# Patient Record
Sex: FEMALE | Race: WHITE | Employment: OTHER | RURAL
[De-identification: names, ages, dates, MRNs, and addresses within clinical notes are randomized per-mention and may not be internally consistent; named-entity substitution may affect disease eponyms.]

---

## 2017-01-02 DIAGNOSIS — I10 ESSENTIAL HYPERTENSION: ICD-10-CM

## 2017-01-03 RX ORDER — ISOSORBIDE MONONITRATE 30 MG/1
TABLET, EXTENDED RELEASE ORAL
Qty: 30 TABLET | Refills: 0 | Status: SHIPPED | OUTPATIENT
Start: 2017-01-03 | End: 2017-02-07 | Stop reason: SDUPTHER

## 2017-01-03 RX ORDER — CLOPIDOGREL BISULFATE 75 MG/1
TABLET ORAL
Qty: 30 TABLET | Refills: 0 | Status: SHIPPED | OUTPATIENT
Start: 2017-01-03 | End: 2017-01-09 | Stop reason: SDUPTHER

## 2017-01-03 RX ORDER — METOPROLOL SUCCINATE 25 MG/1
TABLET, EXTENDED RELEASE ORAL
Qty: 30 TABLET | Refills: 0 | Status: SHIPPED | OUTPATIENT
Start: 2017-01-03 | End: 2017-02-07 | Stop reason: SDUPTHER

## 2017-01-03 RX ORDER — ATORVASTATIN CALCIUM 40 MG/1
TABLET, FILM COATED ORAL
Qty: 30 TABLET | Refills: 0 | Status: SHIPPED | OUTPATIENT
Start: 2017-01-03 | End: 2017-02-07 | Stop reason: SDUPTHER

## 2017-01-09 DIAGNOSIS — F41.9 ANXIETY: ICD-10-CM

## 2017-01-11 RX ORDER — CLOPIDOGREL BISULFATE 75 MG/1
TABLET ORAL
Qty: 30 TABLET | Refills: 0 | Status: SHIPPED | OUTPATIENT
Start: 2017-01-11 | End: 2017-02-07 | Stop reason: SDUPTHER

## 2017-01-11 RX ORDER — DIAZEPAM 5 MG/1
TABLET ORAL
Qty: 15 TABLET | Refills: 0 | Status: SHIPPED | OUTPATIENT
Start: 2017-01-11 | End: 2017-02-07 | Stop reason: SDUPTHER

## 2017-02-04 ENCOUNTER — HOSPITAL ENCOUNTER (OUTPATIENT)
Dept: OTHER | Age: 67
Discharge: HOME | End: 2017-02-04
Attending: INTERNAL MEDICINE | Admitting: INTERNAL MEDICINE

## 2017-02-04 DIAGNOSIS — E78.5 HYPERLIPIDEMIA, UNSPECIFIED HYPERLIPIDEMIA TYPE: ICD-10-CM

## 2017-02-04 DIAGNOSIS — E11.40 TYPE 2 DIABETES MELLITUS WITH DIABETIC NEUROPATHY, WITH LONG-TERM CURRENT USE OF INSULIN (HCC): ICD-10-CM

## 2017-02-04 DIAGNOSIS — Z79.4 TYPE 2 DIABETES MELLITUS WITH DIABETIC NEUROPATHY, WITH LONG-TERM CURRENT USE OF INSULIN (HCC): ICD-10-CM

## 2017-02-04 DIAGNOSIS — D64.9 ANEMIA, UNSPECIFIED TYPE: ICD-10-CM

## 2017-02-04 LAB
A/G RATIO: 1.9 (ref 0.8–2)
ALBUMIN SERPL-MCNC: 4.8 G/DL (ref 3.4–4.8)
ALP BLD-CCNC: 143 U/L (ref 25–100)
ALT SERPL-CCNC: 11 U/L (ref 4–36)
ANION GAP SERPL CALCULATED.3IONS-SCNC: 14 MMOL/L (ref 3–16)
AST SERPL-CCNC: 16 U/L (ref 8–33)
BASOPHILS ABSOLUTE: 0 K/UL (ref 0–0.1)
BASOPHILS RELATIVE PERCENT: 0.3 %
BILIRUB SERPL-MCNC: 0.6 MG/DL (ref 0.3–1.2)
BUN BLDV-MCNC: 13 MG/DL (ref 6–20)
CALCIUM SERPL-MCNC: 10.2 MG/DL (ref 8.5–10.5)
CHLORIDE BLD-SCNC: 98 MMOL/L (ref 98–107)
CHOLESTEROL, TOTAL: 127 MG/DL (ref 0–200)
CO2: 29 MMOL/L (ref 20–30)
CREAT SERPL-MCNC: 0.8 MG/DL (ref 0.4–1.2)
EOSINOPHILS ABSOLUTE: 0.1 K/UL (ref 0–0.4)
EOSINOPHILS RELATIVE PERCENT: 1.1 %
GFR AFRICAN AMERICAN: >59
GFR NON-AFRICAN AMERICAN: >60
GLOBULIN: 2.5 G/DL
GLUCOSE BLD-MCNC: 151 MG/DL (ref 74–106)
HBA1C MFR BLD: 7.8 %
HCT VFR BLD CALC: 39.8 % (ref 37–47)
HDLC SERPL-MCNC: 46 MG/DL (ref 40–60)
HEMOGLOBIN: 13.2 G/DL (ref 11.5–16.5)
LDL CHOLESTEROL CALCULATED: 60 MG/DL
LYMPHOCYTES ABSOLUTE: 1.6 K/UL (ref 1.5–4)
LYMPHOCYTES RELATIVE PERCENT: 26 % (ref 20–40)
MCH RBC QN AUTO: 27 PG (ref 27–32)
MCHC RBC AUTO-ENTMCNC: 33.2 G/DL (ref 31–35)
MCV RBC AUTO: 81.4 FL (ref 80–100)
MONOCYTES ABSOLUTE: 0.4 K/UL (ref 0.2–0.8)
MONOCYTES RELATIVE PERCENT: 6.5 % (ref 3–10)
NEUTROPHILS ABSOLUTE: 4.1 K/UL (ref 2–7.5)
NEUTROPHILS RELATIVE PERCENT: 66.1 %
PDW BLD-RTO: 14.3 % (ref 11–16)
PLATELET # BLD: 191 K/UL (ref 150–400)
PMV BLD AUTO: 11.9 FL (ref 6–10)
POTASSIUM SERPL-SCNC: 4.2 MMOL/L (ref 3.4–5.1)
RBC # BLD: 4.89 M/UL (ref 3.8–5.8)
SODIUM BLD-SCNC: 141 MMOL/L (ref 136–145)
TOTAL PROTEIN: 7.3 G/DL (ref 6.4–8.3)
TRIGL SERPL-MCNC: 106 MG/DL (ref 0–249)
VLDLC SERPL CALC-MCNC: 21 MG/DL
WBC # BLD: 6.2 K/UL (ref 4–11)

## 2017-02-07 ENCOUNTER — TELEPHONE (OUTPATIENT)
Dept: PRIMARY CARE CLINIC | Age: 67
End: 2017-02-07

## 2017-02-07 ENCOUNTER — OFFICE VISIT (OUTPATIENT)
Dept: PRIMARY CARE CLINIC | Age: 67
End: 2017-02-07
Payer: COMMERCIAL

## 2017-02-07 VITALS
HEART RATE: 78 BPM | RESPIRATION RATE: 18 BRPM | SYSTOLIC BLOOD PRESSURE: 140 MMHG | WEIGHT: 156.2 LBS | DIASTOLIC BLOOD PRESSURE: 76 MMHG | BODY MASS INDEX: 25.21 KG/M2 | OXYGEN SATURATION: 99 %

## 2017-02-07 DIAGNOSIS — E11.40 TYPE 2 DIABETES MELLITUS WITH DIABETIC NEUROPATHY, WITH LONG-TERM CURRENT USE OF INSULIN (HCC): Primary | ICD-10-CM

## 2017-02-07 DIAGNOSIS — Z79.4 TYPE 2 DIABETES MELLITUS WITH DIABETIC NEUROPATHY, WITH LONG-TERM CURRENT USE OF INSULIN (HCC): Primary | ICD-10-CM

## 2017-02-07 DIAGNOSIS — E78.5 HYPERLIPIDEMIA, UNSPECIFIED HYPERLIPIDEMIA TYPE: ICD-10-CM

## 2017-02-07 DIAGNOSIS — F41.9 ANXIETY: ICD-10-CM

## 2017-02-07 DIAGNOSIS — I10 ESSENTIAL HYPERTENSION: ICD-10-CM

## 2017-02-07 DIAGNOSIS — M17.0 OSTEOARTHRITIS OF BOTH KNEES, UNSPECIFIED OSTEOARTHRITIS TYPE: ICD-10-CM

## 2017-02-07 DIAGNOSIS — E53.8 VITAMIN B12 DEFICIENCY: ICD-10-CM

## 2017-02-07 PROCEDURE — 96372 THER/PROPH/DIAG INJ SC/IM: CPT | Performed by: INTERNAL MEDICINE

## 2017-02-07 PROCEDURE — 99213 OFFICE O/P EST LOW 20 MIN: CPT | Performed by: INTERNAL MEDICINE

## 2017-02-07 RX ORDER — DIAZEPAM 5 MG/1
TABLET ORAL
Qty: 15 TABLET | Refills: 1 | Status: SHIPPED | OUTPATIENT
Start: 2017-02-07 | End: 2017-05-05 | Stop reason: SDUPTHER

## 2017-02-07 RX ORDER — CLOPIDOGREL BISULFATE 75 MG/1
TABLET ORAL
Qty: 30 TABLET | Refills: 0 | Status: SHIPPED | OUTPATIENT
Start: 2017-02-07 | End: 2017-04-10 | Stop reason: SDUPTHER

## 2017-02-07 RX ORDER — GABAPENTIN 100 MG/1
200 CAPSULE ORAL 3 TIMES DAILY
Qty: 180 CAPSULE | Refills: 1 | Status: SHIPPED | OUTPATIENT
Start: 2017-02-07 | End: 2017-06-05 | Stop reason: SDUPTHER

## 2017-02-07 RX ORDER — CYANOCOBALAMIN 1000 UG/ML
1000 INJECTION INTRAMUSCULAR; SUBCUTANEOUS ONCE
Status: COMPLETED | OUTPATIENT
Start: 2017-02-07 | End: 2017-02-07

## 2017-02-07 RX ORDER — ISOSORBIDE MONONITRATE 30 MG/1
TABLET, EXTENDED RELEASE ORAL
Qty: 30 TABLET | Refills: 0 | Status: SHIPPED | OUTPATIENT
Start: 2017-02-07 | End: 2017-03-03 | Stop reason: SDUPTHER

## 2017-02-07 RX ORDER — ATORVASTATIN CALCIUM 40 MG/1
TABLET, FILM COATED ORAL
Qty: 30 TABLET | Refills: 0 | Status: SHIPPED | OUTPATIENT
Start: 2017-02-07 | End: 2017-03-03 | Stop reason: SDUPTHER

## 2017-02-07 RX ORDER — METOPROLOL SUCCINATE 25 MG/1
TABLET, EXTENDED RELEASE ORAL
Qty: 30 TABLET | Refills: 0 | Status: SHIPPED | OUTPATIENT
Start: 2017-02-07 | End: 2017-03-06 | Stop reason: SDUPTHER

## 2017-02-07 RX ORDER — HYDROCODONE BITARTRATE AND ACETAMINOPHEN 5; 325 MG/1; MG/1
1 TABLET ORAL 2 TIMES DAILY PRN
Qty: 30 TABLET | Refills: 0 | Status: SHIPPED | OUTPATIENT
Start: 2017-02-07 | End: 2017-05-08 | Stop reason: SDUPTHER

## 2017-02-07 RX ADMIN — CYANOCOBALAMIN 1000 MCG: 1000 INJECTION INTRAMUSCULAR; SUBCUTANEOUS at 13:26

## 2017-02-07 ASSESSMENT — ENCOUNTER SYMPTOMS
NAUSEA: 0
ABDOMINAL PAIN: 0
VOMITING: 0
WHEEZING: 0
SHORTNESS OF BREATH: 0
COUGH: 0
SINUS PRESSURE: 0
BACK PAIN: 0
SORE THROAT: 0
EYE DISCHARGE: 0

## 2017-03-03 RX ORDER — ATORVASTATIN CALCIUM 40 MG/1
TABLET, FILM COATED ORAL
Qty: 30 TABLET | Refills: 3 | Status: SHIPPED | OUTPATIENT
Start: 2017-03-03 | End: 2018-04-23 | Stop reason: ALTCHOICE

## 2017-03-03 RX ORDER — ISOSORBIDE MONONITRATE 30 MG/1
TABLET, EXTENDED RELEASE ORAL
Qty: 30 TABLET | Refills: 3 | Status: SHIPPED | OUTPATIENT
Start: 2017-03-03 | End: 2017-05-04 | Stop reason: SDUPTHER

## 2017-03-06 DIAGNOSIS — I10 ESSENTIAL HYPERTENSION: ICD-10-CM

## 2017-03-06 RX ORDER — METOPROLOL SUCCINATE 25 MG/1
TABLET, EXTENDED RELEASE ORAL
Qty: 30 TABLET | Refills: 0 | Status: SHIPPED | OUTPATIENT
Start: 2017-03-06 | End: 2017-05-04 | Stop reason: SDUPTHER

## 2017-04-10 RX ORDER — CLOPIDOGREL BISULFATE 75 MG/1
TABLET ORAL
Qty: 30 TABLET | Refills: 0 | Status: SHIPPED | OUTPATIENT
Start: 2017-04-10 | End: 2017-05-04 | Stop reason: SDUPTHER

## 2017-04-17 RX ORDER — BLOOD SUGAR DIAGNOSTIC
STRIP MISCELLANEOUS
Qty: 100 EACH | Refills: 3 | Status: SHIPPED | OUTPATIENT
Start: 2017-04-17

## 2017-05-04 DIAGNOSIS — I10 ESSENTIAL HYPERTENSION: ICD-10-CM

## 2017-05-04 RX ORDER — METOPROLOL SUCCINATE 25 MG/1
TABLET, EXTENDED RELEASE ORAL
Qty: 30 TABLET | Refills: 3 | Status: SHIPPED | OUTPATIENT
Start: 2017-05-04 | End: 2018-04-23 | Stop reason: ALTCHOICE

## 2017-05-04 RX ORDER — CLOPIDOGREL BISULFATE 75 MG/1
TABLET ORAL
Qty: 30 TABLET | Refills: 3 | Status: SHIPPED | OUTPATIENT
Start: 2017-05-04 | End: 2017-09-29 | Stop reason: SDUPTHER

## 2017-05-04 RX ORDER — ISOSORBIDE MONONITRATE 30 MG/1
TABLET, EXTENDED RELEASE ORAL
Qty: 30 TABLET | Refills: 3 | Status: SHIPPED | OUTPATIENT
Start: 2017-05-04 | End: 2017-09-29 | Stop reason: SDUPTHER

## 2017-05-05 ENCOUNTER — TELEPHONE (OUTPATIENT)
Dept: PRIMARY CARE CLINIC | Age: 67
End: 2017-05-05

## 2017-05-05 DIAGNOSIS — F41.9 ANXIETY: ICD-10-CM

## 2017-05-08 ENCOUNTER — OFFICE VISIT (OUTPATIENT)
Dept: PRIMARY CARE CLINIC | Age: 67
End: 2017-05-08
Payer: COMMERCIAL

## 2017-05-08 VITALS
BODY MASS INDEX: 25.6 KG/M2 | OXYGEN SATURATION: 99 % | DIASTOLIC BLOOD PRESSURE: 68 MMHG | SYSTOLIC BLOOD PRESSURE: 130 MMHG | HEART RATE: 79 BPM | WEIGHT: 158.6 LBS | RESPIRATION RATE: 18 BRPM

## 2017-05-08 DIAGNOSIS — E53.8 B12 DEFICIENCY: ICD-10-CM

## 2017-05-08 DIAGNOSIS — I10 ESSENTIAL HYPERTENSION: ICD-10-CM

## 2017-05-08 DIAGNOSIS — Z96.653 STATUS POST BILATERAL KNEE REPLACEMENTS: ICD-10-CM

## 2017-05-08 DIAGNOSIS — E11.40 TYPE 2 DIABETES MELLITUS WITH DIABETIC NEUROPATHY, WITH LONG-TERM CURRENT USE OF INSULIN (HCC): Primary | ICD-10-CM

## 2017-05-08 DIAGNOSIS — Z79.4 TYPE 2 DIABETES MELLITUS WITH DIABETIC NEUROPATHY, WITH LONG-TERM CURRENT USE OF INSULIN (HCC): Primary | ICD-10-CM

## 2017-05-08 PROCEDURE — 96372 THER/PROPH/DIAG INJ SC/IM: CPT | Performed by: INTERNAL MEDICINE

## 2017-05-08 PROCEDURE — 99213 OFFICE O/P EST LOW 20 MIN: CPT | Performed by: INTERNAL MEDICINE

## 2017-05-08 RX ORDER — HYDROCODONE BITARTRATE AND ACETAMINOPHEN 5; 325 MG/1; MG/1
1 TABLET ORAL 2 TIMES DAILY PRN
Qty: 30 TABLET | Refills: 0 | Status: SHIPPED | OUTPATIENT
Start: 2017-05-08 | End: 2017-07-06 | Stop reason: SDUPTHER

## 2017-05-08 RX ORDER — ONDANSETRON 4 MG/1
TABLET, FILM COATED ORAL
COMMUNITY
Start: 2016-03-16 | End: 2017-07-06 | Stop reason: ALTCHOICE

## 2017-05-08 RX ORDER — DIAZEPAM 5 MG/1
TABLET ORAL
Qty: 15 TABLET | Refills: 1 | Status: SHIPPED | OUTPATIENT
Start: 2017-05-08 | End: 2017-07-11 | Stop reason: SDUPTHER

## 2017-05-08 RX ORDER — CYANOCOBALAMIN 1000 UG/ML
1000 INJECTION INTRAMUSCULAR; SUBCUTANEOUS ONCE
Status: COMPLETED | OUTPATIENT
Start: 2017-05-08 | End: 2017-05-08

## 2017-05-08 RX ADMIN — CYANOCOBALAMIN 1000 MCG: 1000 INJECTION INTRAMUSCULAR; SUBCUTANEOUS at 16:58

## 2017-05-08 ASSESSMENT — PATIENT HEALTH QUESTIONNAIRE - PHQ9
SUM OF ALL RESPONSES TO PHQ QUESTIONS 1-9: 0
2. FEELING DOWN, DEPRESSED OR HOPELESS: 0
1. LITTLE INTEREST OR PLEASURE IN DOING THINGS: 0
SUM OF ALL RESPONSES TO PHQ9 QUESTIONS 1 & 2: 0

## 2017-05-08 ASSESSMENT — ENCOUNTER SYMPTOMS
WHEEZING: 0
BACK PAIN: 0
SHORTNESS OF BREATH: 0
EYE DISCHARGE: 0
VOMITING: 0
SINUS PRESSURE: 0
ABDOMINAL PAIN: 0
NAUSEA: 0
COUGH: 0
SORE THROAT: 0

## 2017-05-26 DIAGNOSIS — Z96.653 STATUS POST BILATERAL KNEE REPLACEMENTS: ICD-10-CM

## 2017-06-05 RX ORDER — GABAPENTIN 100 MG/1
200 CAPSULE ORAL 3 TIMES DAILY
Qty: 180 CAPSULE | Refills: 1 | Status: SHIPPED | OUTPATIENT
Start: 2017-06-05 | End: 2017-07-06

## 2017-06-30 ENCOUNTER — HOSPITAL ENCOUNTER (OUTPATIENT)
Dept: OTHER | Age: 67
Discharge: HOME | End: 2017-06-30
Attending: INTERNAL MEDICINE | Admitting: INTERNAL MEDICINE

## 2017-06-30 LAB
A/G RATIO: 2.1 (ref 0.8–2)
ALBUMIN SERPL-MCNC: 4.1 G/DL (ref 3.4–4.8)
ALP BLD-CCNC: 93 U/L (ref 25–100)
ALT SERPL-CCNC: 11 U/L (ref 4–36)
ANION GAP SERPL CALCULATED.3IONS-SCNC: 16 MMOL/L (ref 3–16)
AST SERPL-CCNC: 15 U/L (ref 8–33)
BILIRUB SERPL-MCNC: 0.4 MG/DL (ref 0.3–1.2)
BUN BLDV-MCNC: 12 MG/DL (ref 6–20)
CALCIUM SERPL-MCNC: 9.3 MG/DL (ref 8.5–10.5)
CHLORIDE BLD-SCNC: 102 MMOL/L (ref 98–107)
CO2: 25 MMOL/L (ref 20–30)
CREAT SERPL-MCNC: 0.9 MG/DL (ref 0.4–1.2)
GFR AFRICAN AMERICAN: >59
GFR NON-AFRICAN AMERICAN: >60
GLOBULIN: 2 G/DL
GLUCOSE BLD-MCNC: 212 MG/DL (ref 74–106)
HBA1C MFR BLD: 7 %
POTASSIUM SERPL-SCNC: 3.5 MMOL/L (ref 3.4–5.1)
SODIUM BLD-SCNC: 143 MMOL/L (ref 136–145)
TOTAL PROTEIN: 6.1 G/DL (ref 6.4–8.3)

## 2017-07-06 ENCOUNTER — OFFICE VISIT (OUTPATIENT)
Dept: PRIMARY CARE CLINIC | Age: 67
End: 2017-07-06
Payer: COMMERCIAL

## 2017-07-06 VITALS
RESPIRATION RATE: 18 BRPM | SYSTOLIC BLOOD PRESSURE: 128 MMHG | DIASTOLIC BLOOD PRESSURE: 64 MMHG | HEART RATE: 72 BPM | WEIGHT: 152.2 LBS | OXYGEN SATURATION: 98 % | BODY MASS INDEX: 24.57 KG/M2

## 2017-07-06 DIAGNOSIS — Z79.4 TYPE 2 DIABETES MELLITUS WITH DIABETIC NEUROPATHY, WITH LONG-TERM CURRENT USE OF INSULIN (HCC): Primary | ICD-10-CM

## 2017-07-06 DIAGNOSIS — Z96.653 STATUS POST BILATERAL KNEE REPLACEMENTS: ICD-10-CM

## 2017-07-06 DIAGNOSIS — E11.40 TYPE 2 DIABETES MELLITUS WITH DIABETIC NEUROPATHY, WITH LONG-TERM CURRENT USE OF INSULIN (HCC): Primary | ICD-10-CM

## 2017-07-06 DIAGNOSIS — E53.8 VITAMIN B12 DEFICIENCY: ICD-10-CM

## 2017-07-06 DIAGNOSIS — I10 ESSENTIAL HYPERTENSION: ICD-10-CM

## 2017-07-06 PROCEDURE — 96372 THER/PROPH/DIAG INJ SC/IM: CPT | Performed by: INTERNAL MEDICINE

## 2017-07-06 PROCEDURE — 99213 OFFICE O/P EST LOW 20 MIN: CPT | Performed by: INTERNAL MEDICINE

## 2017-07-06 RX ORDER — HYDROCODONE BITARTRATE AND ACETAMINOPHEN 5; 325 MG/1; MG/1
1 TABLET ORAL 2 TIMES DAILY PRN
Qty: 30 TABLET | Refills: 0 | Status: SHIPPED | OUTPATIENT
Start: 2017-07-06 | End: 2017-08-07 | Stop reason: SDUPTHER

## 2017-07-06 RX ORDER — CYANOCOBALAMIN 1000 UG/ML
1000 INJECTION INTRAMUSCULAR; SUBCUTANEOUS ONCE
Status: COMPLETED | OUTPATIENT
Start: 2017-07-06 | End: 2017-07-06

## 2017-07-06 RX ADMIN — CYANOCOBALAMIN 1000 MCG: 1000 INJECTION INTRAMUSCULAR; SUBCUTANEOUS at 14:24

## 2017-07-06 ASSESSMENT — ENCOUNTER SYMPTOMS
SORE THROAT: 0
NAUSEA: 0
WHEEZING: 0
EYE DISCHARGE: 0
ABDOMINAL PAIN: 0
COUGH: 0
SHORTNESS OF BREATH: 0
BACK PAIN: 0
SINUS PRESSURE: 0
VOMITING: 0

## 2017-07-11 DIAGNOSIS — F41.9 ANXIETY: ICD-10-CM

## 2017-07-11 RX ORDER — DIAZEPAM 5 MG/1
TABLET ORAL
Qty: 15 TABLET | Refills: 1 | Status: SHIPPED | OUTPATIENT
Start: 2017-07-11 | End: 2017-09-19 | Stop reason: SDUPTHER

## 2017-08-07 RX ORDER — HYDROCODONE BITARTRATE AND ACETAMINOPHEN 5; 325 MG/1; MG/1
1 TABLET ORAL 2 TIMES DAILY PRN
Qty: 30 TABLET | Refills: 0 | Status: SHIPPED | OUTPATIENT
Start: 2017-08-07 | End: 2017-09-19 | Stop reason: SDUPTHER

## 2017-09-12 ENCOUNTER — TELEPHONE (OUTPATIENT)
Dept: PRIMARY CARE CLINIC | Age: 67
End: 2017-09-12

## 2017-09-19 ENCOUNTER — OFFICE VISIT (OUTPATIENT)
Dept: PRIMARY CARE CLINIC | Age: 67
End: 2017-09-19
Payer: COMMERCIAL

## 2017-09-19 ENCOUNTER — HOSPITAL ENCOUNTER (OUTPATIENT)
Dept: OTHER | Age: 67
Discharge: HOME | End: 2017-09-19
Attending: INTERNAL MEDICINE | Admitting: INTERNAL MEDICINE

## 2017-09-19 VITALS
RESPIRATION RATE: 18 BRPM | DIASTOLIC BLOOD PRESSURE: 70 MMHG | OXYGEN SATURATION: 99 % | BODY MASS INDEX: 24.82 KG/M2 | WEIGHT: 153.8 LBS | HEART RATE: 63 BPM | SYSTOLIC BLOOD PRESSURE: 136 MMHG

## 2017-09-19 DIAGNOSIS — I10 ESSENTIAL HYPERTENSION: ICD-10-CM

## 2017-09-19 DIAGNOSIS — F41.9 ANXIETY: ICD-10-CM

## 2017-09-19 DIAGNOSIS — Z79.4 TYPE 2 DIABETES MELLITUS WITH DIABETIC NEUROPATHY, WITH LONG-TERM CURRENT USE OF INSULIN (HCC): ICD-10-CM

## 2017-09-19 DIAGNOSIS — R11.2 NAUSEA AND VOMITING, INTRACTABILITY OF VOMITING NOT SPECIFIED, UNSPECIFIED VOMITING TYPE: Primary | ICD-10-CM

## 2017-09-19 DIAGNOSIS — Z96.653 STATUS POST BILATERAL KNEE REPLACEMENTS: ICD-10-CM

## 2017-09-19 DIAGNOSIS — E11.40 TYPE 2 DIABETES MELLITUS WITH DIABETIC NEUROPATHY, WITH LONG-TERM CURRENT USE OF INSULIN (HCC): ICD-10-CM

## 2017-09-19 LAB
CREATININE URINE: 26.7 MG/DL (ref 1.5–300)
MICROALBUMIN UR-MCNC: 1.9 MG/DL (ref 0–22)
MICROALBUMIN/CREAT UR-RTO: 71.2 MG/G (ref 0–30)

## 2017-09-19 PROCEDURE — 99214 OFFICE O/P EST MOD 30 MIN: CPT | Performed by: INTERNAL MEDICINE

## 2017-09-19 RX ORDER — ONDANSETRON 4 MG/1
4 TABLET, ORALLY DISINTEGRATING ORAL EVERY 8 HOURS PRN
Qty: 25 TABLET | Refills: 0 | Status: SHIPPED | OUTPATIENT
Start: 2017-09-19 | End: 2018-04-23 | Stop reason: ALTCHOICE

## 2017-09-19 RX ORDER — HYDROCODONE BITARTRATE AND ACETAMINOPHEN 5; 325 MG/1; MG/1
1 TABLET ORAL 2 TIMES DAILY PRN
Qty: 30 TABLET | Refills: 0 | Status: SHIPPED | OUTPATIENT
Start: 2017-09-19 | End: 2017-10-24 | Stop reason: SDUPTHER

## 2017-09-19 RX ORDER — DIAZEPAM 5 MG/1
TABLET ORAL
Qty: 15 TABLET | Refills: 1 | Status: SHIPPED | OUTPATIENT
Start: 2017-09-19 | End: 2017-12-12 | Stop reason: SDUPTHER

## 2017-09-19 RX ORDER — RANITIDINE 150 MG/1
150 TABLET ORAL 2 TIMES DAILY
Qty: 60 TABLET | Refills: 0 | Status: SHIPPED | OUTPATIENT
Start: 2017-09-19 | End: 2018-01-03 | Stop reason: SDUPTHER

## 2017-09-19 ASSESSMENT — ENCOUNTER SYMPTOMS
EYE DISCHARGE: 0
COUGH: 0
WHEEZING: 0
SHORTNESS OF BREATH: 0
SORE THROAT: 0
ABDOMINAL PAIN: 1
BACK PAIN: 0
DIARRHEA: 1
VOMITING: 0
SINUS PRESSURE: 0
NAUSEA: 1

## 2017-09-29 RX ORDER — ISOSORBIDE MONONITRATE 30 MG/1
TABLET, EXTENDED RELEASE ORAL
Qty: 30 TABLET | Refills: 3 | Status: SHIPPED | OUTPATIENT
Start: 2017-09-29 | End: 2018-01-30 | Stop reason: SDUPTHER

## 2017-09-29 RX ORDER — CLOPIDOGREL BISULFATE 75 MG/1
TABLET ORAL
Qty: 30 TABLET | Refills: 3 | Status: SHIPPED | OUTPATIENT
Start: 2017-09-29 | End: 2018-01-28 | Stop reason: SDUPTHER

## 2017-10-16 ENCOUNTER — TELEPHONE (OUTPATIENT)
Dept: PRIMARY CARE CLINIC | Age: 67
End: 2017-10-16

## 2017-10-16 NOTE — TELEPHONE ENCOUNTER
Patient came in requesting Levemir samples but we were out. Is there anything she can take in it's place.  Please notify patient to

## 2017-10-24 RX ORDER — HYDROCODONE BITARTRATE AND ACETAMINOPHEN 5; 325 MG/1; MG/1
1 TABLET ORAL 2 TIMES DAILY PRN
Qty: 30 TABLET | Refills: 0 | Status: SHIPPED | OUTPATIENT
Start: 2017-10-24 | End: 2018-01-03 | Stop reason: SDUPTHER

## 2017-10-30 ENCOUNTER — TELEPHONE (OUTPATIENT)
Dept: PRIMARY CARE CLINIC | Age: 67
End: 2017-10-30

## 2017-10-30 ENCOUNTER — HOSPITAL ENCOUNTER (OUTPATIENT)
Dept: OTHER | Age: 67
Discharge: HOME | End: 2017-10-30
Attending: INTERNAL MEDICINE | Admitting: INTERNAL MEDICINE

## 2017-10-30 DIAGNOSIS — E11.40 TYPE 2 DIABETES MELLITUS WITH DIABETIC NEUROPATHY, WITH LONG-TERM CURRENT USE OF INSULIN (HCC): ICD-10-CM

## 2017-10-30 DIAGNOSIS — I10 ESSENTIAL HYPERTENSION: ICD-10-CM

## 2017-10-30 DIAGNOSIS — Z79.4 TYPE 2 DIABETES MELLITUS WITH DIABETIC NEUROPATHY, WITH LONG-TERM CURRENT USE OF INSULIN (HCC): ICD-10-CM

## 2017-10-30 LAB
A/G RATIO: 1.7 (ref 0.8–2)
ALBUMIN SERPL-MCNC: 4.1 G/DL (ref 3.4–4.8)
ALP BLD-CCNC: 120 U/L (ref 25–100)
ALT SERPL-CCNC: 12 U/L (ref 4–36)
ANION GAP SERPL CALCULATED.3IONS-SCNC: 13 MMOL/L (ref 3–16)
AST SERPL-CCNC: 15 U/L (ref 8–33)
BILIRUB SERPL-MCNC: 0.4 MG/DL (ref 0.3–1.2)
BUN BLDV-MCNC: 12 MG/DL (ref 6–20)
CALCIUM SERPL-MCNC: 9 MG/DL (ref 8.5–10.5)
CHLORIDE BLD-SCNC: 100 MMOL/L (ref 98–107)
CO2: 28 MMOL/L (ref 20–30)
CREAT SERPL-MCNC: 0.8 MG/DL (ref 0.4–1.2)
GFR AFRICAN AMERICAN: >59
GFR NON-AFRICAN AMERICAN: >60
GLOBULIN: 2.4 G/DL
GLUCOSE BLD-MCNC: 220 MG/DL (ref 74–106)
HBA1C MFR BLD: 6.8 %
POTASSIUM SERPL-SCNC: 3.9 MMOL/L (ref 3.4–5.1)
SODIUM BLD-SCNC: 141 MMOL/L (ref 136–145)
TOTAL PROTEIN: 6.5 G/DL (ref 6.4–8.3)
TSH SERPL DL<=0.05 MIU/L-ACNC: 4.05 UIU/ML (ref 0.35–5.5)

## 2017-10-30 NOTE — TELEPHONE ENCOUNTER
Patient came to the office today for more samples of Toujeo 300 units. This was prescribed by Dr. Joy Mullins. Patient was given 2 box/boxes. , Lot # [id number], Exp. Date 2/19.

## 2017-12-01 ENCOUNTER — TELEPHONE (OUTPATIENT)
Dept: PRIMARY CARE CLINIC | Age: 67
End: 2017-12-01

## 2017-12-12 DIAGNOSIS — F41.9 ANXIETY: ICD-10-CM

## 2017-12-12 RX ORDER — DIAZEPAM 5 MG/1
TABLET ORAL
Qty: 15 TABLET | Refills: 1 | Status: SHIPPED | OUTPATIENT
Start: 2017-12-12 | End: 2018-04-23 | Stop reason: SDUPTHER

## 2018-01-03 ENCOUNTER — OFFICE VISIT (OUTPATIENT)
Dept: PRIMARY CARE CLINIC | Age: 68
End: 2018-01-03
Payer: COMMERCIAL

## 2018-01-03 VITALS
BODY MASS INDEX: 27.72 KG/M2 | HEART RATE: 69 BPM | HEIGHT: 65 IN | WEIGHT: 166.4 LBS | RESPIRATION RATE: 20 BRPM | DIASTOLIC BLOOD PRESSURE: 80 MMHG | OXYGEN SATURATION: 96 % | SYSTOLIC BLOOD PRESSURE: 138 MMHG

## 2018-01-03 DIAGNOSIS — E53.8 VITAMIN B12 DEFICIENCY: ICD-10-CM

## 2018-01-03 DIAGNOSIS — Z23 NEED FOR VACCINATION WITH 13-POLYVALENT PNEUMOCOCCAL CONJUGATE VACCINE: ICD-10-CM

## 2018-01-03 DIAGNOSIS — E11.40 TYPE 2 DIABETES MELLITUS WITH DIABETIC NEUROPATHY, WITH LONG-TERM CURRENT USE OF INSULIN (HCC): Primary | ICD-10-CM

## 2018-01-03 DIAGNOSIS — Z79.4 TYPE 2 DIABETES MELLITUS WITH DIABETIC NEUROPATHY, WITH LONG-TERM CURRENT USE OF INSULIN (HCC): Primary | ICD-10-CM

## 2018-01-03 DIAGNOSIS — I25.10 CORONARY ARTERY DISEASE INVOLVING NATIVE CORONARY ARTERY OF NATIVE HEART WITHOUT ANGINA PECTORIS: ICD-10-CM

## 2018-01-03 DIAGNOSIS — I10 ESSENTIAL HYPERTENSION: ICD-10-CM

## 2018-01-03 PROCEDURE — 96372 THER/PROPH/DIAG INJ SC/IM: CPT | Performed by: INTERNAL MEDICINE

## 2018-01-03 PROCEDURE — 90670 PCV13 VACCINE IM: CPT | Performed by: INTERNAL MEDICINE

## 2018-01-03 PROCEDURE — G0009 ADMIN PNEUMOCOCCAL VACCINE: HCPCS | Performed by: INTERNAL MEDICINE

## 2018-01-03 PROCEDURE — 99213 OFFICE O/P EST LOW 20 MIN: CPT | Performed by: INTERNAL MEDICINE

## 2018-01-03 RX ORDER — LISINOPRIL 2.5 MG/1
2.5 TABLET ORAL DAILY
Qty: 30 TABLET | Refills: 2 | Status: SHIPPED | OUTPATIENT
Start: 2018-01-03 | End: 2018-04-23 | Stop reason: ALTCHOICE

## 2018-01-03 RX ORDER — HYDROCODONE BITARTRATE AND ACETAMINOPHEN 5; 325 MG/1; MG/1
1 TABLET ORAL 2 TIMES DAILY PRN
Qty: 30 TABLET | Refills: 0 | Status: SHIPPED | OUTPATIENT
Start: 2018-01-03 | End: 2018-05-23 | Stop reason: SDUPTHER

## 2018-01-03 RX ORDER — CYANOCOBALAMIN 1000 UG/ML
1000 INJECTION INTRAMUSCULAR; SUBCUTANEOUS ONCE
Status: COMPLETED | OUTPATIENT
Start: 2018-01-03 | End: 2018-01-03

## 2018-01-03 RX ORDER — RANITIDINE 150 MG/1
150 TABLET ORAL 2 TIMES DAILY
Qty: 60 TABLET | Refills: 0 | Status: SHIPPED | OUTPATIENT
Start: 2018-01-03 | End: 2018-04-23 | Stop reason: ALTCHOICE

## 2018-01-03 RX ADMIN — CYANOCOBALAMIN 1000 MCG: 1000 INJECTION INTRAMUSCULAR; SUBCUTANEOUS at 11:03

## 2018-01-03 ASSESSMENT — ENCOUNTER SYMPTOMS
WHEEZING: 0
SORE THROAT: 0
SINUS PRESSURE: 0
BACK PAIN: 0
SHORTNESS OF BREATH: 0
EYE DISCHARGE: 0
VOMITING: 0
COUGH: 0

## 2018-01-09 ENCOUNTER — HOSPITAL ENCOUNTER (OUTPATIENT)
Facility: HOSPITAL | Age: 68
Setting detail: HOSPITAL OUTPATIENT SURGERY
End: 2018-01-09
Attending: OPHTHALMOLOGY | Admitting: OPHTHALMOLOGY

## 2018-01-29 RX ORDER — CLOPIDOGREL BISULFATE 75 MG/1
TABLET ORAL
Qty: 30 TABLET | Refills: 5 | Status: SHIPPED | OUTPATIENT
Start: 2018-01-29 | End: 2018-07-28 | Stop reason: SDUPTHER

## 2018-02-02 RX ORDER — ISOSORBIDE MONONITRATE 30 MG/1
TABLET, EXTENDED RELEASE ORAL
Qty: 30 TABLET | Refills: 3 | Status: SHIPPED | OUTPATIENT
Start: 2018-02-02 | End: 2018-04-23 | Stop reason: SDUPTHER

## 2018-03-02 RX ORDER — INSULIN DETEMIR 100 [IU]/ML
INJECTION, SOLUTION SUBCUTANEOUS
Qty: 15 ML | Refills: 5 | Status: SHIPPED | OUTPATIENT
Start: 2018-03-02 | End: 2018-05-16 | Stop reason: SDUPTHER

## 2018-03-29 ENCOUNTER — HOSPITAL ENCOUNTER (OUTPATIENT)
Dept: OTHER | Age: 68
Discharge: HOME | End: 2018-03-29
Attending: INTERNAL MEDICINE | Admitting: INTERNAL MEDICINE

## 2018-03-29 ENCOUNTER — OFFICE VISIT (OUTPATIENT)
Dept: CARDIOLOGY | Facility: CLINIC | Age: 68
End: 2018-03-29

## 2018-03-29 VITALS
DIASTOLIC BLOOD PRESSURE: 60 MMHG | HEART RATE: 60 BPM | SYSTOLIC BLOOD PRESSURE: 136 MMHG | BODY MASS INDEX: 31.47 KG/M2 | WEIGHT: 171 LBS | HEIGHT: 62 IN

## 2018-03-29 DIAGNOSIS — I10 ESSENTIAL HYPERTENSION: ICD-10-CM

## 2018-03-29 DIAGNOSIS — G45.9 TRANSIENT CEREBRAL ISCHEMIA, UNSPECIFIED TYPE: ICD-10-CM

## 2018-03-29 DIAGNOSIS — Z79.4 TYPE 2 DIABETES MELLITUS WITH DIABETIC NEUROPATHY, WITH LONG-TERM CURRENT USE OF INSULIN (HCC): ICD-10-CM

## 2018-03-29 DIAGNOSIS — I25.10 CORONARY ARTERY DISEASE INVOLVING NATIVE HEART WITHOUT ANGINA PECTORIS, UNSPECIFIED VESSEL OR LESION TYPE: Primary | ICD-10-CM

## 2018-03-29 DIAGNOSIS — E11.40 TYPE 2 DIABETES MELLITUS WITH DIABETIC NEUROPATHY, WITH LONG-TERM CURRENT USE OF INSULIN (HCC): ICD-10-CM

## 2018-03-29 PROCEDURE — 99214 OFFICE O/P EST MOD 30 MIN: CPT | Performed by: INTERNAL MEDICINE

## 2018-03-29 RX ORDER — ROSUVASTATIN CALCIUM 20 MG/1
20 TABLET, COATED ORAL DAILY
Qty: 90 TABLET | Refills: 1 | Status: SHIPPED | OUTPATIENT
Start: 2018-03-29 | End: 2022-07-01 | Stop reason: SDUPTHER

## 2018-03-29 NOTE — PROGRESS NOTES
[facility]  Office visit  Karol Lopez  1950  [phone]    VISIT DATE:  03/29/2018    PCP: Romaine Eugene MD  [address] [place] [address]    CC:  No chief complaint on file.      PROBLEM LIST:  1. Coronary artery disease:    a. Pharmacologic MPS (09/27/2012):   Small, mild, partially reversible apical defect, LVEF (66%).  b. Echocardiogram (09/27/2012):  LVEF 60% to 65%, mild aortic leaflet calcification with no stenosis.  c. Cardiac catheterization (11/09/2012):  status post HEIDI to proximal LAD.  LVEF 60%.  d.  Chronic stable angina, class II-III symptoms, February 2015.  e. Myocardial perfusion study 09/10/2015:  No reversible ischemia; EF 60.    2.  Cerebrovascular disease:    a. History of lacunar infarct, 02/13/2012,  with presentation to local hospital with left-sided weakness and facial drooping.  b. Transfer to [facility] with systemic thrombolysis.  c. CT of the brain revealing mild atherosclerotic disease, no acute processes, 60% stenosis  of the proximal right ICA and 40% to 50%.  d. Cerebral angiogram by Feroz Hudson (11/09/2012):  Mild (< 50%) stenosis bilaterally, medical therapy recommended.  e. Residual speech and right upper extremity deficit with ongoing rehabilitation.  3. Mild aortic stenosis:  a. Echocardiogram (08/29/2013):  LVEF 55% to 60%.  Mild aortic stenosis.  b. Echocardiogram 09/10/2015:  Normal LV systolic function; EF 55% to 60%.  No noted AS  or AI.   4. Hypercholesterolemia.  5. Current tobacco abuse.  6.  Acid reflux.  7. Family history of hypertension.  8. Tobacco abuse, 1-1/2 packs per day.    ASSESSMENT:   Diagnosis Plan   1. Coronary artery disease involving native heart without angina pectoris, unspecified vessel or lesion type     2. Essential hypertension     3. Transient cerebral ischemia, unspecified type         PLAN:  Coronary artery disease: Currently stable and asymptomatic.  Continue  antiplatelet therapy, afterload reduction and restarting statin therapy.    Hypertension: Goal less than 130/80 mmHg.  Continue current medical therapy, well-controlled    Hyperlipidemia: Goal LDL less than 70.  Starting rosuvastatin 20 mg by mouth daily.  Follow-up lipid panel in 6 months.    Peripheral vascular disease: Mild bilateral coronary atherosclerosis based on angiogram.  Continue aggressive risk factor modification.  Currently asymptomatic.    Nicotine addiction: Counseled on need for smoking cessation.    Subjective  Here for routine follow-up.  Denies chest pain or palpitations.  Has stable shortness of breath and a class II pattern.  Continues to smoke a pack per day, down from 2 packs per day at her Max.  Blood pressures running less than 140/90 mmHg.  She is compliant with medical therapy.  Denies bleeding complications on dual antiplatelet therapy.  Was taken off atorvastatin at some point, she is unclear the reason.  Reports that it was just not refilled at the pharmacy.  Denies claudication.  Mild bilateral ankle edema which worsens throughout the day.    PHYSICAL EXAMINATION:  There were no vitals filed for this visit.  General Appearance:    Alert, cooperative, no distress, appears stated age   Head:    Normocephalic, without obvious abnormality, atraumatic   Eyes:    conjunctiva/corneas clear   Nose:   Nares normal, septum midline, mucosa normal, no drainage   Throat:   Lips, teeth and gums normal   Neck:   Supple, symmetrical, trachea midline, no carotid    bruit or JVD   Lungs:     Clear to auscultation bilaterally, respirations unlabored   Chest Wall:    No tenderness or deformity    Heart:    Regular rate and rhythm, S1 and S2 normal, 3/6 early peaking systolic murmur right upper sternal border with radiation to the right carotid, rub   or gallop, normal carotid impulse bilaterally without bruit.   Abdomen:     Soft, non-tender   Extremities:   Extremities normal, atraumatic, no cyanosis or  "edema   Pulses:   2+ and symmetric all extremities   Skin:   Skin color, texture, turgor normal, no rashes or lesions       Diagnostic Data:  Procedures  Lab Results   Component Value Date    TRIG 130 11/13/2016    HDL 29 (L) 11/13/2016     Lab Results   Component Value Date    GLUCOSE 206 (H) 11/13/2016    BUN 14 11/13/2016    CREATININE 0.80 11/13/2016     11/13/2016    K 3.6 11/13/2016     11/13/2016    CO2 30.0 11/13/2016     Lab Results   Component Value Date    HGBA1C 6.60 (H) 11/13/2016     Lab Results   Component Value Date    WBC 4.28 11/13/2016    HGB 10.8 (L) 11/13/2016    HCT 32.7 (L) 11/13/2016     11/13/2016       Allergies  Allergies   Allergen Reactions   • Codeine        Current Medications    Current Outpatient Prescriptions:   •  aspirin 81 MG chewable tablet, Take 81 mg by mouth daily., Disp: , Rfl:   •  atorvastatin (LIPITOR) 40 MG tablet, Take 1 tablet by mouth daily, Disp: , Rfl:   •  B-D INS SYRINGE 0.5CC/31GX5/16 31G X 5/16\" 0.5 ML misc, use as directed once daily, Disp: , Rfl: 0  •  celecoxib (CeleBREX) 100 MG capsule, Take 1 capsule by mouth 2 (two) times a day., Disp: 30 capsule, Rfl: 0  •  cetirizine (ZYRTEC ALLERGY) 10 MG tablet, Take 1 tablet by mouth daily, Disp: , Rfl:   •  clopidogrel (PLAVIX) 75 MG tablet, Take 1 tablet by mouth daily, Disp: , Rfl:   •  diazePAM (VALIUM) 5 MG tablet, TAKE 1 TABLE BY MOUTH AT BEDTIME AS NEEDED FOR SLEEP OR ANXIETY, Disp: , Rfl:   •  gabapentin (NEURONTIN) 100 MG capsule, take 1 capsule by mouth three times a day, Disp: , Rfl: 0  •  HYDROcodone-acetaminophen (NORCO) 5-325 MG per tablet, Take 1 tablet by mouth Every 8 (Eight) Hours As Needed (PRN PAIN)., Disp: 30 tablet, Rfl: 0  •  insulin aspart (NOVOLOG FLEXPEN) 100 UNIT/ML solution pen-injector sc pen, Inject 10 Units into the skin 3 times daily (before meals) Please provide needles, Disp: , Rfl:   •  Insulin Syringe-Needle U-100 ([employer] INSULIN SYRINGE) 31G X 5/16\" 1 ML misc, 1 " "each by Does not apply route daily, Disp: , Rfl:   •  Insulin Syringe-Needle U-100 30G X 5/16\" 0.5 ML misc, 1 each by Does not apply route daily, Disp: , Rfl:   •  isosorbide mononitrate (IMDUR) 30 MG 24 hr tablet, Take 1 tablet by mouth daily, Disp: , Rfl:   •  LEVEMIR 100 UNIT/ML injection, USE 70 UNITS INTO THE SKIN NIGHTLY, Disp: , Rfl: 0  •  lisinopril (PRINIVIL,ZESTRIL) 10 MG tablet, Take 1 tablet by mouth daily, Disp: , Rfl:   •  metoprolol succinate XL (TOPROL XL) 25 MG 24 hr tablet, Take 1 tablet by mouth daily, Disp: , Rfl:   •  nitroglycerin (NITROSTAT) 0.4 MG SL tablet, Place 1 tablet under the tongue every 5 minutes as needed for Chest pain, Disp: , Rfl:   •  ondansetron (ZOFRAN) 4 MG tablet, every 8 (eight) hours., Disp: , Rfl:   •  pantoprazole (PROTONIX) 40 MG EC tablet, take 1 tablet by mouth once daily, Disp: , Rfl: 0  •  sitaGLIPtin (JANUVIA) 100 MG tablet, Take 1 tablet by mouth daily, Disp: , Rfl:           ROS  Review of Systems   Cardiovascular: Positive for dyspnea on exertion and leg swelling. Negative for chest pain, claudication, irregular heartbeat and palpitations.   Respiratory: Negative for shortness of breath and snoring.          SOCIAL HX  Social History     Social History   • Marital status:      Spouse name: N/A   • Number of children: N/A   • Years of education: N/A     Occupational History   • Not on file.     Social History Main Topics   • Smoking status: Current Every Day Smoker     Packs/day: 1.00     Years: 40.00   • Smokeless tobacco: Never Used   • Alcohol use No   • Drug use: No   • Sexual activity: Defer     Other Topics Concern   • Not on file     Social History Narrative   • No narrative on file       FAMILY HX  Family History   Problem Relation Age of Onset   • Hypertension Other    • Diabetes Mother    • Hypertension Mother    • Cancer Mother    • Diabetes Father    • Hypertension Father    • Heart disease Father    • Cancer Sister              Abiel Oliveira " III, MD, FACC

## 2018-03-30 LAB
AVERAGE GLUCOSE: ABNORMAL
CHOLESTEROL, TOTAL: 224 MG/DL
CHOLESTEROL/HDL RATIO: 3.6
HBA1C MFR BLD: 10.2 %
HDLC SERPL-MCNC: 41 MG/DL (ref 35–70)
LDL CHOLESTEROL CALCULATED: 146 MG/DL (ref 0–160)
TRIGL SERPL-MCNC: 183 MG/DL
VLDLC SERPL CALC-MCNC: 37 MG/DL

## 2018-04-04 ENCOUNTER — TELEPHONE (OUTPATIENT)
Dept: PRIMARY CARE CLINIC | Age: 68
End: 2018-04-04

## 2018-04-10 ENCOUNTER — OFFICE VISIT (OUTPATIENT)
Dept: PRIMARY CARE CLINIC | Age: 68
End: 2018-04-10
Payer: COMMERCIAL

## 2018-04-10 VITALS
OXYGEN SATURATION: 98 % | SYSTOLIC BLOOD PRESSURE: 118 MMHG | WEIGHT: 169.6 LBS | HEART RATE: 69 BPM | BODY MASS INDEX: 28.22 KG/M2 | TEMPERATURE: 97.9 F | DIASTOLIC BLOOD PRESSURE: 70 MMHG

## 2018-04-10 DIAGNOSIS — R52 BODY ACHES: ICD-10-CM

## 2018-04-10 DIAGNOSIS — E53.8 VITAMIN B12 DEFICIENCY: ICD-10-CM

## 2018-04-10 DIAGNOSIS — R05.9 COUGH: ICD-10-CM

## 2018-04-10 DIAGNOSIS — J01.90 ACUTE SINUSITIS, RECURRENCE NOT SPECIFIED, UNSPECIFIED LOCATION: Primary | ICD-10-CM

## 2018-04-10 LAB
INFLUENZA A ANTIBODY: NORMAL
INFLUENZA B ANTIBODY: NORMAL

## 2018-04-10 PROCEDURE — 99213 OFFICE O/P EST LOW 20 MIN: CPT | Performed by: NURSE PRACTITIONER

## 2018-04-10 PROCEDURE — 87804 INFLUENZA ASSAY W/OPTIC: CPT | Performed by: NURSE PRACTITIONER

## 2018-04-10 PROCEDURE — 96372 THER/PROPH/DIAG INJ SC/IM: CPT | Performed by: NURSE PRACTITIONER

## 2018-04-10 RX ORDER — CYANOCOBALAMIN 1000 UG/ML
1000 INJECTION INTRAMUSCULAR; SUBCUTANEOUS ONCE
Status: COMPLETED | OUTPATIENT
Start: 2018-04-10 | End: 2018-04-10

## 2018-04-10 RX ORDER — AMOXICILLIN AND CLAVULANATE POTASSIUM 875; 125 MG/1; MG/1
1 TABLET, FILM COATED ORAL 2 TIMES DAILY WITH MEALS
Qty: 20 TABLET | Refills: 0 | Status: SHIPPED | OUTPATIENT
Start: 2018-04-10 | End: 2018-04-20

## 2018-04-10 RX ADMIN — CYANOCOBALAMIN 1000 MCG: 1000 INJECTION INTRAMUSCULAR; SUBCUTANEOUS at 10:53

## 2018-04-10 ASSESSMENT — ENCOUNTER SYMPTOMS
SHORTNESS OF BREATH: 0
COUGH: 1
NAUSEA: 0
EYE PAIN: 0
SORE THROAT: 0
ABDOMINAL PAIN: 0
VOMITING: 0

## 2018-04-23 ENCOUNTER — OFFICE VISIT (OUTPATIENT)
Dept: PRIMARY CARE CLINIC | Age: 68
End: 2018-04-23
Payer: COMMERCIAL

## 2018-04-23 VITALS
DIASTOLIC BLOOD PRESSURE: 66 MMHG | WEIGHT: 164.2 LBS | HEART RATE: 100 BPM | BODY MASS INDEX: 27.32 KG/M2 | SYSTOLIC BLOOD PRESSURE: 140 MMHG | OXYGEN SATURATION: 98 % | RESPIRATION RATE: 18 BRPM

## 2018-04-23 DIAGNOSIS — Z79.4 TYPE 2 DIABETES MELLITUS WITH DIABETIC NEUROPATHY, WITH LONG-TERM CURRENT USE OF INSULIN (HCC): Primary | ICD-10-CM

## 2018-04-23 DIAGNOSIS — I10 ESSENTIAL HYPERTENSION: ICD-10-CM

## 2018-04-23 DIAGNOSIS — M51.36 DEGENERATIVE DISC DISEASE, LUMBAR: ICD-10-CM

## 2018-04-23 DIAGNOSIS — F41.9 ANXIETY: ICD-10-CM

## 2018-04-23 DIAGNOSIS — E53.8 VITAMIN B12 DEFICIENCY: ICD-10-CM

## 2018-04-23 DIAGNOSIS — Z87.891 PERSONAL HISTORY OF NICOTINE DEPENDENCE: ICD-10-CM

## 2018-04-23 DIAGNOSIS — E11.40 TYPE 2 DIABETES MELLITUS WITH DIABETIC NEUROPATHY, WITH LONG-TERM CURRENT USE OF INSULIN (HCC): Primary | ICD-10-CM

## 2018-04-23 DIAGNOSIS — E78.5 HYPERLIPIDEMIA, UNSPECIFIED HYPERLIPIDEMIA TYPE: ICD-10-CM

## 2018-04-23 PROBLEM — M51.369 DEGENERATIVE DISC DISEASE, LUMBAR: Status: ACTIVE | Noted: 2018-04-23

## 2018-04-23 PROCEDURE — 99406 BEHAV CHNG SMOKING 3-10 MIN: CPT | Performed by: INTERNAL MEDICINE

## 2018-04-23 PROCEDURE — 99214 OFFICE O/P EST MOD 30 MIN: CPT | Performed by: INTERNAL MEDICINE

## 2018-04-23 RX ORDER — DIAZEPAM 5 MG/1
TABLET ORAL
Qty: 15 TABLET | Refills: 1 | Status: SHIPPED | OUTPATIENT
Start: 2018-04-23 | End: 2019-01-09 | Stop reason: SDUPTHER

## 2018-04-23 RX ORDER — LISINOPRIL 2.5 MG/1
2.5 TABLET ORAL DAILY
Qty: 30 TABLET | Refills: 2 | Status: CANCELLED | OUTPATIENT
Start: 2018-04-23

## 2018-04-23 RX ORDER — ROSUVASTATIN CALCIUM 20 MG/1
20 TABLET, COATED ORAL DAILY
COMMUNITY
End: 2018-08-23 | Stop reason: SDUPTHER

## 2018-04-23 RX ORDER — ISOSORBIDE MONONITRATE 30 MG/1
TABLET, EXTENDED RELEASE ORAL
Qty: 30 TABLET | Refills: 3 | Status: SHIPPED | OUTPATIENT
Start: 2018-04-23 | End: 2018-08-23 | Stop reason: SDUPTHER

## 2018-04-23 RX ORDER — NITROGLYCERIN 0.4 MG/1
0.4 TABLET SUBLINGUAL EVERY 5 MIN PRN
Qty: 25 TABLET | Refills: 1 | Status: SHIPPED | OUTPATIENT
Start: 2018-04-23 | End: 2018-08-23 | Stop reason: SDUPTHER

## 2018-04-23 RX ORDER — TRAMADOL HYDROCHLORIDE 50 MG/1
50 TABLET ORAL EVERY 4 HOURS PRN
Qty: 18 TABLET | Refills: 0 | Status: SHIPPED | OUTPATIENT
Start: 2018-04-23 | End: 2018-04-26

## 2018-04-23 ASSESSMENT — ENCOUNTER SYMPTOMS
SHORTNESS OF BREATH: 0
COUGH: 0
BACK PAIN: 1
WHEEZING: 0
SORE THROAT: 0
VOMITING: 0
EYE DISCHARGE: 0
SINUS PRESSURE: 0

## 2018-05-02 ENCOUNTER — CARE COORDINATION (OUTPATIENT)
Dept: CARE COORDINATION | Age: 68
End: 2018-05-02

## 2018-05-16 RX ORDER — INSULIN DETEMIR 100 [IU]/ML
INJECTION, SOLUTION SUBCUTANEOUS
Qty: 15 ML | Refills: 1 | Status: SHIPPED | OUTPATIENT
Start: 2018-05-16 | End: 2018-07-17 | Stop reason: SDUPTHER

## 2018-05-23 ENCOUNTER — OFFICE VISIT (OUTPATIENT)
Dept: PRIMARY CARE CLINIC | Age: 68
End: 2018-05-23
Payer: COMMERCIAL

## 2018-05-23 VITALS
BODY MASS INDEX: 27.09 KG/M2 | DIASTOLIC BLOOD PRESSURE: 68 MMHG | TEMPERATURE: 98.1 F | SYSTOLIC BLOOD PRESSURE: 130 MMHG | HEART RATE: 86 BPM | OXYGEN SATURATION: 99 % | WEIGHT: 162.8 LBS

## 2018-05-23 DIAGNOSIS — Z87.891 PERSONAL HISTORY OF NICOTINE DEPENDENCE: ICD-10-CM

## 2018-05-23 DIAGNOSIS — Z79.4 TYPE 2 DIABETES MELLITUS WITH DIABETIC NEUROPATHY, WITH LONG-TERM CURRENT USE OF INSULIN (HCC): Primary | ICD-10-CM

## 2018-05-23 DIAGNOSIS — M51.36 DEGENERATIVE DISC DISEASE, LUMBAR: ICD-10-CM

## 2018-05-23 DIAGNOSIS — E53.8 VITAMIN B12 DEFICIENCY: ICD-10-CM

## 2018-05-23 DIAGNOSIS — E11.40 TYPE 2 DIABETES MELLITUS WITH DIABETIC NEUROPATHY, WITH LONG-TERM CURRENT USE OF INSULIN (HCC): Primary | ICD-10-CM

## 2018-05-23 DIAGNOSIS — Z78.0 POST-MENOPAUSAL: ICD-10-CM

## 2018-05-23 DIAGNOSIS — I10 ESSENTIAL HYPERTENSION: ICD-10-CM

## 2018-05-23 PROCEDURE — 99406 BEHAV CHNG SMOKING 3-10 MIN: CPT | Performed by: INTERNAL MEDICINE

## 2018-05-23 PROCEDURE — 99214 OFFICE O/P EST MOD 30 MIN: CPT | Performed by: INTERNAL MEDICINE

## 2018-05-23 PROCEDURE — 96372 THER/PROPH/DIAG INJ SC/IM: CPT | Performed by: INTERNAL MEDICINE

## 2018-05-23 RX ORDER — HYDROCODONE BITARTRATE AND ACETAMINOPHEN 5; 325 MG/1; MG/1
1 TABLET ORAL 2 TIMES DAILY PRN
Qty: 30 TABLET | Refills: 0 | Status: SHIPPED | OUTPATIENT
Start: 2018-05-23 | End: 2018-08-23 | Stop reason: SDUPTHER

## 2018-05-23 RX ORDER — CYANOCOBALAMIN 1000 UG/ML
1000 INJECTION INTRAMUSCULAR; SUBCUTANEOUS ONCE
Status: COMPLETED | OUTPATIENT
Start: 2018-05-23 | End: 2018-05-23

## 2018-05-23 RX ADMIN — CYANOCOBALAMIN 1000 MCG: 1000 INJECTION INTRAMUSCULAR; SUBCUTANEOUS at 11:18

## 2018-05-23 ASSESSMENT — ENCOUNTER SYMPTOMS
SHORTNESS OF BREATH: 0
BACK PAIN: 1
COUGH: 0
WHEEZING: 0
VOMITING: 0
EYE DISCHARGE: 0
SORE THROAT: 0
SINUS PRESSURE: 0

## 2018-05-23 ASSESSMENT — PATIENT HEALTH QUESTIONNAIRE - PHQ9
SUM OF ALL RESPONSES TO PHQ9 QUESTIONS 1 & 2: 2
2. FEELING DOWN, DEPRESSED OR HOPELESS: 1
1. LITTLE INTEREST OR PLEASURE IN DOING THINGS: 1
SUM OF ALL RESPONSES TO PHQ QUESTIONS 1-9: 2

## 2018-05-29 ENCOUNTER — HOSPITAL ENCOUNTER (OUTPATIENT)
Dept: GENERAL RADIOLOGY | Age: 68
Discharge: HOME | End: 2018-05-29
Attending: INTERNAL MEDICINE | Admitting: INTERNAL MEDICINE

## 2018-05-29 DIAGNOSIS — Z78.0 ASYMPTOMATIC MENOPAUSAL STATE: ICD-10-CM

## 2018-05-29 DIAGNOSIS — Z78.0 POST-MENOPAUSAL: ICD-10-CM

## 2018-05-29 DIAGNOSIS — M81.0 OSTEOPOROSIS, UNSPECIFIED OSTEOPOROSIS TYPE, UNSPECIFIED PATHOLOGICAL FRACTURE PRESENCE: ICD-10-CM

## 2018-07-05 RX ORDER — GLUCOSAMINE HCL/CHONDROITIN SU 500-400 MG
CAPSULE ORAL
Qty: 100 STRIP | Refills: 5 | Status: SHIPPED | OUTPATIENT
Start: 2018-07-05 | End: 2021-03-10 | Stop reason: SDUPTHER

## 2018-07-17 RX ORDER — INSULIN DETEMIR 100 [IU]/ML
INJECTION, SOLUTION SUBCUTANEOUS
Qty: 15 ML | Refills: 1 | Status: SHIPPED | OUTPATIENT
Start: 2018-07-17 | End: 2018-08-23

## 2018-07-30 RX ORDER — CLOPIDOGREL BISULFATE 75 MG/1
TABLET ORAL
Qty: 30 TABLET | Refills: 5 | Status: SHIPPED | OUTPATIENT
Start: 2018-07-30 | End: 2018-08-23 | Stop reason: SDUPTHER

## 2018-08-23 ENCOUNTER — CARE COORDINATION (OUTPATIENT)
Dept: CARE COORDINATION | Age: 68
End: 2018-08-23

## 2018-08-23 ENCOUNTER — OFFICE VISIT (OUTPATIENT)
Dept: PRIMARY CARE CLINIC | Age: 68
End: 2018-08-23
Payer: MEDICARE

## 2018-08-23 VITALS
BODY MASS INDEX: 26.33 KG/M2 | SYSTOLIC BLOOD PRESSURE: 123 MMHG | HEART RATE: 60 BPM | WEIGHT: 158.2 LBS | RESPIRATION RATE: 18 BRPM | DIASTOLIC BLOOD PRESSURE: 64 MMHG | OXYGEN SATURATION: 99 %

## 2018-08-23 DIAGNOSIS — E53.8 VITAMIN B12 DEFICIENCY: ICD-10-CM

## 2018-08-23 DIAGNOSIS — E11.40 TYPE 2 DIABETES MELLITUS WITH DIABETIC NEUROPATHY, WITH LONG-TERM CURRENT USE OF INSULIN (HCC): Primary | ICD-10-CM

## 2018-08-23 DIAGNOSIS — M51.36 DEGENERATIVE DISC DISEASE, LUMBAR: ICD-10-CM

## 2018-08-23 DIAGNOSIS — Z79.4 TYPE 2 DIABETES MELLITUS WITH DIABETIC NEUROPATHY, WITH LONG-TERM CURRENT USE OF INSULIN (HCC): Primary | ICD-10-CM

## 2018-08-23 LAB — HBA1C MFR BLD: 8.7 %

## 2018-08-23 PROCEDURE — G8419 CALC BMI OUT NRM PARAM NOF/U: HCPCS | Performed by: INTERNAL MEDICINE

## 2018-08-23 PROCEDURE — G8598 ASA/ANTIPLAT THER USED: HCPCS | Performed by: INTERNAL MEDICINE

## 2018-08-23 PROCEDURE — G8400 PT W/DXA NO RESULTS DOC: HCPCS | Performed by: INTERNAL MEDICINE

## 2018-08-23 PROCEDURE — G8427 DOCREV CUR MEDS BY ELIG CLIN: HCPCS | Performed by: INTERNAL MEDICINE

## 2018-08-23 PROCEDURE — 4004F PT TOBACCO SCREEN RCVD TLK: CPT | Performed by: INTERNAL MEDICINE

## 2018-08-23 PROCEDURE — 4040F PNEUMOC VAC/ADMIN/RCVD: CPT | Performed by: INTERNAL MEDICINE

## 2018-08-23 PROCEDURE — 1101F PT FALLS ASSESS-DOCD LE1/YR: CPT | Performed by: INTERNAL MEDICINE

## 2018-08-23 PROCEDURE — 1123F ACP DISCUSS/DSCN MKR DOCD: CPT | Performed by: INTERNAL MEDICINE

## 2018-08-23 PROCEDURE — 83036 HEMOGLOBIN GLYCOSYLATED A1C: CPT | Performed by: INTERNAL MEDICINE

## 2018-08-23 PROCEDURE — 2022F DILAT RTA XM EVC RTNOPTHY: CPT | Performed by: INTERNAL MEDICINE

## 2018-08-23 PROCEDURE — 3045F PR MOST RECENT HEMOGLOBIN A1C LEVEL 7.0-9.0%: CPT | Performed by: INTERNAL MEDICINE

## 2018-08-23 PROCEDURE — 99213 OFFICE O/P EST LOW 20 MIN: CPT | Performed by: INTERNAL MEDICINE

## 2018-08-23 PROCEDURE — 3017F COLORECTAL CA SCREEN DOC REV: CPT | Performed by: INTERNAL MEDICINE

## 2018-08-23 PROCEDURE — 1090F PRES/ABSN URINE INCON ASSESS: CPT | Performed by: INTERNAL MEDICINE

## 2018-08-23 RX ORDER — CLOPIDOGREL BISULFATE 75 MG/1
TABLET ORAL
Qty: 30 TABLET | Refills: 3 | Status: SHIPPED | OUTPATIENT
Start: 2018-08-23 | End: 2018-10-30 | Stop reason: SDUPTHER

## 2018-08-23 RX ORDER — ISOSORBIDE MONONITRATE 30 MG/1
TABLET, EXTENDED RELEASE ORAL
Qty: 30 TABLET | Refills: 3 | Status: SHIPPED | OUTPATIENT
Start: 2018-08-23 | End: 2018-10-30 | Stop reason: SDUPTHER

## 2018-08-23 RX ORDER — HYDROCODONE BITARTRATE AND ACETAMINOPHEN 5; 325 MG/1; MG/1
1 TABLET ORAL 2 TIMES DAILY PRN
Qty: 30 TABLET | Refills: 0 | Status: SHIPPED | OUTPATIENT
Start: 2018-08-23 | End: 2018-10-30 | Stop reason: SDUPTHER

## 2018-08-23 RX ORDER — CYANOCOBALAMIN 1000 UG/ML
1000 INJECTION INTRAMUSCULAR; SUBCUTANEOUS ONCE
Status: DISCONTINUED | OUTPATIENT
Start: 2018-08-23 | End: 2019-05-15

## 2018-08-23 RX ORDER — NITROGLYCERIN 0.4 MG/1
0.4 TABLET SUBLINGUAL EVERY 5 MIN PRN
Qty: 25 TABLET | Refills: 1 | Status: SHIPPED | OUTPATIENT
Start: 2018-08-23

## 2018-08-23 RX ORDER — ROSUVASTATIN CALCIUM 20 MG/1
20 TABLET, COATED ORAL DAILY
Qty: 30 TABLET | Refills: 3 | Status: SHIPPED | OUTPATIENT
Start: 2018-08-23 | End: 2018-10-30 | Stop reason: SDUPTHER

## 2018-08-23 ASSESSMENT — ENCOUNTER SYMPTOMS
VOMITING: 0
SINUS PRESSURE: 0
SHORTNESS OF BREATH: 0
WHEEZING: 0
BACK PAIN: 1
COUGH: 0
EYE DISCHARGE: 0
SORE THROAT: 0

## 2018-08-23 NOTE — PROGRESS NOTES
going to have her bring in numbers in a few days and adjust accordingly. Patient also to follow up with Diabetic Educator. I advised her to monitor her fingerstick closely. I am going to check the A1c every few months. I will check Microalbumin on a yearly basis. I have also advised her to have a yearly eye exam and to monitor her feet closely. Advice her about the complications from diabetes, even with good control. I am going to have her follow up on a regular bases with our diabetic educator. A1C will be checked in few months. Lab Results   Component Value Date    LABA1C 8.7 08/23/2018        - POCT glycosylated hemoglobin (Hb A1C)    2. Vitamin B12 deficiency  Continue replacement. - cyanocobalamin injection 1,000 mcg; Inject 1 mL into the muscle once    3. Degenerative disc disease, lumbar  MRI L Spine:  IMPRESSION:    1. 1+ L2-L3 and L4-L5 degenerative disc disease. 2. 5+ L5-S1 degenerative disc disease. 3. 3+ L4-L5 and L5-S1 facet joint arthritis. 4. Otherwise unremarkable exam. There is no disc herniation or   spinal stenosis.      I am going to treat her with Norco. Advised patient to avoid heavy lifting, use heating pad. 1. Goal is to alleviate pain to a degree that the patient can participate in own ADLS social activity and improve function. 2. Risk and benefits were reviewed at length, including risk for addiction and possible side effects and interactions. Alternative therapy was discussed and will be a part of the patients overall care. Including but not limited to, physical therapy, other medications as well as behavioral and activity modification and the use of other modalities (chiropractic care ect. ). 3. This patient does not exhibit a potential for abuse or addiction at this time. Will monitor closely. 4. UDS has been compliant. Will randomly check drug screen. 5. Laquita Curry completed and compliant, will check every 3 months.    6. Advised her that UDS and possible pill scribed by Oh Balderas CMA, in my presence and it is both accurate and complete.

## 2018-08-23 NOTE — PROGRESS NOTES
Have you seen any other physician or provider since your last visit no    Have you had any other diagnostic tests since your last visit? no    Have you changed or stopped any medications since your last visit? no         Pt is here for fu on dm, htn. pts fingerstick range has been .    novolog 10 units TID   levemir 50 units nightly

## 2018-08-30 ENCOUNTER — CARE COORDINATION (OUTPATIENT)
Dept: CARE COORDINATION | Age: 68
End: 2018-08-30

## 2018-10-30 ENCOUNTER — OFFICE VISIT (OUTPATIENT)
Dept: PRIMARY CARE CLINIC | Age: 68
End: 2018-10-30
Payer: MEDICARE

## 2018-10-30 VITALS
SYSTOLIC BLOOD PRESSURE: 136 MMHG | DIASTOLIC BLOOD PRESSURE: 70 MMHG | OXYGEN SATURATION: 99 % | HEART RATE: 62 BPM | RESPIRATION RATE: 18 BRPM | WEIGHT: 157.4 LBS | BODY MASS INDEX: 26.19 KG/M2

## 2018-10-30 DIAGNOSIS — I25.10 CORONARY ARTERY DISEASE INVOLVING NATIVE CORONARY ARTERY OF NATIVE HEART WITHOUT ANGINA PECTORIS: ICD-10-CM

## 2018-10-30 DIAGNOSIS — Z79.4 TYPE 2 DIABETES MELLITUS WITH DIABETIC NEUROPATHY, WITH LONG-TERM CURRENT USE OF INSULIN (HCC): Primary | ICD-10-CM

## 2018-10-30 DIAGNOSIS — I10 ESSENTIAL HYPERTENSION: ICD-10-CM

## 2018-10-30 DIAGNOSIS — Z87.891 PERSONAL HISTORY OF TOBACCO USE: ICD-10-CM

## 2018-10-30 DIAGNOSIS — E11.40 TYPE 2 DIABETES MELLITUS WITH DIABETIC NEUROPATHY, WITH LONG-TERM CURRENT USE OF INSULIN (HCC): Primary | ICD-10-CM

## 2018-10-30 DIAGNOSIS — M51.36 DEGENERATIVE DISC DISEASE, LUMBAR: ICD-10-CM

## 2018-10-30 DIAGNOSIS — E53.8 VITAMIN B12 DEFICIENCY: ICD-10-CM

## 2018-10-30 PROCEDURE — 2022F DILAT RTA XM EVC RTNOPTHY: CPT | Performed by: INTERNAL MEDICINE

## 2018-10-30 PROCEDURE — 4040F PNEUMOC VAC/ADMIN/RCVD: CPT | Performed by: INTERNAL MEDICINE

## 2018-10-30 PROCEDURE — 1101F PT FALLS ASSESS-DOCD LE1/YR: CPT | Performed by: INTERNAL MEDICINE

## 2018-10-30 PROCEDURE — 99406 BEHAV CHNG SMOKING 3-10 MIN: CPT | Performed by: INTERNAL MEDICINE

## 2018-10-30 PROCEDURE — 96372 THER/PROPH/DIAG INJ SC/IM: CPT | Performed by: INTERNAL MEDICINE

## 2018-10-30 PROCEDURE — G8400 PT W/DXA NO RESULTS DOC: HCPCS | Performed by: INTERNAL MEDICINE

## 2018-10-30 PROCEDURE — G8484 FLU IMMUNIZE NO ADMIN: HCPCS | Performed by: INTERNAL MEDICINE

## 2018-10-30 PROCEDURE — 1123F ACP DISCUSS/DSCN MKR DOCD: CPT | Performed by: INTERNAL MEDICINE

## 2018-10-30 PROCEDURE — 3045F PR MOST RECENT HEMOGLOBIN A1C LEVEL 7.0-9.0%: CPT | Performed by: INTERNAL MEDICINE

## 2018-10-30 PROCEDURE — 3017F COLORECTAL CA SCREEN DOC REV: CPT | Performed by: INTERNAL MEDICINE

## 2018-10-30 PROCEDURE — G8598 ASA/ANTIPLAT THER USED: HCPCS | Performed by: INTERNAL MEDICINE

## 2018-10-30 PROCEDURE — G8427 DOCREV CUR MEDS BY ELIG CLIN: HCPCS | Performed by: INTERNAL MEDICINE

## 2018-10-30 PROCEDURE — 1090F PRES/ABSN URINE INCON ASSESS: CPT | Performed by: INTERNAL MEDICINE

## 2018-10-30 PROCEDURE — G8419 CALC BMI OUT NRM PARAM NOF/U: HCPCS | Performed by: INTERNAL MEDICINE

## 2018-10-30 PROCEDURE — 4004F PT TOBACCO SCREEN RCVD TLK: CPT | Performed by: INTERNAL MEDICINE

## 2018-10-30 PROCEDURE — 99214 OFFICE O/P EST MOD 30 MIN: CPT | Performed by: INTERNAL MEDICINE

## 2018-10-30 RX ORDER — CYANOCOBALAMIN 1000 UG/ML
1000 INJECTION INTRAMUSCULAR; SUBCUTANEOUS ONCE
Status: COMPLETED | OUTPATIENT
Start: 2018-10-30 | End: 2018-10-30

## 2018-10-30 RX ORDER — ROSUVASTATIN CALCIUM 20 MG/1
20 TABLET, COATED ORAL DAILY
Qty: 30 TABLET | Refills: 3 | Status: SHIPPED | OUTPATIENT
Start: 2018-10-30 | End: 2019-07-15 | Stop reason: SDUPTHER

## 2018-10-30 RX ORDER — CLOPIDOGREL BISULFATE 75 MG/1
TABLET ORAL
Qty: 30 TABLET | Refills: 3 | Status: SHIPPED | OUTPATIENT
Start: 2018-10-30 | End: 2019-07-15 | Stop reason: SDUPTHER

## 2018-10-30 RX ORDER — HYDROCODONE BITARTRATE AND ACETAMINOPHEN 5; 325 MG/1; MG/1
1 TABLET ORAL 2 TIMES DAILY PRN
Qty: 30 TABLET | Refills: 0 | Status: SHIPPED | OUTPATIENT
Start: 2018-10-30 | End: 2019-01-09 | Stop reason: SDUPTHER

## 2018-10-30 RX ORDER — ISOSORBIDE MONONITRATE 30 MG/1
TABLET, EXTENDED RELEASE ORAL
Qty: 30 TABLET | Refills: 3 | Status: SHIPPED | OUTPATIENT
Start: 2018-10-30 | End: 2019-07-15 | Stop reason: SDUPTHER

## 2018-10-30 RX ADMIN — CYANOCOBALAMIN 1000 MCG: 1000 INJECTION INTRAMUSCULAR; SUBCUTANEOUS at 09:34

## 2018-10-30 ASSESSMENT — ENCOUNTER SYMPTOMS
SHORTNESS OF BREATH: 0
WHEEZING: 0
VOMITING: 0
SORE THROAT: 0
ABDOMINAL PAIN: 0
NAUSEA: 0
COUGH: 0
SINUS PRESSURE: 0
EYE DISCHARGE: 0

## 2018-10-30 NOTE — PROGRESS NOTES
tablet take 1 tablet by mouth once daily 30 tablet 3    nitroGLYCERIN (NITROSTAT) 0.4 MG SL tablet Place 1 tablet under the tongue every 5 minutes as needed for Chest pain 25 tablet 1    insulin aspart (NOVOLOG FLEXPEN) 100 UNIT/ML injection pen Inject 10 Units into the skin 3 times daily (before meals) Please provide needles 5 pen 3    insulin detemir (LEVEMIR FLEXTOUCH) 100 UNIT/ML injection pen Inject 60 Units into the skin nightly (Patient taking differently: Inject 55 Units into the skin nightly ) 10 pen 3    blood glucose monitor strips QID Dx E11.9 100 strip 5    Insulin Syringe-Needle U-100 (B-D INS SYR ULTRAFINE 1CC/31G) 31G X 5/16\" 1 ML MISC USE ONCE A DAY AS DIRECTED  DX E11.9 100 each 3    aspirin 81 MG chewable tablet Take 81 mg by mouth daily. Review of Systems   Constitutional: Negative for chills and fever. HENT: Negative for congestion, sinus pressure and sore throat. Eyes: Negative for discharge and visual disturbance. Respiratory: Negative for cough, shortness of breath and wheezing. Cardiovascular: Negative for chest pain and palpitations. Gastrointestinal: Negative for abdominal pain, nausea and vomiting. Endocrine: Negative for cold intolerance and heat intolerance. Genitourinary: Negative for dysuria, frequency and urgency. Musculoskeletal: Positive for arthralgias and back pain. Skin: Negative for rash and wound. Neurological: Negative for syncope, numbness and headaches. Hematological: Negative. Psychiatric/Behavioral: Positive for sleep disturbance. Negative for agitation, decreased concentration, self-injury and suicidal ideas. The patient is nervous/anxious.         Past Medical History:   Diagnosis Date    CAD (coronary artery disease)     [facility]ronic obstructive [facility] disease) [facility]     [facility]     GERD (gastroesophageal reflux disease)     H/O: CVA (cardiovascular accident)     HTN (hypertension)     Tobacco abuse

## 2018-11-25 ASSESSMENT — ENCOUNTER SYMPTOMS: BACK PAIN: 1

## 2018-12-11 ENCOUNTER — HOSPITAL ENCOUNTER (OUTPATIENT)
Facility: HOSPITAL | Age: 68
Discharge: HOME | End: 2018-12-11
Payer: MEDICARE

## 2018-12-11 DIAGNOSIS — I10 ESSENTIAL HYPERTENSION: ICD-10-CM

## 2018-12-11 DIAGNOSIS — I25.10 CORONARY ARTERY DISEASE INVOLVING NATIVE CORONARY ARTERY OF NATIVE HEART WITHOUT ANGINA PECTORIS: ICD-10-CM

## 2018-12-11 DIAGNOSIS — Z79.4 TYPE 2 DIABETES MELLITUS WITH DIABETIC NEUROPATHY, WITH LONG-TERM CURRENT USE OF INSULIN (HCC): ICD-10-CM

## 2018-12-11 DIAGNOSIS — E11.40 TYPE 2 DIABETES MELLITUS WITH DIABETIC NEUROPATHY, WITH LONG-TERM CURRENT USE OF INSULIN (HCC): ICD-10-CM

## 2018-12-11 PROCEDURE — 36415 COLL VENOUS BLD VENIPUNCTURE: CPT

## 2019-01-09 ENCOUNTER — HOSPITAL ENCOUNTER (OUTPATIENT)
Facility: HOSPITAL | Age: 69
Discharge: HOME | End: 2019-01-09
Payer: MEDICARE

## 2019-01-09 ENCOUNTER — OFFICE VISIT (OUTPATIENT)
Dept: PRIMARY CARE CLINIC | Age: 69
End: 2019-01-09
Payer: MEDICARE

## 2019-01-09 ENCOUNTER — CARE COORDINATION (OUTPATIENT)
Dept: CARE COORDINATION | Age: 69
End: 2019-01-09

## 2019-01-09 VITALS
DIASTOLIC BLOOD PRESSURE: 80 MMHG | RESPIRATION RATE: 18 BRPM | SYSTOLIC BLOOD PRESSURE: 160 MMHG | OXYGEN SATURATION: 99 % | BODY MASS INDEX: 26.13 KG/M2 | HEART RATE: 71 BPM | WEIGHT: 157 LBS

## 2019-01-09 DIAGNOSIS — Z79.4 TYPE 2 DIABETES MELLITUS WITH DIABETIC NEUROPATHY, WITH LONG-TERM CURRENT USE OF INSULIN (HCC): Primary | ICD-10-CM

## 2019-01-09 DIAGNOSIS — M51.36 DEGENERATIVE DISC DISEASE, LUMBAR: ICD-10-CM

## 2019-01-09 DIAGNOSIS — E11.40 TYPE 2 DIABETES MELLITUS WITH DIABETIC NEUROPATHY, WITH LONG-TERM CURRENT USE OF INSULIN (HCC): Primary | ICD-10-CM

## 2019-01-09 DIAGNOSIS — F41.9 ANXIETY: ICD-10-CM

## 2019-01-09 DIAGNOSIS — Z23 NEED FOR PROPHYLACTIC VACCINATION AGAINST STREPTOCOCCUS PNEUMONIAE (PNEUMOCOCCUS): ICD-10-CM

## 2019-01-09 DIAGNOSIS — E53.8 VITAMIN B12 DEFICIENCY: ICD-10-CM

## 2019-01-09 DIAGNOSIS — I10 ESSENTIAL HYPERTENSION: ICD-10-CM

## 2019-01-09 PROCEDURE — G8484 FLU IMMUNIZE NO ADMIN: HCPCS | Performed by: INTERNAL MEDICINE

## 2019-01-09 PROCEDURE — 90732 PPSV23 VACC 2 YRS+ SUBQ/IM: CPT | Performed by: INTERNAL MEDICINE

## 2019-01-09 PROCEDURE — G0009 ADMIN PNEUMOCOCCAL VACCINE: HCPCS | Performed by: INTERNAL MEDICINE

## 2019-01-09 PROCEDURE — 3017F COLORECTAL CA SCREEN DOC REV: CPT | Performed by: INTERNAL MEDICINE

## 2019-01-09 PROCEDURE — 2022F DILAT RTA XM EVC RTNOPTHY: CPT | Performed by: INTERNAL MEDICINE

## 2019-01-09 PROCEDURE — 4040F PNEUMOC VAC/ADMIN/RCVD: CPT | Performed by: INTERNAL MEDICINE

## 2019-01-09 PROCEDURE — G8419 CALC BMI OUT NRM PARAM NOF/U: HCPCS | Performed by: INTERNAL MEDICINE

## 2019-01-09 PROCEDURE — G8400 PT W/DXA NO RESULTS DOC: HCPCS | Performed by: INTERNAL MEDICINE

## 2019-01-09 PROCEDURE — 4004F PT TOBACCO SCREEN RCVD TLK: CPT | Performed by: INTERNAL MEDICINE

## 2019-01-09 PROCEDURE — 96372 THER/PROPH/DIAG INJ SC/IM: CPT | Performed by: INTERNAL MEDICINE

## 2019-01-09 PROCEDURE — G8427 DOCREV CUR MEDS BY ELIG CLIN: HCPCS | Performed by: INTERNAL MEDICINE

## 2019-01-09 PROCEDURE — 1101F PT FALLS ASSESS-DOCD LE1/YR: CPT | Performed by: INTERNAL MEDICINE

## 2019-01-09 PROCEDURE — G8598 ASA/ANTIPLAT THER USED: HCPCS | Performed by: INTERNAL MEDICINE

## 2019-01-09 PROCEDURE — 3046F HEMOGLOBIN A1C LEVEL >9.0%: CPT | Performed by: INTERNAL MEDICINE

## 2019-01-09 PROCEDURE — 1123F ACP DISCUSS/DSCN MKR DOCD: CPT | Performed by: INTERNAL MEDICINE

## 2019-01-09 PROCEDURE — 1090F PRES/ABSN URINE INCON ASSESS: CPT | Performed by: INTERNAL MEDICINE

## 2019-01-09 PROCEDURE — 99213 OFFICE O/P EST LOW 20 MIN: CPT | Performed by: INTERNAL MEDICINE

## 2019-01-09 RX ORDER — DIAZEPAM 5 MG/1
TABLET ORAL
Qty: 15 TABLET | Refills: 1 | Status: SHIPPED | OUTPATIENT
Start: 2019-01-09 | End: 2019-07-24 | Stop reason: SDUPTHER

## 2019-01-09 RX ORDER — CYANOCOBALAMIN 1000 UG/ML
1000 INJECTION INTRAMUSCULAR; SUBCUTANEOUS ONCE
Status: COMPLETED | OUTPATIENT
Start: 2019-01-09 | End: 2019-01-09

## 2019-01-09 RX ORDER — HYDROCODONE BITARTRATE AND ACETAMINOPHEN 5; 325 MG/1; MG/1
1 TABLET ORAL 2 TIMES DAILY PRN
Qty: 30 TABLET | Refills: 0 | Status: SHIPPED | OUTPATIENT
Start: 2019-01-09 | End: 2019-01-10 | Stop reason: SDUPTHER

## 2019-01-09 RX ADMIN — CYANOCOBALAMIN 1000 MCG: 1000 INJECTION INTRAMUSCULAR; SUBCUTANEOUS at 09:55

## 2019-01-09 ASSESSMENT — ENCOUNTER SYMPTOMS
NAUSEA: 0
WHEEZING: 0
ABDOMINAL PAIN: 0
SINUS PRESSURE: 0
VOMITING: 0
SHORTNESS OF BREATH: 0
BACK PAIN: 1
EYE DISCHARGE: 0
SORE THROAT: 0
COUGH: 0

## 2019-01-10 DIAGNOSIS — M51.36 DEGENERATIVE DISC DISEASE, LUMBAR: ICD-10-CM

## 2019-01-10 RX ORDER — HYDROCODONE BITARTRATE AND ACETAMINOPHEN 5; 325 MG/1; MG/1
1 TABLET ORAL 2 TIMES DAILY PRN
Qty: 14 TABLET | Refills: 0 | Status: SHIPPED | OUTPATIENT
Start: 2019-01-10 | End: 2019-01-17

## 2019-01-15 DIAGNOSIS — Z12.39 BREAST CANCER SCREENING: Primary | ICD-10-CM

## 2019-01-16 DIAGNOSIS — F41.9 ANXIETY: ICD-10-CM

## 2019-01-16 DIAGNOSIS — M51.36 DEGENERATIVE DISC DISEASE, LUMBAR: ICD-10-CM

## 2019-02-12 DIAGNOSIS — M51.36 DEGENERATIVE DISC DISEASE, LUMBAR: ICD-10-CM

## 2019-02-12 RX ORDER — HYDROCODONE BITARTRATE AND ACETAMINOPHEN 5; 325 MG/1; MG/1
1 TABLET ORAL 2 TIMES DAILY PRN
Qty: 30 TABLET | Refills: 0 | Status: SHIPPED | OUTPATIENT
Start: 2019-02-12 | End: 2019-04-09 | Stop reason: SDUPTHER

## 2019-02-13 ENCOUNTER — NURSE ONLY (OUTPATIENT)
Dept: PRIMARY CARE CLINIC | Age: 69
End: 2019-02-13
Payer: MEDICARE

## 2019-02-13 DIAGNOSIS — E53.8 VITAMIN B12 DEFICIENCY: Primary | ICD-10-CM

## 2019-02-13 PROCEDURE — 96372 THER/PROPH/DIAG INJ SC/IM: CPT | Performed by: INTERNAL MEDICINE

## 2019-02-13 RX ORDER — CYANOCOBALAMIN 1000 UG/ML
1000 INJECTION INTRAMUSCULAR; SUBCUTANEOUS ONCE
Status: COMPLETED | OUTPATIENT
Start: 2019-02-13 | End: 2019-02-13

## 2019-02-13 RX ADMIN — CYANOCOBALAMIN 1000 MCG: 1000 INJECTION INTRAMUSCULAR; SUBCUTANEOUS at 11:41

## 2019-02-25 RX ORDER — ROSUVASTATIN CALCIUM 20 MG/1
TABLET, COATED ORAL
Qty: 90 TABLET | Refills: 1 | Status: SHIPPED | OUTPATIENT
Start: 2019-02-25 | End: 2019-04-09 | Stop reason: SDUPTHER

## 2019-02-25 RX ORDER — ISOSORBIDE MONONITRATE 30 MG/1
TABLET, EXTENDED RELEASE ORAL
Qty: 90 TABLET | Refills: 1 | Status: SHIPPED | OUTPATIENT
Start: 2019-02-25 | End: 2019-04-09 | Stop reason: SDUPTHER

## 2019-02-25 RX ORDER — CLOPIDOGREL BISULFATE 75 MG/1
TABLET ORAL
Qty: 90 TABLET | Refills: 1 | Status: SHIPPED | OUTPATIENT
Start: 2019-02-25 | End: 2019-04-09 | Stop reason: SDUPTHER

## 2019-04-09 ENCOUNTER — OFFICE VISIT (OUTPATIENT)
Dept: PRIMARY CARE CLINIC | Age: 69
End: 2019-04-09
Payer: MEDICARE

## 2019-04-09 ENCOUNTER — HOSPITAL ENCOUNTER (OUTPATIENT)
Facility: HOSPITAL | Age: 69
Discharge: HOME | End: 2019-04-09
Payer: MEDICARE

## 2019-04-09 VITALS
BODY MASS INDEX: 24.7 KG/M2 | RESPIRATION RATE: 18 BRPM | SYSTOLIC BLOOD PRESSURE: 138 MMHG | OXYGEN SATURATION: 98 % | TEMPERATURE: 98.4 F | HEART RATE: 68 BPM | DIASTOLIC BLOOD PRESSURE: 64 MMHG | WEIGHT: 148.4 LBS

## 2019-04-09 DIAGNOSIS — E11.40 TYPE 2 DIABETES MELLITUS WITH DIABETIC NEUROPATHY, WITH LONG-TERM CURRENT USE OF INSULIN (HCC): ICD-10-CM

## 2019-04-09 DIAGNOSIS — E53.8 VITAMIN B12 DEFICIENCY: ICD-10-CM

## 2019-04-09 DIAGNOSIS — R63.4 WEIGHT LOSS: ICD-10-CM

## 2019-04-09 DIAGNOSIS — Z79.4 TYPE 2 DIABETES MELLITUS WITH DIABETIC NEUROPATHY, WITH LONG-TERM CURRENT USE OF INSULIN (HCC): ICD-10-CM

## 2019-04-09 DIAGNOSIS — I10 ESSENTIAL HYPERTENSION: ICD-10-CM

## 2019-04-09 DIAGNOSIS — R53.83 FATIGUE, UNSPECIFIED TYPE: ICD-10-CM

## 2019-04-09 DIAGNOSIS — N63.0 BREAST MASS IN FEMALE: ICD-10-CM

## 2019-04-09 DIAGNOSIS — M51.36 DEGENERATIVE DISC DISEASE, LUMBAR: Primary | ICD-10-CM

## 2019-04-09 DIAGNOSIS — Z80.3 FAMILY HISTORY OF BREAST CANCER: ICD-10-CM

## 2019-04-09 DIAGNOSIS — Z87.891 PERSONAL HISTORY OF NICOTINE DEPENDENCE: ICD-10-CM

## 2019-04-09 PROCEDURE — G8420 CALC BMI NORM PARAMETERS: HCPCS | Performed by: INTERNAL MEDICINE

## 2019-04-09 PROCEDURE — 1090F PRES/ABSN URINE INCON ASSESS: CPT | Performed by: INTERNAL MEDICINE

## 2019-04-09 PROCEDURE — G8427 DOCREV CUR MEDS BY ELIG CLIN: HCPCS | Performed by: INTERNAL MEDICINE

## 2019-04-09 PROCEDURE — 4004F PT TOBACCO SCREEN RCVD TLK: CPT | Performed by: INTERNAL MEDICINE

## 2019-04-09 PROCEDURE — 4040F PNEUMOC VAC/ADMIN/RCVD: CPT | Performed by: INTERNAL MEDICINE

## 2019-04-09 PROCEDURE — 2022F DILAT RTA XM EVC RTNOPTHY: CPT | Performed by: INTERNAL MEDICINE

## 2019-04-09 PROCEDURE — 96372 THER/PROPH/DIAG INJ SC/IM: CPT | Performed by: INTERNAL MEDICINE

## 2019-04-09 PROCEDURE — 1123F ACP DISCUSS/DSCN MKR DOCD: CPT | Performed by: INTERNAL MEDICINE

## 2019-04-09 PROCEDURE — 3045F PR MOST RECENT HEMOGLOBIN A1C LEVEL 7.0-9.0%: CPT | Performed by: INTERNAL MEDICINE

## 2019-04-09 PROCEDURE — 36415 COLL VENOUS BLD VENIPUNCTURE: CPT

## 2019-04-09 PROCEDURE — 99214 OFFICE O/P EST MOD 30 MIN: CPT | Performed by: INTERNAL MEDICINE

## 2019-04-09 PROCEDURE — G8598 ASA/ANTIPLAT THER USED: HCPCS | Performed by: INTERNAL MEDICINE

## 2019-04-09 PROCEDURE — 3017F COLORECTAL CA SCREEN DOC REV: CPT | Performed by: INTERNAL MEDICINE

## 2019-04-09 PROCEDURE — G8400 PT W/DXA NO RESULTS DOC: HCPCS | Performed by: INTERNAL MEDICINE

## 2019-04-09 RX ORDER — FLUTICASONE PROPIONATE 50 MCG
1 SPRAY, SUSPENSION (ML) NASAL DAILY
Qty: 2 BOTTLE | Refills: 1 | Status: SHIPPED | OUTPATIENT
Start: 2019-04-09 | End: 2019-07-15 | Stop reason: SDUPTHER

## 2019-04-09 RX ORDER — CYANOCOBALAMIN 1000 UG/ML
1000 INJECTION INTRAMUSCULAR; SUBCUTANEOUS ONCE
Status: COMPLETED | OUTPATIENT
Start: 2019-04-09 | End: 2019-04-09

## 2019-04-09 RX ORDER — HYDROCODONE BITARTRATE AND ACETAMINOPHEN 5; 325 MG/1; MG/1
1 TABLET ORAL 2 TIMES DAILY PRN
Qty: 30 TABLET | Refills: 0 | Status: SHIPPED | OUTPATIENT
Start: 2019-04-09 | End: 2019-05-10 | Stop reason: SDUPTHER

## 2019-04-09 RX ORDER — LORATADINE 10 MG/1
10 TABLET ORAL DAILY
Qty: 30 TABLET | Refills: 1 | Status: SHIPPED | OUTPATIENT
Start: 2019-04-09 | End: 2019-07-15 | Stop reason: SDUPTHER

## 2019-04-09 RX ADMIN — CYANOCOBALAMIN 1000 MCG: 1000 INJECTION INTRAMUSCULAR; SUBCUTANEOUS at 10:41

## 2019-04-09 ASSESSMENT — ENCOUNTER SYMPTOMS
SORE THROAT: 0
ABDOMINAL PAIN: 0
WHEEZING: 0
SHORTNESS OF BREATH: 0
COUGH: 0
SINUS PRESSURE: 0
VOMITING: 0
BACK PAIN: 1
EYE DISCHARGE: 0
NAUSEA: 0

## 2019-04-09 NOTE — PROGRESS NOTES
Have you seen any other physician or provider since your last visit no    Have you had any other diagnostic tests since your last visit? no    Have you changed or stopped any medications since your last visit? no         Pt is here for fu on dm, htn, back pain. pts fs range has been . Pt rates pain a 7/10 today. Pt also co productive cough, congestion x about 2-3 days states she tried otc cough medicine with no relief. Pt also states she has felt a knot between her breasts. novolog 5 units TID.  levemir 55 units nightly.

## 2019-04-09 NOTE — PROGRESS NOTES
Patient's medications, allergies, past medical, surgical, social and family histories were reviewed and updated as appropriate in the electronic medical record/chart. Outpatient Medications Marked as Taking for the 4/9/19 encounter (Office Visit) with Aaron Maradiaga MD   Medication Sig Dispense Refill    insulin detemir (LEVEMIR FLEXTOUCH) 100 UNIT/ML injection pen Inject 55 Units into the skin nightly 20 pen 3    clopidogrel (PLAVIX) 75 MG tablet take 1 tablet by mouth once daily 30 tablet 3    rosuvastatin (CRESTOR) 20 MG tablet Take 1 tablet by mouth daily 30 tablet 3    metFORMIN (GLUCOPHAGE) 1000 MG tablet take 1 tablet by mouth twice a day with food 60 tablet 3    isosorbide mononitrate (IMDUR) 30 MG extended release tablet take 1 tablet by mouth once daily 30 tablet 3    insulin aspart (NOVOLOG FLEXPEN) 100 UNIT/ML injection pen Inject 10 Units into the skin 3 times daily (before meals) Please provide needles (Patient taking differently: Inject 5 Units into the skin 3 times daily (before meals) Please provide needles) 5 pen 3    nitroGLYCERIN (NITROSTAT) 0.4 MG SL tablet Place 1 tablet under the tongue every 5 minutes as needed for Chest pain 25 tablet 1    blood glucose monitor strips QID Dx E11.9 100 strip 5    Insulin Syringe-Needle U-100 (B-D INS SYR ULTRAFINE 1CC/31G) 31G X 5/16\" 1 ML MISC USE ONCE A DAY AS DIRECTED  DX E11.9 100 each 3    aspirin 81 MG chewable tablet Take 81 mg by mouth daily. Review of Systems   Constitutional: Positive for chills, fever and unexpected weight change (10 pound weight loss). HENT: Negative for congestion, sinus pressure and sore throat. Eyes: Negative for discharge and visual disturbance. Respiratory: Negative for cough, shortness of breath and wheezing. Cardiovascular: Negative for chest pain and palpitations. CAZARES   Gastrointestinal: Negative for abdominal pain, nausea and vomiting.    Endocrine: Negative for cold intolerance and heat intolerance. Genitourinary: Negative for dysuria, frequency and urgency. Musculoskeletal: Positive for arthralgias and back pain. Skin: Negative for rash and wound. Neurological: Negative for syncope, numbness and headaches. Hematological: Negative. Psychiatric/Behavioral: Positive for sleep disturbance. Negative for agitation, decreased concentration, self-injury and suicidal ideas. The patient is nervous/anxious. Past Medical History:   Diagnosis Date    CAD (coronary artery disease)     [facility]) [facility] [address] [facility]     [facility] mellitus ([facility]     GERD (gastroesophageal reflux disease)     H/O: CVA (cardiovascular accident)     HTN (hypertension)     Tobacco abuse     Vitamin B12 deficiency      Past Surgical History:   Procedure Laterality Date    CARPAL TUNNEL RELEASE Bilateral     CHOLECYSTECTOMY      CORONARY ANGIOPLASTY WITH STENT PLACEMENT      HYSTERECTOMY      JOINT REPLACEMENT Bilateral 10/15/2016    knees    TOTAL KNEE ARTHROPLASTY [facility]ilateral 10/18/2016    [facility]      Family History   Problem Relation Age of Onset    Diabetes Mother     High Blood Pressure Mother     Cancer Mother         pancreatic    Diabetes Father     Heart Disease Father     High Blood Pressure Father     Cancer Sister         lung, breast    Cancer Brother         throat      Social History     Tobacco Use   Smoking Status Current Every Day Smoker    Packs/day: 1.00    Years: 40.00    Pack years: 40.00    Types: Cigarettes   Smokeless Tobacco Never Used   Tobacco Comment    states she is not willing to stop and this is a stress reliever for her.         OBJECTIVE:   Wt Readings from Last 3 Encounters:   04/09/19 148 lb 6.4 oz (67.3 kg)   01/09/19 157 lb (71.2 kg)   10/30/18 157 lb 6.4 oz (71.4 kg)     BP Readings from Last 3 Encounters:   04/09/19 138/64   01/09/19 (!) 160/80   10/30/18 136/70       /64 (Site: Right Upper Arm, Position: Sitting, Cuff Size: Medium Adult)   Pulse 68   Temp 98.4 °F (36.9 °C) (Oral)   Resp 18   Wt 148 lb 6.4 oz (67.3 kg)   SpO2 98%   BMI 24.70 kg/m²      Physical Exam   Constitutional: She is oriented to person, place, and time. She appears well-developed and well-nourished. HENT:   Head: Normocephalic and atraumatic. Right Ear: External ear normal.   Left Ear: External ear normal.   Nose: Nose normal.   Eyes: Pupils are equal, round, and reactive to light. Conjunctivae and EOM are normal.   Neck: Neck supple. No JVD present. No thyromegaly present. Cardiovascular: Normal rate, regular rhythm and normal heart sounds. Pulmonary/Chest: Effort normal and breath sounds normal. She has no wheezes. She has no rales. Abdominal: Soft. Bowel sounds are normal. She exhibits no distension. There is no tenderness. Musculoskeletal: She exhibits no edema. Lumbar back: She exhibits decreased range of motion, tenderness and spasm. Neurological: She is alert and oriented to person, place, and time. She has normal strength. No sensory deficit. Skin: No rash noted. No erythema. 1.3 subcutaneous mass medial aspect of right breast close to midline. Psychiatric: She has a normal mood and affect. Judgment normal.   Nursing note and vitals reviewed.     Lab Results   Component Value Date     12/11/2018    K 2.9 12/11/2018    CL 96 12/11/2018    CO2 28 12/11/2018    GLUCOSE 94 12/11/2018    BUN 18 12/11/2018    CREATININE 1.04 12/11/2018    CALCIUM 9.5 12/11/2018    PROT 6.8 12/11/2018    LABALBU 4.8 12/11/2018    BILITOT 0.6 12/11/2018    ALT 11 12/11/2018    AST 14 12/11/2018       Hemoglobin A1C (%)   Date Value   12/11/2018 7.2 (H)     Microscopic Examination (no units)   Date Value   11/12/2016 Not Indicated     Microalbumin, Random Urine (ug/mL)   Date Value   01/09/2019 389.0     LDL Calculated (mg/dL)   Date Value   12/11/2018 40         Lab Results   Component Value Date    WBC 6.2 02/04/2017 NEUTROABS 4.1 02/04/2017    HGB 13.2 02/04/2017    HCT 39.8 02/04/2017    MCV 81.4 02/04/2017     02/04/2017       Lab Results   Component Value Date    TSH 2.490 12/11/2018       ASSESSMENT/PLAN:     1. Degenerative disc disease, lumbar  I am going to continue to treat her with Norco. Advice her to avoid heavy lifting, use heat pad. 1. Goal is to alleviate pain to a degree that the patient can participate in own ADLS social activity and improve function. 2. Risk and benefits were reviewed at length, including risk for addiction and possible side effects and interactions. Alternative therapy was discussed and will be a part of the patients overall care. Including but not limited to, physical therapy, other medications as well as behavioral and activity modification and the use of other modalities (chiropractic care ect. ). 3. This patient does not exhibit a potential for abuse or addiction at this time. Will monitor closely. 4. UDS has been compliant. Will randomly check drug screen. 5. Melissa Jose completed and compliant, will check every 3 months. 6. Advised her that UDS and possible pill counts and frequent monitoring will be a part of her care. 7. Patient to has medication agreement and consent to treat with this office. Patient has been informed not to seek or obtain medication from other practitioners and to notify our office ASAP if this happened on emergent basis.    - HYDROcodone-acetaminophen ([facility]) 5-325 MG per tablet; Take 1 tablet by mouth 2 times daily as needed for Pain for up to 30 days. Dispense: 30 tablet; Refill: 0    2. Fatigue, unspecified type  Will check labs today. Further recommandation based on test results. - Comprehensive Metabolic Panel; Future  - CBC Auto Differential; Future  - TSH without Reflex; Future    3. Weight loss  Will check labs today. Further recommandation based on test results. - Comprehensive Metabolic Panel;  Future  - CBC Auto Differential; Future  - TSH without Reflex; Future    4. Personal history of nicotine dependence  I had a long discussion with her regarding the risk of smoking especialy with her other medical problems. I have discussed with patient several treatment options (program, nicotine patches and other meds) to help her quit smoking. Patient is not interested at this time. Spent 3 minutes    5. Breast mass in female  Refer to general surgery for possible biopsy or excision.     - External Referral To General Surgery    6. Vitamin B12 deficiency  Continue on replacement therapy. Monitor labs. 7. Essential hypertension  BP is stable. I have advised her on low-sodium diet, exercise and weight control. I am going to continue current medication . Will monitor her renal function every few months, have advised her to check blood pressure frequently and to keep a record of this. - Comprehensive Metabolic Panel; Future  - CBC Auto Differential; Future  - TSH without Reflex; Future    8. Type 2 diabetes mellitus with diabetic neuropathy, with long-term current use of insulin (HCC)  Stable. I advised her regarding diabetic diet, exercise and weight control. Also, I advised her to stay on the current medication, monitor her fingerstick closely. I am going to check the A1c every few months. I will check microalbumin on a yearly basis. I have also advised her to have a yearly eye exam and to monitor her feet closely. Along with instruction of possible complications related to diabetes, even with good control. A1C will be checked today. Lab Results   Component Value Date    LABA1C 7.2 (H) 12/11/2018     - TSH without Reflex; Future  - Hemoglobin A1C; Future    9. Family history of breast cancer  Will order mammogram today. Discuss results once they are received. - [facility]IGITAL DIAGNOSTIC W OR WO CAD BILATERAL;  Future    Orders Placed This Encounter   Medications    HYDROcodone-acetaminophen (NORCO) 5-325 MG per tablet     Sig: Take 1 tablet by mouth 2 times daily as needed for Pain for up to 30 days. Dispense:  30 tablet     Refill:  0     Reduce doses taken as pain becomes manageable      Written by Marky Moore, acting as a scribe for Dr. Chu Cooney on 4/9/2019 at 9:00 AM.     I, Dr. Michele Phillips, personally performed the services described in the documentation as scribed by Soheila Antonio CMA, in my presence and it is both accurate and complete.

## 2019-04-11 ENCOUNTER — TELEPHONE (OUTPATIENT)
Dept: PRIMARY CARE CLINIC | Age: 69
End: 2019-04-11

## 2019-04-11 NOTE — TELEPHONE ENCOUNTER
Pt states she feels \"terrible\" she thinks she may be dehydrated she feels as if she has no strength she is coughing, congested, runny nose having aches and pains.

## 2019-04-11 NOTE — TELEPHONE ENCOUNTER
Patient called to let you know that she is not feeling any better. She said that you wanted her to call in two days if she was not any better.

## 2019-04-11 NOTE — TELEPHONE ENCOUNTER
Per  can admit her in the morning give her some fluids if she is agreeable and see how she is feeling by tomorrow evening unable to reach pt will try calling her back before I leave today.

## 2019-04-17 ENCOUNTER — TELEPHONE (OUTPATIENT)
Dept: PRIMARY CARE CLINIC | Age: 69
End: 2019-04-17

## 2019-04-17 ENCOUNTER — HOSPITAL ENCOUNTER (OUTPATIENT)
Dept: MAMMOGRAPHY | Facility: HOSPITAL | Age: 69
Discharge: HOME | End: 2019-04-17
Payer: MEDICARE

## 2019-04-17 ENCOUNTER — HOSPITAL ENCOUNTER (OUTPATIENT)
Dept: ULTRASOUND IMAGING | Facility: HOSPITAL | Age: 69
Discharge: HOME | End: 2019-04-17
Payer: MEDICARE

## 2019-04-17 DIAGNOSIS — N63.0 BREAST MASS IN FEMALE: ICD-10-CM

## 2019-04-17 DIAGNOSIS — R63.4 WEIGHT LOSS: ICD-10-CM

## 2019-04-17 DIAGNOSIS — Z87.891 PERSONAL HISTORY OF NICOTINE DEPENDENCE: ICD-10-CM

## 2019-04-17 DIAGNOSIS — Z80.3 FAMILY HISTORY OF BREAST CANCER: ICD-10-CM

## 2019-04-17 DIAGNOSIS — R92.8 ABNORMAL MAMMOGRAM: ICD-10-CM

## 2019-04-17 PROCEDURE — 76642 ULTRASOUND BREAST LIMITED: CPT

## 2019-04-17 PROCEDURE — 77066 DX MAMMO INCL CAD BI: CPT

## 2019-04-17 NOTE — TELEPHONE ENCOUNTER
----- Message from Jackeline Sanchez MD sent at 4/17/2019 11:24 AM EDT -----  Looks benign, but will let surgery decide        No answer left vm please inform. Naya YUSUF [id number]

## 2019-04-19 ENCOUNTER — OFFICE VISIT (OUTPATIENT)
Dept: SURGERY | Facility: CLINIC | Age: 69
End: 2019-04-19

## 2019-04-19 VITALS
SYSTOLIC BLOOD PRESSURE: 138 MMHG | DIASTOLIC BLOOD PRESSURE: 82 MMHG | OXYGEN SATURATION: 98 % | HEART RATE: 74 BPM | BODY MASS INDEX: 24.49 KG/M2 | WEIGHT: 147 LBS | HEIGHT: 65 IN | TEMPERATURE: 97.8 F

## 2019-04-19 DIAGNOSIS — N63.0 BREAST MASS: Primary | ICD-10-CM

## 2019-04-19 PROCEDURE — 99203 OFFICE O/P NEW LOW 30 MIN: CPT | Performed by: SURGERY

## 2019-04-19 RX ORDER — FLUTICASONE PROPIONATE 50 MCG
2 SPRAY, SUSPENSION (ML) NASAL DAILY
Status: ON HOLD | COMMUNITY
End: 2021-03-01

## 2019-04-19 RX ORDER — CEFAZOLIN SODIUM 2 G/50ML
2 SOLUTION INTRAVENOUS ONCE
Status: CANCELLED | OUTPATIENT
Start: 2019-05-10 | End: 2019-04-19

## 2019-04-19 NOTE — PROGRESS NOTES
Patient: Karol Lopez    YOB: 1950    Date: 04/19/2019    Primary Care Provider: Romaine Eugene MD    Reason for consultation: Right breast mass    Chief Complaint   Patient presents with   • Abnormal Breast Imaging   • Mass       Subjective .     History of present illness:  I saw the patient in the office  today for evaluation and treatment of mass on right breast. Patient has had mammogram and ultrasound completed this week however no result are complete at this time. She denies nipple discharge, or discoloration. She does states some discomfort in area.  Mammogram is normal, ultrasound indicates a solid mass right breast.  No nipple discharge and no family history of breast cancer.  No personal history of breast cancer.    Review of Systems   Constitutional: Negative.    HENT: Negative.    Eyes: Negative.    Respiratory: Negative.    Cardiovascular: Negative.    Gastrointestinal: Negative.    Endocrine: Negative.    All other systems reviewed and are negative.      History:  Past Medical History:   Diagnosis Date   • Acid reflux    • Anxiety    • COPD (chronic obstructive pulmonary disease) (CMS/HCC)    • Coronary artery disease    • CTS (carpal tunnel syndrome)    • Diabetes (CMS/HCC)    • Heart attack (CMS/HCC)     s/p stents   • Hypercholesteremia    • Hypertension    • Osteoarthritis    • Stroke (CMS/HCC)     2013   • Tobacco abuse    • Vitamin B12 deficiency           Past Surgical History:   Procedure Laterality Date   • CARPAL TUNNEL RELEASE Bilateral    • CHOLECYSTECTOMY     • CORONARY ANGIOPLASTY WITH STENT PLACEMENT  2012   • HYSTERECTOMY     • TOTAL KNEE ARTHROPLASTY Bilateral 10/18/2016    MAUREEN Palomares MD       Family History   Problem Relation Age of Onset   • Hypertension Other    • Diabetes Mother    • Hypertension Mother    • Cancer Mother    • Diabetes Father    • Hypertension Father    • Heart disease Father    • Cancer Sister    • Cancer Brother        Social History  "    Tobacco Use   • Smoking status: Current Every Day Smoker     Packs/day: 1.00     Years: 46.00     Pack years: 46.00   • Smokeless tobacco: Never Used   Substance Use Topics   • Alcohol use: No   • Drug use: No       Allergies:  Allergies   Allergen Reactions   • Codeine        Medications:     Current Outpatient Medications:   •  fluticasone (FLONASE) 50 MCG/ACT nasal spray, 2 sprays into the nostril(s) as directed by provider Daily., Disp: , Rfl:   •  Loratadine (CLARITIN PO), Take  by mouth., Disp: , Rfl:   •  aspirin 81 MG chewable tablet, Take 81 mg by mouth daily., Disp: , Rfl:   •  B-D INS SYRINGE 0.5CC/31GX5/16 31G X 5/16\" 0.5 ML misc, use as directed once daily, Disp: , Rfl: 0  •  clopidogrel (PLAVIX) 75 MG tablet, Take 1 tablet by mouth daily, Disp: , Rfl:   •  diazePAM (VALIUM) 5 MG tablet, TAKE 1 TABLE BY MOUTH AT BEDTIME AS NEEDED FOR SLEEP OR ANXIETY, Disp: , Rfl:   •  HYDROcodone-acetaminophen (NORCO) 5-325 MG per tablet, Take 1 tablet by mouth Every 8 (Eight) Hours As Needed (PRN PAIN)., Disp: 30 tablet, Rfl: 0  •  insulin aspart (NOVOLOG FLEXPEN) 100 UNIT/ML solution pen-injector sc pen, Inject 10 Units into the skin 3 times daily (before meals) Please provide needles, Disp: , Rfl:   •  Insulin Syringe-Needle U-100 ([employer] INSULIN SYRINGE) 31G X 5/16\" 1 ML misc, 1 each by Does not apply route daily, Disp: , Rfl:   •  Insulin Syringe-Needle U-100 30G X 5/16\" 0.5 ML misc, 1 each by Does not apply route daily, Disp: , Rfl:   •  isosorbide mononitrate (IMDUR) 30 MG 24 hr tablet, Take 1 tablet by mouth daily, Disp: , Rfl:   •  LEVEMIR 100 UNIT/ML injection, USE 70 UNITS INTO THE SKIN NIGHTLY, Disp: , Rfl: 0  •  lisinopril (PRINIVIL,ZESTRIL) 10 MG tablet, Take 1 tablet by mouth daily, Disp: , Rfl:   •  metFORMIN (GLUCOPHAGE) 1000 MG tablet, Take 1,000 mg by mouth 2 (Two) Times a Day With Meals., Disp: , Rfl:   •  rosuvastatin (CRESTOR) 20 MG tablet, Take 1 tablet by mouth Daily., Disp: 90 tablet, Rfl: " "1    Objective     Vital Signs:   Vitals:    04/19/19 1242   BP: 138/82   Pulse: 74   Temp: 97.8 °F (36.6 °C)   SpO2: 98%   Weight: 66.7 kg (147 lb)   Height: 165.1 cm (65\")       Physical Exam:     General Appearance:    Alert, cooperative, in no acute distress   Head:    Normocephalic, without obvious abnormality, atraumatic   Eyes:            Lids and lashes normal, conjunctivae and sclerae normal, no   icterus, no pallor, corneas clear,   Ears:    Ears appear intact with no abnormalities noted   Throat:   No oral lesions, no thrush, oral mucosa moist   Breast:    Right breast mass at the 2 o'clock position medially, 2 cm well-circumscribed.   Lungs:     Clear to auscultation,respirations regular, even and                  Unlabored    Heart:    Regular rhythm and normal rate, no murmur, no gallop.   Chest Wall:    No abnormalities observed   Abdomen:     Normal bowel sounds, no masses, no organomegaly, soft        non-tender, non-distended, no guarding.   Extremities:   Moves all extremities well, no edema, no cyanosis, no             redness   Pulses:   Pulses palpable and equal bilaterally   Skin:   No bleeding, bruising or rash   Lymph nodes:   No palpable adenopathy   Neurologic:   Cranial nerves 2 - 12 grossly intact.           Results Review:   I reviewed the patient's new clinical results.      Assessment / Plan:    1. Breast mass        I did have a detailed and extensive discussion with the patient in the office today and reviewed her recent workup.  I have told the patient that she will need a right breast excisional biopsy.  Risk of bleeding infection and recurrence discussed and patient agreeable.    Electronically signed by Kiana Frey MD  04/19/19  1:22 PM                  "

## 2019-04-24 ENCOUNTER — TELEPHONE (OUTPATIENT)
Dept: SURGERY | Facility: CLINIC | Age: 69
End: 2019-04-24

## 2019-04-25 ENCOUNTER — TELEPHONE (OUTPATIENT)
Dept: SURGERY | Facility: CLINIC | Age: 69
End: 2019-04-25

## 2019-04-25 NOTE — TELEPHONE ENCOUNTER
Patient's insurance has not approved her breast biopsy for 04/26/19  It is in medical review.  I tried to call patient I left a message.  Her emergency numbers were unreachable.  The patient has been difficult to get a hold of.  I cancelled surgery with Tari in the OR

## 2019-04-25 NOTE — TELEPHONE ENCOUNTER
I left a message on the patient's phone that I have contacted the insurance company this morning.  The authorization is still in medical review.  The nurse sent an email to the Dr/Reviewer that the procedure is scheduled for tomorrow.  I should know if it is approved by noon today.  If not we will need to reschedule the procedure.    The requested was faxed 4-23-19, but the standard review time is 48 hours.    I also let the patient know to return [facility]'s call.    Thanks.

## 2019-04-25 NOTE — TELEPHONE ENCOUNTER
I finally got a hold of patient.  I informed her about her insurance and once the biopsy is approved we would reschedule the procedure.

## 2019-04-27 ENCOUNTER — HOSPITAL ENCOUNTER (EMERGENCY)
Facility: HOSPITAL | Age: 69
Discharge: HOME | End: 2019-04-27
Attending: HOSPITALIST
Payer: MEDICARE

## 2019-04-27 ENCOUNTER — APPOINTMENT (OUTPATIENT)
Dept: GENERAL RADIOLOGY | Facility: HOSPITAL | Age: 69
End: 2019-04-27
Payer: MEDICARE

## 2019-04-27 VITALS
BODY MASS INDEX: 25.1 KG/M2 | HEART RATE: 62 BPM | DIASTOLIC BLOOD PRESSURE: 76 MMHG | RESPIRATION RATE: 16 BRPM | HEIGHT: 64 IN | OXYGEN SATURATION: 99 % | WEIGHT: 147 LBS | TEMPERATURE: 97.8 F | SYSTOLIC BLOOD PRESSURE: 160 MMHG

## 2019-04-27 DIAGNOSIS — B34.9 VIRAL SYNDROME: Primary | ICD-10-CM

## 2019-04-27 LAB
A/G RATIO: 1.4 (ref 0.8–2)
ALBUMIN SERPL-MCNC: 4.2 G/DL (ref 3.4–4.8)
ALP BLD-CCNC: 112 U/L (ref 25–100)
ALT SERPL-CCNC: 11 U/L (ref 4–36)
ANION GAP SERPL CALCULATED.3IONS-SCNC: 13 MMOL/L (ref 3–16)
AST SERPL-CCNC: 17 U/L (ref 8–33)
BACTERIA: ABNORMAL /HPF
BASOPHILS ABSOLUTE: 0 K/UL (ref 0–0.1)
BASOPHILS RELATIVE PERCENT: 0.3 %
BILIRUB SERPL-MCNC: 0.5 MG/DL (ref 0.3–1.2)
BILIRUBIN URINE: NEGATIVE
BLOOD, URINE: NEGATIVE
BUN BLDV-MCNC: 14 MG/DL (ref 6–20)
CALCIUM SERPL-MCNC: 9.5 MG/DL (ref 8.5–10.5)
CHLORIDE BLD-SCNC: 100 MMOL/L (ref 98–107)
CLARITY: CLEAR
CO2: 29 MMOL/L (ref 20–30)
COLOR: YELLOW
CREAT SERPL-MCNC: 1.1 MG/DL (ref 0.4–1.2)
EOSINOPHILS ABSOLUTE: 0.1 K/UL (ref 0–0.4)
EOSINOPHILS RELATIVE PERCENT: 0.7 %
EPITHELIAL CELLS, UA: ABNORMAL /HPF
GFR AFRICAN AMERICAN: >59
GFR NON-AFRICAN AMERICAN: 50
GLOBULIN: 2.9 G/DL
GLUCOSE BLD-MCNC: 85 MG/DL (ref 74–106)
GLUCOSE URINE: NEGATIVE MG/DL
HCT VFR BLD CALC: 39.7 % (ref 37–47)
HEMOGLOBIN: 13.2 G/DL (ref 11.5–16.5)
IMMATURE GRANULOCYTES #: 0 K/UL
IMMATURE GRANULOCYTES %: 0.5 % (ref 0–5)
KETONES, URINE: NEGATIVE MG/DL
LEUKOCYTE ESTERASE, URINE: NEGATIVE
LIPASE: 21 U/L (ref 5.6–51.3)
LYMPHOCYTES ABSOLUTE: 1.2 K/UL (ref 1.5–4)
LYMPHOCYTES RELATIVE PERCENT: 16.1 %
MAGNESIUM: 2.1 MG/DL (ref 1.7–2.4)
MCH RBC QN AUTO: 28.7 PG (ref 27–32)
MCHC RBC AUTO-ENTMCNC: 33.2 G/DL (ref 31–35)
MCV RBC AUTO: 86.3 FL (ref 80–100)
MICROSCOPIC EXAMINATION: YES
MONOCYTES ABSOLUTE: 0.4 K/UL (ref 0.2–0.8)
MONOCYTES RELATIVE PERCENT: 5.9 %
NEUTROPHILS ABSOLUTE: 5.7 K/UL (ref 2–7.5)
NEUTROPHILS RELATIVE PERCENT: 76.5 %
NITRITE, URINE: NEGATIVE
PDW BLD-RTO: 13.4 % (ref 11–16)
PH UA: 5.5 (ref 5–8)
PLATELET # BLD: 213 K/UL (ref 150–400)
PMV BLD AUTO: 10.8 FL (ref 6–10)
POTASSIUM REFLEX MAGNESIUM: 3.2 MMOL/L (ref 3.4–5.1)
PROTEIN UA: 30 MG/DL
RAPID INFLUENZA  B AGN: NEGATIVE
RAPID INFLUENZA A AGN: NEGATIVE
RBC # BLD: 4.6 M/UL (ref 3.8–5.8)
RBC UA: ABNORMAL /HPF (ref 0–2)
SODIUM BLD-SCNC: 142 MMOL/L (ref 136–145)
SPECIFIC GRAVITY UA: 1.02 (ref 1–1.03)
TOTAL PROTEIN: 7.1 G/DL (ref 6.4–8.3)
TROPONIN: <0.3 NG/ML
URINE REFLEX TO CULTURE: ABNORMAL
URINE TYPE: ABNORMAL
UROBILINOGEN, URINE: 1 E.U./DL
WBC # BLD: 7.4 K/UL (ref 4–11)
WBC UA: ABNORMAL /HPF (ref 0–5)

## 2019-04-27 PROCEDURE — 81001 URINALYSIS AUTO W/SCOPE: CPT

## 2019-04-27 PROCEDURE — 85025 COMPLETE CBC W/AUTO DIFF WBC: CPT

## 2019-04-27 PROCEDURE — 99285 EMERGENCY DEPT VISIT HI MDM: CPT

## 2019-04-27 PROCEDURE — 83735 ASSAY OF MAGNESIUM: CPT

## 2019-04-27 PROCEDURE — 83690 ASSAY OF LIPASE: CPT

## 2019-04-27 PROCEDURE — 2580000003 HC RX 258: Performed by: HOSPITALIST

## 2019-04-27 PROCEDURE — 93005 ELECTROCARDIOGRAM TRACING: CPT

## 2019-04-27 PROCEDURE — 84484 ASSAY OF TROPONIN QUANT: CPT

## 2019-04-27 PROCEDURE — 87804 INFLUENZA ASSAY W/OPTIC: CPT

## 2019-04-27 PROCEDURE — 36415 COLL VENOUS BLD VENIPUNCTURE: CPT

## 2019-04-27 PROCEDURE — 80053 COMPREHEN METABOLIC PANEL: CPT

## 2019-04-27 PROCEDURE — 96360 HYDRATION IV INFUSION INIT: CPT

## 2019-04-27 PROCEDURE — 71045 X-RAY EXAM CHEST 1 VIEW: CPT

## 2019-04-27 PROCEDURE — 6370000000 HC RX 637 (ALT 250 FOR IP): Performed by: HOSPITALIST

## 2019-04-27 RX ORDER — POTASSIUM CHLORIDE 20 MEQ/1
40 TABLET, EXTENDED RELEASE ORAL ONCE
Status: COMPLETED | OUTPATIENT
Start: 2019-04-27 | End: 2019-04-27

## 2019-04-27 RX ORDER — 0.9 % SODIUM CHLORIDE 0.9 %
1000 INTRAVENOUS SOLUTION INTRAVENOUS ONCE
Status: COMPLETED | OUTPATIENT
Start: 2019-04-27 | End: 2019-04-27

## 2019-04-27 RX ADMIN — POTASSIUM CHLORIDE 40 MEQ: 20 TABLET, EXTENDED RELEASE ORAL at 11:20

## 2019-04-27 RX ADMIN — SODIUM CHLORIDE 1000 ML: 9 INJECTION, SOLUTION INTRAVENOUS at 10:12

## 2019-04-27 ASSESSMENT — PAIN SCALES - GENERAL: PAINLEVEL_OUTOF10: 9

## 2019-04-27 ASSESSMENT — PAIN DESCRIPTION - DESCRIPTORS: DESCRIPTORS: ACHING

## 2019-04-27 ASSESSMENT — PAIN DESCRIPTION - FREQUENCY: FREQUENCY: CONTINUOUS

## 2019-04-27 ASSESSMENT — PAIN DESCRIPTION - LOCATION: LOCATION: GENERALIZED

## 2019-04-27 NOTE — ED NOTES
Patient states that she just came back from the restroom and is unable to void at this time.      Brandon Soliz RN  04/27/19 1011

## 2019-04-27 NOTE — ED NOTES
Dc instructions given to patient, no other questions or concerns.      Jovita Zambrano RN  04/27/19 2246

## 2019-04-27 NOTE — ED PROVIDER NOTES
[facility] ENCOUNTER      Pt Name: Gricel Campbell  MRN: [id number]  YOB: 1950  Date of evaluation: 4/27/2019  Provider: Ebony Nobles, [facility]       Chief Complaint   Patient presents with    Shortness of Breath    Generalized Body Aches         HISTORY OF PRESENT ILLNESS  (Location/Symptom, Timing/Onset, Context/Setting, Quality, Duration, Modifying Factors, Severity.)   Gricel Campbell is a 81 y.o. female who presents to the emergency department for shortness of breath, generalized body aches and weakness. She states her symptoms been ongoing for the past 4 days. Positive sick contacts at home. Patient's been around children that was diagnosed with mono however the patient does not complain of any sore throat or fever. That she does has a cough which is productive with a yellow sputum at times but is also dried other times. Positive for myalgias. States she's had some diarrhea along with some mild nausea and vomiting. Denies any abdominal pain. Patient states that she just feels very weak like she just cannot go on anymore. She also complains some mild watering to her right eye. Denies any chest pain or shortness of breath that she states her left arm has been aching her intermittently. Denies any dysuria or change in urinary frequency. Denies any numbness tingling weakness of extremities are ordinary. Denies any syncopal or presyncopal episodes. Nursing notes were reviewed.     REVIEW OFSYSTEMS    (2-9 systems for level 4, 10 or more for level 5)   ROS:  General:  No fevers, no chills, + weakness, +myalgias  Cardiovascular:  No chest pain, no palpitations  Respiratory:  +shortness of breath, +cough, no wheezing  Gastrointestinal:  No pain, no nausea, no vomiting, no diarrhea  Musculoskeletal:  +left arm pain, no joint pain  Skin:  No rash, no easy bruising  Neurologic:  No speech problems, no headache, no extremity weakness  Psychiatric:  No anxiety  Genitourinary:  No dysuria, no hematuria    Except as noted above the remainder of the review of systems was reviewed and negative. PAST MEDICAL HISTORY     Past Medical History:   Diagnosis Date    CAD (coronary artery disease)     [facility]) [facility]     Cystic fibrosis ([facility]     Diabetes mellitus ([facility]     GERD (gastroesophageal reflux disease)     H/O: CVA (cardiovascular accident)     HTN (hypertension)     Mass     on chest     Tobacco abuse     Vitamin B12 deficiency          SURGICAL HISTORY       Past Surgical History:   Procedure Laterality Date    CARPAL TUNNEL RELEASE Bilateral     CHOLECYSTECTOMY      CORONARY ANGIOPLASTY WITH STENT PLACEMENT      HYSTERECTOMY      JOINT REPLACEMENT Bilateral 10/15/2016    knees    TOTAL KNEE ARTHROPLASTY Bilateral 10/18/2016    at [facility]         CURRENT MEDICATIONS       Previous Medications    ASPIRIN 81 MG CHEWABLE TABLET    Take 81 mg by mouth daily. BLOOD GLUCOSE MONITOR STRIPS    QID Dx E11.9    CLOPIDOGREL (PLAVIX) 75 MG TABLET    take 1 tablet by mouth once daily    FLUTICASONE (FLONASE) 50 MCG/ACT NASAL SPRAY    1 spray by Each Nare route daily 1 Spray in each nostril    HYDROCODONE-ACETAMINOPHEN (NORCO) 5-325 MG PER TABLET    Take 1 tablet by mouth 2 times daily as needed for Pain for up to 30 days.     INSULIN ASPART (NOVOLOG FLEXPEN) 100 UNIT/ML INJECTION PEN    Inject 10 Units into the skin 3 times daily (before meals) Please provide needles    INSULIN DETEMIR (LEVEMIR FLEXTOUCH) 100 UNIT/ML INJECTION PEN    Inject 55 Units into the skin nightly    INSULIN SYRINGE-NEEDLE U-100 (B-D INS SYR ULTRAFINE 1CC/31G) 31G X 5/16\" 1 ML MISC    USE ONCE A DAY AS DIRECTED  DX E11.9    ISOSORBIDE MONONITRATE (IMDUR) 30 MG EXTENDED RELEASE TABLET    take 1 tablet by mouth once daily    LORATADINE (CLARITIN) 10 MG TABLET    Take 1 tablet by mouth daily    METFORMIN (GLUCOPHAGE) 1000 MG TABLET    take 1 tablet by mouth twice a day with food    NITROGLYCERIN (NITROSTAT) 0.4 MG SL TABLET    Place 1 tablet under the tongue every 5 minutes as needed for Chest pain    ROSUVASTATIN (CRESTOR) 20 MG TABLET    Take 1 tablet by mouth daily       ALLERGIES     Codeine    FAMILY HISTORY       Family History   Problem Relation Age of Onset    Diabetes Mother     High Blood Pressure Mother     Cancer Mother         pancreatic    Diabetes Father     Heart Disease Father     High Blood Pressure Father     Cancer Sister         lung, breast    Cancer Brother         throat          SOCIAL HISTORY       Social History     Socioeconomic History    Marital status:      Spouse name: None    Number of children: None    Years of education: None    Highest education level: None   Occupational History    None   Social Needs    Financial resource strain: None    Food insecurity:     Worry: None     Inability: None    Transportation needs:     Medical: None     Non-medical: None   Tobacco Use    Smoking status: Current Every Day Smoker     Packs/day: 1.00     Years: 40.00     Pack years: 40.00     Types: Cigarettes    Smokeless tobacco: Never Used    Tobacco comment: states she is not willing to stop and this is a stress reliever for her.     Substance and Sexual Activity    Alcohol use: No    Drug use: No    Sexual activity: None   Lifestyle    Physical activity:     Days per week: None     Minutes per session: None    Stress: None   Relationships    Social connections:     Talks on phone: None     Gets together: None     Attends Confucianist service: None     Active member of club or organization: None     Attends meetings of clubs or organizations: None     Relationship status: None    Intimate partner violence:     Fear of current or ex partner: None     Emotionally abused: None     Physically abused: None     Forced sexual activity: None   Other Topics Concern    None   Social History Narrative    None         PHYSICAL EXAM    (up to 7 for level 4, 8 or more for level 5)     ED Triage Vitals [04/27/19 0934]   BP Temp Temp Source Pulse Resp SpO2 Height Weight   (!) 165/82 97.8 °F (36.6 °C) Oral 66 16 99 % 5' 4\" (1.626 m) 147 lb (66.7 kg)       Physical Exam  General :Patient is awake, alert, oriented, in no acute distress, nontoxic appearing  HEENT: Pupils are equally round and reactive to light, EOMI, conjunctivae clear. Oral mucosa is moist, no exudate. Uvula is midline  Cardiac: Heart regular rate, rhythm, no murmurs, rubs, or gallops  Lungs: Lungs are clear to auscultation, there is no wheezing, rhonchi, or rales. There is no use of accessory muscles. Abdomen: Abdomen is soft, nontender, nondistended. There is no firm or pulsatile masses, no rebound rigidity or guarding. Musculoskeletal: 5 out of 5 strength in all 4 extremities. No focal muscle deficits are appreciated  Neuro: Motor intact, sensory intact, level of consciousness is normal  Dermatology: Skin is warm and dry  Psych: Mentation is grossly normal, cognition is grossly normal. Affect is appropriate. DIAGNOSTIC RESULTS     EKG: All EKG's are interpreted by the Emergency Department Physician who either signs or Co-signs this chart in the [address] a cardiologist.    The EKG interpreted by me shows sinus rhythm. Ventricular rate is 60 bpm, PA interval is 164 ms, QRS duration is 92 ms, QT/QTc is 466/466 ms. No acute T-wave inversions concerning for acute myocardial ischemia. No acute ST elevations concerning for acute myocardial infarction. RADIOLOGY:   Non-plain film images such as CT, Ultrasound and MRI are read by the radiologist. Plain radiographic images are visualized and preliminarily interpreted by the emergency physician with the below findings:      ? Radiologist's Report Reviewed:  XR CHEST PORTABLE   Final Result      1. No acute process or consolidation   2.  Small granulomata right lung base is stable Reflex to Culture   Result Value Ref Range    Color, UA Yellow Straw/Yellow    Clarity, UA Clear Clear    Glucose, Ur Negative Negative mg/dL    Bilirubin Urine Negative Negative    Ketones, Urine Negative Negative mg/dL    Specific Gravity, UA 1.020 1.005 - 1.030    Blood, Urine Negative Negative    pH, UA 5.5 5.0 - 8.0    Protein, UA 30 (A) Negative mg/dL    Urobilinogen, Urine 1.0 <2.0 E.U./dL    Nitrite, Urine Negative Negative    Leukocyte Esterase, Urine Negative Negative    Microscopic Examination YES     Urine Reflex to Culture Not Indicated     Urine Type Clean catch    Magnesium   Result Value Ref Range    Magnesium 2.1 1.7 - 2.4 mg/dL   Microscopic Urinalysis   Result Value Ref Range    WBC, UA None seen 0 - 5 /HPF    RBC, UA None seen 0 - 2 /HPF    Epi Cells 20-50 /HPF    Bacteria, UA Rare (A) /HPF        All other labs were within normal range or not returned as of this dictation. EMERGENCY DEPARTMENT COURSE and DIFFERENTIAL DIAGNOSIS/MDM:   Vitals:    Vitals:    04/27/19 1045 04/27/19 1100 04/27/19 1115 04/27/19 1121   BP: (!) 161/72 (!) 171/73 (!) 167/77 (!) 160/76   Pulse: 62 63 61 62   Resp:       Temp:       TempSrc:       SpO2: 98% 98% 99% 99%   Weight:       Height:           MEDICATIONS ADMINISTERED IN ED:  Medications   0.9 % sodium chloride bolus (0 mLs Intravenous Stopped 4/27/19 1111)   potassium chloride (KLOR-CON M) extended release tablet 40 mEq (40 mEq Oral Given 4/27/19 1120)       After initial evaluation and examination I did have a conversation with the patient and her family about the upcoming plan, treatment and possible disposition she was agreeable to the time of this dictation. Patient advised that she would receive a fluid bolus of normal saline 1 L since she's had nausea vomiting and diarrhea. We will also check a rapid influenza. We will also assess a CBC, CMP, troponin, lipase and a UA. Patient also April lead EKG performed.  We'll perform an EKG and a troponin for the patient's intermittent left arm pain over the last day or so. Patient's final disposition will be determined once her radiological diagnostic studies been performed and reviewed. Influenza swab was negative. Blood work showed white count 7400, hemoglobin is 13.2, heme crit 39.7, platelet counts 354. Comprehensive metabolic panel was benign except for potassium slightly low at 3.2, alkaline phosphatase is 112. Troponin was nondetectable less than 0.30. Lipase was normal at 21. UA  Negative. Microscopy showed 20-50 epithelial cells, no white cells seen, no RBCs seen, rare bacteria. No acute indication for urinary tract infection. Culture is not indicated. Portable chest radiograph read by radiology as no acute process or consolidation. Small granuloma right lung base is stable. Patient's radiological diagnostic studies were discussed with them and they do state her understanding. Patient advised her symptoms do seem consistent with just a viral illness. Advised the patient to write her for albuterol inhaler and Mucinex but she states she already he has those medications at home and really has not been taking them. He was also offered nausea medication to go home with but she declined that also. Patient did take the dose of potassium here up prior to discharge. Otherwise treatment is just supportive care. Patient also advised that the drainage from her eye looks to be more like a viral conjunctivitis there is no concern for bacterial conjunctivitis or pink eye at this time. Patient was given instructions use a warm moist compress to the eye to help with the symptoms. Patient was also given instructions that if that worsened she need to return back to emergency department or family doctor for possible antibiotic ointment or drops. The patient will follow-up with their PCP in 1-2 days for reevaluation.   Patient advised if symptoms worsens or new symptoms arise he should return back to emergency

## 2019-05-08 ENCOUNTER — TELEPHONE (OUTPATIENT)
Dept: SURGERY | Facility: CLINIC | Age: 69
End: 2019-05-08

## 2019-05-10 ENCOUNTER — HOSPITAL ENCOUNTER (OUTPATIENT)
Facility: HOSPITAL | Age: 69
Setting detail: HOSPITAL OUTPATIENT SURGERY
Discharge: HOME | End: 2019-05-10
Attending: SURGERY | Admitting: SURGERY

## 2019-05-10 ENCOUNTER — ANESTHESIA EVENT (OUTPATIENT)
Dept: PERIOP | Facility: HOSPITAL | Age: 69
End: 2019-05-10

## 2019-05-10 ENCOUNTER — ANESTHESIA (OUTPATIENT)
Dept: PERIOP | Facility: HOSPITAL | Age: 69
End: 2019-05-10

## 2019-05-10 VITALS
DIASTOLIC BLOOD PRESSURE: 82 MMHG | TEMPERATURE: 98.4 F | WEIGHT: 147 LBS | HEIGHT: 65 IN | SYSTOLIC BLOOD PRESSURE: 132 MMHG | BODY MASS INDEX: 24.49 KG/M2 | HEART RATE: 78 BPM | OXYGEN SATURATION: 95 % | RESPIRATION RATE: 18 BRPM

## 2019-05-10 DIAGNOSIS — M51.36 DEGENERATIVE DISC DISEASE, LUMBAR: ICD-10-CM

## 2019-05-10 DIAGNOSIS — N63.0 BREAST MASS: ICD-10-CM

## 2019-05-10 LAB
GLUCOSE BLDC GLUCOMTR-MCNC: 101 MG/DL (ref 70–130)
GLUCOSE BLDC GLUCOMTR-MCNC: 176 MG/DL (ref 70–130)
GLUCOSE BLDC GLUCOMTR-MCNC: 65 MG/DL (ref 70–130)

## 2019-05-10 PROCEDURE — 88304 TISSUE EXAM BY PATHOLOGIST: CPT | Performed by: SURGERY

## 2019-05-10 PROCEDURE — 25010000003 CEFAZOLIN SODIUM-DEXTROSE 2-3 GM-%(50ML) RECONSTITUTED SOLUTION: Performed by: SURGERY

## 2019-05-10 PROCEDURE — 25010000002 FENTANYL CITRATE (PF) 100 MCG/2ML SOLUTION: Performed by: NURSE ANESTHETIST, CERTIFIED REGISTERED

## 2019-05-10 PROCEDURE — 25010000002 MIDAZOLAM PER 1 MG: Performed by: NURSE ANESTHETIST, CERTIFIED REGISTERED

## 2019-05-10 PROCEDURE — 82962 GLUCOSE BLOOD TEST: CPT

## 2019-05-10 PROCEDURE — 25010000002 PROPOFOL 1000 MG/ML EMULSION: Performed by: NURSE ANESTHETIST, CERTIFIED REGISTERED

## 2019-05-10 PROCEDURE — 19120 REMOVAL OF BREAST LESION: CPT | Performed by: SURGERY

## 2019-05-10 RX ORDER — SODIUM CHLORIDE, SODIUM LACTATE, POTASSIUM CHLORIDE, CALCIUM CHLORIDE 600; 310; 30; 20 MG/100ML; MG/100ML; MG/100ML; MG/100ML
1000 INJECTION, SOLUTION INTRAVENOUS CONTINUOUS
Status: DISCONTINUED | OUTPATIENT
Start: 2019-05-10 | End: 2019-05-10 | Stop reason: HOSPADM

## 2019-05-10 RX ORDER — LIDOCAINE HYDROCHLORIDE 10 MG/ML
INJECTION, SOLUTION INFILTRATION; PERINEURAL AS NEEDED
Status: DISCONTINUED | OUTPATIENT
Start: 2019-05-10 | End: 2019-05-10 | Stop reason: HOSPADM

## 2019-05-10 RX ORDER — FENTANYL CITRATE 50 UG/ML
INJECTION, SOLUTION INTRAMUSCULAR; INTRAVENOUS AS NEEDED
Status: DISCONTINUED | OUTPATIENT
Start: 2019-05-10 | End: 2019-05-10 | Stop reason: SURG

## 2019-05-10 RX ORDER — CEFAZOLIN SODIUM 2 G/50ML
2 SOLUTION INTRAVENOUS ONCE
Status: COMPLETED | OUTPATIENT
Start: 2019-05-10 | End: 2019-05-10

## 2019-05-10 RX ORDER — DEXTROSE AND SODIUM CHLORIDE 5; .45 G/100ML; G/100ML
25 INJECTION, SOLUTION INTRAVENOUS CONTINUOUS
Status: DISCONTINUED | OUTPATIENT
Start: 2019-05-10 | End: 2019-05-10 | Stop reason: HOSPADM

## 2019-05-10 RX ORDER — DEXTROSE MONOHYDRATE 25 G/50ML
INJECTION, SOLUTION INTRAVENOUS
Status: DISCONTINUED
Start: 2019-05-10 | End: 2019-05-10 | Stop reason: HOSPADM

## 2019-05-10 RX ORDER — DEXTROSE MONOHYDRATE 25 G/50ML
12.5 INJECTION, SOLUTION INTRAVENOUS ONCE
Status: COMPLETED | OUTPATIENT
Start: 2019-05-10 | End: 2019-05-10

## 2019-05-10 RX ORDER — IBUPROFEN 600 MG/1
600 TABLET ORAL EVERY 6 HOURS PRN
Qty: 30 TABLET | Refills: 0 | Status: SHIPPED | OUTPATIENT
Start: 2019-05-10 | End: 2021-03-03 | Stop reason: HOSPADM

## 2019-05-10 RX ORDER — MIDAZOLAM HYDROCHLORIDE 1 MG/ML
INJECTION INTRAMUSCULAR; INTRAVENOUS AS NEEDED
Status: DISCONTINUED | OUTPATIENT
Start: 2019-05-10 | End: 2019-05-10 | Stop reason: SURG

## 2019-05-10 RX ORDER — HYDROCODONE BITARTRATE AND ACETAMINOPHEN 5; 325 MG/1; MG/1
1 TABLET ORAL 2 TIMES DAILY PRN
Qty: 30 TABLET | Refills: 0 | Status: SHIPPED | OUTPATIENT
Start: 2019-05-10 | End: 2019-06-07 | Stop reason: SDUPTHER

## 2019-05-10 RX ADMIN — SODIUM CHLORIDE, POTASSIUM CHLORIDE, SODIUM LACTATE AND CALCIUM CHLORIDE 1000 ML: 600; 310; 30; 20 INJECTION, SOLUTION INTRAVENOUS at 07:28

## 2019-05-10 RX ADMIN — DEXTROSE MONOHYDRATE 13 ML: 25 INJECTION, SOLUTION INTRAVENOUS at 07:33

## 2019-05-10 RX ADMIN — MIDAZOLAM HYDROCHLORIDE 1 MG: 1 INJECTION, SOLUTION INTRAMUSCULAR; INTRAVENOUS at 08:32

## 2019-05-10 RX ADMIN — CEFAZOLIN SODIUM 2 G: 2 SOLUTION INTRAVENOUS at 08:36

## 2019-05-10 RX ADMIN — FENTANYL CITRATE 50 MCG: 50 INJECTION, SOLUTION INTRAMUSCULAR; INTRAVENOUS at 08:32

## 2019-05-10 RX ADMIN — DEXTROSE AND SODIUM CHLORIDE 25 ML/HR: 5; 450 INJECTION, SOLUTION INTRAVENOUS at 08:01

## 2019-05-10 RX ADMIN — PROPOFOL 100 MCG/KG/MIN: 10 INJECTION, EMULSION INTRAVENOUS at 08:35

## 2019-05-10 NOTE — DISCHARGE INSTRUCTIONS
Rest today  No pushing,pulling,tugging,heavy lifting, or strenuous activity   No major decision making,driving,or drinking alcoholic beverages for 24 hours due to the sedation you received  Always use good hand hygiene/washing technique  No driving on pain medication.      To assist you in voiding:  Drink plenty of fluids  Listen to running water while attempting to void.    If you are unable to urinate and you have an uncomfortable urge to void or it has been   6 hours since you were discharged, return to the Emergency Room.    Keep right arm elevated as comfort measure.  Apply ice to incision site as directed per physician.

## 2019-05-10 NOTE — OP NOTE
PATIENT:    Karol Lopez    DATE OF SURGERY:  5/10/2019    PHYSICIAN:    Kiana Frey MD    REFERRING PHYSICIAN:  Kiana Frey MD    YOB: 1950    PREOPERATIVE DIAGNOSIS: Right breast mass    POSTOPERATIVE DIAGNOSIS: Same    PROCEDURE: Right breast excisional biopsy    INDICATIONS:  The patient was sent to me as a consultation by Kiana Frey MD for evaluation and treatment of a history significant for right breast mass, solid. They are here now today for right breast excisional biopsy    ANESTHESIA: Sedation with local anesthesia     OPERATIVE PROCEDURE:  The patient was taken to the operating room, placed in the supine position, and given sedation with local anesthesia.  They were prepped and draped in the normal sterile fashion.  They did receive preoperative IV antibiotics.  The nursing staff did perform intraoperative timeout prior to the incision.    Incision made in the right medial breast.  A 3 cm mass excised completely and sent to pathology.  The wound was closed in 2 layers.  Dressing applied, no complications.    The patient was stable at this point in time and subsequently transferred back to the recovery room in stable condition.       Kiana Frey MD  5/10/2019  8:53 AM

## 2019-05-10 NOTE — ANESTHESIA PREPROCEDURE EVALUATION
Anesthesia Evaluation     Patient summary reviewed and Nursing notes reviewed   NPO Solid Status: > 8 hours  NPO Liquid Status: > 8 hours           Airway   Mallampati: II  TM distance: >3 FB  Neck ROM: full  No difficulty expected  Dental - normal exam   (+) edentulous and upper dentures    Pulmonary - normal exam    breath sounds clear to auscultation  (+) a smoker Current Smoked day of surgery, COPD moderate,     ROS comment: 1 PPD smoker - 40+ pack year smoker.   Cardiovascular - normal exam  Exercise tolerance: poor (<4 METS) (Decreased mobility)    ECG reviewed  PT is on anticoagulation therapy    (+) hypertension poorly controlled 2 medications or greater, past MI  >12 months, CAD, cardiac stents more than 12 months ago AHUJA, hyperlipidemia,     ROS comment: 11/13/2016 ECHO  · All left ventricular wall segments contract normally.  · Left ventricular wall thickness is consistent with mild-to-moderate concentric hypertrophy.  · Left ventricular function is normal. Estimated EF = 70%.  · Mild mitral valve regurgitation is present  · Mild tricuspid valve regurgitation is present.  · IV SALINE STUDY NOT PERFORMED SECONDARY TO LACK OF IV ACCESS.  · Left ventricular diastolic dysfunction (grade I a) consistent with impaired relaxation.  · There is no evidence of pericardial effusion.  · Estimated right ventricular systolic pressure from tricuspid regurgitation is normal (<35 mmHg).    ECG 3/2018 NSR    Pt takes ASA and Plavix - Last taken 5/7/2019    MI with 1 cardiac stent in 2013    Neuro/Psych  (+) TIA, CVA residual symptoms, numbness, psychiatric history Anxiety,       ROS Comment: Right sided weakness from CVA in 2013  GI/Hepatic/Renal/Endo    (+)  GERD well controlled,  diabetes mellitus using insulin,     Musculoskeletal     (+) back pain,   Abdominal  - normal exam    Abdomen: soft.   Substance History - negative use  (-) alcohol use, drug use     OB/GYN negative ob/gyn ROS   (-)  Pregnant    Comment: Hx of  hysterectomy      Other   (+) arthritis     ROS/Med Hx Other: Coronary artery disease  Osteoarthritis  Anxiety  COPD (chronic obstructive pulmonary disease) (CMS/[facility])  Acid reflux  Hypertension  Heart attack (CMS/[facility])  Vitamin B12 deficiency  Tobacco abuse  TIA (transient ischemic attack)  Recent Bilateral Knee Replacements (10/18/16) at [facility]  Diabetes (CMS/[facility])  Impaired mobility and ADLs  Breast mass  [place]    Glucose DOS 65 - 6.25 Gm of D50 (1/4 syringe) given with order to recheck glucose in 30 min.        Phys Exam Other: Wears upper dentures only. Edentulous lower.               Anesthesia Plan    ASA 3     MAC   (Patient advised that intravenous sedation would be used as the primary anesthetic, along with local anesthesia if necessary. Every effort will be made to make sure the patient is comfortable.     The patient was told that they may experience recall of the procedure. The patient was further advised that if the MAC anesthetic was deemed ineffective that general anesthesia administered via LMA or ETT may be required.    Patient verbalized understanding and agreed to plan of care. )  intravenous induction   Anesthetic plan, all risks, benefits, and alternatives have been provided, discussed and informed consent has been obtained with: patient.    Plan discussed with CRNA.

## 2019-05-10 NOTE — ANESTHESIA POSTPROCEDURE EVALUATION
Patient: Karol Lopez    Procedure Summary     Date:  05/10/19 Room / Location:  [facility] OR  /  [facility]    Anesthesia Start:  0832 Anesthesia Stop:  0858    Procedure:  BREAST BIOPSY RIGHT (Right Breast) Diagnosis:       Breast mass      (Breast mass [N63.0])    Surgeon:  Kiana Frey MD Provider:  Elif Blake CRNA    Anesthesia Type:  MAC ASA Status:  3          Anesthesia Type: MAC  Last vitals  BP   156/90 (05/10/19 0859)   Temp   98.4 °F (36.9 °C) (05/10/19 0859)   Pulse   66 (05/10/19 0859)   Resp   16 (05/10/19 0859)     SpO2   94 % (05/10/19 0859)     Post Anesthesia Care and Evaluation    Patient location during evaluation: PHASE II  Patient participation: complete - patient participated  Level of consciousness: awake and alert  Pain score: 0  Pain management: satisfactory to patient  Airway patency: patent  Anesthetic complications: No anesthetic complications  PONV Status: none  Cardiovascular status: acceptable and stable  Respiratory status: acceptable  Hydration status: acceptable

## 2019-05-10 NOTE — NURSING NOTE
"0905  Pt asking if she got pain meds.  RN explained to pt that she had gotten Ibuprofen for pain as she already has pain meds at home per her medication list.  Pt reports that she has \"run out of her pain medication\".  RN explained to pt that she could contact her regular doctor to see if he would refill pain med of Hydrocodone or call Dr. Frey's office when she got home to explain her need for pain meds and see if he would call in the prescription for her.  Pt c/o chronic back pain.  Support given.  Pt agrees to call physician for med.  "

## 2019-05-15 ENCOUNTER — OFFICE VISIT (OUTPATIENT)
Dept: PRIMARY CARE CLINIC | Age: 69
End: 2019-05-15
Payer: MEDICARE

## 2019-05-15 VITALS
DIASTOLIC BLOOD PRESSURE: 80 MMHG | BODY MASS INDEX: 26.29 KG/M2 | OXYGEN SATURATION: 98 % | SYSTOLIC BLOOD PRESSURE: 134 MMHG | WEIGHT: 154 LBS | TEMPERATURE: 97.4 F | HEART RATE: 62 BPM | HEIGHT: 64 IN

## 2019-05-15 DIAGNOSIS — I25.10 CORONARY ARTERY DISEASE INVOLVING NATIVE CORONARY ARTERY OF NATIVE HEART WITHOUT ANGINA PECTORIS: ICD-10-CM

## 2019-05-15 DIAGNOSIS — I10 ESSENTIAL HYPERTENSION: ICD-10-CM

## 2019-05-15 DIAGNOSIS — R63.4 WEIGHT LOSS: ICD-10-CM

## 2019-05-15 DIAGNOSIS — R53.83 FATIGUE, UNSPECIFIED TYPE: ICD-10-CM

## 2019-05-15 DIAGNOSIS — E11.40 TYPE 2 DIABETES MELLITUS WITH DIABETIC NEUROPATHY, WITH LONG-TERM CURRENT USE OF INSULIN (HCC): Primary | ICD-10-CM

## 2019-05-15 DIAGNOSIS — M51.36 DEGENERATIVE DISC DISEASE, LUMBAR: ICD-10-CM

## 2019-05-15 DIAGNOSIS — E53.8 VITAMIN B12 DEFICIENCY: ICD-10-CM

## 2019-05-15 DIAGNOSIS — Z79.4 TYPE 2 DIABETES MELLITUS WITH DIABETIC NEUROPATHY, WITH LONG-TERM CURRENT USE OF INSULIN (HCC): Primary | ICD-10-CM

## 2019-05-15 PROCEDURE — 4040F PNEUMOC VAC/ADMIN/RCVD: CPT | Performed by: INTERNAL MEDICINE

## 2019-05-15 PROCEDURE — 4004F PT TOBACCO SCREEN RCVD TLK: CPT | Performed by: INTERNAL MEDICINE

## 2019-05-15 PROCEDURE — 99214 OFFICE O/P EST MOD 30 MIN: CPT | Performed by: INTERNAL MEDICINE

## 2019-05-15 PROCEDURE — 1090F PRES/ABSN URINE INCON ASSESS: CPT | Performed by: INTERNAL MEDICINE

## 2019-05-15 PROCEDURE — 2022F DILAT RTA XM EVC RTNOPTHY: CPT | Performed by: INTERNAL MEDICINE

## 2019-05-15 PROCEDURE — 1123F ACP DISCUSS/DSCN MKR DOCD: CPT | Performed by: INTERNAL MEDICINE

## 2019-05-15 PROCEDURE — 3045F PR MOST RECENT HEMOGLOBIN A1C LEVEL 7.0-9.0%: CPT | Performed by: INTERNAL MEDICINE

## 2019-05-15 PROCEDURE — G8400 PT W/DXA NO RESULTS DOC: HCPCS | Performed by: INTERNAL MEDICINE

## 2019-05-15 PROCEDURE — 3017F COLORECTAL CA SCREEN DOC REV: CPT | Performed by: INTERNAL MEDICINE

## 2019-05-15 PROCEDURE — G8419 CALC BMI OUT NRM PARAM NOF/U: HCPCS | Performed by: INTERNAL MEDICINE

## 2019-05-15 PROCEDURE — G8598 ASA/ANTIPLAT THER USED: HCPCS | Performed by: INTERNAL MEDICINE

## 2019-05-15 PROCEDURE — G8427 DOCREV CUR MEDS BY ELIG CLIN: HCPCS | Performed by: INTERNAL MEDICINE

## 2019-05-15 PROCEDURE — 96372 THER/PROPH/DIAG INJ SC/IM: CPT | Performed by: INTERNAL MEDICINE

## 2019-05-15 RX ORDER — FUROSEMIDE 20 MG/1
20 TABLET ORAL DAILY PRN
Qty: 30 TABLET | Refills: 1 | Status: SHIPPED | OUTPATIENT
Start: 2019-05-15 | End: 2019-07-15 | Stop reason: SDUPTHER

## 2019-05-15 RX ORDER — CYANOCOBALAMIN 1000 UG/ML
1000 INJECTION INTRAMUSCULAR; SUBCUTANEOUS ONCE
Status: COMPLETED | OUTPATIENT
Start: 2019-05-15 | End: 2019-05-15

## 2019-05-15 RX ADMIN — CYANOCOBALAMIN 1000 MCG: 1000 INJECTION INTRAMUSCULAR; SUBCUTANEOUS at 10:55

## 2019-05-15 ASSESSMENT — ENCOUNTER SYMPTOMS
RHINORRHEA: 0
WHEEZING: 0
NAUSEA: 0
SHORTNESS OF BREATH: 0
VOMITING: 0
COUGH: 0
CONSTIPATION: 0
EYE ITCHING: 0
SINUS PRESSURE: 0
DIARRHEA: 0
EYE DISCHARGE: 0
SORE THROAT: 0
EYE REDNESS: 0
BACK PAIN: 1
ABDOMINAL PAIN: 0

## 2019-05-15 ASSESSMENT — PATIENT HEALTH QUESTIONNAIRE - PHQ9
1. LITTLE INTEREST OR PLEASURE IN DOING THINGS: 0
SUM OF ALL RESPONSES TO PHQ9 QUESTIONS 1 & 2: 0
SUM OF ALL RESPONSES TO PHQ QUESTIONS 1-9: 0
SUM OF ALL RESPONSES TO PHQ QUESTIONS 1-9: 0
2. FEELING DOWN, DEPRESSED OR HOPELESS: 0

## 2019-05-15 NOTE — PROGRESS NOTES
Have you seen any other physician or provider since your last visit yes Dr. King Andujar removed a tumor from her breast.     Have you had any other diagnostic tests since your last visit?  Yes patient had a biopsy but hasn't received her results from Dr. King Andujar    Have you changed or stopped any medications since your last visit? no         Administrations This Visit     cyanocobalamin injection 1,000 mcg     Admin Date  05/15/2019  10:55 Action  Given Dose  1000 mcg Route  Intramuscular Site  Deltoid Right Administered By  Ivette Hummel    Ordering Provider:  Brenda Smith MD    [address]:  [id number]    Lot#:  [id number]    :  [email or web address] [facility]    Patient Supplied?:  No

## 2019-05-15 NOTE — PROGRESS NOTES
SUBJECTIVE:    Patient ID: Vianey Bradford is a 81 y. o.female. Chief Complaint   Patient presents with    Weight Loss     patient had a breast tumor removed last week.  Back Pain         HPI:  Patient has had diabetes for the past several years. She has been compliant with the medications and denies any side effects from it. She has been monitoring fingersticks on a daily basis. Her fingerstick range is between . She denies any hypoglycemic symptoms. She has  been following a diabetic diet and has not been active. Her last eye exam was more than a year ago. She is using oral meds and Insulin. She is on 5 units before breakfast, 5 units before lunch and 5 before dinner. She is also on Levemir 55 units nightly . Pt has been complaining of LBP for several years. Pain range is 3-8/10, currently 3/10. Pain does not radiate. Pain is worse with exertion, better with rest. Sx getting worse. No change in B/B. Some stiffness after long sitting or standing. No tingling or numbness. Tried Norco with good response. No side effect from pain medications. It has been helping her tolerating ADLs and reducing pain to a tolerable level. Patient requested refills on pain meds. Patient has had hypertension for  several years. She has been compliant with taking medications, without side effects from it. She has been following a low-sodium, is not active and never exercises. Weight is up, compared to last visit. Her blood pressure is stable at this time. Energy has improved compared to before. Patient has progressively been losing weight but her weight improved compared to last visit. Patient's medications, allergies, past medical, surgical, social and family histories were reviewed and updated as appropriate in the electronic medical record/chart.         Outpatient Medications Marked as Taking for the 5/15/19 encounter (Office Visit) with Kavin Hernandez MD   Medication Sig Dispense Refill    Negative for joint swelling. Skin: Negative for rash and wound. Neurological: Negative for syncope, weakness, numbness and headaches. Hematological: Negative. Negative for adenopathy. Psychiatric/Behavioral: Positive for sleep disturbance. Negative for agitation and dysphoric mood. The patient is nervous/anxious. Past Medical History:   Diagnosis Date    CAD (coronary artery disease)     COPD [facility] obstructive [facility] disease) ([facility] [address] [facility]     Cystic fibrosis ([facility] [address] [facility]     Diabetes mellitus ([facility] [address]     GERD (gastroesophageal reflux disease)     H/O: CVA (cardiovascular accident)     HTN (hypertension)     Mass     on chest     Tobacco abuse     Vitamin B12 deficiency      Past Surgical History:   Procedure Laterality Date    CARPAL TUNNEL RELEASE Bilateral     CHOLECYSTECTOMY      CORONARY ANGIOPLASTY WITH STENT PLACEMENT      HYSTERECTOMY      JOINT REPLACEMENT Bilateral 10/15/2016    knees    TOTAL KNEE ARTHROPLASTY [facility]ilateral 10/18/2016    at [facility]      Family History   Problem Relation Age of Onset    Diabetes Mother     High Blood Pressure Mother     Cancer Mother         pancreatic    Diabetes Father     Heart Disease Father     High Blood Pressure Father     Cancer Sister         lung, breast    Cancer Brother         throat      Social History     Tobacco Use   Smoking Status Current Every Day Smoker    Packs/day: 1.00    Years: 40.00    Pack years: 40.00    Types: Cigarettes   Smokeless Tobacco Never Used   Tobacco Comment    states she is not willing to stop and this is a stress reliever for her.         OBJECTIVE:   Wt Readings from Last 3 Encounters:   05/15/19 154 lb (69.9 kg)   04/27/19 147 lb (66.7 kg)   04/09/19 148 lb 6.4 oz (67.3 kg)     BP Readings from Last 3 Encounters:   05/15/19 134/80   04/27/19 (!) 160/76   04/09/19 138/64       /80 (Site: Right Upper Arm, Position: Sitting, Cuff Size: Medium Adult)   Pulse 62   Temp 97.4 °F (36.3 °C)   Ht 5' 4\" (1.626 m)   Wt 154 lb (69.9 kg)   SpO2 98%   BMI 26.43 kg/m²      Physical Exam   Constitutional: She is oriented to person, place, and time. She appears well-developed and well-nourished. HENT:   Head: Normocephalic and atraumatic. Right Ear: External ear normal.   Left Ear: External ear normal.   Nose: Nose normal.   Eyes: Pupils are equal, round, and reactive to light. Conjunctivae and EOM are normal.   Neck: Neck supple. No JVD present. No thyromegaly present. Cardiovascular: Normal rate, regular rhythm and normal heart sounds. Pulmonary/Chest: Effort normal and breath sounds normal. She has no wheezes. She has no rales. Abdominal: Soft. Bowel sounds are normal. She exhibits no distension. There is no tenderness. Musculoskeletal: She exhibits edema (trace B legs). Lumbar back: She exhibits decreased range of motion, tenderness and spasm. Neurological: She is alert and oriented to person, place, and time. Skin: No rash noted. No erythema. Psychiatric: She has a normal mood and affect. Judgment normal.   Nursing note and vitals reviewed. Lab Results   Component Value Date     04/27/2019    K 3.2 04/27/2019     04/27/2019    CO2 29 04/27/2019    GLUCOSE 85 04/27/2019    BUN 14 04/27/2019    CREATININE 1.1 04/27/2019    CALCIUM 9.5 04/27/2019    PROT 7.1 04/27/2019    LABALBU 4.2 04/27/2019    BILITOT 0.5 04/27/2019    ALT 11 04/27/2019    AST 17 04/27/2019       Hemoglobin A1C (%)   Date Value   04/09/2019 7.2 (H)     Microscopic Examination (no units)   Date Value   04/27/2019 YES     LDL Calculated (mg/dL)   Date Value   12/11/2018 40         Lab Results   Component Value Date    WBC 7.4 04/27/2019    NEUTROABS 5.7 04/27/2019    HGB 13.2 04/27/2019    HCT 39.7 04/27/2019    MCV 86.3 04/27/2019     04/27/2019       Lab Results   Component Value Date    TSH 2.920 04/09/2019       ASSESSMENT/PLAN:       1.  Type 2 diabetes mellitus with diabetic neuropathy, with long-term current use of insulin (HCC)  In acceptable range. I advised her regarding diabetic diet, exercise and weight control. Also, I advised her to stay on the current medication, monitor her fingerstick closely. I am going to check the A1c every few months. I will check microalbumin on a yearly basis. I have also advised her to have a yearly eye exam and to monitor her feet closely. Along with instruction of possible complications related to diabetes, even with good control. A1C will be checked  in few months. Lab Results   Component Value Date    LABA1C 7.2 (H) 04/09/2019     2. Essential hypertension  BP is stable. I have advised her on low-sodium diet, exercise and weight control. I am going to continue current medication . Will monitor her renal function every few months, have advised her to check blood pressure frequently and to keep a record of this. Lasix PRN with her mild swelling. 3. Degenerative disc disease, lumbar  I am going to treat her with Norco. Advice her to avoid heavy lifting, use heat pad. 1. Goal is to alleviate pain to a degree that the patient can participate in own ADLS social activity and improve function. 2. Risk and benefits were reviewed at length, including risk for addiction and possible side effects and interactions. Alternative therapy was discussed and will be a part of the patients overall care. Including but not limited to, physical therapy, other medications as well as behavioral and activity modification and the use of other modalities (chiropractic care ect. ). 3. This patient does not exhibit a potential for abuse or addiction at this time. Will monitor closely. 4. UDS has been compliant. Will randomly check drug screen. 5. Yo Rodriguez completed and compliant, will check every 3 months. 6. Advised her that UDS and possible pill counts and frequent monitoring will be a part of her care.    7. Patient has medication agreement and consent to treat with this office. Patient has been informed not to seek or obtain medication from other practitioners and to notify our office ASAP if this happened on emergent basis. 4. Vitamin B12 deficiency  B12 injection today. 5. Weight loss  Weight is up. Pt had breast mass removed results pending. Follow up mammogram in October. Follow up with . Work up unremarkable except for breast mass. 6. Fatigue, unspecified type  Workup has been unremarkable. Long discussion with patient regarding exercise and weight control. Discussed proceeding with sleep study but she wants to wait at this time. Long conversation with her regarding smoking cessation. 7. Coronary artery disease involving native coronary artery of native heart without angina pectoris  Refer to cardiology. Continue current regimen. Long conversation with her regarding smoking cessation but she refused to do anything at this time. - External Referral To Cardiology    Orders Placed This Encounter   Medications    cyanocobalamin injection 1,000 mcg    furosemide (LASIX) 20 MG tablet     Sig: Take 1 tablet by mouth daily as needed (swelling)     Dispense:  30 tablet     Refill:  1      Written by Bon Garland, acting as a scribe for Dr. Jenifer Johnson on 5/15/2019 at 10:20 AM.     I, Dr. Courtney Pat, personally performed the services described in the documentation as scribed by Kylah Browning CMA, in my presence and it is both accurate and complete.

## 2019-05-20 ENCOUNTER — OFFICE VISIT (OUTPATIENT)
Dept: SURGERY | Facility: CLINIC | Age: 69
End: 2019-05-20

## 2019-05-20 VITALS
HEART RATE: 72 BPM | OXYGEN SATURATION: 99 % | SYSTOLIC BLOOD PRESSURE: 112 MMHG | TEMPERATURE: 98.2 F | HEIGHT: 65 IN | WEIGHT: 149.2 LBS | DIASTOLIC BLOOD PRESSURE: 72 MMHG | BODY MASS INDEX: 24.86 KG/M2

## 2019-05-20 DIAGNOSIS — Z48.89 POSTOPERATIVE VISIT: Primary | ICD-10-CM

## 2019-05-20 LAB
LAB AP CASE REPORT: NORMAL
PATH REPORT.FINAL DX SPEC: NORMAL

## 2019-05-20 PROCEDURE — 99024 POSTOP FOLLOW-UP VISIT: CPT | Performed by: SURGERY

## 2019-06-07 DIAGNOSIS — M51.36 DEGENERATIVE DISC DISEASE, LUMBAR: ICD-10-CM

## 2019-06-07 RX ORDER — HYDROCODONE BITARTRATE AND ACETAMINOPHEN 5; 325 MG/1; MG/1
1 TABLET ORAL 2 TIMES DAILY PRN
Qty: 30 TABLET | Refills: 0 | Status: SHIPPED | OUTPATIENT
Start: 2019-06-07 | End: 2019-07-15 | Stop reason: SDUPTHER

## 2019-06-13 ENCOUNTER — NURSE ONLY (OUTPATIENT)
Dept: PRIMARY CARE CLINIC | Age: 69
End: 2019-06-13
Payer: MEDICARE

## 2019-06-13 DIAGNOSIS — E53.8 VITAMIN B12 DEFICIENCY: Primary | ICD-10-CM

## 2019-06-13 PROCEDURE — 96372 THER/PROPH/DIAG INJ SC/IM: CPT | Performed by: INTERNAL MEDICINE

## 2019-06-13 RX ORDER — CYANOCOBALAMIN 1000 UG/ML
1000 INJECTION INTRAMUSCULAR; SUBCUTANEOUS ONCE
Status: COMPLETED | OUTPATIENT
Start: 2019-06-13 | End: 2019-06-13

## 2019-06-13 RX ADMIN — CYANOCOBALAMIN 1000 MCG: 1000 INJECTION INTRAMUSCULAR; SUBCUTANEOUS at 10:52

## 2019-07-15 ENCOUNTER — TELEPHONE (OUTPATIENT)
Dept: PRIMARY CARE CLINIC | Age: 69
End: 2019-07-15

## 2019-07-15 ENCOUNTER — OFFICE VISIT (OUTPATIENT)
Dept: PRIMARY CARE CLINIC | Age: 69
End: 2019-07-15
Payer: MEDICARE

## 2019-07-15 VITALS
RESPIRATION RATE: 18 BRPM | WEIGHT: 145 LBS | SYSTOLIC BLOOD PRESSURE: 140 MMHG | OXYGEN SATURATION: 98 % | BODY MASS INDEX: 24.89 KG/M2 | HEART RATE: 74 BPM | DIASTOLIC BLOOD PRESSURE: 60 MMHG

## 2019-07-15 DIAGNOSIS — I10 ESSENTIAL HYPERTENSION: ICD-10-CM

## 2019-07-15 DIAGNOSIS — Z87.891 PERSONAL HISTORY OF TOBACCO USE: ICD-10-CM

## 2019-07-15 DIAGNOSIS — M51.36 DEGENERATIVE DISC DISEASE, LUMBAR: ICD-10-CM

## 2019-07-15 DIAGNOSIS — E53.8 VITAMIN B12 DEFICIENCY: ICD-10-CM

## 2019-07-15 DIAGNOSIS — Z79.4 TYPE 2 DIABETES MELLITUS WITH DIABETIC NEUROPATHY, WITH LONG-TERM CURRENT USE OF INSULIN (HCC): ICD-10-CM

## 2019-07-15 DIAGNOSIS — I25.10 CORONARY ARTERY DISEASE INVOLVING NATIVE CORONARY ARTERY OF NATIVE HEART WITHOUT ANGINA PECTORIS: Primary | ICD-10-CM

## 2019-07-15 DIAGNOSIS — R63.4 WEIGHT LOSS: Primary | ICD-10-CM

## 2019-07-15 DIAGNOSIS — E11.40 TYPE 2 DIABETES MELLITUS WITH DIABETIC NEUROPATHY, WITH LONG-TERM CURRENT USE OF INSULIN (HCC): ICD-10-CM

## 2019-07-15 PROCEDURE — 4040F PNEUMOC VAC/ADMIN/RCVD: CPT | Performed by: INTERNAL MEDICINE

## 2019-07-15 PROCEDURE — G8427 DOCREV CUR MEDS BY ELIG CLIN: HCPCS | Performed by: INTERNAL MEDICINE

## 2019-07-15 PROCEDURE — G0296 VISIT TO DETERM LDCT ELIG: HCPCS | Performed by: INTERNAL MEDICINE

## 2019-07-15 PROCEDURE — 99214 OFFICE O/P EST MOD 30 MIN: CPT | Performed by: INTERNAL MEDICINE

## 2019-07-15 PROCEDURE — G8598 ASA/ANTIPLAT THER USED: HCPCS | Performed by: INTERNAL MEDICINE

## 2019-07-15 PROCEDURE — 4004F PT TOBACCO SCREEN RCVD TLK: CPT | Performed by: INTERNAL MEDICINE

## 2019-07-15 PROCEDURE — 96372 THER/PROPH/DIAG INJ SC/IM: CPT | Performed by: INTERNAL MEDICINE

## 2019-07-15 PROCEDURE — 1090F PRES/ABSN URINE INCON ASSESS: CPT | Performed by: INTERNAL MEDICINE

## 2019-07-15 PROCEDURE — 3017F COLORECTAL CA SCREEN DOC REV: CPT | Performed by: INTERNAL MEDICINE

## 2019-07-15 PROCEDURE — G8420 CALC BMI NORM PARAMETERS: HCPCS | Performed by: INTERNAL MEDICINE

## 2019-07-15 PROCEDURE — 3045F PR MOST RECENT HEMOGLOBIN A1C LEVEL 7.0-9.0%: CPT | Performed by: INTERNAL MEDICINE

## 2019-07-15 PROCEDURE — 1123F ACP DISCUSS/DSCN MKR DOCD: CPT | Performed by: INTERNAL MEDICINE

## 2019-07-15 PROCEDURE — 2022F DILAT RTA XM EVC RTNOPTHY: CPT | Performed by: INTERNAL MEDICINE

## 2019-07-15 PROCEDURE — G8400 PT W/DXA NO RESULTS DOC: HCPCS | Performed by: INTERNAL MEDICINE

## 2019-07-15 RX ORDER — CLOPIDOGREL BISULFATE 75 MG/1
TABLET ORAL
Qty: 30 TABLET | Refills: 3 | Status: SHIPPED | OUTPATIENT
Start: 2019-07-15 | End: 2019-10-09 | Stop reason: SDUPTHER

## 2019-07-15 RX ORDER — ROSUVASTATIN CALCIUM 20 MG/1
20 TABLET, COATED ORAL DAILY
Qty: 30 TABLET | Refills: 3 | Status: SHIPPED | OUTPATIENT
Start: 2019-07-15 | End: 2019-10-09 | Stop reason: SDUPTHER

## 2019-07-15 RX ORDER — FUROSEMIDE 20 MG/1
20 TABLET ORAL DAILY PRN
Qty: 30 TABLET | Refills: 1 | Status: SHIPPED | OUTPATIENT
Start: 2019-07-15 | End: 2019-10-09 | Stop reason: SDUPTHER

## 2019-07-15 RX ORDER — CYANOCOBALAMIN 1000 UG/ML
1000 INJECTION INTRAMUSCULAR; SUBCUTANEOUS ONCE
Status: COMPLETED | OUTPATIENT
Start: 2019-07-15 | End: 2019-07-15

## 2019-07-15 RX ORDER — FLUTICASONE PROPIONATE 50 MCG
1 SPRAY, SUSPENSION (ML) NASAL DAILY
Qty: 2 BOTTLE | Refills: 1 | Status: SHIPPED | OUTPATIENT
Start: 2019-07-15 | End: 2019-10-09 | Stop reason: SDUPTHER

## 2019-07-15 RX ORDER — LORATADINE 10 MG/1
10 TABLET ORAL DAILY
Qty: 30 TABLET | Refills: 1 | Status: SHIPPED | OUTPATIENT
Start: 2019-07-15 | End: 2019-10-09 | Stop reason: SDUPTHER

## 2019-07-15 RX ORDER — ISOSORBIDE MONONITRATE 30 MG/1
TABLET, EXTENDED RELEASE ORAL
Qty: 30 TABLET | Refills: 3 | Status: SHIPPED | OUTPATIENT
Start: 2019-07-15 | End: 2019-10-09 | Stop reason: SDUPTHER

## 2019-07-15 RX ORDER — HYDROCODONE BITARTRATE AND ACETAMINOPHEN 5; 325 MG/1; MG/1
1 TABLET ORAL 2 TIMES DAILY PRN
Qty: 30 TABLET | Refills: 0 | Status: SHIPPED | OUTPATIENT
Start: 2019-07-15 | End: 2019-09-17 | Stop reason: SDUPTHER

## 2019-07-15 RX ADMIN — CYANOCOBALAMIN 1000 MCG: 1000 INJECTION INTRAMUSCULAR; SUBCUTANEOUS at 11:45

## 2019-07-15 ASSESSMENT — ENCOUNTER SYMPTOMS
RHINORRHEA: 0
BACK PAIN: 1
SINUS PRESSURE: 0
WHEEZING: 0
COUGH: 0
SORE THROAT: 0
VOMITING: 0
DIARRHEA: 0
ABDOMINAL PAIN: 0
NAUSEA: 0
CONSTIPATION: 0
EYE ITCHING: 0
EYE REDNESS: 0
EYE DISCHARGE: 0
SHORTNESS OF BREATH: 0

## 2019-07-15 NOTE — PROGRESS NOTES
SUBJECTIVE:    Patient ID: Yifan Nascimento is a 81 y. o.female. Chief Complaint   Patient presents with    Diabetes    Back Pain    Hypertension     HPI:  Patient has been having unexpected weight changes in the past few months. She has lost 9 pounds since last visit. She denies any diarrhea or vomiting. She states she does eat two meals per day but that is typical for her. She does not report going through anything stressful out of the ordinary. Patient has not had a colonoscopy in the past 5 years. Blood work back in April was unremarkable. She does report she has smoke 1.5 ppd for about 47 years. She denies any severe depression. Patient has had diabetes for the past several years. She has been compliant with the medications and denies any side effects from it. She has been monitoring fingersticks on a daily basis. Her fingerstick range is between 109-200. She has had a few hypoglycemic symptoms. She has been following a diabetic diet and has been active. Her last eye exam was more than a year ago. She is using oral meds and Insulin. She is on SSI if elevated. She is also on Levemir 55 nightly. Patient has had hypertension for several years. She has been compliant with taking medications, without side effects from it. She has been following a low-sodium, is active and occasionally exercises. Weight is down 9 pounds, compared to last visit. Her blood pressure is at the upper normal limits at this time. Pt has been complaining of LBP for several years. She is also stating she is struggling with back spasms recently. Pain range is 3-8/10, currently 5/10. Pain does not radiate. Pain is worse with exertion, better with rest. Sx getting worse. No change in B/B. Some stiffness after long sitting or standing. No tingling or numbness. Tried Norco with good response. No side effect from pain medications. It has been helping her tolerating ADLs and reducing pain to a tolerable level.  Patient Years: 40.00    Pack years: 40.00    Types: Cigarettes   Smokeless Tobacco Never Used   Tobacco Comment    states she is not willing to stop and this is a stress reliever for her. OBJECTIVE:   Wt Readings from Last 3 Encounters:   07/15/19 145 lb (65.8 kg)   05/15/19 154 lb (69.9 kg)   04/27/19 147 lb (66.7 kg)     BP Readings from Last 3 Encounters:   07/15/19 (!) 140/60   05/15/19 134/80   04/27/19 (!) 160/76       BP (!) 140/60 (Site: Right Upper Arm, Position: Sitting, Cuff Size: Medium Adult)   Pulse 74   Resp 18   Wt 145 lb (65.8 kg)   SpO2 98%   BMI 24.89 kg/m²      Physical Exam   Constitutional: She is oriented to person, place, and time. She appears well-developed and well-nourished. HENT:   Head: Normocephalic and atraumatic. Right Ear: External ear normal.   Left Ear: External ear normal.   Nose: Nose normal.   Eyes: Pupils are equal, round, and reactive to light. Conjunctivae and EOM are normal.   Neck: Neck supple. No JVD present. No thyromegaly present. Cardiovascular: Normal rate, regular rhythm and normal heart sounds. Pulmonary/Chest: Effort normal and breath sounds normal. She has no wheezes. She has no rales. Abdominal: Soft. Bowel sounds are normal. She exhibits no distension. There is no tenderness. Musculoskeletal: She exhibits no edema. Lumbar back: She exhibits decreased range of motion, tenderness and spasm. Lymphadenopathy:     She has no cervical adenopathy. She has no axillary adenopathy. Neurological: She is alert and oriented to person, place, and time. Skin: No rash noted. No erythema. Psychiatric: She has a normal mood and affect. Judgment normal.   Nursing note and vitals reviewed.       Lab Results   Component Value Date     04/27/2019    K 3.2 04/27/2019     04/27/2019    CO2 29 04/27/2019    GLUCOSE 85 04/27/2019    BUN 14 04/27/2019    CREATININE 1.1 04/27/2019    CALCIUM 9.5 04/27/2019    PROT 7.1 04/27/2019    LABALBU 4.2 04/27/2019    BILITOT 0.5 04/27/2019    ALT 11 04/27/2019    AST 17 04/27/2019       Hemoglobin A1C (%)   Date Value   04/09/2019 7.2 (H)     Microscopic Examination (no units)   Date Value   04/27/2019 YES     LDL Calculated (mg/dL)   Date Value   12/11/2018 40         Lab Results   Component Value Date    WBC 7.4 04/27/2019    NEUTROABS 5.7 04/27/2019    HGB 13.2 04/27/2019    HCT 39.7 04/27/2019    MCV 86.3 04/27/2019     04/27/2019       Lab Results   Component Value Date    TSH 2.920 04/09/2019       ASSESSMENT/PLAN:     1. Weight loss  I am going to recheck labs and also order a CT lung screen to check for abnormalities due to her history of tobacco use. I also recommended a repeat colonoscopy in the near future. Continue to monitor. Further recommandation based on test results. Recent mammogram was abnormal but biopsy came back unremarkable. Follow-up in October.    - Comprehensive Metabolic Panel; Future  - CBC Auto Differential; Future    2. Vitamin B12 deficiency  Continue on replacement therapy. Injection given today. - cyanocobalamin injection 1,000 mcg    3. Personal history of tobacco use  I had a long discussion with her regarding the risk of smoking especialy with her other medical problems. I have discussed with patient several treatment options (program, nicotine patches and other meds) to help her quit smoking. Patient is not interested at this time. Spent 5  minutes     MI VISIT TO DISCUSS LUNG CA SCREEN W LDCT  - CT Lung Screen (Annual); Future    4. Degenerative disc disease, lumbar  I am going to continue patient on Amie Rhett Iron 100 her to avoid heavy lifting, and use heating pad. 1. Goal is to alleviate pain to a degree that the patient can participate in own ADLS social activity and improve function. 2. Risk and benefits were reviewed at length, including risk for addiction and possible side effects and interactions.  Alternative therapy was discussed and will be a part of the patients overall care. Including but not limited to, physical therapy, other medications as well as behavioral and activity modification and the use of other modalities (chiropractic care ect. ). 3. This patient does not exhibit a potential for abuse or addiction at this time. Will monitor closely. 4. UDS has been compliant. Will randomly check drug screen. 5. Ben Nobles completed and compliant, will check every 3 months. 6. Advised her that UDS and possible pill counts and frequent monitoring will be a part of her care. 7. Patient has medication agreement and consent to treat with this office. Patient has been informed not to seek or obtain medication from other practitioners and to notify our office ASAP if this happened on emergent basis. 5. Essential hypertension  BP is stable. I have advised her on low-sodium diet, exercise and weight control. I am going to continue current medication. Will monitor her renal function every few months, have advised her to check blood pressure frequently and to keep a record of this. - Comprehensive Metabolic Panel; Future  - CBC Auto Differential; Future    6. Type 2 diabetes mellitus with diabetic neuropathy, with long-term current use of insulin (HCC)  Stable. I did advise her to lower Levemir to 50 units nightly until readings are no lower than 80 in the mornings. I advised her regarding diabetic diet, exercise and weight control. Also, I advised her to stay on the current medication, monitor her fingerstick closely. I am going to check the A1c every few months. I will check microalbumin on a yearly basis. I have also advised her to have a yearly eye exam and to monitor her feet closely. Along with instruction of possible complications related to diabetes, even with good control. A1C will be checked soon.    Lab Results   Component Value Date    LABA1C 7.2 (H) 04/09/2019       - Hemoglobin A1C; Future       Orders Placed This Encounter   Medications    insulin detemir (LEVEMIR FLEXTOUCH) 100 UNIT/ML injection pen     Sig: Inject 55 Units into the skin nightly     Dispense:  20 pen     Refill:  3    furosemide (LASIX) 20 MG tablet     Sig: Take 1 tablet by mouth daily as needed (swelling)     Dispense:  30 tablet     Refill:  1    loratadine (CLARITIN) 10 MG tablet     Sig: Take 1 tablet by mouth daily     Dispense:  30 tablet     Refill:  1    fluticasone (FLONASE) 50 MCG/ACT nasal spray     Si spray by Each Nostril route daily 1 Spray in each nostril     Dispense:  2 Bottle     Refill:  1    clopidogrel (PLAVIX) 75 MG tablet     Sig: take 1 tablet by mouth once daily     Dispense:  30 tablet     Refill:  3    rosuvastatin (CRESTOR) 20 MG tablet     Sig: Take 1 tablet by mouth daily     Dispense:  30 tablet     Refill:  3    metFORMIN (GLUCOPHAGE) 1000 MG tablet     Sig: take 1 tablet by mouth twice a day with food     Dispense:  60 tablet     Refill:  3    isosorbide mononitrate (IMDUR) 30 MG extended release tablet     Sig: take 1 tablet by mouth once daily     Dispense:  30 tablet     Refill:  3    cyanocobalamin injection 1,000 mcg    HYDROcodone-acetaminophen (NORCO) 5-325 MG per tablet     Sig: Take 1 tablet by mouth 2 times daily as needed for Pain for up to 30 days. Dispense:  30 tablet     Refill:  0     Reduce doses taken as pain becomes manageable      Written by Alexander Clark, acting as a scribe for Dr. Rosa Lawton on 7/15/2019 at 10:27 AM.     I, Dr. Vanessa Elliott, personally performed the services described in the documentation as scribed by Maryjo Smith CMA, in my presence and it is both accurate and complete.

## 2019-07-15 NOTE — PATIENT INSTRUCTIONS
dispose of used patches by folding them in half so that the sticky sides meet, and then flushing them down a toilet. They should not be placed in the household trash where children or pets can find them. · If you have any questions, ask your provider or pharmacist for more information. · Be sure to keep all appointments for provider visits or tests. We are committed to providing you with the best care possible. In order to help us achieve these goals please remember to bring all medications, herbal products, and over the counter supplements with you to each visit. If your provider has ordered testing for you, please be sure to follow up with our office if you have not received results within 7 days after the testing took place. *If you receive a survey after visiting one of our offices, please take time to share your experience concerning your physician office visit. These surveys are confidential and no health information about you is shared. We are eager to improve for you and we are counting on your feedback to help make that happen. What is lung cancer screening? Lung cancer screening is a way in which doctors check the lungs for early signs of cancer in people who have no symptoms of lung cancer. A low-dose CT scan uses much less radiation than a normal CT scan and shows a more detailed image of the lungs than a standard X-ray. The goal of lung cancer screening is to find cancer early, before it has a chance to grow, spread, or cause problems. One large study found that smokers who were screened with low-dose CT scans were less likely to die of lung cancer than those who were screened with standard X-ray. Below is a summary of the things you need to know regarding screening for lung cancer with low-dose computed tomography (LDCT). This is a screening program that involves routine annual screening with LDCT studies of the lung.   The LDCTs are done using low-dose radiation that is not

## 2019-07-22 ENCOUNTER — HOSPITAL ENCOUNTER (OUTPATIENT)
Dept: CT IMAGING | Facility: HOSPITAL | Age: 69
Discharge: HOME | End: 2019-07-22
Payer: MEDICARE

## 2019-07-22 ENCOUNTER — TELEPHONE (OUTPATIENT)
Dept: PRIMARY CARE CLINIC | Age: 69
End: 2019-07-22

## 2019-07-22 ENCOUNTER — HOSPITAL ENCOUNTER (OUTPATIENT)
Facility: HOSPITAL | Age: 69
Discharge: HOME | End: 2019-07-22
Payer: MEDICARE

## 2019-07-22 DIAGNOSIS — Z87.891 PERSONAL HISTORY OF TOBACCO USE: ICD-10-CM

## 2019-07-22 DIAGNOSIS — R93.89 ABNORMAL CT OF THE CHEST: Primary | ICD-10-CM

## 2019-07-22 DIAGNOSIS — I10 ESSENTIAL HYPERTENSION: ICD-10-CM

## 2019-07-22 DIAGNOSIS — E11.40 TYPE 2 DIABETES MELLITUS WITH DIABETIC NEUROPATHY, WITH LONG-TERM CURRENT USE OF INSULIN (HCC): ICD-10-CM

## 2019-07-22 DIAGNOSIS — Z79.4 TYPE 2 DIABETES MELLITUS WITH DIABETIC NEUROPATHY, WITH LONG-TERM CURRENT USE OF INSULIN (HCC): ICD-10-CM

## 2019-07-22 DIAGNOSIS — R63.4 WEIGHT LOSS: ICD-10-CM

## 2019-07-22 LAB
A/G RATIO: 2.1 (ref 0.8–2)
ALBUMIN SERPL-MCNC: 4.6 G/DL (ref 3.4–4.8)
ALP BLD-CCNC: 96 U/L (ref 25–100)
ALT SERPL-CCNC: 17 U/L (ref 4–36)
ANION GAP SERPL CALCULATED.3IONS-SCNC: 13 MMOL/L (ref 3–16)
AST SERPL-CCNC: 20 U/L (ref 8–33)
BASOPHILS ABSOLUTE: 0 K/UL (ref 0–0.1)
BASOPHILS RELATIVE PERCENT: 0.5 %
BILIRUB SERPL-MCNC: 0.5 MG/DL (ref 0.3–1.2)
BUN BLDV-MCNC: 14 MG/DL (ref 6–20)
CALCIUM SERPL-MCNC: 10 MG/DL (ref 8.5–10.5)
CHLORIDE BLD-SCNC: 99 MMOL/L (ref 98–107)
CO2: 30 MMOL/L (ref 20–30)
CREAT SERPL-MCNC: 0.9 MG/DL (ref 0.4–1.2)
EOSINOPHILS ABSOLUTE: 0 K/UL (ref 0–0.4)
EOSINOPHILS RELATIVE PERCENT: 0.7 %
GFR AFRICAN AMERICAN: >59
GFR NON-AFRICAN AMERICAN: >60
GLOBULIN: 2.2 G/DL
GLUCOSE BLD-MCNC: 166 MG/DL (ref 74–106)
HBA1C MFR BLD: 7.1 %
HCT VFR BLD CALC: 38.9 % (ref 37–47)
HEMOGLOBIN: 13 G/DL (ref 11.5–16.5)
IMMATURE GRANULOCYTES #: 0 K/UL
IMMATURE GRANULOCYTES %: 0.4 % (ref 0–5)
LYMPHOCYTES ABSOLUTE: 1.1 K/UL (ref 1.5–4)
LYMPHOCYTES RELATIVE PERCENT: 18.6 %
MCH RBC QN AUTO: 29.1 PG (ref 27–32)
MCHC RBC AUTO-ENTMCNC: 33.4 G/DL (ref 31–35)
MCV RBC AUTO: 87.2 FL (ref 80–100)
MONOCYTES ABSOLUTE: 0.4 K/UL (ref 0.2–0.8)
MONOCYTES RELATIVE PERCENT: 6.9 %
NEUTROPHILS ABSOLUTE: 4.1 K/UL (ref 2–7.5)
NEUTROPHILS RELATIVE PERCENT: 72.9 %
PDW BLD-RTO: 14.2 % (ref 11–16)
PLATELET # BLD: 153 K/UL (ref 150–400)
PMV BLD AUTO: 11.1 FL (ref 6–10)
POTASSIUM SERPL-SCNC: 4 MMOL/L (ref 3.4–5.1)
RBC # BLD: 4.46 M/UL (ref 3.8–5.8)
SODIUM BLD-SCNC: 142 MMOL/L (ref 136–145)
TOTAL PROTEIN: 6.8 G/DL (ref 6.4–8.3)
WBC # BLD: 5.6 K/UL (ref 4–11)

## 2019-07-22 PROCEDURE — 83036 HEMOGLOBIN GLYCOSYLATED A1C: CPT

## 2019-07-22 PROCEDURE — 85025 COMPLETE CBC W/AUTO DIFF WBC: CPT

## 2019-07-22 PROCEDURE — 80053 COMPREHEN METABOLIC PANEL: CPT

## 2019-07-22 PROCEDURE — 36415 COLL VENOUS BLD VENIPUNCTURE: CPT

## 2019-07-22 PROCEDURE — G0297 LDCT FOR LUNG CA SCREEN: HCPCS

## 2019-07-22 NOTE — TELEPHONE ENCOUNTER
Dr. Buzz Ndiaye received results of patient's recent CT scan. There were some spots that he would like her to see a pulmonologist for. Referral was made, I was calling to inform her of this.

## 2019-07-24 DIAGNOSIS — F41.9 ANXIETY: ICD-10-CM

## 2019-07-24 RX ORDER — DIAZEPAM 5 MG/1
TABLET ORAL
Qty: 15 TABLET | Refills: 0 | Status: SHIPPED | OUTPATIENT
Start: 2019-07-24 | End: 2019-08-24

## 2019-07-29 ENCOUNTER — TELEPHONE (OUTPATIENT)
Dept: PRIMARY CARE CLINIC | Age: 69
End: 2019-07-29

## 2019-08-22 ENCOUNTER — OFFICE VISIT (OUTPATIENT)
Dept: CARDIOLOGY | Facility: CLINIC | Age: 69
End: 2019-08-22

## 2019-08-22 ENCOUNTER — HOSPITAL ENCOUNTER (OUTPATIENT)
Facility: HOSPITAL | Age: 69
Discharge: HOME | End: 2019-08-22
Payer: MEDICARE

## 2019-08-22 VITALS
SYSTOLIC BLOOD PRESSURE: 170 MMHG | BODY MASS INDEX: 25.51 KG/M2 | DIASTOLIC BLOOD PRESSURE: 90 MMHG | WEIGHT: 149.4 LBS | OXYGEN SATURATION: 95 % | HEIGHT: 64 IN | HEART RATE: 85 BPM

## 2019-08-22 DIAGNOSIS — I25.10 CORONARY ARTERY DISEASE INVOLVING NATIVE HEART WITHOUT ANGINA PECTORIS, UNSPECIFIED VESSEL OR LESION TYPE: Primary | ICD-10-CM

## 2019-08-22 DIAGNOSIS — I10 ESSENTIAL HYPERTENSION: ICD-10-CM

## 2019-08-22 LAB
CHOLESTEROL, TOTAL: 96 MG/DL (ref 0–200)
HDLC SERPL-MCNC: 43 MG/DL (ref 40–60)
LDL CHOLESTEROL CALCULATED: 41 MG/DL
TRIGL SERPL-MCNC: 61 MG/DL (ref 0–249)
VLDLC SERPL CALC-MCNC: 12 MG/DL

## 2019-08-22 PROCEDURE — 80061 LIPID PANEL: CPT

## 2019-08-22 PROCEDURE — 99214 OFFICE O/P EST MOD 30 MIN: CPT | Performed by: INTERNAL MEDICINE

## 2019-08-22 PROCEDURE — 36415 COLL VENOUS BLD VENIPUNCTURE: CPT

## 2019-08-22 RX ORDER — ISOSORBIDE MONONITRATE 60 MG/1
60 TABLET, EXTENDED RELEASE ORAL EVERY MORNING
Qty: 90 TABLET | Refills: 3 | Status: SHIPPED | OUTPATIENT
Start: 2019-08-22 | End: 2020-08-17

## 2019-08-22 NOTE — PROGRESS NOTES
[facility]  Office visit  Karol Lopez  1950  [phone]    VISIT DATE:  8/22/2019      PCP: Romaine Eugene MD  [address] [place] [id number]    CC:  Chief Complaint   Patient presents with   • Follow-up   • Shortness of Breath   • Dizziness       PROBLEM LIST:  1. Coronary artery disease:    a. Pharmacologic MPS (09/27/2012):   Small, mild, partially reversible apical defect, LVEF (66%).  b. Echocardiogram (09/27/2012):  LVEF 60% to 65%, mild aortic leaflet calcification with no stenosis.  c. Cardiac catheterization (11/09/2012):  status post HEIDI to proximal LAD.  LVEF 60%.  d.  Chronic stable angina, class II-III symptoms, February 2015.  e. Myocardial perfusion study 09/10/2015:  No reversible ischemia; EF 60.    2.  Cerebrovascular disease:    a. History of lacunar infarct, 02/13/2012,  with presentation to local hospital with left-sided weakness and facial drooping.  b. Transfer to [facility] with systemic thrombolysis.  c. CT of the brain revealing mild atherosclerotic disease, no acute processes, 60% stenosis  of the proximal right ICA and 40% to 50%.  d. Cerebral angiogram by Feroz Hudson (11/09/2012):  Mild (< 50%) stenosis bilaterally, medical therapy recommended.  e. Residual speech and right upper extremity deficit with ongoing rehabilitation.  3. Mild aortic stenosis:  a. Echocardiogram (08/29/2013):  LVEF 55% to 60%.  Mild aortic stenosis.  b. Echocardiogram 09/10/2015:  Normal LV systolic function; EF 55% to 60%.  No noted AS  or AI.   4. Hypercholesterolemia.  5. Current tobacco abuse.  6.  Acid reflux.  7. Family history of hypertension.  8. Tobacco abuse, 1-1/2 packs per day.    ASSESSMENT:   Diagnosis Plan   1. Coronary artery disease involving native heart without angina pectoris, unspecified vessel or lesion type     2. Essential hypertension         PLAN:  Coronary artery disease: Currently stable and asymptomatic.   "Continue antiplatelet therapy, afterload reduction and restarting statin therapy.  Increasing Imdur to 60 mg p.o. daily.    Hypertension: Goal less than 130/80 mmHg.  Continue current medical therapy, well-controlled    Hyperlipidemia: Goal LDL less than 70.  Continue rosuvastatin 20 mg by mouth daily.  Lipid panel pending today.    Peripheral vascular disease: Mild bilateral coronary atherosclerosis based on angiogram.  Continue aggressive risk factor modification.  Currently asymptomatic.    Nicotine addiction: Counseled on need for smoking cessation.    Subjective  Here for routine follow-up.  Denies around palpitations.  Has stable shortness of breath and a class II pattern.  Continues to smoke a pack per day, down from 2 packs per day at her Max.  Blood pressures running around 140/90 mmHg.  She is compliant with medical therapy.  Denies bleeding complications on dual antiplatelet therapy.  Denies claudication.  Intermittent emotional inducedAngina which is relieved with one sublingual nitroglycerin.        PHYSICAL EXAMINATION:  Vitals:    08/22/19 1309 08/22/19 1310   BP: 154/80 170/90   BP Location: Right arm Left arm   Patient Position: Sitting Sitting   Pulse: 85    SpO2: 95%    Weight: 67.8 kg (149 lb 6.4 oz)    Height: 162.6 cm (64\")      General Appearance:    Alert, cooperative, no distress, appears stated age   Head:    Normocephalic, without obvious abnormality, atraumatic   Eyes:    conjunctiva/corneas clear   Nose:   Nares normal, septum midline, mucosa normal, no drainage   Throat:   Lips, teeth and gums normal   Neck:   Supple, symmetrical, trachea midline, no carotid    bruit or JVD   Lungs:     Clear to auscultation bilaterally, respirations unlabored   Chest Wall:    No tenderness or deformity    Heart:    Regular rate and rhythm, S1 and S2 normal, 3/6 early peaking systolic murmur right upper sternal border with radiation to the right carotid, rub   or gallop, normal carotid impulse " "bilaterally without bruit.   Abdomen:     Soft, non-tender   Extremities:   Extremities normal, atraumatic, no cyanosis or edema   Pulses:   2+ and symmetric all extremities   Skin:   Skin color, texture, turgor normal, no rashes or lesions       Diagnostic Data:  Procedures  Lab Results   Component Value Date    TRIG 130 11/13/2016    HDL 29 (L) 11/13/2016     Lab Results   Component Value Date    GLUCOSE 206 (H) 11/13/2016    BUN 14 11/13/2016    CREATININE 0.80 11/13/2016     11/13/2016    K 3.6 11/13/2016     11/13/2016    CO2 30.0 11/13/2016     Lab Results   Component Value Date    HGBA1C 7.1 (H) 07/22/2019     Lab Results   Component Value Date    WBC 5.6 07/22/2019    HGB 13.0 07/22/2019    HCT 38.9 07/22/2019     07/22/2019       Allergies  Allergies   Allergen Reactions   • Codeine GI Intolerance       Current Medications    Current Outpatient Medications:   •  aspirin 81 MG chewable tablet, Take 81 mg by mouth daily., Disp: , Rfl:   •  B-D INS SYRINGE 0.5CC/31GX5/16 31G X 5/16\" 0.5 ML misc, use as directed once daily, Disp: , Rfl: 0  •  clopidogrel (PLAVIX) 75 MG tablet, Take 1 tablet by mouth daily, Disp: , Rfl:   •  diazePAM (VALIUM) 5 MG tablet, TAKE 1 TABLE BY MOUTH AT BEDTIME AS NEEDED FOR SLEEP OR ANXIETY, Disp: , Rfl:   •  fluticasone (FLONASE) 50 MCG/ACT nasal spray, 2 sprays into the nostril(s) as directed by provider Daily., Disp: , Rfl:   •  HYDROcodone-acetaminophen (NORCO) 5-325 MG per tablet, Take 1 tablet by mouth Every 8 (Eight) Hours As Needed (PRN PAIN)., Disp: 30 tablet, Rfl: 0  •  isosorbide mononitrate (IMDUR) 30 MG 24 hr tablet, Take 1 tablet by mouth daily, Disp: , Rfl:   •  LEVEMIR 100 UNIT/ML injection, USE 70 UNITS INTO THE SKIN NIGHTLY, Disp: , Rfl: 0  •  lisinopril (PRINIVIL,ZESTRIL) 10 MG tablet, Take 1 tablet by mouth daily, Disp: , Rfl:   •  Loratadine (CLARITIN PO), Take  by mouth., Disp: , Rfl:   •  metFORMIN (GLUCOPHAGE) 1000 MG tablet, Take 1,000 mg by " "mouth 2 (Two) Times a Day With Meals., Disp: , Rfl:   •  rosuvastatin (CRESTOR) 20 MG tablet, Take 1 tablet by mouth Daily., Disp: 90 tablet, Rfl: 1  •  ibuprofen (ADVIL,MOTRIN) 600 MG tablet, Take 1 tablet by mouth Every 6 (Six) Hours As Needed for Mild Pain ., Disp: 30 tablet, Rfl: 0  •  insulin aspart (NOVOLOG FLEXPEN) 100 UNIT/ML solution pen-injector sc pen, Inject 10 Units into the skin 3 times daily (before meals) Please provide needles, Disp: , Rfl:   •  Insulin Syringe-Needle U-100 ([employer] INSULIN SYRINGE) 31G X 5/16\" 1 ML misc, 1 each by Does not apply route daily, Disp: , Rfl:   •  Insulin Syringe-Needle U-100 30G X 5/16\" 0.5 ML misc, 1 each by Does not apply route daily, Disp: , Rfl:           ROS  Review of Systems   Cardiovascular: Positive for chest pain, dyspnea on exertion and leg swelling. Negative for claudication, irregular heartbeat and palpitations.   Respiratory: Positive for shortness of breath and sputum production. Negative for snoring.          SOCIAL HX  Social History     Socioeconomic History   • Marital status:      Spouse name: Not on file   • Number of children: Not on file   • Years of education: Not on file   • Highest education level: Not on file   Tobacco Use   • Smoking status: Current Every Day Smoker     Packs/day: 1.00     Years: 46.00     Pack years: 46.00     Types: Cigarettes   • Smokeless tobacco: Never Used   • Tobacco comment: PT REPORTS SMOKED THIS AM 5/10/19   Substance and Sexual Activity   • Alcohol use: No   • Drug use: No   • Sexual activity: Defer       FAMILY HX  Family History   Problem Relation Age of Onset   • Hypertension Other    • Diabetes Mother    • Hypertension Mother    • Cancer Mother    • Diabetes Father    • Hypertension Father    • Heart disease Father    • Cancer Sister    • Cancer Brother              Abiel Oliveira III, MD, FACC      "

## 2019-08-27 RX ORDER — CLOPIDOGREL BISULFATE 75 MG/1
TABLET ORAL
Qty: 90 TABLET | Refills: 1 | Status: SHIPPED | OUTPATIENT
Start: 2019-08-27 | End: 2019-10-09

## 2019-09-17 DIAGNOSIS — M51.36 DEGENERATIVE DISC DISEASE, LUMBAR: ICD-10-CM

## 2019-09-17 RX ORDER — HYDROCODONE BITARTRATE AND ACETAMINOPHEN 5; 325 MG/1; MG/1
1 TABLET ORAL 2 TIMES DAILY PRN
Qty: 30 TABLET | Refills: 0 | Status: SHIPPED | OUTPATIENT
Start: 2019-09-17 | End: 2019-10-09 | Stop reason: SDUPTHER

## 2019-10-09 ENCOUNTER — OFFICE VISIT (OUTPATIENT)
Dept: PRIMARY CARE CLINIC | Age: 69
End: 2019-10-09
Payer: MEDICARE

## 2019-10-09 VITALS
RESPIRATION RATE: 16 BRPM | SYSTOLIC BLOOD PRESSURE: 136 MMHG | WEIGHT: 147 LBS | BODY MASS INDEX: 25.23 KG/M2 | DIASTOLIC BLOOD PRESSURE: 74 MMHG

## 2019-10-09 DIAGNOSIS — R63.4 WEIGHT LOSS: Primary | ICD-10-CM

## 2019-10-09 DIAGNOSIS — R93.89 ABNORMAL CT OF THE CHEST: ICD-10-CM

## 2019-10-09 DIAGNOSIS — I10 ESSENTIAL HYPERTENSION: ICD-10-CM

## 2019-10-09 DIAGNOSIS — J44.9 CHRONIC OBSTRUCTIVE PULMONARY DISEASE, UNSPECIFIED COPD TYPE (HCC): ICD-10-CM

## 2019-10-09 DIAGNOSIS — I71.20 THORACIC AORTIC ANEURYSM WITHOUT RUPTURE: ICD-10-CM

## 2019-10-09 DIAGNOSIS — E11.40 TYPE 2 DIABETES MELLITUS WITH DIABETIC NEUROPATHY, WITH LONG-TERM CURRENT USE OF INSULIN (HCC): ICD-10-CM

## 2019-10-09 DIAGNOSIS — M51.36 DEGENERATIVE DISC DISEASE, LUMBAR: ICD-10-CM

## 2019-10-09 DIAGNOSIS — Z79.4 TYPE 2 DIABETES MELLITUS WITH DIABETIC NEUROPATHY, WITH LONG-TERM CURRENT USE OF INSULIN (HCC): ICD-10-CM

## 2019-10-09 DIAGNOSIS — E53.8 VITAMIN B12 DEFICIENCY: ICD-10-CM

## 2019-10-09 PROCEDURE — G8598 ASA/ANTIPLAT THER USED: HCPCS | Performed by: INTERNAL MEDICINE

## 2019-10-09 PROCEDURE — 4004F PT TOBACCO SCREEN RCVD TLK: CPT | Performed by: INTERNAL MEDICINE

## 2019-10-09 PROCEDURE — 3017F COLORECTAL CA SCREEN DOC REV: CPT | Performed by: INTERNAL MEDICINE

## 2019-10-09 PROCEDURE — G8926 SPIRO NO PERF OR DOC: HCPCS | Performed by: INTERNAL MEDICINE

## 2019-10-09 PROCEDURE — G8484 FLU IMMUNIZE NO ADMIN: HCPCS | Performed by: INTERNAL MEDICINE

## 2019-10-09 PROCEDURE — G8419 CALC BMI OUT NRM PARAM NOF/U: HCPCS | Performed by: INTERNAL MEDICINE

## 2019-10-09 PROCEDURE — G8400 PT W/DXA NO RESULTS DOC: HCPCS | Performed by: INTERNAL MEDICINE

## 2019-10-09 PROCEDURE — 1090F PRES/ABSN URINE INCON ASSESS: CPT | Performed by: INTERNAL MEDICINE

## 2019-10-09 PROCEDURE — 3023F SPIROM DOC REV: CPT | Performed by: INTERNAL MEDICINE

## 2019-10-09 PROCEDURE — G8427 DOCREV CUR MEDS BY ELIG CLIN: HCPCS | Performed by: INTERNAL MEDICINE

## 2019-10-09 PROCEDURE — 99214 OFFICE O/P EST MOD 30 MIN: CPT | Performed by: INTERNAL MEDICINE

## 2019-10-09 PROCEDURE — 1123F ACP DISCUSS/DSCN MKR DOCD: CPT | Performed by: INTERNAL MEDICINE

## 2019-10-09 PROCEDURE — 4040F PNEUMOC VAC/ADMIN/RCVD: CPT | Performed by: INTERNAL MEDICINE

## 2019-10-09 PROCEDURE — 2022F DILAT RTA XM EVC RTNOPTHY: CPT | Performed by: INTERNAL MEDICINE

## 2019-10-09 RX ORDER — FLUTICASONE PROPIONATE 50 MCG
1 SPRAY, SUSPENSION (ML) NASAL DAILY
Qty: 2 BOTTLE | Refills: 1 | Status: SHIPPED | OUTPATIENT
Start: 2019-10-09 | End: 2021-03-10

## 2019-10-09 RX ORDER — LORATADINE 10 MG/1
10 TABLET ORAL DAILY
Qty: 30 TABLET | Refills: 1 | Status: SHIPPED | OUTPATIENT
Start: 2019-10-09 | End: 2020-01-15

## 2019-10-09 RX ORDER — HYDROCODONE BITARTRATE AND ACETAMINOPHEN 5; 325 MG/1; MG/1
1 TABLET ORAL 2 TIMES DAILY PRN
Qty: 30 TABLET | Refills: 0 | Status: SHIPPED | OUTPATIENT
Start: 2019-10-09 | End: 2019-11-14 | Stop reason: SDUPTHER

## 2019-10-09 RX ORDER — ISOSORBIDE MONONITRATE 60 MG/1
TABLET, EXTENDED RELEASE ORAL
Qty: 30 TABLET | Refills: 2 | Status: SHIPPED | OUTPATIENT
Start: 2019-10-09 | End: 2021-01-14 | Stop reason: SDUPTHER

## 2019-10-09 RX ORDER — CYANOCOBALAMIN 1000 UG/ML
1000 INJECTION INTRAMUSCULAR; SUBCUTANEOUS ONCE
Status: DISCONTINUED | OUTPATIENT
Start: 2019-10-09 | End: 2021-03-10

## 2019-10-09 RX ORDER — ASPIRIN 81 MG/1
81 TABLET, CHEWABLE ORAL DAILY
Qty: 30 TABLET | Refills: 2 | Status: SHIPPED | OUTPATIENT
Start: 2019-10-09

## 2019-10-09 RX ORDER — CLOPIDOGREL BISULFATE 75 MG/1
TABLET ORAL
Qty: 30 TABLET | Refills: 3 | Status: SHIPPED | OUTPATIENT
Start: 2019-10-09 | End: 2020-02-05

## 2019-10-09 RX ORDER — AZITHROMYCIN 500 MG/1
500 TABLET, FILM COATED ORAL DAILY
Qty: 1 PACKET | Refills: 0 | Status: SHIPPED | OUTPATIENT
Start: 2019-10-09 | End: 2019-10-12

## 2019-10-09 RX ORDER — ROSUVASTATIN CALCIUM 20 MG/1
20 TABLET, COATED ORAL DAILY
Qty: 30 TABLET | Refills: 3 | Status: SHIPPED | OUTPATIENT
Start: 2019-10-09 | End: 2020-04-07

## 2019-10-09 RX ORDER — FUROSEMIDE 20 MG/1
20 TABLET ORAL DAILY PRN
Qty: 30 TABLET | Refills: 1 | Status: SHIPPED | OUTPATIENT
Start: 2019-10-09 | End: 2021-03-10

## 2019-10-09 ASSESSMENT — ENCOUNTER SYMPTOMS
ABDOMINAL PAIN: 0
DIARRHEA: 0
EYE ITCHING: 0
VOMITING: 0
BACK PAIN: 1
SORE THROAT: 0
SINUS PRESSURE: 0
CONSTIPATION: 0
RHINORRHEA: 0
NAUSEA: 0
EYE REDNESS: 0
EYE DISCHARGE: 0

## 2019-10-13 ASSESSMENT — ENCOUNTER SYMPTOMS
COUGH: 1
SHORTNESS OF BREATH: 1
WHEEZING: 1

## 2019-10-14 ENCOUNTER — OFFICE VISIT (OUTPATIENT)
Dept: PULMONOLOGY | Facility: CLINIC | Age: 69
End: 2019-10-14

## 2019-10-14 VITALS
OXYGEN SATURATION: 98 % | HEART RATE: 88 BPM | SYSTOLIC BLOOD PRESSURE: 142 MMHG | BODY MASS INDEX: 25.1 KG/M2 | WEIGHT: 147 LBS | DIASTOLIC BLOOD PRESSURE: 84 MMHG | HEIGHT: 64 IN | RESPIRATION RATE: 16 BRPM

## 2019-10-14 DIAGNOSIS — J41.0 CHRONIC BRONCHITIS, SIMPLE (HCC): ICD-10-CM

## 2019-10-14 DIAGNOSIS — R06.02 SOB (SHORTNESS OF BREATH): Primary | ICD-10-CM

## 2019-10-14 DIAGNOSIS — R93.89 ABNORMAL CT OF THE CHEST: ICD-10-CM

## 2019-10-14 DIAGNOSIS — R06.02 SHORTNESS OF BREATH: Primary | ICD-10-CM

## 2019-10-14 DIAGNOSIS — J44.9 CHRONIC OBSTRUCTIVE PULMONARY DISEASE, UNSPECIFIED COPD TYPE (HCC): ICD-10-CM

## 2019-10-14 DIAGNOSIS — F17.200 SMOKING: ICD-10-CM

## 2019-10-14 PROCEDURE — 94729 DIFFUSING CAPACITY: CPT | Performed by: INTERNAL MEDICINE

## 2019-10-14 PROCEDURE — 99205 OFFICE O/P NEW HI 60 MIN: CPT | Performed by: INTERNAL MEDICINE

## 2019-10-14 PROCEDURE — 94060 EVALUATION OF WHEEZING: CPT | Performed by: INTERNAL MEDICINE

## 2019-10-14 PROCEDURE — 94726 PLETHYSMOGRAPHY LUNG VOLUMES: CPT | Performed by: INTERNAL MEDICINE

## 2019-10-14 RX ORDER — CYANOCOBALAMIN 1000 UG/ML
1000 INJECTION, SOLUTION INTRAMUSCULAR; SUBCUTANEOUS
COMMUNITY
Start: 2019-10-09 | End: 2023-02-03

## 2019-10-14 RX ORDER — FUROSEMIDE 20 MG/1
20 TABLET ORAL
Status: ON HOLD | COMMUNITY
Start: 2019-10-09 | End: 2021-03-01

## 2019-10-14 NOTE — PROGRESS NOTES
CONSULT NOTE    Requested by:   Romaine Eugene MD Kassis, Maher, MD      Chief Complaint   Patient presents with   • Consult     abnormal ct    • Breathing Problem       Subjective:  Karol Lopez is a 69 y.o. female.     History of Present Illness   Patient comes in today for evaluation of shortness of breath. Patient says that for the past few years, she has been noticing that her exercise tolerance has been declining. The patient has had days when walking any significant distance is difficult to do without stopping in the middle, to catch her breath.     Patient also complains of some cough and phlegm production that occurs on most mornings out of the week, for most weeks out of the month, for the past few years. Patient used to smoke 1-2 packs per day for the past 40 years and is currently smoking 1 pack a day.     she does have a family history of chronic obstructive disease, in her sister who had lung cancer.     Patient underwent a CT due to history of smoking. she was told that the imaging study showed abnormal findings.    She had a CVA in 2010 or so and has had issues with her swallowing.     The following portions of the patient's history were reviewed and updated as appropriate: allergies, current medications, past family history, past medical history, past social history and past surgical history.    Review of Systems   Constitutional: Negative for chills, fatigue and fever.   HENT: Negative for sinus pressure, sneezing and sore throat.    Respiratory: Positive for cough, chest tightness, shortness of breath and wheezing.    Cardiovascular: Negative for palpitations and leg swelling.   Psychiatric/Behavioral: Negative for sleep disturbance.   All other systems reviewed and are negative.      Past Medical History:   Diagnosis Date   • Abdominal pain    • Acid reflux    • Anxiety    • Bruises easily    • Cataracts, bilateral    • Constipation    • COPD (chronic obstructive pulmonary disease)  "(CMS/[facility])    • Coronary artery disease    • CTS (carpal tunnel syndrome)    • Diabetes (CMS/HCC)    • Hearing loss    • Heart attack (CMS/HCC)     s/p stents   • Hypercholesteremia    • Hypertension    • Lower back pain    • Osteoarthritis    • Piercing     ears only   • Stroke (CMS/HCC)     2013-weak in right arm   • Tattoos     x2   • Tobacco abuse    • Tumor     in between breast closer to right breast   • Vitamin B12 deficiency    • Wears dentures     upper only   • Wears glasses        Social History     Tobacco Use   • Smoking status: Current Every Day Smoker     Packs/day: 1.00     Years: 46.00     Pack years: 46.00     Types: Cigarettes   • Smokeless tobacco: Never Used   • Tobacco comment: PT REPORTS SMOKED THIS AM 5/10/19   Substance Use Topics   • Alcohol use: No         Objective:  Visit Vitals  /84   Pulse 88   Resp 16   Ht 162.6 cm (64.02\")   Wt 66.7 kg (147 lb)   SpO2 98%   BMI 25.22 kg/m²       Physical Exam   Constitutional: She is oriented to person, place, and time. She appears well-developed.   HENT:   Dentures noted.    Eyes: EOM are normal.   Neck: Neck supple. No JVD present.   Cardiovascular: Normal rate.   Occasionally irregular.    Pulmonary/Chest: Effort normal. She has wheezes. She has no rales.   Somewhat hyperresonant to percussion.  Decreased Air Entry Bilaterally.   Musculoskeletal:   Gait was normal.   Neurological: She is alert and oriented to person, place, and time.   Skin: Skin is warm and dry.   Psychiatric: She has a normal mood and affect. Her behavior is normal.   Vitals reviewed.      Assessment/Plan:  Karol was seen today for consult and breathing problem.    Diagnoses and all orders for this visit:    Shortness of breath  -     Pulmonary Function Test  -     CT Chest Without Contrast; Future    Chronic obstructive pulmonary disease, unspecified COPD type (CMS/[facility])  -     Pulmonary Function Test  -     CT Chest Without Contrast; Future    Chronic bronchitis, " simple (CMS/HCC)    Smoking    Abnormal CT of the chest  -     CT Chest Without Contrast; Future    Other orders  -     umeclidinium-vilanterol (ANORO ELLIPTA) 62.5-25 MCG/INH aerosol powder  inhaler; Inhale 1 puff Daily.        Return in about 4 weeks (around 2019) for Recheck, Imaging study, For Trena.    DISCUSSION(if any):  Last chest x-ray was reviewed personally and the results were shared with the patient.  Images reviewed personally.   Results for orders placed during the hospital encounter of 10/18/16   XR Chest PA & [place] [address]     [phone]         Diagnostic Imaging Report      [id number]         Signed        PATIENT NAME:  TRISTON FERREIRA MRN:  [id number]    :  1950 ACCT#:  [id number]    ATTENDING:  VIKTORIA POSADA MD DATE OF EXAM:  10/11/16    PRIMARY CARE:  GUADALUPE KHAN MD LOCATION:  [facility]        ORDERING PHYSICIAN:  VIKTORIA POSADA MD    PROCEDURE(s):  CHEST PA   LAT    ORDER NUMBER(s):  [id number]        CC:  GUADALUPE KHAN MD; VIKTORIA POSADA MD                 TWO-VIEW CHEST X-RAY           CLINICAL HISTORY: Hypertension, tobacco abuse, COPD           COMPARISON: None.           FINDINGS: PA and lateral views of the chest were obtained. The lungs are     well expanded and appear clear. There is underlying emphysema and     fibrosis. There is evidence of prior granulomatous disease. Heart size     and pulmonary vascularity are normal. There are no pleural effusions.      Regional osseous structures are intact.              IMPRESSION-   1. Chronic appearing findings as above, no active airspace disease     appreciated.           This report was finalized on 10/11/2016 3:19 PM by Kaci Mtz MD.               DICTATED BY:      KACI MTZ MD    DICTATED DATE/TIME:      10/11/16 1519    TRANSCRIBED DATE/TIME:      10/11/16 1519        CC: GUADALUPE KHAN MD; VIKTORIA POSADA MD         I  also reviewed her last echocardiogram and shared the results with her.   Results for orders placed during the hospital encounter of 11/12/16   Adult transthoracic echo complete    Narrative · All left ventricular wall segments contract normally.  · Left ventricular wall thickness is consistent with mild-to-moderate   concentric hypertrophy.  · Left ventricular function is normal. Estimated EF = 70%.  · Mild mitral valve regurgitation is present  · Mild tricuspid valve regurgitation is present.  · IV SALINE STUDY NOT PERFORMED SECONDARY TO LACK OF IV ACCESS.  · Left ventricular diastolic dysfunction (grade I a) consistent with   impaired relaxation.  · There is no evidence of pericardial effusion.  · Estimated right ventricular systolic pressure from tricuspid   regurgitation is normal (<35 mmHg).          ===========================  ===========================    PFTs were reviewed.  Somewhat poorly performed.  Severely decreased diffusion capacity noted.    Laboratory workup was also reviewed which showed     Lab Results   Component Value Date    EOSABS 0.0 07/22/2019    EOSABS 0.1 04/27/2019    EOSABS 0.10 11/13/2016   Laboratory workup also showed   Lab Results   Component Value Date    CO2 30.0 11/13/2016     ===========================  ===========================    CT was reviewed personally. Showed what appears to be mucus involving the trachea in 2 separate areas.     Orders as above.    I have told the patient, that in my view, shortness of breath is likely from underlying chronic obstructive lung disease.     Patient was given education and demonstration on how to use the medicine.     Side effects, of prescribed medicines, discussed.    Patient was also instructed on compliance and adherence with instructions.     I have discussed the need to quit smoking as soon as possible.    Patient was offered modalities such as Chantix/nicotine patches/Wellbutrin to aid in smoking cessation.    The patient will  get back to us regarding the choice, once a decision has been taken.     Patient was given reading material, as appropriate.     Patient was asked to call with any concerns.     Patient will be followed clinically to assess for response to treatment and further recommendations will be made, based on response.    If repeat CT shows continued abnormality, we will proceed with Bronchoscopy.     Dictated utilizing Dragon dictation.    This document was electronically signed by Shlomo Geiger MD on 10/14/19 at 11:32 AM

## 2019-11-13 DIAGNOSIS — M51.36 DEGENERATIVE DISC DISEASE, LUMBAR: ICD-10-CM

## 2019-11-14 RX ORDER — HYDROCODONE BITARTRATE AND ACETAMINOPHEN 5; 325 MG/1; MG/1
1 TABLET ORAL 2 TIMES DAILY PRN
Qty: 30 TABLET | Refills: 0 | Status: SHIPPED | OUTPATIENT
Start: 2019-11-14 | End: 2019-12-10 | Stop reason: SDUPTHER

## 2019-11-18 ENCOUNTER — HOSPITAL ENCOUNTER (EMERGENCY)
Facility: HOSPITAL | Age: 69
Discharge: HOME | End: 2019-11-18
Attending: FAMILY MEDICINE
Payer: MEDICARE

## 2019-11-18 ENCOUNTER — APPOINTMENT (OUTPATIENT)
Dept: GENERAL RADIOLOGY | Facility: HOSPITAL | Age: 69
End: 2019-11-18
Payer: MEDICARE

## 2019-11-18 VITALS
TEMPERATURE: 98.2 F | HEART RATE: 79 BPM | HEIGHT: 64 IN | WEIGHT: 148 LBS | OXYGEN SATURATION: 95 % | BODY MASS INDEX: 25.27 KG/M2 | SYSTOLIC BLOOD PRESSURE: 176 MMHG | DIASTOLIC BLOOD PRESSURE: 78 MMHG | RESPIRATION RATE: 16 BRPM

## 2019-11-18 DIAGNOSIS — K59.00 CONSTIPATION, UNSPECIFIED CONSTIPATION TYPE: Primary | ICD-10-CM

## 2019-11-18 PROCEDURE — 6370000000 HC RX 637 (ALT 250 FOR IP): Performed by: FAMILY MEDICINE

## 2019-11-18 PROCEDURE — 74018 RADEX ABDOMEN 1 VIEW: CPT

## 2019-11-18 PROCEDURE — 99283 EMERGENCY DEPT VISIT LOW MDM: CPT

## 2019-11-18 RX ORDER — MINERAL OIL 100 G/100G
1 OIL RECTAL ONCE
Status: COMPLETED | OUTPATIENT
Start: 2019-11-18 | End: 2019-11-18

## 2019-11-18 RX ORDER — LACTULOSE 10 G/15ML
20 SOLUTION ORAL NIGHTLY
Qty: 236 ML | Refills: 1 | Status: SHIPPED | OUTPATIENT
Start: 2019-11-18 | End: 2020-03-10 | Stop reason: ALTCHOICE

## 2019-11-18 RX ADMIN — MINERAL OIL 1 ENEMA: 100 ENEMA RECTAL at 09:43

## 2019-11-18 ASSESSMENT — ENCOUNTER SYMPTOMS
DIARRHEA: 0
CONSTIPATION: 1
NAUSEA: 0
ABDOMINAL PAIN: 1
VOMITING: 0

## 2019-11-18 ASSESSMENT — PAIN DESCRIPTION - PAIN TYPE: TYPE: ACUTE PAIN

## 2019-11-18 ASSESSMENT — PAIN DESCRIPTION - PROGRESSION: CLINICAL_PROGRESSION: GRADUALLY WORSENING

## 2019-11-18 ASSESSMENT — PAIN DESCRIPTION - ONSET: ONSET: GRADUAL

## 2019-11-18 ASSESSMENT — PAIN SCALES - GENERAL: PAINLEVEL_OUTOF10: 10

## 2019-11-18 ASSESSMENT — PAIN DESCRIPTION - DESCRIPTORS: DESCRIPTORS: CONSTANT

## 2019-11-18 ASSESSMENT — PAIN DESCRIPTION - FREQUENCY: FREQUENCY: CONTINUOUS

## 2019-11-18 ASSESSMENT — PAIN DESCRIPTION - LOCATION: LOCATION: ABDOMEN

## 2019-12-10 ENCOUNTER — OFFICE VISIT (OUTPATIENT)
Dept: PRIMARY CARE CLINIC | Age: 69
End: 2019-12-10
Payer: MEDICARE

## 2019-12-10 ENCOUNTER — HOSPITAL ENCOUNTER (OUTPATIENT)
Facility: HOSPITAL | Age: 69
Discharge: HOME | End: 2019-12-10
Payer: MEDICARE

## 2019-12-10 VITALS
BODY MASS INDEX: 25.71 KG/M2 | RESPIRATION RATE: 18 BRPM | HEART RATE: 78 BPM | HEIGHT: 64 IN | OXYGEN SATURATION: 98 % | DIASTOLIC BLOOD PRESSURE: 72 MMHG | SYSTOLIC BLOOD PRESSURE: 158 MMHG | WEIGHT: 150.6 LBS

## 2019-12-10 DIAGNOSIS — Z79.4 TYPE 2 DIABETES MELLITUS WITH DIABETIC NEUROPATHY, WITH LONG-TERM CURRENT USE OF INSULIN (HCC): ICD-10-CM

## 2019-12-10 DIAGNOSIS — E11.40 TYPE 2 DIABETES MELLITUS WITH DIABETIC NEUROPATHY, WITH LONG-TERM CURRENT USE OF INSULIN (HCC): ICD-10-CM

## 2019-12-10 DIAGNOSIS — I10 ESSENTIAL HYPERTENSION: ICD-10-CM

## 2019-12-10 DIAGNOSIS — R63.4 WEIGHT LOSS: ICD-10-CM

## 2019-12-10 PROCEDURE — 36415 COLL VENOUS BLD VENIPUNCTURE: CPT

## 2019-12-10 PROCEDURE — G8926 SPIRO NO PERF OR DOC: HCPCS | Performed by: INTERNAL MEDICINE

## 2019-12-10 PROCEDURE — 1090F PRES/ABSN URINE INCON ASSESS: CPT | Performed by: INTERNAL MEDICINE

## 2019-12-10 PROCEDURE — 4040F PNEUMOC VAC/ADMIN/RCVD: CPT | Performed by: INTERNAL MEDICINE

## 2019-12-10 PROCEDURE — 3045F PR MOST RECENT HEMOGLOBIN A1C LEVEL 7.0-9.0%: CPT | Performed by: INTERNAL MEDICINE

## 2019-12-10 PROCEDURE — 99214 OFFICE O/P EST MOD 30 MIN: CPT | Performed by: INTERNAL MEDICINE

## 2019-12-10 PROCEDURE — 3023F SPIROM DOC REV: CPT | Performed by: INTERNAL MEDICINE

## 2019-12-10 PROCEDURE — G8484 FLU IMMUNIZE NO ADMIN: HCPCS | Performed by: INTERNAL MEDICINE

## 2019-12-10 PROCEDURE — G8598 ASA/ANTIPLAT THER USED: HCPCS | Performed by: INTERNAL MEDICINE

## 2019-12-10 PROCEDURE — G8427 DOCREV CUR MEDS BY ELIG CLIN: HCPCS | Performed by: INTERNAL MEDICINE

## 2019-12-10 PROCEDURE — G8417 CALC BMI ABV UP PARAM F/U: HCPCS | Performed by: INTERNAL MEDICINE

## 2019-12-10 PROCEDURE — 96372 THER/PROPH/DIAG INJ SC/IM: CPT | Performed by: INTERNAL MEDICINE

## 2019-12-10 PROCEDURE — G8400 PT W/DXA NO RESULTS DOC: HCPCS | Performed by: INTERNAL MEDICINE

## 2019-12-10 PROCEDURE — 4004F PT TOBACCO SCREEN RCVD TLK: CPT | Performed by: INTERNAL MEDICINE

## 2019-12-10 PROCEDURE — 3017F COLORECTAL CA SCREEN DOC REV: CPT | Performed by: INTERNAL MEDICINE

## 2019-12-10 PROCEDURE — 1123F ACP DISCUSS/DSCN MKR DOCD: CPT | Performed by: INTERNAL MEDICINE

## 2019-12-10 PROCEDURE — 2022F DILAT RTA XM EVC RTNOPTHY: CPT | Performed by: INTERNAL MEDICINE

## 2019-12-10 RX ORDER — CYANOCOBALAMIN 1000 UG/ML
1000 INJECTION INTRAMUSCULAR; SUBCUTANEOUS ONCE
Status: COMPLETED | OUTPATIENT
Start: 2019-12-10 | End: 2019-12-10

## 2019-12-10 RX ORDER — HYDROCODONE BITARTRATE AND ACETAMINOPHEN 5; 325 MG/1; MG/1
1 TABLET ORAL 2 TIMES DAILY PRN
Qty: 30 TABLET | Refills: 0 | Status: SHIPPED | OUTPATIENT
Start: 2019-12-10 | End: 2020-01-15 | Stop reason: SDUPTHER

## 2019-12-10 RX ADMIN — CYANOCOBALAMIN 1000 MCG: 1000 INJECTION INTRAMUSCULAR; SUBCUTANEOUS at 11:43

## 2019-12-10 ASSESSMENT — ENCOUNTER SYMPTOMS
NAUSEA: 0
COUGH: 1
EYE ITCHING: 0
ABDOMINAL PAIN: 0
SHORTNESS OF BREATH: 1
SINUS PRESSURE: 0
EYE DISCHARGE: 0
WHEEZING: 1
EYE REDNESS: 0
BACK PAIN: 1
VOMITING: 0
DIARRHEA: 0
SORE THROAT: 0
CONSTIPATION: 0
RHINORRHEA: 0

## 2019-12-10 NOTE — PROGRESS NOTES
SUBJECTIVE:    Patient ID: Radha Ann is a 81 y. o.female. Chief Complaint   Patient presents with    Hypertension    Diabetes    Back Pain    Weight Loss    COPD    Other     B12 Deficiency      HPI:  Patient has had hypertension for several years. She has been compliant with taking medications, without side effects from it. She has been following a low-sodium, is  active and never exercises. Weight is stable, compared to last visit. Her blood pressure is elevated at this time. Patient with history of back pain Pain range is 2-8/10, currently 3/10. Pain does not radiate. Pain is worse with exertion, better with rest. Sx getting worse.  No change in B/B. Some stiffness after long sitting or standing. No tingling or numbness. Tried Norco with good response. No side effect from pain medications. It has been helping her tolerating ADLs and reducing pain to a tolerable level. Patient requested refills on pain meds. Patient has had diabetes for the past several years. She has been compliant with the medications and denies any side effects from it. She has been monitoring fingersticks on a daily basis. Her fingersticks have been around 200 She denies any hypoglycemic symptoms. She has been following a diabetic diet and has  been active. Her last eye exam was less than a year ago. She is using oral meds and Insulin. Patient takes 55 units of Levemir daily. She has not been using the Novolog recently. Patient has had COPD for a long time. She has been using nebulizer inhalation treatments as ordered. She is not on oxygen. She has some dyspnea with exertion. With on and off cough, SOB and wheezing that does respond to treatment. She has been using the Albuterol inhaler. Last Pneumovax was 2019 . She continues to smoke. Patient did have abnormal CT of the chest and was seen by pulmonology. She is due for a follow up CT.      Patient's medications, allergies, past medical, surgical, social and family histories were reviewed and updated as appropriate in the electronic medical record/chart. Outpatient Medications Marked as Taking for the 12/10/19 encounter (Office Visit) with Dominic Easley MD   Medication Sig Dispense Refill    lactulose (CHRONULAC) 10 GM/15ML solution Take 30 mLs by mouth nightly 236 mL 1    HYDROcodone-acetaminophen (NORCO) 5-325 MG per tablet Take 1 tablet by mouth 2 times daily as needed for Pain for up to 30 days. 30 tablet 0    insulin detemir (LEVEMIR FLEXTOUCH) 100 UNIT/ML injection pen Inject 55 Units into the skin nightly 20 pen 3    furosemide (LASIX) 20 MG tablet Take 1 tablet by mouth daily as needed (swelling) 30 tablet 1    loratadine (CLARITIN) 10 MG tablet Take 1 tablet by mouth daily 30 tablet 1    fluticasone (FLONASE) 50 MCG/ACT nasal spray 1 spray by Each Nostril route daily 1 Spray in each nostril 2 Bottle 1    clopidogrel (PLAVIX) 75 MG tablet take 1 tablet by mouth once daily 30 tablet 3    metFORMIN (GLUCOPHAGE) 1000 MG tablet take 1 tablet by mouth twice a day with food 60 tablet 3    isosorbide mononitrate (IMDUR) 60 MG extended release tablet take 1 tablet by mouth once daily 30 tablet 2    insulin aspart (NOVOLOG FLEXPEN) 100 UNIT/ML injection pen Inject 10 Units into the skin 3 times daily (before meals) Please provide needles 5 pen 3    aspirin 81 MG chewable tablet Take 1 tablet by mouth daily 30 tablet 2    rosuvastatin (CRESTOR) 20 MG tablet Take 1 tablet by mouth daily 30 tablet 3    nitroGLYCERIN (NITROSTAT) 0.4 MG SL tablet Place 1 tablet under the tongue every 5 minutes as needed for Chest pain 25 tablet 1    blood glucose monitor strips QID Dx E11.9 100 strip 5    Insulin Syringe-Needle U-100 (B-D INS SYR ULTRAFINE 1CC/31G) 31G X 5/16\" 1 ML MISC USE ONCE A DAY AS DIRECTED  DX E11.9 100 each 3        Review of Systems   Constitutional: Positive for fatigue and unexpected weight change. Negative for chills and fever.    HENT: Risk and benefits were reviewed at length, including risk for addiction and possible side effects and interactions. Alternative therapy was discussed and will be a part of the patients overall care. Including but not limited to, physical therapy, other medications as well as behavioral and activity modification and the use of other modalities (chiropractic care ect. ). 3. This patient does not exhibit a potential for abuse or addiction at this time. Will monitor closely. 4. UDS has been compliant. Will randomly check drug screen. 5. Joyce Alert completed and compliant, will check every 3 months. 6. Advised her that UDS and possible pill counts and frequent monitoring will be a part of her care. 7. Patient has medication agreement and consent to treat with this office. Patient has been informed not to seek or obtain medication from other practitioners and to notify our office ASAP if this happened on emergent basis. 3. Chronic obstructive pulmonary disease, unspecified COPD type ([facility] [address]  I have advised her on the nature of the disease and the importance of staying off smoking. I had a long discussion with her regarding smoking cessation. I have instructed her to continue inhalers, nebulizer treatments as directed. Up to date on  Pneumovax. Immunization History   Administered Date(s) Administered    Influenza Virus Vaccine 11/06/2014, 09/17/2015    Pneumococcal Conjugate 13-valent ([id number]) 01/03/2018    Pneumococcal Polysaccharide ([id number]) 01/09/2019     Follow up CT chest ordered today. Further recommendation based on test results. - CT CHEST W CONTRAST; Future    4. Type 2 diabetes mellitus with diabetic neuropathy, with long-term current use of insulin ([facility] [address]  I had a long discussion with her regarding diabetic diet, exercise and weight control. I am going to increase Levemir to 60 units. I advised her to monitor her fingerstick closely. I am going to check the A1c every few months.   I will check Microalbumin on a yearly basis. I have also advised her to have a yearly eye exam and to monitor her feet closely. Advice her about the complications from diabetes, even with good control. I am going to have her follow up on a regular bases with our diabetic educator. A1C will be checked in few months. Lab Results   Component Value Date    LABA1C 7.1 (H) 07/22/2019       - CBC Auto Differential; Future  - Comprehensive Metabolic Panel; Future  - Hemoglobin A1C; Future    5. Abnormal CT of the chest  Follow up CT ordered today. Further recommendation based on test results. Continue to follow with Pulmonology as scheduled. - CT CHEST W CONTRAST; Future    6. Weight loss  Weight is improving. Continue to monitor closely. Follow-up mammogram/CT scan of the chest.  Long conversation regarding smoking cessation. - CT CHEST W CONTRAST; Future  - CBC Auto Differential; Future  - Comprehensive Metabolic Panel; Future    7. Vitamin B12 deficiency  Injection given today. Monitor Hgb periodically. 8. Abnormal mammogram  Repeat mammogram ordered. Further recommendation based on test results. Orders Placed This Encounter   Medications    cyanocobalamin injection 1,000 mcg    HYDROcodone-acetaminophen (NORCO) 5-325 MG per tablet     Sig: Take 1 tablet by mouth 2 times daily as needed for Pain for up to 30 days. Dispense:  30 tablet     Refill:  0     Reduce doses taken as pain becomes manageable      Written by Shashank France, acting as a scribe for Dr. Cirilo Tarango on 12/10/2019 at 9:43 AM.     I, Dr. Ramon Davis, personally performed the services described in the documentation as scribed by Diamond Mallory CMA, in my presence and it is both accurate and complete.

## 2019-12-12 ENCOUNTER — TELEPHONE (OUTPATIENT)
Dept: PRIMARY CARE CLINIC | Age: 69
End: 2019-12-12

## 2020-01-13 ENCOUNTER — TELEPHONE (OUTPATIENT)
Dept: PRIMARY CARE CLINIC | Age: 70
End: 2020-01-13

## 2020-01-15 RX ORDER — LORATADINE 10 MG/1
TABLET ORAL
Qty: 30 TABLET | Refills: 1 | Status: SHIPPED | OUTPATIENT
Start: 2020-01-15 | End: 2020-04-08 | Stop reason: SDUPTHER

## 2020-01-15 RX ORDER — HYDROCODONE BITARTRATE AND ACETAMINOPHEN 5; 325 MG/1; MG/1
1 TABLET ORAL 2 TIMES DAILY PRN
Qty: 30 TABLET | Refills: 0 | Status: SHIPPED | OUTPATIENT
Start: 2020-01-15 | End: 2020-02-14 | Stop reason: SDUPTHER

## 2020-02-03 RX ORDER — INSULIN DETEMIR 100 [IU]/ML
INJECTION, SOLUTION SUBCUTANEOUS
Qty: 15 ML | Refills: 0 | Status: SHIPPED | OUTPATIENT
Start: 2020-02-03 | End: 2020-04-06

## 2020-02-05 ENCOUNTER — HOSPITAL ENCOUNTER (OUTPATIENT)
Dept: MAMMOGRAPHY | Facility: HOSPITAL | Age: 70
Discharge: HOME | End: 2020-02-05
Payer: MEDICARE

## 2020-02-05 ENCOUNTER — HOSPITAL ENCOUNTER (OUTPATIENT)
Dept: CT IMAGING | Facility: HOSPITAL | Age: 70
Discharge: HOME | End: 2020-02-05
Payer: MEDICARE

## 2020-02-05 LAB
BUN BLDV-MCNC: 16 MG/DL (ref 6–20)
CREAT SERPL-MCNC: 1 MG/DL (ref 0.4–1.2)
GFR AFRICAN AMERICAN: >59
GFR NON-AFRICAN AMERICAN: 55

## 2020-02-05 PROCEDURE — 36415 COLL VENOUS BLD VENIPUNCTURE: CPT

## 2020-02-05 PROCEDURE — 84520 ASSAY OF UREA NITROGEN: CPT

## 2020-02-05 PROCEDURE — 71260 CT THORAX DX C+: CPT

## 2020-02-05 PROCEDURE — 6360000004 HC RX CONTRAST MEDICATION: Performed by: INTERNAL MEDICINE

## 2020-02-05 PROCEDURE — 82565 ASSAY OF CREATININE: CPT

## 2020-02-05 RX ADMIN — IOPAMIDOL 100 ML: 755 INJECTION, SOLUTION INTRAVENOUS at 12:29

## 2020-02-05 NOTE — OP NOTE
We discussed with Ms. Tabitha Baig that today's mammogram was a f/u of an abnormal mammogram; however, she still wanted to wait until April due to financial reasons. Dr. Khurram Tom wanted to make sure Ms. Tabitha Baig understood that since she had an abnormal mammogram in April, that this was a f/u. Veronica Pereyra We explained to her the reasons for today's appointment. She still scheduled her  mammogram in April 27, 2020.

## 2020-02-05 NOTE — OP NOTE
Patient wanted to wait until April to get her annual exam due to financial constraints. I discussed with Dr. James Hendrix and he stated whatever the patient wanted to do. We have scheduled her for April.       Vero johnson

## 2020-02-14 RX ORDER — HYDROCODONE BITARTRATE AND ACETAMINOPHEN 5; 325 MG/1; MG/1
1 TABLET ORAL 2 TIMES DAILY PRN
Qty: 30 TABLET | Refills: 0 | Status: SHIPPED | OUTPATIENT
Start: 2020-02-14 | End: 2020-03-11 | Stop reason: SDUPTHER

## 2020-03-10 ENCOUNTER — OFFICE VISIT (OUTPATIENT)
Dept: PRIMARY CARE CLINIC | Age: 70
End: 2020-03-10
Payer: MEDICARE

## 2020-03-10 VITALS
WEIGHT: 145.8 LBS | BODY MASS INDEX: 25.03 KG/M2 | HEART RATE: 72 BPM | OXYGEN SATURATION: 99 % | SYSTOLIC BLOOD PRESSURE: 148 MMHG | DIASTOLIC BLOOD PRESSURE: 72 MMHG | RESPIRATION RATE: 18 BRPM

## 2020-03-10 LAB
CREATININE URINE POCT: 107.3
HBA1C MFR BLD: 8.9 %
MICROALBUMIN/CREAT 24H UR: >300 MG/G{CREAT}
MICROALBUMIN/CREAT UR-RTO: >279.6

## 2020-03-10 PROCEDURE — G8400 PT W/DXA NO RESULTS DOC: HCPCS | Performed by: INTERNAL MEDICINE

## 2020-03-10 PROCEDURE — 1123F ACP DISCUSS/DSCN MKR DOCD: CPT | Performed by: INTERNAL MEDICINE

## 2020-03-10 PROCEDURE — 99214 OFFICE O/P EST MOD 30 MIN: CPT | Performed by: INTERNAL MEDICINE

## 2020-03-10 PROCEDURE — 96372 THER/PROPH/DIAG INJ SC/IM: CPT | Performed by: INTERNAL MEDICINE

## 2020-03-10 PROCEDURE — 83036 HEMOGLOBIN GLYCOSYLATED A1C: CPT | Performed by: INTERNAL MEDICINE

## 2020-03-10 PROCEDURE — 2022F DILAT RTA XM EVC RTNOPTHY: CPT | Performed by: INTERNAL MEDICINE

## 2020-03-10 PROCEDURE — 82044 UR ALBUMIN SEMIQUANTITATIVE: CPT | Performed by: INTERNAL MEDICINE

## 2020-03-10 PROCEDURE — G8417 CALC BMI ABV UP PARAM F/U: HCPCS | Performed by: INTERNAL MEDICINE

## 2020-03-10 PROCEDURE — 4040F PNEUMOC VAC/ADMIN/RCVD: CPT | Performed by: INTERNAL MEDICINE

## 2020-03-10 PROCEDURE — 4004F PT TOBACCO SCREEN RCVD TLK: CPT | Performed by: INTERNAL MEDICINE

## 2020-03-10 PROCEDURE — 3052F HG A1C>EQUAL 8.0%<EQUAL 9.0%: CPT | Performed by: INTERNAL MEDICINE

## 2020-03-10 PROCEDURE — G8427 DOCREV CUR MEDS BY ELIG CLIN: HCPCS | Performed by: INTERNAL MEDICINE

## 2020-03-10 PROCEDURE — 1090F PRES/ABSN URINE INCON ASSESS: CPT | Performed by: INTERNAL MEDICINE

## 2020-03-10 PROCEDURE — 3017F COLORECTAL CA SCREEN DOC REV: CPT | Performed by: INTERNAL MEDICINE

## 2020-03-10 PROCEDURE — G8484 FLU IMMUNIZE NO ADMIN: HCPCS | Performed by: INTERNAL MEDICINE

## 2020-03-10 RX ORDER — CYANOCOBALAMIN 1000 UG/ML
1000 INJECTION INTRAMUSCULAR; SUBCUTANEOUS ONCE
Status: COMPLETED | OUTPATIENT
Start: 2020-03-10 | End: 2020-03-10

## 2020-03-10 RX ORDER — LISINOPRIL 5 MG/1
5 TABLET ORAL DAILY
Qty: 90 TABLET | Refills: 1 | Status: SHIPPED | OUTPATIENT
Start: 2020-03-10 | End: 2020-08-31

## 2020-03-10 RX ADMIN — CYANOCOBALAMIN 1000 MCG: 1000 INJECTION INTRAMUSCULAR; SUBCUTANEOUS at 13:45

## 2020-03-10 ASSESSMENT — ENCOUNTER SYMPTOMS
CONSTIPATION: 0
NAUSEA: 0
EYE DISCHARGE: 0
RHINORRHEA: 0
DIARRHEA: 0
EYE REDNESS: 0
SINUS PRESSURE: 0
WHEEZING: 1
BACK PAIN: 1
COUGH: 1
SORE THROAT: 0
VOMITING: 0
EYE ITCHING: 0
ABDOMINAL PAIN: 0
SHORTNESS OF BREATH: 1

## 2020-03-10 ASSESSMENT — PATIENT HEALTH QUESTIONNAIRE - PHQ9
SUM OF ALL RESPONSES TO PHQ QUESTIONS 1-9: 0
SUM OF ALL RESPONSES TO PHQ QUESTIONS 1-9: 0
SUM OF ALL RESPONSES TO PHQ9 QUESTIONS 1 & 2: 0
1. LITTLE INTEREST OR PLEASURE IN DOING THINGS: 0
2. FEELING DOWN, DEPRESSED OR HOPELESS: 0

## 2020-03-10 NOTE — PROGRESS NOTES
Syringe-Needle U-100 (B-D INS SYR ULTRAFINE 1CC/31G) 31G X 5/16\" 1 ML MISC USE ONCE A DAY AS DIRECTED  DX E11.9 100 each 3        Review of Systems   Constitutional: Positive for fatigue and unexpected weight change. Negative for chills and fever. HENT: Negative for congestion, ear pain, rhinorrhea, sinus pressure and sore throat. Eyes: Negative for discharge, redness, itching and visual disturbance. Respiratory: Positive for cough, shortness of breath and wheezing. At baseline    Cardiovascular: Negative for chest pain, palpitations and leg swelling. CAZARES   Gastrointestinal: Negative for abdominal pain, constipation, diarrhea, nausea and vomiting. Endocrine: Negative for cold intolerance and heat intolerance. Genitourinary: Negative for dysuria, frequency and urgency. Musculoskeletal: Positive for arthralgias and back pain. Negative for joint swelling. Skin: Negative for rash and wound. Neurological: Positive for dizziness and headaches. Negative for syncope, weakness and numbness. As in HPI   Hematological: Negative. Negative for adenopathy. Psychiatric/Behavioral: Positive for sleep disturbance. Negative for agitation and dysphoric mood. The patient is nervous/anxious.         Past Medical History:   Diagnosis Date    CAD (coronary artery disease)     [facility] obstructive [facility]) ([facility] [address]     Cystic fibrosis ([facility] [address]     Diabetes mellitus ([facility] [address]     GERD (gastroesophageal reflux disease)     H/O: CVA (cardiovascular accident)     HTN (hypertension)     Mass     on chest     Tobacco abuse     Vitamin B12 deficiency      Past Surgical History:   Procedure Laterality Date    CARPAL TUNNEL RELEASE Bilateral     CHOLECYSTECTOMY      CORONARY ANGIOPLASTY WITH STENT PLACEMENT      HYSTERECTOMY      JOINT REPLACEMENT Bilateral 10/15/2016    knees    TOTAL KNEE ARTHROPLASTY [facility]18[facility]    [facility]      Family History   Problem Relation Age of Onset  Diabetes Mother     High Blood Pressure Mother     Cancer Mother         pancreatic    Diabetes Father     Heart Disease Father     High Blood Pressure Father     Cancer Sister         lung, breast    Cancer Brother         throat      Social History     Tobacco Use   Smoking Status Current Every Day Smoker    Packs/day: 1.00    Years: 40.00    Pack years: 40.00    Types: Cigarettes   Smokeless Tobacco Never Used   Tobacco Comment    states she is not willing to stop and this is a stress reliever for her. OBJECTIVE:   Wt Readings from Last 3 Encounters:   03/10/20 145 lb 12.8 oz (66.1 kg)   12/10/19 150 lb 9.6 oz (68.3 kg)   11/18/19 148 lb (67.1 kg)     BP Readings from Last 3 Encounters:   03/10/20 (!) 150/74   12/10/19 (!) 158/72   11/18/19 (!) 176/78       BP (!) 150/74 (Site: Left Upper Arm, Position: Sitting, Cuff Size: Medium Adult)   Pulse 72   Resp 18   Wt 145 lb 12.8 oz (66.1 kg)   SpO2 99%   BMI 25.03 kg/m²      Physical Exam  Vitals signs and nursing note reviewed. Constitutional:       Appearance: Normal appearance. She is well-developed and normal weight. HENT:      Head: Normocephalic and atraumatic. Right Ear: External ear normal.      Left Ear: External ear normal.      Nose: Nose normal.      Mouth/Throat:      Mouth: Mucous membranes are moist.      Pharynx: Oropharynx is clear. Eyes:      Conjunctiva/sclera: Conjunctivae normal.      Pupils: Pupils are equal, round, and reactive to light. Neck:      Musculoskeletal: Neck supple. No neck rigidity or muscular tenderness. Thyroid: No thyromegaly. Vascular: No JVD. Cardiovascular:      Rate and Rhythm: Normal rate and regular rhythm. Heart sounds: Murmur present. Systolic murmur present. Pulmonary:      Effort: Pulmonary effort is normal.      Breath sounds: Normal breath sounds. No wheezing or rales. Abdominal:      General: Bowel sounds are normal. There is no distension.       Palpations:

## 2020-03-10 NOTE — PROGRESS NOTES
Chief Complaint   Patient presents with    Diabetes    Hypertension    Back Pain    COPD     fs average 140's    levemir 60 nightly    Have you seen any other physician or provider since your last visit no    Have you had any other diagnostic tests since your last visit? yes - labs      Have you changed or stopped any medications since your last visit? yes - states she doesn't know what the lactulose was she isn't taking it. I have recommended that this patient have a immunization for shingles ,tdap but she declines at this time. I have discussed the risks and benefits of this examination with her. The patient verbalizes understanding. Pended a1c. Micro,     Diabetic retinal exam completed this year?  No she will schedule fu                        * If yes please have patient sign a records release to obtain record to update Health Maintenance                       * If no, please order referral for patient to be scheduled

## 2020-03-11 RX ORDER — HYDROCODONE BITARTRATE AND ACETAMINOPHEN 5; 325 MG/1; MG/1
1 TABLET ORAL 2 TIMES DAILY PRN
Qty: 30 TABLET | Refills: 0 | Status: SHIPPED | OUTPATIENT
Start: 2020-03-11 | End: 2020-04-08 | Stop reason: SDUPTHER

## 2020-03-11 NOTE — TELEPHONE ENCOUNTER
Patient called requesting a refill on her pain medication.     Whit Landin 1/15/2020  UDS 1/9/2019  No Med Agreement on File    Hemoglobin A1C (%)   Date Value   03/10/2020 8.9   12/10/2019 8.7 (H)   07/22/2019 7.1 (H)     BUN (mg/dL)   Date Value   02/05/2020 16     CREATININE (mg/dL)   Date Value   02/05/2020 1.0     LDL Calculated (mg/dL)   Date Value   08/22/2019 41     AST (IU/L)   Date Value   12/10/2019 15     ALT (IU/L)   Date Value   12/10/2019 13     BP Readings from Last 3 Encounters:   03/10/20 (!) 148/72   12/10/19 (!) 158/72   11/18/19 (!) 176/78     All Future Testing planned in [facility]:  Lab Frequency Next Occurrence   DRUG SCREEN MULTI URINE Once 03/10/2020   CBC Auto Differential Once 06/10/2020   Comprehensive Metabolic Panel Once 90/20/1373   MRI Brain WO Contrast Once 03/10/2020     Health Maintenance   Topic Date Due    Hepatitis C screen  1950    Hepatitis B vaccine (1 of 3 - Risk 3-dose series) 02/27/1971    DTaP/Tdap/Td vaccine (1 - Tdap) 02/27/1971    Shingles Vaccine (1 of 2) 02/27/2002    Diabetic retinal exam  11/05/2016    DEXA (modify frequency per FRAX score)  02/27/2017    Diabetic foot exam  05/23/2019    Annual Wellness Visit (AWV)  05/29/2019    Flu vaccine (1) 09/01/2019    Lipid screen  08/22/2020    Potassium monitoring  12/10/2020    Creatinine monitoring  02/05/2021    Low dose CT lung screening  02/05/2021    A1C test (Diabetic or Prediabetic)  03/10/2021    Diabetic microalbuminuria test  03/10/2021    Breast cancer screen  04/17/2021    Colon cancer screen colonoscopy  02/12/2022    Pneumococcal 65+ years Vaccine  Completed    Hepatitis A vaccine  Aged Out    Hib vaccine  Aged Out    Meningococcal (ACWY) vaccine  Aged Out     Next Visit Date:  Future Appointments   Date Time Provider Kayla Roberts   4/8/2020  2:45 PM MD Marisa Hernández PC [facility]   4/27/2020 12:30 PM [facility]MO 1 [facility]

## 2020-03-13 ENCOUNTER — TELEPHONE (OUTPATIENT)
Dept: PRIMARY CARE CLINIC | Age: 70
End: 2020-03-13

## 2020-03-17 ENCOUNTER — HOSPITAL ENCOUNTER (OUTPATIENT)
Dept: CT IMAGING | Facility: HOSPITAL | Age: 70
Discharge: HOME | End: 2020-03-17
Payer: MEDICARE

## 2020-03-17 PROCEDURE — 70450 CT HEAD/BRAIN W/O DYE: CPT

## 2020-03-19 ENCOUNTER — TELEPHONE (OUTPATIENT)
Dept: PRIMARY CARE CLINIC | Age: 70
End: 2020-03-19

## 2020-04-06 RX ORDER — INSULIN DETEMIR 100 [IU]/ML
INJECTION, SOLUTION SUBCUTANEOUS
Qty: 15 ML | Refills: 0 | Status: SHIPPED | OUTPATIENT
Start: 2020-04-06 | End: 2020-04-08 | Stop reason: SDUPTHER

## 2020-04-07 RX ORDER — ROSUVASTATIN CALCIUM 20 MG/1
TABLET, COATED ORAL
Qty: 120 TABLET | Refills: 0 | Status: SHIPPED | OUTPATIENT
Start: 2020-04-07 | End: 2020-08-31

## 2020-04-08 ENCOUNTER — VIRTUAL VISIT (OUTPATIENT)
Dept: PRIMARY CARE CLINIC | Age: 70
End: 2020-04-08
Payer: MEDICARE

## 2020-04-08 PROCEDURE — G2025 DIS SITE TELE SVCS RHC/FQHC: HCPCS | Performed by: INTERNAL MEDICINE

## 2020-04-08 RX ORDER — CLOPIDOGREL BISULFATE 75 MG/1
75 TABLET ORAL DAILY
Qty: 30 TABLET | Refills: 3 | Status: SHIPPED | OUTPATIENT
Start: 2020-04-08 | End: 2021-03-10

## 2020-04-08 RX ORDER — VITAMIN B COMPLEX
2500 TABLET ORAL DAILY
Qty: 90 TABLET | Refills: 1 | Status: SHIPPED | OUTPATIENT
Start: 2020-04-08 | End: 2020-06-01 | Stop reason: SDUPTHER

## 2020-04-08 RX ORDER — LORATADINE 10 MG/1
10 TABLET ORAL DAILY
Qty: 30 TABLET | Refills: 1 | Status: SHIPPED | OUTPATIENT
Start: 2020-04-08 | End: 2021-04-13 | Stop reason: ALTCHOICE

## 2020-04-08 RX ORDER — HYDROCODONE BITARTRATE AND ACETAMINOPHEN 5; 325 MG/1; MG/1
1 TABLET ORAL 2 TIMES DAILY PRN
Qty: 30 TABLET | Refills: 0 | Status: SHIPPED | OUTPATIENT
Start: 2020-04-08 | End: 2020-05-07 | Stop reason: SDUPTHER

## 2020-04-08 RX ORDER — INSULIN DETEMIR 100 [IU]/ML
60 INJECTION, SOLUTION SUBCUTANEOUS NIGHTLY
Qty: 5 PEN | Refills: 3 | Status: SHIPPED | OUTPATIENT
Start: 2020-04-08 | End: 2020-06-30

## 2020-04-08 ASSESSMENT — ENCOUNTER SYMPTOMS
COUGH: 1
NAUSEA: 0
BACK PAIN: 1
SHORTNESS OF BREATH: 1
ABDOMINAL PAIN: 0
DIARRHEA: 0
EYE ITCHING: 0
SINUS PRESSURE: 0
VOMITING: 0
WHEEZING: 1
CONSTIPATION: 0
EYE REDNESS: 0
RHINORRHEA: 0
EYE DISCHARGE: 0
SORE THROAT: 0

## 2020-04-08 NOTE — PROGRESS NOTES
SUBJECTIVE:    Patient ID: Angi Patiño is a 78 y. o.female. Chief Complaint   Patient presents with    Diabetes    Hypertension     HPI:  This visit was conducted via phone due to COVID-19 outbreak. Patient has had diabetes for the past several years. She has been compliant with the medications and denies any side effects from it. She has been monitoring fingersticks on a daily basis. Her fingerstick range is between 113/200. She denies any hypoglycemic symptoms. She has  been following a diabetic diet and has  been active. Her last eye exam was more than a year ago. She is using oral meds and Insulin. She is taking Levemir 55 units nightly. Patient with history of back pain Pain range is 2-8/10, currently 6/10. Pain does not radiate. Pain is worse with exertion, better with rest. Symptoms fluctuate.  No change in B/B. Some stiffness after long sitting or standing. No tingling or numbness. Tried Norco with good response. No side effect from pain medications. It has been helping her tolerating ADLs and reducing pain to a tolerable level. Patient requested refills on pain meds. Patient has had hypertension for several years. She has been compliant with taking medications, without side effects from it. She has been following a low-sodium, is  active and occasionally exercises. She states that her weight and BP have been stable at home. Patient's medications, allergies, past medical, surgical, social and family histories were reviewed and updated as appropriate in the electronic medical record/chart.         Outpatient Medications Marked as Taking for the 4/8/20 encounter (Virtual Visit) with Raymond Daly MD   Medication Sig Dispense Refill    rosuvastatin (CRESTOR) 20 MG tablet TAKE 1 TABLET BY MOUTH ONCE DAILY 120 tablet 0    LEVEMIR FLEXTOUCH 100 UNIT/ML injection pen INJECT 55 UNITS INTO THE SKIN NIGHTLY 15 mL 0    HYDROcodone-acetaminophen (NORCO) 5-325 MG per tablet Take 1 tablet by mouth 2 times daily as needed for Pain for up to 30 days. 30 tablet 0    umeclidinium-vilanterol (ANORO ELLIPTA) 62.5-25 MCG/INH AEPB inhaler Inhale 1 puff into the lungs daily      lisinopril (PRINIVIL;ZESTRIL) 5 MG tablet Take 1 tablet by mouth daily 90 tablet 1    linagliptin (TRADJENTA) 5 MG tablet Take 1 tablet by mouth daily 30 tablet 3    clopidogrel (PLAVIX) 75 MG tablet TAKE 1 TABLET BY MOUTH ONCE DAILY 30 tablet 2    loratadine (CLARITIN) 10 MG tablet take 1 tablet by mouth once daily 30 tablet 1    furosemide (LASIX) 20 MG tablet Take 1 tablet by mouth daily as needed (swelling) 30 tablet 1    fluticasone (FLONASE) 50 MCG/ACT nasal spray 1 spray by Each Nostril route daily 1 Spray in each nostril 2 Bottle 1    metFORMIN (GLUCOPHAGE) 1000 MG tablet take 1 tablet by mouth twice a day with food 60 tablet 3    isosorbide mononitrate (IMDUR) 60 MG extended release tablet take 1 tablet by mouth once daily 30 tablet 2    aspirin 81 MG chewable tablet Take 1 tablet by mouth daily 30 tablet 2    nitroGLYCERIN (NITROSTAT) 0.4 MG SL tablet Place 1 tablet under the tongue every 5 minutes as needed for Chest pain 25 tablet 1    blood glucose monitor strips QID Dx E11.9 100 strip 5    Insulin Syringe-Needle U-100 (B-D INS SYR ULTRAFINE 1CC/31G) 31G X 5/16\" 1 ML MISC USE ONCE A DAY AS DIRECTED  DX E11.9 100 each 3        Review of Systems   Constitutional: Negative for chills, fatigue, fever and unexpected weight change. HENT: Negative for congestion, ear pain, rhinorrhea, sinus pressure and sore throat. Eyes: Negative for discharge, redness, itching and visual disturbance. Respiratory: Positive for cough, shortness of breath and wheezing. At baseline    Cardiovascular: Negative for chest pain, palpitations and leg swelling. CAZARES   Gastrointestinal: Negative for abdominal pain, constipation, diarrhea, nausea and vomiting. Endocrine: Negative for cold intolerance and heat intolerance. alleviate pain to a degree that the patient can participate in own ADLS social activity and improve function. 2. Risk and benefits were reviewed at length, including risk for addiction and possible side effects and interactions. Alternative therapy was discussed and will be a part of the patients overall care. Including but not limited to, physical therapy, other medications as well as behavioral and activity modification and the use of other modalities (chiropractic care ect. ). 3. This patient does not exhibit a potential for abuse or addiction at this time. Will monitor closely. 4. UDS has been compliant. Will randomly check drug screen. 5. Kasia [place] completed and compliant, will check every 3 months. 6. Advised her that UDS and possible pill counts and frequent monitoring will be a part of her care. 7. Patient has medication agreement and consent to treat with this office. Patient has been informed not to seek or obtain medication from other practitioners and to notify our office ASAP if this happened on emergent basis.    - HYDROcodone-acetaminophen (Corinn Reins) 5-325 MG per tablet; Take 1 tablet by mouth 2 times daily as needed for Pain for up to 30 days. Dispense: 30 tablet; Refill: 0    3. Essential hypertension  BP is stable. I have advised her on low-sodium diet, exercise and weight control. I am going to continue current medication. Will monitor her renal function every few months, have advised her to check blood pressure frequently and to keep a record of this. 4. Vitamin B12 deficiency  Start on SL B12 until office reopens for injections. Patient due for mammogram. Defer until after outbreak. Visit lasted 7 minutes.      Orders Placed This Encounter   Medications    insulin detemir (LEVEMIR FLEXTOUCH) 100 UNIT/ML injection pen     Sig: Inject 60 Units into the skin nightly 340 B program     Dispense:  5 pen     Refill:  3    HYDROcodone-acetaminophen (NORCO) 5-325 MG per tablet Sig: Take 1 tablet by mouth 2 times daily as needed for Pain for up to 30 days. Dispense:  30 tablet     Refill:  0     Reduce doses taken as pain becomes manageable    clopidogrel (PLAVIX) 75 MG tablet     Sig: Take 1 tablet by mouth daily     Dispense:  30 tablet     Refill:  3    loratadine (CLARITIN) 10 MG tablet     Sig: Take 1 tablet by mouth daily     Dispense:  30 tablet     Refill:  1      Written by Chong Quinn, acting as a scribe for Dr. Lilly Purcell on 4/8/2020 at 2:50 PM.     I, Dr. Norma Marie, personally performed the services described in the documentation as scribed by Essence Jarrett CMA, in my presence and it is both accurate and complete.

## 2020-04-09 RX ORDER — ROSUVASTATIN CALCIUM 20 MG/1
TABLET, COATED ORAL
Qty: 90 TABLET | OUTPATIENT
Start: 2020-04-09

## 2020-05-05 RX ORDER — CLOPIDOGREL BISULFATE 75 MG/1
TABLET ORAL
Qty: 30 TABLET | Refills: 3 | OUTPATIENT
Start: 2020-05-05

## 2020-05-07 RX ORDER — HYDROCODONE BITARTRATE AND ACETAMINOPHEN 5; 325 MG/1; MG/1
1 TABLET ORAL 2 TIMES DAILY PRN
Qty: 30 TABLET | Refills: 0 | Status: SHIPPED | OUTPATIENT
Start: 2020-05-07 | End: 2020-06-01 | Stop reason: SDUPTHER

## 2020-05-28 ENCOUNTER — TELEPHONE (OUTPATIENT)
Dept: PRIMARY CARE CLINIC | Age: 70
End: 2020-05-28

## 2020-05-28 NOTE — TELEPHONE ENCOUNTER
Pt is calling requesting something for her nerves called into the pharmacy please?     Please advise

## 2020-06-01 ENCOUNTER — VIRTUAL VISIT (OUTPATIENT)
Dept: PRIMARY CARE CLINIC | Age: 70
End: 2020-06-01
Payer: MEDICARE

## 2020-06-01 PROCEDURE — G2025 DIS SITE TELE SVCS RHC/FQHC: HCPCS | Performed by: INTERNAL MEDICINE

## 2020-06-01 RX ORDER — HYDROXYZINE HYDROCHLORIDE 25 MG/1
25 TABLET, FILM COATED ORAL EVERY 8 HOURS PRN
Qty: 30 TABLET | Refills: 0 | Status: SHIPPED | OUTPATIENT
Start: 2020-06-01 | End: 2020-06-11

## 2020-06-01 RX ORDER — VITAMIN B COMPLEX
2500 TABLET ORAL DAILY
Qty: 90 TABLET | Refills: 1 | Status: SHIPPED | OUTPATIENT
Start: 2020-06-01 | End: 2021-03-10

## 2020-06-01 RX ORDER — HYDROXYZINE HYDROCHLORIDE 25 MG/1
25 TABLET, FILM COATED ORAL EVERY 8 HOURS PRN
Qty: 30 TABLET | Refills: 0 | Status: SHIPPED | OUTPATIENT
Start: 2020-06-01 | End: 2020-06-01 | Stop reason: SDUPTHER

## 2020-06-01 RX ORDER — HYDROCODONE BITARTRATE AND ACETAMINOPHEN 5; 325 MG/1; MG/1
1 TABLET ORAL 2 TIMES DAILY PRN
Qty: 30 TABLET | Refills: 0 | Status: SHIPPED | OUTPATIENT
Start: 2020-06-01 | End: 2020-07-15 | Stop reason: SDUPTHER

## 2020-06-01 RX ORDER — DULOXETIN HYDROCHLORIDE 20 MG/1
20 CAPSULE, DELAYED RELEASE ORAL DAILY
Qty: 30 CAPSULE | Refills: 3 | Status: SHIPPED | OUTPATIENT
Start: 2020-06-01 | End: 2020-11-30

## 2020-06-01 ASSESSMENT — ENCOUNTER SYMPTOMS
CONSTIPATION: 0
BACK PAIN: 1
EYE DISCHARGE: 0
SINUS PRESSURE: 0
RHINORRHEA: 0
NAUSEA: 0
EYE REDNESS: 0
SHORTNESS OF BREATH: 1
DIARRHEA: 0
WHEEZING: 1
SORE THROAT: 0
COUGH: 1
ABDOMINAL PAIN: 0
VOMITING: 0
EYE ITCHING: 0

## 2020-06-01 NOTE — PROGRESS NOTES
 H/O: CVA (cardiovascular accident)     HTN (hypertension)     Mass     on chest     Tobacco abuse     Vitamin B12 deficiency      Past Surgical History:   Procedure Laterality Date    CARPAL TUNNEL RELEASE Bilateral     CHOLECYSTECTOMY      CORONARY ANGIOPLASTY WITH STENT PLACEMENT      HYSTERECTOMY      JOINT REPLACEMENT Bilateral 10/15/2016    knees    TOTAL KN[facility]THROPLASTY [facility]18[facility]      Family History   Problem Relation Age of Onset    Diabetes Mother     High Blood Pressure Mother     Cancer Mother         pancreatic    Diabetes Father     Heart Disease Father     High Blood Pressure Father     Cancer Sister         lung, breast    Cancer Brother         throat      Social History     Tobacco Use   Smoking Status Current Every Day Smoker    Packs/day: 1.00    Years: 40.00    Pack years: 40.00    Types: Cigarettes   Smokeless Tobacco Never Used   Tobacco Comment    states she is not willing to stop and this is a stress reliever for her. OBJECTIVE:   Wt Readings from Last 3 Encounters:   03/10/20 145 lb 12.8 oz (66.1 kg)   12/10/19 150 lb 9.6 oz (68.3 kg)   11/18/19 148 lb (67.1 kg)     BP Readings from Last 3 Encounters:   03/10/20 (!) 148/72   12/10/19 (!) 158/72   11/18/19 (!) 176/78       There were no vitals taken for this visit. Physical Exam  Patient does not seem to be in any acute distress. she is in good spirits. Speech is clear and breath sounds are unremarkable during conversation. Head atraumatic normocephalic. Alert and oriented X3. Upper body movement at baseline for the patient. Mood and thoughts content are within normal limit.     Lab Results   Component Value Date     12/10/2019    K 3.2 12/10/2019    K 3.2 04/27/2019    CL 96 12/10/2019    CO2 28 12/10/2019    GLUCOSE 291 12/10/2019    BUN 16 02/05/2020    CREATININE 1.0 02/05/2020    CALCIUM 9.3 12/10/2019    PROT 6.2 12/10/2019    LABALBU 4.1 12/10/2019    BILITOT 0.3

## 2020-06-02 PROBLEM — F41.9 ANXIETY: Status: ACTIVE | Noted: 2020-06-02

## 2020-06-02 RX ORDER — LINAGLIPTIN 5 MG/1
TABLET, FILM COATED ORAL
Qty: 120 TABLET | Refills: 0 | Status: SHIPPED | OUTPATIENT
Start: 2020-06-02 | End: 2020-12-28

## 2020-06-03 ENCOUNTER — TELEPHONE (OUTPATIENT)
Dept: PRIMARY CARE CLINIC | Age: 70
End: 2020-06-03

## 2020-06-03 RX ORDER — BLOOD-GLUCOSE METER
1 KIT MISCELLANEOUS DAILY
Qty: 1 KIT | Refills: 0 | Status: SHIPPED | OUTPATIENT
Start: 2020-06-03

## 2020-06-15 ENCOUNTER — TELEPHONE (OUTPATIENT)
Dept: PRIMARY CARE CLINIC | Age: 70
End: 2020-06-15

## 2020-06-15 ENCOUNTER — HOSPITAL ENCOUNTER (OUTPATIENT)
Dept: MAMMOGRAPHY | Facility: HOSPITAL | Age: 70
Discharge: HOME | End: 2020-06-15
Payer: MEDICARE

## 2020-06-15 PROCEDURE — 77066 DX MAMMO INCL CAD BI: CPT

## 2020-06-15 NOTE — TELEPHONE ENCOUNTER
Pt is having trouble hearing out of left ear and would like something called into the pharmacy please?     Pharmacy=[facility]

## 2020-06-17 NOTE — TELEPHONE ENCOUNTER
Tried calling patient to find out what medication she is needing no answer please find out and let us know thanks.

## 2020-06-30 RX ORDER — INSULIN DETEMIR 100 [IU]/ML
INJECTION, SOLUTION SUBCUTANEOUS
Qty: 15 ML | Refills: 3 | Status: SHIPPED | OUTPATIENT
Start: 2020-06-30 | End: 2020-10-30

## 2020-07-15 ENCOUNTER — VIRTUAL VISIT (OUTPATIENT)
Dept: PRIMARY CARE CLINIC | Age: 70
End: 2020-07-15
Payer: MEDICARE

## 2020-07-15 PROCEDURE — G2025 DIS SITE TELE SVCS RHC/FQHC: HCPCS | Performed by: INTERNAL MEDICINE

## 2020-07-15 RX ORDER — HYDROCODONE BITARTRATE AND ACETAMINOPHEN 5; 325 MG/1; MG/1
1 TABLET ORAL 2 TIMES DAILY PRN
Qty: 30 TABLET | Refills: 0 | Status: SHIPPED | OUTPATIENT
Start: 2020-07-15 | End: 2020-08-10 | Stop reason: SDUPTHER

## 2020-07-15 RX ORDER — EMPAGLIFLOZIN 10 MG/1
1 TABLET, FILM COATED ORAL DAILY
Qty: 30 TABLET | Refills: 3 | Status: SHIPPED | OUTPATIENT
Start: 2020-07-15 | End: 2020-09-28

## 2020-07-15 ASSESSMENT — ENCOUNTER SYMPTOMS
EYE REDNESS: 0
EYE ITCHING: 0
SHORTNESS OF BREATH: 1
EYE DISCHARGE: 0
COUGH: 1
CONSTIPATION: 0
NAUSEA: 0
BACK PAIN: 1
WHEEZING: 1
SORE THROAT: 0
VOMITING: 0
ABDOMINAL PAIN: 0
SINUS PRESSURE: 0
DIARRHEA: 0
RHINORRHEA: 0

## 2020-07-15 NOTE — PROGRESS NOTES
Chief Complaint   Patient presents with    Diabetes    Hypertension       Have you seen any other physician or provider since your last visit no    Have you had any other diagnostic tests since your last visit? no    Have you changed or stopped any medications since your last visit? no     Pended awv. Diabetic retinal exam completed this year? No she will schedule herself. * If yes please have patient sign a records release to obtain record to update Health Maintenance                       * If no, please order referral for patient to be scheduled     fs range has 120-212.

## 2020-07-15 NOTE — PROGRESS NOTES
SUBJECTIVE:    Patient ID: Dinh Hazel is a 78 y. o.female. Chief Complaint   Patient presents with    Diabetes    Hypertension     HPI:  This visit was conducted via phone pursuant to the emergency declaration and the Coronavirus Preparedness and Response. Consent from the patient to proceed with this type of visit was obtained. Patient was made aware that accurate medical decisions and definite diagnosis are difficult to obtain without direct evaluation and patient was agreeable. The patient has also been advised to contact this office for worsening conditions or problems, and seek emergency medical treatment and/or call 911 if deemed necessary. The patient was at home during this visit and the provider was located at [facility]. Patient has had diabetes for the past several years. She has been compliant with the medications and denies any side effects from it. She has been monitoring fingersticks on a daily basis. Her fingerstick range is between 120-212. She denies any hypoglycemic symptoms. She has been following a diabetic diet and has been active. Her last eye exam was more  than a year ago. She is using oral meds only. Patient with history of back pain Pain range is 2-8/10, currently 5/10. Pain does not radiate. Pain is worse with exertion, better with rest. Symptoms fluctuate.  No change in B/B. Some stiffness after long sitting or standing. No tingling or numbness. Tried Norco with good response. No side effect from pain medications. It has been helping her tolerating ADLs and reducing pain to a tolerable level. Patient requested refills on pain meds.      Patient has had hypertension for several years. She has been compliant with taking medications, without side effects from it. She has been following a low-sodium, is  active and never exercises. BP has been stable at home per patient report.      Patient's medications, allergies, past medical, surgical, social and family histories were reviewed and updated as appropriate in the electronic medical record/chart.         Outpatient Medications Marked as Taking for the 7/15/20 encounter (Virtual Visit) with Mile Pickard MD   Medication Sig Dispense Refill    LEVEMIR FLEXTOUCH 100 UNIT/ML injection pen INJECT 60 UNITS INTO THE SKIN NIGHTLY 15 mL 3    metFORMIN (GLUCOPHAGE) 1000 MG tablet take 1 tablet by mouth twice a day with food 60 tablet 3    glucose monitoring kit (FREESTYLE) monitoring kit 1 kit by Does not apply route daily E11.40 1 kit 0    TRADJENTA 5 MG tablet TAKE 1 TABLET BY MOUTH DAILY 120 tablet 0    Cyanocobalamin 2500 MCG SUBL Place 2,500 mcg under the tongue daily 90 tablet 1    DULoxetine (CYMBALTA) 20 MG extended release capsule Take 1 capsule by mouth daily 30 capsule 3    clopidogrel (PLAVIX) 75 MG tablet Take 1 tablet by mouth daily 30 tablet 3    loratadine (CLARITIN) 10 MG tablet Take 1 tablet by mouth daily 30 tablet 1    rosuvastatin (CRESTOR) 20 MG tablet TAKE 1 TABLET BY MOUTH ONCE DAILY 120 tablet 0    umeclidinium-vilanterol (ANORO ELLIPTA) 62.5-25 MCG/INH AEPB inhaler Inhale 1 puff into the lungs daily      lisinopril (PRINIVIL;ZESTRIL) 5 MG tablet Take 1 tablet by mouth daily 90 tablet 1    furosemide (LASIX) 20 MG tablet Take 1 tablet by mouth daily as needed (swelling) 30 tablet 1    fluticasone (FLONASE) 50 MCG/ACT nasal spray 1 spray by Each Nostril route daily 1 Spray in each nostril 2 Bottle 1    isosorbide mononitrate (IMDUR) 60 MG extended release tablet take 1 tablet by mouth once daily 30 tablet 2    aspirin 81 MG chewable tablet Take 1 tablet by mouth daily 30 tablet 2    nitroGLYCERIN (NITROSTAT) 0.4 MG SL tablet Place 1 tablet under the tongue every 5 minutes as needed for Chest pain 25 tablet 1    blood glucose monitor strips QID Dx E11.9 100 strip 5    Insulin Syringe-Needle U-100 (B-D INS SYR ULTRAFINE 1CC/31G) 31G X 5/16\" 1 ML MISC USE ONCE A DAY AS DIRECTED  DX E11.9 100 each 3        Review of Systems   Constitutional: Negative for chills, fatigue, fever and unexpected weight change. HENT: Negative for congestion, ear pain, rhinorrhea, sinus pressure and sore throat. Eyes: Negative for discharge, redness, itching and visual disturbance. Respiratory: Positive for cough, shortness of breath and wheezing. At baseline    Cardiovascular: Negative for chest pain, palpitations and leg swelling. CAZARES   Gastrointestinal: Negative for abdominal pain, constipation, diarrhea, nausea and vomiting. Endocrine: Negative for cold intolerance and heat intolerance. Genitourinary: Negative for dysuria, frequency and urgency. Musculoskeletal: Positive for arthralgias and back pain. Negative for joint swelling. Skin: Negative for rash and wound. Neurological: Negative for dizziness, syncope, weakness, numbness and headaches. Hematological: Negative. Negative for adenopathy. Psychiatric/Behavioral: Positive for sleep disturbance. Negative for agitation and dysphoric mood. The patient is nervous/anxious.         Past Medical History:   Diagnosis Date    CAD (coronary artery disease)     [facility]     Cystic fibrosis [facility]     Diabetes mellitus [facility]     GERD (gastroesophageal reflux disease)     H/O: CVA (cardiovascular accident)     HTN (hypertension)     Mass     on chest     Tobacco abuse     Vitamin B12 deficiency      Past Surgical History:   Procedure Laterality Date    CARPAL TUNNEL RELEASE Bilateral     CHOLECYSTECTOMY      CORONARY ANGIOPLASTY WITH STENT PLACEMENT      HYSTERECTOMY      JOINT REPLACEMENT Bilateral 10/15/2016    knees    TOTAL KNEE ARTHROPLASTY [facility]ilateral 10/18/2016    at [facility]      Family History   Problem Relation Age of Onset    Diabetes Mother     High Blood Pressure Mother     Cancer Mother         pancreatic    Diabetes Father     Heart Disease Father     High Blood Pressure Father  Cancer Sister         lung, breast    Cancer Brother         throat      Social History     Tobacco Use   Smoking Status Current Every Day Smoker    Packs/day: 1.00    Years: 40.00    Pack years: 40.00    Types: Cigarettes   Smokeless Tobacco Never Used   Tobacco Comment    states she is not willing to stop and this is a stress reliever for her. OBJECTIVE:   Wt Readings from Last 3 Encounters:   03/10/20 145 lb 12.8 oz (66.1 kg)   12/10/19 150 lb 9.6 oz (68.3 kg)   11/18/19 148 lb (67.1 kg)     BP Readings from Last 3 Encounters:   03/10/20 (!) 148/72   12/10/19 (!) 158/72   11/18/19 (!) 176/78       There were no vitals taken for this visit. Physical Exam  Patient does not seem to be in any acute distress. she is in good spirits. Speech is clear and breath sounds are unremarkable during conversation. A &O X3. Lab Results   Component Value Date     12/10/2019    K 3.2 12/10/2019    K 3.2 04/27/2019    CL 96 12/10/2019    CO2 28 12/10/2019    GLUCOSE 291 12/10/2019    BUN 16 02/05/2020    CREATININE 1.0 02/05/2020    CALCIUM 9.3 12/10/2019    PROT 6.2 12/10/2019    LABALBU 4.1 12/10/2019    BILITOT 0.3 12/10/2019    ALT 13 12/10/2019    AST 15 12/10/2019       Hemoglobin A1C (%)   Date Value   03/10/2020 8.9     Microscopic Examination (no units)   Date Value   04/27/2019 YES     LDL Calculated (mg/dL)   Date Value   08/22/2019 41         Lab Results   Component Value Date    WBC 5.8 12/10/2019    NEUTROABS 3.9 12/10/2019    HGB 12.5 12/10/2019    HCT 37.8 12/10/2019    MCV 84 12/10/2019     12/10/2019       Lab Results   Component Value Date    TSH 2.920 04/09/2019       ASSESSMENT/PLAN:     1. Type 2 diabetes mellitus with diabetic neuropathy, with long-term current use of insulin ([address]  I had a long discussion with her regarding diabetic diet, exercise and weight control. I am going to add Jardiance. I advised her to monitor her fingerstick closely.   I am going to check the A1c every few months. I will check Microalbumin on a yearly basis. I have also advised her to have a yearly eye exam and to monitor her feet closely. Advice her about the complications from diabetes, even with good control. I am going to have her follow up on a regular bases with our diabetic educator. A1C will be checked in few months. Lab Results   Component Value Date    LABA1C 8.9 03/10/2020       2. Degenerative disc disease, lumbar  Continue on current regimen. Rx sent.  Advised her to avoid heavy lifting, use heat pad. 1. Goal is to alleviate pain to a degree that the patient can participate in own ADLS social activity and improve function. 2. Risk and benefits were reviewed at length, including risk for addiction and possible side effects and interactions. Alternative therapy was discussed and will be a part of the patients overall care. Including but not limited to, physical therapy, other medications as well as behavioral and activity modification and the use of other modalities (chiropractic care ect. ). 3. This patient does not exhibit a potential for abuse or addiction at this time. Will monitor closely. 4. UDS has been compliant. Will randomly check drug screen. 5. [place] completed and compliant, will check every 3 months. 6. Advised her that UDS and possible pill counts and frequent monitoring will be a part of her care. 7. Patient has medication agreement and consent to treat with this office. Patient has been informed not to seek or obtain medication from other practitioners and to notify our office ASAP if this happened on emergent basis.    - HYDROcodone-acetaminophen ([address]) 5-325 MG per tablet; Take 1 tablet by mouth 2 times daily as needed for Pain for up to 30 days. Dispense: 30 tablet; Refill: 0    3. Essential hypertension  BP is stable. I have advised her on low-sodium diet, exercise and weight control. I am going to continue current medication.  Will monitor her renal function every few months, have advised her to check blood pressure frequently and to keep a record of this. Visit lasted 12 minutes. Orders Placed This Encounter   Medications    HYDROcodone-acetaminophen (NORCO) 5-325 MG per tablet     Sig: Take 1 tablet by mouth 2 times daily as needed for Pain for up to 30 days. Dispense:  30 tablet     Refill:  0     Reduce doses taken as pain becomes manageable    empagliflozin (JARDIANCE) 10 MG tablet     Sig: Take 1 tablet by mouth daily     Dispense:  30 tablet     Refill:  3      Written by Casper Weeks, acting as a scribe for Dr. Jeanna Summers on 7/15/2020 at 3:25 PM.     I, Dr. Karson Joe, personally performed the services described in the documentation as scribed by Ramila Teresa CMA, in my presence and it is both accurate and complete.

## 2020-07-27 ENCOUNTER — HOSPITAL ENCOUNTER (OUTPATIENT)
Facility: HOSPITAL | Age: 70
Discharge: HOME | End: 2020-07-27
Payer: MEDICARE

## 2020-07-27 PROCEDURE — 36415 COLL VENOUS BLD VENIPUNCTURE: CPT

## 2020-08-10 RX ORDER — HYDROCODONE BITARTRATE AND ACETAMINOPHEN 5; 325 MG/1; MG/1
1 TABLET ORAL 2 TIMES DAILY PRN
Qty: 30 TABLET | Refills: 0 | Status: SHIPPED | OUTPATIENT
Start: 2020-08-10 | End: 2020-09-15 | Stop reason: SDUPTHER

## 2020-08-17 RX ORDER — ISOSORBIDE MONONITRATE 60 MG/1
60 TABLET, EXTENDED RELEASE ORAL EVERY MORNING
Qty: 90 TABLET | Refills: 0 | Status: SHIPPED | OUTPATIENT
Start: 2020-08-17 | End: 2020-11-30

## 2020-08-31 RX ORDER — LISINOPRIL 5 MG/1
5 TABLET ORAL DAILY
Qty: 90 TABLET | Refills: 1 | Status: SHIPPED | OUTPATIENT
Start: 2020-08-31 | End: 2021-02-15

## 2020-08-31 RX ORDER — ROSUVASTATIN CALCIUM 20 MG/1
TABLET, COATED ORAL
Qty: 120 TABLET | Refills: 0 | Status: SHIPPED | OUTPATIENT
Start: 2020-08-31 | End: 2020-11-30

## 2020-09-15 RX ORDER — HYDROCODONE BITARTRATE AND ACETAMINOPHEN 5; 325 MG/1; MG/1
1 TABLET ORAL 2 TIMES DAILY PRN
Qty: 30 TABLET | Refills: 0 | Status: SHIPPED | OUTPATIENT
Start: 2020-09-15 | End: 2020-10-07 | Stop reason: SDUPTHER

## 2020-09-15 NOTE — TELEPHONE ENCOUNTER
Abhay Contreraslin is calling for a medication refill that is not listed in her chart. Pt is requesting her Hydrocodone to be refilled. Last office visit: 7/15/2020  Next office visit: 10/28/2020   Preferred Pharmacy:   Please call patient to clarify. Thank You.

## 2020-09-28 RX ORDER — EMPAGLIFLOZIN 10 MG/1
1 TABLET, FILM COATED ORAL DAILY
Qty: 30 TABLET | Refills: 3 | Status: SHIPPED | OUTPATIENT
Start: 2020-09-28 | End: 2020-12-28

## 2020-10-07 RX ORDER — HYDROCODONE BITARTRATE AND ACETAMINOPHEN 5; 325 MG/1; MG/1
1 TABLET ORAL 2 TIMES DAILY PRN
Qty: 30 TABLET | Refills: 0 | Status: SHIPPED | OUTPATIENT
Start: 2020-10-07 | End: 2020-11-11 | Stop reason: SDUPTHER

## 2020-10-07 NOTE — TELEPHONE ENCOUNTER
Pt requesting refill on [place]  CELSO: 8/8/2020  UDS: 3/10/2020  Med Agree: Needs at next appt.   Next Appt: 10/28/2020

## 2020-10-19 ENCOUNTER — NURSE TRIAGE (OUTPATIENT)
Dept: OTHER | Facility: CLINIC | Age: 70
End: 2020-10-19

## 2020-10-19 NOTE — TELEPHONE ENCOUNTER
Shortness of breath with exertion for several months and years. She called d/t she wanted to reschedule an appointment. Transferred to triage d/t red flag word of shortness of breath. She declines triage d/t this is an old problem that has not changed. Transferred back to [facility] to reschedule her current appt.       Reason for Disposition   Requesting regular office appointment    Protocols used: INFORMATION ONLY CALL - NO TRIAGE-ADULT-

## 2020-10-30 RX ORDER — INSULIN DETEMIR 100 [IU]/ML
INJECTION, SOLUTION SUBCUTANEOUS
Qty: 15 ML | Refills: 3 | Status: SHIPPED | OUTPATIENT
Start: 2020-10-30 | End: 2020-12-16 | Stop reason: SDUPTHER

## 2020-11-11 RX ORDER — HYDROCODONE BITARTRATE AND ACETAMINOPHEN 5; 325 MG/1; MG/1
1 TABLET ORAL 2 TIMES DAILY PRN
Qty: 30 TABLET | Refills: 0 | Status: SHIPPED | OUTPATIENT
Start: 2020-11-11 | End: 2020-12-04 | Stop reason: SDUPTHER

## 2020-11-30 RX ORDER — DULOXETIN HYDROCHLORIDE 20 MG/1
20 CAPSULE, DELAYED RELEASE ORAL DAILY
Qty: 30 CAPSULE | Refills: 3 | Status: SHIPPED | OUTPATIENT
Start: 2020-11-30 | End: 2020-12-03

## 2020-11-30 RX ORDER — ROSUVASTATIN CALCIUM 20 MG/1
TABLET, COATED ORAL
Qty: 120 TABLET | Refills: 0 | Status: SHIPPED | OUTPATIENT
Start: 2020-11-30 | End: 2021-02-15

## 2020-11-30 RX ORDER — ISOSORBIDE MONONITRATE 60 MG/1
60 TABLET, EXTENDED RELEASE ORAL EVERY MORNING
Qty: 30 TABLET | Refills: 0 | Status: SHIPPED | OUTPATIENT
Start: 2020-11-30 | End: 2022-07-01

## 2020-12-03 ENCOUNTER — OFFICE VISIT (OUTPATIENT)
Dept: PRIMARY CARE CLINIC | Age: 70
End: 2020-12-03
Payer: MEDICARE

## 2020-12-03 ENCOUNTER — HOSPITAL ENCOUNTER (OUTPATIENT)
Facility: HOSPITAL | Age: 70
Discharge: HOME | End: 2020-12-03
Payer: MEDICARE

## 2020-12-03 VITALS
HEART RATE: 74 BPM | SYSTOLIC BLOOD PRESSURE: 136 MMHG | WEIGHT: 155.6 LBS | DIASTOLIC BLOOD PRESSURE: 60 MMHG | TEMPERATURE: 97.4 F | RESPIRATION RATE: 18 BRPM | OXYGEN SATURATION: 98 % | BODY MASS INDEX: 26.71 KG/M2

## 2020-12-03 PROCEDURE — G8417 CALC BMI ABV UP PARAM F/U: HCPCS | Performed by: INTERNAL MEDICINE

## 2020-12-03 PROCEDURE — 3051F HG A1C>EQUAL 7.0%<8.0%: CPT | Performed by: INTERNAL MEDICINE

## 2020-12-03 PROCEDURE — G8484 FLU IMMUNIZE NO ADMIN: HCPCS | Performed by: INTERNAL MEDICINE

## 2020-12-03 PROCEDURE — 3017F COLORECTAL CA SCREEN DOC REV: CPT | Performed by: INTERNAL MEDICINE

## 2020-12-03 PROCEDURE — 4004F PT TOBACCO SCREEN RCVD TLK: CPT | Performed by: INTERNAL MEDICINE

## 2020-12-03 PROCEDURE — 1123F ACP DISCUSS/DSCN MKR DOCD: CPT | Performed by: INTERNAL MEDICINE

## 2020-12-03 PROCEDURE — G8427 DOCREV CUR MEDS BY ELIG CLIN: HCPCS | Performed by: INTERNAL MEDICINE

## 2020-12-03 PROCEDURE — 1090F PRES/ABSN URINE INCON ASSESS: CPT | Performed by: INTERNAL MEDICINE

## 2020-12-03 PROCEDURE — 4040F PNEUMOC VAC/ADMIN/RCVD: CPT | Performed by: INTERNAL MEDICINE

## 2020-12-03 PROCEDURE — G0296 VISIT TO DETERM LDCT ELIG: HCPCS | Performed by: INTERNAL MEDICINE

## 2020-12-03 PROCEDURE — 99214 OFFICE O/P EST MOD 30 MIN: CPT | Performed by: INTERNAL MEDICINE

## 2020-12-03 PROCEDURE — 2022F DILAT RTA XM EVC RTNOPTHY: CPT | Performed by: INTERNAL MEDICINE

## 2020-12-03 PROCEDURE — G8400 PT W/DXA NO RESULTS DOC: HCPCS | Performed by: INTERNAL MEDICINE

## 2020-12-03 PROCEDURE — 96372 THER/PROPH/DIAG INJ SC/IM: CPT | Performed by: INTERNAL MEDICINE

## 2020-12-03 PROCEDURE — 36415 COLL VENOUS BLD VENIPUNCTURE: CPT

## 2020-12-03 RX ORDER — CYANOCOBALAMIN 1000 UG/ML
1000 INJECTION INTRAMUSCULAR; SUBCUTANEOUS ONCE
Status: COMPLETED | OUTPATIENT
Start: 2020-12-03 | End: 2020-12-03

## 2020-12-03 RX ADMIN — CYANOCOBALAMIN 1000 MCG: 1000 INJECTION INTRAMUSCULAR; SUBCUTANEOUS at 09:58

## 2020-12-03 ASSESSMENT — ENCOUNTER SYMPTOMS
VOMITING: 0
SORE THROAT: 0
SHORTNESS OF BREATH: 1
CONSTIPATION: 0
EYE REDNESS: 0
EYE DISCHARGE: 0
DIARRHEA: 0
BACK PAIN: 1
NAUSEA: 0
RHINORRHEA: 0
COUGH: 1
SINUS PRESSURE: 0
WHEEZING: 1
ABDOMINAL PAIN: 0
EYE ITCHING: 0

## 2020-12-03 NOTE — PROGRESS NOTES
Chief Complaint   Patient presents with    Diabetes    Hypertension       Have you seen any other physician or provider since your last visit yes - cataract surgery both eyes    Have you had any other diagnostic tests since your last visit? no    Have you changed or stopped any medications since your last visit? yes -      I have recommended that this patient have a immunization for influenza but she declines at this time. I have discussed the risks and benefits of this examination with her. The patient verbalizes understanding. Diabetic retinal exam completed this year?  Yes                       * If yes please have patient sign a records release to obtain record to update Health Maintenance                       * If no, please order referral for patient to be scheduled

## 2020-12-03 NOTE — PROGRESS NOTES
for abdominal pain, constipation, diarrhea, nausea and vomiting. Endocrine: Negative for cold intolerance and heat intolerance. Genitourinary: Negative for dysuria, frequency and urgency. Musculoskeletal: Positive for arthralgias and back pain. Negative for joint swelling. Skin: Negative for rash and wound. Neurological: Negative for dizziness, syncope, weakness, numbness and headaches. Hematological: Negative. Negative for adenopathy. Psychiatric/Behavioral: Positive for sleep disturbance. Negative for agitation and dysphoric mood. The patient is nervous/anxious. Past Medical History:   Diagnosis Date    CAD (coronary artery disease)     [facility] obstructive [facility] disease) [facility]     Cystic fibrosis ([facility]     Diabetes mellitus ([facility] [facility]     GERD (gastroesophageal reflux disease)     H/O: CVA (cardiovascular accident)     HTN (hypertension)     Mass     on chest     Tobacco abuse     Vitamin B12 deficiency      Past Surgical History:   Procedure Laterality Date    CARPAL TUNNEL RELEASE Bilateral     CHOLECYSTECTOMY      CORONARY ANGIOPLASTY WITH STENT PLACEMENT      HYSTERECTOMY      JOINT REPLACEMENT Bilateral 10/15/2016    knees    TOTAL KNEE ARTHROPLASTY Bilateral 10/18/2016    at [address]      Family History   Problem Relation Age of Onset    Diabetes Mother     High Blood Pressure Mother     Cancer Mother         pancreatic    Diabetes Father     Heart Disease Father     High Blood Pressure Father     Cancer Sister         lung, breast    Cancer Brother         throat      Social History     Tobacco Use   Smoking Status Current Every Day Smoker    Packs/day: 1.00    Years: 40.00    Pack years: 40.00    Types: Cigarettes   Smokeless Tobacco Never Used   Tobacco Comment    states she is not willing to stop and this is a stress reliever for her.         OBJECTIVE:   Wt Readings from Last 3 Encounters:   12/03/20 155 lb 9.6 oz (70.6 kg)   03/10/20 145 lb 12.8 oz (66.1 kg)   12/10/19 150 lb 9.6 oz (68.3 kg)     BP Readings from Last 3 Encounters:   12/03/20 136/60   03/10/20 (!) 148/72   12/10/19 (!) 158/72       /60   Pulse 74   Temp 97.4 °F (36.3 °C)   Resp 18   Wt 155 lb 9.6 oz (70.6 kg)   SpO2 98%   BMI 26.71 kg/m²      Physical Exam  Vitals signs and nursing note reviewed. Constitutional:       Appearance: Normal appearance. She is well-developed and normal weight. HENT:      Head: Normocephalic and atraumatic. Right Ear: External ear normal.      Left Ear: External ear normal.      Nose: Nose normal.      Mouth/Throat:      Mouth: Mucous membranes are moist.      Pharynx: Oropharynx is clear. Eyes:      Conjunctiva/sclera: Conjunctivae normal.      Pupils: Pupils are equal, round, and reactive to light. Neck:      Musculoskeletal: Neck supple. No neck rigidity or muscular tenderness. Thyroid: No thyromegaly. Vascular: No JVD. Cardiovascular:      Rate and Rhythm: Normal rate and regular rhythm. Heart sounds: Murmur present. Systolic murmur present. Pulmonary:      Effort: Pulmonary effort is normal.      Breath sounds: Normal breath sounds. No wheezing or rales. Abdominal:      General: Bowel sounds are normal. There is no distension. Palpations: Abdomen is soft. Tenderness: There is no abdominal tenderness. Musculoskeletal:         General: No tenderness. Lumbar back: She exhibits decreased range of motion and spasm. Right lower leg: No edema. Left lower leg: No edema. Lymphadenopathy:      Cervical: No cervical adenopathy. Skin:     Findings: No erythema or rash. Neurological:      General: No focal deficit present. Mental Status: She is alert and oriented to person, place, and time. Psychiatric:         Behavior: Behavior normal.         Judgment: Judgment normal.       Intact skin, intact sensation in feet. Good pedal pulse bilaterally.        Lab Results   Component Value Date     07/27/2020    K 4.2 07/27/2020    K 3.2 04/27/2019     07/27/2020    CO2 24 07/27/2020    GLUCOSE 122 07/27/2020    BUN 13 07/27/2020    CREATININE 1.22 07/27/2020    CALCIUM 10.1 07/27/2020    PROT 6.9 07/27/2020    LABALBU 4.5 07/27/2020    BILITOT 0.4 07/27/2020    ALT 16 07/27/2020    AST 21 07/27/2020       Hemoglobin A1C (%)   Date Value   07/27/2020 7.6 (H)     Microscopic Examination (no units)   Date Value   04/27/2019 YES     LDL Calculated (mg/dL)   Date Value   08/22/2019 41         Lab Results   Component Value Date    WBC 6.4 07/27/2020    NEUTROABS 4.8 07/27/2020    HGB 12.0 07/27/2020    HCT 37.0 07/27/2020    MCV 84 07/27/2020     07/27/2020       Lab Results   Component Value Date    TSH 2.720 07/27/2020       ASSESSMENT/PLAN:     1. Type 2 diabetes mellitus with diabetic neuropathy, with long-term current use of insulin (HCC)  In acceptable range. I advised her regarding diabetic diet, exercise and weight control. Also, I advised her to stay on the current medication, monitor her fingerstick closely. I am going to check the A1c every few months. I will check microalbumin on a yearly basis. I have also advised her to have a yearly eye exam and to monitor her feet closely. Along with instruction of possible complications related to diabetes, even with good control. A1C will be checked  in few months. Lab Results   Component Value Date    LABA1C 7.6 (H) 07/27/2020     -  DIABETES FOOT EXAM    2. Essential hypertension  BP is stable. I have advised her on low-sodium diet, exercise and weight control. I am going to continue current medication. Will monitor her renal function every few months, have advised her to check blood pressure frequently and to keep a record of this. 3. Degenerative disc disease, lumbar  Continue on current regimen. Advised her to avoid heavy lifting, use heat pad.   1. Goal is to alleviate pain to a degree that the patient can participate in own ADLS social activity and improve function. 2. Risk and benefits were reviewed at length, including risk for addiction and possible side effects and interactions. Alternative therapy was discussed and will be a part of the patients overall care. Including but not limited to, physical therapy, other medications as well as behavioral and activity modification and the use of other modalities (chiropractic care ect. ). 3. This patient does not exhibit a potential for abuse or addiction at this time. Will monitor closely. 4. UDS has been compliant. Will randomly check drug screen. 5. Margarito Frankel completed and compliant, will check every 3 months. 6. Advised her that UDS and possible pill counts and frequent monitoring will be a part of her care. 7. Patient has medication agreement and consent to treat with this office. Patient has been informed not to seek or obtain medication from other practitioners and to notify our office ASAP if this happened on emergent basis. 4. Vitamin B12 deficiency  Injection given today. Continue supplementation. 5. Other problems related to lifestyle  Check labs. Further recommendation based on test results. 6. Post-menopausal  DEXA ordered today. Further recommendation based on test results. - DEXA Bone Density Axial Skeleton; Future    7. Personal history of tobacco use  I had a long discussion with her regarding the risk of smoking especialy with her other medical problems. I have discussed with patient several treatment options (program, nicotine patches and other meds) to help her quit smoking. Patient is not interested at this time. CT Lung screen ordered today. Further recommendation based on test results.       Low Dose CT (LDCT) Lung Screening criteria met   Age 50-69   Pack year smoking >30   Still smoking or less than 15 year since quit   No sign or symptoms of lung cancer   > 11 months since last LDCT     Risks and benefits of lung cancer screening with LDCT scans discussed:    Significance of positive screen - False-positive LDCT results often occur. 95% of all positive results do not lead to a diagnosis of cancer. Usually further imaging can resolve most false-positive results; however, some patients may require invasive procedures. Over diagnosis risk - 10% to 12% of screen-detected lung cancer cases are over diagnosed--that is, the cancer would not have been detected in the patient's lifetime without the screening. Need for follow up screens annually to continue lung cancer screening effectiveness     Risks associated with radiation from annual LDCT- Radiation exposure is about the same as for a mammogram, which is about 1/3 of the annual background radiation exposure from everyday life. Starting screening at age 54 is not likely to increase cancer risk from radiation exposure. Patients with comorbidities resulting in life expectancy of < 10 years, or that would preclude treatment of an abnormality identified on CT, should not be screened due to lack of benefit. To obtain maximal benefit from this screening, smoking cessation and long-term abstinence from smoking is critical    - [facility] VISIT TO DISCUSS LUNG CA SCREEN W LDCT  - CT Lung Screen (Annual); Future      Orders Placed This Encounter   Medications    cyanocobalamin injection 1,000 mcg      Written by Vipul Doran, acting as a scribe for Dr. Lo Kang on 12/3/2020 at 10:25 AM.     I, Dr. Rosalie Thompson, personally performed the services described in the documentation as scribed by Nell Anaya CMA, in my presence and it is both accurate and complete.

## 2020-12-04 ENCOUNTER — TELEPHONE (OUTPATIENT)
Dept: PRIMARY CARE CLINIC | Age: 70
End: 2020-12-04

## 2020-12-04 RX ORDER — HYDROCODONE BITARTRATE AND ACETAMINOPHEN 5; 325 MG/1; MG/1
1 TABLET ORAL 2 TIMES DAILY PRN
Qty: 30 TABLET | Refills: 0 | Status: SHIPPED | OUTPATIENT
Start: 2020-12-04 | End: 2021-01-12 | Stop reason: SDUPTHER

## 2020-12-04 RX ORDER — HYDROCODONE BITARTRATE AND ACETAMINOPHEN 5; 325 MG/1; MG/1
1 TABLET ORAL 2 TIMES DAILY PRN
Qty: 30 TABLET | Refills: 0 | Status: CANCELLED | OUTPATIENT
Start: 2020-12-04 | End: 2021-01-03

## 2020-12-04 NOTE — TELEPHONE ENCOUNTER
Pt called stating that you told her you would go ahead and refill her pain med   Michelle Deloris 11/11/20

## 2020-12-10 ENCOUNTER — CARE COORDINATION (OUTPATIENT)
Dept: CARE COORDINATION | Age: 70
End: 2020-12-10

## 2020-12-16 RX ORDER — INSULIN DETEMIR 100 [IU]/ML
INJECTION, SOLUTION SUBCUTANEOUS
Qty: 20 PEN | Refills: 1 | Status: SHIPPED | OUTPATIENT
Start: 2020-12-16 | End: 2021-05-07

## 2020-12-16 NOTE — TELEPHONE ENCOUNTER
Called pt to let her know that Levemir will not be available on [address]B after January 1st 2020. We are recommending patients fill a 90 day supply of the medication before January. Pt stated it was fine to refill her rx for 90DS. Dr. Kira Law, I am pending a Levemir 90 day supply rx on [address]. Please let me know if you have any questions or concerns.

## 2021-01-12 DIAGNOSIS — M51.36 DEGENERATIVE DISC DISEASE, LUMBAR: ICD-10-CM

## 2021-01-12 RX ORDER — HYDROCODONE BITARTRATE AND ACETAMINOPHEN 5; 325 MG/1; MG/1
1 TABLET ORAL 2 TIMES DAILY PRN
Qty: 30 TABLET | Refills: 0 | Status: SHIPPED | OUTPATIENT
Start: 2021-01-12 | End: 2021-02-11 | Stop reason: SDUPTHER

## 2021-01-14 RX ORDER — ISOSORBIDE MONONITRATE 60 MG/1
TABLET, EXTENDED RELEASE ORAL
Qty: 30 TABLET | Refills: 2 | Status: SHIPPED | OUTPATIENT
Start: 2021-01-14 | End: 2021-04-12

## 2021-02-11 DIAGNOSIS — M51.36 DEGENERATIVE DISC DISEASE, LUMBAR: ICD-10-CM

## 2021-02-11 RX ORDER — HYDROCODONE BITARTRATE AND ACETAMINOPHEN 5; 325 MG/1; MG/1
1 TABLET ORAL 2 TIMES DAILY PRN
Qty: 30 TABLET | Refills: 0 | Status: SHIPPED | OUTPATIENT
Start: 2021-02-11 | End: 2021-03-10 | Stop reason: SDUPTHER

## 2021-02-15 RX ORDER — ROSUVASTATIN CALCIUM 20 MG/1
TABLET, COATED ORAL
Qty: 120 TABLET | Refills: 0 | Status: SHIPPED | OUTPATIENT
Start: 2021-02-15 | End: 2021-05-11

## 2021-02-15 RX ORDER — LISINOPRIL 5 MG/1
5 TABLET ORAL DAILY
Qty: 90 TABLET | Refills: 1 | Status: SHIPPED | OUTPATIENT
Start: 2021-02-15 | End: 2021-08-09

## 2021-02-25 NOTE — PATIENT INSTRUCTIONS
· Keep a list of your medicines with you. List all of the prescription medicines, nonprescription medicines, supplements, natural remedies, and vitamins that you take. Tell your healthcare providers who treat you about all of the products you are taking. Your provider can provide you with a form to keep track of them. Just ask. · Follow the directions that come with your medicine, including information about food or alcohol. Make sure you know how and when to take your medicine. Do not take more or less than you are supposed to take. · Keep all medicines out of the reach of children. · Store medicines according to the directions on the label. · Monitor yourself. Learn to know how your body reacts to your new medicine and keep track of how it makes you feel before attempting (If your provider has allowed you to do so) to drive or go to work. · Seek emergency medical attention if you think you have used too much of this medicine. An overdose of any prescription medicine can be fatal. Overdose symptoms may include extreme drowsiness, muscle weakness, confusion, cold and clammy skin, pinpoint pupils, shallow breathing, slow heart rate, fainting, or coma. · Don't share prescription medicines with others, even when they seem to have the same symptoms. What may be good for you may be harmful to others. · If you are no longer taking a prescribed medication and you have pills left please take your pills out of their original containers. Mix crushed pills with an undesirable substance, such as cat litter or used coffee grounds. Put the mixture into a disposable container with a lid, such as an empty margarine tub, or into a sealable bag. Cover up or remove any of your personal information on the empty containers by covering it with black permanent marker or duct tape. Place the sealed container with the mixture, and the empty drug containers, in the trash.    · If you use a medication that is in the form of a patch, dispose of used patches by folding them in half so that the sticky sides meet, and then flushing them down a toilet. They should not be placed in the household trash where children or pets can find them. · If you have any questions, ask your provider or pharmacist for more information. · Be sure to keep all appointments for provider visits or tests. ips to Help You Stop Smoking       Cigarette smoking is a preventable cause of death in the [place]. If you have thought about quitting but haven't been able to, here are some reasons why you should and some ways to do it. Here's Why   Quitting smoking now can decrease your risk of getting smoking-related illnesses like:   Heart disease   Stroke   Several types of cancer, including:   Lung   Mouth   Esophagus   Larynx   Bladder   Pancreas   Kidney   Chronic lung diseases:   Bronchitis   Emphysema   Asthma   Cataracts   Macular degeneration   Thyroid conditions   Hearing loss   Erectile dysfunction   Dementia   Osteoporosis   Here's How   Once you've decided to quit smoking, set your target quit date a few weeks away. In the time leading up to your quit day, try some of these ideas offered by the [address] to help you successfully quit smoking. For the best results, work with your doctor. Together, you can test your lung function and compare the results to those of a nonsmoking person. The results can be given to you as your lung age. Finding out your lung age right after having the test done may help you to stop smoking. Your doctor can also discuss with you all of your options and refer you to smoking-cessation support groups. You may wish to use nicotine replacement (gum, patches, inhaler) or one of the prescription medications that have been shown to increase quit rates and prolong abstinence from smoking.  But whatever you and your doctor decide on these matters, it will still be you who decides match to your cigarette. You may decide you don't need it. Make Smoking Inconvenient   Stop buying cigarettes by the carton. Wait until one pack is empty before you buy another. Stop carrying cigarettes with you at home or at work. Make them difficult to get to. Make Smoking Unpleasant   Smoke only under circumstances that aren't especially pleasurable for you. If you like to smoke with others, smoke alone. Turn your chair to an empty corner and focus only on the cigarette you are smoking and all its many negative effects. Collect all your cigarette butts in one large glass container as a visual reminder of the filth made by smoking. Just Before Quitting   Practice going without cigarettes. Don't think of never smoking again. Think of quitting in terms of one day at a time . Tell yourself you won't smoke today, and then don't. Clean your clothes to rid them of the cigarette smell, which can linger a long time. On the Day You Quit   Throw away all your cigarettes and matches. Hide your lighters and ashtrays. Visit the dentist and have your teeth cleaned to get rid of tobacco stains. Notice how nice they look and resolve to keep them that way. Make a list of things you'd like to buy for yourself or someone else. Estimate the cost in terms of packs of cigarettes, and put the money aside to buy these presents. Keep very busy on the big day. Go to the movies, exercise, take long walks, or go bike riding. Remind your family and friends that this is your quit date, and ask them to help you over the rough spots of the first couple of days and weeks. Buy yourself a treat or do something special to celebrate. Telephone and Internet Support   Telephone, web-, and computer-based programs can offer you the support that you need to quit and to stay smoke-free. You can find many programs online, like the [employer]'s [place] from Smoking .    Immediately After Quitting   Develop a clean, Picato Counseling:  I discussed with the patient the risks of Picato including but not limited to erythema, scaling, itching, weeping, crusting, and pain.

## 2021-02-26 ENCOUNTER — APPOINTMENT (OUTPATIENT)
Dept: GENERAL RADIOLOGY | Facility: HOSPITAL | Age: 71
End: 2021-02-26
Payer: MEDICARE

## 2021-02-26 ENCOUNTER — APPOINTMENT (OUTPATIENT)
Dept: CT IMAGING | Facility: HOSPITAL | Age: 71
End: 2021-02-26
Payer: MEDICARE

## 2021-02-26 ENCOUNTER — HOSPITAL ENCOUNTER (EMERGENCY)
Facility: HOSPITAL | Age: 71
Discharge: HOME | End: 2021-02-26
Attending: EMERGENCY MEDICINE
Payer: MEDICARE

## 2021-02-26 VITALS
BODY MASS INDEX: 25.61 KG/M2 | DIASTOLIC BLOOD PRESSURE: 56 MMHG | WEIGHT: 150 LBS | HEART RATE: 69 BPM | SYSTOLIC BLOOD PRESSURE: 143 MMHG | HEIGHT: 64 IN | TEMPERATURE: 98.2 F | RESPIRATION RATE: 15 BRPM | OXYGEN SATURATION: 97 %

## 2021-02-26 DIAGNOSIS — K12.2 UVULITIS: ICD-10-CM

## 2021-02-26 DIAGNOSIS — G44.89 OTHER HEADACHE SYNDROME: ICD-10-CM

## 2021-02-26 DIAGNOSIS — N28.9 RENAL INSUFFICIENCY: ICD-10-CM

## 2021-02-26 DIAGNOSIS — J04.0 LARYNGITIS: Primary | ICD-10-CM

## 2021-02-26 LAB
A/G RATIO: 1.6 (ref 0.8–2)
ALBUMIN SERPL-MCNC: 4.4 G/DL (ref 3.4–4.8)
ALP BLD-CCNC: 93 U/L (ref 25–100)
ALT SERPL-CCNC: 17 U/L (ref 4–36)
ANION GAP SERPL CALCULATED.3IONS-SCNC: 11 MMOL/L (ref 3–16)
AST SERPL-CCNC: 19 U/L (ref 8–33)
BASOPHILS ABSOLUTE: 0 K/UL (ref 0–0.1)
BASOPHILS RELATIVE PERCENT: 0.4 %
BILIRUB SERPL-MCNC: 0.3 MG/DL (ref 0.3–1.2)
BUN BLDV-MCNC: 25 MG/DL (ref 6–20)
CALCIUM SERPL-MCNC: 9.8 MG/DL (ref 8.5–10.5)
CHLORIDE BLD-SCNC: 103 MMOL/L (ref 98–107)
CO2: 25 MMOL/L (ref 20–30)
CREAT SERPL-MCNC: 1.3 MG/DL (ref 0.4–1.2)
EOSINOPHILS ABSOLUTE: 0.1 K/UL (ref 0–0.4)
EOSINOPHILS RELATIVE PERCENT: 1.6 %
GFR AFRICAN AMERICAN: 49
GFR NON-AFRICAN AMERICAN: 41
GLOBULIN: 2.8 G/DL
GLUCOSE BLD-MCNC: 82 MG/DL (ref 74–106)
GLUCOSE BLD-MCNC: 82 MG/DL (ref 74–106)
HCT VFR BLD CALC: 36 % (ref 37–47)
HEMOGLOBIN: 11.1 G/DL (ref 11.5–16.5)
IMMATURE GRANULOCYTES #: 0 K/UL
IMMATURE GRANULOCYTES %: 0.4 % (ref 0–5)
LYMPHOCYTES ABSOLUTE: 1.5 K/UL (ref 1.5–4)
LYMPHOCYTES RELATIVE PERCENT: 32.7 %
MCH RBC QN AUTO: 25.8 PG (ref 27–32)
MCHC RBC AUTO-ENTMCNC: 30.8 G/DL (ref 31–35)
MCV RBC AUTO: 83.5 FL (ref 80–100)
MONOCYTES ABSOLUTE: 0.4 K/UL (ref 0.2–0.8)
MONOCYTES RELATIVE PERCENT: 9 %
NEUTROPHILS ABSOLUTE: 2.5 K/UL (ref 2–7.5)
NEUTROPHILS RELATIVE PERCENT: 55.9 %
PDW BLD-RTO: 15 % (ref 11–16)
PERFORMED ON: NORMAL
PLATELET # BLD: 161 K/UL (ref 150–400)
PMV BLD AUTO: 10.8 FL (ref 6–10)
POTASSIUM REFLEX MAGNESIUM: 3.7 MMOL/L (ref 3.4–5.1)
RBC # BLD: 4.31 M/UL (ref 3.8–5.8)
S PYO AG THROAT QL: NEGATIVE
SODIUM BLD-SCNC: 139 MMOL/L (ref 136–145)
TOTAL PROTEIN: 7.2 G/DL (ref 6.4–8.3)
TROPONIN: <0.3 NG/ML
WBC # BLD: 4.5 K/UL (ref 4–11)

## 2021-02-26 PROCEDURE — 6370000000 HC RX 637 (ALT 250 FOR IP): Performed by: EMERGENCY MEDICINE

## 2021-02-26 PROCEDURE — 6370000000 HC RX 637 (ALT 250 FOR IP)

## 2021-02-26 PROCEDURE — 96374 THER/PROPH/DIAG INJ IV PUSH: CPT

## 2021-02-26 PROCEDURE — 80053 COMPREHEN METABOLIC PANEL: CPT

## 2021-02-26 PROCEDURE — 6360000004 HC RX CONTRAST MEDICATION: Performed by: EMERGENCY MEDICINE

## 2021-02-26 PROCEDURE — 87880 STREP A ASSAY W/OPTIC: CPT

## 2021-02-26 PROCEDURE — 36415 COLL VENOUS BLD VENIPUNCTURE: CPT

## 2021-02-26 PROCEDURE — 85025 COMPLETE CBC W/AUTO DIFF WBC: CPT

## 2021-02-26 PROCEDURE — 94640 AIRWAY INHALATION TREATMENT: CPT

## 2021-02-26 PROCEDURE — 70450 CT HEAD/BRAIN W/O DYE: CPT

## 2021-02-26 PROCEDURE — 2580000003 HC RX 258: Performed by: EMERGENCY MEDICINE

## 2021-02-26 PROCEDURE — 99283 EMERGENCY DEPT VISIT LOW MDM: CPT

## 2021-02-26 PROCEDURE — 70492 CT SFT TSUE NCK W/O & W/DYE: CPT

## 2021-02-26 PROCEDURE — 84484 ASSAY OF TROPONIN QUANT: CPT

## 2021-02-26 PROCEDURE — 93005 ELECTROCARDIOGRAM TRACING: CPT

## 2021-02-26 PROCEDURE — 6360000002 HC RX W HCPCS: Performed by: EMERGENCY MEDICINE

## 2021-02-26 PROCEDURE — 71045 X-RAY EXAM CHEST 1 VIEW: CPT

## 2021-02-26 RX ORDER — METHYLPREDNISOLONE 4 MG/1
TABLET ORAL
Qty: 1 KIT | Refills: 0 | Status: SHIPPED | OUTPATIENT
Start: 2021-02-26 | End: 2021-03-04

## 2021-02-26 RX ORDER — METHYLPREDNISOLONE SODIUM SUCCINATE 125 MG/2ML
125 INJECTION, POWDER, LYOPHILIZED, FOR SOLUTION INTRAMUSCULAR; INTRAVENOUS ONCE
Status: COMPLETED | OUTPATIENT
Start: 2021-02-26 | End: 2021-02-26

## 2021-02-26 RX ORDER — SODIUM CHLORIDE 9 MG/ML
1000 INJECTION, SOLUTION INTRAVENOUS CONTINUOUS
Status: DISCONTINUED | OUTPATIENT
Start: 2021-02-26 | End: 2021-02-26 | Stop reason: HOSPADM

## 2021-02-26 RX ORDER — AZITHROMYCIN 250 MG/1
250 TABLET, FILM COATED ORAL SEE ADMIN INSTRUCTIONS
Qty: 6 TABLET | Refills: 0 | Status: SHIPPED | OUTPATIENT
Start: 2021-02-26 | End: 2021-03-03

## 2021-02-26 RX ORDER — ACETAMINOPHEN 325 MG/1
650 TABLET ORAL ONCE
Status: COMPLETED | OUTPATIENT
Start: 2021-02-26 | End: 2021-02-26

## 2021-02-26 RX ORDER — GLIPIZIDE 5 MG/1
5 TABLET ORAL 2 TIMES DAILY
Qty: 4 TABLET | Refills: 3 | Status: SHIPPED | OUTPATIENT
Start: 2021-02-26 | End: 2021-03-10

## 2021-02-26 RX ORDER — IPRATROPIUM BROMIDE AND ALBUTEROL SULFATE 2.5; .5 MG/3ML; MG/3ML
SOLUTION RESPIRATORY (INHALATION)
Status: COMPLETED
Start: 2021-02-26 | End: 2021-02-26

## 2021-02-26 RX ORDER — SODIUM CHLORIDE FOR INHALATION 0.9 %
3 VIAL, NEBULIZER (ML) INHALATION EVERY 4 HOURS PRN
Status: DISCONTINUED | OUTPATIENT
Start: 2021-02-26 | End: 2021-02-26 | Stop reason: HOSPADM

## 2021-02-26 RX ADMIN — IPRATROPIUM BROMIDE AND ALBUTEROL SULFATE 3 ML: .5; 3 SOLUTION RESPIRATORY (INHALATION) at 08:24

## 2021-02-26 RX ADMIN — METHYLPREDNISOLONE SODIUM SUCCINATE 125 MG: 125 INJECTION, POWDER, FOR SOLUTION INTRAMUSCULAR; INTRAVENOUS at 08:24

## 2021-02-26 RX ADMIN — IOPAMIDOL 100 ML: 755 INJECTION, SOLUTION INTRAVENOUS at 08:50

## 2021-02-26 RX ADMIN — SODIUM CHLORIDE 1000 ML: 9 INJECTION, SOLUTION INTRAVENOUS at 08:26

## 2021-02-26 RX ADMIN — RACEPINEPHRINE HYDROCHLORIDE 11.25 MG: 11.25 SOLUTION RESPIRATORY (INHALATION) at 08:12

## 2021-02-26 RX ADMIN — ACETAMINOPHEN 650 MG: 325 TABLET, FILM COATED ORAL at 12:32

## 2021-02-26 RX ADMIN — ISODIUM CHLORIDE 3 ML: 0.03 SOLUTION RESPIRATORY (INHALATION) at 08:12

## 2021-02-26 ASSESSMENT — PAIN DESCRIPTION - DESCRIPTORS: DESCRIPTORS: ACHING

## 2021-02-26 ASSESSMENT — PAIN SCALES - GENERAL: PAINLEVEL_OUTOF10: 5

## 2021-02-26 ASSESSMENT — PAIN DESCRIPTION - FREQUENCY: FREQUENCY: CONTINUOUS

## 2021-02-26 ASSESSMENT — PAIN DESCRIPTION - PROGRESSION: CLINICAL_PROGRESSION: NOT CHANGED

## 2021-02-26 ASSESSMENT — PAIN DESCRIPTION - ONSET: ONSET: AWAKENED FROM SLEEP

## 2021-02-26 NOTE — ED NOTES
Discharge instructions reviewed with pt and family, pt taken via wheelchair to pov. No further needs at this time.      Starr Moy RN  02/26/21 [id number]

## 2021-02-26 NOTE — ED NOTES
RN to bedside with tylenol and discharge instructions. Asked pt if she was feeling better and she states\" not much\", asked pt if she felt like she needed to stay, pt states \"no she will go somewhere else\" MD made aware and no new orders at this time.       Piotr Faulkner RN  02/26/21 [id number]

## 2021-02-26 NOTE — ED PROVIDER NOTES
[address] [facility] ENCOUNTER      Pt Name: Salma Molina  MRN: [id number]  YOB: 1950  Date of evaluation: 2/26/2021  Provider: Queen Severs, MD    CHIEF COMPLAINT       Chief Complaint   Patient presents with    Dysphagia    Aphasia    Other     concern for stroke         HISTORY OF PRESENT ILLNESS  (Location/Symptom, Timing/Onset, Context/Setting, Quality, Duration, Modifying Factors, Severity.)   Salma Molina is a 78 y.o. female who presents to the emergency department complaining of difficulty swallowing and talking. She ate well about 630 last night. Soon after that she started having this trouble. She woke up this morning feeling worse. Also mentions occipital headache. No change in vision. No new onset weakness or numbness. She has history of stroke with residual weakness in the left side. History of coronary disease status post MI. She has hypertension and diabetes and hyperlipidemia. Nursing notes were reviewed. REVIEW OFSYSTEMS    (2-9 systems for level 4, 10 or more for level 5)   ROS:  General:  No fevers, no chills, no weakness  Cardiovascular:  No chest pain, no palpitations  Respiratory:  No shortness of breath, no cough, no wheezing  Gastrointestinal:  No pain, no nausea, no vomiting, no diarrhea  Musculoskeletal:  No muscle pain, no joint pain  Skin:  No rash, no easy bruising  Neurologic:  No speech problems, no headache, no extremity weakness  Psychiatric:  No anxiety  Genitourinary:  No dysuria, no hematuria    Except as noted above the remainder of the review of systems was reviewed and negative.        PAST MEDICAL HISTORY     Past Medical History:   Diagnosis Date    CAD (coronary artery disease)     [facility] obstructive [facility]) [facility] [address] [facility]     Cystic fibrosis ([facility] [address] [facility]     Diabetes mellitus ([facility] [address]     GERD (gastroesophageal reflux disease)     H/O: CVA (cardiovascular accident)     20 MG TABLET    TAKE 1 TABLET BY MOUTH ONCE DAILY    TRADJENTA 5 MG TABLET    TAKE 1 TABLET BY MOUTH EVERY DAY    UMECLIDINIUM-VILANTEROL (ANORO ELLIPTA) 62.5-25 MCG/INH AEPB INHALER    Inhale 1 puff into the lungs daily       ALLERGIES     Codeine    FAMILY HISTORY       Family History   Problem Relation Age of Onset    Diabetes Mother     High Blood Pressure Mother     Cancer Mother         pancreatic    Diabetes Father     Heart Disease Father     High Blood Pressure Father     Cancer Sister         lung, breast    Cancer Brother         throat          SOCIAL HISTORY       Social History     Socioeconomic History    Marital status:      Spouse name: Not on file    Number of children: Not on file    Years of education: Not on file    Highest education level: Not on file   Occupational History    Not on file   Social Needs    Financial resource strain: Not on file    Food insecurity     Worry: Not on file     Inability: Not on file    Transportation needs     Medical: Not on file     Non-medical: Not on file   Tobacco Use    Smoking status: Current Every Day Smoker     Packs/day: 1.00     Years: 40.00     Pack years: 40.00     Types: Cigarettes    Smokeless tobacco: Never Used    Tobacco comment: states she is not willing to stop and this is a stress reliever for her.     Substance and Sexual Activity    Alcohol use: No    Drug use: No    Sexual activity: Not on file   Lifestyle    Physical activity     Days per week: Not on file     Minutes per session: Not on file    Stress: Not on file   Relationships    Social connections     Talks on phone: Not on file     Gets together: Not on file     Attends Christianity service: Not on file     Active member of club or organization: Not on file     Attends meetings of clubs or organizations: Not on file     Relationship status: Not on file    Intimate partner violence     Fear of current or ex partner: Not on file     Emotionally abused: Not on file     Physically abused: Not on file     Forced sexual activity: Not on file   Other Topics Concern    Not on file   Social History Narrative    Not on file         PHYSICAL EXAM    (up to 7 for level 4, 8 or more for level 5)     ED Triage Vitals   BP Temp Temp src Pulse Resp SpO2 Height Weight   -- -- -- -- -- -- -- --       Physical Exam  General :Patient is awake, alert, oriented, in no acute distress, nontoxic appearing  HEENT: Pupils are equally round and reactive to light, EOMI, conjunctivae clear. Oral mucosa is moist, no exudate. Uvula is midline, erythematous and swollen. Voice is hoarse. No drooling or stridor. Neck: Neck is supple, full range of motion, trachea midline  Cardiac: Heart regular rate, rhythm, no murmurs, rubs, or gallops  Lungs: Lungs are clear to auscultation, there is no wheezing, rhonchi, or rales. There is no use of accessory muscles. Chest wall: There is no tenderness to palpation over the chest wall or over ribs  Abdomen: Abdomen is soft, nontender, nondistended. There is no firm or pulsatile masses, no rebound rigidity or guarding. Musculoskeletal: 5 out of 5 strength in all 4 extremities. No focal muscle deficits are appreciated  Neuro: Mild  weakness in the left, sensory intact, level of consciousness is normal, cerebellar function is normal, reflexes are grossly normal. No evidence of incontinence or loss of bowel or bladder function, no saddle anesthesia noted   Dermatology: Skin is warm and dry  Psych: Mentation is grossly normal, cognition is grossly normal. Affect is appropriate.   NIHSS: Drift LLE = 1    DIAGNOSTIC RESULTS     EKG: All EKG's are interpreted by the Emergency Department Physician who either signs or Co-signs this chart in the absenceof a cardiologist.    The EKG interpreted by me shows normal sinus rhythm at 63 bpm . No change    RADIOLOGY:   Non-plain film images such as CT, Ultrasound and MRI are read by the radiologist. Plain radiographic images are visualized and preliminarily interpreted by the emergency physician with the below findings:      ? Radiologist's Report Reviewed:  CT SOFT TISSUE NECK W WO CONTRAST   Final Result   Impression: No drainable fluid collection or abscess. Degenerative changes of the cervical spine. XR CHEST PORTABLE   Final Result      No acute disease      CT Head WO Contrast   Final Result      No acute intracranial abnormality. Left basal ganglia small remote infarct with gliosis.                   ED BEDSIDE ULTRASOUND:   Performed by ED Physician - none    LABS:    I have reviewed and interpreted all of the currently available lab results from this visit (ifapplicable):  Results for orders placed or performed during the hospital encounter of 02/26/21   Strep Screen Group A Throat    Specimen: Throat   Result Value Ref Range    Rapid Strep A Screen Negative Negative   CBC Auto Differential   Result Value Ref Range    WBC 4.5 4.0 - 11.0 K/uL    RBC 4.31 3.80 - 5.80 M/uL    Hemoglobin 11.1 (L) 11.5 - 16.5 g/dL    Hematocrit 36.0 (L) 37.0 - 47.0 %    MCV 83.5 80.0 - 100.0 fL    MCH 25.8 (L) 27.0 - 32.0 pg    MCHC 30.8 (L) 31.0 - 35.0 g/dL    RDW 15.0 11.0 - 16.0 %    Platelets 815 055 - 577 K/uL    MPV 10.8 (H) 6.0 - 10.0 fL    Neutrophils % 55.9 %    Immature Granulocytes % 0.4 0.0 - 5.0 %    Lymphocytes % 32.7 %    Monocytes % 9.0 %    Eosinophils % 1.6 %    Basophils % 0.4 %    Neutrophils Absolute 2.5 2.0 - 7.5 K/uL    Immature Granulocytes # 0.0 K/uL    Lymphocytes Absolute 1.5 1.5 - 4.0 K/uL    Monocytes Absolute 0.4 0.2 - 0.8 K/uL    Eosinophils Absolute 0.1 0.0 - 0.4 K/uL    Basophils Absolute 0.0 0.0 - 0.1 K/uL   Comprehensive Metabolic Panel w/ Reflex to MG   Result Value Ref Range    Sodium 139 136 - 145 mmol/L    Potassium reflex Magnesium 3.7 3.4 - 5.1 mmol/L    Chloride 103 98 - 107 mmol/L    CO2 25 20 - 30 mmol/L    Anion Gap 11 3 - 16    Glucose 82 74 - 106 mg/dL    BUN 25 (H) 6 - 20 mg/dL CREATININE 1.3 (H) 0.4 - 1.2 mg/dL    GFR Non- 41 (L) >59    GFR  49 (L) >59    Calcium 9.8 8.5 - 10.5 mg/dL    Total Protein 7.2 6.4 - 8.3 g/dL    Albumin 4.4 3.4 - 4.8 g/dL    Albumin/Globulin Ratio 1.6 0.8 - 2.0    Total Bilirubin 0.3 0.3 - 1.2 mg/dL    Alkaline Phosphatase 93 25 - 100 U/L    ALT 17 4 - 36 U/L    AST 19 8 - 33 U/L    Globulin 2.8 g/dL   Troponin   Result Value Ref Range    Troponin <0.30 <0.30 ng/mL   POCT Glucose   Result Value Ref Range    POC Glucose 82 74 - 106 mg/dl    Performed on ACCU-CHEK         All other labs were within normal range or not returned as of this dictation. EMERGENCY DEPARTMENT COURSE and DIFFERENTIAL DIAGNOSIS/MDM:   Vitals: There were no vitals filed for this visit. MEDICATIONS ADMINISTERED IN ED:  Medications   0.9 % sodium chloride infusion (has no administration in time range)   racepinephrine HCl (VAPONEFPRIN) 2.25 % nebulizer solution NEBU 11.25 mg (has no administration in time range)   sodium chloride nebulizer 0.9 % solution 3 mL (has no administration in time range)   methylPREDNISolone sodium (SOLU-MEDROL) injection 125 mg (has no administration in time range)     9:45 AM.  She reports feeling better. Throat does not hurt as much. Voice is better. 1020 AM  Feeling much better  Voice better  NIHSS 1 for drift in left LE  I do not believe she is having a stroke. Her main problem really is pain in her throat and hoarseness. She does have some erythema and swelling of the uvula. CT showed no abscess, epiglottis was normal. Strep was negative. She has Uvulitis, Laryngitis. She will not antibiotics, steroids. Discussed side effects, need for frequent sugar monitoring. She is on Metformin, advised not to take it for the next 2 days, she might need to adjust the dosage of her other DM medications. The patient will follow-up with their PCP in 1-2 days for reevaluation.   If the patient or family members have anyfurther concerns or any worsening symptoms they will return to the ED for reevaluation. CONSULTS:  None    PROCEDURES:  Procedures    CRITICAL CARE TIME    Total Critical Care time was 0 minutes, excluding separately reportable procedures. There was a high probability of clinically significant/life threatening deterioration in the patient's condition which required my urgent intervention. FINAL IMPRESSION      1. Laryngitis Stable   2. Uvulitis Stable   3. Other headache syndrome Stable         DISPOSITION/PLAN   DISPOSITION        PATIENT REFERRED TO:  Alicia Heath MD  [address]  [phone]    In 1 day        DISCHARGE MEDICATIONS:  New Prescriptions    AZITHROMYCIN (ZITHROMAX) 250 MG TABLET    Take 1 tablet by mouth See Admin Instructions for 5 days 500mg on day 1 followed by 250mg on days 2 - 5    GLIPIZIDE (GLUCOTROL) 5 MG TABLET    Take 1 tablet by mouth 2 times daily for 2 days    METHYLPREDNISOLONE (MEDROL DOSEPACK) 4 MG TABLET    Take by mouth. Comment: Please note this report has been produced using speech recognition software and may contain errorsrelated to that system including errors in grammar, punctuation, and spelling, as well as words and phrases that may be inappropriate. If there are any questions or concerns please feel free to contact the dictating providerfor clarification.     Dilcia Aggarwal MD  Attending Emergency Physician             Dilcia Aggarwal MD  02/26/21 6111

## 2021-02-26 NOTE — ED NOTES
Pt reports to respiratory therapy that she is having back and leg pain, MD made aware and order for tylenol.       Jayla Yin RN  02/26/21 [id number]

## 2021-02-26 NOTE — ED TRIAGE NOTES
Pt to ED with difficulty speaking, (pt is able to communicated and is hoarse) and difficulty/painful swallowing. Pt also complains of headache that woke her up this am. Pt is alert and is oriented at this time. Pt does have some weakness in her left side from previous stroke.

## 2021-02-26 NOTE — ED NOTES
Bed: 3CR  Expected date:   Expected time:   Means of arrival:   Comments:  Special clean 2236pm     Brandt Pacheco RN  02/26/21 [id number]

## 2021-02-28 ENCOUNTER — APPOINTMENT (OUTPATIENT)
Dept: CT IMAGING | Facility: HOSPITAL | Age: 71
End: 2021-02-28

## 2021-02-28 ENCOUNTER — APPOINTMENT (OUTPATIENT)
Dept: GENERAL RADIOLOGY | Facility: HOSPITAL | Age: 71
End: 2021-02-28

## 2021-02-28 ENCOUNTER — HOSPITAL ENCOUNTER (INPATIENT)
Facility: HOSPITAL | Age: 71
LOS: 3 days | Discharge: HOME HEALTH | End: 2021-03-03
Attending: EMERGENCY MEDICINE | Admitting: INTERNAL MEDICINE

## 2021-02-28 DIAGNOSIS — R29.2 ABSENT GAG REFLEX: ICD-10-CM

## 2021-02-28 DIAGNOSIS — R29.810 FACIAL DROOP: ICD-10-CM

## 2021-02-28 DIAGNOSIS — I63.50 RIGHT PONTINE STROKE (HCC): ICD-10-CM

## 2021-02-28 DIAGNOSIS — I69.391 DYSPHAGIA DUE TO RECENT STROKE: Primary | ICD-10-CM

## 2021-02-28 DIAGNOSIS — I10 ESSENTIAL HYPERTENSION: ICD-10-CM

## 2021-02-28 DIAGNOSIS — R53.1 WEAKNESS: ICD-10-CM

## 2021-02-28 PROBLEM — N17.9 ACUTE RENAL FAILURE (ARF) (HCC): Status: ACTIVE | Noted: 2021-02-28

## 2021-02-28 PROBLEM — E11.9 TYPE 2 DIABETES MELLITUS, WITH LONG-TERM CURRENT USE OF INSULIN: Status: ACTIVE | Noted: 2021-02-28

## 2021-02-28 PROBLEM — Z79.4 TYPE 2 DIABETES MELLITUS, WITH LONG-TERM CURRENT USE OF INSULIN (HCC): Status: ACTIVE | Noted: 2021-02-28

## 2021-02-28 LAB
ALBUMIN SERPL-MCNC: 4.2 G/DL (ref 3.5–5.2)
ALBUMIN/GLOB SERPL: 1.8 G/DL
ALP SERPL-CCNC: 83 U/L (ref 39–117)
ALT SERPL W P-5'-P-CCNC: 23 U/L (ref 1–33)
ANION GAP SERPL CALCULATED.3IONS-SCNC: 10 MMOL/L (ref 5–15)
AST SERPL-CCNC: 28 U/L (ref 1–32)
BACTERIA UR QL AUTO: ABNORMAL /HPF
BACTERIA UR QL AUTO: ABNORMAL /HPF
BASOPHILS # BLD AUTO: 0.03 10*3/MM3 (ref 0–0.2)
BASOPHILS NFR BLD AUTO: 0.5 % (ref 0–1.5)
BILIRUB SERPL-MCNC: 0.2 MG/DL (ref 0–1.2)
BILIRUB UR QL STRIP: NEGATIVE
BILIRUB UR QL STRIP: NEGATIVE
BUN SERPL-MCNC: 24 MG/DL (ref 8–23)
BUN/CREAT SERPL: 18.3 (ref 7–25)
CALCIUM SPEC-SCNC: 9 MG/DL (ref 8.6–10.5)
CHLORIDE SERPL-SCNC: 110 MMOL/L (ref 98–107)
CLARITY UR: ABNORMAL
CLARITY UR: CLEAR
CO2 SERPL-SCNC: 25 MMOL/L (ref 22–29)
COLOR UR: YELLOW
COLOR UR: YELLOW
CREAT SERPL-MCNC: 1.31 MG/DL (ref 0.57–1)
DEPRECATED RDW RBC AUTO: 46.1 FL (ref 37–54)
EOSINOPHIL # BLD AUTO: 0.02 10*3/MM3 (ref 0–0.4)
EOSINOPHIL NFR BLD AUTO: 0.3 % (ref 0.3–6.2)
ERYTHROCYTE [DISTWIDTH] IN BLOOD BY AUTOMATED COUNT: 15.3 % (ref 12.3–15.4)
GFR SERPL CREATININE-BSD FRML MDRD: 40 ML/MIN/1.73
GLOBULIN UR ELPH-MCNC: 2.3 GM/DL
GLUCOSE BLDC GLUCOMTR-MCNC: 121 MG/DL (ref 70–130)
GLUCOSE SERPL-MCNC: 72 MG/DL (ref 65–99)
GLUCOSE UR STRIP-MCNC: ABNORMAL MG/DL
GLUCOSE UR STRIP-MCNC: ABNORMAL MG/DL
HCT VFR BLD AUTO: 33.5 % (ref 34–46.6)
HGB BLD-MCNC: 10.7 G/DL (ref 12–15.9)
HGB UR QL STRIP.AUTO: ABNORMAL
HGB UR QL STRIP.AUTO: ABNORMAL
HOLD SPECIMEN: NORMAL
HOLD SPECIMEN: NORMAL
HYALINE CASTS UR QL AUTO: ABNORMAL /LPF
HYALINE CASTS UR QL AUTO: ABNORMAL /LPF
IMM GRANULOCYTES # BLD AUTO: 0.03 10*3/MM3 (ref 0–0.05)
IMM GRANULOCYTES NFR BLD AUTO: 0.5 % (ref 0–0.5)
INR PPP: 1.04 (ref 0.9–1.1)
KETONES UR QL STRIP: NEGATIVE
KETONES UR QL STRIP: NEGATIVE
LEUKOCYTE ESTERASE UR QL STRIP.AUTO: ABNORMAL
LEUKOCYTE ESTERASE UR QL STRIP.AUTO: NEGATIVE
LYMPHOCYTES # BLD AUTO: 1.36 10*3/MM3 (ref 0.7–3.1)
LYMPHOCYTES NFR BLD AUTO: 20.7 % (ref 19.6–45.3)
MCH RBC QN AUTO: 26.3 PG (ref 26.6–33)
MCHC RBC AUTO-ENTMCNC: 31.9 G/DL (ref 31.5–35.7)
MCV RBC AUTO: 82.3 FL (ref 79–97)
MONOCYTES # BLD AUTO: 0.42 10*3/MM3 (ref 0.1–0.9)
MONOCYTES NFR BLD AUTO: 6.4 % (ref 5–12)
NEUTROPHILS NFR BLD AUTO: 4.71 10*3/MM3 (ref 1.7–7)
NEUTROPHILS NFR BLD AUTO: 71.6 % (ref 42.7–76)
NITRITE UR QL STRIP: NEGATIVE
NITRITE UR QL STRIP: NEGATIVE
NRBC BLD AUTO-RTO: 0 /100 WBC (ref 0–0.2)
PH UR STRIP.AUTO: 5.5 [PH] (ref 5–8)
PH UR STRIP.AUTO: 5.5 [PH] (ref 5–8)
PLATELET # BLD AUTO: 180 10*3/MM3 (ref 140–450)
PMV BLD AUTO: 10.7 FL (ref 6–12)
POTASSIUM SERPL-SCNC: 3.4 MMOL/L (ref 3.5–5.2)
PROT SERPL-MCNC: 6.5 G/DL (ref 6–8.5)
PROT UR QL STRIP: ABNORMAL
PROT UR QL STRIP: ABNORMAL
PROTHROMBIN TIME: 14 SECONDS (ref 12–15.1)
RBC # BLD AUTO: 4.07 10*6/MM3 (ref 3.77–5.28)
RBC # UR: ABNORMAL /HPF
RBC # UR: ABNORMAL /HPF
REF LAB TEST METHOD: ABNORMAL
REF LAB TEST METHOD: ABNORMAL
SARS-COV-2 RNA PNL SPEC NAA+PROBE: NOT DETECTED
SODIUM SERPL-SCNC: 145 MMOL/L (ref 136–145)
SP GR UR STRIP: >=1.03 (ref 1–1.03)
SP GR UR STRIP: >=1.03 (ref 1–1.03)
SQUAMOUS #/AREA URNS HPF: ABNORMAL /HPF
SQUAMOUS #/AREA URNS HPF: ABNORMAL /HPF
TROPONIN T SERPL-MCNC: <0.01 NG/ML (ref 0–0.03)
TSH SERPL DL<=0.05 MIU/L-ACNC: 2.6 UIU/ML (ref 0.27–4.2)
UROBILINOGEN UR QL STRIP: ABNORMAL
UROBILINOGEN UR QL STRIP: ABNORMAL
WBC # BLD AUTO: 6.57 10*3/MM3 (ref 3.4–10.8)
WBC UR QL AUTO: ABNORMAL /HPF
WBC UR QL AUTO: ABNORMAL /HPF
WHOLE BLOOD HOLD SPECIMEN: NORMAL
WHOLE BLOOD HOLD SPECIMEN: NORMAL

## 2021-02-28 PROCEDURE — 70496 CT ANGIOGRAPHY HEAD: CPT

## 2021-02-28 PROCEDURE — 82962 GLUCOSE BLOOD TEST: CPT

## 2021-02-28 PROCEDURE — 25010000002 IOPAMIDOL 61 % SOLUTION: Performed by: EMERGENCY MEDICINE

## 2021-02-28 PROCEDURE — 87086 URINE CULTURE/COLONY COUNT: CPT | Performed by: PHYSICIAN ASSISTANT

## 2021-02-28 PROCEDURE — G0427 INPT/ED TELECONSULT70: HCPCS | Performed by: PSYCHIATRY & NEUROLOGY

## 2021-02-28 PROCEDURE — 70498 CT ANGIOGRAPHY NECK: CPT

## 2021-02-28 PROCEDURE — 81001 URINALYSIS AUTO W/SCOPE: CPT | Performed by: PHYSICIAN ASSISTANT

## 2021-02-28 PROCEDURE — 84484 ASSAY OF TROPONIN QUANT: CPT | Performed by: PHYSICIAN ASSISTANT

## 2021-02-28 PROCEDURE — 70450 CT HEAD/BRAIN W/O DYE: CPT

## 2021-02-28 PROCEDURE — 99223 1ST HOSP IP/OBS HIGH 75: CPT | Performed by: INTERNAL MEDICINE

## 2021-02-28 PROCEDURE — 87635 SARS-COV-2 COVID-19 AMP PRB: CPT | Performed by: PHYSICIAN ASSISTANT

## 2021-02-28 PROCEDURE — 25010000002 ENOXAPARIN PER 10 MG: Performed by: INTERNAL MEDICINE

## 2021-02-28 PROCEDURE — 85610 PROTHROMBIN TIME: CPT | Performed by: EMERGENCY MEDICINE

## 2021-02-28 PROCEDURE — 93005 ELECTROCARDIOGRAM TRACING: CPT | Performed by: EMERGENCY MEDICINE

## 2021-02-28 PROCEDURE — 93005 ELECTROCARDIOGRAM TRACING: CPT

## 2021-02-28 PROCEDURE — 85025 COMPLETE CBC W/AUTO DIFF WBC: CPT | Performed by: EMERGENCY MEDICINE

## 2021-02-28 PROCEDURE — 80053 COMPREHEN METABOLIC PANEL: CPT | Performed by: EMERGENCY MEDICINE

## 2021-02-28 PROCEDURE — 71045 X-RAY EXAM CHEST 1 VIEW: CPT

## 2021-02-28 PROCEDURE — 84443 ASSAY THYROID STIM HORMONE: CPT | Performed by: PHYSICIAN ASSISTANT

## 2021-02-28 PROCEDURE — 99285 EMERGENCY DEPT VISIT HI MDM: CPT

## 2021-02-28 RX ORDER — SODIUM CHLORIDE 0.9 % (FLUSH) 0.9 %
3 SYRINGE (ML) INJECTION EVERY 12 HOURS SCHEDULED
Status: DISCONTINUED | OUTPATIENT
Start: 2021-02-28 | End: 2021-03-03 | Stop reason: HOSPADM

## 2021-02-28 RX ORDER — ONDANSETRON 2 MG/ML
4 INJECTION INTRAMUSCULAR; INTRAVENOUS EVERY 6 HOURS PRN
Status: DISCONTINUED | OUTPATIENT
Start: 2021-02-28 | End: 2021-03-03 | Stop reason: HOSPADM

## 2021-02-28 RX ORDER — SODIUM CHLORIDE 9 MG/ML
100 INJECTION, SOLUTION INTRAVENOUS CONTINUOUS
Status: DISCONTINUED | OUTPATIENT
Start: 2021-02-28 | End: 2021-03-02

## 2021-02-28 RX ORDER — ACETAMINOPHEN 160 MG/5ML
650 SOLUTION ORAL EVERY 4 HOURS PRN
Status: DISCONTINUED | OUTPATIENT
Start: 2021-02-28 | End: 2021-03-03 | Stop reason: HOSPADM

## 2021-02-28 RX ORDER — CLOPIDOGREL BISULFATE 75 MG/1
75 TABLET ORAL DAILY
Status: DISCONTINUED | OUTPATIENT
Start: 2021-03-01 | End: 2021-03-01

## 2021-02-28 RX ORDER — SODIUM CHLORIDE 0.9 % (FLUSH) 0.9 %
3-10 SYRINGE (ML) INJECTION AS NEEDED
Status: DISCONTINUED | OUTPATIENT
Start: 2021-02-28 | End: 2021-03-03 | Stop reason: HOSPADM

## 2021-02-28 RX ORDER — ACETAMINOPHEN 325 MG/1
650 TABLET ORAL EVERY 4 HOURS PRN
Status: DISCONTINUED | OUTPATIENT
Start: 2021-02-28 | End: 2021-03-03 | Stop reason: HOSPADM

## 2021-02-28 RX ORDER — ASPIRIN 81 MG/1
324 TABLET, CHEWABLE ORAL ONCE
Status: COMPLETED | OUTPATIENT
Start: 2021-02-28 | End: 2021-02-28

## 2021-02-28 RX ORDER — ACETAMINOPHEN 650 MG/1
650 SUPPOSITORY RECTAL EVERY 4 HOURS PRN
Status: DISCONTINUED | OUTPATIENT
Start: 2021-02-28 | End: 2021-03-03 | Stop reason: HOSPADM

## 2021-02-28 RX ORDER — ASPIRIN 325 MG
325 TABLET, DELAYED RELEASE (ENTERIC COATED) ORAL DAILY
Status: DISCONTINUED | OUTPATIENT
Start: 2021-03-01 | End: 2021-03-02

## 2021-02-28 RX ORDER — SODIUM CHLORIDE 0.9 % (FLUSH) 0.9 %
10 SYRINGE (ML) INJECTION AS NEEDED
Status: DISCONTINUED | OUTPATIENT
Start: 2021-02-28 | End: 2021-03-03 | Stop reason: HOSPADM

## 2021-02-28 RX ADMIN — ASPIRIN 324 MG: 81 TABLET, CHEWABLE ORAL at 12:38

## 2021-02-28 RX ADMIN — ENOXAPARIN SODIUM 40 MG: 40 INJECTION SUBCUTANEOUS at 17:51

## 2021-02-28 RX ADMIN — ACETAMINOPHEN 650 MG: 650 SUPPOSITORY RECTAL at 21:00

## 2021-02-28 RX ADMIN — IOPAMIDOL 100 ML: 612 INJECTION, SOLUTION INTRAVENOUS at 11:09

## 2021-02-28 RX ADMIN — SODIUM CHLORIDE 1000 ML: 9 INJECTION, SOLUTION INTRAVENOUS at 14:15

## 2021-02-28 RX ADMIN — SODIUM CHLORIDE 100 ML/HR: 9 INJECTION, SOLUTION INTRAVENOUS at 17:51

## 2021-03-01 ENCOUNTER — APPOINTMENT (OUTPATIENT)
Dept: GENERAL RADIOLOGY | Facility: HOSPITAL | Age: 71
End: 2021-03-01

## 2021-03-01 ENCOUNTER — APPOINTMENT (OUTPATIENT)
Dept: CARDIOLOGY | Facility: HOSPITAL | Age: 71
End: 2021-03-01

## 2021-03-01 ENCOUNTER — APPOINTMENT (OUTPATIENT)
Dept: MRI IMAGING | Facility: HOSPITAL | Age: 71
End: 2021-03-01

## 2021-03-01 LAB
ANION GAP SERPL CALCULATED.3IONS-SCNC: 8.3 MMOL/L (ref 5–15)
BACTERIA SPEC AEROBE CULT: NORMAL
BASOPHILS # BLD AUTO: 0.03 10*3/MM3 (ref 0–0.2)
BASOPHILS NFR BLD AUTO: 0.7 % (ref 0–1.5)
BH CV ECHO MEAS - % IVS THICK: 20 %
BH CV ECHO MEAS - % LVPW THICK: 42.2 %
BH CV ECHO MEAS - AO MAX PG (FULL): 1.2 MMHG
BH CV ECHO MEAS - AO MAX PG: 4 MMHG
BH CV ECHO MEAS - AO MEAN PG (FULL): 0 MMHG
BH CV ECHO MEAS - AO MEAN PG: 2 MMHG
BH CV ECHO MEAS - AO ROOT AREA (BSA CORRECTED): 1.9
BH CV ECHO MEAS - AO ROOT AREA: 8.5 CM^2
BH CV ECHO MEAS - AO ROOT DIAM: 3.3 CM
BH CV ECHO MEAS - AO V2 MAX: 106 CM/SEC
BH CV ECHO MEAS - AO V2 MEAN: 68 CM/SEC
BH CV ECHO MEAS - AO V2 VTI: 26.5 CM
BH CV ECHO MEAS - AVA(I,A): 2.7 CM^2
BH CV ECHO MEAS - AVA(I,D): 2.7 CM^2
BH CV ECHO MEAS - AVA(V,A): 2.4 CM^2
BH CV ECHO MEAS - AVA(V,D): 2.4 CM^2
BH CV ECHO MEAS - BSA(HAYCOCK): 1.8 M^2
BH CV ECHO MEAS - BSA: 1.8 M^2
BH CV ECHO MEAS - BZI_BMI: 26.4 KILOGRAMS/M^2
BH CV ECHO MEAS - BZI_METRIC_HEIGHT: 162.6 CM
BH CV ECHO MEAS - BZI_METRIC_WEIGHT: 69.9 KG
BH CV ECHO MEAS - EDV(CUBED): 76.2 ML
BH CV ECHO MEAS - EDV(MOD-SP2): 83.9 ML
BH CV ECHO MEAS - EDV(MOD-SP4): 103 ML
BH CV ECHO MEAS - EDV(TEICH): 80.4 ML
BH CV ECHO MEAS - EF(CUBED): 67 %
BH CV ECHO MEAS - EF(MOD-BP): 60.2 %
BH CV ECHO MEAS - EF(MOD-SP2): 56.1 %
BH CV ECHO MEAS - EF(MOD-SP4): 61.2 %
BH CV ECHO MEAS - EF(TEICH): 58.9 %
BH CV ECHO MEAS - ESV(CUBED): 25.2 ML
BH CV ECHO MEAS - ESV(MOD-SP2): 36.8 ML
BH CV ECHO MEAS - ESV(MOD-SP4): 40 ML
BH CV ECHO MEAS - ESV(TEICH): 33 ML
BH CV ECHO MEAS - FS: 30.9 %
BH CV ECHO MEAS - IVS/LVPW: 1.2
BH CV ECHO MEAS - IVSD: 1.4 CM
BH CV ECHO MEAS - IVSS: 1.6 CM
BH CV ECHO MEAS - LA DIMENSION: 3.8 CM
BH CV ECHO MEAS - LA/AO: 1.2
BH CV ECHO MEAS - LAD MAJOR: 4.3 CM
BH CV ECHO MEAS - LAT PEAK E' VEL: 6 CM/SEC
BH CV ECHO MEAS - LATERAL E/E' RATIO: 17.9
BH CV ECHO MEAS - LV DIASTOLIC VOL/BSA (35-75): 58.8 ML/M^2
BH CV ECHO MEAS - LV MASS(C)D: 193.1 GRAMS
BH CV ECHO MEAS - LV MASS(C)DI: 110.3 GRAMS/M^2
BH CV ECHO MEAS - LV MASS(C)S: 178 GRAMS
BH CV ECHO MEAS - LV MASS(C)SI: 101.7 GRAMS/M^2
BH CV ECHO MEAS - LV MAX PG: 2.8 MMHG
BH CV ECHO MEAS - LV MEAN PG: 2 MMHG
BH CV ECHO MEAS - LV SYSTOLIC VOL/BSA (12-30): 22.9 ML/M^2
BH CV ECHO MEAS - LV V1 MAX: 83.2 CM/SEC
BH CV ECHO MEAS - LV V1 MEAN: 56.7 CM/SEC
BH CV ECHO MEAS - LV V1 VTI: 23.1 CM
BH CV ECHO MEAS - LVIDD: 4.2 CM
BH CV ECHO MEAS - LVIDS: 2.9 CM
BH CV ECHO MEAS - LVLD AP2: 7.2 CM
BH CV ECHO MEAS - LVLD AP4: 7.8 CM
BH CV ECHO MEAS - LVLS AP2: 6 CM
BH CV ECHO MEAS - LVLS AP4: 5.9 CM
BH CV ECHO MEAS - LVOT AREA (M): 3.1 CM^2
BH CV ECHO MEAS - LVOT AREA: 3.1 CM^2
BH CV ECHO MEAS - LVOT DIAM: 2 CM
BH CV ECHO MEAS - LVPWD: 1.2 CM
BH CV ECHO MEAS - LVPWS: 1.7 CM
BH CV ECHO MEAS - MED PEAK E' VEL: 5.5 CM/SEC
BH CV ECHO MEAS - MEDIAL E/E' RATIO: 19.6
BH CV ECHO MEAS - MR MAX PG: 55 MMHG
BH CV ECHO MEAS - MR MAX VEL: 370 CM/SEC
BH CV ECHO MEAS - MV A MAX VEL: 87.9 CM/SEC
BH CV ECHO MEAS - MV DEC TIME: 0.21 SEC
BH CV ECHO MEAS - MV E MAX VEL: 107 CM/SEC
BH CV ECHO MEAS - MV E/A: 1.2
BH CV ECHO MEAS - MV MAX PG: 5.3 MMHG
BH CV ECHO MEAS - MV MEAN PG: 2 MMHG
BH CV ECHO MEAS - MV V2 MAX: 115 CM/SEC
BH CV ECHO MEAS - MV V2 MEAN: 61 CM/SEC
BH CV ECHO MEAS - MV V2 VTI: 36.9 CM
BH CV ECHO MEAS - MVA(VTI): 1.9 CM^2
BH CV ECHO MEAS - PA ACC TIME: 0.16 SEC
BH CV ECHO MEAS - PA MAX PG (FULL): 1.8 MMHG
BH CV ECHO MEAS - PA MAX PG: 3.8 MMHG
BH CV ECHO MEAS - PA MEAN PG (FULL): 1 MMHG
BH CV ECHO MEAS - PA MEAN PG: 2 MMHG
BH CV ECHO MEAS - PA PR(ACCEL): 9.3 MMHG
BH CV ECHO MEAS - PA V2 MAX: 97.6 CM/SEC
BH CV ECHO MEAS - PA V2 MEAN: 64.2 CM/SEC
BH CV ECHO MEAS - PA V2 VTI: 22 CM
BH CV ECHO MEAS - PULM A REVS DUR: 0.12 SEC
BH CV ECHO MEAS - PULM A REVS VEL: 27.9 CM/SEC
BH CV ECHO MEAS - PULM DIAS VEL: 48.7 CM/SEC
BH CV ECHO MEAS - PULM S/D: 1.2
BH CV ECHO MEAS - PULM SYS VEL: 59.4 CM/SEC
BH CV ECHO MEAS - RAP SYSTOLE: 15 MMHG
BH CV ECHO MEAS - RV MAX PG: 2 MMHG
BH CV ECHO MEAS - RV MEAN PG: 1 MMHG
BH CV ECHO MEAS - RV V1 MAX: 70.1 CM/SEC
BH CV ECHO MEAS - RV V1 MEAN: 43.2 CM/SEC
BH CV ECHO MEAS - RV V1 VTI: 14 CM
BH CV ECHO MEAS - RVSP: 36 MMHG
BH CV ECHO MEAS - SI(AO): 128.7 ML/M^2
BH CV ECHO MEAS - SI(CUBED): 29.2 ML/M^2
BH CV ECHO MEAS - SI(LVOT): 41 ML/M^2
BH CV ECHO MEAS - SI(MOD-SP2): 26.9 ML/M^2
BH CV ECHO MEAS - SI(MOD-SP4): 36 ML/M^2
BH CV ECHO MEAS - SI(TEICH): 27 ML/M^2
BH CV ECHO MEAS - SV(AO): 225.3 ML
BH CV ECHO MEAS - SV(CUBED): 51.1 ML
BH CV ECHO MEAS - SV(LVOT): 71.8 ML
BH CV ECHO MEAS - SV(MOD-SP2): 47.1 ML
BH CV ECHO MEAS - SV(MOD-SP4): 63 ML
BH CV ECHO MEAS - SV(TEICH): 47.3 ML
BH CV ECHO MEAS - TAPSE (>1.6): 2.5 CM
BH CV ECHO MEAS - TR MAX PG: 21 MMHG
BH CV ECHO MEAS - TR MAX VEL: 230 CM/SEC
BH CV ECHO MEASUREMENTS AVERAGE E/E' RATIO: 18.61
BH CV XLRA - RV BASE: 3.8 CM
BH CV XLRA - RV LENGTH: 6 CM
BH CV XLRA - RV MID: 2.9 CM
BH CV XLRA - TDI S': 12.7 CM/SEC
BUN SERPL-MCNC: 20 MG/DL (ref 8–23)
BUN/CREAT SERPL: 19.6 (ref 7–25)
CALCIUM SPEC-SCNC: 8.5 MG/DL (ref 8.6–10.5)
CHLORIDE SERPL-SCNC: 113 MMOL/L (ref 98–107)
CO2 SERPL-SCNC: 23.7 MMOL/L (ref 22–29)
CREAT SERPL-MCNC: 1.02 MG/DL (ref 0.57–1)
DEPRECATED RDW RBC AUTO: 47 FL (ref 37–54)
EOSINOPHIL # BLD AUTO: 0.04 10*3/MM3 (ref 0–0.4)
EOSINOPHIL NFR BLD AUTO: 1 % (ref 0.3–6.2)
ERYTHROCYTE [DISTWIDTH] IN BLOOD BY AUTOMATED COUNT: 15.5 % (ref 12.3–15.4)
GFR SERPL CREATININE-BSD FRML MDRD: 54 ML/MIN/1.73
GLUCOSE SERPL-MCNC: 81 MG/DL (ref 65–99)
HCT VFR BLD AUTO: 32 % (ref 34–46.6)
HGB BLD-MCNC: 9.8 G/DL (ref 12–15.9)
IMM GRANULOCYTES # BLD AUTO: 0.01 10*3/MM3 (ref 0–0.05)
IMM GRANULOCYTES NFR BLD AUTO: 0.2 % (ref 0–0.5)
LEFT ATRIUM VOLUME INDEX: 24.5 ML/M^2
LEFT ATRIUM VOLUME: 42.9 ML
LYMPHOCYTES # BLD AUTO: 1.44 10*3/MM3 (ref 0.7–3.1)
LYMPHOCYTES NFR BLD AUTO: 35.6 % (ref 19.6–45.3)
MAXIMAL PREDICTED HEART RATE: 151 BPM
MCH RBC QN AUTO: 25.7 PG (ref 26.6–33)
MCHC RBC AUTO-ENTMCNC: 30.6 G/DL (ref 31.5–35.7)
MCV RBC AUTO: 83.8 FL (ref 79–97)
MONOCYTES # BLD AUTO: 0.29 10*3/MM3 (ref 0.1–0.9)
MONOCYTES NFR BLD AUTO: 7.2 % (ref 5–12)
NEUTROPHILS NFR BLD AUTO: 2.24 10*3/MM3 (ref 1.7–7)
NEUTROPHILS NFR BLD AUTO: 55.3 % (ref 42.7–76)
NRBC BLD AUTO-RTO: 0 /100 WBC (ref 0–0.2)
PLATELET # BLD AUTO: 140 10*3/MM3 (ref 140–450)
PMV BLD AUTO: 11.6 FL (ref 6–12)
POTASSIUM SERPL-SCNC: 4.1 MMOL/L (ref 3.5–5.2)
RBC # BLD AUTO: 3.82 10*6/MM3 (ref 3.77–5.28)
SODIUM SERPL-SCNC: 145 MMOL/L (ref 136–145)
STRESS TARGET HR: 128 BPM
WBC # BLD AUTO: 4.05 10*3/MM3 (ref 3.4–10.8)

## 2021-03-01 PROCEDURE — 99232 SBSQ HOSP IP/OBS MODERATE 35: CPT | Performed by: INTERNAL MEDICINE

## 2021-03-01 PROCEDURE — 93306 TTE W/DOPPLER COMPLETE: CPT

## 2021-03-01 PROCEDURE — 93306 TTE W/DOPPLER COMPLETE: CPT | Performed by: INTERNAL MEDICINE

## 2021-03-01 PROCEDURE — 74230 X-RAY XM SWLNG FUNCJ C+: CPT

## 2021-03-01 PROCEDURE — 25010000002 ENOXAPARIN PER 10 MG: Performed by: INTERNAL MEDICINE

## 2021-03-01 PROCEDURE — 92522 EVALUATE SPEECH PRODUCTION: CPT

## 2021-03-01 PROCEDURE — 92611 MOTION FLUOROSCOPY/SWALLOW: CPT

## 2021-03-01 PROCEDURE — 92610 EVALUATE SWALLOWING FUNCTION: CPT

## 2021-03-01 PROCEDURE — 80048 BASIC METABOLIC PNL TOTAL CA: CPT | Performed by: INTERNAL MEDICINE

## 2021-03-01 PROCEDURE — 97165 OT EVAL LOW COMPLEX 30 MIN: CPT

## 2021-03-01 PROCEDURE — 85025 COMPLETE CBC W/AUTO DIFF WBC: CPT | Performed by: INTERNAL MEDICINE

## 2021-03-01 PROCEDURE — 70551 MRI BRAIN STEM W/O DYE: CPT

## 2021-03-01 PROCEDURE — 97161 PT EVAL LOW COMPLEX 20 MIN: CPT

## 2021-03-01 PROCEDURE — G0407 INPT/TELE FOLLOW UP 25: HCPCS | Performed by: PSYCHIATRY & NEUROLOGY

## 2021-03-01 RX ORDER — GLIPIZIDE 5 MG/1
5 TABLET ORAL DAILY
COMMUNITY
End: 2022-07-01

## 2021-03-01 RX ORDER — ISOSORBIDE MONONITRATE 60 MG/1
60 TABLET, EXTENDED RELEASE ORAL EVERY MORNING
Status: DISCONTINUED | OUTPATIENT
Start: 2021-03-02 | End: 2021-03-03 | Stop reason: HOSPADM

## 2021-03-01 RX ORDER — EMPAGLIFLOZIN 10 MG/1
10 TABLET, FILM COATED ORAL DAILY
COMMUNITY
End: 2021-11-17 | Stop reason: HOSPADM

## 2021-03-01 RX ORDER — LISINOPRIL 5 MG/1
5 TABLET ORAL DAILY
Status: DISCONTINUED | OUTPATIENT
Start: 2021-03-02 | End: 2021-03-03 | Stop reason: HOSPADM

## 2021-03-01 RX ORDER — ROSUVASTATIN CALCIUM 20 MG/1
20 TABLET, COATED ORAL DAILY
Status: DISCONTINUED | OUTPATIENT
Start: 2021-03-01 | End: 2021-03-03 | Stop reason: HOSPADM

## 2021-03-01 RX ADMIN — ASPIRIN 325 MG: 325 TABLET, COATED ORAL at 13:51

## 2021-03-01 RX ADMIN — SODIUM CHLORIDE, PRESERVATIVE FREE 3 ML: 5 INJECTION INTRAVENOUS at 20:41

## 2021-03-01 RX ADMIN — SODIUM CHLORIDE, PRESERVATIVE FREE 3 ML: 5 INJECTION INTRAVENOUS at 08:40

## 2021-03-01 RX ADMIN — SODIUM CHLORIDE 100 ML/HR: 9 INJECTION, SOLUTION INTRAVENOUS at 03:38

## 2021-03-01 RX ADMIN — ROSUVASTATIN CALCIUM 20 MG: 20 TABLET, COATED ORAL at 15:45

## 2021-03-01 RX ADMIN — TICAGRELOR 90 MG: 90 TABLET ORAL at 20:26

## 2021-03-01 RX ADMIN — CLOPIDOGREL BISULFATE 75 MG: 75 TABLET ORAL at 13:53

## 2021-03-01 RX ADMIN — ENOXAPARIN SODIUM 40 MG: 40 INJECTION SUBCUTANEOUS at 17:58

## 2021-03-01 RX ADMIN — ACETAMINOPHEN 650 MG: 325 TABLET ORAL at 20:40

## 2021-03-01 NOTE — THERAPY EVALUATION
Order received for swallow eval, but pt. having ECHO and going for MRI.  SLP to f/u when pt. returns from MRI to complete eval.

## 2021-03-01 NOTE — PLAN OF CARE
Problem: Skin Injury Risk Increased  Goal: Skin Health and Integrity  Intervention: Optimize Skin Protection  Recent Flowsheet Documentation  Taken 3/1/2021 0200 by Dyana Navarro RN  Head of Bed ([facility] [place] [facility] [place] elevated  Pressure Reduction Devices: pressure-redistributing mattress utilized  Taken 3/1/2021 0000 by Dyana Navarro RN  Head of Bed ([facility] [place]): HOB elevated  Taken 2/28/2021 2200 by Dyana Navarro RN  Head of Bed ([facility] [place]): HOB elevated  Taken 2/28/2021 2000 by Dyana Navarro RN  Pressure Reduction Techniques: frequent weight shift encouraged  Head of Bed ([facility] [place] [facility] [place] elevated  Pressure Reduction Devices: pressure-redistributing mattress utilized   Goal Outcome Evaluation:  Plan of Care Reviewed With: patient  Progress: no change  Outcome Summary: BP STABLE,  HR BRADYCARDIC, DROPPING INTO 40'S DURING SLEEP,  NEURO STATUS HAS NOT DECLINED DURING SHIFT,   PT HAS WEAK COUGH EFFORT,  ORAL SUCTIONED AS NEEDED,

## 2021-03-01 NOTE — THERAPY EVALUATION
Patient Name: Karol Lopez  : 1950    MRN: [id number]                              Today's Date: 3/1/2021       Admit Date: 2021    Visit Dx:     ICD-10-CM ICD-9-CM   1. Dysphagia due to recent stroke  I69.391 438.82   2. Absent gag reflex  R29.2 796.1   3. Weakness  R53.1 780.79   4. Facial droop  R29.810 781.94     Patient Active Problem List   Diagnosis   • Coronary artery disease   • Osteoarthritis   • Anxiety   • COPD (chronic obstructive pulmonary disease) (CMS/HCC)   • Right pontine stroke (CMS/[facility])   • Acid reflux   • Essential hypertension   • Heart attack (CMS/[facility])   • Vitamin B12 deficiency   • Tobacco abuse   • TIA (transient ischemic attack)   • Recent Bilateral Knee Replacements (10/18/16) at [facility]   • Diabetes (CMS/[facility])   • Impaired mobility and ADLs   • Breast mass   • Dysphagia due to recent stroke   • Type 2 diabetes mellitus, with long-term current use of insulin (CMS/[facility])   • Acute renal failure (ARF) (CMS/[facility])     Past Medical History:   Diagnosis Date   • Abdominal pain    • Acid reflux    • Anxiety    • Bruises easily    • Cataracts, bilateral    • Constipation    • COPD (chronic obstructive pulmonary disease) (CMS/[facility])    • Coronary artery disease    • CTS (carpal tunnel syndrome)    • Diabetes (CMS/[facility])    • Elevated cholesterol    • Hearing loss    • Heart attack (CMS/[facility])     s/p stents   • Hypercholesteremia    • Hypertension    • Impaired functional mobility, balance, gait, and endurance    • Lower back pain    • Osteoarthritis    • Piercing     ears only   • Stroke (CMS/[facility])     2013-weak in right arm   • Tattoos     x2   • Tobacco abuse    • Tumor     in between breast closer to right breast   • Vitamin B12 deficiency    • Wears dentures     upper only   • Wears glasses      Past Surgical History:   Procedure Laterality Date   • BREAST BIOPSY Right 5/10/2019    Procedure: BREAST BIOPSY RIGHT;  Surgeon: Kiana Frey MD;  Location: [facility] OR;   Service: General   • CARPAL TUNNEL RELEASE Bilateral    • CHOLECYSTECTOMY     • CORONARY ANGIOPLASTY WITH STENT PLACEMENT  2012   • HYSTERECTOMY      complete   • JOINT REPLACEMENT Bilateral     knee replacements   • TOTAL KNEE ARTHROPLASTY Bilateral 10/18/2016    MAUREEN Palomares MD     General Information     Row Name 03/01/21 1310          Physical Therapy Time and Intention    Document Type  evaluation  -JR     Mode of Treatment  physical therapy  -JR     Row Name 03/01/21 1310          General Information    Patient Profile Reviewed  yes  -JR     Prior Level of Function  independent:;community mobility  -JR     Existing Precautions/Restrictions  fall  -JR     Barriers to Rehab  medically complex  -JR     Row Name 03/01/21 1310          Living Environment    Lives With  -- ex-  -JR     Row Name 03/01/21 1310          Home Main Entrance    Number of Stairs, Main Entrance  one  -JR     Row Name 03/01/21 1310          Stairs Within Home, Primary    Number of Stairs, Within Home, Primary  none  -JR     Row Name 03/01/21 1310          Cognition    Orientation Status (Cognition)  oriented x 4  -JR     Row Name 03/01/21 1310          Safety Issues, Functional Mobility    Safety Issues Affecting Function (Mobility)  safety precautions follow-through/compliance  -JR     Impairments Affecting Function (Mobility)  balance;coordination  -JR       User Key  (r) = Recorded By, (t) = Taken By, (c) = Cosigned By    Initials Name Provider Type    JR Dilia Palmer, PT Physical Therapist        Mobility     Row Name 03/01/21 1310          Bed Mobility    Bed Mobility  supine-sit  -     Supine-Sit [place] (Bed Mobility)  modified independence  -     Assistive Device (Bed Mobility)  head of bed elevated  -     Row Name 03/01/21 1310          Sit-Stand Transfer    Sit-Stand [place] (Transfers)  modified independence  -     Assistive Device (Sit-Stand Transfers)  -- hand rails  -     Row Name 03/01/21 1310           Gait/Stairs (Locomotion)    [place] Level (Gait)  contact guard  -JR     Assistive Device (Gait)  -- gait belt and HHA  -JR     Distance in Feet (Gait)  160  -JR     Deviations/Abnormal Patterns (Gait)  ataxic  -JR     Left Sided Gait Deviations  heel strike decreased  -JR       User Key  (r) = Recorded By, (t) = Taken By, (c) = Cosigned By    Initials Name Provider Type    Dilia Buckner PT Physical Therapist        Obj/Interventions     Row Name 03/01/21 1310          Range of Motion Comprehensive    General Range of Motion  bilateral lower extremity ROM WNL  -JR     Row Name 03/01/21 1310          Strength Comprehensive (MMT)    Comment, General Manual Muscle Testing (MMT) Assessment  BLE grossly WFL  -JR     Row Name 03/01/21 1310          Balance    Balance Assessment  sitting static balance;sitting dynamic balance;standing static balance;standing dynamic balance  -JR     Static Sitting Balance  WFL  -JR     Dynamic Sitting Balance  WFL  -JR     Static Standing Balance  WFL  -JR     Dynamic Standing Balance  mild impairment  -JR       User Key  (r) = Recorded By, (t) = Taken By, (c) = Cosigned By    Initials Name Provider Type    Dilia Buckner, PT Physical Therapist        Goals/Plan     Row Name 03/01/21 1310          Gait Training Goal 1 (PT)    Activity/Assistive Device (Gait Training Goal 1, PT)  gait (walking locomotion)  -JR     [place] Level (Gait Training Goal 1, PT)  modified independence  -JR     Distance (Gait Training Goal 1, PT)  400  -JR     Time Frame (Gait Training Goal 1, PT)  4 days  -JR     Strategies/Barriers (Gait Training Goal 1, PT)  with even step lengths and no LOB  -JR     Progress/Outcome (Gait Training Goal 1, PT)  goal ongoing  -JR     Row Name 03/01/21 1310          Patient Education Goal (PT)    Activity (Patient Education Goal, PT)  Perform single leg stance x 20 seconds  -JR     [place]/Cues/Accuracy (Memory Goal 2, PT)  demonstrates adequately  -JR      Time Frame (Patient Education Goal, PT)  5 days  -JR     Progress/Outcome (Patient Education Goal, PT)  goal ongoing  -JR       User Key  (r) = Recorded By, (t) = Taken By, (c) = Cosigned By    Initials Name Provider Type    Dilia Buckner, PT Physical Therapist        Clinical Impression     Row Name 03/01/21 1310          Pain    Additional Documentation  Pain Scale: Numbers Pre/Post-Treatment (Group)  -JR     Row Name 03/01/21 1310          Pain Scale: Numbers Pre/Post-Treatment    Pretreatment Pain Rating  0/10 - no pain  -JR     Posttreatment Pain Rating  0/10 - no pain  -JR     Row Name 03/01/21 1310          Plan of Care Review    Plan of Care Reviewed With  patient  -JR     Progress  no change  -JR     Outcome Summary  Patient participates well in PT evaluation and demonstrates decreased left side coordination with gait.  She has decreased heel strike and increased speed of left foot to foot flat.  She has decreased coordination and is expected to benefit from additional PT while hospitalized and upon discharge to home with home health care.  -     Row Name 03/01/21 1310          Therapy Assessment/Plan (PT)    Rehab Potential (PT)  good, to achieve stated therapy goals  -JR     Criteria for Skilled Interventions Met (PT)  yes;meets criteria;skilled treatment is necessary  -JR     Row Name 03/01/21 1310          Positioning and Restraints    Post Treatment Position  chair  -JR     In Chair  sitting;call light within reach;encouraged to call for assist  -JR       User Key  (r) = Recorded By, (t) = Taken By, (c) = Cosigned By    Initials Name Provider Type    Dilia Buckner, PT Physical Therapist        Outcome Measures     Row Name 03/01/21 1310          How much help from another person do you currently need...    Turning from your back to your side while in flat bed without using bedrails?  4  -JR     Moving from lying on back to sitting on the side of a flat bed without bedrails?  4  -JR     Moving to  and from a bed to a chair (including a wheelchair)?  4  -JR     Standing up from a chair using your arms (e.g., wheelchair, bedside chair)?  4  -JR     Climbing 3-5 steps with a railing?  3  -JR     To walk in hospital room?  3  -JR     AM-PAC 6 Clicks Score (PT)  22  -JR     Row Name 03/01/21 1310          Functional Assessment    Outcome Measure Options  AM-PAC 6 Clicks Basic Mobility (PT)  -       User Key  (r) = Recorded By, (t) = Taken By, (c) = Cosigned By    Initials Name Provider Type    JR Dilia Palmer, PT Physical Therapist        Physical Therapy Education                 Title: PT OT SLP Therapies (In Progress)     Topic: Physical Therapy (In Progress)     Point: Mobility training (Done)     Learning Progress Summary           Patient Acceptance, E,TB, VU by  at 3/1/2021 1840    Comment: Role of PT per POC                   Point: Home exercise program (Not Started)     Learner Progress:  Not documented in this visit.          Point: Body mechanics (Not Started)     Learner Progress:  Not documented in this visit.          Point: Precautions (Not Started)     Learner Progress:  Not documented in this visit.                      User Key     Initials Effective Dates Name Provider Type Discipline     04/03/18 -  Dilia Palmer, BRONSON Physical Therapist PT              PT Recommendation and Plan  Planned Therapy Interventions (PT): neuromuscular re-education, gait training, home exercise program  Plan of Care Reviewed With: patient  Progress: no change  Outcome Summary: Patient participates well in PT evaluation and demonstrates decreased left side coordination with gait.  She has decreased heel strike and increased speed of left foot to foot flat.  She has decreased coordination and is expected to benefit from additional PT while hospitalized and upon discharge to home with home health care.     Time Calculation:   PT Charges     Row Name 03/01/21 1842             Time Calculation    Start Time  1310  -       PT Received On  03/01/21  -      PT Goal Re-Cert Due Date  03/11/21  -        User Key  (r) = Recorded By, (t) = Taken By, (c) = Cosigned By    Initials Name Provider Type    Dilia Buckner PT Physical Therapist        Therapy Charges for Today     Code Description Service Date Service Provider Modifiers Qty    [id number] HC PT EVAL LOW COMPLEXITY 4 3/1/2021 Dilia Palmer, PT GP 1          PT G-Codes  Outcome Measure Options: AM-PAC 6 Clicks Daily Activity (OT)  AM-PAC 6 Clicks Score (PT): 22  AM-PAC 6 Clicks Score (OT): 24    Dilia Palmer PT  3/1/2021

## 2021-03-01 NOTE — PROGRESS NOTES
"Adult Nutrition  Assessment/PES    Patient Name:  Karol Lopez  YOB: 1950  MRN: [id number]  Admit Date:  2/28/2021    Assessment Date:  3/1/2021    Comments:    Recommend:  1. Consider advancing diet per SLP evaluation once medically appropriate.   2. If pt remains NPO >5 days, consider nutrition support.   3. Consider a multivitamin with minerals daily.     RD to follow pt and available PRN.      Reason for Assessment     Row Name 03/01/21 0951          Reason for Assessment    Reason For Assessment  diagnosis/disease state;identified at risk by screening criteria     Diagnosis  cardiac disease;diabetes diagnosis/complications;neurologic conditions;renal disease CVA, dysphagia, COPD, DM 2, HTN, EDITH, CAD     Identified At Risk by Screening Criteria  other (see comments) LACE           Anthropometrics     Row Name 03/01/21 0954          Anthropometrics    Height  162.6 cm (64\")        Ideal Body Weight (IBW)    Ideal Body Weight (IBW) (kg)  55         Labs/Tests/Procedures/Meds     Row Name 03/01/21 0954          Labs/Procedures/Meds    Lab Results Comments  High: Cl, Cr        Medications    Pertinent Medications Reviewed  reviewed           Estimated/Assessed Needs     Row Name 03/01/21 0916          Calculation Measurements    Weight Used For Calculations  58.4 kg (128 lb 12 oz) adjusted BW     Height  162.6 cm (64\")        Estimated/Assessed Needs    Additional Documentation  Fluid Requirements (Group);Newton-St. Jeor Equation (Group);Protein Requirements (Group);Calorie Requirements (Group)        Calorie Requirements    Estimated Calorie Need Method  Newton-St Jodi     Estimated Calorie Requirement Comment  [id number]        Newton-St. Jeor Equation    RMR (Newton-St. Jeor Equation)  1094     Newton-St. Jodi Activity Factors  -- 1.3 AF        Protein Requirements    Weight Used For Protein Calculations  58.4 kg (128 lb 12 oz) adjusted BW     Est Protein Requirement Amount (gms/kg)  1.2 " gm protein 58-70 grams     Estimated Protein Requirements (gms/day)  70.08        Fluid Requirements    Estimated Fluid Requirement Method  Marielena-Segar Formula     Marielena-Segar Method (over 20 kg)  2668         Nutrition Prescription Ordered     Row Name 03/01/21 0956          Nutrition Prescription PO    Current PO Diet  NPO         Evaluation of Received Nutrient/Fluid Intake     Row Name 03/01/21 0954          PO Evaluation    Number of Days PO Intake Evaluated  Insufficient Data               Problem/Interventions:  Problem 1     Row Name 03/01/21 0956          Nutrition Diagnoses Problem 1    Problem 1  Inadequate Nutrient Intake     Etiology (related to)  MNT for Treatment/Condition     Signs/Symptoms (evidenced by)  NPO         Problem 2     Row Name 03/01/21 0956          Nutrition Diagnoses Problem 2    Problem 2  Altered Nutrition Related to Labs     Etiology (related to)  Medical Diagnosis     Renal  EDITH     Signs/Symptoms (evidenced by)  Biochemical     Specific Labs Noted  Creatinine         Problem 3     Row Name 03/01/21 0957          Nutrition Diagnoses Problem 3    Problem 3  Swallowing Difficulty     Etiology (related to)  Functional Diagnosis     Functional Diagnosis  Dysphagia     Signs/Symptoms (evidenced by)  Report/Observation     Reported/Observed By  MD           Intervention Goal     Row Name 03/01/21 0957          Intervention Goal    General  Meet nutritional needs for age/condition;Improved nutrition related lab(s)     PO  Meet estimated needs;Advance diet;Establish PO     TF/PN  Inititiate TF/PN     Weight  Maintain weight         Nutrition Intervention     Row Name 03/01/21 0957          Nutrition Intervention    RD/Tech Action  Follow Tx progress;Encourage intake;Recommend/ordered     Recommended/Ordered  Diet;EN         Nutrition Prescription     Row Name 03/01/21 0957          Nutrition Prescription PO    PO Prescription  Begin/change diet;Other (comment) Advance diet per SLP  eval     Common Modifiers  Cardiac;Consistent Carbohydrate     New PO Prescription Ordered?  No, recommended        Nutrition Prescription EN    Enteral Prescription  Enteral begin/change If pt remains NPO >5 days, consider EN     New EN Prescription Ordered?  No, recommended        Other Orders    Obtain Weight  Daily     Obtain Weight Ordered?  No, recommended     Supplement  Vitamin mineral supplement     Supplement Ordered?  No, recommended         Education/Evaluation     Row Name 03/01/21 0958          Education    Education  Education not appropriate at this time     Please explain  Other (comment) Hold edu until SLP eval        Monitor/Evaluation    Monitor  Per protocol;I&O;Pertinent labs;Weight;Skin status           Electronically signed by:  Alejandra Redman RD  03/01/21 09:58 EST

## 2021-03-01 NOTE — PROGRESS NOTES
Stroke Progress Note       Chief Complaint: Dysarthria and dysphagia    Subjective    Subjective     Subjective:  No acute issues overnight, patient continues to have dysphagia, drooling, dysarthria.  Did not get enough sleep last night, fatigued this morning.    Review of Systems   Constitutional: Positive for fatigue.   HENT: Positive for trouble swallowing and voice change.    Eyes: Negative.    Respiratory: Negative.    Cardiovascular: Negative.    Gastrointestinal: Negative.    Endocrine: Negative.    Genitourinary: Negative.    Musculoskeletal: Negative.    Skin: Negative.    Allergic/Immunologic: Negative.    Neurological: Positive for speech difficulty and weakness.   Hematological: Negative.    Psychiatric/Behavioral: Negative.               Objective    Objective      Temp:  [97.5 °F (36.4 °C)-98.3 °F (36.8 °C)] 98 °F (36.7 °C)  Heart Rate:  [50-78] 50  Resp:  [18-20] 20  BP: (145-181)/(54-75) 169/70    Neurological Exam  Mental Status  Awake, alert and oriented to person, place and time.Alert. Moderate dysarthria present. Language is fluent with no aphasia. Attention and concentration are normal. Fund of knowledge is appropriate for level of education.     Cranial Nerves  CN II: Visual fields full to confrontation.  CN III, IV, VI: Extraocular movements intact bilaterally. No nystagmus. Normal saccades. Pupils equal round and reactive to light bilaterally.  CN V:  Right: Diminished sensation of the entire right side of the face.  Left: Facial sensation is normal on the left.  CN VII:  Right: . Subtle right lower facial asymmetry.  Left: There is no facial weakness.  CN VIII: Equal hearing bilaterally.  CN IX, X:  Right: Palate is normal. Gag is absent.  Left: Palate is normal. Gag is absent.  CN XI: Shoulder shrug strength is normal.  CN XII: Tongue midline without atrophy or fasciculations.     Motor  Normal muscle bulk throughout. No fasciculations present.  No obvious weakness on both sides, although  subjectively patient feels weaker on the left side.  Patient has chronic left-sided symptoms from her old stroke..     Sensory  Decreased to pinprick on the right upper and lower extremity.      Reflexes  Not assessed.     Coordination  No dysmetria in upper or lower extremities.     Gait  Not assessed.        Physical Exam  Vitals signs and nursing note reviewed.   Constitutional:       Appearance: Normal appearance.   HENT:      Head: Normocephalic and atraumatic.      Mouth/Throat:      Mouth: Mucous membranes are moist.      Pharynx: Oropharynx is clear.   Eyes:      Extraocular Movements: EOM normal. No nystagmus.      Conjunctiva/sclera: Conjunctivae normal.      Pupils: Pupils are equal, round, and reactive to light.   Neck:      Musculoskeletal: Normal range of motion and neck supple.   Cardiovascular:      Rate and Rhythm: Normal rate and regular rhythm.   Pulmonary:      Effort: Pulmonary effort is normal. No respiratory distress.   Neurological:      Mental Status: She is alert.      Cranial Nerves: Dysarthria present.   Psychiatric:         Mood and Affect: Mood normal.         Behavior: Behavior normal.     Results Review:    I reviewed the patient's new clinical results.  Her CBC and BMP looks okay, except BUN/creatinine is 20/1.02, H&H of 9.8/32.  A1c and lipid panel is pending    Patient is getting 2D echocardiogram, will await results.          Assessment/Plan     Assessment/Plan:  71-year-old right-handed white female with known diagnosis of hypertension, diabetes, hyperlipidemia, smoking, coronary artery disease, stroke with residual left-sided weakness, comes in with worsening dysarthria, dysphagia, and left-sided weakness, and on exam found to have moderate to severe dysarthria, absent gag reflex, and mild right facial asymmetry, decreased sensation on the right side.        1. Brainstem stroke.  Based on the patient's symptoms, and exam findings, patient likely has a brainstem stroke.  Her CT  head is negative for any acute finding, shows old left basal ganglia stroke, CT angiogram of head and neck shows bilateral ICA atherosclerosis with about 50% stenosis, and mild intracranial ICA atherosclerosis, vertebrobasilar artery looks patent with no significant stenosis.  Patient was on aspirin 81 mg and Plavix 75 mg daily, along with Crestor 20 mg daily.  Unable to get Plavix response test.  Patient will get MRI brain today, and if shows embolic-looking events, may consider anticoagulation, otherwise we will plan to switch to Brilinta 90 mg twice daily.   2. Essential hypertension.  Can start low-dose blood pressure medications, start normalizing blood pressure.  3. Diabetes mellitus type 2 controlled with normoglycemia, with long-term use of insulin with no complications.  Her last A1c was 7.1 in 2019.  Will recheck A1c.  Sliding scale insulin for now.  Okay to continue her home medications.  4. Mixed hyperlipidemia.  She is on Crestor 20 mg daily, and her lipid panel in 2019 was okay.  Will recheck lipid panel.  Recommend full dose of statins for now.  5. Acute kidney injury.  Her BUN and creatinine is improving with IV fluids.  Hospitalist managing the same.  Likely prerenal.  6. Dysphagia.  Secondary to stroke.    Patient to get modified barium swallow today.  Keep her n.p.o. until further recommendations by speech therapist.  7. PT/OT can evaluate her today.    8. Smoking greater than 40 pack years.  Patient has a longstanding history of smoking, and is unsure if she will be able to quit.  She will try to quit.    Case was discussed with patient, nursing and the hospitalist.  All the risk factors were discussed, and appropriate education was given.  Thank you for the consult.      Demetris Miguel MD  03/01/21  09:32 EST    This was an audio and video enabled telemedicine encounter.

## 2021-03-01 NOTE — THERAPY EVALUATION
Acute Care - Speech Language Pathology   Swallow Initial Evaluation [facility]     Patient Name: Karol Lopez  : 1950  MRN: [id number]  Today's Date: 3/1/2021               Admit Date: 2021    Visit Dx:     ICD-10-CM ICD-9-CM   1. Dysphagia due to recent stroke  I69.391 438.82   2. Absent gag reflex  R29.2 796.1   3. Weakness  R53.1 780.79   4. Facial droop  R29.810 781.94     Patient Active Problem List   Diagnosis   • Coronary artery disease   • Osteoarthritis   • Anxiety   • COPD (chronic obstructive pulmonary disease) (CMS/[facility])   • Acid reflux   • Essential hypertension   • Heart attack (CMS/[facility])   • Vitamin B12 deficiency   • Tobacco abuse   • TIA (transient ischemic attack)   • Recent Bilateral Knee Replacements (10/18/16) at [facility]   • Diabetes (CMS/[facility])   • Impaired mobility and ADLs   • Breast mass   • Dysphagia due to recent stroke   • Type 2 diabetes mellitus, with long-term current use of insulin (CMS/[facility])   • Acute renal failure (ARF) (CMS/[facility])     Past Medical History:   Diagnosis Date   • Abdominal pain    • Acid reflux    • Anxiety    • Bruises easily    • Cataracts, bilateral    • Constipation    • COPD (chronic obstructive pulmonary disease) (CMS/[facility])    • Coronary artery disease    • CTS (carpal tunnel syndrome)    • Diabetes (CMS/[facility])    • Elevated cholesterol    • Hearing loss    • Heart attack (CMS/[facility])     s/p stents   • Hypercholesteremia    • Hypertension    • Lower back pain    • Osteoarthritis    • Piercing     ears only   • Stroke (CMS/[facility])     -weak in right arm   • Tattoos     x2   • Tobacco abuse    • Tumor     in between breast closer to right breast   • Vitamin B12 deficiency    • Wears dentures     upper only   • Wears glasses      Past Surgical History:   Procedure Laterality Date   • BREAST BIOPSY Right 5/10/2019    Procedure: BREAST BIOPSY RIGHT;  Surgeon: Kiana Frey MD;  Location: [facility];  Service: General   • CARPAL TUNNEL  RELEASE Bilateral    • CHOLECYSTECTOMY     • CORONARY ANGIOPLASTY WITH STENT PLACEMENT  2012   • HYSTERECTOMY      complete   • JOINT REPLACEMENT Bilateral     knee replacements   • TOTAL KNEE ARTHROPLASTY Bilateral 10/18/2016    MAUREEN Palomares MD        SWALLOW EVALUATION (last 72 hours)      SLP Adult Swallow Evaluation     Row Name 03/01/21 1233                   Rehab Evaluation    Document Type  evaluation  -TM        Subjective Information  no complaints  -TM        Patient Observations  alert;cooperative  -TM        Patient/Family/Caregiver Comments/Observations  no family present  -TM        Patient Effort  good  -TM           General Information    Patient Profile Reviewed  yes  -TM        Pertinent History Of Current Problem  cardiac, hx CVA, COPD, GERD  -TM        Current Method of Nutrition  NPO  -TM        Precautions/Limitations, Vision  WFL with corrective lenses  -TM        Prior Level of Function-Communication  unknown  -TM        Prior Level of Function-Swallowing  no diet consistency restrictions  -TM        Plans/Goals Discussed with  patient;other (see comments) RN  -TM        Barriers to Rehab  medically complex  -TM           Pain    Additional Documentation  Pain Scale: FACES Pre/Post-Treatment (Group)  -TM           Pain Scale: FACES Pre/Post-Treatment    Pain: FACES Scale, Pretreatment  0-->no hurt  -TM        Posttreatment Pain Rating  0-->no hurt  -TM           Oral Motor Structure and Function    Oral Lesions or Structural Abnormalities and/or variants  none identified  -TM        Dentition Assessment  upper dentures/partial in place;other (see comments) no lower teeth  -TM        Secretion Management  WNL/WFL  -TM        Mucosal Quality  moist, healthy  -TM        Volitional Cough  weak  -TM           Oral Musculature and Cranial Nerve Assessment    Oral Motor General Assessment  oral labial or buccal impairment;lingual impairment;vocal impairment;mandibular impairment  -TM         Mandibular Impairment Detail, Cranial Nerve V (Trigeminal)  reduced strength bilaterally  -TM        Oral Labial or Buccal Impairment, Detail, Cranial Nerve VII (Facial):  reduced strength bilaterally  -TM        Lingual Impairment, Detail. Cranial Nerves IX, XII (Glossopharyngeal and Hypoglossal)  reduced strength;bilaterally  -TM        Vocal Impairment, Detail. Cranial Nerve X (Vagus)  other (see comments) strained-strangled, difficult to initiate  -TM           General Eating/Swallowing Observations    Respiratory Support Currently in Use  room air  -TM        Positioning During Eating  upright in bed  -TM        Utensils Used  spoon;straw  -TM        Consistencies Trialed  pureed;honey-thick liquids;thin liquids H20 via toothette  -TM        Pre SpO2 (%)  100  -TM        Post SpO2 (%)  100  -TM           Respiratory    Respiratory Status  WFL;during swallowing/eating  -TM           Clinical Swallow Eval    Oral Prep Phase  WFL  -TM        Oral Transit  WFL  -TM        Oral Residue  WFL  -TM        Pharyngeal Phase  suspected pharyngeal impairment  -TM        Esophageal Phase  suspected esophageal impairment previous dx of GERD  -TM        Clinical Swallow Evaluation Summary  Bedside eval of swallow completed.  Pt. seated upright in bed for po trials.  Pt.  exhbiited strained-strangeled voicing.  Pt. was given trials of puree and honey-thick, and H20 via toothette only.  Oral phase was adequate with trials/consistencies presented.  Suspect pharyngeal phase dysphagia with pt. coughing post swallows with thin via toothette.  Although no overt s/s aspiration with trials of puree and honey-thick liq, suspect possible aspiration risk with pt. at risk of silent aspiration at times.  Recommend:  1. MBS to determine risk, amount, and severity of possible aspiration and make least restrictive diet recs,  2. continue NPO until MBS can be completed,  3. aspiration precautions,  4. meds via IV or if po meds necessary, with  pudding/applesauce as jacqueline.    -TM           Pharyngeal Phase Concerns    Pharyngeal Phase Concerns  cough  -TM        Cough  thin via toothette  -TM           Esophageal Phase Concerns    Esophageal Phase Concerns  other (see comments) prior dx GERD  -TM           Clinical Impression    Barriers to Overall Progress (SLP)  medical complexity  -TM        SLP Swallowing Diagnosis  esophageal dysphagia;suspected pharyngeal dysphagia;oral dysphagia  -TM        Functional Impact  risk of aspiration/pneumonia  -TM        Swallow Criteria for Skilled Therapeutic Interventions Met  other (see comments) pending MBS results  -TM        Plan for Continued Treatment (SLP)  pending MBS results  -TM           Recommendations    Therapy Frequency (Swallow)  PRN  -TM        Predicted Duration Therapy Intervention (Days)  until discharge  -TM        Recommended Diagnostics  VFSS (MBS)  -TM        Recommended Precautions and Strategies  general aspiration precautions  -TM        SLP Rec. for Method of Medication Administration  meds via alternate route;with pudding or applesauce;as tolerated  -TM        Anticipated Discharge Disposition (SLP)  unknown  -TM        Demonstrates Need for Referral to Another Service  speech therapy  -TM          User Key  (r) = Recorded By, (t) = Taken By, (c) = Cosigned By    Initials Name Effective Dates     Ramah, Babs 03/07/18 -           EDUCATION  The patient has been educated in the following areas:   Dysphagia (Swallowing Impairment) Oral Care/Hydration NPO rationale.    SLP Recommendation and Plan  SLP Swallowing Diagnosis: esophageal dysphagia, suspected pharyngeal dysphagia, oral dysphagia     Recommended Precautions and Strategies: general aspiration precautions  SLP Rec. for Method of Medication Administration: meds via alternate route, with pudding or applesauce, as tolerated        Recommended Diagnostics: VFSS (MBS)  Swallow Criteria for Skilled Therapeutic Interventions Met: other  (see comments)(pending MBS results)  Anticipated Discharge Disposition (SLP): unknown     Therapy Frequency (Swallow): PRN  Predicted Duration Therapy Intervention (Days): until discharge  Demonstrates Need for Referral to Another Service: speech therapy       Plan for Continued Treatment (SLP): pending MBS results              Plan of Care Reviewed With: patient  Progress: no change  Outcome Summary: Bedside eval of swallow completed.  Pt. seated upright in bed for po trials.  Pt.  exhbiited strained-strangeled voicing.  Pt. was given trials of puree and honey-thick, and H20 via toothette only.  Oral phase was adequate with trials/consistencies presented.  Suspect pharyngeal phase dysphagia with pt. coughing post swallows with thin via toothette.  Although no overt s/s aspiration with trials of puree and honey-thick liq, suspect possible aspiration risk with pt. at risk of silent aspiration at times.  Recommend:  1. MBS to determine risk, amount, and severity of possible aspiration and make least restrictive diet recs,  2. continue NPO until MBS can be completed,  3. aspiration precautions,  4. meds via IV or if po meds necessary, with pudding/applesauce as jacqueline.           Time Calculation:   Time Calculation- SLP     Row Name 03/01/21 1302             Time Calculation- SLP    SLP Start Time  1233  -      SLP Stop Time  1245  -      SLP Time Calculation (min)  12 min  -      SLP Received On  03/01/21  -        User Key  (r) = Recorded By, (t) = Taken By, (c) = Cosigned By    Initials Name Provider Type    TM Babs Selby Speech and Language Pathologist          Therapy Charges for Today     Code Description Service Date Service Provider Modifiers Qty    [id number]  ST EVAL ORAL PHARYNG SWALLOW 4 3/1/2021 Babs Selby GN 1               Babs Selby  3/1/2021

## 2021-03-01 NOTE — PLAN OF CARE
Goal Outcome Evaluation:  Plan of Care Reviewed With: patient  Progress: no change  Outcome Summary: Bedside eval of swallow completed.  Pt. seated upright in bed for po trials.  Pt.  exhbiited strained-strangeled voicing.  Pt. was given trials of puree and honey-thick, and H20 via toothette only.  Oral phase was adequate with trials/consistencies presented.  Suspect pharyngeal phase dysphagia with pt. coughing post swallows with thin via toothette.  Although no overt s/s aspiration with trials of puree and honey-thick liq, suspect possible aspiration risk with pt. at risk of silent aspiration at times.  Recommend:  1. MBS to determine risk, amount, and severity of possible aspiration and make least restrictive diet recs,  2. continue NPO until MBS can be completed,  3. aspiration precautions,  4. meds via IV or if po meds necessary, with pudding/applesauce as jacqueline.

## 2021-03-01 NOTE — PROGRESS NOTES
Discharge Planning Assessment  [facility]     Patient Name: Karol Lopez  MRN: [id number]  Today's Date: 3/1/2021    Admit Date: 2/28/2021    Discharge Needs Assessment     Row Name 03/01/21 1400       Living Environment    Lives With  other (see comments) ex- in [facility]    Current Living Arrangements  other (see comments) LIves in [facility] with one step in entry way    Primary Care Provided by  self    Provides Primary Care For  no one    Caregiving Concerns  Does help her ex-spouse when needed    Family Caregiver if Needed  child(bobby), adult    Family Caregiver Names  Maris Xiong, daughter    Quality of Family Relationships  supportive    Able to Return to Prior Arrangements  yes       Transition Planning    Patient/Family Anticipates Transition to  home    Patient/Family Anticipated Services at Transition  none    Transportation Anticipated  family or friend will provide;car, drives self       Discharge Needs Assessment    Readmission Within the Last 30 Days  no previous admission in last 30 days    Equipment Currently Used at Home  none    Anticipated Changes Related to Illness  none    Equipment Needed After Discharge  none    Discharge Facility/Level of Care Needs  other (see comments) Reports no needs    Provided Post Acute Provider List?  N/A    Provided Post Acute Provider Quality & Resource List?  N/A    Row Name 03/01/21 1346       Living Environment    Lives With  other (see comments) ex-    Unique Family Situation  Lives with ex- in [facility]    Current Living Arrangements  other (see comments) Lives in [facility]    Primary Care Provided by  self    Provides Primary Care For  no one, unable/limited ability to care for self;other (see comments) Says can help ex-spouse if needed    Family Caregiver if Needed  child(bobby), adult    Family Caregiver Names  Maris Xiong    Quality of Family Relationships  supportive    Able to Return to Prior Arrangements  yes    Living Arrangement Comments   "Lives in [facility] which has one entry way step.       Transition Planning    Patient/Family Anticipates Transition to  home    Patient/Family Anticipated Services at Transition  none    Transportation Anticipated  car, drives self;family or friend will provide       Discharge Needs Assessment    Readmission Within the Last 30 Days  no previous admission in last 30 days    Equipment Currently Used at Home  none    Concerns to be Addressed  denies needs/concerns at this time    Anticipated Changes Related to Illness  none    Equipment Needed After Discharge  none    Discharge Facility/Level of Care Needs  other (see comments) Denies needs at this time    Provided Post Acute Provider List?  N/A    Provided Post Acute Provider Quality & Resource List?  N/A        Discharge Plan     Row Name 03/01/21 5227       Plan    Plan Comments  Awake and alert.  Due to some speech difficulty I asked pt if she wanted me to call her family. She responded wanting to try to answer as many questions as she could.    Pt confirmed address, telephone number, PCP and contact persons. She reported medical information can be communicated with her daughter, Maris Xiong [phone], and her ex-, Emeka Henderson.  Pt reports lives in a camper with her ex-.  She denied having any medical equipment and denied needing any medical equipment.   Pt reported is independent of ADLs and is able to obtain medications without financial need.  Pt reported not having any HH visiting.  When asked, pt reported having a POA and a Living Will.  She stated her daughter was the person \"to make decisions for her if she could not speak for herself\".  Pt plans to return home at DC and does not have any DC needs at this time.  Requested pt ask her daughter to bring in POA and Living Will when she can so we can scan it into the system.        Continued Care and Services - Admitted Since 2/28/2021    Coordination has not been started for this encounter.   "       Demographic Summary     Row Name 03/01/21 7026       General Information    Admission Type  inpatient    Arrived From  home    Expected Length of Stay (LOS)  3 days    Referral Source  admission list    Reason for Consult  discharge planning    Preferred Language  English     Used During This Interaction  no    General Information Comments  Pt does have some speech difficulty.  Preferred to answer questions herself and was able to communicate all answers to questions.       Contact Information    Permission Granted to Share Info With  ;family/designee    Contact Information Obtained for  ;power of  for healthcare    Contact Information Comments  Maris Xiong/Daughter/[phone].  States daughter is POA and HC surrogate. Stated can share  inform with ex-.       Healthcare Power of  Information    Name, Healthcare Power of   Pt reports daughter, Maris Xiong, is POA and HC surrogate.  No document on file.  Requested daughter bring in to be scanned in to chart.        Functional Status     Row Name 03/01/21 1345       Functional Status    Usual Activity Tolerance  good    Current Activity Tolerance  good    Functional Status Comments  Reports does not use any medical equipment       Functional Status, IADL    Medications  independent    Meal Preparation  independent    Housekeeping  independent    Laundry  independent    Shopping  independent    IADL Comments  Reports is independent of ADLs        Psychosocial    No documentation.       Abuse/Neglect    No documentation.       Legal    No documentation.       Substance Abuse    No documentation.       Patient Forms    No documentation.           Norma Montgomery RN

## 2021-03-01 NOTE — PLAN OF CARE
Goal Outcome Evaluation:  Plan of Care Reviewed With: patient  Progress: no change  Outcome Summary: Patient participates well in PT evaluation and demonstrates decreased left side coordination with gait.  She has decreased heel strike and increased speed of left foot to foot flat.  She has decreased coordination and is expected to benefit from additional PT while hospitalized and upon discharge to home with home health care.

## 2021-03-01 NOTE — PLAN OF CARE
Problem: Adult Inpatient Plan of Care  Goal: Plan of Care Review  3/1/2021 1423 by Babs Selby  Outcome: Ongoing, Progressing  Flowsheets (Taken 3/1/2021 1423)  Progress: no change  Plan of Care Reviewed With: patient  Outcome Summary: MBS completed with pt. seated upright in dedicated chair.  Pt. was given trials of mech soft, pudding, honey-thick, nectar-thick, and thin liquid.  Oral phase was remarkable for extended bolus prep time with mech soft due to oral weakness, but adequate for small amounts at a time.  Mild pharyngeal phase dysphagia exhibited with delayed initiation of pharyngeal swallow with vallecular pooling and intermittent loss to pyriforms demonstrarted with various consistencies.  She exhibited consistent laryngeal penetration with all consistencies except pudding, due to decreased larygneal elevation. Laryngeal penetration was trace amounts and flash, but consistent with all consistencies and deep with thin liquid.  Although no aspiration with any trial, pt. is at increased risk of aspiration with thin liquids specifically.  Recommend:  1. mechanical soft diet with nectar-thick liq as jacqueline,  2. meds whole with pudding/applesauce,  3. small bites/sips,  4. aspiration precautions,  5. reflux precautions.  D/w pt. and RN.  3/1/2021 1301 by Babs Selby  Flowsheets (Taken 3/1/2021 1301)  Progress: no change  Plan of Care Reviewed With: patient  Outcome Summary: Bedside eval of swallow completed.  Pt. seated upright in bed for po trials.  Pt.  exhbiited strained-strangeled voicing.  Pt. was given trials of puree and honey-thick, and H20 via toothette only.  Oral phase was adequate with trials/consistencies presented.  Suspect pharyngeal phase dysphagia with pt. coughing post swallows with thin via toothette.  Although no overt s/s aspiration with trials of puree and honey-thick liq, suspect possible aspiration risk with pt. at risk of silent aspiration at times.  Recommend:  1. MBS to  determine risk, amount, and severity of possible aspiration and make least restrictive diet recs,  2. continue NPO until MBS can be completed,  3. aspiration precautions,  4. meds via IV or if po meds necessary, with pudding/applesauce as jacqueline.   Goal Outcome Evaluation:  Plan of Care Reviewed With: patient  Progress: no change  Outcome Summary: MBS completed with pt. seated upright in dedicated chair.  Pt. was given trials of mech soft, pudding, honey-thick, nectar-thick, and thin liquid.  Oral phase was remarkable for extended bolus prep time with mech soft due to oral weakness, but adequate for small amounts at a time.  Mild pharyngeal phase dysphagia exhibited with delayed initiation of pharyngeal swallow with vallecular pooling and intermittent loss to pyriforms demonstrarted with various consistencies.  She exhibited consistent laryngeal penetration with all consistencies except pudding, due to decreased larygneal elevation. Laryngeal penetration was trace amounts and flash, but consistent with all consistencies and deep with thin liquid.  Although no aspiration with any trial, pt. is at increased risk of aspiration with thin liquids specifically.  Recommend:  1. mechanical soft diet with nectar-thick liq as jacqueline,  2. meds whole with pudding/applesauce,  3. small bites/sips,  4. aspiration precautions,  5. reflux precautions.  D/w pt. and RN.

## 2021-03-01 NOTE — PLAN OF CARE
Goal Outcome Evaluation:        Outcome Summary: VSS att. Pt scored a 4 on her NIH scale this morning. Maintaining on room air. Up with 1 assist to bedside commode. Advanced to Mechanical soft diet with nectar thick liquids att. Will continue to monitor.

## 2021-03-01 NOTE — MBS/VFSS/FEES
Acute Care - Speech Language Pathology   Swallow Modified Barium Swallow Study [facility]     Patient Name: Karol Lopez  : 1950  MRN: [id number]  Today's Date: 3/1/2021               Admit Date: 2021    Visit Dx:     ICD-10-CM ICD-9-CM   1. Dysphagia due to recent stroke  I69.391 438.82   2. Absent gag reflex  R29.2 796.1   3. Weakness  R53.1 780.79   4. Facial droop  R29.810 781.94     Patient Active Problem List   Diagnosis   • Coronary artery disease   • Osteoarthritis   • Anxiety   • COPD (chronic obstructive pulmonary disease) (CMS/formerly [facility])   • Acid reflux   • Essential hypertension   • Heart attack (CMS/formerly [facility])   • Vitamin B12 deficiency   • Tobacco abuse   • TIA (transient ischemic attack)   • Recent Bilateral Knee Replacements (10/18/16) at [facility]   • Diabetes (CMS/formerly [facility])   • Impaired mobility and ADLs   • Breast mass   • Dysphagia due to recent stroke   • Type 2 diabetes mellitus, with long-term current use of insulin (CMS/formerly [facility])   • Acute renal failure (ARF) (CMS/formerly [facility])     Past Medical History:   Diagnosis Date   • Abdominal pain    • Acid reflux    • Anxiety    • Bruises easily    • Cataracts, bilateral    • Constipation    • COPD (chronic obstructive pulmonary disease) (CMS/formerly [facility])    • Coronary artery disease    • CTS (carpal tunnel syndrome)    • Diabetes (CMS/formerly [facility])    • Elevated cholesterol    • Hearing loss    • Heart attack (CMS/formerly [facility])     s/p stents   • Hypercholesteremia    • Hypertension    • Lower back pain    • Osteoarthritis    • Piercing     ears only   • Stroke (CMS/formerly [facility])     -weak in right arm   • Tattoos     x2   • Tobacco abuse    • Tumor     in between breast closer to right breast   • Vitamin B12 deficiency    • Wears dentures     upper only   • Wears glasses      Past Surgical History:   Procedure Laterality Date   • BREAST BIOPSY Right 5/10/2019    Procedure: BREAST BIOPSY RIGHT;  Surgeon: Kiana Frey MD;  Location: [facility];  Service: General   • CARPAL  TUNNEL RELEASE Bilateral    • CHOLECYSTECTOMY     • CORONARY ANGIOPLASTY WITH STENT PLACEMENT  2012   • HYSTERECTOMY      complete   • JOINT REPLACEMENT Bilateral     knee replacements   • TOTAL KNEE ARTHROPLASTY Bilateral 10/18/2016    MAUREEN Palomares MD        SWALLOW EVALUATION (last 72 hours)      SLP Adult Swallow Evaluation     Row Name 03/01/21 1340 03/01/21 1233                Rehab Evaluation    Document Type  other (see comments) MBSS  -TM  evaluation  -TM       Subjective Information  no complaints  -TM  no complaints  -TM       Patient Observations  alert;cooperative  -TM  alert;cooperative  -TM       Patient/Family/Caregiver Comments/Observations  no family present  -TM  no family present  -TM       Patient Effort  good  -TM  good  -TM          General Information    Patient Profile Reviewed  yes  -TM  yes  -TM       Pertinent History Of Current Problem  --  cardiac, hx CVA, COPD, GERD  -TM       Current Method of Nutrition  NPO  -TM  NPO  -TM       Precautions/Limitations, Vision  WFL with corrective lenses  -TM  WFL with corrective lenses  -TM       Prior Level of Function-Communication  unknown  -TM  unknown  -TM       Prior Level of Function-Swallowing  no diet consistency restrictions  -TM  no diet consistency restrictions  -TM       Plans/Goals Discussed with  patient;other (see comments) RN  -TM  patient;other (see comments) RN  -TM       Barriers to Rehab  medically complex  -TM  medically complex  -TM          Pain    Additional Documentation  Pain Scale: FACES Pre/Post-Treatment (Group)  -TM  Pain Scale: FACES Pre/Post-Treatment (Group)  -TM          Pain Scale: FACES Pre/Post-Treatment    Pain: FACES Scale, Pretreatment  0-->no hurt  -TM  0-->no hurt  -TM       Posttreatment Pain Rating  0-->no hurt  -TM  0-->no hurt  -TM          Oral Motor Structure and Function    Oral Lesions or Structural Abnormalities and/or variants  --  none identified  -TM       Dentition Assessment  upper  dentures/partial in place;other (see comments)  -TM  upper dentures/partial in place;other (see comments) no lower teeth  -TM       Secretion Management  WNL/WFL  -TM  WNL/WFL  -TM       Mucosal Quality  moist, healthy  -TM  moist, healthy  -TM       Volitional Cough  weak  -TM  weak  -TM          Oral Musculature and Cranial Nerve Assessment    Oral Motor General Assessment  oral labial or buccal impairment;lingual impairment;vocal impairment;mandibular impairment  -TM  oral labial or buccal impairment;lingual impairment;vocal impairment;mandibular impairment  -TM       Mandibular Impairment Detail, Cranial Nerve V (Trigeminal)  reduced strength bilaterally  -TM  reduced strength bilaterally  -TM       Oral Labial or Buccal Impairment, Detail, Cranial Nerve VII (Facial):  reduced strength bilaterally  -TM  reduced strength bilaterally  -TM       Lingual Impairment, Detail. Cranial Nerves IX, XII (Glossopharyngeal and Hypoglossal)  reduced strength;bilaterally  -TM  reduced strength;bilaterally  -TM       Vocal Impairment, Detail. Cranial Nerve X (Vagus)  other (see comments)  -TM  other (see comments) strained-strangled, difficult to initiate  -TM          General Eating/Swallowing Observations    Respiratory Support Currently in Use  room air  -TM  room air  -TM       Positioning During Eating  upright in bed  -TM  upright in bed  -TM       Utensils Used  spoon;straw  -TM  spoon;straw  -TM       Consistencies Trialed  mechanical soft, no mixed consistencies;pudding thick;honey-thick liquids;nectar/syrup-thick liquids;thin liquids  -TM  pureed;honey-thick liquids;thin liquids H20 via toothette  -TM       Pre SpO2 (%)  --  100  -TM       Post SpO2 (%)  --  100  -TM          Respiratory    Respiratory Status  WFL;during swallowing/eating  -TM  WFL;during swallowing/eating  -TM          Clinical Swallow Eval    Oral Prep Phase  --  -TM  WFL  -TM       Oral Transit  --  WFL  -TM       Oral Residue  --  WFL  -TM        Pharyngeal Phase  --  suspected pharyngeal impairment  -TM       Esophageal Phase  --  suspected esophageal impairment previous dx of GERD  -TM       Clinical Swallow Evaluation Summary  --  Bedside eval of swallow completed.  Pt. seated upright in bed for po trials.  Pt.  exhbiited strained-strangeled voicing.  Pt. was given trials of puree and honey-thick, and H20 via toothette only.  Oral phase was adequate with trials/consistencies presented.  Suspect pharyngeal phase dysphagia with pt. coughing post swallows with thin via toothette.  Although no overt s/s aspiration with trials of puree and honey-thick liq, suspect possible aspiration risk with pt. at risk of silent aspiration at times.  Recommend:  1. MBS to determine risk, amount, and severity of possible aspiration and make least restrictive diet recs,  2. continue NPO until MBS can be completed,  3. aspiration precautions,  4. meds via IV or if po meds necessary, with pudding/applesauce as jacqueline.    -TM          Pharyngeal Phase Concerns    Pharyngeal Phase Concerns  --  cough  -TM       Cough  --  thin via toothette  -TM          Esophageal Phase Concerns    Esophageal Phase Concerns  --  other (see comments) prior dx GERD  -TM          MBS/VFSS    Utensils Used  spoon;cup;straw  -TM  --       Consistencies Trialed  mechanical soft, no mixed consistencies;pudding thick;honey-thick liquids;nectar/syrup-thick liquids;thin liquids  -TM  --          MBS/VFSS Interpretation    Oral Prep Phase  impaired oral phase of swallowing  -TM  --       Oral Transit Phase  WFL  -TM  --       Oral Residue  WFL  -TM  --       VFSS Summary  MBS completed with pt. seated upright in dedicated chair.  Pt. was given trials of mech soft, pudding, honey-thick, nectar-thick, and thin liquid.  Oral phase was remarkable for extended bolus prep time with mech soft due to oral weakness, but adequate for small amounts at a time.  Mild pharyngeal phase dysphagia exhibited with delayed  initiation of pharyngeal swallow with vallecular pooling and intermittent loss to pyriforms demonstrarted with various consistencies.  She exhibited consistent laryngeal penetration with all consistencies except pudding, due to decreased larygneal elevation. Laryngeal penetration was trace amounts and flash, but consistent with all consistencies and deep with thin liquid.  Although no aspiration with any trial, pt. is at increased risk of aspiration with thin liquids specifically.  Recommend:  1. mechanical soft diet with nectar-thick liq as jacqueline,  2. meds whole with pudding/applesauce,  3. small bites/sips,  4. aspiration precautions,  5. reflux precautions.  D/w pt. and RN.    -TM  --          Oral Preparatory Phase    Oral Preparatory Phase  prolonged manipulation  -TM  --       Prolonged Manipulation  mechanical soft  -TM  --          Initiation of Pharyngeal Swallow    Initiation of Pharyngeal Swallow  bolus in valleculae;bolus in pyriform sinuses  -TM  --       Pharyngeal Phase  impaired pharyngeal phase of swallowing  -TM  --       Penetration During the Swallow  thin liquids  -TM  --       Aspiration During the Swallow  nectar-thick liquids;honey-thick liquids;mechanical soft;thin liquids  -TM  --       Response to Penetration  no response  -TM  --       Rosenbek's Scale  2--->level 2  -TM  --          Esophageal Phase    Esophageal Phase  other (see comments) prior dx of GERD  -TM  --          SLP Communication to Radiology    Severity Level of Dysphagia  mild dysphagia;oral dysfunction;pharyngeal dysfunction;suspected esophageal dysfunction  -TM  --       Consistencies Aspirated/Penetrated  penetrated;thin liquids;nectar-thick liquids;honey-thick liquids;mechanical soft  -TM  --       Summary Statement  mild oropharyngeal dysphagia; third stage dysphagia  -TM  --          Clinical Impression    Barriers to Overall Progress (SLP)  medical complexity  -TM  medical complexity  -TM       SLP Swallowing  Diagnosis  esophageal dysphagia;suspected pharyngeal dysphagia;oral dysphagia  -TM  esophageal dysphagia;suspected pharyngeal dysphagia;oral dysphagia  -TM       Functional Impact  risk of aspiration/pneumonia  -TM  risk of aspiration/pneumonia  -TM       Swallow Criteria for Skilled Therapeutic Interventions Met  demonstrates skilled criteria  -TM  other (see comments) pending [facility] results  -TM       Plan for Continued Treatment (SLP)  ST for strengthening  -TM  pending [facility] results  -TM          Recommendations    Therapy Frequency (Swallow)  PRN  -TM  PRN  -TM       Predicted Duration Therapy Intervention (Days)  until discharge  -TM  until discharge  -TM       SLP Diet Recommendation  mechanical soft with no mixed consistencies;nectar thick liquids  -TM  --       Recommended Diagnostics  --  VFSS ([facility])  -TM       Recommended Precautions and Strategies  general aspiration precautions  -TM  general aspiration precautions  -TM       Oral Care Recommendations  Oral Care BID/PRN  -TM  --       SLP Rec. for Method of Medication Administration  meds whole;meds crushed;with pudding or applesauce;as tolerated;with thick liquids  -TM  meds via alternate route;with pudding or applesauce;as tolerated  -TM       Monitor for Signs of Aspiration  yes;cough;gurgly voice;throat clearing;right lower lobe infiltrates  -TM  --       Anticipated Discharge Disposition (SLP)  --  unknown  -TM       Demonstrates Need for Referral to Another Service  speech therapy  -TM  speech therapy  -TM          Swallow Goals (SLP)    Oral Nutrition/Hydration Goal Selection (SLP)  oral nutrition/hydration, SLP goal 1  -TM  --       Labial Strengthening Goal Selection (SLP)  labial strengthening, SLP goal 1  -TM  --       Lingual Strengthening Goal Selection (SLP)  lingual strengthening, SLP goal 1  -TM  --       Pharyngeal Strengthening Exercise Goal Selection (SLP)  pharyngeal strengthening exercise, SLP goal 1  -TM  --       Additional Documentation   labial strengthening goal selection (SLP);lingual strengthening goal selection (SLP);pharyngeal strengthening exercise goal selection (SLP)  -TM  --          Oral Nutrition/Hydration Goal 1 (SLP)    Oral Nutrition/Hydration Goal 1, SLP  Goal: tolerate mech soft diet with nectar-thick liq   -TM  --       Time Frame (Oral Nutrition/Hydration Goal 1, SLP)  1 day  -TM  --       Barriers (Oral Nutrition/Hydration Goal 1, SLP)  medical complexity  -TM  --          Labial Strengthening Goal 1 (SLP)    Activity (Labial Strengthening Goal 1, SLP)  increase labial tone  -TM  --       Increase Labial Tone  labial resistance exercises;swallow trials  -TM  --       [place]/Accuracy (Labial Strengthening Goal 1, SLP)  independently (over 90% accuracy)  -TM  --       Time Frame (Labial Strengthening Goal 1, SLP)  by discharge  -TM  --       Barriers (Labial Strengthening Goal 1, SLP)  medical complexity  -TM  --          Lingual Strengthening Goal 1 (SLP)    Activity (Lingual Strengthening Goal 1, SLP)  increase lingual tone/sensation/control/coordination/movement;increase tongue back strength  -TM  --       Increase Lingual Tone/Sensation/Control/Coordination/Movement  lingual movement exercises;swallow trials;lingual resistance exercises  -TM  --       Increase Tongue Back Strength  lingual movement exercises;swallow trials  -TM  --       [place]/Accuracy (Lingual Strengthening Goal 1, SLP)  independently (over 90% accuracy)  -TM  --       Time Frame (Lingual Strengthening Goal 1, SLP)  by discharge  -TM  --          Pharyngeal Strengthening Exercise Goal 1 (SLP)    Activity (Pharyngeal Strengthening Goal 1, SLP)  increase superior movement of the hyolaryngeal complex  -TM  --       Increase Superior Movement of the Hyolaryngeal Complex  falsetto;hard effortful swallow  -TM  --       [place]/Accuracy (Pharyngeal Strengthening Goal 1, SLP)  independently (over 90% accuracy)  -TM  --       Time Frame (Pharyngeal  Strengthening Goal 1, SLP)  by discharge  -  --         User Key  (r) = Recorded By, (t) = Taken By, (c) = Cosigned By    Initials Name Effective Dates    TM Babs Selby 03/07/18 -           EDUCATION  The patient has been educated in the following areas:   Dysphagia (Swallowing Impairment) Modified Diet Instruction.    SLP Recommendation and Plan  SLP Swallowing Diagnosis: esophageal dysphagia, suspected pharyngeal dysphagia, oral dysphagia  SLP Diet Recommendation: mechanical soft with no mixed consistencies, nectar thick liquids  Recommended Precautions and Strategies: general aspiration precautions  SLP Rec. for Method of Medication Administration: meds whole, meds crushed, with pudding or applesauce, as tolerated, with thick liquids     Monitor for Signs of Aspiration: yes, cough, gurgly voice, throat clearing, right lower lobe infiltrates  Recommended Diagnostics: VFSS (MBS)  Swallow Criteria for Skilled Therapeutic Interventions Met: demonstrates skilled criteria  Anticipated Discharge Disposition (SLP): unknown     Therapy Frequency (Swallow): PRN  Predicted Duration Therapy Intervention (Days): until discharge  Demonstrates Need for Referral to Another Service: speech therapy       Plan for Continued Treatment (SLP): ST for strengthening              Plan of Care Reviewed With: patient  Progress: no change  Outcome Summary: MBS completed with pt. seated upright in dedicated chair.  Pt. was given trials of mech soft, pudding, honey-thick, nectar-thick, and thin liquid.  Oral phase was remarkable for extended bolus prep time with mech soft due to oral weakness, but adequate for small amounts at a time.  Mild pharyngeal phase dysphagia exhibited with delayed initiation of pharyngeal swallow with vallecular pooling and intermittent loss to pyriforms demonstrarted with various consistencies.  She exhibited consistent laryngeal penetration with all consistencies except pudding, due to decreased larygneal  elevation. Laryngeal penetration was trace amounts and flash, but consistent with all consistencies and deep with thin liquid.  Although no aspiration with any trial, pt. is at increased risk of aspiration with thin liquids specifically.  Recommend:  1. mechanical soft diet with nectar-thick liq as jacqueline,  2. meds whole with pudding/applesauce,  3. small bites/sips,  4. aspiration precautions,  5. reflux precautions.  D/w pt. and RN.    SLP GOALS     Row Name 03/01/21 1340             Oral Nutrition/Hydration Goal 1 (SLP)    Oral Nutrition/Hydration Goal 1, SLP  Goal: tolerate [facility] soft diet with nectar-thick liq   -TM      Time Frame (Oral Nutrition/Hydration Goal 1, SLP)  1 day  -TM      Barriers (Oral Nutrition/Hydration Goal 1, SLP)  medical complexity  -TM         Labial Strengthening Goal 1 (SLP)    Activity (Labial Strengthening Goal 1, SLP)  increase labial tone  -TM      Increase Labial Tone  labial resistance exercises;swallow trials  -TM      [place]/Accuracy (Labial Strengthening Goal 1, SLP)  independently (over 90% accuracy)  -TM      Time Frame (Labial Strengthening Goal 1, SLP)  by discharge  -TM      Barriers (Labial Strengthening Goal 1, SLP)  medical complexity  -TM         Lingual Strengthening Goal 1 (SLP)    Activity (Lingual Strengthening Goal 1, SLP)  increase lingual tone/sensation/control/coordination/movement;increase tongue back strength  -TM      Increase Lingual Tone/Sensation/Control/Coordination/Movement  lingual movement exercises;swallow trials;lingual resistance exercises  -TM      Increase Tongue Back Strength  lingual movement exercises;swallow trials  -TM      [place]/Accuracy (Lingual Strengthening Goal 1, SLP)  independently (over 90% accuracy)  -TM      Time Frame (Lingual Strengthening Goal 1, SLP)  by discharge  -TM         Pharyngeal Strengthening Exercise Goal 1 (SLP)    Activity (Pharyngeal Strengthening Goal 1, SLP)  increase superior movement of the  hyolaryngeal complex  -TM      Increase Superior Movement of the Hyolaryngeal Complex  falsetto;hard effortful swallow  -TM      [place]/Accuracy (Pharyngeal Strengthening Goal 1, SLP)  independently (over 90% accuracy)  -TM      Time Frame (Pharyngeal Strengthening Goal 1, SLP)  by discharge  -TM        User Key  (r) = Recorded By, (t) = Taken By, (c) = Cosigned By    Initials Name Provider Type     Babs Selby Speech and Language Pathologist             Time Calculation:   Time Calculation- SLP     Row Name 03/01/21 1425 03/01/21 1302          Time Calculation- SLP    SLP Start Time  1340  -TM  1233  -TM     SLP Stop Time  1355  -TM  1245  -TM     SLP Time Calculation (min)  15 min  -TM  12 min  -TM     SLP Received On  03/01/21  -TM  03/01/21  -TM       User Key  (r) = Recorded By, (t) = Taken By, (c) = Cosigned By    Initials Name Provider Type     Babs Selby Speech and Language Pathologist          Therapy Charges for Today     Code Description Service Date Service Provider Modifiers Qty    01988758720 HC ST EVAL ORAL PHARYNG SWALLOW 4 3/1/2021 Babs Selby GN 1    [id number] HC ST MOTION FLUORO EVAL SWALLOW 6 3/1/2021 Babs Selby GN 1               Babs Selby  3/1/2021

## 2021-03-01 NOTE — PROGRESS NOTES
[facility]    Name:  Karol Lopez   Age:  71 y.o.  Sex:  female  :  1950  MRN:  [id number]   Visit Number:  [id number]  Admission Date:  2021  Date Of Service:  21  Primary Care Physician:  Romaine Eugene MD     LOS: 1 day :  Patient Care Team:  Romaine Eugene MD as PCP - General  [facility]ing [facility] (General Surgery):    Chief Complaint:      Follow-up of stroke with dysphagia.    Subjective / Interval History:     Ms. Lopez was seen and examined this morning.  She is currently lying down on the bed and is comfortable at rest.  She is feeling better with regards to her swallow and was able to speak better compared to yesterday.  She was seen by Dr. Parks from neurology this morning.  She did have an MRI of the brain today and it does show right pontine lacunar infarct.  She was also seen by speech therapy this morning and they recommended modified barium swallow.  She denies any chest pain or shortness of breath.  No significant overnight events.    Review of Systems:     General ROS: Patient denies any fevers or chills.  Respiratory ROS: Denies cough or shortness of breath.  Cardiovascular ROS: Denies chest pain or palpitations.  Gastrointestinal ROS: Denies nausea and vomiting. Denies any abdominal pain.  Difficulty swallowing.  Neurological ROS: Denies any focal weakness. No loss of consciousness.  Dermatological ROS: Denies any redness or pruritis.    Vital Signs:    Temp:  [97.4 °F (36.3 °C)-98.3 °F (36.8 °C)] 97.4 °F (36.3 °C)  Heart Rate:  [49-78] 49  Resp:  [18-20] 20  BP: (160-170)/(54-84) 160/84    Intake and output:    I/O last 3 completed shifts:  In: 1226 [I.V.:1226]  Out: 725 [Urine:725]  I/O this shift:  In: -   Out: 550 [Urine:550]    Physical Examination:    General Appearance:  Alert and cooperative, not in any acute distress.   Head:  Atraumatic and normocephalic, without obvious abnormality.   Eyes:           Conjunctivae and sclerae normal, no Icterus. No pallor. Extraocular movements are within normal limits.   Neck: Supple, trachea midline, no thyromegaly, no carotid bruit.   Lungs:   Chest shape is normal. Breath sounds heard bilaterally equally.  No crackles or wheezing. No pleural rub or bronchial breathing.   Heart:  Normal S1 and S2, no murmur, no gallop, no rub. No JVD   Abdomen:   Normal bowel sounds, no masses, no organomegaly. Soft, nontender, nondistended, no guarding, no rebound tenderness.  Midline surgical scar noted in the lower abdomen.   Extremities: Supple, no edema, no cyanosis, no clubbing.  Bilateral knee joint surgical scars noted.   Skin: No bleeding or rash.   Neurologic: Alert and oriented x 3.  Dysarthria noted.  Facial asymmetry noted with angle of the mouth deviated to the right side.  Left upper motor neuron facial palsy noted.  Power is 5 out of 5 on the right side but around 4+ out of 5 on the left side.  Mild decrease tactile sense on the left upper and lower extremity.  Hand  is weaker on the left side.     Laboratory results:    Results from last 7 days   Lab Units 03/01/21  0623 02/28/21  0954   SODIUM mmol/L 145 145   POTASSIUM mmol/L 4.1 3.4*   CHLORIDE mmol/L 113* 110*   CO2 mmol/L 23.7 25.0   BUN mg/dL 20 24*   CREATININE mg/dL 1.02* 1.31*   CALCIUM mg/dL 8.5* 9.0   BILIRUBIN mg/dL  --  0.2   ALK PHOS U/L  --  83   ALT (SGPT) U/L  --  23   AST (SGOT) U/L  --  28   GLUCOSE mg/dL 81 72     Results from last 7 days   Lab Units 03/01/21  0623 02/28/21  0954 02/26/21  0745   WBC 10*3/mm3 4.05 6.57 4.5   HEMOGLOBIN g/dL 9.8* 10.7* 11.1*   HEMATOCRIT % 32.0* 33.5* 36.0*   PLATELETS 10*3/mm3 140 180 161     Results from last 7 days   Lab Units 02/28/21  0954   INR  1.04     Results from last 7 days   Lab Units 02/28/21  0954 02/26/21  0745   TROPONIN I ng/mL  --  <0.30   TROPONIN T ng/mL <0.010  --      Results from last 7 days   Lab Units 02/28/21  1036   URINECX  50,000  CFU/mL Mixed Flory Isolated     I have reviewed the patient's laboratory results.    Radiology results:    Imaging Results (Last 24 Hours)     Procedure Component Value Units Date/Time    FL Video Swallow With Speech Single Contrast [[id number]] Collected: 03/01/21 1526     Updated: 03/01/21 1526    Narrative:      PROCEDURE: FL VIDEO SWALLOW W SPEECH SINGLE-CONTRAST-     History:  Stroke with dysphagia; I69.391-Dysphagia following cerebral  infarction; R29.2-Abnormal reflex; R53.1-Weakness; R29.810-Facial  weakness     FINDINGS:  Fluoroscopy was provided for the speech pathologist to  evaluate the swallowing mechanism. The patient was given several  different consistencies of barium while the swallow was visualized  fluoroscopically. Across all consistencies there was no evidence of  penetration or aspiration. The report of the speech pathologist should  be consulted prior to making dietary decisions.       Impression:      Modified barium swallow under fluoroscopic guidance.     Flash penetration occurred with multiple consistencies. There were no  episodes of aspiration.     Please see speech pathologist's report for further details and dietary  recommendations.        FLUOROSCOPY TIME: 87 s     NUMBER OF CINE SEQUENCES:  9       MRI Brain Without Contrast [[id number]] Collected: 03/01/21 1107     Updated: 03/01/21 1113    Narrative:      MRI BRAIN WO CONTRAST-     HISTORY: Hx of CVA, new slurred speech and dysphagia, possible  cerebellar stroke; I69.391-Dysphagia following cerebral infarction;  R29.2-Abnormal reflex; R53.1-Weakness; R29.810-Facial weakness     TECHNIQUE: Multiplanar imaging was performed of the brain without  gadolinium infusion.     Diffusion sequences show restricted diffusion within the right slava with  edema on conventional sequences compatible with early subacute infarct.     Chronic lacunar infarct is seen of the left basal ganglia and left  periventricular white matter. Periventricular  white matter signal  changes are present. These are likely due to moderate chronic  microvascular change. Moderate atrophy is noted. There is no evidence of  mass or hemorrhage. No extra-axial abnormal findings are identified. The  ventricles and cisterns appear normal.        Impression:      1. Early subacute lacunar infarct right slava.  2. No hemorrhage.  3. Moderate atrophy and chronic microvascular changes.     This report was finalized on 3/1/2021 11:11 AM by Wesly Garcia MD.        I have reviewed the patient's radiology reports.    Medication Review:     I have reviewed the patient's active and prn medications.     Problem List:      Dysphagia due to recent stroke    Right pontine stroke (CMS/[facility])    Acute renal failure (ARF) (CMS/[facility])    Coronary artery disease    COPD (chronic obstructive pulmonary disease) (CMS/[facility])    Essential hypertension    Diabetes (CMS/[facility])    Type 2 diabetes mellitus, with long-term current use of insulin (CMS/[facility])    Assessment:    1.  Subacute right pontine ischemic stroke, present on admission (not a TPA candidate).  2.  Acute renal failure likely secondary to dehydration, POA, improving.  3.  Essential hypertension.  4.  Diabetes mellitus type 2.  5.  COPD, no evidence of exacerbation.  6.  Coronary artery disease status post stents.    Plan:    Ms. Lopez is currently doing better with regards to her swallowing difficulty.  She was seen by speech therapy and they did recommend modified barium swallow.  She did undergo barium swallow and did not have any significant aspiration.  Speech therapy has recommended mechanical soft diet with nectar thick liquids.  She did have a 2D echocardiogram which was unremarkable.    I have discussed the patient's condition with Dr. Miguel from neurology.  She will be continued on full dose aspirin and we will change her Plavix to Brilinta.  Plavix response test cannot be obtained at this time in this facility.  She will be continued on physical  and occupational therapy.  She states that she lives with her ex- and wants to go home.  She may benefit from home health services especially for speech therapy.  Discussed with nursing staff and multidisciplinary team.    DVT prophylaxis: Lovenox.  Diet: Mechanical soft with nectar thick liquids.  Discharge plan: Home with home health tomorrow.    Darrion Wetzel MD  03/01/21  15:38 EST    Dictated utilizing Dragon dictation.

## 2021-03-01 NOTE — THERAPY DISCHARGE NOTE
Acute Care - Occupational Therapy Discharge  [facility]    Patient Name: Karol Lopez  : 1950    MRN: [id number]                              Today's Date: 3/1/2021       Admit Date: 2021    Visit Dx:     ICD-10-CM ICD-9-CM   1. Dysphagia due to recent stroke  I69.391 438.82   2. Absent gag reflex  R29.2 796.1   3. Weakness  R53.1 780.79   4. Facial droop  R29.810 781.94     Patient Active Problem List   Diagnosis   • Coronary artery disease   • Osteoarthritis   • Anxiety   • COPD (chronic obstructive pulmonary disease) (CMS/[facility])   • Acid reflux   • Essential hypertension   • Heart attack (CMS/[facility])   • Vitamin B12 deficiency   • Tobacco abuse   • TIA (transient ischemic attack)   • Recent Bilateral Knee Replacements (10/18/16) at [facility]   • Diabetes (CMS/[facility])   • Impaired mobility and ADLs   • Breast mass   • Dysphagia due to recent stroke   • Type 2 diabetes mellitus, with long-term current use of insulin (CMS/[facility])   • Acute renal failure (ARF) (CMS/[facility])     Past Medical History:   Diagnosis Date   • Abdominal pain    • Acid reflux    • Anxiety    • Bruises easily    • Cataracts, bilateral    • Constipation    • COPD (chronic obstructive pulmonary disease) (CMS/[facility])    • Coronary artery disease    • CTS (carpal tunnel syndrome)    • Diabetes (CMS/[facility])    • Elevated cholesterol    • Hearing loss    • Heart attack (CMS/[facility])     s/p stents   • Hypercholesteremia    • Hypertension    • Lower back pain    • Osteoarthritis    • Piercing     ears only   • Stroke (CMS/[facility])     2013-weak in right arm   • Tattoos     x2   • Tobacco abuse    • Tumor     in between breast closer to right breast   • Vitamin B12 deficiency    • Wears dentures     upper only   • Wears glasses      Past Surgical History:   Procedure Laterality Date   • BREAST BIOPSY Right 5/10/2019    Procedure: BREAST BIOPSY RIGHT;  Surgeon: Kiana Frey MD;  Location: [facility];  Service: General   • CARPAL TUNNEL RELEASE  Bilateral    • CHOLECYSTECTOMY     • CORONARY ANGIOPLASTY WITH STENT PLACEMENT  2012   • HYSTERECTOMY      complete   • JOINT REPLACEMENT Bilateral     knee replacements   • TOTAL KNEE ARTHROPLASTY Bilateral 10/18/2016    MAUREEN Palomares MD     General Information     [facility] Name 03/01/21 1413          OT Time and Intention    Document Type  discharge evaluation/summary  -     Mode of Treatment  occupational therapy  -[facility] Name 03/01/21 1413          General Information    Patient Profile Reviewed  yes  -     Prior Level of Function  independent:;ADL's;community mobility  -     Barriers to Rehab  medically complex  -[facility] Name 03/01/21 1413          Living Environment    Lives With  other (see comments) pt lives with her ex-  -[facility] Name 03/01/21 1413          Home Main Entrance    Number of Stairs, Main Entrance  one  -[facility] Name 03/01/21 1413          Cognition    Orientation Status (Cognition)  oriented x 4  -[facility] Name 03/01/21 1413          Safety Issues, Functional Mobility    Impairments Affecting Function (Mobility)  balance  -       User Key  (r) = Recorded By, (t) = Taken By, (c) = Cosigned By    Initials Name Provider Type     Patricia Perdomo Occupational Therapist        Mobility/ADL's     [facility] Name 03/01/21 1415          Bed Mobility    Bed Mobility  supine-sit  -     Supine-Sit [place] (Bed Mobility)  independent  -[facility] Name 03/01/21 1415          Transfers    Transfers  sit-stand transfer  -     Sit-Stand [place] (Transfers)  independent  -[facility] Name 03/01/21 1415          Functional Mobility    Functional Mobility- Ind. Level  contact guard assist  -     Functional Mobility-Distance (Feet)  160  -[facility] Name 03/01/21 1415          Activities of Daily Living    BADL Assessment/Intervention  bathing;upper body dressing;lower body dressing;grooming;feeding;toileting  -[facility] Name 03/01/21 1415          Bathing Assessment/Intervention     [place] Level (Bathing)  independent  -AH     Row Name 03/01/21 1415          Upper Body Dressing Assessment/Training    [place] Level (Upper Body Dressing)  independent  -AH     Row Name 03/01/21 1415          Lower Body Dressing Assessment/Training    [place] Level (Lower Body Dressing)  independent  -AH     Row Name 03/01/21 1415          Grooming Assessment/Training    [place] Level (Grooming)  independent  -AH     Row Name 03/01/21 1415          Self-Feeding Assessment/Training    [place] Level (Feeding)  independent  -AH     Row Name 03/01/21 1415          Toileting Assessment/Training    [place] Level (Toileting)  independent  -AH       User Key  (r) = Recorded By, (t) = Taken By, (c) = Cosigned By    Initials Name Provider Type    Patricia Elias Occupational Therapist        Obj/Interventions     Row Name 03/01/21 1418          Sensory Assessment (Somatosensory)    Sensory Assessment (Somatosensory)  sensation intact  -AH     Row Name 03/01/21 1418          Vision Assessment/Intervention    Visual Impairment/Limitations  WFL;corrective lenses for reading  -AH     Row Name 03/01/21 1418          Range of Motion Comprehensive    General Range of Motion  bilateral upper extremity ROM L  -AH     Row Name 03/01/21 1418          Strength Comprehensive (MMT)    Comment, General Manual Muscle Testing (MMT) Assessment  [facility]       User Key  (r) = Recorded By, (t) = Taken By, (c) = Cosigned By    Initials Name Provider Type    Patricia Elias Occupational Therapist        Goals/Plan    No documentation.       Clinical Impression     Row Name 03/01/21 1419          Pain Assessment    Additional Documentation  Pain Scale: Numbers Pre/Post-Treatment (Group)  -AH     Row Name 03/01/21 1419          Pain Scale: Numbers Pre/Post-Treatment    Pretreatment Pain Rating  0/10 - no pain  -     Posttreatment Pain Rating  0/10 - no pain  -     Pain Intervention(s)   Repositioned;Ambulation/increased activity  -[facility] Name 03/01/21 1419          Plan of Care Review    Plan of Care Reviewed With  patient  -     Progress  no change  -     Outcome Summary  Pt seen for OT evaluation today.  Pt is independent with self care tasks and cga to walk 160'.  Pt has no skilled OT needs at this time.  If pts needs change, please reconsult OT at that time.  -[facility] Name 03/01/21 1419          Therapy Assessment/Plan (OT)    Patient/Family Therapy Goal Statement (OT)  d/c home  -     Criteria for Skilled Therapeutic Interventions Met (OT)  no problems identified which require skilled intervention  -     Therapy Frequency (OT)  evaluation only  -[facility] Name 03/01/21 1419          Therapy Plan Review/Discharge Plan (OT)    Anticipated Discharge Disposition (OT)  home  -[facility] Name 03/01/21 1419          Positioning and Restraints    Pre-Treatment Position  in bed  -     Post Treatment Position  chair  -     In Chair  sitting;call light within reach;encouraged to call for assist  -       User Key  (r) = Recorded By, (t) = Taken By, (c) = Cosigned By    Initials Name Provider Type    Patricia Elias Occupational Therapist        Outcome Measures     [facility] Name 03/01/21 1421          How much help from another is currently needed...    Putting on and taking off regular lower body clothing?  4  -AH     Bathing (including washing, rinsing, and drying)  4  -AH     Toileting (which includes using toilet bed pan or urinal)  4  -AH     Putting on and taking off regular upper body clothing  4  -AH     Taking care of personal grooming (such as brushing teeth)  4  -AH     Eating meals  4  -     AM-PAC 6 Clicks Score (OT)  24  -[facility] Name 03/01/21 1421          Functional Assessment    Outcome Measure Options  AM-PAC 6 Clicks Daily Activity (OT)  -       User Key  (r) = Recorded By, (t) = Taken By, (c) = Cosigned By    Initials Name Provider Type    Patricia Elias  Occupational Therapist        Occupational Therapy Education                 Title: PT OT SLP Therapies (In Progress)     Topic: Occupational Therapy (In Progress)     Point: ADL training (Done)     Description:   Instruct learner(s) on proper safety adaptation and remediation techniques during self care or transfers.   Instruct in proper use of assistive devices.              Learning Progress Summary           Patient Acceptance, E,TB, VU by  at 3/1/2021 1421    Comment: Role of OT                   Point: Home exercise program (Not Started)     Description:   Instruct learner(s) on appropriate technique for monitoring, assisting and/or progressing therapeutic exercises/activities.              Learner Progress:  Not documented in this visit.                      User Key     Initials Effective Dates Name Provider Type [facility] 03/07/18 -  Patricia Perdomo Occupational Therapist OT              OT Recommendation and Plan  Retired Outcome Summary/Treatment Plan (OT)  Anticipated Discharge Disposition (OT): home  Therapy Frequency (OT): evaluation only  Plan of Care Review  Plan of Care Reviewed With: patient  Progress: no change  Outcome Summary: Pt seen for OT evaluation today.  Pt is independent with self care tasks and cga to walk 160'.  Pt has no skilled OT needs at this time.  If pts needs change, please reconsult OT at that time.  Plan of Care Reviewed With: patient  Outcome Summary: Pt seen for OT evaluation today.  Pt is independent with self care tasks and cga to walk 160'.  Pt has no skilled OT needs at this time.  If pts needs change, please reconsult OT at that time.     Time Calculation:   Time Calculation- OT     Row Name 03/01/21 1422             Time Calculation- OT    OT Start Time  1306  -      OT Received On  03/01/21  -        User Key  (r) = Recorded By, (t) = Taken By, (c) = Cosigned By    Initials Name Provider Type     Patricia Perdomo Occupational Therapist        Therapy  Charges for Today     Code Description Service Date Service Provider Modifiers Qty    [id number] HC OT EVAL LOW COMPLEXITY 3 3/1/2021 Patricia Perdomo 1               Patricia Perdomo  3/1/2021

## 2021-03-01 NOTE — PLAN OF CARE
Goal Outcome Evaluation:  Plan of Care Reviewed With: patient  Progress: no change  Outcome Summary: Pt seen for OT evaluation today.  Pt is independent with self care tasks and cga to walk 160'.  Pt has no skilled OT needs at this time.  If pts needs change, please reconsult OT at that time.

## 2021-03-02 LAB
CHOLEST SERPL-MCNC: 91 MG/DL (ref 0–200)
DEPRECATED RDW RBC AUTO: 44.1 FL (ref 37–54)
ERYTHROCYTE [DISTWIDTH] IN BLOOD BY AUTOMATED COUNT: 14.6 % (ref 12.3–15.4)
HBA1C MFR BLD: 7 % (ref 4.8–5.6)
HCT VFR BLD AUTO: 32.7 % (ref 34–46.6)
HDLC SERPL-MCNC: 35 MG/DL (ref 40–60)
HGB BLD-MCNC: 10.7 G/DL (ref 12–15.9)
LDLC SERPL CALC-MCNC: 25 MG/DL (ref 0–100)
LDLC/HDLC SERPL: 0.47 {RATIO}
MCH RBC QN AUTO: 26.8 PG (ref 26.6–33)
MCHC RBC AUTO-ENTMCNC: 32.7 G/DL (ref 31.5–35.7)
MCV RBC AUTO: 81.8 FL (ref 79–97)
PLATELET # BLD AUTO: 146 10*3/MM3 (ref 140–450)
PMV BLD AUTO: 10.7 FL (ref 6–12)
RBC # BLD AUTO: 4 10*6/MM3 (ref 3.77–5.28)
TRIGL SERPL-MCNC: 197 MG/DL (ref 0–150)
VLDLC SERPL-MCNC: 31 MG/DL (ref 5–40)
WBC # BLD AUTO: 4.47 10*3/MM3 (ref 3.4–10.8)

## 2021-03-02 PROCEDURE — G0407 INPT/TELE FOLLOW UP 25: HCPCS | Performed by: PSYCHIATRY & NEUROLOGY

## 2021-03-02 PROCEDURE — 99232 SBSQ HOSP IP/OBS MODERATE 35: CPT | Performed by: INTERNAL MEDICINE

## 2021-03-02 PROCEDURE — 92526 ORAL FUNCTION THERAPY: CPT

## 2021-03-02 PROCEDURE — 83036 HEMOGLOBIN GLYCOSYLATED A1C: CPT | Performed by: PSYCHIATRY & NEUROLOGY

## 2021-03-02 PROCEDURE — 25010000002 ENOXAPARIN PER 10 MG: Performed by: INTERNAL MEDICINE

## 2021-03-02 PROCEDURE — 97116 GAIT TRAINING THERAPY: CPT

## 2021-03-02 PROCEDURE — 85027 COMPLETE CBC AUTOMATED: CPT | Performed by: INTERNAL MEDICINE

## 2021-03-02 PROCEDURE — 80061 LIPID PANEL: CPT | Performed by: PSYCHIATRY & NEUROLOGY

## 2021-03-02 RX ORDER — MULTIVIT WITH IRON,MINERALS
15 LIQUID (ML) ORAL DAILY
Status: DISCONTINUED | OUTPATIENT
Start: 2021-03-02 | End: 2021-03-03 | Stop reason: HOSPADM

## 2021-03-02 RX ORDER — ASPIRIN 81 MG/1
81 TABLET, CHEWABLE ORAL DAILY
Status: DISCONTINUED | OUTPATIENT
Start: 2021-03-03 | End: 2021-03-03 | Stop reason: HOSPADM

## 2021-03-02 RX ADMIN — ISOSORBIDE MONONITRATE 60 MG: 60 TABLET, EXTENDED RELEASE ORAL at 08:15

## 2021-03-02 RX ADMIN — MULTIVITAMIN 15 ML: LIQUID ORAL at 16:37

## 2021-03-02 RX ADMIN — ASPIRIN 325 MG: 325 TABLET, COATED ORAL at 08:15

## 2021-03-02 RX ADMIN — ACETAMINOPHEN 650 MG: 325 TABLET ORAL at 20:13

## 2021-03-02 RX ADMIN — SODIUM CHLORIDE, PRESERVATIVE FREE 3 ML: 5 INJECTION INTRAVENOUS at 08:15

## 2021-03-02 RX ADMIN — TICAGRELOR 90 MG: 90 TABLET ORAL at 20:14

## 2021-03-02 RX ADMIN — SODIUM CHLORIDE, PRESERVATIVE FREE 3 ML: 5 INJECTION INTRAVENOUS at 20:15

## 2021-03-02 RX ADMIN — LISINOPRIL 5 MG: 5 TABLET ORAL at 08:15

## 2021-03-02 RX ADMIN — ENOXAPARIN SODIUM 40 MG: 40 INJECTION SUBCUTANEOUS at 17:29

## 2021-03-02 RX ADMIN — TICAGRELOR 90 MG: 90 TABLET ORAL at 08:15

## 2021-03-02 RX ADMIN — ROSUVASTATIN CALCIUM 20 MG: 20 TABLET, COATED ORAL at 08:15

## 2021-03-02 NOTE — PLAN OF CARE
Goal Outcome Evaluation:  Plan of Care Reviewed With: patient     Outcome Summary: PT treatment completed this date with pt showing improvement in her gait endurance ambulating 220 ft with HHA. Pt continues to demonstrate good safety awareness with her mobility. Continue current PT POC.

## 2021-03-02 NOTE — PROGRESS NOTES
[facility]    Name:  Karol Lopez   Age:  71 y.o.  Sex:  female  :  1950  MRN:  [id number]   Visit Number:  [id number]  Admission Date:  2021  Date Of Service:  21  Primary Care Physician:  Romaine Eugene MD     LOS: 2 days :  Patient Care Team:  Romaine Eugene MD as PCP - General  [facility]ing Physician (General Surgery):    Chief Complaint:      Follow-up of stroke with dysphagia.    Subjective / Interval History:     Ms. Lopez was seen and examined this morning along with speech therapist.  She is currently lying down on the bed and is comfortable at rest.  She was able to drink nectar thick liquids without any difficulty or coughing.  Patient was being planned to be discharged home today but states that her home area was provided and she is unable to go home today.  She does not know if she would be able to go home tomorrow but hopes to go home tomorrow.  No significant overnight events.  She has been walking in the hallway with physical therapy.    Review of Systems:     General ROS: Patient denies any fevers or chills.  Respiratory ROS: Denies cough or shortness of breath.  Cardiovascular ROS: Denies chest pain or palpitations.  Gastrointestinal ROS: Denies nausea and vomiting. Denies any abdominal pain.  Difficulty swallowing.  Neurological ROS: Denies any focal weakness. No loss of consciousness.  Dermatological ROS: Denies any redness or pruritis.    Vital Signs:    Temp:  [97.4 °F (36.3 °C)-98.1 °F (36.7 °C)] 98 °F (36.7 °C)  Heart Rate:  [49-61] 61  Resp:  [18-20] 18  BP: (159-177)/(55-84) 159/55    Intake and output:    I/O last 3 completed shifts:  In: 1466 [P.O.:240; I.V.:1226]  Out: 4425 [Urine:4425]  I/O this shift:  In: 240 [P.O.:240]  Out: 800 [Urine:800]    Physical Examination:    General Appearance:  Alert and cooperative, not in any acute distress.   Head:  Atraumatic and normocephalic, without obvious  abnormality.   Eyes:          Conjunctivae and sclerae normal, no Icterus. No pallor. Extraocular movements are within normal limits.   Neck: Supple, trachea midline, no thyromegaly, no carotid bruit.   Lungs:   Chest shape is normal. Breath sounds heard bilaterally equally.  No crackles or wheezing. No pleural rub or bronchial breathing.   Heart:  Normal S1 and S2, no murmur, no gallop, no rub. No JVD   Abdomen:   Normal bowel sounds, no masses, no organomegaly. Soft, nontender, nondistended, no guarding, no rebound tenderness.  Midline surgical scar noted in the lower abdomen.   Extremities: Supple, no edema, no cyanosis, no clubbing.  Bilateral knee joint surgical scars noted.   Skin: No bleeding or rash.   Neurologic: Alert and oriented x 3.  Dysarthria noted.  Facial asymmetry noted with angle of the mouth deviated to the right side.  Left upper motor neuron facial palsy noted.  Power is 5 out of 5 on the right side but around 4+ out of 5 on the left side.  Mild decrease tactile sense on the left upper and lower extremity.  Hand  is weaker on the left side.     Laboratory results:    Results from last 7 days   Lab Units 03/01/21  0623 02/28/21  0954   SODIUM mmol/L 145 145   POTASSIUM mmol/L 4.1 3.4*   CHLORIDE mmol/L 113* 110*   CO2 mmol/L 23.7 25.0   BUN mg/dL 20 24*   CREATININE mg/dL 1.02* 1.31*   CALCIUM mg/dL 8.5* 9.0   BILIRUBIN mg/dL  --  0.2   ALK PHOS U/L  --  83   ALT (SGPT) U/L  --  23   AST (SGOT) U/L  --  28   GLUCOSE mg/dL 81 72     Results from last 7 days   Lab Units 03/02/21  1155 03/01/21  0623 02/28/21  0954   WBC 10*3/mm3 4.47 4.05 6.57   HEMOGLOBIN g/dL 10.7* 9.8* 10.7*   HEMATOCRIT % 32.7* 32.0* 33.5*   PLATELETS 10*3/mm3 146 140 180     Results from last 7 days   Lab Units 02/28/21  0954   INR  1.04     Results from last 7 days   Lab Units 02/28/21  0954 02/26/21  0745   TROPONIN I ng/mL  --  <0.30   TROPONIN T ng/mL <0.010  --      Results from last 7 days   Lab Units 02/28/21  6055    URINECX  50,000 CFU/mL Mixed Flory Isolated     I have reviewed the patient's laboratory results.    Radiology results:    Imaging Results (Last 24 Hours)     Procedure Component Value Units Date/Time    FL Video Swallow With Speech Single Contrast [[id number]] Collected: 03/01/21 1526     Updated: 03/01/21 1547    Narrative:      PROCEDURE: FL VIDEO SWALLOW W SPEECH SINGLE-CONTRAST-     History:  Stroke with dysphagia; I69.391-Dysphagia following cerebral  infarction; R29.2-Abnormal reflex; R53.1-Weakness; R29.810-Facial  weakness     FINDINGS:  Fluoroscopy was provided for the speech pathologist to  evaluate the swallowing mechanism. The patient was given several  different consistencies of barium while the swallow was visualized  fluoroscopically. Across all consistencies there was no evidence of  penetration or aspiration. The report of the speech pathologist should  be consulted prior to making dietary decisions.       Impression:      Modified barium swallow under fluoroscopic guidance.     Flash penetration occurred with multiple consistencies. There were no  episodes of aspiration.     Please see speech pathologist's report for further details and dietary  recommendations.        FLUOROSCOPY TIME: 87 s     NUMBER OF CINE SEQUENCES:  9     This report was finalized on 3/1/2021 3:45 PM by Wesly Garcia MD.        I have reviewed the patient's radiology reports.    Medication Review:     I have reviewed the patient's active and prn medications.     Problem List:      Dysphagia due to recent stroke    Right pontine stroke (CMS/[facility])    Acute renal failure (ARF) (CMS/[facility])    Coronary artery disease    COPD (chronic obstructive pulmonary disease) (CMS/[facility])    Essential hypertension    Diabetes (CMS/[facility])    Type 2 diabetes mellitus, with long-term current use of insulin (CMS/[facility])    Assessment:    1.  Subacute right pontine ischemic stroke, present on admission (not a TPA candidate).  2.  Acute renal failure likely  secondary to dehydration, POA, improving.  3.  Essential hypertension.  4.  Diabetes mellitus type 2.  5.  COPD, no evidence of exacerbation.  6.  Coronary artery disease status post stents.    Plan:    Ms. Lopez is currently doing better with regards to her swallowing difficulty.  She is tolerating nectar thick liquids.  She is currently on full dose aspirin and Brilinta.  She did have a 2D echocardiogram which was unremarkable.    She will be continued on physical and occupational therapy.  Her renal function has improved with IV hydration and I will discontinue the IV fluids at this time.  She states that she lives with her ex- and wants to go home.  She may benefit from home health services especially for speech therapy.  Discussed with nursing staff and multidisciplinary team.    DVT prophylaxis: Lovenox.  Diet: Mechanical soft with nectar thick liquids.  Discharge plan: Home with home health tomorrow.    Darrion Wetzel MD  03/02/21  14:25 EST    Dictated utilizing Dragon dictation.

## 2021-03-02 NOTE — PLAN OF CARE
Goal Outcome Evaluation:     Progress: improving  Outcome Summary: Patient participated in dysphagia therapy, targeting bilabial and lingual strengthening and ROM and laryngeal strengthening.  With verbal cues and models, patient completed effortful swallows, PO trials of NTL utilizing double swallows.  Return demonstration of all  oral motor and swallowing exercises completed by patient with min verbal cues required.  Patient continues to work towards improved PO intake and swallowing efficiency and accuracy.

## 2021-03-02 NOTE — PROGRESS NOTES
Stroke Progress Note       Chief Complaint: Dysarthria and dysphagia    Subjective    Subjective     Subjective:  No acute issues overnight, patient feeling better than yesterday.    Review of Systems   Constitutional: Positive for fatigue.   HENT: Positive for trouble swallowing and voice change.    Eyes: Negative.    Respiratory: Negative.    Cardiovascular: Negative.    Gastrointestinal: Negative.    Endocrine: Negative.    Genitourinary: Negative.    Musculoskeletal: Negative.    Skin: Negative.    Allergic/Immunologic: Negative.    Neurological: Positive for speech difficulty and weakness.   Hematological: Negative.    Psychiatric/Behavioral: Negative.               Objective    Objective      Temp:  [97.4 °F (36.3 °C)-98.1 °F (36.7 °C)] 97.8 °F (36.6 °C)  Heart Rate:  [49-61] 61  Resp:  [18-20] 18  BP: (160-177)/(61-84) 177/61    Neurological Exam  Mental Status  Awake, alert and oriented to person, place and time.Alert. Moderate dysarthria present. Language is fluent with no aphasia. Attention and concentration are normal. Fund of knowledge is appropriate for level of education.     Cranial Nerves  CN II: Visual fields full to confrontation.  CN III, IV, VI: Extraocular movements intact bilaterally. No nystagmus. Normal saccades. Pupils equal round and reactive to light bilaterally.  CN V:  Right: Diminished sensation of the entire right side of the face.  Left: Facial sensation is normal on the left.  CN VII:  Right: . Subtle right lower facial asymmetry.  Left: There is no facial weakness.  CN VIII: Equal hearing bilaterally.  CN IX, X:  Right: Palate is normal. Gag is absent.  Left: Palate is normal. Gag is absent.  CN XI: Shoulder shrug strength is normal.  CN XII: Tongue midline without atrophy or fasciculations.     Motor  Normal muscle bulk throughout. No fasciculations present.  No obvious weakness on both sides, although subjectively patient feels weaker on the left side.  Patient has chronic  left-sided symptoms from her old stroke..     Sensory  Decreased to pinprick on the right upper and lower extremity.      Reflexes  Not assessed.     Coordination  No dysmetria in upper or lower extremities.     Gait  Not assessed.        Physical Exam  Vitals signs and nursing note reviewed.   Constitutional:       Appearance: Normal appearance.   HENT:      Head: Normocephalic and atraumatic.      Mouth/Throat:      Mouth: Mucous membranes are moist.      Pharynx: Oropharynx is clear.   Eyes:      Extraocular Movements: EOM normal. No nystagmus.      Conjunctiva/sclera: Conjunctivae normal.      Pupils: Pupils are equal, round, and reactive to light.   Neck:      Musculoskeletal: Normal range of motion and neck supple.   Cardiovascular:      Rate and Rhythm: Normal rate and regular rhythm.   Pulmonary:      Effort: Pulmonary effort is normal. No respiratory distress.   Neurological:      Mental Status: She is alert.      Cranial Nerves: Dysarthria present.   Psychiatric:         Mood and Affect: Mood normal.         Behavior: Behavior normal.     Results Review:    I reviewed the patient's new clinical results.  MRI brain shows her to have a right pontine stroke, mild to moderate white matter disease, no hemorrhage.  2019- A1c 7.1, LDL 41, total cholesterol 96, triglycerides 61  Her modified barium swallow evaluation was done, which she cleared for mechanical soft diet and nectar thick liquid.    Her 2D echocardiogram shows EF of 60 to 65%, normal left atrial size.          Assessment/Plan     Assessment/Plan:  71-year-old right-handed white female with known diagnosis of hypertension, diabetes, hyperlipidemia, smoking, coronary artery disease, stroke with residual left-sided weakness, comes in with worsening dysarthria, dysphagia, and left-sided weakness, and on exam found to have moderate to severe dysarthria, absent gag reflex, and mild right facial asymmetry, decreased sensation on the right side.         1. Right pontine stroke.    Likely lacunar in etiology.  Patient has been started on aspirin 81 mg and Brilinta 90 mg twice daily along with Crestor 20 mg daily for secondary stroke prevention.  2. Essential hypertension.  Normal blood pressure goal.  Started on low-dose blood pressure medications.  3. Diabetes mellitus type 2 controlled with normoglycemia, with long-term use of insulin with no complications.  Her last A1c was 7.1 in 2019.  Sliding scale insulin for now.  Okay to continue her home medications.  Awaiting A1c levels.  4. Mixed hyperlipidemia.  Started on Crestor 20 mg daily, home dose.  Awaiting lipid panel.  5. Acute kidney injury.  Her BUN and creatinine is improving with IV fluids.  Hospitalist managing the same.  Likely prerenal.  6. Dysphagia.  Secondary to stroke.    Got MBS yesterday, started on nectar thick and mechanical soft diet.  7. PT recommends home health, OT has signed off.  8. Smoking greater than 40 pack years.  Patient has a longstanding history of smoking, and is unsure if she will be able to quit.  She will try to quit.    Case was discussed with patient, nursing and the hospitalist.  All the risk factors were discussed, and appropriate education was given.  Thank you for the consult.  Possible discharge home today, if okay with hospitalist.        Demetris Miguel MD  03/02/21  11:14 EST    This was an audio and video enabled telemedicine encounter.

## 2021-03-02 NOTE — THERAPY TREATMENT NOTE
Acute Care - Speech Language Pathology   Swallow Treatment Note [facility]     Patient Name: Karol Lopez  : 1950  MRN: [id number]  Today's Date: 3/2/2021               Admit Date: 2021    Visit Dx:     ICD-10-CM ICD-9-CM   1. Dysphagia due to recent stroke  I69.391 438.82   2. Absent gag reflex  R29.2 796.1   3. Weakness  R53.1 780.79   4. Facial droop  R29.810 781.94   5. Right pontine stroke (CMS/[facility])  I63.50 434.91   6. Essential hypertension  I10 401.9     Patient Active Problem List   Diagnosis   • Coronary artery disease   • Osteoarthritis   • Anxiety   • COPD (chronic obstructive pulmonary disease) (CMS/[facility])   • Right pontine stroke (CMS/[facility])   • Acid reflux   • Essential hypertension   • Heart attack (CMS/[facility])   • Vitamin B12 deficiency   • Tobacco abuse   • TIA (transient ischemic attack)   • Recent Bilateral Knee Replacements (10/18/16) at [facility]   • Diabetes (CMS/[facility])   • Impaired mobility and ADLs   • Breast mass   • Dysphagia due to recent stroke   • Type 2 diabetes mellitus, with long-term current use of insulin (CMS/[facility])   • Acute renal failure (ARF) (CMS/[facility])     Past Medical History:   Diagnosis Date   • Abdominal pain    • Acid reflux    • Anxiety    • Bruises easily    • Cataracts, bilateral    • Constipation    • COPD (chronic obstructive pulmonary disease) (CMS/[facility])    • Coronary artery disease    • CTS (carpal tunnel syndrome)    • Diabetes (CMS/[facility])    • Elevated cholesterol    • Hearing loss    • Heart attack (CMS/[facility])     s/p stents   • Hypercholesteremia    • Hypertension    • Impaired functional mobility, balance, gait, and endurance    • Lower back pain    • Osteoarthritis    • Piercing     ears only   • Stroke (CMS/[facility])     2013-weak in right arm   • Tattoos     x2   • Tobacco abuse    • Tumor     in between breast closer to right breast   • Vitamin B12 deficiency    • Wears dentures     upper only   • Wears glasses      Past Surgical History:    Procedure Laterality Date   • BREAST BIOPSY Right 5/10/2019    Procedure: BREAST BIOPSY RIGHT;  Surgeon: Kiana Frey MD;  Location: [facility] OR;  Service: General   • CARPAL TUNNEL RELEASE Bilateral    • CHOLECYSTECTOMY     • CORONARY ANGIOPLASTY WITH STENT PLACEMENT  2012   • HYSTERECTOMY      complete   • JOINT REPLACEMENT Bilateral     knee replacements   • TOTAL KNEE ARTHROPLASTY Bilateral 10/18/2016    MAUREEN Palomares MD        SWALLOW EVALUATION (last 72 hours)      SLP Adult Swallow Evaluation     Row Name 03/02/21 0940 03/01/21 1340 03/01/21 1233             Rehab Evaluation    Document Type  therapy note (daily note)  -MD  other (see comments) MBSS  -TM  evaluation  -TM      Subjective Information  no complaints  -MD  no complaints  -TM  no complaints  -TM      Patient Observations  alert;cooperative  -MD  alert;cooperative  -TM  alert;cooperative  -TM      Patient/Family/Caregiver Comments/Observations  --  no family present  -TM  no family present  -TM      Patient Effort  adequate  -MD  good  -TM  good  -TM      Symptoms Noted During/After Treatment  none  -MD  --  --         General Information    Patient Profile Reviewed  yes  -MD  yes  -TM  yes  -TM      Pertinent History Of Current Problem  CVA, COPD, Acute renal failure, htn  -MD  --  cardiac, hx CVA, COPD, GERD  -TM      Current Method of Nutrition  mechanical soft, no mixed consistencies;nectar/syrup-thick liquids  -MD  NPO  -TM  NPO  -TM      Precautions/Limitations, Vision  WFL with corrective lenses  -MD  WFL with corrective lenses  -TM  WFL with corrective lenses  -TM      Precautions/Limitations, Hearing  WFL  -MD  --  --      Prior Level of Function-Communication  WFL  -MD  unknown  -TM  unknown  -TM      Prior Level of Function-Swallowing  no diet consistency restrictions  -MD  no diet consistency restrictions  -TM  no diet consistency restrictions  -TM      Plans/Goals Discussed with  patient  -MD  patient;other (see comments) RN  -TM   patient;other (see comments) RN  -TM      Barriers to Rehab  medically complex  -MD  medically complex  -TM  medically complex  -TM      Patient's Goals for Discharge  return home  -MD  --  --         Pain    Additional Documentation  Pain Scale: Numbers Pre/Post-Treatment (Group)  -MD  Pain Scale: FACES Pre/Post-Treatment (Group)  -TM  Pain Scale: FACES Pre/Post-Treatment (Group)  -TM         Pain Scale: Numbers Pre/Post-Treatment    Pretreatment Pain Rating  0/10 - no pain  -MD  --  --      Posttreatment Pain Rating  0/10 - no pain  -MD  --  --         Pain Scale: FACES Pre/Post-Treatment    Pain: FACES Scale, Pretreatment  --  0-->no hurt  -TM  0-->no hurt  -TM      Posttreatment Pain Rating  --  0-->no hurt  -TM  0-->no hurt  -TM         Oral Motor Structure and Function    Oral Lesions or Structural Abnormalities and/or variants  --  --  none identified  -TM      Dentition Assessment  --  upper dentures/partial in place;other (see comments)  -TM  upper dentures/partial in place;other (see comments) no lower teeth  -TM      Secretion Management  --  WNL/WFL  -TM  WNL/WFL  -TM      Mucosal Quality  --  moist, healthy  -TM  moist, healthy  -TM      Volitional Cough  --  weak  -TM  weak  -TM         Oral Musculature and Cranial Nerve Assessment    Oral Motor General Assessment  --  oral labial or buccal impairment;lingual impairment;vocal impairment;mandibular impairment  -TM  oral labial or buccal impairment;lingual impairment;vocal impairment;mandibular impairment  -TM      Mandibular Impairment Detail, Cranial Nerve V (Trigeminal)  --  reduced strength bilaterally  -TM  reduced strength bilaterally  -TM      Oral Labial or Buccal Impairment, Detail, Cranial Nerve VII (Facial):  --  reduced strength bilaterally  -TM  reduced strength bilaterally  -TM      Lingual Impairment, Detail. Cranial Nerves IX, XII (Glossopharyngeal and Hypoglossal)  --  reduced strength;bilaterally  -TM  reduced strength;bilaterally  -TM       Vocal Impairment, Detail. Cranial Nerve X (Vagus)  --  other (see comments)  -TM  other (see comments) strained-strangled, difficult to initiate  -TM         General Eating/Swallowing Observations    Respiratory Support Currently in Use  --  room air  -TM  room air  -TM      Positioning During Eating  --  upright in bed  -TM  upright in bed  -TM      Utensils Used  --  spoon;straw  -TM  spoon;straw  -TM      Consistencies Trialed  --  mechanical soft, no mixed consistencies;pudding thick;honey-thick liquids;nectar/syrup-thick liquids;thin liquids  -TM  pureed;honey-thick liquids;thin liquids H20 via toothette  -TM      Pre SpO2 (%)  --  --  100  -TM      Post SpO2 (%)  --  --  100  -TM         Respiratory    Respiratory Status  WFL;room air  -MD  WFL;during swallowing/eating  -TM  WFL;during swallowing/eating  -         Clinical Swallow Eval    Oral Prep Phase  --  --  -TM  WFL  -TM      Oral Transit  --  --  WFL  -TM      Oral Residue  --  --  WFL  -TM      Pharyngeal Phase  --  --  suspected pharyngeal impairment  -TM      Esophageal Phase  --  --  suspected esophageal impairment previous dx of GERD  -TM      Clinical Swallow Evaluation Summary  --  --  Bedside eval of swallow completed.  Pt. seated upright in bed for po trials.  Pt.  exhbiited strained-strangeled voicing.  Pt. was given trials of puree and honey-thick, and H20 via toothette only.  Oral phase was adequate with trials/consistencies presented.  Suspect pharyngeal phase dysphagia with pt. coughing post swallows with thin via toothette.  Although no overt s/s aspiration with trials of puree and honey-thick liq, suspect possible aspiration risk with pt. at risk of silent aspiration at times.  Recommend:  1. MBS to determine risk, amount, and severity of possible aspiration and make least restrictive diet recs,  2. continue NPO until MBS can be completed,  3. aspiration precautions,  4. meds via IV or if po meds necessary, with pudding/applesauce  as jacqueline.    -TM         Pharyngeal Phase Concerns    Pharyngeal Phase Concerns  --  --  cough  -TM      Cough  --  --  thin via toothette  -TM         Esophageal Phase Concerns    Esophageal Phase Concerns  --  --  other (see comments) prior dx GERD  -TM         MBS/VFSS    Utensils Used  --  spoon;cup;straw  -TM  --      Consistencies Trialed  --  mechanical soft, no mixed consistencies;pudding thick;honey-thick liquids;nectar/syrup-thick liquids;thin liquids  -TM  --         MBS/VFSS Interpretation    Oral Prep Phase  --  impaired oral phase of swallowing  -TM  --      Oral Transit Phase  --  WFL  -TM  --      Oral Residue  --  WFL  -TM  --      VFSS Summary  --  MBS completed with pt. seated upright in dedicated chair.  Pt. was given trials of mech soft, pudding, honey-thick, nectar-thick, and thin liquid.  Oral phase was remarkable for extended bolus prep time with mech soft due to oral weakness, but adequate for small amounts at a time.  Mild pharyngeal phase dysphagia exhibited with delayed initiation of pharyngeal swallow with vallecular pooling and intermittent loss to pyriforms demonstrarted with various consistencies.  She exhibited consistent laryngeal penetration with all consistencies except pudding, due to decreased larygneal elevation. Laryngeal penetration was trace amounts and flash, but consistent with all consistencies and deep with thin liquid.  Although no aspiration with any trial, pt. is at increased risk of aspiration with thin liquids specifically.  Recommend:  1. mechanical soft diet with nectar-thick liq as jacqueline,  2. meds whole with pudding/applesauce,  3. small bites/sips,  4. aspiration precautions,  5. reflux precautions.  D/w pt. and RN.    -TM  --         Oral Preparatory Phase    Oral Preparatory Phase  --  prolonged manipulation  -TM  --      Prolonged Manipulation  --  mechanical soft  -TM  --         Initiation of Pharyngeal Swallow    Initiation of Pharyngeal Swallow  --  bolus in  valleculae;bolus in pyriform sinuses  -TM  --      Pharyngeal Phase  --  impaired pharyngeal phase of swallowing  -TM  --      Penetration During the Swallow  --  thin liquids  -TM  --      Aspiration During the Swallow  --  nectar-thick liquids;honey-thick liquids;mechanical soft;thin liquids  -TM  --      Response to Penetration  --  no response  -TM  --      Rosenbek's Scale  --  2--->level 2  -TM  --         Esophageal Phase    Esophageal Phase  --  other (see comments) prior dx of GERD  -TM  --         SLP Communication to Radiology    Severity Level of Dysphagia  --  mild dysphagia;oral dysfunction;pharyngeal dysfunction;suspected esophageal dysfunction  -TM  --      Consistencies Aspirated/Penetrated  --  penetrated;thin liquids;nectar-thick liquids;honey-thick liquids;mechanical soft  -TM  --      Summary Statement  --  mild oropharyngeal dysphagia; third stage dysphagia  -TM  --         Clinical Impression    Barriers to Overall Progress (SLP)  --  medical complexity  -TM  medical complexity  -TM      SLP Swallowing Diagnosis  moderate;pharyngeal dysphagia;mild;oral dysphagia  -MD  esophageal dysphagia;suspected pharyngeal dysphagia;oral dysphagia  -TM  esophageal dysphagia;suspected pharyngeal dysphagia;oral dysphagia  -TM      Functional Impact  risk of aspiration/pneumonia;risk of malnutrition;risk of dehydration  -MD  risk of aspiration/pneumonia  -TM  risk of aspiration/pneumonia  -TM      Swallow Criteria for Skilled Therapeutic Interventions Met  --  demonstrates skilled criteria  -TM  other (see comments) pending MBS results  -TM      Plan for Continued Treatment (SLP)  Patient participated in dysphagia therapy, targeting bilabial and lingual strengthening and ROM and laryngeal strengthening.  With verbal cues and models, patient completed effortful swallows, PO trials of NTL utilizing double swallows.  Return demonstration of all  oral motor and swallowing exercises completed by patient with min  verbal cues required.  Patient continues to work towards improved PO intake and swallowing efficiency and accuracy.    -MD  ST for strengthening  -TM  pending MBS results  -TM         Recommendations    Therapy Frequency (Swallow)  PRN  -MD  PRN  -TM  PRN  -TM      Predicted Duration Therapy Intervention (Days)  until discharge  -MD  until discharge  -TM  until discharge  -      SLP Diet Recommendation  nectar thick liquids;mechanical soft with no mixed consistencies  -MD  mechanical soft with no mixed consistencies;nectar thick liquids  -  --      Recommended Diagnostics  --  --  VFSS ([facility] [place]  -      Recommended Precautions and Strategies  small bites of food and sips of liquid;general aspiration precautions;fatigue precautions;multiple swallows per bite of food;multiple swallows per sip of liquid  -MD  general aspiration precautions  -TM  general aspiration precautions  -      Oral Care Recommendations  Oral Care BID/PRN  -MD  Oral Care BID/PRN  -TM  --      SLP Rec. for Method of Medication Administration  meds whole;with pudding or applesauce;as tolerated  -MD  meds whole;meds crushed;with pudding or applesauce;as tolerated;with thick liquids  -  meds via alternate route;with pudding or applesauce;as tolerated  -      Monitor for Signs of Aspiration  yes;notify SLP if any concerns  -MD  yes;cough;gurgly voice;throat clearing;right lower lobe infiltrates  -TM  --      Anticipated Discharge Disposition (SLP)  --  --  unknown  -TM      Demonstrates Need for Referral to Another Service  --  speech therapy  -  speech therapy  -         Swallow Goals (SLP)    Oral Nutrition/Hydration Goal Selection (SLP)  --  oral nutrition/hydration, SLP goal 1  -TM  --      Labial Strengthening Goal Selection (SLP)  --  labial strengthening, SLP goal 1  -TM  --      Lingual Strengthening Goal Selection (SLP)  --  lingual strengthening, SLP goal 1  -TM  --      Pharyngeal Strengthening Exercise Goal Selection (SLP)  --   pharyngeal strengthening exercise, SLP goal 1  -TM  --      Additional Documentation  --  labial strengthening goal selection (SLP);lingual strengthening goal selection (SLP);pharyngeal strengthening exercise goal selection (SLP)  -TM  --         Oral Nutrition/Hydration Goal 1 (SLP)    Oral Nutrition/Hydration Goal 1, SLP  --  Goal: tolerate mech soft diet with nectar-thick liq   -TM  --      Time Frame (Oral Nutrition/Hydration Goal 1, SLP)  --  1 day  -TM  --      Barriers (Oral Nutrition/Hydration Goal 1, SLP)  --  medical complexity  -TM  --         Labial Strengthening Goal 1 (SLP)    Activity (Labial Strengthening Goal 1, SLP)  increase labial tone  -MD  increase labial tone  -TM  --      Increase Labial Tone  labial resistance exercises;swallow trials  -MD  labial resistance exercises;swallow trials  -TM  --      [place]/Accuracy (Labial Strengthening Goal 1, SLP)  with minimal cues (75-90% accuracy)  -MD  independently (over 90% accuracy)  -TM  --      Time Frame (Labial Strengthening Goal 1, SLP)  --  by discharge  -TM  --      Barriers (Labial Strengthening Goal 1, SLP)  --  medical complexity  -TM  --      Progress/Outcomes (Labial Strengthening Goal 1, SLP)  continuing progress toward goal  -MD  --  --         Lingual Strengthening Goal 1 (SLP)    Activity (Lingual Strengthening Goal 1, SLP)  increase lingual tone/sensation/control/coordination/movement  -MD  increase lingual tone/sensation/control/coordination/movement;increase tongue back strength  -TM  --      Increase Lingual Tone/Sensation/Control/Coordination/Movement  lingual movement exercises;lingual resistance exercises  -MD  lingual movement exercises;swallow trials;lingual resistance exercises  -TM  --      Increase Tongue Back Strength  lingual movement exercises;lingual resistance exercises;swallow trials  -MD  lingual movement exercises;swallow trials  -TM  --      [place]/Accuracy (Lingual Strengthening Goal 1, SLP)  with  minimal cues (75-90% accuracy)  -MD  independently (over 90% accuracy)  -TM  --      Time Frame (Lingual Strengthening Goal 1, SLP)  --  by discharge  -TM  --         Pharyngeal Strengthening Exercise Goal 1 (SLP)    Activity (Pharyngeal Strengthening Goal 1, SLP)  --  increase superior movement of the hyolaryngeal complex  -TM  --      Increase Superior Movement of the Hyolaryngeal Complex  hard effortful swallow  -MD  falsetto;hard effortful swallow  -TM  --      [place]/Accuracy (Pharyngeal Strengthening Goal 1, SLP)  with moderate cues (50-74% accuracy)  -MD  independently (over 90% accuracy)  -TM  --      Time Frame (Pharyngeal Strengthening Goal 1, SLP)  --  by discharge  -TM  --      Progress/Outcomes (Pharyngeal Strengthening Goal 1, SLP)  continuing progress toward goal  -MD  --  --        User Key  (r) = Recorded By, (t) = Taken By, (c) = Cosigned By    Initials Name Effective Dates    TM Babs Selby 03/07/18 -     Rebecca Jimenez 01/09/20 -           EDUCATION  The patient has been educated in the following areas:   Dysphagia (Swallowing Impairment) Modified Diet Instruction.    SLP Recommendation and Plan  SLP Swallowing Diagnosis: moderate, pharyngeal dysphagia, mild, oral dysphagia  SLP Diet Recommendation: nectar thick liquids, mechanical soft with no mixed consistencies  Recommended Precautions and Strategies: small bites of food and sips of liquid, general aspiration precautions, fatigue precautions, multiple swallows per bite of food, multiple swallows per sip of liquid  SLP Rec. for Method of Medication Administration: meds whole, with pudding or applesauce, as tolerated     Monitor for Signs of Aspiration: yes, notify SLP if any concerns              Therapy Frequency (Swallow): PRN  Predicted Duration Therapy Intervention (Days): until discharge          Plan for Continued Treatment (SLP): Patient participated in dysphagia therapy, targeting bilabial and lingual strengthening and ROM  and laryngeal strengthening.  With verbal cues and models, patient completed effortful swallows, PO trials of NTL utilizing double swallows.  Return demonstration of all  oral motor and swallowing exercises completed by patient with min verbal cues required.  Patient continues to work towards improved PO intake and swallowing efficiency and accuracy.                Progress: improving  Outcome Summary: Patient participated in dysphagia therapy, targeting bilabial and lingual strengthening and ROM and laryngeal strengthening.  With verbal cues and models, patient completed effortful swallows, PO trials of NTL utilizing double swallows.  Return demonstration of all  oral motor and swallowing exercises completed by patient with min verbal cues required.  Patient continues to work towards improved PO intake and swallowing efficiency and accuracy.    SLP GOALS     Row Name 03/02/21 0940 03/01/21 1340          Oral Nutrition/Hydration Goal 1 (SLP)    Oral Nutrition/Hydration Goal 1, SLP  --  Goal: tolerate mech soft diet with nectar-thick liq   -TM     Time Frame (Oral Nutrition/Hydration Goal 1, SLP)  --  1 day  -TM     Barriers (Oral Nutrition/Hydration Goal 1, SLP)  --  medical complexity  -TM        Labial Strengthening Goal 1 (SLP)    Activity (Labial Strengthening Goal 1, SLP)  increase labial tone  -MD  increase labial tone  -TM     Increase Labial Tone  labial resistance exercises;swallow trials  -MD  labial resistance exercises;swallow trials  -TM     [place]/Accuracy (Labial Strengthening Goal 1, SLP)  with minimal cues (75-90% accuracy)  -MD  independently (over 90% accuracy)  -TM     Time Frame (Labial Strengthening Goal 1, SLP)  --  by discharge  -TM     Barriers (Labial Strengthening Goal 1, SLP)  --  medical complexity  -TM     Progress/Outcomes (Labial Strengthening Goal 1, SLP)  continuing progress toward goal  -MD  --        Lingual Strengthening Goal 1 (SLP)    Activity (Lingual Strengthening Goal  1, SLP)  increase lingual tone/sensation/control/coordination/movement  -MD  increase lingual tone/sensation/control/coordination/movement;increase tongue back strength  -TM     Increase Lingual Tone/Sensation/Control/Coordination/Movement  lingual movement exercises;lingual resistance exercises  -MD  lingual movement exercises;swallow trials;lingual resistance exercises  -TM     Increase Tongue Back Strength  lingual movement exercises;lingual resistance exercises;swallow trials  -MD  lingual movement exercises;swallow trials  -TM     [place]/Accuracy (Lingual Strengthening Goal 1, SLP)  with minimal cues (75-90% accuracy)  -MD  independently (over 90% accuracy)  -TM     Time Frame (Lingual Strengthening Goal 1, SLP)  --  by discharge  -TM        Pharyngeal Strengthening Exercise Goal 1 (SLP)    Activity (Pharyngeal Strengthening Goal 1, SLP)  --  increase superior movement of the hyolaryngeal complex  -TM     Increase Superior Movement of the Hyolaryngeal Complex  hard effortful swallow  -MD  falsetto;hard effortful swallow  -TM     [place]/Accuracy (Pharyngeal Strengthening Goal 1, SLP)  with moderate cues (50-74% accuracy)  -MD  independently (over 90% accuracy)  -TM     Time Frame (Pharyngeal Strengthening Goal 1, SLP)  --  by discharge  -TM     Progress/Outcomes (Pharyngeal Strengthening Goal 1, SLP)  continuing progress toward goal  -MD  --       User Key  (r) = Recorded By, (t) = Taken By, (c) = Cosigned By    Initials Name Provider Type    TM Babs Selby Speech and Language Pathologist    Rebecca Jimenez Speech and Language Pathologist             Time Calculation:   Time Calculation- SLP     Row Name 03/02/21 1026             Time Calculation- SLP    SLP Start Time  0940  -MD      SLP Stop Time  0955  -MD      SLP Time Calculation (min)  15 min  -MD      SLP Received On  03/02/21  -MD        User Key  (r) = Recorded By, (t) = Taken By, (c) = Cosigned By    Initials Name Provider Type    MD  Rebecca Woods Speech and Language Pathologist          Therapy Charges for Today     Code Description Service Date Service Provider Modifiers Qty    [id number]  ST TREATMENT SWALLOW 2 3/2/2021 Rebecca Woods GN 1               Rebecca Woods  3/2/2021

## 2021-03-02 NOTE — PLAN OF CARE
Goal Outcome Evaluation:        Outcome Summary: VSS att. Maintaing sats on room air. Pt sat up in the chair this shift and ambulated in room. No complaints noted this shift. Will continue to monitor.

## 2021-03-02 NOTE — PROGRESS NOTES
Continued Stay Note   [facility]     Patient Name: Karol Lopez  MRN: [id number]  Today's Date: 3/2/2021    Admit Date: 2/28/2021    Discharge Plan    Spoke with patient and nurse Josefina in room.  The patients step daughter Anca said that she plans on taking the patient to her home in [place] until the water is down and the road is passable to the patients home.  [facility] [place] informed of patients plans.     Expected Discharge Date and Time     Expected Discharge Date Expected Discharge Time    Mar 3, 2021             Fina Aragon RN

## 2021-03-02 NOTE — PROGRESS NOTES
Adult Nutrition  Assessment/PES    Patient Name:  Karol Lopez  YOB: 1950  MRN: [id number]  Admit Date:  2/28/2021    Assessment Date:  3/2/2021    Comments:    Recommend:  1. Consider adding consistent carbohydrate to current diet order as medically appropriate and tolerated.  2. Encourage PO intake. PO intake average ~62% x 2 meals.  3. RD ordered Boost Pudding BID.  4. Consider a multivitamin with minerals daily..     RD to follow pt and available PRN.      Reason for Assessment     Row Name 03/02/21 1328          Reason for Assessment    Reason For Assessment  follow-up protocol     Diagnosis  cardiac disease;diabetes diagnosis/complications;neurologic conditions;renal disease CVA, dysphagia, COPD, DM 2, HTN, EDITH, CAD     Identified At Risk by Screening Criteria  other (see comments) LACE             Labs/Tests/Procedures/Meds     Row Name 03/02/21 1328          Labs/Procedures/Meds    Lab Results Reviewed  reviewed, pertinent     Lab Results Comments  High: Gluc, Cr, HgbA1c, TG        Medications    Pertinent Medications Reviewed  reviewed             Nutrition Prescription Ordered     Row Name 03/02/21 1329          Nutrition Prescription PO    Current PO Diet  Dysphagia     Dysphagia Level  4  Mechanical soft no mixed consistencies     Fluid Consistency  Nectar/syrup thick         Evaluation of Received Nutrient/Fluid Intake     Row Name 03/02/21 1330          PO Evaluation    Number of Days PO Intake Evaluated  1 day     Number of Meals  2     % PO Intake  62               Problem/Interventions:  Problem 1     Row Name 03/02/21 1331          Nutrition Diagnoses Problem 1    Problem 1  Inadequate Nutrient Intake     Etiology (related to)  MNT for Treatment/Condition     Signs/Symptoms (evidenced by)  NPO     Resolved?  Yes Diet order advanced         Problem 2     Row Name 03/02/21 1331          Nutrition Diagnoses Problem 2    Problem 2  Altered Nutrition Related to Labs     Etiology  (related to)  Medical Diagnosis     Renal  EDITH     Signs/Symptoms (evidenced by)  Biochemical     Specific Labs Noted  Creatinine     Resolved?  Yes         Problem 3     Row Name 03/02/21 1332          Nutrition Diagnoses Problem 3    Problem 3  Swallowing Difficulty     Etiology (related to)  Functional Diagnosis     Functional Diagnosis  Dysphagia     Signs/Symptoms (evidenced by)  Report/Observation     Reported/Observed By  MD           Intervention Goal     Row Name 03/02/21 1332          Intervention Goal    General  Meet nutritional needs for age/condition;Improved nutrition related lab(s)     PO  Meet estimated needs;Increase intake;PO intake (%)     PO Intake %  75 %     Weight  Maintain weight         Nutrition Intervention     Row Name 03/02/21 1332          Nutrition Intervention    RD/Tech Action  Follow Tx progress;Encourage intake;Recommend/ordered     Recommended/Ordered  Supplement;Diet         Nutrition Prescription     Row Name 03/02/21 8826          Nutrition Prescription PO    PO Prescription  Begin/change diet;Begin/change supplement     Begin/Change Diet to  Dysphagia     Fluid Consistency  Nectar/syrup thick     Supplement  Boost Pudding     Supplement Frequency  2 times a day     Common Modifiers  Consistent Carbohydrate     New PO Prescription Ordered?  Yes        Other Orders    Obtain Weight  Daily     Obtain Weight Ordered?  No, recommended     Supplement  Vitamin mineral supplement     Supplement Ordered?  No, recommended         Education/Evaluation     Row Name 03/02/21 3981          Education    Education  Will Instruct as appropriate        Monitor/Evaluation    Monitor  Per protocol;I&O;PO intake;Supplement intake;Pertinent labs;Weight;Skin status           Electronically signed by:  Susu Montana RD  03/02/21 13:36 EST

## 2021-03-03 ENCOUNTER — TELEPHONE (OUTPATIENT)
Dept: PRIMARY CARE CLINIC | Age: 71
End: 2021-03-03

## 2021-03-03 VITALS
HEART RATE: 57 BPM | OXYGEN SATURATION: 97 % | RESPIRATION RATE: 16 BRPM | DIASTOLIC BLOOD PRESSURE: 54 MMHG | HEIGHT: 64 IN | WEIGHT: 154.98 LBS | TEMPERATURE: 97.9 F | BODY MASS INDEX: 26.46 KG/M2 | SYSTOLIC BLOOD PRESSURE: 142 MMHG

## 2021-03-03 LAB
ANION GAP SERPL CALCULATED.3IONS-SCNC: 8.9 MMOL/L (ref 5–15)
BUN SERPL-MCNC: 14 MG/DL (ref 8–23)
BUN/CREAT SERPL: 12.6 (ref 7–25)
CALCIUM SPEC-SCNC: 9.1 MG/DL (ref 8.6–10.5)
CHLORIDE SERPL-SCNC: 108 MMOL/L (ref 98–107)
CO2 SERPL-SCNC: 25.1 MMOL/L (ref 22–29)
CREAT SERPL-MCNC: 1.11 MG/DL (ref 0.57–1)
GFR SERPL CREATININE-BSD FRML MDRD: 49 ML/MIN/1.73
GLUCOSE SERPL-MCNC: 171 MG/DL (ref 65–99)
POTASSIUM SERPL-SCNC: 3.7 MMOL/L (ref 3.5–5.2)
SODIUM SERPL-SCNC: 142 MMOL/L (ref 136–145)

## 2021-03-03 PROCEDURE — 80048 BASIC METABOLIC PNL TOTAL CA: CPT | Performed by: INTERNAL MEDICINE

## 2021-03-03 PROCEDURE — 99238 HOSP IP/OBS DSCHRG MGMT 30/<: CPT | Performed by: INTERNAL MEDICINE

## 2021-03-03 PROCEDURE — 94799 UNLISTED PULMONARY SVC/PX: CPT

## 2021-03-03 PROCEDURE — G0407 INPT/TELE FOLLOW UP 25: HCPCS | Performed by: PSYCHIATRY & NEUROLOGY

## 2021-03-03 RX ORDER — AMLODIPINE BESYLATE 5 MG/1
5 TABLET ORAL
Qty: 30 TABLET | Refills: 0 | Status: SHIPPED | OUTPATIENT
Start: 2021-03-04 | End: 2021-11-17 | Stop reason: HOSPADM

## 2021-03-03 RX ORDER — AMLODIPINE BESYLATE 5 MG/1
5 TABLET ORAL
Status: DISCONTINUED | OUTPATIENT
Start: 2021-03-03 | End: 2021-03-03 | Stop reason: HOSPADM

## 2021-03-03 RX ADMIN — SODIUM CHLORIDE, PRESERVATIVE FREE 3 ML: 5 INJECTION INTRAVENOUS at 09:34

## 2021-03-03 RX ADMIN — AMLODIPINE BESYLATE 5 MG: 5 TABLET ORAL at 09:30

## 2021-03-03 RX ADMIN — MULTIVITAMIN 15 ML: LIQUID ORAL at 09:30

## 2021-03-03 RX ADMIN — ISOSORBIDE MONONITRATE 60 MG: 60 TABLET, EXTENDED RELEASE ORAL at 09:31

## 2021-03-03 RX ADMIN — ASPIRIN 81 MG: 81 TABLET, CHEWABLE ORAL at 09:31

## 2021-03-03 RX ADMIN — TICAGRELOR 90 MG: 90 TABLET ORAL at 09:31

## 2021-03-03 RX ADMIN — ROSUVASTATIN CALCIUM 20 MG: 20 TABLET, COATED ORAL at 09:30

## 2021-03-03 RX ADMIN — LISINOPRIL 5 MG: 5 TABLET ORAL at 09:31

## 2021-03-03 NOTE — DISCHARGE SUMMARY
[facility]   DISCHARGE SUMMARY      Name:  Karol Lopez   Age:  71 y.o.  Sex:  female  :  1950  MRN:  [id number]   Visit Number:  [id number]    Admission Date:  2021  Date of Discharge:  3/3/2021  Primary Care Physician:  Romaine Eugene MD    Important issues to note:    1.  Patient's Plavix has been discontinued and she has been placed on Brilinta as per neurology advice.  2.  Continue aspirin 81 mg daily.  3.  Patient has been started on amlodipine along with her other antihypertensive medications for better blood pressure control.  4.  Patient will be arranged home health services especially for speech therapy.  5.  Follow-up with primary care physician in 1 week.  6.  Aspiration precautions with mechanical soft diet and honey thick liquids.    Discharge Diagnoses:     1.  Subacute right pontine ischemic stroke, present on admission (not a TPA candidate).  2.  Acute renal failure likely secondary to dehydration, POA, improving.  3.  Essential hypertension.  4.  Diabetes mellitus type 2.  5.  COPD, no evidence of exacerbation.  6.  Coronary artery disease status post stents.    Problem List:       Dysphagia due to recent stroke    Right pontine stroke (CMS/[facility])    Acute renal failure (ARF) (CMS/[facility])    Coronary artery disease    COPD (chronic obstructive pulmonary disease) (CMS/[facility])    Essential hypertension    Diabetes (CMS/[facility])    Type 2 diabetes mellitus, with long-term current use of insulin (CMS/[facility])    Presenting Problem:    Chief Complaint   Patient presents with   • Weakness - Generalized      Consults:     Consults     Date and Time Order Name Status Description    2021 1229 Inpatient Neurology Consult Stroke Completed         Procedures Performed:    None.    History of presenting illness/Hospital Course:    Ms. Lopez is a pleasant 71-year-old female with history of prior cerebrovascular accident with left-sided weakness, COPD, diabetes mellitus type 2  and hypertension was admitted from the emergency room on 2/28/2021 with worsening dysphagia and slurred speech associated with generalized weakness of 2 days duration.  Patient was evaluated in the emergency room and was seen by telemetry neurology.  It was felt that the patient likely had brainstem stroke but she was out of the window for TPA as well as recent stroke.  Patient did have a CT of the head which was negative for any acute stroke.  Patient was admitted to the medical floor with telemetry and was placed on n.p.o. orders.    Patient was initially allowed permissive hypertension due to her stroke.  She was evaluated by speech therapy and they recommended modified barium swallow.  Patient did have modified barium swallow and did not have any significant aspiration and the speech therapy recommended mechanical soft diet with honey thick liquids.  Patient was seen by physical and occupational therapy and was able to walk in the hallway without any difficulty.  Her dysphagia slowly improved over the next couple of days but she still has difficulty swallowing.  She did have a 2D echocardiogram on 2/28/2021 and it showed normal left ventricular systolic and diastolic functions.  Bubble study was negative.    Patient was followed by telemetry neurology and they recommended changing her Plavix to Brilinta.  She did have an MRI which showed right pontine stroke which explains her dysphagia.  Patient was started on amlodipine for better blood pressure control along with her lisinopril and Imdur.  She is currently eager to go home and will be discharged home with home health services especially for speech therapy.  She is advised to follow-up with her primary care physician in 1 week.  I have discussed the patient's condition and discharge plan with the neurologist Dr. Miguel.    Vital Signs:    Temp:  [97.7 °F (36.5 °C)-98.1 °F (36.7 °C)] 98.1 °F (36.7 °C)  Heart Rate:  [50-60] 57  Resp:  [16-18] 16  BP:  (144-179)/(53-63) 148/63    Physical Exam:    General Appearance:  Alert and cooperative, not in any acute distress.   Head:  Atraumatic and normocephalic, without obvious abnormality.   Eyes:          Conjunctivae and sclerae normal, no icterus. No pallor. Extraocular movements are within normal limits.   Ears:  Ears appear intact with no abnormalities noted.   Throat: No oral lesions, no thrush, oral mucosa moist.   Neck: Supple, trachea midline, no thyromegaly, no carotid bruit.   Back:   No kyphoscoliosis present. No tenderness to palpation,   range of motion normal.   Lungs:   Chest shape is normal. Breath sounds heard bilaterally equally.  No crackles or wheezing. No Pleural rub or bronchial breathing.   Heart:  Normal S1 and S2, no murmur, no gallop, no rub. No JVD.   Abdomen:   Normal bowel sounds, no masses, no organomegaly. Soft, nontender, nondistended, no guarding, no rebound tenderness.   Extremities: Supple, no edema, no cyanosis, no clubbing.  Bilateral knee joint surgical scars noted.   Pulses: Pulses palpable and equal bilaterally.   Skin: No bleeding or rash.   Neurologic: Alert and oriented x 3.  Dysarthria noted.  Facial asymmetry noted with angle of the mouth deviated to the right side.  Left upper motor neuron facial palsy noted.  Power is 5 out of 5 on the right side but around 4+ out of 5 on the left side.  Mild decrease tactile sense on the left upper and lower extremity.  Hand  is weaker on the left side.     Pertinent Lab Results:     Results from last 7 days   Lab Units 03/03/21  0605 03/01/21  0623 02/28/21  0954   SODIUM mmol/L 142 145 145   POTASSIUM mmol/L 3.7 4.1 3.4*   CHLORIDE mmol/L 108* 113* 110*   CO2 mmol/L 25.1 23.7 25.0   BUN mg/dL 14 20 24*   CREATININE mg/dL 1.11* 1.02* 1.31*   CALCIUM mg/dL 9.1 8.5* 9.0   BILIRUBIN mg/dL  --   --  0.2   ALK PHOS U/L  --   --  83   ALT (SGPT) U/L  --   --  23   AST (SGOT) U/L  --   --  28   GLUCOSE mg/dL 171* 81 72     Results from last  7 days   Lab Units 03/02/21  1155 03/01/21  0623 02/28/21  0954   WBC 10*3/mm3 4.47 4.05 6.57   HEMOGLOBIN g/dL 10.7* 9.8* 10.7*   HEMATOCRIT % 32.7* 32.0* 33.5*   PLATELETS 10*3/mm3 146 140 180     Results from last 7 days   Lab Units 02/28/21  0954   INR  1.04     Results from last 7 days   Lab Units 02/28/21  0954 02/26/21  0745   TROPONIN I ng/mL  --  <0.30   TROPONIN T ng/mL <0.010  --        Results from last 7 days   Lab Units 02/28/21  1144   COLOR UA  Yellow   GLUCOSE UA  >=1000 mg/dL (3+)*   KETONES UA  Negative   LEUKOCYTES UA  Negative   PH, URINE  5.5   BILIRUBIN UA  Negative   UROBILINOGEN UA  1.0 E.U./dL     Results from last 7 days   Lab Units 02/28/21  1036   URINECX  50,000 CFU/mL Mixed Flory Isolated     Pertinent Radiology Results:    Imaging Results (All)     Procedure Component Value Units Date/Time    FL Video Swallow With Speech Single Contrast [[id number]] Collected: 03/01/21 1526     Updated: 03/01/21 1547    Narrative:      PROCEDURE: FL VIDEO SWALLOW W SPEECH SINGLE-CONTRAST-     History:  Stroke with dysphagia; I69.391-Dysphagia following cerebral  infarction; R29.2-Abnormal reflex; R53.1-Weakness; R29.810-Facial  weakness     FINDINGS:  Fluoroscopy was provided for the speech pathologist to  evaluate the swallowing mechanism. The patient was given several  different consistencies of barium while the swallow was visualized  fluoroscopically. Across all consistencies there was no evidence of  penetration or aspiration. The report of the speech pathologist should  be consulted prior to making dietary decisions.       Impression:      Modified barium swallow under fluoroscopic guidance.     Flash penetration occurred with multiple consistencies. There were no  episodes of aspiration.     Please see speech pathologist's report for further details and dietary  recommendations.        FLUOROSCOPY TIME: 87 s     NUMBER OF CINE SEQUENCES:  9     This report was finalized on 3/1/2021 3:45 PM by  Wesly Garcia MD.    MRI Brain Without Contrast [[id number]] Collected: 03/01/21 1107     Updated: 03/01/21 1113    Narrative:      MRI BRAIN WO CONTRAST-     HISTORY: Hx of CVA, new slurred speech and dysphagia, possible  cerebellar stroke; I69.391-Dysphagia following cerebral infarction;  R29.2-Abnormal reflex; R53.1-Weakness; R29.810-Facial weakness     TECHNIQUE: Multiplanar imaging was performed of the brain without  gadolinium infusion.     Diffusion sequences show restricted diffusion within the right slava with  edema on conventional sequences compatible with early subacute infarct.     Chronic lacunar infarct is seen of the left basal ganglia and left  periventricular white matter. Periventricular white matter signal  changes are present. These are likely due to moderate chronic  microvascular change. Moderate atrophy is noted. There is no evidence of  mass or hemorrhage. No extra-axial abnormal findings are identified. The  ventricles and cisterns appear normal.        Impression:      1. Early subacute lacunar infarct right slava.  2. No hemorrhage.  3. Moderate atrophy and chronic microvascular changes.     This report was finalized on 3/1/2021 11:11 AM by Wesly Garcia MD.    CT Angiogram Neck [[id number]] Collected: 02/28/21 1140     Updated: 02/28/21 1149    Narrative:      PROCEDURE: CT ANGIOGRAM NECK-     HISTORY: Slurred speech and weakness for 2 days.     TECHNIQUE: Thin-section axial images of the neck were performed after  the administration of 100 mL of Isovue 370 intravenously. 3D MIP images  were performed and reviewed. NASCET technique is utilized for stenosis  evaluation. This study was performed with techniques to keep radiation  doses as low as reasonably achievable, (ALARA). Individualized dose  reduction techniques using automated exposure control or adjustment of  mA and/or kV according to the patient size were employed.     FINDINGS:  The lung apices demonstrate no abnormalities. The  thyroid gland is  demonstrates a calcification on the left. Right lobe is larger compared  to the left but no definite focal lesion is seen. The visualized  cervical spine demonstrates no abnormalities. The visualized paranasal  sinuses are clear.      Aortic arch: The aortic arch has a normal contour.     Right carotid:  There is moderate calcified plaque of the right carotid  bulb and proximal internal carotid artery causing approximately 75%  stenosis.     Left carotid:  Moderate calcified plaque of the left carotid bulb and  left proximal internal carotid artery causes approximate 50% stenosis.     Vertebrals: The left vertebral artery is dominant. No significant  stenosis is present.       Impression:      Bilateral proximal internal carotid artery stenoses, 75% on  the right and 50% on the left.     This report was finalized on 2/28/2021 11:47 AM by Pina Blunt MD.    CT Angiogram Head [[id number]] Collected: 02/28/21 1136     Updated: 02/28/21 1142    Narrative:      PROCEDURE: CT ANGIOGRAM HEAD-     HISTORY: slurred speech, weake x 2 days     TECHNIQUE: Thin section axial CT with  and without IV contrast  supplemented with multiplanar 3 D reconstruction of the head. This study  was performed with techniques to keep radiation doses as low as  reasonably achievable, (ALARA). Individualized dose reduction techniques  using automated exposure control or adjustment of mA and/or kV according  to the patient size were employed.     FINDINGS:     Head CT: The ventricles are mildly prominent with mildly prominent  sulci, appropriate for age. There is no evidence of hemorrhage. No  masses are identified. No extra-axial fluid is seen. The sinuses are  unremarkable. No abnormal enhancing lesion is seen.     CTA: The cranial circulation is unremarkable. There is no significant  stenosis, aneurysm, or occlusion. System is left vertebral dominant.       Impression:      No acute process.       This report was finalized on  2/28/2021 11:39 AM by Pina Blunt MD.    XR Chest 1 View [[id number]] Collected: 02/28/21 1007     Updated: 02/28/21 1010    Narrative:      PROCEDURE: XR CHEST 1 VW-     HISTORY: Stroke Protocol (Onset > 12 hrs)     COMPARISON: 10/11/2016.     FINDINGS: The heart is upper limits of normal in size. Decreased  inspiratory effort present. Calcified granulomas identified.. The  mediastinum is unremarkable. The lungs are clear. There is no  pneumothorax.  There are no acute osseous abnormalities.       Impression:      No acute cardiopulmonary process.     .     This report was finalized on 2/28/2021 10:08 AM by Pina Blunt MD.    CT Head Without Contrast Stroke Protocol [[id number]] Collected: 02/28/21 1004     Updated: 02/28/21 1009    Narrative:      PROCEDURE: CT HEAD WO CONTRAST STROKE PROTOCOL-     HISTORY: Stroke, follow up     COMPARISON: MRI brain 10/02/2012.     TECHNIQUE: Multiple axial CT images were performed from the foramen  magnum to the vertex. Individualized dose reduction techniques using  automated exposure control or adjustment of mA and/or kV according to  the patient size were employed.      FINDINGS: There is mild, age-appropriate generalized cerebral atrophy  which has progressed since the prior exam. The ventricles are enlarged.  There is no evidence of edema or hemorrhage. Again noted is a area of  encephalomalacia in the left periventricular region consistent with  remote CVA; finding stable from prior exam. No masses are identified. No  extra-axial fluid is seen. The paranasal sinuses are unremarkable.       Impression:      Atrophy and remote left CVA without acute process.              This report was finalized on 2/28/2021 10:07 AM by Pina Blunt MD.        Echo:    Results for orders placed during the hospital encounter of 02/28/21   Adult Transthoracic Echo Complete W/ Cont if Necessary Per Protocol    Narrative 1.  Normal left ventricular size and systolic function, LVEF 60-65%.  2.   "Mild concentric LVH.  3.  Normal LV diastolic filling pattern.  4.  Normal left atrial volume index.  5.  Normal right ventricular size and systolic function.  6.  Mild calcification of aortic valve without significant stenosis.  7.  Trace MR, TR, and PI.  8.  Negative bubble study with no evidence of intracardiac or   intrapulmonary shunt.     Condition on Discharge:      Stable.    Code status during the hospital stay:    Code Status and Medical Interventions:   Ordered at: 02/28/21 1609     Code Status:    CPR     Medical Interventions (Level of Support Prior to Arrest):    Full     Discharge Disposition:    [place] [facility]    Discharge Medications:       Discharge Medications      New Medications      Instructions Start Date   amLODIPine 5 MG tablet  Commonly known as: NORVASC   5 mg, Oral, Every 24 Hours Scheduled   Start Date: March 4, 2021     ticagrelor 90 MG tablet tablet  Commonly known as: BRILINTA   90 mg, Oral, 2 Times Daily         Changes to Medications      Instructions Start Date   HYDROcodone-acetaminophen 5-325 MG per tablet  Commonly known as: NORCO  What changed:   · when to take this  · additional instructions   1 tablet, Oral, Every 8 Hours PRN         Continue These Medications      Instructions Start Date   aspirin 81 MG chewable tablet   Take 81 mg by mouth daily.      cyanocobalamin 1000 MCG/ML injection   1,000 mcg, Intramuscular      glipizide 5 MG tablet  Commonly known as: GLUCOTROL   5 mg, Oral, 2 Times Daily Before Meals, Only took for 2 days filled on 02/26/21       insulin aspart 100 UNIT/ML solution pen-injector sc pen  Commonly known as: novoLOG FLEXPEN   10 Units, Subcutaneous      isosorbide mononitrate 60 MG 24 hr tablet  Commonly known as: IMDUR   60 mg, Oral, Every Morning, Need lab work and follow up appt for further refills. Otherwise defer to PCP.      Jardiance 10 MG tablet  Generic drug: Empagliflozin   10 mg, Oral, Daily      [employer] Insulin Syringe 31G X 5/16\" 1 " "ML misc  Generic drug: Insulin Syringe-Needle U-100   1 each by Does not apply route daily      Insulin Syringe-Needle U-100 30G X 5/16\" 0.5 ML misc   1 each by Does not apply route daily      B-D INS SYRINGE 0.5CC/31GX5/16 31G X 5/16\" 0.5 ML misc  Generic drug: Insulin Syringe-Needle U-100   use as directed once daily      Levemir 100 UNIT/ML injection  Generic drug: insulin detemir   USE 70 UNITS INTO THE SKIN NIGHTLY      lisinopril 5 MG tablet  Commonly known as: PRINIVIL,ZESTRIL   5 mg, Daily      metFORMIN 1000 MG tablet  Commonly known as: GLUCOPHAGE   1,000 mg, Oral, 2 Times Daily With Meals      rosuvastatin 20 MG tablet  Commonly known as: CRESTOR   20 mg, Oral, Daily         Stop These Medications    clopidogrel 75 MG tablet  Commonly known as: PLAVIX     ibuprofen 600 MG tablet  Commonly known as: ADVIL,MOTRIN          Discharge Diet:     Diet Instructions     Diet: Consistent Carbohydrate, Cardiac, Dysphagia; Honey Thick Liquids; Mechanical Soft; Honey Thick      Discharge Diet:  Consistent Carbohydrate  Cardiac  Dysphagia       Fluid Consistency: Honey Thick Liquids    Pureed Options: Mechanical Soft    Fluid Consistency: Honey Thick        Activity at Discharge:     Activity Instructions     Activity as Tolerated          Follow-up Appointments:    Additional Instructions for the Follow-ups that You Need to Schedule     Ambulatory Referral to Home Health   As directed      Face to Face Visit Date: 3/2/2021    Follow-up provider for Plan of Care?: I treated the patient in an acute care facility and will not continue treatment after discharge.    Follow-up provider: GUADALUPE KHAN [[id number]]    Reason/Clinical Findings: Pontine CVA with dysphagia, HTN, DM2, CAD    Describe mobility limitations that make leaving home difficult: Generalized weakness, Fall risk    Nursing/Therapeutic Services Requested: Skilled Nursing Physical Therapy Occupational Therapy Speech Therapy    Skilled nursing orders: Medication " education COPD management Cardiopulmonary assessments Neurovascular assessments    PT orders: Therapeutic exercise Gait Training Transfer training Strengthening    Weight Bearing Status: As Tolerated    Occupational orders: Activities of daily living Strengthening    SLP orders: Dysphagia therapy Articulation Voice    Frequency: 1 Week 1            Contact information for follow-up providers     Romaine Eugene MD Follow up in 1 week(s).    Specialty: Internal Medicine  Contact information:  [address] [facility] [address]  [phone]                   Contact information for after-discharge care     Home Medical Care     [facility] [place] .    Service: Home Health Services  Contact information:  [address] [address]  [phone]                           Test Results Pending at Discharge:    None.       Darrion Wetzel MD  03/03/21  14:57 EST    Time: I spent 25 minutes on this discharge activity which included: face-to-face encounter with the patient, reviewing the data in the system, coordination of the care with the nursing staff as well as consultants, documentation, and entering orders.     Dictated utilizing Dragon dictation.

## 2021-03-03 NOTE — PROGRESS NOTES
Stroke Progress Note       Chief Complaint: Dysarthria and dysphagia    Subjective    Subjective     Subjective:  No acute issues overnight, patient feeling better than yesterday.    Review of Systems   Constitutional: Negative.   HENT: Positive for trouble swallowing and voice change.    Eyes: Negative.    Respiratory: Negative.    Cardiovascular: Negative.    Gastrointestinal: Negative.    Endocrine: Negative.    Genitourinary: Negative.    Musculoskeletal: Negative.    Skin: Negative.    Allergic/Immunologic: Negative.    Neurological: Positive for speech difficulty and weakness.   Hematological: Negative.    Psychiatric/Behavioral: Negative.               Objective    Objective      Temp:  [97.7 °F (36.5 °C)-98 °F (36.7 °C)] 97.9 °F (36.6 °C)  Heart Rate:  [50-61] 60  Resp:  [16-18] 16  BP: (144-179)/(53-63) 171/63    Neurological Exam  Mental Status  Awake, alert and oriented to person, place and time.Alert. Moderate dysarthria present. Language is fluent with no aphasia. Attention and concentration are normal. Fund of knowledge is appropriate for level of education.     Cranial Nerves  CN II: Visual fields full to confrontation.  CN III, IV, VI: Extraocular movements intact bilaterally. No nystagmus. Normal saccades. Pupils equal round and reactive to light bilaterally.  CN V:  Right: Diminished sensation of the entire right side of the face.  Left: Facial sensation is normal on the left.  CN VII:  Right: . Subtle right lower facial asymmetry.  Left: There is no facial weakness.  CN VIII: Equal hearing bilaterally.  CN IX, X:  Right: Palate is normal. Gag is  present.  Left: Palate is normal. Gag is present.  CN XI: Shoulder shrug strength is normal.  CN XII: Tongue midline without atrophy or fasciculations.     Motor  Normal muscle bulk throughout. No fasciculations present.  No obvious weakness on both sides, although subjectively patient feels weaker on the left side.  Patient has chronic left-sided  symptoms from her old stroke..     Sensory  Decreased to pinprick on the right upper and lower extremity.      Reflexes  Not assessed.     Coordination  No dysmetria in upper or lower extremities.     Gait  Not assessed.        Physical Exam  Vitals signs and nursing note reviewed.   Constitutional:       Appearance: Normal appearance.   HENT:      Head: Normocephalic and atraumatic.      Mouth/Throat:      Mouth: Mucous membranes are moist.      Pharynx: Oropharynx is clear.   Eyes:      Extraocular Movements: EOM normal. No nystagmus.      Conjunctiva/sclera: Conjunctivae normal.      Pupils: Pupils are equal, round, and reactive to light.   Neck:      Musculoskeletal: Normal range of motion and neck supple.   Cardiovascular:      Rate and Rhythm: Normal rate and regular rhythm.   Pulmonary:      Effort: Pulmonary effort is normal. No respiratory distress.   Neurological:      Mental Status: She is alert.      Cranial Nerves: Dysarthria present.   Psychiatric:         Mood and Affect: Mood normal.         Behavior: Behavior normal.     Results Review:    I reviewed the patient's new clinical results.  No new imaging, labs shows her BUNs/creatinine at 14/1.1, glucose of 171.  A1c was 7, LDL of 25, triglyceride of 197    Her 2D echocardiogram shows EF of 60 to 65%, normal left atrial size.          Assessment/Plan     Assessment/Plan:  71-year-old right-handed white female with known diagnosis of hypertension, diabetes, hyperlipidemia, smoking, coronary artery disease, stroke with residual left-sided weakness, comes in with worsening dysarthria, dysphagia, and left-sided weakness, and on exam found to have moderate to severe dysarthria, absent gag reflex, and mild right facial asymmetry, decreased sensation on the right side.        1. Right pontine stroke.    Likely lacunar in etiology.  Patient has been started on aspirin 81 mg and Brilinta 90 mg twice daily along with Crestor 20 mg daily for secondary stroke  prevention.  2. Essential hypertension.  Normal blood pressure goal.  Started on low-dose blood pressure medications.  3. Diabetes mellitus type 2 controlled with normoglycemia, with long-term use of insulin with no complications.  Her A1c is 7.  Sliding scale insulin for now.  Okay to continue her home medications.    4. Mixed hyperlipidemia.  Continue Crestor 20 mg daily, home dose.  Her LDL is 25, total cholesterol of 91, triglyceride of 197.  5. Acute kidney injury.  Her BUN and creatinine has continued to improve with IV fluids.  Hospitalist managing the same.  Likely prerenal.  6. Dysphagia.  Secondary to stroke.  Tolerating nectar thickened mechanical soft diet.  7. PT recommends home health, OT has signed off.  8. Smoking greater than 40 pack years.  Patient has a longstanding history of smoking, and is unsure if she will be able to quit.  She will try to quit.    Case was discussed with patient, nursing and the hospitalist.  All the risk factors were discussed, and appropriate education was given.  Thank you for the consult.  We will sign off for now, please call us with questions.  Possible discharge home today, if okay with hospitalist.        Demetris Miguel MD  03/03/21  09:47 EST    This was an audio and video enabled telemedicine encounter.

## 2021-03-03 NOTE — PLAN OF CARE
Goal Outcome Evaluation:  Plan of Care Reviewed With: patient  Progress: improving  Outcome Summary: VSS on RA. No acute events throughout the shift. Will continue to monitor

## 2021-03-03 NOTE — PROGRESS NOTES
Case Management Discharge Note    Spoke with Patricia Barrera at [facility].  Explained that the patient will be going home with stepdaughter for a day or so, but plans on being home on Friday evening.  [facility] said they will see the patient starting Saturday 3/6/21.        Provided Post Acute Provider List?: N/A  Provided Post Acute Provider Quality & Resource List?: N/A    Selected Continued Care - Admitted Since 2/28/2021         Home Medical Care Coordination complete    Service Provider Selected Services Address Phone Fax Patient Preferred    [facility] [place]  [facility] [address] [place] [address] [phone] --           Transportation Services  Private: Car    Final Discharge Disposition Code: 06 - home with home health care

## 2021-03-04 ENCOUNTER — READMISSION MANAGEMENT (OUTPATIENT)
Dept: CALL CENTER | Facility: HOSPITAL | Age: 71
End: 2021-03-04

## 2021-03-04 ENCOUNTER — CARE COORDINATION (OUTPATIENT)
Dept: CARE COORDINATION | Age: 71
End: 2021-03-04

## 2021-03-04 NOTE — OUTREACH NOTE
Prep Survey      Responses   [facility] patient discharged from?  [facility]   Is LACE score < 7 ?  No   Emergency Room discharge w/ pulse ox?  No   Eligibility  Readm Mgmt   Discharge diagnosis  Subacute right pontine ischemic stroke, present on admission, acute renal failure, COPD    Does the patient have one of the following disease processes/diagnoses(primary or secondary)?  Stroke (TIA)   Does the patient have Home health ordered?  Yes   What is the Home health agency?   [facility]   Is there a DME ordered?  No   Comments regarding appointments  Per AVS   Medication alerts for this patient  Stop Plavix, continue Aspirin, start Brilinta.  Medications per AVS.   Prep survey completed?  Yes          Judy Calero RN

## 2021-03-04 NOTE — CARE COORDINATION
Deepthi 45 Transitions Initial Follow Up Call    Call within 2 business days of discharge: Yes     Patient: Maru Bowling Patient : 1950 MRN: <[id number]>    Last Discharge [facility]       Complaint Diagnosis Description Type Department Provider    21 Dysphagia; Aphasia; Other Laryngitis . .. ED (DISCHARGE) [address] [place] [address] ED Marion Agarwal, [address] [facility] [address]  3/3/21  CVA       RARS: No data recorded     Spoke with: Attempting HFU call, unsuccessful. All numbers say not reachable. Unable to leave message.      Discharge department/facility: [address] [place] [address] services provided:  Scheduled appointment with PCP-Daily  Obtained and reviewed discharge summary and/or continuity of care documents    Follow Up  Future Appointments   Date Time Provider Kayla Roberts   3/10/2021 11:00 AM Cherelle Reilly, [address] [place] [address] Dr [facility]       Colin Murphy, RN

## 2021-03-05 ENCOUNTER — READMISSION MANAGEMENT (OUTPATIENT)
Dept: CALL CENTER | Facility: HOSPITAL | Age: 71
End: 2021-03-05

## 2021-03-05 NOTE — CARE COORDINATION
Deepthi 45 Transitions Initial Follow Up Call    Call within 2 business days of discharge: Yes     Patient: Luma Mcdowell Patient : 1950 MRN: <[id number]>    Last Discharge [facility]       Complaint Diagnosis Description Type Department Provider    21 Dysphagia; Aphasia; Other Laryngitis . .. ED (DISCHARGE) [facility] ED Evaristo Hendricks MD   DC  3/3/21   CVA       RARS: No data recorded     Spoke with: Attempting HFU, unsuccessful. Number not reachable.      Discharge department/facility: [address] [place] [address] services provided:  Obtained and reviewed discharge summary and/or continuity of care documents    Follow Up  Future Appointments   Date Time Provider Kayla Roberts   3/10/2021 11:00 AM Marcella Henderson, [address] FLORES MANE [facility]-[place]       Pool Del Rosario RN

## 2021-03-05 NOTE — OUTREACH NOTE
Stroke Week 1 Survey      Responses   [facility] patient discharged from?  [facility]   Does the patient have one of the following disease processes/diagnoses(primary or secondary)?  Stroke (TIA)   Week 1 attempt successful?  No   Unsuccessful attempts  Attempt 1          Eliane Johnson RN

## 2021-03-08 NOTE — CARE COORDINATION
[facility] 45 Transitions Initial Follow Up Call    Call within 2 business days of discharge: Yes     Patient: Anita Hall Patient : 1950 MRN: <[id number]>    Last Discharge [address] [address]       Complaint Diagnosis Description Type Department Provider    21 Dysphagia; Aphasia; Other Laryngitis . .. ED (DISCHARGE) [facility] ED Jarrell Mandel MD   [facility] 3/3/21  CVA       RARS: No data recorded     Spoke with: Attempting HFU call, able to leave a message today. Contact information provided.      Discharge department/facility: [facility]    Non-face-to-face services provided:  Scheduled appointment with PCP-Daily/ Bertha Osorio and reviewed discharge summary and/or continuity of care documents    Follow Up  Future Appointments   Date Time Provider Kayla Roberts   3/10/2021 11:00 AM Refugio Yancey, [address] FLORES MANESAVANNAH [facility]       Kaushik Goldsmith RN

## 2021-03-09 ENCOUNTER — READMISSION MANAGEMENT (OUTPATIENT)
Dept: CALL CENTER | Facility: HOSPITAL | Age: 71
End: 2021-03-09

## 2021-03-09 NOTE — OUTREACH NOTE
Stroke Week 1 Survey      Responses   [facility] patient [facility]   Does the patient have one of the following disease processes/diagnoses(primary or secondary)?  Stroke (TIA)   Week 1 attempt successful?  No   Unsuccessful attempts  Attempt 2          Celeste Gallagher RN

## 2021-03-10 ENCOUNTER — HOSPITAL ENCOUNTER (OUTPATIENT)
Facility: HOSPITAL | Age: 71
Discharge: HOME | End: 2021-03-10
Payer: MEDICARE

## 2021-03-10 ENCOUNTER — OFFICE VISIT (OUTPATIENT)
Dept: PRIMARY CARE CLINIC | Age: 71
End: 2021-03-10
Payer: MEDICARE

## 2021-03-10 ENCOUNTER — READMISSION MANAGEMENT (OUTPATIENT)
Dept: CALL CENTER | Facility: HOSPITAL | Age: 71
End: 2021-03-10

## 2021-03-10 VITALS
SYSTOLIC BLOOD PRESSURE: 160 MMHG | DIASTOLIC BLOOD PRESSURE: 80 MMHG | HEART RATE: 70 BPM | RESPIRATION RATE: 16 BRPM | OXYGEN SATURATION: 97 %

## 2021-03-10 DIAGNOSIS — Z09 HOSPITAL DISCHARGE FOLLOW-UP: ICD-10-CM

## 2021-03-10 DIAGNOSIS — E11.40 TYPE 2 DIABETES MELLITUS WITH DIABETIC NEUROPATHY, WITH LONG-TERM CURRENT USE OF INSULIN (HCC): ICD-10-CM

## 2021-03-10 DIAGNOSIS — E53.8 VITAMIN B12 DEFICIENCY: ICD-10-CM

## 2021-03-10 DIAGNOSIS — I69.391 DYSPHAGIA DUE TO RECENT STROKE: ICD-10-CM

## 2021-03-10 DIAGNOSIS — Z86.73 H/O: CVA (CEREBROVASCULAR ACCIDENT): ICD-10-CM

## 2021-03-10 DIAGNOSIS — I63.50 RIGHT PONTINE STROKE (HCC): ICD-10-CM

## 2021-03-10 DIAGNOSIS — Z09 HOSPITAL DISCHARGE FOLLOW-UP: Primary | ICD-10-CM

## 2021-03-10 DIAGNOSIS — I25.10 CORONARY ARTERY DISEASE INVOLVING NATIVE CORONARY ARTERY OF NATIVE HEART WITHOUT ANGINA PECTORIS: ICD-10-CM

## 2021-03-10 DIAGNOSIS — Z79.4 TYPE 2 DIABETES MELLITUS WITH DIABETIC NEUROPATHY, WITH LONG-TERM CURRENT USE OF INSULIN (HCC): ICD-10-CM

## 2021-03-10 DIAGNOSIS — M51.36 DEGENERATIVE DISC DISEASE, LUMBAR: Primary | ICD-10-CM

## 2021-03-10 PROCEDURE — 36415 COLL VENOUS BLD VENIPUNCTURE: CPT

## 2021-03-10 PROCEDURE — 96372 THER/PROPH/DIAG INJ SC/IM: CPT | Performed by: NURSE PRACTITIONER

## 2021-03-10 PROCEDURE — 1111F DSCHRG MED/CURRENT MED MERGE: CPT | Performed by: NURSE PRACTITIONER

## 2021-03-10 PROCEDURE — 99215 OFFICE O/P EST HI 40 MIN: CPT | Performed by: NURSE PRACTITIONER

## 2021-03-10 RX ORDER — CYANOCOBALAMIN 1000 UG/ML
1000 INJECTION INTRAMUSCULAR; SUBCUTANEOUS ONCE
Status: COMPLETED | OUTPATIENT
Start: 2021-03-10 | End: 2021-03-10

## 2021-03-10 RX ORDER — AMLODIPINE BESYLATE 5 MG/1
5 TABLET ORAL
Status: ON HOLD | COMMUNITY
Start: 2021-03-04 | End: 2021-11-02

## 2021-03-10 RX ORDER — GLUCOSAMINE HCL/CHONDROITIN SU 500-400 MG
CAPSULE ORAL
Qty: 100 STRIP | Refills: 5 | Status: SHIPPED | OUTPATIENT
Start: 2021-03-10

## 2021-03-10 RX ORDER — HYDROCODONE BITARTRATE AND ACETAMINOPHEN 5; 325 MG/1; MG/1
1 TABLET ORAL 2 TIMES DAILY PRN
Qty: 30 TABLET | Refills: 0 | Status: SHIPPED | OUTPATIENT
Start: 2021-03-10 | End: 2021-04-13 | Stop reason: SDUPTHER

## 2021-03-10 RX ADMIN — CYANOCOBALAMIN 1000 MCG: 1000 INJECTION INTRAMUSCULAR; SUBCUTANEOUS at 12:02

## 2021-03-10 NOTE — OUTREACH NOTE
Stroke Week 1 Survey      Responses   [facility] patient discharged from?  [facility]   Does the patient have one of the following disease processes/diagnoses(primary or secondary)?  Stroke (TIA)   Week 1 attempt successful?  Yes   Call start time  0817   Call end time  0821   Discharge diagnosis  Subacute right pontine ischemic stroke, present on admission, acute renal failure, COPD    Is patient permission given to speak with other caregiver?  No   Meds reviewed with patient/caregiver?  Yes   Is the patient having any side effects they believe may be caused by any medication additions or changes?  No   Does the patient have all medications ordered at discharge?  Yes   Is the patient taking all medications as directed (includes completed medication regime)?  Yes   Does the patient have a primary care provider?   Yes   Does the patient have an appointment with their PCP within 7 days of discharge?  Yes   Comments regarding PCP  PCP Dr Eugene. Patient has appt today at 11am   Has the patient kept scheduled appointments due by today?  N/A   What is the Home health agency?   [facility]   Has home health visited the patient within 72 hours of discharge?  Yes   Psychosocial issues?  No   Does the patient require any assistance with activities of daily living such as eating, bathing, dressing, walking, etc.?  No   Does the patient have any residual symptoms from stroke/TIA?  Yes   Residual symptoms comments  Patient reports more difficulty with speech when she gets tired.    Does the patient understand the diet ordered at discharge?  Yes [Honey thick liquids. ]   Did the patient receive a copy of their discharge instructions?  Yes   Nursing interventions  Reviewed instructions with patient   What is the patient's perception of their health status since discharge?  Improving   Nursing interventions  Nurse provided patient education   Is the patient able to teach back FAST for Stroke?  Yes   Is the  patient/caregiver able to teach back the risk factors for a stroke?  High blood pressure-goal below 120/80   Is the patient/caregiver able to teach back signs and symptoms related to disease process for when to call PCP?  Yes   Is the patient/caregiver able to teach back signs and symptoms related to disease process for when to call 911?  Yes   Is the patient/caregiver able to teach back the hierarchy of who to call/visit for symptoms/problems? PCP, Specialist, Home health nurse, Urgent Care, ED, 911  Yes   Week 1 call completed?  Yes          Yamilka Russell RN

## 2021-03-10 NOTE — PROGRESS NOTES
Post-Discharge Transitional Care Management Services or Hospital Follow Up      Prakash Mcclure   YOB: 1950    Date of Office Visit:  3/10/2021  Date of Hospital Admission: 2/28/21 [facility]  Date of Hospital Discharge: 3/03/21  Risk of hospital readmission (high >=14%.  Medium >=10%) :No data recorded    Care management risk score Rising risk (score 2-5) and Complex Care (Scores >=6): 6     Non face to face  following discharge, date last encounter closed (first attempt may have been earlier): 3/8/2021 10:21 AM    Call initiated 2 business days of discharge: Yes    Patient Active Problem List   Diagnosis    CAD (coronary artery disease)    [facility]    H/O: CVA (cerebrovascular accident)    Tobacco abuse    Vitamin B12 deficiency    Essential hypertension    Type 2 diabetes mellitus with diabetic neuropathy ([facility]    History of colon polyps    Status post bilateral knee replacements    Degenerative disc disease, lumbar    Family history of breast cancer    Abnormal CT of the chest    Thoracic aortic aneurysm without rupture ([facility] [facility]    Anxiety       Allergies   Allergen Reactions    Codeine        Medications listed as ordered at the time of discharge from hospital   Fani [address] Medication Instructions STONEY:    Printed on:03/10/21 1055   Medication Information                      aspirin 81 MG chewable tablet  Take 1 tablet by mouth daily             blood glucose monitor strips  QID Dx E11.9             clopidogrel (PLAVIX) 75 MG tablet  Take 1 tablet by mouth daily             Cyanocobalamin 2500 MCG SUBL  Place 2,500 mcg under the tongue daily             fluticasone (FLONASE) 50 MCG/ACT nasal spray  1 spray by Each Nostril route daily 1 Spray in each nostril             furosemide (LASIX) 20 MG tablet  Take 1 tablet by mouth daily as needed (swelling)             glipiZIDE (GLUCOTROL) 5 MG tablet  Take 1 tablet by mouth 2 times daily for 2 days             glucose monitoring kit (FREESTYLE) monitoring kit  1 kit by Does not apply route daily E11.40             HYDROcodone-acetaminophen (NORCO) 5-325 MG per tablet  Take 1 tablet by mouth 2 times daily as needed for Pain for up to 30 days. insulin detemir (LEVEMIR FLEXTOUCH) 100 UNIT/ML injection pen  INJECT 60 UNITS INTO THE SKIN NIGHTLY 340B 90 day supply.              Insulin Syringe-Needle U-100 (B-D INS SYR ULTRAFINE 1CC/31G) 31G X 5/16\" 1 ML MISC  USE ONCE A DAY AS DIRECTED  DX E11.9             isosorbide mononitrate (IMDUR) 60 MG extended release tablet  take 1 tablet by mouth once daily             JARDIANCE 10 MG tablet  TAKE 1 TABLET BY MOUTH DAILY             lisinopril (PRINIVIL;ZESTRIL) 5 MG tablet  TAKE 1 TABLET BY MOUTH DAILY             loratadine (CLARITIN) 10 MG tablet  Take 1 tablet by mouth daily             metFORMIN (GLUCOPHAGE) 1000 MG tablet  take 1 tablet by mouth twice a day with food             nitroGLYCERIN (NITROSTAT) 0.4 MG SL tablet  Place 1 tablet under the tongue every 5 minutes as needed for Chest pain             rosuvastatin (CRESTOR) 20 MG tablet  TAKE 1 TABLET BY MOUTH ONCE DAILY             TRADJENTA 5 MG tablet  TAKE 1 TABLET BY MOUTH EVERY DAY             umeclidinium-vilanterol (ANORO ELLIPTA) 62.5-25 MCG/INH AEPB inhaler  Inhale 1 puff into the lungs daily                   Medications marked \"taking\" at this time  Outpatient Medications Marked as Taking for the 3/10/21 encounter (Office Visit) with DAVIDA Kumar - CNP   Medication Sig Dispense Refill    amLODIPine (NORVASC) 5 MG tablet Take 5 mg by mouth      ticagrelor (BRILINTA) 90 MG TABS tablet Take 90 mg by mouth 2 times daily      blood glucose monitor strips QID Dx E11.9 100 strip 5    rosuvastatin (CRESTOR) 20 MG tablet TAKE 1 TABLET BY MOUTH ONCE DAILY 120 tablet 0    lisinopril (PRINIVIL;ZESTRIL) 5 MG tablet TAKE 1 TABLET BY MOUTH DAILY 90 tablet 1    [DISCONTINUED] HYDROcodone-acetaminophen (NORCO) 5-325 MG per tablet Take 1 tablet by mouth 2 times daily as needed for Pain for up to 30 days. 30 tablet 0    metFORMIN (GLUCOPHAGE) 1000 MG tablet take 1 tablet by mouth twice a day with food 60 tablet 3    isosorbide mononitrate (IMDUR) 60 MG extended release tablet take 1 tablet by mouth once daily 30 tablet 2    JARDIANCE 10 MG tablet TAKE 1 TABLET BY MOUTH DAILY 30 tablet 3    insulin detemir (LEVEMIR FLEXTOUCH) 100 UNIT/ML injection pen INJECT 60 UNITS INTO THE SKIN NIGHTLY 340B 90 day supply. 20 pen 1    glucose monitoring kit (FREESTYLE) monitoring kit 1 kit by Does not apply route daily E11.40 1 kit 0    loratadine (CLARITIN) 10 MG tablet Take 1 tablet by mouth daily 30 tablet 1    aspirin 81 MG chewable tablet Take 1 tablet by mouth daily 30 tablet 2    Insulin Syringe-Needle U-100 (B-D INS SYR ULTRAFINE 1CC/31G) 31G X 5/16\" 1 ML MISC USE ONCE A DAY AS DIRECTED  DX E11.9 100 each 3      Medications patient taking as of now reconciled against medications ordered at time of hospital discharge: YES        Chief Complaint   Patient presents with    Follow-Up from Hospital     History of Present illness - Follow up of Hospital diagnosis(es): Dysphagia due to right pascual stroke    Pt admitted to [facility] 2/28/2021 for worsening dysphagia, weakness for 2 days. PMH CVA, HTN, DM.  MRI 3/1/21 showed eary subacute lacunar infarct right pascual which explains dysphagia per neuro note review. Diagnosed dysphagia due to subacute right pontine ischemic stroke which was present on admission and wasn't TPA candidate. Discharge summary reviewed.       Patient had previous CVA with left sided weakness prior to this stroke, During hospital stay, patient Recevied PT, OT, speech therapy and improved with strength, mobility, speech but still chronic left sided weakness and new disarthria, dysphagia Speech modified swallow eval showed no aspiration but recommended [facility] soft diet, honey thick liquids. Some acute renal failure during stay but resolved; see further changes below. Date of Discharge 3/3/2021. Quick updates from hospitalization:  -Some uncontrolled HTN and norvasc added to regimen. ECHO 2/28/2021 EF 60-65%. -Plavix discontinued and started on Brilinta per neuro. Dr Carlotta Malin neuro. -She is to cont asa 81mg.   -Home health following at home with therapies.  -On aspiration precautions and has been following mech soft, honey thick liquid diet. Today, patient reports she is getting stronger. Still having some left sided weakness but ambulating on own; still having some slurred speech but improving as well per patient. Reports PT, speech therapy have already been to home once for therapy and coming again today. Pt cooperating and wanting to get stronger. She is positive and reports her laya keeps her strong. Has  at home for support. No worsening of symptoms. Taking all meds as directed; has not taken home meds yet today and -no CP, SOB. Reports SBP 140s at home. Following cardiac diet, low sodium, mech soft, honey thick liquid diet. Denies choking or worsening dysphagia. Some dysphagia still present but improved and not worsening per patient. No worsening unilateral weakness or headaches. Denies dysphoric mood. She is excited about therapy and getting stronger. She needs refill on glucose strips, needs labs and Vit B12 injection today. Inpatient course: Discharge summary reviewed- see chart.   Interval history/Current status: stable      ROS  Constitutional:  Denies fever or chills   Eyes:  Denies eye pain or redness  HENT:  Denies nasal congestion or sore throat +improving dysphagia  Respiratory:  Denies cough or shortness of breath   Cardiovascular:  Denies chest pain or edema   GI:  Denies abdominal pain, nausea, vomiting, bloody stools or diarrhea   Musculoskeletal:  Denies acute neck pain or body aches  Integument:  Denies rash or itching  Neurologic:  Denies headache, dizziness, numbness, tingling +chronic left side weakness weakness  Psychiatric:  Denies acute depression or acute anxiety        Vitals:    03/10/21 1110 03/10/21 1125   BP: (!) 160/50 (!) 160/80   Site: Right Upper Arm Right Upper Arm   Position: Sitting    Pulse: 70    Resp: 16    SpO2: 97%      There is no height or weight on file to calculate BMI. Wt Readings from Last 3 Encounters:   02/26/21 150 lb (68 kg)   12/03/20 155 lb 9.6 oz (70.6 kg)   03/10/20 145 lb 12.8 oz (66.1 kg)     BP Readings from Last 3 Encounters:   03/10/21 (!) 160/80   02/26/21 (!) 143/56   12/03/20 136/60        Physical Exam:  Constitutional:  Well developed, well nourished, no acute distress. Pleasant, cooperative  Eyes:  PERRL, no scleral icterus, conjunctiva normal   HENT:  Atraumatic, external ears normal, nose normal, oropharynx moist, no pharyngeal exudates. Neck- supple, no JVD, no lymphadenopathy  Respiratory:  No respiratory distress, no wheezing, rales or rhonchi detected  Cardiovascular:  Normal rate, normal rhythm, no murmurs, no gallops, no rubs, no edema   GI:  Soft, nondistended, normal bowel sounds, nontender, no voluntary guarding  Musculoskeletal:  No cyanosis or obvious acute deformity. Moving all extremities. Steady gait. Integument:  Warm and dry. Neurologic:  Alert & oriented x 3. Left  weaker than right; slight left sided weakness 4/5 muscle strength (chronic from previous CVA). Right facial asymmetry present (baseline). Psychiatric:  Speech and behavior appropriate. Disarthria noted. No aphasia. Radiology:    MRI Brain 3/1/21:  Impression:   1. Early subacute lacunar infarct right pascual. 2. No hemorrhage. 3. Moderate atrophy and chronic microvascular changes. Assessment/Plan:  1. Hospital discharge follow-up  S/p right CVA. Continue current regimens. Cont therapies. Labs today, will follow. F/u all specialists as scheduled. If S&S return, go to ED. F/u PCP as scheduled.  Pt verbalized understanding. 2. Right pontine stroke ([facility] [address]  3. Dysphagia due to recent stroke  4. H/O: CVA (cerebrovascular accident)  Cont brillinta, asa, and current meds as directed. Aspiration precautions. Mech soft, honey thicken liquid diet. Cont therapies. F/u as scheduled. See 1    5. Coronary artery disease involving native coronary artery of native heart without angina pectoris  No CP, Cont current regimens. 6. Vitamin B12 deficiency  Injection today. 7. Type 2 diabetes mellitus with diabetic neuropathy, with long-term current use of insulin (HCC)  Refill strips. Cont current regimen. Diabetic diet. Monitor close. F/u PRN.          Medical Decision Making: moderate complexity

## 2021-03-10 NOTE — TELEPHONE ENCOUNTER
Dr Wilson Kwno:    Seen today for HFU for stroke right pascual causing dysphagia. Requesting refills on her norco. No concerns. Thanks.

## 2021-03-10 NOTE — PROGRESS NOTES
Chief Complaint   Patient presents with    Follow-Up from Hospital     Pt here today for hospital f/u following stroke    Have you seen any other physician or provider since your last visit yes - [facility]    Have you had any other diagnostic tests since your last visit? yes - labs     Have you changed or stopped any medications since your last visit?  yes no

## 2021-03-11 NOTE — PROGRESS NOTES
Case Management Discharge Note      Final Note: code changed due to pt not seen unitl 3/8    Provided Post Acute Provider List?: N/A  Provided Post Acute Provider Quality & Resource List?: N/A    Selected Continued Care - Discharged on 3/3/2021 Admission date: 2/28/2021 - Discharge disposition: Home-Health Care Svc    Destination    No services have been selected for the patient.              Durable Medical Equipment    No services have been selected for the patient.              Dialysis/Infusion    No services have been selected for the patient.              Home Medical Care Coordination complete    Service Provider Selected Services Address Phone Fax Patient Preferred    [facility] [place]  [facility] [address] [place] [address] [phone] --          Therapy    No services have been selected for the patient.              Community Resources    No services have been selected for the patient.                  Transportation Services  Private: Car    Final Discharge Disposition Code: 01 - home or self-care

## 2021-03-17 ENCOUNTER — READMISSION MANAGEMENT (OUTPATIENT)
Dept: CALL CENTER | Facility: HOSPITAL | Age: 71
End: 2021-03-17

## 2021-03-17 ENCOUNTER — TELEPHONE (OUTPATIENT)
Dept: PRIMARY CARE CLINIC | Age: 71
End: 2021-03-17

## 2021-03-17 RX ORDER — LINAGLIPTIN 5 MG/1
TABLET, FILM COATED ORAL
Qty: 120 TABLET | Refills: 0 | Status: SHIPPED | OUTPATIENT
Start: 2021-03-17 | End: 2021-06-10

## 2021-03-17 NOTE — OUTREACH NOTE
Stroke Week 2 Survey      Responses   [facility] patient discharged from?  [facility]   Does the patient have one of the following disease processes/diagnoses(primary or secondary)?  Stroke (TIA)   Week 2 attempt successful?  No   Unsuccessful attempts  Attempt 1          Anaya Aldana RN

## 2021-03-17 NOTE — TELEPHONE ENCOUNTER
Called pt regarding her insulin. Pt did not answer and I left a message for her to call me back at [phone].     Romario Trujillo, JeffreyD

## 2021-03-18 ENCOUNTER — READMISSION MANAGEMENT (OUTPATIENT)
Dept: CALL CENTER | Facility: HOSPITAL | Age: 71
End: 2021-03-18

## 2021-03-18 NOTE — OUTREACH NOTE
Stroke Week 2 Survey      Responses   [facility] patient discharged from?  [facility]   Does the patient have one of the following disease processes/diagnoses(primary or secondary)?  Stroke (TIA)   Week 2 attempt successful?  Yes   Call start time  1530   Call end time  1533   Discharge diagnosis  Subacute right pontine ischemic stroke, present on admission, acute renal failure, COPD    Meds reviewed with patient/caregiver?  Yes   Is the patient having any side effects they believe may be caused by any medication additions or changes?  No   Does the patient have all medications ordered at discharge?  Yes   Is the patient taking all medications as directed (includes completed medication regime)?  Yes   Does the patient have a primary care provider?   Yes   Does the patient have an appointment with their PCP within 7 days of discharge?  Yes   Has the patient kept scheduled appointments due by today?  Yes   What is the Home health agency?   [facility]   Has home health visited the patient within 72 hours of discharge?  Yes   Psychosocial issues?  No   Does the patient require any assistance with activities of daily living such as eating, bathing, dressing, walking, etc.?  No   Does the patient have any residual symptoms from stroke/TIA?  Yes   Residual symptoms comments  Working with speech therapy to improve her voice.   Does the patient understand the diet ordered at discharge?  Yes   Did the patient receive a copy of their discharge instructions?  Yes   Nursing interventions  Reviewed instructions with patient   What is the patient's perception of their health status since discharge?  Improving   Nursing interventions  Nurse provided patient education   Is the patient able to teach back FAST for Stroke?  Yes   Is the patient/caregiver able to teach back the risk factors for a stroke?  High blood pressure-goal below 120/80   Is the patient/caregiver able to teach back signs and symptoms related  to disease process for when to call PCP?  Yes   Is the patient/caregiver able to teach back signs and symptoms related to disease process for when to call 911?  Yes   If the patient is a current smoker, are they able to teach back resources for cessation?  [id number]   Is the patient/caregiver able to teach back the hierarchy of who to call/visit for symptoms/problems? PCP, Specialist, Home health nurse, Urgent Care, ED, 911  Yes   Additional teach back comments  /62, walking around, encouraged smoking cessasation.   Week 2 call completed?  Yes   Wrap up additional comments  Encouraged voice practice.          Eliane Johnson RN

## 2021-03-24 ENCOUNTER — READMISSION MANAGEMENT (OUTPATIENT)
Dept: CALL CENTER | Facility: HOSPITAL | Age: 71
End: 2021-03-24

## 2021-03-24 NOTE — OUTREACH NOTE
Stroke Week 3 Survey      Responses   [facility] patient discharged from?  [facility]   Does the patient have one of the following disease processes/diagnoses(primary or secondary)?  Stroke (TIA)   Week 3 attempt successful?  Yes   Call start time  1625   Call end time  1626   Discharge diagnosis  Subacute right pontine ischemic stroke, present on admission, acute renal failure, COPD    Meds reviewed with patient/caregiver?  Yes   Is the patient having any side effects they believe may be caused by any medication additions or changes?  No   Does the patient have all medications ordered at discharge?  Yes   Is the patient taking all medications as directed (includes completed medication regime)?  Yes   Does the patient have a primary care provider?   Yes   Has the patient kept scheduled appointments due by today?  Yes   Psychosocial issues?  No   Does the patient require any assistance with activities of daily living such as eating, bathing, dressing, walking, etc.?  No   Does the patient have any residual symptoms from stroke/TIA?  Yes   Does the patient understand the diet ordered at discharge?  Yes   Did the patient receive a copy of their discharge instructions?  Yes   Nursing interventions  Reviewed instructions with patient, Educated on MyChart   What is the patient's perception of their health status since discharge?  Improving   Nursing interventions  Nurse provided patient education   Is the patient able to teach back FAST for Stroke?  Yes   Is the patient/caregiver able to teach back the risk factors for a stroke?  High blood pressure-goal below 120/80, Smoking, Diabetes, High Cholesterol, Physical inactivity and obesity   Is the patient/caregiver able to teach back signs and symptoms related to disease process for when to call PCP?  Yes   Is the patient/caregiver able to teach back signs and symptoms related to disease process for when to call 911?  Yes   If the patient is a current smoker, are they  able to teach back resources for cessation?  Smoking cessation medications   Is the patient/caregiver able to teach back the hierarchy of who to call/visit for symptoms/problems? PCP, Specialist, Home health nurse, Urgent Care, ED, 911  Yes   Week 3 call completed?  Yes          Chapis Thompson RN

## 2021-04-01 ENCOUNTER — READMISSION MANAGEMENT (OUTPATIENT)
Dept: CALL CENTER | Facility: HOSPITAL | Age: 71
End: 2021-04-01

## 2021-04-01 ENCOUNTER — TELEPHONE (OUTPATIENT)
Dept: PRIMARY CARE CLINIC | Age: 71
End: 2021-04-01

## 2021-04-01 NOTE — TELEPHONE ENCOUNTER
If having any alarming signs or symptoms like abdominal pain fever etc. then she need to go to the emergency room. Not sure exactly what she has been taking. She can try MiraLAX, milk of magnesia, enema, suppositories. ..

## 2021-04-01 NOTE — TELEPHONE ENCOUNTER
Angy Gerber with [facility] called to reprt that pt has not had bowel movement in 1 week, also reports very minimal npwel sounds in upper quads.  Asking for advice [phone]

## 2021-04-01 NOTE — OUTREACH NOTE
Stroke Week 4 Survey      Responses   [facility]?  [facility]   Does the patient have one of the following disease processes/diagnoses(primary or secondary)?  Stroke (TIA)   Week 4 attempt successful?  No          Anaya Aldana RN

## 2021-04-06 ENCOUNTER — TELEPHONE (OUTPATIENT)
Dept: PRIMARY CARE CLINIC | Age: 71
End: 2021-04-06

## 2021-04-06 DIAGNOSIS — I69.391 DYSPHAGIA DUE TO OLD STROKE: Primary | ICD-10-CM

## 2021-04-06 NOTE — TELEPHONE ENCOUNTER
Omaira with [facility] called requesting that you order a Modified Barium Swallow to check progress to see if she still needs to still be on thickened liquids.  Also reports bp of 158/80 today  [phone]

## 2021-04-12 RX ORDER — ISOSORBIDE MONONITRATE 60 MG/1
TABLET, EXTENDED RELEASE ORAL
Qty: 30 TABLET | Refills: 2 | Status: SHIPPED | OUTPATIENT
Start: 2021-04-12

## 2021-04-13 ENCOUNTER — OFFICE VISIT (OUTPATIENT)
Dept: PRIMARY CARE CLINIC | Age: 71
End: 2021-04-13
Payer: MEDICARE

## 2021-04-13 VITALS
SYSTOLIC BLOOD PRESSURE: 138 MMHG | TEMPERATURE: 97.9 F | RESPIRATION RATE: 18 BRPM | OXYGEN SATURATION: 96 % | BODY MASS INDEX: 25.85 KG/M2 | HEART RATE: 100 BPM | WEIGHT: 150.6 LBS | DIASTOLIC BLOOD PRESSURE: 60 MMHG

## 2021-04-13 DIAGNOSIS — E11.40 TYPE 2 DIABETES MELLITUS WITH DIABETIC NEUROPATHY, WITH LONG-TERM CURRENT USE OF INSULIN (HCC): ICD-10-CM

## 2021-04-13 DIAGNOSIS — M51.36 DEGENERATIVE DISC DISEASE, LUMBAR: ICD-10-CM

## 2021-04-13 DIAGNOSIS — I10 ESSENTIAL HYPERTENSION: ICD-10-CM

## 2021-04-13 DIAGNOSIS — E53.8 VITAMIN B12 DEFICIENCY: ICD-10-CM

## 2021-04-13 DIAGNOSIS — D64.9 ANEMIA, UNSPECIFIED TYPE: ICD-10-CM

## 2021-04-13 DIAGNOSIS — Z79.4 TYPE 2 DIABETES MELLITUS WITH DIABETIC NEUROPATHY, WITH LONG-TERM CURRENT USE OF INSULIN (HCC): ICD-10-CM

## 2021-04-13 DIAGNOSIS — I63.50 RIGHT PONTINE STROKE (HCC): Primary | ICD-10-CM

## 2021-04-13 DIAGNOSIS — I69.391 DYSPHAGIA DUE TO RECENT STROKE: ICD-10-CM

## 2021-04-13 PROCEDURE — 99213 OFFICE O/P EST LOW 20 MIN: CPT | Performed by: INTERNAL MEDICINE

## 2021-04-13 PROCEDURE — 3051F HG A1C>EQUAL 7.0%<8.0%: CPT | Performed by: INTERNAL MEDICINE

## 2021-04-13 PROCEDURE — 1123F ACP DISCUSS/DSCN MKR DOCD: CPT | Performed by: INTERNAL MEDICINE

## 2021-04-13 PROCEDURE — G8417 CALC BMI ABV UP PARAM F/U: HCPCS | Performed by: INTERNAL MEDICINE

## 2021-04-13 PROCEDURE — 2022F DILAT RTA XM EVC RTNOPTHY: CPT | Performed by: INTERNAL MEDICINE

## 2021-04-13 PROCEDURE — 96372 THER/PROPH/DIAG INJ SC/IM: CPT | Performed by: INTERNAL MEDICINE

## 2021-04-13 PROCEDURE — 1090F PRES/ABSN URINE INCON ASSESS: CPT | Performed by: INTERNAL MEDICINE

## 2021-04-13 PROCEDURE — G8427 DOCREV CUR MEDS BY ELIG CLIN: HCPCS | Performed by: INTERNAL MEDICINE

## 2021-04-13 PROCEDURE — G8400 PT W/DXA NO RESULTS DOC: HCPCS | Performed by: INTERNAL MEDICINE

## 2021-04-13 PROCEDURE — 4040F PNEUMOC VAC/ADMIN/RCVD: CPT | Performed by: INTERNAL MEDICINE

## 2021-04-13 PROCEDURE — 1036F TOBACCO NON-USER: CPT | Performed by: INTERNAL MEDICINE

## 2021-04-13 PROCEDURE — 3017F COLORECTAL CA SCREEN DOC REV: CPT | Performed by: INTERNAL MEDICINE

## 2021-04-13 RX ORDER — HYDROCODONE BITARTRATE AND ACETAMINOPHEN 5; 325 MG/1; MG/1
1 TABLET ORAL 2 TIMES DAILY PRN
Qty: 30 TABLET | Refills: 0 | Status: SHIPPED | OUTPATIENT
Start: 2021-04-13 | End: 2021-05-07 | Stop reason: SDUPTHER

## 2021-04-13 RX ORDER — CYANOCOBALAMIN 1000 UG/ML
1000 INJECTION INTRAMUSCULAR; SUBCUTANEOUS ONCE
Status: COMPLETED | OUTPATIENT
Start: 2021-04-13 | End: 2021-04-13

## 2021-04-13 RX ORDER — GABAPENTIN 100 MG/1
100 CAPSULE ORAL NIGHTLY
Qty: 90 CAPSULE | Refills: 1 | Status: SHIPPED | OUTPATIENT
Start: 2021-04-13 | End: 2021-06-16

## 2021-04-13 RX ADMIN — CYANOCOBALAMIN 1000 MCG: 1000 INJECTION INTRAMUSCULAR; SUBCUTANEOUS at 15:46

## 2021-04-13 ASSESSMENT — PATIENT HEALTH QUESTIONNAIRE - PHQ9
SUM OF ALL RESPONSES TO PHQ QUESTIONS 1-9: 0
SUM OF ALL RESPONSES TO PHQ9 QUESTIONS 1 & 2: 0

## 2021-04-13 ASSESSMENT — ENCOUNTER SYMPTOMS
TROUBLE SWALLOWING: 1
COUGH: 1
SORE THROAT: 0
VOMITING: 0
NAUSEA: 0
EYE REDNESS: 0
SHORTNESS OF BREATH: 1
EYE DISCHARGE: 0
RHINORRHEA: 0
VOICE CHANGE: 1
SINUS PRESSURE: 0
CONSTIPATION: 0
WHEEZING: 1
ABDOMINAL PAIN: 0
BACK PAIN: 1
EYE ITCHING: 0
DIARRHEA: 0

## 2021-04-13 NOTE — PROGRESS NOTES
Chief Complaint   Patient presents with    Hypertension    Other     recently had stroke    Diabetes      fs-   levemir 56-60 units nightly    Have you seen any other physician or provider since your last visit yes -  stroke , [facility] [facility] for HFU     Have you had any other diagnostic tests since your last visit? no    Have you changed or stopped any medications since your last visit?  Pharmacy wouldn't fill brilinta she needs refill she was put on it from [address] discharge

## 2021-04-13 NOTE — PROGRESS NOTES
BY MOUTH ONCE DAILY 120 tablet 0    lisinopril (PRINIVIL;ZESTRIL) 5 MG tablet TAKE 1 TABLET BY MOUTH DAILY 90 tablet 1    metFORMIN (GLUCOPHAGE) 1000 MG tablet take 1 tablet by mouth twice a day with food 60 tablet 3    JARDIANCE 10 MG tablet TAKE 1 TABLET BY MOUTH DAILY 30 tablet 3    insulin detemir (LEVEMIR FLEXTOUCH) 100 UNIT/ML injection pen INJECT 60 UNITS INTO THE SKIN NIGHTLY 340B 90 day supply. 20 pen 1    glucose monitoring kit (FREESTYLE) monitoring kit 1 kit by Does not apply route daily E11.40 1 kit 0    umeclidinium-vilanterol (ANORO ELLIPTA) 62.5-25 MCG/INH AEPB inhaler Inhale 1 puff into the lungs daily      aspirin 81 MG chewable tablet Take 1 tablet by mouth daily 30 tablet 2    nitroGLYCERIN (NITROSTAT) 0.4 MG SL tablet Place 1 tablet under the tongue every 5 minutes as needed for Chest pain 25 tablet 1    Insulin Syringe-Needle U-100 (B-D INS SYR ULTRAFINE 1CC/31G) 31G X 5/16\" 1 ML MISC USE ONCE A DAY AS DIRECTED  DX E11.9 100 each 3        Review of Systems   Constitutional: Negative for chills, fatigue, fever and unexpected weight change. HENT: Positive for trouble swallowing and voice change. Negative for congestion, ear pain, rhinorrhea, sinus pressure and sore throat. Eyes: Negative for discharge, redness, itching and visual disturbance. Respiratory: Positive for cough, shortness of breath and wheezing. At baseline    Cardiovascular: Negative for chest pain, palpitations and leg swelling. CAZARES   Gastrointestinal: Negative for abdominal pain, constipation, diarrhea, nausea and vomiting. Endocrine: Negative for cold intolerance and heat intolerance. Genitourinary: Negative for dysuria, frequency and urgency. Musculoskeletal: Positive for arthralgias and back pain. Negative for joint swelling. Skin: Negative for rash and wound. Neurological: Negative for dizziness, syncope, weakness, numbness and headaches. Hematological: Negative.   Negative for adenopathy. Psychiatric/Behavioral: Positive for sleep disturbance. Negative for agitation and dysphoric mood. The patient is nervous/anxious. Past Medical History:   Diagnosis Date    CAD (coronary artery disease)     [facility]     Cystic fibrosis [facility]     Diabetes mellitus ([facility]     GERD (gastroesophageal reflux disease)     H/O: CVA (cardiovascular accident)     HTN (hypertension)     Mass     on chest     Tobacco abuse     Vitamin B12 deficiency      Past Surgical History:   Procedure Laterality Date    CARPAL TUNNEL RELEASE Bilateral     CHOLECYSTECTOMY      CORONARY ANGIOPLASTY WITH STENT PLACEMENT      HYSTERECTOMY      JOINT REPLACEMENT Bilateral 10/15/2016    knees    TOTAL KNEE ARTHROPLASTY [facility]18/2016    [facility]      Family History   Problem Relation Age of Onset    Diabetes Mother     High Blood Pressure Mother     Cancer Mother         pancreatic    Diabetes Father     Heart Disease Father     High Blood Pressure Father     Cancer Sister         lung, breast    Cancer Brother         throat      Social History     Tobacco Use   Smoking Status Former Smoker    Packs/day: 1.00    Years: 40.00    Pack years: 40.00    Types: Cigarettes   Smokeless Tobacco Never Used   Tobacco Comment    states she is not willing to stop and this is a stress reliever for her. OBJECTIVE:   Wt Readings from Last 3 Encounters:   04/13/21 150 lb 9.6 oz (68.3 kg)   02/26/21 150 lb (68 kg)   12/03/20 155 lb 9.6 oz (70.6 kg)     BP Readings from Last 3 Encounters:   04/13/21 138/60   03/10/21 (!) 160/80   02/26/21 (!) 143/56       /60   Pulse 100   Temp 97.9 °F (36.6 °C)   Resp 18   Wt 150 lb 9.6 oz (68.3 kg)   SpO2 96%   BMI 25.85 kg/m²      Physical Exam  Vitals signs and nursing note reviewed. Constitutional:       Appearance: Normal appearance. She is well-developed and normal weight.    HENT:      Head: Normocephalic and atraumatic. Right Ear: External ear normal.      Left Ear: External ear normal.      Nose: Nose normal.      Mouth/Throat:      Mouth: Mucous membranes are moist.      Pharynx: Oropharynx is clear. Eyes:      Conjunctiva/sclera: Conjunctivae normal.      Pupils: Pupils are equal, round, and reactive to light. Neck:      Musculoskeletal: Neck supple. No neck rigidity or muscular tenderness. Thyroid: No thyromegaly. Vascular: No JVD. Cardiovascular:      Rate and Rhythm: Normal rate and regular rhythm. Heart sounds: Murmur present. Systolic murmur present. Pulmonary:      Effort: Pulmonary effort is normal.      Breath sounds: Normal breath sounds. No wheezing or rales. Abdominal:      General: Bowel sounds are normal. There is no distension. Palpations: Abdomen is soft. Tenderness: There is no abdominal tenderness. Musculoskeletal:         General: No tenderness. Lumbar back: She exhibits decreased range of motion and spasm. Right lower leg: No edema. Left lower leg: No edema. Lymphadenopathy:      Cervical: No cervical adenopathy. Skin:     Findings: No erythema or rash. Neurological:      General: No focal deficit present. Mental Status: She is alert and oriented to person, place, and time.    Psychiatric:         Behavior: Behavior normal.         Judgment: Judgment normal.         Lab Results   Component Value Date     03/10/2021    K 4.5 03/10/2021    K 3.7 02/26/2021     03/10/2021    CO2 20 03/10/2021    GLUCOSE 102 03/10/2021    BUN 15 03/10/2021    CREATININE 0.98 03/10/2021    CALCIUM 9.7 03/10/2021    PROT 6.6 03/10/2021    LABALBU 4.3 03/10/2021    BILITOT 0.5 03/10/2021    ALT 46 03/10/2021    AST 53 03/10/2021       Hemoglobin A1C (%)   Date Value   12/03/2020 7.4 (H)     Microscopic Examination (no units)   Date Value   04/27/2019 YES     LDL Calculated (mg/dL)   Date Value   12/03/2020 45         Lab Results   Component Value Date    WBC 6.1 03/10/2021    NEUTROABS 2.5 02/26/2021    HGB 10.4 03/10/2021    HCT 32.9 03/10/2021    MCV 83 03/10/2021     03/10/2021       Lab Results   Component Value Date    TSH 2.840 03/10/2021       ASSESSMENT/PLAN:     1. Right pontine stroke [facility])  I did review records from recent hospital stay (lab and imaging studies as well as notes and discharge summary). I am going to continue current regimen as recommended by neurology. Discussed the importance of keeping blood pressure, lipid profile and diabetes under good control. Discussed the importance of quit/not starting back on smoking.    - gabapentin (NEURONTIN) 100 MG capsule; Take 1 capsule by mouth nightly for 60 days. Dispense: 90 capsule; Refill: 1  - External Referral To ENT  - Comprehensive Metabolic Panel; Future  - CBC Auto Differential; Future  - Ferritin; Future  - Folate; Future  - Iron and TIBC; Future    2. Dysphagia due to recent stroke  Continue in speech therapy. Referral made for ENT for possible scope of vocal cords. - External Referral To ENT    3. Degenerative disc disease, lumbar  Continue on current regimen. Advised her to avoid heavy lifting, use heat pad. 1. Goal is to alleviate pain to a degree that the patient can participate in own ADLS social activity and improve function. 2. Risk and benefits were reviewed at length, including risk for addiction and possible side effects and interactions. Alternative therapy was discussed and will be a part of the patients overall care. Including but not limited to, physical therapy, other medications as well as behavioral and activity modification and the use of other modalities (chiropractic care ect. ). 3. This patient does not exhibit a potential for abuse or addiction at this time. Will monitor closely. 4. UDS has been compliant. Will randomly check drug screen. 5. Serafin Mora completed and compliant, will check every 3 months.    6. Advised her that UDS and possible pill counts and frequent monitoring will be a part of her care. 7. Patient has medication agreement and consent to treat with this office. Patient has been informed not to seek or obtain medication from other practitioners and to notify our office ASAP if this happened on emergent basis.    - HYDROcodone-acetaminophen (Isa Keane) 5-325 MG per tablet; Take 1 tablet by mouth 2 times daily as needed for Pain for up to 30 days. Dispense: 30 tablet; Refill: 0    4. Essential hypertension  BP is stable. I have advised her on low-sodium diet, exercise and weight control. I am going to continue current medication. Will monitor her renal function every few months, have advised her to check blood pressure frequently and to keep a record of this. - Comprehensive Metabolic Panel; Future  - CBC Auto Differential; Future    5. Vitamin B12 deficiency  Injection given today. Continue supplementation. 6. Type 2 diabetes mellitus with diabetic neuropathy, with long-term current use of insulin (HCC)  In acceptable range. I advised her regarding diabetic diet, exercise and weight control. Also, I advised her to stay on the current medication, monitor her fingerstick closely. I am going to check the A1c every few months. I will check microalbumin on a yearly basis. I have also advised her to have a yearly eye exam and to monitor her feet closely. Along with instruction of possible complications related to diabetes, even with good control. A1C will be checked  in few months. Lab Results   Component Value Date    LABA1C 7.0 (H) 3/2/2021     - Hemoglobin A1C; Future    7. Anemia, unspecified type  Likely related to recent stroke. Mild. Continue to monitor. Proceed with anemia work-up. Further recommendations based on test results. - Comprehensive Metabolic Panel; Future  - CBC Auto Differential; Future  - Ferritin; Future  - Folate; Future  - Iron and TIBC;  Future      Orders Placed This Encounter Medications    ticagrelor (BRILINTA) 90 MG TABS tablet     Sig: Take 1 tablet by mouth 2 times daily     Dispense:  60 tablet     Refill:  3    HYDROcodone-acetaminophen (NORCO) 5-325 MG per tablet     Sig: Take 1 tablet by mouth 2 times daily as needed for Pain for up to 30 days. Dispense:  30 tablet     Refill:  0     Reduce doses taken as pain becomes manageable    cyanocobalamin injection 1,000 mcg    gabapentin (NEURONTIN) 100 MG capsule     Sig: Take 1 capsule by mouth nightly for 60 days. Dispense:  90 capsule     Refill:  1      Written by Caden Lau, acting as a scribe for Dr. Kacie Ivory on 4/13/2021 at 3:01 PM.     I, Dr. Hilario Price, personally performed the services described in the documentation as scribed by Abdulaziz Frye CMA, in my presence and it is both accurate and complete.

## 2021-04-20 ENCOUNTER — HOSPITAL ENCOUNTER (OUTPATIENT)
Dept: GENERAL RADIOLOGY | Facility: HOSPITAL | Age: 71
Discharge: HOME | End: 2021-04-20
Payer: MEDICARE

## 2021-04-20 ENCOUNTER — HOSPITAL ENCOUNTER (OUTPATIENT)
Facility: HOSPITAL | Age: 71
Discharge: HOME | End: 2021-04-20
Payer: MEDICARE

## 2021-04-20 DIAGNOSIS — I63.50 RIGHT PONTINE STROKE (HCC): ICD-10-CM

## 2021-04-20 DIAGNOSIS — I10 ESSENTIAL HYPERTENSION: ICD-10-CM

## 2021-04-20 DIAGNOSIS — Z79.4 TYPE 2 DIABETES MELLITUS WITH DIABETIC NEUROPATHY, WITH LONG-TERM CURRENT USE OF INSULIN (HCC): ICD-10-CM

## 2021-04-20 DIAGNOSIS — E11.40 TYPE 2 DIABETES MELLITUS WITH DIABETIC NEUROPATHY, WITH LONG-TERM CURRENT USE OF INSULIN (HCC): ICD-10-CM

## 2021-04-20 DIAGNOSIS — I69.391 DYSPHAGIA DUE TO OLD STROKE: ICD-10-CM

## 2021-04-20 DIAGNOSIS — D64.9 ANEMIA, UNSPECIFIED TYPE: ICD-10-CM

## 2021-04-20 PROCEDURE — 74230 X-RAY XM SWLNG FUNCJ C+: CPT

## 2021-04-20 PROCEDURE — 92611 MOTION FLUOROSCOPY/SWALLOW: CPT

## 2021-04-20 PROCEDURE — 36415 COLL VENOUS BLD VENIPUNCTURE: CPT

## 2021-04-20 NOTE — PROCEDURES
INSTRUMENTAL SWALLOW REPORT  MODIFIED BARIUM SWALLOW    NAME: Jacinda Islas   : 1950  MRN: [id number]       Date of Eval: 2021     Ordering Physician: Canelo Trevizo  Radiologist: Anthony Vázquez     Referring Diagnosis(es): Referring Diagnosis: Dysphagia secondary to CVA    Past Medical History:  has a past medical history of CAD (coronary artery disease), COPD (chronic obstructive pulmonary disease) ([facility] [address] Cystic fibrosis ([facility] [address] Diabetes mellitus ([facility] [address] GERD (gastroesophageal reflux disease), H/O: CVA (cardiovascular accident), HTN (hypertension), Mass, Tobacco abuse, and Vitamin B12 deficiency. Past Surgical History:  has a past surgical history that includes Hysterectomy; Cholecystectomy; Carpal tunnel release (Bilateral); Coronary angioplasty with stent; joint replacement (Bilateral, 10/15/2016); and Total knee arthroplasty (Bilateral, 10/18/2016). Current Diet Solid Consistency: Regular  Current Diet Liquid Consistency: Mildly Thick (Nectar)    Date of Prior Study: Not reported  Type of Study: Initial MBS  Results of Prior Study: N/A    Recent CXR/CT of Chest: Date N/A    Patient Complaints/Reason for Referral:  Jacinda Islas was referred for a MBS to assess the efficiency of his/her swallow function, assess for aspiration, and to make recommendations regarding safe dietary consistencies, effective compensatory strategies, and safe eating environment. Patient complaints: Patient would like to advance diet to thin liquids. Reports no difficulty with regular diet texture. Onset of problem:   Date of Onset: 2021    General Comment  Comments: Patient reported she currently receives home health speech therapy services d/t swallowing difficulty. A soft and bite sized diet with NTL was reportedly recommended during previous hospitalization d/t CVA, however, patient stated she \"can eat any anything\" without difficulty. Behavior/Cognition/Vision/Hearing:  Behavior/Cognition: Alert; Cooperative;Pleasant mood  Vision: Within Functional Limits  Hearing: Within functional limits    Impressions:  Treatment Dx and ICD 10: R13.10   Patient Position: Lateral and Patient Degrees: 90    Consistencies Administered: Dysphagia Soft and Bite-Sized (Dysphagia III); Nectar cup; Thin cup; Thin teaspoon; Thin straw    Compensatory Swallowing Strategies Attempted: Alternate solids and liquids; Lingual sweep;Small bites/sips; Chin tuck;Eat/Feed slowly; Remain upright for 30-45 minutes after meals;Upright as possible for all oral intake;Swallow 2 times per bite/sip; No straws  Postural Changes and/or Swallow Maneuvers Trialed: Chin tuck;OOB; Upright 30 min after meal;Upright 90 degrees;Upright    Oral Phase: No remarkable deficits indicated. Pharyngeal: When given trials of thin liquids via straw, patient demonstrated min flash penetration and min vallecular residue, however cleared effectively with cues to reswallow. Min vallecular residue was also noted with trials of thins via cup sip, however, patient was able to effectively clear with reswallow. Cues to use tsp amount sips and chin tuck swallow were also found to be effective with improving swallow safety. With trials of NTL and thin liquids via tsp amount, patient demonstrated no residue, penetration or aspiration. Dysphagia Outcome Severity Scale: Level 5: Mild dysphagia- Distant supervision. May need one diet consistency restricted  Penetration-Aspiration Scale (PAS): 2 - Material enters the airway, remains above the vocal folds, and is ejected from the airway    Recommended Diet:  Solid consistency: Regular  Liquid consistency: Thin  Liquid administration via: Cup;Other (comment)(TSP AMOUNTS OF THIN LIQUIDS ONLY)    Medication administration: PO    Safe Swallow Protocol:  Supervision: Distant  Compensatory Swallowing Strategies: Alternate solids and liquids;Small bites/sips; Chin tuck;Eat/Feed slowly; Swallow 2 times per bite/sip; No straws;Remain upright for 30-45 minutes after meals;Upright as possible for all oral intake    Recommendations/Treatment  Requires SLP Intervention: Yes     Recommendations comment: Recommend patient conitnue with skilled [facility] health services to educate re: use of compensatory strategies and continue exercises to increase BOT strength. D/C Recommendations: Home independently  Postural Changes and/or Swallow Maneuvers: Chin tuck;OOB; Upright 30 min after meal;Upright 90 degrees;Upright    Recommended Exercises:   Therapeutic Interventions: Tongue base strengthening;Patient/Family education;Pharyngeal exercises;Diet tolerance monitoring; Therapeutic PO trials with SLP    Education: Images and recommendations were reviewed with patient following this exam.   Patient Education: Aspiration precaution guidelines and recommended compensatory strategies  Patient Education Response: Verbalizes understanding;Needs reinforcement    Prognosis  Prognosis for safe diet advancement: good  Barriers to reach goals: age  Barriers/Prognosis Comment: Good for recommended goals and diet. Duration/Frequency of Treatment  Duration/Frequency of Treatment: N/A  Safety Devices  Safety Devices in place: Not Applicable    Goals:    Long Term:   Timeframe for Long-term Goals: N/A     Short Term: N/A    Oral Preparation / Oral Phase  Oral Phase: WFL    Pharyngeal Phase  Pharyngeal Phase: Impaired  Pharyngeal Phase - Major Contributing Deficits  Pooling Valleculae: Thin straw  Reduced Tongue Base: Thin straw; Thin cup  Shallow Penetration During: Thin straw  Pharyngeal Residue - Valleculae: Thin cup; Thin straw    Esophageal Phase  NA    Pain   None reported or indicated    Therapy Time:   Individual Concurrent Group Co-treatment   Time In 1030         Time Out 1045         Minutes [address] [facility], 4/20/2021, 5:53 PM

## 2021-04-22 ENCOUNTER — TELEPHONE (OUTPATIENT)
Dept: PRIMARY CARE CLINIC | Age: 71
End: 2021-04-22

## 2021-04-26 DIAGNOSIS — D64.9 ANEMIA, UNSPECIFIED TYPE: Primary | ICD-10-CM

## 2021-04-26 DIAGNOSIS — N18.31 CHRONIC RENAL FAILURE, STAGE 3A (HCC): ICD-10-CM

## 2021-04-27 DIAGNOSIS — N18.30 STAGE 3 CHRONIC KIDNEY DISEASE, UNSPECIFIED WHETHER STAGE 3A OR 3B CKD (HCC): ICD-10-CM

## 2021-04-27 DIAGNOSIS — N18.30 ANEMIA DUE TO STAGE 3 CHRONIC KIDNEY DISEASE, UNSPECIFIED WHETHER STAGE 3A OR 3B CKD (HCC): ICD-10-CM

## 2021-04-27 DIAGNOSIS — D50.8 OTHER IRON DEFICIENCY ANEMIA: Primary | ICD-10-CM

## 2021-04-27 DIAGNOSIS — D63.1 ANEMIA DUE TO STAGE 3 CHRONIC KIDNEY DISEASE, UNSPECIFIED WHETHER STAGE 3A OR 3B CKD (HCC): ICD-10-CM

## 2021-04-27 PROBLEM — D50.9 IRON DEFICIENCY ANEMIA: Status: ACTIVE | Noted: 2021-04-27

## 2021-04-27 RX ORDER — EPINEPHRINE 1 MG/ML
0.3 INJECTION, SOLUTION, CONCENTRATE INTRAVENOUS PRN
Status: CANCELLED | OUTPATIENT
Start: 2021-04-27

## 2021-04-27 RX ORDER — DIPHENHYDRAMINE HYDROCHLORIDE 50 MG/ML
50 INJECTION INTRAMUSCULAR; INTRAVENOUS ONCE
Status: CANCELLED | OUTPATIENT
Start: 2021-04-27 | End: 2021-04-27

## 2021-04-27 RX ORDER — SODIUM CHLORIDE 9 MG/ML
INJECTION, SOLUTION INTRAVENOUS CONTINUOUS
Status: CANCELLED | OUTPATIENT
Start: 2021-04-27

## 2021-04-27 RX ORDER — METHYLPREDNISOLONE SODIUM SUCCINATE 125 MG/2ML
125 INJECTION, POWDER, LYOPHILIZED, FOR SOLUTION INTRAMUSCULAR; INTRAVENOUS ONCE
Status: CANCELLED | OUTPATIENT
Start: 2021-04-27 | End: 2021-04-27

## 2021-04-27 RX ORDER — SODIUM CHLORIDE 0.9 % (FLUSH) 0.9 %
5-40 SYRINGE (ML) INJECTION PRN
Status: CANCELLED | OUTPATIENT
Start: 2021-04-27

## 2021-04-27 RX ORDER — SODIUM CHLORIDE 9 MG/ML
25 INJECTION, SOLUTION INTRAVENOUS PRN
Status: CANCELLED | OUTPATIENT
Start: 2021-04-27

## 2021-04-29 ENCOUNTER — HOSPITAL ENCOUNTER (OUTPATIENT)
Dept: NURSING | Facility: HOSPITAL | Age: 71
Setting detail: INFUSION SERIES
Discharge: HOME | End: 2021-05-01
Payer: MEDICARE

## 2021-04-29 VITALS
RESPIRATION RATE: 16 BRPM | HEART RATE: 68 BPM | SYSTOLIC BLOOD PRESSURE: 186 MMHG | OXYGEN SATURATION: 100 % | DIASTOLIC BLOOD PRESSURE: 60 MMHG | TEMPERATURE: 98 F

## 2021-04-29 DIAGNOSIS — D50.8 OTHER IRON DEFICIENCY ANEMIA: Primary | ICD-10-CM

## 2021-04-29 DIAGNOSIS — N18.30 STAGE 3 CHRONIC KIDNEY DISEASE, UNSPECIFIED WHETHER STAGE 3A OR 3B CKD (HCC): ICD-10-CM

## 2021-04-29 PROCEDURE — 2580000003 HC RX 258: Performed by: INTERNAL MEDICINE

## 2021-04-29 PROCEDURE — 6360000002 HC RX W HCPCS: Performed by: INTERNAL MEDICINE

## 2021-04-29 PROCEDURE — 96367 TX/PROPH/DG ADDL SEQ IV INF: CPT

## 2021-04-29 PROCEDURE — 96366 THER/PROPH/DIAG IV INF ADDON: CPT

## 2021-04-29 PROCEDURE — 96365 THER/PROPH/DIAG IV INF INIT: CPT

## 2021-04-29 RX ORDER — DIPHENHYDRAMINE HYDROCHLORIDE 50 MG/ML
50 INJECTION INTRAMUSCULAR; INTRAVENOUS ONCE
Status: CANCELLED | OUTPATIENT
Start: 2021-05-06 | End: 2021-05-06

## 2021-04-29 RX ORDER — SODIUM CHLORIDE 0.9 % (FLUSH) 0.9 %
5-40 SYRINGE (ML) INJECTION PRN
Status: CANCELLED | OUTPATIENT
Start: 2021-05-06

## 2021-04-29 RX ORDER — EPINEPHRINE 1 MG/ML
0.3 INJECTION, SOLUTION, CONCENTRATE INTRAVENOUS PRN
Status: CANCELLED | OUTPATIENT
Start: 2021-05-06

## 2021-04-29 RX ORDER — SODIUM CHLORIDE 9 MG/ML
25 INJECTION, SOLUTION INTRAVENOUS PRN
Status: CANCELLED | OUTPATIENT
Start: 2021-05-06

## 2021-04-29 RX ORDER — SODIUM CHLORIDE 9 MG/ML
INJECTION, SOLUTION INTRAVENOUS CONTINUOUS
Status: CANCELLED | OUTPATIENT
Start: 2021-05-06

## 2021-04-29 RX ORDER — SODIUM CHLORIDE 9 MG/ML
INJECTION, SOLUTION INTRAVENOUS CONTINUOUS
Status: ACTIVE | OUTPATIENT
Start: 2021-04-29 | End: 2021-04-29

## 2021-04-29 RX ORDER — METHYLPREDNISOLONE SODIUM SUCCINATE 125 MG/2ML
125 INJECTION, POWDER, LYOPHILIZED, FOR SOLUTION INTRAMUSCULAR; INTRAVENOUS ONCE
Status: CANCELLED | OUTPATIENT
Start: 2021-05-06 | End: 2021-05-06

## 2021-04-29 RX ADMIN — SODIUM CHLORIDE: 9 INJECTION, SOLUTION INTRAVENOUS at 08:58

## 2021-04-29 RX ADMIN — FERRIC CARBOXYMALTOSE INJECTION 750 MG: 50 INJECTION, SOLUTION INTRAVENOUS at 09:07

## 2021-04-29 NOTE — ED NOTES
nad 172/70 hr 66 rr 16 99%. Iv infusing at 150 ml/hr. Pt is aware that her infusion will take quite a bit longer than scheduled because it is running so much slower.   This was explained to pt by sarita barahona

## 2021-04-29 NOTE — ED NOTES
Pt alert, orient, no pain or acute distress at this time. Positioned for comfort. Iv infusing with no issues.   186/60 hr 66 rr 16 100% r/a

## 2021-04-29 NOTE — ED NOTES
Infusing iv continues to occlude at the rate of 300 ml/hr. I did lower rate to 200 ml/hr.   Pt tolerating well

## 2021-04-29 NOTE — ED NOTES
Pt iv infusion continues to occlude at 200 ml/hr. I did reposition and flush her catheter with no success in infusion her medication. 22g removed from r ac and a 20g was placed to pt left as. Infusion running at 530 ml/hr at this time. Pt tolerating well. I did call pharmacy and spoke with pt. She will need to wait for 30 min after infusion complete. I will advise pt of this.

## 2021-04-29 NOTE — ED NOTES
Iv access removed. Pt tolerated well, nad noted at this time, no pain. 99% r/a  Hr 70 rr 16 180/65. Pt states she does take bp medications at home. She stated she did take it already today. I encouraged to f/u with pcp and to monitor bp at home. Agreement stated.   Gait steady on leaving the ED

## 2021-04-29 NOTE — ED NOTES
Iv infusion complete. Pt is aware that she will have a 30 min wait time. nad noted. Pt alert and orient.

## 2021-05-06 ENCOUNTER — NURSE ONLY (OUTPATIENT)
Dept: PRIMARY CARE CLINIC | Age: 71
End: 2021-05-06
Payer: MEDICARE

## 2021-05-06 DIAGNOSIS — E53.8 VITAMIN B12 DEFICIENCY: Primary | ICD-10-CM

## 2021-05-06 PROCEDURE — 96372 THER/PROPH/DIAG INJ SC/IM: CPT | Performed by: INTERNAL MEDICINE

## 2021-05-06 RX ORDER — CYANOCOBALAMIN 1000 UG/ML
1000 INJECTION INTRAMUSCULAR; SUBCUTANEOUS ONCE
Status: COMPLETED | OUTPATIENT
Start: 2021-05-06 | End: 2021-05-06

## 2021-05-06 RX ADMIN — CYANOCOBALAMIN 1000 MCG: 1000 INJECTION INTRAMUSCULAR; SUBCUTANEOUS at 11:18

## 2021-05-06 NOTE — PROGRESS NOTES
After obtaining consent, and per orders of Dr. Reuben Vora, injection of B12 given in left deltoid by Manuel Gaspar MA. Patient instructed to remain in clinic for 20 minutes afterwards, and to report any adverse reaction to me immediately.

## 2021-05-07 DIAGNOSIS — M51.36 DEGENERATIVE DISC DISEASE, LUMBAR: ICD-10-CM

## 2021-05-07 RX ORDER — HYDROCODONE BITARTRATE AND ACETAMINOPHEN 5; 325 MG/1; MG/1
1 TABLET ORAL 2 TIMES DAILY PRN
Qty: 30 TABLET | Refills: 0 | Status: SHIPPED | OUTPATIENT
Start: 2021-05-07 | End: 2021-06-11 | Stop reason: SDUPTHER

## 2021-05-07 RX ORDER — INSULIN DETEMIR 100 [IU]/ML
INJECTION, SOLUTION SUBCUTANEOUS
Qty: 60 ML | Refills: 1 | Status: SHIPPED | OUTPATIENT
Start: 2021-05-07 | End: 2021-08-10

## 2021-05-10 ENCOUNTER — TELEPHONE (OUTPATIENT)
Dept: PRIMARY CARE CLINIC | Age: 71
End: 2021-05-10

## 2021-05-10 NOTE — TELEPHONE ENCOUNTER
Patient stated she needed a blood transfusion but wanted to have it done at [facility]?

## 2021-05-10 NOTE — PROGRESS NOTES
Patient did not return for second infusion of Injectafer last Thursday. Pharmacy contacted patient and she did not want to return because her arm was still bruised from 1st infusion. Two IV sites were obtained  in right arm but neither would allow for iron infusion. IV was placed in left arm and infusion was successful. Dr. Chato Marcelino was informed that patient did not want to return to [facility] for second infusion and was made aware of difficulty placing Ifunctional IV in right arm.

## 2021-05-11 RX ORDER — EMPAGLIFLOZIN 10 MG/1
1 TABLET, FILM COATED ORAL DAILY
Qty: 30 TABLET | Refills: 3 | Status: SHIPPED | OUTPATIENT
Start: 2021-05-11 | End: 2021-08-09 | Stop reason: SDUPTHER

## 2021-05-11 RX ORDER — ROSUVASTATIN CALCIUM 20 MG/1
TABLET, COATED ORAL
Qty: 120 TABLET | Refills: 0 | Status: SHIPPED | OUTPATIENT
Start: 2021-05-11 | End: 2021-08-09

## 2021-05-17 ENCOUNTER — HOSPITAL ENCOUNTER (OUTPATIENT)
Dept: NURSING | Facility: HOSPITAL | Age: 71
Setting detail: INFUSION SERIES
Discharge: HOME | End: 2021-05-19
Payer: MEDICARE

## 2021-05-17 VITALS
RESPIRATION RATE: 18 BRPM | TEMPERATURE: 98.8 F | OXYGEN SATURATION: 100 % | SYSTOLIC BLOOD PRESSURE: 167 MMHG | DIASTOLIC BLOOD PRESSURE: 61 MMHG | HEART RATE: 73 BPM

## 2021-05-17 DIAGNOSIS — N18.30 STAGE 3 CHRONIC KIDNEY DISEASE, UNSPECIFIED WHETHER STAGE 3A OR 3B CKD (HCC): ICD-10-CM

## 2021-05-17 DIAGNOSIS — D50.8 OTHER IRON DEFICIENCY ANEMIA: Primary | ICD-10-CM

## 2021-05-17 PROCEDURE — 96368 THER/DIAG CONCURRENT INF: CPT

## 2021-05-17 PROCEDURE — 96365 THER/PROPH/DIAG IV INF INIT: CPT

## 2021-05-17 PROCEDURE — 2580000003 HC RX 258: Performed by: INTERNAL MEDICINE

## 2021-05-17 PROCEDURE — 6360000002 HC RX W HCPCS: Performed by: INTERNAL MEDICINE

## 2021-05-17 RX ORDER — SODIUM CHLORIDE 0.9 % (FLUSH) 0.9 %
5-40 SYRINGE (ML) INJECTION PRN
Status: CANCELLED | OUTPATIENT
Start: 2021-05-20

## 2021-05-17 RX ORDER — DIPHENHYDRAMINE HYDROCHLORIDE 50 MG/ML
50 INJECTION INTRAMUSCULAR; INTRAVENOUS ONCE
Status: CANCELLED | OUTPATIENT
Start: 2021-05-20 | End: 2021-05-20

## 2021-05-17 RX ORDER — EPINEPHRINE 1 MG/ML
0.3 INJECTION, SOLUTION, CONCENTRATE INTRAVENOUS PRN
Status: CANCELLED | OUTPATIENT
Start: 2021-05-20

## 2021-05-17 RX ORDER — SODIUM CHLORIDE 9 MG/ML
INJECTION, SOLUTION INTRAVENOUS CONTINUOUS
Status: ACTIVE | OUTPATIENT
Start: 2021-05-17 | End: 2021-05-17

## 2021-05-17 RX ORDER — SODIUM CHLORIDE 9 MG/ML
INJECTION, SOLUTION INTRAVENOUS CONTINUOUS
Status: CANCELLED | OUTPATIENT
Start: 2021-05-20

## 2021-05-17 RX ORDER — METHYLPREDNISOLONE SODIUM SUCCINATE 125 MG/2ML
125 INJECTION, POWDER, LYOPHILIZED, FOR SOLUTION INTRAMUSCULAR; INTRAVENOUS ONCE
Status: CANCELLED | OUTPATIENT
Start: 2021-05-20 | End: 2021-05-20

## 2021-05-17 RX ORDER — SODIUM CHLORIDE 9 MG/ML
25 INJECTION, SOLUTION INTRAVENOUS PRN
Status: CANCELLED | OUTPATIENT
Start: 2021-05-20

## 2021-05-17 RX ADMIN — SODIUM CHLORIDE: 9 INJECTION, SOLUTION INTRAVENOUS at 08:57

## 2021-05-17 RX ADMIN — FERRIC CARBOXYMALTOSE INJECTION 750 MG: 50 INJECTION, SOLUTION INTRAVENOUS at 08:58

## 2021-06-10 DIAGNOSIS — M51.36 DEGENERATIVE DISC DISEASE, LUMBAR: ICD-10-CM

## 2021-06-10 RX ORDER — LINAGLIPTIN 5 MG/1
TABLET, FILM COATED ORAL
Qty: 120 TABLET | Refills: 0 | Status: SHIPPED | OUTPATIENT
Start: 2021-06-10 | End: 2021-09-08

## 2021-06-11 RX ORDER — HYDROCODONE BITARTRATE AND ACETAMINOPHEN 5; 325 MG/1; MG/1
1 TABLET ORAL 2 TIMES DAILY PRN
Qty: 30 TABLET | Refills: 0 | Status: SHIPPED | OUTPATIENT
Start: 2021-06-11 | End: 2021-07-12 | Stop reason: SDUPTHER

## 2021-06-16 ENCOUNTER — OFFICE VISIT (OUTPATIENT)
Dept: PRIMARY CARE CLINIC | Age: 71
End: 2021-06-16
Payer: MEDICARE

## 2021-06-16 ENCOUNTER — HOSPITAL ENCOUNTER (OUTPATIENT)
Facility: HOSPITAL | Age: 71
Discharge: HOME | End: 2021-06-16
Payer: MEDICARE

## 2021-06-16 ENCOUNTER — TELEPHONE (OUTPATIENT)
Dept: PRIMARY CARE CLINIC | Age: 71
End: 2021-06-16

## 2021-06-16 VITALS
WEIGHT: 145.8 LBS | DIASTOLIC BLOOD PRESSURE: 60 MMHG | RESPIRATION RATE: 18 BRPM | BODY MASS INDEX: 25.03 KG/M2 | OXYGEN SATURATION: 98 % | TEMPERATURE: 97.8 F | SYSTOLIC BLOOD PRESSURE: 122 MMHG | HEART RATE: 86 BPM

## 2021-06-16 DIAGNOSIS — I63.50 RIGHT PONTINE STROKE (HCC): ICD-10-CM

## 2021-06-16 DIAGNOSIS — R53.83 FATIGUE, UNSPECIFIED TYPE: ICD-10-CM

## 2021-06-16 DIAGNOSIS — D64.9 ANEMIA, UNSPECIFIED TYPE: Primary | ICD-10-CM

## 2021-06-16 DIAGNOSIS — I10 ESSENTIAL HYPERTENSION: ICD-10-CM

## 2021-06-16 DIAGNOSIS — E53.8 VITAMIN B12 DEFICIENCY: ICD-10-CM

## 2021-06-16 DIAGNOSIS — I25.10 CORONARY ARTERY DISEASE INVOLVING NATIVE CORONARY ARTERY OF NATIVE HEART WITHOUT ANGINA PECTORIS: ICD-10-CM

## 2021-06-16 DIAGNOSIS — D64.9 ANEMIA, UNSPECIFIED TYPE: ICD-10-CM

## 2021-06-16 DIAGNOSIS — Z86.73 H/O: CVA (CEREBROVASCULAR ACCIDENT): ICD-10-CM

## 2021-06-16 PROCEDURE — 4040F PNEUMOC VAC/ADMIN/RCVD: CPT | Performed by: INTERNAL MEDICINE

## 2021-06-16 PROCEDURE — 99214 OFFICE O/P EST MOD 30 MIN: CPT | Performed by: INTERNAL MEDICINE

## 2021-06-16 PROCEDURE — 1036F TOBACCO NON-USER: CPT | Performed by: INTERNAL MEDICINE

## 2021-06-16 PROCEDURE — 1090F PRES/ABSN URINE INCON ASSESS: CPT | Performed by: INTERNAL MEDICINE

## 2021-06-16 PROCEDURE — 96372 THER/PROPH/DIAG INJ SC/IM: CPT | Performed by: INTERNAL MEDICINE

## 2021-06-16 PROCEDURE — G8400 PT W/DXA NO RESULTS DOC: HCPCS | Performed by: INTERNAL MEDICINE

## 2021-06-16 PROCEDURE — G8427 DOCREV CUR MEDS BY ELIG CLIN: HCPCS | Performed by: INTERNAL MEDICINE

## 2021-06-16 PROCEDURE — G8417 CALC BMI ABV UP PARAM F/U: HCPCS | Performed by: INTERNAL MEDICINE

## 2021-06-16 PROCEDURE — 1123F ACP DISCUSS/DSCN MKR DOCD: CPT | Performed by: INTERNAL MEDICINE

## 2021-06-16 PROCEDURE — 3017F COLORECTAL CA SCREEN DOC REV: CPT | Performed by: INTERNAL MEDICINE

## 2021-06-16 PROCEDURE — 36415 COLL VENOUS BLD VENIPUNCTURE: CPT

## 2021-06-16 RX ORDER — GABAPENTIN 300 MG/1
300 CAPSULE ORAL NIGHTLY
Qty: 90 CAPSULE | Refills: 0 | Status: SHIPPED | OUTPATIENT
Start: 2021-06-16 | End: 2021-07-12 | Stop reason: SDUPTHER

## 2021-06-16 RX ORDER — UMECLIDINIUM BROMIDE AND VILANTEROL TRIFENATATE 62.5; 25 UG/1; UG/1
POWDER RESPIRATORY (INHALATION)
Qty: 2 EACH | Refills: 0 | COMMUNITY
Start: 2021-06-16 | End: 2021-07-21 | Stop reason: SDUPTHER

## 2021-06-16 RX ORDER — CYANOCOBALAMIN 1000 UG/ML
1000 INJECTION INTRAMUSCULAR; SUBCUTANEOUS ONCE
Status: COMPLETED | OUTPATIENT
Start: 2021-06-16 | End: 2021-06-16

## 2021-06-16 RX ADMIN — CYANOCOBALAMIN 1000 MCG: 1000 INJECTION INTRAMUSCULAR; SUBCUTANEOUS at 15:49

## 2021-06-16 SDOH — ECONOMIC STABILITY: FOOD INSECURITY: WITHIN THE PAST 12 MONTHS, THE FOOD YOU BOUGHT JUST DIDN'T LAST AND YOU DIDN'T HAVE MONEY TO GET MORE.: PATIENT DECLINED

## 2021-06-16 SDOH — ECONOMIC STABILITY: FOOD INSECURITY: WITHIN THE PAST 12 MONTHS, YOU WORRIED THAT YOUR FOOD WOULD RUN OUT BEFORE YOU GOT MONEY TO BUY MORE.: PATIENT DECLINED

## 2021-06-16 ASSESSMENT — ENCOUNTER SYMPTOMS
ABDOMINAL PAIN: 0
VOMITING: 0
DIARRHEA: 0
SINUS PRESSURE: 0
BACK PAIN: 1
NAUSEA: 0
SHORTNESS OF BREATH: 1
CONSTIPATION: 0
WHEEZING: 1
VOICE CHANGE: 1
SORE THROAT: 0
RHINORRHEA: 0
COUGH: 1
EYE ITCHING: 0
EYE REDNESS: 0
EYE DISCHARGE: 0

## 2021-06-16 ASSESSMENT — SOCIAL DETERMINANTS OF HEALTH (SDOH): HOW HARD IS IT FOR YOU TO PAY FOR THE VERY BASICS LIKE FOOD, HOUSING, MEDICAL CARE, AND HEATING?: PATIENT DECLINED

## 2021-06-16 NOTE — PROGRESS NOTES
SUBJECTIVE:    Patient ID: Carlos Enrique Sultana is a 73 y. o.female. Chief Complaint   Patient presents with    Hypertension    Diabetes         HPI:  Patient does have iron deficiency anemia as well as B12 deficiency. She has received infusions of iron and is on iron pills daily. She denies any melena or hematochezia but does report easy bruising. She does get B12 injections. She reported slight decrease in her energy which has been stable for her. Patient with recent right pontine stroke. She is still having some difficulty with speech as well as memory per her report. She is swallowing well per her report and is not on any special diet for this. She also has a history of CAD. She does follow with cardiology. Blood pressure has been stable. She continues to smoke. Patient has had diabetes for the past several years. She has been compliant with the medications and denies any side effects from it. She has been monitoring fingersticks on a daily basis. Her fingerstick range is between 120-130. She has had some hypoglycemic symptoms (shaking) in the mornings per her report. She has been following a diabetic diet and has been active. Her last eye exam was less than a year ago. She is using oral meds and insulin. She is taking 55 units of Levemir. Patient's medications, allergies, past medical, surgical, social and family histories were reviewed and updated as appropriate in the electronic medical record/chart. Outpatient Medications Marked as Taking for the 6/16/21 encounter (Office Visit) with Patricia Paredes MD   Medication Sig Dispense Refill    HYDROcodone-acetaminophen (NORCO) 5-325 MG per tablet Take 1 tablet by mouth 2 times daily as needed for Pain for up to 30 days.  30 tablet 0    TRADJENTA 5 MG tablet TAKE 1 TABLET BY MOUTH EVERY  tablet 0    metFORMIN (GLUCOPHAGE) 1000 MG tablet TAKE 1 TABLET BY MOUTH TWICE DAILY WITH FOOD 60 tablet 0    Insulin Pen Needle 31G X 6 MM MISC 1 each by Does not apply route daily 100 each 3    JARDIANCE 10 MG tablet TAKE 1 TABLET BY MOUTH DAILY 30 tablet 3    rosuvastatin (CRESTOR) 20 MG tablet TAKE 1 TABLET BY MOUTH ONCE DAILY 120 tablet 0    LEVEMIR FLEXTOUCH 100 UNIT/ML injection pen INJECT 60 UNITS INTO THE SKIN NIGHTLY 340B 90 DAY SUPPLY. 60 mL 1    ticagrelor (BRILINTA) 90 MG TABS tablet Take 1 tablet by mouth 2 times daily 60 tablet 3    gabapentin (NEURONTIN) 100 MG capsule Take 1 capsule by mouth nightly for 60 days. 90 capsule 1    isosorbide mononitrate (IMDUR) 60 MG extended release tablet TAKE 1 TABLET BY MOUTH EVERY DAY 30 tablet 2    amLODIPine (NORVASC) 5 MG tablet Take 5 mg by mouth      blood glucose monitor strips QID Dx E11.9 100 strip 5    lisinopril (PRINIVIL;ZESTRIL) 5 MG tablet TAKE 1 TABLET BY MOUTH DAILY 90 tablet 1    glucose monitoring kit (FREESTYLE) monitoring kit 1 kit by Does not apply route daily E11.40 1 kit 0    umeclidinium-vilanterol (ANORO ELLIPTA) 62.5-25 MCG/INH AEPB inhaler Inhale 1 puff into the lungs daily      aspirin 81 MG chewable tablet Take 1 tablet by mouth daily 30 tablet 2    nitroGLYCERIN (NITROSTAT) 0.4 MG SL tablet Place 1 tablet under the tongue every 5 minutes as needed for Chest pain 25 tablet 1    Insulin Syringe-Needle U-100 (B-D INS SYR ULTRAFINE 1CC/31G) 31G X 5/16\" 1 ML MISC USE ONCE A DAY AS DIRECTED  DX E11.9 100 each 3        Review of Systems   Constitutional: Positive for fatigue. Negative for chills, fever and unexpected weight change. HENT: Positive for voice change. Negative for congestion, ear pain, rhinorrhea, sinus pressure, sore throat and trouble swallowing. Eyes: Negative for discharge, redness, itching and visual disturbance. Respiratory: Positive for cough, shortness of breath and wheezing. At baseline    Cardiovascular: Negative for chest pain, palpitations and leg swelling.         CAZARES   Gastrointestinal: Negative for abdominal pain, constipation, diarrhea, nausea and vomiting. Endocrine: Negative for cold intolerance and heat intolerance. Genitourinary: Negative for dysuria, frequency and urgency. Musculoskeletal: Positive for arthralgias and back pain. Negative for joint swelling. Skin: Negative for rash and wound. Neurological: Negative for dizziness, syncope, weakness, numbness and headaches. Hematological: Negative for adenopathy. Bruises/bleeds easily. Psychiatric/Behavioral: Positive for sleep disturbance. Negative for agitation, dysphoric mood, self-injury and suicidal ideas. The patient is nervous/anxious. Past Medical History:   Diagnosis Date    CAD (coronary artery disease)     COPD [facility]chronic obstructive pulmonary disease) ([facility] [address]     Cystic fibrosis ([facility] [address]     Diabetes mellitus ([facility] [address]     GERD (gastroesophageal reflux disease)     H/O: CVA (cardiovascular accident)     HTN (hypertension)     Mass     on chest     Tobacco abuse     Vitamin B12 deficiency      Past Surgical History:   Procedure Laterality Date    CARPAL TUNNEL RELEASE Bilateral     CHOLECYSTECTOMY      CORONARY ANGIOPLASTY WITH STENT PLACEMENT      HYSTERECTOMY      JOINT REPLACEMENT Bilateral 10/15/2016    knees    TOTAL KNEE ARTHROPLASTY Bilateral 10/18/2016    at [facility]      Family History   Problem Relation Age of Onset    Diabetes Mother     High Blood Pressure Mother     Cancer Mother         pancreatic    Diabetes Father     Heart Disease Father     High Blood Pressure Father     Cancer Sister         lung, breast    Cancer Brother         throat      Social History     Tobacco Use   Smoking Status Former Smoker    Packs/day: 1.00    Years: 40.00    Pack years: 40.00    Types: Cigarettes   Smokeless Tobacco Never Used   Tobacco Comment    states she is not willing to stop and this is a stress reliever for her.         OBJECTIVE:   Wt Readings from Last 3 Encounters:   06/16/21 145 lb 12.8 oz (66.1 kg)   04/13/21 150 lb 9.6 oz (68.3 kg) 02/26/21 150 lb (68 kg)     BP Readings from Last 3 Encounters:   06/16/21 122/60   05/17/21 (!) 167/61   04/29/21 (!) 186/60       /60   Pulse 86   Temp 97.8 °F (36.6 °C)   Resp 18   Wt 145 lb 12.8 oz (66.1 kg)   SpO2 98%   BMI 25.03 kg/m²      Physical Exam  Vitals and nursing note reviewed. Constitutional:       Appearance: Normal appearance. She is well-developed and normal weight. HENT:      Head: Normocephalic and atraumatic. Right Ear: External ear normal.      Left Ear: External ear normal.      Nose: Nose normal.      Mouth/Throat:      Mouth: Mucous membranes are moist.      Pharynx: Oropharynx is clear. Eyes:      Conjunctiva/sclera: Conjunctivae normal.      Pupils: Pupils are equal, round, and reactive to light. Neck:      Thyroid: No thyromegaly. Vascular: No JVD. Cardiovascular:      Rate and Rhythm: Normal rate and regular rhythm. Heart sounds: Murmur heard. Systolic murmur is present. Pulmonary:      Effort: Pulmonary effort is normal.      Breath sounds: Normal breath sounds. No wheezing or rales. Abdominal:      General: Bowel sounds are normal. There is no distension. Palpations: Abdomen is soft. Tenderness: There is no abdominal tenderness. Musculoskeletal:         General: No tenderness. Cervical back: Neck supple. No rigidity. No muscular tenderness. Lumbar back: Spasms present. Decreased range of motion. Right lower leg: No edema. Left lower leg: No edema. Lymphadenopathy:      Cervical: No cervical adenopathy. Skin:     Findings: Bruising (both arms) present. No erythema or rash. Neurological:      General: No focal deficit present. Mental Status: She is alert and oriented to person, place, and time.    Psychiatric:         Behavior: Behavior normal.         Judgment: Judgment normal.         Lab Results   Component Value Date     04/20/2021    K 4.2 04/20/2021    K 3.7 02/26/2021     04/20/2021    CO2 21 04/20/2021    GLUCOSE 131 04/20/2021    BUN 14 04/20/2021    CREATININE 1.17 04/20/2021    CALCIUM 9.5 04/20/2021    PROT 6.6 04/20/2021    LABALBU 4.3 04/20/2021    BILITOT 0.3 04/20/2021    ALT 16 04/20/2021    AST 18 04/20/2021       Hemoglobin A1C (%)   Date Value   04/20/2021 7.3 (H)     Microscopic Examination (no units)   Date Value   04/27/2019 YES     LDL Calculated (mg/dL)   Date Value   12/03/2020 45         Lab Results   Component Value Date    WBC 5.1 04/20/2021    NEUTROABS 3.8 04/20/2021    HGB 9.8 04/20/2021    HCT 30.4 04/20/2021    MCV 81 04/20/2021     04/20/2021       Lab Results   Component Value Date    TSH 2.840 03/10/2021       ASSESSMENT/PLAN:     1. Anemia, unspecified type  Patient to proceed with checking hemoglobin level and anemia work-up today. Will notify patient with test results/further recommendations. Continue for now iron and B12 replacement. 2. Vitamin B12 deficiency  Will cont on B12 injection on a monthly basis. - cyanocobalamin injection 1,000 mcg    3. H/O: CVA (cerebrovascular accident)  Seems to be stable. Make sure blood pressure, lipid profile under good control. Discussed the importance of being compliant with taking her medications. Discussed the importance of quit smoking as soon as possible but she does not seems to be interested in doing anything at this time. 4. Coronary artery disease involving native coronary artery of native heart without angina pectoris  Patient is on appropriate regimen. I will make sure that BP, Lipid and other medical problems are under good control. Patient to follow up with cardiology on a regular basis. Patient to return to office or go to ER if start to have CP, SOA, palpitation. .ect. Long conversation regarding smoking cessation especially with the presence of CAD/history of CVA. 5. Fatigue, unspecified type  Try to improve her medical problems as able. Discussed increase activity level. May consider proceeding with sleep study if symptoms persist.    6. Essential hypertension  BP is stable. I have advised her on low-sodium diet, exercise and weight control. I am going to continue current medication. Will monitor her renal function every few months, have advised her to check blood pressure frequently and to keep a record of this. 7. Right pontine stroke ([facility] [address]  As per #3.    - gabapentin (NEURONTIN) 300 MG capsule; Take 1 capsule by mouth nightly for 60 days. Dispense: 90 capsule; Refill: 0      Orders Placed This Encounter   Medications    cyanocobalamin injection 1,000 mcg    gabapentin (NEURONTIN) 300 MG capsule     Sig: Take 1 capsule by mouth nightly for 60 days. Dispense:  90 capsule     Refill:  0      Written by Adrienne Watts, acting as a scribe for Dr. Cara Corado on 6/16/2021 at 3:30 PM.     I, Dr. Manju Albert, personally performed the services described in the documentation as scribed by Pancho Herrera CMA, in my presence and it is both accurate and complete.

## 2021-06-16 NOTE — PROGRESS NOTES
Chief Complaint   Patient presents with    Hypertension    Diabetes     fs 120-130    Have you seen any other physician or provider since your last visit no    Have you had any other diagnostic tests since your last visit?  No     Have you changed or stopped any medications since your last visit? no       Eye exam emanuel will get record

## 2021-06-16 NOTE — TELEPHONE ENCOUNTER
Per the orders of Daily, 2 box/boxes of Anoro 62.5 were given to patient today. Qty. 14 doses, Lot # [id number], Exp. Date 06/2022. Patient given instructions with samples.

## 2021-07-04 ASSESSMENT — ENCOUNTER SYMPTOMS: TROUBLE SWALLOWING: 0

## 2021-07-12 DIAGNOSIS — I63.50 RIGHT PONTINE STROKE (HCC): ICD-10-CM

## 2021-07-12 DIAGNOSIS — M51.36 DEGENERATIVE DISC DISEASE, LUMBAR: ICD-10-CM

## 2021-07-12 RX ORDER — GABAPENTIN 300 MG/1
300 CAPSULE ORAL NIGHTLY
Qty: 90 CAPSULE | Refills: 0 | Status: ON HOLD | OUTPATIENT
Start: 2021-07-12 | End: 2021-11-01

## 2021-07-12 RX ORDER — HYDROCODONE BITARTRATE AND ACETAMINOPHEN 5; 325 MG/1; MG/1
1 TABLET ORAL 2 TIMES DAILY PRN
Qty: 30 TABLET | Refills: 0 | Status: SHIPPED | OUTPATIENT
Start: 2021-07-12 | End: 2021-08-09 | Stop reason: SDUPTHER

## 2021-07-21 ENCOUNTER — OFFICE VISIT (OUTPATIENT)
Dept: PRIMARY CARE CLINIC | Age: 71
End: 2021-07-21
Payer: MEDICARE

## 2021-07-21 ENCOUNTER — HOSPITAL ENCOUNTER (OUTPATIENT)
Facility: HOSPITAL | Age: 71
Discharge: HOME | End: 2021-07-21
Payer: MEDICARE

## 2021-07-21 VITALS
RESPIRATION RATE: 18 BRPM | WEIGHT: 145 LBS | TEMPERATURE: 97.7 F | HEART RATE: 81 BPM | DIASTOLIC BLOOD PRESSURE: 68 MMHG | BODY MASS INDEX: 24.89 KG/M2 | OXYGEN SATURATION: 99 % | SYSTOLIC BLOOD PRESSURE: 136 MMHG

## 2021-07-21 DIAGNOSIS — Z79.4 TYPE 2 DIABETES MELLITUS WITH DIABETIC NEUROPATHY, WITH LONG-TERM CURRENT USE OF INSULIN (HCC): ICD-10-CM

## 2021-07-21 DIAGNOSIS — I10 ESSENTIAL HYPERTENSION: ICD-10-CM

## 2021-07-21 DIAGNOSIS — R53.83 FATIGUE, UNSPECIFIED TYPE: ICD-10-CM

## 2021-07-21 DIAGNOSIS — I25.10 CORONARY ARTERY DISEASE INVOLVING NATIVE CORONARY ARTERY OF NATIVE HEART WITHOUT ANGINA PECTORIS: ICD-10-CM

## 2021-07-21 DIAGNOSIS — Z86.73 H/O: CVA (CEREBROVASCULAR ACCIDENT): ICD-10-CM

## 2021-07-21 DIAGNOSIS — D64.9 ANEMIA, UNSPECIFIED TYPE: ICD-10-CM

## 2021-07-21 DIAGNOSIS — E11.40 TYPE 2 DIABETES MELLITUS WITH DIABETIC NEUROPATHY, WITH LONG-TERM CURRENT USE OF INSULIN (HCC): ICD-10-CM

## 2021-07-21 DIAGNOSIS — E53.8 VITAMIN B12 DEFICIENCY: ICD-10-CM

## 2021-07-21 DIAGNOSIS — Z79.4 TYPE 2 DIABETES MELLITUS WITH DIABETIC NEUROPATHY, WITH LONG-TERM CURRENT USE OF INSULIN (HCC): Primary | ICD-10-CM

## 2021-07-21 DIAGNOSIS — E11.40 TYPE 2 DIABETES MELLITUS WITH DIABETIC NEUROPATHY, WITH LONG-TERM CURRENT USE OF INSULIN (HCC): Primary | ICD-10-CM

## 2021-07-21 LAB
CHP ED QC CHECK: NORMAL
GLUCOSE BLD-MCNC: 189 MG/DL
HBA1C MFR BLD: 5.4 %

## 2021-07-21 PROCEDURE — 1123F ACP DISCUSS/DSCN MKR DOCD: CPT | Performed by: INTERNAL MEDICINE

## 2021-07-21 PROCEDURE — G8400 PT W/DXA NO RESULTS DOC: HCPCS | Performed by: INTERNAL MEDICINE

## 2021-07-21 PROCEDURE — G8427 DOCREV CUR MEDS BY ELIG CLIN: HCPCS | Performed by: INTERNAL MEDICINE

## 2021-07-21 PROCEDURE — 3044F HG A1C LEVEL LT 7.0%: CPT | Performed by: INTERNAL MEDICINE

## 2021-07-21 PROCEDURE — 1036F TOBACCO NON-USER: CPT | Performed by: INTERNAL MEDICINE

## 2021-07-21 PROCEDURE — 82962 GLUCOSE BLOOD TEST: CPT | Performed by: INTERNAL MEDICINE

## 2021-07-21 PROCEDURE — 3017F COLORECTAL CA SCREEN DOC REV: CPT | Performed by: INTERNAL MEDICINE

## 2021-07-21 PROCEDURE — 96372 THER/PROPH/DIAG INJ SC/IM: CPT | Performed by: INTERNAL MEDICINE

## 2021-07-21 PROCEDURE — 2022F DILAT RTA XM EVC RTNOPTHY: CPT | Performed by: INTERNAL MEDICINE

## 2021-07-21 PROCEDURE — 1090F PRES/ABSN URINE INCON ASSESS: CPT | Performed by: INTERNAL MEDICINE

## 2021-07-21 PROCEDURE — 99214 OFFICE O/P EST MOD 30 MIN: CPT | Performed by: INTERNAL MEDICINE

## 2021-07-21 PROCEDURE — G8420 CALC BMI NORM PARAMETERS: HCPCS | Performed by: INTERNAL MEDICINE

## 2021-07-21 PROCEDURE — 36415 COLL VENOUS BLD VENIPUNCTURE: CPT

## 2021-07-21 PROCEDURE — 4040F PNEUMOC VAC/ADMIN/RCVD: CPT | Performed by: INTERNAL MEDICINE

## 2021-07-21 PROCEDURE — 83036 HEMOGLOBIN GLYCOSYLATED A1C: CPT | Performed by: INTERNAL MEDICINE

## 2021-07-21 RX ORDER — CYANOCOBALAMIN 1000 UG/ML
1000 INJECTION INTRAMUSCULAR; SUBCUTANEOUS ONCE
Status: COMPLETED | OUTPATIENT
Start: 2021-07-21 | End: 2021-07-21

## 2021-07-21 RX ADMIN — CYANOCOBALAMIN 1000 MCG: 1000 INJECTION INTRAMUSCULAR; SUBCUTANEOUS at 13:16

## 2021-07-21 ASSESSMENT — ENCOUNTER SYMPTOMS
SORE THROAT: 0
TROUBLE SWALLOWING: 0
EYE DISCHARGE: 0
RHINORRHEA: 0
VOICE CHANGE: 1
NAUSEA: 0
EYE ITCHING: 0
COUGH: 1
SHORTNESS OF BREATH: 1
VOMITING: 0
CONSTIPATION: 0
ABDOMINAL PAIN: 0
SINUS PRESSURE: 0
WHEEZING: 1
BACK PAIN: 1
DIARRHEA: 0
EYE REDNESS: 0

## 2021-07-21 NOTE — PROGRESS NOTES
Chief Complaint   Patient presents with    Hypertension    Diabetes     Levemir 55 nightly   fs range has been 160 this morning     Have you seen any other physician or provider since your last visit no    Have you had any other diagnostic tests since your last visit? no    Have you changed or stopped any medications since your last visit? no         Diabetic retinal exam completed this year? No pt has apt scheduled.                         * If yes please have patient sign a records release to obtain record to update Health Maintenance                       * If no, please order referral for patient to be scheduled

## 2021-07-21 NOTE — PROGRESS NOTES
SUBJECTIVE:    Patient ID: Carlos Enrique Sultana is a 73 y. o.female. Chief Complaint   Patient presents with    Hypertension    Diabetes    Dizziness     few days (light headed)          HPI:  Patient has had diabetes for the past several years. She has been compliant with the medications and denies any side effects from it. She has been monitoring fingersticks on a daily basis. Her fingerstick range is between 100-200. She denies any hypoglycemic symptoms. She has been following a diabetic diet and has  been active. Her last eye exam was less than a year ago. She is using oral meds and Insulin. Patient taking Levemir 55 units nightly. Patient does have iron deficiency anemia as well as B12 deficiency. She has received infusions of iron in the past.  She denies any melena or hematochezia. She does get B12 injections. She reported slight decrease in her energy which has been stable for her. Patient has had hypertension for several years. She has been compliant with taking medications, without side effects from it. She has been following a low-sodium, is  active and rarely exercises. Weight is stable, compared to last visit. Her blood pressure is stable at this time. Patient's medications, allergies, past medical, surgical, social and family histories were reviewed and updated as appropriate in the electronic medical record/chart. Outpatient Medications Marked as Taking for the 7/21/21 encounter (Office Visit) with Patricia Paredes MD   Medication Sig Dispense Refill    metFORMIN (GLUCOPHAGE) 1000 MG tablet TAKE 1 TABLET BY MOUTH TWICE DAILY WITH FOOD 60 tablet 0    gabapentin (NEURONTIN) 300 MG capsule Take 1 capsule by mouth nightly for 60 days. 90 capsule 0    HYDROcodone-acetaminophen (NORCO) 5-325 MG per tablet Take 1 tablet by mouth 2 times daily as needed for Pain for up to 30 days.  30 tablet 0    TRADJENTA 5 MG tablet TAKE 1 TABLET BY MOUTH EVERY  tablet 0    Insulin Pen constipation, diarrhea, nausea and vomiting. Endocrine: Negative for cold intolerance and heat intolerance. Genitourinary: Negative for dysuria, frequency and urgency. Musculoskeletal: Positive for arthralgias and back pain. Negative for joint swelling. Skin: Negative for rash and wound. Neurological: Negative for dizziness, syncope, weakness, numbness and headaches. Hematological: Negative for adenopathy. Bruises/bleeds easily. Psychiatric/Behavioral: Positive for sleep disturbance. Negative for agitation, dysphoric mood, self-injury and suicidal ideas. The patient is nervous/anxious. Past Medical History:   Diagnosis Date    CAD (coronary artery disease)     [facility]     Cystic fibrosis [facility]     Diabetes mellitus ([facility]     GERD (gastroesophageal reflux disease)     H/O: CVA (cardiovascular accident)     HTN (hypertension)     Mass     on chest     Tobacco abuse     Vitamin B12 deficiency      Past Surgical History:   Procedure Laterality Date    CARPAL TUNNEL RELEASE Bilateral     CHOLECYSTECTOMY      CORONARY ANGIOPLASTY WITH STENT PLACEMENT      HYSTERECTOMY      JOINT REPLACEMENT Bilateral 10/15/2016    knees    TOTAL KNEE ARTHROPLASTY [facility]18/2016    [facility]      Family History   Problem Relation Age of Onset    Diabetes Mother     High Blood Pressure Mother     Cancer Mother         pancreatic    Diabetes Father     Heart Disease Father     High Blood Pressure Father     Cancer Sister         lung, breast    Cancer Brother         throat      Social History     Tobacco Use   Smoking Status Former Smoker    Packs/day: 1.00    Years: 40.00    Pack years: 40.00    Types: Cigarettes   Smokeless Tobacco Never Used   Tobacco Comment    states she is not willing to stop and this is a stress reliever for her.         OBJECTIVE:   Wt Readings from Last 3 Encounters:   07/21/21 145 lb (65.8 kg)   06/16/21 145 lb 12.8 oz (66.1 kg)   04/13/21 150 lb 9.6 oz (68.3 kg)     BP Readings from Last 3 Encounters:   07/21/21 136/68   06/16/21 122/60   05/17/21 (!) 167/61       /68   Pulse 81   Temp 97.7 °F (36.5 °C)   Resp 18   Wt 145 lb (65.8 kg)   SpO2 99%   BMI 24.89 kg/m²      Physical Exam  Vitals and nursing note reviewed. Constitutional:       Appearance: Normal appearance. She is well-developed and normal weight. HENT:      Head: Normocephalic and atraumatic. Right Ear: External ear normal.      Left Ear: External ear normal.      Nose: Nose normal.      Mouth/Throat:      Mouth: Mucous membranes are moist.      Pharynx: Oropharynx is clear. Eyes:      Conjunctiva/sclera: Conjunctivae normal.      Pupils: Pupils are equal, round, and reactive to light. Neck:      Thyroid: No thyromegaly. Vascular: No JVD. Cardiovascular:      Rate and Rhythm: Normal rate and regular rhythm. Heart sounds: Murmur heard. Systolic murmur is present. Pulmonary:      Effort: Pulmonary effort is normal.      Breath sounds: Normal breath sounds. No wheezing or rales. Abdominal:      General: Bowel sounds are normal. There is no distension. Palpations: Abdomen is soft. Tenderness: There is no abdominal tenderness. Musculoskeletal:         General: No tenderness. Cervical back: Neck supple. No rigidity. No muscular tenderness. Lumbar back: Spasms present. Decreased range of motion. Right lower leg: No edema. Left lower leg: No edema. Lymphadenopathy:      Cervical: No cervical adenopathy. Skin:     Findings: No bruising, erythema or rash. Neurological:      General: No focal deficit present. Mental Status: She is alert and oriented to person, place, and time.    Psychiatric:         Behavior: Behavior normal.         Judgment: Judgment normal.         Lab Results   Component Value Date     06/16/2021    K 3.7 06/16/2021    K 3.7 02/26/2021     06/16/2021    CO2 21 06/16/2021    GLUCOSE 189 07/21/2021    BUN 12 06/16/2021    CREATININE 1.10 06/16/2021    CALCIUM 9.4 06/16/2021    PROT 6.4 06/16/2021    LABALBU 4.3 06/16/2021    BILITOT 0.3 06/16/2021    ALT 18 06/16/2021    AST 23 06/16/2021       Hemoglobin A1C (%)   Date Value   07/21/2021 5.4     Microscopic Examination (no units)   Date Value   04/27/2019 YES     LDL Calculated (mg/dL)   Date Value   12/03/2020 45         Lab Results   Component Value Date    WBC 4.6 06/16/2021    NEUTROABS 3.2 06/16/2021    HGB 10.2 06/16/2021    HCT 32.2 06/16/2021    MCV 92 06/16/2021     06/16/2021       Lab Results   Component Value Date    TSH 2.840 03/10/2021       ASSESSMENT/PLAN:     1. Type 2 diabetes mellitus with diabetic neuropathy, with long-term current use of insulin (HCC)  Stable. I advised her regarding diabetic diet, exercise and weight control. Also, I advised her to stay on the current medication, monitor her fingerstick closely. I am going to check the A1c every few months. I will check microalbumin on a yearly basis. I have also advised her to have a yearly eye exam and to monitor her feet closely. Along with instruction of possible complications related to diabetes, even with good control. A1C will be checked  in few months. Lab Results   Component Value Date    LABA1C 5.4 07/21/2021   I am going to lower her insulin dose 5-10 unit. - POCT glycosylated hemoglobin (Hb A1C)  - POCT Glucose    2. Vitamin B12 deficiency  Injection given today. Continue to monitor labs. - cyanocobalamin injection 1,000 mcg    3. Anemia, unspecified type  Mild. Continue to monitor hemoglobin level and anemia work-up periodically. Already on iron and B12.     4. Fatigue, unspecified type  Stable. Continue on B12 supplementation. Check hemoglobin level. Discussed the importance of increased activity level. 5. Essential hypertension  BP is stable. I have advised her on low-sodium diet, exercise and weight control. I am going to continue current medication. Will monitor her renal function every few months, have advised her to check blood pressure frequently and to keep a record of this. 6. H/O: CVA (cerebrovascular accident)  Stable. Referral made to cardiology today. 7. Coronary artery disease involving native coronary artery of native heart without angina pectoris  Patient is on appropriate regimen. I will make sure that BP, Lipid and other medical problems are under good control. Patient to follow up with cardiology on a regular basis. Patient to return to office or go to ER if start to have CP, SOA, palpitation. .ect. Referral made to cardiology. - External Referral To Cardiology    I had long conversation with the patient regarding her ongoing smoking. Patient is refusing to do anything for this problem and she reported that she is going to smoke till she die. May consider ENT referral with her hoarseness which started after her stroke. She wanted to wait at this time. Orders Placed This Encounter   Medications    cyanocobalamin injection 1,000 mcg      Written by Sierra Pearce, acting as a scribe for Dr. Gilbert Odonnell on 7/21/2021 at 1:22 PM.     I, Dr. Mikaela Aaron, personally performed the services described in the documentation as scribed by Shaan Alfaro CMA, in my presence and it is both accurate and complete.

## 2021-08-09 DIAGNOSIS — M51.36 DEGENERATIVE DISC DISEASE, LUMBAR: ICD-10-CM

## 2021-08-09 RX ORDER — HYDROCODONE BITARTRATE AND ACETAMINOPHEN 5; 325 MG/1; MG/1
1 TABLET ORAL 2 TIMES DAILY PRN
Qty: 30 TABLET | Refills: 0 | Status: SHIPPED | OUTPATIENT
Start: 2021-08-09 | End: 2021-09-09 | Stop reason: SDUPTHER

## 2021-08-09 RX ORDER — EMPAGLIFLOZIN 10 MG/1
1 TABLET, FILM COATED ORAL DAILY
Qty: 30 TABLET | Refills: 3 | Status: SHIPPED | OUTPATIENT
Start: 2021-08-09 | End: 2021-12-27

## 2021-08-09 RX ORDER — ROSUVASTATIN CALCIUM 20 MG/1
TABLET, COATED ORAL
Qty: 120 TABLET | Refills: 0 | Status: SHIPPED | OUTPATIENT
Start: 2021-08-09 | End: 2021-11-01

## 2021-08-09 RX ORDER — LISINOPRIL 5 MG/1
5 TABLET ORAL DAILY
Qty: 90 TABLET | Refills: 1 | Status: ON HOLD | OUTPATIENT
Start: 2021-08-09 | End: 2021-11-02 | Stop reason: HOSPADM

## 2021-08-10 RX ORDER — INSULIN DETEMIR 100 [IU]/ML
INJECTION, SOLUTION SUBCUTANEOUS
Qty: 60 ML | Refills: 1 | Status: ON HOLD | OUTPATIENT
Start: 2021-08-10 | End: 2021-11-02 | Stop reason: SDUPTHER

## 2021-08-17 ENCOUNTER — NURSE ONLY (OUTPATIENT)
Dept: PRIMARY CARE CLINIC | Age: 71
End: 2021-08-17
Payer: MEDICARE

## 2021-08-17 DIAGNOSIS — E53.8 VITAMIN B12 DEFICIENCY: Primary | ICD-10-CM

## 2021-08-17 PROCEDURE — 96372 THER/PROPH/DIAG INJ SC/IM: CPT | Performed by: INTERNAL MEDICINE

## 2021-08-17 RX ORDER — CYANOCOBALAMIN 1000 UG/ML
1000 INJECTION INTRAMUSCULAR; SUBCUTANEOUS ONCE
Status: COMPLETED | OUTPATIENT
Start: 2021-08-17 | End: 2021-08-17

## 2021-08-17 RX ADMIN — CYANOCOBALAMIN 1000 MCG: 1000 INJECTION INTRAMUSCULAR; SUBCUTANEOUS at 11:12

## 2021-08-17 NOTE — PROGRESS NOTES
After obtaining consent, and per orders of Dr. Cara Corado, injection of b12 given in left deltoid by Bertha Harmon CMA . Patient instructed to remain in clinic for 20 minutes afterwards, and to report any adverse reaction to me immediately.

## 2021-08-23 RX ORDER — TICAGRELOR 90 MG/1
TABLET ORAL
Qty: 60 TABLET | Refills: 3 | Status: SHIPPED | OUTPATIENT
Start: 2021-08-23 | End: 2021-11-29

## 2021-08-30 ENCOUNTER — HOSPITAL ENCOUNTER (EMERGENCY)
Facility: HOSPITAL | Age: 71
Discharge: LEFT BEFORE DISCHARGE | End: 2021-08-30
Payer: MEDICARE

## 2021-08-30 ENCOUNTER — NURSE TRIAGE (OUTPATIENT)
Dept: OTHER | Facility: CLINIC | Age: 71
End: 2021-08-30

## 2021-08-30 NOTE — TELEPHONE ENCOUNTER
Reason for Disposition   Difficulty breathing    Answer Assessment - Initial Assessment Questions  1. DESCRIPTION: \"Describe how you are feeling. \"      States feeling very weak, dizzy, feels like will fall down when she gets up. Also having some SOB and intermittent chest pain    2. SEVERITY: \"How bad is it? \"  \"Can you stand and walk? \"    - MILD - Feels weak or tired, but does not interfere with work, school or normal activities    - [facility] to stand and walk; weakness interferes with work, school, or normal activities    - SEVERE - Unable to stand or walk      Able to stand but must use things to support self    3. ONSET:  \"When did the weakness begin? \"      1 week ago    4. CAUSE: \"What do you think is causing the weakness? \"      Unsure    5. MEDICINES: Oneita Primer you recently started a new medicine or had a change in the amount of a medicine? \"      Unsure    6. OTHER SYMPTOMS: \"Do you have any other symptoms? \" (e.g., chest pain, fever, cough, SOB, vomiting, diarrhea, bleeding, other areas of pain)      Dizziness, cough, achiness, shortness of breath, intermittent chest pain    7. PREGNANCY: \"Is there any chance you are pregnant? \" \"When was your last menstrual period? \"      n/a    Protocols used: WEAKNESS (GENERALIZED) AND FATIGUE-ADULT-[place]    [facility] [place] [facility] with [employer] Complaint. Brief description of triage: Patient states she is experiencing dizziness, headaches, SOB and intermittent chest pain, in addition to increased generalized weakness. Triage indicates for patient to go to ED now. Care advice provided, patient verbalizes understanding; denies any other questions or concerns; instructed to call back for any new or worsening symptoms. Attention Provider: Thank you for allowing me to participate in the care of your patient. The patient was connected to triage in response to information provided to the [facility].   Please do not respond through this encounter as the response is not directed to a shared pool.

## 2021-08-30 NOTE — ED NOTES
Pt not in lobby when Milena Jarquin RN went out to check on patients     [facility], [facility] / [facility]  08/30/21 8891

## 2021-09-01 ENCOUNTER — HOSPITAL ENCOUNTER (EMERGENCY)
Facility: HOSPITAL | Age: 71
Discharge: HOME | End: 2021-09-01
Attending: EMERGENCY MEDICINE | Admitting: EMERGENCY MEDICINE

## 2021-09-01 ENCOUNTER — APPOINTMENT (OUTPATIENT)
Dept: GENERAL RADIOLOGY | Facility: HOSPITAL | Age: 71
End: 2021-09-01

## 2021-09-01 VITALS
HEIGHT: 64 IN | WEIGHT: 144 LBS | RESPIRATION RATE: 16 BRPM | SYSTOLIC BLOOD PRESSURE: 144 MMHG | HEART RATE: 79 BPM | DIASTOLIC BLOOD PRESSURE: 61 MMHG | OXYGEN SATURATION: 97 % | BODY MASS INDEX: 24.59 KG/M2 | TEMPERATURE: 98.3 F

## 2021-09-01 DIAGNOSIS — R00.2 PALPITATIONS: ICD-10-CM

## 2021-09-01 DIAGNOSIS — R07.9 CHEST PAIN, UNSPECIFIED TYPE: Primary | ICD-10-CM

## 2021-09-01 DIAGNOSIS — D64.9 ANEMIA, UNSPECIFIED TYPE: ICD-10-CM

## 2021-09-01 DIAGNOSIS — R53.83 FATIGUE, UNSPECIFIED TYPE: ICD-10-CM

## 2021-09-01 DIAGNOSIS — R05.9 COUGH: ICD-10-CM

## 2021-09-01 LAB
ALBUMIN SERPL-MCNC: 4.3 G/DL (ref 3.5–5.2)
ALBUMIN/GLOB SERPL: 2 G/DL
ALP SERPL-CCNC: 67 U/L (ref 39–117)
ALT SERPL W P-5'-P-CCNC: 16 U/L (ref 1–33)
ANION GAP SERPL CALCULATED.3IONS-SCNC: 15.1 MMOL/L (ref 5–15)
AST SERPL-CCNC: 20 U/L (ref 1–32)
BACTERIA UR QL AUTO: ABNORMAL /HPF
BASOPHILS # BLD AUTO: 0.01 10*3/MM3 (ref 0–0.2)
BASOPHILS NFR BLD AUTO: 0.2 % (ref 0–1.5)
BILIRUB SERPL-MCNC: 0.5 MG/DL (ref 0–1.2)
BILIRUB UR QL STRIP: NEGATIVE
BUN SERPL-MCNC: 22 MG/DL (ref 8–23)
BUN/CREAT SERPL: 18.6 (ref 7–25)
CALCIUM SPEC-SCNC: 10.2 MG/DL (ref 8.6–10.5)
CHLORIDE SERPL-SCNC: 100 MMOL/L (ref 98–107)
CLARITY UR: ABNORMAL
CO2 SERPL-SCNC: 22.9 MMOL/L (ref 22–29)
COLOR UR: YELLOW
CREAT SERPL-MCNC: 1.18 MG/DL (ref 0.57–1)
DEPRECATED RDW RBC AUTO: 47.8 FL (ref 37–54)
EOSINOPHIL # BLD AUTO: 0.03 10*3/MM3 (ref 0–0.4)
EOSINOPHIL NFR BLD AUTO: 0.6 % (ref 0.3–6.2)
ERYTHROCYTE [DISTWIDTH] IN BLOOD BY AUTOMATED COUNT: 14.5 % (ref 12.3–15.4)
FLUAV RNA RESP QL NAA+PROBE: NOT DETECTED
FLUBV RNA RESP QL NAA+PROBE: NOT DETECTED
GFR SERPL CREATININE-BSD FRML MDRD: 45 ML/MIN/1.73
GLOBULIN UR ELPH-MCNC: 2.1 GM/DL
GLUCOSE SERPL-MCNC: 134 MG/DL (ref 65–99)
GLUCOSE UR STRIP-MCNC: ABNORMAL MG/DL
HCT VFR BLD AUTO: 22.9 % (ref 34–46.6)
HGB BLD-MCNC: 7.4 G/DL (ref 12–15.9)
HGB UR QL STRIP.AUTO: ABNORMAL
HOLD SPECIMEN: NORMAL
HOLD SPECIMEN: NORMAL
HYALINE CASTS UR QL AUTO: ABNORMAL /LPF
IMM GRANULOCYTES # BLD AUTO: 0.02 10*3/MM3 (ref 0–0.05)
IMM GRANULOCYTES NFR BLD AUTO: 0.4 % (ref 0–0.5)
KETONES UR QL STRIP: NEGATIVE
LEUKOCYTE ESTERASE UR QL STRIP.AUTO: NEGATIVE
LYMPHOCYTES # BLD AUTO: 0.92 10*3/MM3 (ref 0.7–3.1)
LYMPHOCYTES NFR BLD AUTO: 19.1 % (ref 19.6–45.3)
MAGNESIUM SERPL-MCNC: 2.4 MG/DL (ref 1.6–2.4)
MCH RBC QN AUTO: 29.8 PG (ref 26.6–33)
MCHC RBC AUTO-ENTMCNC: 32.3 G/DL (ref 31.5–35.7)
MCV RBC AUTO: 92.3 FL (ref 79–97)
MONOCYTES # BLD AUTO: 0.42 10*3/MM3 (ref 0.1–0.9)
MONOCYTES NFR BLD AUTO: 8.7 % (ref 5–12)
NEUTROPHILS NFR BLD AUTO: 3.41 10*3/MM3 (ref 1.7–7)
NEUTROPHILS NFR BLD AUTO: 71 % (ref 42.7–76)
NITRITE UR QL STRIP: NEGATIVE
NRBC BLD AUTO-RTO: 0 /100 WBC (ref 0–0.2)
NT-PROBNP SERPL-MCNC: 549.1 PG/ML (ref 0–900)
PH UR STRIP.AUTO: <=5 [PH] (ref 5–8)
PLATELET # BLD AUTO: 158 10*3/MM3 (ref 140–450)
PMV BLD AUTO: 11.2 FL (ref 6–12)
POTASSIUM SERPL-SCNC: 3.8 MMOL/L (ref 3.5–5.2)
PROT SERPL-MCNC: 6.4 G/DL (ref 6–8.5)
PROT UR QL STRIP: ABNORMAL
RBC # BLD AUTO: 2.48 10*6/MM3 (ref 3.77–5.28)
RBC # UR: ABNORMAL /HPF
REF LAB TEST METHOD: ABNORMAL
SARS-COV-2 RNA RESP QL NAA+PROBE: NOT DETECTED
SODIUM SERPL-SCNC: 138 MMOL/L (ref 136–145)
SP GR UR STRIP: 1.03 (ref 1–1.03)
SQUAMOUS #/AREA URNS HPF: ABNORMAL /HPF
TROPONIN T SERPL-MCNC: 0.01 NG/ML (ref 0–0.03)
TROPONIN T SERPL-MCNC: <0.01 NG/ML (ref 0–0.03)
UROBILINOGEN UR QL STRIP: ABNORMAL
WBC # BLD AUTO: 4.81 10*3/MM3 (ref 3.4–10.8)
WBC UR QL AUTO: ABNORMAL /HPF
WHOLE BLOOD HOLD SPECIMEN: NORMAL
WHOLE BLOOD HOLD SPECIMEN: NORMAL

## 2021-09-01 PROCEDURE — 83880 ASSAY OF NATRIURETIC PEPTIDE: CPT | Performed by: PHYSICIAN ASSISTANT

## 2021-09-01 PROCEDURE — 93005 ELECTROCARDIOGRAM TRACING: CPT | Performed by: EMERGENCY MEDICINE

## 2021-09-01 PROCEDURE — 99284 EMERGENCY DEPT VISIT MOD MDM: CPT

## 2021-09-01 PROCEDURE — 85025 COMPLETE CBC W/AUTO DIFF WBC: CPT

## 2021-09-01 PROCEDURE — 84484 ASSAY OF TROPONIN QUANT: CPT | Performed by: EMERGENCY MEDICINE

## 2021-09-01 PROCEDURE — 80053 COMPREHEN METABOLIC PANEL: CPT | Performed by: EMERGENCY MEDICINE

## 2021-09-01 PROCEDURE — 84484 ASSAY OF TROPONIN QUANT: CPT | Performed by: PHYSICIAN ASSISTANT

## 2021-09-01 PROCEDURE — 71045 X-RAY EXAM CHEST 1 VIEW: CPT

## 2021-09-01 PROCEDURE — 81001 URINALYSIS AUTO W/SCOPE: CPT | Performed by: PHYSICIAN ASSISTANT

## 2021-09-01 PROCEDURE — 83735 ASSAY OF MAGNESIUM: CPT | Performed by: PHYSICIAN ASSISTANT

## 2021-09-01 PROCEDURE — 87636 SARSCOV2 & INF A&B AMP PRB: CPT | Performed by: PHYSICIAN ASSISTANT

## 2021-09-01 RX ORDER — SODIUM CHLORIDE 0.9 % (FLUSH) 0.9 %
10 SYRINGE (ML) INJECTION AS NEEDED
Status: DISCONTINUED | OUTPATIENT
Start: 2021-09-01 | End: 2021-09-02 | Stop reason: HOSPADM

## 2021-09-01 RX ORDER — ASPIRIN 325 MG
325 TABLET ORAL ONCE
Status: COMPLETED | OUTPATIENT
Start: 2021-09-01 | End: 2021-09-01

## 2021-09-01 RX ADMIN — SODIUM CHLORIDE 500 ML: 9 INJECTION, SOLUTION INTRAVENOUS at 19:15

## 2021-09-01 RX ADMIN — ASPIRIN 325 MG ORAL TABLET 325 MG: 325 PILL ORAL at 19:15

## 2021-09-01 NOTE — ED PROVIDER NOTES
"Subjective   Patient is a 71-year-old female with history of anxiety, acid reflux, constipation, iron deficiency anemia, COPD, CAD, diabetes, MI with stents, hypertension, osteoarthritis, stroke, and tobacco use presenting to the ER for evaluation of chest pain and various other symptoms.  Upon further questioning, patient denies any specific pain but feels as if her heart is \"skipping\". She states that she has had a headache and feels generally weak. She states she is just worn out.  She states she is also had a little bit of a nonproductive cough, body aches.  She smokes approximately 2 packs of cigarettes per day. She denies any fever, chills, vision loss or changes, hemoptysis, abdominal pain, emesis, diarrhea, leg pain or swelling, or any other symptoms.          Review of Systems   Constitutional: Positive for fatigue. Negative for chills.   HENT: Negative for congestion, ear pain, rhinorrhea and sore throat.    Eyes: Negative.    Respiratory: Positive for cough and shortness of breath.    Cardiovascular: Positive for chest pain.   Gastrointestinal: Negative.    Genitourinary: Negative.    Musculoskeletal: Positive for myalgias.   Skin: Negative.    Allergic/Immunologic: Negative for immunocompromised state.   Neurological: Positive for headaches.   Psychiatric/Behavioral: Negative.        Past Medical History:   Diagnosis Date   • Abdominal pain    • Acid reflux    • Anxiety    • Bruises easily    • Cataracts, bilateral    • Constipation    • COPD (chronic obstructive pulmonary disease) (CMS/HCC)    • Coronary artery disease    • CTS (carpal tunnel syndrome)    • Diabetes (CMS/[facility])    • Elevated cholesterol    • Hearing loss    • Heart attack (CMS/[facility])     s/p stents   • Hypercholesteremia    • Hypertension    • Impaired functional mobility, balance, gait, and endurance    • Lower back pain    • Osteoarthritis    • Piercing     ears only   • Stroke (CMS/HCC)     2013-weak in right arm   • Tattoos     x2   • " "Tobacco abuse    • Tumor     in between breast closer to right breast   • Vitamin B12 deficiency    • Wears dentures     upper only   • Wears glasses        Allergies   Allergen Reactions   • Codeine GI Intolerance       Past Surgical History:   Procedure Laterality Date   • BREAST BIOPSY Right 5/10/2019    Procedure: BREAST BIOPSY RIGHT;  Surgeon: Kiana Frey MD;  Location: [facility];  Service: General   • CARPAL TUNNEL RELEASE Bilateral    • CHOLECYSTECTOMY     • CORONARY ANGIOPLASTY WITH STENT PLACEMENT  2012   • HYSTERECTOMY      complete   • JOINT REPLACEMENT Bilateral     knee replacements   • TOTAL KNEE ARTHROPLASTY Bilateral 10/18/2016    MAUREEN Palomares MD       Family History   Problem Relation Age of Onset   • Hypertension Other    • Diabetes Mother    • Hypertension Mother    • Cancer Mother    • Diabetes Father    • Hypertension Father    • Heart disease Father    • Cancer Sister    • Cancer Brother        Social History     Socioeconomic History   • Marital status:      Spouse name: Not on file   • Number of children: Not on file   • Years of education: Not on file   • Highest education level: Not on file   Tobacco Use   • Smoking status: Current Every Day Smoker     Packs/day: 2.00     Years: 46.00     Pack years: 92.00     Types: Cigarettes   • Smokeless tobacco: Never Used   • Tobacco comment: PT REPORTS SMOKED THIS AM 5/10/19   Vaping Use   • Vaping Use: Never used   Substance and Sexual Activity   • Alcohol use: No   • Drug use: No   • Sexual activity: Defer           Objective   Physical Exam  Vitals and nursing note reviewed.     /59   Pulse 81   Temp 98.3 °F (36.8 °C) (Oral)   Resp 16   Ht 162.6 cm (64\")   Wt 65.3 kg (144 lb)   SpO2 99%   BMI 24.72 kg/m²     GEN: No acute distress sitting upright in stretcher.  Awake and alert, does not appear septic or toxic.   Head: Normocephalic, atraumatic  Eyes: EOM intact  ENT: Mask in place per protocol  Cardiovascular: Regular rate " and rhythm  Lungs: Clear to auscultation bilaterally without adventitious sounds  Abdomen: Soft, nontender, nondistended, no peritoneal signs, no guarding   Extremities: No edema, normal appearance, full ROM  Neuro: GCS 15  Psych: Mood and affect are appropriate    Procedures           ED Course  ED Course as of Sep 01 2247   Wed Sep 01, 2021   1929 Magnesium: 2.4 [LA]   1929 Glucose(!): 134 [LA]   1929 BUN: 22 [LA]   1929 Creatinine(!): 1.18 [LA]   1929 Sodium: 138 [LA]   1929 Potassium: 3.8 [LA]   1929 Chloride: 100 [LA]   1929 CO2: 22.9 [LA]   1929 Calcium: 10.2 [LA]   1929 Total Protein: 6.4 [LA]   1929 Albumin: 4.30 [LA]   1929 ALT (SGPT): 16 [LA]   1929 AST (SGOT): 20 [LA]   1929 Alkaline Phosphatase: 67 [LA]   1929 Total Bilirubin: 0.5 [LA]   1929 eGFR Non  Am(!): 45 [LA]   1929 Globulin: 2.1 [LA]   1929 A/G Ratio: 2.0 [LA]   1929 BUN/Creatinine Ratio: 18.6 [LA]   1929 Anion Gap(!): 15.1 [LA]   1930 proBNP: 549.1 [LA]   1930 Troponin T: <0.010 [LA]   1930 Magnesium: 2.4 [LA]   1930 Patient's creatinine seems to be near baseline.    [LA]   2007 Color, UA: Yellow [LA]   2022 Appearance, UA(!): Cloudy [LA]   2022 pH, UA: <=5.0 [LA]   2022 Specific Gravity, UA: 1.028 [LA]   2022 Glucose(!): >=1000 mg/dL (3+) [LA]   2022 Ketones, UA: Negative [LA]   2022 Bilirubin, UA: Negative [LA]   2022 Blood, UA(!): Small (1+) [LA]   2022 Protein, UA(!): 100 mg/dL (2+) [LA]   2022 Leukocytes, UA: Negative [LA]   2022 Nitrite, UA: Negative [LA]   2022 Urobilinogen, UA: 1.0 E.U./dL [LA]   2022 RBC, UA(!): 0-2 [LA]   2022 WBC, UA: None Seen [LA]   2022 Bacteria, UA(!): 1+ [LA]   2022 Hyaline Casts, UA: None Seen [LA]   2022 Methodology:: Manual Light Microscopy [LA]   2022 COVID19: Not Detected [LA]   2022 Influenza A PCR: Not Detected [LA]   2022 Influenza B PCR: Not Detected [LA]   2022 Urine appears contaminated with squamous cells.    [LA]   2024 Patient states she feels okay right now.  She does have a bit of a cough  at this time.  She denies any melena, hematochezia, hematemesis.    [LA]   2051 EKG interpreted by me reveals sinus rhythm rate of 69 with PACs. Nonspecific T wave changes    [PF]   2057 Discussed case and lab work with Dr. Lange.  Patient denies any acute bleeding, plan for anemia can be worked up outpatient.  She may need to follow-up with her cardiologist given the palpitations.    [LA]   2242 Discussed with Dr. Lange, patient denies chest pain at  this time. HEART score 4. No significant elevation of troponin on serial lab work. Believe she can be discharged with close follow up.     [LA]      ED Course User Index  [LA] Pina Alexander PA-C  [PF] Feliberto Lange, DO                                           MDM  Number of Diagnoses or Management Options  Anemia, unspecified type  Chest pain, unspecified type  Cough  Fatigue, unspecified type  Palpitations  Diagnosis management comments: On arrival, patient is stable.  Differential could include bronchitis, viral illness, pneumonia, ACS, electrolyte abnormalities, dehydration, or other concerns.  Will obtain basic labs, troponin, proBNP, chest x-ray, EKG, Covid and influenza swab, magnesium, urinalysis.    EKG interpreted by the attending.  There appears to be some premature atrial beats.  Chest x-ray reviewed without significant abnormalities.  There is no elevation of initial troponin.  She did have an anemia with a hemoglobin of 7.4 she denies any melena or hematochezia.  Per chart review, it appears that she has a history of iron deficiency anemia that has required iron infusions. No urinary tract infection noted.  Covid and influenza negative.  Patient remained stable here with no pain.   There is no hypoxia or hypotension.  Discussed with Dr. Lange. If serial troponin is negative, believe she can be discharged with very close follow-up with her primary care provider and her cardiologist.  Discussed very strict return precautions.  She verbalized  understanding and was in agreement with this plan of care.    Repeat troponin with no significant elevation.  Patient was chest pain-free here.  Patient's heart score is moderate.  Discussed with Dr. Lange, believe she can be discharged with very close follow-up.  Discussed that she needs to call her primary care provider and her cardiologist first thing in the morning.  Discussed very strict return precautions.  She verbalized understanding and was in agreement with this plan of care       Amount and/or Complexity of Data Reviewed  Clinical lab tests: reviewed and ordered  Tests in the radiology section of CPT®: ordered and reviewed  Discussion of test results with the performing providers: yes  Decide to obtain previous medical records or to obtain history from someone other than the patient: yes  Review and summarize past medical records: yes  Discuss the patient with other providers: yes    Risk of Complications, Morbidity, and/or Mortality  Presenting problems: moderate  Diagnostic procedures: moderate  Management options: low    Patient Progress  Patient progress: stable      Final diagnoses:   Chest pain, unspecified type   Fatigue, unspecified type   Cough   Anemia, unspecified type   Palpitations       ED Disposition  ED Disposition     ED Disposition Condition Comment    Discharge Stable           Romaine Eugene MD  [address] [id number] [phone]    Schedule an appointment as soon as possible for a visit       Luis A Celestin IV, MD  [address] [facility] [address] [id number] [phone]    Schedule an appointment as soon as possible for a visit            Medication List      Changed    HYDROcodone-acetaminophen 5-325 MG per tablet  Commonly known as: NORCO  Take 1 tablet by mouth Every 8 (Eight) Hours As Needed (PRN PAIN).  What changed:   · when to take this  · additional instructions             Pina Alexander PA-C  09/01/21 7888

## 2021-09-02 NOTE — DISCHARGE INSTRUCTIONS
Take all of your home medications as directed.  You will need to follow-up with your primary care provider as early as possible.  They may need to reinitiate the iron transfusions perform further work-up of the anemia.  You can follow-up with your cardiologist when possible given the palpitations.  You may need to be Holter monitoring or other testing.  Return here to the ER for any change, worsening symptoms, or any additional concerns including but not limited to severe worsening shortness of breath or chest pain, dizziness, productive cough, hemoptysis, syncopal episode

## 2021-09-03 ENCOUNTER — TELEPHONE (OUTPATIENT)
Dept: PRIMARY CARE CLINIC | Age: 71
End: 2021-09-03

## 2021-09-03 ENCOUNTER — NURSE TRIAGE (OUTPATIENT)
Dept: OTHER | Facility: CLINIC | Age: 71
End: 2021-09-03

## 2021-09-03 NOTE — TELEPHONE ENCOUNTER
Received call from James Reaves  At [facility]  with [employer] Complaint. Brief description of triage: weakness to her legs- calling to following up PCP after her ED visit. Triage indicates for patient to follow ED instructions. Care advice provided, patient verbalizes understanding; denies any other questions or concerns; instructed to call back for any new or worsening symptoms. Writer provided warm transfer to Selam varma  at [facility]  for appointment scheduling. Attention Provider: Thank you for allowing me to participate in the care of your patient. The patient was connected to triage in response to information provided to the E[facility]. Please do not respond through this encounter as the response is not directed to a shared pool.         Reason for Disposition   Caller has already spoken with another triager and has no further questions    Protocols used: NO CONTACT OR DUPLICATE CONTACT CALL-ADULT-[place]

## 2021-09-03 NOTE — TELEPHONE ENCOUNTER
No answer. ----- Message from Alana Lizama sent at 9/3/2021  1:35 PM EDT -----  Subject: Appointment Request    Reason for Call: Semi-Routine Return from RN Triage    QUESTIONS  Type of Appointment? Established Patient  Reason for appointment request? No appointments available during search  Additional Information for Provider? return from nurse triage. pt needs an   ED follow-up . no available appointments listed  ---------------------------------------------------------------------------  --------------  CALL BACK INFO  What is the best way for the office to contact you? OK to leave message on   voicemail  Preferred Call Back Phone Number? [phone]  ---------------------------------------------------------------------------  --------------  SCRIPT ANSWERS  Patient needs to be seen within 5 days? Yes   Nurse Name? Carol Shay  Have you been diagnosed with, awaiting test results for, or told that you   are suspected of having COVID-19 (Coronavirus)? (If patient has tested   negative or was tested as a requirement for work, school, or travel and   not based on symptoms, answer no)? No  Do you currently have flu-like symptoms including fever or chills, cough,   shortness of breath, difficulty breathing, or new loss of taste or smell? No  Have you had close contact with someone with COVID-19 in the last 14 days? No  (Service Expert - click yes below to proceed with [facility]   Scheduling)?  Yes

## 2021-09-07 NOTE — TELEPHONE ENCOUNTER
I tried calling pt no answer left message for her to return call. If she thinks she needs to come today double book at 1030 if she cant put her with [place] tomorrow.

## 2021-09-08 RX ORDER — LINAGLIPTIN 5 MG/1
TABLET, FILM COATED ORAL
Qty: 120 TABLET | Refills: 0 | Status: SHIPPED | OUTPATIENT
Start: 2021-09-08 | End: 2021-11-29

## 2021-09-09 DIAGNOSIS — M51.36 DEGENERATIVE DISC DISEASE, LUMBAR: ICD-10-CM

## 2021-09-09 RX ORDER — HYDROCODONE BITARTRATE AND ACETAMINOPHEN 5; 325 MG/1; MG/1
1 TABLET ORAL 2 TIMES DAILY PRN
Qty: 30 TABLET | Refills: 0 | Status: SHIPPED | OUTPATIENT
Start: 2021-09-09 | End: 2021-09-30 | Stop reason: SDUPTHER

## 2021-09-09 NOTE — TELEPHONE ENCOUNTER
----- Message from Jesus Flores sent at 9/9/2021  8:19 AM EDT -----  Subject: Refill Request    QUESTIONS  Name of Medication? HYDROcodone-acetaminophen (NORCO) 5-325 MG per tablet  Patient-reported dosage and instructions? Twice a day. when needed  How many days do you have left? 1  Preferred Pharmacy? [id number]  Pharmacy phone number (if available)? [phone]  ---------------------------------------------------------------------------  --------------  CALL BACK INFO  What is the best way for the office to contact you? OK to leave message on   voicemail  Preferred Call Back Phone Number?  [phone]

## 2021-09-15 ENCOUNTER — APPOINTMENT (OUTPATIENT)
Dept: MRI IMAGING | Facility: HOSPITAL | Age: 71
End: 2021-09-15

## 2021-09-15 ENCOUNTER — APPOINTMENT (OUTPATIENT)
Dept: CT IMAGING | Facility: HOSPITAL | Age: 71
End: 2021-09-15

## 2021-09-15 ENCOUNTER — HOSPITAL ENCOUNTER (EMERGENCY)
Facility: HOSPITAL | Age: 71
Discharge: HOME | End: 2021-09-15
Attending: EMERGENCY MEDICINE | Admitting: EMERGENCY MEDICINE

## 2021-09-15 ENCOUNTER — APPOINTMENT (OUTPATIENT)
Dept: GENERAL RADIOLOGY | Facility: HOSPITAL | Age: 71
End: 2021-09-15

## 2021-09-15 VITALS
BODY MASS INDEX: 24.59 KG/M2 | OXYGEN SATURATION: 98 % | TEMPERATURE: 98.1 F | WEIGHT: 144 LBS | HEIGHT: 64 IN | RESPIRATION RATE: 16 BRPM | SYSTOLIC BLOOD PRESSURE: 137 MMHG | DIASTOLIC BLOOD PRESSURE: 61 MMHG | HEART RATE: 67 BPM

## 2021-09-15 DIAGNOSIS — D64.9 ANEMIA, UNSPECIFIED TYPE: ICD-10-CM

## 2021-09-15 DIAGNOSIS — R26.89 BALANCE PROBLEM: ICD-10-CM

## 2021-09-15 DIAGNOSIS — R53.1 GENERAL WEAKNESS: Primary | ICD-10-CM

## 2021-09-15 LAB
ABO GROUP BLD: NORMAL
ABO GROUP BLD: NORMAL
ALBUMIN SERPL-MCNC: 4.3 G/DL (ref 3.5–5.2)
ALBUMIN/GLOB SERPL: 2 G/DL
ALP SERPL-CCNC: 65 U/L (ref 39–117)
ALT SERPL W P-5'-P-CCNC: 13 U/L (ref 1–33)
ANION GAP SERPL CALCULATED.3IONS-SCNC: 18.5 MMOL/L (ref 5–15)
AST SERPL-CCNC: 15 U/L (ref 1–32)
BASOPHILS # BLD AUTO: 0.02 10*3/MM3 (ref 0–0.2)
BASOPHILS NFR BLD AUTO: 0.4 % (ref 0–1.5)
BILIRUB SERPL-MCNC: 0.4 MG/DL (ref 0–1.2)
BLD GP AB SCN SERPL QL: NEGATIVE
BUN SERPL-MCNC: 19 MG/DL (ref 8–23)
BUN/CREAT SERPL: 17.4 (ref 7–25)
CALCIUM SPEC-SCNC: 9.6 MG/DL (ref 8.6–10.5)
CHLORIDE SERPL-SCNC: 102 MMOL/L (ref 98–107)
CO2 SERPL-SCNC: 19.5 MMOL/L (ref 22–29)
CREAT SERPL-MCNC: 1.09 MG/DL (ref 0.57–1)
DEPRECATED RDW RBC AUTO: 48.3 FL (ref 37–54)
EOSINOPHIL # BLD AUTO: 0.03 10*3/MM3 (ref 0–0.4)
EOSINOPHIL NFR BLD AUTO: 0.6 % (ref 0.3–6.2)
ERYTHROCYTE [DISTWIDTH] IN BLOOD BY AUTOMATED COUNT: 14.2 % (ref 12.3–15.4)
GFR SERPL CREATININE-BSD FRML MDRD: 50 ML/MIN/1.73
GLOBULIN UR ELPH-MCNC: 2.1 GM/DL
GLUCOSE SERPL-MCNC: 112 MG/DL (ref 65–99)
HCT VFR BLD AUTO: 21.3 % (ref 34–46.6)
HGB BLD-MCNC: 6.5 G/DL (ref 12–15.9)
HOLD SPECIMEN: NORMAL
HOLD SPECIMEN: NORMAL
IMM GRANULOCYTES # BLD AUTO: 0.02 10*3/MM3 (ref 0–0.05)
IMM GRANULOCYTES NFR BLD AUTO: 0.4 % (ref 0–0.5)
LYMPHOCYTES # BLD AUTO: 0.94 10*3/MM3 (ref 0.7–3.1)
LYMPHOCYTES NFR BLD AUTO: 17.4 % (ref 19.6–45.3)
MCH RBC QN AUTO: 28.4 PG (ref 26.6–33)
MCHC RBC AUTO-ENTMCNC: 30.5 G/DL (ref 31.5–35.7)
MCV RBC AUTO: 93 FL (ref 79–97)
MONOCYTES # BLD AUTO: 0.46 10*3/MM3 (ref 0.1–0.9)
MONOCYTES NFR BLD AUTO: 8.5 % (ref 5–12)
NEUTROPHILS NFR BLD AUTO: 3.93 10*3/MM3 (ref 1.7–7)
NEUTROPHILS NFR BLD AUTO: 72.7 % (ref 42.7–76)
NRBC BLD AUTO-RTO: 0 /100 WBC (ref 0–0.2)
PLATELET # BLD AUTO: 187 10*3/MM3 (ref 140–450)
PMV BLD AUTO: 11.8 FL (ref 6–12)
POTASSIUM SERPL-SCNC: 4.2 MMOL/L (ref 3.5–5.2)
PROT SERPL-MCNC: 6.4 G/DL (ref 6–8.5)
RBC # BLD AUTO: 2.29 10*6/MM3 (ref 3.77–5.28)
RH BLD: NEGATIVE
RH BLD: NEGATIVE
SODIUM SERPL-SCNC: 140 MMOL/L (ref 136–145)
T&S EXPIRATION DATE: NORMAL
TROPONIN T SERPL-MCNC: <0.01 NG/ML (ref 0–0.03)
WBC # BLD AUTO: 5.4 10*3/MM3 (ref 3.4–10.8)
WHOLE BLOOD HOLD SPECIMEN: NORMAL
WHOLE BLOOD HOLD SPECIMEN: NORMAL

## 2021-09-15 PROCEDURE — 86920 COMPATIBILITY TEST SPIN: CPT

## 2021-09-15 PROCEDURE — 70498 CT ANGIOGRAPHY NECK: CPT

## 2021-09-15 PROCEDURE — 84484 ASSAY OF TROPONIN QUANT: CPT

## 2021-09-15 PROCEDURE — 71045 X-RAY EXAM CHEST 1 VIEW: CPT

## 2021-09-15 PROCEDURE — 70551 MRI BRAIN STEM W/O DYE: CPT

## 2021-09-15 PROCEDURE — 85025 COMPLETE CBC W/AUTO DIFF WBC: CPT

## 2021-09-15 PROCEDURE — 86900 BLOOD TYPING SEROLOGIC ABO: CPT

## 2021-09-15 PROCEDURE — 86901 BLOOD TYPING SEROLOGIC RH(D): CPT

## 2021-09-15 PROCEDURE — 86901 BLOOD TYPING SEROLOGIC RH(D): CPT | Performed by: EMERGENCY MEDICINE

## 2021-09-15 PROCEDURE — 86850 RBC ANTIBODY SCREEN: CPT | Performed by: EMERGENCY MEDICINE

## 2021-09-15 PROCEDURE — 93005 ELECTROCARDIOGRAM TRACING: CPT

## 2021-09-15 PROCEDURE — 25010000002 IOPAMIDOL 61 % SOLUTION: Performed by: EMERGENCY MEDICINE

## 2021-09-15 PROCEDURE — P9016 RBC LEUKOCYTES REDUCED: HCPCS

## 2021-09-15 PROCEDURE — 99284 EMERGENCY DEPT VISIT MOD MDM: CPT

## 2021-09-15 PROCEDURE — 70496 CT ANGIOGRAPHY HEAD: CPT

## 2021-09-15 PROCEDURE — 99283 EMERGENCY DEPT VISIT LOW MDM: CPT | Performed by: STUDENT IN AN ORGANIZED HEALTH CARE EDUCATION/TRAINING PROGRAM

## 2021-09-15 PROCEDURE — 80053 COMPREHEN METABOLIC PANEL: CPT

## 2021-09-15 PROCEDURE — 36430 TRANSFUSION BLD/BLD COMPNT: CPT

## 2021-09-15 PROCEDURE — 86900 BLOOD TYPING SEROLOGIC ABO: CPT | Performed by: EMERGENCY MEDICINE

## 2021-09-15 RX ORDER — ASPIRIN 325 MG
325 TABLET ORAL ONCE
Status: COMPLETED | OUTPATIENT
Start: 2021-09-15 | End: 2021-09-15

## 2021-09-15 RX ORDER — BUTALBITAL, ACETAMINOPHEN AND CAFFEINE 50; 325; 40 MG/1; MG/1; MG/1
1 TABLET ORAL ONCE
Status: COMPLETED | OUTPATIENT
Start: 2021-09-15 | End: 2021-09-15

## 2021-09-15 RX ORDER — SODIUM CHLORIDE 0.9 % (FLUSH) 0.9 %
10 SYRINGE (ML) INJECTION AS NEEDED
Status: DISCONTINUED | OUTPATIENT
Start: 2021-09-15 | End: 2021-09-15 | Stop reason: HOSPADM

## 2021-09-15 RX ADMIN — ASPIRIN 325 MG ORAL TABLET 325 MG: 325 PILL ORAL at 13:33

## 2021-09-15 RX ADMIN — SODIUM CHLORIDE 1000 ML: 9 INJECTION, SOLUTION INTRAVENOUS at 13:33

## 2021-09-15 RX ADMIN — BUTALBITAL, ACETAMINOPHEN AND CAFFEINE 1 TABLET: 50; 325; 40 TABLET ORAL at 13:33

## 2021-09-15 RX ADMIN — IOPAMIDOL 100 ML: 612 INJECTION, SOLUTION INTRAVENOUS at 13:12

## 2021-09-15 NOTE — ED PROVIDER NOTES
Subjective   71-year-old female presenting with headache.  She states that this morning she woke up with a severe headache, this is posterior, she feels like when she stood up she felt off balance.  She denies any new numbness, weakness, speech difficulty.  She does note a previous history of strokes.  She also noted some chest pain that she was not very concerned about but did mention this.  She denies fevers, chills, cough, shortness of breath.          Review of Systems   Constitutional: Negative.    Eyes: Negative.    Respiratory: Negative.    Cardiovascular: Positive for chest pain.   Gastrointestinal: Negative.    Genitourinary: Negative.    Musculoskeletal: Negative.    Skin: Negative.    Neurological: Positive for headaches. Negative for dizziness, weakness and numbness.   Psychiatric/Behavioral: Negative.        Past Medical History:   Diagnosis Date   • Abdominal pain    • Acid reflux    • Anxiety    • Bruises easily    • Cataracts, bilateral    • Constipation    • COPD (chronic obstructive pulmonary disease) (CMS/HCC)    • Coronary artery disease    • CTS (carpal tunnel syndrome)    • Diabetes (CMS/HCC)    • Elevated cholesterol    • Hearing loss    • Heart attack (CMS/HCC)     s/p stents   • Hypercholesteremia    • Hypertension    • Impaired functional mobility, balance, gait, and endurance    • Lower back pain    • Osteoarthritis    • Piercing     ears only   • Stroke (CMS/HCC)     2013-weak in right arm   • Tattoos     x2   • Tobacco abuse    • Tumor     in between breast closer to right breast   • Vitamin B12 deficiency    • Wears dentures     upper only   • Wears glasses        Allergies   Allergen Reactions   • Codeine GI Intolerance       Past Surgical History:   Procedure Laterality Date   • BREAST BIOPSY Right 5/10/2019    Procedure: BREAST BIOPSY RIGHT;  Surgeon: Kiana Frey MD;  Location: [facility];  Service: General   • CARPAL TUNNEL RELEASE Bilateral    • CHOLECYSTECTOMY     • CORONARY  ANGIOPLASTY WITH STENT PLACEMENT  2012   • HYSTERECTOMY      complete   • JOINT REPLACEMENT Bilateral     knee replacements   • TOTAL KNEE ARTHROPLASTY Bilateral 10/18/2016    MAUREEN Palomares MD       Family History   Problem Relation Age of Onset   • Hypertension Other    • Diabetes Mother    • Hypertension Mother    • Cancer Mother    • Diabetes Father    • Hypertension Father    • Heart disease Father    • Cancer Sister    • Cancer Brother        Social History     Socioeconomic History   • Marital status:      Spouse name: Not on file   • Number of children: Not on file   • Years of education: Not on file   • Highest education level: Not on file   Tobacco Use   • Smoking status: Current Every Day Smoker     Packs/day: 2.00     Years: 46.00     Pack years: 92.00     Types: Cigarettes   • Smokeless tobacco: Never Used   • Tobacco comment: PT REPORTS SMOKED THIS AM 5/10/19   Vaping Use   • Vaping Use: Never used   Substance and Sexual Activity   • Alcohol use: No   • Drug use: No   • Sexual activity: Defer           Objective   Physical Exam  Constitutional:       General: She is not in acute distress.     Appearance: She is not toxic-appearing or diaphoretic.      Comments: Chronically ill-appearing   HENT:      Head: Normocephalic and atraumatic.      Right Ear: External ear normal.      Left Ear: External ear normal.      Nose: Nose normal.      Mouth/Throat:      Mouth: Mucous membranes are moist.      Pharynx: Oropharynx is clear.   Eyes:      Extraocular Movements: Extraocular movements intact.      Conjunctiva/sclera: Conjunctivae normal.      Pupils: Pupils are equal, round, and reactive to light.   Cardiovascular:      Rate and Rhythm: Normal rate and regular rhythm.      Pulses: Normal pulses.      Heart sounds: Normal heart sounds.   Pulmonary:      Effort: Pulmonary effort is normal. No respiratory distress.      Breath sounds: Normal breath sounds.   Abdominal:      General: Bowel sounds are  normal. There is no distension.      Tenderness: There is no abdominal tenderness.   Musculoskeletal:         General: No swelling, tenderness or deformity. Normal range of motion.      Cervical back: Normal range of motion.   Skin:     General: Skin is warm and dry.      Capillary Refill: Capillary refill takes less than 2 seconds.      Findings: No rash.   Neurological:      Mental Status: She is alert and oriented to person, place, and time.      Comments: Some chronic slurring of speech, very mild pronator drift on the right upper extremity, patient does have some unsteady when standing   Psychiatric:         Mood and Affect: Mood normal.         Behavior: Behavior normal.         Procedures           ED Course                                           MDM  Number of Diagnoses or Management Options  Anemia, unspecified type  Balance problem  General weakness  Diagnosis management comments: 71-year-old female with headache and dizziness.  Chronically ill-appearing lady in no distress with exam as above.  She has normal vital signs.  Her exam is as above.  I do have some concern for stroke.  Will obtain labs, EKG, head imaging.  Will also obtain chest x-ray given her passing complaints of chest pain.  Will give symptomatic treatment for headache.  Disposition pending.    DDx: Stroke, migraine, ICH, electrolyte abnormality    EKG interpreted by me: Sinus rhythm, normal rate, no acute ST/T changes, this is a normal EKG    14:47 EDT Work up thus far unremarkable. She feels maybe marginally better. Discussed with Dr. Bunn, tele-neuro, will see in consultation.    19:02 EDT Dr. Bunn thinks patient is more generally weak, he recommended MRI, this was obtained and is negative.  Patient is noted to be anemic.  She tells me that she has a history of anemia, she thinks that she may get transfusions, it sounds more like she gets iron and B12 injections.  Either way we had long discussion regarding plan of care,  initially we are planning on admission for further work-up, after some thought she has decided that she prefers to go home and follow-up as an outpatient.  Given her overall stability I think that is a reasonable plan.      Final diagnoses:   General weakness   Balance problem   Anemia, unspecified type          Shahzad Borja MD  09/15/21 [id number]

## 2021-09-15 NOTE — CONSULTS
[facility]   Teleneurology Note       Patient Name: Karol Lopez  : 1950  MRN: [id number]  Primary Care Physician: Romaine Eugene MD       Patient Location:  ([place])    Subjective   Teleneurology Initial Data     Referring Site: [place] Referring Provider: Dr. Borja   Arrival Date Telestroke Site: 09/15/21 Arrival Time Telestroke Site: 1230   Neurologist Evaluation Date: 09/15/21 Neurologist Evaluation Time: 144   Date Last Known Well: 21 Time Last Known Well: 2100      Chief Complant: off balance  HPI: 71 with h/o stroke, smoker, CAD, presents with headache, offbalance and leaning to one side. residual right sided weakness and slurred speech.    Stroke Risk Factors/ Pertinent Data     Stroke risk factors: smoking, previous ischemic stroke, hypertension  Current antithrombotics: aspirin, ticagrelor     Pre- Stroke Modified Pierson Scale     Pre-Stroke Modified Pierson Scale: 1 - No significant disability despite symptoms.  Able to carry out all usual duties and activities.    NIH Stroke Scale     NIHSS Performed Date: 09/15/21 NIHSS Performed Time:    1A. Level of Consciousness: Alert, Keenly Responsive  1B. Ask Month and Age: Both Questions Right  1C. Blink Eyes & Squeeze Hands: Performs Both Tasks  2. Best Gaze: Normal  3. Visual: No Visual Loss  4. Facial Palsy: Normal Symmetrical Movements  5A. Motor - Left Arm: No Drift  5B. Motor - Right Arm: Drift  6A. Motor - Left Leg: No Drift  6B. Motor - Right Leg: No Drift  7. Limb Ataxia: Absent  8. Sensory Loss: Normal  9. Best Language: No Aphasia  10. Dysarthria: Mild-to-Moderate Dysarthria  11. Extinction and Inattention: No Abnormality  NIH Stroke Scale: 2     Review of Systems     Review of Systems  Constitutional: No fatigue  HENT: Negative for nosebleeds and rhinorrhea.    Eyes: Negative for redness.   Respiratory: Negative for cough.    Gastrointestinal: Negative for anal bleeding.   Endocrine: Negative for polydipsia.    Genitourinary: Negative for enuresis and urgency.   Musculoskeletal: Negative for joint swelling.   Neurological: Negative for tremors.   Psychiatric/Behavioral: Negative for hallucinations.   Objective   Exam     Exam performed with the help of support staff from the referring site  Neurological Exam  NEURO( Exam is performed with help of hospital staff at bed side and observed by me via audio-video interface)    MENTAL STATUS: AAOx3, memory intact, fund of knowledge appropriate    LANG/SPEECH: Naming and repetition intact, fluent, follows 3-step commands    CRANIAL NERVES:    Pupils equal and reactive, EOMI intact, no gaze deviation, no nystagmus  No facial droop, cough and gag intact, shoulder shrug intact, tongue midline     MOTOR:  Moves all extremities equally    SENSORY: Normal to light touch all throughout     COORDINATION: Normal finger to nose and heel to shin, no tremor, no dysmetria    STATION: Not assessed due to patient condition    GAIT: Not assessed due to patient condition  Result Review    Results     CT Imaging Review: CT Imaging reviewed, NO acute infarct/ hemorrhage seen  CTA Imaging Review: CTA Imaging reviewed, NO large vessel occlusion or severe stenosis seen     Assessment/Plan   Assessment / Plan     Acute Stroke Evaluation: Suspected ACUTE ischemic stroke     Tissue Plasminogen Activator (tPA): tPA not given  Tissue Plasminogen Activator (tPA) Exclusion Criteria: Onset unknown or GREATER than 4.5 hours       This is 71 with vascular risk factors taking aspirin and ticagrelor presented with headache, imbalance and worsening of her residual weakness. Imaging included which did not reveal any acute pathology. There is significant bilateral carotid calcification, both extracranially and cavernous portions. MRI brain did not reveal any acute abnormality. This could be recrudescence of old stroke deficits. Evaluate for any medical causes for such exacerbation. Continue her current  antithrombotic regimen.    Disposition: The patient will remain at the referring institution for further evaluation and management

## 2021-09-17 LAB
BH BB BLOOD EXPIRATION DATE: NORMAL
BH BB BLOOD TYPE BARCODE: 600
BH BB DISPENSE STATUS: NORMAL
BH BB PRODUCT CODE: NORMAL
BH BB UNIT NUMBER: NORMAL
CROSSMATCH INTERPRETATION: NORMAL
UNIT  ABO: NORMAL
UNIT  RH: NORMAL

## 2021-09-28 ENCOUNTER — HOSPITAL ENCOUNTER (OUTPATIENT)
Facility: HOSPITAL | Age: 71
Discharge: HOME | End: 2021-09-30
Attending: EMERGENCY MEDICINE | Admitting: STUDENT IN AN ORGANIZED HEALTH CARE EDUCATION/TRAINING PROGRAM

## 2021-09-28 ENCOUNTER — HOSPITAL ENCOUNTER (OUTPATIENT)
Dept: CT IMAGING | Facility: HOSPITAL | Age: 71
Discharge: HOME | End: 2021-09-28

## 2021-09-28 ENCOUNTER — OFFICE VISIT (OUTPATIENT)
Dept: GASTROENTEROLOGY | Facility: CLINIC | Age: 71
End: 2021-09-28

## 2021-09-28 ENCOUNTER — LAB (OUTPATIENT)
Dept: LAB | Facility: HOSPITAL | Age: 71
End: 2021-09-28

## 2021-09-28 VITALS
BODY MASS INDEX: 24.72 KG/M2 | HEIGHT: 64 IN | HEART RATE: 99 BPM | SYSTOLIC BLOOD PRESSURE: 125 MMHG | DIASTOLIC BLOOD PRESSURE: 49 MMHG | RESPIRATION RATE: 18 BRPM

## 2021-09-28 DIAGNOSIS — K92.1 MELENA: Primary | ICD-10-CM

## 2021-09-28 DIAGNOSIS — K92.1 MELENA: ICD-10-CM

## 2021-09-28 DIAGNOSIS — D50.9 IRON DEFICIENCY ANEMIA: ICD-10-CM

## 2021-09-28 DIAGNOSIS — K92.2 GASTROINTESTINAL HEMORRHAGE, UNSPECIFIED GASTROINTESTINAL HEMORRHAGE TYPE: ICD-10-CM

## 2021-09-28 DIAGNOSIS — D64.9 NORMOCYTIC ANEMIA: ICD-10-CM

## 2021-09-28 DIAGNOSIS — R53.81 MALAISE AND FATIGUE: ICD-10-CM

## 2021-09-28 DIAGNOSIS — R53.83 MALAISE AND FATIGUE: ICD-10-CM

## 2021-09-28 DIAGNOSIS — Z12.11 COLON CANCER SCREENING: ICD-10-CM

## 2021-09-28 DIAGNOSIS — D50.0 BLOOD LOSS ANEMIA: ICD-10-CM

## 2021-09-28 DIAGNOSIS — D64.9 SYMPTOMATIC ANEMIA: ICD-10-CM

## 2021-09-28 LAB
ABO GROUP BLD: NORMAL
ALBUMIN SERPL-MCNC: 4.3 G/DL (ref 3.5–5.2)
ALBUMIN/GLOB SERPL: 2 G/DL
ALP SERPL-CCNC: 56 U/L (ref 39–117)
ALT SERPL W P-5'-P-CCNC: 12 U/L (ref 1–33)
AMMONIA BLD-SCNC: <10 UMOL/L (ref 11–51)
ANION GAP SERPL CALCULATED.3IONS-SCNC: 16.1 MMOL/L (ref 5–15)
AST SERPL-CCNC: 12 U/L (ref 1–32)
BILIRUB SERPL-MCNC: 0.3 MG/DL (ref 0–1.2)
BLD GP AB SCN SERPL QL: NEGATIVE
BUN SERPL-MCNC: 27 MG/DL (ref 8–23)
BUN/CREAT SERPL: 22.7 (ref 7–25)
CALCIUM SPEC-SCNC: 10.3 MG/DL (ref 8.6–10.5)
CHLORIDE SERPL-SCNC: 103 MMOL/L (ref 98–107)
CO2 SERPL-SCNC: 18.9 MMOL/L (ref 22–29)
CREAT SERPL-MCNC: 1.19 MG/DL (ref 0.57–1)
DEPRECATED RDW RBC AUTO: 47.5 FL (ref 37–54)
ERYTHROCYTE [DISTWIDTH] IN BLOOD BY AUTOMATED COUNT: 14.7 % (ref 12.3–15.4)
GFR SERPL CREATININE-BSD FRML MDRD: 45 ML/MIN/1.73
GLOBULIN UR ELPH-MCNC: 2.2 GM/DL
GLUCOSE BLDC GLUCOMTR-MCNC: 198 MG/DL (ref 70–130)
GLUCOSE SERPL-MCNC: 177 MG/DL (ref 65–99)
HCT VFR BLD AUTO: 18.4 % (ref 34–46.6)
HGB BLD-MCNC: 5.5 G/DL (ref 12–15.9)
IRON 24H UR-MRATE: 15 MCG/DL (ref 37–145)
IRON SATN MFR SERPL: 3 % (ref 20–50)
MCH RBC QN AUTO: 26.6 PG (ref 26.6–33)
MCHC RBC AUTO-ENTMCNC: 29.9 G/DL (ref 31.5–35.7)
MCV RBC AUTO: 88.9 FL (ref 79–97)
PLATELET # BLD AUTO: 212 10*3/MM3 (ref 140–450)
PMV BLD AUTO: 11.5 FL (ref 6–12)
POTASSIUM SERPL-SCNC: 4.7 MMOL/L (ref 3.5–5.2)
PROT SERPL-MCNC: 6.5 G/DL (ref 6–8.5)
RBC # BLD AUTO: 2.07 10*6/MM3 (ref 3.77–5.28)
RH BLD: NEGATIVE
SODIUM SERPL-SCNC: 138 MMOL/L (ref 136–145)
T&S EXPIRATION DATE: NORMAL
TIBC SERPL-MCNC: 471 MCG/DL (ref 298–536)
TRANSFERRIN SERPL-MCNC: 316 MG/DL (ref 200–360)
WBC # BLD AUTO: 6.11 10*3/MM3 (ref 3.4–10.8)

## 2021-09-28 PROCEDURE — 99204 OFFICE O/P NEW MOD 45 MIN: CPT | Performed by: PHYSICIAN ASSISTANT

## 2021-09-28 PROCEDURE — 99220 PR INITIAL OBSERVATION CARE/DAY 70 MINUTES: CPT | Performed by: FAMILY MEDICINE

## 2021-09-28 PROCEDURE — 36415 COLL VENOUS BLD VENIPUNCTURE: CPT

## 2021-09-28 PROCEDURE — 86920 COMPATIBILITY TEST SPIN: CPT

## 2021-09-28 PROCEDURE — P9016 RBC LEUKOCYTES REDUCED: HCPCS

## 2021-09-28 PROCEDURE — 86850 RBC ANTIBODY SCREEN: CPT | Performed by: EMERGENCY MEDICINE

## 2021-09-28 PROCEDURE — G0378 HOSPITAL OBSERVATION PER HR: HCPCS

## 2021-09-28 PROCEDURE — 80053 COMPREHEN METABOLIC PANEL: CPT | Performed by: EMERGENCY MEDICINE

## 2021-09-28 PROCEDURE — 86901 BLOOD TYPING SEROLOGIC RH(D): CPT | Performed by: EMERGENCY MEDICINE

## 2021-09-28 PROCEDURE — 85027 COMPLETE CBC AUTOMATED: CPT

## 2021-09-28 PROCEDURE — 36430 TRANSFUSION BLD/BLD COMPNT: CPT

## 2021-09-28 PROCEDURE — 86900 BLOOD TYPING SEROLOGIC ABO: CPT | Performed by: EMERGENCY MEDICINE

## 2021-09-28 PROCEDURE — 86900 BLOOD TYPING SEROLOGIC ABO: CPT

## 2021-09-28 PROCEDURE — 99284 EMERGENCY DEPT VISIT MOD MDM: CPT

## 2021-09-28 PROCEDURE — 96374 THER/PROPH/DIAG INJ IV PUSH: CPT

## 2021-09-28 PROCEDURE — 84466 ASSAY OF TRANSFERRIN: CPT

## 2021-09-28 PROCEDURE — 96376 TX/PRO/DX INJ SAME DRUG ADON: CPT

## 2021-09-28 PROCEDURE — 82140 ASSAY OF AMMONIA: CPT | Performed by: FAMILY MEDICINE

## 2021-09-28 PROCEDURE — 82962 GLUCOSE BLOOD TEST: CPT

## 2021-09-28 PROCEDURE — 96365 THER/PROPH/DIAG IV INF INIT: CPT

## 2021-09-28 PROCEDURE — 25010000002 IOPAMIDOL 61 % SOLUTION: Performed by: PHYSICIAN ASSISTANT

## 2021-09-28 PROCEDURE — 74177 CT ABD & PELVIS W/CONTRAST: CPT

## 2021-09-28 PROCEDURE — 96366 THER/PROPH/DIAG IV INF ADDON: CPT

## 2021-09-28 PROCEDURE — 83540 ASSAY OF IRON: CPT

## 2021-09-28 RX ORDER — ONDANSETRON 2 MG/ML
4 INJECTION INTRAMUSCULAR; INTRAVENOUS EVERY 6 HOURS PRN
Status: DISCONTINUED | OUTPATIENT
Start: 2021-09-28 | End: 2021-09-30 | Stop reason: HOSPADM

## 2021-09-28 RX ORDER — ONDANSETRON 4 MG/1
4 TABLET, FILM COATED ORAL EVERY 6 HOURS PRN
Status: DISCONTINUED | OUTPATIENT
Start: 2021-09-28 | End: 2021-09-30 | Stop reason: HOSPADM

## 2021-09-28 RX ORDER — NICOTINE POLACRILEX 4 MG
1 LOZENGE BUCCAL
Status: DISCONTINUED | OUTPATIENT
Start: 2021-09-28 | End: 2021-09-30 | Stop reason: HOSPADM

## 2021-09-28 RX ORDER — PANTOPRAZOLE SODIUM 40 MG/10ML
40 INJECTION, POWDER, LYOPHILIZED, FOR SOLUTION INTRAVENOUS ONCE
Status: COMPLETED | OUTPATIENT
Start: 2021-09-28 | End: 2021-09-28

## 2021-09-28 RX ORDER — SODIUM CHLORIDE 0.9 % (FLUSH) 0.9 %
10 SYRINGE (ML) INJECTION EVERY 12 HOURS SCHEDULED
Status: DISCONTINUED | OUTPATIENT
Start: 2021-09-28 | End: 2021-09-30 | Stop reason: HOSPADM

## 2021-09-28 RX ORDER — SODIUM CHLORIDE 0.9 % (FLUSH) 0.9 %
10 SYRINGE (ML) INJECTION AS NEEDED
Status: DISCONTINUED | OUTPATIENT
Start: 2021-09-28 | End: 2021-09-30 | Stop reason: HOSPADM

## 2021-09-28 RX ORDER — PANTOPRAZOLE SODIUM 40 MG/1
40 TABLET, DELAYED RELEASE ORAL
Qty: 60 TABLET | Refills: 5 | Status: SHIPPED | OUTPATIENT
Start: 2021-09-28 | End: 2022-03-25

## 2021-09-28 RX ORDER — CALCIUM CARBONATE 200(500)MG
1 TABLET,CHEWABLE ORAL 3 TIMES DAILY PRN
Status: DISCONTINUED | OUTPATIENT
Start: 2021-09-28 | End: 2021-09-30 | Stop reason: HOSPADM

## 2021-09-28 RX ORDER — DEXTROSE MONOHYDRATE 25 G/50ML
25 INJECTION, SOLUTION INTRAVENOUS
Status: DISCONTINUED | OUTPATIENT
Start: 2021-09-28 | End: 2021-09-30 | Stop reason: HOSPADM

## 2021-09-28 RX ADMIN — PANTOPRAZOLE SODIUM 40 MG: 40 INJECTION, POWDER, FOR SOLUTION INTRAVENOUS at 18:39

## 2021-09-28 RX ADMIN — IOPAMIDOL 100 ML: 612 INJECTION, SOLUTION INTRAVENOUS at 17:08

## 2021-09-28 RX ADMIN — Medication 8 MG/HR: at 18:39

## 2021-09-28 RX ADMIN — SODIUM CHLORIDE, PRESERVATIVE FREE 10 ML: 5 INJECTION INTRAVENOUS at 22:42

## 2021-09-28 NOTE — PROGRESS NOTES
New Patient Consult      Date: 2021   Patient Name: Karol Lopez  MRN: [id number]  : 1950     Primary Care Provider: Romaine Eugene MD  Referring Provider: Rhoda    Chief Complaint   Patient presents with   • Anemia     History of Present Illness: Karol Lopez is a 71 y.o. female who is here today to establish care with Gastroenterology for evaluation of Anemia.    She is having black stools over the past couple of weeks. No bright red rectal bleeding. Denies any diarrhea. Has BMs about 1-2 times per week. She complains that she has severe fatigue, unable to walk due to this. Was seen in the ED a few days ago and had a blood transfusion. She complains of back pain but no current abdominal pain. She has not taken iron supplements recently or any pepto bismol. No recent change in her bowel habits other than change in color, has had constipation for years. No complaints of heartburn or acid reflux into her mouth. She has been taking ibuprofen 2 tabs PO once daily due to back pain, smokes cigarettes daily. No alcohol use. No prior EGD. She may have had a colonoscopy in approx  (per chart review) but she does not recall those findings. There is no known family history of colon cancer or colon polyps. No recent abdominal imaging. No labs since last ED visit when she had blood transfusion on 9/15/2021.     Subjective      Past Medical History:   Diagnosis Date   • Abdominal pain    • Acid reflux    • Anxiety    • Bruises easily    • Cataracts, bilateral    • Constipation    • COPD (chronic obstructive pulmonary disease) (CMS/HCC)    • Coronary artery disease    • CTS (carpal tunnel syndrome)    • Diabetes (CMS/HCC)    • Elevated cholesterol    • Hearing loss    • Heart attack (CMS/HCC)     s/p stents   • Hypercholesteremia    • Hypertension    • Impaired functional mobility, balance, gait, and endurance    • Lower back pain    • Osteoarthritis    • Piercing     ears only   • Stroke  (CMS/HCC)     2013-weak in right arm   • Tattoos     x2   • Tobacco abuse    • Tumor     in between breast closer to right breast   • Vitamin B12 deficiency    • Wears dentures     upper only   • Wears glasses      Past Surgical History:   Procedure Laterality Date   • BREAST BIOPSY Right 5/10/2019    Procedure: BREAST BIOPSY RIGHT;  Surgeon: Kiana Frey MD;  Location: [facility];  Service: General   • CARPAL TUNNEL RELEASE Bilateral    • CHOLECYSTECTOMY     • CORONARY ANGIOPLASTY WITH STENT PLACEMENT  2012   • HYSTERECTOMY      complete   • JOINT REPLACEMENT Bilateral     knee replacements   • TOTAL KNEE ARTHROPLASTY Bilateral 10/18/2016    MAUREEN Palomares MD     Family History   Problem Relation Age of Onset   • Hypertension Other    • Diabetes Mother    • Hypertension Mother    • Cancer Mother    • Diabetes Father    • Hypertension Father    • Heart disease Father    • Cancer Sister    • Cancer Brother      Social History     Socioeconomic History   • Marital status:      Spouse name: Not on file   • Number of children: Not on file   • Years of education: Not on file   • Highest education level: Not on file   Tobacco Use   • Smoking status: Current Every Day Smoker     Packs/day: 2.00     Years: 46.00     Pack years: 92.00     Types: Cigarettes   • Smokeless tobacco: Never Used   • Tobacco comment: PT REPORTS SMOKED THIS AM 5/10/19   Vaping Use   • Vaping Use: Never used   Substance and Sexual Activity   • Alcohol use: No   • Drug use: No   • Sexual activity: Defer     Current Outpatient Medications:   •  amLODIPine (NORVASC) 5 MG tablet, Take 1 tablet by mouth Daily., Disp: 30 tablet, Rfl: 0  •  aspirin 81 MG chewable tablet, Take 81 mg by mouth daily., Disp: , Rfl:   •  cyanocobalamin 1000 MCG/ML injection, Inject 1,000 mcg into the appropriate muscle as directed by prescriber., Disp: , Rfl:   •  Empagliflozin (Jardiance) 10 MG tablet, Take 10 mg by mouth Daily., Disp: , Rfl:   •  glipizide (GLUCOTROL)  "5 MG tablet, Take 5 mg by mouth 2 (Two) Times a Day Before Meals. Only took for 2 days filled on 02/26/21, Disp: , Rfl:   •  HYDROcodone-acetaminophen (NORCO) 5-325 MG per tablet, Take 1 tablet by mouth Every 8 (Eight) Hours As Needed (PRN PAIN). (Patient taking differently: Take 1 tablet by mouth 2 (two) times a day. As needed), Disp: 30 tablet, Rfl: 0  •  insulin aspart (novoLOG FLEXPEN) 100 UNIT/ML solution pen-injector sc pen, Inject 10 Units under the skin into the appropriate area as directed., Disp: , Rfl:   •  isosorbide mononitrate (IMDUR) 60 MG 24 hr tablet, Take 1 tablet by mouth Every Morning. Need lab work and follow up appt for further refills. Otherwise defer to PCP., Disp: 30 tablet, Rfl: 0  •  LEVEMIR 100 UNIT/ML injection, USE 70 UNITS INTO THE SKIN NIGHTLY, Disp: , Rfl: 0  •  lisinopril (PRINIVIL,ZESTRIL) 5 MG tablet, 5 mg Daily., Disp: , Rfl:   •  metFORMIN (GLUCOPHAGE) 1000 MG tablet, Take 1,000 mg by mouth 2 (Two) Times a Day With Meals., Disp: , Rfl:   •  rosuvastatin (CRESTOR) 20 MG tablet, Take 1 tablet by mouth Daily., Disp: 90 tablet, Rfl: 1  •  ticagrelor (BRILINTA) 90 MG tablet tablet, Take 1 tablet by mouth 2 (Two) Times a Day., Disp: 60 tablet, Rfl: 0  •  B-D INS SYRINGE 0.5CC/31GX5/16 31G X 5/16\" 0.5 ML misc, use as directed once daily, Disp: , Rfl: 0  •  Insulin Syringe-Needle U-100 ([employer] INSULIN SYRINGE) 31G X 5/16\" 1 ML misc, 1 each by Does not apply route daily, Disp: , Rfl:   •  Insulin Syringe-Needle U-100 30G X 5/16\" 0.5 ML misc, 1 each by Does not apply route daily, Disp: , Rfl:     Allergies   Allergen Reactions   • Codeine GI Intolerance     The following portions of the patient's history were reviewed and updated as appropriate: allergies, current medications, past family history, past medical history, past social history, past surgical history and problem list.    Objective     Physical Exam  Vitals reviewed.   Constitutional:       General: She is not in acute " "distress.     Appearance: She is well-developed. She is ill-appearing. She is not diaphoretic.   HENT:      Head: Normocephalic and atraumatic.      Right Ear: External ear normal.      Left Ear: External ear normal.      Nose: Nose normal.      Mouth/Throat:      Comments: Wearing a mask  Eyes:      General: No scleral icterus.        Right eye: No discharge.         Left eye: No discharge.      Conjunctiva/sclera: Conjunctivae normal.   Neck:      Vascular: No JVD.   Cardiovascular:      Rate and Rhythm: Normal rate and regular rhythm.      Heart sounds: Normal heart sounds. No murmur heard.   No friction rub. No gallop.    Pulmonary:      Effort: Pulmonary effort is normal. No respiratory distress.      Breath sounds: Normal breath sounds. No wheezing or rales.   Chest:      Chest wall: No tenderness.   Abdominal:      General: Bowel sounds are normal. There is no distension.      Palpations: Abdomen is soft. There is no mass.      Tenderness: There is abdominal tenderness (mild epigastric). There is no guarding.   Musculoskeletal:         General: No deformity.      Cervical back: Normal range of motion.      Right lower leg: No edema.      Left lower leg: No edema.      Comments: Mobility with wheelchair   Skin:     General: Skin is warm and dry.      Coloration: Skin is pale.      Findings: No erythema or rash.   Neurological:      Mental Status: She is alert and oriented to person, place, and time.      Coordination: Coordination normal.   Psychiatric:      Comments: Depressed affect. Slowed speech.       Vitals:    09/28/21 1442   BP: 125/49   Pulse: 99   Resp: 18   Weight: Comment: too weak to stand   Height: 162.6 cm (64\")     Results Review:   I have reviewed the patient's new clinical and imaging results.    Admission on 09/15/2021, Discharged on 09/15/2021   Component Date Value Ref Range Status   • Glucose 09/15/2021 112* 65 - 99 mg/dL Final   • BUN 09/15/2021 19  8 - 23 mg/dL Final   • Creatinine " 09/15/2021 1.09* 0.57 - 1.00 mg/dL Final   • Sodium 09/15/2021 140  136 - 145 mmol/L Final   • Potassium 09/15/2021 4.2  3.5 - 5.2 mmol/L Final   • Chloride 09/15/2021 102  98 - 107 mmol/L Final   • CO2 09/15/2021 19.5* 22.0 - 29.0 mmol/L Final   • Calcium 09/15/2021 9.6  8.6 - 10.5 mg/dL Final   • Total Protein 09/15/2021 6.4  6.0 - 8.5 g/dL Final   • Albumin 09/15/2021 4.30  3.50 - 5.20 g/dL Final   • ALT (SGPT) 09/15/2021 13  1 - 33 U/L Final   • AST (SGOT) 09/15/2021 15  1 - 32 U/L Final   • Alkaline Phosphatase 09/15/2021 65  39 - 117 U/L Final   • Total Bilirubin 09/15/2021 0.4  0.0 - 1.2 mg/dL Final   • eGFR Non African Amer 09/15/2021 50* >60 mL/min/1.73 Final   • Globulin 09/15/2021 2.1  gm/dL Final   • A/G Ratio 09/15/2021 2.0  g/dL Final   • BUN/Creatinine Ratio 09/15/2021 17.4  7.0 - 25.0 Final   • Anion Gap 09/15/2021 18.5* 5.0 - 15.0 mmol/L Final   • Troponin T 09/15/2021 <0.010  0.000 - 0.030 ng/mL Final   • WBC 09/15/2021 5.40  3.40 - 10.80 10*3/mm3 Final   • RBC 09/15/2021 2.29* 3.77 - 5.28 10*6/mm3 Final   • Hemoglobin 09/15/2021 6.5* 12.0 - 15.9 g/dL Final   • Hematocrit 09/15/2021 21.3* 34.0 - 46.6 % Final   • MCV 09/15/2021 93.0  79.0 - 97.0 fL Final   • MCH 09/15/2021 28.4  26.6 - 33.0 pg Final   • MCHC 09/15/2021 30.5* 31.5 - 35.7 g/dL Final   • RDW 09/15/2021 14.2  12.3 - 15.4 % Final   • RDW-SD 09/15/2021 48.3  37.0 - 54.0 fl Final   • MPV 09/15/2021 11.8  6.0 - 12.0 fL Final   • Platelets 09/15/2021 187  140 - 450 10*3/mm3 Final      No recent abdominal imaging to review.      Assessment / Plan      1. Melena  2. Normocytic anemia  3. Malaise and fatigue  09/28/21  Having black stool over the past few weeks but also has associated constipation as her normal bowel habits. She had 1 unit PRBC transfusion on 9/15/2021 in the ED after Hgb was found to be 6.5 g/dL. She still has extreme fatigue, not able to walk into clinic today. Has not had labs since ED visit. Will have STAT CBC today as well  as iron profile and CTAP. An urgent EGD will be planned tomorrow due to suspicion for upper GI bleeding. Has been taking ibuprofen and ASA daily, she will hold these. Stop smoking. Start Protonix 40 mg BID now. If Hgb is less than 7 on labs today, she will be sent to ED for transfusion, if normal, will proceed with planned endoscopy in the morning.   - pantoprazole (Protonix) 40 MG EC tablet; Take 1 tablet by mouth 2 (Two) Times a Day Before Meals.  Dispense: 60 tablet; Refill: 5  - CT Abdomen Pelvis With Contrast; Future  - CBC (No Diff); Future  - Iron Profile; Future    She will need an esophagogastroduodenoscopy performed with monitored anesthesia care. The indications, technique, alternatives and potential risk and complications were discussed with the patient including but not limited to bleeding, intestinal perforations, missing lesions and anesthetic complications. The patient understands and wishes to proceed with the procedure and has given their verbal consent. Written patient education information was given to the patient. She should follow up in the office after this procedure to discuss the results and further recommendations can be made at that time. The patient will call if they have further questions before procedure.    4. Colon cancer screening  09/28/21  Last colonoscopy was approx 2010 with Dr. Frey. She is not certain of those findings. No first degree family history of colon cancer. She needs colonoscopy soon due to anemia, will make recommendations based on review of EGD findings.             Follow Up:   Return for follow up after procedure to discuss results.      Sara Solis PA-C  [facility]  9/28/2021  16:01 EDT    Please note that portions of this note may have been completed with a voice recognition program. Efforts were made to edit the dictations, but occasionally words are mistranscribed.

## 2021-09-29 ENCOUNTER — ANESTHESIA (OUTPATIENT)
Dept: GASTROENTEROLOGY | Facility: HOSPITAL | Age: 71
End: 2021-09-29

## 2021-09-29 ENCOUNTER — ANESTHESIA EVENT (OUTPATIENT)
Dept: GASTROENTEROLOGY | Facility: HOSPITAL | Age: 71
End: 2021-09-29

## 2021-09-29 LAB
ANION GAP SERPL CALCULATED.3IONS-SCNC: 10 MMOL/L (ref 5–15)
BASOPHILS # BLD AUTO: 0.03 10*3/MM3 (ref 0–0.2)
BASOPHILS NFR BLD AUTO: 0.7 % (ref 0–1.5)
BUN SERPL-MCNC: 27 MG/DL (ref 8–23)
BUN/CREAT SERPL: 23.7 (ref 7–25)
CALCIUM SPEC-SCNC: 9.4 MG/DL (ref 8.6–10.5)
CHLORIDE SERPL-SCNC: 103 MMOL/L (ref 98–107)
CO2 SERPL-SCNC: 24 MMOL/L (ref 22–29)
CREAT SERPL-MCNC: 1.14 MG/DL (ref 0.57–1)
DEPRECATED RDW RBC AUTO: 43.3 FL (ref 37–54)
EOSINOPHIL # BLD AUTO: 0.03 10*3/MM3 (ref 0–0.4)
EOSINOPHIL NFR BLD AUTO: 0.7 % (ref 0.3–6.2)
ERYTHROCYTE [DISTWIDTH] IN BLOOD BY AUTOMATED COUNT: 13.8 % (ref 12.3–15.4)
GFR SERPL CREATININE-BSD FRML MDRD: 47 ML/MIN/1.73
GLUCOSE BLDC GLUCOMTR-MCNC: 114 MG/DL (ref 70–130)
GLUCOSE BLDC GLUCOMTR-MCNC: 138 MG/DL (ref 70–130)
GLUCOSE BLDC GLUCOMTR-MCNC: 140 MG/DL (ref 70–130)
GLUCOSE BLDC GLUCOMTR-MCNC: 165 MG/DL (ref 70–130)
GLUCOSE SERPL-MCNC: 112 MG/DL (ref 65–99)
HCT VFR BLD AUTO: 22.9 % (ref 34–46.6)
HCT VFR BLD AUTO: 23 % (ref 34–46.6)
HGB BLD-MCNC: 7.3 G/DL (ref 12–15.9)
HGB BLD-MCNC: 7.5 G/DL (ref 12–15.9)
IMM GRANULOCYTES # BLD AUTO: 0.02 10*3/MM3 (ref 0–0.05)
IMM GRANULOCYTES NFR BLD AUTO: 0.5 % (ref 0–0.5)
INR PPP: 1.1 (ref 0.9–1.1)
LYMPHOCYTES # BLD AUTO: 1.21 10*3/MM3 (ref 0.7–3.1)
LYMPHOCYTES NFR BLD AUTO: 27.4 % (ref 19.6–45.3)
MCH RBC QN AUTO: 27.8 PG (ref 26.6–33)
MCHC RBC AUTO-ENTMCNC: 31.9 G/DL (ref 31.5–35.7)
MCV RBC AUTO: 87.1 FL (ref 79–97)
MONOCYTES # BLD AUTO: 0.38 10*3/MM3 (ref 0.1–0.9)
MONOCYTES NFR BLD AUTO: 8.6 % (ref 5–12)
NEUTROPHILS NFR BLD AUTO: 2.74 10*3/MM3 (ref 1.7–7)
NEUTROPHILS NFR BLD AUTO: 62.1 % (ref 42.7–76)
NRBC BLD AUTO-RTO: 0 /100 WBC (ref 0–0.2)
PLATELET # BLD AUTO: 174 10*3/MM3 (ref 140–450)
PMV BLD AUTO: 10.9 FL (ref 6–12)
POTASSIUM SERPL-SCNC: 4.1 MMOL/L (ref 3.5–5.2)
PROTHROMBIN TIME: 14.8 SECONDS (ref 12–15.1)
RBC # BLD AUTO: 2.63 10*6/MM3 (ref 3.77–5.28)
SARS-COV-2 RNA PNL SPEC NAA+PROBE: NOT DETECTED
SODIUM SERPL-SCNC: 137 MMOL/L (ref 136–145)
WBC # BLD AUTO: 4.41 10*3/MM3 (ref 3.4–10.8)

## 2021-09-29 PROCEDURE — 80048 BASIC METABOLIC PNL TOTAL CA: CPT | Performed by: FAMILY MEDICINE

## 2021-09-29 PROCEDURE — G0378 HOSPITAL OBSERVATION PER HR: HCPCS

## 2021-09-29 PROCEDURE — P9016 RBC LEUKOCYTES REDUCED: HCPCS

## 2021-09-29 PROCEDURE — A9270 NON-COVERED ITEM OR SERVICE: HCPCS | Performed by: INTERNAL MEDICINE

## 2021-09-29 PROCEDURE — 63710000001 POLYETHYLENE GLYCOL WITH ELECTROLYTES 240 G RECONSTITUTED SOLUTION 4,000 ML BOTTLE: Performed by: INTERNAL MEDICINE

## 2021-09-29 PROCEDURE — 43239 EGD BIOPSY SINGLE/MULTIPLE: CPT | Performed by: INTERNAL MEDICINE

## 2021-09-29 PROCEDURE — 87635 SARS-COV-2 COVID-19 AMP PRB: CPT | Performed by: FAMILY MEDICINE

## 2021-09-29 PROCEDURE — 99225 PR SBSQ OBSERVATION CARE/DAY 25 MINUTES: CPT | Performed by: STUDENT IN AN ORGANIZED HEALTH CARE EDUCATION/TRAINING PROGRAM

## 2021-09-29 PROCEDURE — 36430 TRANSFUSION BLD/BLD COMPNT: CPT

## 2021-09-29 PROCEDURE — 88305 TISSUE EXAM BY PATHOLOGIST: CPT | Performed by: INTERNAL MEDICINE

## 2021-09-29 PROCEDURE — 85014 HEMATOCRIT: CPT | Performed by: FAMILY MEDICINE

## 2021-09-29 PROCEDURE — 25010000002 PROPOFOL 10 MG/ML EMULSION: Performed by: NURSE ANESTHETIST, CERTIFIED REGISTERED

## 2021-09-29 PROCEDURE — 96366 THER/PROPH/DIAG IV INF ADDON: CPT

## 2021-09-29 PROCEDURE — 82962 GLUCOSE BLOOD TEST: CPT

## 2021-09-29 PROCEDURE — 85610 PROTHROMBIN TIME: CPT | Performed by: FAMILY MEDICINE

## 2021-09-29 PROCEDURE — 86900 BLOOD TYPING SEROLOGIC ABO: CPT

## 2021-09-29 PROCEDURE — 85025 COMPLETE CBC W/AUTO DIFF WBC: CPT | Performed by: FAMILY MEDICINE

## 2021-09-29 PROCEDURE — 85018 HEMOGLOBIN: CPT | Performed by: FAMILY MEDICINE

## 2021-09-29 RX ORDER — SODIUM CHLORIDE 9 MG/ML
INJECTION, SOLUTION INTRAVENOUS CONTINUOUS PRN
Status: DISCONTINUED | OUTPATIENT
Start: 2021-09-29 | End: 2021-09-29 | Stop reason: SURG

## 2021-09-29 RX ORDER — LIDOCAINE HYDROCHLORIDE 20 MG/ML
INJECTION, SOLUTION INTRAVENOUS AS NEEDED
Status: DISCONTINUED | OUTPATIENT
Start: 2021-09-29 | End: 2021-09-29 | Stop reason: SURG

## 2021-09-29 RX ORDER — POLYETHYLENE GLYCOL 3350, SODIUM CHLORIDE, POTASSIUM CHLORIDE, SODIUM BICARBONATE, AND SODIUM SULFATE 240; 5.84; 2.98; 6.72; 22.72 G/4L; G/4L; G/4L; G/4L; G/4L
4000 POWDER, FOR SOLUTION ORAL SEE ADMIN INSTRUCTIONS
Status: DISCONTINUED | OUTPATIENT
Start: 2021-09-29 | End: 2021-09-30 | Stop reason: HOSPADM

## 2021-09-29 RX ORDER — ONDANSETRON 2 MG/ML
4 INJECTION INTRAMUSCULAR; INTRAVENOUS ONCE AS NEEDED
Status: ACTIVE | OUTPATIENT
Start: 2021-09-29 | End: 2021-09-29

## 2021-09-29 RX ORDER — PROPOFOL 10 MG/ML
VIAL (ML) INTRAVENOUS AS NEEDED
Status: DISCONTINUED | OUTPATIENT
Start: 2021-09-29 | End: 2021-09-29 | Stop reason: SURG

## 2021-09-29 RX ORDER — SODIUM CHLORIDE 9 MG/ML
75 INJECTION, SOLUTION INTRAVENOUS ONCE
Status: DISCONTINUED | OUTPATIENT
Start: 2021-09-29 | End: 2021-09-29 | Stop reason: HOSPADM

## 2021-09-29 RX ADMIN — SODIUM CHLORIDE, PRESERVATIVE FREE 10 ML: 5 INJECTION INTRAVENOUS at 20:48

## 2021-09-29 RX ADMIN — SODIUM CHLORIDE, PRESERVATIVE FREE 10 ML: 5 INJECTION INTRAVENOUS at 10:14

## 2021-09-29 RX ADMIN — SODIUM CHLORIDE: 9 INJECTION, SOLUTION INTRAVENOUS at 07:55

## 2021-09-29 RX ADMIN — LIDOCAINE HYDROCHLORIDE 100 MG: 20 INJECTION, SOLUTION INTRAVENOUS at 08:00

## 2021-09-29 RX ADMIN — PROPOFOL 100 MG: 10 INJECTION, EMULSION INTRAVENOUS at 08:10

## 2021-09-29 RX ADMIN — Medication 8 MG/HR: at 10:14

## 2021-09-29 RX ADMIN — Medication 8 MG/HR: at 19:09

## 2021-09-29 RX ADMIN — POLYETHYLENE GLYCOL-3350 AND ELECTROLYTES WITH FLAVOR PACK 4000 ML: 240; 5.84; 2.98; 6.72; 22.72 POWDER, FOR SOLUTION ORAL at 18:42

## 2021-09-29 NOTE — ANESTHESIA POSTPROCEDURE EVALUATION
Patient: Karol Lopez    Procedure Summary     Date: 09/29/21 [facility]    Anesthesia Start: 0755 Anesthesia Stop: 0810    Procedure: ESOPHAGOGASTRODUODENOSCOPY (N/A Esophagus) Diagnosis:     Surgeons: Russell Davila MD Provider: James Jones CRNA    Anesthesia Type: MAC ASA Status: 3 - Emergent          Anesthesia Type: MAC    Vitals  Vitals Value Taken Time   /50 09/29/21 0918   Temp 98 °F (36.7 °C) 09/29/21 0918   Pulse 69 09/29/21 0959   Resp 16 09/29/21 0918   SpO2 100 % 09/29/21 0918   Vitals shown include unvalidated device data.          Post Anesthesia Care and Evaluation    Patient location during evaluation: PACU  Patient participation: complete - patient participated  Level of consciousness: awake  Pain score: 1  Pain management: adequate  Airway patency: patent  Anesthetic complications: No anesthetic complications  PONV Status: controlled  Cardiovascular status: acceptable and stable  Respiratory status: acceptable and nasal cannula  Hydration status: acceptable

## 2021-09-29 NOTE — ANESTHESIA PREPROCEDURE EVALUATION
Anesthesia Evaluation     Patient summary reviewed and Nursing notes reviewed   no history of anesthetic complications:  NPO Solid Status: > 8 hours  NPO Liquid Status: > 8 hours           Airway   Mallampati: II  TM distance: >3 FB  Neck ROM: full  no difficulty expected  Dental - normal exam     Pulmonary    (+) a smoker Current, COPD moderate, shortness of breath, decreased breath sounds,   Cardiovascular - normal exam    Rhythm: regular  Rate: normal    (+) hypertension, past MI , CAD, AHUJA, PVD, hyperlipidemia,       Neuro/Psych  (+) TIA, CVA, numbness, psychiatric history Anxiety and Depression,     GI/Hepatic/Renal/Endo    (+)  GERD,  renal disease, diabetes mellitus,     Musculoskeletal     (+) arthralgias, back pain, chronic pain, myalgias,   Abdominal    Substance History - negative use     OB/GYN negative ob/gyn ROS         Other   arthritis,      ROS/Med Hx Other: Labs reviewed  7 /23 gluc 138 bun 27 crt 1.14                Anesthesia Plan    ASA 3 - emergent     MAC   (Pt told that intravenous sedation will be used as the primary anesthetic along with local anesthesia if necessary. Every effort will be made to make sure the patient is comfortable.     The patient was told they may or may not have recall for the procedure. It was further explained that if the MAC was not adequate that a general anesthetic with either an LMA or endotracheal tube would be required.     Will proceed with the plan of care.)  intravenous induction     Anesthetic plan, all risks, benefits, and alternatives have been provided, discussed and informed consent has been obtained with: patient.

## 2021-09-30 ENCOUNTER — TELEPHONE (OUTPATIENT)
Dept: PRIMARY CARE CLINIC | Age: 71
End: 2021-09-30

## 2021-09-30 ENCOUNTER — ANESTHESIA EVENT (OUTPATIENT)
Dept: GASTROENTEROLOGY | Facility: HOSPITAL | Age: 71
End: 2021-09-30

## 2021-09-30 ENCOUNTER — ANESTHESIA (OUTPATIENT)
Dept: GASTROENTEROLOGY | Facility: HOSPITAL | Age: 71
End: 2021-09-30

## 2021-09-30 VITALS
WEIGHT: 135.14 LBS | BODY MASS INDEX: 23.07 KG/M2 | RESPIRATION RATE: 16 BRPM | SYSTOLIC BLOOD PRESSURE: 124 MMHG | TEMPERATURE: 97.5 F | DIASTOLIC BLOOD PRESSURE: 55 MMHG | HEART RATE: 75 BPM | HEIGHT: 64 IN | OXYGEN SATURATION: 98 %

## 2021-09-30 DIAGNOSIS — M51.36 DEGENERATIVE DISC DISEASE, LUMBAR: ICD-10-CM

## 2021-09-30 PROBLEM — D50.0 BLOOD LOSS ANEMIA: Status: ACTIVE | Noted: 2021-09-28

## 2021-09-30 LAB
ALBUMIN SERPL-MCNC: 3.9 G/DL (ref 3.5–5.2)
ALBUMIN/GLOB SERPL: 2.2 G/DL
ALP SERPL-CCNC: 61 U/L (ref 39–117)
ALT SERPL W P-5'-P-CCNC: 76 U/L (ref 1–33)
ANION GAP SERPL CALCULATED.3IONS-SCNC: 9.9 MMOL/L (ref 5–15)
AST SERPL-CCNC: 145 U/L (ref 1–32)
BASOPHILS # BLD AUTO: 0.01 10*3/MM3 (ref 0–0.2)
BASOPHILS NFR BLD AUTO: 0.3 % (ref 0–1.5)
BH BB BLOOD EXPIRATION DATE: NORMAL
BH BB BLOOD EXPIRATION DATE: NORMAL
BH BB BLOOD TYPE BARCODE: 600
BH BB BLOOD TYPE BARCODE: 600
BH BB DISPENSE STATUS: NORMAL
BH BB DISPENSE STATUS: NORMAL
BH BB PRODUCT CODE: NORMAL
BH BB PRODUCT CODE: NORMAL
BH BB UNIT NUMBER: NORMAL
BH BB UNIT NUMBER: NORMAL
BILIRUB SERPL-MCNC: 0.5 MG/DL (ref 0–1.2)
BUN SERPL-MCNC: 18 MG/DL (ref 8–23)
BUN/CREAT SERPL: 16.7 (ref 7–25)
CALCIUM SPEC-SCNC: 8.9 MG/DL (ref 8.6–10.5)
CHLORIDE SERPL-SCNC: 105 MMOL/L (ref 98–107)
CO2 SERPL-SCNC: 23.1 MMOL/L (ref 22–29)
CREAT SERPL-MCNC: 1.08 MG/DL (ref 0.57–1)
CROSSMATCH INTERPRETATION: NORMAL
CROSSMATCH INTERPRETATION: NORMAL
DEPRECATED RDW RBC AUTO: 44.4 FL (ref 37–54)
EOSINOPHIL # BLD AUTO: 0.02 10*3/MM3 (ref 0–0.4)
EOSINOPHIL NFR BLD AUTO: 0.6 % (ref 0.3–6.2)
ERYTHROCYTE [DISTWIDTH] IN BLOOD BY AUTOMATED COUNT: 14.1 % (ref 12.3–15.4)
GFR SERPL CREATININE-BSD FRML MDRD: 50 ML/MIN/1.73
GLOBULIN UR ELPH-MCNC: 1.8 GM/DL
GLUCOSE BLDC GLUCOMTR-MCNC: 122 MG/DL (ref 70–130)
GLUCOSE BLDC GLUCOMTR-MCNC: 131 MG/DL (ref 70–130)
GLUCOSE SERPL-MCNC: 106 MG/DL (ref 65–99)
HCT VFR BLD AUTO: 22.9 % (ref 34–46.6)
HGB BLD-MCNC: 7.2 G/DL (ref 12–15.9)
IMM GRANULOCYTES # BLD AUTO: 0.01 10*3/MM3 (ref 0–0.05)
IMM GRANULOCYTES NFR BLD AUTO: 0.3 % (ref 0–0.5)
LYMPHOCYTES # BLD AUTO: 0.79 10*3/MM3 (ref 0.7–3.1)
LYMPHOCYTES NFR BLD AUTO: 22.9 % (ref 19.6–45.3)
MCH RBC QN AUTO: 27.6 PG (ref 26.6–33)
MCHC RBC AUTO-ENTMCNC: 31.4 G/DL (ref 31.5–35.7)
MCV RBC AUTO: 87.7 FL (ref 79–97)
MONOCYTES # BLD AUTO: 0.33 10*3/MM3 (ref 0.1–0.9)
MONOCYTES NFR BLD AUTO: 9.6 % (ref 5–12)
NEUTROPHILS NFR BLD AUTO: 2.29 10*3/MM3 (ref 1.7–7)
NEUTROPHILS NFR BLD AUTO: 66.3 % (ref 42.7–76)
NRBC BLD AUTO-RTO: 0 /100 WBC (ref 0–0.2)
PLATELET # BLD AUTO: 171 10*3/MM3 (ref 140–450)
PMV BLD AUTO: 10.9 FL (ref 6–12)
POTASSIUM SERPL-SCNC: 3.7 MMOL/L (ref 3.5–5.2)
PROT SERPL-MCNC: 5.7 G/DL (ref 6–8.5)
RBC # BLD AUTO: 2.61 10*6/MM3 (ref 3.77–5.28)
SODIUM SERPL-SCNC: 138 MMOL/L (ref 136–145)
UNIT  ABO: NORMAL
UNIT  ABO: NORMAL
UNIT  RH: NORMAL
UNIT  RH: NORMAL
WBC # BLD AUTO: 3.45 10*3/MM3 (ref 3.4–10.8)

## 2021-09-30 PROCEDURE — 97165 OT EVAL LOW COMPLEX 30 MIN: CPT

## 2021-09-30 PROCEDURE — 80053 COMPREHEN METABOLIC PANEL: CPT | Performed by: STUDENT IN AN ORGANIZED HEALTH CARE EDUCATION/TRAINING PROGRAM

## 2021-09-30 PROCEDURE — 25010000002 PROPOFOL 200 MG/20ML EMULSION: Performed by: NURSE ANESTHETIST, CERTIFIED REGISTERED

## 2021-09-30 PROCEDURE — 82962 GLUCOSE BLOOD TEST: CPT

## 2021-09-30 PROCEDURE — G0378 HOSPITAL OBSERVATION PER HR: HCPCS

## 2021-09-30 PROCEDURE — 45382 COLONOSCOPY W/CONTROL BLEED: CPT | Performed by: INTERNAL MEDICINE

## 2021-09-30 PROCEDURE — 45385 COLONOSCOPY W/LESION REMOVAL: CPT | Performed by: INTERNAL MEDICINE

## 2021-09-30 PROCEDURE — 97161 PT EVAL LOW COMPLEX 20 MIN: CPT

## 2021-09-30 PROCEDURE — 85025 COMPLETE CBC W/AUTO DIFF WBC: CPT | Performed by: STUDENT IN AN ORGANIZED HEALTH CARE EDUCATION/TRAINING PROGRAM

## 2021-09-30 PROCEDURE — 88305 TISSUE EXAM BY PATHOLOGIST: CPT | Performed by: INTERNAL MEDICINE

## 2021-09-30 PROCEDURE — 99217 PR OBSERVATION CARE DISCHARGE MANAGEMENT: CPT | Performed by: STUDENT IN AN ORGANIZED HEALTH CARE EDUCATION/TRAINING PROGRAM

## 2021-09-30 RX ORDER — LIDOCAINE HYDROCHLORIDE 20 MG/ML
INJECTION, SOLUTION INTRAVENOUS AS NEEDED
Status: DISCONTINUED | OUTPATIENT
Start: 2021-09-30 | End: 2021-09-30 | Stop reason: SURG

## 2021-09-30 RX ORDER — SODIUM CHLORIDE 9 MG/ML
70 INJECTION, SOLUTION INTRAVENOUS CONTINUOUS PRN
Status: DISCONTINUED | OUTPATIENT
Start: 2021-09-30 | End: 2021-09-30 | Stop reason: HOSPADM

## 2021-09-30 RX ORDER — HYDROCODONE BITARTRATE AND ACETAMINOPHEN 5; 325 MG/1; MG/1
1 TABLET ORAL 2 TIMES DAILY PRN
Qty: 30 TABLET | Refills: 0 | Status: SHIPPED | OUTPATIENT
Start: 2021-09-30 | End: 2021-11-03 | Stop reason: SDUPTHER

## 2021-09-30 RX ORDER — PROPOFOL 10 MG/ML
INJECTION, EMULSION INTRAVENOUS AS NEEDED
Status: DISCONTINUED | OUTPATIENT
Start: 2021-09-30 | End: 2021-09-30 | Stop reason: SURG

## 2021-09-30 RX ORDER — FERROUS SULFATE 325(65) MG
325 TABLET ORAL
Qty: 30 TABLET | Refills: 3 | Status: SHIPPED | OUTPATIENT
Start: 2021-09-30

## 2021-09-30 RX ADMIN — Medication 8 MG/HR: at 05:15

## 2021-09-30 RX ADMIN — PROPOFOL 50 MG: 10 INJECTION, EMULSION INTRAVENOUS at 07:44

## 2021-09-30 RX ADMIN — PROPOFOL 50 MG: 10 INJECTION, EMULSION INTRAVENOUS at 08:19

## 2021-09-30 RX ADMIN — LIDOCAINE HYDROCHLORIDE 60 MG: 20 INJECTION, SOLUTION INTRAVENOUS at 07:42

## 2021-09-30 RX ADMIN — PROPOFOL 50 MG: 10 INJECTION, EMULSION INTRAVENOUS at 07:48

## 2021-09-30 RX ADMIN — PROPOFOL 50 MG: 10 INJECTION, EMULSION INTRAVENOUS at 07:52

## 2021-09-30 RX ADMIN — SODIUM CHLORIDE 70 ML/HR: 9 INJECTION, SOLUTION INTRAVENOUS at 06:42

## 2021-09-30 RX ADMIN — PROPOFOL 50 MG: 10 INJECTION, EMULSION INTRAVENOUS at 08:06

## 2021-09-30 RX ADMIN — PROPOFOL 50 MG: 10 INJECTION, EMULSION INTRAVENOUS at 08:01

## 2021-09-30 RX ADMIN — PROPOFOL 50 MG: 10 INJECTION, EMULSION INTRAVENOUS at 07:56

## 2021-09-30 NOTE — ANESTHESIA POSTPROCEDURE EVALUATION
Patient: Karol Lopez    Procedure Summary     Date: 09/30/21 Room / Location: [facility] ENDOSCOPY 2 / [facility] ENDOSCOPY    Anesthesia Start: 0740 Anesthesia Stop: 0829    Procedure: COLONOSCOPY WITH POLYPECTOMY (N/A Anus) Diagnosis:       Blood loss anemia      (Blood loss anemia [D50.0])    Surgeons: Russell Davila MD Provider: Haim Negron CRNA    Anesthesia Type: MAC ASA Status: 3          Anesthesia Type: MAC    Vitals  No vitals data found for the desired time range.          Post Anesthesia Care and Evaluation    Patient location during evaluation: bedside  Patient participation: complete - patient participated  Level of consciousness: awake and alert  Pain score: 0  Pain management: adequate  Airway patency: patent  Anesthetic complications: No anesthetic complications  PONV Status: none  Cardiovascular status: acceptable  Respiratory status: acceptable  Hydration status: acceptable

## 2021-09-30 NOTE — ANESTHESIA PREPROCEDURE EVALUATION
Anesthesia Evaluation     Patient summary reviewed and Nursing notes reviewed   NPO Solid Status: > 8 hours  NPO Liquid Status: > 8 hours           Airway   Mallampati: II  TM distance: >3 FB  Neck ROM: full  No difficulty expected  Dental      Pulmonary    (+) COPD,   Cardiovascular     (+) hypertension, past MI , CAD, hyperlipidemia,     ROS comment: 1.  Normal left ventricular size and systolic function, LVEF 60-65%.  2.  Mild concentric LVH.  3.  Normal LV diastolic filling pattern.  4.  Normal left atrial volume index.  5.  Normal right ventricular size and systolic function.  6.  Mild calcification of aortic valve without significant stenosis.  7.  Trace MR, TR, and PI.  8.  Negative bubble study with no evidence of intracardiac or intrapulmonary shunt.    Neuro/Psych  (+) TIA, CVA, numbness, psychiatric history,     GI/Hepatic/Renal/Endo    (+)  GERD,  renal disease, diabetes mellitus,     Musculoskeletal     Abdominal    Substance History      OB/GYN          Other   arthritis,                    Anesthesia Plan    ASA 3     MAC     intravenous induction     Anesthetic plan, all risks, benefits, and alternatives have been provided, discussed and informed consent has been obtained with: patient.    Plan discussed with CRNA.

## 2021-10-01 ENCOUNTER — READMISSION MANAGEMENT (OUTPATIENT)
Dept: CALL CENTER | Facility: HOSPITAL | Age: 71
End: 2021-10-01

## 2021-10-01 NOTE — OUTREACH NOTE
Prep Survey      Responses   [facility] facility patient discharged from?  [facility]   Is LACE score < 7 ?  No   Emergency Room discharge w/ pulse ox?  No   Eligibility  Readm Mgmt   Discharge diagnosis  Acute, severe blood loss anemia   Does the patient have one of the following disease processes/diagnoses(primary or secondary)?  Other   Does the patient have Home health ordered?  No   Is there a DME ordered?  No   Prep survey completed?  Yes          Stephanie Aparicio RN

## 2021-10-03 LAB
LAB AP CASE REPORT: NORMAL
PATH REPORT.FINAL DX SPEC: NORMAL

## 2021-10-05 ENCOUNTER — READMISSION MANAGEMENT (OUTPATIENT)
Dept: CALL CENTER | Facility: HOSPITAL | Age: 71
End: 2021-10-05

## 2021-10-05 ENCOUNTER — OFFICE VISIT (OUTPATIENT)
Dept: PRIMARY CARE CLINIC | Age: 71
End: 2021-10-05
Payer: MEDICARE

## 2021-10-05 VITALS
WEIGHT: 138.6 LBS | RESPIRATION RATE: 18 BRPM | DIASTOLIC BLOOD PRESSURE: 60 MMHG | BODY MASS INDEX: 23.79 KG/M2 | OXYGEN SATURATION: 100 % | SYSTOLIC BLOOD PRESSURE: 132 MMHG | TEMPERATURE: 97.9 F | HEART RATE: 80 BPM

## 2021-10-05 DIAGNOSIS — D12.6 ADENOMATOUS POLYP OF COLON, UNSPECIFIED PART OF COLON: ICD-10-CM

## 2021-10-05 DIAGNOSIS — I25.10 CORONARY ARTERY DISEASE INVOLVING NATIVE CORONARY ARTERY OF NATIVE HEART WITHOUT ANGINA PECTORIS: ICD-10-CM

## 2021-10-05 DIAGNOSIS — D64.9 ANEMIA, UNSPECIFIED TYPE: Primary | ICD-10-CM

## 2021-10-05 DIAGNOSIS — I10 ESSENTIAL HYPERTENSION: ICD-10-CM

## 2021-10-05 DIAGNOSIS — E11.40 TYPE 2 DIABETES MELLITUS WITH DIABETIC NEUROPATHY, WITH LONG-TERM CURRENT USE OF INSULIN (HCC): ICD-10-CM

## 2021-10-05 DIAGNOSIS — Z86.73 H/O: CVA (CEREBROVASCULAR ACCIDENT): ICD-10-CM

## 2021-10-05 DIAGNOSIS — E53.8 VITAMIN B12 DEFICIENCY: ICD-10-CM

## 2021-10-05 DIAGNOSIS — Z79.4 TYPE 2 DIABETES MELLITUS WITH DIABETIC NEUROPATHY, WITH LONG-TERM CURRENT USE OF INSULIN (HCC): ICD-10-CM

## 2021-10-05 LAB
LAB AP CASE REPORT: NORMAL
PATH REPORT.FINAL DX SPEC: NORMAL

## 2021-10-05 PROCEDURE — 4040F PNEUMOC VAC/ADMIN/RCVD: CPT | Performed by: INTERNAL MEDICINE

## 2021-10-05 PROCEDURE — 2022F DILAT RTA XM EVC RTNOPTHY: CPT | Performed by: INTERNAL MEDICINE

## 2021-10-05 PROCEDURE — G8427 DOCREV CUR MEDS BY ELIG CLIN: HCPCS | Performed by: INTERNAL MEDICINE

## 2021-10-05 PROCEDURE — 1123F ACP DISCUSS/DSCN MKR DOCD: CPT | Performed by: INTERNAL MEDICINE

## 2021-10-05 PROCEDURE — G8400 PT W/DXA NO RESULTS DOC: HCPCS | Performed by: INTERNAL MEDICINE

## 2021-10-05 PROCEDURE — G8420 CALC BMI NORM PARAMETERS: HCPCS | Performed by: INTERNAL MEDICINE

## 2021-10-05 PROCEDURE — 1090F PRES/ABSN URINE INCON ASSESS: CPT | Performed by: INTERNAL MEDICINE

## 2021-10-05 PROCEDURE — 3044F HG A1C LEVEL LT 7.0%: CPT | Performed by: INTERNAL MEDICINE

## 2021-10-05 PROCEDURE — 99214 OFFICE O/P EST MOD 30 MIN: CPT | Performed by: INTERNAL MEDICINE

## 2021-10-05 PROCEDURE — G8484 FLU IMMUNIZE NO ADMIN: HCPCS | Performed by: INTERNAL MEDICINE

## 2021-10-05 PROCEDURE — 1036F TOBACCO NON-USER: CPT | Performed by: INTERNAL MEDICINE

## 2021-10-05 PROCEDURE — 3017F COLORECTAL CA SCREEN DOC REV: CPT | Performed by: INTERNAL MEDICINE

## 2021-10-05 PROCEDURE — 96372 THER/PROPH/DIAG INJ SC/IM: CPT | Performed by: INTERNAL MEDICINE

## 2021-10-05 RX ORDER — FERROUS SULFATE 325(65) MG
325 TABLET ORAL
COMMUNITY
Start: 2021-09-30

## 2021-10-05 RX ORDER — PANTOPRAZOLE SODIUM 40 MG/1
40 TABLET, DELAYED RELEASE ORAL
COMMUNITY
Start: 2021-09-28

## 2021-10-05 RX ORDER — FUROSEMIDE 20 MG/1
20 TABLET ORAL DAILY PRN
Qty: 30 TABLET | Refills: 1 | Status: SHIPPED | OUTPATIENT
Start: 2021-10-05 | End: 2021-11-29

## 2021-10-05 RX ORDER — CYANOCOBALAMIN 1000 UG/ML
1000 INJECTION INTRAMUSCULAR; SUBCUTANEOUS ONCE
Status: COMPLETED | OUTPATIENT
Start: 2021-10-05 | End: 2021-10-05

## 2021-10-05 RX ADMIN — CYANOCOBALAMIN 1000 MCG: 1000 INJECTION INTRAMUSCULAR; SUBCUTANEOUS at 17:03

## 2021-10-05 ASSESSMENT — ENCOUNTER SYMPTOMS
SINUS PRESSURE: 0
NAUSEA: 0
VOMITING: 0
ABDOMINAL PAIN: 0
EYE DISCHARGE: 0
SORE THROAT: 0

## 2021-10-05 NOTE — PROGRESS NOTES
SUBJECTIVE:    Patient ID: Lizette Steele is a 73 y. o.female. Chief Complaint   Patient presents with    Follow-Up from [facility]          HPI:  Patient presented today to follow-up from recent hospital stay. She was admitted with fatigue and dark stool. Found to have hemoglobin of 5.5. She received 2 units of packed red blood cells and esaphegogastroendoscopy and colonoscopy were done came back significant for gastropathy and colon polyps. Aspirin and Brilinta placed on hold for few days. Patient reported feeling better but continued to be weak and tired which is better compared to before. She has been compliant with taking her Protonix. Patient has had diabetes for the past several years. She has been compliant with the medications and denies any side effects from it. She has been monitoring fingersticks on a daily basis. Her fingerstick range is between . She denies any hypoglycemic symptoms. She has been following a diabetic diet and has not been active. Her last eye exam was less than a year ago. She is using oral meds and Insulin. She is on 40 units of Levemir. BP has been stable. Patient's medications, allergies, past medical, surgical, social and family histories were reviewed and updated as appropriate in electronic medical record. Outpatient Medications Marked as Taking for the 10/5/21 encounter (Office Visit) with Sheldon Benjamin MD   Medication Sig Dispense Refill    pantoprazole (PROTONIX) 40 MG tablet Take 40 mg by mouth 2 times daily (before meals)      ferrous sulfate (IRON 325) 325 (65 Fe) MG tablet Take 325 mg by mouth daily (with breakfast)      metFORMIN (GLUCOPHAGE) 1000 MG tablet TAKE 1 TABLET BY MOUTH TWICE DAILY WITH FOOD 60 tablet 0    HYDROcodone-acetaminophen (NORCO) 5-325 MG per tablet Take 1 tablet by mouth 2 times daily as needed for Pain for up to 30 days.  30 tablet 0    TRADJENTA 5 MG tablet TAKE 1 TABLET BY MOUTH EVERY  tablet 0  BRILINTA 90 MG TABS tablet TAKE 1 TABLET BY MOUTH TWICE DAILY 60 tablet 3    insulin detemir (LEVEMIR FLEXTOUCH) 100 UNIT/ML injection pen INJECT 55 UNITS INTO THE SKIN NIGHTLY 340B 90 day supply. (Patient taking differently: INJECT 40 UNITS INTO THE SKIN NIGHTLY 340B 90 day supply.) 60 mL 1    rosuvastatin (CRESTOR) 20 MG tablet TAKE 1 TABLET BY MOUTH ONCE DAILY 120 tablet 0    lisinopril (PRINIVIL;ZESTRIL) 5 MG tablet TAKE 1 TABLET BY MOUTH DAILY 90 tablet 1    empagliflozin (JARDIANCE) 10 MG tablet Take 1 tablet by mouth daily 30 tablet 3    gabapentin (NEURONTIN) 300 MG capsule Take 1 capsule by mouth nightly for 60 days. 90 capsule 0    Insulin Pen Needle 31G X 6 MM MISC 1 each by Does not apply route daily 100 each 3    isosorbide mononitrate (IMDUR) 60 MG extended release tablet TAKE 1 TABLET BY MOUTH EVERY DAY 30 tablet 2    amLODIPine (NORVASC) 5 MG tablet Take 5 mg by mouth      blood glucose monitor strips QID Dx E11.9 100 strip 5    glucose monitoring kit (FREESTYLE) monitoring kit 1 kit by Does not apply route daily E11.40 1 kit 0    umeclidinium-vilanterol (ANORO ELLIPTA) 62.5-25 MCG/INH AEPB inhaler Inhale 1 puff into the lungs daily      aspirin 81 MG chewable tablet Take 1 tablet by mouth daily 30 tablet 2    nitroGLYCERIN (NITROSTAT) 0.4 MG SL tablet Place 1 tablet under the tongue every 5 minutes as needed for Chest pain 25 tablet 1    Insulin Syringe-Needle U-100 (B-D INS SYR ULTRAFINE 1CC/31G) 31G X 5/16\" 1 ML MISC USE ONCE A DAY AS DIRECTED  DX E11.9 100 each 3        Review of Systems   Constitutional: Positive for fatigue. Negative for chills and fever. HENT: Positive for voice change. Negative for congestion, sinus pressure and sore throat. Eyes: Negative for discharge and visual disturbance. Respiratory: Positive for cough, shortness of breath and wheezing. At baseline    Cardiovascular: Negative for chest pain and palpitations.    Gastrointestinal: Negative for abdominal pain, nausea and vomiting. Endocrine: Negative for cold intolerance and heat intolerance. Genitourinary: Negative for dysuria, frequency and urgency. Musculoskeletal: Positive for arthralgias and back pain. Skin: Negative for rash and wound. Neurological: Negative for syncope, numbness and headaches. Hematological: Negative. Psychiatric/Behavioral: Positive for sleep disturbance. Negative for agitation. The patient is not nervous/anxious. Past Medical History:   Diagnosis Date    CAD (coronary artery disease)     [facility] [address]     Cystic fibrosis [facility] [address]     Diabetes mellitus ([facility] [address]     GERD (gastroesophageal reflux disease)     H/O: CVA (cardiovascular accident)     HTN (hypertension)     Mass     on chest     Tobacco abuse     Vitamin B12 deficiency      Past Surgical History:   Procedure Laterality Date    CARPAL TUNNEL RELEASE Bilateral     CHOLECYSTECTOMY      CORONARY ANGIOPLASTY WITH STENT PLACEMENT      HYSTERECTOMY      JOINT REPLACEMENT Bilateral 10/15/2016    knees    TOTAL KNEE ARTHROPLASTY [facility]ilateral 10[facility]18/2016    [facility]     Family History   Problem Relation Age of Onset    Diabetes Mother     High Blood Pressure Mother     Cancer Mother         pancreatic    Diabetes Father     Heart Disease Father     High Blood Pressure Father     Cancer Sister         lung, breast    Cancer Brother         throat      Social History     Tobacco Use   Smoking Status Former Smoker    Packs/day: 1.00    Years: 40.00    Pack years: 40.00    Types: Cigarettes   Smokeless Tobacco Never Used   Tobacco Comment    states she is not willing to stop and this is a stress reliever for her.         OBJECTIVE:   Wt Readings from Last 3 Encounters:   10/05/21 138 lb 9.6 oz (62.9 kg)   07/21/21 145 lb (65.8 kg)   06/16/21 145 lb 12.8 oz (66.1 kg)     BP Readings from Last 3 Encounters:   10/05/21 132/60   07/21/21 136/68   06/16/21 122/60 /60   Pulse 80   Temp 97.9 °F (36.6 °C)   Resp 18   Wt 138 lb 9.6 oz (62.9 kg)   SpO2 100%   BMI 23.79 kg/m²      Physical Exam  Vitals and nursing note reviewed. Constitutional:       Appearance: Normal appearance. She is well-developed. HENT:      Head: Normocephalic and atraumatic. Right Ear: External ear normal.      Left Ear: External ear normal.      Nose: Nose normal.      Mouth/Throat:      Mouth: Mucous membranes are moist.      Pharynx: Oropharynx is clear. Eyes:      Conjunctiva/sclera: Conjunctivae normal.      Pupils: Pupils are equal, round, and reactive to light. Neck:      Thyroid: No thyromegaly. Vascular: No JVD. Cardiovascular:      Rate and Rhythm: Normal rate and regular rhythm. Heart sounds: Murmur heard. Systolic murmur is present. Pulmonary:      Effort: Pulmonary effort is normal.      Breath sounds: Normal breath sounds. No wheezing or rales. Abdominal:      General: Bowel sounds are normal. There is no distension. Palpations: Abdomen is soft. Tenderness: There is no abdominal tenderness. Musculoskeletal:         General: No tenderness. Cervical back: Neck supple. No rigidity. No muscular tenderness. Right lower leg: No edema. Left lower leg: No edema. Skin:     Findings: No erythema or rash. Neurological:      General: No focal deficit present. Mental Status: She is alert and oriented to person, place, and time.    Psychiatric:         Behavior: Behavior normal.         Judgment: Judgment normal.         Lab Results   Component Value Date     07/21/2021    K 4.2 07/21/2021    K 3.7 02/26/2021     07/21/2021    CO2 21 07/21/2021    GLUCOSE 160 07/21/2021    BUN 15 07/21/2021    CREATININE 1.12 07/21/2021    CALCIUM 9.6 07/21/2021    PROT 6.5 07/21/2021    LABALBU 4.3 07/21/2021    BILITOT 0.3 07/21/2021    ALT 11 07/21/2021    AST 14 07/21/2021       Hemoglobin A1C (%)   Date Value   07/21/2021 5.4     Microscopic Examination (no units)   Date Value   04/27/2019 YES     LDL Calculated (mg/dL)   Date Value   12/03/2020 45         Lab Results   Component Value Date    WBC 5.4 07/21/2021    NEUTROABS 3.9 07/21/2021    HGB 10.8 07/21/2021    HCT 33.2 07/21/2021    MCV 92 07/21/2021     07/21/2021       Lab Results   Component Value Date    TSH 2.840 03/10/2021         ASSESSMENT/PLAN:     1. Anemia, unspecified type  Gastropathy and colon polyps on recent EGDs/colonoscopy. Status post blood transfusion as well. Prior history of iron deficiency anemia. Hemoglobin was 7.2 on discharge. I am concerned regarding her hemoglobin level given her prior history of CVA/CAD. I am going to proceed with checking labs and anemia work-up. Further recommendation based on test result. - CBC Auto Differential; Future  - Comprehensive Metabolic Panel; Future  - Ferritin; Future  - Folate; Future  - Iron and TIBC; Future  - Magnesium; Future  - TSH without Reflex; Future    2. Vitamin B12 deficiency  Will cont on B12 injection on a monthly basis. 3. Adenomatous polyp of colon, unspecified part of colon  Reviewed recent colonoscopy and pathology. Patient is due for repeat colonoscopy in 6 months. 4. Essential hypertension  BP is stable. I have advised her on low-sodium diet, exercise and weight control. I am going to continue current medication. Will monitor her renal function every few months, have advised her to check blood pressure frequently and to keep a record of this. - CBC Auto Differential; Future  - Comprehensive Metabolic Panel; Future  - Ferritin; Future  - Folate; Future  - Iron and TIBC; Future  - Magnesium; Future  - TSH without Reflex; Future    5. Type 2 diabetes mellitus with diabetic neuropathy, with long-term current use of insulin (HCC)  Stable. I advised her regarding diabetic diet, exercise and weight control.   Also, I advised her to stay on the current medication, monitor her fingerstick closely. I am going to check the A1c every few months. I will check microalbumin on a yearly basis. I have also advised her to have a yearly eye exam and to monitor her feet closely. Along with instruction of possible complications related to diabetes, even with good control. A1C will be checked in few months. Lab Results   Component Value Date    LABA1C 5.4 07/21/2021     - CBC Auto Differential; Future  - Comprehensive Metabolic Panel; Future  - Ferritin; Future  - Folate; Future  - Iron and TIBC; Future  - Magnesium; Future  - TSH without Reflex; Future    6. Coronary artery disease involving native coronary artery of native heart without angina pectoris  Patient is on appropriate regimen. I will make sure that BP, Lipid and other medical problems are under good control. Patient to follow up with cardiology on a regular basis. Patient to return to office or go to ER if start to have CP, SOA, palpitation. .ect.    - CBC Auto Differential; Future  - Comprehensive Metabolic Panel; Future  - Ferritin; Future  - Folate; Future  - Iron and TIBC; Future  - Magnesium; Future  - TSH without Reflex; Future    7. H/O: CVA (cerebrovascular accident)  Seems to be stable. Continue current regimen. Make sure blood pressure, DM and lipid profile under good control. Try to get her anemia to a better level.         Orders Placed This Encounter   Medications    cyanocobalamin injection 1,000 mcg    furosemide (LASIX) 20 MG tablet     Sig: Take 1 tablet by mouth daily as needed (swelling)     Dispense:  30 tablet     Refill:  1

## 2021-10-05 NOTE — OUTREACH NOTE
Medical Week 1 Survey      Responses   [facility] patient discharged from?  [facility]   Does the patient have one of the following disease processes/diagnoses(primary or secondary)?  Other   Week 1 attempt successful?  No   Unsuccessful attempts  Attempt 1   Discharge diagnosis  Acute, severe blood loss anemia          Yumiko Aldana RN

## 2021-10-05 NOTE — PROGRESS NOTES
Chief Complaint   Patient presents with    Follow-Up from [facility] 40 night     Have you seen any other physician or provider since your last visit yes - [facility]     Have you had any other diagnostic tests since your last visit? Yes     Have you changed or stopped any medications since your last visit? yes - started iron pill and pantoprazole. I have recommended that this patient have a immunization for influenza but she declines at this time. I have discussed the risks and benefits of this examination with her. The patient verbalizes understanding. Diabetic retinal exam completed this year?  Yes will obtain record                        * If yes please have patient sign a records release to obtain record to update Health Maintenance                       * If no, please order referral for patient to be scheduled

## 2021-10-06 ENCOUNTER — READMISSION MANAGEMENT (OUTPATIENT)
Dept: CALL CENTER | Facility: HOSPITAL | Age: 71
End: 2021-10-06

## 2021-10-06 NOTE — OUTREACH NOTE
Medical Week 1 Survey      Responses   [facility] patient discharged from?  [facility]   Does the patient have one of the following disease processes/diagnoses(primary or secondary)?  Other   Week 1 attempt successful?  No   Unsuccessful attempts  Attempt 2          Alvina Veloz RN

## 2021-10-07 ENCOUNTER — READMISSION MANAGEMENT (OUTPATIENT)
Dept: CALL CENTER | Facility: HOSPITAL | Age: 71
End: 2021-10-07

## 2021-10-07 NOTE — OUTREACH NOTE
Medical Week 1 Survey      Responses   [facility] patient discharged from?  [facility]   Does the patient have one of the following disease processes/diagnoses(primary or secondary)?  Other   Week 1 attempt successful?  No   Unsuccessful attempts  Attempt 3   Discharge diagnosis  Acute, severe blood loss anemia          Dyana Maxwell RN

## 2021-10-13 ENCOUNTER — HOSPITAL ENCOUNTER (OUTPATIENT)
Facility: HOSPITAL | Age: 71
Discharge: HOME | End: 2021-10-13
Payer: MEDICARE

## 2021-10-13 DIAGNOSIS — E11.40 TYPE 2 DIABETES MELLITUS WITH DIABETIC NEUROPATHY, WITH LONG-TERM CURRENT USE OF INSULIN (HCC): ICD-10-CM

## 2021-10-13 DIAGNOSIS — Z79.4 TYPE 2 DIABETES MELLITUS WITH DIABETIC NEUROPATHY, WITH LONG-TERM CURRENT USE OF INSULIN (HCC): ICD-10-CM

## 2021-10-13 DIAGNOSIS — D64.9 ANEMIA, UNSPECIFIED TYPE: ICD-10-CM

## 2021-10-13 DIAGNOSIS — I10 ESSENTIAL HYPERTENSION: ICD-10-CM

## 2021-10-13 DIAGNOSIS — I25.10 CORONARY ARTERY DISEASE INVOLVING NATIVE CORONARY ARTERY OF NATIVE HEART WITHOUT ANGINA PECTORIS: ICD-10-CM

## 2021-10-13 PROCEDURE — 36415 COLL VENOUS BLD VENIPUNCTURE: CPT

## 2021-10-15 ENCOUNTER — READMISSION MANAGEMENT (OUTPATIENT)
Dept: CALL CENTER | Facility: HOSPITAL | Age: 71
End: 2021-10-15

## 2021-10-15 NOTE — OUTREACH NOTE
Medical Week 2 Survey      Responses   [facility] patient discharged from? [facility]   Does the patient have one of the following disease processes/diagnoses(primary or secondary)? Other   Week 2 attempt successful? Yes   Call start time 1610   Discharge diagnosis Acute, severe blood loss anemia   Call end time 1612   Meds reviewed with patient/caregiver? Yes   Is the patient having any side effects they believe may be caused by any medication additions or changes? No   Does the patient have all medications ordered at discharge? Yes   Is the patient taking all medications as directed (includes completed medication regime)? Yes   Does the patient have a primary care provider?  Yes   Does the patient have an appointment with their PCP within 7 days of discharge? Greater than 7 days   Comments regarding PCP PCP Dr Eugene f/u appt 10-26   What is preventing the patient from scheduling follow up appointments within 7 days of discharge? Earlier appointment not available   Nursing Interventions Verified appointment date/time/provider   Has the patient kept scheduled appointments due by today? N/A   Psychosocial issues? No   Comments Pt denies melena. Pt has no issues eating/drinking and gaining strength   What is the patient's perception of their health status since discharge? Improving   Is the patient/caregiver able to teach back the hierarchy of who to call/visit for symptoms/problems? PCP, Specialist, Home health nurse, Urgent Care, ED, 911 Yes   Week 2 Call Completed? Yes          Celeste Gallagher RN

## 2021-10-21 ENCOUNTER — READMISSION MANAGEMENT (OUTPATIENT)
Dept: CALL CENTER | Facility: HOSPITAL | Age: 71
End: 2021-10-21

## 2021-10-21 ENCOUNTER — OFFICE VISIT (OUTPATIENT)
Dept: GASTROENTEROLOGY | Facility: CLINIC | Age: 71
End: 2021-10-21

## 2021-10-21 VITALS
TEMPERATURE: 97.5 F | WEIGHT: 132.5 LBS | DIASTOLIC BLOOD PRESSURE: 72 MMHG | HEIGHT: 64 IN | SYSTOLIC BLOOD PRESSURE: 118 MMHG | BODY MASS INDEX: 22.62 KG/M2

## 2021-10-21 DIAGNOSIS — E78.49 OTHER HYPERLIPIDEMIA: ICD-10-CM

## 2021-10-21 DIAGNOSIS — K74.60 CIRRHOSIS OF LIVER WITHOUT ASCITES, UNSPECIFIED HEPATIC CIRRHOSIS TYPE (HCC): ICD-10-CM

## 2021-10-21 DIAGNOSIS — Z11.59 ENCOUNTER FOR SCREENING FOR OTHER VIRAL DISEASES: ICD-10-CM

## 2021-10-21 DIAGNOSIS — D12.6 ADENOMATOUS POLYP OF COLON, UNSPECIFIED PART OF COLON: ICD-10-CM

## 2021-10-21 DIAGNOSIS — D64.9 NORMOCYTIC ANEMIA: Primary | ICD-10-CM

## 2021-10-21 DIAGNOSIS — D50.0 IRON DEFICIENCY ANEMIA DUE TO CHRONIC BLOOD LOSS: ICD-10-CM

## 2021-10-21 DIAGNOSIS — K55.20 ANGIODYSPLASIA OF COLON: ICD-10-CM

## 2021-10-21 DIAGNOSIS — K75.81 NONALCOHOLIC STEATOHEPATITIS (NASH): ICD-10-CM

## 2021-10-21 PROCEDURE — 99214 OFFICE O/P EST MOD 30 MIN: CPT | Performed by: INTERNAL MEDICINE

## 2021-10-21 RX ORDER — FUROSEMIDE 20 MG/1
20 TABLET ORAL DAILY PRN
COMMUNITY
Start: 2021-10-06

## 2021-10-21 RX ORDER — LINAGLIPTIN 5 MG/1
5 TABLET, FILM COATED ORAL DAILY
COMMUNITY
Start: 2021-09-08 | End: 2021-11-17 | Stop reason: HOSPADM

## 2021-10-21 NOTE — OUTREACH NOTE
Medical Week 3 Survey      Responses   [facility] patient discharged from? [facility]   Does the patient have one of the following disease processes/diagnoses(primary or secondary)? Other   Week 3 attempt successful? No   Unsuccessful attempts Attempt 1          Elayne Pardo RN

## 2021-10-21 NOTE — PROGRESS NOTES
Follow Up Note     Date: 10/21/2021   Patient Name: Karol Lopez  MRN: [id number]  : 1950     Referring Physician:  Romaine Eugene MD  Chief Complaint:    Chief Complaint   Patient presents with   • Follow-up     Patient here for follow up from hospital       Interval History:   10/21/2021  Karol Lopez is a 71 y.o. female who is here today for follow up for her anemia and recent GI bleed.  Patient states that she sees black stool associated with iron pills but no blood\no clots.  She states that she had blood work done recently at PCPs office not sure about the report.  Denies any abdominal pain nausea vomiting.    2021  Karol Lopez is a 71 y.o. female who is here today to establish care with Gastroenterology for evaluation of Anemia.  She is having black stools over the past couple of weeks. No bright red rectal bleeding. Denies any diarrhea. Has BMs about 1-2 times per week. She complains that she has severe fatigue, unable to walk due to this. Was seen in the ED a few days ago and had a blood transfusion. She complains of back pain but no current abdominal pain. She has not taken iron supplements recently or any pepto bismol. No recent change in her bowel habits other than change in color, has had constipation for years. No complaints of heartburn or acid reflux into her mouth. She has been taking ibuprofen 2 tabs PO once daily due to back pain, smokes cigarettes daily. No alcohol use. No prior EGD. She may have had a colonoscopy in approx  (per chart review) but she does not recall those findings. There is no known family history of colon cancer or colon polyps. No recent abdominal imaging. No labs since last ED visit when she had blood transfusion on 9/15/2021.     Subjective      Past Medical History:   Past Medical History:   Diagnosis Date   • Abdominal pain    • Acid reflux    • Anxiety    • Bruises easily    • Cataracts, bilateral    • Constipation    • COPD  (chronic obstructive pulmonary disease) (HCC)    • Coronary artery disease    • CTS (carpal tunnel syndrome)    • Diabetes (HCC)    • Elevated cholesterol    • Hearing loss    • Heart attack (HCC)     s/p stents   • Hypercholesteremia    • Hypertension    • Impaired functional mobility, balance, gait, and endurance    • Lower back pain    • Osteoarthritis    • Piercing     ears only   • Stroke (HCC)     2013-weak in right arm   • Tattoos     x2   • Tobacco abuse    • Tumor     in between breast closer to right breast   • Vitamin B12 deficiency    • Wears dentures     upper only   • Wears glasses      Past Surgical History:   Past Surgical History:   Procedure Laterality Date   • BREAST BIOPSY Right 5/10/2019    Procedure: BREAST BIOPSY RIGHT;  Surgeon: Kiana Frey MD;  Location: [facility] OR;  Service: General   • CARPAL TUNNEL RELEASE Bilateral    • CHOLECYSTECTOMY     • COLONOSCOPY N/A 9/30/2021    Procedure: COLONOSCOPY WITH POLYPECTOMY AND ABLATION OF AVM;  Surgeon: Russell Davila MD;  Location: [facility] ENDOSCOPY;  Service: Gastroenterology;  Laterality: N/A;   • CORONARY ANGIOPLASTY WITH STENT PLACEMENT  2012   • ENDOSCOPY N/A 9/29/2021    Procedure: ESOPHAGOGASTRODUODENOSCOPY with biopsies;  Surgeon: Russell Davila MD;  Location: [facility] ENDOSCOPY;  Service: Gastroenterology;  Laterality: N/A;   • HYSTERECTOMY      complete   • JOINT REPLACEMENT Bilateral     knee replacements   • TOTAL KNEE ARTHROPLASTY Bilateral 10/18/2016    MAUREEN Palomares MD       Family History:   Family History   Problem Relation Age of Onset   • Hypertension Other    • Diabetes Mother    • Hypertension Mother    • Cancer Mother    • Diabetes Father    • Hypertension Father    • Heart disease Father    • Cancer Sister    • Cancer Brother        Social History:   Social History     Socioeconomic History   • Marital status:    Tobacco Use   • Smoking status: Current Every Day Smoker     Packs/day: 2.00     Years: 46.00  "    Pack years: 92.00     Types: Cigarettes   • Smokeless tobacco: Never Used   • Tobacco comment: PT REPORTS SMOKED THIS AM 5/10/19   Vaping Use   • Vaping Use: Never used   Substance and Sexual Activity   • Alcohol use: No   • Drug use: No   • Sexual activity: Defer       Medications:     Current Outpatient Medications:   •  amLODIPine (NORVASC) 5 MG tablet, Take 1 tablet by mouth Daily., Disp: 30 tablet, Rfl: 0  •  aspirin 81 MG chewable tablet, Take 81 mg by mouth daily., Disp: , Rfl:   •  cyanocobalamin 1000 MCG/ML injection, Inject 1,000 mcg into the appropriate muscle as directed by prescriber., Disp: , Rfl:   •  Empagliflozin (Jardiance) 10 MG tablet, Take 10 mg by mouth Daily., Disp: , Rfl:   •  ferrous sulfate (FerrouSul) 325 (65 FE) MG tablet, Take 1 tablet by mouth Daily With Breakfast., Disp: 30 tablet, Rfl: 3  •  glipizide (GLUCOTROL) 5 MG tablet, Take 5 mg by mouth 2 (Two) Times a Day Before Meals. Only took for 2 days filled on 02/26/21, Disp: , Rfl:   •  HYDROcodone-acetaminophen (NORCO) 5-325 MG per tablet, Take 1 tablet by mouth Every 8 (Eight) Hours As Needed (PRN PAIN). (Patient taking differently: Take 1 tablet by mouth 2 (two) times a day. As needed), Disp: 30 tablet, Rfl: 0  •  insulin aspart (novoLOG FLEXPEN) 100 UNIT/ML solution pen-injector sc pen, Inject 10 Units under the skin into the appropriate area as directed., Disp: , Rfl:   •  Insulin Pen Needle 31G X 6 MM misc, 1 each., Disp: , Rfl:   •  Insulin Syringe-Needle U-100 ([employer] INSULIN SYRINGE) 31G X 5/16\" 1 ML misc, 1 each by Does not apply route daily, Disp: , Rfl:   •  Insulin Syringe-Needle U-100 30G X 5/16\" 0.5 ML misc, 1 each by Does not apply route daily, Disp: , Rfl:   •  isosorbide mononitrate (IMDUR) 60 MG 24 hr tablet, Take 1 tablet by mouth Every Morning. Need lab work and follow up appt for further refills. Otherwise defer to PCP., Disp: 30 tablet, Rfl: 0  •  lisinopril (PRINIVIL,ZESTRIL) 5 MG tablet, 5 mg Daily., Disp: " ", Rfl:   •  metFORMIN (GLUCOPHAGE) 1000 MG tablet, Take 1,000 mg by mouth 2 (Two) Times a Day With Meals., Disp: , Rfl:   •  pantoprazole (Protonix) 40 MG EC tablet, Take 1 tablet by mouth 2 (Two) Times a Day Before Meals., Disp: 60 tablet, Rfl: 5  •  rosuvastatin (CRESTOR) 20 MG tablet, Take 1 tablet by mouth Daily., Disp: 90 tablet, Rfl: 1  •  ticagrelor (BRILINTA) 90 MG tablet tablet, Take 1 tablet by mouth 2 (Two) Times a Day., Disp: 60 tablet, Rfl: 0  •  B-D INS SYRINGE 0.5CC/31GX5/16 31G X 5/16\" 0.5 ML misc, use as directed once daily, Disp: , Rfl: 0  •  furosemide (LASIX) 20 MG tablet, Take 20 mg by mouth Daily As Needed. swelling, Disp: , Rfl:   •  LEVEMIR 100 UNIT/ML injection, USE 70 UNITS INTO THE SKIN NIGHTLY, Disp: , Rfl: 0  •  Tradjenta 5 MG tablet tablet, Take 5 mg by mouth Daily., Disp: , Rfl:     Allergies:   Allergies   Allergen Reactions   • Codeine GI Intolerance       Review of Systems:   Review of Systems   Constitutional: Positive for fatigue. Negative for appetite change, fever and unexpected weight loss.   HENT: Negative for trouble swallowing.    Gastrointestinal: Negative for abdominal distention, abdominal pain, anal bleeding, blood in stool, constipation, diarrhea, nausea, rectal pain, vomiting, GERD and indigestion.       The following portions of the patient's history were reviewed and updated as appropriate: allergies, current medications, past family history, past medical history, past social history, past surgical history and problem list.    Objective     Physical Exam:  Vital Signs:   Vitals:    10/21/21 1451   BP: 118/72   Temp: 97.5 °F (36.4 °C)   Weight: 60.1 kg (132 lb 8 oz)   Height: 162.6 cm (64.02\")       Physical Exam  Constitutional:       Appearance: Normal appearance.   HENT:      Head: Normocephalic and atraumatic.   Eyes:      Comments: Pallor present   Abdominal:      General: Abdomen is flat. There is no distension.      Palpations: There is no mass.      " Tenderness: There is no abdominal tenderness. There is no guarding or rebound.      Hernia: No hernia is present.   Musculoskeletal:      Cervical back: Normal range of motion and neck supple.   Neurological:      Mental Status: She is alert.         Results Review:   I reviewed the patient's new clinical results.    Admission on 09/28/2021, Discharged on 09/30/2021   Component Date Value Ref Range Status   • ABO Type 09/28/2021 A   Final   • RH type 09/28/2021 Negative   Final   • Antibody Screen 09/28/2021 Negative   Final   • T&S Expiration Date 09/28/2021 10/1/2021 11:59:59 PM   Final   • Product Code 09/30/2021 [id number]   Final   • Unit Number 09/30/2021 [id number]   Final   • UNIT  ABO 09/30/2021 A   Final   • UNIT  RH 09/30/2021 NEG   Final   • Crossmatch Interpretation 09/30/2021 Compatible   Final   • Dispense Status 09/30/2021 PT   Final   • Blood Expiration Date 09/30/2021 202110252359   Final   • Blood Type Barcode 09/30/2021 0600   Final   • Product Code 09/30/2021 [id number]   Final   • Unit Number 09/30/2021 [id number]   Final   • UNIT  ABO 09/30/2021 A   Final   • UNIT  RH 09/30/2021 NEG   Final   • Crossmatch Interpretation 09/30/2021 Compatible   Final   • Dispense Status 09/30/2021 PT   Final   • Blood Expiration Date 09/30/2021 [id number]   Final   • Blood Type Barcode 09/30/2021 0600   Final   • Glucose 09/28/2021 177* 65 - 99 mg/dL Final   • BUN 09/28/2021 27* 8 - 23 mg/dL Final   • Creatinine 09/28/2021 1.19* 0.57 - 1.00 mg/dL Final   • Sodium 09/28/2021 138  136 - 145 mmol/L Final   • Potassium 09/28/2021 4.7  3.5 - 5.2 mmol/L Final   • Chloride 09/28/2021 103  98 - 107 mmol/L Final   • CO2 09/28/2021 18.9* 22.0 - 29.0 mmol/L Final   • Calcium 09/28/2021 10.3  8.6 - 10.5 mg/dL Final   • Total Protein 09/28/2021 6.5  6.0 - 8.5 g/dL Final   • Albumin 09/28/2021 4.30  3.50 - 5.20 g/dL Final   • ALT (SGPT) 09/28/2021 12  1 - 33 U/L Final   • AST (SGOT) 09/28/2021 12  1 - 32 U/L Final    • Alkaline Phosphatase 09/28/2021 56  39 - 117 U/L Final   • Total Bilirubin 09/28/2021 0.3  0.0 - 1.2 mg/dL Final   • eGFR Non African Amer 09/28/2021 45* >60 mL/min/1.73 Final   • Globulin 09/28/2021 2.2  gm/dL Final   • A/G Ratio 09/28/2021 2.0  g/dL Final   • BUN/Creatinine Ratio 09/28/2021 22.7  7.0 - 25.0 Final   • Anion Gap 09/28/2021 16.1* 5.0 - 15.0 mmol/L Final   • Ammonia 09/28/2021 <10* 11 - 51 umol/L Final   • Glucose 09/28/2021 198* 70 - 130 mg/dL Final    Serial Number: [id number]:  [id number]   • Glucose 09/29/2021 112* 65 - 99 mg/dL Final   • BUN 09/29/2021 27* 8 - 23 mg/dL Final   • Creatinine 09/29/2021 1.14* 0.57 - 1.00 mg/dL Final   • Sodium 09/29/2021 137  136 - 145 mmol/L Final   • Potassium 09/29/2021 4.1  3.5 - 5.2 mmol/L Final   • Chloride 09/29/2021 103  98 - 107 mmol/L Final   • CO2 09/29/2021 24.0  22.0 - 29.0 mmol/L Final   • Calcium 09/29/2021 9.4  8.6 - 10.5 mg/dL Final   • eGFR Non African Amer 09/29/2021 47* >60 mL/min/1.73 Final   • BUN/Creatinine Ratio 09/29/2021 23.7  7.0 - 25.0 Final   • Anion Gap 09/29/2021 10.0  5.0 - 15.0 mmol/L Final   • Protime 09/29/2021 14.8  12.0 - 15.1 Seconds Final   • INR 09/29/2021 1.10  0.90 - 1.10 Final   • WBC 09/29/2021 4.41  3.40 - 10.80 10*3/mm3 Final   • RBC 09/29/2021 2.63* 3.77 - 5.28 10*6/mm3 Final   • Hemoglobin 09/29/2021 7.3* 12.0 - 15.9 g/dL Final   • Hematocrit 09/29/2021 22.9* 34.0 - 46.6 % Final   • MCV 09/29/2021 87.1  79.0 - 97.0 fL Final   • MCH 09/29/2021 27.8  26.6 - 33.0 pg Final   • MCHC 09/29/2021 31.9  31.5 - 35.7 g/dL Final   • RDW 09/29/2021 13.8  12.3 - 15.4 % Final   • RDW-SD 09/29/2021 43.3  37.0 - 54.0 fl Final   • MPV 09/29/2021 10.9  6.0 - 12.0 fL Final   • Platelets 09/29/2021 174  140 - 450 10*3/mm3 Final   • Neutrophil % 09/29/2021 62.1  42.7 - 76.0 % Final   • Lymphocyte % 09/29/2021 27.4  19.6 - 45.3 % Final   • Monocyte % 09/29/2021 8.6  5.0 - 12.0 % Final   • Eosinophil % 09/29/2021 0.7  0.3 - 6.2 %  Final   • Basophil % 09/29/2021 0.7  0.0 - 1.5 % Final   • Immature Grans % 09/29/2021 0.5  0.0 - 0.5 % Final   • Neutrophils, Absolute 09/29/2021 2.74  1.70 - 7.00 10*3/mm3 Final   • Lymphocytes, Absolute 09/29/2021 1.21  0.70 - 3.10 10*3/mm3 Final   • Monocytes, Absolute 09/29/2021 0.38  0.10 - 0.90 10*3/mm3 Final   • Eosinophils, Absolute 09/29/2021 0.03  0.00 - 0.40 10*3/mm3 Final   • Basophils, Absolute 09/29/2021 0.03  0.00 - 0.20 10*3/mm3 Final   • Immature Grans, Absolute 09/29/2021 0.02  0.00 - 0.05 10*3/mm3 Final   • nRBC 09/29/2021 0.0  0.0 - 0.2 /100 WBC Final   • Hemoglobin 09/29/2021 7.5* 12.0 - 15.9 g/dL Final   • Hematocrit 09/29/2021 23.0* 34.0 - 46.6 % Final   • COVID19 09/29/2021 Not Detected  Not Detected - Ref. Range Final   • Glucose 09/29/2021 138* 70 - 130 mg/dL Final    Serial Number: [id number]:  [id number]   • Case Report 09/29/2021    Final                    Value:Surgical Pathology Report                         Case: [id number]                                  Authorizing Provider:  Russell Davila MD  Collected:           09/29/2021 08:05 AM          Ordering Location:     [facility]    Received:            09/29/2021 09:06 AM                                 SURG ENDO                                                                    Pathologist:           Isaías Martinez DO                                                           Specimens:   1) - Small Intestine, Duodenum                                                                      2) - Gastric, Antrum, antrum and body for h. pylori                                       • Final Diagnosis 09/29/2021    Final                    Value:This result contains rich text formatting which cannot be displayed here.   • Glucose 09/29/2021 114  70 - 130 mg/dL Final    Serial Number: [id number]:  [id number]   • Glucose 09/29/2021 140* 70 - 130 mg/dL Final    Serial Number: [id number]:  [id number]   •  Glucose 09/29/2021 165* 70 - 130 mg/dL Final    Serial Number: [id number]:  [id number]   • Glucose 09/30/2021 106* 65 - 99 mg/dL Final   • BUN 09/30/2021 18  8 - 23 mg/dL Final   • Creatinine 09/30/2021 1.08* 0.57 - 1.00 mg/dL Final   • Sodium 09/30/2021 138  136 - 145 mmol/L Final   • Potassium 09/30/2021 3.7  3.5 - 5.2 mmol/L Final   • Chloride 09/30/2021 105  98 - 107 mmol/L Final   • CO2 09/30/2021 23.1  22.0 - 29.0 mmol/L Final   • Calcium 09/30/2021 8.9  8.6 - 10.5 mg/dL Final   • Total Protein 09/30/2021 5.7* 6.0 - 8.5 g/dL Final   • Albumin 09/30/2021 3.90  3.50 - 5.20 g/dL Final   • ALT (SGPT) 09/30/2021 76* 1 - 33 U/L Final   • AST (SGOT) 09/30/2021 145* 1 - 32 U/L Final   • Alkaline Phosphatase 09/30/2021 61  39 - 117 U/L Final   • Total Bilirubin 09/30/2021 0.5  0.0 - 1.2 mg/dL Final   • eGFR Non African Amer 09/30/2021 50* >60 mL/min/1.73 Final   • Globulin 09/30/2021 1.8  gm/dL Final   • A/G Ratio 09/30/2021 2.2  g/dL Final   • BUN/Creatinine Ratio 09/30/2021 16.7  7.0 - 25.0 Final   • Anion Gap 09/30/2021 9.9  5.0 - 15.0 mmol/L Final   • WBC 09/30/2021 3.45  3.40 - 10.80 10*3/mm3 Final   • RBC 09/30/2021 2.61* 3.77 - 5.28 10*6/mm3 Final   • Hemoglobin 09/30/2021 7.2* 12.0 - 15.9 g/dL Final   • Hematocrit 09/30/2021 22.9* 34.0 - 46.6 % Final   • MCV 09/30/2021 87.7  79.0 - 97.0 fL Final   • MCH 09/30/2021 27.6  26.6 - 33.0 pg Final   • MCHC 09/30/2021 31.4* 31.5 - 35.7 g/dL Final   • RDW 09/30/2021 14.1  12.3 - 15.4 % Final   • RDW-SD 09/30/2021 44.4  37.0 - 54.0 fl Final   • MPV 09/30/2021 10.9  6.0 - 12.0 fL Final   • Platelets 09/30/2021 171  140 - 450 10*3/mm3 Final   • Neutrophil % 09/30/2021 66.3  42.7 - 76.0 % Final   • Lymphocyte % 09/30/2021 22.9  19.6 - 45.3 % Final   • Monocyte % 09/30/2021 9.6  5.0 - 12.0 % Final   • Eosinophil % 09/30/2021 0.6  0.3 - 6.2 % Final   • Basophil % 09/30/2021 0.3  0.0 - 1.5 % Final   • Immature Grans % 09/30/2021 0.3  0.0 - 0.5 % Final   • Neutrophils,  Absolute 09/30/2021 2.29  1.70 - 7.00 10*3/mm3 Final   • Lymphocytes, Absolute 09/30/2021 0.79  0.70 - 3.10 10*3/mm3 Final   • Monocytes, Absolute 09/30/2021 0.33  0.10 - 0.90 10*3/mm3 Final   • Eosinophils, Absolute 09/30/2021 0.02  0.00 - 0.40 10*3/mm3 Final   • Basophils, Absolute 09/30/2021 0.01  0.00 - 0.20 10*3/mm3 Final   • Immature Grans, Absolute 09/30/2021 0.01  0.00 - 0.05 10*3/mm3 Final   • nRBC 09/30/2021 0.0  0.0 - 0.2 /100 WBC Final   • Glucose 09/30/2021 122  70 - 130 mg/dL Final    Serial Number: [id number]:  [id number]   • Case Report 09/30/2021    Final                    Value:Surgical Pathology Report                         Case: [id number]                                  Authorizing Provider:  Russell Davila MD  Collected:           09/30/2021 07:52 AM          Ordering Location:     [facility]    Received:            09/30/2021 09:10 AM                                 SURG ENDO                                                                    Pathologist:           Tony Alejandra MD                                                     Specimens:   1) - Large Intestine, Right / Ascending Colon, Ascending colon polyp                                2) - Large Intestine, Transverse Colon, Transverse colon polyp                                      3) - Large Intestine, Cecum, Cecum polyp                                                  • Final Diagnosis 09/30/2021    Final                    Value:This result contains rich text formatting which cannot be displayed here.   • Glucose 09/30/2021 131* 70 - 130 mg/dL Final    Serial Number: [id number]:  [id number]   Lab on 09/28/2021   Component Date Value Ref Range Status   • WBC 09/28/2021 6.11  3.40 - 10.80 10*3/mm3 Final   • RBC 09/28/2021 2.07* 3.77 - 5.28 10*6/mm3 Final   • Hemoglobin 09/28/2021 5.5* 12.0 - 15.9 g/dL Final   • Hematocrit 09/28/2021 18.4* 34.0 - 46.6 % Final   • MCV 09/28/2021 88.9  79.0 -  97.0 fL Final   • MCH 09/28/2021 26.6  26.6 - 33.0 pg Final   • MCHC 09/28/2021 29.9* 31.5 - 35.7 g/dL Final   • RDW 09/28/2021 14.7  12.3 - 15.4 % Final   • RDW-SD 09/28/2021 47.5  37.0 - 54.0 fl Final   • MPV 09/28/2021 11.5  6.0 - 12.0 fL Final   • Platelets 09/28/2021 212  140 - 450 10*3/mm3 Final   • Iron 09/28/2021 15* 37 - 145 mcg/dL Final   • Iron Saturation 09/28/2021 3* 20 - 50 % Final   • Transferrin 09/28/2021 316  200 - 360 mg/dL Final   • TIBC 09/28/2021 471  298 - 536 mcg/dL Final   Admission on 09/15/2021, Discharged on 09/15/2021   Component Date Value Ref Range Status   • Glucose 09/15/2021 112* 65 - 99 mg/dL Final   • BUN 09/15/2021 19  8 - 23 mg/dL Final   • Creatinine 09/15/2021 1.09* 0.57 - 1.00 mg/dL Final   • Sodium 09/15/2021 140  136 - 145 mmol/L Final   • Potassium 09/15/2021 4.2  3.5 - 5.2 mmol/L Final   • Chloride 09/15/2021 102  98 - 107 mmol/L Final   • CO2 09/15/2021 19.5* 22.0 - 29.0 mmol/L Final   • Calcium 09/15/2021 9.6  8.6 - 10.5 mg/dL Final   • Total Protein 09/15/2021 6.4  6.0 - 8.5 g/dL Final   • Albumin 09/15/2021 4.30  3.50 - 5.20 g/dL Final   • ALT (SGPT) 09/15/2021 13  1 - 33 U/L Final   • AST (SGOT) 09/15/2021 15  1 - 32 U/L Final   • Alkaline Phosphatase 09/15/2021 65  39 - 117 U/L Final   • Total Bilirubin 09/15/2021 0.4  0.0 - 1.2 mg/dL Final   • eGFR Non African Amer 09/15/2021 50* >60 mL/min/1.73 Final   • Globulin 09/15/2021 2.1  gm/dL Final   • A/G Ratio 09/15/2021 2.0  g/dL Final   • BUN/Creatinine Ratio 09/15/2021 17.4  7.0 - 25.0 Final   • Anion Gap 09/15/2021 18.5* 5.0 - 15.0 mmol/L Final   • Troponin T 09/15/2021 <0.010  0.000 - 0.030 ng/mL Final   • Extra Tube 09/15/2021 Hold for add-ons.   Final    Auto resulted.   • Extra Tube 09/15/2021 hold for add-on   Final    Auto resulted   • Extra Tube 09/15/2021 Hold for add-ons.   Final    Auto resulted.   • Extra Tube 09/15/2021 hold for add-on   Final    Auto resulted   • WBC 09/15/2021 5.40  3.40 - 10.80  10*3/mm3 Final   • RBC 09/15/2021 2.29* 3.77 - 5.28 10*6/mm3 Final   • Hemoglobin 09/15/2021 6.5* 12.0 - 15.9 g/dL Final   • Hematocrit 09/15/2021 21.3* 34.0 - 46.6 % Final   • MCV 09/15/2021 93.0  79.0 - 97.0 fL Final   • MCH 09/15/2021 28.4  26.6 - 33.0 pg Final   • MCHC 09/15/2021 30.5* 31.5 - 35.7 g/dL Final   • RDW 09/15/2021 14.2  12.3 - 15.4 % Final   • RDW-SD 09/15/2021 48.3  37.0 - 54.0 fl Final   • MPV 09/15/2021 11.8  6.0 - 12.0 fL Final   • Platelets 09/15/2021 187  140 - 450 10*3/mm3 Final   • Neutrophil % 09/15/2021 72.7  42.7 - 76.0 % Final   • Lymphocyte % 09/15/2021 17.4* 19.6 - 45.3 % Final   • Monocyte % 09/15/2021 8.5  5.0 - 12.0 % Final   • Eosinophil % 09/15/2021 0.6  0.3 - 6.2 % Final   • Basophil % 09/15/2021 0.4  0.0 - 1.5 % Final   • Immature Grans % 09/15/2021 0.4  0.0 - 0.5 % Final   • Neutrophils, Absolute 09/15/2021 3.93  1.70 - 7.00 10*3/mm3 Final   • Lymphocytes, Absolute 09/15/2021 0.94  0.70 - 3.10 10*3/mm3 Final   • Monocytes, Absolute 09/15/2021 0.46  0.10 - 0.90 10*3/mm3 Final   • Eosinophils, Absolute 09/15/2021 0.03  0.00 - 0.40 10*3/mm3 Final   • Basophils, Absolute 09/15/2021 0.02  0.00 - 0.20 10*3/mm3 Final   • Immature Grans, Absolute 09/15/2021 0.02  0.00 - 0.05 10*3/mm3 Final   • nRBC 09/15/2021 0.0  0.0 - 0.2 /100 WBC Final   • Product Code 09/17/2021 [id number]   Final   • Unit Number 09/17/2021 [id number]   Final   • UNIT  ABO 09/17/2021 A   Final   • UNIT  RH 09/17/2021 NEG   Final   • Crossmatch Interpretation 09/17/2021 Compatible   Final   • Dispense Status 09/17/2021 PT   Final   • Blood Expiration Date 09/17/2021 [id number]   Final   • Blood Type Barcode 09/17/2021 0600   Final   • ABO Type 09/15/2021 A   Final   • RH type 09/15/2021 Negative   Final   • Antibody Screen 09/15/2021 Negative   Final   • T&S Expiration Date 09/15/2021 9/18/2021 11:59:59 PM   Final   • ABO Type 09/15/2021 A   Final   • RH type 09/15/2021 Negative   Final   Admission on  09/01/2021, Discharged on 09/01/2021   Component Date Value Ref Range Status   • Glucose 09/01/2021 134* 65 - 99 mg/dL Final   • BUN 09/01/2021 22  8 - 23 mg/dL Final   • Creatinine 09/01/2021 1.18* 0.57 - 1.00 mg/dL Final   • Sodium 09/01/2021 138  136 - 145 mmol/L Final   • Potassium 09/01/2021 3.8  3.5 - 5.2 mmol/L Final   • Chloride 09/01/2021 100  98 - 107 mmol/L Final   • CO2 09/01/2021 22.9  22.0 - 29.0 mmol/L Final   • Calcium 09/01/2021 10.2  8.6 - 10.5 mg/dL Final   • Total Protein 09/01/2021 6.4  6.0 - 8.5 g/dL Final   • Albumin 09/01/2021 4.30  3.50 - 5.20 g/dL Final   • ALT (SGPT) 09/01/2021 16  1 - 33 U/L Final   • AST (SGOT) 09/01/2021 20  1 - 32 U/L Final   • Alkaline Phosphatase 09/01/2021 67  39 - 117 U/L Final   • Total Bilirubin 09/01/2021 0.5  0.0 - 1.2 mg/dL Final   • eGFR Non African Amer 09/01/2021 45* >60 mL/min/1.73 Final   • Globulin 09/01/2021 2.1  gm/dL Final   • A/G Ratio 09/01/2021 2.0  g/dL Final   • BUN/Creatinine Ratio 09/01/2021 18.6  7.0 - 25.0 Final   • Anion Gap 09/01/2021 15.1* 5.0 - 15.0 mmol/L Final   • Troponin T 09/01/2021 <0.010  0.000 - 0.030 ng/mL Final   • Extra Tube 09/01/2021 Hold for add-ons.   Final    Auto resulted.   • Extra Tube 09/01/2021 hold for add-on   Final    Auto resulted   • Extra Tube 09/01/2021 Hold for add-ons.   Final    Auto resulted.   • Extra Tube 09/01/2021 hold for add-on   Final    Auto resulted   • WBC 09/01/2021 4.81  3.40 - 10.80 10*3/mm3 Final   • RBC 09/01/2021 2.48* 3.77 - 5.28 10*6/mm3 Final   • Hemoglobin 09/01/2021 7.4* 12.0 - 15.9 g/dL Final   • Hematocrit 09/01/2021 22.9* 34.0 - 46.6 % Final   • MCV 09/01/2021 92.3  79.0 - 97.0 fL Final   • MCH 09/01/2021 29.8  26.6 - 33.0 pg Final   • MCHC 09/01/2021 32.3  31.5 - 35.7 g/dL Final   • RDW 09/01/2021 14.5  12.3 - 15.4 % Final   • RDW-SD 09/01/2021 47.8  37.0 - 54.0 fl Final   • MPV 09/01/2021 11.2  6.0 - 12.0 fL Final   • Platelets 09/01/2021 158  140 - 450 10*3/mm3 Final   •  Neutrophil % 09/01/2021 71.0  42.7 - 76.0 % Final   • Lymphocyte % 09/01/2021 19.1* 19.6 - 45.3 % Final   • Monocyte % 09/01/2021 8.7  5.0 - 12.0 % Final   • Eosinophil % 09/01/2021 0.6  0.3 - 6.2 % Final   • Basophil % 09/01/2021 0.2  0.0 - 1.5 % Final   • Immature Grans % 09/01/2021 0.4  0.0 - 0.5 % Final   • Neutrophils, Absolute 09/01/2021 3.41  1.70 - 7.00 10*3/mm3 Final   • Lymphocytes, Absolute 09/01/2021 0.92  0.70 - 3.10 10*3/mm3 Final   • Monocytes, Absolute 09/01/2021 0.42  0.10 - 0.90 10*3/mm3 Final   • Eosinophils, Absolute 09/01/2021 0.03  0.00 - 0.40 10*3/mm3 Final   • Basophils, Absolute 09/01/2021 0.01  0.00 - 0.20 10*3/mm3 Final   • Immature Grans, Absolute 09/01/2021 0.02  0.00 - 0.05 10*3/mm3 Final   • nRBC 09/01/2021 0.0  0.0 - 0.2 /100 WBC Final   • Magnesium 09/01/2021 2.4  1.6 - 2.4 mg/dL Final   • proBNP 09/01/2021 549.1  0.0 - 900.0 pg/mL Final   • Color, UA 09/01/2021 Yellow  Yellow, Straw Final   • Appearance, UA 09/01/2021 Cloudy* Clear Final   • pH, UA 09/01/2021 <=5.0  5.0 - 8.0 Final   • Specific Gravity, UA 09/01/2021 1.028  1.005 - 1.030 Final   • Glucose, UA 09/01/2021 >=1000 mg/dL (3+)* Negative Final   • Ketones, UA 09/01/2021 Negative  Negative Final   • Bilirubin, UA 09/01/2021 Negative  Negative Final   • Blood, UA 09/01/2021 Small (1+)* Negative Final   • Protein, UA 09/01/2021 100 mg/dL (2+)* Negative Final   • Leuk Esterase, UA 09/01/2021 Negative  Negative Final   • Nitrite, UA 09/01/2021 Negative  Negative Final   • Urobilinogen, UA 09/01/2021 1.0 E.U./dL  0.2 - 1.0 E.U./dL Final   • COVID19 09/01/2021 Not Detected  Not Detected - Ref. Range Final   • Influenza A PCR 09/01/2021 Not Detected  Not Detected Final   • Influenza B PCR 09/01/2021 Not Detected  Not Detected Final   • Troponin T 09/01/2021 0.013  0.000 - 0.030 ng/mL Final   • RBC, UA 09/01/2021 0-2* None Seen /HPF Final   • WBC, UA 09/01/2021 None Seen  None Seen /HPF Final   • Bacteria, UA 09/01/2021 1+* None  Seen /HPF Final   • Squamous Epithelial Cells,  09/01/2021 7-12* None Seen, 0-2 /HPF Final   • Hyaline Casts,  09/01/2021 None Seen  None Seen /LPF Final   • Methodology 09/01/2021 Manual Light Microscopy   Final      CT Angiogram Neck    Result Date: 9/15/2021    This study was performed with techniques to keep radiation doses as low as reasonably achievable (ALARA). Individualized dose reduction techniques using automated exposure control or adjustment of mA and/or kV according to the patient size were employed.  This report was finalized on 9/15/2021 1:20 PM by Janet Craig M.D..    MRI Brain Without Contrast    Result Date: 9/15/2021  Findings consistent with chronic small vessel ischemic change with no acute intracranial abnormality.    This report was finalized on 9/15/2021 4:05 PM by Janet Craig M.D..    CT Abdomen Pelvis With Contrast    Result Date: 9/28/2021  Cirrhosis without ascites.  No acute abnormality. Authenticated by Armaan Luna M.D. on 09/28/2021 05:45:29 PM    XR Chest 1 View    Result Date: 9/15/2021  No acute cardiopulmonary process.  Continued followup is recommended.  This report was finalized on 9/15/2021 12:56 PM by Janet Craig M.D..    XR Chest 1 View    Result Date: 9/2/2021  Unremarkable portable chest.  This report was finalized on 9/2/2021 8:43 AM by Wesly Garcia MD.    CT Angiogram Head    Result Date: 9/15/2021    This study was performed with techniques to keep radiation doses as low as reasonably achievable (ALARA). Individualized dose reduction techniques using automated exposure control or adjustment of mA and/or kV according to the patient size were employed.  This report was finalized on 9/15/2021 1:20 PM by Janet Craig M.D..    9/29/2021 EGD  - Normal oropharynx.  - Z-line regular, 38 cm from the incisors.  - No gross lesions in esophagus.  - Bilious gastric fluid.  - Erythematous mucosa in the antrum and prepyloric region of the stomach.  Biopsied. Mostly Gastropathy.  - No obvious portal hypertensive gastropathy  - Normal duodenal bulb, first portion of the duodenum, second portion of the duodenum and third portion of the  duodenum. Biopsied.  - No obvious source of upper GI bleeding  - Rectal exam this morning, soft yellow stool. No signs of Bleeding    9/30/2021 colonoscopy  - Preparation of the colon was inadequate.  - One 8 mm polyp in the cecum, removed with a hot snare. Resected and retrieved.  - One 5 mm polyp in the proximal ascending colon, removed with a hot snare. Resected and retrieved.  - One 5 mm polyp in the proximal transverse colon, removed with a hot snare. Resected and retrieved.  - A few colonic angioectasias. Treated with argon beam coagulation.  - Stool in the entire examined colon, Views suboptimal rt colon, left colon due to stool for any small polyp and  AVM detection.  Assessment / Plan      1.  Bleeding colonic angiectasia  2.  Acute blood loss anemia  10/21/2020  Her colonoscopy done on 9/30/2021 revealed a multiple angiectasia in the colon which were cauterized.  This was possibly the source of bleeding.  Her EGD done revealed gastropathy which appeared reactive gastropathy.  No obvious signs of portal hypertensive gastropathy or esophageal varices noted.  Gastric biopsies negative for H. Pylori and revealed a reactive gastropathy.  Duodenal biopsies normal.  No further signs of any GI bleed.  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    We will continue iron pills and for now we will continue her aspirin and the Brilinta along with the PPI  Will repeat colonoscopy in the next 3 to 6 months time to rule out any other AVMs as her prep was suboptimal with the recent colonoscopy.  CBC CMP in 6 months time.  If any signs of bleeding I have advised to hold both aspirin and the Brilinta.  Given her cirrhosis and GI bleed patient may not be a candidate  for dual therapy in the future    09/28/21  Having black stool over the past few weeks but also has associated constipation as her normal bowel habits. She had 1 unit PRBC transfusion on 9/15/2021 in the ED after Hgb was found to be 6.5 g/dL. She still has extreme fatigue, not able to walk into clinic today. Has not had labs since ED visit. Will have STAT CBC today as well as iron profile and CTAP. An urgent EGD will be planned tomorrow due to suspicion for upper GI bleeding. Has been taking ibuprofen and ASA daily, she will hold these. Stop smoking. Start Protonix 40 mg BID now. If Hgb is less than 7 on labs today, she will be sent to ED for transfusion, if normal, will proceed with planned endoscopy in the morning.     3.  Elevated liver enzymes  4.  Cirrhosis unspecified without ascites.  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  Patient is a nonalcoholic.  Her recent CT scan abdomen pelvis done revealed signs of cirrhosis with portal hypertension.. Patient does have diabetes and hypertension.  Her liver enzymes were elevated recently however prior liver enzymes were all normal.. She does have a chronic borderline thrombocytopenia, all these suggest compensated cirrhosis.    We will get chronic hepatitis panel and basic work-up  As there is some discordance with the lab findings and the CT scan findings, will get a ultrasound abdomen and Roe fiber sure test.  EGD on 9/29/2021 did not reveal any signs of esophageal varices or any significant portal hypertensive gastropathy.  I have discussed the low-salt diet    5.  Adenomatous colon polyps  10/21/2021   Colonoscopy done 9/30/2021 revealed colon polyps and 3 subcentimeter polyps removed all of them came out tubular adenoma without any dysplasia  We will repeat colonoscopy in 3 years time in 2024 surveillance  09/28/21  Last colonoscopy was approx 2010 with Dr. Fery. She is not certain of those findings. No first degree family history of colon cancer. She  needs colonoscopy soon due to anemia, will make recommendations based on review of EGD findings.           Follow Up:   No follow-ups on file.    Russell Davila MD  [facility] [place]  10/21/2021  14:58 EDT     Please note that portions of this note may have been completed with a voice recognition program.

## 2021-10-22 ENCOUNTER — TELEPHONE (OUTPATIENT)
Dept: GASTROENTEROLOGY | Facility: CLINIC | Age: 71
End: 2021-10-22

## 2021-10-22 ENCOUNTER — READMISSION MANAGEMENT (OUTPATIENT)
Dept: CALL CENTER | Facility: HOSPITAL | Age: 71
End: 2021-10-22

## 2021-10-22 NOTE — TELEPHONE ENCOUNTER
----- Message from Russell Davila MD sent at 10/21/2021  6:35 PM EDT -----    Let this patient know that her CT scan done recently showed signs of cirrhosis.  She need to be on a 2 g sodium diet for now  I have sent a lab work to do and also requested ultrasound abdomen we will follow her up after the above test reports

## 2021-10-22 NOTE — OUTREACH NOTE
Medical Week 3 Survey      Responses   [facility] patient discharged from? [facility]   Does the patient have one of the following disease processes/diagnoses(primary or secondary)? Other   Week 3 attempt successful? No   Unsuccessful attempts Attempt 2   Discharge diagnosis Acute, severe blood loss anemia          Lyndsey Rick RN

## 2021-10-29 ASSESSMENT — ENCOUNTER SYMPTOMS
SHORTNESS OF BREATH: 1
COUGH: 1
VOICE CHANGE: 1
WHEEZING: 1
BACK PAIN: 1

## 2021-11-01 ENCOUNTER — HOSPITAL ENCOUNTER (INPATIENT)
Facility: HOSPITAL | Age: 71
LOS: 1 days | Discharge: HOME | DRG: 812 | End: 2021-11-02
Attending: INTERNAL MEDICINE | Admitting: INTERNAL MEDICINE
Payer: MEDICARE

## 2021-11-01 ENCOUNTER — APPOINTMENT (OUTPATIENT)
Dept: CT IMAGING | Facility: HOSPITAL | Age: 71
DRG: 812 | End: 2021-11-01
Attending: INTERNAL MEDICINE
Payer: MEDICARE

## 2021-11-01 ENCOUNTER — OFFICE VISIT (OUTPATIENT)
Dept: PRIMARY CARE CLINIC | Age: 71
End: 2021-11-01
Payer: MEDICARE

## 2021-11-01 VITALS
OXYGEN SATURATION: 100 % | BODY MASS INDEX: 22.66 KG/M2 | HEART RATE: 83 BPM | DIASTOLIC BLOOD PRESSURE: 58 MMHG | WEIGHT: 132 LBS | TEMPERATURE: 97.2 F | SYSTOLIC BLOOD PRESSURE: 122 MMHG | RESPIRATION RATE: 18 BRPM

## 2021-11-01 DIAGNOSIS — Z79.4 TYPE 2 DIABETES MELLITUS WITH DIABETIC NEUROPATHY, WITH LONG-TERM CURRENT USE OF INSULIN (HCC): ICD-10-CM

## 2021-11-01 DIAGNOSIS — D64.9 ANEMIA, UNSPECIFIED TYPE: Primary | ICD-10-CM

## 2021-11-01 DIAGNOSIS — E11.40 TYPE 2 DIABETES MELLITUS WITH DIABETIC NEUROPATHY, WITH LONG-TERM CURRENT USE OF INSULIN (HCC): ICD-10-CM

## 2021-11-01 DIAGNOSIS — D50.0 IRON DEFICIENCY ANEMIA DUE TO CHRONIC BLOOD LOSS: Primary | ICD-10-CM

## 2021-11-01 PROBLEM — K74.60 CIRRHOSIS (HCC): Status: ACTIVE | Noted: 2021-11-01

## 2021-11-01 LAB
A/G RATIO: 2 (ref 0.8–2)
ABO/RH: NORMAL
ALBUMIN SERPL-MCNC: 4.1 G/DL (ref 3.4–4.8)
ALP BLD-CCNC: 72 U/L (ref 25–100)
ALT SERPL-CCNC: 28 U/L (ref 4–36)
ANION GAP SERPL CALCULATED.3IONS-SCNC: 16 MMOL/L (ref 3–16)
ANTIBODY SCREEN: NORMAL
AST SERPL-CCNC: 27 U/L (ref 8–33)
BILIRUB SERPL-MCNC: 0.6 MG/DL (ref 0.3–1.2)
BLOOD BANK DISPENSE STATUS: NORMAL
BLOOD BANK DISPENSE STATUS: NORMAL
BLOOD BANK PRODUCT CODE: NORMAL
BLOOD BANK PRODUCT CODE: NORMAL
BPU ID: NORMAL
BPU ID: NORMAL
BUN BLDV-MCNC: 16 MG/DL (ref 6–20)
CALCIUM SERPL-MCNC: 9.7 MG/DL (ref 8.5–10.5)
CHLORIDE BLD-SCNC: 101 MMOL/L (ref 98–107)
CO2: 21 MMOL/L (ref 20–30)
COMPONENT: NORMAL
COMPONENT: NORMAL
CREAT SERPL-MCNC: 1.1 MG/DL (ref 0.4–1.2)
DONOR TYPE/RH: NORMAL
DONOR TYPE/RH: NORMAL
FERRITIN: 10.3 NG/ML (ref 22–322)
FOLATE: 8.6 NG/ML
GFR AFRICAN AMERICAN: >59
GFR NON-AFRICAN AMERICAN: 49
GLOBULIN: 2.1 G/DL
GLUCOSE BLD-MCNC: 129 MG/DL (ref 74–106)
GLUCOSE BLD-MCNC: 172 MG/DL (ref 74–106)
GLUCOSE BLD-MCNC: 259 MG/DL (ref 74–106)
HBA1C MFR BLD: 5.4 %
HCT VFR BLD CALC: 19.9 % (ref 37–47)
HCT VFR BLD CALC: 28.3 % (ref 37–47)
HEMOGLOBIN: 5.9 G/DL (ref 11.5–16.5)
HEMOGLOBIN: 8.2 G/DL (ref 11.5–16.5)
HOLLISTER NO: NORMAL
HOLLISTER NO: NORMAL
INR BLD: 1.07 (ref 0.88–1.11)
IRON SATURATION: 8 % (ref 15–50)
IRON: 28 UG/DL (ref 37–145)
MAGNESIUM: 2 MG/DL (ref 1.7–2.4)
MCH RBC QN AUTO: 26.7 PG (ref 27–32)
MCHC RBC AUTO-ENTMCNC: 29.6 G/DL (ref 31–35)
MCV RBC AUTO: 90 FL (ref 80–100)
PDW BLD-RTO: 16.4 % (ref 11–16)
PERFORMED ON: ABNORMAL
PERFORMED ON: ABNORMAL
PLATELET # BLD: 154 K/UL (ref 150–400)
PMV BLD AUTO: 12 FL (ref 6–10)
POTASSIUM REFLEX MAGNESIUM: 3.5 MMOL/L (ref 3.4–5.1)
PROTHROMBIN TIME: 13.4 SEC (ref 11.6–13.8)
RBC # BLD: 2.21 M/UL (ref 3.8–5.8)
SODIUM BLD-SCNC: 138 MMOL/L (ref 136–145)
TOTAL IRON BINDING CAPACITY: 334 UG/DL (ref 250–450)
TOTAL PROTEIN: 6.2 G/DL (ref 6.4–8.3)
TROPONIN: <0.3 NG/ML
VITAMIN B-12: 881 PG/ML (ref 211–911)
WBC # BLD: 4.4 K/UL (ref 4–11)

## 2021-11-01 PROCEDURE — 80053 COMPREHEN METABOLIC PANEL: CPT

## 2021-11-01 PROCEDURE — 1123F ACP DISCUSS/DSCN MKR DOCD: CPT | Performed by: INTERNAL MEDICINE

## 2021-11-01 PROCEDURE — 1090F PRES/ABSN URINE INCON ASSESS: CPT | Performed by: INTERNAL MEDICINE

## 2021-11-01 PROCEDURE — 1036F TOBACCO NON-USER: CPT | Performed by: INTERNAL MEDICINE

## 2021-11-01 PROCEDURE — 83550 IRON BINDING TEST: CPT

## 2021-11-01 PROCEDURE — 83735 ASSAY OF MAGNESIUM: CPT

## 2021-11-01 PROCEDURE — 74176 CT ABD & PELVIS W/O CONTRAST: CPT

## 2021-11-01 PROCEDURE — 86901 BLOOD TYPING SEROLOGIC RH(D): CPT

## 2021-11-01 PROCEDURE — 85027 COMPLETE CBC AUTOMATED: CPT

## 2021-11-01 PROCEDURE — G8400 PT W/DXA NO RESULTS DOC: HCPCS | Performed by: INTERNAL MEDICINE

## 2021-11-01 PROCEDURE — G0378 HOSPITAL OBSERVATION PER HR: HCPCS

## 2021-11-01 PROCEDURE — P9016 RBC LEUKOCYTES REDUCED: HCPCS

## 2021-11-01 PROCEDURE — 93005 ELECTROCARDIOGRAM TRACING: CPT

## 2021-11-01 PROCEDURE — 71250 CT THORAX DX C-: CPT

## 2021-11-01 PROCEDURE — 85018 HEMOGLOBIN: CPT

## 2021-11-01 PROCEDURE — 3017F COLORECTAL CA SCREEN DOC REV: CPT | Performed by: INTERNAL MEDICINE

## 2021-11-01 PROCEDURE — G8420 CALC BMI NORM PARAMETERS: HCPCS | Performed by: INTERNAL MEDICINE

## 2021-11-01 PROCEDURE — 3044F HG A1C LEVEL LT 7.0%: CPT | Performed by: INTERNAL MEDICINE

## 2021-11-01 PROCEDURE — 83540 ASSAY OF IRON: CPT

## 2021-11-01 PROCEDURE — 85014 HEMATOCRIT: CPT

## 2021-11-01 PROCEDURE — 99214 OFFICE O/P EST MOD 30 MIN: CPT | Performed by: INTERNAL MEDICINE

## 2021-11-01 PROCEDURE — 85610 PROTHROMBIN TIME: CPT

## 2021-11-01 PROCEDURE — 82728 ASSAY OF FERRITIN: CPT

## 2021-11-01 PROCEDURE — 1200000000 HC SEMI PRIVATE

## 2021-11-01 PROCEDURE — 86900 BLOOD TYPING SEROLOGIC ABO: CPT

## 2021-11-01 PROCEDURE — 36415 COLL VENOUS BLD VENIPUNCTURE: CPT

## 2021-11-01 PROCEDURE — 2022F DILAT RTA XM EVC RTNOPTHY: CPT | Performed by: INTERNAL MEDICINE

## 2021-11-01 PROCEDURE — 83036 HEMOGLOBIN GLYCOSYLATED A1C: CPT

## 2021-11-01 PROCEDURE — 84484 ASSAY OF TROPONIN QUANT: CPT

## 2021-11-01 PROCEDURE — 97802 MEDICAL NUTRITION INDIV IN: CPT

## 2021-11-01 PROCEDURE — 6370000000 HC RX 637 (ALT 250 FOR IP): Performed by: PHYSICIAN ASSISTANT

## 2021-11-01 PROCEDURE — 82746 ASSAY OF FOLIC ACID SERUM: CPT

## 2021-11-01 PROCEDURE — 86850 RBC ANTIBODY SCREEN: CPT

## 2021-11-01 PROCEDURE — 86920 COMPATIBILITY TEST SPIN: CPT

## 2021-11-01 PROCEDURE — G8427 DOCREV CUR MEDS BY ELIG CLIN: HCPCS | Performed by: INTERNAL MEDICINE

## 2021-11-01 PROCEDURE — G8484 FLU IMMUNIZE NO ADMIN: HCPCS | Performed by: INTERNAL MEDICINE

## 2021-11-01 PROCEDURE — 82607 VITAMIN B-12: CPT

## 2021-11-01 PROCEDURE — G0379 DIRECT REFER HOSPITAL OBSERV: HCPCS

## 2021-11-01 PROCEDURE — 36430 TRANSFUSION BLD/BLD COMPNT: CPT

## 2021-11-01 PROCEDURE — 4040F PNEUMOC VAC/ADMIN/RCVD: CPT | Performed by: INTERNAL MEDICINE

## 2021-11-01 RX ORDER — ACETAMINOPHEN 650 MG/1
650 SUPPOSITORY RECTAL EVERY 6 HOURS PRN
Status: DISCONTINUED | OUTPATIENT
Start: 2021-11-01 | End: 2021-11-02 | Stop reason: HOSPADM

## 2021-11-01 RX ORDER — INSULIN GLARGINE 100 [IU]/ML
30 INJECTION, SOLUTION SUBCUTANEOUS NIGHTLY
Status: DISCONTINUED | OUTPATIENT
Start: 2021-11-01 | End: 2021-11-02 | Stop reason: HOSPADM

## 2021-11-01 RX ORDER — DIPHENHYDRAMINE HCL 25 MG
25 TABLET ORAL ONCE
Status: COMPLETED | OUTPATIENT
Start: 2021-11-01 | End: 2021-11-01

## 2021-11-01 RX ORDER — ROSUVASTATIN CALCIUM 20 MG/1
20 TABLET, COATED ORAL DAILY
Status: DISCONTINUED | OUTPATIENT
Start: 2021-11-02 | End: 2021-11-02 | Stop reason: HOSPADM

## 2021-11-01 RX ORDER — ROSUVASTATIN CALCIUM 20 MG/1
TABLET, COATED ORAL
Qty: 120 TABLET | Refills: 0 | Status: SHIPPED | OUTPATIENT
Start: 2021-11-01 | End: 2022-01-24

## 2021-11-01 RX ORDER — SODIUM CHLORIDE 9 MG/ML
INJECTION, SOLUTION INTRAVENOUS PRN
Status: DISCONTINUED | OUTPATIENT
Start: 2021-11-01 | End: 2021-11-02 | Stop reason: HOSPADM

## 2021-11-01 RX ORDER — ACETAMINOPHEN 325 MG/1
650 TABLET ORAL EVERY 6 HOURS PRN
Status: DISCONTINUED | OUTPATIENT
Start: 2021-11-01 | End: 2021-11-02 | Stop reason: HOSPADM

## 2021-11-01 RX ORDER — ONDANSETRON 2 MG/ML
4 INJECTION INTRAMUSCULAR; INTRAVENOUS EVERY 6 HOURS PRN
Status: DISCONTINUED | OUTPATIENT
Start: 2021-11-01 | End: 2021-11-02 | Stop reason: HOSPADM

## 2021-11-01 RX ORDER — POLYETHYLENE GLYCOL 3350 17 G/17G
17 POWDER, FOR SOLUTION ORAL DAILY PRN
Status: DISCONTINUED | OUTPATIENT
Start: 2021-11-01 | End: 2021-11-02 | Stop reason: HOSPADM

## 2021-11-01 RX ORDER — NICOTINE POLACRILEX 4 MG
15 LOZENGE BUCCAL PRN
Status: DISCONTINUED | OUTPATIENT
Start: 2021-11-01 | End: 2021-11-02 | Stop reason: HOSPADM

## 2021-11-01 RX ORDER — FERROUS SULFATE TAB EC 324 MG (65 MG FE EQUIVALENT) 324 (65 FE) MG
324 TABLET DELAYED RESPONSE ORAL
Status: DISCONTINUED | OUTPATIENT
Start: 2021-11-02 | End: 2021-11-02 | Stop reason: HOSPADM

## 2021-11-01 RX ORDER — DEXTROSE MONOHYDRATE 50 MG/ML
100 INJECTION, SOLUTION INTRAVENOUS PRN
Status: DISCONTINUED | OUTPATIENT
Start: 2021-11-01 | End: 2021-11-02 | Stop reason: HOSPADM

## 2021-11-01 RX ORDER — ISOSORBIDE MONONITRATE 60 MG/1
60 TABLET, EXTENDED RELEASE ORAL DAILY
Status: DISCONTINUED | OUTPATIENT
Start: 2021-11-02 | End: 2021-11-02 | Stop reason: HOSPADM

## 2021-11-01 RX ORDER — DEXTROSE MONOHYDRATE 25 G/50ML
12.5 INJECTION, SOLUTION INTRAVENOUS PRN
Status: DISCONTINUED | OUTPATIENT
Start: 2021-11-01 | End: 2021-11-02 | Stop reason: HOSPADM

## 2021-11-01 RX ORDER — ONDANSETRON 4 MG/1
4 TABLET, ORALLY DISINTEGRATING ORAL EVERY 8 HOURS PRN
Status: DISCONTINUED | OUTPATIENT
Start: 2021-11-01 | End: 2021-11-02 | Stop reason: HOSPADM

## 2021-11-01 RX ORDER — AMLODIPINE BESYLATE 5 MG/1
5 TABLET ORAL DAILY
Status: DISCONTINUED | OUTPATIENT
Start: 2021-11-02 | End: 2021-11-02 | Stop reason: HOSPADM

## 2021-11-01 RX ORDER — PANTOPRAZOLE SODIUM 40 MG/1
40 TABLET, DELAYED RELEASE ORAL
Status: DISCONTINUED | OUTPATIENT
Start: 2021-11-01 | End: 2021-11-02 | Stop reason: HOSPADM

## 2021-11-01 RX ORDER — HYDROCODONE BITARTRATE AND ACETAMINOPHEN 5; 325 MG/1; MG/1
1 TABLET ORAL EVERY 6 HOURS PRN
Status: DISCONTINUED | OUTPATIENT
Start: 2021-11-01 | End: 2021-11-02 | Stop reason: HOSPADM

## 2021-11-01 RX ADMIN — PANTOPRAZOLE SODIUM 40 MG: 40 TABLET, DELAYED RELEASE ORAL at 16:43

## 2021-11-01 RX ADMIN — HYDROCODONE BITARTRATE AND ACETAMINOPHEN 1 TABLET: 5; 325 TABLET ORAL at 21:27

## 2021-11-01 RX ADMIN — DIPHENHYDRAMINE HYDROCHLORIDE 25 MG: 25 TABLET ORAL at 16:43

## 2021-11-01 RX ADMIN — ACETAMINOPHEN 650 MG: 325 TABLET, FILM COATED ORAL at 16:43

## 2021-11-01 RX ADMIN — INSULIN LISPRO 2 UNITS: 100 INJECTION, SOLUTION INTRAVENOUS; SUBCUTANEOUS at 20:30

## 2021-11-01 RX ADMIN — BISACODYL 5 MG: 5 TABLET, COATED ORAL at 21:09

## 2021-11-01 RX ADMIN — INSULIN LISPRO 1 UNITS: 100 INJECTION, SOLUTION INTRAVENOUS; SUBCUTANEOUS at 17:07

## 2021-11-01 RX ADMIN — INSULIN GLARGINE 30 UNITS: 100 INJECTION, SOLUTION SUBCUTANEOUS at 20:30

## 2021-11-01 ASSESSMENT — ENCOUNTER SYMPTOMS
VOICE CHANGE: 1
SINUS PRESSURE: 0
ABDOMINAL PAIN: 0
WHEEZING: 0
EYE DISCHARGE: 0
COUGH: 0
SORE THROAT: 0
VOMITING: 0
SHORTNESS OF BREATH: 0
BACK PAIN: 1
NAUSEA: 0

## 2021-11-01 ASSESSMENT — PAIN SCALES - GENERAL
PAINLEVEL_OUTOF10: 10
PAINLEVEL_OUTOF10: 0

## 2021-11-01 NOTE — PROGRESS NOTES
Spoke with Jackeline Willis from Radiology related to patients new orders for CTA of chest and Chest exray. Notified April-Hill PA new order received to cancel the CXR. [facility] notified.

## 2021-11-01 NOTE — CARE COORDINATION
Hold per nursing recommendation, will follow up tomorrow.      Electronically signed by Anushka Moore PT on 27/8/0910 at 2:40 PM

## 2021-11-01 NOTE — PROGRESS NOTES
Received this 73year old white female direct admit from Dr. Alysia Mata office diagnosis Anemia. Patient alert and oriented X 4. No acute distress noted. Patient stated she no longer takes Gabapentin at night related it makes her feel funny and crazy. Patient stated last BM was a week ago and the stool color was black. Patient noted to take Ferrous sulfate daily. Patient stated she had colonoscopy two weeks ago and some polyps removed.

## 2021-11-01 NOTE — PROGRESS NOTES
Chief Complaint   Patient presents with    Diabetes    Anemia    Hypertension    Coronary Artery Disease       Have you seen any other physician or provider since your last visit yes - Gastro    Have you had any other diagnostic tests since your last visit? yes - labs     Have you changed or stopped any medications since your last visit? no         Diabetic retinal exam completed this year?  Yes                       * If yes please have patient sign a records release to obtain record to update Health Maintenance                       * If no, please order referral for patient to be scheduled

## 2021-11-01 NOTE — CONSULTS
Comprehensive Nutrition Assessment    Type and Reason for Visit:  Initial, Consult    Nutrition Recommendations/Plan: Recommend to advance diet as MD allows and is appropriate for patient. Recommend to start ONS (clear) TID. Nutrition Assessment:  pt admitted with moderate risk for ongoing nutritional compromise r/t being on clear liquid diet. Will start ONS TID and monitor intakes and progression of diet. Malnutrition Assessment:  Malnutrition Status:  Insufficient data    Context:  Chronic Illness     Findings of the 6 clinical characteristics of malnutrition:  Energy Intake:  Unable to assess  Weight Loss:  7 - Greater than 7.5% over 3 months     Body Fat Loss:  No significant body fat loss     Muscle Mass Loss:  Unable to assess    Fluid Accumulation:  1 - Mild Extremities   Strength:       Estimated Daily Nutrient Needs:  Energy (kcal):  1325-8748 (25-27 kcal/kg cbw); Weight Used for Energy Requirements:  Current     Protein (g):  60-72 (1-1.2 gm/kg cbw); Weight Used for Protein Requirements:  Current        Fluid (ml/day):  1453-4801; Method Used for Fluid Requirements:  1 ml/kcal      Nutrition Related Findings:  Pt presents with significant weight loss past 3 months. Pt has lost about 12-13 lbs since July. Pt has dark stool (but is on iron supplements). BLE edema +1 pitting. PMH: CAD, COPD, GERD, CVA, Hypertension, smoker. Wounds:  None       Current Nutrition Therapies:    ADULT DIET; Clear Liquid  ADULT ORAL NUTRITION SUPPLEMENT; Breakfast, Lunch, Dinner; Clear Liquid Oral Supplement    Anthropometric Measures:  · Height: 5' 4\" (162.6 cm)  · Current Body Weight: 132 lb 4.4 oz (60 kg)   · Admission Body Weight:      · Usual Body Weight: 145 lb (65.8 kg)     · Ideal Body Weight: 120 lbs; % Ideal Body Weight 110.2 %   · BMI: 22.7  · Adjusted Body Weight:  ; No Adjustment   · Adjusted BMI:      · BMI Categories: Normal Weight (BMI 18.5-24. 9)       Nutrition Diagnosis:   · Predicted inadequate energy intake related to acute injury/trauma, inadequate protein-energy intake as evidenced by NPO or clear liquid status due to medical condition, weight loss, weight loss 7.5% in 3 months, localized or generalized fluid accumulation      Nutrition Interventions:   Food and/or Nutrient Delivery:  Continue Current Diet, Start Oral Nutrition Supplement  Nutrition Education/Counseling:  No recommendation at this time   Coordination of Nutrition Care:  Continue to monitor while inpatient    Goals: To meet est needs for age/condition       Nutrition Monitoring and Evaluation:   Behavioral-Environmental Outcomes:      Food/Nutrient Intake Outcomes:  Diet Advancement/Tolerance, Supplement Intake  Physical Signs/Symptoms Outcomes:  Biochemical Data, Fluid Status or Edema, Nutrition Focused Physical Findings, Weight, Constipation     Discharge Planning:     Too soon to determine     Electronically signed by Dennis Wang, MS, RD, LD on 11/1/21 at 2:12 PM EDT

## 2021-11-01 NOTE — PROGRESS NOTES
SUBJECTIVE:    Patient ID: Margo Nogueira is a 73 y. o.female. Chief Complaint   Patient presents with    Diabetes    Anemia    Hypertension    Coronary Artery Disease         HPI:  Patient was diagnosed with iron deficiency anemia. Patient denies melena or hematochezia. Patient energy is decreased. Work up showed B12 and iron deficiency. Patient has been compliant with treatment. Weight is down 6 pounds since last visit. She continues to smoke. She reported that she is on the schedule to have repeat colonoscopy in 2 weeks. Patient reported is feeling weaker. Patient with history of diabetes. She occasionally check her fingersticks and usually around 100. Patient's medications, allergies, past medical, surgical, social and family histories were reviewed and updated as appropriate in electronic medical record. Outpatient Medications Marked as Taking for the 11/1/21 encounter (Office Visit) with Tyler Ott MD   Medication Sig Dispense Refill    rosuvastatin (CRESTOR) 20 MG tablet TAKE 1 TABLET BY MOUTH ONCE DAILY 120 tablet 0    metFORMIN (GLUCOPHAGE) 1000 MG tablet TAKE 1 TABLET BY MOUTH TWICE DAILY WITH FOOD 60 tablet 0    pantoprazole (PROTONIX) 40 MG tablet Take 40 mg by mouth 2 times daily (before meals)      ferrous sulfate (IRON 325) 325 (65 Fe) MG tablet Take 325 mg by mouth daily (with breakfast)      furosemide (LASIX) 20 MG tablet Take 1 tablet by mouth daily as needed (swelling) 30 tablet 1    TRADJENTA 5 MG tablet TAKE 1 TABLET BY MOUTH EVERY  tablet 0    BRILINTA 90 MG TABS tablet TAKE 1 TABLET BY MOUTH TWICE DAILY 60 tablet 3    insulin detemir (LEVEMIR FLEXTOUCH) 100 UNIT/ML injection pen INJECT 55 UNITS INTO THE SKIN NIGHTLY 340B 90 day supply.  (Patient taking differently: INJECT 40 UNITS INTO THE SKIN NIGHTLY 340B 90 day supply.) 60 mL 1    lisinopril (PRINIVIL;ZESTRIL) 5 MG tablet TAKE 1 TABLET BY MOUTH DAILY 90 tablet 1    empagliflozin (JARDIANCE) 10 MG tablet Take 1 tablet by mouth daily 30 tablet 3    gabapentin (NEURONTIN) 300 MG capsule Take 1 capsule by mouth nightly for 60 days. 90 capsule 0    Insulin Pen Needle 31G X 6 MM MISC 1 each by Does not apply route daily 100 each 3    isosorbide mononitrate (IMDUR) 60 MG extended release tablet TAKE 1 TABLET BY MOUTH EVERY DAY 30 tablet 2    amLODIPine (NORVASC) 5 MG tablet Take 5 mg by mouth      blood glucose monitor strips QID Dx E11.9 100 strip 5    glucose monitoring kit (FREESTYLE) monitoring kit 1 kit by Does not apply route daily E11.40 1 kit 0    umeclidinium-vilanterol (ANORO ELLIPTA) 62.5-25 MCG/INH AEPB inhaler Inhale 1 puff into the lungs daily      aspirin 81 MG chewable tablet Take 1 tablet by mouth daily 30 tablet 2    nitroGLYCERIN (NITROSTAT) 0.4 MG SL tablet Place 1 tablet under the tongue every 5 minutes as needed for Chest pain 25 tablet 1    Insulin Syringe-Needle U-100 (B-D INS SYR ULTRAFINE 1CC/31G) 31G X 5/16\" 1 ML MISC USE ONCE A DAY AS DIRECTED  DX E11.9 100 each 3        Review of Systems   Constitutional: Positive for fatigue and unexpected weight change. Negative for chills and fever. HENT: Positive for voice change. Negative for congestion, sinus pressure and sore throat. Eyes: Negative for discharge and visual disturbance. Respiratory: Negative for cough, shortness of breath and wheezing. Cardiovascular: Negative for chest pain and palpitations. CAZARES   Gastrointestinal: Negative for abdominal pain, nausea and vomiting. Endocrine: Negative for cold intolerance and heat intolerance. Genitourinary: Negative for dysuria, frequency and urgency. Musculoskeletal: Positive for arthralgias, back pain and gait problem. Skin: Negative for rash and wound. Neurological: Positive for weakness. Negative for syncope, numbness and headaches. Hematological: Negative. Psychiatric/Behavioral: Negative for agitation and sleep disturbance.  The patient is not nervous/anxious. Past Medical History:   Diagnosis Date    CAD (coronary artery disease)     [facility] obstructive [facility] [address]     Cystic fibrosis ([facility] [address]     Diabetes mellitus ([facility] [address]     GERD (gastroesophageal reflux disease)     H/O: CVA (cardiovascular accident)     HTN (hypertension)     Mass     on chest     Tobacco abuse     Vitamin B12 deficiency      Past Surgical History:   Procedure Laterality Date    CARPAL TUNNEL RELEASE Bilateral     CHOLECYSTECTOMY      CORONARY ANGIOPLASTY WITH STENT PLACEMENT      HYSTERECTOMY      JOINT REPLACEMENT Bilateral 10/15/2016    knees    TOTAL KNEE ARTHROPLASTY Bilateral 10/18/2016    at [facility]     Family History   Problem Relation Age of Onset    Diabetes Mother     High Blood Pressure Mother     Cancer Mother         pancreatic    Diabetes Father     Heart Disease Father     High Blood Pressure Father     Cancer Sister         lung, breast    Cancer Brother         throat      Social History     Tobacco Use   Smoking Status Former Smoker    Packs/day: 1.00    Years: 40.00    Pack years: 40.00    Types: Cigarettes   Smokeless Tobacco Never Used   Tobacco Comment    states she is not willing to stop and this is a stress reliever for her. OBJECTIVE:   Wt Readings from Last 3 Encounters:   11/01/21 132 lb (59.9 kg)   10/05/21 138 lb 9.6 oz (62.9 kg)   07/21/21 145 lb (65.8 kg)     BP Readings from Last 3 Encounters:   11/01/21 (!) 122/58   10/05/21 132/60   07/21/21 136/68       BP (!) 122/58   Pulse 83   Temp 97.2 °F (36.2 °C)   Resp 18   Wt 132 lb (59.9 kg)   SpO2 100%   BMI 22.66 kg/m²      Physical Exam  Vitals and nursing note reviewed. Constitutional:       Appearance: Normal appearance. She is well-developed. Comments: Very pale   HENT:      Head: Normocephalic and atraumatic.       Right Ear: External ear normal.      Left Ear: External ear normal.      Nose: Nose normal.      Mouth/Throat:      Mouth: Mucous membranes are moist.      Pharynx: Oropharynx is clear. Eyes:      Conjunctiva/sclera: Conjunctivae normal.      Pupils: Pupils are equal, round, and reactive to light. Neck:      Thyroid: No thyromegaly. Vascular: No JVD. Cardiovascular:      Rate and Rhythm: Normal rate and regular rhythm. Heart sounds: Normal heart sounds. Comments: Frequent extra-systolic beats  Pulmonary:      Effort: Pulmonary effort is normal.      Breath sounds: Normal breath sounds. No wheezing or rales. Abdominal:      General: Bowel sounds are normal. There is no distension. Palpations: Abdomen is soft. Tenderness: There is no abdominal tenderness. Musculoskeletal:         General: No tenderness. Cervical back: Neck supple. No rigidity. No muscular tenderness. Right lower le+ Edema present. Left lower le+ Edema present. Skin:     Findings: No erythema or rash. Neurological:      General: No focal deficit present. Mental Status: She is alert and oriented to person, place, and time.    Psychiatric:         Behavior: Behavior normal.         Judgment: Judgment normal.         Lab Results   Component Value Date     10/13/2021    K 4.1 10/13/2021    K 3.7 2021     10/13/2021    CO2 21 10/13/2021    GLUCOSE 84 10/13/2021    BUN 16 10/13/2021    CREATININE 0.87 10/13/2021    CALCIUM 9.3 10/13/2021    PROT 6.2 10/13/2021    LABALBU 4.2 10/13/2021    BILITOT 0.5 10/13/2021    ALT 31 10/13/2021    AST 37 10/13/2021       Hemoglobin A1C (%)   Date Value   2021 5.4     Microscopic Examination (no units)   Date Value   2019 YES     LDL Calculated (mg/dL)   Date Value   2020 45         Lab Results   Component Value Date    WBC 4.8 10/13/2021    NEUTROABS 3.6 10/13/2021    HGB 7.6 10/13/2021    HCT 24.5 10/13/2021    MCV 88 10/13/2021     10/13/2021       Lab Results   Component Value Date    TSH 2.280 10/13/2021         ASSESSMENT/PLAN: 1. Anemia, unspecified type  Patient clinically worse. We were not able to touch base with her for the past month since her recent blood work which showed hemoglobin of 7.6. Ferritin is low. Gastropathy and colon polyp on recent EGDs/colonoscopy. Clinically seems to be worse. I am going to admit the patient directly for further evaluation with repeat blood work and possible blood transfusion and iron infusion.    - Comprehensive Metabolic Panel; Future  - CBC Auto Differential; Future  - Hemoglobin A1C; Future  - Ferritin; Future  - Iron and TIBC; Future    2. Type 2 diabetes mellitus with diabetic neuropathy, with long-term current use of insulin ([facility] [address]  I advised her regarding diabetic diet, exercise and weight control. Also, I advised her to stay on the current medication, monitor her fingerstick closely. I am going to check the A1c every few months. I will check microalbumin on a yearly basis. I have also advised her to have a yearly eye exam and to monitor her feet closely. Along with instruction of possible complications related to diabetes, even with good control. A1C will be checked  in few months. Lab Results   Component Value Date    LABA1C 5.4 07/21/2021     - Comprehensive Metabolic Panel; Future  - CBC Auto Differential; Future  - Hemoglobin A1C; Future  - Ferritin; Future  - Iron and TIBC; Future     Need to monitor heart rate on telemetry since seems to have PVCs or PACs on exam but also could be A. Fib. Patient was in agreement to proceed with admission for further evaluation/treatment. Erin Harper MA am scribing for and in the presence of Jasmeet Norris MD on this date of 11/01/21 at 10:54 AM    I, Dr. Jasmeet Norris, personally performed the services described in the documentation as scribed by Ubaldo Avelar MA, in my presence and it is both accurate and complete.

## 2021-11-02 VITALS
BODY MASS INDEX: 22.58 KG/M2 | HEIGHT: 64 IN | RESPIRATION RATE: 16 BRPM | OXYGEN SATURATION: 100 % | DIASTOLIC BLOOD PRESSURE: 68 MMHG | TEMPERATURE: 97.7 F | HEART RATE: 42 BPM | WEIGHT: 132.25 LBS | SYSTOLIC BLOOD PRESSURE: 148 MMHG

## 2021-11-02 LAB
EKG ATRIAL RATE: 83 BPM
EKG DIAGNOSIS: NORMAL
EKG Q-T INTERVAL: 386 MS
EKG QRS DURATION: 86 MS
EKG QTC CALCULATION (BAZETT): 472 MS
EKG R AXIS: 5 DEGREES
EKG T AXIS: 70 DEGREES
EKG VENTRICULAR RATE: 90 BPM
GLUCOSE BLD-MCNC: 257 MG/DL (ref 74–106)
GLUCOSE BLD-MCNC: 82 MG/DL (ref 74–106)
PERFORMED ON: ABNORMAL
PERFORMED ON: NORMAL

## 2021-11-02 PROCEDURE — 97165 OT EVAL LOW COMPLEX 30 MIN: CPT

## 2021-11-02 PROCEDURE — 6370000000 HC RX 637 (ALT 250 FOR IP): Performed by: PHYSICIAN ASSISTANT

## 2021-11-02 PROCEDURE — 97161 PT EVAL LOW COMPLEX 20 MIN: CPT

## 2021-11-02 PROCEDURE — 2580000003 HC RX 258: Performed by: PHYSICIAN ASSISTANT

## 2021-11-02 PROCEDURE — 99236 HOSP IP/OBS SAME DATE HI 85: CPT | Performed by: INTERNAL MEDICINE

## 2021-11-02 PROCEDURE — 6360000002 HC RX W HCPCS: Performed by: PHYSICIAN ASSISTANT

## 2021-11-02 RX ORDER — INSULIN DETEMIR 100 [IU]/ML
20 INJECTION, SOLUTION SUBCUTANEOUS NIGHTLY
Qty: 60 ML | Refills: 1 | Status: SHIPPED | OUTPATIENT
Start: 2021-11-02 | End: 2022-02-01 | Stop reason: SDUPTHER

## 2021-11-02 RX ORDER — SPIRONOLACTONE 25 MG/1
25 TABLET ORAL DAILY
Qty: 30 TABLET | Refills: 3 | Status: ON HOLD | OUTPATIENT
Start: 2021-11-03 | End: 2022-03-17

## 2021-11-02 RX ORDER — SPIRONOLACTONE 25 MG/1
25 TABLET ORAL DAILY
Status: DISCONTINUED | OUTPATIENT
Start: 2021-11-02 | End: 2021-11-02 | Stop reason: HOSPADM

## 2021-11-02 RX ORDER — FUROSEMIDE 20 MG/1
20 TABLET ORAL DAILY
Status: DISCONTINUED | OUTPATIENT
Start: 2021-11-02 | End: 2021-11-02 | Stop reason: HOSPADM

## 2021-11-02 RX ADMIN — INSULIN LISPRO 3 UNITS: 100 INJECTION, SOLUTION INTRAVENOUS; SUBCUTANEOUS at 11:38

## 2021-11-02 RX ADMIN — IRON SUCROSE 200 MG: 20 INJECTION, SOLUTION INTRAVENOUS at 13:49

## 2021-11-02 RX ADMIN — SPIRONOLACTONE 25 MG: 25 TABLET ORAL at 11:38

## 2021-11-02 RX ADMIN — FUROSEMIDE 20 MG: 20 TABLET ORAL at 11:38

## 2021-11-02 RX ADMIN — FERROUS SULFATE TAB EC 324 MG (65 MG FE EQUIVALENT) 324 MG: 324 (65 FE) TABLET DELAYED RESPONSE at 08:21

## 2021-11-02 RX ADMIN — AMLODIPINE BESYLATE 5 MG: 5 TABLET ORAL at 08:21

## 2021-11-02 RX ADMIN — PANTOPRAZOLE SODIUM 40 MG: 40 TABLET, DELAYED RELEASE ORAL at 06:10

## 2021-11-02 RX ADMIN — ROSUVASTATIN CALCIUM 20 MG: 20 TABLET, COATED ORAL at 08:21

## 2021-11-02 RX ADMIN — ISOSORBIDE MONONITRATE 60 MG: 60 TABLET, EXTENDED RELEASE ORAL at 08:21

## 2021-11-02 RX ADMIN — BISACODYL 5 MG: 5 TABLET, COATED ORAL at 08:21

## 2021-11-02 NOTE — PROGRESS NOTES
Physical Therapy    Facility/Department: [facility] MED SURG  Initial Assessment    NAME: Jany Adair  : 1950  MRN: [id number]    Date of Service: 2021    Discharge Recommendations:  Continue to assess pending progress        Assessment   Body structures, Functions, Activity limitations: Decreased high-level IADLs;Decreased functional mobility   Assessment: Anemia; general weakness; will benefit from PT to help restore safe, functional independence with functional mobility. Treatment Diagnosis: Anemia; general weakness  Prognosis: Excellent  Decision Making: Low Complexity  REQUIRES PT FOLLOW UP: Yes  Activity Tolerance  Activity Tolerance: Patient Tolerated treatment well       Patient Diagnosis(es): There were no encounter diagnoses. has a past medical history of CAD (coronary artery disease), COPD (chronic obstructive pulmonary disease) ([facility] [address] Cystic fibrosis ([facility] [address] Diabetes mellitus ([facility] [address] GERD (gastroesophageal reflux disease), H/O: CVA (cardiovascular accident), HTN (hypertension), Mass, Tobacco abuse, and Vitamin B12 deficiency. has a past surgical history that includes Hysterectomy; Cholecystectomy; Carpal tunnel release (Bilateral); Coronary angioplasty with stent; joint replacement (Bilateral, 10/15/2016); and Total knee arthroplasty (Bilateral, 10/18/2016). Restrictions  Restrictions/Precautions  Restrictions/Precautions: General Precautions, Fall Risk  Required Braces or Orthoses?: No  Vision/Hearing  Vision: Impaired  Vision Exceptions: Wears glasses for reading  Hearing: Exceptions to [facility]  Hearing Exceptions: Hard of hearing/hearing concerns; No hearing aid     Subjective  General  Chart Reviewed: Yes  Patient assessed for rehabilitation services?: Yes  Response To Previous Treatment: Not applicable  Family / Caregiver Present: No  Referring Practitioner: Jayme Cabello MD  Diagnosis: Anemia  Follows Commands: Within Functional Limits  Subjective  Subjective: Patient agreeable to consult; states she is normally independent with functional mobility; uses a cane at times for ambulation; uncertain of why she is anemic.   Pain Screening  Patient Currently in Pain: Yes (mild back pain)          Orientation  Orientation  Overall Orientation Status: Within Normal Limits  Social/Functional History  Social/Functional History  Lives With: Significant other  Type of Home:  ([facility])  Home Layout: One level  Home Access: Stairs to enter with rails  Entrance Stairs - Number of Steps: 1  Bathroom Shower/Tub: Walk-in shower  Bathroom Toilet: Standard  Bathroom Accessibility: Not accessible  Home Equipment: [employer]  ADL Assistance: Independent  Homemaking Assistance: Independent  Homemaking Responsibilities: Yes  Ambulation Assistance: Independent  Transfer Assistance: Independent  Active : No  Cognition        Objective     Observation/Palpation  Posture: Good  Observation: Patient lying in bed, NAD, pleasant and cooperative, RA    AROM RLE (degrees)  RLE AROM: WFL  AROM LLE (degrees)  LLE AROM : WFL  AROM RUE (degrees)  RUE AROM : WFL  AROM LUE (degrees)  LUE AROM : WFL  Strength RLE  Strength RLE: WFL  Strength LLE  Strength LLE: WFL  Strength RUE  Strength RUE: WFL  Strength LUE  Strength LUE: WFL  Tone RLE  RLE Tone: Normotonic  Tone LLE  LLE Tone: Normotonic  Motor Control  Gross Motor?: WFL  Sensation  Overall Sensation Status: WFL  Bed mobility  Rolling to Left: Independent  Supine to Sit: Independent  Sit to Supine: Independent  Scooting: Independent  Transfers  Sit to Stand: Modified independent  Stand to sit: Modified independent  Ambulation  Ambulation?: Yes  Ambulation 1  Device: No Device  Assistance: Stand by assistance  Quality of Gait: steady but guarded  Gait Deviations: Slow Mireya  Distance: 40 feet     Balance  Posture: Good  Sitting - Static: Good  Sitting - Dynamic: Good  Standing - Static: Good;-  Standing - Dynamic: Fair;+        Plan   Plan  Times per week: 2-3 days  Plan weeks: 2-3 days  Current Treatment Recommendations: Strengthening, Home Exercise Program, Neuromuscular Re-education, Balance Training, Gait Training, Safety Education & Training               Goals  Long term goals  Time Frame for Long term goals : 2-3 days  Long term goal 1: Demonstrate safety and independence with basic transfers. Long term goal 2: Ambulate 150 feet with Supervision with good safety awareness and no LOB.        Therapy Time   Individual Concurrent Group Co-treatment   Time In           Time Out           Minutes                   Candelaria Bey, PT

## 2021-11-02 NOTE — PROGRESS NOTES
I have reviewed patient's home medication list with patient. Added: none    Changed: none    Deleted:   Amlodipine 5mg tablet    Gabapentin 300mg capsule was deleted prior to my review. Patient last filled insulin at [address] in August 2021. She said she has plenty. Anoro Ellipta 62.5-25mcg/ inhaler-Patient gets samples from clinic.        Devin Cardenas CPhT

## 2021-11-02 NOTE — PROGRESS NOTES
Pt discharged at this time. Pt educated on new medications. Pt educated on when to take medications. Pt educated on pharmacy to  medications. Pt educated on follow up appointments. Pt given papers for lab work. Pt spouse at bedside. Pt states no further questions. Pt stable. Pt left via wc.

## 2021-11-02 NOTE — CARE COORDINATION
11/02/21 1124   Discharge Planning   [email or web address] Spouse/Significant Other   Current Services Prior To Admission Durable Medical Equipment   DME [id number] [facility] Medications No   Patient expects to be discharged to: Trail/mobile home   Expected Discharge Date 11/04/21   Follow Up Appointment: Best Day/Time    (any)     Lives with SO. Has cane. I at baseline. No DME needs noted at this time.

## 2021-11-02 NOTE — H&P
Short Stay Summary      Patient ID: Jennifer Hernandez      Patient's PCP: Jatin Bloom MD    Admit Date: 11/1/2021     Discharge Date:   11/2/2021    Admitting Physician: Jatin Bloom MD    Discharge Physician: VIGNESH Huitron     Reason for this admission:   Anemia  Decompensated LEON cirrhosis    Discharge Diagnoses: Active Hospital Problems    Diagnosis Date Noted    Anemia [D64.9] 11/01/2021    Cirrhosis ([facility] [address] [K74.60] 11/01/2021    Thoracic aortic aneurysm without rupture ([facility] [address] [I71.2] 10/09/2019    Essential hypertension [I10] 08/12/2015    Type 2 diabetes mellitus with diabetic neuropathy ([facility] [address] [E11.40] 08/12/2015    Tobacco abuse [Z72.0]     CAD (coronary artery disease) [I25.10]     COPD (chronic obstructive pulmonary disease) ([address] [J44.9]     H/O: CVA (cerebrovascular accident) [Z86.73]        Procedures:  CT CHEST WO CONTRAST   Final Result      CHEST:   1. No evidence of malignancy in the chest.   2.  Aneurysmal dilation of the ascending aorta measuring up to 4 cm., Unchanged   3. Dilation of the pulmonary artery can be seen with pulmonary artery hypertension. ABDOMEN AND PELVIS:   1. Cirrhosis with sequela of portal hypertension including splenomegaly and gastroesophageal varices. 2.  Large stool burden. CT ABDOMEN PELVIS WO CONTRAST Additional Contrast? None   Final Result      CHEST:   1. No evidence of malignancy in the chest.   2.  Aneurysmal dilation of the ascending aorta measuring up to 4 cm., Unchanged   3. Dilation of the pulmonary artery can be seen with pulmonary artery hypertension. ABDOMEN AND PELVIS:   1. Cirrhosis with sequela of portal hypertension including splenomegaly and gastroesophageal varices. 2.  Large stool burden.                Consults:   IP CONSULT TO CASE MANAGEMENT  IP CONSULT TO DIETITIAN  PT OT       HISTORY OF PRESENT ILLNESS:   The patient is a 73 y.o. female with PMH of iron deficiency anemia due to chronic blood loss, [facility] cirrhosis, colon polyps, GERD, anxiety, COPD, coronary artery disease, diabetes mellitus type 2, hyperlipidemia, hearing loss, CAD status post MI and stents, hypertension, low back pain, osteoarthritis, CVA with residual right upper extremity weakness (2013), tobacco abuse, vitamin B12 deficiency who presented as a direct admission from PCPs office for further evaluation of anemia. Per medical record review, patient was admitted to [facility] in late September for anemia and melena. She required a blood transfusion at that time. She was scheduled for outpatient follow-up with GI and underwent EGD and colonoscopy. EGD performed on 9/29/2021 showed: Normal oropharynx; Z-line regular, 38 cm from the incisors; no gross lesions in esophagus; bilious gastric fluid; erythematous mucosa in the antrum and prepyloric region on the stomach that was biopsied; mostly gastropathy; no obvious portal hypertensive gastropathy; normal duodenal bulb, first portion of the duodenum, second portion of the duodenum and third portion of the duodenum were biopsied. No obvious source of upper GI bleed. Colonoscopy from 9/30/2021 was performed under an adequate prep. One 8 mm polyp in the cecum was removed with a hot snare. One 5 mm polyp in the proximal ascending colon was removed with a hot snare and one 5 mm polyp in the proximal transverse colon was removed with a hot snare. A few colonic angiectasia's were noted and treated with argon beam coagulation. Per GI notes, it was felt that the multiple and ectasia in the colon were possibly the source of her bleeding. Those were treated with cauterization. Gastric biopsies were negative for H. pylori and revealed a reactive gastropathy. Patient also diagnosed with cirrhosis possibly [place] [facility] cirrhosis and has a chronic anemia associated with a cirrhosis. GI recommended to continue iron pills in addition to her home aspirin and Brilinta along with the PPI.   Repeat colonoscopy in 3 to 6 months to rule out any other AVMs since her prep was suboptimal on the recent colonoscopy. Since then, patient has followed up again with GI on 10/21/2021. She reported seeing black stool but associated that with her iron pills. She denied any bleeding or clots. She denied any abdominal pain with nausea or vomiting. No change was made to her treatment plan. Patient followed up with her PCP on 11/1/2021. She was noted to be very pale and given the above history, she was sent for admission. Review of system  Constitutional:  Denies fever or chills. Positive for generalized weakness and fatigue. Eyes:  Denies change in visual acuity or discharge. HENT:  Denies nasal congestion or sore throat. Respiratory:  Denies cough or shortness of breath. Cardiovascular:  Denies chest pain, palpitation or swelling in LEs. GI:  Denies abdominal pain, nausea, vomiting, bloody stools or diarrhea. :  Denies dysuria or frequency. Musculoskeletal:  Denies back pain or joint pain. Integument:  Denies rash or itching. Neurologic:  Denies headache, focal weakness or sensory changes. Endocrine:  Denies polyuria or polydipsia. Lymphatic:  Denies swollen glands or night sweats. Psychiatric:  Denies depression or anxiety.     Past Medical History:      Diagnosis Date    CAD (coronary artery disease)     [facility] (chronic obstructive pulmonary disease) [facility] [address]     Cystic fibrosis ([facility] [address]     Diabetes mellitus ([facility] [address]     GERD (gastroesophageal reflux disease)     H/O: CVA (cardiovascular accident)     HTN (hypertension)     Mass     on chest     Tobacco abuse     Vitamin B12 deficiency        Past Surgical History:      Procedure Laterality Date    CARPAL TUNNEL RELEASE Bilateral     CHOLECYSTECTOMY      CORONARY ANGIOPLASTY WITH STENT PLACEMENT      HYSTERECTOMY      JOINT REPLACEMENT Bilateral 10/15/2016    knees    TOTAL KNEE ARTHROPLASTY Bilateral 10/18/2016    at [facility] Social History:   TOBACCO:   reports that she has quit smoking. Her smoking use included cigarettes. She has a 40.00 pack-year smoking history. She has never used smokeless tobacco.  ETOH:   reports no history of alcohol use. OCCUPATION:  None     Family History:       Problem Relation Age of Onset    Diabetes Mother     High Blood Pressure Mother     Cancer Mother         pancreatic    Diabetes Father     Heart Disease Father     High Blood Pressure Father     Cancer Sister         lung, breast    Cancer Brother         throat       Allergies:  Codeine    Medications Prior to Admission:    Prior to Admission medications    Medication Sig Start Date End Date Taking? Authorizing Provider   rosuvastatin (CRESTOR) 20 MG tablet TAKE 1 TABLET BY MOUTH ONCE DAILY 11/1/21  Yes Rosy Mendez MD   metFORMIN (GLUCOPHAGE) 1000 MG tablet TAKE 1 TABLET BY MOUTH TWICE DAILY WITH FOOD 11/1/21  Yes Rosy Mendez MD   pantoprazole (PROTONIX) 40 MG tablet Take 40 mg by mouth 2 times daily (before meals) 9/28/21  Yes Historical Provider, MD   ferrous sulfate (IRON 325) 325 (65 Fe) MG tablet Take 325 mg by mouth daily (with breakfast) 9/30/21  Yes Historical Provider, MD   furosemide (LASIX) 20 MG tablet Take 1 tablet by mouth daily as needed (swelling) 10/5/21  Yes Rosy Mendez MD   BRILINTA 90 MG TABS tablet TAKE 1 TABLET BY MOUTH TWICE DAILY 8/23/21  Yes Rosy Mendez MD   insulin detemir (LEVEMIR FLEXTOUCH) 100 UNIT/ML injection pen INJECT 55 UNITS INTO THE SKIN NIGHTLY 340B 90 day supply.  8/10/21  Yes Rosy Mendez MD   lisinopril (PRINIVIL;ZESTRIL) 5 MG tablet TAKE 1 TABLET BY MOUTH DAILY 8/9/21  Yes Rosy Mendez MD   empagliflozin (JARDIANCE) 10 MG tablet Take 1 tablet by mouth daily 8/9/21  Yes Rosy Mendez MD   isosorbide mononitrate (IMDUR) 60 MG extended release tablet TAKE 1 TABLET BY MOUTH EVERY DAY 4/12/21  Yes oRsy Mendez MD   umeclidinium-vilanterol [facility] ELLIPTA) 62.5-25 MCG/INH AEPB inhaler Inhale 1 puff into the lungs daily 10/14/19  Yes Historical Provider, MD   aspirin 81 MG chewable tablet Take 1 tablet by mouth daily 10/9/19  Yes Myrna Griffith MD   TRADJENTA 5 MG tablet TAKE 1 TABLET BY MOUTH EVERY DAY 9/8/21   DAVIDA Correa   Insulin Pen Needle 31G X 6 MM MISC 1 each by Does not apply route daily 5/17/21   Myrna Griffith MD   blood glucose monitor strips QID Dx E11.9 3/10/21   DAVIDA Harrison - CNP   glucose monitoring kit (FREESTYLE) monitoring kit 1 kit by Does not apply route daily E11.40 6/3/20   Myrna Griffith MD   nitroGLYCERIN (NITROSTAT) 0.4 MG SL tablet Place 1 tablet under the tongue every 5 minutes as needed for Chest pain 8/23/18   Myrna Griffith MD   Insulin Syringe-Needle U-100 (B-D INS SYR ULTRAFINE 1CC/31G) 31G X 5/16\" 1 ML MISC USE ONCE A DAY AS DIRECTED  DX E11.9 4/17/17   Myrna Griffith MD       Vital Signs  Temp: 97.7 °F (36.5 °C)  Pulse: (!) 42  Resp: 16  BP: (!) 148/68  SpO2: 100 %  O2 Device: None (Room air)       Vital signs reviewed in electronic chart. Physical exam  Constitutional:  Well developed, chronically ill appearing female in no acute distress. Eyes:  PERRL, conjunctiva normal, EOMI. HENT:  Atraumatic, external ears normal, external nose/nares normal, oropharynx moist, no pharyngeal exudates. Neck:  Supple. No JVD or thyromegaly. Respiratory:  No respiratory distress, normal breath sounds, no rales, no wheezing. Cardiovascular:  Normal rate, normal rhythm, no murmurs, no gallops, no rubs. GI:  Soft, nondistended, normal bowel sounds, nontender, no organomegaly, no mass. :  No costovertebral angle tenderness. Musculoskeletal:  No edema, no tenderness, no obvious deformities. Patient is moving all extremities. Integument:  Well hydrated, no rash. Lymphatic:  No cervical or axillary lymphadenopathy noted. Neurologic:  Alert & oriented x 3,  no focal deficits noted. Strength is equal throughout. Chronic RUE weakness.   Psychiatric:  Speech and behavior appropriate. Lab Results   Component Value Date    WBC 4.4 11/01/2021    HGB 8.2 (L) 11/01/2021    HCT 28.3 (L) 11/01/2021    MCV 90.0 11/01/2021     11/01/2021       Lab Results   Component Value Date     11/01/2021    K 3.5 11/01/2021     11/01/2021    CO2 21 11/01/2021    BUN 16 11/01/2021    CREATININE 1.1 11/01/2021    GLUCOSE 129 (H) 11/01/2021    CALCIUM 9.7 11/01/2021    PROT 6.2 (L) 11/01/2021    LABALBU 4.1 11/01/2021    BILITOT 0.6 11/01/2021    ALKPHOS 72 11/01/2021    AST 27 11/01/2021    ALT 28 11/01/2021    LABGLOM 49 (L) 11/01/2021    GFRAA >59 11/01/2021    AGRATIO 2.0 11/01/2021    GLOB 2.1 11/01/2021           PA/lat CXR:   CT CHEST WO CONTRAST   Final Result      CHEST:   1. No evidence of malignancy in the chest.   2.  Aneurysmal dilation of the ascending aorta measuring up to 4 cm., Unchanged   3. Dilation of the pulmonary artery can be seen with pulmonary artery hypertension. ABDOMEN AND PELVIS:   1. Cirrhosis with sequela of portal hypertension including splenomegaly and gastroesophageal varices. 2.  Large stool burden. CT ABDOMEN PELVIS WO CONTRAST Additional Contrast? None   Final Result      CHEST:   1. No evidence of malignancy in the chest.   2.  Aneurysmal dilation of the ascending aorta measuring up to 4 cm., Unchanged   3. Dilation of the pulmonary artery can be seen with pulmonary artery hypertension. ABDOMEN AND PELVIS:   1. Cirrhosis with sequela of portal hypertension including splenomegaly and gastroesophageal varices. 2.  Large stool burden. Assessment and Plan/Hospital course:     Basic labs obtained. Hemoglobin reported at 5.9. Iron studies indicative of iron deficiency anemia. Patient was given 2 units packed red blood cells. She was also given 1 unit of IV iron. No bleeding or melena noted overnight. Suspect patient's anemia was secondary to her [facility] cirrhosis.   Patient reports feeling fine this morning and is requesting to be discharged home. She will follow up with her PCP in 1 week for repeat labs. This note will also be faxed to her GI doctor and follow-up appointment will be scheduled. Home ASA and Brilinta resumed on discharge. Active Hospital Problems    Diagnosis Date Noted    Anemia [D64.9]  -hx of MAGDALENA; iron studies show ongoing MAGDALENA in setting of oral supplement.  Gave IV iron prior to discharge today.  -recent EGD and colonoscopy in September 2021 as per HPI  -hgb 5.9 on arrival here; transfused 2 units PRBCs with improvement to 8.2 on day of discharge  -repeat labs in 1 week and follow up with PCP  -Aspirin and Brilinta on discharge  -follow up with GI   11/01/2021    Cirrhosis ([facility] [address] [K74.60]  -LEON cirrhosis  -add lasix and spironolactone 11/2  -follow up with GI   11/01/2021    Thoracic aortic aneurysm without rupture ([facility] [address] [I71.2]  -CT chest aneurysmal dilation of the ascending aorta measuring up to 4 cm, unchanged  -defer to PCP for further surveillance imaging   10/09/2019    Essential hypertension [I10]  -home norvasc and lisinopril discontinued starting lasix and spironolactone  -continue home imdur  -monitor BP and titrate meds accordingly   08/12/2015    Type 2 diabetes mellitus with diabetic neuropathy (HCC) [E11.40]  -continue home lantus and oral diabetic regimen   08/12/2015    Tobacco abuse [Z72.0]  -NRT  -counseled on cessation       CAD (coronary artery disease) [I25.10]  -hx of MI and stents  -continue home aspirin, Brilinta and Imdur       COPD (chronic obstructive pulmonary disease) ([facility] [address] [J44.9]  -resume home regimen       H/O: CVA (cerebrovascular accident) [Z86.73]  -with residual RUE weakness  -continue home ASA and crestor          Disposition: home    Discharged Condition: Stable    Activity: activity as tolerated  Diet: diabetic diet  Follow Up: Primary Care Physician in one week  Patient to proceed with the following labs (CBC, CMP) in 1 week    Discharge Medications:     Current Discharge Medication List           Details   spironolactone (ALDACTONE) 25 MG tablet Take 1 tablet by mouth daily  Qty: 30 tablet, Refills: 3              Details   insulin detemir (LEVEMIR FLEXTOUCH) 100 UNIT/ML injection pen Inject 20 Units into the skin nightly INJECT 55 UNITS INTO THE SKIN NIGHTLY 340B 90 day supply.   Qty: 60 mL, Refills: 1              Details   rosuvastatin (CRESTOR) 20 MG tablet TAKE 1 TABLET BY MOUTH ONCE DAILY  Qty: 120 tablet, Refills: 0      metFORMIN (GLUCOPHAGE) 1000 MG tablet TAKE 1 TABLET BY MOUTH TWICE DAILY WITH FOOD  Qty: 60 tablet, Refills: 0      pantoprazole (PROTONIX) 40 MG tablet Take 40 mg by mouth 2 times daily (before meals)      ferrous sulfate (IRON 325) 325 (65 Fe) MG tablet Take 325 mg by mouth daily (with breakfast)      furosemide (LASIX) 20 MG tablet Take 1 tablet by mouth daily as needed (swelling)  Qty: 30 tablet, Refills: 1      BRILINTA 90 MG TABS tablet TAKE 1 TABLET BY MOUTH TWICE DAILY  Qty: 60 tablet, Refills: 3      empagliflozin (JARDIANCE) 10 MG tablet Take 1 tablet by mouth daily  Qty: 30 tablet, Refills: 3      isosorbide mononitrate (IMDUR) 60 MG extended release tablet TAKE 1 TABLET BY MOUTH EVERY DAY  Qty: 30 tablet, Refills: 2      umeclidinium-vilanterol (ANORO ELLIPTA) 62.5-25 MCG/INH AEPB inhaler Inhale 1 puff into the lungs daily      aspirin 81 MG chewable tablet Take 1 tablet by mouth daily  Qty: 30 tablet, Refills: 2      TRADJENTA 5 MG tablet TAKE 1 TABLET BY MOUTH EVERY DAY  Qty: 120 tablet, Refills: 0      Insulin Pen Needle 31G X 6 MM MISC 1 each by Does not apply route daily  Qty: 100 each, Refills: 3      blood glucose monitor strips QID Dx E11.9  Qty: 100 strip, Refills: 5    Comments: accucheck      glucose monitoring kit (FREESTYLE) monitoring kit 1 kit by Does not apply route daily E11.40  Qty: 1 kit, Refills: 0      nitroGLYCERIN (NITROSTAT) 0.4 MG SL tablet Place 1 tablet under the tongue every 5 minutes as needed for Chest pain  Qty: 25 tablet, Refills: 1      Insulin Syringe-Needle U-100 (B-D INS SYR ULTRAFINE 1CC/31G) 31G X 5/16\" 1 ML MISC USE ONCE A DAY AS DIRECTED  DX E11.9  Qty: 100 each, Refills: 3              Patient was seen and examined by Dr. Júnior South and plan of care reviewed. Signed:  Electronically signed by VIGNESH Roper on 11/2/2021 at 12:09 PM       Thank you Sanya Manning MD for the opportunity to be involved in this patient's care. If you have any questions or concerns please feel free to contact me at [phone].

## 2021-11-02 NOTE — FLOWSHEET NOTE
11/02/21 0826   Assessment   Charting Type Shift assessment   Neurological   Neuro (WDL) WDL   [place] Coma Scale   Eye Opening 4   Best Verbal Response 5   Best Motor Response 6   Des Coma Scale Score 15   NIHSS Stroke Scale   NIHSS Stroke Scale Assessed No   HEENT   HEENT (WDL) X   Teeth Dentures upper   Respiratory   Respiratory (WDL) X   Respiratory Pattern Regular   Respiratory Depth Normal   Respiratory Quality/Effort Dyspnea with exertion   Chest Assessment Chest expansion symmetrical   L Breath Sounds Clear   R Breath Sounds Clear   Breath Sounds   Right Upper Lobe Clear   Right Middle Lobe Clear   Right Lower Lobe Clear   Left Upper Lobe Clear   Left Lower Lobe Clear   Cardiac   Cardiac (WDL) X   Cardiac Regularity Irregular   Cardiac Monitor   Telemetry Monitor On Yes   Telemetry Audible Yes   Telemetry Alarms Set Yes   Telemetry Box Number [id number]   Gastrointestinal   Abdominal (WDL) X   GI Symptoms Constipation   Abdomen Inspection Soft   RUQ Bowel Sounds Active   LUQ Bowel Sounds Active   RLQ Bowel Sounds Active   LLQ Bowel Sounds Active   Peripheral Vascular   Peripheral Vascular (WDL) X   Edema Right lower extremity; Left lower extremity   RLE Edema None   LLE Edema Trace   RLE Neurovascular Assessment   Capillary Refill Less than/equal to 3 seconds   Color Pink   Temperature Warm   LLE Neurovascular Assessment   Capillary Refill Less than/equal to 3 seconds   Color Pink   Temperature Warm   Skin Color/Condition   Skin Color/Condition (WDL) X   Skin Color Pale   Skin Condition/Temp Warm;Dry   Skin Integrity   Skin Integrity (WDL) X   Skin Integrity Bruising   Location BUE   Musculoskeletal   Musculoskeletal (WDL) X   RUE Full movement;Weakness   LUE Full movement;Weakness   RL Extremity Full movement;Weakness   LL Extremity Full movement;Weakness   Genitourinary   Genitourinary (WDL) WDL   Psychosocial   Psychosocial (WDL) WDL   Pt awake in bed. Pt alert and oriented.  Pt appears in no acute distress. Pt currently on RA. Pt currently on telemetry monitoring showing afib. Pt lung sounds clear throughout. Pt encouraged to cough and deep breathe. Pt has trace edema in LLE. Pt states feeling much better this am. Pt call bell and bedside table within reach. Will continue to monitor pt.

## 2021-11-02 NOTE — PROGRESS NOTES
Occupational Therapy   Occupational Therapy Initial Assessment  Date: 2021   Patient Name: Rishi Benavidez  MRN: [id number]     : 1950    Date of Service: 2021    Discharge Recommendations:  Home independently  OT Equipment Recommendations  Equipment Needed: No    Assessment   Assessment: Pt agreeable to OT services. Pt currently demo ability to complete bed mobility with independence. Pt completed LB dressing with independence seated at EOB. Pt reports she has been toileting with independence since admission. Pt ambulated in room with SBA no AD. Pt tolerated well. Pt demo good balance standing in room with narrow IVORY and vision occluded. Pt tolerated tx well. No skilled OT services warranted at this time. Prognosis: Good  Decision Making: Low Complexity  No Skilled OT: At baseline function  REQUIRES OT FOLLOW UP: No  Activity Tolerance  Activity Tolerance: Patient Tolerated treatment well           Patient Diagnosis(es): There were no encounter diagnoses. has a past medical history of CAD (coronary artery disease), COPD (chronic obstructive pulmonary disease) ([address] Cystic fibrosis ([facility] [address] Diabetes mellitus ([address] GERD (gastroesophageal reflux disease), H/O: CVA (cardiovascular accident), HTN (hypertension), Mass, Tobacco abuse, and Vitamin B12 deficiency. has a past surgical history that includes Hysterectomy; Cholecystectomy; Carpal tunnel release (Bilateral); Coronary angioplasty with stent; joint replacement (Bilateral, 10/15/2016); and Total knee arthroplasty (Bilateral, 10/18/2016).            Restrictions  Restrictions/Precautions  Restrictions/Precautions: General Precautions, Fall Risk  Required Braces or Orthoses?: No    Subjective   General  Chart Reviewed: Yes  Patient assessed for rehabilitation services?: Yes  Family / Caregiver Present: No  [facility]  Diagnosis: Anemia  Subjective  Subjective: Pt states she went to appt yesterday and was a direct admit. Pt received transfusion yesterday and is feeling much better. pt agreeable to OT services. Social/Functional History  Social/Functional History  Lives With: Significant other  Type of Home:  ([place])  Home Layout: One level  Home Access: Stairs to enter with rails  Entrance Stairs - Number of Steps: 1  Bathroom Shower/Tub: Walk-in shower  Bathroom Toilet: Standard  Bathroom Accessibility: Not accessible  Home Equipment: [employer]  ADL Assistance: Independent  Homemaking Assistance: Independent  Homemaking Responsibilities: Yes  Ambulation Assistance: Independent  Transfer Assistance: Independent  Active : No       Objective   Vision: Impaired  Vision Exceptions: Wears glasses for reading  Hearing: Exceptions to [employer]  Hearing Exceptions: Hard of hearing/hearing concerns; No hearing aid (L side hearing deficits)    Orientation  Overall Orientation Status: Within Functional Limits  Observation/Palpation  Posture: Good  Observation: Patient lying in bed, NAD, pleasant and cooperative, RA  Balance  Sitting Balance: Independent  Standing Balance: Stand by assistance  Functional Mobility  Functional - Mobility Device: No device  Assist Level: Stand by assistance  Functional Mobility Comments: 40 feet in room no AD  ADL  LE Dressing: Independent  Toileting:  (Pt reports independence)  Tone RUE  RUE Tone: Normotonic  Tone LUE  LUE Tone: Normotonic     Bed mobility  Rolling to Left: Independent  Supine to Sit: Independent  Sit to Supine: Independent  Scooting: Independent  Transfers  Stand Pivot Transfers: Modified independent  Sit to stand: Modified independent     Cognition  Overall Cognitive Status: WFL        Sensation  Overall Sensation Status: WFL        LUE AROM (degrees)  LUE AROM : WFL  RUE AROM (degrees)  RUE AROM : WFL  LUE Strength  Gross LUE Strength: WFL  LUE Strength Comment: 4/5 Grossly MMT BUE  RUE Strength  Gross RUE Strength: WFL       Therapy Time   Individual Concurrent Group Co-treatment   Time In 1013         Time Out 1028         Minutes 15              This note serves as a DC summary in the event of pt discharge.      Carol Reyna, OTR/L

## 2021-11-02 NOTE — FLOWSHEET NOTE
11/01/21 2030   Assessment   Charting Type Shift assessment   Neurological   Neuro (WDL) WDL   Happy Coma Scale   Eye Opening 4   Best Verbal Response 5   Best Motor Response 6   Des Coma Scale Score 15   HEENT   HEENT (WDL) X   Teeth Dentures upper   Respiratory   Respiratory (WDL) X   Respiratory Pattern Regular   Respiratory Depth Normal   Respiratory Quality/Effort Dyspnea with exertion   Chest Assessment Chest expansion symmetrical   L Breath Sounds Clear   R Breath Sounds Clear   Cardiac   Cardiac (WDL) X   Cardiac Regularity Irregular   Gastrointestinal   Abdominal (WDL) X   GI Symptoms Constipation   Abdomen Inspection Soft   RUQ Bowel Sounds Active   LUQ Bowel Sounds Active   RLQ Bowel Sounds Active   LLQ Bowel Sounds Active   Peripheral Vascular   Peripheral Vascular (WDL) X   Edema Right lower extremity; Left lower extremity   RLE Edema +1;Pitting   LLE Edema +1;Pitting   RLE Neurovascular Assessment   Capillary Refill Less than/equal to 3 seconds   Color Pink   Temperature Warm   LLE Neurovascular Assessment   Capillary Refill Less than/equal to 3 seconds   Color Pink   Temperature Warm   Skin Color/Condition   Skin Color/Condition (WDL) X   Skin Color Pale   Skin Condition/Temp Dry; Warm   Skin Integrity   Skin Integrity (WDL) X   Skin Integrity Bruising   Location BUE    Musculoskeletal   Musculoskeletal (WDL) X   RUE Full movement;Weakness   LUE Full movement;Weakness   RL Extremity Full movement;Weakness   LL Extremity Full movement;Weakness   Genitourinary   Genitourinary (WDL) WDL   Psychosocial   Psychosocial (WDL) WDL

## 2021-11-02 NOTE — ACP (ADVANCE CARE PLANNING)
Advance Care Planning     General Advance Care Planning (ACP) Conversation    Date of Conversation: 11/1/2021  Conducted with: Patient with Decision Making Capacity    Healthcare Decision Maker:    Primary Decision Maker: Danilo Garcia - [phone]    Secondary Decision Maker: Stevenson Frausto - Niece/Nephew - [phone]  Click here to complete Healthcare Decision Makers including selection of the Healthcare Decision Maker Relationship (ie \"Primary\").        Content/Action Overview:  Has NO ACP documents/care preferences - refer to ACP Clinical Specialist  Reviewed DNR/DNI and patient elects DNR order - referred to ACP Clinical Specialist & placed order        Length of Voluntary ACP Conversation in minutes:  <16 minutes (Non-Billable)    Jonathan Blood

## 2021-11-02 NOTE — DISCHARGE SUMMARY
Short Stay Summary        Patient ID: King Justino                 Patient's PCP: Anu Garcia MD     Admit Date:    11/1/2021      Discharge Date:   11/2/2021     Admitting Physician: Anu Garcia MD     Discharge Physician: VIGNESH Roberts      Reason for this admission:   Anemia  Decompensated LEON cirrhosis     Discharge Diagnoses: Active Hospital Problems     Diagnosis Date Noted    Anemia [D64.9] 11/01/2021    Cirrhosis ([address] [K74.60] 11/01/2021    Thoracic aortic aneurysm without rupture ([facility] [address] [I71.2] 10/09/2019    Essential hypertension [I10] 08/12/2015    Type 2 diabetes mellitus with diabetic neuropathy ([facility] [address] [E11.40] 08/12/2015    Tobacco abuse [Z72.0]      CAD (coronary artery disease) [I25.10]      COPD (chronic obstructive pulmonary disease) (HCC) [J44.9]      H/O: CVA (cerebrovascular accident) [Z86.73]           Procedures:  CT CHEST WO CONTRAST   Final Result       CHEST:   1. No evidence of malignancy in the chest.   2.  Aneurysmal dilation of the ascending aorta measuring up to 4 cm., Unchanged   3. Dilation of the pulmonary artery can be seen with pulmonary artery hypertension.       ABDOMEN AND PELVIS:   1. Cirrhosis with sequela of portal hypertension including splenomegaly and gastroesophageal varices. 2.  Large stool burden.           CT ABDOMEN PELVIS WO CONTRAST Additional Contrast? None   Final Result       CHEST:   1. No evidence of malignancy in the chest.   2.  Aneurysmal dilation of the ascending aorta measuring up to 4 cm., Unchanged   3. Dilation of the pulmonary artery can be seen with pulmonary artery hypertension.       ABDOMEN AND PELVIS:   1. Cirrhosis with sequela of portal hypertension including splenomegaly and gastroesophageal varices.    2.  Large stool burden.                    Consults:   IP CONSULT TO CASE MANAGEMENT  IP CONSULT TO DIETITIAN  PT OT         HISTORY OF PRESENT ILLNESS:   The patient is a 73 y.o. female with PMH of iron deficiency anemia due to chronic blood loss, Chavarria cirrhosis, colon polyps, GERD, anxiety, COPD, coronary artery disease, diabetes mellitus type 2, hyperlipidemia, hearing loss, CAD status post MI and stents, hypertension, low back pain, osteoarthritis, CVA with residual right upper extremity weakness (2013), tobacco abuse, vitamin B12 deficiency who presented as a direct admission from PCPs office for further evaluation of anemia. Per medical record review, patient was admitted to [facility] in late September for anemia and melena. She required a blood transfusion at that time. She was scheduled for outpatient follow-up with GI and underwent EGD and colonoscopy. EGD performed on 9/29/2021 showed: Normal oropharynx; Z-line regular, 38 cm from the incisors; no gross lesions in esophagus; bilious gastric fluid; erythematous mucosa in the antrum and prepyloric region on the stomach that was biopsied; mostly gastropathy; no obvious portal hypertensive gastropathy; normal duodenal bulb, first portion of the duodenum, second portion of the duodenum and third portion of the duodenum were biopsied. No obvious source of upper GI bleed. Colonoscopy from 9/30/2021 was performed under an adequate prep. One 8 mm polyp in the cecum was removed with a hot snare. One 5 mm polyp in the proximal ascending colon was removed with a hot snare and one 5 mm polyp in the proximal transverse colon was removed with a hot snare. A few colonic angiectasia's were noted and treated with argon beam coagulation. Per GI notes, it was felt that the multiple and ectasia in the colon were possibly the source of her bleeding. Those were treated with cauterization. Gastric biopsies were negative for H. pylori and revealed a reactive gastropathy. Patient also diagnosed with cirrhosis possibly Miladis Rain cirrhosis and has a chronic anemia associated with a cirrhosis.   GI recommended to continue iron pills in addition to her home aspirin and Brilinta along with the PPI. Repeat colonoscopy in 3 to 6 months to rule out any other AVMs since her prep was suboptimal on the recent colonoscopy.     Since then, patient has followed up again with GI on 10/21/2021. She reported seeing black stool but associated that with her iron pills. She denied any bleeding or clots. She denied any abdominal pain with nausea or vomiting. No change was made to her treatment plan.     Patient followed up with her PCP on 11/1/2021. She was noted to be very pale and given the above history, she was sent for admission.       Review of system  Constitutional:  Denies fever or chills. Positive for generalized weakness and fatigue. Eyes:  Denies change in visual acuity or discharge. HENT:  Denies nasal congestion or sore throat. Respiratory:  Denies cough or shortness of breath. Cardiovascular:  Denies chest pain, palpitation or swelling in LEs. GI:  Denies abdominal pain, nausea, vomiting, bloody stools or diarrhea. :  Denies dysuria or frequency. Musculoskeletal:  Denies back pain or joint pain. Integument:  Denies rash or itching. Neurologic:  Denies headache, focal weakness or sensory changes. Endocrine:  Denies polyuria or polydipsia. Lymphatic:  Denies swollen glands or night sweats.   Psychiatric:  Denies depression or anxiety.     Past Medical History:  Past Medical History             Diagnosis Date    CAD (coronary artery disease)      COPD (chronic obstructive [facility] disease) ([facility] [address]      Cystic fibrosis ([facility] [address]      Diabetes mellitus ([facility] [address]      GERD (gastroesophageal reflux disease)      H/O: CVA (cardiovascular accident)      HTN (hypertension)      Mass       on chest     Tobacco abuse      Vitamin B12 deficiency              Past Surgical History:  Past Surgical History             Procedure Laterality Date    CARPAL TUNNEL RELEASE Bilateral      CHOLECYSTECTOMY        CORONARY ANGIOPLASTY WITH STENT PLACEMENT        HYSTERECTOMY        JOINT REPLACEMENT Bilateral 10/15/2016     knees    TOTAL KNEE ARTHROPLASTY Bilateral 10/18/2016     at [facility]            Social History:   TOBACCO:   reports that she has quit smoking. Her smoking use included cigarettes. She has a 40.00 pack-year smoking history. She has never used smokeless tobacco.  ETOH:   reports no history of alcohol use. OCCUPATION:  None      Family History:   Family History             Problem Relation Age of Onset    Diabetes Mother      High Blood Pressure Mother      Cancer Mother           pancreatic    Diabetes Father      Heart Disease Father      High Blood Pressure Father      Cancer Sister           lung, breast    Cancer Brother           throat            Allergies:  Codeine     Medications Prior to Admission:    Home Medications   Prior to Admission medications    Medication Sig Start Date End Date Taking? Authorizing Provider   rosuvastatin (CRESTOR) 20 MG tablet TAKE 1 TABLET BY MOUTH ONCE DAILY 11/1/21   Yes Wayne Maldonado MD   metFORMIN (GLUCOPHAGE) 1000 MG tablet TAKE 1 TABLET BY MOUTH TWICE DAILY WITH FOOD 11/1/21   Yes Wayne Maldonado MD   pantoprazole (PROTONIX) 40 MG tablet Take 40 mg by mouth 2 times daily (before meals) 9/28/21   Yes Historical Provider, MD   ferrous sulfate (IRON 325) 325 (65 Fe) MG tablet Take 325 mg by mouth daily (with breakfast) 9/30/21   Yes Historical Provider, MD   furosemide (LASIX) 20 MG tablet Take 1 tablet by mouth daily as needed (swelling) 10/5/21   Yes Wayne Maldonado MD   BRILINTA 90 MG TABS tablet TAKE 1 TABLET BY MOUTH TWICE DAILY 8/23/21   Yes Wayne Maldonado MD   insulin detemir (LEVEMIR FLEXTOUCH) 100 UNIT/ML injection pen INJECT 55 UNITS INTO THE SKIN NIGHTLY 340B 90 day supply.  8/10/21   Yes Wayne Maldonado MD   lisinopril (PRINIVIL;ZESTRIL) 5 MG tablet TAKE 1 TABLET BY MOUTH DAILY 8/9/21   Yes Wayne Maldonado MD   empagliflozin (JARDIANCE) 10 MG tablet Take 1 tablet by mouth daily 8/9/21   Yes Wayne Maldonado MD Well hydrated, no rash. Lymphatic:  No cervical or axillary lymphadenopathy noted. Neurologic:  Alert & oriented x 3,  no focal deficits noted. Strength is equal throughout. Chronic RUE weakness. Psychiatric:  Speech and behavior appropriate.              Lab Results   Component Value Date     WBC 4.4 11/01/2021     HGB 8.2 (L) 11/01/2021     HCT 28.3 (L) 11/01/2021     MCV 90.0 11/01/2021      11/01/2021               Lab Results   Component Value Date      11/01/2021     K 3.5 11/01/2021      11/01/2021     CO2 21 11/01/2021     BUN 16 11/01/2021     CREATININE 1.1 11/01/2021     GLUCOSE 129 (H) 11/01/2021     CALCIUM 9.7 11/01/2021     PROT 6.2 (L) 11/01/2021     LABALBU 4.1 11/01/2021     BILITOT 0.6 11/01/2021     ALKPHOS 72 11/01/2021     AST 27 11/01/2021     ALT 28 11/01/2021     LABGLOM 49 (L) 11/01/2021     GFRAA >59 11/01/2021     AGRATIO 2.0 11/01/2021     GLOB 2.1 11/01/2021               PA/lat CXR:   CT CHEST WO CONTRAST   Final Result       CHEST:   1. No evidence of malignancy in the chest.   2.  Aneurysmal dilation of the ascending aorta measuring up to 4 cm., Unchanged   3. Dilation of the pulmonary artery can be seen with pulmonary artery hypertension.       ABDOMEN AND PELVIS:   1. Cirrhosis with sequela of portal hypertension including splenomegaly and gastroesophageal varices. 2.  Large stool burden.           CT ABDOMEN PELVIS WO CONTRAST Additional Contrast? None   Final Result       CHEST:   1. No evidence of malignancy in the chest.   2.  Aneurysmal dilation of the ascending aorta measuring up to 4 cm., Unchanged   3. Dilation of the pulmonary artery can be seen with pulmonary artery hypertension.       ABDOMEN AND PELVIS:   1. Cirrhosis with sequela of portal hypertension including splenomegaly and gastroesophageal varices. 2.  Large stool burden.                    Assessment and Plan/Hospital course:      Basic labs obtained.   Hemoglobin reported at 5.9.  Iron studies indicative of iron deficiency anemia. Patient was given 2 units packed red blood cells. She was also given 1 unit of IV iron. No bleeding or melena noted overnight. Suspect patient's anemia was secondary to her Nery Jani cirrhosis. Patient reports feeling fine this morning and is requesting to be discharged home. She will follow up with her PCP in 1 week for repeat labs. This note will also be faxed to her GI doctor and follow-up appointment will be scheduled. Home ASA and Brilinta resumed on discharge.  [facility] Problems     Diagnosis Date Noted    Anemia [D64.9]  -hx of MAGDALENA; iron studies show ongoing MAGDALENA in setting of oral supplement.  Gave IV iron prior to discharge today.  -recent EGD and colonoscopy in September 2021 as per HPI  -hgb 5.9 on arrival here; transfused 2 units PRBCs with improvement to 8.2 on day of discharge  -repeat labs in 1 week and follow up with PCP  -Aspirin and Brilinta on discharge  -follow up with GI    11/01/2021    Cirrhosis ([facility] [address] [K74.60]  -LEON cirrhosis  -add lasix and spironolactone 11/2  -follow up with GI    11/01/2021    Thoracic aortic aneurysm without rupture ([facility] [address] [I71.2]  -CT chest aneurysmal dilation of the ascending aorta measuring up to 4 cm, unchanged  -defer to PCP for further surveillance imaging    10/09/2019    Essential hypertension [I10]  -home norvasc and lisinopril discontinued starting lasix and spironolactone  -continue home imdur  -monitor BP and titrate meds accordingly    08/12/2015    Type 2 diabetes mellitus with diabetic neuropathy (HCC) [E11.40]  -continue home lantus and oral diabetic regimen    08/12/2015    Tobacco abuse [Z72.0]  -NRT  -counseled on cessation         CAD (coronary artery disease) [I25.10]  -hx of MI and stents  -continue home aspirin, Brilinta and Imdur         COPD (chronic obstructive pulmonary disease) ([facility] [address] [J44.9]  -resume home regimen         H/O: CVA (cerebrovascular accident) [Q51.53]  -with residual RUE weakness  -continue home ASA and crestor              Disposition: home     Discharged Condition: Stable     Activity: activity as tolerated  Diet: diabetic diet  Follow Up: Primary Care Physician in one week  Patient to proceed with the following labs (CBC, CMP) in 1 week     Discharge Medications:      Discharge Medications         Current Discharge Medication List                  Details   spironolactone (ALDACTONE) 25 MG tablet Take 1 tablet by mouth daily  Qty: 30 tablet, Refills: 3                      Details   insulin detemir (LEVEMIR FLEXTOUCH) 100 UNIT/ML injection pen Inject 20 Units into the skin nightly INJECT 55 UNITS INTO THE SKIN NIGHTLY 340B 90 day supply.   Qty: 60 mL, Refills: 1                      Details   rosuvastatin (CRESTOR) 20 MG tablet TAKE 1 TABLET BY MOUTH ONCE DAILY  Qty: 120 tablet, Refills: 0       metFORMIN (GLUCOPHAGE) 1000 MG tablet TAKE 1 TABLET BY MOUTH TWICE DAILY WITH FOOD  Qty: 60 tablet, Refills: 0       pantoprazole (PROTONIX) 40 MG tablet Take 40 mg by mouth 2 times daily (before meals)       ferrous sulfate (IRON 325) 325 (65 Fe) MG tablet Take 325 mg by mouth daily (with breakfast)       furosemide (LASIX) 20 MG tablet Take 1 tablet by mouth daily as needed (swelling)  Qty: 30 tablet, Refills: 1       BRILINTA 90 MG TABS tablet TAKE 1 TABLET BY MOUTH TWICE DAILY  Qty: 60 tablet, Refills: 3       empagliflozin (JARDIANCE) 10 MG tablet Take 1 tablet by mouth daily  Qty: 30 tablet, Refills: 3       isosorbide mononitrate (IMDUR) 60 MG extended release tablet TAKE 1 TABLET BY MOUTH EVERY DAY  Qty: 30 tablet, Refills: 2       umeclidinium-vilanterol (ANORO ELLIPTA) 62.5-25 MCG/INH AEPB inhaler Inhale 1 puff into the lungs daily       aspirin 81 MG chewable tablet Take 1 tablet by mouth daily  Qty: 30 tablet, Refills: 2       TRADJENTA 5 MG tablet TAKE 1 TABLET BY MOUTH EVERY DAY  Qty: 120 tablet, Refills: 0       Insulin Pen Needle 31G X 6 MM MISC 1 each by Does not apply route daily  Qty: 100 each, Refills: 3       blood glucose monitor strips QID Dx E11.9  Qty: 100 strip, Refills: 5     Comments: accucheck       glucose monitoring kit (FREESTYLE) monitoring kit 1 kit by Does not apply route daily E11.40  Qty: 1 kit, Refills: 0       nitroGLYCERIN (NITROSTAT) 0.4 MG SL tablet Place 1 tablet under the tongue every 5 minutes as needed for Chest pain  Qty: 25 tablet, Refills: 1       Insulin Syringe-Needle U-100 (B-D INS SYR ULTRAFINE 1CC/31G) 31G X 5/16\" 1 ML MISC USE ONCE A DAY AS DIRECTED  DX E11.9  Qty: 100 each, Refills: 3                    Patient was seen and examined by Dr. Fernanda Gunter and plan of care reviewed.           Signed:  Electronically signed by VIGNESH Tang on 11/2/2021 at 12:09 PM         Thank you Phyllis Hernandez MD for the opportunity to be involved in this patient's care.  If you have any questions or concerns please feel free to contact me at [phone].

## 2021-11-02 NOTE — PLAN OF CARE
Problem: Falls - Risk of:  Goal: Will remain free from falls  Description: Will remain free from falls  11/2/2021 8800 by Katlyn Ramsay RN  Outcome: Ongoing  11/1/2021 2135 by Shireen Escobar LPN  Outcome: Ongoing  Goal: Absence of physical injury  Description: Absence of physical injury  11/2/2021 2861 by Katlyn Ramsay RN  Outcome: Ongoing  11/1/2021 2135 by Shireen Escobar LPN  Outcome: Ongoing     Problem:  Bowel/Gastric:  Goal: Ability to achieve a regular elimination pattern will improve  Description: Ability to achieve a regular elimination pattern will improve  Outcome: Ongoing     Problem: Nutritional:  Goal: Maintenance of adequate nutrition will improve  Description: Maintenance of adequate nutrition will improve  Outcome: Ongoing
adjust to condition or change in health will improve  Outcome: Ongoing  Goal: Communication of feelings regarding changes in body function or appearance will improve  Description: Communication of feelings regarding changes in body function or appearance will improve  Outcome: Ongoing     Problem: Nutritional:  Goal: Maintenance of adequate nutrition will improve  Description: Maintenance of adequate nutrition will improve  Outcome: Ongoing     Problem: Physical Regulation:  Goal: Signs and symptoms of infection will decrease  Description: Signs and symptoms of infection will decrease  Outcome: Ongoing  Goal: Will show no signs and symptoms of excessive bleeding  Description: Will show no signs and symptoms of excessive bleeding  Outcome: Ongoing  Goal: Complications related to the disease process, condition or treatment will be avoided or minimized  Description: Complications related to the disease process, condition or treatment will be avoided or minimized  Outcome: Ongoing     Problem: Safety:  Goal: Ability to remain free from injury will improve  Description: Ability to remain free from injury will improve  Outcome: Ongoing     Problem: Sensory:  Goal: Pain level will decrease  Description: Pain level will decrease  Outcome: Ongoing  Goal: General experience of comfort will improve  Description: General experience of comfort will improve  Outcome: Ongoing     Problem: Skin Integrity:  Goal: Skin integrity will improve  Description: Skin integrity will improve  Outcome: Ongoing  Goal: Signs of wound healing will improve  Description: Signs of wound healing will improve  Outcome: Ongoing

## 2021-11-03 DIAGNOSIS — M51.36 DEGENERATIVE DISC DISEASE, LUMBAR: ICD-10-CM

## 2021-11-03 RX ORDER — HYDROCODONE BITARTRATE AND ACETAMINOPHEN 5; 325 MG/1; MG/1
1 TABLET ORAL 2 TIMES DAILY PRN
Qty: 30 TABLET | Refills: 0 | Status: SHIPPED | OUTPATIENT
Start: 2021-11-03 | End: 2021-11-29 | Stop reason: SDUPTHER

## 2021-11-09 ENCOUNTER — HOSPITAL ENCOUNTER (OUTPATIENT)
Facility: HOSPITAL | Age: 71
Discharge: HOME | End: 2021-11-09
Payer: MEDICARE

## 2021-11-09 ENCOUNTER — OFFICE VISIT (OUTPATIENT)
Dept: PRIMARY CARE CLINIC | Age: 71
End: 2021-11-09
Payer: MEDICARE

## 2021-11-09 VITALS
WEIGHT: 131.8 LBS | SYSTOLIC BLOOD PRESSURE: 126 MMHG | BODY MASS INDEX: 22.62 KG/M2 | RESPIRATION RATE: 18 BRPM | OXYGEN SATURATION: 99 % | DIASTOLIC BLOOD PRESSURE: 56 MMHG | HEART RATE: 54 BPM | TEMPERATURE: 97.4 F

## 2021-11-09 DIAGNOSIS — I49.1 PAC (PREMATURE ATRIAL CONTRACTION): ICD-10-CM

## 2021-11-09 DIAGNOSIS — K74.60 CIRRHOSIS OF LIVER WITHOUT ASCITES, UNSPECIFIED HEPATIC CIRRHOSIS TYPE (HCC): ICD-10-CM

## 2021-11-09 DIAGNOSIS — D64.9 ANEMIA, UNSPECIFIED TYPE: ICD-10-CM

## 2021-11-09 DIAGNOSIS — D50.0 IRON DEFICIENCY ANEMIA DUE TO CHRONIC BLOOD LOSS: ICD-10-CM

## 2021-11-09 DIAGNOSIS — Z79.4 TYPE 2 DIABETES MELLITUS WITH DIABETIC NEUROPATHY, WITH LONG-TERM CURRENT USE OF INSULIN (HCC): ICD-10-CM

## 2021-11-09 DIAGNOSIS — D50.9 IRON DEFICIENCY ANEMIA, UNSPECIFIED IRON DEFICIENCY ANEMIA TYPE: ICD-10-CM

## 2021-11-09 DIAGNOSIS — E11.40 TYPE 2 DIABETES MELLITUS WITH DIABETIC NEUROPATHY, WITH LONG-TERM CURRENT USE OF INSULIN (HCC): ICD-10-CM

## 2021-11-09 DIAGNOSIS — K59.00 CONSTIPATION, UNSPECIFIED CONSTIPATION TYPE: ICD-10-CM

## 2021-11-09 DIAGNOSIS — D50.9 IRON DEFICIENCY ANEMIA, UNSPECIFIED IRON DEFICIENCY ANEMIA TYPE: Primary | ICD-10-CM

## 2021-11-09 DIAGNOSIS — I71.20 THORACIC AORTIC ANEURYSM WITHOUT RUPTURE: ICD-10-CM

## 2021-11-09 PROCEDURE — 3017F COLORECTAL CA SCREEN DOC REV: CPT | Performed by: INTERNAL MEDICINE

## 2021-11-09 PROCEDURE — G8427 DOCREV CUR MEDS BY ELIG CLIN: HCPCS | Performed by: INTERNAL MEDICINE

## 2021-11-09 PROCEDURE — G8420 CALC BMI NORM PARAMETERS: HCPCS | Performed by: INTERNAL MEDICINE

## 2021-11-09 PROCEDURE — 1123F ACP DISCUSS/DSCN MKR DOCD: CPT | Performed by: INTERNAL MEDICINE

## 2021-11-09 PROCEDURE — 1036F TOBACCO NON-USER: CPT | Performed by: INTERNAL MEDICINE

## 2021-11-09 PROCEDURE — G8400 PT W/DXA NO RESULTS DOC: HCPCS | Performed by: INTERNAL MEDICINE

## 2021-11-09 PROCEDURE — G8484 FLU IMMUNIZE NO ADMIN: HCPCS | Performed by: INTERNAL MEDICINE

## 2021-11-09 PROCEDURE — 36415 COLL VENOUS BLD VENIPUNCTURE: CPT

## 2021-11-09 PROCEDURE — 1111F DSCHRG MED/CURRENT MED MERGE: CPT | Performed by: INTERNAL MEDICINE

## 2021-11-09 PROCEDURE — 4040F PNEUMOC VAC/ADMIN/RCVD: CPT | Performed by: INTERNAL MEDICINE

## 2021-11-09 PROCEDURE — 99214 OFFICE O/P EST MOD 30 MIN: CPT | Performed by: INTERNAL MEDICINE

## 2021-11-09 PROCEDURE — 1090F PRES/ABSN URINE INCON ASSESS: CPT | Performed by: INTERNAL MEDICINE

## 2021-11-09 RX ORDER — LACTULOSE 10 G/15ML
20 SOLUTION ORAL DAILY
Qty: 900 ML | Refills: 1 | Status: SHIPPED | OUTPATIENT
Start: 2021-11-09 | End: 2022-05-10 | Stop reason: SDUPTHER

## 2021-11-09 RX ORDER — LINACLOTIDE 290 UG/1
290 CAPSULE, GELATIN COATED ORAL
Qty: 30 CAPSULE | Refills: 2 | Status: SHIPPED | OUTPATIENT
Start: 2021-11-09

## 2021-11-09 ASSESSMENT — ENCOUNTER SYMPTOMS
CONSTIPATION: 1
SORE THROAT: 0
NAUSEA: 0
BACK PAIN: 0
WHEEZING: 0
ABDOMINAL PAIN: 0
VOICE CHANGE: 1
SHORTNESS OF BREATH: 0
SINUS PRESSURE: 0
EYE DISCHARGE: 0
VOMITING: 0
COUGH: 0

## 2021-11-09 NOTE — PROGRESS NOTES
Chief Complaint   Patient presents with    Follow-Up from [facility] 20 nightly   fs 180      Have you seen any other physician or provider since your last visit yes - [facility]- anemia     Have you had any other diagnostic tests since your last visit?  yes -     Have you changed or stopped any medications since your last visit? no

## 2021-11-09 NOTE — PROGRESS NOTES
SUBJECTIVE:    Patient ID: Rishi Benavidez is a 73 y. o.female. Chief Complaint   Patient presents with    Follow-Up from Hospital         HPI:  Patient is here today for Hospital Follow-up. Patient was recently admitted for Anemia. Patient with history of iron deficiency anemia. Energy is decreasing, but slightly better compared to last visit. Weight is stable since last visit. Did have an iron and blood infusion at the hospital.  Patient was diagnosed with cirrhosis of the liver. She has been to GI and had scopes recently. Hemoglobin seems to be trending down prior to the infusion. Clinically not much better. She reported her appetite is stable. She does have prior history of CVA. Patient states that she is not having consistent bowel movements. Bowels moving once every other week. Patient is taking a laxative OTC with little response. Patient's medications, allergies, past medical, surgical, social and family histories were reviewed and updated as appropriate in electronic medical record. Outpatient Medications Marked as Taking for the 11/9/21 encounter (Office Visit) with Myrna Griffith MD   Medication Sig Dispense Refill    lactulose (CHRONULAC) 10 GM/15ML solution Take 30 mLs by mouth daily 900 mL 1    linaclotide (LINZESS) 290 MCG CAPS capsule Take 1 capsule by mouth every morning (before breakfast) 30 capsule 2    HYDROcodone-acetaminophen (NORCO) 5-325 MG per tablet Take 1 tablet by mouth 2 times daily as needed for Pain for up to 30 days. 30 tablet 0    insulin detemir (LEVEMIR FLEXTOUCH) 100 UNIT/ML injection pen Inject 20 Units into the skin nightly INJECT 55 UNITS INTO THE SKIN NIGHTLY 340B 90 day supply.  60 mL 1    spironolactone (ALDACTONE) 25 MG tablet Take 1 tablet by mouth daily 30 tablet 3    rosuvastatin (CRESTOR) 20 MG tablet TAKE 1 TABLET BY MOUTH ONCE DAILY 120 tablet 0    metFORMIN (GLUCOPHAGE) 1000 MG tablet TAKE 1 TABLET BY MOUTH TWICE DAILY WITH FOOD 60 tablet 0    pantoprazole (PROTONIX) 40 MG tablet Take 40 mg by mouth 2 times daily (before meals)      ferrous sulfate (IRON 325) 325 (65 Fe) MG tablet Take 325 mg by mouth daily (with breakfast)      furosemide (LASIX) 20 MG tablet Take 1 tablet by mouth daily as needed (swelling) 30 tablet 1    TRADJENTA 5 MG tablet TAKE 1 TABLET BY MOUTH EVERY  tablet 0    BRILINTA 90 MG TABS tablet TAKE 1 TABLET BY MOUTH TWICE DAILY 60 tablet 3    empagliflozin (JARDIANCE) 10 MG tablet Take 1 tablet by mouth daily 30 tablet 3    Insulin Pen Needle 31G X 6 MM MISC 1 each by Does not apply route daily 100 each 3    isosorbide mononitrate (IMDUR) 60 MG extended release tablet TAKE 1 TABLET BY MOUTH EVERY DAY 30 tablet 2    blood glucose monitor strips QID Dx E11.9 100 strip 5    glucose monitoring kit (FREESTYLE) monitoring kit 1 kit by Does not apply route daily E11.40 1 kit 0    umeclidinium-vilanterol (ANORO ELLIPTA) 62.5-25 MCG/INH AEPB inhaler Inhale 1 puff into the lungs daily      aspirin 81 MG chewable tablet Take 1 tablet by mouth daily 30 tablet 2    nitroGLYCERIN (NITROSTAT) 0.4 MG SL tablet Place 1 tablet under the tongue every 5 minutes as needed for Chest pain 25 tablet 1    Insulin Syringe-Needle U-100 (B-D INS SYR ULTRAFINE 1CC/31G) 31G X 5/16\" 1 ML MISC USE ONCE A DAY AS DIRECTED  DX E11.9 100 each 3        Review of Systems   Constitutional: Positive for fatigue. Negative for chills and fever. HENT: Positive for voice change. Negative for congestion, sinus pressure and sore throat. Eyes: Negative for discharge and visual disturbance. Respiratory: Negative for cough, shortness of breath and wheezing. Cardiovascular: Negative for chest pain and palpitations. CAZARES   Gastrointestinal: Positive for constipation. Negative for abdominal pain, nausea and vomiting. Endocrine: Negative for cold intolerance and heat intolerance. Genitourinary: Negative for dysuria, frequency and urgency. kg)   SpO2 99%   BMI 22.62 kg/m²      Physical Exam  Vitals and nursing note reviewed. Constitutional:       Appearance: Normal appearance. She is well-developed. Comments: Pale   HENT:      Head: Normocephalic and atraumatic. Right Ear: External ear normal.      Left Ear: External ear normal.      Nose: Nose normal.      Mouth/Throat:      Mouth: Mucous membranes are moist.      Pharynx: Oropharynx is clear. Eyes:      Conjunctiva/sclera: Conjunctivae normal.      Pupils: Pupils are equal, round, and reactive to light. Neck:      Thyroid: No thyromegaly. Vascular: No JVD. Cardiovascular:      Rate and Rhythm: Normal rate and regular rhythm. Heart sounds: Normal heart sounds. Pulmonary:      Effort: Pulmonary effort is normal.      Breath sounds: Normal breath sounds. No wheezing or rales. Abdominal:      General: Bowel sounds are normal. There is no distension. Palpations: Abdomen is soft. Tenderness: There is no abdominal tenderness. Musculoskeletal:         General: No tenderness. Cervical back: Neck supple. No rigidity. No muscular tenderness. Right lower leg: No edema. Left lower leg: No edema. Skin:     Findings: No erythema or rash. Neurological:      General: No focal deficit present. Mental Status: She is alert and oriented to person, place, and time.    Psychiatric:         Behavior: Behavior normal.         Judgment: Judgment normal.         Lab Results   Component Value Date     11/01/2021    K 3.5 11/01/2021     11/01/2021    CO2 21 11/01/2021    GLUCOSE 129 11/01/2021    BUN 16 11/01/2021    CREATININE 1.1 11/01/2021    CALCIUM 9.7 11/01/2021    PROT 6.2 11/01/2021    LABALBU 4.1 11/01/2021    BILITOT 0.6 11/01/2021    ALT 28 11/01/2021    AST 27 11/01/2021       Hemoglobin A1C (%)   Date Value   11/01/2021 5.4     Microscopic Examination (no units)   Date Value   04/27/2019 YES     LDL Calculated (mg/dL)   Date Value 12/03/2020 45         Lab Results   Component Value Date    WBC 4.4 11/01/2021    NEUTROABS 3.6 10/13/2021    HGB 8.2 11/01/2021    HCT 28.3 11/01/2021    MCV 90.0 11/01/2021     11/01/2021       Lab Results   Component Value Date    TSH 2.280 10/13/2021         ASSESSMENT/PLAN:     1. Iron deficiency anemia, unspecified iron deficiency anemia type  I am going to send anemia work up and treat accordingly. I will monitor her Hb level periodically. Patient to continue follow-up with GI. Discussed the potential need for repeating scopes. Advised the patient that may need blood transfusion if hemoglobin trended down. Discussed the importance of seeking ER immediately with any evidence of bleeding. 2. Cirrhosis of liver without ascites, unspecified hepatic cirrhosis type ([address]  Try to avoid any hepatotoxic agent. Monitor labs closely. Discussed appropriate diet/fluid restriction. 3. Thoracic aortic aneurysm without rupture [facility])  Patient with 4 cm ascending thoracic aneurysm unchanged compared to last year. Make sure blood pressure lipid profile under good control. Long conversation regarding smoking cessation. Continue to monitor every 6 to 12 months. 4. Constipation, unspecified constipation type  Patient has appointment with GI for possible EGD and Colonoscopy. Started on lactulose and Linzess    5. PAC (premature atrial contraction)  Patient was noted to have questionable irregular rhythm on exam.  EKG was done and showed frequent PACs. Monitor labs and electrolytes/thyroid studies. Advised the patient to come back with any issues regarding chest pain, palpitation, shortness of breath etc.    Patient instructed to use cane when ambulating    Long conversation with her regarding her ongoing smoking but she is not interested in doing anything at this time.     Orders Placed This Encounter   Medications    lactulose (CHRONULAC) 10 GM/15ML solution     Sig: Take 30 mLs by mouth daily     Dispense: 900 mL     Refill:  1    linaclotide (LINZESS) 290 MCG CAPS capsule     Sig: Take 1 capsule by mouth every morning (before breakfast)     Dispense:  30 capsule     Refill:  2      I, Neema Dixon MA am scribing for and in the presence of Martha Hilario MD on this date of 11/09/21 at 11:04 AM    I, Dr. Martha Hilario, personally performed the services described in the documentation as scribed by Neema Dixon MA, in my presence and it is both accurate and complete.

## 2021-11-15 ENCOUNTER — APPOINTMENT (OUTPATIENT)
Dept: GENERAL RADIOLOGY | Facility: HOSPITAL | Age: 71
End: 2021-11-15

## 2021-11-15 ENCOUNTER — HOSPITAL ENCOUNTER (OUTPATIENT)
Facility: HOSPITAL | Age: 71
LOS: 1 days | Discharge: HOME | End: 2021-11-17
Attending: EMERGENCY MEDICINE | Admitting: INTERNAL MEDICINE

## 2021-11-15 DIAGNOSIS — D50.9 IRON DEFICIENCY ANEMIA, UNSPECIFIED IRON DEFICIENCY ANEMIA TYPE: ICD-10-CM

## 2021-11-15 DIAGNOSIS — K92.1 MELENA: ICD-10-CM

## 2021-11-15 DIAGNOSIS — D62 ACUTE BLOOD LOSS ANEMIA: ICD-10-CM

## 2021-11-15 DIAGNOSIS — D50.0 BLOOD LOSS ANEMIA: ICD-10-CM

## 2021-11-15 DIAGNOSIS — K92.2 GASTROINTESTINAL HEMORRHAGE, UNSPECIFIED GASTROINTESTINAL HEMORRHAGE TYPE: Primary | ICD-10-CM

## 2021-11-15 LAB
ABO GROUP BLD: NORMAL
ALBUMIN SERPL-MCNC: 4 G/DL (ref 3.5–5.2)
ALBUMIN/GLOB SERPL: 1.8 G/DL
ALP SERPL-CCNC: 74 U/L (ref 39–117)
ALT SERPL W P-5'-P-CCNC: 26 U/L (ref 1–33)
ANION GAP SERPL CALCULATED.3IONS-SCNC: 22.9 MMOL/L (ref 5–15)
AST SERPL-CCNC: 30 U/L (ref 1–32)
BACTERIA UR QL AUTO: ABNORMAL /HPF
BASOPHILS # BLD AUTO: 0.02 10*3/MM3 (ref 0–0.2)
BASOPHILS NFR BLD AUTO: 0.3 % (ref 0–1.5)
BILIRUB SERPL-MCNC: 0.7 MG/DL (ref 0–1.2)
BILIRUB UR QL STRIP: NEGATIVE
BLD GP AB SCN SERPL QL: NEGATIVE
BUN SERPL-MCNC: 31 MG/DL (ref 8–23)
BUN/CREAT SERPL: 27.4 (ref 7–25)
CALCIUM SPEC-SCNC: 10.3 MG/DL (ref 8.6–10.5)
CHLORIDE SERPL-SCNC: 95 MMOL/L (ref 98–107)
CLARITY UR: CLEAR
CO2 SERPL-SCNC: 19.1 MMOL/L (ref 22–29)
COLOR UR: YELLOW
CREAT SERPL-MCNC: 1.13 MG/DL (ref 0.57–1)
DEPRECATED RDW RBC AUTO: 59.1 FL (ref 37–54)
EOSINOPHIL # BLD AUTO: 0.01 10*3/MM3 (ref 0–0.4)
EOSINOPHIL NFR BLD AUTO: 0.1 % (ref 0.3–6.2)
ERYTHROCYTE [DISTWIDTH] IN BLOOD BY AUTOMATED COUNT: 17.9 % (ref 12.3–15.4)
GFR SERPL CREATININE-BSD FRML MDRD: 48 ML/MIN/1.73
GLOBULIN UR ELPH-MCNC: 2.2 GM/DL
GLUCOSE BLDC GLUCOMTR-MCNC: 143 MG/DL (ref 70–130)
GLUCOSE BLDC GLUCOMTR-MCNC: 171 MG/DL (ref 70–130)
GLUCOSE SERPL-MCNC: 167 MG/DL (ref 65–99)
GLUCOSE UR STRIP-MCNC: ABNORMAL MG/DL
HCT VFR BLD AUTO: 21.4 % (ref 34–46.6)
HGB BLD-MCNC: 6.3 G/DL (ref 12–15.9)
HGB BLD-MCNC: 6.9 G/DL (ref 12–15.9)
HGB UR QL STRIP.AUTO: NEGATIVE
HOLD SPECIMEN: NORMAL
HOLD SPECIMEN: NORMAL
HYALINE CASTS UR QL AUTO: ABNORMAL /LPF
HYPOCHROMIA BLD QL: NORMAL
IMM GRANULOCYTES # BLD AUTO: 0.03 10*3/MM3 (ref 0–0.05)
IMM GRANULOCYTES NFR BLD AUTO: 0.4 % (ref 0–0.5)
KETONES UR QL STRIP: NEGATIVE
LEUKOCYTE ESTERASE UR QL STRIP.AUTO: NEGATIVE
LYMPHOCYTES # BLD AUTO: 0.81 10*3/MM3 (ref 0.7–3.1)
LYMPHOCYTES NFR BLD AUTO: 12 % (ref 19.6–45.3)
MAGNESIUM SERPL-MCNC: 2 MG/DL (ref 1.6–2.4)
MCH RBC QN AUTO: 27.2 PG (ref 26.6–33)
MCHC RBC AUTO-ENTMCNC: 29.4 G/DL (ref 31.5–35.7)
MCV RBC AUTO: 92.2 FL (ref 79–97)
MONOCYTES # BLD AUTO: 0.42 10*3/MM3 (ref 0.1–0.9)
MONOCYTES NFR BLD AUTO: 6.2 % (ref 5–12)
NEUTROPHILS NFR BLD AUTO: 5.46 10*3/MM3 (ref 1.7–7)
NEUTROPHILS NFR BLD AUTO: 81 % (ref 42.7–76)
NITRITE UR QL STRIP: NEGATIVE
NRBC BLD AUTO-RTO: 0 /100 WBC (ref 0–0.2)
PH UR STRIP.AUTO: <=5 [PH] (ref 5–8)
PLATELET # BLD AUTO: 209 10*3/MM3 (ref 140–450)
PMV BLD AUTO: 11.5 FL (ref 6–12)
POTASSIUM SERPL-SCNC: 3.9 MMOL/L (ref 3.5–5.2)
PROT SERPL-MCNC: 6.2 G/DL (ref 6–8.5)
PROT UR QL STRIP: ABNORMAL
RBC # BLD AUTO: 2.32 10*6/MM3 (ref 3.77–5.28)
RBC # UR: ABNORMAL /HPF
REF LAB TEST METHOD: ABNORMAL
RH BLD: NEGATIVE
SARS-COV-2 RNA PNL SPEC NAA+PROBE: NOT DETECTED
SMALL PLATELETS BLD QL SMEAR: ADEQUATE
SODIUM SERPL-SCNC: 137 MMOL/L (ref 136–145)
SP GR UR STRIP: 1.03 (ref 1–1.03)
SQUAMOUS #/AREA URNS HPF: ABNORMAL /HPF
T&S EXPIRATION DATE: NORMAL
TROPONIN T SERPL-MCNC: <0.01 NG/ML (ref 0–0.03)
UROBILINOGEN UR QL STRIP: ABNORMAL
WBC # BLD AUTO: 6.75 10*3/MM3 (ref 3.4–10.8)
WBC MORPH BLD: NORMAL
WBC UR QL AUTO: ABNORMAL /HPF
WHOLE BLOOD HOLD SPECIMEN: NORMAL
WHOLE BLOOD HOLD SPECIMEN: NORMAL

## 2021-11-15 PROCEDURE — 82962 GLUCOSE BLOOD TEST: CPT

## 2021-11-15 PROCEDURE — C9803 HOPD COVID-19 SPEC COLLECT: HCPCS

## 2021-11-15 PROCEDURE — 86901 BLOOD TYPING SEROLOGIC RH(D): CPT | Performed by: EMERGENCY MEDICINE

## 2021-11-15 PROCEDURE — 81001 URINALYSIS AUTO W/SCOPE: CPT | Performed by: FAMILY MEDICINE

## 2021-11-15 PROCEDURE — 96374 THER/PROPH/DIAG INJ IV PUSH: CPT

## 2021-11-15 PROCEDURE — 86850 RBC ANTIBODY SCREEN: CPT | Performed by: EMERGENCY MEDICINE

## 2021-11-15 PROCEDURE — 87635 SARS-COV-2 COVID-19 AMP PRB: CPT | Performed by: FAMILY MEDICINE

## 2021-11-15 PROCEDURE — P9016 RBC LEUKOCYTES REDUCED: HCPCS

## 2021-11-15 PROCEDURE — 99220 PR INITIAL OBSERVATION CARE/DAY 70 MINUTES: CPT | Performed by: FAMILY MEDICINE

## 2021-11-15 PROCEDURE — 71045 X-RAY EXAM CHEST 1 VIEW: CPT

## 2021-11-15 PROCEDURE — 86900 BLOOD TYPING SEROLOGIC ABO: CPT | Performed by: EMERGENCY MEDICINE

## 2021-11-15 PROCEDURE — 36430 TRANSFUSION BLD/BLD COMPNT: CPT

## 2021-11-15 PROCEDURE — G0378 HOSPITAL OBSERVATION PER HR: HCPCS

## 2021-11-15 PROCEDURE — 86900 BLOOD TYPING SEROLOGIC ABO: CPT

## 2021-11-15 PROCEDURE — 85025 COMPLETE CBC W/AUTO DIFF WBC: CPT | Performed by: EMERGENCY MEDICINE

## 2021-11-15 PROCEDURE — 80053 COMPREHEN METABOLIC PANEL: CPT | Performed by: EMERGENCY MEDICINE

## 2021-11-15 PROCEDURE — 99284 EMERGENCY DEPT VISIT MOD MDM: CPT

## 2021-11-15 PROCEDURE — 84484 ASSAY OF TROPONIN QUANT: CPT | Performed by: EMERGENCY MEDICINE

## 2021-11-15 PROCEDURE — 83735 ASSAY OF MAGNESIUM: CPT | Performed by: EMERGENCY MEDICINE

## 2021-11-15 PROCEDURE — 85007 BL SMEAR W/DIFF WBC COUNT: CPT | Performed by: EMERGENCY MEDICINE

## 2021-11-15 PROCEDURE — 86920 COMPATIBILITY TEST SPIN: CPT

## 2021-11-15 PROCEDURE — 85018 HEMOGLOBIN: CPT | Performed by: FAMILY MEDICINE

## 2021-11-15 PROCEDURE — 93005 ELECTROCARDIOGRAM TRACING: CPT | Performed by: EMERGENCY MEDICINE

## 2021-11-15 RX ORDER — ISOSORBIDE MONONITRATE 60 MG/1
60 TABLET, EXTENDED RELEASE ORAL EVERY MORNING
Status: DISCONTINUED | OUTPATIENT
Start: 2021-11-16 | End: 2021-11-16

## 2021-11-15 RX ORDER — SODIUM CHLORIDE 9 MG/ML
70 INJECTION, SOLUTION INTRAVENOUS CONTINUOUS PRN
Status: CANCELLED | OUTPATIENT
Start: 2021-11-15

## 2021-11-15 RX ORDER — LISINOPRIL 5 MG/1
5 TABLET ORAL
Status: DISCONTINUED | OUTPATIENT
Start: 2021-11-16 | End: 2021-11-17 | Stop reason: HOSPADM

## 2021-11-15 RX ORDER — NICOTINE POLACRILEX 4 MG
1 LOZENGE BUCCAL
Status: DISCONTINUED | OUTPATIENT
Start: 2021-11-15 | End: 2021-11-17 | Stop reason: HOSPADM

## 2021-11-15 RX ORDER — ONDANSETRON 2 MG/ML
4 INJECTION INTRAMUSCULAR; INTRAVENOUS EVERY 6 HOURS PRN
Status: DISCONTINUED | OUTPATIENT
Start: 2021-11-15 | End: 2021-11-17 | Stop reason: HOSPADM

## 2021-11-15 RX ORDER — AMLODIPINE BESYLATE 5 MG/1
5 TABLET ORAL
Status: DISCONTINUED | OUTPATIENT
Start: 2021-11-16 | End: 2021-11-16

## 2021-11-15 RX ORDER — HYDROCODONE BITARTRATE AND ACETAMINOPHEN 5; 325 MG/1; MG/1
1 TABLET ORAL 2 TIMES DAILY PRN
Status: DISCONTINUED | OUTPATIENT
Start: 2021-11-15 | End: 2021-11-17 | Stop reason: HOSPADM

## 2021-11-15 RX ORDER — DEXTROSE MONOHYDRATE 25 G/50ML
25 INJECTION, SOLUTION INTRAVENOUS
Status: DISCONTINUED | OUTPATIENT
Start: 2021-11-15 | End: 2021-11-17 | Stop reason: HOSPADM

## 2021-11-15 RX ORDER — SODIUM CHLORIDE 0.9 % (FLUSH) 0.9 %
10 SYRINGE (ML) INJECTION AS NEEDED
Status: DISCONTINUED | OUTPATIENT
Start: 2021-11-15 | End: 2021-11-17 | Stop reason: HOSPADM

## 2021-11-15 RX ORDER — PANTOPRAZOLE SODIUM 40 MG/10ML
40 INJECTION, POWDER, LYOPHILIZED, FOR SOLUTION INTRAVENOUS EVERY 12 HOURS SCHEDULED
Status: DISCONTINUED | OUTPATIENT
Start: 2021-11-15 | End: 2021-11-17

## 2021-11-15 RX ADMIN — PANTOPRAZOLE SODIUM 40 MG: 40 INJECTION, POWDER, FOR SOLUTION INTRAVENOUS at 21:42

## 2021-11-15 NOTE — ED PROVIDER NOTES
Subjective   71-year-old female presents to the ED with a chief of generalized weakness and fatigue.  The patient states that she does not feel well and has had decreased energy.  She states that she feels like when her blood has been low.  She had GI bleeding with endoscopy informed about 6 weeks ago.  She states that last week she was seen in outside facility and had 3 units of blood and was discharged to follow-up with GI.  She states that she is still having increasing fatigue and weakness over the last few days.  No chest pain or shortness of breath.  No cough or wheeze.  No fever or chills.  No nausea vomiting diarrhea or abdominal pain.  No obvious black stools.  No other complaints at this time.          Review of Systems   Constitutional: Positive for fatigue.        Generalized weakness   All other systems reviewed and are negative.      Past Medical History:   Diagnosis Date   • Abdominal pain    • Acid reflux    • Anxiety    • Bruises easily    • Cataracts, bilateral    • Cirrhosis of liver (HCC)    • Constipation    • COPD (chronic obstructive pulmonary disease) (HCC)    • Coronary artery disease    • CTS (carpal tunnel syndrome)    • Diabetes (HCC)    • Elevated cholesterol    • Hearing loss    • Heart attack (HCC)     s/p stents   • Hypercholesteremia    • Hypertension    • Impaired functional mobility, balance, gait, and endurance    • Lower back pain    • Osteoarthritis    • Piercing     ears only   • Stroke (HCC)     2013-weak in right arm   • Tattoos     x2   • Tobacco abuse    • Tumor     in between breast closer to right breast   • Vitamin B12 deficiency    • Wears dentures     upper only   • Wears glasses        Allergies   Allergen Reactions   • Codeine GI Intolerance       Past Surgical History:   Procedure Laterality Date   • BREAST BIOPSY Right 5/10/2019    Procedure: BREAST BIOPSY RIGHT;  Surgeon: Kiana Frey MD;  Location: [facility];  Service: General   • CARPAL TUNNEL RELEASE  Bilateral    • CHOLECYSTECTOMY     • COLONOSCOPY N/A 9/30/2021    Procedure: COLONOSCOPY WITH POLYPECTOMY AND ABLATION OF AVM;  Surgeon: Russell Davila MD;  Location: [facility] ENDOSCOPY;  Service: Gastroenterology;  Laterality: N/A;   • CORONARY ANGIOPLASTY WITH STENT PLACEMENT  2012   • ENDOSCOPY N/A 9/29/2021    Procedure: ESOPHAGOGASTRODUODENOSCOPY with biopsies;  Surgeon: Russell Davila MD;  Location: [facility] ENDOSCOPY;  Service: Gastroenterology;  Laterality: N/A;   • HYSTERECTOMY      complete   • JOINT REPLACEMENT Bilateral     knee replacements   • TOTAL KNEE ARTHROPLASTY Bilateral 10/18/2016    MAUREEN Palomares MD       Family History   Problem Relation Age of Onset   • Hypertension Other    • Diabetes Mother    • Hypertension Mother    • Cancer Mother    • Diabetes Father    • Hypertension Father    • Heart disease Father    • Cancer Sister    • Cancer Brother        Social History     Socioeconomic History   • Marital status:    Tobacco Use   • Smoking status: Current Every Day Smoker     Packs/day: 1.00     Years: 46.00     Pack years: 46.00     Types: Cigarettes   • Smokeless tobacco: Never Used   • Tobacco comment: PT REPORTS SMOKED THIS AM 5/10/19   Vaping Use   • Vaping Use: Never used   Substance and Sexual Activity   • Alcohol use: No   • Drug use: No   • Sexual activity: Defer           Objective   Physical Exam  Vitals and nursing note reviewed.   Constitutional:       General: She is not in acute distress.     Appearance: She is well-developed. She is not diaphoretic.      Comments: Chronically ill-appearing   HENT:      Head: Normocephalic and atraumatic.      Nose: Nose normal.   Eyes:      Conjunctiva/sclera: Conjunctivae normal.   Cardiovascular:      Rate and Rhythm: Normal rate and regular rhythm.   Pulmonary:      Effort: Pulmonary effort is normal. No respiratory distress.      Breath sounds: Normal breath sounds.   Abdominal:      General: There is no distension.       Palpations: Abdomen is soft.      Tenderness: There is no abdominal tenderness. There is no guarding.   Musculoskeletal:         General: No deformity.   Neurological:      Mental Status: She is alert and oriented to person, place, and time.      Cranial Nerves: No cranial nerve deficit.         Procedures           ED Course  ED Course as of 11/15/21 1841   Mon Nov 15, 2021   1651 EKG interpreted by me.  Sinus rhythm.  Rate of 80.  Frequent PVCs.  No obvious ST or T wave abnormalities.  Prolonged QT interval.  Abnormal EKG [CG]      ED Course User Index  [CG] Bashir Goode, DO                                           MDM    71-year-old female with chronic anemia likely secondary to chronic GI bleeding.  On Brilinta and aspirin. Hemogloin in the sixes today.  Discussed with Dr. Davila given her requirement of 5 units of blood in the last week we will admit the patient for further evaluation and medical management.     Final diagnoses:   Gastrointestinal hemorrhage, unspecified gastrointestinal hemorrhage type   Acute blood loss anemia       ED Disposition  ED Disposition     ED Disposition Condition Comment    Decision to Admit  Level of Care: Telemetry [5]   Diagnosis: Gastrointestinal hemorrhage, unspecified gastrointestinal hemorrhage type [[id number]]   Admitting Physician: JOY PÉREZ [[id number]]   Attending Physician: JOY PÉREZ [[id number]]   Isolate for COVID?: No [0]   Certification: I Certify That Inpatient Hospital Services Are Medically Necessary For Greater Than 2 Midnights            No follow-up provider specified.       Medication List      No changes were made to your prescriptions during this visit.          Bashir Goode DO  11/15/21 1841

## 2021-11-16 ENCOUNTER — ANESTHESIA (OUTPATIENT)
Dept: GASTROENTEROLOGY | Facility: HOSPITAL | Age: 71
End: 2021-11-16

## 2021-11-16 ENCOUNTER — ANESTHESIA EVENT (OUTPATIENT)
Dept: GASTROENTEROLOGY | Facility: HOSPITAL | Age: 71
End: 2021-11-16

## 2021-11-16 ENCOUNTER — APPOINTMENT (OUTPATIENT)
Dept: ULTRASOUND IMAGING | Facility: HOSPITAL | Age: 71
End: 2021-11-16

## 2021-11-16 LAB
ANION GAP SERPL CALCULATED.3IONS-SCNC: 11.8 MMOL/L (ref 5–15)
APTT PPP: 26.2 SECONDS (ref 24.5–37.2)
BASOPHILS # BLD AUTO: 0.02 10*3/MM3 (ref 0–0.2)
BASOPHILS NFR BLD AUTO: 0.4 % (ref 0–1.5)
BUN SERPL-MCNC: 32 MG/DL (ref 8–23)
BUN/CREAT SERPL: 30.8 (ref 7–25)
CALCIUM SPEC-SCNC: 9.3 MG/DL (ref 8.6–10.5)
CHLORIDE SERPL-SCNC: 99 MMOL/L (ref 98–107)
CO2 SERPL-SCNC: 25.2 MMOL/L (ref 22–29)
CREAT SERPL-MCNC: 1.04 MG/DL (ref 0.57–1)
DEPRECATED RDW RBC AUTO: 47.8 FL (ref 37–54)
EOSINOPHIL # BLD AUTO: 0.02 10*3/MM3 (ref 0–0.4)
EOSINOPHIL NFR BLD AUTO: 0.4 % (ref 0.3–6.2)
ERYTHROCYTE [DISTWIDTH] IN BLOOD BY AUTOMATED COUNT: 15.4 % (ref 12.3–15.4)
GFR SERPL CREATININE-BSD FRML MDRD: 53 ML/MIN/1.73
GLUCOSE BLDC GLUCOMTR-MCNC: 101 MG/DL (ref 70–130)
GLUCOSE BLDC GLUCOMTR-MCNC: 227 MG/DL (ref 70–130)
GLUCOSE BLDC GLUCOMTR-MCNC: 98 MG/DL (ref 70–130)
GLUCOSE SERPL-MCNC: 98 MG/DL (ref 65–99)
HCT VFR BLD AUTO: 26.1 % (ref 34–46.6)
HGB BLD-MCNC: 8.6 G/DL (ref 12–15.9)
HGB BLD-MCNC: 9 G/DL (ref 12–15.9)
IMM GRANULOCYTES # BLD AUTO: 0.05 10*3/MM3 (ref 0–0.05)
IMM GRANULOCYTES NFR BLD AUTO: 1.1 % (ref 0–0.5)
INR PPP: 1.06 (ref 0.9–1.1)
LYMPHOCYTES # BLD AUTO: 0.73 10*3/MM3 (ref 0.7–3.1)
LYMPHOCYTES NFR BLD AUTO: 15.8 % (ref 19.6–45.3)
MCH RBC QN AUTO: 28.8 PG (ref 26.6–33)
MCHC RBC AUTO-ENTMCNC: 33 G/DL (ref 31.5–35.7)
MCV RBC AUTO: 87.3 FL (ref 79–97)
MONOCYTES # BLD AUTO: 0.41 10*3/MM3 (ref 0.1–0.9)
MONOCYTES NFR BLD AUTO: 8.9 % (ref 5–12)
NEUTROPHILS NFR BLD AUTO: 3.39 10*3/MM3 (ref 1.7–7)
NEUTROPHILS NFR BLD AUTO: 73.4 % (ref 42.7–76)
NRBC BLD AUTO-RTO: 0 /100 WBC (ref 0–0.2)
PLATELET # BLD AUTO: 166 10*3/MM3 (ref 140–450)
PMV BLD AUTO: 11.4 FL (ref 6–12)
POTASSIUM SERPL-SCNC: 3.6 MMOL/L (ref 3.5–5.2)
PROTHROMBIN TIME: 14.3 SECONDS (ref 12–15.1)
RBC # BLD AUTO: 2.99 10*6/MM3 (ref 3.77–5.28)
SODIUM SERPL-SCNC: 136 MMOL/L (ref 136–145)
WBC # BLD AUTO: 4.62 10*3/MM3 (ref 3.4–10.8)

## 2021-11-16 PROCEDURE — 99214 OFFICE O/P EST MOD 30 MIN: CPT | Performed by: INTERNAL MEDICINE

## 2021-11-16 PROCEDURE — 96376 TX/PRO/DX INJ SAME DRUG ADON: CPT

## 2021-11-16 PROCEDURE — G0378 HOSPITAL OBSERVATION PER HR: HCPCS

## 2021-11-16 PROCEDURE — 63710000001 SIMETHICONE 40 MG/0.6ML SUSPENSION: Performed by: INTERNAL MEDICINE

## 2021-11-16 PROCEDURE — A9270 NON-COVERED ITEM OR SERVICE: HCPCS | Performed by: INTERNAL MEDICINE

## 2021-11-16 PROCEDURE — 44361 SMALL BOWEL ENDOSCOPY/BIOPSY: CPT | Performed by: INTERNAL MEDICINE

## 2021-11-16 PROCEDURE — 85025 COMPLETE CBC W/AUTO DIFF WBC: CPT | Performed by: FAMILY MEDICINE

## 2021-11-16 PROCEDURE — 99225 PR SBSQ OBSERVATION CARE/DAY 25 MINUTES: CPT | Performed by: FAMILY MEDICINE

## 2021-11-16 PROCEDURE — 85018 HEMOGLOBIN: CPT | Performed by: FAMILY MEDICINE

## 2021-11-16 PROCEDURE — 82962 GLUCOSE BLOOD TEST: CPT

## 2021-11-16 PROCEDURE — 63710000001 HYDROCODONE-ACETAMINOPHEN 5-325 MG TABLET: Performed by: INTERNAL MEDICINE

## 2021-11-16 PROCEDURE — 88305 TISSUE EXAM BY PATHOLOGIST: CPT | Performed by: INTERNAL MEDICINE

## 2021-11-16 PROCEDURE — 85610 PROTHROMBIN TIME: CPT | Performed by: FAMILY MEDICINE

## 2021-11-16 PROCEDURE — 80048 BASIC METABOLIC PNL TOTAL CA: CPT | Performed by: FAMILY MEDICINE

## 2021-11-16 PROCEDURE — 25010000002 PROPOFOL 10 MG/ML EMULSION: Performed by: NURSE ANESTHETIST, CERTIFIED REGISTERED

## 2021-11-16 PROCEDURE — 85730 THROMBOPLASTIN TIME PARTIAL: CPT | Performed by: FAMILY MEDICINE

## 2021-11-16 RX ORDER — ISOSORBIDE MONONITRATE 30 MG/1
30 TABLET, EXTENDED RELEASE ORAL EVERY MORNING
Status: DISCONTINUED | OUTPATIENT
Start: 2021-11-17 | End: 2021-11-17 | Stop reason: HOSPADM

## 2021-11-16 RX ORDER — SIMETHICONE 20 MG/.3ML
EMULSION ORAL AS NEEDED
Status: DISCONTINUED | OUTPATIENT
Start: 2021-11-16 | End: 2021-11-16 | Stop reason: HOSPADM

## 2021-11-16 RX ORDER — SODIUM CHLORIDE 9 MG/ML
INJECTION, SOLUTION INTRAVENOUS CONTINUOUS PRN
Status: DISCONTINUED | OUTPATIENT
Start: 2021-11-16 | End: 2021-11-16 | Stop reason: SURG

## 2021-11-16 RX ORDER — LANOLIN ALCOHOL/MO/W.PET/CERES
1000 CREAM (GRAM) TOPICAL DAILY
COMMUNITY
End: 2021-11-17 | Stop reason: HOSPADM

## 2021-11-16 RX ORDER — ONDANSETRON 2 MG/ML
4 INJECTION INTRAMUSCULAR; INTRAVENOUS ONCE AS NEEDED
Status: DISCONTINUED | OUTPATIENT
Start: 2021-11-16 | End: 2021-11-16 | Stop reason: HOSPADM

## 2021-11-16 RX ORDER — PROPOFOL 10 MG/ML
VIAL (ML) INTRAVENOUS AS NEEDED
Status: DISCONTINUED | OUTPATIENT
Start: 2021-11-16 | End: 2021-11-16 | Stop reason: SURG

## 2021-11-16 RX ADMIN — PANTOPRAZOLE SODIUM 40 MG: 40 INJECTION, POWDER, FOR SOLUTION INTRAVENOUS at 09:30

## 2021-11-16 RX ADMIN — PANTOPRAZOLE SODIUM 40 MG: 40 INJECTION, POWDER, FOR SOLUTION INTRAVENOUS at 21:45

## 2021-11-16 RX ADMIN — HYDROCODONE BITARTRATE AND ACETAMINOPHEN 1 TABLET: 5; 325 TABLET ORAL at 21:45

## 2021-11-16 RX ADMIN — SODIUM CHLORIDE: 9 INJECTION, SOLUTION INTRAVENOUS at 15:04

## 2021-11-16 RX ADMIN — GLYCOPYRROLATE 0.2 MCG: 0.2 INJECTION, SOLUTION INTRAMUSCULAR; INTRAVITREAL at 15:08

## 2021-11-16 RX ADMIN — PROPOFOL 200 MG: 10 INJECTION, EMULSION INTRAVENOUS at 15:35

## 2021-11-16 NOTE — CASE MANAGEMENT/SOCIAL WORK
Discharge Planning Assessment  [facility]     Patient Name: Karol Lopez  MRN: [id number]  Today's Date: 11/16/2021    Admit Date: 11/15/2021     Discharge Needs Assessment     Row Name 11/16/21 1045       Living Environment    Lives With significant other    Name(s) of Who Lives With Patient Emeka Henderson    significant other   and ex spouse    Current Living Arrangements home/apartment/condo    Potentially Unsafe Housing Conditions aggressive pet(s)  has 4 dogs and one does bite    Primary Care Provided by self    Family Caregiver if Needed significant other    Family Caregiver Names Emeka Mcdonough  S/O,  Maris Whitehead daughter    works at [employer] and [employer]       Resource/Environmental Concerns    Resource/Environmental Concerns none    Transportation Concerns car, none       Transition Planning    Patient/Family Anticipates Transition to home    Patient/Family Anticipated Services at Transition none    Transportation Anticipated family or friend will provide       Discharge Needs Assessment    Equipment Currently Used at Home cane, straight    Equipment Needed After Discharge none    Provided Post Acute Provider List? Yes    Post Acute Provider List Home Health    Delivered To Patient    Method of Delivery In person    Patient's Choice of Community Agency(s) patient has had [facility] before and if needed wants them  again               Discharge Plan     Row Name 11/16/21 1048       Plan    Plan home with family    Plan Comments independent of ADL's,   lives with ex spouse and significant other Emeka Mcdonough,  daughter Maris Whitehead  is next of kin  did not know her number but states she works at [employer] and [employer],   no oxygen,   no current home health  has had [facility] in the past,   [employer] nurse  check on her,  own POA, plans on returning home at discharge  Bill will transport              Continued Care and Services - Admitted Since 11/15/2021    Coordination has not been started for this  encounter.          Demographic Summary     Row Name 11/16/21 1044       General Information    Admission Type observation    Arrived From emergency department    Required Notices Provided Observation Status Notice    Referral Source admission list    Reason for Consult discharge planning    Preferred Language English               Functional Status     Row Name 11/16/21 1044       Functional Status    Usual Activity Tolerance good    Current Activity Tolerance moderate       Functional Status, IADL    Medications independent    Meal Preparation independent    Housekeeping independent    Laundry independent    Shopping independent       Employment/    Employment Status retired               Psychosocial    No documentation.                Abuse/Neglect    No documentation.                Legal    No documentation.                Substance Abuse    No documentation.                Patient Forms    No documentation.                   Tari Borja RN

## 2021-11-16 NOTE — NURSING NOTE
Unable to give report at this time. RN states she is unable to take report; asked to call back in a few.

## 2021-11-16 NOTE — ANESTHESIA POSTPROCEDURE EVALUATION
Patient: Karol Lopez    Procedure Summary     Date: 11/16/21 Room / Location: [facility] ENDOSCOPY 2 / [facility] ENDOSCOPY    Anesthesia Start: 1504 Anesthesia Stop: 1540    Procedure: ESOPHAGOGASTRODUODENOSCOPY WITH SMALL BOWEL ENTEROSCOPY and biopsy (N/A Esophagus) Diagnosis:       Gastrointestinal hemorrhage, unspecified gastrointestinal hemorrhage type      Melena      Blood loss anemia      (Gastrointestinal hemorrhage, unspecified gastrointestinal hemorrhage type [K92.2])      (Melena [K92.1])      (Blood loss anemia [D50.0])    Surgeons: Russell Davila MD Provider: James Jones CRNA    Anesthesia Type: MAC ASA Status: 3          Anesthesia Type: MAC    Vitals  Vitals Value Taken Time   /46 11/16/21 1605   Temp 97.8 °F (36.6 °C) 11/16/21 1542   Pulse 85 11/16/21 1608   Resp 13 11/16/21 1600   SpO2 98 % 11/16/21 1608   Vitals shown include unvalidated device data.          Post Anesthesia Care and Evaluation    Patient location during evaluation: PHASE II  Patient participation: complete - patient participated  Level of consciousness: awake  Pain score: 1  Pain management: adequate  Airway patency: patent  Anesthetic complications: No anesthetic complications  PONV Status: controlled  Cardiovascular status: acceptable and stable  Respiratory status: acceptable  Hydration status: acceptable

## 2021-11-16 NOTE — CONSULTS
"Adult Nutrition  Assessment/PES    Patient Name:  Karol Lopez  YOB: 1950  MRN: [id number]  Admit Date:  11/15/2021    Assessment Date:  11/16/2021    Comments:      RD understands that pt is currently NPO d/t chronic GI bleed.     Recommend:    1. Continue NPO as medically appropriate. Advance dt once medically appropriate and cleared by MD.  2. Consider MVI with minerals daily.     RD to follow pt and available PRN.      Reason for Assessment     Row Name 11/16/21 0934          Reason for Assessment    Reason For Assessment nurse/nurse practitioner consult     Diagnosis cardiac disease; diabetes diagnosis/complications; pulmonary disease; liver disease; hematological/related complications  Chronic anemia, chronic GI bleed, DM, WILCOX cirrhosis, CAD, COPD                  Anthropometrics     Row Name 11/16/21 0937 11/16/21 0555       Anthropometrics    Height 162.6 cm (64\") --    Weight -- 56.4 kg (124 lb 5.4 oz)       Ideal Body Weight (IBW)    Ideal Body Weight (IBW) (kg) 55 --               Labs/Tests/Procedures/Meds     Row Name 11/16/21 0935          Labs/Procedures/Meds    Lab Results Reviewed reviewed, pertinent     Lab Results Comments high: BUN, Cr Low: Cl-            Medications    Pertinent Medications Reviewed reviewed, pertinent     Pertinent Medications Comments Novolog, Protonix                Physical Findings     Row Name 11/16/21 0936          Physical Findings    Gastrointestinal other (see comments)  chronic GI bleed                Estimated/Assessed Needs     Row Name 11/16/21 0906          Calculation Measurements    Weight Used For Calculations 56.4 kg (124 lb 5.4 oz)  actual BW     Height 162.6 cm (64\")            Estimated/Assessed Needs    Additional Documentation Calorie Requirements (Group); Protein Requirements (Group); KCAL/KG (Group); [place]-St. Jeor Equation (Group); Fluid Requirements (Group)            Calorie Requirements    Weight Used For Calorie " Calculations 56.4 kg (124 lb 5.4 oz)  actual BW     Estimated Calorie Need Method [place]- Jeor; kcal/kg     Measured Resting Energy Expenditure (MREE) 1500 Kcal/day            KCAL/KG    KCAL/KG 25 Kcal/Kg (kcal); 30 Kcal/Kg (kcal)     25 Kcal/Kg (kcal) 1410     30 Kcal/Kg (kcal) 1692            [place]-St. Jeor Equation    RMR ([place]-St. Jeor Equation) 1074     [place]-St. Jeor Activity Factors --  1.3 AF = 1396 kcal            Protein Requirements    Weight Used For Protein Calculations 56.4 kg (124 lb 5.4 oz)  actual BW     Est Protein Requirement Amount (gms/kg) 0.8 gm protein  45-56     Estimated Protein Requirements (gms/day) 45.12            Fluid Requirements    Fluid Requirements (mL/day) 1500     Estimated Fluid Requirement Method other (see comments)  1 mL/kcal     RDA Method (mL) 1500                Nutrition Prescription Ordered     Row Name 11/16/21 0943          Nutrition Prescription PO    Current PO Diet NPO                Evaluation of Received Nutrient/Fluid Intake     Row Name 11/16/21 0937          PO Evaluation    Number of Days PO Intake Evaluated Insufficient Data                     Problem/Interventions:   Problem 1     Row Name 11/16/21 0944          Nutrition Diagnoses Problem 1    Problem 1 Inadequate Nutrient Intake     Etiology (related to) Medical Diagnosis     Gastrointestinal Gastrointestinal bleed  chronic GI bleed     Hematological Chronic anemia     Signs/Symptoms (evidenced by) NPO                      Intervention Goal     Row Name 11/16/21 0935          Intervention Goal    General Maintain nutrition; Meet nutritional needs for age/condition     PO Initiate feeding; Tolerate PO  advance diet as medically appropriate and tolerated     Weight Maintain weight                Nutrition Intervention     Row Name 11/16/21 0919          Nutrition Intervention    RD/Tech Action Await begin PO; Follow Tx progress; Care plan reviewd                Nutrition Prescription     Row Name  11/16/21 0945          Nutrition Prescription PO    PO Prescription Other (comment)  continue NPO as medically appropriate; advance once cleared by MD     Common Modifiers Consistent Carbohydrate     New PO Prescription Ordered? No, recommended            Other Orders    Obtain Weight Daily     Obtain Weight Ordered? No, recommended     Supplement Vitamin mineral supplement     Supplement Ordered? No, recommended     Other continue to monitor and replace electrolytes PRN                Education/Evaluation     Row Name 11/16/21 0947          Education    Education Will Instruct as appropriate            Monitor/Evaluation    Monitor Per protocol; I&O; Pertinent labs; Other (comment)  dt advancement                 Electronically signed by:  Dunia Vega RD  11/16/21 09:47 EST

## 2021-11-16 NOTE — PLAN OF CARE
Goal Outcome Evaluation:  Plan of Care Reviewed With: patient        Progress: no change  Outcome Summary: Pt accepted back post EGD, VSS, safety maintained. room air. sinus arythmia on tele, freq pvcs

## 2021-11-16 NOTE — ANESTHESIA PREPROCEDURE EVALUATION
Anesthesia Evaluation     Patient summary reviewed and Nursing notes reviewed   no history of anesthetic complications:  NPO Solid Status: > 8 hours  NPO Liquid Status: > 8 hours           Airway   Mallampati: II  TM distance: >3 FB  Neck ROM: full  no difficulty expected  Dental - normal exam     Pulmonary - normal exam   (+) a smoker Current Abstained day of surgery, COPD moderate,   Cardiovascular - normal exam    Rhythm: regular  Rate: normal    (+) hypertension, past MI  >12 months, CAD, hyperlipidemia,       Neuro/Psych  (+) TIA, CVA, numbness, psychiatric history Anxiety and Depression,     GI/Hepatic/Renal/Endo    (+)  GERD, GI bleeding , liver disease, renal disease CRI, diabetes mellitus using insulin,     Musculoskeletal     Abdominal    Substance History - negative use     OB/GYN negative ob/gyn ROS         Other   arthritis,                      Anesthesia Plan    ASA 3     MAC   (Pt told that intravenous sedation will be used as the primary anesthetic along with local anesthesia if necessary. Every effort will be made to make sure the patient is comfortable.     The patient was told they may or may not have recall for the procedure. It was further explained that if the MAC was not adequate that a general anesthetic with either an LMA or endotracheal tube would be required.     Will proceed with the plan of care.)  intravenous induction     Anesthetic plan, all risks, benefits, and alternatives have been provided, discussed and informed consent has been obtained with: patient.

## 2021-11-16 NOTE — PROGRESS NOTES
[facility]    [facility]    Name:  Karol Lopez   Age:  71 y.o.  Sex:  female  :  1950  MRN:  [id number]   Visit Number:  [id number]  Admission Date:  11/15/2021  Date Of Service:  21  Primary Care Physician:  Romaine Eugene MD     LOS: 1 day :    Chief Complaint:      Follow-up on anemia    Subjective:    Patient seen at bedside this morning.  Discussed we are waiting for her to undergo endoscopy.  She states she does continue to have intermittent dark stools at home over the last few weeks.  She is on aspirin and Brilinta due to history of CVA and arrhythmia.  She also states she was diagnosed with cirrhosis over the last month or so as well.    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  She has had apparent issues with acute on chronic anemia and possible gastrointestinal bleeding.  She is on aspirin and Brilinta.  Her initial work-up was concerning for anemia with a hemoglobin of 6.3 and platelets of 209.  Creatinine of 1.13.  She did receive 2 units PRBCs upon admission and plan for endoscopy.  Antiplatelet medications on hold currently.    Review of Systems:     All systems were reviewed and negative except as mentioned in subjective, assessment and plan.    Vital Signs:    Temp:  [98 °F (36.7 °C)-98.5 °F (36.9 °C)] 98 °F (36.7 °C)  Heart Rate:  [60-89] 60  Resp:  [15-18] 15  BP: (102-132)/(47-72) 121/72    Intake and output:    I/O last 3 completed shifts:  In: 968.3 [Blood:968.3]  Out: 700 [Urine:700]  No intake/output data recorded.    Physical Examination:    General Appearance:  Alert and cooperative.  Chronically ill-appearing elderly female.   Head:  Atraumatic and normocephalic.   Eyes: Conjunctivae and sclerae normal,  no icterus. No pallor.   Throat: No oral lesions, no thrush, oral mucosa moist.   Neck: Supple, trachea midline, no thyromegaly.   Lungs:   Breath sounds heard bilaterally equally.  No crackles or wheezing. No Pleural rub or bronchial breathing.   Heart:  Normal S1 and S2, no murmur, no gallop, no rub. No JVD.   Abdomen:   Normal bowel sounds, no masses, no organomegaly. Soft, nontender, nondistended, no rebound tenderness.   Extremities: Supple, no edema, no cyanosis, no clubbing.   Skin: No bleeding or rash.   Neurologic: Alert and oriented x 3. No facial asymmetry. Moves all four limbs. No tremors.      Laboratory results:    Results from last 7 days   Lab Units 11/16/21  0620 11/15/21  1516   SODIUM mmol/L 136 137   POTASSIUM mmol/L 3.6 3.9   CHLORIDE mmol/L 99 95*   CO2 mmol/L 25.2 19.1*   BUN mg/dL 32* 31*   CREATININE mg/dL 1.04* 1.13*   CALCIUM mg/dL 9.3 10.3   BILIRUBIN mg/dL  --  0.7   ALK PHOS U/L  --  74   ALT (SGPT) U/L  --  26   AST (SGOT) U/L  --  30   GLUCOSE mg/dL 98 167*     Results from last 7 days   Lab Units 11/16/21  1200 11/16/21  0620 11/15/21  2237 11/15/21  1516 11/15/21  1516   WBC 10*3/mm3  --  4.62  --   --  6.75   HEMOGLOBIN g/dL 9.0* 8.6* 6.9*   < > 6.3*   HEMATOCRIT %  --  26.1*  --   --  21.4*   PLATELETS 10*3/mm3  --  166  --   --  209    < > = values in this interval not displayed.     Results from last 7 days   Lab Units 11/16/21  0620   INR  1.06     Results from last 7 days   Lab Units 11/15/21  1516   TROPONIN T ng/mL <0.010             I have reviewed the patient's laboratory results.    Radiology results:    XR Chest 1 View    Result Date: 11/15/2021  PROCEDURE: XR CHEST 1 VW-  HISTORY: Weak/Dizzy/AMS triage protocol  COMPARISON: 09/15/2021.  FINDINGS: The heart is normal in size. The mediastinum is unremarkable. The lungs are clear. There is no pneumothorax.  There are no acute osseous abnormalities.      Impression: No acute cardiopulmonary process.  Continued followup is  recommended.  This report was finalized on 11/15/2021 3:33 PM by Janet Craig M.D..    I have reviewed the patient's radiology reports.    Medication Review:     I have reviewed the patient's active and prn medications.     Problem List:      Melena    Blood loss anemia    Gastrointestinal hemorrhage      Assessment:    1. Acute on chronic blood loss anemia, present on admission  2. Possible upper gastrointestinal bleeding  3. Symptomatic anemia  4. 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 History of prior CVA  6. CAD  7. Essential hypertension  8. Impaired mobility and ADL    Plan:    Patient's vital signs remained stable.  Appropriate rise in hemoglobin after 2 unit PRBCs.  She is on IV Protonix.  She has been n.p.o. with anticipation for EGD today.  I discussed with patient that if she continues to have evidence of bleeding while on anticoagulation/antiplatelet therapy, may need to consider holding these with known elevated CVA risk.  Further recommendations will depend upon clinical course.    DVT Prophylaxis: SCD  Code Status: DNR/DNI  Diet: N.p.o. currently  Discharge Plan: 1 to 2 days    Deb Dalton DO  11/16/21  12:24 EST    Dictated utilizing Dragon dictation.

## 2021-11-16 NOTE — CONSULTS
In Patient Consult      Date of Consultation: 2021  Patient Name: Karol Lopez  MRN: [id number]  : 1950     Referring provider: Deb Dalton, *    Primary care provider:  Romaine Eugene MD    Reason for consultation: Severe symptomatic anemia    History of Present Illness: This is a 71-year-old female patient with a prior history of CVA on aspirin and Brilinta, diabetes mellitus, suspected compensated Roe cirrhosis, coronary artery disease, COPD was brought into emergency room on 11/15/2021 with complaints of generalized weakness and fatigue.    She had a chronic anemia.  She was admitted in 2021 with a hemoglobin of 6 g/dL and at that time patient had a EGD done which did not reveal any esophageal varices, or obvious portal hypertensive gastropathy or any other upper GI source of bleeding.  She did have a diffuse erythema in the distal stomach suggesting possible gastropathy.  Colonoscopy done during the same.  Revealed multiple small angiectasia in the colon which were cauterized which were not bleeding during the procedure.  Her terminal ileum was normal.    She states that after she was discharged here she developed anemia again and had a 3 units of PRBC transfused 2 weeks ago.  She is on iron pills and always has a black stool.  Denies any obvious history suggestive of overt bleeding,  no blood in the stool as such or clots.  She also had a PRBC transfusion before her last admission in September.    She has a chronic constipation for years.  Denies any acid reflux.  She takes ibuprofen as needed for back pain and also she smokes cigarettes.  Denies any alcohol use.  No family stop any colon cancer.    Recent CT scan abdomen pelvis done in 2021 revealed signs of cirrhosis without any ascites.  EGD did not reveal any esophageal varices or obvious portal hypertensive gastropathy.    This time in ED she was again noted to have hemoglobin of 6 g/dL she  was admitted for further management and GI was consulted    Subjective     Past Medical History:   Diagnosis Date   • Abdominal pain    • Acid reflux    • Anxiety    • Bruises easily    • Cataracts, bilateral    • Cirrhosis of liver (HCC)    • Constipation    • COPD (chronic obstructive pulmonary disease) (HCC)    • Coronary artery disease    • CTS (carpal tunnel syndrome)    • Diabetes (HCC)    • Elevated cholesterol    • Hearing loss    • Heart attack (HCC)     s/p stents   • Hypercholesteremia    • Hypertension    • Impaired functional mobility, balance, gait, and endurance    • Lower back pain    • Osteoarthritis    • Piercing     ears only   • Stroke (HCC)     2013-weak in right arm   • Tattoos     x2   • Tobacco abuse    • Tumor     in between breast closer to right breast   • Vitamin B12 deficiency    • Wears dentures     upper only   • Wears glasses        Past Surgical History:   Procedure Laterality Date   • BREAST BIOPSY Right 5/10/2019    Procedure: BREAST BIOPSY RIGHT;  Surgeon: Kiana Frey MD;  Location: [facility] OR;  Service: General   • CARPAL TUNNEL RELEASE Bilateral    • CHOLECYSTECTOMY     • COLONOSCOPY N/A 9/30/2021    Procedure: COLONOSCOPY WITH POLYPECTOMY AND ABLATION OF AVM;  Surgeon: Russell Davila MD;  Location: [facility] ENDOSCOPY;  Service: Gastroenterology;  Laterality: N/A;   • CORONARY ANGIOPLASTY WITH STENT PLACEMENT  2012   • ENDOSCOPY N/A 9/29/2021    Procedure: ESOPHAGOGASTRODUODENOSCOPY with biopsies;  Surgeon: Russell Davila MD;  Location: [facility] ENDOSCOPY;  Service: Gastroenterology;  Laterality: N/A;   • HYSTERECTOMY      complete   • JOINT REPLACEMENT Bilateral     knee replacements   • TOTAL KNEE ARTHROPLASTY Bilateral 10/18/2016    MAUREEN Palomares MD       Family History   Problem Relation Age of Onset   • Hypertension Other    • Diabetes Mother    • Hypertension Mother    • Cancer Mother    • Diabetes Father    • Hypertension Father    • Heart disease Father     • Cancer Sister    • Cancer Brother        Social History     Socioeconomic History   • Marital status:    Tobacco Use   • Smoking status: Current Every Day Smoker     Packs/day: 1.00     Years: 46.00     Pack years: 46.00     Types: Cigarettes   • Smokeless tobacco: Never Used   • Tobacco comment: PT REPORTS SMOKED THIS AM 5/10/19   Vaping Use   • Vaping Use: Never used   Substance and Sexual Activity   • Alcohol use: No   • Drug use: No   • Sexual activity: Defer         Current Facility-Administered Medications:   •  [MAR Hold] dextrose (D50W) (25 g/50 mL) IV injection 25 g, 25 g, Intravenous, Q15 Min PRN, Dejan Ribera MD  •  [MAR Hold] dextrose (GLUTOSE) oral gel 1 tube, 1 tube, Oral, Q15 Min PRN, Dejan Ribera MD  •  [MAR Hold] glucagon (human recombinant) (GLUCAGEN DIAGNOSTIC) injection 1 mg, 1 mg, Subcutaneous, PRN, Dejan Ribera MD  •  [MAR Hold] HYDROcodone-acetaminophen (NORCO) 5-325 MG per tablet 1 tablet, 1 tablet, Oral, BID PRN, Dejan Ribera MD  •  [MAR Hold] insulin aspart (novoLOG) injection 0-7 Units, 0-7 Units, Subcutaneous, TID AC, Dejan Ribera MD  •  [MAR Hold] isosorbide mononitrate (IMDUR) 24 hr tablet 30 mg, 30 mg, Oral, Krystle PEREZ Shandy Marcus,   •  lisinopril (PRINIVIL,ZESTRIL) tablet 5 mg, 5 mg, Oral, Q24H, Dejan Ribera MD  •  [MAR Hold] ondansetron (ZOFRAN) injection 4 mg, 4 mg, Intravenous, Q6H PRN, Dejan Ribera MD  •  [MAR Hold] pantoprazole (PROTONIX) injection 40 mg, 40 mg, Intravenous, Q12H, Dejan Ribera MD, 40 mg at 11/16/21 0930  •  sodium chloride 0.9 % flush 10 mL, 10 mL, Intravenous, PRN, Dejan Ribera MD    Facility-Administered Medications Ordered in Other Encounters:   •  glycopyrrolate PF (ROBINUL) injection, , Intravenous, PRN, James Jones, CRNA, 0.2 mcg at 11/16/21 1508  •  sodium chloride 0.9 % infusion, , Intravenous, Continuous PRN, James Jones, CRNA, New Bag at 11/16/21 1504    Allergies   Allergen Reactions  "  • Codeine GI Intolerance       Review of Systems   Constitutional: Positive for fatigue. Negative for fever.   HENT: Negative for sore throat and trouble swallowing.    Eyes: Negative for visual disturbance.   Respiratory: Negative for cough, chest tightness and shortness of breath.    Cardiovascular: Negative for chest pain, palpitations and leg swelling.   Gastrointestinal: Negative for abdominal pain, constipation, diarrhea, nausea, vomiting and indigestion.        Always passes black stool as she is on iron pills   Endocrine: Negative for polyphagia.   Genitourinary: Negative for dysuria and hematuria.   Musculoskeletal: Negative for back pain, joint swelling and neck pain.   Skin: Negative for rash, skin lesions and bruise.   Neurological: Negative for dizziness, seizures, speech difficulty, weakness (Generalized weakness), numbness and confusion.   Hematological: Negative for adenopathy. Does not bruise/bleed easily.   Psychiatric/Behavioral: Negative for hallucinations and depressed mood.        The following portions of the patient's history were reviewed and updated as appropriate: allergies, current medications, past family history, past medical history, past social history, past surgical history and problem list.    Objective     Vitals:    11/16/21 0555 11/16/21 0700 11/16/21 0937 11/16/21 1100   BP:  132/59  121/72   BP Location:  Right arm  Left arm   Patient Position:  Lying  Lying   Pulse:  65  60   Resp:  16  15   Temp:  98.2 °F (36.8 °C)  98 °F (36.7 °C)   TempSrc:  Axillary  Axillary   SpO2:  96%  97%   Weight: 56.4 kg (124 lb 5.4 oz)      Height:   162.6 cm (64\")        Physical Exam  Vitals and nursing note reviewed.   Constitutional:       Appearance: Normal appearance. She is well-developed.   HENT:      Head: Normocephalic and atraumatic.      Right Ear: External ear normal.      Left Ear: External ear normal.   Eyes:      Comments: Pallor present   Neck:      Thyroid: No thyromegaly.      " Trachea: No tracheal deviation.   Cardiovascular:      Rate and Rhythm: Normal rate and regular rhythm.      Heart sounds: No murmur heard.      Pulmonary:      Effort: Pulmonary effort is normal. No respiratory distress.      Breath sounds: Normal breath sounds.   Abdominal:      General: Bowel sounds are normal. There is no distension.      Palpations: Abdomen is soft. There is no mass.      Tenderness: There is no abdominal tenderness.      Hernia: No hernia is present.      Comments: Rectal examination done revealed black stool in the rectum   Musculoskeletal:         General: Normal range of motion.      Cervical back: Normal range of motion and neck supple.   Skin:     General: Skin is warm and dry.   Neurological:      General: No focal deficit present.      Mental Status: She is alert and oriented to person, place, and time.      Cranial Nerves: No cranial nerve deficit.      Sensory: No sensory deficit.   Psychiatric:         Mood and Affect: Mood normal.         Behavior: Behavior normal.         Thought Content: Thought content normal.         Judgment: Judgment normal.         Results from last 7 days   Lab Units 11/16/21  1200 11/16/21  0620 11/15/21  2237 11/15/21  1516 11/15/21  1516   SODIUM mmol/L  --  136  --   --  137   POTASSIUM mmol/L  --  3.6  --   --  3.9   CHLORIDE mmol/L  --  99  --   --  95*   CO2 mmol/L  --  25.2  --   --  19.1*   BUN mg/dL  --  32*  --   --  31*   CREATININE mg/dL  --  1.04*  --   --  1.13*   CALCIUM mg/dL  --  9.3  --   --  10.3   ALBUMIN g/dL  --   --   --   --  4.00   BILIRUBIN mg/dL  --   --   --   --  0.7   ALK PHOS U/L  --   --   --   --  74   ALT (SGPT) U/L  --   --   --   --  26   AST (SGOT) U/L  --   --   --   --  30   GLUCOSE mg/dL  --  98  --   --  167*   WBC 10*3/mm3  --  4.62  --   --  6.75   HEMOGLOBIN g/dL 9.0* 8.6* 6.9*   < > 6.3*   PLATELETS 10*3/mm3  --  166  --   --  209   INR   --  1.06  --   --   --     < > = values in this interval not displayed.        Imaging Results (Last 24 Hours)     Procedure Component Value Units Date/Time    XR Chest 1 View [[id number]] Collected: 11/15/21 1533     Updated: 11/15/21 1536    Narrative:      PROCEDURE: XR CHEST 1 VW-     HISTORY: Weak/Dizzy/AMS triage protocol     COMPARISON: 09/15/2021.     FINDINGS: The heart is normal in size. The mediastinum is unremarkable.  The lungs are clear. There is no pneumothorax.  There are no acute  osseous abnormalities.       Impression:      No acute cardiopulmonary process.     Continued followup is recommended.     This report was finalized on 11/15/2021 3:33 PM by Janet Craig M.D..          Assessment / Plan      Assessment/Recommendations:     1.  Severe symptomatic iron deficiency anemia  2.  Suspected GI bleed  3.  History of colonic angiectasia  4.  Suspected Roe cirrhosis; MELD ; 7  As patient is on iron pills, patient will have a black stool always.  She did not see any clots or bright red rectal bleed.  Her colonoscopy done in September 2021 revealed small multiple colonic AVMs which were cauterized.  With this history there is the possibility of small bowel AVMs especially proximal jejunal AVMs. Patient also has a possible cirrhosis and she is high risk for AVMs. Patient is currently on aspirin and Brilinta which is significantly contributing to her possible GI bleed.  EGD done in September 2021 did not reveal any significant normality.  CT abdomen pelvis done in September 2021 did not reveal any other abnormality except signs of cirrhosis.     Given her significant anemia without overt bleeding, other etiologies need to be ruled out including MDS.     We will schedule for an EGD with enteroscopy for further evaluation of the proximal jejunum.  I have discussed the risk and benefits involved and patient is agreeable to proceed with the procedure.  Keep n.p.o.  IV fluid hydration  Enteroscopy today  PPI to continue  Recommend to hold both aspirin and the Brilinta for  now.  Given her recurrent suspected GI bleed, patient may not be a candidate for dual therapy.  Would recommend only on aspirin until her full work-up and evaluation is performed.     Hematology consultation to evaluate with any other etiology for her severe anemia other than GI bleed  We will recommend further after enteroscopy.  She may need a PillCam study      Thank you very much for letting me participate in the care of this patient.  Please do not hesitate to call me if you have any questions.      Russell Davila MD  [facility]  11/16/2021  15:12 EST    Please note that portions of this note may have been completed with a voice recognition program.

## 2021-11-16 NOTE — PLAN OF CARE
Goal Outcome Evaluation:  Plan of Care Reviewed With: patient        Progress: no change  Outcome Summary: patient admitted this shift.  prbc's infusing at 150ml/hr on arrival.  no acute events noted this shift.  no adverse reactions noted to blood transfusion.  patient educated on call light fall safety, room layout, television controls and bathroom with understanding verbalized.  will cont. to monitor.

## 2021-11-16 NOTE — H&P
[facility]   HISTORY AND PHYSICAL      Name:  Karol Lopez   Age:  71 y.o.  Sex:  female  :  1950  MRN:  [id number]   Visit Number:  [id number]  Admission Date:  11/15/2021  Date Of Service:  11/15/21  Primary Care Physician:  Romaine Eugene MD    Chief Complaint:     Generalized weakness    History Of Presenting Illness:      Patient is 71 years old female with a history of CVA, type 2 diabetes, Roe cirrhosis, tobacco abuse, OA, CAD, COPD, who had presented to the ER today with a chief complaint of generalized weakness and fatigue.  Patient reported she has progressively felt weaker with decreased energy over the past few days.  Patient has history of chronic anemia along with GI bleed.  Patient had an endoscopy performed recently by Dr. Ruiz which showed no obvious signs of portal hypertension or esophageal varices and did not show an obvious source of bleeding.  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  Patient reports that she was seen in an outside facility and was transfused 3 units of blood due to low hemoglobin.  Patient denies any obvious bright red blood in the stool however she reports that her stool has been dark.  Patient is also currently on iron supplements.  Patient reporting constipation.  Patient is currently on Brilinta and aspirin.  Patient denies any chest pain, shortness of breath or abdominal pain.  Patient denies any hematemesis or dark or bloody urine.  Upon ER evaluation, patient was hemodynamically stable, labs showed hemoglobin 6.3, hematocrit 21.4, platelets 209.  Troponin negative, CMP showed glucose 167, creatinine 1.13, BUN 31, LFTs WNL, magnesium 2.0, chest x-ray with no acute cardiopulmonary process.  Case was discussed with Dr. Ruiz who recommended PRBC transfusion, PPI and plan for endoscopy in the a.m given her requirement of 5 units of  blood during the past week.  Hospitalist team was consulted for admission and further management.       Review Of Systems:    All systems were reviewed and negative except as mentioned in history of presenting illness, assessment and plan.    Past Medical History: Patient  has a past medical history of Abdominal pain, Acid reflux, Anxiety, Bruises easily, Cataracts, bilateral, Cirrhosis of liver (HCC), Constipation, COPD (chronic obstructive pulmonary disease) (HCC), Coronary artery disease, CTS (carpal tunnel syndrome), Diabetes (HCC), Elevated cholesterol, Hearing loss, Heart attack (HCC), Hypercholesteremia, Hypertension, Impaired functional mobility, balance, gait, and endurance, Lower back pain, Osteoarthritis, Piercing, Stroke (HCC), Tattoos, Tobacco abuse, Tumor, Vitamin B12 deficiency, Wears dentures, and Wears glasses.    Past Surgical History: Patient  has a past surgical history that includes Coronary angioplasty with stent (2012); Carpal tunnel release (Bilateral); Cholecystectomy; Total knee arthroplasty (Bilateral, 10/18/2016); Hysterectomy; Joint replacement (Bilateral); Breast biopsy (Right, 5/10/2019); Esophagogastroduodenoscopy (N/A, 9/29/2021); and Colonoscopy (N/A, 9/30/2021).    Social History: Patient  reports that she has been smoking cigarettes. She has a 46.00 pack-year smoking history. She has never used smokeless tobacco. She reports that she does not drink alcohol and does not use drugs.    Family History: Patient's family history includes Cancer in her brother, mother, and sister; Diabetes in her father and mother; Heart disease in her father; Hypertension in her father, mother, and another family member.    Allergies:      Codeine    Home Medications:    Prior to Admission Medications     Prescriptions Last Dose Informant Patient Reported? Taking?    amLODIPine (NORVASC) 5 MG tablet   No No    Take 1 tablet by mouth Daily.    aspirin 81 MG chewable tablet   Yes No    Take 81 mg by mouth  "daily.    B-D INS SYRINGE 0.5CC/31GX5/16 31G X 5/16\" 0.5 ML misc   Yes No    use as directed once daily    cyanocobalamin 1000 MCG/ML injection   Yes No    Inject 1,000 mcg into the appropriate muscle as directed by prescriber.    Empagliflozin (Jardiance) 10 MG tablet  Pharmacy Yes No    Take 10 mg by mouth Daily.    ferrous sulfate (FerrouSul) 325 (65 FE) MG tablet   No No    Take 1 tablet by mouth Daily With Breakfast.    furosemide (LASIX) 20 MG tablet   Yes No    Take 20 mg by mouth Daily As Needed. swelling    glipizide (GLUCOTROL) 5 MG tablet  Pharmacy Yes No    Take 5 mg by mouth 2 (Two) Times a Day Before Meals. Only took for 2 days filled on 02/26/21    HYDROcodone-acetaminophen (NORCO) 5-325 MG per tablet   No No    Take 1 tablet by mouth Every 8 (Eight) Hours As Needed (PRN PAIN).    Patient taking differently:  Take 1 tablet by mouth 2 (two) times a day. As needed    insulin aspart (novoLOG FLEXPEN) 100 UNIT/ML solution pen-injector sc pen   Yes No    Inject 10 Units under the skin into the appropriate area as directed.    Insulin Pen Needle 31G X 6 MM misc   Yes No    1 each.    Insulin Syringe-Needle U-100 ([employer] INSULIN SYRINGE) 31G X 5/16\" 1 ML misc   Yes No    1 each by Does not apply route daily    Insulin Syringe-Needle U-100 30G X 5/16\" 0.5 ML misc   Yes No    1 each by Does not apply route daily    isosorbide mononitrate (IMDUR) 60 MG 24 hr tablet   No No    Take 1 tablet by mouth Every Morning. Need lab work and follow up appt for further refills. Otherwise defer to PCP.    LEVEMIR 100 UNIT/ML injection   Yes No    USE 70 UNITS INTO THE SKIN NIGHTLY    lisinopril (PRINIVIL,ZESTRIL) 5 MG tablet   Yes No    5 mg Daily.    metFORMIN (GLUCOPHAGE) 1000 MG tablet   Yes No    Take 1,000 mg by mouth 2 (Two) Times a Day With Meals.    pantoprazole (Protonix) 40 MG EC tablet   No No    Take 1 tablet by mouth 2 (Two) Times a Day Before Meals.    rosuvastatin (CRESTOR) 20 MG tablet   No No    Take 1 " tablet by mouth Daily.    ticagrelor (BRILINTA) 90 MG tablet tablet   No No    Take 1 tablet by mouth 2 (Two) Times a Day.    Tradjenta 5 MG tablet tablet   Yes No    Take 5 mg by mouth Daily.        ED Medications:    Medications   sodium chloride 0.9 % flush 10 mL (has no administration in time range)     Vital Signs:  Temp:  [98.1 °F (36.7 °C)-98.5 °F (36.9 °C)] 98.1 °F (36.7 °C)  Heart Rate:  [79-89] 79  Resp:  [16-17] 16  BP: (102-120)/(47-63) 120/47        11/15/21  1507   Weight: 60.8 kg (134 lb)     Body mass index is 23 kg/m².    Physical Exam:     General Appearance:  Alert and cooperative.  Chronically ill-appearing.  No acute distress.   Head:  Atraumatic and normocephalic.   Eyes: Conjunctivae and sclerae normal, no icterus. Pallor.   Ears:  Ears with no abnormalities noted.   Throat: No oral lesions, no thrush, oral mucosa moist.   Neck: Supple, trachea midline, no thyromegaly.   Back:   No kyphoscoliosis present. No tenderness to palpation.   Lungs:   Breath sounds heard bilaterally equally.  No crackles or wheezing. No Pleural rub or bronchial breathing.   Heart:  Normal S1 and S2, no murmur, no gallop, no rub. No JVD.   Abdomen:   Normal bowel sounds, no masses, no organomegaly. Soft, nontender, nondistended, no rebound tenderness.   Extremities: Supple, no edema, no cyanosis, no clubbing.   Pulses: Pulses palpable bilaterally.   Skin: No bleeding or rash.   Neurologic: Alert and oriented x 3. No facial asymmetry. Moves all four limbs. No tremors.      Laboratory data:    I have reviewed the labs done in the emergency room.    Results from last 7 days   Lab Units 11/15/21  1516   SODIUM mmol/L 137   POTASSIUM mmol/L 3.9   CHLORIDE mmol/L 95*   CO2 mmol/L 19.1*   BUN mg/dL 31*   CREATININE mg/dL 1.13*   CALCIUM mg/dL 10.3   BILIRUBIN mg/dL 0.7   ALK PHOS U/L 74   ALT (SGPT) U/L 26   AST (SGOT) U/L 30   GLUCOSE mg/dL 167*     Results from last 7 days   Lab Units 11/15/21  1516   WBC 10*3/mm3 6.75    HEMOGLOBIN g/dL 6.3*   HEMATOCRIT % 21.4*   PLATELETS 10*3/mm3 209         Results from last 7 days   Lab Units 11/15/21  1516   TROPONIN T ng/mL <0.010                           Invalid input(s): USDES,  BLOODU, NITRITITE, BACT, EP    Pain Management Panel    There is no flowsheet data to display.         EKG:      Sinus rhythm, heart rate 80, frequent PVCs, Nonspecific ST-T wave changes.    Radiology:    XR Chest 1 View    Result Date: 11/15/2021  PROCEDURE: XR CHEST 1 VW-  HISTORY: Weak/Dizzy/AMS triage protocol  COMPARISON: 09/15/2021.  FINDINGS: The heart is normal in size. The mediastinum is unremarkable. The lungs are clear. There is no pneumothorax.  There are no acute osseous abnormalities.      No acute cardiopulmonary process.  Continued followup is recommended.  This report was finalized on 11/15/2021 3:33 PM by Janet Craig M.D..      Assessment:    1.  Suspected upper GI bleed, present admission  2.  Symptomatic anemia, present on admission  3.  History of prior CVA x3  4.  CAD  5.  Essential hypertension  6.  Type 2 diabetes mellitus, with long-term insulin use  7.  WILCOX cirrhosis       Plan:    Patient be admitted for further evaluation management.  Currently meets observation level care with anticipated stay less than 2 midnights. 2 units PRBCs ordered and and currently being transfused.  Repeat H&H posttransfusion every 6 hours.  PPI ordered.  We will continue blood pressure medicine, otherwise will hold majority of home medications at this time, including antiplatelet.  Sliding scale insulin coverage for diabetes management for now as patient will be n.p.o. after midnight.  Will check occult blood and Covid test. Gastroenterology consulted and plan for EGD in a.m.     Patient confirms she is a DNR/DNI.    Risk Assessment: High due to age  DVT Prophylaxis: SCDs due to suspected GI bleed.  Code Status: DNR/DNI  Diet: Clear liquids, n.p.o. after midnight    Advance Care Planning   ACP  discussion was held with the patient during this visit. Patient does not have an advance directive, declines further assistance.      Dejan Ribera MD  11/15/21  19:37 EST    Dictated utilizing Dragon dictation.

## 2021-11-17 ENCOUNTER — READMISSION MANAGEMENT (OUTPATIENT)
Dept: CALL CENTER | Facility: HOSPITAL | Age: 71
End: 2021-11-17

## 2021-11-17 VITALS
BODY MASS INDEX: 21.23 KG/M2 | SYSTOLIC BLOOD PRESSURE: 124 MMHG | DIASTOLIC BLOOD PRESSURE: 65 MMHG | OXYGEN SATURATION: 98 % | WEIGHT: 124.34 LBS | TEMPERATURE: 98.7 F | HEIGHT: 64 IN | RESPIRATION RATE: 20 BRPM | HEART RATE: 62 BPM

## 2021-11-17 DIAGNOSIS — D50.0 IRON DEFICIENCY ANEMIA DUE TO CHRONIC BLOOD LOSS: Primary | ICD-10-CM

## 2021-11-17 LAB
ANION GAP SERPL CALCULATED.3IONS-SCNC: 10.7 MMOL/L (ref 5–15)
BASOPHILS # BLD AUTO: 0.01 10*3/MM3 (ref 0–0.2)
BASOPHILS NFR BLD AUTO: 0.3 % (ref 0–1.5)
BH BB BLOOD EXPIRATION DATE: NORMAL
BH BB BLOOD EXPIRATION DATE: NORMAL
BH BB BLOOD TYPE BARCODE: 600
BH BB BLOOD TYPE BARCODE: NORMAL
BH BB DISPENSE STATUS: NORMAL
BH BB DISPENSE STATUS: NORMAL
BH BB PRODUCT CODE: NORMAL
BH BB PRODUCT CODE: NORMAL
BH BB UNIT NUMBER: NORMAL
BH BB UNIT NUMBER: NORMAL
BUN SERPL-MCNC: 22 MG/DL (ref 8–23)
BUN/CREAT SERPL: 21.8 (ref 7–25)
CALCIUM SPEC-SCNC: 9.2 MG/DL (ref 8.6–10.5)
CHLORIDE SERPL-SCNC: 101 MMOL/L (ref 98–107)
CO2 SERPL-SCNC: 25.3 MMOL/L (ref 22–29)
CREAT SERPL-MCNC: 1.01 MG/DL (ref 0.57–1)
CROSSMATCH INTERPRETATION: NORMAL
CROSSMATCH INTERPRETATION: NORMAL
DEPRECATED RDW RBC AUTO: 49.1 FL (ref 37–54)
EOSINOPHIL # BLD AUTO: 0.02 10*3/MM3 (ref 0–0.4)
EOSINOPHIL NFR BLD AUTO: 0.6 % (ref 0.3–6.2)
ERYTHROCYTE [DISTWIDTH] IN BLOOD BY AUTOMATED COUNT: 15.8 % (ref 12.3–15.4)
GFR SERPL CREATININE-BSD FRML MDRD: 54 ML/MIN/1.73
GLUCOSE BLDC GLUCOMTR-MCNC: 117 MG/DL (ref 70–130)
GLUCOSE BLDC GLUCOMTR-MCNC: 213 MG/DL (ref 70–130)
GLUCOSE BLDC GLUCOMTR-MCNC: 253 MG/DL (ref 70–130)
GLUCOSE SERPL-MCNC: 117 MG/DL (ref 65–99)
HCT VFR BLD AUTO: 25.4 % (ref 34–46.6)
HGB BLD-MCNC: 8.2 G/DL (ref 12–15.9)
IMM GRANULOCYTES # BLD AUTO: 0.02 10*3/MM3 (ref 0–0.05)
IMM GRANULOCYTES NFR BLD AUTO: 0.6 % (ref 0–0.5)
LYMPHOCYTES # BLD AUTO: 0.6 10*3/MM3 (ref 0.7–3.1)
LYMPHOCYTES NFR BLD AUTO: 17.4 % (ref 19.6–45.3)
MCH RBC QN AUTO: 28.5 PG (ref 26.6–33)
MCHC RBC AUTO-ENTMCNC: 32.3 G/DL (ref 31.5–35.7)
MCV RBC AUTO: 88.2 FL (ref 79–97)
MONOCYTES # BLD AUTO: 0.4 10*3/MM3 (ref 0.1–0.9)
MONOCYTES NFR BLD AUTO: 11.6 % (ref 5–12)
NEUTROPHILS NFR BLD AUTO: 2.39 10*3/MM3 (ref 1.7–7)
NEUTROPHILS NFR BLD AUTO: 69.5 % (ref 42.7–76)
NRBC BLD AUTO-RTO: 0 /100 WBC (ref 0–0.2)
PLATELET # BLD AUTO: 159 10*3/MM3 (ref 140–450)
PMV BLD AUTO: 11.3 FL (ref 6–12)
POTASSIUM SERPL-SCNC: 3.5 MMOL/L (ref 3.5–5.2)
RBC # BLD AUTO: 2.88 10*6/MM3 (ref 3.77–5.28)
SODIUM SERPL-SCNC: 137 MMOL/L (ref 136–145)
UNIT  ABO: NORMAL
UNIT  ABO: NORMAL
UNIT  RH: NORMAL
UNIT  RH: NORMAL
WBC # BLD AUTO: 3.44 10*3/MM3 (ref 3.4–10.8)

## 2021-11-17 PROCEDURE — A9270 NON-COVERED ITEM OR SERVICE: HCPCS | Performed by: INTERNAL MEDICINE

## 2021-11-17 PROCEDURE — 85060 BLOOD SMEAR INTERPRETATION: CPT | Performed by: FAMILY MEDICINE

## 2021-11-17 PROCEDURE — 63710000001 ISOSORBIDE MONONITRATE 30 MG TABLET SUSTAINED-RELEASE 24 HOUR: Performed by: INTERNAL MEDICINE

## 2021-11-17 PROCEDURE — 25010000002 IRON SUCROSE PER 1 MG: Performed by: FAMILY MEDICINE

## 2021-11-17 PROCEDURE — 63710000001 INSULIN ASPART PER 5 UNITS: Performed by: INTERNAL MEDICINE

## 2021-11-17 PROCEDURE — 63710000001 LISINOPRIL 5 MG TABLET: Performed by: INTERNAL MEDICINE

## 2021-11-17 PROCEDURE — G0378 HOSPITAL OBSERVATION PER HR: HCPCS

## 2021-11-17 PROCEDURE — 85025 COMPLETE CBC W/AUTO DIFF WBC: CPT | Performed by: INTERNAL MEDICINE

## 2021-11-17 PROCEDURE — 80048 BASIC METABOLIC PNL TOTAL CA: CPT | Performed by: INTERNAL MEDICINE

## 2021-11-17 PROCEDURE — 99217 PR OBSERVATION CARE DISCHARGE MANAGEMENT: CPT | Performed by: FAMILY MEDICINE

## 2021-11-17 PROCEDURE — 99214 OFFICE O/P EST MOD 30 MIN: CPT | Performed by: PHYSICIAN ASSISTANT

## 2021-11-17 PROCEDURE — 82962 GLUCOSE BLOOD TEST: CPT

## 2021-11-17 RX ADMIN — ISOSORBIDE MONONITRATE 30 MG: 30 TABLET, EXTENDED RELEASE ORAL at 06:41

## 2021-11-17 RX ADMIN — LISINOPRIL 5 MG: 5 TABLET ORAL at 09:31

## 2021-11-17 RX ADMIN — INSULIN ASPART 4 UNITS: 100 INJECTION, SOLUTION INTRAVENOUS; SUBCUTANEOUS at 12:38

## 2021-11-17 RX ADMIN — SODIUM CHLORIDE, PRESERVATIVE FREE 10 ML: 5 INJECTION INTRAVENOUS at 09:31

## 2021-11-17 RX ADMIN — PANTOPRAZOLE SODIUM 40 MG: 40 INJECTION, POWDER, FOR SOLUTION INTRAVENOUS at 09:31

## 2021-11-17 RX ADMIN — IRON SUCROSE 200 MG: 20 INJECTION, SOLUTION INTRAVENOUS at 15:17

## 2021-11-17 NOTE — DISCHARGE SUMMARY
[facility]   DISCHARGE SUMMARY      Name:  Karol Lopez   Age:  71 y.o.  Sex:  female  :  1950  MRN:  [id number]   Visit Number:  [id number]    Admission Date:  11/15/2021  Date of Discharge:  2021  Primary Care Physician:  Romaine Eugene MD    Important issues to note:    Please take aspirin only.  Please make sure Dr. Davila's office contact you with your PillCam study.  Please keep follow-up appointment with hematology.  Please follow-up with PCP.  May need further PRBC transfusion on outpatient basis.    Discharge Diagnoses:     1. Acute on chronic blood loss anemia, present on admission  2. Possible upper gastrointestinal bleeding  3. Symptomatic anemia  4. 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 History of prior CVA  6. CAD  7. Essential hypertension  8. Impaired mobility and ADL       Problem List:     Active Hospital Problems    Diagnosis  POA   • Gastrointestinal hemorrhage [K92.2]  Yes   • Melena [K92.1]  Unknown   • Blood loss anemia [D50.0]  Unknown      Resolved Hospital Problems   No resolved problems to display.     Presenting Problem:    Chief Complaint   Patient presents with   • Weakness - Generalized      Consults:     Consulting Physician(s)  Chat With All Active Members    Provider Relationship Specialty    Russell Davila MD  Consulting Physician Gastroenterology        Procedures Performed:    Procedure(s):  ESOPHAGOGASTRODUODENOSCOPY WITH SMALL BOWEL ENTEROSCOPY and biopsy    History of presenting illness/Hospital Course:    Patient is a 71-year-old female with significant medical history of prior CVA, type 2 diabetes, suspected Roe cirrhosis, tobacco abuse, CAD, COPD, who had presented to the emergency room with multiple complaints including generalized weakness and fatigue.  She has had apparent issues with acute on chronic anemia and possible gastrointestinal bleeding.  She is on aspirin and Brilinta.  Her initial work-up was concerning for  anemia with a hemoglobin of 6.3 and platelets of 209.  Creatinine of 1.13.  She did receive 2 units PRBCs upon admission and plan for endoscopy.  Antiplatelet medications were on hold.    After evaluation by Dr. Davila, no evidence of upper gastrointestinal bleeding to the level of the fourth part of the duodenum and proximal jejunum.  Recommendations include continuing aspirin alone.  Unsure reason why patient would still be on Brilinta regardless, this risk was discussed with her.  Will be given iron transfusion prior to discharge today.  She will need follow-up with Dr. Davila for outpatient PillCam.  She will also need to establish care and a referral has been made with hematology service.  Peripheral smear has been sent for follow-up.  Continue with iron p.o.  No other changes at this time.    Follow-up with PCP.  She may need further blood transfusions in the next week if continues to have issues.  This was discussed with patient and family at bedside.    Vital Signs:    Temp:  [97.8 °F (36.6 °C)-98.6 °F (37 °C)] 97.9 °F (36.6 °C)  Heart Rate:  [62-94] 62  Resp:  [13-21] 21  BP: (105-148)/() 124/65    Physical Exam:    General Appearance:  Alert and cooperative.  Frail appearing female   Head:  Atraumatic and normocephalic.   Eyes: Conjunctivae and sclerae normal, no icterus. No pallor.   Ears:  Ears with no abnormalities noted.   Throat: No oral lesions, no thrush, oral mucosa moist.   Neck: Supple, trachea midline, no thyromegaly.   Back:   No kyphoscoliosis present. No tenderness to palpation.   Lungs:   Breath sounds heard bilaterally equally.  No crackles or wheezing. No Pleural rub or bronchial breathing.   Heart:  Normal S1 and S2, no murmur, no gallop, no rub. No JVD.   Abdomen:   Normal bowel sounds, no masses, no organomegaly. Soft, nontender, nondistended, no rebound tenderness.   Extremities: Supple, no edema, no cyanosis, no clubbing.   Pulses: Pulses palpable bilaterally.   Skin: No  bleeding or rash.   Neurologic: Alert and oriented x 3. No facial asymmetry. Moves all four limbs. No tremors.  Patient does have deficits noted from prior CVA     Pertinent Lab Results:     Results from last 7 days   Lab Units 11/17/21  0701 11/16/21  0620 11/15/21  1516   SODIUM mmol/L 137 136 137   POTASSIUM mmol/L 3.5 3.6 3.9   CHLORIDE mmol/L 101 99 95*   CO2 mmol/L 25.3 25.2 19.1*   BUN mg/dL 22 32* 31*   CREATININE mg/dL 1.01* 1.04* 1.13*   CALCIUM mg/dL 9.2 9.3 10.3   BILIRUBIN mg/dL  --   --  0.7   ALK PHOS U/L  --   --  74   ALT (SGPT) U/L  --   --  26   AST (SGOT) U/L  --   --  30   GLUCOSE mg/dL 117* 98 167*     Results from last 7 days   Lab Units 11/17/21  0701 11/16/21  1200 11/16/21  0620 11/15/21  2237 11/15/21  1516   WBC 10*3/mm3 3.44  --  4.62  --  6.75   HEMOGLOBIN g/dL 8.2* 9.0* 8.6*   < > 6.3*   HEMATOCRIT % 25.4*  --  26.1*  --  21.4*   PLATELETS 10*3/mm3 159  --  166  --  209    < > = values in this interval not displayed.     Results from last 7 days   Lab Units 11/16/21  0620   INR  1.06     Results from last 7 days   Lab Units 11/15/21  1516   TROPONIN T ng/mL <0.010                           Pertinent Radiology Results:    Imaging Results (All)     Procedure Component Value Units Date/Time    XR Chest 1 View [[id number]] Collected: 11/15/21 1533     Updated: 11/15/21 1536    Narrative:      PROCEDURE: XR CHEST 1 VW-     HISTORY: Weak/Dizzy/AMS triage protocol     COMPARISON: 09/15/2021.     FINDINGS: The heart is normal in size. The mediastinum is unremarkable.  The lungs are clear. There is no pneumothorax.  There are no acute  osseous abnormalities.       Impression:      No acute cardiopulmonary process.     Continued followup is recommended.     This report was finalized on 11/15/2021 3:33 PM by Janet Craig M.D..          Echo:    Results for orders placed during the hospital encounter of 02/28/21    Adult Transthoracic Echo Complete W/ Cont if Necessary Per  Protocol    Interpretation Summary  1.  Normal left ventricular size and systolic function, LVEF 60-65%.  2.  Mild concentric LVH.  3.  Normal LV diastolic filling pattern.  4.  Normal left atrial volume index.  5.  Normal right ventricular size and systolic function.  6.  Mild calcification of aortic valve without significant stenosis.  7.  Trace MR, TR, and PI.  8.  Negative bubble study with no evidence of intracardiac or intrapulmonary shunt.    Condition on Discharge:      Stable.    Code status during the hospital stay:    Code Status and Medical Interventions:   Ordered at: 11/15/21 2014     Medical Intervention Limits:    NO intubation (DNI)     Level Of Support Discussed With:    Patient     Code Status (Patient has no pulse and is not breathing):    No CPR (Do Not Attempt to Resuscitate)     Medical Interventions (Patient has pulse or is breathing):    Limited Support     Discharge Disposition:    Home or Self Care    Discharge Medications:       Discharge Medications      Changes to Medications      Instructions Start Date   cyanocobalamin 1000 MCG/ML injection  What changed: Another medication with the same name was removed. Continue taking this medication, and follow the directions you see here.   1,000 mcg      HYDROcodone-acetaminophen 5-325 MG per tablet  Commonly known as: NORCO  What changed:   · when to take this  · additional instructions   1 tablet, Oral, Every 8 Hours PRN      pantoprazole 40 MG EC tablet  Commonly known as: Protonix  What changed: when to take this   40 mg, Oral, 2 Times Daily Before Meals         Continue These Medications      Instructions Start Date   aspirin 81 MG chewable tablet   Take 81 mg by mouth daily.      FeroSul 325 (65 FE) MG tablet  Generic drug: ferrous sulfate   325 mg, Oral, Daily With Breakfast      furosemide 20 MG tablet  Commonly known as: LASIX   20 mg, Oral, Daily PRN, swelling      glipizide 5 MG tablet  Commonly known as: GLUCOTROL   5 mg, Oral,  "Daily, Only took for 2 days filled on 02/26/21      Insulin Pen Needle 31G X 6 MM misc   1 each, Does not apply      isosorbide mononitrate 60 MG 24 hr tablet  Commonly known as: IMDUR   60 mg, Oral, Every Morning, Need lab work and follow up appt for further refills. Otherwise defer to PCP.      [employer] Insulin Syringe 31G X 5/16\" 1 ML misc  Generic drug: Insulin Syringe-Needle U-100   1 each by Does not apply route daily      Insulin Syringe-Needle U-100 30G X 5/16\" 0.5 ML misc   1 each by Does not apply route daily      B-D INS SYRINGE 0.5CC/31GX5/16 31G X 5/16\" 0.5 ML misc  Generic drug: Insulin Syringe-Needle U-100   use as directed once daily      Levemir 100 UNIT/ML injection  Generic drug: insulin detemir   20 Units, Subcutaneous, Daily, Adjust per sliding scale      lisinopril 5 MG tablet  Commonly known as: PRINIVIL,ZESTRIL   5 mg, Oral, 2 Times Daily      rosuvastatin 20 MG tablet  Commonly known as: CRESTOR   20 mg, Oral, Daily         Stop These Medications    amLODIPine 5 MG tablet  Commonly known as: NORVASC     Brilinta 90 MG tablet tablet  Generic drug: ticagrelor     insulin aspart 100 UNIT/ML solution pen-injector sc pen  Commonly known as: novoLOG FLEXPEN     Jardiance 10 MG tablet tablet  Generic drug: empagliflozin     metFORMIN 1000 MG tablet  Commonly known as: GLUCOPHAGE     Tradjenta 5 MG tablet tablet  Generic drug: linagliptin          Discharge Diet:   Carb consistent    Activity at Discharge:     As tolerated  Follow-up Appointments:    Additional Instructions for the Follow-ups that You Need to Schedule     Ambulatory Referral to Hematology / Oncology   As directed      Iron deficiency anemia    Order Comments: Iron deficiency anemia             Follow-up Information     [facility] .    Specialty: Oncology  Contact information:  [address] [facility] [address] [address] [phone]  Additional " information:  Phone Number: [phone]           Russell Davila MD Follow up in 1 week(s).    Specialty: Gastroenterology  Why: his office to schedule pillcam  Contact information:  [address]  [place] [address] [phone]             Romaine Eugene MD Follow up in 1 week(s).    Specialty: Internal Medicine  Why: may need further PRBC transfusion, next week  Contact information:  [address] [facility] [address] [phone]                       Future Appointments   Date Time Provider Department Center   1/24/2022  2:45 PM Russell Davila MD [facility]     Test Results Pending at Discharge:    Pending Labs     Order Current Status    Peripheral Blood Smear In process    Tissue Pathology Exam In process             Deb Dalton DO  11/17/21  14:20 EST    Time: I spent 45 minutes on this discharge activity which included: face-to-face encounter with the patient, reviewing the data in the system, coordination of the care with the nursing staff as well as consultants, documentation, and entering orders.     Dictated utilizing Dragon dictation.

## 2021-11-17 NOTE — PROGRESS NOTES
In Patient Consult      Date of Consultation: 2021  Patient Name: Karol Lopez  MRN: [id number]  : 1950     Referring provider: Deb Dalton DO    Primary care provider:  Romaine Eugene MD    Reason for consultation: Severe symptomatic anemia     Interval history:   2021  She had EGD completed yesterday and would like to discuss those results. Has not seen any rectal bleeding. Has black stools which she relates to iron supplement. No current abdominal pain or nausea.     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     She had a chronic anemia.  She was admitted in 2021 with a hemoglobin of 6 g/dL and at that time patient had a EGD done which did not reveal any esophageal varices, or obvious portal hypertensive gastropathy or any other upper GI source of bleeding.  She did have a diffuse erythema in the distal stomach suggesting possible gastropathy.  Colonoscopy done during the same.  Revealed multiple small angiectasia in the colon which were cauterized which were not bleeding during the procedure.  Her terminal ileum was normal.     She states that after she was discharged here she developed anemia again and had a 3 units of PRBC transfused 2 weeks ago.  She is on iron pills and always has a black stool.  Denies any obvious history suggestive of overt bleeding,  no blood in the stool as such or clots.  She also had a PRBC transfusion before her last admission in September.     She has a chronic constipation for years.  Denies any acid reflux.  She takes ibuprofen as needed for back pain and also she smokes cigarettes.  Denies any alcohol use.  No family stop any colon cancer.     Recent CT scan abdomen pelvis done  in September 2021 revealed signs of cirrhosis without any ascites.  EGD did not reveal any esophageal varices or obvious portal hypertensive gastropathy.     This time in ED she was again noted to have hemoglobin of 6 g/dL she was admitted for further management and GI was consulted    Subjective     Past Medical History:   Diagnosis Date   • Abdominal pain    • Acid reflux    • Anxiety    • Bruises easily    • Cataracts, bilateral    • Cirrhosis of liver (HCC)    • Constipation    • COPD (chronic obstructive pulmonary disease) (HCC)    • Coronary artery disease    • CTS (carpal tunnel syndrome)    • Diabetes (HCC)    • Elevated cholesterol    • Hearing loss    • Heart attack (HCC)     s/p stents   • Hypercholesteremia    • Hypertension    • Impaired functional mobility, balance, gait, and endurance    • Lower back pain    • Osteoarthritis    • Piercing     ears only   • Stroke (HCC)     2013-weak in right arm   • Tattoos     x2   • Tobacco abuse    • Tumor     in between breast closer to right breast   • Vitamin B12 deficiency    • Wears dentures     upper only   • Wears glasses        Past Surgical History:   Procedure Laterality Date   • BREAST BIOPSY Right 5/10/2019    Procedure: BREAST BIOPSY RIGHT;  Surgeon: Kiana Frey MD;  Location: [facility] OR;  Service: General   • CARPAL TUNNEL RELEASE Bilateral    • CHOLECYSTECTOMY     • COLONOSCOPY N/A 9/30/2021    Procedure: COLONOSCOPY WITH POLYPECTOMY AND ABLATION OF AVM;  Surgeon: Russell Davila MD;  Location: [facility] ENDOSCOPY;  Service: Gastroenterology;  Laterality: N/A;   • CORONARY ANGIOPLASTY WITH STENT PLACEMENT  2012   • ENDOSCOPY N/A 9/29/2021    Procedure: ESOPHAGOGASTRODUODENOSCOPY with biopsies;  Surgeon: Russell Davila MD;  Location: [facility] ENDOSCOPY;  Service: Gastroenterology;  Laterality: N/A;   • ENTEROSCOPY SMALL BOWEL N/A 11/16/2021    Procedure: ESOPHAGOGASTRODUODENOSCOPY WITH SMALL BOWEL ENTEROSCOPY and biopsy;  Surgeon:  Russell Davila MD;  Location: [facility] ENDOSCOPY;  Service: Gastroenterology;  Laterality: N/A;   • HYSTERECTOMY      complete   • JOINT REPLACEMENT Bilateral     knee replacements   • TOTAL KNEE ARTHROPLASTY Bilateral 10/18/2016    MAUREEN Palomares MD       Family History   Problem Relation Age of Onset   • Hypertension Other    • Diabetes Mother    • Hypertension Mother    • Cancer Mother    • Diabetes Father    • Hypertension Father    • Heart disease Father    • Cancer Sister    • Cancer Brother        Social History     Socioeconomic History   • Marital status:    Tobacco Use   • Smoking status: Current Every Day Smoker     Packs/day: 1.00     Years: 46.00     Pack years: 46.00     Types: Cigarettes   • Smokeless tobacco: Never Used   • Tobacco comment: PT REPORTS SMOKED THIS AM 5/10/19   Vaping Use   • Vaping Use: Never used   Substance and Sexual Activity   • Alcohol use: No   • Drug use: No   • Sexual activity: Defer         Current Facility-Administered Medications:   •  dextrose (D50W) (25 g/50 mL) IV injection 25 g, 25 g, Intravenous, Q15 Min PRN, Russell Davila MD  •  dextrose (GLUTOSE) oral gel 1 tube, 1 tube, Oral, Q15 Min PRN, Russell Davila MD  •  glucagon (human recombinant) (GLUCAGEN DIAGNOSTIC) injection 1 mg, 1 mg, Subcutaneous, PRN, Russell Davila MD  •  HYDROcodone-acetaminophen (NORCO) 5-325 MG per tablet 1 tablet, 1 tablet, Oral, BID PRN, Russell Davila MD, 1 tablet at 11/16/21 2145  •  insulin aspart (novoLOG) injection 0-7 Units, 0-7 Units, Subcutaneous, TID AC, Russell Davila MD  •  isosorbide mononitrate (IMDUR) 24 hr tablet 30 mg, 30 mg, Oral, QAM, Russell Davila MD, 30 mg at 11/17/21 0641  •  lisinopril (PRINIVIL,ZESTRIL) tablet 5 mg, 5 mg, Oral, Q24H, Russell Davila MD, 5 mg at 11/17/21 0931  •  ondansetron (ZOFRAN) injection 4 mg, 4 mg, Intravenous, Q6H PRN, Russell Davila MD  •  pantoprazole (PROTONIX) injection  40 mg, 40 mg, Intravenous, Q12H, Russell Davila MD, 40 mg at 11/17/21 0931  •  sodium chloride 0.9 % flush 10 mL, 10 mL, Intravenous, PRN, Russell Davila MD, 10 mL at 11/17/21 0931    Allergies   Allergen Reactions   • Codeine GI Intolerance       Review of Systems   Constitutional: Positive for fatigue. Negative for appetite change, fever and unexpected weight loss.   HENT: Negative for trouble swallowing.    Gastrointestinal: Negative for abdominal distention, abdominal pain, anal bleeding, blood in stool, constipation, diarrhea, nausea, rectal pain, vomiting, GERD and indigestion.       The following portions of the patient's history were reviewed and updated as appropriate: allergies, current medications, past family history, past medical history, past social history, past surgical history and problem list.    Objective     Vitals:    11/16/21 2300 11/17/21 0300 11/17/21 0710 11/17/21 1105   BP: 129/48 105/64 124/65    BP Location: Left arm Left arm Right arm    Patient Position: Lying Lying Lying    Pulse: 77 62     Resp: 18 20 20 21   Temp: 98.4 °F (36.9 °C) 98.6 °F (37 °C) 97.9 °F (36.6 °C) 97.9 °F (36.6 °C)   TempSrc: Oral Oral Oral Oral   SpO2:       Weight:       Height:           Physical Exam  Constitutional:       Appearance: Normal appearance.   HENT:      Head: Normocephalic and atraumatic.   Eyes:      Comments: Pallor present   Abdominal:      General: Abdomen is flat. There is no distension.      Palpations: There is no mass.      Tenderness: There is no abdominal tenderness. There is no guarding or rebound.      Hernia: No hernia is present.   Musculoskeletal:      Cervical back: Normal range of motion and neck supple.   Neurological:      Mental Status: She is alert.         Results from last 7 days   Lab Units 11/17/21  0701 11/16/21  1200 11/16/21  0620 11/15/21  2237 11/15/21  1516   SODIUM mmol/L 137  --  136  --  137   POTASSIUM mmol/L 3.5  --  3.6  --  3.9   CHLORIDE mmol/L  101  --  99  --  95*   CO2 mmol/L 25.3  --  25.2  --  19.1*   BUN mg/dL 22  --  32*  --  31*   CREATININE mg/dL 1.01*  --  1.04*  --  1.13*   CALCIUM mg/dL 9.2  --  9.3  --  10.3   ALBUMIN g/dL  --   --   --   --  4.00   BILIRUBIN mg/dL  --   --   --   --  0.7   ALK PHOS U/L  --   --   --   --  74   ALT (SGPT) U/L  --   --   --   --  26   AST (SGOT) U/L  --   --   --   --  30   GLUCOSE mg/dL 117*  --  98  --  167*   WBC 10*3/mm3 3.44  --  4.62  --  6.75   HEMOGLOBIN g/dL 8.2* 9.0* 8.6*   < > 6.3*   PLATELETS 10*3/mm3 159  --  166  --  209   INR   --   --  1.06  --   --     < > = values in this interval not displayed.     CTAP with contrast 9/28/2021:  FINDINGS:  Abdomen: The gallbladder has been removed.  There is cirrhosis  without focal liver lesion.  There is borderline splenomegaly.  The portal vein is mildly dilated, but there are no apparent  venous collaterals.  The other solid abdominal organs and  ureters are unremarkable.  The GI tract is unremarkable, with no  sign of appendicitis.  Pelvis: The uterus is absent.  There is a  2 cm left ovarian cyst.  The urinary bladder is unremarkable.  There is no pelvic or abdominal ascites, adenopathy or acute  osseous abnormality.  IMPRESSION:  Cirrhosis without ascites.  No acute abnormality.    EGD was completed by Dr. Davila 9/29/2021:  - The oropharynx was normal.  - The Z-line was regular and was found 38 cm from the incisors.  - No gross lesions were noted in the entire esophagus.  - No esophgeal varices  - Bilious fluid was found in the gastric fundus.  - Striped moderately erythematous mucosa without bleeding was found in the gastric antrum and in the prepyloric region of the stomach. Biopsies were taken with a cold forceps for Helicobacter pylori testing.  - The duodenal bulb, first portion of the duodenum, second portion of the duodenum and third portion of the duodenum were normal. Biopsies for histology were taken with a cold forceps for evaluation of  celiac disease.  - No obvious Upper GI bleeding identified  Pathology showed unremarkable small bowel mucosa of the duodenum, reactive gastropathy, negative for H pylori or intestinal metaplasia of the antrum.    Colonoscopy was completed by Dr. Davila on 9/30/2021:  - The perianal and digital rectal examinations were normal.  - A 8 mm polyp was found in the cecum. The polyp was sessile. The polyp was removed with a hot snare. Resection and retrieval were complete.  - A 5 mm polyp was found in the proximal ascending colon. The polyp was sessile. The polyp was removed with a hot snare. Resection and retrieval were complete.  - A 5 mm polyp was found in the proximal transverse colon. The polyp was sessile. The polyp was removed with a hot snare. Resection and retrieval were complete.  - A few small patchy angioectasias with bleeding on contact were found at the hepatic flexure. Vaporization for hemostasis using argon beam at 2 liters/minute and 20 kraft was successful. Estimated blood loss was minimal.  - A large amount of liquid semi-liquid semi-solid stool was found in the entire colon, interfering with visualization. Lavage of the area was performed using a moderate amount of sterile water, resulting in incomplete clearance with fair visualization.  - Views suboptimal rt colon, left colon due to stool  Pathology showed tubular adenoma of ascending colon polyp, tubular adenoma of the transevere colon, tubular adenoma of the cecal polyp.    EGD with enteroscopy was completed by Dr. Davila on 11/16/2021:  - There was no evidence of significant pathology in the duodenal bulb, in the first portion of the duodenum, in the second portion of the duodenum, in the third portion of the duodenum and in the fourth portion of the duodenum.  - There was no evidence of significant pathology in the proximal jejunum.  - The Z-line was regular and was found 36 cm from the incisors.  - No gross lesions were noted in the entire  esophagus.  - Diffuse mildly erythematous mucosa without bleeding was found in the gastric antrum and in the prepyloric region of the stomach.  - Patchy mild mucosal changes characterized by white papules were found in the gastric antrum and in the prepyloric region of the stomach. Biopsies were taken with a cold forceps for histology.  - No upper GI source of bleeding identified for about 130cm from angle of the mouth.  Pathology pending.     Assessment / Plan    Assessment/Recommendations:  1.  Severe symptomatic iron deficiency anemia  2.  History of colonic angiectasia  3.  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  Patient also has a possible cirrhosis and she is high risk for AVMs. Patient is currently on aspirin and Brilinta which is significantly contributing to her possible GI bleed.  EGD completed yesterday with enteroscopy and no upper GI bleeding noted, no AVMs in the proximal jejunum. Rectal exam yesterday showed no signs of overt bleeding. No witnessed GI bleeding anytime recentlyCT abdomen pelvis done in September 2021 did not reveal any other abnormality except signs of cirrhosis.     Given her significant anemia without overt bleeding, other etiologies need to be ruled out including MDS.      Hematology consult  Sent peripheral smear  Continue daily PPI  Recommend to continue to hold both aspirin and the Brilinta for now.  Given her recurrent suspected GI bleed, patient may not be a candidate for dual therapy.  Would recommend only on aspirin until her full work-up and evaluation is performed.   In process of scheduling her for Pilcam next week as outpatient  Return to GI office for follow up in 6 weeks.  Repeat colonoscopy in 6 months due to poor prep, small AVMs could miss.            Thank you very much for letting me participate in the care of this patient.  Please do not hesitate to call me if you have any questions.      SPENCER Solis  [facility] [place]  11/17/2021  11:36 EST    Please note that portions of this note may have been completed with a voice recognition program.

## 2021-11-17 NOTE — PROGRESS NOTES
Patient: Karol Lopez  Procedure(s):  ESOPHAGOGASTRODUODENOSCOPY WITH SMALL BOWEL ENTEROSCOPY and biopsy  Anesthesia type: MAC    Patient location: [facility] Surgical Floor  Last vitals:   Vitals:    11/17/21 1510   BP:    Pulse:    Resp: 20   Temp: 98.7 °F (37.1 °C)   SpO2:      Level of consciousness: awake, alert and oriented    Post-anesthesia pain: adequate analgesia  Airway patency: patent  Respiratory: unassisted  Cardiovascular: stable and blood pressure at baseline  Hydration: euvolemic    Anesthetic complications: no

## 2021-11-18 ENCOUNTER — TELEPHONE (OUTPATIENT)
Dept: PRIMARY CARE CLINIC | Age: 71
End: 2021-11-18

## 2021-11-18 DIAGNOSIS — K74.60 CIRRHOSIS OF LIVER WITHOUT ASCITES, UNSPECIFIED HEPATIC CIRRHOSIS TYPE (HCC): ICD-10-CM

## 2021-11-18 DIAGNOSIS — D50.9 IRON DEFICIENCY ANEMIA, UNSPECIFIED IRON DEFICIENCY ANEMIA TYPE: Primary | ICD-10-CM

## 2021-11-18 NOTE — TELEPHONE ENCOUNTER
Pt came to tell you she needs Referral to Hematology/oncology office [facility] was [facility] visit in chart but they told her to get you to place referral asap.

## 2021-11-18 NOTE — OUTREACH NOTE
Prep Survey      Responses   [facility] discharged from? [facility]   Is LACE score < 7 ? No   Emergency Room discharge w/ pulse ox? No   Eligibility Readm Mgmt   Discharge diagnosis Acute on chronic blood loss anemia, present on admission   Does the patient have one of the following disease processes/diagnoses(primary or secondary)? Other   Does the patient have Home health ordered? No   Is there a DME ordered? No   Prep survey completed? Yes          Stephanie Aparicio RN

## 2021-11-19 LAB
CYTOLOGIST CVX/VAG CYTO: NORMAL
PATH INTERP BLD-IMP: NORMAL

## 2021-11-22 ENCOUNTER — OFFICE VISIT (OUTPATIENT)
Dept: PRIMARY CARE CLINIC | Age: 71
End: 2021-11-22
Payer: MEDICARE

## 2021-11-22 ENCOUNTER — HOSPITAL ENCOUNTER (OUTPATIENT)
Facility: HOSPITAL | Age: 71
Discharge: HOME | End: 2021-11-22
Payer: MEDICARE

## 2021-11-22 ENCOUNTER — READMISSION MANAGEMENT (OUTPATIENT)
Dept: CALL CENTER | Facility: HOSPITAL | Age: 71
End: 2021-11-22

## 2021-11-22 VITALS
HEIGHT: 64 IN | HEART RATE: 67 BPM | WEIGHT: 131 LBS | TEMPERATURE: 98 F | BODY MASS INDEX: 22.36 KG/M2 | DIASTOLIC BLOOD PRESSURE: 65 MMHG | RESPIRATION RATE: 16 BRPM | OXYGEN SATURATION: 100 % | SYSTOLIC BLOOD PRESSURE: 135 MMHG

## 2021-11-22 DIAGNOSIS — D50.8 OTHER IRON DEFICIENCY ANEMIA: ICD-10-CM

## 2021-11-22 DIAGNOSIS — Z09 HOSPITAL DISCHARGE FOLLOW-UP: Primary | ICD-10-CM

## 2021-11-22 DIAGNOSIS — D64.9 ANEMIA, UNSPECIFIED TYPE: ICD-10-CM

## 2021-11-22 LAB
A/G RATIO: 2.2 (ref 0.8–2)
ALBUMIN SERPL-MCNC: 3.9 G/DL (ref 3.4–4.8)
ALP BLD-CCNC: 94 U/L (ref 25–100)
ALT SERPL-CCNC: 78 U/L (ref 4–36)
ANION GAP SERPL CALCULATED.3IONS-SCNC: 9 MMOL/L (ref 3–16)
AST SERPL-CCNC: 57 U/L (ref 8–33)
BILIRUB SERPL-MCNC: 0.5 MG/DL (ref 0.3–1.2)
BUN BLDV-MCNC: 19 MG/DL (ref 6–20)
CALCIUM SERPL-MCNC: 9.2 MG/DL (ref 8.5–10.5)
CHLORIDE BLD-SCNC: 97 MMOL/L (ref 98–107)
CO2: 29 MMOL/L (ref 20–30)
CREAT SERPL-MCNC: 1.8 MG/DL (ref 0.4–1.2)
GFR AFRICAN AMERICAN: 34
GFR NON-AFRICAN AMERICAN: 28
GLOBULIN: 1.8 G/DL
GLUCOSE BLD-MCNC: 396 MG/DL (ref 74–106)
HCT VFR BLD CALC: 28.8 % (ref 37–47)
HEMOGLOBIN: 9 G/DL (ref 11.5–16.5)
LAB AP CASE REPORT: NORMAL
MCH RBC QN AUTO: 29.2 PG (ref 27–32)
MCHC RBC AUTO-ENTMCNC: 31.3 G/DL (ref 31–35)
MCV RBC AUTO: 93.5 FL (ref 80–100)
PATH REPORT.FINAL DX SPEC: NORMAL
PDW BLD-RTO: 16.8 % (ref 11–16)
PLATELET # BLD: 157 K/UL (ref 150–400)
PMV BLD AUTO: 12 FL (ref 6–10)
POTASSIUM SERPL-SCNC: 3.9 MMOL/L (ref 3.4–5.1)
RBC # BLD: 3.08 M/UL (ref 3.8–5.8)
SODIUM BLD-SCNC: 135 MMOL/L (ref 136–145)
TOTAL PROTEIN: 5.7 G/DL (ref 6.4–8.3)
WBC # BLD: 3.6 K/UL (ref 4–11)

## 2021-11-22 PROCEDURE — 1111F DSCHRG MED/CURRENT MED MERGE: CPT | Performed by: NURSE PRACTITIONER

## 2021-11-22 PROCEDURE — 85027 COMPLETE CBC AUTOMATED: CPT

## 2021-11-22 PROCEDURE — 80053 COMPREHEN METABOLIC PANEL: CPT

## 2021-11-22 PROCEDURE — 99214 OFFICE O/P EST MOD 30 MIN: CPT | Performed by: NURSE PRACTITIONER

## 2021-11-22 ASSESSMENT — PATIENT HEALTH QUESTIONNAIRE - PHQ9: DEPRESSION UNABLE TO ASSESS: URGENT/EMERGENT SITUATION

## 2021-11-22 NOTE — OUTREACH NOTE
Medical Week 1 Survey      Responses   [facility] patient discharged from? [facility]   Does the patient have one of the following disease processes/diagnoses(primary or secondary)? Other   Week 1 attempt successful? No   Unsuccessful attempts Attempt 1          Simon Polanco RN

## 2021-11-22 NOTE — PROGRESS NOTES
HYDROcodone-acetaminophen (NORCO) 5-325 MG per tablet Take 1 tablet by mouth 2 times daily as needed for Pain for up to 30 days. 30 tablet 0    insulin detemir (LEVEMIR FLEXTOUCH) 100 UNIT/ML injection pen Inject 20 Units into the skin nightly INJECT 55 UNITS INTO THE SKIN NIGHTLY 340B 90 day supply. 60 mL 1    spironolactone (ALDACTONE) 25 MG tablet Take 1 tablet by mouth daily 30 tablet 3    rosuvastatin (CRESTOR) 20 MG tablet TAKE 1 TABLET BY MOUTH ONCE DAILY 120 tablet 0    ferrous sulfate (IRON 325) 325 (65 Fe) MG tablet Take 325 mg by mouth daily (with breakfast)      [DISCONTINUED] furosemide (LASIX) 20 MG tablet Take 1 tablet by mouth daily as needed (swelling) 30 tablet 1    [DISCONTINUED] TRADJENTA 5 MG tablet TAKE 1 TABLET BY MOUTH EVERY  tablet 0    empagliflozin (JARDIANCE) 10 MG tablet Take 1 tablet by mouth daily 30 tablet 3    Insulin Pen Needle 31G X 6 MM MISC 1 each by Does not apply route daily 100 each 3    isosorbide mononitrate (IMDUR) 60 MG extended release tablet TAKE 1 TABLET BY MOUTH EVERY DAY 30 tablet 2    blood glucose monitor strips QID Dx E11.9 100 strip 5    umeclidinium-vilanterol (ANORO ELLIPTA) 62.5-25 MCG/INH AEPB inhaler Inhale 1 puff into the lungs daily      aspirin 81 MG chewable tablet Take 1 tablet by mouth daily 30 tablet 2    nitroGLYCERIN (NITROSTAT) 0.4 MG SL tablet Place 1 tablet under the tongue every 5 minutes as needed for Chest pain 25 tablet 1    Insulin Syringe-Needle U-100 (B-D INS SYR ULTRAFINE 1CC/31G) 31G X 5/16\" 1 ML MISC USE ONCE A DAY AS DIRECTED  DX E11.9 100 each 3      Medications patient taking as of now reconciled against medications ordered at time of hospital discharge: yes      Chief Complaint   Patient presents with    Follow-Up from Hospital     HPI    Pt here for hospital discharge follow up from [address] [place] [address]. Admitted R 11/15-11/17.  Diagnosed Acute on chronic blood loss anemia, Possible upper gastrointestinal bleeding, Symptomatic anemia, Brittnn Grew cirrhosis suspected. She did receive 2 units PRBCs during stay. Unknown etiology. GI note, Michele Brandt is still a possibility of jejunal or distal small bowel AVMs with bleeding\" Advised hematology consult too which was placed 11/18. Has pillcam scheduled soon Dr Eleonora Summers. GI note, \"Continue daily PPI. Recommend to continue to hold both aspirin and the Brilinta for now. Given her recurrent suspected GI bleed, patient may not be a candidate for dual therapy. Would recommend only on aspirin until her full work-up and evaluation is performed. Return to GI office for follow up in 6 weeks. Repeat colonoscopy in 6 months due to poor prep. \"    Patient unaware of pillcam appointment. C/o fatigue but no other symptoms. Taking all meds as directed. Inpatient course: Discharge summary reviewed- see chart. Interval history/Current status: none    Review of Systems   Constitutional: Positive for fatigue. Gastrointestinal: Negative for abdominal distention, abdominal pain, anal bleeding, blood in stool, constipation, diarrhea, nausea, rectal pain and vomiting. All other systems reviewed and are negative. Vitals:    11/22/21 1305   BP: 135/65   Site: Right Upper Arm   Position: Sitting   Cuff Size: Medium Adult   Pulse: 67   Resp: 16   Temp: 98 °F (36.7 °C)   SpO2: 100%   Weight: 131 lb (59.4 kg)   Height: 5' 4\" (1.626 m)     Body mass index is 22.49 kg/m². Wt Readings from Last 3 Encounters:   11/22/21 131 lb (59.4 kg)   11/09/21 131 lb 12.8 oz (59.8 kg)   11/01/21 132 lb 4 oz (60 kg)     BP Readings from Last 3 Encounters:   11/22/21 135/65   11/09/21 (!) 126/56   11/02/21 (!) 148/68       Physical Exam  Vitals and nursing note reviewed. Constitutional:       General: She is not in acute distress. Appearance: Normal appearance. HENT:      Head: Normocephalic. Eyes:      Conjunctiva/sclera: Conjunctivae normal.      Pupils: Pupils are equal, round, and reactive to light.    Cardiovascular: Rate and Rhythm: Normal rate and regular rhythm. Pulmonary:      Effort: Pulmonary effort is normal.      Breath sounds: Normal breath sounds. Abdominal:      General: Bowel sounds are normal.      Palpations: Abdomen is soft. Tenderness: There is no guarding. Musculoskeletal:         General: Normal range of motion. Cervical back: Normal range of motion. Right lower leg: No edema. Left lower leg: No edema. Lymphadenopathy:      Cervical: No cervical adenopathy. Skin:     General: Skin is warm. Capillary Refill: Capillary refill takes less than 2 seconds. Neurological:      General: No focal deficit present. Mental Status: She is alert and oriented to person, place, and time. Motor: No weakness. Coordination: Coordination normal.      Gait: Gait normal.   Psychiatric:         Mood and Affect: Mood normal.         Behavior: Behavior normal.         Thought Content: Thought content normal.         Lab Results   Component Value Date     11/09/2021    K 4.0 11/09/2021    K 3.5 11/01/2021     11/09/2021    CO2 20 11/09/2021    GLUCOSE 99 11/09/2021    BUN 23 11/09/2021    CREATININE 0.98 11/09/2021    CALCIUM 9.4 11/09/2021    PROT 5.7 11/09/2021    LABALBU 4.0 11/09/2021    BILITOT 0.6 11/09/2021    ALT 30 11/09/2021    AST 32 11/09/2021       Hemoglobin A1C (%)   Date Value   11/01/2021 5.4     Microscopic Examination (no units)   Date Value   04/27/2019 YES     LDL Calculated (mg/dL)   Date Value   12/03/2020 45         Lab Results   Component Value Date    WBC 4.5 11/09/2021    NEUTROABS 3.5 11/09/2021    HGB 8.0 11/09/2021    HCT 25.4 11/09/2021    MCV 87 11/09/2021     11/09/2021       Lab Results   Component Value Date    TSH 2.280 10/13/2021       Assessment/Plan:  1. Hospital discharge follow-up  2. Anemia, unspecified type  3. Other iron deficiency anemia  -Labs today. Will follow. -Referral for hematology placed on 11/18. -Contacted Dr Burdick Kin office to determine follow up appointment because pt unaware of an appointment. Has appt 11/24 for pillcam and directions given to pt. Will also obtain Labs today to check H/H and if low, will set up blood transfusion tomorrow. - CBC; Future  - COMPREHENSIVE METABOLIC PANEL;  Future        Medical Decision Making: moderate complexity

## 2021-11-23 ENCOUNTER — READMISSION MANAGEMENT (OUTPATIENT)
Dept: CALL CENTER | Facility: HOSPITAL | Age: 71
End: 2021-11-23

## 2021-11-23 NOTE — OUTREACH NOTE
Medical Week 1 Survey      Responses   [facility] patient discharged from? [facility]   Does the patient have one of the following disease processes/diagnoses(primary or secondary)? Other   Week 1 attempt successful? No   Unsuccessful attempts Attempt 2          Anaay Aldana RN

## 2021-11-24 ENCOUNTER — READMISSION MANAGEMENT (OUTPATIENT)
Dept: CALL CENTER | Facility: HOSPITAL | Age: 71
End: 2021-11-24

## 2021-11-24 ENCOUNTER — PROCEDURE VISIT (OUTPATIENT)
Dept: GASTROENTEROLOGY | Facility: CLINIC | Age: 71
End: 2021-11-24

## 2021-11-24 VITALS
WEIGHT: 132 LBS | BODY MASS INDEX: 22.53 KG/M2 | TEMPERATURE: 97.3 F | SYSTOLIC BLOOD PRESSURE: 102 MMHG | DIASTOLIC BLOOD PRESSURE: 60 MMHG | HEIGHT: 64 IN

## 2021-11-24 DIAGNOSIS — K92.2 GASTROINTESTINAL HEMORRHAGE, UNSPECIFIED GASTROINTESTINAL HEMORRHAGE TYPE: Primary | ICD-10-CM

## 2021-11-24 NOTE — PROGRESS NOTES
Patient presented to office for Pill Cam Endoscopy. Received consent. Discussed risks and benefits. Patient hooked to sensor belt monitor that was paired to capsule. Patient swallowed capsule without difficulty at 0835. Patient returned to office at 1645 to return equipment. Patient did not have any complaints of abdominal pain, nausea or vomiting. Pictures downloaded for review by Dr. Davila.    Lizeth Tanner, [facility]

## 2021-11-24 NOTE — OUTREACH NOTE
Medical Week 2 Survey      Responses   [facility] patient discharged from? [facility]   Does the patient have one of the following disease processes/diagnoses(primary or secondary)? Other   Week 2 attempt successful? Yes   Call start time 1509   Discharge diagnosis Acute on chronic blood loss anemia, present on admission   Call end time 1512   Meds reviewed with patient/caregiver? Yes   Is the patient taking all medications as directed (includes completed medication regime)? Yes   Does the patient have a primary care provider?  Yes   Comments regarding PCP Saw PCP    Has the patient kept scheduled appointments due by today? Yes   Has home health visited the patient within 72 hours of discharge? N/A   Psychosocial issues? No   What is the patient's perception of their health status since discharge? Improving  [Tired]   Is the patient/caregiver able to teach back signs and symptoms related to disease process for when to call PCP? Yes   Is the patient/caregiver able to teach back signs and symptoms related to disease process for when to call 911? Yes   Is the patient/caregiver able to teach back the hierarchy of who to call/visit for symptoms/problems? PCP, Specialist, Home health nurse, Urgent Care, ED, 911 Yes   If the patient is a current smoker, are they able to teach back resources for cessation? [id number]   Week 2 Call Completed? Yes          Alyx Hu RN

## 2021-11-29 DIAGNOSIS — M51.36 DEGENERATIVE DISC DISEASE, LUMBAR: ICD-10-CM

## 2021-11-29 RX ORDER — FUROSEMIDE 20 MG/1
TABLET ORAL
Qty: 90 TABLET | Refills: 2 | Status: SHIPPED | OUTPATIENT
Start: 2021-11-29 | End: 2021-12-16

## 2021-11-29 RX ORDER — LINAGLIPTIN 5 MG/1
TABLET, FILM COATED ORAL
Qty: 120 TABLET | Refills: 0 | Status: SHIPPED | OUTPATIENT
Start: 2021-11-29 | End: 2022-02-23

## 2021-11-29 RX ORDER — FUROSEMIDE 20 MG/1
TABLET ORAL
Qty: 30 TABLET | Refills: 1 | Status: SHIPPED | OUTPATIENT
Start: 2021-11-29 | End: 2021-11-29

## 2021-11-29 RX ORDER — HYDROCODONE BITARTRATE AND ACETAMINOPHEN 5; 325 MG/1; MG/1
1 TABLET ORAL 2 TIMES DAILY PRN
Qty: 30 TABLET | Refills: 0 | Status: SHIPPED | OUTPATIENT
Start: 2021-11-29 | End: 2021-12-28 | Stop reason: SDUPTHER

## 2021-11-29 RX ORDER — TICAGRELOR 90 MG/1
TABLET ORAL
Qty: 60 TABLET | Refills: 3 | Status: SHIPPED | OUTPATIENT
Start: 2021-11-29 | End: 2022-02-17 | Stop reason: ALTCHOICE

## 2021-11-30 ENCOUNTER — READMISSION MANAGEMENT (OUTPATIENT)
Dept: CALL CENTER | Facility: HOSPITAL | Age: 71
End: 2021-11-30

## 2021-11-30 NOTE — OUTREACH NOTE
Medical Week 3 Survey      Responses   [facility] patient discharged from? [facility]   Does the patient have one of the following disease processes/diagnoses(primary or secondary)? Other   Week 3 attempt successful? No   Unsuccessful attempts Attempt 1          Alyx Hu RN

## 2021-12-01 ENCOUNTER — READMISSION MANAGEMENT (OUTPATIENT)
Dept: CALL CENTER | Facility: HOSPITAL | Age: 71
End: 2021-12-01

## 2021-12-01 NOTE — OUTREACH NOTE
Medical Week 3 Survey      Responses   [facility] patient discharged from? [facility]   Does the patient have one of the following disease processes/diagnoses(primary or secondary)? Other   Week 3 attempt successful? No   Unsuccessful attempts Attempt 2   Rescheduled Revoked          Lata Becerra RN

## 2021-12-03 ENCOUNTER — TELEPHONE (OUTPATIENT)
Dept: PRIMARY CARE CLINIC | Age: 71
End: 2021-12-03

## 2021-12-03 NOTE — TELEPHONE ENCOUNTER
----- Message from Lowry Barthel, APRN - CNP sent at 11/23/2021 11:33 AM EST -----  Hgb not dropping. Creatinine 1.8 acute kidney injury. Increase water intake. Avoid NSAIDS. Repeat labs 2 weeks.  Next available appointment dr Konrad Choi

## 2021-12-05 PROCEDURE — 91110 GI TRC IMG INTRAL ESOPH-ILE: CPT | Performed by: INTERNAL MEDICINE

## 2021-12-06 ENCOUNTER — TELEPHONE (OUTPATIENT)
Dept: GASTROENTEROLOGY | Facility: CLINIC | Age: 71
End: 2021-12-06

## 2021-12-06 NOTE — TELEPHONE ENCOUNTER
Called patient several times, called patient's nephew and he is going to have her call us back. I will also send her a letter.

## 2021-12-07 ENCOUNTER — TELEPHONE (OUTPATIENT)
Dept: GASTROENTEROLOGY | Facility: CLINIC | Age: 71
End: 2021-12-07

## 2021-12-07 NOTE — TELEPHONE ENCOUNTER
----- Message from Russell Davila MD sent at 12/6/2021  7:31 PM EST -----    Her PillCam shows multiple AVMs in the distal ileum.   Let her start back on at least an aspirin p.o. daily and hold the Brilinta for now.  I will try to call her again tomorrow

## 2021-12-07 NOTE — TELEPHONE ENCOUNTER
Spoke with patient, then transferred patient call to Dr. Davila after I gave her the information as I was instructed.

## 2021-12-07 NOTE — TELEPHONE ENCOUNTER
----- Message from Niurka Elias sent at 12/3/2021  3:45 PM EST -----  Regarding: PILL CAM RESULTS  Patient called today for pill cam results.       Called the patient home number unable to reach her called her daughter Maris unable to reach her to discuss the PillCam report.  We will try to call her again tomorrow

## 2021-12-07 NOTE — TELEPHONE ENCOUNTER
I called the patient and discussed the PillCam report.  Patient has a few AVMs in the mid  Ileum.  This may be reachable only with the push enteroscopy, retrograde enteroscopy.     We discussed on discontinuing the Brilinta and continue with only aspirin for now.     If any overt bleeding patient need to be transferred to [facility] center push enteroscopy

## 2021-12-14 ASSESSMENT — ENCOUNTER SYMPTOMS
DIARRHEA: 0
ANAL BLEEDING: 0
RECTAL PAIN: 0
CONSTIPATION: 0
ABDOMINAL PAIN: 0
NAUSEA: 0
BLOOD IN STOOL: 0
ABDOMINAL DISTENTION: 0
VOMITING: 0

## 2021-12-15 ENCOUNTER — HOSPITAL ENCOUNTER (OUTPATIENT)
Facility: HOSPITAL | Age: 71
Discharge: HOME | End: 2021-12-15
Payer: MEDICARE

## 2021-12-15 DIAGNOSIS — N17.9 ACUTE KIDNEY INJURY (HCC): Primary | ICD-10-CM

## 2021-12-15 DIAGNOSIS — N17.9 ACUTE KIDNEY INJURY (HCC): ICD-10-CM

## 2021-12-15 PROCEDURE — 36415 COLL VENOUS BLD VENIPUNCTURE: CPT

## 2021-12-16 RX ORDER — FUROSEMIDE 20 MG/1
TABLET ORAL
Qty: 30 TABLET | Refills: 2 | Status: SHIPPED | OUTPATIENT
Start: 2021-12-16

## 2021-12-22 ENCOUNTER — TELEPHONE (OUTPATIENT)
Dept: PRIMARY CARE CLINIC | Age: 71
End: 2021-12-22

## 2021-12-22 DIAGNOSIS — M51.36 DEGENERATIVE DISC DISEASE, LUMBAR: ICD-10-CM

## 2021-12-27 RX ORDER — EMPAGLIFLOZIN 10 MG/1
1 TABLET, FILM COATED ORAL DAILY
Qty: 30 TABLET | Refills: 3 | Status: SHIPPED | OUTPATIENT
Start: 2021-12-27 | End: 2022-04-25

## 2021-12-28 RX ORDER — HYDROCODONE BITARTRATE AND ACETAMINOPHEN 5; 325 MG/1; MG/1
1 TABLET ORAL 2 TIMES DAILY PRN
Qty: 30 TABLET | Refills: 0 | Status: SHIPPED | OUTPATIENT
Start: 2021-12-28 | End: 2022-01-27 | Stop reason: SDUPTHER

## 2022-01-06 ENCOUNTER — OFFICE VISIT (OUTPATIENT)
Dept: PRIMARY CARE CLINIC | Age: 72
End: 2022-01-06
Payer: MEDICARE

## 2022-01-06 VITALS
TEMPERATURE: 97.4 F | WEIGHT: 142 LBS | BODY MASS INDEX: 24.37 KG/M2 | HEART RATE: 71 BPM | SYSTOLIC BLOOD PRESSURE: 136 MMHG | DIASTOLIC BLOOD PRESSURE: 80 MMHG | RESPIRATION RATE: 18 BRPM | OXYGEN SATURATION: 99 %

## 2022-01-06 DIAGNOSIS — K74.60 CIRRHOSIS OF LIVER WITHOUT ASCITES, UNSPECIFIED HEPATIC CIRRHOSIS TYPE (HCC): ICD-10-CM

## 2022-01-06 DIAGNOSIS — D64.9 ANEMIA, UNSPECIFIED TYPE: Primary | ICD-10-CM

## 2022-01-06 DIAGNOSIS — I71.20 THORACIC AORTIC ANEURYSM WITHOUT RUPTURE: ICD-10-CM

## 2022-01-06 DIAGNOSIS — N19 RENAL FAILURE, UNSPECIFIED CHRONICITY: ICD-10-CM

## 2022-01-06 DIAGNOSIS — Z79.4 TYPE 2 DIABETES MELLITUS WITH DIABETIC NEUROPATHY, WITH LONG-TERM CURRENT USE OF INSULIN (HCC): ICD-10-CM

## 2022-01-06 DIAGNOSIS — Z86.73 H/O: CVA (CEREBROVASCULAR ACCIDENT): ICD-10-CM

## 2022-01-06 DIAGNOSIS — I10 ESSENTIAL HYPERTENSION: ICD-10-CM

## 2022-01-06 DIAGNOSIS — E11.40 TYPE 2 DIABETES MELLITUS WITH DIABETIC NEUROPATHY, WITH LONG-TERM CURRENT USE OF INSULIN (HCC): ICD-10-CM

## 2022-01-06 DIAGNOSIS — E53.8 VITAMIN B12 DEFICIENCY: ICD-10-CM

## 2022-01-06 DIAGNOSIS — K55.20 AVM (ARTERIOVENOUS MALFORMATION) OF SMALL BOWEL, ACQUIRED: ICD-10-CM

## 2022-01-06 PROCEDURE — 99214 OFFICE O/P EST MOD 30 MIN: CPT | Performed by: INTERNAL MEDICINE

## 2022-01-06 PROCEDURE — 2022F DILAT RTA XM EVC RTNOPTHY: CPT | Performed by: INTERNAL MEDICINE

## 2022-01-06 PROCEDURE — 3017F COLORECTAL CA SCREEN DOC REV: CPT | Performed by: INTERNAL MEDICINE

## 2022-01-06 PROCEDURE — G8400 PT W/DXA NO RESULTS DOC: HCPCS | Performed by: INTERNAL MEDICINE

## 2022-01-06 PROCEDURE — 1036F TOBACCO NON-USER: CPT | Performed by: INTERNAL MEDICINE

## 2022-01-06 PROCEDURE — 1123F ACP DISCUSS/DSCN MKR DOCD: CPT | Performed by: INTERNAL MEDICINE

## 2022-01-06 PROCEDURE — G8484 FLU IMMUNIZE NO ADMIN: HCPCS | Performed by: INTERNAL MEDICINE

## 2022-01-06 PROCEDURE — G8420 CALC BMI NORM PARAMETERS: HCPCS | Performed by: INTERNAL MEDICINE

## 2022-01-06 PROCEDURE — 3046F HEMOGLOBIN A1C LEVEL >9.0%: CPT | Performed by: INTERNAL MEDICINE

## 2022-01-06 PROCEDURE — 1090F PRES/ABSN URINE INCON ASSESS: CPT | Performed by: INTERNAL MEDICINE

## 2022-01-06 PROCEDURE — 4040F PNEUMOC VAC/ADMIN/RCVD: CPT | Performed by: INTERNAL MEDICINE

## 2022-01-06 PROCEDURE — 82962 GLUCOSE BLOOD TEST: CPT | Performed by: INTERNAL MEDICINE

## 2022-01-06 PROCEDURE — G8427 DOCREV CUR MEDS BY ELIG CLIN: HCPCS | Performed by: INTERNAL MEDICINE

## 2022-01-06 PROCEDURE — 96372 THER/PROPH/DIAG INJ SC/IM: CPT | Performed by: INTERNAL MEDICINE

## 2022-01-06 RX ORDER — CYANOCOBALAMIN 1000 UG/ML
1000 INJECTION INTRAMUSCULAR; SUBCUTANEOUS ONCE
Status: COMPLETED | OUTPATIENT
Start: 2022-01-06 | End: 2022-01-06

## 2022-01-06 RX ADMIN — CYANOCOBALAMIN 1000 MCG: 1000 INJECTION INTRAMUSCULAR; SUBCUTANEOUS at 10:37

## 2022-01-06 ASSESSMENT — ENCOUNTER SYMPTOMS
EYE DISCHARGE: 0
ABDOMINAL PAIN: 0
WHEEZING: 0
SORE THROAT: 0
SHORTNESS OF BREATH: 0
NAUSEA: 0
BACK PAIN: 1
COUGH: 0
SINUS PRESSURE: 0
VOMITING: 0

## 2022-01-06 NOTE — PROGRESS NOTES
SUBJECTIVE:    Patient ID: Pratima Davis is a 73 y. o.female. Chief Complaint   Patient presents with    Diabetes    Hypertension    Anemia    Coronary Artery Disease         HPI:  Patient with hx of anemia and iron deficiency anemia. She did see  since last visit and completed small bowel enteroscopy. Patient denies any bleeding, hematemesis, or melena. She had extensive work-up with esaphegogastroendoscopy, colonoscopy and capsule endoscopy. She was found to have AVM in the ileum. Patient energy is improving. Patient has been compliant with treatment. Weight is up 11 pounds. She continues to smoke. Patient has had diabetes for the past several years. She has been compliant with the medications and denies any side effects from it. She has been monitoring fingersticks on a daily basis. Her fingerstick range is between . She denies any hypoglycemic symptoms. She has  been following a diabetic diet and has  been active. Her last eye exam was more than a year ago. She is using oral meds and Insulin. Levemir  55 units nightly. BP is stable    Patient's medications, allergies, past medical, surgical, social and family histories were reviewed and updated as appropriate in electronic medical record. Outpatient Medications Marked as Taking for the 1/6/22 encounter (Office Visit) with Lilia Alston MD   Medication Sig Dispense Refill    HYDROcodone-acetaminophen (NORCO) 5-325 MG per tablet Take 1 tablet by mouth 2 times daily as needed for Pain for up to 30 days.  30 tablet 0    JARDIANCE 10 MG tablet TAKE 1 TABLET BY MOUTH DAILY 30 tablet 3    furosemide (LASIX) 20 MG tablet TAKE 1 TABLET BY MOUTH DAILY AS NEEDED FOR SWELLING 30 tablet 2    TRADJENTA 5 MG tablet TAKE 1 TABLET BY MOUTH EVERY  tablet 0    metFORMIN (GLUCOPHAGE) 1000 MG tablet TAKE 1 TABLET BY MOUTH TWICE DAILY WITH FOOD 180 tablet 2    lactulose (CHRONULAC) 10 GM/15ML solution Take 30 mLs by mouth daily 900 mL 1    linaclotide (LINZESS) 290 MCG CAPS capsule Take 1 capsule by mouth every morning (before breakfast) 30 capsule 2    insulin detemir (LEVEMIR FLEXTOUCH) 100 UNIT/ML injection pen Inject 20 Units into the skin nightly INJECT 55 UNITS INTO THE SKIN NIGHTLY 340B 90 day supply. 60 mL 1    spironolactone (ALDACTONE) 25 MG tablet Take 1 tablet by mouth daily 30 tablet 3    rosuvastatin (CRESTOR) 20 MG tablet TAKE 1 TABLET BY MOUTH ONCE DAILY 120 tablet 0    pantoprazole (PROTONIX) 40 MG tablet Take 40 mg by mouth 2 times daily (before meals)       ferrous sulfate (IRON 325) 325 (65 Fe) MG tablet Take 325 mg by mouth daily (with breakfast)      Insulin Pen Needle 31G X 6 MM MISC 1 each by Does not apply route daily 100 each 3    isosorbide mononitrate (IMDUR) 60 MG extended release tablet TAKE 1 TABLET BY MOUTH EVERY DAY 30 tablet 2    blood glucose monitor strips QID Dx E11.9 100 strip 5    glucose monitoring kit (FREESTYLE) monitoring kit 1 kit by Does not apply route daily E11.40 1 kit 0    umeclidinium-vilanterol (ANORO ELLIPTA) 62.5-25 MCG/INH AEPB inhaler Inhale 1 puff into the lungs daily      aspirin 81 MG chewable tablet Take 1 tablet by mouth daily 30 tablet 2    nitroGLYCERIN (NITROSTAT) 0.4 MG SL tablet Place 1 tablet under the tongue every 5 minutes as needed for Chest pain 25 tablet 1    Insulin Syringe-Needle U-100 (B-D INS SYR ULTRAFINE 1CC/31G) 31G X 5/16\" 1 ML MISC USE ONCE A DAY AS DIRECTED  DX E11.9 100 each 3        Review of Systems   Constitutional: Negative for chills and fever. HENT: Negative for congestion, sinus pressure and sore throat. Eyes: Negative for discharge and visual disturbance. Respiratory: Negative for cough, shortness of breath and wheezing. Cardiovascular: Negative for chest pain and palpitations. CAZARES   Gastrointestinal: Negative for abdominal pain, nausea and vomiting. Endocrine: Negative for cold intolerance and heat intolerance. Genitourinary: Negative for dysuria, frequency and urgency. Musculoskeletal: Positive for arthralgias, back pain and gait problem. Skin: Negative for rash and wound. Neurological: Negative for syncope, numbness and headaches. Hematological: Negative. Psychiatric/Behavioral: Negative for agitation and sleep disturbance. The patient is not nervous/anxious. Past Medical History:   Diagnosis Date    CAD (coronary artery disease)     COPD (chronic obstructive pulmonary disease) ([facility] [place] [address]     Cystic fibrosis ([facility] [place] [address]     Diabetes mellitus ([facility] [place] [address]     GERD (gastroesophageal reflux disease)     H/O: CVA (cardiovascular accident)     HTN (hypertension)     Mass     on chest     Tobacco abuse     Vitamin B12 deficiency      Past Surgical History:   Procedure Laterality Date    CARPAL TUNNEL RELEASE Bilateral     CHOLECYSTECTOMY      CORONARY ANGIOPLASTY WITH STENT PLACEMENT      HYSTERECTOMY      JOINT REPLACEMENT Bilateral 10/15/2016    knees    TOTAL KNEE ARTHROPLASTY [facility]18/2016    [facility]     Family History   Problem Relation Age of Onset    Diabetes Mother     High Blood Pressure Mother     Cancer Mother         pancreatic    Diabetes Father     Heart Disease Father     High Blood Pressure Father     Cancer Sister         lung, breast    Cancer Brother         throat      Social History     Tobacco Use   Smoking Status Former Smoker    Packs/day: 1.00    Years: 40.00    Pack years: 40.00    Types: Cigarettes   Smokeless Tobacco Never Used   Tobacco Comment    states she is not willing to stop and this is a stress reliever for her.         OBJECTIVE:   Wt Readings from Last 3 Encounters:   01/06/22 142 lb (64.4 kg)   11/22/21 131 lb (59.4 kg)   11/09/21 131 lb 12.8 oz (59.8 kg)     BP Readings from Last 3 Encounters:   01/06/22 136/80   11/22/21 135/65   11/09/21 (!) 126/56       /80   Pulse 71   Temp 97.4 °F (36.3 °C)   Resp 18   Wt 142 lb (64.4 kg)   SpO2 99% BMI 24.37 kg/m²      Physical Exam  Vitals and nursing note reviewed. Constitutional:       Appearance: Normal appearance. She is well-developed. HENT:      Head: Normocephalic and atraumatic. Right Ear: External ear normal.      Left Ear: External ear normal.      Nose: Nose normal.      Mouth/Throat:      Mouth: Mucous membranes are moist.      Pharynx: Oropharynx is clear. Eyes:      Conjunctiva/sclera: Conjunctivae normal.      Pupils: Pupils are equal, round, and reactive to light. Neck:      Thyroid: No thyromegaly. Vascular: No JVD. Cardiovascular:      Rate and Rhythm: Normal rate and regular rhythm. Heart sounds: Normal heart sounds. Pulmonary:      Effort: Pulmonary effort is normal.      Breath sounds: Normal breath sounds. No wheezing or rales. Abdominal:      General: Bowel sounds are normal. There is no distension. Palpations: Abdomen is soft. Tenderness: There is no abdominal tenderness. Musculoskeletal:         General: No tenderness. Cervical back: Neck supple. No rigidity. No muscular tenderness. Right lower leg: No edema. Left lower leg: No edema. Skin:     Findings: No erythema or rash. Neurological:      General: No focal deficit present. Mental Status: She is alert and oriented to person, place, and time.    Psychiatric:         Behavior: Behavior normal.         Judgment: Judgment normal.         Lab Results   Component Value Date     12/15/2021    K 3.6 12/15/2021    K 3.5 11/01/2021    CL 96 12/15/2021    CO2 24 12/15/2021    GLUCOSE 383 12/15/2021    BUN 15 12/15/2021    CREATININE 1.64 12/15/2021    CALCIUM 9.3 12/15/2021    PROT 6.4 12/15/2021    LABALBU 3.9 12/15/2021    BILITOT 0.4 12/15/2021    ALT 22 12/15/2021    AST 23 12/15/2021       Hemoglobin A1C (%)   Date Value   11/01/2021 5.4     Microscopic Examination (no units)   Date Value   04/27/2019 YES     LDL Calculated (mg/dL)   Date Value   12/03/2020 45 Lab Results   Component Value Date    WBC 6.1 12/15/2021    NEUTROABS 4.4 12/15/2021    HGB 10.3 12/15/2021    HCT 31.8 12/15/2021    MCV 89 12/15/2021     12/15/2021       Lab Results   Component Value Date    TSH 2.280 10/13/2021         ASSESSMENT/PLAN:     1. Anemia, unspecified type  Anemia related to AVM. Monitor labs. Will refer to [facility] if any further problems. Gastroenterology note 12/7/21: I called the patient and discussed the PillCam report. Patient has a few AVMs in the mid Ileum. This may be reachable only with the push enteroscopy, retrograde enteroscopy. We discussed on discontinuing the Brilinta and continue with only aspirin for now. If any overt bleeding patient need to be transferred to [facility] push enteroscopy       - Comprehensive Metabolic Panel; Future  - CBC Auto Differential; Future  - Ferritin; Future  - Iron and TIBC; Future    2. AVM (arteriovenous malformation) of small bowel, acquired  As in # 1. Provided education to the patient regarding her condition. 3. Cirrhosis of liver without ascites, unspecified hepatic cirrhosis type ([facility] [address]  Will continue current medication regimen. Monitor labs. - Comprehensive Metabolic Panel; Future  - CBC Auto Differential; Future  - Ferritin; Future  - Iron and TIBC; Future    4. Thoracic aortic aneurysm without rupture [facility])  Will order imaging to complete later this year (September-October). Will make sure blood pressure and lipid profile under good control. 5. Type 2 diabetes mellitus with diabetic neuropathy, with long-term current use of insulin ([facility] [address]  I advised her regarding diabetic diet, exercise and weight control. Also, I advised her to stay on the current medication, monitor her fingerstick closely. I am going to check the A1c every few months. I will check microalbumin on a yearly basis. I have also advised her to have a yearly eye exam and to monitor her feet closely.   Along with instruction of possible

## 2022-01-06 NOTE — PROGRESS NOTES
Chief Complaint   Patient presents with    Diabetes    Hypertension    Anemia    Coronary Artery Disease     fs range  she thinks the high was when she had cake around [facility] 55 nightly     Have you seen any other physician or provider since your last visit no    Have you had any other diagnostic tests since your last visit? no    Have you changed or stopped any medications since your last visit?  Yes stopped brilinta          Eye exam yes will obtain record

## 2022-01-24 ENCOUNTER — LAB (OUTPATIENT)
Dept: LAB | Facility: HOSPITAL | Age: 72
End: 2022-01-24

## 2022-01-24 ENCOUNTER — OFFICE VISIT (OUTPATIENT)
Dept: GASTROENTEROLOGY | Facility: CLINIC | Age: 72
End: 2022-01-24

## 2022-01-24 VITALS
HEIGHT: 64 IN | WEIGHT: 145 LBS | BODY MASS INDEX: 24.75 KG/M2 | SYSTOLIC BLOOD PRESSURE: 142 MMHG | DIASTOLIC BLOOD PRESSURE: 68 MMHG | TEMPERATURE: 97.7 F

## 2022-01-24 DIAGNOSIS — K74.60 CIRRHOSIS OF LIVER WITHOUT ASCITES, UNSPECIFIED HEPATIC CIRRHOSIS TYPE: ICD-10-CM

## 2022-01-24 DIAGNOSIS — D12.6 ADENOMATOUS POLYP OF COLON, UNSPECIFIED PART OF COLON: ICD-10-CM

## 2022-01-24 DIAGNOSIS — D50.0 IRON DEFICIENCY ANEMIA DUE TO CHRONIC BLOOD LOSS: Primary | ICD-10-CM

## 2022-01-24 DIAGNOSIS — Z11.59 ENCOUNTER FOR SCREENING FOR OTHER VIRAL DISEASES: ICD-10-CM

## 2022-01-24 DIAGNOSIS — K55.20 ANGIODYSPLASIA: ICD-10-CM

## 2022-01-24 DIAGNOSIS — D50.0 IRON DEFICIENCY ANEMIA DUE TO CHRONIC BLOOD LOSS: ICD-10-CM

## 2022-01-24 DIAGNOSIS — K75.81 NONALCOHOLIC STEATOHEPATITIS (NASH): ICD-10-CM

## 2022-01-24 DIAGNOSIS — E78.49 OTHER HYPERLIPIDEMIA: ICD-10-CM

## 2022-01-24 PROCEDURE — 86381 MITOCHONDRIAL ANTIBODY EACH: CPT

## 2022-01-24 PROCEDURE — 99214 OFFICE O/P EST MOD 30 MIN: CPT | Performed by: INTERNAL MEDICINE

## 2022-01-24 PROCEDURE — 84478 ASSAY OF TRIGLYCERIDES: CPT | Performed by: INTERNAL MEDICINE

## 2022-01-24 PROCEDURE — 82103 ALPHA-1-ANTITRYPSIN TOTAL: CPT | Performed by: INTERNAL MEDICINE

## 2022-01-24 PROCEDURE — 82465 ASSAY BLD/SERUM CHOLESTEROL: CPT | Performed by: INTERNAL MEDICINE

## 2022-01-24 PROCEDURE — 86015 ACTIN ANTIBODY EACH: CPT | Performed by: INTERNAL MEDICINE

## 2022-01-24 PROCEDURE — 86038 ANTINUCLEAR ANTIBODIES: CPT | Performed by: INTERNAL MEDICINE

## 2022-01-24 PROCEDURE — 86803 HEPATITIS C AB TEST: CPT | Performed by: INTERNAL MEDICINE

## 2022-01-24 PROCEDURE — 80053 COMPREHEN METABOLIC PANEL: CPT | Performed by: INTERNAL MEDICINE

## 2022-01-24 PROCEDURE — 86706 HEP B SURFACE ANTIBODY: CPT | Performed by: INTERNAL MEDICINE

## 2022-01-24 PROCEDURE — 82977 ASSAY OF GGT: CPT | Performed by: INTERNAL MEDICINE

## 2022-01-24 PROCEDURE — 86704 HEP B CORE ANTIBODY TOTAL: CPT | Performed by: INTERNAL MEDICINE

## 2022-01-24 PROCEDURE — 85025 COMPLETE CBC W/AUTO DIFF WBC: CPT | Performed by: INTERNAL MEDICINE

## 2022-01-24 PROCEDURE — 86708 HEPATITIS A ANTIBODY: CPT | Performed by: INTERNAL MEDICINE

## 2022-01-24 PROCEDURE — 82172 ASSAY OF APOLIPOPROTEIN: CPT | Performed by: INTERNAL MEDICINE

## 2022-01-24 PROCEDURE — 83883 ASSAY NEPHELOMETRY NOT SPEC: CPT | Performed by: INTERNAL MEDICINE

## 2022-01-24 PROCEDURE — 83010 ASSAY OF HAPTOGLOBIN QUANT: CPT | Performed by: INTERNAL MEDICINE

## 2022-01-24 PROCEDURE — 87340 HEPATITIS B SURFACE AG IA: CPT | Performed by: INTERNAL MEDICINE

## 2022-01-24 RX ORDER — ROSUVASTATIN CALCIUM 20 MG/1
TABLET, COATED ORAL
Qty: 120 TABLET | Refills: 0 | Status: SHIPPED | OUTPATIENT
Start: 2022-01-24 | End: 2022-04-25

## 2022-01-24 NOTE — PROGRESS NOTES
Follow Up Note     Date: 2022   Patient Name: Karol Lopez  MRN: [id number]  : 1950     Referring Physician: Romaine Eugene MD    Chief Complaint:    Chief Complaint   Patient presents with   • Follow-up   • Anemia       Interval History:   2022  Karol Lopez is a 71 y.o. female who is here today for follow up for for anemia.  She is feeling much better now without any abdominal symptoms.  Nuys any melena or bright red rectal bleed.  No abdominal pain.  She is only on aspirin now.  Alimta has been held since 1 month.    2021  She had EGD completed yesterday and would like to discuss those results. Has not seen any rectal bleeding. Has black stools which she relates to iron supplement. No current abdominal pain or nausea.      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     She had a chronic anemia.  She was admitted in 2021 with a hemoglobin of 6 g/dL and at that time patient had a EGD done which did not reveal any esophageal varices, or obvious portal hypertensive gastropathy or any other upper GI source of bleeding.  She did have a diffuse erythema in the distal stomach suggesting possible gastropathy.  Colonoscopy done during the same.  Revealed multiple small angiectasia in the colon which were cauterized which were not bleeding during the procedure.  Her terminal ileum was normal.     She states that after she was discharged here she developed anemia again and had a 3 units of PRBC transfused 2 weeks ago.  She is on iron pills and always has a black stool.  Denies any obvious history suggestive of overt bleeding,  no blood in the stool as such or clots.  She also had a PRBC transfusion before her last admission in  September.     She has a chronic constipation for years.  Denies any acid reflux.  She takes ibuprofen as needed for back pain and also she smokes cigarettes.  Denies any alcohol use.  No family stop any colon cancer.     Recent CT scan abdomen pelvis done in September 2021 revealed signs of cirrhosis without any ascites.  EGD did not reveal any esophageal varices or obvious portal hypertensive gastropathy.     This time in ED she was again noted to have hemoglobin of 6 g/dL she was admitted for further management and GI was consulted  Subjective      Past Medical History:   Past Medical History:   Diagnosis Date   • Abdominal pain    • Acid reflux    • Anxiety    • Bruises easily    • Cataracts, bilateral    • Cirrhosis of liver (HCC)    • Constipation    • COPD (chronic obstructive pulmonary disease) (HCC)    • Coronary artery disease    • CTS (carpal tunnel syndrome)    • Diabetes (HCC)    • Elevated cholesterol    • Hearing loss    • Heart attack (HCC)     s/p stents   • Hypercholesteremia    • Hypertension    • Impaired functional mobility, balance, gait, and endurance    • Lower back pain    • Osteoarthritis    • Piercing     ears only   • Stroke (HCC)     2013-weak in right arm   • Tattoos     x2   • Tobacco abuse    • Tumor     in between breast closer to right breast   • Vitamin B12 deficiency    • Wears dentures     upper only   • Wears glasses      Past Surgical History:   Past Surgical History:   Procedure Laterality Date   • BREAST BIOPSY Right 5/10/2019    Procedure: BREAST BIOPSY RIGHT;  Surgeon: Kiana Frey MD;  Location: [facility] OR;  Service: General   • CARPAL TUNNEL RELEASE Bilateral    • CHOLECYSTECTOMY     • COLONOSCOPY N/A 9/30/2021    Procedure: COLONOSCOPY WITH POLYPECTOMY AND ABLATION OF AVM;  Surgeon: Russell Davila MD;  Location: [facility] ENDOSCOPY;  Service: Gastroenterology;  Laterality: N/A;   • CORONARY ANGIOPLASTY WITH STENT PLACEMENT  2012   • ENDOSCOPY N/A 9/29/2021     "Procedure: ESOPHAGOGASTRODUODENOSCOPY with biopsies;  Surgeon: Russell Davila MD;  Location: [facility] ENDOSCOPY;  Service: Gastroenterology;  Laterality: N/A;   • ENTEROSCOPY SMALL BOWEL N/A 11/16/2021    Procedure: ESOPHAGOGASTRODUODENOSCOPY WITH SMALL BOWEL ENTEROSCOPY and biopsy;  Surgeon: Russell Davila MD;  Location: [facility] ENDOSCOPY;  Service: Gastroenterology;  Laterality: N/A;   • HYSTERECTOMY      complete   • JOINT REPLACEMENT Bilateral     knee replacements   • TOTAL KNEE ARTHROPLASTY Bilateral 10/18/2016    MAUREEN Palomares MD       Family History:   Family History   Problem Relation Age of Onset   • Hypertension Other    • Diabetes Mother    • Hypertension Mother    • Cancer Mother    • Diabetes Father    • Hypertension Father    • Heart disease Father    • Cancer Sister    • Cancer Brother        Social History:   Social History     Socioeconomic History   • Marital status:    Tobacco Use   • Smoking status: Current Every Day Smoker     Packs/day: 1.00     Years: 46.00     Pack years: 46.00     Types: Cigarettes   • Smokeless tobacco: Never Used   • Tobacco comment: PT REPORTS SMOKED THIS AM 5/10/19   Vaping Use   • Vaping Use: Never used   Substance and Sexual Activity   • Alcohol use: No   • Drug use: No   • Sexual activity: Defer       Medications:     Current Outpatient Medications:   •  aspirin 81 MG chewable tablet, Take 81 mg by mouth daily., Disp: , Rfl:   •  B-D INS SYRINGE 0.5CC/31GX5/16 31G X 5/16\" 0.5 ML misc, use as directed once daily, Disp: , Rfl: 0  •  cyanocobalamin 1000 MCG/ML injection, Inject 1,000 mcg into the appropriate muscle as directed by prescriber., Disp: , Rfl:   •  ferrous sulfate (FerrouSul) 325 (65 FE) MG tablet, Take 1 tablet by mouth Daily With Breakfast., Disp: 30 tablet, Rfl: 3  •  furosemide (LASIX) 20 MG tablet, Take 20 mg by mouth Daily As Needed. swelling, Disp: , Rfl:   •  glipizide (GLUCOTROL) 5 MG tablet, Take 5 mg by mouth Daily. Only took " "for 2 days filled on 02/26/21 , Disp: , Rfl:   •  HYDROcodone-acetaminophen (NORCO) 5-325 MG per tablet, Take 1 tablet by mouth Every 8 (Eight) Hours As Needed (PRN PAIN). (Patient taking differently: Take 1 tablet by mouth Daily. As needed), Disp: 30 tablet, Rfl: 0  •  Insulin Pen Needle 31G X 6 MM misc, 1 each., Disp: , Rfl:   •  Insulin Syringe-Needle U-100 ([employer] INSULIN SYRINGE) 31G X 5/16\" 1 ML misc, 1 each by Does not apply route daily, Disp: , Rfl:   •  Insulin Syringe-Needle U-100 30G X 5/16\" 0.5 ML misc, 1 each by Does not apply route daily, Disp: , Rfl:   •  isosorbide mononitrate (IMDUR) 60 MG 24 hr tablet, Take 1 tablet by mouth Every Morning. Need lab work and follow up appt for further refills. Otherwise defer to PCP., Disp: 30 tablet, Rfl: 0  •  LEVEMIR 100 UNIT/ML injection, Inject 20 Units under the skin into the appropriate area as directed Daily. Adjust per sliding scale, Disp: , Rfl: 0  •  lisinopril (PRINIVIL,ZESTRIL) 5 MG tablet, Take 5 mg by mouth 2 (Two) Times a Day., Disp: , Rfl:   •  rosuvastatin (CRESTOR) 20 MG tablet, Take 1 tablet by mouth Daily., Disp: 90 tablet, Rfl: 1  •  pantoprazole (Protonix) 40 MG EC tablet, Take 1 tablet by mouth 2 (Two) Times a Day Before Meals. (Patient taking differently: Take 40 mg by mouth Daily.), Disp: 60 tablet, Rfl: 5    Allergies:   Allergies   Allergen Reactions   • Codeine GI Intolerance       Review of Systems:   Review of Systems   Constitutional: Negative for appetite change, fatigue, fever and unexpected weight loss.   HENT: Negative for trouble swallowing.    Gastrointestinal: Negative for abdominal distention, abdominal pain, anal bleeding, blood in stool, constipation, diarrhea, nausea, rectal pain, vomiting, GERD and indigestion.       The following portions of the patient's history were reviewed and updated as appropriate: allergies, current medications, past family history, past medical history, past social history, past surgical history " "and problem list.    Objective     Physical Exam:  Vital Signs:   Vitals:    01/24/22 1434   BP: 142/68   Temp: 97.7 °F (36.5 °C)   TempSrc: Infrared   Weight: 65.8 kg (145 lb)   Height: 162.6 cm (64\")       Physical Exam  Constitutional:       Appearance: Normal appearance.   HENT:      Head: Normocephalic and atraumatic.   Eyes:      Comments: Pallor present   Abdominal:      General: Abdomen is flat. There is no distension.      Palpations: There is no mass.      Tenderness: There is no abdominal tenderness. There is no guarding or rebound.      Hernia: No hernia is present.   Musculoskeletal:      Cervical back: Normal range of motion and neck supple.   Neurological:      Mental Status: She is alert.         Results Review:   I reviewed the patient's new clinical results.    Admission on 11/15/2021, Discharged on 11/17/2021   Component Date Value Ref Range Status   • Glucose 11/15/2021 167* 65 - 99 mg/dL Final   • BUN 11/15/2021 31* 8 - 23 mg/dL Final   • Creatinine 11/15/2021 1.13* 0.57 - 1.00 mg/dL Final   • Sodium 11/15/2021 137  136 - 145 mmol/L Final   • Potassium 11/15/2021 3.9  3.5 - 5.2 mmol/L Final   • Chloride 11/15/2021 95* 98 - 107 mmol/L Final   • CO2 11/15/2021 19.1* 22.0 - 29.0 mmol/L Final   • Calcium 11/15/2021 10.3  8.6 - 10.5 mg/dL Final   • Total Protein 11/15/2021 6.2  6.0 - 8.5 g/dL Final   • Albumin 11/15/2021 4.00  3.50 - 5.20 g/dL Final   • ALT (SGPT) 11/15/2021 26  1 - 33 U/L Final   • AST (SGOT) 11/15/2021 30  1 - 32 U/L Final   • Alkaline Phosphatase 11/15/2021 74  39 - 117 U/L Final   • Total Bilirubin 11/15/2021 0.7  0.0 - 1.2 mg/dL Final   • eGFR Non  Amer 11/15/2021 48* >60 mL/min/1.73 Final   • Globulin 11/15/2021 2.2  gm/dL Final   • A/G Ratio 11/15/2021 1.8  g/dL Final   • BUN/Creatinine Ratio 11/15/2021 27.4* 7.0 - 25.0 Final   • Anion Gap 11/15/2021 22.9* 5.0 - 15.0 mmol/L Final   • Troponin T 11/15/2021 <0.010  0.000 - 0.030 ng/mL Final   • Magnesium 11/15/2021 2.0  " 1.6 - 2.4 mg/dL Final   • Color, UA 11/15/2021 Yellow  Yellow, Straw Final   • Appearance, UA 11/15/2021 Clear  Clear Final   • pH, UA 11/15/2021 <=5.0  5.0 - 8.0 Final   • Specific Gravity, UA 11/15/2021 1.026  1.005 - 1.030 Final   • Glucose, UA 11/15/2021 >=1000 mg/dL (3+)* Negative Final   • Ketones, UA 11/15/2021 Negative  Negative Final   • Bilirubin, UA 11/15/2021 Negative  Negative Final   • Blood, UA 11/15/2021 Negative  Negative Final   • Protein, UA 11/15/2021 30 mg/dL (1+)* Negative Final   • Leuk Esterase, UA 11/15/2021 Negative  Negative Final   • Nitrite, UA 11/15/2021 Negative  Negative Final   • Urobilinogen, UA 11/15/2021 1.0 E.U./dL  0.2 - 1.0 E.U./dL Final   • Extra Tube 11/15/2021 Hold for add-ons.   Final    Auto resulted.   • Extra Tube 11/15/2021 hold for add-on   Final    Auto resulted   • Extra Tube 11/15/2021 Hold for add-ons.   Final    Auto resulted.   • Extra Tube 11/15/2021 hold for add-on   Final    Auto resulted   • WBC 11/15/2021 6.75  3.40 - 10.80 10*3/mm3 Final   • RBC 11/15/2021 2.32* 3.77 - 5.28 10*6/mm3 Final   • Hemoglobin 11/15/2021 6.3* 12.0 - 15.9 g/dL Final   • Hematocrit 11/15/2021 21.4* 34.0 - 46.6 % Final   • MCV 11/15/2021 92.2  79.0 - 97.0 fL Final   • MCH 11/15/2021 27.2  26.6 - 33.0 pg Final   • MCHC 11/15/2021 29.4* 31.5 - 35.7 g/dL Final   • RDW 11/15/2021 17.9* 12.3 - 15.4 % Final   • RDW-SD 11/15/2021 59.1* 37.0 - 54.0 fl Final   • MPV 11/15/2021 11.5  6.0 - 12.0 fL Final   • Platelets 11/15/2021 209  140 - 450 10*3/mm3 Final   • Neutrophil % 11/15/2021 81.0* 42.7 - 76.0 % Final   • Lymphocyte % 11/15/2021 12.0* 19.6 - 45.3 % Final   • Monocyte % 11/15/2021 6.2  5.0 - 12.0 % Final   • Eosinophil % 11/15/2021 0.1* 0.3 - 6.2 % Final   • Basophil % 11/15/2021 0.3  0.0 - 1.5 % Final   • Immature Grans % 11/15/2021 0.4  0.0 - 0.5 % Final   • Neutrophils, Absolute 11/15/2021 5.46  1.70 - 7.00 10*3/mm3 Final   • Lymphocytes, Absolute 11/15/2021 0.81  0.70 - 3.10  10*3/mm3 Final   • Monocytes, Absolute 11/15/2021 0.42  0.10 - 0.90 10*3/mm3 Final   • Eosinophils, Absolute 11/15/2021 0.01  0.00 - 0.40 10*3/mm3 Final   • Basophils, Absolute 11/15/2021 0.02  0.00 - 0.20 10*3/mm3 Final   • Immature Grans, Absolute 11/15/2021 0.03  0.00 - 0.05 10*3/mm3 Final   • nRBC 11/15/2021 0.0  0.0 - 0.2 /100 WBC Final   • Glucose 11/15/2021 171* 70 - 130 mg/dL Final    Serial Number: [id number]:  [id number]   • Hypochromia 11/15/2021 Slight/1+  None Seen Final   • WBC Morphology 11/15/2021 Normal  Normal Final   • Platelet Estimate 11/15/2021 Adequate  Normal Final   • ABO Type 11/15/2021 A   Final   • RH type 11/15/2021 Negative   Final   • Antibody Screen 11/15/2021 Negative   Final   • T&S Expiration Date 11/15/2021 11/18/2021 11:59:59 PM   Final   • Product Code 11/17/2021 F4447Y13   Final   • Unit Number 11/17/2021 [id number]   Final   • UNIT  ABO 11/17/2021 A   Final   • UNIT  RH 11/17/2021 NEG   Final   • Crossmatch Interpretation 11/17/2021 Compatible   Final   • Dispense Status 11/17/2021 PT   Final   • Blood Expiration Date 11/17/2021 202112012359   Final   • Blood Type Barcode 11/17/2021 0600   Final   • Product Code 11/17/2021 R6541Y79   Final   • Unit Number 11/17/2021 [id number]   Final   • UNIT  ABO 11/17/2021 A   Final   • UNIT  RH 11/17/2021 NEG   Final   • Crossmatch Interpretation 11/17/2021 Compatible   Final   • Dispense Status 11/17/2021 PT   Final   • Blood Expiration Date 11/17/2021 202112012359   Final   • COVID19 11/15/2021 Not Detected  Not Detected - Ref. Range Final   • Hemoglobin 11/15/2021 6.9* 12.0 - 15.9 g/dL Final   • Glucose 11/15/2021 143* 70 - 130 mg/dL Final    Serial Number: [id number]:  [id number]   • RBC, UA 11/15/2021 None Seen  None Seen /HPF Final   • WBC, UA 11/15/2021 0-2* None Seen /HPF Final   • Bacteria, UA 11/15/2021 Trace* None Seen /HPF Final   • Squamous Epithelial Cells, UA 11/15/2021 0-2  None Seen, 0-2 /HPF Final   •  Hyaline Casts, UA 11/15/2021 None Seen  None Seen /LPF Final   • Methodology 11/15/2021 Manual Light Microscopy   Final   • Glucose 11/16/2021 98  65 - 99 mg/dL Final   • BUN 11/16/2021 32* 8 - 23 mg/dL Final   • Creatinine 11/16/2021 1.04* 0.57 - 1.00 mg/dL Final   • Sodium 11/16/2021 136  136 - 145 mmol/L Final   • Potassium 11/16/2021 3.6  3.5 - 5.2 mmol/L Final   • Chloride 11/16/2021 99  98 - 107 mmol/L Final   • CO2 11/16/2021 25.2  22.0 - 29.0 mmol/L Final   • Calcium 11/16/2021 9.3  8.6 - 10.5 mg/dL Final   • eGFR Non  Amer 11/16/2021 53* >60 mL/min/1.73 Final   • BUN/Creatinine Ratio 11/16/2021 30.8* 7.0 - 25.0 Final   • Anion Gap 11/16/2021 11.8  5.0 - 15.0 mmol/L Final   • Protime 11/16/2021 14.3  12.0 - 15.1 Seconds Final   • INR 11/16/2021 1.06  0.90 - 1.10 Final   • PTT 11/16/2021 26.2  24.5 - 37.2 seconds Final   • WBC 11/16/2021 4.62  3.40 - 10.80 10*3/mm3 Final   • RBC 11/16/2021 2.99* 3.77 - 5.28 10*6/mm3 Final   • Hemoglobin 11/16/2021 8.6* 12.0 - 15.9 g/dL Final   • Hematocrit 11/16/2021 26.1* 34.0 - 46.6 % Final   • MCV 11/16/2021 87.3  79.0 - 97.0 fL Final   • MCH 11/16/2021 28.8  26.6 - 33.0 pg Final   • MCHC 11/16/2021 33.0  31.5 - 35.7 g/dL Final   • RDW 11/16/2021 15.4  12.3 - 15.4 % Final   • RDW-SD 11/16/2021 47.8  37.0 - 54.0 fl Final   • MPV 11/16/2021 11.4  6.0 - 12.0 fL Final   • Platelets 11/16/2021 166  140 - 450 10*3/mm3 Final   • Neutrophil % 11/16/2021 73.4  42.7 - 76.0 % Final   • Lymphocyte % 11/16/2021 15.8* 19.6 - 45.3 % Final   • Monocyte % 11/16/2021 8.9  5.0 - 12.0 % Final   • Eosinophil % 11/16/2021 0.4  0.3 - 6.2 % Final   • Basophil % 11/16/2021 0.4  0.0 - 1.5 % Final   • Immature Grans % 11/16/2021 1.1* 0.0 - 0.5 % Final   • Neutrophils, Absolute 11/16/2021 3.39  1.70 - 7.00 10*3/mm3 Final   • Lymphocytes, Absolute 11/16/2021 0.73  0.70 - 3.10 10*3/mm3 Final   • Monocytes, Absolute 11/16/2021 0.41  0.10 - 0.90 10*3/mm3 Final   • Eosinophils, Absolute 11/16/2021  0.02  0.00 - 0.40 10*3/mm3 Final   • Basophils, Absolute 11/16/2021 0.02  0.00 - 0.20 10*3/mm3 Final   • Immature Grans, Absolute 11/16/2021 0.05  0.00 - 0.05 10*3/mm3 Final   • nRBC 11/16/2021 0.0  0.0 - 0.2 /100 WBC Final   • Hemoglobin 11/16/2021 9.0* 12.0 - 15.9 g/dL Final   • Glucose 11/16/2021 98  70 - 130 mg/dL Final    Serial Number: [id number]:  [id number]   • Glucose 11/16/2021 101  70 - 130 mg/dL Final    Serial Number: [id number]:  [id number]   • Case Report 11/16/2021    Final                    Value:Surgical Pathology Report                         Case: [id number]                                  Authorizing Provider:  Russell Davila MD  Collected:           11/16/2021 03:36 PM          Ordering Location:     [facility]    Received:            11/17/2021 09:02 AM                                 [facility]                                                                    Pathologist:           Isaías Martinez DO                                                           Specimen:    Gastric, Body, Abnormal mucosa biopsy in distal stomach                                   • Final Diagnosis 11/16/2021    Final                    Value:This result contains rich text formatting which cannot be displayed here.   • Glucose 11/16/2021 227* 70 - 130 mg/dL Final    Serial Number: [id number]:  [id number]   • Glucose 11/17/2021 117* 65 - 99 mg/dL Final   • BUN 11/17/2021 22  8 - 23 mg/dL Final   • Creatinine 11/17/2021 1.01* 0.57 - 1.00 mg/dL Final   • Sodium 11/17/2021 137  136 - 145 mmol/L Final   • Potassium 11/17/2021 3.5  3.5 - 5.2 mmol/L Final   • Chloride 11/17/2021 101  98 - 107 mmol/L Final   • CO2 11/17/2021 25.3  22.0 - 29.0 mmol/L Final   • Calcium 11/17/2021 9.2  8.6 - 10.5 mg/dL Final   • eGFR Non  Amer 11/17/2021 54* >60 mL/min/1.73 Final   • BUN/Creatinine Ratio 11/17/2021 21.8  7.0 - 25.0 Final   • Anion Gap 11/17/2021 10.7  5.0 - 15.0 mmol/L Final   • WBC  11/17/2021 3.44  3.40 - 10.80 10*3/mm3 Final   • RBC 11/17/2021 2.88* 3.77 - 5.28 10*6/mm3 Final   • Hemoglobin 11/17/2021 8.2* 12.0 - 15.9 g/dL Final   • Hematocrit 11/17/2021 25.4* 34.0 - 46.6 % Final   • MCV 11/17/2021 88.2  79.0 - 97.0 fL Final   • MCH 11/17/2021 28.5  26.6 - 33.0 pg Final   • MCHC 11/17/2021 32.3  31.5 - 35.7 g/dL Final   • RDW 11/17/2021 15.8* 12.3 - 15.4 % Final   • RDW-SD 11/17/2021 49.1  37.0 - 54.0 fl Final   • MPV 11/17/2021 11.3  6.0 - 12.0 fL Final   • Platelets 11/17/2021 159  140 - 450 10*3/mm3 Final   • Neutrophil % 11/17/2021 69.5  42.7 - 76.0 % Final   • Lymphocyte % 11/17/2021 17.4* 19.6 - 45.3 % Final   • Monocyte % 11/17/2021 11.6  5.0 - 12.0 % Final   • Eosinophil % 11/17/2021 0.6  0.3 - 6.2 % Final   • Basophil % 11/17/2021 0.3  0.0 - 1.5 % Final   • Immature Grans % 11/17/2021 0.6* 0.0 - 0.5 % Final   • Neutrophils, Absolute 11/17/2021 2.39  1.70 - 7.00 10*3/mm3 Final   • Lymphocytes, Absolute 11/17/2021 0.60* 0.70 - 3.10 10*3/mm3 Final   • Monocytes, Absolute 11/17/2021 0.40  0.10 - 0.90 10*3/mm3 Final   • Eosinophils, Absolute 11/17/2021 0.02  0.00 - 0.40 10*3/mm3 Final   • Basophils, Absolute 11/17/2021 0.01  0.00 - 0.20 10*3/mm3 Final   • Immature Grans, Absolute 11/17/2021 0.02  0.00 - 0.05 10*3/mm3 Final   • nRBC 11/17/2021 0.0  0.0 - 0.2 /100 WBC Final   • Glucose 11/17/2021 117  70 - 130 mg/dL Final    Serial Number: [id number]:  [id number]   • Performed by: 11/17/2021 Dr. Vieira    Final   • Pathologist Interpretation 11/17/2021 See Scanned Report     Final   • Glucose 11/17/2021 253* 70 - 130 mg/dL Final    Serial Number: [id number]:  [id number]   • Glucose 11/17/2021 213* 70 - 130 mg/dL Final    Serial Number: [id number]:  [id number]      XR Chest 1 View    Result Date: 11/15/2021  No acute cardiopulmonary process.  Continued followup is recommended.  This report was finalized on 11/15/2021 3:33 PM by Janet Craig M.D..    CTAP with contrast  9/28/2021:  FINDINGS:  Abdomen: The gallbladder has been removed.  There is cirrhosis  without focal liver lesion.  There is borderline splenomegaly.  The portal vein is mildly dilated, but there are no apparent  venous collaterals.  The other solid abdominal organs and  ureters are unremarkable.  The GI tract is unremarkable, with no  sign of appendicitis.  Pelvis: The uterus is absent.  There is a  2 cm left ovarian cyst.  The urinary bladder is unremarkable.  There is no pelvic or abdominal ascites, adenopathy or acute  osseous abnormality.  IMPRESSION:  Cirrhosis without ascites.  No acute abnormality.     EGD was completed by Dr. Davila 9/29/2021:  - The oropharynx was normal.  - The Z-line was regular and was found 38 cm from the incisors.  - No gross lesions were noted in the entire esophagus.  - No esophgeal varices  - Bilious fluid was found in the gastric fundus.  - Striped moderately erythematous mucosa without bleeding was found in the gastric antrum and in the prepyloric region of the stomach. Biopsies were taken with a cold forceps for Helicobacter pylori testing.  - The duodenal bulb, first portion of the duodenum, second portion of the duodenum and third portion of the duodenum were normal. Biopsies for histology were taken with a cold forceps for evaluation of celiac disease.  - No obvious Upper GI bleeding identified  Pathology showed unremarkable small bowel mucosa of the duodenum, reactive gastropathy, negative for H pylori or intestinal metaplasia of the antrum.     Colonoscopy was completed by Dr. Davila on 9/30/2021:  - The perianal and digital rectal examinations were normal.  - A 8 mm polyp was found in the cecum. The polyp was sessile. The polyp was removed with a hot snare. Resection and retrieval were complete.  - A 5 mm polyp was found in the proximal ascending colon. The polyp was sessile. The polyp was removed with a hot snare. Resection and retrieval were complete.  - A 5 mm polyp  was found in the proximal transverse colon. The polyp was sessile. The polyp was removed with a hot snare. Resection and retrieval were complete.  - A few small patchy angioectasias with bleeding on contact were found at the hepatic flexure. Vaporization for hemostasis using argon beam at 2 liters/minute and 20 kraft was successful. Estimated blood loss was minimal.  - A large amount of liquid semi-liquid semi-solid stool was found in the entire colon, interfering with visualization. Lavage of the area was performed using a moderate amount of sterile water, resulting in incomplete clearance with fair visualization.  - Views suboptimal rt colon, left colon due to stool  Pathology showed tubular adenoma of ascending colon polyp, tubular adenoma of the transevere colon, tubular adenoma of the cecal polyp.     EGD with enteroscopy was completed by Dr. Davila on 11/16/2021:  - There was no evidence of significant pathology in the duodenal bulb, in the first portion of the duodenum, in the second portion of the duodenum, in the third portion of the duodenum and in the fourth portion of the duodenum.  - There was no evidence of significant pathology in the proximal jejunum.  - The Z-line was regular and was found 36 cm from the incisors.  - No gross lesions were noted in the entire esophagus.  - Diffuse mildly erythematous mucosa without bleeding was found in the gastric antrum and in the prepyloric region of the stomach.  - Patchy mild mucosal changes characterized by white papules were found in the gastric antrum and in the prepyloric region of the stomach. Biopsies were taken with a cold forceps for histology.  - No upper GI source of bleeding identified for about 130cm from angle of the mouth.  Pathology pending.   Assessment / Plan      1.  Severe symptomatic iron deficiency anemia  2.  History of colonic angiectasia  3.  Small bowel angiectasias (mid ileal AVMs)  4.  History of GI bleed  1/24/2022  Etiology of her  chronic anemia is unclear and appears to be multifactorial including cirrhosis however patient did have episodes of GI bleed possibly associated with the small bowel AVMs.  She had a EGD/colonoscopy/small bowel PillCam study.  Currently she is stable with a stable hemoglobin.  No signs of any current GI bleed.  Her PillCam study done on 11/24/2021 revealed few small mid ileal AVMs without any bleeding.  These AVMs are beyond the reach of standard upper GI enteroscopy.  This needs possibly push enteroscopy if there is any bleeding to control the bleeding.  After discussing with the patient finally agreed on discontinuing the Brilinta and continue with aspirin for now.  No signs of any current GI bleed.  Her last hemoglobin #2021 was 9 g/dL.  We will continue iron pills for now.  Continue low-dose PPI  We will recommend to follow-up with the hematology once to rule out any other etiology for her anemia.  Patient missed the appointment in December with hematology.  Advised to follow-up with hematology  CBC today and follow-up in 3 months time      11/17/2021  With her history of colonic AVMs there is the possibility of small bowel AVMs.  Patient also has a possible cirrhosis and she is high risk for AVMs. Patient is currently on aspirin and Brilinta which is significantly contributing to her possible GI bleed.  EGD completed yesterday with enteroscopy and no upper GI bleeding noted, no AVMs in the proximal jejunum. Rectal exam yesterday showed no signs of overt bleeding. No witnessed GI bleeding anytime recentlyCT abdomen pelvis done in September 2021 did not reveal any other abnormality except signs of cirrhosis.  Given her significant anemia without overt bleeding, other etiologies need to be ruled out including MDS.      5.  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  We will get a basic liver work-up to rule out any other etiology for her  cirrhosis.  Patient is currently taking Lasix as needed for leg swelling we will continue the same.  Recent imaging did not reveal any ascites.  Low-salt diet discussed with the patient.    6.  Adenomatous colon polyps  She had a colonoscopy done on 9/30/2021 which revealed colon polyps.  Three subcentimeter polyps removed all of them were tubular adenoma without any dysplasia  She needs a surveillance colonoscopy in 2024        Follow Up:   No follow-ups on file.    Russell Davila MD  [facility]  1/24/2022  14:38 EST     Please note that portions of this note may have been completed with a voice recognition program.

## 2022-01-25 LAB — MITOCHONDRIA M2 IGG SER-ACNC: <20 UNITS (ref 0–20)

## 2022-01-27 DIAGNOSIS — M51.36 DEGENERATIVE DISC DISEASE, LUMBAR: ICD-10-CM

## 2022-01-27 RX ORDER — HYDROCODONE BITARTRATE AND ACETAMINOPHEN 5; 325 MG/1; MG/1
1 TABLET ORAL 2 TIMES DAILY PRN
Qty: 30 TABLET | Refills: 0 | Status: SHIPPED | OUTPATIENT
Start: 2022-01-27 | End: 2022-02-25 | Stop reason: SDUPTHER

## 2022-02-01 RX ORDER — INSULIN DETEMIR 100 [IU]/ML
20 INJECTION, SOLUTION SUBCUTANEOUS NIGHTLY
Qty: 60 ML | Refills: 1 | Status: ON HOLD | OUTPATIENT
Start: 2022-02-01 | End: 2022-03-17

## 2022-02-11 ENCOUNTER — HOSPITAL ENCOUNTER (OUTPATIENT)
Facility: HOSPITAL | Age: 72
Discharge: HOME | End: 2022-02-11
Payer: MEDICARE

## 2022-02-11 DIAGNOSIS — K74.60 CIRRHOSIS OF LIVER WITHOUT ASCITES, UNSPECIFIED HEPATIC CIRRHOSIS TYPE (HCC): ICD-10-CM

## 2022-02-11 DIAGNOSIS — E11.40 TYPE 2 DIABETES MELLITUS WITH DIABETIC NEUROPATHY, WITH LONG-TERM CURRENT USE OF INSULIN (HCC): ICD-10-CM

## 2022-02-11 DIAGNOSIS — D64.9 ANEMIA, UNSPECIFIED TYPE: ICD-10-CM

## 2022-02-11 DIAGNOSIS — N19 RENAL FAILURE, UNSPECIFIED CHRONICITY: ICD-10-CM

## 2022-02-11 DIAGNOSIS — Z79.4 TYPE 2 DIABETES MELLITUS WITH DIABETIC NEUROPATHY, WITH LONG-TERM CURRENT USE OF INSULIN (HCC): ICD-10-CM

## 2022-02-11 DIAGNOSIS — I10 ESSENTIAL HYPERTENSION: ICD-10-CM

## 2022-02-11 LAB
A/G RATIO: 1.8 (ref 0.8–2)
ALBUMIN SERPL-MCNC: 4.3 G/DL (ref 3.4–4.8)
ALP BLD-CCNC: 118 U/L (ref 25–100)
ALT SERPL-CCNC: 9 U/L (ref 4–36)
ANION GAP SERPL CALCULATED.3IONS-SCNC: 12 MMOL/L (ref 3–16)
AST SERPL-CCNC: 13 U/L (ref 8–33)
BASOPHILS ABSOLUTE: 0 K/UL (ref 0–0.1)
BASOPHILS RELATIVE PERCENT: 0.6 %
BILIRUB SERPL-MCNC: 0.4 MG/DL (ref 0.3–1.2)
BUN BLDV-MCNC: 16 MG/DL (ref 6–20)
CALCIUM SERPL-MCNC: 9.8 MG/DL (ref 8.5–10.5)
CHLORIDE BLD-SCNC: 101 MMOL/L (ref 98–107)
CO2: 27 MMOL/L (ref 20–30)
CREAT SERPL-MCNC: 1.2 MG/DL (ref 0.4–1.2)
EOSINOPHILS ABSOLUTE: 0.1 K/UL (ref 0–0.4)
EOSINOPHILS RELATIVE PERCENT: 1.1 %
FERRITIN: 42.8 NG/ML (ref 22–322)
GFR AFRICAN AMERICAN: 54
GFR NON-AFRICAN AMERICAN: 44
GLOBULIN: 2.4 G/DL
GLUCOSE BLD-MCNC: 220 MG/DL (ref 74–106)
HBA1C MFR BLD: 7 %
HCT VFR BLD CALC: 39.1 % (ref 37–47)
HEMOGLOBIN: 12.2 G/DL (ref 11.5–16.5)
IMMATURE GRANULOCYTES #: 0 K/UL
IMMATURE GRANULOCYTES %: 0.4 % (ref 0–5)
IRON SATURATION: 21 % (ref 15–50)
IRON: 69 UG/DL (ref 37–145)
LYMPHOCYTES ABSOLUTE: 1.1 K/UL (ref 1.5–4)
LYMPHOCYTES RELATIVE PERCENT: 23 %
MCH RBC QN AUTO: 27.9 PG (ref 27–32)
MCHC RBC AUTO-ENTMCNC: 31.2 G/DL (ref 31–35)
MCV RBC AUTO: 89.3 FL (ref 80–100)
MONOCYTES ABSOLUTE: 0.4 K/UL (ref 0.2–0.8)
MONOCYTES RELATIVE PERCENT: 7.9 %
NEUTROPHILS ABSOLUTE: 3.1 K/UL (ref 2–7.5)
NEUTROPHILS RELATIVE PERCENT: 67 %
PDW BLD-RTO: 15.9 % (ref 11–16)
PLATELET # BLD: 144 K/UL (ref 150–400)
PMV BLD AUTO: 12.3 FL (ref 6–10)
POTASSIUM SERPL-SCNC: 4.1 MMOL/L (ref 3.4–5.1)
RBC # BLD: 4.38 M/UL (ref 3.8–5.8)
SODIUM BLD-SCNC: 140 MMOL/L (ref 136–145)
TOTAL IRON BINDING CAPACITY: 321 UG/DL (ref 250–450)
TOTAL PROTEIN: 6.7 G/DL (ref 6.4–8.3)
WBC # BLD: 4.7 K/UL (ref 4–11)

## 2022-02-11 PROCEDURE — 83540 ASSAY OF IRON: CPT

## 2022-02-11 PROCEDURE — 83550 IRON BINDING TEST: CPT

## 2022-02-11 PROCEDURE — 82728 ASSAY OF FERRITIN: CPT

## 2022-02-11 PROCEDURE — 85025 COMPLETE CBC W/AUTO DIFF WBC: CPT

## 2022-02-11 PROCEDURE — 80053 COMPREHEN METABOLIC PANEL: CPT

## 2022-02-11 PROCEDURE — 83036 HEMOGLOBIN GLYCOSYLATED A1C: CPT

## 2022-02-17 ENCOUNTER — OFFICE VISIT (OUTPATIENT)
Dept: PRIMARY CARE CLINIC | Age: 72
End: 2022-02-17
Payer: MEDICARE

## 2022-02-17 VITALS
WEIGHT: 137 LBS | TEMPERATURE: 97.3 F | RESPIRATION RATE: 18 BRPM | SYSTOLIC BLOOD PRESSURE: 124 MMHG | DIASTOLIC BLOOD PRESSURE: 60 MMHG | HEART RATE: 76 BPM | OXYGEN SATURATION: 97 % | BODY MASS INDEX: 23.52 KG/M2

## 2022-02-17 DIAGNOSIS — E53.8 VITAMIN B12 DEFICIENCY: ICD-10-CM

## 2022-02-17 DIAGNOSIS — K74.60 CIRRHOSIS OF LIVER WITHOUT ASCITES, UNSPECIFIED HEPATIC CIRRHOSIS TYPE (HCC): ICD-10-CM

## 2022-02-17 DIAGNOSIS — I10 ESSENTIAL HYPERTENSION: ICD-10-CM

## 2022-02-17 DIAGNOSIS — D64.9 ANEMIA, UNSPECIFIED TYPE: ICD-10-CM

## 2022-02-17 DIAGNOSIS — E11.40 TYPE 2 DIABETES MELLITUS WITH DIABETIC NEUROPATHY, WITH LONG-TERM CURRENT USE OF INSULIN (HCC): Primary | ICD-10-CM

## 2022-02-17 DIAGNOSIS — Z79.4 TYPE 2 DIABETES MELLITUS WITH DIABETIC NEUROPATHY, WITH LONG-TERM CURRENT USE OF INSULIN (HCC): Primary | ICD-10-CM

## 2022-02-17 PROCEDURE — G8420 CALC BMI NORM PARAMETERS: HCPCS | Performed by: INTERNAL MEDICINE

## 2022-02-17 PROCEDURE — 3017F COLORECTAL CA SCREEN DOC REV: CPT | Performed by: INTERNAL MEDICINE

## 2022-02-17 PROCEDURE — 82044 UR ALBUMIN SEMIQUANTITATIVE: CPT | Performed by: INTERNAL MEDICINE

## 2022-02-17 PROCEDURE — G8427 DOCREV CUR MEDS BY ELIG CLIN: HCPCS | Performed by: INTERNAL MEDICINE

## 2022-02-17 PROCEDURE — G8400 PT W/DXA NO RESULTS DOC: HCPCS | Performed by: INTERNAL MEDICINE

## 2022-02-17 PROCEDURE — 3051F HG A1C>EQUAL 7.0%<8.0%: CPT | Performed by: INTERNAL MEDICINE

## 2022-02-17 PROCEDURE — 1123F ACP DISCUSS/DSCN MKR DOCD: CPT | Performed by: INTERNAL MEDICINE

## 2022-02-17 PROCEDURE — 1090F PRES/ABSN URINE INCON ASSESS: CPT | Performed by: INTERNAL MEDICINE

## 2022-02-17 PROCEDURE — 4040F PNEUMOC VAC/ADMIN/RCVD: CPT | Performed by: INTERNAL MEDICINE

## 2022-02-17 PROCEDURE — 99213 OFFICE O/P EST LOW 20 MIN: CPT | Performed by: INTERNAL MEDICINE

## 2022-02-17 PROCEDURE — 2022F DILAT RTA XM EVC RTNOPTHY: CPT | Performed by: INTERNAL MEDICINE

## 2022-02-17 PROCEDURE — 1036F TOBACCO NON-USER: CPT | Performed by: INTERNAL MEDICINE

## 2022-02-17 PROCEDURE — G8484 FLU IMMUNIZE NO ADMIN: HCPCS | Performed by: INTERNAL MEDICINE

## 2022-02-17 PROCEDURE — 96372 THER/PROPH/DIAG INJ SC/IM: CPT | Performed by: INTERNAL MEDICINE

## 2022-02-17 RX ORDER — CYANOCOBALAMIN 1000 UG/ML
1000 INJECTION INTRAMUSCULAR; SUBCUTANEOUS ONCE
Status: COMPLETED | OUTPATIENT
Start: 2022-02-17 | End: 2022-02-17

## 2022-02-17 RX ORDER — UMECLIDINIUM BROMIDE AND VILANTEROL TRIFENATATE 62.5; 25 UG/1; UG/1
1 POWDER RESPIRATORY (INHALATION) DAILY
Qty: 1 EACH | Refills: 3 | Status: SHIPPED | OUTPATIENT
Start: 2022-02-17 | End: 2022-02-21 | Stop reason: SDUPTHER

## 2022-02-17 RX ADMIN — CYANOCOBALAMIN 1000 MCG: 1000 INJECTION INTRAMUSCULAR; SUBCUTANEOUS at 10:32

## 2022-02-17 ASSESSMENT — ENCOUNTER SYMPTOMS
WHEEZING: 0
SINUS PRESSURE: 0
EYE DISCHARGE: 0
VOMITING: 0
BACK PAIN: 1
NAUSEA: 0
SORE THROAT: 0
COUGH: 0
SHORTNESS OF BREATH: 0
ABDOMINAL PAIN: 0

## 2022-02-17 NOTE — PROGRESS NOTES
Chief Complaint   Patient presents with    Diabetes    Hypertension    Anemia     fs averaging 130  levemir 50 units nightly     Have you seen any other physician or provider since your last visit no    Have you had any other diagnostic tests since your last visit? no    Have you changed or stopped any medications since your last visit?  Not taking Brilinta

## 2022-02-17 NOTE — PROGRESS NOTES
SUBJECTIVE:    Patient ID: Gonzales Galeano is a 73 y. o.female. Chief Complaint   Patient presents with    Diabetes    Hypertension    Anemia         HPI:  Patient has had diabetes for the past several years. She has been compliant with the medications and denies any side effects from it. She has been monitoring fingersticks on a daily basis. Her fingerstick range is between 110-160. She denies any hypoglycemic symptoms. She has been following a diabetic diet and has not been active. Her last eye exam was less than a year ago. She is using oral meds and insulin. She is on 50 units of Levemir. BP is stable    Patient's medications, allergies, past medical, surgical, social and family histories were reviewed and updated as appropriate in electronic medical record. Outpatient Medications Marked as Taking for the 2/17/22 encounter (Office Visit) with Sulaiman Jose MD   Medication Sig Dispense Refill    insulin detemir (LEVEMIR FLEXTOUCH) 100 UNIT/ML injection pen Inject 20 Units into the skin nightly INJECT 55 UNITS INTO THE SKIN NIGHTLY 340B 90 day supply. 60 mL 1    HYDROcodone-acetaminophen (NORCO) 5-325 MG per tablet Take 1 tablet by mouth 2 times daily as needed for Pain for up to 30 days.  30 tablet 0    rosuvastatin (CRESTOR) 20 MG tablet TAKE 1 TABLET BY MOUTH ONCE DAILY 120 tablet 0    JARDIANCE 10 MG tablet TAKE 1 TABLET BY MOUTH DAILY 30 tablet 3    furosemide (LASIX) 20 MG tablet TAKE 1 TABLET BY MOUTH DAILY AS NEEDED FOR SWELLING 30 tablet 2    TRADJENTA 5 MG tablet TAKE 1 TABLET BY MOUTH EVERY  tablet 0    metFORMIN (GLUCOPHAGE) 1000 MG tablet TAKE 1 TABLET BY MOUTH TWICE DAILY WITH FOOD 180 tablet 2    lactulose (CHRONULAC) 10 GM/15ML solution Take 30 mLs by mouth daily 900 mL 1    linaclotide (LINZESS) 290 MCG CAPS capsule Take 1 capsule by mouth every morning (before breakfast) 30 capsule 2    spironolactone (ALDACTONE) 25 MG tablet Take 1 tablet by mouth daily 30 tablet 3    pantoprazole (PROTONIX) 40 MG tablet Take 40 mg by mouth 2 times daily (before meals)       ferrous sulfate (IRON 325) 325 (65 Fe) MG tablet Take 325 mg by mouth daily (with breakfast)      Insulin Pen Needle 31G X 6 MM MISC 1 each by Does not apply route daily 100 each 3    isosorbide mononitrate (IMDUR) 60 MG extended release tablet TAKE 1 TABLET BY MOUTH EVERY DAY 30 tablet 2    blood glucose monitor strips QID Dx E11.9 100 strip 5    glucose monitoring kit (FREESTYLE) monitoring kit 1 kit by Does not apply route daily E11.40 1 kit 0    aspirin 81 MG chewable tablet Take 1 tablet by mouth daily 30 tablet 2    nitroGLYCERIN (NITROSTAT) 0.4 MG SL tablet Place 1 tablet under the tongue every 5 minutes as needed for Chest pain 25 tablet 1    Insulin Syringe-Needle U-100 (B-D INS SYR ULTRAFINE 1CC/31G) 31G X 5/16\" 1 ML MISC USE ONCE A DAY AS DIRECTED  DX E11.9 100 each 3        Review of Systems   Constitutional: Positive for fatigue. Negative for chills and fever. HENT: Negative for congestion, sinus pressure and sore throat. Eyes: Negative for discharge and visual disturbance. Respiratory: Negative for cough, shortness of breath and wheezing. Cardiovascular: Negative for chest pain and palpitations. CAZARES   Gastrointestinal: Negative for abdominal pain, nausea and vomiting. Endocrine: Negative for cold intolerance and heat intolerance. Genitourinary: Negative for dysuria, frequency and urgency. Musculoskeletal: Positive for arthralgias, back pain and gait problem. Skin: Negative for rash and wound. Neurological: Negative for syncope, numbness and headaches. Hematological: Negative. Psychiatric/Behavioral: Negative for agitation and sleep disturbance. The patient is not nervous/anxious.         Past Medical History:   Diagnosis Date    CAD (coronary artery disease)     [facility] obstructive pulmonary disease) [address] [address]     Cystic fibrosis [address] [address]     Diabetes mellitus ([address]     GERD (gastroesophageal reflux disease)     H/O: CVA (cardiovascular accident)     HTN (hypertension)     Mass     on chest     Tobacco abuse     Vitamin B12 deficiency      Past Surgical History:   Procedure Laterality Date    CARPAL TUNNEL RELEASE Bilateral     CHOLECYSTECTOMY      CORONARY ANGIOPLASTY WITH STENT PLACEMENT      HYSTERECTOMY      JOINT REPLACEMENT Bilateral 10/15/2016    knees    TOTAL KNEE ARTHROPLASTY [facility]ilateral 10/18/2016    [facility]     Family History   Problem Relation Age of Onset    Diabetes Mother     High Blood Pressure Mother     Cancer Mother         pancreatic    Diabetes Father     Heart Disease Father     High Blood Pressure Father     Cancer Sister         lung, breast    Cancer Brother         throat      Social History     Tobacco Use   Smoking Status Former Smoker    Packs/day: 1.00    Years: 40.00    Pack years: 40.00    Types: Cigarettes   Smokeless Tobacco Never Used   Tobacco Comment    states she is not willing to stop and this is a stress reliever for her. OBJECTIVE:   Wt Readings from Last 3 Encounters:   02/17/22 137 lb (62.1 kg)   01/06/22 142 lb (64.4 kg)   11/22/21 131 lb (59.4 kg)     BP Readings from Last 3 Encounters:   02/17/22 124/60   01/06/22 136/80   11/22/21 135/65       /60   Pulse 76   Temp 97.3 °F (36.3 °C)   Resp 18   Wt 137 lb (62.1 kg)   SpO2 97%   BMI 23.52 kg/m²      Physical Exam  Vitals and nursing note reviewed. Constitutional:       Appearance: Normal appearance. She is well-developed. HENT:      Head: Normocephalic and atraumatic. Right Ear: External ear normal.      Left Ear: External ear normal.      Nose: Nose normal.      Mouth/Throat:      Mouth: Mucous membranes are moist.      Pharynx: Oropharynx is clear. Eyes:      Conjunctiva/sclera: Conjunctivae normal.      Pupils: Pupils are equal, round, and reactive to light. Neck:      Thyroid: No thyromegaly.       Vascular: No JVD.   Cardiovascular:      Rate and Rhythm: Normal rate and regular rhythm. Heart sounds: Normal heart sounds. Pulmonary:      Effort: Pulmonary effort is normal.      Breath sounds: Normal breath sounds. No wheezing or rales. Abdominal:      General: Bowel sounds are normal. There is no distension. Palpations: Abdomen is soft. Tenderness: There is no abdominal tenderness. Musculoskeletal:         General: No tenderness. Cervical back: Neck supple. No rigidity. No muscular tenderness. Right lower leg: No edema. Left lower leg: No edema. Skin:     Findings: No erythema or rash. Neurological:      General: No focal deficit present. Mental Status: She is alert and oriented to person, place, and time. Psychiatric:         Behavior: Behavior normal.         Judgment: Judgment normal.     Intact skin, intact sensation in feet. Good pedal pulse bilaterally. Lab Results   Component Value Date     02/11/2022    K 4.1 02/11/2022    K 3.5 11/01/2021     02/11/2022    CO2 27 02/11/2022    GLUCOSE 220 02/11/2022    BUN 16 02/11/2022    CREATININE 1.2 02/11/2022    CALCIUM 9.8 02/11/2022    PROT 6.7 02/11/2022    LABALBU 4.3 02/11/2022    BILITOT 0.4 02/11/2022    ALT 9 02/11/2022    AST 13 02/11/2022       Hemoglobin A1C (%)   Date Value   02/11/2022 7.0 (H)     Microscopic Examination (no units)   Date Value   04/27/2019 YES     LDL Calculated (mg/dL)   Date Value   12/03/2020 45         Lab Results   Component Value Date    WBC 4.7 02/11/2022    NEUTROABS 3.1 02/11/2022    HGB 12.2 02/11/2022    HCT 39.1 02/11/2022    MCV 89.3 02/11/2022     02/11/2022       Lab Results   Component Value Date    TSH 2.280 10/13/2021         ASSESSMENT/PLAN:     1. Type 2 diabetes mellitus with diabetic neuropathy, with long-term current use of insulin (HCC)  Stable. I advised her regarding diabetic diet, exercise and weight control.   Also, I advised her to stay on the current medication, monitor her fingerstick closely. I am going to check the A1c every few months. I will check microalbumin on a yearly basis. I have also advised her to have a yearly eye exam and to monitor her feet closely. Along with instruction of possible complications related to diabetes, even with good control. A1C will be checked in few months. Lab Results   Component Value Date    LABA1C 7.0 (H) 02/11/2022     -  DIABETES FOOT EXAM  - POCT microalbumin  - CBC with Auto Differential; Future  - Comprehensive Metabolic Panel; Future  - Hemoglobin A1C; Future  - TSH; Future    2. Cirrhosis of liver without ascites, unspecified hepatic cirrhosis type ([facility] [address]  Monitor labs closely. Try to avoid any hepatotoxic agent. Make sure diabetes and other medical problems under good control. Continue follow-up with gastroenterology. - CBC with Auto Differential; Future  - Comprehensive Metabolic Panel; Future  - TSH; Future    3. Vitamin B12 deficiency  Will cont on B12 injection on a monthly basis. 4. Essential hypertension  BP is stable. I have advised her on low-sodium diet, exercise and weight control. I am going to continue current medication. Will monitor her renal function every few months, have advised her to check blood pressure frequently and to keep a record of this. - CBC with Auto Differential; Future  - Comprehensive Metabolic Panel; Future    5. Anemia, unspecified type  Up to normal.   Anemia related to AVM. Monitor labs. Will refer to [facility] if any further problems. Gastroenterology note 12/7/21: I called the patient and discussed the Pill[facility] report. Patient has a few AVMs in the mid Ileum. This may be reachable only with the push enteroscopy, retrograde enteroscopy. We discussed on discontinuing the Brilinta and continue with only aspirin for now.  If any overt bleeding patient need to be transferred to [facility] push enteroscopy    - CBC with Auto Differential; Future  - Comprehensive Metabolic Panel; Future  - TSH; Future    Need to proceed with CT scan of the chest summertime to follow-up on her thoracic aneurysm. I had long conversation with the patient regarding her ongoing smoking especially with her multiple and complicated medical problems. She continued to refuse doing anything at this part.     Orders Placed This Encounter   Medications    cyanocobalamin injection 1,000 mcg

## 2022-02-21 ENCOUNTER — TELEPHONE (OUTPATIENT)
Dept: PRIMARY CARE CLINIC | Age: 72
End: 2022-02-21

## 2022-02-21 RX ORDER — UMECLIDINIUM BROMIDE AND VILANTEROL TRIFENATATE 62.5; 25 UG/1; UG/1
1 POWDER RESPIRATORY (INHALATION) DAILY
Qty: 1 EACH | Refills: 0 | COMMUNITY
Start: 2022-02-21

## 2022-02-21 NOTE — TELEPHONE ENCOUNTER
Per the orders of Dr. Shannon Thompson, 1 box/boxes of Anoro 62.5/25 mcg were given to patient today., Lot # [id number], Exp. Date 11/22. Patient given instructions with samples.

## 2022-02-23 RX ORDER — TICAGRELOR 90 MG/1
TABLET ORAL
Qty: 60 TABLET | Refills: 3 | OUTPATIENT
Start: 2022-02-23

## 2022-02-23 RX ORDER — LINAGLIPTIN 5 MG/1
TABLET, FILM COATED ORAL
Qty: 120 TABLET | Refills: 0 | Status: SHIPPED | OUTPATIENT
Start: 2022-02-23 | End: 2022-07-06 | Stop reason: SDUPTHER

## 2022-02-25 ENCOUNTER — TELEPHONE (OUTPATIENT)
Dept: PRIMARY CARE CLINIC | Age: 72
End: 2022-02-25

## 2022-02-25 DIAGNOSIS — M51.36 DEGENERATIVE DISC DISEASE, LUMBAR: ICD-10-CM

## 2022-02-25 RX ORDER — HYDROCODONE BITARTRATE AND ACETAMINOPHEN 5; 325 MG/1; MG/1
1 TABLET ORAL 2 TIMES DAILY PRN
Qty: 30 TABLET | Refills: 0 | Status: SHIPPED | OUTPATIENT
Start: 2022-02-25 | End: 2022-03-24 | Stop reason: SDUPTHER

## 2022-03-10 ENCOUNTER — HOSPITAL ENCOUNTER (OUTPATIENT)
Dept: ULTRASOUND IMAGING | Facility: HOSPITAL | Age: 72
Discharge: HOME | End: 2022-03-10
Admitting: INTERNAL MEDICINE

## 2022-03-10 DIAGNOSIS — D50.0 IRON DEFICIENCY ANEMIA DUE TO CHRONIC BLOOD LOSS: ICD-10-CM

## 2022-03-10 DIAGNOSIS — K74.60 CIRRHOSIS OF LIVER WITHOUT ASCITES, UNSPECIFIED HEPATIC CIRRHOSIS TYPE: ICD-10-CM

## 2022-03-10 PROCEDURE — 76700 US EXAM ABDOM COMPLETE: CPT

## 2022-03-14 ENCOUNTER — TELEPHONE (OUTPATIENT)
Dept: GASTROENTEROLOGY | Facility: CLINIC | Age: 72
End: 2022-03-14

## 2022-03-14 NOTE — TELEPHONE ENCOUNTER
----- Message from Sara Solis PA-C sent at 3/11/2022  4:51 PM EST -----  Liver has known cirrhosis, no lesions  ----- Message -----  From: Lizeth Tanner MA  Sent: 3/11/2022   2:45 PM EST  To: Sara Solis PA-C    Patient requesting results of ultrasound please advise    ----- Message -----  From: Ozzy Christina RegSched Rep  Sent: 3/11/2022   2:29 PM EST  To: Lizeth Tanner MA    PT called office requesting recent test results.    You can reach Karol at : [phone]

## 2022-03-17 ENCOUNTER — APPOINTMENT (OUTPATIENT)
Dept: GENERAL RADIOLOGY | Facility: HOSPITAL | Age: 72
End: 2022-03-17
Attending: INTERNAL MEDICINE
Payer: MEDICARE

## 2022-03-17 ENCOUNTER — HOSPITAL ENCOUNTER (OUTPATIENT)
Facility: HOSPITAL | Age: 72
Setting detail: OBSERVATION
Discharge: HOME | End: 2022-03-19
Attending: INTERNAL MEDICINE | Admitting: INTERNAL MEDICINE
Payer: MEDICARE

## 2022-03-17 ENCOUNTER — OFFICE VISIT (OUTPATIENT)
Dept: PRIMARY CARE CLINIC | Age: 72
End: 2022-03-17
Payer: MEDICARE

## 2022-03-17 VITALS
SYSTOLIC BLOOD PRESSURE: 110 MMHG | BODY MASS INDEX: 22.4 KG/M2 | DIASTOLIC BLOOD PRESSURE: 60 MMHG | TEMPERATURE: 97.9 F | RESPIRATION RATE: 18 BRPM | WEIGHT: 130.5 LBS | OXYGEN SATURATION: 99 % | HEART RATE: 69 BPM

## 2022-03-17 DIAGNOSIS — E11.40 TYPE 2 DIABETES MELLITUS WITH DIABETIC NEUROPATHY, WITH LONG-TERM CURRENT USE OF INSULIN (HCC): ICD-10-CM

## 2022-03-17 DIAGNOSIS — R53.83 FATIGUE, UNSPECIFIED TYPE: Primary | ICD-10-CM

## 2022-03-17 DIAGNOSIS — Z79.4 TYPE 2 DIABETES MELLITUS WITH DIABETIC NEUROPATHY, WITH LONG-TERM CURRENT USE OF INSULIN (HCC): ICD-10-CM

## 2022-03-17 DIAGNOSIS — E53.8 VITAMIN B12 DEFICIENCY: ICD-10-CM

## 2022-03-17 DIAGNOSIS — I10 ESSENTIAL HYPERTENSION: ICD-10-CM

## 2022-03-17 DIAGNOSIS — K59.00 CONSTIPATION, UNSPECIFIED CONSTIPATION TYPE: ICD-10-CM

## 2022-03-17 DIAGNOSIS — K74.60 CIRRHOSIS OF LIVER WITHOUT ASCITES, UNSPECIFIED HEPATIC CIRRHOSIS TYPE (HCC): ICD-10-CM

## 2022-03-17 PROBLEM — R53.81 DECLINING FUNCTIONAL STATUS: Status: ACTIVE | Noted: 2022-03-17

## 2022-03-17 LAB
A/G RATIO: 1.6 (ref 0.8–2)
ALBUMIN SERPL-MCNC: 4.5 G/DL (ref 3.4–4.8)
ALP BLD-CCNC: 98 U/L (ref 25–100)
ALT SERPL-CCNC: 13 U/L (ref 4–36)
AMORPHOUS: ABNORMAL /HPF
ANION GAP SERPL CALCULATED.3IONS-SCNC: 20 MMOL/L (ref 3–16)
AST SERPL-CCNC: 15 U/L (ref 8–33)
BILIRUB SERPL-MCNC: 0.7 MG/DL (ref 0.3–1.2)
BILIRUBIN URINE: NEGATIVE
BLOOD, URINE: ABNORMAL
BUN BLDV-MCNC: 31 MG/DL (ref 6–20)
CALCIUM SERPL-MCNC: 9.8 MG/DL (ref 8.5–10.5)
CHLORIDE BLD-SCNC: 90 MMOL/L (ref 98–107)
CLARITY: CLEAR
CO2: 25 MMOL/L (ref 20–30)
COLOR: YELLOW
CREAT SERPL-MCNC: 2.1 MG/DL (ref 0.4–1.2)
EPITHELIAL CELLS, UA: ABNORMAL /HPF (ref 0–5)
GFR AFRICAN AMERICAN: 28
GFR NON-AFRICAN AMERICAN: 23
GLOBULIN: 2.8 G/DL
GLUCOSE BLD-MCNC: 137 MG/DL (ref 74–106)
GLUCOSE BLD-MCNC: 251 MG/DL (ref 74–106)
GLUCOSE BLD-MCNC: 69 MG/DL (ref 74–106)
GLUCOSE URINE: 500 MG/DL
HCT VFR BLD CALC: 37.3 % (ref 37–47)
HEMOGLOBIN: 13.2 G/DL (ref 11.5–16.5)
KETONES, URINE: NEGATIVE MG/DL
LEUKOCYTE ESTERASE, URINE: NEGATIVE
MAGNESIUM: 2 MG/DL (ref 1.7–2.4)
MCH RBC QN AUTO: 29 PG (ref 27–32)
MCHC RBC AUTO-ENTMCNC: 35.4 G/DL (ref 31–35)
MCV RBC AUTO: 82 FL (ref 80–100)
MICROSCOPIC EXAMINATION: YES
NITRITE, URINE: NEGATIVE
PDW BLD-RTO: 14.9 % (ref 11–16)
PERFORMED ON: ABNORMAL
PERFORMED ON: ABNORMAL
PH UA: 5 (ref 5–8)
PLATELET # BLD: 169 K/UL (ref 150–400)
PMV BLD AUTO: 10.7 FL (ref 6–10)
POTASSIUM REFLEX MAGNESIUM: 3.4 MMOL/L (ref 3.4–5.1)
PROTEIN UA: 30 MG/DL
RBC # BLD: 4.55 M/UL (ref 3.8–5.8)
RBC UA: ABNORMAL /HPF (ref 0–4)
SODIUM BLD-SCNC: 135 MMOL/L (ref 136–145)
SPECIFIC GRAVITY UA: <=1.005 (ref 1–1.03)
TOTAL PROTEIN: 7.3 G/DL (ref 6.4–8.3)
TROPONIN: <0.3 NG/ML
URINE REFLEX TO CULTURE: ABNORMAL
URINE TYPE: ABNORMAL
UROBILINOGEN, URINE: 0.2 E.U./DL
WBC # BLD: 9.4 K/UL (ref 4–11)
WBC UA: ABNORMAL /HPF (ref 0–5)

## 2022-03-17 PROCEDURE — 97161 PT EVAL LOW COMPLEX 20 MIN: CPT

## 2022-03-17 PROCEDURE — G0378 HOSPITAL OBSERVATION PER HR: HCPCS

## 2022-03-17 PROCEDURE — 96375 TX/PRO/DX INJ NEW DRUG ADDON: CPT

## 2022-03-17 PROCEDURE — G8400 PT W/DXA NO RESULTS DOC: HCPCS | Performed by: INTERNAL MEDICINE

## 2022-03-17 PROCEDURE — 99214 OFFICE O/P EST MOD 30 MIN: CPT | Performed by: INTERNAL MEDICINE

## 2022-03-17 PROCEDURE — 80053 COMPREHEN METABOLIC PANEL: CPT

## 2022-03-17 PROCEDURE — 81001 URINALYSIS AUTO W/SCOPE: CPT

## 2022-03-17 PROCEDURE — G8484 FLU IMMUNIZE NO ADMIN: HCPCS | Performed by: INTERNAL MEDICINE

## 2022-03-17 PROCEDURE — 97165 OT EVAL LOW COMPLEX 30 MIN: CPT

## 2022-03-17 PROCEDURE — 1036F TOBACCO NON-USER: CPT | Performed by: INTERNAL MEDICINE

## 2022-03-17 PROCEDURE — 1123F ACP DISCUSS/DSCN MKR DOCD: CPT | Performed by: INTERNAL MEDICINE

## 2022-03-17 PROCEDURE — 4040F PNEUMOC VAC/ADMIN/RCVD: CPT | Performed by: INTERNAL MEDICINE

## 2022-03-17 PROCEDURE — 93005 ELECTROCARDIOGRAM TRACING: CPT

## 2022-03-17 PROCEDURE — 3017F COLORECTAL CA SCREEN DOC REV: CPT | Performed by: INTERNAL MEDICINE

## 2022-03-17 PROCEDURE — 2580000003 HC RX 258: Performed by: PHYSICIAN ASSISTANT

## 2022-03-17 PROCEDURE — 36415 COLL VENOUS BLD VENIPUNCTURE: CPT

## 2022-03-17 PROCEDURE — 2022F DILAT RTA XM EVC RTNOPTHY: CPT | Performed by: INTERNAL MEDICINE

## 2022-03-17 PROCEDURE — 71045 X-RAY EXAM CHEST 1 VIEW: CPT

## 2022-03-17 PROCEDURE — G8427 DOCREV CUR MEDS BY ELIG CLIN: HCPCS | Performed by: INTERNAL MEDICINE

## 2022-03-17 PROCEDURE — 96372 THER/PROPH/DIAG INJ SC/IM: CPT | Performed by: INTERNAL MEDICINE

## 2022-03-17 PROCEDURE — 1090F PRES/ABSN URINE INCON ASSESS: CPT | Performed by: INTERNAL MEDICINE

## 2022-03-17 PROCEDURE — 83735 ASSAY OF MAGNESIUM: CPT

## 2022-03-17 PROCEDURE — 85027 COMPLETE CBC AUTOMATED: CPT

## 2022-03-17 PROCEDURE — 6360000002 HC RX W HCPCS: Performed by: PHYSICIAN ASSISTANT

## 2022-03-17 PROCEDURE — 3051F HG A1C>EQUAL 7.0%<8.0%: CPT | Performed by: INTERNAL MEDICINE

## 2022-03-17 PROCEDURE — 84484 ASSAY OF TROPONIN QUANT: CPT

## 2022-03-17 PROCEDURE — G0379 DIRECT REFER HOSPITAL OBSERV: HCPCS

## 2022-03-17 PROCEDURE — G8420 CALC BMI NORM PARAMETERS: HCPCS | Performed by: INTERNAL MEDICINE

## 2022-03-17 PROCEDURE — 6370000000 HC RX 637 (ALT 250 FOR IP): Performed by: PHYSICIAN ASSISTANT

## 2022-03-17 RX ORDER — ONDANSETRON 2 MG/ML
4 INJECTION INTRAMUSCULAR; INTRAVENOUS EVERY 6 HOURS PRN
Status: DISCONTINUED | OUTPATIENT
Start: 2022-03-17 | End: 2022-03-19 | Stop reason: HOSPADM

## 2022-03-17 RX ORDER — ACETAMINOPHEN 650 MG/1
650 SUPPOSITORY RECTAL EVERY 6 HOURS PRN
Status: DISCONTINUED | OUTPATIENT
Start: 2022-03-17 | End: 2022-03-19 | Stop reason: HOSPADM

## 2022-03-17 RX ORDER — DEXTROSE MONOHYDRATE 25 G/50ML
12.5 INJECTION, SOLUTION INTRAVENOUS PRN
Status: DISCONTINUED | OUTPATIENT
Start: 2022-03-17 | End: 2022-03-19 | Stop reason: HOSPADM

## 2022-03-17 RX ORDER — FERROUS SULFATE TAB EC 324 MG (65 MG FE EQUIVALENT) 324 (65 FE) MG
324 TABLET DELAYED RESPONSE ORAL
Status: DISCONTINUED | OUTPATIENT
Start: 2022-03-18 | End: 2022-03-19 | Stop reason: HOSPADM

## 2022-03-17 RX ORDER — POLYETHYLENE GLYCOL 3350 17 G/17G
17 POWDER, FOR SOLUTION ORAL DAILY PRN
Status: DISCONTINUED | OUTPATIENT
Start: 2022-03-17 | End: 2022-03-19 | Stop reason: HOSPADM

## 2022-03-17 RX ORDER — INSULIN GLARGINE 100 [IU]/ML
50 INJECTION, SOLUTION SUBCUTANEOUS NIGHTLY
Status: DISCONTINUED | OUTPATIENT
Start: 2022-03-17 | End: 2022-03-19 | Stop reason: HOSPADM

## 2022-03-17 RX ORDER — DEXTROSE MONOHYDRATE 50 MG/ML
100 INJECTION, SOLUTION INTRAVENOUS PRN
Status: DISCONTINUED | OUTPATIENT
Start: 2022-03-17 | End: 2022-03-19 | Stop reason: HOSPADM

## 2022-03-17 RX ORDER — CYANOCOBALAMIN 1000 UG/ML
1000 INJECTION INTRAMUSCULAR; SUBCUTANEOUS ONCE
Status: COMPLETED | OUTPATIENT
Start: 2022-03-17 | End: 2022-03-17

## 2022-03-17 RX ORDER — ROSUVASTATIN CALCIUM 20 MG/1
20 TABLET, COATED ORAL DAILY
Status: DISCONTINUED | OUTPATIENT
Start: 2022-03-17 | End: 2022-03-19 | Stop reason: HOSPADM

## 2022-03-17 RX ORDER — ONDANSETRON 4 MG/1
4 TABLET, ORALLY DISINTEGRATING ORAL EVERY 8 HOURS PRN
Status: DISCONTINUED | OUTPATIENT
Start: 2022-03-17 | End: 2022-03-19 | Stop reason: HOSPADM

## 2022-03-17 RX ORDER — LACTULOSE 10 G/15ML
20 SOLUTION ORAL 2 TIMES DAILY
Status: DISCONTINUED | OUTPATIENT
Start: 2022-03-17 | End: 2022-03-19 | Stop reason: HOSPADM

## 2022-03-17 RX ORDER — NICOTINE POLACRILEX 4 MG
15 LOZENGE BUCCAL PRN
Status: DISCONTINUED | OUTPATIENT
Start: 2022-03-17 | End: 2022-03-19 | Stop reason: HOSPADM

## 2022-03-17 RX ORDER — HYDROCODONE BITARTRATE AND ACETAMINOPHEN 5; 325 MG/1; MG/1
1 TABLET ORAL 2 TIMES DAILY PRN
Status: DISCONTINUED | OUTPATIENT
Start: 2022-03-17 | End: 2022-03-19 | Stop reason: HOSPADM

## 2022-03-17 RX ORDER — ISOSORBIDE MONONITRATE 60 MG/1
60 TABLET, EXTENDED RELEASE ORAL DAILY
Status: DISCONTINUED | OUTPATIENT
Start: 2022-03-17 | End: 2022-03-19 | Stop reason: HOSPADM

## 2022-03-17 RX ORDER — ASPIRIN 81 MG/1
81 TABLET, CHEWABLE ORAL DAILY
Status: DISCONTINUED | OUTPATIENT
Start: 2022-03-17 | End: 2022-03-19 | Stop reason: HOSPADM

## 2022-03-17 RX ORDER — SODIUM CHLORIDE 9 MG/ML
INJECTION, SOLUTION INTRAVENOUS CONTINUOUS
Status: DISCONTINUED | OUTPATIENT
Start: 2022-03-17 | End: 2022-03-19 | Stop reason: HOSPADM

## 2022-03-17 RX ORDER — INSULIN DETEMIR 100 [IU]/ML
30 INJECTION, SOLUTION SUBCUTANEOUS NIGHTLY
COMMUNITY
End: 2022-08-22

## 2022-03-17 RX ORDER — ACETAMINOPHEN 325 MG/1
650 TABLET ORAL EVERY 6 HOURS PRN
Status: DISCONTINUED | OUTPATIENT
Start: 2022-03-17 | End: 2022-03-19 | Stop reason: HOSPADM

## 2022-03-17 RX ORDER — ALOGLIPTIN 12.5 MG/1
6.25 TABLET, FILM COATED ORAL DAILY
Refills: 0 | Status: DISCONTINUED | OUTPATIENT
Start: 2022-03-17 | End: 2022-03-19 | Stop reason: HOSPADM

## 2022-03-17 RX ORDER — PANTOPRAZOLE SODIUM 40 MG/1
40 TABLET, DELAYED RELEASE ORAL
Status: DISCONTINUED | OUTPATIENT
Start: 2022-03-17 | End: 2022-03-19 | Stop reason: HOSPADM

## 2022-03-17 RX ADMIN — SODIUM CHLORIDE: 9 INJECTION, SOLUTION INTRAVENOUS at 13:03

## 2022-03-17 RX ADMIN — ISOSORBIDE MONONITRATE 60 MG: 60 TABLET, EXTENDED RELEASE ORAL at 16:35

## 2022-03-17 RX ADMIN — PANTOPRAZOLE SODIUM 40 MG: 40 TABLET, DELAYED RELEASE ORAL at 16:35

## 2022-03-17 RX ADMIN — LACTULOSE 20 G: 20 SOLUTION ORAL at 13:01

## 2022-03-17 RX ADMIN — ACETAMINOPHEN 650 MG: 325 TABLET, FILM COATED ORAL at 13:07

## 2022-03-17 RX ADMIN — INSULIN GLARGINE 50 UNITS: 100 INJECTION, SOLUTION SUBCUTANEOUS at 20:59

## 2022-03-17 RX ADMIN — ROSUVASTATIN CALCIUM 20 MG: 20 TABLET, COATED ORAL at 16:35

## 2022-03-17 RX ADMIN — ALOGLIPTIN 6.25 MG: 12.5 TABLET, FILM COATED ORAL at 16:35

## 2022-03-17 RX ADMIN — ONDANSETRON 4 MG: 2 INJECTION INTRAMUSCULAR; INTRAVENOUS at 23:00

## 2022-03-17 RX ADMIN — LACTULOSE 20 G: 20 SOLUTION ORAL at 20:59

## 2022-03-17 RX ADMIN — CYANOCOBALAMIN 1000 MCG: 1000 INJECTION INTRAMUSCULAR; SUBCUTANEOUS at 10:11

## 2022-03-17 RX ADMIN — HYDROCODONE BITARTRATE AND ACETAMINOPHEN 1 TABLET: 5; 325 TABLET ORAL at 16:35

## 2022-03-17 RX ADMIN — ASPIRIN 81 MG 81 MG: 81 TABLET ORAL at 16:35

## 2022-03-17 ASSESSMENT — ENCOUNTER SYMPTOMS
CONSTIPATION: 1
COUGH: 0
SHORTNESS OF BREATH: 0
WHEEZING: 0
SINUS PRESSURE: 0
SORE THROAT: 0
EYE DISCHARGE: 0
NAUSEA: 0
ABDOMINAL PAIN: 1
VOMITING: 0

## 2022-03-17 ASSESSMENT — PAIN DESCRIPTION - PAIN TYPE: TYPE: ACUTE PAIN

## 2022-03-17 ASSESSMENT — PAIN DESCRIPTION - LOCATION: LOCATION: GENERALIZED

## 2022-03-17 ASSESSMENT — PAIN SCALES - GENERAL
PAINLEVEL_OUTOF10: 6
PAINLEVEL_OUTOF10: 5

## 2022-03-17 NOTE — PROGRESS NOTES
After obtaining consent, and per orders of Dr. Samreen Stuart, injection of B12 was given in Left deltoid by Ledy Persaud. Patient instructed to remain in clinic for 20 minutes afterwards, and to report any adverse reaction to me immediately.

## 2022-03-17 NOTE — PROGRESS NOTES
Chief Complaint   Patient presents with    Fatigue    Abdominal Pain     fs avg about 10  Was 65 this morning she did eat after that  levemir 50 nightly    Have you seen any other physician or provider since your last visit no    Have you had any other diagnostic tests since your last visit? no    Have you changed or stopped any medications since your last visit?  Not taking lasix

## 2022-03-17 NOTE — PROGRESS NOTES
SUBJECTIVE:    Patient ID: Scout Amin is a 81 y. o.female. Chief Complaint   Patient presents with    Fatigue    Abdominal Pain    Constipation     hasn't had a bowel movement x 1 week         HPI:  Patient reported mild and diffuse abdominal pain in addition to significant worsening of fatigue for the past week. Sx are getting worse. Patient has not had a bowel movement in at least a week. Denies nausea,vomiting or diarrhea. She denied any fever or chills. Patient is taking Linzess without relief. Fingerstick 100-150. She has been watching her diet. She has not been active. Blood pressure has been stable. Patient's medications, allergies, past medical, surgical, social and family histories were reviewed and updated as appropriate in electronic medical record. Outpatient Medications Marked as Taking for the 3/17/22 encounter (Office Visit) with Manju Albert MD   Medication Sig Dispense Refill    HYDROcodone-acetaminophen (NORCO) 5-325 MG per tablet Take 1 tablet by mouth 2 times daily as needed for Pain for up to 30 days. 30 tablet 0    TRADJENTA 5 MG tablet TAKE 1 TABLET BY MOUTH EVERY  tablet 0    umeclidinium-vilanterol (ANORO ELLIPTA) 62.5-25 MCG/INH AEPB inhaler Inhale 1 puff into the lungs daily 1 each 0    insulin detemir (LEVEMIR FLEXTOUCH) 100 UNIT/ML injection pen Inject 20 Units into the skin nightly INJECT 55 UNITS INTO THE SKIN NIGHTLY 340B 90 day supply.  (Patient taking differently: Inject 20 Units into the skin nightly INJECT 50 UNITS INTO THE SKIN NIGHTLY 340B 90 day supply.) 60 mL 1    rosuvastatin (CRESTOR) 20 MG tablet TAKE 1 TABLET BY MOUTH ONCE DAILY 120 tablet 0    JARDIANCE 10 MG tablet TAKE 1 TABLET BY MOUTH DAILY 30 tablet 3    metFORMIN (GLUCOPHAGE) 1000 MG tablet TAKE 1 TABLET BY MOUTH TWICE DAILY WITH FOOD 180 tablet 2    lactulose (CHRONULAC) 10 GM/15ML solution Take 30 mLs by mouth daily 900 mL 1    linaclotide (LINZESS) 290 MCG CAPS capsule Take 1 capsule by mouth every morning (before breakfast) 30 capsule 2    spironolactone (ALDACTONE) 25 MG tablet Take 1 tablet by mouth daily 30 tablet 3    pantoprazole (PROTONIX) 40 MG tablet Take 40 mg by mouth 2 times daily (before meals)       ferrous sulfate (IRON 325) 325 (65 Fe) MG tablet Take 325 mg by mouth daily (with breakfast)      Insulin Pen Needle 31G X 6 MM MISC 1 each by Does not apply route daily 100 each 3    isosorbide mononitrate (IMDUR) 60 MG extended release tablet TAKE 1 TABLET BY MOUTH EVERY DAY 30 tablet 2    blood glucose monitor strips QID Dx E11.9 100 strip 5    glucose monitoring kit (FREESTYLE) monitoring kit 1 kit by Does not apply route daily E11.40 1 kit 0    aspirin 81 MG chewable tablet Take 1 tablet by mouth daily 30 tablet 2    nitroGLYCERIN (NITROSTAT) 0.4 MG SL tablet Place 1 tablet under the tongue every 5 minutes as needed for Chest pain 25 tablet 1    Insulin Syringe-Needle U-100 (B-D INS SYR ULTRAFINE 1CC/31G) 31G X 5/16\" 1 ML MISC USE ONCE A DAY AS DIRECTED  DX E11.9 100 each 3        Review of Systems   Constitutional: Positive for fatigue. Negative for chills. HENT: Negative for congestion, sinus pressure and sore throat. Eyes: Negative for discharge and visual disturbance. Respiratory: Negative for cough, shortness of breath and wheezing. Cardiovascular: Negative for chest pain and palpitations. CAZARES   Gastrointestinal: Positive for abdominal pain and constipation. Negative for nausea and vomiting. Endocrine: Negative for cold intolerance and heat intolerance. Genitourinary: Negative for dysuria, frequency and urgency. Musculoskeletal: Positive for arthralgias, back pain and gait problem. Skin: Negative for rash and wound. Neurological: Positive for weakness. Negative for syncope, numbness and headaches. Hematological: Negative. Psychiatric/Behavioral: Negative for agitation and sleep disturbance.  The patient is not nervous/anxious. Past Medical History:   Diagnosis Date    CAD (coronary artery disease)     [facility]) [facility]     Cystic fibrosis [facility]     Diabetes mellitus ([facility]     GERD (gastroesophageal reflux disease)     H/O: CVA (cardiovascular accident)     HTN (hypertension)     Mass     on chest     Tobacco abuse     Vitamin B12 deficiency      Past Surgical History:   Procedure Laterality Date    CARPAL TUNNEL RELEASE Bilateral     CHOLECYSTECTOMY      CORONARY ANGIOPLASTY WITH STENT PLACEMENT      HYSTERECTOMY      JOINT REPLACEMENT Bilateral 10/15/2016    knees    TOTAL KNEE ARTHROPLASTY [facility]ilateral [facility]18/2016    [facility]     Family History   Problem Relation Age of Onset    Diabetes Mother     High Blood Pressure Mother     Cancer Mother         pancreatic    Diabetes Father     Heart Disease Father     High Blood Pressure Father     Cancer Sister         lung, breast    Cancer Brother         throat      Social History     Tobacco Use   Smoking Status Former Smoker    Packs/day: 1.00    Years: 40.00    Pack years: 40.00    Types: Cigarettes   Smokeless Tobacco Never Used   Tobacco Comment    states she is not willing to stop and this is a stress reliever for her. OBJECTIVE:   Wt Readings from Last 3 Encounters:   03/17/22 130 lb 8 oz (59.2 kg)   02/17/22 137 lb (62.1 kg)   01/06/22 142 lb (64.4 kg)     BP Readings from Last 3 Encounters:   03/17/22 110/60   02/17/22 124/60   01/06/22 136/80       /60   Pulse 69   Temp 97.9 °F (36.6 °C)   Resp 18   Wt 130 lb 8 oz (59.2 kg)   SpO2 99%   BMI 22.40 kg/m²      Physical Exam  Vitals and nursing note reviewed. Constitutional:       Appearance: Normal appearance. She is well-developed. HENT:      Head: Normocephalic and atraumatic. Right Ear: External ear normal.      Left Ear: External ear normal.      Nose: Nose normal.      Mouth/Throat:      Mouth: Mucous membranes are dry. Pharynx: Oropharynx is clear. Eyes:      Conjunctiva/sclera: Conjunctivae normal.      Pupils: Pupils are equal, round, and reactive to light. Neck:      Thyroid: No thyromegaly. Vascular: No JVD. Cardiovascular:      Rate and Rhythm: Normal rate and regular rhythm. Heart sounds: Normal heart sounds. Pulmonary:      Effort: Pulmonary effort is normal.      Breath sounds: Normal breath sounds. No wheezing or rales. Abdominal:      General: Bowel sounds are normal. There is no distension. Palpations: Abdomen is soft. There is no mass. Tenderness: There is abdominal tenderness (mild and diffuse ). There is no right CVA tenderness, left CVA tenderness, guarding or rebound. Hernia: No hernia is present. Musculoskeletal:         General: No tenderness. Cervical back: Neck supple. No rigidity. No muscular tenderness. Right lower leg: No edema. Left lower leg: No edema. Skin:     Findings: No erythema or rash. Neurological:      General: No focal deficit present. Mental Status: She is alert and oriented to person, place, and time.    Psychiatric:         Behavior: Behavior normal.         Judgment: Judgment normal.         Lab Results   Component Value Date     02/11/2022    K 4.1 02/11/2022    K 3.5 11/01/2021     02/11/2022    CO2 27 02/11/2022    GLUCOSE 220 02/11/2022    BUN 16 02/11/2022    CREATININE 1.2 02/11/2022    CALCIUM 9.8 02/11/2022    PROT 6.7 02/11/2022    LABALBU 4.3 02/11/2022    BILITOT 0.4 02/11/2022    ALT 9 02/11/2022    AST 13 02/11/2022       Hemoglobin A1C (%)   Date Value   02/11/2022 7.0 (H)     Microscopic Examination (no units)   Date Value   04/27/2019 YES     LDL Calculated (mg/dL)   Date Value   12/03/2020 45         Lab Results   Component Value Date    WBC 4.7 02/11/2022    NEUTROABS 3.1 02/11/2022    HGB 12.2 02/11/2022    HCT 39.1 02/11/2022    MCV 89.3 02/11/2022     02/11/2022       Lab Results   Component Value Date    TSH 2.280 10/13/2021         ASSESSMENT/PLAN:     1. Fatigue, unspecified type  Significant worsening. Will need blood work and close monitoring. May need sleep study. 2. Constipation, unspecified constipation type  No BM for 1 week per patient report despite using Linzess and other medications. Will need laxative at this time. Discussed the importance of appropriate bowel hygiene. Colonoscopy was done 2021. Colonoscopy showed multiple polyps which were removed also showed some angiectasia. Recommended repeat colonoscopy in 6 months. PillCam report showed few AVMs in the mid ileum. This may be reachable only with the push enteroscopy, retrograde enteroscopy. We discussed on discontinuing the Brilinta and continue with only aspirin for now. If any overt bleeding patient need to be transferred to [facility] rectal center push enteroscopy      3. Type 2 diabetes mellitus with diabetic neuropathy, with long-term current use of insulin ([address]  I had a long discussion with her regarding diabetic diet, exercise and weight control. I am going to continue current medication. I advised her to monitor her fingerstick closely. I am going to check the A1c every few months. I will check Microalbumin on a yearly basis. I have also advised her to have a yearly eye exam and to monitor her feet closely. Advice her about the complications from diabetes, even with good control. I am going to have her follow up on a regular bases with our diabetic educator. A1C will be checked in few months. Lab Results   Component Value Date    LABA1C 7.0 (H) 02/11/2022     4. Cirrhosis of liver without ascites, unspecified hepatic cirrhosis type ([address]  Try to avoid any hepatotoxic agent. Make sure other medical problems are under good control. Patient to continue follow-up with GI.    5. Essential hypertension  BP is stable. I have advised her on low-sodium diet, exercise and weight control.   I am going to continue current medication. Will monitor her renal function every few months, have advised her to check blood pressure frequently and to keep a record of this. 6. Vitamin B12 deficiency  Will cont on B12 injection on a monthly basis. Clinically dehydrated with significant constipation as well as worsening fatigue. Will admit directly to the hospital for further evaluation. Patient is agreeable. Orders Placed This Encounter   Medications    cyanocobalamin injection 1,000 mcg      I, Elvira Eller MA am scribing for and in the presence of Polina Whitten MD on this date of 03/17/22 at 10:00 AM    I, Dr. Polina Whitten, personally performed the services described in the documentation as scribed by Elvira Eller MA, in my presence and it is both accurate and complete.

## 2022-03-17 NOTE — PROGRESS NOTES
Noted increased slurring in speech this evening, unable to understand some words that patient says, when patient was informed of not being able to understand she was able to repeat and enunciate words more clearly, patient with no other S&S of stroke att, informed Dr. Nani Williamson, patient known to have baseline slurring of speech due to previous stroke, advised by Dr. Nani Williamson to continue to monitor patient.

## 2022-03-17 NOTE — ACP (ADVANCE CARE PLANNING)
Advance Care Planning     General Advance Care Planning (ACP) Conversation    Date of Conversation: 3/17/2022  Conducted with: Patient with Decision Making Capacity    Healthcare Decision Maker:    Primary Decision Maker: Candelaria Paredes - [phone]    Secondary Decision Maker: Mita Garcia - Niece/Nephew - [phone]  Click here to complete Healthcare Decision Makers including selection of the Healthcare Decision Maker Relationship (ie \"Primary\"). Today we documented Decision Maker(s) consistent with Legal Next of Kin hierarchy. Content/Action Overview:   Has ACP document(s) on file - reflects the patient's care preferences  Reviewed DNR/DNI and patient elects DNR order - completed portable DNR form & placed order        Length of Voluntary ACP Conversation in minutes:  <16 minutes (Non-Billable)    ARMAND Thompson Intern

## 2022-03-17 NOTE — PROGRESS NOTES
Occupational Therapy   Occupational Therapy Initial Assessment  Date: 3/17/2022   Patient Name: Margie Loja  MRN: [id number]     : 1950    Date of Service: 3/17/2022    Discharge Recommendations:  Continue to assess pending progress       Assessment   Performance deficits / Impairments: Decreased functional mobility ; Decreased endurance;Decreased ADL status; Decreased high-level IADLs  Assessment: Pt agreeable to OT services. Pt transferred to sitting at EOB without difficulty. Pt come to stand at EOB without assistance. Assessment limited secondary to pt reports of illness. Pt reported feeling dizzy upon coming to sit at EOB. Will attempt to further assess pt as tolerated. Pt will benefit from skilled OT services while IP. Prognosis: Good  Decision Making: Low Complexity  REQUIRES OT FOLLOW UP: Yes  Activity Tolerance  Activity Tolerance: Patient Tolerated treatment well           Patient Diagnosis(es): There were no encounter diagnoses. has a past medical history of CAD (coronary artery disease), Cirrhosis ([address] COPD (chronic obstructive pulmonary disease) ([facility] Cystic fibrosis ([facility] Diabetes mellitus ([facility] [facility] GERD (gastroesophageal reflux disease), H/O: CVA (cardiovascular accident), HTN (hypertension), Mass, Tobacco abuse, and Vitamin B12 deficiency. has a past surgical history that includes Hysterectomy; Cholecystectomy; Carpal tunnel release (Bilateral); Coronary angioplasty with stent; joint replacement (Bilateral, 10/15/2016); Total knee arthroplasty (Bilateral, 10/18/2016); and Cardiac catheterization. Restrictions  Restrictions/Precautions  Restrictions/Precautions: General Precautions  Required Braces or Orthoses?: No    Subjective   General  Chart Reviewed: Yes  Patient assessed for rehabilitation services?: Yes  Family / Caregiver Present: No  Referring Practitioner: Hanna Hennessy PA-C  Diagnosis: Functional Decline  Subjective  Subjective: Pt agreeable to OT services.  Pt reports BLE pain. Patient Currently in Pain: Yes  Pain Assessment  Pain Level: 5  Vital Signs  Patient Currently in Pain: Yes  Social/Functional History  Social/Functional History  Lives With: Spouse  Type of Home:  ([facility])  Home Layout: One level  Home Access: Stairs to enter with rails  Entrance Stairs - Number of Steps: 1  Bathroom Shower/Tub: Walk-in shower  Bathroom Toilet: Standard  Home Equipment: Cane  ADL Assistance: Independent  Homemaking Assistance: Independent  Homemaking Responsibilities: Yes  Ambulation Assistance: Independent  Transfer Assistance: Independent  Active : Yes       Objective   Vision: Impaired  Vision Exceptions: Wears glasses for reading  H[facility]: [facility]  Hearing Exceptions: Hard of hearing/hearing concerns (Left hearing impairment)    Orientation  Overall Orientation Status: Within Functional Limits  Observation/Palpation  Posture: Fair  Observation: Pt lying in bed, room air, pleasant and cooperative     ADL  Additional Comments: unable to assess ADL's        Bed mobility  Rolling to Right: Modified independent  Supine to Sit: Modified independent  Scooting: Modified independent  Transfers  Sit to stand: Supervision;Stand by assistance  Stand to sit: Supervision              Sensation  Overall Sensation Status: WFL        LUE AROM (degrees)  LUE AROM : WFL  RUE AROM (degrees)  RUE AROM : WFL  LUE Strength  LUE Strength Comment: SOUMYA at time of evaluation                   Plan   Plan  Times per week: 3-5  Times per day: Daily  Plan weeks: 1  Current Treatment Recommendations: Strengthening,Balance Training,Functional Mobility Training,Endurance Training,Self-Care / ADL,Patient/Caregiver Education & Training      Goals  Short term goals  Time Frame for Short term goals: 1 week  Short term goal 1: Pt to complete dressing with MOD I. Short term goal 2: Pt to complete bathing with MOD I.   Short term goal 3: Pt to tolerate x20 minutes of activity to increase functional activity tolerance. Short term goal 4: Pt to complete toileting with MOD I. Short term goal 5: Pt to demo independence with HEP. Therapy Time   Individual Concurrent Group Co-treatment   Time In 0111         Time Out 0122         Minutes 11              This note serves as a DC summary in the event of pt discharge.      Carol Ceron, OTR/L

## 2022-03-17 NOTE — PROGRESS NOTES
Commands: Within Functional Limits  Subjective  Subjective: Patient agreeable to consult; is normally independent with functional mobility- walks with a cane  Pain Screening  Patient Currently in Pain: Yes  Vital Signs  Patient Currently in Pain: Yes       Orientation  Orientation  Overall Orientation Status: Within Normal Limits  Social/Functional History  Social/Functional History  Lives With: Spouse  Type of Home:  ([facility])  Home Layout: One level  Home Access: Stairs to enter with rails  Entrance Stairs - Number of Steps: 1  Bathroom Shower/Tub: Walk-in shower  Bathroom Toilet: Standard  Home Equipment: Cane  ADL Assistance: Independent  Homemaking Assistance: Independent  Homemaking Responsibilities: Yes  Ambulation Assistance: Independent  Transfer Assistance: Independent  Active : Yes  Cognition        Objective     Observation/Palpation  Posture: Fair  Observation: Pt lying in bed, room air, pleasant and cooperative    AROM RLE (degrees)  RLE AROM: WFL  AROM LLE (degrees)  LLE AROM : WFL  AROM RUE (degrees)  RUE AROM : WFL  AROM LUE (degrees)  LUE AROM : WFL  Strength RLE  Strength RLE: WFL  Comment: MMG's grossly 4+/5  Strength LLE  Strength LLE: WFL  Comment: MMG's grossly 4+/5  Strength RUE  Strength RUE: WFL  Strength LUE  Strength LUE: WFL  Tone RLE  RLE Tone: Normotonic  Tone LLE  LLE Tone: Normotonic  Motor Control  Gross Motor?: WFL  Sensation  Overall Sensation Status: WFL  Bed mobility  Rolling to Right: Modified independent  Supine to Sit: Modified independent  Sit to Supine: Modified independent  Scooting: Modified independent  Transfers  Sit to Stand: Stand by assistance  Stand to sit: Stand by assistance  Ambulation  Ambulation?: Yes  Ambulation 1  Device: Rolling Walker  Assistance: Stand by assistance  Gait Deviations: Slow Mireya;Decreased step length;Decreased step height  Distance: step in place x 10 steps - c/o abdominal pain     Balance  Posture: Fair  Sitting - Static:

## 2022-03-17 NOTE — PROGRESS NOTES
Medication Reconciliation  Med rec performed utilizing fill history from Leena Alston and per the pt. See notes below:      -Removed the following meds per fill history and per the pt. Spironolactone      -Changed the following meds per fill history and per the pt. Umeclidinium-vilanterol Inhaler from 1 puff daily to 1 puff PRN. Pt states that she does not like this medication so she doesn't use it every day. Levemir dose from 55 units to 50 units nightly per pt. Of note, pt has not filled iron at pharmacy but states that she gets it OTC. Also, the pt has not filled spironolactone or imdur in the past year per fill history. Pt stated that she does not take the spironolactone at home, so this was removed from the home med list.  Pt stated that she does take the imdur, so this med was left on home med list.  Suspect noncompliance with this med.     Saúl Navarro, PharmD

## 2022-03-18 ENCOUNTER — OUTSIDE FACILITY SERVICE (OUTPATIENT)
Dept: CARDIOLOGY | Facility: CLINIC | Age: 72
End: 2022-03-18

## 2022-03-18 PROBLEM — I72.9 ANEURYSM (HCC): Status: ACTIVE | Noted: 2022-03-18

## 2022-03-18 PROBLEM — Z87.891 PERSONAL HISTORY OF NICOTINE DEPENDENCE: Status: ACTIVE | Noted: 2022-03-18

## 2022-03-18 PROBLEM — N17.9 ACUTE RENAL FAILURE (ARF) (HCC): Status: ACTIVE | Noted: 2022-03-18

## 2022-03-18 LAB
A/G RATIO: 1.7 (ref 0.8–2)
ALBUMIN SERPL-MCNC: 3.8 G/DL (ref 3.4–4.8)
ALP BLD-CCNC: 83 U/L (ref 25–100)
ALT SERPL-CCNC: 14 U/L (ref 4–36)
ANION GAP SERPL CALCULATED.3IONS-SCNC: 13 MMOL/L (ref 3–16)
AST SERPL-CCNC: 17 U/L (ref 8–33)
BASOPHILS ABSOLUTE: 0 K/UL (ref 0–0.1)
BASOPHILS RELATIVE PERCENT: 0.5 %
BILIRUB SERPL-MCNC: 0.6 MG/DL (ref 0.3–1.2)
BUN BLDV-MCNC: 32 MG/DL (ref 6–20)
CALCIUM SERPL-MCNC: 8.9 MG/DL (ref 8.5–10.5)
CHLORIDE BLD-SCNC: 95 MMOL/L (ref 98–107)
CO2: 26 MMOL/L (ref 20–30)
CREAT SERPL-MCNC: 2.1 MG/DL (ref 0.4–1.2)
EOSINOPHILS ABSOLUTE: 0.1 K/UL (ref 0–0.4)
EOSINOPHILS RELATIVE PERCENT: 0.8 %
GFR AFRICAN AMERICAN: 28
GFR NON-AFRICAN AMERICAN: 23
GLOBULIN: 2.2 G/DL
GLUCOSE BLD-MCNC: 109 MG/DL (ref 74–106)
GLUCOSE BLD-MCNC: 123 MG/DL (ref 74–106)
GLUCOSE BLD-MCNC: 195 MG/DL (ref 74–106)
GLUCOSE BLD-MCNC: 200 MG/DL (ref 74–106)
GLUCOSE BLD-MCNC: 254 MG/DL (ref 74–106)
HCT VFR BLD CALC: 31 % (ref 37–47)
HEMOGLOBIN: 10.6 G/DL (ref 11.5–16.5)
IMMATURE GRANULOCYTES #: 0 K/UL
IMMATURE GRANULOCYTES %: 0.3 % (ref 0–5)
IRON SATURATION: 57 % (ref 15–50)
IRON: 145 UG/DL (ref 37–145)
LV EF: 63 %
LVEF MODALITY: NORMAL
LYMPHOCYTES ABSOLUTE: 1.5 K/UL (ref 1.5–4)
LYMPHOCYTES RELATIVE PERCENT: 25.4 %
MAGNESIUM: 2.1 MG/DL (ref 1.7–2.4)
MCH RBC QN AUTO: 28.5 PG (ref 27–32)
MCHC RBC AUTO-ENTMCNC: 34.2 G/DL (ref 31–35)
MCV RBC AUTO: 83.3 FL (ref 80–100)
MONOCYTES ABSOLUTE: 0.5 K/UL (ref 0.2–0.8)
MONOCYTES RELATIVE PERCENT: 7.6 %
NEUTROPHILS ABSOLUTE: 3.9 K/UL (ref 2–7.5)
NEUTROPHILS RELATIVE PERCENT: 65.4 %
PDW BLD-RTO: 14.9 % (ref 11–16)
PERFORMED ON: ABNORMAL
PLATELET # BLD: 168 K/UL (ref 150–400)
PMV BLD AUTO: 11.4 FL (ref 6–10)
POTASSIUM REFLEX MAGNESIUM: 3.4 MMOL/L (ref 3.4–5.1)
RBC # BLD: 3.72 M/UL (ref 3.8–5.8)
SODIUM BLD-SCNC: 134 MMOL/L (ref 136–145)
TOTAL IRON BINDING CAPACITY: 256 UG/DL (ref 250–450)
TOTAL PROTEIN: 6 G/DL (ref 6.4–8.3)
WBC # BLD: 5.9 K/UL (ref 4–11)

## 2022-03-18 PROCEDURE — 6370000000 HC RX 637 (ALT 250 FOR IP): Performed by: PHYSICIAN ASSISTANT

## 2022-03-18 PROCEDURE — 36415 COLL VENOUS BLD VENIPUNCTURE: CPT

## 2022-03-18 PROCEDURE — 97802 MEDICAL NUTRITION INDIV IN: CPT

## 2022-03-18 PROCEDURE — 93306 TTE W/DOPPLER COMPLETE: CPT

## 2022-03-18 PROCEDURE — 83540 ASSAY OF IRON: CPT

## 2022-03-18 PROCEDURE — 93306 TTE W/DOPPLER COMPLETE: CPT | Performed by: INTERNAL MEDICINE

## 2022-03-18 PROCEDURE — 99219 PR INITIAL OBSERVATION CARE/DAY 50 MINUTES: CPT | Performed by: INTERNAL MEDICINE

## 2022-03-18 PROCEDURE — 97530 THERAPEUTIC ACTIVITIES: CPT

## 2022-03-18 PROCEDURE — 80053 COMPREHEN METABOLIC PANEL: CPT

## 2022-03-18 PROCEDURE — 97535 SELF CARE MNGMENT TRAINING: CPT

## 2022-03-18 PROCEDURE — 2580000003 HC RX 258: Performed by: PHYSICIAN ASSISTANT

## 2022-03-18 PROCEDURE — G0378 HOSPITAL OBSERVATION PER HR: HCPCS

## 2022-03-18 PROCEDURE — 85025 COMPLETE CBC W/AUTO DIFF WBC: CPT

## 2022-03-18 PROCEDURE — 97110 THERAPEUTIC EXERCISES: CPT

## 2022-03-18 PROCEDURE — 83735 ASSAY OF MAGNESIUM: CPT

## 2022-03-18 PROCEDURE — 96365 THER/PROPH/DIAG IV INF INIT: CPT

## 2022-03-18 PROCEDURE — 99406 BEHAV CHNG SMOKING 3-10 MIN: CPT | Performed by: INTERNAL MEDICINE

## 2022-03-18 PROCEDURE — 83550 IRON BINDING TEST: CPT

## 2022-03-18 PROCEDURE — 6360000002 HC RX W HCPCS: Performed by: INTERNAL MEDICINE

## 2022-03-18 RX ORDER — CYANOCOBALAMIN 1000 UG/ML
1000 INJECTION INTRAMUSCULAR; SUBCUTANEOUS ONCE
Status: COMPLETED | OUTPATIENT
Start: 2022-03-19 | End: 2022-03-19

## 2022-03-18 RX ORDER — FERROUS SULFATE TAB EC 324 MG (65 MG FE EQUIVALENT) 324 (65 FE) MG
TABLET DELAYED RESPONSE ORAL
Status: DISCONTINUED
Start: 2022-03-18 | End: 2022-03-18 | Stop reason: ALTCHOICE

## 2022-03-18 RX ORDER — PROMETHAZINE HYDROCHLORIDE 25 MG/ML
25 INJECTION, SOLUTION INTRAMUSCULAR; INTRAVENOUS ONCE
Status: DISCONTINUED | OUTPATIENT
Start: 2022-03-18 | End: 2022-03-18

## 2022-03-18 RX ADMIN — INSULIN GLARGINE 50 UNITS: 100 INJECTION, SOLUTION SUBCUTANEOUS at 20:25

## 2022-03-18 RX ADMIN — Medication 25 MG: at 02:04

## 2022-03-18 RX ADMIN — FERROUS SULFATE TAB EC 324 MG (65 MG FE EQUIVALENT) 324 MG: 324 (65 FE) TABLET DELAYED RESPONSE at 11:17

## 2022-03-18 RX ADMIN — HYDROCODONE BITARTRATE AND ACETAMINOPHEN 1 TABLET: 5; 325 TABLET ORAL at 01:44

## 2022-03-18 RX ADMIN — SODIUM CHLORIDE: 9 INJECTION, SOLUTION INTRAVENOUS at 11:15

## 2022-03-18 RX ADMIN — ISOSORBIDE MONONITRATE 60 MG: 60 TABLET, EXTENDED RELEASE ORAL at 08:47

## 2022-03-18 RX ADMIN — ASPIRIN 81 MG 81 MG: 81 TABLET ORAL at 08:47

## 2022-03-18 RX ADMIN — PANTOPRAZOLE SODIUM 40 MG: 40 TABLET, DELAYED RELEASE ORAL at 08:46

## 2022-03-18 RX ADMIN — ROSUVASTATIN CALCIUM 20 MG: 20 TABLET, COATED ORAL at 08:46

## 2022-03-18 RX ADMIN — PANTOPRAZOLE SODIUM 40 MG: 40 TABLET, DELAYED RELEASE ORAL at 18:10

## 2022-03-18 RX ADMIN — ALOGLIPTIN 6.25 MG: 12.5 TABLET, FILM COATED ORAL at 08:46

## 2022-03-18 ASSESSMENT — PAIN SCALES - GENERAL: PAINLEVEL_OUTOF10: 8

## 2022-03-18 NOTE — PLAN OF CARE
Problem: Falls - Risk of:  Goal: Will remain free from falls  Description: Will remain free from falls  3/18/2022 1217 by Fredy Lechuga RN  Outcome: Ongoing  3/17/2022 2335 by Leon Boyd RN  Outcome: Ongoing  Goal: Absence of physical injury  Description: Absence of physical injury  3/18/2022 1217 by Fredy Lechuga RN  Outcome: Ongoing  3/17/2022 2335 by Leon Boyd RN  Outcome: Ongoing     Problem: Skin Integrity:  Goal: Will show no infection signs and symptoms  Description: Will show no infection signs and symptoms  3/18/2022 1217 by Fredy Lechuga RN  Outcome: Ongoing  3/17/2022 2335 by Leon Boyd RN  Outcome: Ongoing  Goal: Absence of new skin breakdown  Description: Absence of new skin breakdown  3/18/2022 1217 by Fredy Lechuga RN  Outcome: Ongoing  3/17/2022 2335 by Leon Boyd RN  Outcome: Ongoing     Problem: SAFETY  Goal: Free from accidental physical injury  3/18/2022 1217 by Fredy Lechuga RN  Outcome: Ongoing  3/17/2022 2335 by Leon Boyd RN  Outcome: Ongoing  Goal: Free from intentional harm  3/18/2022 1217 by Fredy Lechuga RN  Outcome: Ongoing  3/17/2022 2335 by Leon Boyd RN  Outcome: Ongoing     Problem: DAILY CARE  Goal: Daily care needs are met  3/18/2022 1217 by Fredy Lechuga RN  Outcome: Ongoing  3/17/2022 2335 by Leon Boyd RN  Outcome: Ongoing     Problem: PAIN  Goal: Patient's pain/discomfort is manageable  3/18/2022 1217 by Fredy Lechuga RN  Outcome: Ongoing  3/17/2022 2335 by Leon Boyd RN  Outcome: Ongoing     Problem: SKIN INTEGRITY  Goal: Skin integrity is maintained or improved  3/18/2022 1217 by Fredy Lechuga RN  Outcome: Ongoing  3/17/2022 2335 by Leon Boyd RN  Outcome: Ongoing     Problem: KNOWLEDGE DEFICIT  Goal: Patient/S.O. demonstrates understanding of disease process, treatment plan, medications, and discharge instructions.   3/18/2022 1217 by Fredy Lechuga RN  Outcome: Ongoing  3/17/2022 2335 by Vania Adrian Kadie Cabello RN  Outcome: Ongoing     Problem: DISCHARGE BARRIERS  Goal: Patient's continuum of care needs are met  3/18/2022 1217 by Judson Laguerre RN  Outcome: Ongoing  3/17/2022 2335 by Ron Zuleta RN  Outcome: Ongoing     Problem: Pain:  Goal: Pain level will decrease  Description: Pain level will decrease  3/18/2022 1217 by Judson Laguerre RN  Outcome: Ongoing  3/17/2022 2335 by Ron Zuleta RN  Outcome: Ongoing  Goal: Control of acute pain  Description: Control of acute pain  3/18/2022 1217 by Judson Laguerre RN  Outcome: Ongoing  3/17/2022 2335 by Ron Zuleta RN  Outcome: Ongoing  Goal: Control of chronic pain  Description: Control of chronic pain  3/18/2022 1217 by Judson Laguerre RN  Outcome: Ongoing  3/17/2022 2335 by Ron Zuleta RN  Outcome: Ongoing

## 2022-03-18 NOTE — FLOWSHEET NOTE
03/17/22 2030   Assessment   Charting Type Admission   Neurological   Neuro (WDL) X   Level of Consciousness Alert (0)   Orientation Level Oriented X4   Cognition Appropriate judgement; Appropriate safety awareness; Appropriate attention/concentration; Appropriate for developmental age; Follows commands   Language Slurred; Other (Comment)  (quiet, mild slurring)   Hardy Coma Scale   Eye Opening 4   Best Verbal Response 5   Best Motor Response 6   Des Coma Scale Score 15

## 2022-03-18 NOTE — PROGRESS NOTES
Occupational Therapy  Facility/Department: [facility] MED SURG  Daily Treatment Note  NAME: Dheeraj Coelho  : 1950  MRN: [id number]    Date of Service: 3/18/2022    Discharge Recommendations:  Continue to assess pending progress       Assessment   Assessment: Pt requesting assistance with toileting upon entry. Pt come to sit at EOB with MOD I. Pt transferred to [facility] with CGA. Pt doffed LB clothing with SBA. Pt completed kaley hygiene with CHANDLER. Pt donned LB clothing with CGA<>SBA. Pt transferred to bed and returned to supine with call light in reach. Pt reports she is not feeling well at this time. Patient Diagnosis(es): There were no encounter diagnoses. has a past medical history of CAD (coronary artery disease), Cirrhosis ([facility] [address] COPD (chronic obstructive pulmonary disease) ([facility] [address] Cystic fibrosis ([facility] [address] Diabetes mellitus ([facility] [address] GERD (gastroesophageal reflux disease), H/O: CVA (cardiovascular accident), HTN (hypertension), Mass, Tobacco abuse, and Vitamin B12 deficiency. has a past surgical history that includes Hysterectomy; Cholecystectomy; Carpal tunnel release (Bilateral); Coronary angioplasty with stent; joint replacement (Bilateral, 10/15/2016); Total knee arthroplasty (Bilateral, 10/18/2016); and Cardiac catheterization. Restrictions  Restrictions/Precautions  Restrictions/Precautions: General Precautions  Required Braces or Orthoses?: No  Subjective   General  Chart Reviewed: Yes  Patient assessed for rehabilitation services?: Yes  Family / Caregiver Present: No  Referring Practitioner: Ozzy Juarez PA-C  Diagnosis: Functional Decline  Subjective  Subjective: Pt agreeable to OT services. Pt reports BLE pain.       Orientation     Objective    ADL  LE Dressing: Contact guard assistance  Toileting: Contact guard assistance        Plan   Plan  Times per week: 3-5  Times per day: Daily  Plan weeks: 1  Current Treatment Recommendations: Jesús Osorio Mobility Nusrat Bonilla / ADL,Patient/Caregiver Education & Training    Goals  Short term goals  Time Frame for Short term goals: 1 week  Short term goal 1: Pt to complete dressing with MOD I. Short term goal 2: Pt to complete bathing with MOD I. Short term goal 3: Pt to tolerate x20 minutes of activity to increase functional activity tolerance. Short term goal 4: Pt to complete toileting with MOD I. Short term goal 5: Pt to demo independence with HEP. Therapy Time   Individual Concurrent Group Co-treatment   Time In 0278         Time Out 1036         Minutes 8              This note serves as a DC summary in the event of pt discharge.      Carol Ceron, OTR/L

## 2022-03-18 NOTE — CONSULTS
Comprehensive Nutrition Assessment    Type and Reason for Visit:  Initial,Positive Nutrition Screen,Consult    Nutrition Recommendations/Plan: Diet modified to include 3 carb choice, low fat/low chol, high fiber, JOSEFINA that is easy to chew. Will monitor intakes and add ONS if needed. At this time patient eating %. Nutrition Assessment:  Pt admitted with declining functional status, and is at low-moderate risk for ongoing nutritional compromise r/t weight loss and reported poor intakes, but last three meals patient [id number] Malnutrition Assessment:  Malnutrition Status: At risk for malnutrition (Comment) (related to weight loss and predicted poor intakes and reported poor intakes.)    Context:  Acute Illness     Findings of the 6 clinical characteristics of malnutrition:  Energy Intake:  No significant decrease in energy intake  Weight Loss:  No significant weight loss     Body Fat Loss:  No significant body fat loss     Muscle Mass Loss:  No significant muscle mass loss    Fluid Accumulation:  Unable to assess     Strength:       Estimated Daily Nutrient Needs:  Energy (kcal):  0222-8587 (26-28 kcal/kg cbw); Weight Used for Energy Requirements:  Current     Protein (g):  60-72 (1-1.2 gm/kg cbw); Weight Used for Protein Requirements:  Current        Fluid (ml/day):  6640-0974; Method Used for Fluid Requirements:  1 ml/kcal      Nutrition Related Findings:  Pt presents normal weight for age, with no reported edema. Pt weight has been fluctuating up and down. She has lost 3% last month and 6 % past 2 months. she is on an easy to chew and is eating % of meals  No apparent fat loss or muscle wasting. PMH: Smokes,CKD stage 3,Anemia, CAD, cirrhosis, COPD, DM, CVA, hypertension      Wounds:  None       Current Nutrition Therapies:    ADULT DIET; Easy to Chew; 3 carb choices (45 gm/meal);  Low Fat/Low Chol/High Fiber/JOSEFINA    Anthropometric Measures:  · Height: 5' 3\" (160 cm)  · Current Body Weight: 132 lb 6.4 oz (60.1 kg)   · Admission Body Weight:      · Usual Body Weight: 137 lb (62.1 kg) (average body weight)     · Ideal Body Weight: 115 lbs; % Ideal Body Weight 115.1 %   · BMI: 23.5  · Adjusted Body Weight:  ; No Adjustment   · Adjusted BMI:      · BMI Categories: Normal Weight (BMI 22.0 to 24.9) age over 72       Nutrition Diagnosis:   · Unintended weight loss related to biting/chewing (masticatory) difficulty,early satiety as evidenced by weight loss,poor intake prior to admission      Nutrition Interventions:   Food and/or Nutrient Delivery:  Modify Current Diet  Nutrition Education/Counseling:   (Could not wake patient up enought to address edu needs. Will try again when more appropriate.)   Coordination of Nutrition Care:  Continue to monitor while inpatient    Goals:  to meet est needs for age/condition       Nutrition Monitoring and Evaluation:   Behavioral-Environmental Outcomes:      Food/Nutrient Intake Outcomes:  Food and Nutrient Intake  Physical Signs/Symptoms Outcomes:  Biochemical Data,Chewing or Swallowing,Weight,Fluid Status or Edema     Discharge Planning:     Too soon to determine     Electronically signed by Oliver Jeans, MS, RD, LD on 3/18/22 at 1:25 PM EDT

## 2022-03-18 NOTE — PROGRESS NOTES
Occupational Therapy  Daily Note  Date: 3/18/2022   Patient Name: Gonzales Galeano  MRN: [id number]     : 1950    Date of Service: 3/18/2022  Discharge Recommendations:  Continue to assess pending progress    Assessment  Pt seen for skilled OT interventions; co tx w PTA; no c/o pain. Pt able to perform bed mobility w Mod I; CGA w RW. Pt able to perform BADLs of oral hygiene, face washing, & hair combing w setup w/o observations of fine motor deficits. Pt presents w minor dizziness after ambulation reporting feeling weaker than usual. OT provided pt education about weekend program to prevent decline. Pt EOB per request, family present, call bell in reach, all needs met. Patient Diagnosis(es): There were no encounter diagnoses. has a past medical history of CAD (coronary artery disease), Cirrhosis ([facility] [address] COPD (chronic obstructive pulmonary disease) ([facility] [address] Cystic fibrosis ([facility] [address] Diabetes mellitus ([facility] [address] GERD (gastroesophageal reflux disease), H/O: CVA (cardiovascular accident), HTN (hypertension), Mass, Tobacco abuse, and Vitamin B12 deficiency. has a past surgical history that includes Hysterectomy; Cholecystectomy; Carpal tunnel release (Bilateral); Coronary angioplasty with stent; joint replacement (Bilateral, 10/15/2016); Total knee arthroplasty (Bilateral, 10/18/2016); and Cardiac catheterization. Restrictions: General Precautions    Subjective  Chart Reviewed: Yes  Patient assessed for rehabilitation services?: Yes  Family / Caregiver Present: No  Referring Practitioner: Marek Schmidt PA-C  Diagnosis: Functional Decline  Subjective: Pt agreeable to OT services. Pt reports BLE pain.     Objective  Bed Mobility: Modified Independent  Sit to Stand: Contact Guard Assistance  Functional Mobility: Contact Guard Assistance w Rolling Walker ~30ft    BADLs: Setup  Fine Motor Skills: Good    Plan  Times per week: 3-5  Times per day: Daily  Plan weeks: 1  Current Treatment Recommendations: Strengthening,Balance Training,Functional Mobility Training,Endurance Training,Self-Care / ADL,Patient/Caregiver Education & Training    Goals  Short term goals  Time Frame for Short term goals: 1 week  Short term goal 1: Pt to complete dressing with MOD I. Short term goal 2: Pt to complete bathing with MOD I. Short term goal 3: Pt to tolerate x20 minutes of activity to increase functional activity tolerance. Short term goal 4: Pt to complete toileting with MOD I. Short term goal 5: Pt to demo independence with HEP. Therapy Time   Individual Co-treatment   Time In  1046   Time Out  1110   Minutes  24     This note serves as a DC summary in the event of pt discharge.    Cong Cortés, OTR/L

## 2022-03-18 NOTE — PROGRESS NOTES
Physical Therapy  Facility/Department: [facility] MED SURG  Daily Treatment Note  NAME: Dylan Patel  : 1950  MRN: [id number]    Date of Service: 3/18/2022    Discharge Recommendations:  Home with assist PRN,Home with Home health PT      Assessment   Assessment: Cotreated with OT. Patient completed bed mobility tasks with Mod I.  Stood with SBA and ambulated ~30 feet with RW and CGA. Patient sat EOB and performed B LE therex. Family member came to visit. Patient requested to remain sitting EOB rather than getting into a chair. REQUIRES PT FOLLOW UP: Yes  Activity Tolerance  Activity Tolerance: Patient Tolerated treatment well     Patient Diagnosis(es): There were no encounter diagnoses. has a past medical history of CAD (coronary artery disease), Cirrhosis ([facility] [address] COPD (chronic obstructive pulmonary disease) ([facility] [address] Cystic fibrosis ([facility] [address] Diabetes mellitus ([facility] [address] GERD (gastroesophageal reflux disease), H/O: CVA (cardiovascular accident), HTN (hypertension), Mass, Tobacco abuse, and Vitamin B12 deficiency. has a past surgical history that includes Hysterectomy; Cholecystectomy; Carpal tunnel release (Bilateral); Coronary angioplasty with stent; joint replacement (Bilateral, 10/15/2016); Total knee arthroplasty (Bilateral, 10/18/2016); and Cardiac catheterization. Restrictions  Restrictions/Precautions  Restrictions/Precautions: General Precautions  Required Braces or Orthoses?: No  Subjective   General  Chart Reviewed: Yes  Response To Previous Treatment: Patient with no complaints from previous session. Family / Caregiver Present: No  Subjective  Subjective: Patient reports she doesn't feel very well but better than yesterday.   Pain Screening  Patient Currently in Pain: Yes  Vital Signs  Patient Currently in Pain: Yes       Objective   Bed mobility  Rolling to Right: Modified independent  Supine to Sit: Modified independent  Scooting: Modified independent  Transfers  Sit to Stand: Stand by assistance  Stand to sit: Stand by assistance  Ambulation  Ambulation?: Yes  Ambulation 1  Surface: level tile  Device: Rolling Walker  Assistance: Contact guard assistance  Distance: 30 feet     Balance  Posture: Fair  Sitting - Static: Good  Sitting - Dynamic: Good;-  Standing - Static: Fair;+  Standing - Dynamic: Fair  Exercises  Hip Flexion: 10  Knee Long Arc Quad: 10  Ankle Pumps: 10      Goals  Long term goals  Time Frame for Long term goals : 3-4 days  Long term goal 1: Patient to demonstrate modified independence with basic transfers. Long term goal 2: Patient to ambulate 50 feet x 2 with RW with good safety awareness and no more than minimal fatigue. Plan    Plan  Times per week: 3-4 days  Plan weeks: 3-4 days  Current Treatment Recommendations: Strengthening,Neuromuscular Re-education,Home Exercise Program,Balance Training,Gait Training,Functional Mobility Training,Endurance Training,Safety Education & Training,Patient/Caregiver Education & Training  Safety Devices  Type of devices: Call light within reach (sitting EOB, family member present)     Therapy Time   Individual Concurrent Group Co-treatment   Time In 65         Time Out 1110         Minutes 24              This note serves as D/C summary if patient is discharged prior to next visit.   Juwan Montenegro, PTA

## 2022-03-19 VITALS
TEMPERATURE: 98.1 F | HEIGHT: 63 IN | HEART RATE: 58 BPM | BODY MASS INDEX: 23.46 KG/M2 | OXYGEN SATURATION: 95 % | DIASTOLIC BLOOD PRESSURE: 61 MMHG | SYSTOLIC BLOOD PRESSURE: 136 MMHG | RESPIRATION RATE: 16 BRPM | WEIGHT: 132.4 LBS

## 2022-03-19 LAB
ANION GAP SERPL CALCULATED.3IONS-SCNC: 12 MMOL/L (ref 3–16)
BASOPHILS ABSOLUTE: 0 K/UL (ref 0–0.1)
BASOPHILS RELATIVE PERCENT: 0.5 %
BUN BLDV-MCNC: 26 MG/DL (ref 6–20)
CALCIUM SERPL-MCNC: 8.6 MG/DL (ref 8.5–10.5)
CHLORIDE BLD-SCNC: 99 MMOL/L (ref 98–107)
CO2: 24 MMOL/L (ref 20–30)
CREAT SERPL-MCNC: 1.8 MG/DL (ref 0.4–1.2)
EOSINOPHILS ABSOLUTE: 0 K/UL (ref 0–0.4)
EOSINOPHILS RELATIVE PERCENT: 0.7 %
FOLATE: 6.75 NG/ML
GFR AFRICAN AMERICAN: 34
GFR NON-AFRICAN AMERICAN: 28
GLUCOSE BLD-MCNC: 144 MG/DL (ref 74–106)
GLUCOSE BLD-MCNC: 213 MG/DL (ref 74–106)
GLUCOSE BLD-MCNC: 251 MG/DL (ref 74–106)
HCT VFR BLD CALC: 28.3 % (ref 37–47)
HEMOGLOBIN: 9.6 G/DL (ref 11.5–16.5)
IMMATURE GRANULOCYTES #: 0 K/UL
IMMATURE GRANULOCYTES %: 0 % (ref 0–5)
LYMPHOCYTES ABSOLUTE: 1 K/UL (ref 1.5–4)
LYMPHOCYTES RELATIVE PERCENT: 23.9 %
MCH RBC QN AUTO: 28.5 PG (ref 27–32)
MCHC RBC AUTO-ENTMCNC: 33.9 G/DL (ref 31–35)
MCV RBC AUTO: 84 FL (ref 80–100)
MONOCYTES ABSOLUTE: 0.4 K/UL (ref 0.2–0.8)
MONOCYTES RELATIVE PERCENT: 8.7 %
NEUTROPHILS ABSOLUTE: 2.8 K/UL (ref 2–7.5)
NEUTROPHILS RELATIVE PERCENT: 66.2 %
PDW BLD-RTO: 15.2 % (ref 11–16)
PERFORMED ON: ABNORMAL
PERFORMED ON: ABNORMAL
PLATELET # BLD: 130 K/UL (ref 150–400)
PMV BLD AUTO: 11.3 FL (ref 6–10)
POTASSIUM SERPL-SCNC: 3.1 MMOL/L (ref 3.4–5.1)
RBC # BLD: 3.37 M/UL (ref 3.8–5.8)
SODIUM BLD-SCNC: 135 MMOL/L (ref 136–145)
TOTAL CK: 97 U/L (ref 26–174)
WBC # BLD: 4.3 K/UL (ref 4–11)

## 2022-03-19 PROCEDURE — G0378 HOSPITAL OBSERVATION PER HR: HCPCS

## 2022-03-19 PROCEDURE — 99217 PR OBSERVATION CARE DISCHARGE MANAGEMENT: CPT | Performed by: INTERNAL MEDICINE

## 2022-03-19 PROCEDURE — 36415 COLL VENOUS BLD VENIPUNCTURE: CPT

## 2022-03-19 PROCEDURE — 82746 ASSAY OF FOLIC ACID SERUM: CPT

## 2022-03-19 PROCEDURE — 96372 THER/PROPH/DIAG INJ SC/IM: CPT

## 2022-03-19 PROCEDURE — 6370000000 HC RX 637 (ALT 250 FOR IP): Performed by: PHYSICIAN ASSISTANT

## 2022-03-19 PROCEDURE — 6360000002 HC RX W HCPCS: Performed by: INTERNAL MEDICINE

## 2022-03-19 PROCEDURE — 85025 COMPLETE CBC W/AUTO DIFF WBC: CPT

## 2022-03-19 PROCEDURE — 82550 ASSAY OF CK (CPK): CPT

## 2022-03-19 PROCEDURE — 80048 BASIC METABOLIC PNL TOTAL CA: CPT

## 2022-03-19 RX ORDER — POLYETHYLENE GLYCOL 3350 17 G/17G
17 POWDER, FOR SOLUTION ORAL 2 TIMES DAILY
Qty: 60 EACH | Refills: 0 | Status: SHIPPED | OUTPATIENT
Start: 2022-03-19 | End: 2022-04-18

## 2022-03-19 RX ORDER — BISACODYL 5 MG
5 TABLET, DELAYED RELEASE (ENTERIC COATED) ORAL DAILY PRN
Qty: 30 TABLET | Refills: 1 | Status: SHIPPED | OUTPATIENT
Start: 2022-03-19

## 2022-03-19 RX ADMIN — ISOSORBIDE MONONITRATE 60 MG: 60 TABLET, EXTENDED RELEASE ORAL at 08:11

## 2022-03-19 RX ADMIN — ROSUVASTATIN CALCIUM 20 MG: 20 TABLET, COATED ORAL at 08:11

## 2022-03-19 RX ADMIN — PANTOPRAZOLE SODIUM 40 MG: 40 TABLET, DELAYED RELEASE ORAL at 08:12

## 2022-03-19 RX ADMIN — CYANOCOBALAMIN 1000 MCG: 1000 INJECTION INTRAMUSCULAR; SUBCUTANEOUS at 08:12

## 2022-03-19 RX ADMIN — ALOGLIPTIN 6.25 MG: 12.5 TABLET, FILM COATED ORAL at 08:11

## 2022-03-19 RX ADMIN — FERROUS SULFATE TAB EC 324 MG (65 MG FE EQUIVALENT) 324 MG: 324 (65 FE) TABLET DELAYED RESPONSE at 08:12

## 2022-03-19 RX ADMIN — ASPIRIN 81 MG 81 MG: 81 TABLET ORAL at 08:11

## 2022-03-19 ASSESSMENT — PAIN SCALES - GENERAL: PAINLEVEL_OUTOF10: 0

## 2022-03-19 NOTE — PLAN OF CARE
Problem: Falls - Risk of:  Goal: Will remain free from falls  Description: Will remain free from falls  3/19/2022 1006 by Braden Dowling RN  Outcome: Met This Shift  3/18/2022 2203 by Rafael Gross RN  Outcome: Ongoing  Goal: Absence of physical injury  Description: Absence of physical injury  3/19/2022 1006 by Braden Dowling RN  Outcome: Met This Shift  3/18/2022 2203 by Rafael Gross RN  Outcome: Ongoing     Problem: Skin Integrity:  Goal: Will show no infection signs and symptoms  Description: Will show no infection signs and symptoms  3/19/2022 1006 by Braden Dowling RN  Outcome: Met This Shift  3/18/2022 2203 by Rafael Gross RN  Outcome: Ongoing  Goal: Absence of new skin breakdown  Description: Absence of new skin breakdown  3/19/2022 1006 by Braden Dowling RN  Outcome: Met This Shift  3/18/2022 2203 by Rafael Gross RN  Outcome: Ongoing     Problem: SAFETY  Goal: Free from accidental physical injury  3/19/2022 1006 by Braden Dowling RN  Outcome: Met This Shift  3/18/2022 2203 by Rafael Gross RN  Outcome: Ongoing  Goal: Free from intentional harm  3/19/2022 1006 by Braden Dowling RN  Outcome: Met This Shift  3/18/2022 2203 by Rafael Gross RN  Outcome: Ongoing     Problem: DAILY CARE  Goal: Daily care needs are met  3/19/2022 1006 by Braden Dowling RN  Outcome: Met This Shift  3/18/2022 2203 by Rafael Gross RN  Outcome: Ongoing     Problem: SKIN INTEGRITY  Goal: Skin integrity is maintained or improved  3/19/2022 1006 by Braden Dowling RN  Outcome: Met This Shift  3/18/2022 2203 by Rafael Gross RN  Outcome: Ongoing     Problem: PAIN  Goal: Patient's pain/discomfort is manageable  3/19/2022 1006 by Braden Dowling RN  Outcome: Ongoing  3/18/2022 2203 by Rafael Gross RN  Outcome: Ongoing     Problem: KNOWLEDGE DEFICIT  Goal: Patient/S.O. demonstrates understanding of disease process, treatment plan, medications, and discharge instructions.   3/19/2022 1006 by Suzy Hinds RN  Outcome: Ongoing  3/18/2022 2203 by David Trevizo RN  Outcome: Ongoing     Problem: DISCHARGE BARRIERS  Goal: Patient's continuum of care needs are met  3/19/2022 1006 by Suzy Hinds RN  Outcome: Ongoing  3/18/2022 2203 by David Trevizo RN  Outcome: Ongoing     Problem: Pain:  Goal: Pain level will decrease  Description: Pain level will decrease  3/19/2022 1006 by Suzy Hinds RN  Outcome: Ongoing  3/18/2022 2203 by David Trevizo RN  Outcome: Ongoing  Goal: Control of acute pain  Description: Control of acute pain  3/19/2022 1006 by Suzy Hinds RN  Outcome: Ongoing  3/18/2022 2203 by David Trevizo RN  Outcome: Ongoing  Goal: Control of chronic pain  Description: Control of chronic pain  3/19/2022 1006 by Suzy Hinds RN  Outcome: Ongoing  3/18/2022 2203 by David Trevizo RN  Outcome: Ongoing

## 2022-03-19 NOTE — DISCHARGE SUMMARY
Discharge Summary      Patient ID: Venus Stephens      Patient's PCP: Kaila Martínez MD    Admit Date: 3/17/2022     Discharge Date:  3/19/2022    Admitting Provider: Kaila Martínez MD    Discharging Provider: VIGNESH Gamboa     Reason for this admission:   Fatigue, weakness and constipation    Discharge Diagnoses: Active Hospital Problems    Diagnosis Date Noted    Aneurysm ([facility] [address] [I72.9] 03/18/2022    Acute renal failure (ARF) ([facility] [address] [N17.9] 03/18/2022    Personal history of nicotine dependence [Z87.891] 03/18/2022    Declining functional status [R53.81] 03/17/2022    Cirrhosis ([facility] [address] [K74.60] 11/01/2021    Anemia [D64.9] 11/01/2021    Type 2 diabetes mellitus with diabetic neuropathy ([facility] [address] [E11.40] 08/12/2015    Essential hypertension [I10] 08/12/2015       Procedures:  XR CHEST PORTABLE   Final Result   1. No acute disease. Consults:   IP CONSULT TO CASE MANAGEMENT  IP CONSULT TO DIETITIAN  PT OT    Briefly:   68-year-old female with past medical history significant for CAD, diabetes mellitus type 2, COPD and cirrhosis of the liver admitted for fatigue, weakness and constipation. Routine labs obtained and essentially within normal limits. Infectious work-up negative. Patient given bowel regimen with subsequent bowel movement and improvement of symptoms. She was also evaluated by PT OT and deemed safe for discharge. Pt feeling much better today and requesting to be discharged home. Hospital Course: Active Hospital Problems    Diagnosis Date Noted    Aneurysm [facility]) [I72.9]  -CT scan chest 11/1/2021: Aneurysm dilatation of the ascending aorta measuring up to 4 cm, unchanged.  -Make sure blood pressure remains under good control  -Echocardiogram 3/18/2022: Normal LV systolic function with estimated EF 60-65%. Mild asymmetric left ventricular septal hypertrophy is present.   Grade 1 diastolic dysfunction is present.  -Defer further surveillance monitoring to PCP   03/18/2022    Acute renal failure (ARF) ([facility]) [N17.9]  -Serum creatinine 2.1 on admission; patient hydrated with IV fluids with improvement to 1.8  -per chart review, baseline sCr 1.6-1.8. She did have labs on 2/11/22 ith one time improvement to 1.2.  -encourage good po intake; avoid NSAIDs and nephrotoxins  -resolved   03/18/2022    Personal history of nicotine dependence [Z87.891]  -NRT  -counseled on cessation   03/18/2022    Declining functional status [R53.81]  -suspect due to multiple comorbidities, constipation and advanced age  -PT OT consulted yesterday. Patient did well and has been cleared for discharge home. 03/17/2022    Cirrhosis ([facility] [address] [K74.60]  -unable to calculate MELD score or child robert score without INR  -avoid hepatotoxic agents    11/01/2021    Anemia [D64.9]  -Suspect dilutional anemia secondary to IV fluids  -Patient with history of anemia. Iron studies were unremarkable. Folic acid were within normal limits.  -Consider referral to GI for screening endoscopies. Defer to PCP. 11/01/2021    Type 2 diabetes mellitus with diabetic neuropathy ([facility] [address] [E11.40]  -Continue current regimen   08/12/2015    Essential hypertension [I10]  -Continue current regimen 08/12/2015       Disposition: home    Discharged Condition: Stable    Vital Signs  Temp: 98.1 °F (36.7 °C)  Pulse: 58  Resp: 16  BP: 136/61  SpO2: 95 %  O2 Device: None (Room air)       Vital signs reviewed in electronic chart. Physical exam  Constitutional:  Well developed, well nourished, no acute distress. Eyes:  PERRL, conjunctiva normal, sclera without icterus. HENT:  Atraumatic, external ears normal, nose normal, oropharynx moist, no pharyngeal exudates. Neck- supple, no JVD, no lymphadenopathy. Respiratory:  No respiratory distress, no wheezing, rales or rhonchi detected. Cardiovascular:  Normal rate, normal rhythm, no murmurs, no gallops, no rubs.    GI:  Soft, nondistended, normal bowel sounds, nontender, no hepatosplenomegaly appreciated. Musculoskeletal:  No edema, cyanosis or obvious acute deformity. Moving all extremities. Integument:  Warm and dry. No rash. Neurologic:  Alert & oriented x 3, no apparent focal deficits noted. Psychiatric:  Speech and behavior appropriate. Activity: activity as tolerated  Diet: diabetic diet  Follow Up: Primary Care Physician in 2 weeks    Labs: For convenience and continuity at follow-up the following most recent labs are provided:    CBC:   Lab Results   Component Value Date    WBC 4.3 03/19/2022    HGB 9.6 03/19/2022    HCT 28.3 03/19/2022     03/19/2022       RENAL:   Lab Results   Component Value Date     03/19/2022    K 3.1 03/19/2022    K 3.4 03/18/2022    CL 99 03/19/2022    CO2 24 03/19/2022    BUN 26 03/19/2022    CREATININE 1.8 03/19/2022         Discharge Medications:     Current Discharge Medication List           Details   polyethylene glycol (GLYCOLAX) 17 g packet Take 17 g by mouth 2 times daily  Qty: 60 each, Refills: 0      bisacodyl (BISACODYL) 5 MG EC tablet Take 1 tablet by mouth daily as needed for Constipation  Qty: 30 tablet, Refills: 1              Details   insulin detemir (LEVEMIR FLEXTOUCH) 100 UNIT/ML injection pen Inject 50 Units into the skin nightly      HYDROcodone-acetaminophen (NORCO) 5-325 MG per tablet Take 1 tablet by mouth 2 times daily as needed for Pain for up to 30 days.   Qty: 30 tablet, Refills: 0    Comments: Reduce doses taken as pain becomes manageable  Associated Diagnoses: Degenerative disc disease, lumbar      TRADJENTA 5 MG tablet TAKE 1 TABLET BY MOUTH EVERY DAY  Qty: 120 tablet, Refills: 0      umeclidinium-vilanterol (ANORO ELLIPTA) 62.5-25 MCG/INH AEPB inhaler Inhale 1 puff into the lungs daily  Qty: 1 each, Refills: 0      rosuvastatin (CRESTOR) 20 MG tablet TAKE 1 TABLET BY MOUTH ONCE DAILY  Qty: 120 tablet, Refills: 0      JARDIANCE 10 MG tablet TAKE 1 TABLET BY MOUTH DAILY  Qty: 30 tablet, Refills: 3      furosemide (LASIX) 20 MG tablet TAKE 1 TABLET BY MOUTH DAILY AS NEEDED FOR SWELLING  Qty: 30 tablet, Refills: 2      metFORMIN (GLUCOPHAGE) 1000 MG tablet TAKE 1 TABLET BY MOUTH TWICE DAILY WITH FOOD  Qty: 180 tablet, Refills: 2    Comments: **Patient requests 90 days supply**      lactulose (CHRONULAC) 10 GM/15ML solution Take 30 mLs by mouth daily  Qty: 900 mL, Refills: 1      linaclotide (LINZESS) 290 MCG CAPS capsule Take 1 capsule by mouth every morning (before breakfast)  Qty: 30 capsule, Refills: 2      pantoprazole (PROTONIX) 40 MG tablet Take 40 mg by mouth 2 times daily (before meals)       ferrous sulfate (IRON 325) 325 (65 Fe) MG tablet Take 325 mg by mouth daily (with breakfast)      Insulin Pen Needle 31G X 6 MM MISC 1 each by Does not apply route daily  Qty: 100 each, Refills: 3      isosorbide mononitrate (IMDUR) 60 MG extended release tablet TAKE 1 TABLET BY MOUTH EVERY DAY  Qty: 30 tablet, Refills: 2      blood glucose monitor strips QID Dx E11.9  Qty: 100 strip, Refills: 5    Comments: accucheck      glucose monitoring kit (FREESTYLE) monitoring kit 1 kit by Does not apply route daily E11.40  Qty: 1 kit, Refills: 0      aspirin 81 MG chewable tablet Take 1 tablet by mouth daily  Qty: 30 tablet, Refills: 2      nitroGLYCERIN (NITROSTAT) 0.4 MG SL tablet Place 1 tablet under the tongue every 5 minutes as needed for Chest pain  Qty: 25 tablet, Refills: 1      Insulin Syringe-Needle U-100 (B-D INS SYR ULTRAFINE 1CC/31G) 31G X 5/16\" 1 ML MISC USE ONCE A DAY AS DIRECTED  DX E11.9  Qty: 100 each, Refills: 3              Patient was seen and examined by Dr. Landen Guillen and plan of care reviewed. Treatment plan was formulated collaboratively. Signed:  Electronically signed by VIGNESH Gamboa on 3/19/2022 at 11:28 AM       Thank you Kaila Martínez MD for the opportunity to be involved in this patient's care. If you have any questions or concerns please feel free to contact me at [phone].

## 2022-03-19 NOTE — H&P
History and Physical    Patient:  Gisselle Gallegos    CHIEF COMPLAINT:    Fatigue, weakness and constipation. HISTORY OF PRESENT ILLNESS:   The patient is a 81 y.o. female with past medical history significant for CAD, diabetes, COPD and cirrhosis of the liver. Patient was admitted directly from PCP office due to fatigue, weakness and constipation. Patient reported significant decrease in her energy over the past several days associated with generalized weakness. She continued to have good appetite. No diarrhea, nausea or vomiting. She reported having constipation and no bowel movement for about a week per her report. She reported having some abdominal distention and slight discomfort at times. She tried some over-the-counter medication to help with her bowels as well as Linzess with no response. Patient was admitted directly to the hospital for further evaluation. Past Medical History:      Diagnosis Date    CAD (coronary artery disease)     [facility]     COPD (chronic obstructive pulmonary disease) (HCC)     Cystic fibrosis ([facility]     Diabetes mellitus ([facility]     GERD (gastroesophageal reflux disease)     H/O: CVA (cardiovascular accident)     HTN (hypertension)     Mass     on chest     Tobacco abuse     Vitamin B12 deficiency        Past Surgical History:      Procedure Laterality Date    CARDIAC CATHETERIZATION      CARPAL TUNNEL RELEASE Bilateral     CHOLECYSTECTOMY      CORONARY ANGIOPLASTY WITH STENT PLACEMENT      HYSTERECTOMY      JOINT REPLACEMENT Bilateral 10/15/2016    knees    TOTAL KNEE ARTHROPLASTY Bilateral 10/18/2016    at [facility]       Medications Prior to Admission:    Prior to Admission medications    Medication Sig Start Date End Date Taking?  Authorizing Provider   insulin detemir (LEVEMIR FLEXTOUCH) 100 UNIT/ML injection pen Inject 50 Units into the skin nightly   Yes Historical Provider, MD   HYDROcodone-acetaminophen (NORCO) 5-325 MG per tablet Take 1 tablet by mouth 2 times daily as needed for Pain for up to 30 days.  2/25/22 3/27/22  Sulaiman Jose MD   TRADJENTA 5 MG tablet TAKE 1 TABLET BY MOUTH EVERY DAY 2/23/22   DAVIDA Rebolledo   umeclidinium-vilanterol (ANORO ELLIPTA) 62.5-25 MCG/INH AEPB inhaler Inhale 1 puff into the lungs daily  Patient taking differently: Inhale 1 puff into the lungs as needed  2/21/22   Sulaiman Jose MD   rosuvastatin (CRESTOR) 20 MG tablet TAKE 1 TABLET BY MOUTH ONCE DAILY 1/24/22   Sulaiman Jose MD   JARDIANCE 10 MG tablet TAKE 1 TABLET BY MOUTH DAILY 12/27/21   Sulaiman Jose MD   furosemide (LASIX) 20 MG tablet TAKE 1 TABLET BY MOUTH DAILY AS NEEDED FOR SWELLING 12/16/21   Sulaiman Jose MD   metFORMIN (GLUCOPHAGE) 1000 MG tablet TAKE 1 TABLET BY MOUTH TWICE DAILY WITH FOOD 11/29/21   Sulaiman Jose MD   lactulose [facility]) 10 GM/15ML solution Take 30 mLs by mouth daily 11/9/21   Sulaiman Jose MD   linaclotide [facility]) 290 MCG CAPS capsule Take 1 capsule by mouth every morning (before breakfast) 11/9/21   Sulaiman Jose MD   pantoprazole (PROTONIX) 40 MG tablet Take 40 mg by mouth 2 times daily (before meals)  9/28/21   Historical Provider, MD   ferrous sulfate (IRON 325) 325 (65 Fe) MG tablet Take 325 mg by mouth daily (with breakfast) 9/30/21   Historical Provider, MD   Insulin Pen Needle 31G X 6 MM MISC 1 each by Does not apply route daily 5/17/21   Sulaiman Jose MD   isosorbide mononitrate (IMDUR) 60 MG extended release tablet TAKE 1 TABLET BY MOUTH EVERY DAY 4/12/21   Sulaiman Jose MD   blood glucose monitor strips QID Dx E11.9 3/10/21   DAVIDA Sim - CNP   glucose monitoring kit (FREESTYLE) monitoring kit 1 kit by Does not apply route daily E11.40 6/3/20   Sulaiman Jose MD   aspirin 81 MG chewable tablet Take 1 tablet by mouth daily 10/9/19   Sulaiman Jose MD   nitroGLYCERIN (NITROSTAT) 0.4 MG SL tablet Place 1 tablet under the tongue every 5 minutes as needed for Chest pain 8/23/18   Sulaiman Jose MD   Insulin Syringe-Needle U-100 (B-D INS SYR ULTRAFINE 1CC/31G) 31G X 5/16\" 1 ML MISC USE ONCE A DAY AS DIRECTED  DX E11.9 4/17/17   Faizan Sofia MD       Allergies:  Codeine    Social History:   TOBACCO:   reports that she has quit smoking. Her smoking use included cigarettes. She has a 40.00 pack-year smoking history. She has never used smokeless tobacco.  ETOH:   reports no history of alcohol use. OCCUPATION:  None     Family History:       Problem Relation Age of Onset    Diabetes Mother     High Blood Pressure Mother     Cancer Mother         pancreatic    Diabetes Father     Heart Disease Father     High Blood Pressure Father     Cancer Sister         lung, breast    Cancer Brother         throat       Review of system  Constitutional:  Denies fever or chills. Positive for fatigue  Eyes:  Denies change in visual acuity or discharge  HENT:  Denies nasal congestion or sore throat   Respiratory:  Denies cough or shortness of breath   Cardiovascular:  Denies chest pain, palpitation or swelling in LEs. Positive for CAZARES  GI:  Denies nausea, vomiting, bloody stools or diarrhea. Positive for abdominal distention, mild abdominal pain and constipation. :  Denies dysuria or frequency   Musculoskeletal: Positive for chronic back pain and arthralgia  Integument:  Denies rash or itching  Neurologic:  Denies headache, focal weakness or sensory changes. Positive for generalized weakness  Endocrine:  Denies polyuria or polydipsia   Lymphatic:  Denies swollen glands or night sweats  Psychiatric:  Denies depression or anxiety     Vital Signs  Temp: 97.7 °F (36.5 °C)  Pulse: 62  Resp: 12  BP: 114/61  SpO2: 94 %  O2 Device: None (Room air)       vital signs reviewed in electronic chart. Physical exam  Constitutional:  Well developed, well nourished, no acute distress.   Seems to be very tired  Eyes:  PERRL, conjunctiva normal, EOMI  HENT:  Atraumatic, external ears normal, external nose/nares normal, oropharynx moist, no pharyngeal exudates. Neck:  Supple. No JVD or thyromegaly  Respiratory:  No respiratory distress, normal breath sounds, no rales, no wheezing   Cardiovascular:  Normal rate, normal rhythm, no murmurs, no gallops, no rubs   GI:  Soft, nondistended, normal bowel sounds, nontender, no organomegaly, no mass  :  No costovertebral angle tenderness   Musculoskeletal:  No edema, no tenderness, no obvious deformities. Patient is moving all extremities. Integument:  Well hydrated, no rash   Lymphatic:  No cervical or axillary lymphadenopathy noted   Neurologic:  Alert & oriented x 3,  no focal deficits noted. Strength is equal throughout  Psychiatric:  Speech and behavior appropriate. Lab Results   Component Value Date     (L) 03/18/2022    K 3.4 03/18/2022    CL 95 (L) 03/18/2022    CO2 26 03/18/2022    BUN 32 (H) 03/18/2022    CREATININE 2.1 (H) 03/18/2022    GLUCOSE 109 (H) 03/18/2022    CALCIUM 8.9 03/18/2022    PROT 6.0 (L) 03/18/2022    LABALBU 3.8 03/18/2022    BILITOT 0.6 03/18/2022    ALKPHOS 83 03/18/2022    AST 17 03/18/2022    ALT 14 03/18/2022    LABGLOM 23 (L) 03/18/2022    GFRAA 28 (L) 03/18/2022    AGRATIO 1.7 03/18/2022    GLOB 2.2 03/18/2022           Lab Results   Component Value Date    WBC 5.9 03/18/2022    HGB 10.6 (L) 03/18/2022    HCT 31.0 (L) 03/18/2022    MCV 83.3 03/18/2022     03/18/2022       PA/lat CXR: NAD      Assessment and Plan     Active Hospital Problems    Diagnosis Date Noted    Aneurysm [facility]) [I72.9]  Patient with full centimeter ascending thoracic aneurysm noted on CT scan of the chest November 1, 2021. Make sure blood pressure under good control. Check echocardiogram.  Needs yearly monitoring.   03/18/2022    Acute renal failure (ARF) ([address] [N17.9]  Patient with acute renal failure. Monitor electrolytes and renal function closely. Gentle hydration. Try to avoid any nephrotoxic agent. Monitor urine output.   Check CK level.   03/18/2022    Personal history of nicotine dependence [Z87.891]  I had a long discussion with her regarding the risk of smoking especialy with her other medical problems. I have discussed with patient several treatment options (program, nicotine product and other meds like Chantix, Wellbutrin ect) to help her quit smoking. Patient is not interested at this time  Spent 5 minutes in counseling regarding smoking cessation. 03/18/2022    Declining functional status [R53.81]  Not sure exactly about the etiology but patient with multiple medical problems. Try to improve medical problems as able. Gentle hydration. PT evaluation. Proceed with echocardiogram with her underlying CKD, decreased energy/weakness and prior history of thoracic aneurysm. 03/17/2022    Cirrhosis ([facility] [K74.60]  Try to avoid any hepatotoxic agent. Monitor labs closely. Check ammonia level. 11/01/2021    Anemia [D64.9]  Hemoglobin within normal limit on arrival drifted down with IV fluid. Prior history of anemia. Iron studies were unremarkable. Check folic acid. Continue B12 replacement. Further recommendation based on test results. GI prophylaxis. 11/01/2021    Type 2 diabetes mellitus with diabetic neuropathy (HCC) [E11.40]  Slightly elevated. Continue current regimen. Monitor fingersticks closely. Cover with slight scale insulin if needed. Adjust regimen if continue to be elevated. 08/12/2015    Essential hypertension [I10]  Seems to be stable on current regimen. Continue current meds and monitor.    08/12/2015         Marisela Almeida MD certifies per CMS regulation for 42 .15(a), that the patient may reasonably be expected to be discharged or transferred to a hospital within 96 hours after admission to [address] [place] [address].        Electronically signed by Marisela Almeida MD on 3/18/2022 at 10:28 PM

## 2022-03-19 NOTE — FLOWSHEET NOTE
03/18/22 2030   Assessment   Charting Type Shift assessment   Neurological   Neuro (WDL) X   Level of Consciousness Alert (0)   Orientation Level Oriented X4   Cognition Appropriate judgement; Appropriate safety awareness; Appropriate attention/concentration; Appropriate for developmental age; Follows commands   Language Slurred; Other (Comment)  (quiet, mild slurring)   [place] Coma Scale   Eye Opening 4   Best Verbal Response 5   Best Motor Response 6   [place] Coma Scale Score 15   Respiratory   Respiratory (WDL) WDL   Cardiac   Cardiac (WDL) WDL   Skin Color/Condition   Skin Color/Condition (WDL) WDL   Genitourinary   Genitourinary (WDL) WDL   pt refused lactulose at [facility]. Pt states that she normally takes lactulose every other day at home normally.

## 2022-03-19 NOTE — DISCHARGE INSTR - DIET

## 2022-03-19 NOTE — PROGRESS NOTES
Orders to dc noted, ivs dcd, verbalized understanding of dc instructions, home with  pov, wheeled out by staff, nad noted

## 2022-03-19 NOTE — FLOWSHEET NOTE
03/19/22 0800   Assessment   Charting Type Shift assessment   Neurological   Neuro (WDL) X   Level of Consciousness Alert (0)   Orientation Level Oriented X4   Cognition Appropriate judgement; Appropriate safety awareness; Appropriate attention/concentration; Appropriate for developmental age; Follows commands   Language Slurred; Other (Comment)  (quiet, mild slurring)   R Hand  Moderate   L Hand  Moderate   [place] Coma Scale   Eye Opening 4   Best Verbal Response 5   Best Motor Response 6   [place] Coma Scale Score 15   HEENT   HEENT (WDL) WDL   Respiratory   Respiratory (WDL) WDL   Cardiac   Cardiac (WDL) WDL   Rhythm Interpretation   Pulse 58   Cardiac Monitor   Telemetry Monitor On No   Skin Color/Condition   Skin Color/Condition (WDL) WDL   Skin Integrity   Skin Integrity (WDL) WDL   Musculoskeletal   Musculoskeletal (WDL) X   RUE Full movement   LUE Full movement   RL Extremity Weakness   LL Extremity Weakness   Genitourinary   Genitourinary (WDL) WDL   Anus/Rectum   Anus/Rectum (WDL) WDL   Psychosocial   Psychosocial (WDL) WDL

## 2022-03-21 ENCOUNTER — CARE COORDINATION (OUTPATIENT)
Dept: CARE COORDINATION | Age: 72
End: 2022-03-21

## 2022-03-21 NOTE — CARE COORDINATION
[facility] Transitions Initial Follow Up Call    Call within 2 business days of discharge: Yes     Patient: Jeane Vallejo Patient : 1950 MRN: [id number]    Last Discharge [address] [address]       Complaint Diagnosis Description Type Department Provider    3/17/22   Admission (Discharged) [address] [place] [address] [place] Miya Broussard MD          RARS: Readmission Risk Score: 9       Spoke with: Attempting HFU call, unsuccessful. Messge left with contact information.      Discharge department/facility: [facility]    Non-face-to-face services provided:  Scheduled appointment with PCP-Daily  Obtained and reviewed discharge summary and/or continuity of care documents    Follow Up  Future Appointments   Date Time Provider Kayla Roberts   2022 10:15 AM Miya Broussard MD [facility]       Dilcia Cantu RN

## 2022-03-22 NOTE — CARE COORDINATION
[facility] Transitions Initial Follow Up Call    Call within 2 business days of discharge: Yes     Patient: Leonardo Jeter Patient : 1950 MRN: [id number]    Last Discharge [facility]       Complaint Diagnosis Description Type Department Provider    3/17/22   Admission (Discharged) [address] [place] Roxy Champagne MD          RARS: Readmission Risk Score: 9       Spoke with: Attempting to contact Bandar Baez for  call, unsuccessful. Voice mailbox is full. Call placed to kori Downing and message left that we have a HFU appointment made for Bandar Baez.       Discharge department/facility: [facility]    Non-face-to-face services provided:  Scheduled appointment with PCP-Daily  Obtained and reviewed discharge summary and/or continuity of care documents    Follow Up  Future Appointments   Date Time Provider Kayla Roberts   3/24/2022  3:00 PM Roxy Champagne, [address] [place] [address] [facility]   2022 10:15 AM Roxy Champagne MD [facility]       Amy Sargent RN

## 2022-03-23 LAB
EKG ATRIAL RATE: 87 BPM
EKG DIAGNOSIS: NORMAL
EKG P AXIS: 86 DEGREES
EKG P-R INTERVAL: 190 MS
EKG Q-T INTERVAL: 396 MS
EKG QRS DURATION: 84 MS
EKG QTC CALCULATION (BAZETT): 476 MS
EKG R AXIS: 60 DEGREES
EKG T AXIS: 60 DEGREES
EKG VENTRICULAR RATE: 87 BPM

## 2022-03-24 ENCOUNTER — OFFICE VISIT (OUTPATIENT)
Dept: PRIMARY CARE CLINIC | Age: 72
End: 2022-03-24
Payer: MEDICARE

## 2022-03-24 VITALS
DIASTOLIC BLOOD PRESSURE: 64 MMHG | HEART RATE: 81 BPM | BODY MASS INDEX: 24.02 KG/M2 | OXYGEN SATURATION: 99 % | WEIGHT: 135.6 LBS | TEMPERATURE: 97 F | RESPIRATION RATE: 18 BRPM | SYSTOLIC BLOOD PRESSURE: 122 MMHG

## 2022-03-24 DIAGNOSIS — K74.60 CIRRHOSIS OF LIVER WITHOUT ASCITES, UNSPECIFIED HEPATIC CIRRHOSIS TYPE (HCC): ICD-10-CM

## 2022-03-24 DIAGNOSIS — R53.81 DECLINING FUNCTIONAL STATUS: Primary | ICD-10-CM

## 2022-03-24 DIAGNOSIS — D64.9 ANEMIA, UNSPECIFIED TYPE: ICD-10-CM

## 2022-03-24 DIAGNOSIS — M51.36 DEGENERATIVE DISC DISEASE, LUMBAR: ICD-10-CM

## 2022-03-24 DIAGNOSIS — Z79.4 TYPE 2 DIABETES MELLITUS WITH DIABETIC NEUROPATHY, WITH LONG-TERM CURRENT USE OF INSULIN (HCC): ICD-10-CM

## 2022-03-24 DIAGNOSIS — I10 ESSENTIAL HYPERTENSION: ICD-10-CM

## 2022-03-24 DIAGNOSIS — N18.9 CHRONIC KIDNEY DISEASE, UNSPECIFIED CKD STAGE: ICD-10-CM

## 2022-03-24 DIAGNOSIS — E87.6 HYPOKALEMIA: ICD-10-CM

## 2022-03-24 DIAGNOSIS — E11.40 TYPE 2 DIABETES MELLITUS WITH DIABETIC NEUROPATHY, WITH LONG-TERM CURRENT USE OF INSULIN (HCC): ICD-10-CM

## 2022-03-24 PROCEDURE — 1111F DSCHRG MED/CURRENT MED MERGE: CPT | Performed by: INTERNAL MEDICINE

## 2022-03-24 PROCEDURE — 99495 TRANSJ CARE MGMT MOD F2F 14D: CPT | Performed by: INTERNAL MEDICINE

## 2022-03-24 RX ORDER — HYDROCODONE BITARTRATE AND ACETAMINOPHEN 5; 325 MG/1; MG/1
1 TABLET ORAL 2 TIMES DAILY PRN
Qty: 30 TABLET | Refills: 0 | Status: SHIPPED | OUTPATIENT
Start: 2022-03-24 | End: 2022-04-18 | Stop reason: SDUPTHER

## 2022-03-24 RX ORDER — LACTULOSE 10 G/15ML
10 SOLUTION ORAL DAILY
Qty: 450 ML | Refills: 1 | Status: SHIPPED | OUTPATIENT
Start: 2022-03-24 | End: 2022-03-24

## 2022-03-24 RX ORDER — LACTULOSE 10 G/15ML
SOLUTION ORAL
Qty: 1350 ML | Refills: 0 | Status: SHIPPED | OUTPATIENT
Start: 2022-03-24

## 2022-03-24 ASSESSMENT — PATIENT HEALTH QUESTIONNAIRE - PHQ9
SUM OF ALL RESPONSES TO PHQ QUESTIONS 1-9: 1
6. FEELING BAD ABOUT YOURSELF - OR THAT YOU ARE A FAILURE OR HAVE LET YOURSELF OR YOUR FAMILY DOWN: 0
8. MOVING OR SPEAKING SO SLOWLY THAT OTHER PEOPLE COULD HAVE NOTICED. OR THE OPPOSITE, BEING SO FIGETY OR RESTLESS THAT YOU HAVE BEEN MOVING AROUND A LOT MORE THAN USUAL: 0
9. THOUGHTS THAT YOU WOULD BE BETTER OFF DEAD, OR OF HURTING YOURSELF: 0
SUM OF ALL RESPONSES TO PHQ9 QUESTIONS 1 & 2: 0
SUM OF ALL RESPONSES TO PHQ QUESTIONS 1-9: 1
2. FEELING DOWN, DEPRESSED OR HOPELESS: 0
1. LITTLE INTEREST OR PLEASURE IN DOING THINGS: 0
5. POOR APPETITE OR OVEREATING: 0
SUM OF ALL RESPONSES TO PHQ QUESTIONS 1-9: 1
SUM OF ALL RESPONSES TO PHQ QUESTIONS 1-9: 1
3. TROUBLE FALLING OR STAYING ASLEEP: 0
7. TROUBLE CONCENTRATING ON THINGS, SUCH AS READING THE NEWSPAPER OR WATCHING TELEVISION: 0
10. IF YOU CHECKED OFF ANY PROBLEMS, HOW DIFFICULT HAVE THESE PROBLEMS MADE IT FOR YOU TO DO YOUR WORK, TAKE CARE OF THINGS AT HOME, OR GET ALONG WITH OTHER PEOPLE: 0
4. FEELING TIRED OR HAVING LITTLE ENERGY: 1

## 2022-03-24 ASSESSMENT — ENCOUNTER SYMPTOMS
EYE DISCHARGE: 0
SINUS PRESSURE: 0
WHEEZING: 0
COUGH: 0
NAUSEA: 0
SORE THROAT: 0
VOMITING: 0
CONSTIPATION: 1
ABDOMINAL PAIN: 0
SHORTNESS OF BREATH: 0

## 2022-03-24 NOTE — PROGRESS NOTES
Post-Discharge Transitional Care Management Services or Hospital Follow Up      Rosy Bruno   YOB: 1950    Date of Office Visit:  3/24/2022  Date of Hospital Admission: 3/17/22  Date of Hospital Discharge: 3/19/22  Readmission Risk Score(high >=14%. Medium >=10%):Readmission Risk Score: 9      Care management risk score Rising risk (score 2-5) and Complex Care (Scores >=6): 7     Non face to face  following discharge, date last encounter closed (first attempt may have been earlier):   3/23/2022 10:29 AM 3/23/2022 10:29 AM    Call initiated 2 business days of discharge: Yes     Patient Active Problem List   Diagnosis    CAD (coronary artery disease)    COPD (chronic obstructive pulmonary disease) ([address]    H/O: CVA (cerebrovascular accident)    Tobacco abuse    Vitamin B12 deficiency    Essential hypertension    Type 2 diabetes mellitus with diabetic neuropathy ([address]    History of colon polyps    Status post bilateral knee replacements    Degenerative disc disease, lumbar    Family history of breast cancer    Abnormal CT of the chest    Thoracic aortic aneurysm without rupture ([address]    Anxiety    Anemia due to stage 3 chronic kidney disease (HCC)    Iron deficiency anemia    CKD (chronic kidney disease) stage 3, GFR 30-59 ml/min (HCC)    Anemia    Cirrhosis ([address]    AVM (arteriovenous malformation) of small bowel, acquired    Declining functional status    Aneurysm ([address]    Acute renal failure (ARF) ([address]    Personal history of nicotine dependence       Allergies   Allergen Reactions    Codeine        Medications listed as ordered at the time of discharge from hospital     Medication List          Accurate as of March 24, 2022  2:54 PM. If you have any questions, ask your nurse or doctor.             CHANGE how you take these medications    Anoro Ellipta 62.5-25 MCG/INH Aepb inhaler  Generic drug: umeclidinium-vilanterol  Inhale 1 puff into the lungs daily  What changed: · when to take this  · reasons to take this        CONTINUE taking these medications    aspirin 81 MG chewable tablet  Take 1 tablet by mouth daily     bisacodyl 5 MG EC tablet  Generic drug: bisacodyl  Take 1 tablet by mouth daily as needed for Constipation     blood glucose test strips  QID Dx E11.9     ferrous sulfate 325 (65 Fe) MG tablet  Commonly known as: IRON 325     furosemide 20 MG tablet  Commonly known as: LASIX  TAKE 1 TABLET BY MOUTH DAILY AS NEEDED FOR SWELLING     glucose monitoring kit  1 kit by Does not apply route daily E11.40     HYDROcodone-acetaminophen 5-325 MG per tablet  Commonly known as: NORCO  Take 1 tablet by mouth 2 times daily as needed for Pain for up to 30 days.      Insulin Pen Needle 31G X 6 MM Misc  1 each by Does not apply route daily     Insulin Syringe-Needle U-100 31G X 5/16\" 1 ML Misc  Commonly known as: B-D INS SYR ULTRAFINE 1CC/31G  USE ONCE A DAY AS DIRECTED  DX E11.9     isosorbide mononitrate 60 MG extended release tablet  Commonly known as: IMDUR  TAKE 1 TABLET BY MOUTH EVERY DAY     Jardiance 10 MG tablet  Generic drug: empagliflozin  TAKE 1 TABLET BY MOUTH DAILY     lactulose 10 GM/15ML solution  Commonly known as: CHRONULAC  Take 30 mLs by mouth daily     Levemir FlexTouch 100 UNIT/ML injection pen  Generic drug: insulin detemir     Linzess 290 MCG Caps capsule  Generic drug: linaclotide  Take 1 capsule by mouth every morning (before breakfast)     metFORMIN 1000 MG tablet  Commonly known as: GLUCOPHAGE  TAKE 1 TABLET BY MOUTH TWICE DAILY WITH FOOD     nitroGLYCERIN 0.4 MG SL tablet  Commonly known as: Nitrostat  Place 1 tablet under the tongue every 5 minutes as needed for Chest pain     pantoprazole 40 MG tablet  Commonly known as: PROTONIX     polyethylene glycol 17 g packet  Commonly known as: GLYCOLAX  Take 17 g by mouth 2 times daily     rosuvastatin 20 MG tablet  Commonly known as: CRESTOR  TAKE 1 TABLET BY MOUTH ONCE DAILY     Tradjenta 5 MG tablet  Generic drug: linagliptin  TAKE 1 TABLET BY MOUTH EVERY DAY              Medications marked \"taking\" at this time  Outpatient Medications Marked as Taking for the 3/24/22 encounter (Office Visit) with Manju Albert MD   Medication Sig Dispense Refill    polyethylene glycol (GLYCOLAX) 17 g packet Take 17 g by mouth 2 times daily 60 each 0    bisacodyl (BISACODYL) 5 MG EC tablet Take 1 tablet by mouth daily as needed for Constipation 30 tablet 1    insulin detemir (LEVEMIR FLEXTOUCH) 100 UNIT/ML injection pen Inject 50 Units into the skin nightly      HYDROcodone-acetaminophen (NORCO) 5-325 MG per tablet Take 1 tablet by mouth 2 times daily as needed for Pain for up to 30 days.  30 tablet 0    TRADJENTA 5 MG tablet TAKE 1 TABLET BY MOUTH EVERY  tablet 0    umeclidinium-vilanterol (ANORO ELLIPTA) 62.5-25 MCG/INH AEPB inhaler Inhale 1 puff into the lungs daily (Patient taking differently: Inhale 1 puff into the lungs as needed ) 1 each 0    rosuvastatin (CRESTOR) 20 MG tablet TAKE 1 TABLET BY MOUTH ONCE DAILY 120 tablet 0    JARDIANCE 10 MG tablet TAKE 1 TABLET BY MOUTH DAILY 30 tablet 3    furosemide (LASIX) 20 MG tablet TAKE 1 TABLET BY MOUTH DAILY AS NEEDED FOR SWELLING 30 tablet 2    metFORMIN (GLUCOPHAGE) 1000 MG tablet TAKE 1 TABLET BY MOUTH TWICE DAILY WITH FOOD 180 tablet 2    lactulose (CHRONULAC) 10 GM/15ML solution Take 30 mLs by mouth daily 900 mL 1    linaclotide (LINZESS) 290 MCG CAPS capsule Take 1 capsule by mouth every morning (before breakfast) 30 capsule 2    pantoprazole (PROTONIX) 40 MG tablet Take 40 mg by mouth 2 times daily (before meals)       ferrous sulfate (IRON 325) 325 (65 Fe) MG tablet Take 325 mg by mouth daily (with breakfast)      Insulin Pen Needle 31G X 6 MM MISC 1 each by Does not apply route daily 100 each 3    isosorbide mononitrate (IMDUR) 60 MG extended release tablet TAKE 1 TABLET BY MOUTH EVERY DAY 30 tablet 2    blood glucose monitor strips QID Dx E11.9 100 strip 5    glucose monitoring kit (FREESTYLE) monitoring kit 1 kit by Does not apply route daily E11.40 1 kit 0    aspirin 81 MG chewable tablet Take 1 tablet by mouth daily 30 tablet 2    nitroGLYCERIN (NITROSTAT) 0.4 MG SL tablet Place 1 tablet under the tongue every 5 minutes as needed for Chest pain 25 tablet 1    Insulin Syringe-Needle U-100 (B-D INS SYR ULTRAFINE 1CC/31G) 31G X 5/16\" 1 ML MISC USE ONCE A DAY AS DIRECTED  DX E11.9 100 each 3        Medications patient taking as of now reconciled against medications ordered at time of hospital discharge: Yes    Chief Complaint   Patient presents with    Follow-Up from Hospital       HPI  Interval history/Current status:   Patient is here for HFU. Patient was admitted with increasing fatigue and weakness. Patient with acute renal failure and constipation. She was started on IV fluid and laxative. Patient states constipation has improved and reports having 2 bowel movements weekly. She is feeling some better, but reports her sugar readings have been low. She feels this is from her lack of appetite. Currently on oral medication and insulin for her diabetes. Blood pressure has been stable. Patient has been compliant taking her medications. Inpatient course: Discharge summary reviewed- see chart. Review of Systems   Constitutional: Positive for appetite change and fatigue. Negative for chills and fever. HENT: Negative for congestion, sinus pressure and sore throat. Eyes: Negative for discharge and visual disturbance. Respiratory: Negative for cough, shortness of breath and wheezing. Cardiovascular: Negative for chest pain and palpitations. CAZARES   Gastrointestinal: Positive for constipation. Negative for abdominal pain, nausea and vomiting. Endocrine: Negative for cold intolerance and heat intolerance. Genitourinary: Negative for dysuria, frequency and urgency. Musculoskeletal: Positive for arthralgias, back pain and gait problem. Skin: Negative for rash and wound. Neurological: Negative for syncope, numbness and headaches. Hematological: Negative. Psychiatric/Behavioral: Negative for agitation and sleep disturbance. The patient is not nervous/anxious. Vitals:    03/24/22 1427   BP: 122/64   Pulse: 81   Resp: 18   Temp: 97 °F (36.1 °C)   SpO2: 99%   Weight: 135 lb 9.6 oz (61.5 kg)     Body mass index is 24.02 kg/m². Wt Readings from Last 3 Encounters:   03/24/22 135 lb 9.6 oz (61.5 kg)   03/17/22 132 lb 6.4 oz (60.1 kg)   03/17/22 130 lb 8 oz (59.2 kg)     BP Readings from Last 3 Encounters:   03/24/22 122/64   03/19/22 136/61   03/17/22 110/60       Physical Exam  Vitals and nursing note reviewed. Constitutional:       Appearance: Normal appearance. She is well-developed. HENT:      Head: Normocephalic and atraumatic. Right Ear: External ear normal.      Left Ear: External ear normal.      Nose: Nose normal.      Mouth/Throat:      Mouth: Mucous membranes are moist.      Pharynx: Oropharynx is clear. Eyes:      Conjunctiva/sclera: Conjunctivae normal.      Pupils: Pupils are equal, round, and reactive to light. Neck:      Thyroid: No thyromegaly. Vascular: No JVD. Cardiovascular:      Rate and Rhythm: Normal rate and regular rhythm. Heart sounds: Normal heart sounds. Pulmonary:      Effort: Pulmonary effort is normal.      Breath sounds: Normal breath sounds. No wheezing or rales. Abdominal:      General: Bowel sounds are normal. There is no distension. Palpations: Abdomen is soft. Tenderness: There is no abdominal tenderness. Musculoskeletal:         General: No tenderness. Cervical back: Neck supple. No rigidity. No muscular tenderness. Right lower leg: No edema. Left lower leg: No edema. Skin:     Findings: No erythema or rash. Neurological:      General: No focal deficit present. Mental Status: She is alert and oriented to person, place, and time. Psychiatric:         Behavior: Behavior normal.         Judgment: Judgment normal.         Lab Results   Component Value Date     03/19/2022    K 3.1 03/19/2022    K 3.4 03/18/2022    CL 99 03/19/2022    CO2 24 03/19/2022    GLUCOSE 213 03/19/2022    BUN 26 03/19/2022    CREATININE 1.8 03/19/2022    CALCIUM 8.6 03/19/2022    PROT 6.0 03/18/2022    LABALBU 3.8 03/18/2022    BILITOT 0.6 03/18/2022    ALT 14 03/18/2022    AST 17 03/18/2022       Hemoglobin A1C (%)   Date Value   02/11/2022 7.0 (H)     Microscopic Examination (no units)   Date Value   03/17/2022 YES     LDL Calculated (mg/dL)   Date Value   12/03/2020 45         Lab Results   Component Value Date    WBC 4.3 03/19/2022    NEUTROABS 2.8 03/19/2022    HGB 9.6 03/19/2022    HCT 28.3 03/19/2022    MCV 84.0 03/19/2022     03/19/2022       Lab Results   Component Value Date    TSH 2.280 10/13/2021       Assessment/Plan:  1. Declining functional status  Seems to be multifactorial related to multiple medical problems including recent renal failure and abdominal pain which felt to be related to constipation. Clinically better. Discussed the importance of increased activity level and appropriate diet. Long conversation regarding smoking cessation.    - [place] DISCHARGE MEDS RECONCILED W/ CURRENT OUTPATIENT MED LIST    2. Cirrhosis of liver without ascites, unspecified hepatic cirrhosis type ([facility]  Monitor labs closely. Try to avoid any hepatotoxic agent. Starting patient on lactulose to help with constipation. Make sure diabetes and other medical problems under good control. Continue follow-up with gastroenterology.     - [place] DISCHARGE MEDS RECONCILED W/ CURRENT OUTPATIENT MED LIST    3. Essential hypertension  BP is stable. I have advised her on low-sodium diet, exercise and weight control. I am going to continue current medication.  Will monitor her renal function every few months, have advised her to check blood pressure frequently and to keep a record of this. - CBC with Auto Differential; Future  - Comprehensive Metabolic Panel; Future  - Magnesium; Future    4. Type 2 diabetes mellitus with diabetic neuropathy, with long-term current use of insulin ([address]  I had a long discussion with her regarding diabetic diet, exercise and weight control. I am going to continue current medication. I advised her to monitor her fingerstick closely. I am going to check the A1c every few months. I will check Microalbumin on a yearly basis. I have also advised her to have a yearly eye exam and to monitor her feet closely. Advice her about the complications from diabetes, even with good control. I am going to have her follow up on a regular bases with our diabetic educator. A1C will be checked in few months. Lab Results   Component Value Date    LABA1C 7.0 (H) 02/11/2022     - CBC with Auto Differential; Future  - Comprehensive Metabolic Panel; Future  - Magnesium; Future    5. Chronic kidney disease, unspecified CKD stage  Improving some but still worse than her baseline. I have advised her to avoid any nephrotoxic agents. I am going to monitor renal function periodically. I will make sure that the blood pressure and other medical problems are under good control. Will refer to nephrology if the renal function should begin to deteriorate. Creatinine will be checked in few months. Last Creatinine is   Lab Results   Component Value Date    CREATININE 1.8 (H) 03/19/2022     - CBC with Auto Differential; Future  - Comprehensive Metabolic Panel; Future  - Magnesium; Future  - [place] DISCHARGE MEDS RECONCILED W/ CURRENT OUTPATIENT MED LIST    6. Anemia, unspecified type  I am going to send anemia work up and treat accordingly. I will monitor her Hb level periodically. - CBC with Auto Differential; Future  - Comprehensive Metabolic Panel; Future  - Magnesium; Future  - [place] DISCHARGE MEDS RECONCILED W/ CURRENT OUTPATIENT MED LIST    7. Hypokalemia  Improved.   Monitor lab closely. Discussed importance of increase oral intake. May need replacement. - Comprehensive Metabolic Panel; Future  - Magnesium; Future    8. Degenerative disc disease, lumbar  I am going to treat her with Norco PRN. Advised her to avoid heavy lifting, use heat pad.    - HYDROcodone-acetaminophen (NORCO) 5-325 MG per tablet; Take 1 tablet by mouth 2 times daily as needed for Pain for up to 30 days. Dispense: 30 tablet; Refill: 0        Medical Decision Making: moderate complexity    I, Sravani France MA am scribing for and in the presence of Sarina Rojas MD on this date of 03/24/22 at 3:04 PM    I, Dr. Sarina Rojas, personally performed the services described in the documentation as scribed by Sravani France MA, in my presence and it is both accurate and complete.

## 2022-03-25 DIAGNOSIS — D64.9 NORMOCYTIC ANEMIA: ICD-10-CM

## 2022-03-25 RX ORDER — PANTOPRAZOLE SODIUM 40 MG/1
TABLET, DELAYED RELEASE ORAL
Qty: 60 TABLET | Refills: 5 | Status: SHIPPED | OUTPATIENT
Start: 2022-03-25 | End: 2022-09-30

## 2022-03-29 ENCOUNTER — HOSPITAL ENCOUNTER (OUTPATIENT)
Facility: HOSPITAL | Age: 72
Discharge: HOME | End: 2022-03-29
Payer: MEDICARE

## 2022-03-29 DIAGNOSIS — K74.60 CIRRHOSIS OF LIVER WITHOUT ASCITES, UNSPECIFIED HEPATIC CIRRHOSIS TYPE (HCC): ICD-10-CM

## 2022-03-29 DIAGNOSIS — I10 ESSENTIAL HYPERTENSION: ICD-10-CM

## 2022-03-29 DIAGNOSIS — E87.6 HYPOKALEMIA: ICD-10-CM

## 2022-03-29 DIAGNOSIS — E11.40 TYPE 2 DIABETES MELLITUS WITH DIABETIC NEUROPATHY, WITH LONG-TERM CURRENT USE OF INSULIN (HCC): ICD-10-CM

## 2022-03-29 DIAGNOSIS — D64.9 ANEMIA, UNSPECIFIED TYPE: ICD-10-CM

## 2022-03-29 DIAGNOSIS — Z79.4 TYPE 2 DIABETES MELLITUS WITH DIABETIC NEUROPATHY, WITH LONG-TERM CURRENT USE OF INSULIN (HCC): ICD-10-CM

## 2022-03-29 DIAGNOSIS — N18.9 CHRONIC KIDNEY DISEASE, UNSPECIFIED CKD STAGE: ICD-10-CM

## 2022-03-29 LAB
A/G RATIO: 1.9 (ref 0.8–2)
ALBUMIN SERPL-MCNC: 4 G/DL (ref 3.4–4.8)
ALP BLD-CCNC: 94 U/L (ref 25–100)
ALT SERPL-CCNC: 16 U/L (ref 4–36)
ANION GAP SERPL CALCULATED.3IONS-SCNC: 15 MMOL/L (ref 3–16)
AST SERPL-CCNC: 17 U/L (ref 8–33)
BASOPHILS ABSOLUTE: 0 K/UL (ref 0–0.1)
BASOPHILS RELATIVE PERCENT: 0.5 %
BILIRUB SERPL-MCNC: 0.5 MG/DL (ref 0.3–1.2)
BUN BLDV-MCNC: 13 MG/DL (ref 6–20)
CALCIUM SERPL-MCNC: 9 MG/DL (ref 8.5–10.5)
CHLORIDE BLD-SCNC: 101 MMOL/L (ref 98–107)
CO2: 23 MMOL/L (ref 20–30)
CREAT SERPL-MCNC: 1.1 MG/DL (ref 0.4–1.2)
EOSINOPHILS ABSOLUTE: 0.1 K/UL (ref 0–0.4)
EOSINOPHILS RELATIVE PERCENT: 1.2 %
GFR AFRICAN AMERICAN: >59
GFR NON-AFRICAN AMERICAN: 49
GLOBULIN: 2.1 G/DL
GLUCOSE BLD-MCNC: 108 MG/DL (ref 74–106)
HCT VFR BLD CALC: 28.3 % (ref 37–47)
HEMOGLOBIN: 9.4 G/DL (ref 11.5–16.5)
IMMATURE GRANULOCYTES #: 0 K/UL
IMMATURE GRANULOCYTES %: 0.7 % (ref 0–5)
LYMPHOCYTES ABSOLUTE: 0.9 K/UL (ref 1.5–4)
LYMPHOCYTES RELATIVE PERCENT: 20.6 %
MAGNESIUM: 2.1 MG/DL (ref 1.7–2.4)
MCH RBC QN AUTO: 29.7 PG (ref 27–32)
MCHC RBC AUTO-ENTMCNC: 33.2 G/DL (ref 31–35)
MCV RBC AUTO: 89.3 FL (ref 80–100)
MONOCYTES ABSOLUTE: 0.3 K/UL (ref 0.2–0.8)
MONOCYTES RELATIVE PERCENT: 7.4 %
NEUTROPHILS ABSOLUTE: 3 K/UL (ref 2–7.5)
NEUTROPHILS RELATIVE PERCENT: 69.6 %
PDW BLD-RTO: 16.8 % (ref 11–16)
PLATELET # BLD: 140 K/UL (ref 150–400)
PMV BLD AUTO: 11.1 FL (ref 6–10)
POTASSIUM SERPL-SCNC: 4.1 MMOL/L (ref 3.4–5.1)
RBC # BLD: 3.17 M/UL (ref 3.8–5.8)
SODIUM BLD-SCNC: 139 MMOL/L (ref 136–145)
TOTAL PROTEIN: 6.1 G/DL (ref 6.4–8.3)
TSH SERPL DL<=0.05 MIU/L-ACNC: 3.77 UIU/ML (ref 0.27–4.2)
WBC # BLD: 4.3 K/UL (ref 4–11)

## 2022-03-29 PROCEDURE — 80053 COMPREHEN METABOLIC PANEL: CPT

## 2022-03-29 PROCEDURE — 85025 COMPLETE CBC W/AUTO DIFF WBC: CPT

## 2022-03-29 PROCEDURE — 84443 ASSAY THYROID STIM HORMONE: CPT

## 2022-03-29 PROCEDURE — 83735 ASSAY OF MAGNESIUM: CPT

## 2022-04-14 ASSESSMENT — ENCOUNTER SYMPTOMS: BACK PAIN: 1

## 2022-04-18 DIAGNOSIS — M51.36 DEGENERATIVE DISC DISEASE, LUMBAR: ICD-10-CM

## 2022-04-18 RX ORDER — HYDROCODONE BITARTRATE AND ACETAMINOPHEN 5; 325 MG/1; MG/1
1 TABLET ORAL 2 TIMES DAILY PRN
Qty: 30 TABLET | Refills: 0 | Status: SHIPPED | OUTPATIENT
Start: 2022-04-18 | End: 2022-05-16 | Stop reason: SDUPTHER

## 2022-04-23 ASSESSMENT — ENCOUNTER SYMPTOMS: BACK PAIN: 1

## 2022-04-25 RX ORDER — EMPAGLIFLOZIN 10 MG/1
1 TABLET, FILM COATED ORAL DAILY
Qty: 30 TABLET | Refills: 3 | Status: SHIPPED | OUTPATIENT
Start: 2022-04-25 | End: 2022-10-24

## 2022-04-25 RX ORDER — ROSUVASTATIN CALCIUM 20 MG/1
TABLET, COATED ORAL
Qty: 120 TABLET | Refills: 0 | Status: SHIPPED | OUTPATIENT
Start: 2022-04-25 | End: 2022-08-29 | Stop reason: SDUPTHER

## 2022-05-10 ENCOUNTER — OFFICE VISIT (OUTPATIENT)
Dept: PRIMARY CARE CLINIC | Age: 72
End: 2022-05-10
Payer: MEDICARE

## 2022-05-10 ENCOUNTER — HOSPITAL ENCOUNTER (OUTPATIENT)
Facility: HOSPITAL | Age: 72
Discharge: HOME | End: 2022-05-10
Payer: MEDICARE

## 2022-05-10 VITALS
TEMPERATURE: 97.7 F | DIASTOLIC BLOOD PRESSURE: 62 MMHG | RESPIRATION RATE: 18 BRPM | BODY MASS INDEX: 24.27 KG/M2 | HEART RATE: 83 BPM | SYSTOLIC BLOOD PRESSURE: 134 MMHG | WEIGHT: 137 LBS | OXYGEN SATURATION: 98 %

## 2022-05-10 DIAGNOSIS — K74.60 CIRRHOSIS OF LIVER WITHOUT ASCITES, UNSPECIFIED HEPATIC CIRRHOSIS TYPE (HCC): ICD-10-CM

## 2022-05-10 DIAGNOSIS — I10 ESSENTIAL HYPERTENSION: ICD-10-CM

## 2022-05-10 DIAGNOSIS — Z79.4 TYPE 2 DIABETES MELLITUS WITH DIABETIC NEUROPATHY, WITH LONG-TERM CURRENT USE OF INSULIN (HCC): ICD-10-CM

## 2022-05-10 DIAGNOSIS — E53.8 VITAMIN B12 DEFICIENCY: ICD-10-CM

## 2022-05-10 DIAGNOSIS — D64.9 ANEMIA, UNSPECIFIED TYPE: ICD-10-CM

## 2022-05-10 DIAGNOSIS — I25.10 CORONARY ARTERY DISEASE INVOLVING NATIVE HEART WITHOUT ANGINA PECTORIS, UNSPECIFIED VESSEL OR LESION TYPE: ICD-10-CM

## 2022-05-10 DIAGNOSIS — E11.40 TYPE 2 DIABETES MELLITUS WITH DIABETIC NEUROPATHY, WITH LONG-TERM CURRENT USE OF INSULIN (HCC): Primary | ICD-10-CM

## 2022-05-10 DIAGNOSIS — I49.1 PAC (PREMATURE ATRIAL CONTRACTION): ICD-10-CM

## 2022-05-10 DIAGNOSIS — Z79.4 TYPE 2 DIABETES MELLITUS WITH DIABETIC NEUROPATHY, WITH LONG-TERM CURRENT USE OF INSULIN (HCC): Primary | ICD-10-CM

## 2022-05-10 DIAGNOSIS — N18.30 CHRONIC RENAL FAILURE, STAGE 3 (MODERATE), UNSPECIFIED WHETHER STAGE 3A OR 3B CKD (HCC): ICD-10-CM

## 2022-05-10 DIAGNOSIS — E11.40 TYPE 2 DIABETES MELLITUS WITH DIABETIC NEUROPATHY, WITH LONG-TERM CURRENT USE OF INSULIN (HCC): ICD-10-CM

## 2022-05-10 LAB
A/G RATIO: 1.7 (ref 0.8–2)
ALBUMIN SERPL-MCNC: 3.9 G/DL (ref 3.4–4.8)
ALP BLD-CCNC: 85 U/L (ref 25–100)
ALT SERPL-CCNC: 12 U/L (ref 4–36)
ANION GAP SERPL CALCULATED.3IONS-SCNC: 20 MMOL/L (ref 3–16)
AST SERPL-CCNC: 20 U/L (ref 8–33)
BASOPHILS ABSOLUTE: 0 K/UL (ref 0–0.1)
BASOPHILS RELATIVE PERCENT: 0.6 %
BILIRUB SERPL-MCNC: 0.3 MG/DL (ref 0.3–1.2)
BUN BLDV-MCNC: 11 MG/DL (ref 6–20)
CALCIUM SERPL-MCNC: 9 MG/DL (ref 8.5–10.5)
CHLORIDE BLD-SCNC: 100 MMOL/L (ref 98–107)
CHOLESTEROL, TOTAL: 86 MG/DL (ref 0–200)
CO2: 23 MMOL/L (ref 20–30)
CREAT SERPL-MCNC: 1 MG/DL (ref 0.4–1.2)
EOSINOPHILS ABSOLUTE: 0 K/UL (ref 0–0.4)
EOSINOPHILS RELATIVE PERCENT: 0.9 %
FERRITIN: 10.1 NG/ML (ref 22–322)
FOLATE: 11.54 NG/ML
GFR AFRICAN AMERICAN: >59
GFR NON-AFRICAN AMERICAN: 55
GLOBULIN: 2.3 G/DL
GLUCOSE BLD-MCNC: 132 MG/DL (ref 74–106)
HBA1C MFR BLD: 5.5 %
HCT VFR BLD CALC: 27.4 % (ref 37–47)
HDLC SERPL-MCNC: 40 MG/DL (ref 40–60)
HEMOGLOBIN: 8.3 G/DL (ref 11.5–16.5)
IMMATURE GRANULOCYTES #: 0 K/UL
IMMATURE GRANULOCYTES %: 0.3 % (ref 0–5)
IRON SATURATION: 8 % (ref 15–50)
IRON: 28 UG/DL (ref 37–145)
LDL CHOLESTEROL CALCULATED: 26 MG/DL
LYMPHOCYTES ABSOLUTE: 0.7 K/UL (ref 1.5–4)
LYMPHOCYTES RELATIVE PERCENT: 22.3 %
MCH RBC QN AUTO: 28.9 PG (ref 27–32)
MCHC RBC AUTO-ENTMCNC: 30.3 G/DL (ref 31–35)
MCV RBC AUTO: 95.5 FL (ref 80–100)
MONOCYTES ABSOLUTE: 0.4 K/UL (ref 0.2–0.8)
MONOCYTES RELATIVE PERCENT: 10.7 %
NEUTROPHILS ABSOLUTE: 2.1 K/UL (ref 2–7.5)
NEUTROPHILS RELATIVE PERCENT: 65.2 %
PDW BLD-RTO: 15 % (ref 11–16)
PLATELET # BLD: 147 K/UL (ref 150–400)
PMV BLD AUTO: 12.1 FL (ref 6–10)
POTASSIUM SERPL-SCNC: 3.3 MMOL/L (ref 3.4–5.1)
RBC # BLD: 2.87 M/UL (ref 3.8–5.8)
SODIUM BLD-SCNC: 143 MMOL/L (ref 136–145)
TOTAL IRON BINDING CAPACITY: 334 UG/DL (ref 250–450)
TOTAL PROTEIN: 6.2 G/DL (ref 6.4–8.3)
TRIGL SERPL-MCNC: 102 MG/DL (ref 0–249)
TSH SERPL DL<=0.05 MIU/L-ACNC: 2.25 UIU/ML (ref 0.27–4.2)
VLDLC SERPL CALC-MCNC: 20 MG/DL
WBC # BLD: 3.3 K/UL (ref 4–11)

## 2022-05-10 PROCEDURE — G8400 PT W/DXA NO RESULTS DOC: HCPCS | Performed by: INTERNAL MEDICINE

## 2022-05-10 PROCEDURE — 3017F COLORECTAL CA SCREEN DOC REV: CPT | Performed by: INTERNAL MEDICINE

## 2022-05-10 PROCEDURE — 1123F ACP DISCUSS/DSCN MKR DOCD: CPT | Performed by: INTERNAL MEDICINE

## 2022-05-10 PROCEDURE — G8427 DOCREV CUR MEDS BY ELIG CLIN: HCPCS | Performed by: INTERNAL MEDICINE

## 2022-05-10 PROCEDURE — 96372 THER/PROPH/DIAG INJ SC/IM: CPT | Performed by: INTERNAL MEDICINE

## 2022-05-10 PROCEDURE — 1090F PRES/ABSN URINE INCON ASSESS: CPT | Performed by: INTERNAL MEDICINE

## 2022-05-10 PROCEDURE — 82728 ASSAY OF FERRITIN: CPT

## 2022-05-10 PROCEDURE — 80061 LIPID PANEL: CPT

## 2022-05-10 PROCEDURE — 83550 IRON BINDING TEST: CPT

## 2022-05-10 PROCEDURE — 1036F TOBACCO NON-USER: CPT | Performed by: INTERNAL MEDICINE

## 2022-05-10 PROCEDURE — 83036 HEMOGLOBIN GLYCOSYLATED A1C: CPT

## 2022-05-10 PROCEDURE — G8420 CALC BMI NORM PARAMETERS: HCPCS | Performed by: INTERNAL MEDICINE

## 2022-05-10 PROCEDURE — 2022F DILAT RTA XM EVC RTNOPTHY: CPT | Performed by: INTERNAL MEDICINE

## 2022-05-10 PROCEDURE — 3044F HG A1C LEVEL LT 7.0%: CPT | Performed by: INTERNAL MEDICINE

## 2022-05-10 PROCEDURE — 99214 OFFICE O/P EST MOD 30 MIN: CPT | Performed by: INTERNAL MEDICINE

## 2022-05-10 PROCEDURE — 84443 ASSAY THYROID STIM HORMONE: CPT

## 2022-05-10 PROCEDURE — 83540 ASSAY OF IRON: CPT

## 2022-05-10 PROCEDURE — 82746 ASSAY OF FOLIC ACID SERUM: CPT

## 2022-05-10 PROCEDURE — 80053 COMPREHEN METABOLIC PANEL: CPT

## 2022-05-10 PROCEDURE — 85025 COMPLETE CBC W/AUTO DIFF WBC: CPT

## 2022-05-10 RX ORDER — CYANOCOBALAMIN 1000 UG/ML
1000 INJECTION INTRAMUSCULAR; SUBCUTANEOUS ONCE
Status: COMPLETED | OUTPATIENT
Start: 2022-05-10 | End: 2022-05-10

## 2022-05-10 RX ORDER — METOPROLOL SUCCINATE 25 MG/1
TABLET, EXTENDED RELEASE ORAL
Qty: 30 TABLET | Refills: 3 | Status: SHIPPED | OUTPATIENT
Start: 2022-05-10

## 2022-05-10 RX ADMIN — CYANOCOBALAMIN 1000 MCG: 1000 INJECTION INTRAMUSCULAR; SUBCUTANEOUS at 15:21

## 2022-05-10 ASSESSMENT — ENCOUNTER SYMPTOMS
BACK PAIN: 1
WHEEZING: 0
ABDOMINAL PAIN: 0
EYE DISCHARGE: 0
NAUSEA: 0
SORE THROAT: 0
COUGH: 0
SINUS PRESSURE: 0
VOMITING: 0
SHORTNESS OF BREATH: 0

## 2022-05-10 NOTE — PROGRESS NOTES
SUBJECTIVE:    Patient ID: Jenny Mcginnis is a 81 y. o.female. Chief Complaint   Patient presents with    Diabetes    Hypertension    Anemia         HPI:  Patient has had diabetes for the past several years. She has been compliant with the medications and denies any side effects from it. She has been monitoring fingersticks on a daily basis. Her fingerstick average around 170. She denies any hypoglycemic symptoms. She has been following a diabetic diet and has not been active. Her last eye exam was more than a year ago. She is using oral meds only and Insulin. She is on Levemir 30 units nightly. Patient has had hypertension for several years. She has been compliant with taking medications, without side effects from it. She has been following a low-sodium, is not active and never exercises. Weight is up 2 pounds, compared to last visit. Her blood pressure is stable at this time. Patient has had chronic renal failure for the past few years. She continues to have good urinary output. She is not taking anything, but what was prescribed for her. Patient's medications, allergies, past medical, surgical, social and family histories were reviewed and updated as appropriate in electronic medical record. Outpatient Medications Marked as Taking for the 5/10/22 encounter (Office Visit) with Williams Bray MD   Medication Sig Dispense Refill    JARDIANCE 10 MG tablet TAKE 1 TABLET BY MOUTH DAILY 30 tablet 3    rosuvastatin (CRESTOR) 20 MG tablet TAKE 1 TABLET BY MOUTH EVERY  tablet 0    HYDROcodone-acetaminophen (NORCO) 5-325 MG per tablet Take 1 tablet by mouth 2 times daily as needed for Pain for up to 30 days.  30 tablet 0    lactulose (CHRONULAC) 10 GM/15ML solution TAKE 15 ML BY MOUTH DAILY 1350 mL 0    bisacodyl (BISACODYL) 5 MG EC tablet Take 1 tablet by mouth daily as needed for Constipation 30 tablet 1    insulin detemir (LEVEMIR FLEXTOUCH) 100 UNIT/ML injection pen Inject 50 Units into the skin nightly 30 nightly      TRADJENTA 5 MG tablet TAKE 1 TABLET BY MOUTH EVERY  tablet 0    umeclidinium-vilanterol (ANORO ELLIPTA) 62.5-25 MCG/INH AEPB inhaler Inhale 1 puff into the lungs daily (Patient taking differently: Inhale 1 puff into the lungs as needed ) 1 each 0    furosemide (LASIX) 20 MG tablet TAKE 1 TABLET BY MOUTH DAILY AS NEEDED FOR SWELLING 30 tablet 2    metFORMIN (GLUCOPHAGE) 1000 MG tablet TAKE 1 TABLET BY MOUTH TWICE DAILY WITH FOOD 180 tablet 2    linaclotide (LINZESS) 290 MCG CAPS capsule Take 1 capsule by mouth every morning (before breakfast) 30 capsule 2    pantoprazole (PROTONIX) 40 MG tablet Take 40 mg by mouth 2 times daily (before meals)       ferrous sulfate (IRON 325) 325 (65 Fe) MG tablet Take 325 mg by mouth daily (with breakfast)      Insulin Pen Needle 31G X 6 MM MISC 1 each by Does not apply route daily 100 each 3    isosorbide mononitrate (IMDUR) 60 MG extended release tablet TAKE 1 TABLET BY MOUTH EVERY DAY 30 tablet 2    blood glucose monitor strips QID Dx E11.9 100 strip 5    glucose monitoring kit (FREESTYLE) monitoring kit 1 kit by Does not apply route daily E11.40 1 kit 0    aspirin 81 MG chewable tablet Take 1 tablet by mouth daily 30 tablet 2    nitroGLYCERIN (NITROSTAT) 0.4 MG SL tablet Place 1 tablet under the tongue every 5 minutes as needed for Chest pain 25 tablet 1    Insulin Syringe-Needle U-100 (B-D INS SYR ULTRAFINE 1CC/31G) 31G X 5/16\" 1 ML MISC USE ONCE A DAY AS DIRECTED  DX E11.9 100 each 3        Review of Systems   Constitutional: Positive for fatigue. Negative for chills and fever. HENT: Negative for congestion, sinus pressure and sore throat. Eyes: Negative for discharge and visual disturbance. Respiratory: Negative for cough, shortness of breath and wheezing. Cardiovascular: Negative for chest pain and palpitations. CAZARES   Gastrointestinal: Negative for abdominal pain, nausea and vomiting. Endocrine: Negative for cold intolerance and heat intolerance. Genitourinary: Negative for dysuria, frequency and urgency. Musculoskeletal: Positive for arthralgias, back pain and gait problem. Skin: Negative for rash and wound. Neurological: Negative for syncope, numbness and headaches. Hematological: Negative. Psychiatric/Behavioral: Negative for agitation and sleep disturbance. The patient is not nervous/anxious. Past Medical History:   Diagnosis Date    CAD (coronary artery disease)     [facility]     COPD (chronic obstructive pulmonary disease) (HCC)     [facility]     [facility]     GERD (gastroesophageal reflux disease)     H/O: CVA (cardiovascular accident)     HTN (hypertension)     Mass     on chest     Tobacco abuse     Vitamin B12 deficiency      Past Surgical History:   Procedure Laterality Date    CARDIAC CATHETERIZATION      CARPAL TUNNEL RELEASE Bilateral     CHOLECYSTECTOMY      CORONARY ANGIOPLASTY WITH STENT PLACEMENT      HYSTERECTOMY      JOINT REPLACEMENT Bilateral 10/15/2016    knees    TOTAL KN[facility]     Family History   Problem Relation Age of Onset    Diabetes Mother     High Blood Pressure Mother     Cancer Mother         pancreatic    Diabetes Father     Heart Disease Father     High Blood Pressure Father     Cancer Sister         lung, breast    Cancer Brother         throat      Social History     Tobacco Use   Smoking Status Former Smoker    Packs/day: 1.00    Years: 40.00    Pack years: 40.00    Types: Cigarettes   Smokeless Tobacco Never Used   Tobacco Comment    states she is not willing to stop and this is a stress reliever for her.         OBJECTIVE:   Wt Readings from Last 3 Encounters:   05/10/22 137 lb (62.1 kg)   03/24/22 135 lb 9.6 oz (61.5 kg)   03/17/22 132 lb 6.4 oz (60.1 kg)     BP Readings from Last 3 Encounters:   05/10/22 134/62   03/24/22 122/64 22 136/61       /62   Pulse 83   Temp 97.7 °F (36.5 °C)   Resp 18   Wt 137 lb (62.1 kg)   SpO2 98%   BMI 24.27 kg/m²      Physical Exam  Vitals and nursing note reviewed. Constitutional:       Appearance: Normal appearance. She is well-developed. HENT:      Head: Normocephalic and atraumatic. Right Ear: External ear normal.      Left Ear: External ear normal.      Nose: Nose normal.      Mouth/Throat:      Mouth: Mucous membranes are moist.      Pharynx: Oropharynx is clear. Eyes:      Conjunctiva/sclera: Conjunctivae normal.      Pupils: Pupils are equal, round, and reactive to light. Neck:      Thyroid: No thyromegaly. Vascular: No JVD. Cardiovascular:      Rate and Rhythm: Normal rate and regular rhythm. Heart sounds: Normal heart sounds. Comments: Frequent extra-systolic beats  Pulmonary:      Effort: Pulmonary effort is normal.      Breath sounds: Normal breath sounds. No wheezing or rales. Abdominal:      General: Bowel sounds are normal. There is no distension. Palpations: Abdomen is soft. Tenderness: There is no abdominal tenderness. Musculoskeletal:         General: No tenderness. Cervical back: Neck supple. No rigidity. No muscular tenderness. Right lower le+ Edema present. Left lower le+ Edema present. Skin:     Findings: No erythema or rash. Neurological:      General: No focal deficit present. Mental Status: She is alert and oriented to person, place, and time.    Psychiatric:         Behavior: Behavior normal.         Judgment: Judgment normal.         Lab Results   Component Value Date     2022    K 4.1 2022    K 3.4 2022     2022    CO2 23 2022    GLUCOSE 108 2022    BUN 13 2022    CREATININE 1.1 2022    CALCIUM 9.0 2022    PROT 6.1 2022    LABALBU 4.0 2022    BILITOT 0.5 2022    ALT 16 2022    AST 17 2022 Hemoglobin A1C (%)   Date Value   02/11/2022 7.0 (H)     Microscopic Examination (no units)   Date Value   03/17/2022 YES     LDL Calculated (mg/dL)   Date Value   12/03/2020 45         Lab Results   Component Value Date    WBC 4.3 03/29/2022    NEUTROABS 3.0 03/29/2022    HGB 9.4 03/29/2022    HCT 28.3 03/29/2022    MCV 89.3 03/29/2022     03/29/2022       Lab Results   Component Value Date    TSH 3.77 03/29/2022         ASSESSMENT/PLAN:     1. Type 2 diabetes mellitus with diabetic neuropathy, with long-term current use of insulin ([facility] [address]  I had a long discussion with her regarding diabetic diet, exercise and weight control. I am going to continue current medication. I advised her to monitor her fingerstick closely. I am going to check the A1c every few months. I will check Microalbumin on a yearly basis. I have also advised her to have a yearly eye exam and to monitor her feet closely. Advice her about the complications from diabetes, even with good control. I am going to have her follow up on a regular bases with our diabetic educator. A1C will be checked today. Lab Results   Component Value Date    LABA1C 7.0 (H) 02/11/2022     - Comprehensive Metabolic Panel; Future  - CBC with Auto Differential; Future  - Ferritin; Future  - Iron and TIBC; Future  - Hemoglobin A1C; Future  - Folate; Future  - Lipid Panel; Future  - TSH; Future    2. Essential hypertension  BP is stable. I have advised her on low-sodium diet, exercise and weight control. I am going to continue current medication. Will monitor her renal function every few months, have advised her to check blood pressure frequently and to keep a record of this. - Comprehensive Metabolic Panel; Future  - CBC with Auto Differential; Future  - Ferritin; Future  - Iron and TIBC; Future  - Folate; Future  - Lipid Panel; Future  - TSH;  Future  - metoprolol succinate (TOPROL XL) 25 MG extended release tablet; take 1 tablet by mouth once daily Dispense: 30 tablet; Refill: 3    3. Cirrhosis of liver without ascites, unspecified hepatic cirrhosis type ([facility] [address]  Will continue to monitor labs closely. Try to avoid any hepatotoxic agent. Make sure diabetes and other medical problems under good control. Continue to follow with Gastroenterology. - Comprehensive Metabolic Panel; Future  - CBC with Auto Differential; Future  - Ferritin; Future  - Iron and TIBC; Future  - Folate; Future  - Lipid Panel; Future  - TSH; Future    4. Anemia, unspecified type  I am going to send anemia work up and treat accordingly. I will monitor her Hb level periodically. From Prior GI Note:  Colonoscopy showed multiple polyps which were removed also showed some angiectasia. Recommended repeat colonoscopy in 6 months. PillCam report showed few AVMs in the mid ileum. This may be reachable only with the push enteroscopy, retrograde enteroscopy. We discussed on discontinuing the Brilinta and continue with only aspirin for now. If any overt bleeding patient need to be transferred to [facility] push enteroscopy      - Comprehensive Metabolic Panel; Future  - CBC with Auto Differential; Future  - Ferritin; Future  - Iron and TIBC; Future  - Folate; Future  - Lipid Panel; Future  - TSH; Future    5. Chronic renal failure, stage 3 (moderate), unspecified whether stage 3a or 3b CKD (HCC)  Baseline. I have advised her to avoid any nephrotoxic agents. I am going to monitor renal function periodically. I will make sure that the blood pressure and other medical problems are under good control. Will refer to nephrology if the renal function should begin to deteriorate. Creatinine will be checked today. Last Creatinine is   Lab Results   Component Value Date    CREATININE 1.1 03/29/2022     - Comprehensive Metabolic Panel; Future  - CBC with Auto Differential; Future  - Ferritin; Future  - Iron and TIBC; Future  - Folate; Future  - Lipid Panel; Future  - TSH; Future    6.  Vitamin B12 deficiency  Will cont on B12 injection on a monthly basis. - cyanocobalamin injection 1,000 mcg    7. PAC (premature atrial contraction)  EKG NSR with Frequent PAC. Will proceed to schedule patient with  for follow-up. Starting back on metoprolol.    - External Referral To Cardiology    8. Coronary artery disease involving native heart without angina pectoris, unspecified vessel or lesion type  Patient is on appropriate regimen. I will make sure that BP, Lipid and other medical problems are under good control. Patient to follow up with cardiology on a regular basis. Patient to return to office or go to ER if start to have CP, SOA, palpitation. .ect.    - External Referral To Cardiology      Orders Placed This Encounter   Medications    cyanocobalamin injection 1,000 mcg    metoprolol succinate (TOPROL XL) 25 MG extended release tablet     Sig: take 1 tablet by mouth once daily     Dispense:  30 tablet     Refill:  3      I, Eliud May MA am scribing for and in the presence of Dona Desai MD on this date of 05/10/22 at 1:07 PM    I, Dr. Dona Desai, personally performed the services described in the documentation as scribed by Eliud May MA, in my presence and it is both accurate and complete.

## 2022-05-10 NOTE — PROGRESS NOTES
Chief Complaint   Patient presents with    Diabetes    Hypertension    Anemia     fs avg 170   levemir 30 nightly     Have you seen any other physician or provider since your last visit no    Have you had any other diagnostic tests since your last visit? no    Have you changed or stopped any medications since your last visit? no

## 2022-05-11 RX ORDER — SODIUM CHLORIDE 9 MG/ML
INJECTION, SOLUTION INTRAVENOUS CONTINUOUS
Status: CANCELLED | OUTPATIENT
Start: 2022-05-11

## 2022-05-16 DIAGNOSIS — M51.36 DEGENERATIVE DISC DISEASE, LUMBAR: ICD-10-CM

## 2022-05-16 RX ORDER — HYDROCODONE BITARTRATE AND ACETAMINOPHEN 5; 325 MG/1; MG/1
1 TABLET ORAL 2 TIMES DAILY PRN
Qty: 30 TABLET | Refills: 0 | Status: SHIPPED | OUTPATIENT
Start: 2022-05-16 | End: 2022-06-13 | Stop reason: SDUPTHER

## 2022-05-17 ENCOUNTER — HOSPITAL ENCOUNTER (OUTPATIENT)
Dept: NURSING | Facility: HOSPITAL | Age: 72
Setting detail: INFUSION SERIES
Discharge: HOME | End: 2022-05-19
Payer: MEDICARE

## 2022-05-17 ENCOUNTER — HOSPITAL ENCOUNTER (OUTPATIENT)
Facility: HOSPITAL | Age: 72
Setting detail: INFUSION SERIES
End: 2022-05-17
Payer: MEDICARE

## 2022-05-17 VITALS
TEMPERATURE: 97.7 F | RESPIRATION RATE: 18 BRPM | DIASTOLIC BLOOD PRESSURE: 60 MMHG | HEART RATE: 58 BPM | SYSTOLIC BLOOD PRESSURE: 176 MMHG | OXYGEN SATURATION: 100 %

## 2022-05-17 DIAGNOSIS — D50.8 OTHER IRON DEFICIENCY ANEMIA: ICD-10-CM

## 2022-05-17 DIAGNOSIS — D63.1 ANEMIA DUE TO STAGE 3 CHRONIC KIDNEY DISEASE, UNSPECIFIED WHETHER STAGE 3A OR 3B CKD (HCC): Primary | ICD-10-CM

## 2022-05-17 DIAGNOSIS — N18.30 ANEMIA DUE TO STAGE 3 CHRONIC KIDNEY DISEASE, UNSPECIFIED WHETHER STAGE 3A OR 3B CKD (HCC): Primary | ICD-10-CM

## 2022-05-17 PROCEDURE — 6360000002 HC RX W HCPCS: Performed by: INTERNAL MEDICINE

## 2022-05-17 PROCEDURE — 2580000003 HC RX 258: Performed by: INTERNAL MEDICINE

## 2022-05-17 PROCEDURE — 96365 THER/PROPH/DIAG IV INF INIT: CPT

## 2022-05-17 RX ORDER — SODIUM CHLORIDE 9 MG/ML
INJECTION, SOLUTION INTRAVENOUS CONTINUOUS
Status: CANCELLED | OUTPATIENT
Start: 2022-05-24

## 2022-05-17 RX ORDER — SODIUM CHLORIDE 9 MG/ML
INJECTION, SOLUTION INTRAVENOUS CONTINUOUS
Status: ACTIVE | OUTPATIENT
Start: 2022-05-17 | End: 2022-05-18

## 2022-05-17 RX ADMIN — FERRIC CARBOXYMALTOSE INJECTION 750 MG: 50 INJECTION, SOLUTION INTRAVENOUS at 15:26

## 2022-05-17 NOTE — FLOWSHEET NOTE
05/17/22 1630   Vital Signs   Temp 97.7 °F (36.5 °C)   Temp Source Axillary   Pulse 58   Heart Rate Source Monitor   Resp 18   BP (!) 176/60   MAP (Calculated) 98.67   Oxygen Therapy   SpO2 100 %   O2 Device None (Room air)   Pt infusion complete at this time. Pt tolerated well. IV removed and vitals obtained. Pt BP high which Pt states she takes medications for at home. Pt ambulated independently off medical unit.

## 2022-05-24 ENCOUNTER — HOSPITAL ENCOUNTER (OUTPATIENT)
Dept: NURSING | Facility: HOSPITAL | Age: 72
Setting detail: INFUSION SERIES
Discharge: HOME | End: 2022-05-24
Payer: MEDICARE

## 2022-05-24 VITALS — HEART RATE: 56 BPM | DIASTOLIC BLOOD PRESSURE: 70 MMHG | SYSTOLIC BLOOD PRESSURE: 126 MMHG

## 2022-05-24 DIAGNOSIS — N18.30 ANEMIA DUE TO STAGE 3 CHRONIC KIDNEY DISEASE, UNSPECIFIED WHETHER STAGE 3A OR 3B CKD (HCC): Primary | ICD-10-CM

## 2022-05-24 DIAGNOSIS — D50.8 OTHER IRON DEFICIENCY ANEMIA: ICD-10-CM

## 2022-05-24 DIAGNOSIS — D63.1 ANEMIA DUE TO STAGE 3 CHRONIC KIDNEY DISEASE, UNSPECIFIED WHETHER STAGE 3A OR 3B CKD (HCC): Primary | ICD-10-CM

## 2022-05-24 PROCEDURE — 2580000003 HC RX 258: Performed by: INTERNAL MEDICINE

## 2022-05-24 PROCEDURE — 96365 THER/PROPH/DIAG IV INF INIT: CPT

## 2022-05-24 PROCEDURE — 6360000002 HC RX W HCPCS: Performed by: INTERNAL MEDICINE

## 2022-05-24 RX ORDER — SODIUM CHLORIDE 9 MG/ML
INJECTION, SOLUTION INTRAVENOUS CONTINUOUS
Status: CANCELLED | OUTPATIENT
Start: 2022-05-24

## 2022-05-24 RX ORDER — SODIUM CHLORIDE 9 MG/ML
INJECTION, SOLUTION INTRAVENOUS CONTINUOUS
Status: DISCONTINUED | OUTPATIENT
Start: 2022-05-24 | End: 2022-05-25 | Stop reason: HOSPADM

## 2022-05-24 RX ADMIN — FERRIC CARBOXYMALTOSE INJECTION 750 MG: 50 INJECTION, SOLUTION INTRAVENOUS at 15:52

## 2022-05-24 NOTE — FLOWSHEET NOTE
Pt given injectafer infusion. Tolerated without complication. Instructed pt on required 30min reaction wait time. Pt declined. Instructed on complications and s/sx of anaphylaxis that can occur, difficulty breathing, swelling, rash and itching at site. Instructed to return to hospital ER if occur. Verb understanding. Pt ambulated off medical unit.

## 2022-06-13 DIAGNOSIS — M51.36 DEGENERATIVE DISC DISEASE, LUMBAR: ICD-10-CM

## 2022-06-13 RX ORDER — HYDROCODONE BITARTRATE AND ACETAMINOPHEN 5; 325 MG/1; MG/1
1 TABLET ORAL 2 TIMES DAILY PRN
Qty: 30 TABLET | Refills: 0 | Status: SHIPPED | OUTPATIENT
Start: 2022-06-13 | End: 2022-07-06 | Stop reason: SDUPTHER

## 2022-06-29 PROBLEM — I35.0 AORTIC STENOSIS, MILD: Status: ACTIVE | Noted: 2022-06-29

## 2022-07-01 ENCOUNTER — OFFICE VISIT (OUTPATIENT)
Dept: CARDIOLOGY | Facility: CLINIC | Age: 72
End: 2022-07-01

## 2022-07-01 VITALS
BODY MASS INDEX: 25.4 KG/M2 | HEIGHT: 62 IN | DIASTOLIC BLOOD PRESSURE: 72 MMHG | OXYGEN SATURATION: 97 % | WEIGHT: 138 LBS | SYSTOLIC BLOOD PRESSURE: 146 MMHG | HEART RATE: 62 BPM

## 2022-07-01 DIAGNOSIS — Z79.4 TYPE 2 DIABETES MELLITUS WITHOUT COMPLICATION, WITH LONG-TERM CURRENT USE OF INSULIN: ICD-10-CM

## 2022-07-01 DIAGNOSIS — D50.0 ANEMIA DUE TO GI BLOOD LOSS: ICD-10-CM

## 2022-07-01 DIAGNOSIS — I73.9 CLAUDICATION: ICD-10-CM

## 2022-07-01 DIAGNOSIS — E78.5 HYPERLIPIDEMIA LDL GOAL <70: ICD-10-CM

## 2022-07-01 DIAGNOSIS — Z72.0 TOBACCO ABUSE: ICD-10-CM

## 2022-07-01 DIAGNOSIS — E11.9 TYPE 2 DIABETES MELLITUS WITHOUT COMPLICATION, WITH LONG-TERM CURRENT USE OF INSULIN: ICD-10-CM

## 2022-07-01 DIAGNOSIS — I25.119 CORONARY ARTERY DISEASE INVOLVING NATIVE CORONARY ARTERY OF NATIVE HEART WITH ANGINA PECTORIS: Primary | ICD-10-CM

## 2022-07-01 PROBLEM — I35.0 AORTIC STENOSIS, MILD: Status: RESOLVED | Noted: 2022-06-29 | Resolved: 2022-07-01

## 2022-07-01 PROBLEM — N63.0 BREAST MASS: Status: RESOLVED | Noted: 2019-04-19 | Resolved: 2022-07-01

## 2022-07-01 PROBLEM — I69.391 DYSPHAGIA DUE TO RECENT STROKE: Status: RESOLVED | Noted: 2021-02-28 | Resolved: 2022-07-01

## 2022-07-01 PROBLEM — N17.9 ACUTE RENAL FAILURE (ARF) (HCC): Status: RESOLVED | Noted: 2021-02-28 | Resolved: 2022-07-01

## 2022-07-01 LAB
ALBUMIN SERPL-MCNC: 4.3 G/DL (ref 3.5–5.2)
ALBUMIN/GLOB SERPL: 1.8 G/DL
ALP SERPL-CCNC: 164 U/L (ref 39–117)
ALT SERPL-CCNC: 48 U/L (ref 1–33)
AST SERPL-CCNC: 43 U/L (ref 1–32)
BILIRUB SERPL-MCNC: 0.4 MG/DL (ref 0–1.2)
BUN SERPL-MCNC: 13 MG/DL (ref 8–23)
BUN/CREAT SERPL: 12.9 (ref 7–25)
CALCIUM SERPL-MCNC: 10.1 MG/DL (ref 8.6–10.5)
CHLORIDE SERPL-SCNC: 99 MMOL/L (ref 98–107)
CO2 SERPL-SCNC: 25 MMOL/L (ref 22–29)
CREAT SERPL-MCNC: 1.01 MG/DL (ref 0.57–1)
EGFRCR SERPLBLD CKD-EPI 2021: 60 ML/MIN/1.73
ERYTHROCYTE [DISTWIDTH] IN BLOOD BY AUTOMATED COUNT: 14.8 % (ref 12.3–15.4)
GLOBULIN SER CALC-MCNC: 2.4 GM/DL
GLUCOSE SERPL-MCNC: 230 MG/DL (ref 65–99)
HBA1C MFR BLD: 7 % (ref 4.8–5.6)
HCT VFR BLD AUTO: 35.5 % (ref 34–46.6)
HGB BLD-MCNC: 11.3 G/DL (ref 12–15.9)
IRON SATN MFR SERPL: 21 % (ref 20–50)
IRON SERPL-MCNC: 87 MCG/DL (ref 37–145)
MCH RBC QN AUTO: 30.3 PG (ref 26.6–33)
MCHC RBC AUTO-ENTMCNC: 31.8 G/DL (ref 31.5–35.7)
MCV RBC AUTO: 95.2 FL (ref 79–97)
PLATELET # BLD AUTO: 129 10*3/MM3 (ref 140–450)
POTASSIUM SERPL-SCNC: 3.9 MMOL/L (ref 3.5–5.2)
PROT SERPL-MCNC: 6.7 G/DL (ref 6–8.5)
RBC # BLD AUTO: 3.73 10*6/MM3 (ref 3.77–5.28)
SODIUM SERPL-SCNC: 139 MMOL/L (ref 136–145)
TIBC SERPL-MCNC: 405 MCG/DL
UIBC SERPL-MCNC: 318 MCG/DL (ref 112–346)
WBC # BLD AUTO: 5.75 10*3/MM3 (ref 3.4–10.8)

## 2022-07-01 PROCEDURE — 93000 ELECTROCARDIOGRAM COMPLETE: CPT | Performed by: INTERNAL MEDICINE

## 2022-07-01 PROCEDURE — 99214 OFFICE O/P EST MOD 30 MIN: CPT | Performed by: INTERNAL MEDICINE

## 2022-07-01 RX ORDER — METOPROLOL SUCCINATE 25 MG/1
1 TABLET, EXTENDED RELEASE ORAL DAILY
COMMUNITY
Start: 2022-05-10 | End: 2022-07-01 | Stop reason: SDUPTHER

## 2022-07-01 RX ORDER — INSULIN DETEMIR 100 [IU]/ML
50 INJECTION, SOLUTION SUBCUTANEOUS DAILY
Refills: 0 | Status: CANCELLED
Start: 2022-07-01

## 2022-07-01 RX ORDER — ROSUVASTATIN CALCIUM 20 MG/1
20 TABLET, COATED ORAL DAILY
Qty: 90 TABLET | Refills: 1 | Status: SHIPPED | OUTPATIENT
Start: 2022-07-01 | End: 2023-02-03 | Stop reason: SDUPTHER

## 2022-07-01 RX ORDER — ASPIRIN 81 MG/1
81 TABLET, CHEWABLE ORAL DAILY
Qty: 90 TABLET | Refills: 3 | Status: SHIPPED | OUTPATIENT
Start: 2022-07-01 | End: 2023-02-03

## 2022-07-01 RX ORDER — BISACODYL 5 MG/1
5 TABLET, DELAYED RELEASE ORAL DAILY PRN
COMMUNITY
Start: 2022-03-19

## 2022-07-01 RX ORDER — LACTULOSE 10 G/15ML
10 SOLUTION ORAL DAILY
COMMUNITY
Start: 2022-03-24

## 2022-07-01 RX ORDER — EMPAGLIFLOZIN 10 MG/1
10 TABLET, FILM COATED ORAL DAILY
COMMUNITY
Start: 2022-04-25 | End: 2022-07-01 | Stop reason: SDUPTHER

## 2022-07-01 RX ORDER — METOPROLOL SUCCINATE 25 MG/1
25 TABLET, EXTENDED RELEASE ORAL DAILY
Qty: 90 TABLET | Refills: 3 | Status: SHIPPED | OUTPATIENT
Start: 2022-07-01 | End: 2023-02-03 | Stop reason: SDUPTHER

## 2022-07-01 RX ORDER — ISOSORBIDE MONONITRATE 60 MG/1
60 TABLET, EXTENDED RELEASE ORAL EVERY MORNING
Qty: 30 TABLET | Refills: 0 | Status: CANCELLED | OUTPATIENT
Start: 2022-07-01

## 2022-07-01 NOTE — ASSESSMENT & PLAN NOTE
· Patient reports discontinuation of ticagrelor has improved blood in stools  · Last CBC showed anemia  · Repeat CBC with iron profile  · Presumed AVM bleeding will complicate antiplatelet therapy or anticoagulation if needed in the future

## 2022-07-06 DIAGNOSIS — M51.36 DEGENERATIVE DISC DISEASE, LUMBAR: ICD-10-CM

## 2022-07-06 RX ORDER — HYDROCODONE BITARTRATE AND ACETAMINOPHEN 5; 325 MG/1; MG/1
1 TABLET ORAL 2 TIMES DAILY PRN
Qty: 30 TABLET | Refills: 0 | Status: SHIPPED | OUTPATIENT
Start: 2022-07-06 | End: 2022-08-06 | Stop reason: SDUPTHER

## 2022-07-08 NOTE — PROGRESS NOTES
Labs are acceptable.  Anemia is slightly improved.  Kidney function normal.  Diabetes reasonably well controlled.  Iron studies within normal limits

## 2022-07-12 ENCOUNTER — OFFICE VISIT (OUTPATIENT)
Dept: PRIMARY CARE CLINIC | Age: 72
End: 2022-07-12
Payer: MEDICARE

## 2022-07-12 VITALS
BODY MASS INDEX: 23.99 KG/M2 | HEART RATE: 70 BPM | SYSTOLIC BLOOD PRESSURE: 126 MMHG | RESPIRATION RATE: 18 BRPM | WEIGHT: 135.4 LBS | OXYGEN SATURATION: 98 % | DIASTOLIC BLOOD PRESSURE: 60 MMHG

## 2022-07-12 DIAGNOSIS — I10 ESSENTIAL HYPERTENSION: ICD-10-CM

## 2022-07-12 DIAGNOSIS — E11.40 TYPE 2 DIABETES MELLITUS WITH DIABETIC NEUROPATHY, WITH LONG-TERM CURRENT USE OF INSULIN (HCC): Primary | ICD-10-CM

## 2022-07-12 DIAGNOSIS — N18.30 CHRONIC RENAL FAILURE, STAGE 3 (MODERATE), UNSPECIFIED WHETHER STAGE 3A OR 3B CKD (HCC): ICD-10-CM

## 2022-07-12 DIAGNOSIS — E53.8 VITAMIN B12 DEFICIENCY: ICD-10-CM

## 2022-07-12 DIAGNOSIS — K74.60 CIRRHOSIS OF LIVER WITHOUT ASCITES, UNSPECIFIED HEPATIC CIRRHOSIS TYPE (HCC): ICD-10-CM

## 2022-07-12 DIAGNOSIS — I71.20 THORACIC AORTIC ANEURYSM WITHOUT RUPTURE: ICD-10-CM

## 2022-07-12 DIAGNOSIS — Z79.4 TYPE 2 DIABETES MELLITUS WITH DIABETIC NEUROPATHY, WITH LONG-TERM CURRENT USE OF INSULIN (HCC): Primary | ICD-10-CM

## 2022-07-12 DIAGNOSIS — D64.9 ANEMIA, UNSPECIFIED TYPE: ICD-10-CM

## 2022-07-12 PROCEDURE — 1123F ACP DISCUSS/DSCN MKR DOCD: CPT | Performed by: INTERNAL MEDICINE

## 2022-07-12 PROCEDURE — 3017F COLORECTAL CA SCREEN DOC REV: CPT | Performed by: INTERNAL MEDICINE

## 2022-07-12 PROCEDURE — 2022F DILAT RTA XM EVC RTNOPTHY: CPT | Performed by: INTERNAL MEDICINE

## 2022-07-12 PROCEDURE — 96372 THER/PROPH/DIAG INJ SC/IM: CPT | Performed by: INTERNAL MEDICINE

## 2022-07-12 PROCEDURE — 3044F HG A1C LEVEL LT 7.0%: CPT | Performed by: INTERNAL MEDICINE

## 2022-07-12 PROCEDURE — 99214 OFFICE O/P EST MOD 30 MIN: CPT | Performed by: INTERNAL MEDICINE

## 2022-07-12 PROCEDURE — G8420 CALC BMI NORM PARAMETERS: HCPCS | Performed by: INTERNAL MEDICINE

## 2022-07-12 PROCEDURE — G8427 DOCREV CUR MEDS BY ELIG CLIN: HCPCS | Performed by: INTERNAL MEDICINE

## 2022-07-12 PROCEDURE — 1090F PRES/ABSN URINE INCON ASSESS: CPT | Performed by: INTERNAL MEDICINE

## 2022-07-12 PROCEDURE — 4004F PT TOBACCO SCREEN RCVD TLK: CPT | Performed by: INTERNAL MEDICINE

## 2022-07-12 PROCEDURE — G8400 PT W/DXA NO RESULTS DOC: HCPCS | Performed by: INTERNAL MEDICINE

## 2022-07-12 RX ORDER — CYANOCOBALAMIN 1000 UG/ML
1000 INJECTION INTRAMUSCULAR; SUBCUTANEOUS ONCE
Status: COMPLETED | OUTPATIENT
Start: 2022-07-12 | End: 2022-07-12

## 2022-07-12 RX ADMIN — CYANOCOBALAMIN 1000 MCG: 1000 INJECTION INTRAMUSCULAR; SUBCUTANEOUS at 14:44

## 2022-07-12 SDOH — ECONOMIC STABILITY: FOOD INSECURITY: WITHIN THE PAST 12 MONTHS, YOU WORRIED THAT YOUR FOOD WOULD RUN OUT BEFORE YOU GOT MONEY TO BUY MORE.: NEVER TRUE

## 2022-07-12 SDOH — ECONOMIC STABILITY: FOOD INSECURITY: WITHIN THE PAST 12 MONTHS, THE FOOD YOU BOUGHT JUST DIDN'T LAST AND YOU DIDN'T HAVE MONEY TO GET MORE.: NEVER TRUE

## 2022-07-12 ASSESSMENT — ENCOUNTER SYMPTOMS
SHORTNESS OF BREATH: 0
WHEEZING: 0
NAUSEA: 0
SORE THROAT: 0
EYE DISCHARGE: 0
SINUS PRESSURE: 0
BACK PAIN: 1
VOMITING: 0
COUGH: 0
ABDOMINAL PAIN: 0

## 2022-07-12 ASSESSMENT — SOCIAL DETERMINANTS OF HEALTH (SDOH): HOW HARD IS IT FOR YOU TO PAY FOR THE VERY BASICS LIKE FOOD, HOUSING, MEDICAL CARE, AND HEATING?: SOMEWHAT HARD

## 2022-07-12 NOTE — PROGRESS NOTES
SUBJECTIVE:    Patient ID: Edith Carbajal is a 81 y. o.female. Chief Complaint   Patient presents with    Diabetes    Hypertension    Anemia         HPI:  Patient has had diabetes for the past several years. She has been compliant with the medications and denies any side effects from it. She has been monitoring fingersticks on a daily basis. Her fingerstick range is between 120-140. She denies any hypoglycemic symptoms. She has been following a diabetic diet and has not been active. Her last eye exam was greater than a year ago. She is using oral meds and Insulin. She is also on 30 units of Levemir. BP has been stable. Patient with a prior history of anemia. No bleeding, melena or hematochezia. Energy is slightly down but chronic and stable compared to before. Patient's medications, allergies, past medical, surgical, social and family histories were reviewed and updated as appropriate in electronic medical record. Outpatient Medications Marked as Taking for the 7/12/22 encounter (Office Visit) with Vita Grullon MD   Medication Sig Dispense Refill    HYDROcodone-acetaminophen (NORCO) 5-325 MG per tablet Take 1 tablet by mouth 2 times daily as needed for Pain for up to 30 days.  30 tablet 0    linagliptin (TRADJENTA) 5 MG tablet TAKE 1 TABLET BY MOUTH EVERY DAY 90 tablet 0    metoprolol succinate (TOPROL XL) 25 MG extended release tablet take 1 tablet by mouth once daily 30 tablet 3    JARDIANCE 10 MG tablet TAKE 1 TABLET BY MOUTH DAILY 30 tablet 3    rosuvastatin (CRESTOR) 20 MG tablet TAKE 1 TABLET BY MOUTH EVERY  tablet 0    lactulose (CHRONULAC) 10 GM/15ML solution TAKE 15 ML BY MOUTH DAILY 1350 mL 0    bisacodyl (BISACODYL) 5 MG EC tablet Take 1 tablet by mouth daily as needed for Constipation 30 tablet 1    insulin detemir (LEVEMIR FLEXTOUCH) 100 UNIT/ML injection pen Inject 30 Units into the skin nightly 30 nightly      umeclidinium-vilanterol (ANORO ELLIPTA) 62.5-25 MCG/INH AEPB inhaler Inhale 1 puff into the lungs daily (Patient taking differently: Inhale 1 puff into the lungs as needed ) 1 each 0    furosemide (LASIX) 20 MG tablet TAKE 1 TABLET BY MOUTH DAILY AS NEEDED FOR SWELLING 30 tablet 2    metFORMIN (GLUCOPHAGE) 1000 MG tablet TAKE 1 TABLET BY MOUTH TWICE DAILY WITH FOOD 180 tablet 2    linaclotide (LINZESS) 290 MCG CAPS capsule Take 1 capsule by mouth every morning (before breakfast) 30 capsule 2    pantoprazole (PROTONIX) 40 MG tablet Take 40 mg by mouth 2 times daily (before meals)       ferrous sulfate (IRON 325) 325 (65 Fe) MG tablet Take 325 mg by mouth daily (with breakfast)      Insulin Pen Needle 31G X 6 MM MISC 1 each by Does not apply route daily 100 each 3    isosorbide mononitrate (IMDUR) 60 MG extended release tablet TAKE 1 TABLET BY MOUTH EVERY DAY 30 tablet 2    blood glucose monitor strips QID Dx E11.9 100 strip 5    glucose monitoring kit (FREESTYLE) monitoring kit 1 kit by Does not apply route daily E11.40 1 kit 0    aspirin 81 MG chewable tablet Take 1 tablet by mouth daily 30 tablet 2    nitroGLYCERIN (NITROSTAT) 0.4 MG SL tablet Place 1 tablet under the tongue every 5 minutes as needed for Chest pain 25 tablet 1    Insulin Syringe-Needle U-100 (B-D INS SYR ULTRAFINE 1CC/31G) 31G X 5/16\" 1 ML MISC USE ONCE A DAY AS DIRECTED  DX E11.9 100 each 3        Review of Systems   Constitutional:  Positive for fatigue. Negative for chills and fever. HENT:  Negative for congestion, sinus pressure and sore throat. Eyes:  Negative for discharge and visual disturbance. Respiratory:  Negative for cough, shortness of breath and wheezing. Cardiovascular:  Negative for chest pain and palpitations. CAZARES   Gastrointestinal:  Negative for abdominal pain, nausea and vomiting. Endocrine: Negative for cold intolerance and heat intolerance. Genitourinary:  Negative for dysuria, frequency and urgency. Musculoskeletal:  Positive for arthralgias and back pain. Skin:  Negative for rash and wound. Neurological:  Negative for syncope, numbness and headaches. Hematological: Negative. Psychiatric/Behavioral:  Negative for agitation and sleep disturbance. The patient is not nervous/anxious. Past Medical History:   Diagnosis Date    CAD (coronary artery disease)     Cirrhosis (HCC)     COPD (chronic obstructive pulmonary disease) (HCC)     Cystic fibrosis (HCC)     Diabetes mellitus ([address]     GERD (gastroesophageal reflux disease)     H/O: CVA (cardiovascular accident)     HTN (hypertension)     Mass     on chest     Tobacco abuse     Vitamin B12 deficiency      Past Surgical History:   Procedure Laterality Date    CARDIAC CATHETERIZATION      CARPAL TUNNEL RELEASE Bilateral     CHOLECYSTECTOMY      CORONARY ANGIOPLASTY WITH STENT PLACEMENT      HYSTERECTOMY (CERVIX STATUS UNKNOWN)      JOINT REPLACEMENT Bilateral 10/15/2016    knees    TOTAL KNEE ARTHROPLASTY Bilateral 10/18/2016    at [facility]     Family History   Problem Relation Age of Onset    Diabetes Mother     High Blood Pressure Mother     Cancer Mother         pancreatic    Diabetes Father     Heart Disease Father     High Blood Pressure Father     Cancer Sister         lung, breast    Cancer Brother         throat      Social History     Tobacco Use   Smoking Status Current Every Day Smoker    Packs/day: 1.00    Years: 40.00    Pack years: 40.00    Types: Cigarettes   Smokeless Tobacco Never Used   Tobacco Comment    states she is not willing to stop and this is a stress reliever for her. OBJECTIVE:   Wt Readings from Last 3 Encounters:   07/12/22 135 lb 6.4 oz (61.4 kg)   05/10/22 137 lb (62.1 kg)   03/24/22 135 lb 9.6 oz (61.5 kg)     BP Readings from Last 3 Encounters:   07/12/22 126/60   05/24/22 126/70   05/17/22 (!) 176/60       /60   Pulse 70   Resp 18   Wt 135 lb 6.4 oz (61.4 kg)   SpO2 98%   BMI 23.99 kg/m²      Physical Exam  Vitals and nursing note reviewed.    Constitutional: LDL Calculated (mg/dL)   Date Value   05/10/2022 26         Lab Results   Component Value Date/Time    WBC 3.3 05/10/2022 02:05 PM    NEUTROABS 2.1 05/10/2022 02:05 PM    HGB 8.3 05/10/2022 02:05 PM    HCT 27.4 05/10/2022 02:05 PM    MCV 95.5 05/10/2022 02:05 PM     05/10/2022 02:05 PM       Lab Results   Component Value Date    TSH 2.25 05/10/2022         ASSESSMENT/PLAN:     1. Type 2 diabetes mellitus with diabetic neuropathy, with long-term current use of insulin (HCC)  Stable. I advised her regarding diabetic diet, exercise and weight control. Also, I advised her to stay on the current medication, monitor her fingerstick closely. I am going to check the A1c every few months. I will check microalbumin on a yearly basis. I have also advised her to have a yearly eye exam and to monitor her feet closely. Along with instruction of possible complications related to diabetes, even with good control. A1C will be checked in few months. Lab Results   Component Value Date    LABA1C 7 07/1/2022       2. Essential hypertension  BP is stable. I have advised her on low-sodium diet, exercise and weight control. I am going to continue current medication. Will monitor her renal function every few months, have advised her to check blood pressure frequently and to keep a record of this. 3. Anemia, unspecified type  Mild and chronic. Improved compared to before. Prior work-up showed iron deficiency. Continue to monitor for now. Check hemoglobin level and iron studies periodically. From Prior GI Note:  Colonoscopy showed multiple polyps which were removed also showed some angiectasia. Recommended repeat colonoscopy in 6 months. PillCam report showed few AVMs in the mid ileum. This may be reachable only with the push enteroscopy, retrograde enteroscopy. We discussed on discontinuing the Brilinta and continue with only aspirin for now.  If any overt bleeding patient need to be transferred to [facility] push enteroscopy      4. Cirrhosis of liver without ascites, unspecified hepatic cirrhosis type ([facility] [address]  Try to avoid any hepatotoxic agent. Make sure other medical problems under good control. Monitor labs periodically. 5. Chronic renal failure, stage 3 (moderate), unspecified whether stage 3a or 3b CKD (HCC)  Baseline. I have advised her to avoid any nephrotoxic agents. I am going to monitor renal function periodically. I will make sure that the blood pressure and other medical problems are under good control. Will refer to nephrology if the renal function should begin to deteriorate. Creatinine will be checked in few months. Last Creatinine is   Lab Results   Component Value Date    CREATININE 1.0 07/1/2022       6. Vitamin B12 deficiency  Will cont on B12 injection on a monthly basis. 7. Thoracic aortic aneurysm without rupture (HCC)  CT scan of the chest 11/1/2021  Aneurysmal dilatation of the ascending aorta measuring up to 4 cm. Make sure blood pressure, lipid profile and other medical issues under good control. Long conversation regarding smoking cessation. She is not interested in proceeding with anything regarding smoking cessation. Need repeat study in November.   Patient also been following up with her cardiologist.

## 2022-07-19 ENCOUNTER — APPOINTMENT (OUTPATIENT)
Dept: CARDIOLOGY | Facility: HOSPITAL | Age: 72
End: 2022-07-19

## 2022-08-05 DIAGNOSIS — M51.36 DEGENERATIVE DISC DISEASE, LUMBAR: ICD-10-CM

## 2022-08-06 RX ORDER — HYDROCODONE BITARTRATE AND ACETAMINOPHEN 5; 325 MG/1; MG/1
1 TABLET ORAL 2 TIMES DAILY PRN
Qty: 30 TABLET | Refills: 0 | Status: SHIPPED | OUTPATIENT
Start: 2022-08-06 | End: 2022-09-06 | Stop reason: SDUPTHER

## 2022-08-19 ENCOUNTER — NURSE ONLY (OUTPATIENT)
Dept: PRIMARY CARE CLINIC | Age: 72
End: 2022-08-19
Payer: MEDICARE

## 2022-08-19 DIAGNOSIS — E53.8 VITAMIN B12 DEFICIENCY: Primary | ICD-10-CM

## 2022-08-19 PROCEDURE — 96372 THER/PROPH/DIAG INJ SC/IM: CPT | Performed by: INTERNAL MEDICINE

## 2022-08-19 RX ORDER — CYANOCOBALAMIN 1000 UG/ML
1000 INJECTION INTRAMUSCULAR; SUBCUTANEOUS ONCE
Status: COMPLETED | OUTPATIENT
Start: 2022-08-19 | End: 2022-08-19

## 2022-08-19 RX ADMIN — CYANOCOBALAMIN 1000 MCG: 1000 INJECTION INTRAMUSCULAR; SUBCUTANEOUS at 15:39

## 2022-08-19 NOTE — PROGRESS NOTES
Chief Complaint   Patient presents with    Injections     After obtaining consent, and per orders of Dr. Raman Winchester, injection of B12 was given in Right deltoid by Patric Thomas. Patient instructed to remain in clinic for 20 minutes afterwards, and to report any adverse reaction to me immediately.

## 2022-08-22 RX ORDER — INSULIN DETEMIR 100 [IU]/ML
INJECTION, SOLUTION SUBCUTANEOUS
Qty: 45 ML | Refills: 3 | Status: SHIPPED | OUTPATIENT
Start: 2022-08-22

## 2022-08-29 RX ORDER — ROSUVASTATIN CALCIUM 20 MG/1
TABLET, COATED ORAL
Qty: 120 TABLET | Refills: 0 | Status: SHIPPED | OUTPATIENT
Start: 2022-08-29

## 2022-09-06 DIAGNOSIS — M51.36 DEGENERATIVE DISC DISEASE, LUMBAR: ICD-10-CM

## 2022-09-06 RX ORDER — HYDROCODONE BITARTRATE AND ACETAMINOPHEN 5; 325 MG/1; MG/1
1 TABLET ORAL 2 TIMES DAILY PRN
Qty: 30 TABLET | Refills: 0 | Status: SHIPPED | OUTPATIENT
Start: 2022-09-06 | End: 2022-10-07 | Stop reason: SDUPTHER

## 2022-09-30 DIAGNOSIS — D64.9 NORMOCYTIC ANEMIA: ICD-10-CM

## 2022-09-30 RX ORDER — PANTOPRAZOLE SODIUM 40 MG/1
TABLET, DELAYED RELEASE ORAL
Qty: 60 TABLET | Refills: 0 | Status: SHIPPED | OUTPATIENT
Start: 2022-09-30 | End: 2022-10-03

## 2022-10-03 RX ORDER — PANTOPRAZOLE SODIUM 40 MG/1
TABLET, DELAYED RELEASE ORAL
Qty: 180 TABLET | Refills: 3 | Status: SHIPPED | OUTPATIENT
Start: 2022-10-03

## 2022-10-04 ENCOUNTER — OFFICE VISIT (OUTPATIENT)
Dept: PRIMARY CARE CLINIC | Age: 72
End: 2022-10-04
Payer: MEDICARE

## 2022-10-04 ENCOUNTER — HOSPITAL ENCOUNTER (OUTPATIENT)
Facility: HOSPITAL | Age: 72
Discharge: HOME | End: 2022-10-04
Payer: MEDICARE

## 2022-10-04 VITALS
RESPIRATION RATE: 18 BRPM | HEIGHT: 63 IN | DIASTOLIC BLOOD PRESSURE: 74 MMHG | HEART RATE: 72 BPM | BODY MASS INDEX: 24.98 KG/M2 | SYSTOLIC BLOOD PRESSURE: 130 MMHG | WEIGHT: 141 LBS | OXYGEN SATURATION: 99 %

## 2022-10-04 DIAGNOSIS — E11.40 TYPE 2 DIABETES MELLITUS WITH DIABETIC NEUROPATHY, WITH LONG-TERM CURRENT USE OF INSULIN (HCC): ICD-10-CM

## 2022-10-04 DIAGNOSIS — Z23 NEED FOR PROPHYLACTIC VACCINATION AND INOCULATION AGAINST VARICELLA: ICD-10-CM

## 2022-10-04 DIAGNOSIS — Z79.4 TYPE 2 DIABETES MELLITUS WITH DIABETIC NEUROPATHY, WITH LONG-TERM CURRENT USE OF INSULIN (HCC): ICD-10-CM

## 2022-10-04 DIAGNOSIS — Z00.00 INITIAL MEDICARE ANNUAL WELLNESS VISIT: ICD-10-CM

## 2022-10-04 DIAGNOSIS — Z00.00 INITIAL MEDICARE ANNUAL WELLNESS VISIT: Primary | ICD-10-CM

## 2022-10-04 DIAGNOSIS — D64.9 ANEMIA, UNSPECIFIED TYPE: ICD-10-CM

## 2022-10-04 DIAGNOSIS — I71.21 ANEURYSM OF ASCENDING AORTA WITHOUT RUPTURE: ICD-10-CM

## 2022-10-04 DIAGNOSIS — Z78.0 POSTMENOPAUSAL: ICD-10-CM

## 2022-10-04 DIAGNOSIS — Z12.31 ENCOUNTER FOR SCREENING MAMMOGRAM FOR BREAST CANCER: ICD-10-CM

## 2022-10-04 DIAGNOSIS — E53.8 VITAMIN B12 DEFICIENCY: ICD-10-CM

## 2022-10-04 DIAGNOSIS — Z78.0 ASYMPTOMATIC MENOPAUSAL STATE: ICD-10-CM

## 2022-10-04 DIAGNOSIS — Z23 NEED FOR PROPHYLACTIC VACCINATION AGAINST DIPHTHERIA-TETANUS-PERTUSSIS (DTP): ICD-10-CM

## 2022-10-04 DIAGNOSIS — Z87.891 PERSONAL HISTORY OF TOBACCO USE, PRESENTING HAZARDS TO HEALTH: ICD-10-CM

## 2022-10-04 LAB
A/G RATIO: 2.4 (ref 0.8–2)
ALBUMIN SERPL-MCNC: 4.5 G/DL (ref 3.4–4.8)
ALP BLD-CCNC: 81 U/L (ref 25–100)
ALT SERPL-CCNC: 16 U/L (ref 4–36)
ANION GAP SERPL CALCULATED.3IONS-SCNC: 17 MMOL/L (ref 3–16)
AST SERPL-CCNC: 17 U/L (ref 8–33)
BASOPHILS ABSOLUTE: 0 K/UL (ref 0–0.1)
BASOPHILS RELATIVE PERCENT: 0.4 %
BILIRUB SERPL-MCNC: 0.4 MG/DL (ref 0.3–1.2)
BUN BLDV-MCNC: 23 MG/DL (ref 6–20)
CALCIUM SERPL-MCNC: 10.2 MG/DL (ref 8.5–10.5)
CHLORIDE BLD-SCNC: 96 MMOL/L (ref 98–107)
CO2: 28 MMOL/L (ref 20–30)
CREAT SERPL-MCNC: 1.4 MG/DL (ref 0.4–1.2)
EOSINOPHILS ABSOLUTE: 0 K/UL (ref 0–0.4)
EOSINOPHILS RELATIVE PERCENT: 0.8 %
FERRITIN: 10.9 NG/ML (ref 22–322)
FOLATE: 12.79 NG/ML
GFR AFRICAN AMERICAN: 45
GFR NON-AFRICAN AMERICAN: 37
GLOBULIN: 1.9 G/DL
GLUCOSE BLD-MCNC: 112 MG/DL (ref 74–106)
HCT VFR BLD CALC: 28.8 % (ref 37–47)
HEMOGLOBIN: 9 G/DL (ref 11.5–16.5)
IMMATURE GRANULOCYTES #: 0 K/UL
IMMATURE GRANULOCYTES %: 0.4 % (ref 0–5)
IRON SATURATION: 17 % (ref 15–50)
IRON: 69 UG/DL (ref 37–145)
LYMPHOCYTES ABSOLUTE: 1.1 K/UL (ref 1.5–4)
LYMPHOCYTES RELATIVE PERCENT: 22.5 %
MCH RBC QN AUTO: 28.3 PG (ref 27–32)
MCHC RBC AUTO-ENTMCNC: 31.3 G/DL (ref 31–35)
MCV RBC AUTO: 90.6 FL (ref 80–100)
MONOCYTES ABSOLUTE: 0.5 K/UL (ref 0.2–0.8)
MONOCYTES RELATIVE PERCENT: 10 %
NEUTROPHILS ABSOLUTE: 3.2 K/UL (ref 2–7.5)
NEUTROPHILS RELATIVE PERCENT: 65.9 %
PDW BLD-RTO: 13.8 % (ref 11–16)
PLATELET # BLD: 162 K/UL (ref 150–400)
PMV BLD AUTO: 11.6 FL (ref 6–10)
POTASSIUM SERPL-SCNC: 3.5 MMOL/L (ref 3.4–5.1)
RBC # BLD: 3.18 M/UL (ref 3.8–5.8)
SODIUM BLD-SCNC: 141 MMOL/L (ref 136–145)
TOTAL IRON BINDING CAPACITY: 410 UG/DL (ref 250–450)
TOTAL PROTEIN: 6.4 G/DL (ref 6.4–8.3)
TSH SERPL DL<=0.05 MIU/L-ACNC: 3 UIU/ML (ref 0.27–4.2)
VITAMIN B-12: >2000 PG/ML (ref 211–911)
VITAMIN D 25-HYDROXY: 45.9 (ref 32–100)
WBC # BLD: 4.8 K/UL (ref 4–11)

## 2022-10-04 PROCEDURE — 3017F COLORECTAL CA SCREEN DOC REV: CPT | Performed by: INTERNAL MEDICINE

## 2022-10-04 PROCEDURE — 85025 COMPLETE CBC W/AUTO DIFF WBC: CPT

## 2022-10-04 PROCEDURE — 96372 THER/PROPH/DIAG INJ SC/IM: CPT | Performed by: INTERNAL MEDICINE

## 2022-10-04 PROCEDURE — G0438 PPPS, INITIAL VISIT: HCPCS | Performed by: INTERNAL MEDICINE

## 2022-10-04 PROCEDURE — G8484 FLU IMMUNIZE NO ADMIN: HCPCS | Performed by: INTERNAL MEDICINE

## 2022-10-04 PROCEDURE — 3044F HG A1C LEVEL LT 7.0%: CPT | Performed by: INTERNAL MEDICINE

## 2022-10-04 PROCEDURE — 80053 COMPREHEN METABOLIC PANEL: CPT

## 2022-10-04 PROCEDURE — 82728 ASSAY OF FERRITIN: CPT

## 2022-10-04 PROCEDURE — 1123F ACP DISCUSS/DSCN MKR DOCD: CPT | Performed by: INTERNAL MEDICINE

## 2022-10-04 PROCEDURE — 82306 VITAMIN D 25 HYDROXY: CPT

## 2022-10-04 PROCEDURE — 84443 ASSAY THYROID STIM HORMONE: CPT

## 2022-10-04 PROCEDURE — 82746 ASSAY OF FOLIC ACID SERUM: CPT

## 2022-10-04 PROCEDURE — 82607 VITAMIN B-12: CPT

## 2022-10-04 PROCEDURE — 83550 IRON BINDING TEST: CPT

## 2022-10-04 PROCEDURE — 83540 ASSAY OF IRON: CPT

## 2022-10-04 RX ORDER — CYANOCOBALAMIN 1000 UG/ML
1000 INJECTION INTRAMUSCULAR; SUBCUTANEOUS ONCE
Status: COMPLETED | OUTPATIENT
Start: 2022-10-04 | End: 2022-10-04

## 2022-10-04 RX ADMIN — CYANOCOBALAMIN 1000 MCG: 1000 INJECTION INTRAMUSCULAR; SUBCUTANEOUS at 11:05

## 2022-10-04 ASSESSMENT — PATIENT HEALTH QUESTIONNAIRE - PHQ9
1. LITTLE INTEREST OR PLEASURE IN DOING THINGS: 0
SUM OF ALL RESPONSES TO PHQ QUESTIONS 1-9: 0
SUM OF ALL RESPONSES TO PHQ9 QUESTIONS 1 & 2: 0
SUM OF ALL RESPONSES TO PHQ QUESTIONS 1-9: 0
2. FEELING DOWN, DEPRESSED OR HOPELESS: 0

## 2022-10-04 ASSESSMENT — LIFESTYLE VARIABLES: HOW OFTEN DO YOU HAVE A DRINK CONTAINING ALCOHOL: NEVER

## 2022-10-04 NOTE — PATIENT INSTRUCTIONS
Personalized Preventive Plan for Rush Maldonado - 10/4/2022  Medicare offers a range of preventive health benefits. Some of the tests and screenings are paid in full while other may be subject to a deductible, co-insurance, and/or copay. Some of these benefits include a comprehensive review of your medical history including lifestyle, illnesses that may run in your family, and various assessments and screenings as appropriate. After reviewing your medical record and screening and assessments performed today your provider may have ordered immunizations, labs, imaging, and/or referrals for you. A list of these orders (if applicable) as well as your Preventive Care list are included within your After Visit Summary for your review. Other Preventive Recommendations:    A preventive eye exam performed by an eye specialist is recommended every 1-2 years to screen for glaucoma; cataracts, macular degeneration, and other eye disorders. A preventive dental visit is recommended every 6 months. Try to get at least 150 minutes of exercise per week or 10,000 steps per day on a pedometer . Order or download the FREE \"Exercise & Physical Activity: Your Everyday Guide\" from [employer]. Call 7-506.816.9321 or search [employer] online. You need 5723-1365 mg of calcium and 0846-8115 IU of vitamin D per day. It is possible to meet your calcium requirement with diet alone, but a vitamin D supplement is usually necessary to meet this goal.  When exposed to the sun, use a sunscreen that protects against both UVA and UVB radiation with an SPF of 30 or greater. Reapply every 2 to 3 hours or after sweating, drying off with a towel, or swimming. Always wear a seat belt when traveling in a car. Always wear a helmet when riding a bicycle or motorcycle.

## 2022-10-04 NOTE — PROGRESS NOTES
Medicare Annual Wellness Visit  Name: Jarrett Meeks Date: 10/4/2022   MRN: [id number] Sex: Female   Age: 81 y.o. Ethnicity: Non- / Non    : 1950 Race: White (non-)      Chava Blair is here for Medicare AWV    Screenings for behavioral, psychosocial and functional/safety risks, and cognitive dysfunction are all negative except as indicated below. These results, as well as other patient data from the [address] form, are documented in Flowsheets linked to this Encounter. Allergies   Allergen Reactions    Codeine        Prior to Visit Medications    Medication Sig Taking? Authorizing Provider   metFORMIN (GLUCOPHAGE) 1000 MG tablet TAKE 1 TABLET BY MOUTH TWICE DAILY WITH FOOD Yes Nj Ramirez MD   linagliptin (TRADJENTA) 5 MG tablet TAKE 1 TABLET BY MOUTH EVERY DAY Yes Nj Ramirez MD   HYDROcodone-acetaminophen (NORCO) 5-325 MG per tablet Take 1 tablet by mouth 2 times daily as needed for Pain for up to 30 days.  Yes Nj Ramirez MD   rosuvastatin (CRESTOR) 20 MG tablet TAKE 1 TABLET BY MOUTH EVERY DAY Yes Nj Ramirez MD   LEVEMIR FLEXTOUCH 100 UNIT/ML injection pen INJECT 55 UNITS UNDER SKIN NIGHTLY AS DIRECTED Yes Nj Ramirez MD   metoprolol succinate (TOPROL XL) 25 MG extended release tablet take 1 tablet by mouth once daily Yes Nj Ramirez MD   JARDIANCE 10 MG tablet TAKE 1 TABLET BY MOUTH DAILY Yes Nj Ramirez MD   lactulose (CHRONULAC) 10 GM/15ML solution TAKE 15 ML BY MOUTH DAILY Yes Nj Ramirez MD   bisacodyl (BISACODYL) 5 MG EC tablet Take 1 tablet by mouth daily as needed for Constipation Yes VIGNESH Teresa   umeclidinium-vilanterol (ANORO ELLIPTA) 62.5-25 MCG/INH AEPB inhaler Inhale 1 puff into the lungs daily  Patient taking differently: Inhale 1 puff into the lungs as needed Yes Nj Ramirez MD   furosemide (LASIX) 20 MG tablet TAKE 1 TABLET BY MOUTH DAILY AS NEEDED FOR SWELLING Yes Nj Ramirez MD   linaclotide (LINZESS) 290 MCG CAPS capsule Take 1 capsule by mouth every morning (before breakfast) Yes Bere Eason MD   pantoprazole (PROTONIX) 40 MG tablet Take 40 mg by mouth 2 times daily (before meals)  Yes Historical Provider, MD   ferrous sulfate (IRON 325) 325 (65 Fe) MG tablet Take 325 mg by mouth daily (with breakfast) Yes Historical Provider, MD   Insulin Pen Needle 31G X 6 MM MISC 1 each by Does not apply route daily Yes Bere Eason MD   isosorbide mononitrate (IMDUR) 60 MG extended release tablet TAKE 1 TABLET BY MOUTH EVERY DAY Yes Bere Eason MD   blood glucose monitor strips QID Dx E11.9 Yes DAVIDA Ribeiro CNP   glucose monitoring kit (FREESTYLE) monitoring kit 1 kit by Does not apply route daily E11.40 Yes Bere Eason MD   aspirin 81 MG chewable tablet Take 1 tablet by mouth daily Yes Bere Eason MD   nitroGLYCERIN (NITROSTAT) 0.4 MG SL tablet Place 1 tablet under the tongue every 5 minutes as needed for Chest pain Yes Bere Esaon MD   Insulin Syringe-Needle U-100 (B-D INS SYR ULTRAFINE 1CC/31G) 31G X 5/16\" 1 ML MISC USE ONCE A DAY AS DIRECTED  DX E11.9 Yes Bere Eason MD       Past Medical History:   Diagnosis Date    CAD (coronary artery disease)     Cirrhosis ([facility] [address]     COPD (chronic obstructive pulmonary disease) ([facility] [address]     Cystic fibrosis ([facility] [address]     Diabetes mellitus ([facility] [address]     GERD (gastroesophageal reflux disease)     H/O: CVA (cardiovascular accident)     HTN (hypertension)     Mass     on chest     Tobacco abuse     Vitamin B12 deficiency      Past Surgical History:   Procedure Laterality Date    CARDIAC CATHETERIZATION      CARPAL TUNNEL RELEASE Bilateral     CHOLECYSTECTOMY      CORONARY ANGIOPLASTY WITH STENT PLACEMENT      HYSTERECTOMY (CERVIX STATUS UNKNOWN)      JOINT REPLACEMENT Bilateral 10/15/2016    knees    TOTAL KNEE ARTHROPLASTY Bilateral 10/18/2016    at [facility]       Family History   Problem Relation Age of Onset    Diabetes Mother     High Blood Pressure Mother     Cancer Mother pancreatic    Diabetes Father     Heart Disease Father     High Blood Pressure Father     Cancer Sister         lung, breast    Cancer Brother         throat       CareTeam (Including outside providers/suppliers regularly involved in providing care):   Patient Care Team:  Lacy Ruiz MD as PCP - General  Lacy Ruiz MD as PCP - [facility] Empaneled Provider    Wt Readings from Last 3 Encounters:   10/04/22 141 lb (64 kg)   07/12/22 135 lb 6.4 oz (61.4 kg)   05/10/22 137 lb (62.1 kg)     Vitals:    10/04/22 1015   BP: 130/74   Pulse: 72   Resp: 18   SpO2: 99%   Weight: 141 lb (64 kg)   Height: 5' 3\" (1.6 m)     Body mass index is 24.98 kg/m². Based upon direct observation of the patient, evaluation of cognition reveals recent and remote memory intact. Physical Exam  Vitals and nursing note reviewed. Constitutional:       Appearance: Normal appearance. She is well-developed. HENT:      Head: Normocephalic and atraumatic. Right Ear: External ear normal.      Left Ear: External ear normal.      Nose: Nose normal.      Mouth/Throat:      Mouth: Mucous membranes are moist.      Pharynx: Oropharynx is clear. Eyes:      Conjunctiva/sclera: Conjunctivae normal.      Pupils: Pupils are equal, round, and reactive to light. Neck:      Thyroid: No thyromegaly. Vascular: No JVD. Cardiovascular:      Rate and Rhythm: Normal rate and regular rhythm. Heart sounds: Normal heart sounds. Pulmonary:      Effort: Pulmonary effort is normal.      Breath sounds: Normal breath sounds. No wheezing or rales. Abdominal:      General: Bowel sounds are normal. There is no distension. Palpations: Abdomen is soft. Tenderness: There is no abdominal tenderness. Musculoskeletal:         General: No tenderness. Cervical back: Neck supple. No rigidity. No muscular tenderness. Right lower leg: No edema. Left lower leg: No edema. Skin:     Findings: No erythema or rash.    Neurological: General: No focal deficit present. Mental Status: She is alert and oriented to person, place, and time. Psychiatric:         Behavior: Behavior normal.         Judgment: Judgment normal.          Lab Results   Component Value Date/Time     05/10/2022 02:05 PM    K 3.3 05/10/2022 02:05 PM    K 3.4 03/18/2022 05:20 AM     05/10/2022 02:05 PM    CO2 23 05/10/2022 02:05 PM    GLUCOSE 132 05/10/2022 02:05 PM    BUN 11 05/10/2022 02:05 PM    CREATININE 1.0 05/10/2022 02:05 PM    CALCIUM 9.0 05/10/2022 02:05 PM    PROT 6.2 05/10/2022 02:05 PM    LABALBU 3.9 05/10/2022 02:05 PM    BILITOT 0.3 05/10/2022 02:05 PM    ALT 12 05/10/2022 02:05 PM    AST 20 05/10/2022 02:05 PM       Hemoglobin A1C (%)   Date Value   05/10/2022 5.5     Microscopic Examination (no units)   Date Value   03/17/2022 YES     LDL Calculated (mg/dL)   Date Value   05/10/2022 26         Lab Results   Component Value Date/Time    WBC 3.3 05/10/2022 02:05 PM    NEUTROABS 2.1 05/10/2022 02:05 PM    HGB 8.3 05/10/2022 02:05 PM    HCT 27.4 05/10/2022 02:05 PM    MCV 95.5 05/10/2022 02:05 PM     05/10/2022 02:05 PM       Lab Results   Component Value Date    TSH 2.25 05/10/2022       Patient's complete Health Risk Assessment and screening values have been reviewed and are found in Flowsheets. The following problems were reviewed today and where indicated follow up appointments were made and/or referrals ordered. Positive Risk Factor Screenings with Interventions:         Substance History:  Social History       Tobacco History       Smoking Status  Every Day Smoking Frequency  1 pack/day for 40.00 years (40.00 pk-yrs) Smoking Tobacco Type  Cigarettes      Smokeless Tobacco Use  Never      Tobacco Comments  states she is not willing to stop and this is a stress reliever for her.                Alcohol History       Alcohol Use Status  No              Drug Use       Drug Use Status  No              Sexual Activity       Sexually Active  Not Asked                   Alcohol Screening:       A score of 8 or more is associated with harmful or hazardous drinking. A score of 13 or more in women, and 15 or more in men, is likely to indicate alcohol dependence. Substance Abuse Interventions:  Tobacco abuse:  tobacco cessation tips and resources provided    General Health and ACP:  General  In general, how would you say your health is?: Fair  In the past 7 days, have you experienced any of the following: New or Increased Pain, New or Increased Fatigue, Loneliness, Social Isolation, Stress or Anger?: (!) Yes  Select all that apply: (!) New or Increased Fatigue  Do you get the social and emotional support that you need?: Yes  Do you have a Living Will?: Yes    Advance Directives       Power of  Living Will ACP-Advance Directive ACP-Power of     Not on File Not on File Not on File Not on File        General Health Risk Interventions:  Power of  and Living Will not on file      Safety:  Do you have working smoke detectors?: (!) No  Do you have any tripping hazards - loose or unsecured carpets or rugs?: No  Do you have any tripping hazards - clutter in doorways, halls, or stairs?: No  Do you have either shower bars, grab bars, non-slip mats or non-slip surfaces in your shower or bathtub?: Yes  Do all of your stairways have a railing or banister?: Not Applicable  Do you always fasten your seatbelt when you are in a car?: Yes  Safety Interventions:  Home safety tips provided     Personalized Preventive Plan   Current Health Maintenance Status  Immunization History   Administered Date(s) Administered    COVID-19, MODERNA BLUE border, Primary or Immunocompromised, (age 12y+), IM, 100 mcg/0.5mL 05/20/2021, 06/17/2021    Influenza Virus Vaccine 11/06/2014, 09/17/2015    Pneumococcal Conjugate 13-valent (Hynktrm23) 01/03/2018    Pneumococcal Polysaccharide ([id number]) 01/09/2019        Health Maintenance   Topic Date Due    Hepatitis A vaccine (1 of 2 - Risk 2-dose series) Never done    Shingles vaccine (1 of 2) Never done    DEXA (modify frequency per FRAX score)  Never done    Annual Wellness Visit (AWV)  Never done    Low dose CT lung screening  07/22/2020    Diabetic microalbuminuria test  03/10/2021    COVID-19 Vaccine (3 - Booster for Wadie Starks series) 11/17/2021    Breast cancer screen  06/15/2022    Diabetic retinal exam  07/30/2022    Flu vaccine (1) 08/01/2022    DTaP/Tdap/Td vaccine (2 - Td or Tdap) 08/02/2022    Diabetic foot exam  02/17/2023    Depression Screen  03/24/2023    A1C test (Diabetic or Prediabetic)  05/10/2023    Lipids  05/10/2023    Colorectal Cancer Screen  09/30/2031    Pneumococcal 65+ years Vaccine  Completed    Hepatitis C screen  Completed    Hib vaccine  Aged Out    Meningococcal (ACWY) vaccine  Aged Out     Recommendations for [employer] Due: see orders and patient instructions/AVS.    EKG reviewed 5/10/2022     I did discuss cardiovascular risk with patient. Patient with known CAD. Encouraged to continue following up with cardiology. she is on appropriate regimen. Make sure blood pressure, lipid profile and diabetes under good control. Discussed the importance of increased activity level/staying active. Discussed the importance of smoking cessation. Recommended screening schedule for the next 5-10 years is provided to the patient in written form: see Patient Instructions/AVS.    Recommended patient receive Shingles and TDaP vaccine. Prescriptions given today. She can complete at local pharmacy or health department. I had a long discussion with her regarding the risk of smoking especialy with her other medical problems. I have discussed with patient several treatment options (program, nicotine product and other meds like Chantix, Wellbutrin ect) to help her quit smoking. Patient is not interested at all at this time  Spent 3 minutes in counseling regarding smoking cessation.     Will proceed with CT scan for further evaluation of her thoracic aneurysm/part of CT lung screen with her ongoing smoking. Patient reported ongoing decreased energy. I am going to proceed with blood work specially with her history of anemia. Discussed increase activity level. Long conversation regarding smoking cessation. Reviewed medication list.    I, Singh Soto MA am scribing for and in the presence of Natalya Koch MD on this date of 10/04/22 at 10:45 AM    I, Dr. Natalya Koch, personally performed the services described in the documentation as scribed by Singh Soto MA, in my presence and it is both accurate and complete.

## 2022-10-07 DIAGNOSIS — M51.36 DEGENERATIVE DISC DISEASE, LUMBAR: ICD-10-CM

## 2022-10-07 RX ORDER — HYDROCODONE BITARTRATE AND ACETAMINOPHEN 5; 325 MG/1; MG/1
1 TABLET ORAL 2 TIMES DAILY PRN
Qty: 30 TABLET | Refills: 0 | Status: SHIPPED | OUTPATIENT
Start: 2022-10-07 | End: 2022-11-06

## 2022-10-24 RX ORDER — EMPAGLIFLOZIN 10 MG/1
TABLET, FILM COATED ORAL DAILY
Qty: 30 TABLET | Refills: 3 | Status: SHIPPED | OUTPATIENT
Start: 2022-10-24

## 2022-10-27 ENCOUNTER — HOSPITAL ENCOUNTER (OUTPATIENT)
Dept: CT IMAGING | Facility: HOSPITAL | Age: 72
Discharge: HOME | End: 2022-10-27
Payer: MEDICARE

## 2022-10-27 ENCOUNTER — HOSPITAL ENCOUNTER (OUTPATIENT)
Dept: MAMMOGRAPHY | Facility: HOSPITAL | Age: 72
Discharge: HOME | End: 2022-10-27
Payer: MEDICARE

## 2022-10-27 ENCOUNTER — HOSPITAL ENCOUNTER (OUTPATIENT)
Dept: GENERAL RADIOLOGY | Facility: HOSPITAL | Age: 72
Discharge: HOME | End: 2022-10-27
Payer: MEDICARE

## 2022-10-27 VITALS — WEIGHT: 143 LBS | BODY MASS INDEX: 25.34 KG/M2 | HEIGHT: 63 IN

## 2022-10-27 DIAGNOSIS — I71.21 ANEURYSM OF ASCENDING AORTA WITHOUT RUPTURE: ICD-10-CM

## 2022-10-27 DIAGNOSIS — Z78.0 ASYMPTOMATIC MENOPAUSAL STATE: ICD-10-CM

## 2022-10-27 DIAGNOSIS — Z12.31 ENCOUNTER FOR SCREENING MAMMOGRAM FOR BREAST CANCER: ICD-10-CM

## 2022-10-27 DIAGNOSIS — Z78.0 POSTMENOPAUSAL: ICD-10-CM

## 2022-10-27 PROCEDURE — 77080 DXA BONE DENSITY AXIAL: CPT

## 2022-10-27 PROCEDURE — 71250 CT THORAX DX C-: CPT

## 2022-10-27 PROCEDURE — 77067 SCR MAMMO BI INCL CAD: CPT

## 2022-11-07 DIAGNOSIS — M51.36 DEGENERATIVE DISC DISEASE, LUMBAR: ICD-10-CM

## 2022-11-07 RX ORDER — HYDROCODONE BITARTRATE AND ACETAMINOPHEN 5; 325 MG/1; MG/1
1 TABLET ORAL 2 TIMES DAILY PRN
Qty: 30 TABLET | Refills: 0 | Status: SHIPPED | OUTPATIENT
Start: 2022-11-07 | End: 2022-12-07

## 2022-11-17 DIAGNOSIS — E11.40 TYPE 2 DIABETES MELLITUS WITH DIABETIC NEUROPATHY, WITH LONG-TERM CURRENT USE OF INSULIN (HCC): ICD-10-CM

## 2022-11-17 DIAGNOSIS — Z79.4 TYPE 2 DIABETES MELLITUS WITH DIABETIC NEUROPATHY, WITH LONG-TERM CURRENT USE OF INSULIN (HCC): ICD-10-CM

## 2022-11-17 DIAGNOSIS — D64.9 ANEMIA, UNSPECIFIED TYPE: Primary | ICD-10-CM

## 2022-11-22 ENCOUNTER — HOSPITAL ENCOUNTER (INPATIENT)
Facility: HOSPITAL | Age: 72
LOS: 2 days | Discharge: HOME | DRG: 810 | End: 2022-11-24
Attending: EMERGENCY MEDICINE | Admitting: INTERNAL MEDICINE
Payer: MEDICARE

## 2022-11-22 DIAGNOSIS — D61.818 PANCYTOPENIA (HCC): ICD-10-CM

## 2022-11-22 DIAGNOSIS — N18.30 STAGE 3 CHRONIC KIDNEY DISEASE, UNSPECIFIED WHETHER STAGE 3A OR 3B CKD (HCC): ICD-10-CM

## 2022-11-22 DIAGNOSIS — D64.9 ANEMIA, UNSPECIFIED TYPE: ICD-10-CM

## 2022-11-22 DIAGNOSIS — R53.1 GENERAL WEAKNESS: Primary | ICD-10-CM

## 2022-11-22 LAB
A/G RATIO: 1.6 (ref 0.8–2)
ABO/RH: NORMAL
ALBUMIN SERPL-MCNC: 4.4 G/DL (ref 3.4–4.8)
ALP BLD-CCNC: 73 U/L (ref 25–100)
ALT SERPL-CCNC: 14 U/L (ref 4–36)
ANION GAP SERPL CALCULATED.3IONS-SCNC: 15 MMOL/L (ref 3–16)
ANTIBODY SCREEN: NORMAL
AST SERPL-CCNC: 11 U/L (ref 8–33)
BASOPHILS ABSOLUTE: 0 K/UL (ref 0–0.1)
BASOPHILS RELATIVE PERCENT: 0.3 %
BILIRUB SERPL-MCNC: 0.6 MG/DL (ref 0.3–1.2)
BLOOD BANK DISPENSE STATUS: NORMAL
BLOOD BANK DISPENSE STATUS: NORMAL
BLOOD BANK PRODUCT CODE: NORMAL
BLOOD BANK PRODUCT CODE: NORMAL
BPU ID: NORMAL
BPU ID: NORMAL
BUN BLDV-MCNC: 21 MG/DL (ref 6–20)
CALCIUM SERPL-MCNC: 9.9 MG/DL (ref 8.5–10.5)
CHLORIDE BLD-SCNC: 96 MMOL/L (ref 98–107)
CHP ED QC CHECK: NORMAL
CO2: 25 MMOL/L (ref 20–30)
COMPONENT: NORMAL
COMPONENT: NORMAL
CREAT SERPL-MCNC: 1.4 MG/DL (ref 0.4–1.2)
DONOR TYPE/RH: NORMAL
DONOR TYPE/RH: NORMAL
EOSINOPHILS ABSOLUTE: 0 K/UL (ref 0–0.4)
EOSINOPHILS RELATIVE PERCENT: 0.5 %
FERRITIN: 7.4 NG/ML (ref 22–322)
FOLATE: 12.39 NG/ML
GFR SERPL CREATININE-BSD FRML MDRD: 40 ML/MIN/{1.73_M2}
GLOBULIN: 2.8 G/DL
GLUCOSE BLD-MCNC: 184 MG/DL (ref 74–106)
GLUCOSE BLD-MCNC: 316 MG/DL (ref 74–106)
GLUCOSE BLD-MCNC: 333 MG/DL
GLUCOSE BLD-MCNC: 333 MG/DL (ref 74–106)
HBA1C MFR BLD: 7.3 %
HCT VFR BLD CALC: 22.8 % (ref 37–47)
HEMOGLOBIN: 6.6 G/DL (ref 11.5–16.5)
HOLLISTER NO: NORMAL
HOLLISTER NO: NORMAL
IMMATURE GRANULOCYTES #: 0 K/UL
IMMATURE GRANULOCYTES %: 0.3 % (ref 0–5)
INR BLD: 1.01 (ref 0.87–1.11)
IRON SATURATION: 7 % (ref 15–50)
IRON: 30 UG/DL (ref 37–145)
LACTIC ACID: 4.6 MMOL/L (ref 0.4–2)
LACTIC ACID: 4.8 MMOL/L (ref 0.4–2)
LYMPHOCYTES ABSOLUTE: 0.8 K/UL (ref 1.5–4)
LYMPHOCYTES RELATIVE PERCENT: 20.9 %
MCH RBC QN AUTO: 23.7 PG (ref 27–32)
MCHC RBC AUTO-ENTMCNC: 28.9 G/DL (ref 31–35)
MCV RBC AUTO: 82 FL (ref 80–100)
MONOCYTES ABSOLUTE: 0.3 K/UL (ref 0.2–0.8)
MONOCYTES RELATIVE PERCENT: 7.5 %
NEUTROPHILS ABSOLUTE: 2.6 K/UL (ref 2–7.5)
NEUTROPHILS RELATIVE PERCENT: 70.5 %
OCCULT BLOOD SCREENING: ABNORMAL
PDW BLD-RTO: 15.6 % (ref 11–16)
PERFORMED ON: ABNORMAL
PERFORMED ON: ABNORMAL
PLATELET # BLD: 141 K/UL (ref 150–400)
PMV BLD AUTO: 11.5 FL (ref 6–10)
POTASSIUM SERPL-SCNC: 3.2 MMOL/L (ref 3.4–5.1)
PROTHROMBIN TIME: 13.3 SEC (ref 12–14.4)
RBC # BLD: 2.78 M/UL (ref 3.8–5.8)
SODIUM BLD-SCNC: 136 MMOL/L (ref 136–145)
TOTAL IRON BINDING CAPACITY: 455 UG/DL (ref 250–450)
TOTAL PROTEIN: 7.2 G/DL (ref 6.4–8.3)
VITAMIN B-12: 811 PG/ML (ref 211–911)
WBC # BLD: 3.7 K/UL (ref 4–11)

## 2022-11-22 PROCEDURE — 83540 ASSAY OF IRON: CPT

## 2022-11-22 PROCEDURE — 83550 IRON BINDING TEST: CPT

## 2022-11-22 PROCEDURE — 85025 COMPLETE CBC W/AUTO DIFF WBC: CPT

## 2022-11-22 PROCEDURE — C9113 INJ PANTOPRAZOLE SODIUM, VIA: HCPCS | Performed by: PHYSICIAN ASSISTANT

## 2022-11-22 PROCEDURE — 86850 RBC ANTIBODY SCREEN: CPT

## 2022-11-22 PROCEDURE — 6370000000 HC RX 637 (ALT 250 FOR IP): Performed by: INTERNAL MEDICINE

## 2022-11-22 PROCEDURE — 85610 PROTHROMBIN TIME: CPT

## 2022-11-22 PROCEDURE — 6370000000 HC RX 637 (ALT 250 FOR IP): Performed by: EMERGENCY MEDICINE

## 2022-11-22 PROCEDURE — 82607 VITAMIN B-12: CPT

## 2022-11-22 PROCEDURE — 6370000000 HC RX 637 (ALT 250 FOR IP): Performed by: PHYSICIAN ASSISTANT

## 2022-11-22 PROCEDURE — 86901 BLOOD TYPING SEROLOGIC RH(D): CPT

## 2022-11-22 PROCEDURE — P9016 RBC LEUKOCYTES REDUCED: HCPCS

## 2022-11-22 PROCEDURE — 2580000003 HC RX 258: Performed by: EMERGENCY MEDICINE

## 2022-11-22 PROCEDURE — 86920 COMPATIBILITY TEST SPIN: CPT

## 2022-11-22 PROCEDURE — 36430 TRANSFUSION BLD/BLD COMPNT: CPT

## 2022-11-22 PROCEDURE — 93005 ELECTROCARDIOGRAM TRACING: CPT

## 2022-11-22 PROCEDURE — 6360000002 HC RX W HCPCS: Performed by: PHYSICIAN ASSISTANT

## 2022-11-22 PROCEDURE — 1200000000 HC SEMI PRIVATE

## 2022-11-22 PROCEDURE — 36415 COLL VENOUS BLD VENIPUNCTURE: CPT

## 2022-11-22 PROCEDURE — 99285 EMERGENCY DEPT VISIT HI MDM: CPT

## 2022-11-22 PROCEDURE — 2580000003 HC RX 258: Performed by: PHYSICIAN ASSISTANT

## 2022-11-22 PROCEDURE — 83605 ASSAY OF LACTIC ACID: CPT

## 2022-11-22 PROCEDURE — 82270 OCCULT BLOOD FECES: CPT

## 2022-11-22 PROCEDURE — A4216 STERILE WATER/SALINE, 10 ML: HCPCS | Performed by: PHYSICIAN ASSISTANT

## 2022-11-22 PROCEDURE — 86900 BLOOD TYPING SEROLOGIC ABO: CPT

## 2022-11-22 PROCEDURE — 82746 ASSAY OF FOLIC ACID SERUM: CPT

## 2022-11-22 PROCEDURE — 80053 COMPREHEN METABOLIC PANEL: CPT

## 2022-11-22 PROCEDURE — 83036 HEMOGLOBIN GLYCOSYLATED A1C: CPT

## 2022-11-22 PROCEDURE — 82728 ASSAY OF FERRITIN: CPT

## 2022-11-22 RX ORDER — POTASSIUM CHLORIDE 20 MEQ/1
40 TABLET, EXTENDED RELEASE ORAL ONCE
Status: COMPLETED | OUTPATIENT
Start: 2022-11-22 | End: 2022-11-22

## 2022-11-22 RX ORDER — ROPINIROLE 0.25 MG/1
TABLET, FILM COATED ORAL
Status: DISPENSED
Start: 2022-11-22 | End: 2022-11-23

## 2022-11-22 RX ORDER — ACETAMINOPHEN 325 MG/1
650 TABLET ORAL SEE ADMIN INSTRUCTIONS
Status: COMPLETED | OUTPATIENT
Start: 2022-11-22 | End: 2022-11-22

## 2022-11-22 RX ORDER — NICOTINE 21 MG/24HR
1 PATCH, TRANSDERMAL 24 HOURS TRANSDERMAL DAILY
Status: DISCONTINUED | OUTPATIENT
Start: 2022-11-22 | End: 2022-11-24 | Stop reason: HOSPADM

## 2022-11-22 RX ORDER — INSULIN LISPRO 100 [IU]/ML
0-4 INJECTION, SOLUTION INTRAVENOUS; SUBCUTANEOUS
Status: DISCONTINUED | OUTPATIENT
Start: 2022-11-22 | End: 2022-11-24 | Stop reason: HOSPADM

## 2022-11-22 RX ORDER — POLYETHYLENE GLYCOL 3350 17 G/17G
17 POWDER, FOR SOLUTION ORAL DAILY PRN
Status: DISCONTINUED | OUTPATIENT
Start: 2022-11-22 | End: 2022-11-24 | Stop reason: HOSPADM

## 2022-11-22 RX ORDER — ROSUVASTATIN CALCIUM 20 MG/1
20 TABLET, COATED ORAL DAILY
Status: DISCONTINUED | OUTPATIENT
Start: 2022-11-22 | End: 2022-11-24 | Stop reason: HOSPADM

## 2022-11-22 RX ORDER — ACETAMINOPHEN 650 MG/1
650 SUPPOSITORY RECTAL EVERY 6 HOURS PRN
Status: DISCONTINUED | OUTPATIENT
Start: 2022-11-22 | End: 2022-11-24 | Stop reason: HOSPADM

## 2022-11-22 RX ORDER — ONDANSETRON 2 MG/ML
4 INJECTION INTRAMUSCULAR; INTRAVENOUS EVERY 6 HOURS PRN
Status: DISCONTINUED | OUTPATIENT
Start: 2022-11-22 | End: 2022-11-24 | Stop reason: HOSPADM

## 2022-11-22 RX ORDER — INSULIN GLARGINE 100 [IU]/ML
45 INJECTION, SOLUTION SUBCUTANEOUS NIGHTLY
Status: DISCONTINUED | OUTPATIENT
Start: 2022-11-22 | End: 2022-11-24 | Stop reason: HOSPADM

## 2022-11-22 RX ORDER — POTASSIUM CHLORIDE 750 MG/1
20 CAPSULE, EXTENDED RELEASE ORAL ONCE
Status: COMPLETED | OUTPATIENT
Start: 2022-11-23 | End: 2022-11-22

## 2022-11-22 RX ORDER — FERROUS SULFATE TAB EC 324 MG (65 MG FE EQUIVALENT) 324 (65 FE) MG
324 TABLET DELAYED RESPONSE ORAL 2 TIMES DAILY WITH MEALS
Status: DISCONTINUED | OUTPATIENT
Start: 2022-11-22 | End: 2022-11-24 | Stop reason: HOSPADM

## 2022-11-22 RX ORDER — ONDANSETRON 4 MG/1
4 TABLET, ORALLY DISINTEGRATING ORAL EVERY 8 HOURS PRN
Status: DISCONTINUED | OUTPATIENT
Start: 2022-11-22 | End: 2022-11-24 | Stop reason: HOSPADM

## 2022-11-22 RX ORDER — ALOGLIPTIN 12.5 MG/1
12.5 TABLET, FILM COATED ORAL DAILY
Status: DISCONTINUED | OUTPATIENT
Start: 2022-11-22 | End: 2022-11-24 | Stop reason: HOSPADM

## 2022-11-22 RX ORDER — SODIUM CHLORIDE 9 MG/ML
INJECTION, SOLUTION INTRAVENOUS PRN
Status: DISCONTINUED | OUTPATIENT
Start: 2022-11-22 | End: 2022-11-24 | Stop reason: HOSPADM

## 2022-11-22 RX ORDER — FERROUS SULFATE TAB EC 324 MG (65 MG FE EQUIVALENT) 324 (65 FE) MG
324 TABLET DELAYED RESPONSE ORAL
Status: DISCONTINUED | OUTPATIENT
Start: 2022-11-23 | End: 2022-11-22

## 2022-11-22 RX ORDER — DEXTROSE MONOHYDRATE 100 MG/ML
INJECTION, SOLUTION INTRAVENOUS CONTINUOUS PRN
Status: DISCONTINUED | OUTPATIENT
Start: 2022-11-22 | End: 2022-11-24 | Stop reason: HOSPADM

## 2022-11-22 RX ORDER — INSULIN LISPRO 100 [IU]/ML
0-4 INJECTION, SOLUTION INTRAVENOUS; SUBCUTANEOUS NIGHTLY
Status: DISCONTINUED | OUTPATIENT
Start: 2022-11-22 | End: 2022-11-24 | Stop reason: HOSPADM

## 2022-11-22 RX ORDER — POTASSIUM CHLORIDE 750 MG/1
CAPSULE, EXTENDED RELEASE ORAL
Status: DISPENSED
Start: 2022-11-22 | End: 2022-11-23

## 2022-11-22 RX ORDER — FERROUS SULFATE 325(65) MG
325 TABLET ORAL
Status: DISCONTINUED | OUTPATIENT
Start: 2022-11-23 | End: 2022-11-22 | Stop reason: CLARIF

## 2022-11-22 RX ORDER — METOPROLOL SUCCINATE 25 MG/1
25 TABLET, EXTENDED RELEASE ORAL DAILY
Status: DISCONTINUED | OUTPATIENT
Start: 2022-11-22 | End: 2022-11-24 | Stop reason: HOSPADM

## 2022-11-22 RX ORDER — ROPINIROLE 0.25 MG/1
0.5 TABLET, FILM COATED ORAL NIGHTLY PRN
Status: DISCONTINUED | OUTPATIENT
Start: 2022-11-22 | End: 2022-11-24 | Stop reason: HOSPADM

## 2022-11-22 RX ORDER — 0.9 % SODIUM CHLORIDE 0.9 %
1000 INTRAVENOUS SOLUTION INTRAVENOUS ONCE
Status: COMPLETED | OUTPATIENT
Start: 2022-11-22 | End: 2022-11-22

## 2022-11-22 RX ORDER — ACETAMINOPHEN 325 MG/1
650 TABLET ORAL EVERY 6 HOURS PRN
Status: DISCONTINUED | OUTPATIENT
Start: 2022-11-22 | End: 2022-11-24 | Stop reason: HOSPADM

## 2022-11-22 RX ORDER — HYDROCODONE BITARTRATE AND ACETAMINOPHEN 5; 325 MG/1; MG/1
1 TABLET ORAL 2 TIMES DAILY PRN
Status: DISCONTINUED | OUTPATIENT
Start: 2022-11-22 | End: 2022-11-24 | Stop reason: HOSPADM

## 2022-11-22 RX ORDER — LACTULOSE 10 G/15ML
10 SOLUTION ORAL DAILY
Status: DISCONTINUED | OUTPATIENT
Start: 2022-11-22 | End: 2022-11-23

## 2022-11-22 RX ADMIN — METFORMIN HYDROCHLORIDE 1000 MG: 500 TABLET ORAL at 17:12

## 2022-11-22 RX ADMIN — POTASSIUM CHLORIDE 20 MEQ: 10 CAPSULE, COATED, EXTENDED RELEASE ORAL at 23:58

## 2022-11-22 RX ADMIN — FERROUS SULFATE TAB EC 324 MG (65 MG FE EQUIVALENT) 324 MG: 324 (65 FE) TABLET DELAYED RESPONSE at 17:12

## 2022-11-22 RX ADMIN — ALOGLIPTIN 12.5 MG: 12.5 TABLET, FILM COATED ORAL at 15:58

## 2022-11-22 RX ADMIN — POTASSIUM CHLORIDE 40 MEQ: 1500 TABLET, EXTENDED RELEASE ORAL at 13:36

## 2022-11-22 RX ADMIN — EMPAGLIFLOZIN 10 MG: 10 TABLET, FILM COATED ORAL at 15:58

## 2022-11-22 RX ADMIN — INSULIN LISPRO 1 UNITS: 100 INJECTION, SOLUTION INTRAVENOUS; SUBCUTANEOUS at 17:06

## 2022-11-22 RX ADMIN — SODIUM CHLORIDE 1000 ML: 9 INJECTION, SOLUTION INTRAVENOUS at 09:42

## 2022-11-22 RX ADMIN — METOPROLOL SUCCINATE 25 MG: 25 TABLET, EXTENDED RELEASE ORAL at 15:58

## 2022-11-22 RX ADMIN — ROSUVASTATIN CALCIUM 20 MG: 20 TABLET, COATED ORAL at 15:58

## 2022-11-22 RX ADMIN — SODIUM CHLORIDE 40 MG: 9 INJECTION, SOLUTION INTRAMUSCULAR; INTRAVENOUS; SUBCUTANEOUS at 15:58

## 2022-11-22 RX ADMIN — IRON SUCROSE 200 MG: 20 INJECTION, SOLUTION INTRAVENOUS at 21:55

## 2022-11-22 RX ADMIN — INSULIN GLARGINE 45 UNITS: 100 INJECTION, SOLUTION SUBCUTANEOUS at 22:03

## 2022-11-22 RX ADMIN — LACTULOSE 10 G: 20 SOLUTION ORAL at 15:59

## 2022-11-22 RX ADMIN — ACETAMINOPHEN 650 MG: 325 TABLET, FILM COATED ORAL at 11:07

## 2022-11-22 NOTE — PROGRESS NOTES
Medication Reconciliation completed with fill history from [facility] in [place]. Patient was unable to help with home med list. I marked Isosorbide Mononitrate 60 mg \"not taking\". This medication was not on her fill history and I called the pharmacy to double check and was told it had not been sent to them and they had not filled it for this patient nor was it on hold. There is an office visit cardiologist note from 7/1/2022 that does not include isosorbide but does not address it. An office note from Zahraa Bajwa on 10/4/2022 does include isosorbide but was not sent to the pharmacy. The patient cannot confirm if she takes it or not. I can't confirm that she's ever filled it.

## 2022-11-22 NOTE — FLOWSHEET NOTE
11/22/22 1400   Assessment   Charting Type Admission   Psychosocial   Psychosocial (WDL) WDL   Neurological   Neuro (WDL) WDL   Level of Consciousness 0   Des Coma Scale   Eye Opening 4   Best Verbal Response 5   Best Motor Response 6   Des Coma Scale Score 15   Cranial Nerves   Facial Symmetry Within functional limits   HEENT (Head, Ears, Eyes, Nose, & Throat)   HEENT (WDL) X   Right Eye Glasses   Left Eye Glasses   Teeth Dentures upper   Respiratory   Respiratory (WDL) X   Respiratory Interventions H.O.B. elevated;Cough & deep breathe   Respiratory Pattern Regular   Respiratory Depth Normal   Respiratory Quality/Effort Unlabored   Chest Assessment Chest expansion symmetrical   L Breath Sounds Clear;Diminished   R Breath Sounds Diminished;Clear   Level of Activity/Mobility 1   Breath Sounds   Right Upper Lobe Clear   Right Middle Lobe Clear   Right Lower Lobe Clear;Diminished   Left Upper Lobe Clear   Left Lower Lobe Clear;Diminished   Cough/Sputum   Cough Strong;Non-productive   Frequency Occasional   Cardiac   Cardiac (WDL) WDL   Cardiac Rhythm Sinus rhythm   Rhythm Interpretation   Heart Rate 60   Cardiac Monitor   Cardiac/Telemetry Monitor On Portable telemetry pack applied   Telemetry Box Number [id number]   Alarms Set Yes   Electrodes Replaced Yes   Telemetry Monitor Alarm Parameters    Gastrointestinal   Abdominal (WDL) WDL   RUQ Bowel Sounds Active   LUQ Bowel Sounds Active   RLQ Bowel Sounds Active   LLQ Bowel Sounds Active   Genitourinary   Genitourinary (WDL) WDL   Dysuria (Pain/Burning w/Urination) No   Difficulty Urinating/Starting Stream No   Peripheral Vascular   Peripheral Vascular (WDL) WDL   Edema None   Skin Integumentary    Skin Integumentary (WDL) X   Skin Color Pale   Skin Condition/Temp Warm;Dry   Skin Integrity Ecchymosis   Location right arm   Wound Prevention/Protection Method No   Multiple Skin Integrity Sites No   Care Plan - Skin/Tissue Integrity Goals   Skin Integrity Remains Intact Monitor for areas of redness and/or skin breakdown   Musculoskeletal   Musculoskeletal (WDL) WDL   Care Plan - Discharge Planning Goals   Discharge to home or other facility with appropriate resources Identify barriers to discharge with patient and caregiver; Identify discharge learning needs (meds, wound care, etc)   Pt is alert and oriented. Pt is resting in bed and appears to have no acute distress. Pt currently on room air. Pt currently on telemetry monitoring showing NSR. Pt's lung sounds are clear and diminished in lower lobes. Pt encouraged to cough and deep breathe. Pt call bell and bedside table within reach.

## 2022-11-22 NOTE — ED NOTES
Report given to [facility] RN on MU.      Gisela Yap, RN  11/22/22 0387

## 2022-11-22 NOTE — PLAN OF CARE
Problem: Discharge Planning  Goal: Discharge to home or other facility with appropriate resources  Outcome: Progressing  Flowsheets  Taken 11/22/2022 1412  Discharge to home or other facility with appropriate resources:   Identify barriers to discharge with patient and caregiver   Identify discharge learning needs (meds, wound care, etc)  Taken 11/22/2022 1400  Discharge to home or other facility with appropriate resources:   Identify barriers to discharge with patient and caregiver   Identify discharge learning needs (meds, wound care, etc)     Problem: Safety - Adult  Goal: Free from fall injury  Outcome: Progressing     Problem: ABCDS Injury Assessment  Goal: Absence of physical injury  Outcome: Progressing     Problem: Skin/Tissue Integrity  Goal: Absence of new skin breakdown  Description: 1. Monitor for areas of redness and/or skin breakdown  2. Assess vascular access sites hourly  3. Every 4-6 hours minimum:  Change oxygen saturation probe site  4. Every 4-6 hours:  If on nasal continuous positive airway pressure, respiratory therapy assess nares and determine need for appliance change or resting period.   Outcome: Progressing

## 2022-11-22 NOTE — ED NOTES
PRBC unit #1 of 2 initiated at this time. Verbal and written consent form signed prior to receiving blood product. Pt verbalizes understanding of side effects. Pt is able to communicate any discomfort and verbalizes understanding to do so. Will remain at bedside with pt for 15-20 minutes to monitor for reaction. Pt in no acute distress Prior to initiation of blood product.   Unit:   84  [id number]  2   11/22/22 1103   Document PRIOR to Transfusion   Blood Bank Wristband ID #    Previous Transfusion Reaction No   Pre-Meds Given Yes   Hospital Blood Product Consent Form Signed Yes   Vitals   BP (!) 149/70   Temp 97.7 °F (36.5 °C)   Temp Source Oral   Heart Rate 65   Resp 20   SpO2 100 %   Level of Consciousness 0   MEWS Score 1      11/22/22 1109   Transfuse RBC   Time Blood Reaches Vein 1111   Patient Observed Initial 15 Min  Yes   Charges - Once Per Day (Any Number of Units)   $Blood Administration Yes        Gisela Yap RN  11/22/22 8208

## 2022-11-22 NOTE — ED TRIAGE NOTES
Pt arrived to ER, weak and unsteady on ambulation into ER. Pt declined WC. Pt reports that she has had black stool x 1 weeks. Pt starts that she has been too weak to do anything. Pt has noted dysarthria, but reports it is her baseline from prior CVAs. Pt reports SOA. Denies any abdominal or chest pain.

## 2022-11-22 NOTE — ED PROVIDER NOTES
64  ENCOUNTER      Pt Name: Quiana Ashraf  MRN: [id number]  [place]: 1950  Date of evaluation: 11/22/2022  Provider: Aster Gonzalez MD    [address] [place] [address]       Chief Complaint   Patient presents with    Fatigue    Rectal Bleeding    Shortness of Breath         HISTORY OF PRESENT ILLNESS  (Location/Symptom, Timing/Onset, Context/Setting, Quality, Duration, Modifying Factors, Severity.)   Quiana Ashraf is a 81 y.o. female who presents to the emergency department planing of generalized weakness for the last 3 days she denies any type she has a history of anemia from GI bleeding no the underlying cause but she has been worked up in the past she is diabetic. Nursing notes were reviewed. REVIEW OFSYSTEMS    (2-9 systems for level 4, 10 or more for level 5)   ROS:  General:  No fevers, no chills, + weakness  Cardiovascular:  No chest pain, no palpitations  Respiratory:  No shortness of breath, no cough, no wheezing  Gastrointestinal:  No pain, no nausea, no vomiting, no diarrhea  Musculoskeletal:  No muscle pain, no joint pain  Skin:  No rash, no easy bruising  Neurologic:  No speech problems, no headache, no extremity weakness  Psychiatric:  No anxiety  Genitourinary:  No dysuria, no hematuria    Except as noted above the remainder of the review of systems was reviewed and negative.        PAST MEDICAL HISTORY     Past Medical History:   Diagnosis Date    CAD (coronary artery disease)     Cirrhosis (HCC)     COPD (chronic obstructive pulmonary disease) (HCC)     Cystic fibrosis (HCC)     Diabetes mellitus (HCC)     GERD (gastroesophageal reflux disease)     H/O: CVA (cardiovascular accident)     HTN (hypertension)     Mass     on chest     Tobacco abuse     Vitamin B12 deficiency          SURGICAL HISTORY       Past Surgical History:   Procedure Laterality Date    CARDIAC CATHETERIZATION      CARPAL TUNNEL RELEASE Bilateral CHOLECYSTECTOMY      CORONARY ANGIOPLASTY WITH STENT PLACEMENT      HYSTERECTOMY (CERVIX STATUS UNKNOWN)      JOINT REPLACEMENT Bilateral 10/15/2016    knees    TOTAL KNEE ARTHROPLASTY Bilateral 10/18/2016    at [facility]         CURRENT MEDICATIONS       Previous Medications    ASPIRIN 81 MG CHEWABLE TABLET    Take 1 tablet by mouth daily    BISACODYL (BISACODYL) 5 MG EC TABLET    Take 1 tablet by mouth daily as needed for Constipation    BLOOD GLUCOSE MONITOR STRIPS    QID Dx E11.9    FERROUS SULFATE (IRON 325) 325 (65 FE) MG TABLET    Take 325 mg by mouth daily (with breakfast)    FUROSEMIDE (LASIX) 20 MG TABLET    TAKE 1 TABLET BY MOUTH DAILY AS NEEDED FOR SWELLING    GLUCOSE MONITORING KIT (FREESTYLE) MONITORING KIT    1 kit by Does not apply route daily E11.40    HYDROCODONE-ACETAMINOPHEN (NORCO) 5-325 MG PER TABLET    Take 1 tablet by mouth 2 times daily as needed for Pain for up to 30 days.     INSULIN PEN NEEDLE 31G X 6 MM MISC    1 each by Does not apply route daily    INSULIN SYRINGE-NEEDLE U-100 (B-D INS SYR ULTRAFINE 1CC/31G) 31G X 5/16\" 1 ML MISC    USE ONCE A DAY AS DIRECTED  DX E11.9    ISOSORBIDE MONONITRATE (IMDUR) 60 MG EXTENDED RELEASE TABLET    TAKE 1 TABLET BY MOUTH EVERY DAY    JARDIANCE 10 MG TABLET    TAKE 1 TABLET BY MOUTH DAILY    LACTULOSE (CHRONULAC) 10 GM/15ML SOLUTION    TAKE 15 ML BY MOUTH DAILY    LEVEMIR FLEXTOUCH 100 UNIT/ML INJECTION PEN    INJECT 55 UNITS UNDER SKIN NIGHTLY AS DIRECTED    LINACLOTIDE (LINZESS) 290 MCG CAPS CAPSULE    Take 1 capsule by mouth every morning (before breakfast)    LINAGLIPTIN (TRADJENTA) 5 MG TABLET    TAKE 1 TABLET BY MOUTH EVERY DAY    METFORMIN (GLUCOPHAGE) 1000 MG TABLET    TAKE 1 TABLET BY MOUTH TWICE DAILY WITH FOOD    METOPROLOL SUCCINATE (TOPROL XL) 25 MG EXTENDED RELEASE TABLET    take 1 tablet by mouth once daily    NITROGLYCERIN (NITROSTAT) 0.4 MG SL TABLET    Place 1 tablet under the tongue every 5 minutes as needed for Chest pain    PANTOPRAZOLE (PROTONIX) 40 MG TABLET    Take 40 mg by mouth 2 times daily (before meals)     ROSUVASTATIN (CRESTOR) 20 MG TABLET    TAKE 1 TABLET BY MOUTH EVERY DAY    UMECLIDINIUM-VILANTEROL (ANORO ELLIPTA) 62.5-25 MCG/INH AEPB INHALER    Inhale 1 puff into the lungs daily       ALLERGIES     Codeine    FAMILY HISTORY       Family History   Problem Relation Age of Onset    Diabetes Mother     High Blood Pressure Mother     Cancer Mother         pancreatic    Diabetes Father     Heart Disease Father     High Blood Pressure Father     Breast Cancer Sister     Cancer Sister         lung, breast    Cancer Brother         throat          SOCIAL HISTORY       Social History     Socioeconomic History    Marital status:      Spouse name: None    Number of children: None    Years of education: None    Highest education level: None   Tobacco Use    Smoking status: Every Day     Packs/day: 1.00     Years: 40.00     Pack years: 40.00     Types: Cigarettes    Smokeless tobacco: Never    Tobacco comments:     states she is not willing to stop and this is a stress reliever for her. Substance and Sexual Activity    Alcohol use: No    Drug use: No     Social Determinants of Health     Financial Resource Strain: Medium Risk    Difficulty of Paying Living Expenses: Somewhat hard   Food Insecurity: No Food Insecurity    Worried About Running Out of Food in the Last Year: Never true    Ran Out of Food in the Last Year: Never true   Physical Activity: Insufficiently Active    Days of Exercise per Week: 7 days    Minutes of Exercise per Session: 10 min         PHYSICAL EXAM    (up to 7 for level 4, 8 or more for level 5)     ED Triage Vitals   BP Temp Temp src Pulse Resp SpO2 Height Weight   -- -- -- -- -- -- -- --       Physical Exam  General :Patient is awake, alert, oriented, in no acute distress, nontoxic appearing  HEENT: Pupils are equally round and reactive to light, EOMI, conjunctivae clear.     Neck: Neck is supple, full range of motion, trachea midline  Cardiac: Heart irregular rate, rhythm, no murmurs, rubs, or gallops  Lungs: Lungs are clear to auscultation, there is no wheezing, rhonchi, or rales. There is no use of accessory muscles. Chest wall: There is no tenderness to palpation over the chest wall or over ribs  Abdomen: Abdomen is soft, nontender, nondistended. There is no firm or pulsatile masses, no rebound rigidity or guarding. Musculoskeletal: 5 out of 5 strength in all 4 extremities. No focal muscle deficits are appreciated  Neuro: Motor intact, sensory intact, level of consciousness is normal, Dermatology: Skin is warm and dry  Psych: Mentation is grossly normal, cognition is grossly normal. Affect is appropriate.       DIAGNOSTIC RESULTS     EKG: All EKG's are interpreted by the Emergency Department Physician who either signs or Co-signs this chart in the absenceof a cardiologist.    The EKG interpreted by me shows sinus rhythm rate of 61 no acute ST changes    RADIOLOGY:   Non-plain film images such as CT, Ultrasound and MRI are read by the radiologist. Plain radiographic images are visualized and preliminarily interpreted by the emergency physician with the below findings:      [] Radiologist's Report Reviewed:  No orders to display         ED BEDSIDE ULTRASOUND:   Performed by ED Physician - none    LABS:    I have reviewed and interpreted all of the currently available lab results from this visit (ifapplicable):  Results for orders placed or performed during the hospital encounter of 11/22/22   CBC with Auto Differential   Result Value Ref Range    WBC 3.7 (L) 4.0 - 11.0 K/uL    RBC 2.78 (L) 3.80 - 5.80 M/uL    Hemoglobin 6.6 (L) 11.5 - 16.5 g/dL    Hematocrit 22.8 (L) 37.0 - 47.0 %    MCV 82.0 80.0 - 100.0 fL    MCH 23.7 (L) 27.0 - 32.0 pg    MCHC 28.9 (L) 31.0 - 35.0 g/dL    RDW 15.6 11.0 - 16.0 %    Platelets 617 (L) 391 - 400 K/uL    MPV 11.5 (H) 6.0 - 10.0 fL    Neutrophils % 70.5 %    Immature Granulocytes % 0.3 0.0 - 5.0 %    Lymphocytes % 20.9 %    Monocytes % 7.5 %    Eosinophils % 0.5 %    Basophils % 0.3 %    Neutrophils Absolute 2.6 2.0 - 7.5 K/uL    Immature Granulocytes # 0.0 K/uL    Lymphocytes Absolute 0.8 (L) 1.5 - 4.0 K/uL    Monocytes Absolute 0.3 0.2 - 0.8 K/uL    Eosinophils Absolute 0.0 0.0 - 0.4 K/uL    Basophils Absolute 0.0 0.0 - 0.1 K/uL   CMP   Result Value Ref Range    Sodium 136 136 - 145 mmol/L    Potassium 3.2 (L) 3.4 - 5.1 mmol/L    Chloride 96 (L) 98 - 107 mmol/L    CO2 25 20 - 30 mmol/L    Anion Gap 15 3 - 16    Glucose 316 (H) 74 - 106 mg/dL    BUN 21 (H) 6 - 20 mg/dL    Creatinine 1.4 (H) 0.4 - 1.2 mg/dL    Est, Glom Filt Rate 40 (L) >59    Calcium 9.9 8.5 - 10.5 mg/dL    Total Protein 7.2 6.4 - 8.3 g/dL    Albumin 4.4 3.4 - 4.8 g/dL    Albumin/Globulin Ratio 1.6 0.8 - 2.0    Total Bilirubin 0.6 0.3 - 1.2 mg/dL    Alkaline Phosphatase 73 25 - 100 U/L    ALT 14 4 - 36 U/L    AST 11 8 - 33 U/L    Globulin 2.8 Not Established g/dL   Lactic Acid   Result Value Ref Range    Lactic Acid 4.6 (HH) 0.4 - 2.0 mmol/L   Protime-INR   Result Value Ref Range    Protime 13.3 12.0 - 14.4 sec    INR 1.01 0.87 - 1.11   POCT Glucose   Result Value Ref Range    Glucose 333 mg/dL    QC OK? pass    POCT Glucose   Result Value Ref Range    POC Glucose 333 (H) 74 - 106 mg/dl    Performed on ACCU-CHEK    TYPE AND SCREEN   Result Value Ref Range    ABO/Rh A NEG     Antibody Screen NEG    PREPARE RBC (CROSSMATCH), 2 Units   Result Value Ref Range    Unit Number [id number]     Product Code Blood Bank <[id number]     Component RBC LR     DONOR TYPE/RH A NEG     Dispense Status Blood Bank Compatible     [place] No     PREPARE RBC (CROSSMATCH)   Result Value Ref Range    Unit Number [id number]     Product Code Blood Bank <[id number]     Component RBC LR     DONOR TYPE/RH A NEG     Dispense Status Blood Bank Compatible     [place] No          All other labs were within normal range or not returned as of this dictation. EMERGENCY DEPARTMENT COURSE and DIFFERENTIAL DIAGNOSIS/MDM:   Vitals:    Vitals:    11/22/22 1045 11/22/22 1100 11/22/22 1103 11/22/22 1127   BP: (!) 145/59 (!) 140/54 (!) 149/70 115/81   Pulse: 64 64 65 63   Resp: 19 17 20 19   Temp:   97.7 °F (36.5 °C) 97.9 °F (36.6 °C)   TempSrc:   Oral Oral   SpO2: 100% 100% 100% 100%   Weight:       Height:           MEDICATIONS ADMINISTERED IN ED:  Medications   0.9 % sodium chloride infusion (has no administration in time range)   potassium chloride (KLOR-CON M) extended release tablet 40 mEq (has no administration in time range)   0.9 % sodium chloride bolus (0 mLs IntraVENous Stopped 11/22/22 1155)   acetaminophen (TYLENOL) tablet 650 mg (650 mg Oral Given 11/22/22 1107)       Patient's hemoglobin has dropped from 9-6.6 in 6 weeks. This would explain the patient's symptoms. 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 She has been typed and crossmatched for 2 units of packed red cells and will be transfused as soon as available. I have contacted the hospitalist for possible admission. 11:44 have just started patient's transfusion and still awaiting a callback from the hospitalist.  12:00 patient has been stable is starting her first transfusion will admit to the hospital.  The patient will follow-up with their PCP in 1-2 days for reevaluation. If the patient or family members have anyfurther concerns or any worsening symptoms they will return to the ED for reevaluation. Is this patient to be included in the SEP-1 Core Measure due to severe sepsis or septic shock? No   Exclusion criteria - the patient is NOT to be included for SEP-1 Core Measure due to:   Alternative explanation for abnormal labs/vitals that do not relate to sepsis, see MDM for further explanation          CONSULTS:  None    PROCEDURES:  Procedures    CRITICAL CARE TIME    Total Critical Care time was 0 minutes, excluding separately reportable procedures. There was a high probability of clinically significant/life threatening deterioration in the patient's condition which required my urgent intervention. FINAL IMPRESSION      1. General weakness New Problem   2. Anemia, unspecified type Stable         DISPOSITION/PLAN   DISPOSITION Decision To Admit 11/22/2022 12:01:10 PM  Admit to hospital    PATIENT REFERRED TO:  No follow-up provider specified. DISCHARGE MEDICATIONS:  New Prescriptions    No medications on file       Comment: Please note this report has been produced using speech recognition software and may contain errorsrelated to that system including errors in grammar, punctuation, and spelling, as well as words and phrases that may be inappropriate. If there are any questions or concerns please feel free to contact the dictating providerfor clarification.     Verena Wood MD  Attending Emergency Physician               Verena Wood MD  11/22/22 7262

## 2022-11-23 PROBLEM — D61.818 PANCYTOPENIA (HCC): Status: ACTIVE | Noted: 2022-11-23

## 2022-11-23 PROBLEM — R53.1 GENERALIZED WEAKNESS: Status: ACTIVE | Noted: 2022-11-23

## 2022-11-23 LAB
A/G RATIO: 1.7 (ref 0.8–2)
ALBUMIN SERPL-MCNC: 3.6 G/DL (ref 3.4–4.8)
ALP BLD-CCNC: 59 U/L (ref 25–100)
ALT SERPL-CCNC: 12 U/L (ref 4–36)
ANION GAP SERPL CALCULATED.3IONS-SCNC: 11 MMOL/L (ref 3–16)
AST SERPL-CCNC: 15 U/L (ref 8–33)
BASOPHILS ABSOLUTE: 0 K/UL (ref 0–0.1)
BASOPHILS RELATIVE PERCENT: 0.3 %
BILIRUB SERPL-MCNC: 0.8 MG/DL (ref 0.3–1.2)
BUN BLDV-MCNC: 18 MG/DL (ref 6–20)
CALCIUM SERPL-MCNC: 9.2 MG/DL (ref 8.5–10.5)
CHLORIDE BLD-SCNC: 103 MMOL/L (ref 98–107)
CO2: 24 MMOL/L (ref 20–30)
CREAT SERPL-MCNC: 1.2 MG/DL (ref 0.4–1.2)
EOSINOPHILS ABSOLUTE: 0 K/UL (ref 0–0.4)
EOSINOPHILS RELATIVE PERCENT: 0.5 %
GFR SERPL CREATININE-BSD FRML MDRD: 48 ML/MIN/{1.73_M2}
GLOBULIN: 2.1 G/DL
GLUCOSE BLD-MCNC: 123 MG/DL (ref 74–106)
GLUCOSE BLD-MCNC: 126 MG/DL (ref 74–106)
GLUCOSE BLD-MCNC: 148 MG/DL (ref 74–106)
GLUCOSE BLD-MCNC: 154 MG/DL (ref 74–106)
GLUCOSE BLD-MCNC: 201 MG/DL (ref 74–106)
HCT VFR BLD CALC: 25.9 % (ref 37–47)
HEMOGLOBIN: 8 G/DL (ref 11.5–16.5)
IMMATURE GRANULOCYTES #: 0 K/UL
IMMATURE GRANULOCYTES %: 0.5 % (ref 0–5)
LYMPHOCYTES ABSOLUTE: 0.8 K/UL (ref 1.5–4)
LYMPHOCYTES RELATIVE PERCENT: 22 %
MCH RBC QN AUTO: 25.3 PG (ref 27–32)
MCHC RBC AUTO-ENTMCNC: 30.9 G/DL (ref 31–35)
MCV RBC AUTO: 82 FL (ref 80–100)
MONOCYTES ABSOLUTE: 0.3 K/UL (ref 0.2–0.8)
MONOCYTES RELATIVE PERCENT: 8.7 %
NEUTROPHILS ABSOLUTE: 2.5 K/UL (ref 2–7.5)
NEUTROPHILS RELATIVE PERCENT: 68 %
PDW BLD-RTO: 15.4 % (ref 11–16)
PERFORMED ON: ABNORMAL
PLATELET # BLD: 120 K/UL (ref 150–400)
PMV BLD AUTO: 11.9 FL (ref 6–10)
POTASSIUM REFLEX MAGNESIUM: 3.7 MMOL/L (ref 3.4–5.1)
RBC # BLD: 3.16 M/UL (ref 3.8–5.8)
SODIUM BLD-SCNC: 138 MMOL/L (ref 136–145)
TOTAL PROTEIN: 5.7 G/DL (ref 6.4–8.3)
WBC # BLD: 3.7 K/UL (ref 4–11)

## 2022-11-23 PROCEDURE — 6360000002 HC RX W HCPCS: Performed by: PHYSICIAN ASSISTANT

## 2022-11-23 PROCEDURE — 97116 GAIT TRAINING THERAPY: CPT

## 2022-11-23 PROCEDURE — 6370000000 HC RX 637 (ALT 250 FOR IP): Performed by: INTERNAL MEDICINE

## 2022-11-23 PROCEDURE — A4216 STERILE WATER/SALINE, 10 ML: HCPCS | Performed by: PHYSICIAN ASSISTANT

## 2022-11-23 PROCEDURE — 97165 OT EVAL LOW COMPLEX 30 MIN: CPT

## 2022-11-23 PROCEDURE — 2580000003 HC RX 258: Performed by: PHYSICIAN ASSISTANT

## 2022-11-23 PROCEDURE — 99406 BEHAV CHNG SMOKING 3-10 MIN: CPT | Performed by: INTERNAL MEDICINE

## 2022-11-23 PROCEDURE — C9113 INJ PANTOPRAZOLE SODIUM, VIA: HCPCS | Performed by: PHYSICIAN ASSISTANT

## 2022-11-23 PROCEDURE — 97530 THERAPEUTIC ACTIVITIES: CPT

## 2022-11-23 PROCEDURE — 80053 COMPREHEN METABOLIC PANEL: CPT

## 2022-11-23 PROCEDURE — 36415 COLL VENOUS BLD VENIPUNCTURE: CPT

## 2022-11-23 PROCEDURE — 97802 MEDICAL NUTRITION INDIV IN: CPT

## 2022-11-23 PROCEDURE — 99223 1ST HOSP IP/OBS HIGH 75: CPT | Performed by: INTERNAL MEDICINE

## 2022-11-23 PROCEDURE — 97161 PT EVAL LOW COMPLEX 20 MIN: CPT

## 2022-11-23 PROCEDURE — 85025 COMPLETE CBC W/AUTO DIFF WBC: CPT

## 2022-11-23 PROCEDURE — 6370000000 HC RX 637 (ALT 250 FOR IP): Performed by: PHYSICIAN ASSISTANT

## 2022-11-23 PROCEDURE — 1200000000 HC SEMI PRIVATE

## 2022-11-23 RX ADMIN — INSULIN GLARGINE 45 UNITS: 100 INJECTION, SOLUTION SUBCUTANEOUS at 21:41

## 2022-11-23 RX ADMIN — ROSUVASTATIN CALCIUM 20 MG: 20 TABLET, COATED ORAL at 08:38

## 2022-11-23 RX ADMIN — LINACLOTIDE 290 MCG: 145 CAPSULE, GELATIN COATED ORAL at 06:43

## 2022-11-23 RX ADMIN — METFORMIN HYDROCHLORIDE 1000 MG: 500 TABLET ORAL at 08:38

## 2022-11-23 RX ADMIN — SODIUM CHLORIDE 40 MG: 9 INJECTION, SOLUTION INTRAMUSCULAR; INTRAVENOUS; SUBCUTANEOUS at 14:32

## 2022-11-23 RX ADMIN — ALOGLIPTIN 12.5 MG: 12.5 TABLET, FILM COATED ORAL at 08:38

## 2022-11-23 RX ADMIN — IRON SUCROSE 200 MG: 20 INJECTION, SOLUTION INTRAVENOUS at 21:34

## 2022-11-23 RX ADMIN — INSULIN LISPRO 1 UNITS: 100 INJECTION, SOLUTION INTRAVENOUS; SUBCUTANEOUS at 11:30

## 2022-11-23 RX ADMIN — FERROUS SULFATE TAB EC 324 MG (65 MG FE EQUIVALENT) 324 MG: 324 (65 FE) TABLET DELAYED RESPONSE at 17:16

## 2022-11-23 RX ADMIN — METFORMIN HYDROCHLORIDE 1000 MG: 500 TABLET ORAL at 17:16

## 2022-11-23 RX ADMIN — METOPROLOL SUCCINATE 25 MG: 25 TABLET, EXTENDED RELEASE ORAL at 08:38

## 2022-11-23 RX ADMIN — ROPINIROLE HYDROCHLORIDE 0.5 MG: 0.25 TABLET, FILM COATED ORAL at 00:00

## 2022-11-23 RX ADMIN — SODIUM CHLORIDE 40 MG: 9 INJECTION, SOLUTION INTRAMUSCULAR; INTRAVENOUS; SUBCUTANEOUS at 02:59

## 2022-11-23 RX ADMIN — FERROUS SULFATE TAB EC 324 MG (65 MG FE EQUIVALENT) 324 MG: 324 (65 FE) TABLET DELAYED RESPONSE at 08:38

## 2022-11-23 RX ADMIN — EMPAGLIFLOZIN 10 MG: 10 TABLET, FILM COATED ORAL at 08:38

## 2022-11-23 NOTE — FLOWSHEET NOTE
11/23/22 0838   Assessment   Charting Type Shift assessment   Psychosocial   Psychosocial (WDL) WDL   Neurological   Neuro (WDL) WDL   Level of Consciousness 0   [place] Coma Scale   Eye Opening 4   Best Verbal Response 5   Best Motor Response 6   [place] Coma Scale Score 15   Cranial Nerves   Facial Symmetry Within functional limits   HEENT (Head, Ears, Eyes, Nose, & Throat)   HEENT (WDL) X   Right Eye Glasses   Left Eye Glasses   Teeth Dentures upper   Respiratory   Respiratory (WDL) X   Respiratory Interventions Cough & deep breathe;H. O.B. elevated   Respiratory Pattern Regular   Respiratory Depth Normal   Respiratory Quality/Effort Unlabored   Chest Assessment Chest expansion symmetrical   L Breath Sounds Clear;Diminished   R Breath Sounds Diminished;Clear   Level of Activity/Mobility 1   Breath Sounds   Right Upper Lobe Clear   Right Middle Lobe Clear   Right Lower Lobe Clear;Diminished   Left Upper Lobe Clear   Left Lower Lobe Clear;Diminished   Cardiac   Cardiac (WDL) WDL   Cardiac Rhythm Sinus rhythm   Rhythm Interpretation   Heart Rate 61   Cardiac Monitor   Cardiac/Telemetry Monitor On Portable telemetry pack applied   Telemetry Box Number [id number]   Alarms Set Yes   Electrodes Replaced Yes   Telemetry Monitor Alarm Parameters    Gastrointestinal   Abdominal (WDL) WDL   RUQ Bowel Sounds Active   LUQ Bowel Sounds Active   RLQ Bowel Sounds Active   LLQ Bowel Sounds Active   Last BM (including prior to admit) 11/23/22   Genitourinary   Genitourinary (WDL) WDL   Dysuria (Pain/Burning w/Urination) No   Peripheral Vascular   Peripheral Vascular (WDL) WDL   Edema None   Skin Integumentary    Skin Integumentary (WDL) X   Skin Color Pale   Skin Condition/Temp Dry; Warm   Skin Integrity Ecchymosis   Location right arm   Care Plan - Skin/Tissue Integrity Goals   Skin Integrity Remains Intact Monitor for areas of redness and/or skin breakdown   Musculoskeletal   Musculoskeletal (WDL) WDL   Care Plan - Chronic Conditions and Co-Morbidity   Care Plan - Patient's Chronic Conditions and Co-Morbidity Symptoms are Monitored and Maintained or Improved Monitor and assess patient's chronic conditions and comorbid symptoms for stability, deterioration, or improvement   Care Plan - Discharge Planning Goals   Discharge to home or other facility with appropriate resources Identify barriers to discharge with patient and caregiver; Identify discharge learning needs (meds, wound care, etc)   Treatment Team Notification   Reason for Communication   (lac 4.6)   Pt is alert and oriented. Pt is resting in bed and appears to have no acute distress. Pt currently on room air. Pt currently on telemetry monitoring. Pt's lung sounds are clear, diminished. Pt encouraged to cough and deep breathe. Pt call bell and bedside table within reach.

## 2022-11-23 NOTE — ACP (ADVANCE CARE PLANNING)
Advance Care Planning     General Advance Care Planning (ACP) Conversation    Date of Conversation: 11/22/2022  Conducted with: Patient with Decision Making Capacity    Healthcare Decision Maker:    Primary Decision Maker: Silvia Gilford - [phone]    Secondary Decision Maker: Efe Haresh - Niece/Nephew - [phone]  Click here to complete Healthcare Decision Makers including selection of the Healthcare Decision Maker Relationship (ie \"Primary\"). Today we documented Decision Maker(s) consistent with Legal Next of Kin hierarchy.     Content/Action Overview:  Has NO ACP documents/care preferences - information provided, considering goals and options  Reviewed DNR/DNI and patient confirms current DNR status - completed forms on file (place new order if needed)    Length of Voluntary ACP Conversation in minutes:  <16 minutes (Non-Billable)

## 2022-11-23 NOTE — CONSULTS
Comprehensive Nutrition Assessment    Type and Reason for Visit:  Initial, Positive Nutrition Screen, Consult (MST 3)    Nutrition Recommendations/Plan:   RD added 3 CHO to existing diet order. Consider vitamin C supplement d/t tobacco use. Consider vitamin B12 supplement as appropriate d/t hx of deficiency. Continue to encourage PO intake as appropriate. Pt PO intake of % x 1 meal.     RD to follow pt and available PRN. Malnutrition Assessment:  Malnutrition Status:  No malnutrition (11/23/22 1009)    Context:  Chronic Illness     Findings of the 6 clinical characteristics of malnutrition:  Energy Intake:  Unable to assess (% x 1 meal)  Weight Loss:  No significant weight loss (Weight gain of 9.9% within the last ~ one year)     Body Fat Loss:  No significant body fat loss     Muscle Mass Loss:  No significant muscle mass loss    Fluid Accumulation:  No significant fluid accumulation     Strength:  Not Performed    Nutrition Assessment:    Pt admitted with anemia. Pt is considered low-moderate risk for ongoing nutritional compromise r/t report of unintentional weight loss (per comprehensive weight hx, wt gain of 9.9% within the last one year) and decreased appetite. Nutrition Related Findings:    Normal weight for age, dysarthria r/t prior CVA, melena, no edema at this time. Weight gain of 9.9% within the last ~ one year. PMH includes CAD, cirrhosis, COPD, cystic fibrosis, T2DM, GERD, HTN, tobacco use, B12 deficiency, iron-deficiency anemia, CKD. Labs: GFR 48, glu 123-333, A1c 7.3. Medications: Micro-k, humalog, lantus, venofer, ferrous sulfate, metformin, jardiance, protonix. Wound Type: None       Current Nutrition Intake & Therapies:    Average Meal Intake: % (x 1 meal)  Average Supplements Intake: None Ordered  ADULT DIET; Regular; 3 carb choices (45 gm/meal);  Low Sodium (2 gm); 2000 ml    Anthropometric Measures:  Height: 5' 4\" (162.6 cm)  Ideal Body Weight (IBW): 120 lbs (55 kg)    Admission Body Weight: 144 lb (65.3 kg)  Current Body Weight: 144 lb (65.3 kg), 120 % IBW. Weight Source: Bed Scale  Current BMI (kg/m2): 24.7        Weight Adjustment For: No Adjustment                 BMI Categories: Normal Weight (BMI 22.0 to 24.9) age over 72    Estimated Daily Nutrient Needs:  Energy Requirements Based On: Kcal/kg  Weight Used for Energy Requirements: Current  Energy (kcal/day): 1640--1771 (25-27 kcal/kg)  Weight Used for Protein Requirements: Current  Protein (g/day): 52-59 (.8-.9 g/kg; CKD)  Method Used for Fluid Requirements: Other (Comment) (Fld restriction per MD)  Fluid (ml/day): 2000    Nutrition Diagnosis:   Altered nutrition-related lab values related to endocrine dysfuntion as evidenced by lab values (A1c 7.3, glu 123-333)    Nutrition Interventions:   Food and/or Nutrient Delivery: Modify Current Diet, Vitamin Supplement  Nutrition Education/Counseling: Education not indicated  Coordination of Nutrition Care: Continue to monitor while inpatient  Plan of Care discussed with: Pt    Goals:     Goals: Meet at least 75% of estimated needs       Nutrition Monitoring and Evaluation:   Behavioral-Environmental Outcomes: None Identified  Food/Nutrient Intake Outcomes: Food and Nutrient Intake, Vitamin/Mineral Intake  Physical Signs/Symptoms Outcomes: Biochemical Data, Nutrition Focused Physical Findings, Weight    Discharge Planning:     Too soon to determine     Leanne Swanson RD

## 2022-11-23 NOTE — PROGRESS NOTES
Occupational Therapy  Facility/Department: [facility] MED SURG  Occupational Therapy Initial Assessment    Name: Nayely Miguel  : 1950  MRN: [id number]  Date of Service: 2022    Discharge Recommendations:  Home with Home health OT  OT Equipment Recommendations  Equipment Needed: No       Patient Diagnosis(es): The primary encounter diagnosis was General weakness. A diagnosis of Anemia, unspecified type was also pertinent to this visit. Past Medical History:  has a past medical history of CAD (coronary artery disease), Cirrhosis ([facility] [address] COPD (chronic obstructive pulmonary disease) ([facility] [address] Cystic fibrosis ([facility] [address] Diabetes mellitus ([facility] [address] GERD (gastroesophageal reflux disease), H/O: CVA (cardiovascular accident), HTN (hypertension), Mass, Tobacco abuse, and Vitamin B12 deficiency. Past Surgical History:  has a past surgical history that includes Hysterectomy; Cholecystectomy; Carpal tunnel release (Bilateral); Coronary angioplasty with stent; joint replacement (Bilateral, 10/15/2016); Total knee arthroplasty (Bilateral, 10/18/2016); and Cardiac catheterization. Assessment   Performance deficits / Impairments: Decreased high-level IADLs;Decreased endurance;Decreased balance  Assessment: Thi s67 year old female was referred to OT services upon admission following onset of anemia. Pt reports prior to entry she toileted but declined need during evaluation. Pt transferred to sitting at EOB without assistance. Pt sat at EOB without assistance. Pt donned LB clothing seated at EOB without assistance. Pt transferred to standing with SBA. Pt ambulated without AD mildly unsteady ambulated 250 feet. Pt reported fatigue and needed to return to room. Pt transferred to chair with SBA. Pt will benefit from skilled OT services while IP.   Prognosis: Good  Decision Making: Low Complexity  REQUIRES OT FOLLOW-UP: Yes  Activity Tolerance  Activity Tolerance: Patient limited by fatigue        Plan   Occupational Therapy Plan  Times Per Week: 3-5  Times Per Day: Once a day  Days Per Week: 5 Days  Therapy Duration: 10 Days  Current Treatment Recommendations: Strengthening, Functional mobility training, Endurance training, Self-Care / ADL, Patient/Caregiver education & training, Safety education & training     Restrictions  Restrictions/Precautions  Restrictions/Precautions: Fall Risk, General Precautions  Required Braces or Orthoses?: No    Subjective   General  Chart Reviewed: Yes  Patient assessed for rehabilitation services?: Yes  Family / Caregiver Present: No  Referring Practitioner: VIGNESH Morfin  Diagnosis: Anemia  Subjective  Subjective: Pt agreeable to OT services. Pt reports she was getting weaker.      Social/Functional History  Social/Functional History  Lives With: Spouse  Type of Home:  ([facility])  Home Layout: One level  Home Access: Stairs to enter with rails  Entrance Stairs - Number of Steps: 1  Bathroom Shower/Tub: Walk-in shower  Bathroom Toilet: Standard  Bathroom Equipment: Built-in shower seat  Bathroom Accessibility: Not accessible  Home Equipment: Rosea Mon, rolling  Has the patient had two or more falls in the past year or any fall with injury in the past year?: No  ADL Assistance: Independent  Homemaking Assistance: Independent  Homemaking Responsibilities: Yes  Ambulation Assistance: Independent  Transfer Assistance: Independent  Active : Yes       Objective   Heart Rate: 61  Heart Rate Source: Monitor  BP: (!) 161/57  BP Location: Right upper arm  Patient Position: Supine  MAP (Calculated): 92  Resp: 18  SpO2: 96 %  O2 Device: None (Room air)          Observation/Palpation  Observation: Pt lying in bed, room air, slow to respond to questions, NAD     Bed Mobility Training  Bed Mobility Training: No  Balance  Sitting: Intact  Standing: High guard  Transfer Training  Transfer Training: No  Gait  Overall Level of Assistance: Contact-guard assistance;Stand-by assistance  Distance (ft): 250 Feet  Assistive Device: Gait belt     AROM: Within functional limits  PROM: Within functional limits  Strength: Within functional limits  Coordination: Within functional limits  Tone: Normal  Sensation: Intact  ADL  LE Dressing: Independent        Bed mobility  Rolling to Left: Modified independent  Supine to Sit: Modified independent  Scooting: Modified independent  Transfers  Stand Pivot Transfers: Stand by assistance  Sit to stand: Stand by assistance  Stand to sit: Stand by assistance  Vision  Vision: Impaired  Vision Exceptions: Wears glasses at all times  Hearing  Hearing: Within functional limits  Cognition  Overall Cognitive Status: WFL  Orientation  Overall Orientation Status: Within Functional Limits   Goals  Short Term Goals  Time Frame for Short Term Goals: 1 week  Short Term Goal 1: Pt to complete toileting with MOD I. Short Term Goal 2: Pt to complete dressing with MOD I. Short Term Goal 3: Pt to complete bathing with MOD I. Short Term Goal 4: Pt to tolerate standing at sink to complete hygiene/grooming with MOD I. Short Term Goal 5: Pt to tolerate x15 minutes of activity to increase functional activity tolerance. Therapy Time   Individual Concurrent Group Co-treatment   Time In 6356         Time Out 1111         Minutes 23          This note serves as a DC summary in the event of pt discharge.       Carlo Ceron, OTR/L

## 2022-11-23 NOTE — FLOWSHEET NOTE
11/22/22 2208   Assessment   Charting Type Shift assessment   Psychosocial   Psychosocial (WDL) WDL   Neurological   Neuro (WDL) WDL   Level of Consciousness 0   R Pupil Size (mm) 3   R Pupil Shape Round   R Pupil Reaction Brisk   L Pupil Size (mm) 3   L Pupil Shape Round   L Pupil Reaction Brisk   R Hand  Weak   L Hand  Weak   R Foot Dorsiflexion Moderate   L Foot Dorsiflexion Moderate   R Foot Plantar Flexion Moderate   L Foot Plantar Flexion Moderate   Des Coma Scale   Eye Opening 4   Best Verbal Response 5   Best Motor Response 6   Des Coma Scale Score 15   Cranial Nerves   Facial Symmetry Within functional limits   HEENT (Head, Ears, Eyes, Nose, & Throat)   HEENT (WDL) X   Right Eye Glasses   Left Eye Glasses   Teeth Dentures upper   Respiratory   Respiratory (WDL) X   Respiratory Pattern Regular   Respiratory Depth Normal   Respiratory Quality/Effort Unlabored   Chest Assessment Chest expansion symmetrical   L Breath Sounds Clear;Diminished   R Breath Sounds Diminished;Clear   Breath Sounds   Right Upper Lobe Clear   Right Middle Lobe Clear   Right Lower Lobe Clear;Diminished   Left Upper Lobe Clear   Left Lower Lobe Clear;Diminished   Cardiac   Cardiac (WDL) WDL   Cardiac Rhythm Sinus rhythm   Cardiac Monitor   Telemetry Box Number [id number]   Telemetry Monitor Alarm Parameters    Gastrointestinal   Abdominal (WDL) WDL   RUQ Bowel Sounds Active   LUQ Bowel Sounds Active   RLQ Bowel Sounds Active   LLQ Bowel Sounds Active   Genitourinary   Genitourinary (WDL) WDL   Dysuria (Pain/Burning w/Urination) No   Urine Assessment   Urine Color Yellow/straw   Urine Appearance Clear   Urine Odor No odor   Peripheral Vascular   Peripheral Vascular (WDL) WDL   Edema None   Skin Integumentary    Skin Integumentary (WDL) X   Skin Color Pale   Skin Condition/Temp Dry; Warm   Musculoskeletal   Musculoskeletal (WDL) WDL   Treatment Team Notification   Reason for Communication   (lac 4.6)   Handoff Communication Given Shift Handoff   Handoff Given To [address] [place] [address] Received From [place] Face to Face   Time Handoff Given 1900   End of Shift Check Performed Yes

## 2022-11-23 NOTE — H&P
History and Physical    Patient:  Ludmila Wright    CHIEF COMPLAINT:    Weakness     HISTORY OF PRESENT ILLNESS:   The patient is a 81 y.o. female with PMH of CAD, cirrhosis, stroke with chronic dysarthric speech, tobacco abuse, HTN, GERD, CF, DM, COPD who presents due to weakness. Patient states for the last several days she has become more weak than usual. She feels unsteady on her feet. Also reports black stool for about 1 week. Admits to shortness of breath on exertion. Has history of anemia and AVMs per her report. Hgb 6.6 on arival to ER. Hemoccult positive. Admitted for blood transfusion and therapy. Today she is feeling some better but remains weak. Speech dysarthic which is chronic per patient report. She had a large BM after taking lactulose today. Reports she doesn't take that medication at home. On room air. Past Medical History:      Diagnosis Date    CAD (coronary artery disease)     Cirrhosis (HCC)     COPD (chronic obstructive pulmonary disease) (HCC)     Cystic fibrosis (HCC)     Diabetes mellitus (HCC)     GERD (gastroesophageal reflux disease)     H/O: CVA (cardiovascular accident)     HTN (hypertension)     Mass     on chest     Tobacco abuse     Vitamin B12 deficiency        Past Surgical History:      Procedure Laterality Date    CARDIAC CATHETERIZATION      CARPAL TUNNEL RELEASE Bilateral     CHOLECYSTECTOMY      CORONARY ANGIOPLASTY WITH STENT PLACEMENT      HYSTERECTOMY (CERVIX STATUS UNKNOWN)      JOINT REPLACEMENT Bilateral 10/15/2016    knees    TOTAL KNEE ARTHROPLASTY Bilateral 10/18/2016    at [facility]       Medications Prior to Admission:    Prior to Admission medications    Medication Sig Start Date End Date Taking? Authorizing Provider   HYDROcodone-acetaminophen (NORCO) 5-325 MG per tablet Take 1 tablet by mouth 2 times daily as needed for Pain for up to 30 days.  11/7/22 12/7/22  Nabila Blackwell MD   JARDIANCE 10 MG tablet TAKE 1 TABLET BY MOUTH DAILY 10/24/22   Nabila Blackwell MD metFORMIN (GLUCOPHAGE) 1000 MG tablet TAKE 1 TABLET BY MOUTH TWICE DAILY WITH FOOD 9/30/22   Chaitanya Cedeño MD   linagliptin (TRADJENTA) 5 MG tablet TAKE 1 TABLET BY MOUTH EVERY DAY 9/30/22   Chaitanya Cedeño MD   rosuvastatin (CRESTOR) 20 MG tablet TAKE 1 TABLET BY MOUTH EVERY DAY 8/29/22   Chaitanya Cedeño MD   LEVEMIR FLEXTOUCH 100 UNIT/ML injection pen INJECT 55 UNITS UNDER SKIN NIGHTLY AS DIRECTED 8/22/22   Chaitanya Cedeño MD   metoprolol succinate (TOPROL XL) 25 MG extended release tablet take 1 tablet by mouth once daily 5/10/22   Chaitanya Cedeño MD   lactulose [facility]) 10 GM/15ML solution TAKE 15 ML BY MOUTH DAILY 3/24/22   Chaitanya Cedeño MD   bisacodyl (BISACODYL) 5 MG EC tablet Take 1 tablet by mouth daily as needed for Constipation 3/19/22   VIGNESH Chin   umeclidinium-vilanterol (ANORO ELLIPTA) 62.5-25 MCG/INH AEPB inhaler Inhale 1 puff into the lungs daily  Patient taking differently: Inhale 1 puff into the lungs as needed 2/21/22   Chaitanya Cedeño MD   furosemide (LASIX) 20 MG tablet TAKE 1 TABLET BY MOUTH DAILY AS NEEDED FOR SWELLING 12/16/21   Chaitanya Cedeño MD   linaclotide Yessi Sharan) 290 MCG CAPS capsule Take 1 capsule by mouth every morning (before breakfast) 11/9/21   Chaitanya Cedeño MD   pantoprazole (PROTONIX) 40 MG tablet Take 40 mg by mouth 2 times daily (before meals)  9/28/21   Historical Provider, MD   ferrous sulfate (IRON 325) 325 (65 Fe) MG tablet Take 325 mg by mouth daily (with breakfast) 9/30/21   Historical Provider, MD   Insulin Pen Needle 31G X 6 MM MISC 1 each by Does not apply route daily 5/17/21   Chaitanya Cedeño MD   isosorbide mononitrate (IMDUR) 60 MG extended release tablet TAKE 1 TABLET BY MOUTH EVERY DAY  Patient not taking: Reported on 11/22/2022 4/12/21   Chaitanya Cedeño MD   blood glucose monitor strips QID Dx E11.9 3/10/21   DAVIDA Villalobos - CNP   glucose monitoring kit (FREESTYLE) monitoring kit 1 kit by Does not apply route daily E11.40 6/3/20   Chaitanya Cedeño MD aspirin 81 MG chewable tablet Take 1 tablet by mouth daily 10/9/19   Emily Calzada MD   nitroGLYCERIN (NITROSTAT) 0.4 MG SL tablet Place 1 tablet under the tongue every 5 minutes as needed for Chest pain 8/23/18   Emily Calzada MD   Insulin Syringe-Needle U-100 (B-D INS SYR ULTRAFINE 1CC/31G) 31G X 5/16\" 1 ML MISC USE ONCE A DAY AS DIRECTED  DX E11.9 4/17/17   Emily Calzada MD       Allergies:  Codeine    Social History:   TOBACCO:   reports that she has been smoking cigarettes. She has a 40.00 pack-year smoking history. She has never used smokeless tobacco.  ETOH:   reports no history of alcohol use. OCCUPATION:  None      Family History:       Problem Relation Age of Onset    Diabetes Mother     High Blood Pressure Mother     Cancer Mother         pancreatic    Diabetes Father     Heart Disease Father     High Blood Pressure Father     Breast Cancer Sister     Cancer Sister         lung, breast    Cancer Brother         throat       Review of system  Constitutional:  Denies fever or chills. Positive for generalized weakness. Eyes:  Denies eye pain or redness  HENT:  Denies nasal congestion or sore throat   Respiratory:  Denies cough or shortness of breath   Cardiovascular:  Denies chest pain or edema . Positive for dyspnea on exertion. GI:  Denies abdominal pain, nausea, vomiting,  or diarrhea. Positive for black stools. :  Denies dysuria or frequency  Musculoskeletal:  Denies acute neck pain. Positive for chronic arthralgias. Integument:  Denies rash or itching  Neurologic:  Denies headache, dizziness, numbness, tingling or unilateral weakness  Psychiatric:  Denies acute depression or acute anxiety      Vital Signs  Temp: 98.6 °F (37 °C)  Heart Rate: 61  Resp: 16  BP: (!) 130/52  SpO2: 98 %  O2 Device: None (Room air)       vital signs reviewed in electronic chart.     Physical exam  Constitutional:  Well developed, well nourished, no acute distress  Eyes:  PERRL, no scleral icterus, conjunctiva normal HENT:  Atraumatic, external ears normal, nose normal, oropharynx moist, no pharyngeal exudates. Neck- supple, no JVD, no lymphadenopathy  Respiratory:  No respiratory distress on RA, no wheezing, rales or rhonchi detected  Cardiovascular:  Normal rate, normal rhythm, no murmurs, no gallops, no rubs, no edema   GI:  Soft, nondistended, normal bowel sounds, nontender, no voluntary guarding  Musculoskeletal:  No cyanosis or obvious acute deformity. Moving all extremities with generalized weakness throughout. Integument:  Warm and dry. Neurologic:  Alert & oriented x 3, no apparent focal deficits noted. Speech slow and dysarthric. Psychiatric:  Speech and behavior appropriate         Lab Results   Component Value Date     11/23/2022    K 3.7 11/23/2022     11/23/2022    CO2 24 11/23/2022    BUN 18 11/23/2022    CREATININE 1.2 11/23/2022    GLUCOSE 123 (H) 11/23/2022    CALCIUM 9.2 11/23/2022    PROT 5.7 (L) 11/23/2022    LABALBU 3.6 11/23/2022    BILITOT 0.8 11/23/2022    ALKPHOS 59 11/23/2022    AST 15 11/23/2022    ALT 12 11/23/2022    LABGLOM 48 (L) 11/23/2022    GFRAA 45 (L) 10/04/2022    AGRATIO 1.7 11/23/2022    GLOB 2.1 11/23/2022           Lab Results   Component Value Date    WBC 3.7 (L) 11/23/2022    HGB 8.0 (L) 11/23/2022    HCT 25.9 (L) 11/23/2022    MCV 82.0 11/23/2022     (L) 11/23/2022       No orders to display        Assessment and Plan     Active Hospital Problems    Diagnosis Date Noted    Generalized weakness [R53.1]  - b12, folate, tsh wnl when recently checked   - anemic and receiving blood as below  - pt/ot consulted  11/23/2022    Pancytopenia ([facility] [address] [X74.910]  - may be related to cirrhosis   - monitor   - transfuse as below  11/23/2022    Personal history of nicotine dependence [Z87.891]  - Patient was strongly encouraged to stop smoking.  Risks of continued smoking and benefits of smoking cessation were discussed  - nicotine patch ordered  03/18/2022    Cirrhosis ([facility] [address] [K74.60]  - follow up with GI as scheduled   - declines lactulose   - continue GI ppx  11/01/2021    Anemia [D64.9]  - Hgb 6.6 on arrival to ER   - transfused 2 units and Hgb improved to 8   - venofer ordered and increased po iron frequency   - on protonix   - hold ASA and lovenox   - hemoccult +   - per chart review patient with history of anemia and has underwent scopes with Dr. Gavi Rivas and also had pillcam study. Per GI note and PCP note most recent colonoscopy with multiple polyps and angiectasia. Pillcam showed few AVMs this may be reachable only with push enteroscopy , retrograde enteroscopy. If any overt bleeding she must be transferred to [facility] center for push enteroscopy. - monitor closely  11/01/2021    CKD (chronic kidney disease) stage 3, GFR 30-59 ml/min ([facility]) [N18.30]  - renal function at baseline with Cr 1.2-1.4  - avoid nephrotoxic agent as able   - monitor  04/27/2021    Thoracic aortic aneurysm without rupture [I71.20]  - CT scan of the chest 11/1/2021 Aneurysmal dilatation of the ascending aorta measuring up to 4 cm.  - Make sure blood pressure, lipid profile controlled  10/09/2019    Essential hypertension [I10]   - continue home regimen  08/12/2015    Type 2 diabetes mellitus with diabetic neuropathy ([facility] [address] [E11.40]  - continue home regimen   - hypoglycemia protocol ordered  08/12/2015      CAD (coronary artery disease) [I25.10]  - ASA on hold with anemia. Continue statin. No CP. COPD (chronic obstructive pulmonary disease) ([facility]) [J44.9]  - no exacerbation      H/O: CVA (cerebrovascular accident) [Z86.73]  - continue home reigmen   - chronic dysarthric speech and weakness         Patient was seen and examined with Dr. Blue Bear. After reviewing patient data and diagnostic testing the plan of care was established in conjunction with Dr. Blue Bear.     VIGNESH Sellers certifies per CMS regulation for 42 .15(a), that the patient may reasonably be expected to be discharged or transferred to a hospital within 96 hours after admission to [address] [place] [address] and [facility]    Electronically signed by VIGNESH Bass on 11/23/2022 at 3:41 PM

## 2022-11-23 NOTE — PLAN OF CARE
Problem: Discharge Planning  Goal: Discharge to home or other facility with appropriate resources  11/22/2022 1646 by Jazmin Duran RN  Outcome: Progressing  Flowsheets  Taken 11/22/2022 1412  Discharge to home or other facility with appropriate resources:   Identify barriers to discharge with patient and caregiver   Identify discharge learning needs (meds, wound care, etc)  Taken 11/22/2022 1400  Discharge to home or other facility with appropriate resources:   Identify barriers to discharge with patient and caregiver   Identify discharge learning needs (meds, wound care, etc)     Problem: Safety - Adult  Goal: Free from fall injury  11/22/2022 2338 by Chin Monroy RN  Outcome: Progressing  11/22/2022 1646 by Jazmin Duran RN  Outcome: Progressing     Problem: ABCDS Injury Assessment  Goal: Absence of physical injury  11/22/2022 2338 by Chin Monroy RN  Outcome: Progressing  11/22/2022 1646 by Jazmin Duran RN  Outcome: Progressing     Problem: Skin/Tissue Integrity  Goal: Absence of new skin breakdown  Description: 1. Monitor for areas of redness and/or skin breakdown  2. Assess vascular access sites hourly  3. Every 4-6 hours minimum:  Change oxygen saturation probe site  4. Every 4-6 hours:  If on nasal continuous positive airway pressure, respiratory therapy assess nares and determine need for appliance change or resting period.   11/22/2022 2338 by Chin Monroy RN  Outcome: Progressing  11/22/2022 1646 by Jazmin Duran RN  Outcome: Progressing

## 2022-11-23 NOTE — PROGRESS NOTES
Physical Therapy  Facility/Department: [facility] MED SURG  Physical Therapy Initial Assessment    Name: Karrie Damian  : 1950  MRN: [id number]  Date of Service: 2022    Discharge Recommendations:  Continue to assess pending progress, Home with assist PRN, Home with Home health PT          Patient Diagnosis(es): The primary encounter diagnosis was General weakness. A diagnosis of Anemia, unspecified type was also pertinent to this visit. Past Medical History:  has a past medical history of CAD (coronary artery disease), Cirrhosis ([address] COPD (chronic obstructive pulmonary disease) ([facility] [address] Cystic fibrosis ([facility] [address] Diabetes mellitus ([facility] [address] GERD (gastroesophageal reflux disease), H/O: CVA (cardiovascular accident), HTN (hypertension), Mass, Tobacco abuse, and Vitamin B12 deficiency. Past Surgical History:  has a past surgical history that includes Hysterectomy; Cholecystectomy; Carpal tunnel release (Bilateral); Coronary angioplasty with stent; joint replacement (Bilateral, 10/15/2016); Total knee arthroplasty (Bilateral, 10/18/2016); and Cardiac catheterization. Assessment   Body Structures, Functions, Activity Limitations Requiring Skilled Therapeutic Intervention: Decreased functional mobility ; Decreased strength;Decreased safe awareness;Decreased endurance;Decreased balance  Assessment: PT eval completed on this patient with primary diagnosis of anemia. She present supine in bed. NAD. Pleasant and agreeable to work with PT. She performs bed mobility with mod I. Sit to stand, transfers and ambulates 200' with CGA. Slow pace, and decreased step length and height noted with ambulation. Quick fatigue. Patient presents with deficits in functional mobiltiy but is expected to benefit from continued skilled PT intervention to maximize her mobility status prior to D/C.   Therapy Prognosis: Good  Decision Making: Low Complexity  Requires PT Follow-Up: Yes  Activity Tolerance  Activity Tolerance: Patient limited by fatigue;Patient limited by endurance     Plan   Physcial Therapy Plan  General Plan: 3-5 times per week  Days Per Week: 5 Days  Current Treatment Recommendations: Strengthening, Balance training, Functional mobility training, Transfer training, Gait training, Endurance training, Therapeutic activities, Home exercise program, Safety education & training, Patient/Caregiver education & training  Safety Devices  Type of Devices: Call light within reach, Left in chair     Restrictions  Restrictions/Precautions  Restrictions/Precautions: Fall Risk, General Precautions  Required Braces or Orthoses?: No     Subjective   Pain: No c/o.   General  Chart Reviewed: Yes  Patient assessed for rehabilitation services?: Yes  Family / Caregiver Present: No  Referring Practitioner: VIGNESH Qiu  Follows Commands: Within Functional Limits         Social/Functional History  Social/Functional History  Lives With: Spouse  Type of Home:  ([facility])  Home Layout: One level  Home Access: Stairs to enter with rails  Entrance Stairs - Number of Steps: 1  Bathroom Shower/Tub: Walk-in shower  Bathroom Toilet: Standard  Bathroom Equipment: Built-in shower seat  Bathroom Accessibility: Not accessible  Home Equipment: kalie Hernandez  Has the patient had two or more falls in the past year or any fall with injury in the past year?: No  ADL Assistance: Independent  Homemaking Assistance: Independent  Homemaking Responsibilities: Yes  Ambulation Assistance: Independent  Transfer Assistance: Independent  Active : Yes  Vision/Hearing  Vision  Vision: Impaired  Vision Exceptions: Wears glasses at all times  Hearing  Hearing: Within functional limits    Cognition   Orientation  Overall Orientation Status: Within Functional Limits  Cognition  Overall Cognitive Status: WFL     Objective   Heart Rate: 61  Heart Rate Source: Monitor  BP: (!) 161/57  BP Location: Right upper arm  Patient Position: Supine  MAP (Calculated): 92  Resp: 18  SpO2: 96 %  O2 Device: None (Room air)     Observation/Palpation  Observation: Pt lying in bed, room air, slow to respond to questions, NAD  Gross Assessment  AROM: Within functional limits  PROM: Within functional limits  Strength: Generally decreased, functional  Coordination: Within functional limits                 Bed Mobility Training  Bed Mobility Training: No  Balance  Sitting: Intact  Standing: High guard  Transfer Training  Transfer Training: No  Gait  Overall Level of Assistance: Contact-guard assistance;Stand-by assistance  Distance (ft): 250 Feet  Assistive Device: Gait belt  Bed mobility  Rolling to Left: Modified independent  Supine to Sit: Modified independent  Scooting: Modified independent  Transfers  Sit to Stand: Stand by assistance  Stand to Sit: Stand by assistance  Bed to Chair: Contact guard assistance  Ambulation  Surface: Level tile  Assistance: Contact guard assistance  Gait Deviations: Slow Mireya;Decreased step length;Decreased step height  Distance: 200'     Balance  Posture: Good  Sitting - Static: Good  Sitting - Dynamic: Good  Standing - Static: Good;-  Standing - Dynamic: Good;-                Goals  Short Term Goals  Time Frame for Short Term Goals: 10 days  Short Term Goal 1: Patient will perform all bed mobiltiy with independence. Short Term Goal 2: Patient will perform sit to stand and transfers with no more than SBA. Short Term Goal 3: Patient will ambulate 400' with no more than SBA. Short Term Goal 4: Patient will demonstrate independence with HEP.        Education  Patient Education  Education Given To: Patient  Education Provided: Role of Therapy;Plan of Care  Education Method: Verbal  Barriers to Learning: None  Education Outcome: Continued education needed      Therapy Time   Individual Concurrent Group Co-treatment   Time In 1048         Time Out 1112         Minutes 150 [facility], PT

## 2022-11-23 NOTE — PLAN OF CARE
Problem: Discharge Planning  Goal: Discharge to home or other facility with appropriate resources  Outcome: Progressing  Flowsheets (Taken 11/23/2022 6043)  Discharge to home or other facility with appropriate resources:   Identify barriers to discharge with patient and caregiver   Identify discharge learning needs (meds, wound care, etc)     Problem: Safety - Adult  Goal: Free from fall injury  11/23/2022 1107 by Madelyn Singleton RN  Outcome: Progressing  Flowsheets (Taken 11/23/2022 0838)  Free From Fall Injury: Instruct family/caregiver on patient safety  11/22/2022 2338 by Asuncion Denver, RN  Outcome: Progressing     Problem: ABCDS Injury Assessment  Goal: Absence of physical injury  11/23/2022 1107 by Madelyn Singleton RN  Outcome: Progressing  Flowsheets (Taken 11/23/2022 0838)  Absence of Physical Injury: Implement safety measures based on patient assessment  11/22/2022 2338 by Asuncion Denver, RN  Outcome: Progressing     Problem: Skin/Tissue Integrity  Goal: Absence of new skin breakdown  Description: 1. Monitor for areas of redness and/or skin breakdown  2. Assess vascular access sites hourly  3. Every 4-6 hours minimum:  Change oxygen saturation probe site  4. Every 4-6 hours:  If on nasal continuous positive airway pressure, respiratory therapy assess nares and determine need for appliance change or resting period.   11/23/2022 1107 by Madelyn Singleton RN  Outcome: Progressing  11/22/2022 2338 by Asuncion Denver, RN  Outcome: Progressing     Problem: Chronic Conditions and Co-morbidities  Goal: Patient's chronic conditions and co-morbidity symptoms are monitored and maintained or improved  Outcome: Progressing  Flowsheets (Taken 11/23/2022 0838)  Care Plan - Patient's Chronic Conditions and Co-Morbidity Symptoms are Monitored and Maintained or Improved: Monitor and assess patient's chronic conditions and comorbid symptoms for stability, deterioration, or improvement     Problem: Nutrition Deficit:  Goal: Optimize nutritional status  Outcome: Progressing

## 2022-11-24 VITALS
HEIGHT: 64 IN | WEIGHT: 144.7 LBS | DIASTOLIC BLOOD PRESSURE: 58 MMHG | HEART RATE: 57 BPM | OXYGEN SATURATION: 98 % | SYSTOLIC BLOOD PRESSURE: 168 MMHG | RESPIRATION RATE: 18 BRPM | TEMPERATURE: 97.9 F | BODY MASS INDEX: 24.7 KG/M2

## 2022-11-24 LAB
A/G RATIO: 1.6 (ref 0.8–2)
ALBUMIN SERPL-MCNC: 3.6 G/DL (ref 3.4–4.8)
ALP BLD-CCNC: 62 U/L (ref 25–100)
ALT SERPL-CCNC: 14 U/L (ref 4–36)
ANION GAP SERPL CALCULATED.3IONS-SCNC: 13 MMOL/L (ref 3–16)
AST SERPL-CCNC: 19 U/L (ref 8–33)
BILIRUB SERPL-MCNC: 0.6 MG/DL (ref 0.3–1.2)
BUN BLDV-MCNC: 16 MG/DL (ref 6–20)
CALCIUM SERPL-MCNC: 9.6 MG/DL (ref 8.5–10.5)
CHLORIDE BLD-SCNC: 102 MMOL/L (ref 98–107)
CO2: 23 MMOL/L (ref 20–30)
CREAT SERPL-MCNC: 1.2 MG/DL (ref 0.4–1.2)
GFR SERPL CREATININE-BSD FRML MDRD: 48 ML/MIN/{1.73_M2}
GLOBULIN: 2.3 G/DL
GLUCOSE BLD-MCNC: 100 MG/DL (ref 74–106)
GLUCOSE BLD-MCNC: 105 MG/DL (ref 74–106)
GLUCOSE BLD-MCNC: 132 MG/DL (ref 74–106)
HCT VFR BLD CALC: 27.1 % (ref 37–47)
HEMOGLOBIN: 8.1 G/DL (ref 11.5–16.5)
MCH RBC QN AUTO: 25.6 PG (ref 27–32)
MCHC RBC AUTO-ENTMCNC: 29.9 G/DL (ref 31–35)
MCV RBC AUTO: 85.8 FL (ref 80–100)
PDW BLD-RTO: 15.8 % (ref 11–16)
PERFORMED ON: ABNORMAL
PERFORMED ON: NORMAL
PLATELET # BLD: 115 K/UL (ref 150–400)
PMV BLD AUTO: 12.3 FL (ref 6–10)
POTASSIUM SERPL-SCNC: 3.6 MMOL/L (ref 3.4–5.1)
RBC # BLD: 3.16 M/UL (ref 3.8–5.8)
SODIUM BLD-SCNC: 138 MMOL/L (ref 136–145)
TOTAL PROTEIN: 5.9 G/DL (ref 6.4–8.3)
WBC # BLD: 4.2 K/UL (ref 4–11)

## 2022-11-24 PROCEDURE — A4216 STERILE WATER/SALINE, 10 ML: HCPCS | Performed by: PHYSICIAN ASSISTANT

## 2022-11-24 PROCEDURE — 85027 COMPLETE CBC AUTOMATED: CPT

## 2022-11-24 PROCEDURE — 80053 COMPREHEN METABOLIC PANEL: CPT

## 2022-11-24 PROCEDURE — 6360000002 HC RX W HCPCS: Performed by: PHYSICIAN ASSISTANT

## 2022-11-24 PROCEDURE — 36415 COLL VENOUS BLD VENIPUNCTURE: CPT

## 2022-11-24 PROCEDURE — 6370000000 HC RX 637 (ALT 250 FOR IP): Performed by: PHYSICIAN ASSISTANT

## 2022-11-24 PROCEDURE — C9113 INJ PANTOPRAZOLE SODIUM, VIA: HCPCS | Performed by: PHYSICIAN ASSISTANT

## 2022-11-24 PROCEDURE — 99239 HOSP IP/OBS DSCHRG MGMT >30: CPT | Performed by: INTERNAL MEDICINE

## 2022-11-24 PROCEDURE — 2580000003 HC RX 258: Performed by: PHYSICIAN ASSISTANT

## 2022-11-24 RX ORDER — PANTOPRAZOLE SODIUM 40 MG/1
40 TABLET, DELAYED RELEASE ORAL
Qty: 60 TABLET | Refills: 0 | Status: SHIPPED | OUTPATIENT
Start: 2022-11-24

## 2022-11-24 RX ORDER — LOSARTAN POTASSIUM 25 MG/1
25 TABLET ORAL DAILY
Status: DISCONTINUED | OUTPATIENT
Start: 2022-11-24 | End: 2022-11-24

## 2022-11-24 RX ORDER — FERROUS SULFATE 325(65) MG
325 TABLET ORAL 2 TIMES DAILY
Qty: 60 TABLET | Refills: 3 | Status: SHIPPED
Start: 2022-11-24

## 2022-11-24 RX ADMIN — SODIUM CHLORIDE 40 MG: 9 INJECTION, SOLUTION INTRAMUSCULAR; INTRAVENOUS; SUBCUTANEOUS at 01:34

## 2022-11-24 RX ADMIN — HYDROCODONE BITARTRATE AND ACETAMINOPHEN 1 TABLET: 5; 325 TABLET ORAL at 08:19

## 2022-11-24 RX ADMIN — FERROUS SULFATE TAB EC 324 MG (65 MG FE EQUIVALENT) 324 MG: 324 (65 FE) TABLET DELAYED RESPONSE at 08:19

## 2022-11-24 RX ADMIN — METFORMIN HYDROCHLORIDE 1000 MG: 500 TABLET ORAL at 08:19

## 2022-11-24 RX ADMIN — IRON SUCROSE 200 MG: 20 INJECTION, SOLUTION INTRAVENOUS at 11:17

## 2022-11-24 RX ADMIN — ALOGLIPTIN 12.5 MG: 12.5 TABLET, FILM COATED ORAL at 08:19

## 2022-11-24 RX ADMIN — ROSUVASTATIN CALCIUM 20 MG: 20 TABLET, COATED ORAL at 08:19

## 2022-11-24 RX ADMIN — LINACLOTIDE 290 MCG: 145 CAPSULE, GELATIN COATED ORAL at 06:35

## 2022-11-24 RX ADMIN — METOPROLOL SUCCINATE 25 MG: 25 TABLET, EXTENDED RELEASE ORAL at 08:20

## 2022-11-24 RX ADMIN — EMPAGLIFLOZIN 10 MG: 10 TABLET, FILM COATED ORAL at 08:20

## 2022-11-24 ASSESSMENT — PAIN DESCRIPTION - ORIENTATION: ORIENTATION: LOWER

## 2022-11-24 ASSESSMENT — PAIN SCALES - GENERAL: PAINLEVEL_OUTOF10: 10

## 2022-11-24 ASSESSMENT — PAIN DESCRIPTION - LOCATION: LOCATION: BACK

## 2022-11-24 NOTE — PLAN OF CARE
Problem: Discharge Planning  Goal: Discharge to home or other facility with appropriate resources  Outcome: Progressing  Flowsheets (Taken 11/24/2022 0819)  Discharge to home or other facility with appropriate resources:   Identify barriers to discharge with patient and caregiver   Identify discharge learning needs (meds, wound care, etc)     Problem: Safety - Adult  Goal: Free from fall injury  11/24/2022 1054 by Dinora Hilario RN  Outcome: Marlyn Blancas (Taken 11/24/2022 1052)  Free From Fall Injury: Instruct family/caregiver on patient safety  11/23/2022 2227 by Albert Dover RN  Outcome: Progressing     Problem: ABCDS Injury Assessment  Goal: Absence of physical injury  11/24/2022 1054 by Dinora Hilario RN  Outcome: Progressing  Flowsheets (Taken 11/24/2022 1052)  Absence of Physical Injury: Implement safety measures based on patient assessment  11/23/2022 2227 by Albert Dover RN  Outcome: Progressing     Problem: Skin/Tissue Integrity  Goal: Absence of new skin breakdown  Description: 1. Monitor for areas of redness and/or skin breakdown  2. Assess vascular access sites hourly  3. Every 4-6 hours minimum:  Change oxygen saturation probe site  4. Every 4-6 hours:  If on nasal continuous positive airway pressure, respiratory therapy assess nares and determine need for appliance change or resting period.   11/24/2022 1054 by Dinora Hilario RN  Outcome: Progressing  11/23/2022 2227 by Alebrt Dover RN  Outcome: Progressing     Problem: Chronic Conditions and Co-morbidities  Goal: Patient's chronic conditions and co-morbidity symptoms are monitored and maintained or improved  Outcome: Progressing  Flowsheets (Taken 11/24/2022 0819)  Care Plan - Patient's Chronic Conditions and Co-Morbidity Symptoms are Monitored and Maintained or Improved: Monitor and assess patient's chronic conditions and comorbid symptoms for stability, deterioration, or improvement     Problem: Nutrition Deficit:  Goal: Optimize nutritional status  Outcome: Progressing

## 2022-11-24 NOTE — PROGRESS NOTES
Pt discharged at this time. IV was removed with no complication and tip intact. Pt educated on new medications and when to take medications. Pt educated on pharmacy to  medications. Pt educated on follow up appointments. Pt verbalized understanding and had no further questions. Pt was stable at time of discharge. Pt taken by wheelchair to parking lot and left via private vehicle.

## 2022-11-24 NOTE — DISCHARGE SUMMARY
Discharge Summary      Patient ID: Jeannie Ahmadi      Patient's PCP: Samuel Berman MD    Admit Date: 11/22/2022     Discharge Date:   11/24/2022    Admitting Provider: Samuel Berman MD    Discharging Provider: VIGNESH Marie     Reason for this admission:   Anemia     Discharge Diagnoses: Active Hospital Problems    Diagnosis Date Noted    General weakness [R53.1] 11/23/2022    Pancytopenia ([facility] [address] [D61.818] 11/23/2022    Personal history of nicotine dependence [Z87.891] 03/18/2022    Cirrhosis ([facility] [address] [K74.60] 11/01/2021    Anemia [D64.9] 11/01/2021    CKD (chronic kidney disease) stage 3, GFR 30-59 ml/min (HCC) [N18.30] 04/27/2021    Thoracic aortic aneurysm without rupture [I71.20] 10/09/2019    Essential hypertension [I10] 08/12/2015    Type 2 diabetes mellitus with diabetic neuropathy ([facility] [address] [E11.40] 08/12/2015    CAD (coronary artery disease) [I25.10]     COPD (chronic obstructive pulmonary disease) ([facility] [address] [J44.9]     H/O: CVA (cerebrovascular accident) [Z86.73]        Procedures:  No orders to display         Consults:   IP CONSULT TO DIETITIAN  Pt/ot     Briefly:   Ms. Zahida Mcmullen is a 28-year-old female with past medical history of cirrhosis, CKD 3, thoracic aortic aneurysm, hypertension, diabetes, CAD, COPD, stroke, tobacco abuse, chronic anemia who was admitted for blood transfusion with hemoglobin 6.6. Hemoglobin is improved and patient is requesting discharge home. She is clinically stable and hemoglobin has remained stable following transfusion. See details of hospital course below. Hospital Course:       Generalized weakness [R53.1]  - b12, folate, tsh wnl when recently checked   - anemic and receiving blood as below  - patient reports chronic issue for her  - pt/ot consulted    11/23/2022    Pancytopenia ([address] [K26.956]  - suspect related to cirrhosis   - monitor   - transfuse as below. Hold ASA.   - repeat labs next week   - f/u with pcp and GI    11/23/2022    Personal history of nicotine dependence [Y54.465]  - Patient was strongly encouraged to stop smoking. Risks of continued smoking and benefits of smoking cessation were discussed. She is not interested in quitting at this time. - nicotine patch ordered while admitted    03/18/2022    Cirrhosis ([facility] [address] [K74.60]  - follow up with GI as outpatient   - declines lactulose due to excessive diarrhea when she takes it   - continue PPI and BB    11/01/2021    Anemia [D64.9]  - Hgb 6.6 on arrival to ER   - transfused 2 units   - b12 and folate wnl  - Iron deficient. venofer infusions ordered and increased po iron frequency at [facility].   - on protonix BID- provided refill since it is unclear if she has at home   - hold ASA and lovenox   - hemoccult +   - per chart review patient with history of anemia and has underwent scopes with Dr. Kwabena Ly and also had pillcam study. Per GI note and PCP note most recent colonoscopy with multiple polyps and angiectasia. Pillcam showed few AVMs this may be reachable only with push enteroscopy , retrograde enteroscopy. If any overt bleeding she must be transferred to [facility] rectal center for push enteroscopy. - Hgb 8.1 11/23 and 8 on 11/24   - patient requesting discharge home today. Discussed risks vs benefits of continuing ASA. She does have history of PVD, CAD, and CVA. Previously on brilinta and ASA per chart review and brilinta discontinued by GI several months ago. At this time will hold aspirin until she has repeat blood work next week and PCP/GI follow up. She can resume ASA if recommended by GI or PCP. Also discussed importance of her making an appointment with GI for follow up. Phone number written for her in dc instructions per her request and she agrees to calling them when the office is open for an appointment.      11/01/2021    CKD (chronic kidney disease) stage 3, GFR 30-59 ml/min ([facility]) [N18.30]  - renal function at baseline with Cr 1.2-1.4  - avoid nephrotoxic agent as able   - monitor    04/27/2021    Thoracic aortic aneurysm without rupture [I71.20]  - CT scan of the chest 11/1/2021 Aneurysmal dilatation of the ascending aorta measuring up to 4 cm.  - Make sure blood pressure, lipid profile controlled   - continue statin, anti hypertensives. ASA on hold for now as above. 10/09/2019    Essential hypertension [I10]   - continue home regimen   - BP slightly elevated prior to home meds this AM however previous readings 145/43   08/12/2015    Type 2 diabetes mellitus with diabetic neuropathy ([facility] [E11.40]  - continue home regimen   - hypoglycemia protocol ordered   - A1C 7.3    - f/u with pcp   - encourage diabetic diet compliance    08/12/2015       CAD (coronary artery disease) [I25.10]  - ASA on hold with anemia. Continue statin. No CP.   - f/u with cardiology as scheduled         COPD (chronic obstructive pulmonary disease) ([facility] [J44.9]  - no exacerbation         H/O: CVA (cerebrovascular accident) [Z86.73]  - continue statin. ASA on hold as above. Consider resuming ASA if hgb stable or improved. - chronic dysarthric speech and weakness per patient report following her stroke                Vital Signs  Temp: 97.9 °F (36.6 °C)  Heart Rate: 57  Resp: 18  BP: (!) 168/58  SpO2: 98 %  O2 Device: None (Room air)       Vital signs reviewed in electronic chart. Physical exam  Constitutional:  Well developed, well nourished, no acute distress  Eyes:  PERRL, no scleral icterus, conjunctiva normal   HENT:  Atraumatic, external ears normal, nose normal, oropharynx moist, no pharyngeal exudates. Neck- supple, no JVD, no lymphadenopathy  Respiratory:  No respiratory distress on RA, no wheezing, rales or rhonchi detected  Cardiovascular:  Normal rate, normal rhythm, no murmurs, no gallops, no rubs, no edema   GI:  Soft, nondistended, normal bowel sounds, nontender, no voluntary guarding  Musculoskeletal:  No cyanosis or obvious acute deformity. Moving all extremities with generalized weakness throughout.   Integument:  Warm FOOD  Qty: 180 tablet, Refills: 2      linagliptin (TRADJENTA) 5 MG tablet TAKE 1 TABLET BY MOUTH EVERY DAY  Qty: 90 tablet, Refills: 0      rosuvastatin (CRESTOR) 20 MG tablet TAKE 1 TABLET BY MOUTH EVERY DAY  Qty: 120 tablet, Refills: 0      LEVEMIR FLEXTOUCH 100 UNIT/ML injection pen INJECT 55 UNITS UNDER SKIN NIGHTLY AS DIRECTED  Qty: 45 mL, Refills: 3      metoprolol succinate (TOPROL XL) 25 MG extended release tablet take 1 tablet by mouth once daily  Qty: 30 tablet, Refills: 3    Associated Diagnoses: Essential hypertension      lactulose (CHRONULAC) 10 GM/15ML solution TAKE 15 ML BY MOUTH DAILY  Qty: 1350 mL, Refills: 0    Comments: **Patient requests 90 days supply**      bisacodyl (BISACODYL) 5 MG EC tablet Take 1 tablet by mouth daily as needed for Constipation  Qty: 30 tablet, Refills: 1      umeclidinium-vilanterol (ANORO ELLIPTA) 62.5-25 MCG/INH AEPB inhaler Inhale 1 puff into the lungs daily  Qty: 1 each, Refills: 0      furosemide (LASIX) 20 MG tablet TAKE 1 TABLET BY MOUTH DAILY AS NEEDED FOR SWELLING  Qty: 30 tablet, Refills: 2      linaclotide (LINZESS) 290 MCG CAPS capsule Take 1 capsule by mouth every morning (before breakfast)  Qty: 30 capsule, Refills: 2      Insulin Pen Needle 31G X 6 MM MISC 1 each by Does not apply route daily  Qty: 100 each, Refills: 3      isosorbide mononitrate (IMDUR) 60 MG extended release tablet TAKE 1 TABLET BY MOUTH EVERY DAY  Qty: 30 tablet, Refills: 2      blood glucose monitor strips QID Dx E11.9  Qty: 100 strip, Refills: 5    Comments: accucheck      glucose monitoring kit (FREESTYLE) monitoring kit 1 kit by Does not apply route daily E11.40  Qty: 1 kit, Refills: 0      nitroGLYCERIN (NITROSTAT) 0.4 MG SL tablet Place 1 tablet under the tongue every 5 minutes as needed for Chest pain  Qty: 25 tablet, Refills: 1      Insulin Syringe-Needle U-100 (B-D INS SYR ULTRAFINE 1CC/31G) 31G X 5/16\" 1 ML MISC USE ONCE A DAY AS DIRECTED  DX E11.9  Qty: 100 each, Refills: 3 Patient was seen and examined with Dr. Renetta Wick. After reviewing patient data and diagnostic testing the plan of care was established in conjunction with Dr. Renetta Wick. Signed:  Electronically signed by VIGNESH Howell on 11/24/2022 at 10:43 AM       Thank you Heather Thomas MD for the opportunity to be involved in this patient's care. If you have any questions or concerns please feel free to contact me at [phone].

## 2022-11-24 NOTE — FLOWSHEET NOTE
11/24/22 0819   Assessment   Charting Type Shift assessment   Psychosocial   Psychosocial (WDL) WDL   Neurological   Neuro (WDL) WDL   Level of Consciousness 0   [place] Coma Scale   Eye Opening 4   Best Verbal Response 5   Best Motor Response 6   [place] Coma Scale Score 15   Cranial Nerves   Facial Symmetry Within functional limits   HEENT (Head, Ears, Eyes, Nose, & Throat)   HEENT (WDL) X   Right Eye Glasses   Left Eye Glasses   Teeth Dentures upper   Respiratory   Respiratory (WDL) X   Respiratory Interventions Cough & deep breathe;H. O.B. elevated   Respiratory Pattern Regular   Respiratory Depth Normal   Respiratory Quality/Effort Unlabored   Chest Assessment Chest expansion symmetrical   L Breath Sounds Clear;Diminished   R Breath Sounds Diminished;Clear   Breath Sounds   Right Upper Lobe Clear   Right Middle Lobe Clear   Right Lower Lobe Clear;Diminished   Left Upper Lobe Clear   Left Lower Lobe Clear;Diminished   Cardiac   Cardiac (WDL) WDL   Cardiac Rhythm Sinus rhythm   Rhythm Interpretation   Heart Rate 57   Cardiac Monitor   Telemetry Box Number [id number]   Telemetry Monitor Alarm Parameters    Gastrointestinal   Abdominal (WDL) WDL   RUQ Bowel Sounds Active   LUQ Bowel Sounds Active   RLQ Bowel Sounds Active   LLQ Bowel Sounds Active   Genitourinary   Genitourinary (WDL) WDL   Dysuria (Pain/Burning w/Urination) No   Peripheral Vascular   Peripheral Vascular (WDL) WDL   Edema None   Skin Integumentary    Skin Integumentary (WDL) X   Skin Color Pale   Skin Condition/Temp Dry; Warm   Skin Integrity Ecchymosis   Location right arm   Wound Prevention/Protection Method No   Multiple Skin Integrity Sites No   Care Plan - Skin/Tissue Integrity Goals   Skin Integrity Remains Intact Monitor for areas of redness and/or skin breakdown   Musculoskeletal   Musculoskeletal (WDL) WDL   Care Plan - Chronic Conditions and Co-Morbidity   Care Plan - Patient's Chronic Conditions and Co-Morbidity Symptoms are Monitored and Maintained or Improved Monitor and assess patient's chronic conditions and comorbid symptoms for stability, deterioration, or improvement   Care Plan - Discharge Planning Goals   Discharge to home or other facility with appropriate resources Identify barriers to discharge with patient and caregiver; Identify discharge learning needs (meds, wound care, etc)   Pt is alert and oriented. Pt is resting in bed and appears to have no acute distress. Pt currently on room air. Pt currently on telemetry monitoring showing NSR. Pt's lung sounds are clear and diminished. Pt encouraged to cough and deep breathe. Pt call bell and bedside table within reach.

## 2022-11-24 NOTE — PLAN OF CARE
Problem: Discharge Planning  Goal: Discharge to home or other facility with appropriate resources  11/23/2022 1107 by Blade Umanzor RN  Outcome: Progressing  Flowsheets (Taken 11/23/2022 5105)  Discharge to home or other facility with appropriate resources:   Identify barriers to discharge with patient and caregiver   Identify discharge learning needs (meds, wound care, etc)     Problem: Safety - Adult  Goal: Free from fall injury  11/23/2022 2227 by Lissette Keating RN  Outcome: Progressing  11/23/2022 1107 by Blade Umanzor RN  Outcome: Progressing  Flowsheets (Taken 11/23/2022 0838)  Free From Fall Injury: Instruct family/caregiver on patient safety     Problem: ABCDS Injury Assessment  Goal: Absence of physical injury  11/23/2022 2227 by Lissette Keating RN  Outcome: Progressing  11/23/2022 1107 by Blade Umanzor RN  Outcome: Progressing  Flowsheets (Taken 11/23/2022 5918)  Absence of Physical Injury: Implement safety measures based on patient assessment     Problem: Skin/Tissue Integrity  Goal: Absence of new skin breakdown  Description: 1. Monitor for areas of redness and/or skin breakdown  2. Assess vascular access sites hourly  3. Every 4-6 hours minimum:  Change oxygen saturation probe site  4. Every 4-6 hours:  If on nasal continuous positive airway pressure, respiratory therapy assess nares and determine need for appliance change or resting period.   11/23/2022 2227 by Lissette Keating RN  Outcome: Progressing  11/23/2022 1107 by Blade Umanzor RN  Outcome: Progressing     Problem: Chronic Conditions and Co-morbidities  Goal: Patient's chronic conditions and co-morbidity symptoms are monitored and maintained or improved  11/23/2022 1107 by Blade Umanzor RN  Outcome: Progressing  Flowsheets (Taken 11/23/2022 5269)  Care Plan - Patient's Chronic Conditions and Co-Morbidity Symptoms are Monitored and Maintained or Improved: Monitor and assess patient's chronic conditions and comorbid symptoms for stability, deterioration, or improvement     Problem: Nutrition Deficit:  Goal: Optimize nutritional status  11/23/2022 1107 by Blade Umanzor RN  Outcome: Progressing

## 2022-11-24 NOTE — DISCHARGE INSTRUCTIONS
Disposition: home  Discharged Condition: Stable  Activity: activity as tolerated  Diet: cardiac diet and diabetic diet  Follow Up: Primary Care Provider in 1 week. Follow up with GI in 1-2 weeks (patient will need to call and make this appointment at [phone]). Do not start taking aspirin again until directed to by your primary care provider or GI.   Patient to proceed with the following lab (cbc, bmp) on 11/30    Hold linzess if having diarrhea

## 2022-11-24 NOTE — FLOWSHEET NOTE
11/23/22 2144   Assessment   Charting Type Shift assessment   Psychosocial   Psychosocial (WDL) WDL   Neurological   Neuro (WDL) WDL   Level of Consciousness 0   R Pupil Size (mm) 3   R Pupil Shape Round   R Pupil Reaction Brisk   L Pupil Size (mm) 3   L Pupil Shape Round   L Pupil Reaction Brisk   R Hand  Weak   L Hand  Weak   R Foot Dorsiflexion Moderate   L Foot Dorsiflexion Moderate   R Foot Plantar Flexion Moderate   L Foot Plantar Flexion Moderate   Des Coma Scale   Eye Opening 4   Best Verbal Response 5   Cranial Nerves   Facial Symmetry Within functional limits   HEENT (Head, Ears, Eyes, Nose, & Throat)   HEENT (WDL) X   Right Eye Glasses   Left Eye Glasses   Teeth Dentures upper   Respiratory   Respiratory (WDL) X   Respiratory Pattern Regular   Respiratory Depth Normal   Respiratory Quality/Effort Unlabored   Chest Assessment Chest expansion symmetrical   L Breath Sounds Clear;Diminished   R Breath Sounds Diminished;Clear   Breath Sounds   Right Upper Lobe Clear   Right Middle Lobe Clear   Right Lower Lobe Clear;Diminished   Left Upper Lobe Clear   Left Lower Lobe Clear;Diminished   Cardiac   Cardiac (WDL) WDL   Cardiac Rhythm Sinus rhythm   Cardiac Monitor   Telemetry Box Number [id number]   Telemetry Monitor Alarm Parameters    Gastrointestinal   Abdominal (WDL) WDL   RUQ Bowel Sounds Active   LUQ Bowel Sounds Active   RLQ Bowel Sounds Active   LLQ Bowel Sounds Active   Genitourinary   Genitourinary (WDL) WDL   Dysuria (Pain/Burning w/Urination) No   Urine Assessment   Urine Color Yellow/straw   Urine Appearance Clear   Urine Odor No odor   Peripheral Vascular   Peripheral Vascular (WDL) WDL   Edema None   Skin Integumentary    Skin Integumentary (WDL) X   Skin Color Pale   Skin Condition/Temp Dry; Warm   Skin Integrity Ecchymosis   Musculoskeletal   Musculoskeletal (WDL) WDL   Handoff   Communication Given Shift Handoff   Handoff Given To [address] [place] [address] Received From [employer] Face to Face   Time Handoff Given 2480   End of Shift Check Performed Yes

## 2022-11-24 NOTE — PROGRESS NOTES
Pt is being discharged today. Needs Venofer prior to discharge. Per Tobin Valiente will be given this morning.

## 2022-11-28 ENCOUNTER — CARE COORDINATION (OUTPATIENT)
Dept: CARE COORDINATION | Age: 72
End: 2022-11-28

## 2022-11-28 NOTE — CARE COORDINATION
[facility] Care Transitions Initial Follow Up Call    Call within 2 business days of discharge: Yes    Care Transition Nurse contacted the family by telephone to perform post hospital discharge assessment. Verified name and  with family as identifiers. Provided introduction to self, and explanation of the Care Transition Nurse role. Patient: Godwin Reyes Patient : 1950   MRN: [id number]  Reason for Admission: Anemia  Discharge Date: 22 RARS: Readmission Risk Score: 14.8      Last Discharge  Street       Date Complaint Diagnosis Description Type Department Provider    22 Fatigue; Rectal Bleeding; Shortness of Breath General weakness . .. ED to Hosp-Admission (Discharged) (ADMITTED) RAMSES Arreola MD; Carmen Chi,... Was this an external facility discharge? No Discharge Facility: [facility]    Challenges to be reviewed by the provider   Additional needs identified to be addressed with provider: No  none               Method of communication with provider: none. Attempts to reach Gen pierce were unsuccessful. Daughter was able to verify that Gen pierce remains somewhat tired but is definitely doing better. States that her mother's phone is sometimes heart to get a signal.  She is not aware of any medication needs. We discussed her HFU appointment and daughter will continue to try to reach her. Care Transition Nurse reviewed discharge instructions with family who verbalized understanding. The family was given an opportunity to ask questions and does not have any further questions or concerns at this time. Were discharge instructions available to patient? Yes. Reviewed appropriate site of care based on symptoms and resources available to patient including: PCP. The family agrees to contact the PCP office for questions related to their healthcare. Advance Care Planning:   Does patient have an Advance Directive: not on file.     Medication reconciliation was performed with family, who verbalizes understanding of administration of home medications. Medications reviewed, 1111F entered: N/A    Was patient discharged with a pulse oximeter? no    Non-face-to-face services provided:  Scheduled appointment with PCPTerrance  Obtained and reviewed discharge summary and/or continuity of care documents    Offered patient enrollment in the Remote Patient Monitoring (RPM) program for in-home monitoring: NA.    Care Transitions 24 Hour Call    Schedule Follow Up Appointment with PCP: Completed  Do you have a copy of your discharge instructions?: Yes  Do you have all of your prescriptions and are they filled?: Yes (Comment: per daughter)  Have you been contacted by a [facility] Pharmacist?: No  Have you scheduled your follow up appointment?: Yes (Comment: 11/29/22)  How are you going to get to your appointment?: Car - family or friend to transport  Do you feel like you have everything you need to keep you well at home?: Yes  Care Transitions Interventions         Follow Up  Future Appointments   Date Time Provider Kayla Roberts   11/29/2022 10:30 AM MD Marisa Barfield  [facility]   12/20/2022  1:00 PM Bere Eason MD [address] [place] [address] Transition Nurse provided contact information. Plan for follow-up call in 3-5 days based on severity of symptoms and risk factors.   Plan for next call: symptom management-david Aponte, RN

## 2022-11-29 ENCOUNTER — HOSPITAL ENCOUNTER (OUTPATIENT)
Facility: HOSPITAL | Age: 72
Discharge: HOME | End: 2022-11-29
Payer: MEDICARE

## 2022-11-29 ENCOUNTER — OFFICE VISIT (OUTPATIENT)
Dept: PRIMARY CARE CLINIC | Age: 72
End: 2022-11-29

## 2022-11-29 VITALS
WEIGHT: 143.8 LBS | BODY MASS INDEX: 24.68 KG/M2 | RESPIRATION RATE: 18 BRPM | SYSTOLIC BLOOD PRESSURE: 136 MMHG | DIASTOLIC BLOOD PRESSURE: 62 MMHG | HEART RATE: 62 BPM | OXYGEN SATURATION: 99 %

## 2022-11-29 DIAGNOSIS — K74.60 CIRRHOSIS OF LIVER WITHOUT ASCITES, UNSPECIFIED HEPATIC CIRRHOSIS TYPE (HCC): ICD-10-CM

## 2022-11-29 DIAGNOSIS — E11.40 TYPE 2 DIABETES MELLITUS WITH DIABETIC NEUROPATHY, WITH LONG-TERM CURRENT USE OF INSULIN (HCC): ICD-10-CM

## 2022-11-29 DIAGNOSIS — D64.9 ANEMIA, UNSPECIFIED TYPE: Primary | ICD-10-CM

## 2022-11-29 DIAGNOSIS — N18.30 CHRONIC RENAL FAILURE, STAGE 3 (MODERATE), UNSPECIFIED WHETHER STAGE 3A OR 3B CKD (HCC): ICD-10-CM

## 2022-11-29 DIAGNOSIS — E53.8 VITAMIN B12 DEFICIENCY: ICD-10-CM

## 2022-11-29 DIAGNOSIS — D61.818 PANCYTOPENIA (HCC): ICD-10-CM

## 2022-11-29 DIAGNOSIS — N18.30 STAGE 3 CHRONIC KIDNEY DISEASE, UNSPECIFIED WHETHER STAGE 3A OR 3B CKD (HCC): ICD-10-CM

## 2022-11-29 DIAGNOSIS — Z79.4 TYPE 2 DIABETES MELLITUS WITH DIABETIC NEUROPATHY, WITH LONG-TERM CURRENT USE OF INSULIN (HCC): ICD-10-CM

## 2022-11-29 DIAGNOSIS — I71.21 ANEURYSM OF ASCENDING AORTA WITHOUT RUPTURE: ICD-10-CM

## 2022-11-29 DIAGNOSIS — Z09 HOSPITAL DISCHARGE FOLLOW-UP: ICD-10-CM

## 2022-11-29 DIAGNOSIS — I10 ESSENTIAL HYPERTENSION: ICD-10-CM

## 2022-11-29 LAB
ANION GAP SERPL CALCULATED.3IONS-SCNC: 16 MMOL/L (ref 3–16)
BUN BLDV-MCNC: 20 MG/DL (ref 6–20)
CALCIUM SERPL-MCNC: 9.7 MG/DL (ref 8.5–10.5)
CHLORIDE BLD-SCNC: 95 MMOL/L (ref 98–107)
CO2: 24 MMOL/L (ref 20–30)
CREAT SERPL-MCNC: 1.3 MG/DL (ref 0.4–1.2)
GFR SERPL CREATININE-BSD FRML MDRD: 44 ML/MIN/{1.73_M2}
GLUCOSE BLD-MCNC: 196 MG/DL (ref 74–106)
HCT VFR BLD CALC: 33.5 % (ref 37–47)
HEMOGLOBIN: 10.3 G/DL (ref 11.5–16.5)
MCH RBC QN AUTO: 26.3 PG (ref 27–32)
MCHC RBC AUTO-ENTMCNC: 30.7 G/DL (ref 31–35)
MCV RBC AUTO: 85.5 FL (ref 80–100)
PDW BLD-RTO: 18.6 % (ref 11–16)
PLATELET # BLD: 158 K/UL (ref 150–400)
PMV BLD AUTO: 12.2 FL (ref 6–10)
POTASSIUM SERPL-SCNC: 3.5 MMOL/L (ref 3.4–5.1)
RBC # BLD: 3.92 M/UL (ref 3.8–5.8)
SODIUM BLD-SCNC: 135 MMOL/L (ref 136–145)
WBC # BLD: 5.7 K/UL (ref 4–11)

## 2022-11-29 PROCEDURE — 85027 COMPLETE CBC AUTOMATED: CPT

## 2022-11-29 PROCEDURE — 80048 BASIC METABOLIC PNL TOTAL CA: CPT

## 2022-11-29 RX ORDER — CYANOCOBALAMIN 1000 UG/ML
1000 INJECTION, SOLUTION INTRAMUSCULAR; SUBCUTANEOUS ONCE
Status: COMPLETED | OUTPATIENT
Start: 2022-11-29 | End: 2022-11-29

## 2022-11-29 RX ADMIN — CYANOCOBALAMIN 1000 MCG: 1000 INJECTION, SOLUTION INTRAMUSCULAR; SUBCUTANEOUS at 11:47

## 2022-11-29 ASSESSMENT — ENCOUNTER SYMPTOMS
VOMITING: 0
ABDOMINAL PAIN: 0
SINUS PRESSURE: 0
BACK PAIN: 0
SHORTNESS OF BREATH: 0
EYE DISCHARGE: 0
NAUSEA: 0
COUGH: 0
SORE THROAT: 0
WHEEZING: 0

## 2022-11-29 NOTE — PROGRESS NOTES
Chief Complaint   Patient presents with    Follow-Up from Hospital       Have you seen any other physician or provider since your last visit yes -     Have you had any other diagnostic tests since your last visit? yes -     Have you changed or stopped any medications since your last visit?  yes -

## 2022-11-29 NOTE — PROGRESS NOTES
Post-Discharge Transitional Care Management Services or Hospital Follow Up      Cathy Nieto   YOB: 1950    Date of Office Visit:  11/29/2022  Date of Hospital Admission: 11/22/22  Date of Hospital Discharge: 11/24/22  Readmission Risk Score(high >=14%. Medium >=10%):Readmission Risk Score: 14.8      Care management risk score Rising risk (score 2-5) and Complex Care (Scores >=6): No Risk Score On File     Non face to face  following discharge, date last encounter closed (first attempt may have been earlier):   11/28/2022 11/28/2022    Call initiated 2 business days of discharge: Yes     Patient Active Problem List   Diagnosis    CAD (coronary artery disease)    COPD (chronic obstructive pulmonary disease) ([facility]    H/O: CVA (cerebrovascular accident)    Tobacco abuse    Vitamin B12 deficiency    Essential hypertension    Type 2 diabetes mellitus with diabetic neuropathy ([address]    History of colon polyps    Status post bilateral knee replacements    Degenerative disc disease, lumbar    Family history of breast cancer    Abnormal CT of the chest    Thoracic aortic aneurysm without rupture    Anxiety    Anemia due to stage 3 chronic kidney disease (HCC)    Iron deficiency anemia    CKD (chronic kidney disease) stage 3, GFR 30-59 ml/min (HCC)    Anemia    Cirrhosis ([address]    AVM (arteriovenous malformation) of small bowel, acquired    Declining functional status    Acute renal failure (ARF) ([address]    Personal history of nicotine dependence    General weakness    Pancytopenia (HCC)       Allergies   Allergen Reactions    Codeine        Medications listed as ordered at the time of discharge from hospital     Medication List            Accurate as of November 29, 2022 11:23 AM. If you have any questions, ask your nurse or doctor.                 CHANGE how you take these medications      Anoro Ellipta 62.5-25 MCG/ACT Aepb  Generic drug: Umeclidinium-Vilanterol  Inhale 1 puff into the lungs daily  What changed:   when to take this  reasons to take this            CONTINUE taking these medications      bisacodyl 5 MG EC tablet  Generic drug: bisacodyl  Take 1 tablet by mouth daily as needed for Constipation     blood glucose test strips  QID Dx E11.9     ferrous sulfate 325 (65 Fe) MG tablet  Commonly known as: IRON 325  Take 1 tablet by mouth in the morning and at bedtime     furosemide 20 MG tablet  Commonly known as: LASIX  TAKE 1 TABLET BY MOUTH DAILY AS NEEDED FOR SWELLING     glucose monitoring kit  1 kit by Does not apply route daily E11.40     HYDROcodone-acetaminophen 5-325 MG per tablet  Commonly known as: NORCO  Take 1 tablet by mouth 2 times daily as needed for Pain for up to 30 days.      Insulin Pen Needle 31G X 6 MM Misc  1 each by Does not apply route daily     Insulin Syringe-Needle U-100 31G X 5/16\" 1 ML Misc  Commonly known as: B-D INS SYR ULTRAFINE 1CC/31G  USE ONCE A DAY AS DIRECTED  DX E11.9     isosorbide mononitrate 60 MG extended release tablet  Commonly known as: IMDUR  TAKE 1 TABLET BY MOUTH EVERY DAY     Jardiance 10 MG tablet  Generic drug: empagliflozin  TAKE 1 TABLET BY MOUTH DAILY     lactulose 10 GM/15ML solution  Commonly known as: CHRONULAC  TAKE 15 ML BY MOUTH DAILY     Levemir FlexTouch 100 UNIT/ML injection pen  Generic drug: insulin detemir  INJECT 55 UNITS UNDER SKIN NIGHTLY AS DIRECTED     linagliptin 5 MG tablet  Commonly known as: Tradjenta  TAKE 1 TABLET BY MOUTH EVERY DAY     Linzess 290 MCG Caps capsule  Generic drug: linaclotide  Take 1 capsule by mouth every morning (before breakfast)     metFORMIN 1000 MG tablet  Commonly known as: GLUCOPHAGE  TAKE 1 TABLET BY MOUTH TWICE DAILY WITH FOOD     metoprolol succinate 25 MG extended release tablet  Commonly known as: TOPROL XL  take 1 tablet by mouth once daily     nitroGLYCERIN 0.4 MG SL tablet  Commonly known as: Nitrostat  Place 1 tablet under the tongue every 5 minutes as needed for Chest pain     pantoprazole 40 MG tablet  Commonly known as: PROTONIX  Take 1 tablet by mouth 2 times daily (before meals)     rosuvastatin 20 MG tablet  Commonly known as: CRESTOR  TAKE 1 TABLET BY MOUTH EVERY DAY                Medications marked \"taking\" at this time  Outpatient Medications Marked as Taking for the 11/29/22 encounter (Office Visit) with Jones Jones MD   Medication Sig Dispense Refill    ferrous sulfate (IRON 325) 325 (65 Fe) MG tablet Take 1 tablet by mouth in the morning and at bedtime 60 tablet 3    pantoprazole (PROTONIX) 40 MG tablet Take 1 tablet by mouth 2 times daily (before meals) 60 tablet 0    HYDROcodone-acetaminophen (NORCO) 5-325 MG per tablet Take 1 tablet by mouth 2 times daily as needed for Pain for up to 30 days.  30 tablet 0    JARDIANCE 10 MG tablet TAKE 1 TABLET BY MOUTH DAILY 30 tablet 3    metFORMIN (GLUCOPHAGE) 1000 MG tablet TAKE 1 TABLET BY MOUTH TWICE DAILY WITH FOOD 180 tablet 2    linagliptin (TRADJENTA) 5 MG tablet TAKE 1 TABLET BY MOUTH EVERY DAY 90 tablet 0    rosuvastatin (CRESTOR) 20 MG tablet TAKE 1 TABLET BY MOUTH EVERY  tablet 0    LEVEMIR FLEXTOUCH 100 UNIT/ML injection pen INJECT 55 UNITS UNDER SKIN NIGHTLY AS DIRECTED 45 mL 3    metoprolol succinate (TOPROL XL) 25 MG extended release tablet take 1 tablet by mouth once daily 30 tablet 3    lactulose (CHRONULAC) 10 GM/15ML solution TAKE 15 ML BY MOUTH DAILY 1350 mL 0    bisacodyl (BISACODYL) 5 MG EC tablet Take 1 tablet by mouth daily as needed for Constipation 30 tablet 1    umeclidinium-vilanterol (ANORO ELLIPTA) 62.5-25 MCG/INH AEPB inhaler Inhale 1 puff into the lungs daily (Patient taking differently: Inhale 1 puff into the lungs as needed) 1 each 0    furosemide (LASIX) 20 MG tablet TAKE 1 TABLET BY MOUTH DAILY AS NEEDED FOR SWELLING 30 tablet 2    linaclotide (LINZESS) 290 MCG CAPS capsule Take 1 capsule by mouth every morning (before breakfast) 30 capsule 2    Insulin Pen Needle 31G X 6 MM MISC 1 each by Does not apply route daily 100 each 3    isosorbide mononitrate (IMDUR) 60 MG extended release tablet TAKE 1 TABLET BY MOUTH EVERY DAY 30 tablet 2    blood glucose monitor strips QID Dx E11.9 100 strip 5    glucose monitoring kit (FREESTYLE) monitoring kit 1 kit by Does not apply route daily E11.40 1 kit 0    nitroGLYCERIN (NITROSTAT) 0.4 MG SL tablet Place 1 tablet under the tongue every 5 minutes as needed for Chest pain 25 tablet 1    Insulin Syringe-Needle U-100 (B-D INS SYR ULTRAFINE 1CC/31G) 31G X 5/16\" 1 ML MISC USE ONCE A DAY AS DIRECTED  DX E11.9 100 each 3        Medications patient taking as of now reconciled against medications ordered at time of hospital discharge: Yes    Chief Complaint   Patient presents with    Follow-Up from Hospital       HPI  Patient presents to the office today for HFU. Patient was admitted recently related to fatigue and weakness. Noted to be profoundly anemic with hemoglobin of 6.6. Patient received 2 unit of packed red blood cells and her hemoglobin has improved. Patient with a prior history of multiple colon polyp as well as angiectasia. Also found to have AVM on her prior PillCam study. Patient felt better and was discharged in stable condition. Patient reported been doing better. Still have some decreased energy but much better compared to before. Reported no bleeding, melena or hematochezia. She continues to smoke and not interested in quitting at this time. She has been compliant with taking medication as prescribed. Review of Systems   Constitutional:  Positive for fatigue. Negative for chills and fever. HENT:  Negative for congestion, sinus pressure and sore throat. Eyes:  Negative for discharge and visual disturbance. Respiratory:  Negative for cough, shortness of breath and wheezing. Cardiovascular:  Negative for chest pain and palpitations. Gastrointestinal:  Negative for abdominal pain, nausea and vomiting.    Endocrine: Negative for cold intolerance and heat intolerance. Genitourinary:  Negative for dysuria, frequency and urgency. Musculoskeletal:  Negative for arthralgias and back pain. Skin:  Negative for rash and wound. Neurological:  Positive for weakness. Negative for syncope, numbness and headaches. Hematological: Negative. Psychiatric/Behavioral:  Negative for agitation and sleep disturbance. The patient is not nervous/anxious. Vitals:    11/29/22 1047   BP: 136/62   Pulse: 62   Resp: 18   SpO2: 99%   Weight: 143 lb 12.8 oz (65.2 kg)     Body mass index is 24.68 kg/m². Wt Readings from Last 3 Encounters:   11/29/22 143 lb 12.8 oz (65.2 kg)   11/22/22 144 lb 11.2 oz (65.6 kg)   10/27/22 143 lb (64.9 kg)     BP Readings from Last 3 Encounters:   11/29/22 136/62   11/24/22 (!) 168/58   10/04/22 130/74       Physical Exam  Vitals and nursing note reviewed. Constitutional:       Appearance: Normal appearance. She is well-developed. HENT:      Head: Normocephalic and atraumatic. Right Ear: External ear normal.      Left Ear: External ear normal.      Nose: Nose normal.      Mouth/Throat:      Mouth: Mucous membranes are moist.      Pharynx: Oropharynx is clear. Eyes:      Conjunctiva/sclera: Conjunctivae normal.      Pupils: Pupils are equal, round, and reactive to light. Neck:      Thyroid: No thyromegaly. Vascular: No JVD. Cardiovascular:      Rate and Rhythm: Normal rate and regular rhythm. Heart sounds: Normal heart sounds. Pulmonary:      Effort: Pulmonary effort is normal.      Breath sounds: Normal breath sounds. No wheezing or rales. Abdominal:      General: Bowel sounds are normal. There is no distension. Palpations: Abdomen is soft. Tenderness: There is no abdominal tenderness. Musculoskeletal:         General: No tenderness. Cervical back: Neck supple. No rigidity. No muscular tenderness. Right lower leg: No edema. Left lower leg: No edema. Skin:     Findings: No erythema or rash. Neurological:      General: No focal deficit present. Mental Status: She is alert and oriented to person, place, and time. Psychiatric:         Behavior: Behavior normal.         Judgment: Judgment normal.       Lab Results   Component Value Date/Time     11/24/2022 04:48 AM    K 3.6 11/24/2022 04:48 AM    K 3.7 11/23/2022 05:24 AM     11/24/2022 04:48 AM    CO2 23 11/24/2022 04:48 AM    GLUCOSE 100 11/24/2022 04:48 AM    BUN 16 11/24/2022 04:48 AM    CREATININE 1.2 11/24/2022 04:48 AM    CALCIUM 9.6 11/24/2022 04:48 AM    PROT 5.9 11/24/2022 04:48 AM    LABALBU 3.6 11/24/2022 04:48 AM    BILITOT 0.6 11/24/2022 04:48 AM    ALT 14 11/24/2022 04:48 AM    AST 19 11/24/2022 04:48 AM       Hemoglobin A1C (%)   Date Value   11/22/2022 7.3 (H)     Microscopic Examination (no units)   Date Value   03/17/2022 YES     LDL Calculated (mg/dL)   Date Value   05/10/2022 26         Lab Results   Component Value Date/Time    WBC 4.2 11/24/2022 04:48 AM    NEUTROABS 2.5 11/23/2022 05:24 AM    HGB 8.1 11/24/2022 04:48 AM    HCT 27.1 11/24/2022 04:48 AM    MCV 85.8 11/24/2022 04:48 AM     11/24/2022 04:48 AM       Lab Results   Component Value Date    TSH 3.00 10/04/2022       Assessment/Plan:  1. Anemia, unspecified type  Status posttransfusion of 2 units while she was at the hospital.  U07 and folic acid within normal limit. Noted to be iron deficient and Venofer infusion was done while inpatient. - per chart review patient with history of anemia and has underwent scopes with Dr. Cathay Primrose and also had pillcam study. Per GI note and PCP note most recent colonoscopy with multiple polyps and angiectasia. Pillcam showed few AVMs this may be reachable only with push enteroscopy , retrograde enteroscopy. If any overt bleeding she must be transferred to [facility] center for push enteroscopy. Continue GI prophylaxis. Continue to monitor hemoglobin level closely. Proceed with checking hemoglobin level today. Further recommendation based on test result. It is somewhat concerning her profound anemia with a prior history of telangiectasia and AVM. Used to be on Brilinta and aspirin. Brilinta was discontinued by gastroenterology several months ago. Currently aspirin still on hold till making sure the stability of hemoglobin level. Proceed with referral to GI    - External Referral To Gastroenterology    2. Type 2 diabetes mellitus with diabetic neuropathy, with long-term current use of insulin ([facility]  I had a long discussion with her regarding diabetic diet, exercise and weight control. I am going to continue current medication. I advised her to monitor her fingerstick closely. I am going to check the A1c every few months. I will check Microalbumin on a yearly basis. I have also advised her to have a yearly eye exam and to monitor her feet closely. Advice her about the complications from diabetes, even with good control. I am going to have her follow up on a regular bases with our diabetic educator. A1C will be checked in few months. Not at goal but acceptable to avoid any potential hypoglycemia especially with not been monitoring fingersticks on a regular basis. Lab Results   Component Value Date    LABA1C 7.3 (H) 11/22/2022     3. Essential hypertension  BP is stable. I have advised her on low-sodium diet, exercise and weight control. I am going to continue current medication. Will monitor her renal function every few months, have advised her to check blood pressure frequently and to keep a record of this. 4. Cirrhosis of liver without ascites, unspecified hepatic cirrhosis type ([facility]  Try to avoid any hepatotoxic agent. Monitor liver test periodically. Referral to GI. Make sure other medical problems under good control.  - External Referral To Gastroenterology    5. Aneurysm of ascending aorta without rupture  4.1 cm aneurysm in the ascending thoracic aorta.   Make sure blood pressure and lipid profile under good control. Long conversation regarding smoking cessation. Monitor on a yearly basis. Due in October 2023. 6. Chronic renal failure, stage 3 (moderate), unspecified whether stage 3a or 3b CKD (HCC)  Baseline. I have advised her to avoid any nephrotoxic agents. I am going to monitor renal function periodically. I will make sure that the blood pressure and other medical problems are under good control. Will refer to nephrology if the renal function should begin to deteriorate. Creatinine will be checked today in few months. Last Creatinine is   Lab Results   Component Value Date    CREATININE 1.3 (H) 11/29/2022     7. Vitamin B12 deficiency  Will cont on B12 injection on a monthly basis. 2 [facility] discharge follow-up  Reviewed records from recent hospital stay including blood work, discharge summary as well as recent CT scan of the chest prior to this admission. Complicated/high risk with her multiple medical problems including history of CVA, CAD and PVD as well as profound anemia and cirrhosis. - OK DISCHARGE MEDS RECONCILED W/ CURRENT OUTPATIENT MED LIST      Medical Decision Making: high complexity    Inpatient course: Discharge summary reviewed- see chart. Crystal Zhang MA am scribing for and in the presence of Tiffanie Zhang MD on this date of 11/29/22 at 11:24 AM    I, Dr. Tiffanie Zhang, personally performed the services described in the documentation as scribed by Lillian Summers MA, in my presence and it is both accurate and complete.

## 2022-12-05 DIAGNOSIS — M51.36 DEGENERATIVE DISC DISEASE, LUMBAR: ICD-10-CM

## 2022-12-05 RX ORDER — HYDROCODONE BITARTRATE AND ACETAMINOPHEN 5; 325 MG/1; MG/1
1 TABLET ORAL 2 TIMES DAILY PRN
Qty: 30 TABLET | Refills: 0 | Status: SHIPPED | OUTPATIENT
Start: 2022-12-05 | End: 2023-01-04

## 2022-12-20 ENCOUNTER — OFFICE VISIT (OUTPATIENT)
Dept: PRIMARY CARE CLINIC | Age: 72
End: 2022-12-20
Payer: MEDICARE

## 2022-12-20 VITALS
OXYGEN SATURATION: 99 % | RESPIRATION RATE: 18 BRPM | BODY MASS INDEX: 24.37 KG/M2 | SYSTOLIC BLOOD PRESSURE: 138 MMHG | WEIGHT: 142 LBS | DIASTOLIC BLOOD PRESSURE: 64 MMHG | HEART RATE: 63 BPM

## 2022-12-20 DIAGNOSIS — E11.40 TYPE 2 DIABETES MELLITUS WITH DIABETIC NEUROPATHY, WITH LONG-TERM CURRENT USE OF INSULIN (HCC): ICD-10-CM

## 2022-12-20 DIAGNOSIS — D64.9 ANEMIA, UNSPECIFIED TYPE: Primary | ICD-10-CM

## 2022-12-20 DIAGNOSIS — I10 ESSENTIAL HYPERTENSION: ICD-10-CM

## 2022-12-20 DIAGNOSIS — N18.30 CHRONIC RENAL FAILURE, STAGE 3 (MODERATE), UNSPECIFIED WHETHER STAGE 3A OR 3B CKD (HCC): ICD-10-CM

## 2022-12-20 DIAGNOSIS — Z79.4 TYPE 2 DIABETES MELLITUS WITH DIABETIC NEUROPATHY, WITH LONG-TERM CURRENT USE OF INSULIN (HCC): ICD-10-CM

## 2022-12-20 PROCEDURE — G8427 DOCREV CUR MEDS BY ELIG CLIN: HCPCS | Performed by: INTERNAL MEDICINE

## 2022-12-20 PROCEDURE — G8420 CALC BMI NORM PARAMETERS: HCPCS | Performed by: INTERNAL MEDICINE

## 2022-12-20 PROCEDURE — 99214 OFFICE O/P EST MOD 30 MIN: CPT | Performed by: INTERNAL MEDICINE

## 2022-12-20 PROCEDURE — 3017F COLORECTAL CA SCREEN DOC REV: CPT | Performed by: INTERNAL MEDICINE

## 2022-12-20 PROCEDURE — 1123F ACP DISCUSS/DSCN MKR DOCD: CPT | Performed by: INTERNAL MEDICINE

## 2022-12-20 PROCEDURE — 1090F PRES/ABSN URINE INCON ASSESS: CPT | Performed by: INTERNAL MEDICINE

## 2022-12-20 PROCEDURE — 4004F PT TOBACCO SCREEN RCVD TLK: CPT | Performed by: INTERNAL MEDICINE

## 2022-12-20 PROCEDURE — 3074F SYST BP LT 130 MM HG: CPT | Performed by: INTERNAL MEDICINE

## 2022-12-20 PROCEDURE — 3051F HG A1C>EQUAL 7.0%<8.0%: CPT | Performed by: INTERNAL MEDICINE

## 2022-12-20 PROCEDURE — G8399 PT W/DXA RESULTS DOCUMENT: HCPCS | Performed by: INTERNAL MEDICINE

## 2022-12-20 PROCEDURE — G8484 FLU IMMUNIZE NO ADMIN: HCPCS | Performed by: INTERNAL MEDICINE

## 2022-12-20 PROCEDURE — 1111F DSCHRG MED/CURRENT MED MERGE: CPT | Performed by: INTERNAL MEDICINE

## 2022-12-20 PROCEDURE — 2022F DILAT RTA XM EVC RTNOPTHY: CPT | Performed by: INTERNAL MEDICINE

## 2022-12-20 PROCEDURE — 3078F DIAST BP <80 MM HG: CPT | Performed by: INTERNAL MEDICINE

## 2022-12-20 ASSESSMENT — ENCOUNTER SYMPTOMS
SORE THROAT: 0
SHORTNESS OF BREATH: 0
SINUS PRESSURE: 0
BACK PAIN: 1
NAUSEA: 0
WHEEZING: 0
COUGH: 0
ABDOMINAL PAIN: 0
VOMITING: 0
EYE DISCHARGE: 0

## 2022-12-20 NOTE — PROGRESS NOTES
Chief Complaint   Patient presents with    Diabetes    Anemia            Levemir 35 nightly  Fs range 79 this morning goes up and down per pt     Have you seen any other physician or provider since your last visit no    Have you had any other diagnostic tests since your last visit? no    Have you changed or stopped any medications since your last visit? no

## 2022-12-20 NOTE — PROGRESS NOTES
SUBJECTIVE:    Patient ID: Merrill Almonte is a 81 y. o.female. Chief Complaint   Patient presents with    Diabetes    Anemia                HPI:  Patient with hx of anemia. Patient denies any bleeding, hematemesis, or melena. Patient energy is unchanged from before. She is currently on Iron replacement. Patient has had diabetes for the past several years. She has been compliant with the medications and denies any side effects from it. She has been monitoring fingersticks on a daily basis. Her fingerstick this morning was 79. She denies any hypoglycemic symptoms. She has been following a diabetic diet and has not been active. Her last eye exam was more than a year ago. She is using oral meds and Insulin. She is on Levemir 35 untis nightly. BP is stable    Patient's medications, allergies, past medical, surgical, social and family histories were reviewed and updated as appropriate in electronic medical record. Outpatient Medications Marked as Taking for the 12/20/22 encounter (Office Visit) with Chaitanya Cedeño MD   Medication Sig Dispense Refill    HYDROcodone-acetaminophen (NORCO) 5-325 MG per tablet Take 1 tablet by mouth 2 times daily as needed for Pain for up to 30 days.  30 tablet 0    ferrous sulfate (IRON 325) 325 (65 Fe) MG tablet Take 1 tablet by mouth in the morning and at bedtime 60 tablet 3    pantoprazole (PROTONIX) 40 MG tablet Take 1 tablet by mouth 2 times daily (before meals) 60 tablet 0    JARDIANCE 10 MG tablet TAKE 1 TABLET BY MOUTH DAILY 30 tablet 3    metFORMIN (GLUCOPHAGE) 1000 MG tablet TAKE 1 TABLET BY MOUTH TWICE DAILY WITH FOOD 180 tablet 2    linagliptin (TRADJENTA) 5 MG tablet TAKE 1 TABLET BY MOUTH EVERY DAY 90 tablet 0    rosuvastatin (CRESTOR) 20 MG tablet TAKE 1 TABLET BY MOUTH EVERY  tablet 0    LEVEMIR FLEXTOUCH 100 UNIT/ML injection pen INJECT 55 UNITS UNDER SKIN NIGHTLY AS DIRECTED 45 mL 3    metoprolol succinate (TOPROL XL) 25 MG extended release tablet take 1 tablet by mouth once daily 30 tablet 3    lactulose (CHRONULAC) 10 GM/15ML solution TAKE 15 ML BY MOUTH DAILY 1350 mL 0    bisacodyl (BISACODYL) 5 MG EC tablet Take 1 tablet by mouth daily as needed for Constipation 30 tablet 1    umeclidinium-vilanterol (ANORO ELLIPTA) 62.5-25 MCG/INH AEPB inhaler Inhale 1 puff into the lungs daily (Patient taking differently: Inhale 1 puff into the lungs as needed) 1 each 0    furosemide (LASIX) 20 MG tablet TAKE 1 TABLET BY MOUTH DAILY AS NEEDED FOR SWELLING 30 tablet 2    linaclotide (LINZESS) 290 MCG CAPS capsule Take 1 capsule by mouth every morning (before breakfast) 30 capsule 2    Insulin Pen Needle 31G X 6 MM MISC 1 each by Does not apply route daily 100 each 3    isosorbide mononitrate (IMDUR) 60 MG extended release tablet TAKE 1 TABLET BY MOUTH EVERY DAY 30 tablet 2    blood glucose monitor strips QID Dx E11.9 100 strip 5    glucose monitoring kit (FREESTYLE) monitoring kit 1 kit by Does not apply route daily E11.40 1 kit 0    nitroGLYCERIN (NITROSTAT) 0.4 MG SL tablet Place 1 tablet under the tongue every 5 minutes as needed for Chest pain 25 tablet 1    Insulin Syringe-Needle U-100 (B-D INS SYR ULTRAFINE 1CC/31G) 31G X 5/16\" 1 ML MISC USE ONCE A DAY AS DIRECTED  DX E11.9 100 each 3        Review of Systems   Constitutional:  Positive for fatigue. Negative for chills and fever. HENT:  Negative for congestion, sinus pressure and sore throat. Eyes:  Negative for discharge and visual disturbance. Respiratory:  Negative for cough, shortness of breath and wheezing. Cardiovascular:  Negative for chest pain and palpitations. CAZARES   Gastrointestinal:  Negative for abdominal pain, nausea and vomiting. Endocrine: Negative for cold intolerance and heat intolerance. Genitourinary:  Negative for dysuria, frequency and urgency. Musculoskeletal:  Positive for arthralgias and back pain. Skin:  Negative for rash and wound.    Neurological:  Positive for weakness. Negative for syncope, numbness and headaches. Hematological: Negative. Psychiatric/Behavioral:  Negative for agitation and sleep disturbance. The patient is not nervous/anxious. Past Medical History:   Diagnosis Date    CAD (coronary artery disease)     Cirrhosis (HCC)     COPD (chronic obstructive pulmonary disease) (HCC)     Cystic fibrosis (HCC)     Diabetes mellitus (HCC)     GERD (gastroesophageal reflux disease)     H/O: CVA (cardiovascular accident)     HTN (hypertension)     Mass     on chest     Tobacco abuse     Vitamin B12 deficiency      Past Surgical History:   Procedure Laterality Date    CARDIAC CATHETERIZATION      CARPAL TUNNEL RELEASE Bilateral     CHOLECYSTECTOMY      CORONARY ANGIOPLASTY WITH STENT PLACEMENT      HYSTERECTOMY (CERVIX STATUS UNKNOWN)      JOINT REPLACEMENT Bilateral 10/15/2016    knees    TOTAL KNEE ARTHROPLASTY Bilateral 10/18/2016    at [facility]     Family History   Problem Relation Age of Onset    Diabetes Mother     High Blood Pressure Mother     Cancer Mother         pancreatic    Diabetes Father     Heart Disease Father     High Blood Pressure Father     Breast Cancer Sister     Cancer Sister         lung, breast    Cancer Brother         throat      Social History     Tobacco Use   Smoking Status Every Day    Packs/day: 1.00    Years: 40.00    Pack years: 40.00    Types: Cigarettes   Smokeless Tobacco Never   Tobacco Comments    states she is not willing to stop and this is a stress reliever for her. OBJECTIVE:   Wt Readings from Last 3 Encounters:   12/20/22 142 lb (64.4 kg)   11/29/22 143 lb 12.8 oz (65.2 kg)   11/22/22 144 lb 11.2 oz (65.6 kg)     BP Readings from Last 3 Encounters:   12/20/22 138/64   11/29/22 136/62   11/24/22 (!) 168/58       /64   Pulse 63   Resp 18   Wt 142 lb (64.4 kg)   SpO2 99%   BMI 24.37 kg/m²      Physical Exam  Vitals and nursing note reviewed. Constitutional:       Appearance: Normal appearance.  She is well-developed. HENT:      Head: Normocephalic and atraumatic. Right Ear: External ear normal.      Left Ear: External ear normal.      Nose: Nose normal.      Mouth/Throat:      Mouth: Mucous membranes are moist.      Pharynx: Oropharynx is clear. Eyes:      Conjunctiva/sclera: Conjunctivae normal.      Pupils: Pupils are equal, round, and reactive to light. Neck:      Thyroid: No thyromegaly. Vascular: No JVD. Cardiovascular:      Rate and Rhythm: Normal rate and regular rhythm. Heart sounds: Murmur heard. Systolic murmur is present. Pulmonary:      Effort: Pulmonary effort is normal.      Breath sounds: Normal breath sounds. No wheezing or rales. Abdominal:      General: Bowel sounds are normal. There is no distension. Palpations: Abdomen is soft. Tenderness: There is no abdominal tenderness. Musculoskeletal:         General: No tenderness. Cervical back: Neck supple. No rigidity. No muscular tenderness. Right lower leg: No edema. Left lower leg: No edema. Skin:     Findings: No erythema or rash. Neurological:      General: No focal deficit present. Mental Status: She is alert and oriented to person, place, and time.    Psychiatric:         Behavior: Behavior normal.         Judgment: Judgment normal.       Lab Results   Component Value Date/Time     11/29/2022 11:46 AM    K 3.5 11/29/2022 11:46 AM    K 3.7 11/23/2022 05:24 AM    CL 95 11/29/2022 11:46 AM    CO2 24 11/29/2022 11:46 AM    GLUCOSE 196 11/29/2022 11:46 AM    BUN 20 11/29/2022 11:46 AM    CREATININE 1.3 11/29/2022 11:46 AM    CALCIUM 9.7 11/29/2022 11:46 AM    PROT 5.9 11/24/2022 04:48 AM    LABALBU 3.6 11/24/2022 04:48 AM    BILITOT 0.6 11/24/2022 04:48 AM    ALT 14 11/24/2022 04:48 AM    AST 19 11/24/2022 04:48 AM       Hemoglobin A1C (%)   Date Value   11/22/2022 7.3 (H)     Microscopic Examination (no units)   Date Value   03/17/2022 YES     LDL Calculated (mg/dL)   Date Value 05/10/2022 26         Lab Results   Component Value Date/Time    WBC 5.7 11/29/2022 11:46 AM    NEUTROABS 2.5 11/23/2022 05:24 AM    HGB 10.3 11/29/2022 11:46 AM    HCT 33.5 11/29/2022 11:46 AM    MCV 85.5 11/29/2022 11:46 AM     11/29/2022 11:46 AM       Lab Results   Component Value Date    TSH 3.00 10/04/2022         ASSESSMENT/PLAN:     1. Anemia, unspecified type  Status post recent blood transfusion and iron infusion while she was at the hospital about a month ago. E98 and folic acid within normal limit. - per chart review patient with history of anemia and has underwent scopes with Dr. Betzaida Mera and also had pillcam study. Per GI note and PCP note most recent colonoscopy with multiple polyps and angiectasia. Pillcam showed few AVMs this may be reachable only with push enteroscopy , retrograde enteroscopy. If any overt bleeding she must be transferred to [facility] for push enteroscopy. Continue GI prophylaxis. Continue to monitor hemoglobin level closely. Proceed with checking hemoglobin level in few weeks. Further recommendation based on test result. It is somewhat concerning her profound anemia with a prior history of telangiectasia and AVM. Used to be on Brilinta and aspirin. Brilinta was discontinued by gastroenterology several months ago. Currently aspirin still on hold till making sure the stability of hemoglobin level. Proceed with referral to GI    After lengthy conversation with the patient. She elected to start back on aspirin every other day for now and monitor hemoglobin level closely. Obviously this need to be discontinued permanently if hemoglobin trended down again. Discussed the risk/benefit from taking or not taking the aspirin given her history of bleed/CAD/prior CVA specially with her ongoing smoking.    - CBC with Auto Differential; Future  - Comprehensive Metabolic Panel; Future    2.  Type 2 diabetes mellitus with diabetic neuropathy, with long-term current use of insulin (HCC)  A1c is slightly elevated and not at goal but acceptable for her especially with not been checking fingersticks on a regular basis. I had long conversation with the patient regarding monitoring fingersticks closely since need to get her diabetes under tighter control specially with her multiple and complicated medical history including CAD, history of CVA and PVD. I advised her regarding diabetic diet, exercise and weight control. Also, I advised her to stay on the current medication, monitor her fingerstick closely. I am going to check the A1c every few months. I will check microalbumin on a yearly basis. I have also advised her to have a yearly eye exam and to monitor her feet closely. Along with instruction of possible complications related to diabetes, even with good control. A1C will be checked in few months. Lab Results   Component Value Date    LABA1C 7.3 (H) 11/22/2022     - CBC with Auto Differential; Future  - Comprehensive Metabolic Panel; Future  - Hemoglobin A1C; Future    3. Essential hypertension  BP is stable. I have advised her on low-sodium diet, exercise and weight control. I am going to continue current medication. Will monitor her renal function every few months, have advised her to check blood pressure frequently and to keep a record of this. - CBC with Auto Differential; Future  - Comprehensive Metabolic Panel; Future    4. Chronic renal failure, stage 3 (moderate), unspecified whether stage 3a or 3b CKD ([facility])  Baseline. I have advised her to avoid any nephrotoxic agents. I am going to monitor renal function periodically. I will make sure that the blood pressure and other medical problems are under good control. Will refer to nephrology if the renal function should begin to deteriorate. Creatinine will be checked in few months.  Last Creatinine is   Lab Results   Component Value Date    CREATININE 1.3 (H) 11/29/2022       Art PUENTES MA am scribing for and in the presence of Emeli Dobbs MD on this date of 12/20/22 at 1:53 PM    I, Dr. Emeli Dobbs, personally performed the services described in the documentation as scribed by Demetra Ventura MA, in my presence and it is both accurate and complete.

## 2023-01-09 DIAGNOSIS — M51.36 DEGENERATIVE DISC DISEASE, LUMBAR: ICD-10-CM

## 2023-01-09 RX ORDER — HYDROCODONE BITARTRATE AND ACETAMINOPHEN 5; 325 MG/1; MG/1
1 TABLET ORAL 2 TIMES DAILY PRN
Qty: 30 TABLET | Refills: 0 | Status: SHIPPED | OUTPATIENT
Start: 2023-01-09 | End: 2023-02-08

## 2023-01-24 RX ORDER — EMPAGLIFLOZIN 10 MG/1
TABLET, FILM COATED ORAL DAILY
Qty: 30 TABLET | Refills: 3 | Status: SHIPPED | OUTPATIENT
Start: 2023-01-24

## 2023-02-03 ENCOUNTER — OFFICE VISIT (OUTPATIENT)
Dept: CARDIOLOGY | Facility: CLINIC | Age: 73
End: 2023-02-03
Payer: MEDICARE

## 2023-02-03 VITALS
HEART RATE: 86 BPM | DIASTOLIC BLOOD PRESSURE: 64 MMHG | WEIGHT: 143 LBS | BODY MASS INDEX: 26.31 KG/M2 | HEIGHT: 62 IN | SYSTOLIC BLOOD PRESSURE: 124 MMHG | OXYGEN SATURATION: 99 %

## 2023-02-03 DIAGNOSIS — I25.119 CORONARY ARTERY DISEASE INVOLVING NATIVE CORONARY ARTERY OF NATIVE HEART WITH ANGINA PECTORIS: Primary | ICD-10-CM

## 2023-02-03 DIAGNOSIS — Z79.4 TYPE 2 DIABETES MELLITUS WITHOUT COMPLICATION, WITH LONG-TERM CURRENT USE OF INSULIN: Chronic | ICD-10-CM

## 2023-02-03 DIAGNOSIS — E11.9 TYPE 2 DIABETES MELLITUS WITHOUT COMPLICATION, WITH LONG-TERM CURRENT USE OF INSULIN: Chronic | ICD-10-CM

## 2023-02-03 DIAGNOSIS — E78.5 HYPERLIPIDEMIA LDL GOAL <70: ICD-10-CM

## 2023-02-03 DIAGNOSIS — D50.0 ANEMIA DUE TO GI BLOOD LOSS: ICD-10-CM

## 2023-02-03 DIAGNOSIS — F17.200 CURRENT SMOKER: ICD-10-CM

## 2023-02-03 PROCEDURE — 99214 OFFICE O/P EST MOD 30 MIN: CPT | Performed by: INTERNAL MEDICINE

## 2023-02-03 RX ORDER — METOPROLOL SUCCINATE 25 MG/1
25 TABLET, EXTENDED RELEASE ORAL DAILY
Qty: 90 TABLET | Refills: 3 | Status: SHIPPED | OUTPATIENT
Start: 2023-02-03

## 2023-02-03 RX ORDER — LANOLIN ALCOHOL/MO/W.PET/CERES
1000 CREAM (GRAM) TOPICAL DAILY
COMMUNITY

## 2023-02-03 RX ORDER — ROSUVASTATIN CALCIUM 20 MG/1
20 TABLET, COATED ORAL DAILY
Qty: 90 TABLET | Refills: 3 | Status: SHIPPED | OUTPATIENT
Start: 2023-02-03

## 2023-02-03 NOTE — PROGRESS NOTES
Cardiology Outpatient Visit      Identification: Karol Lopez is a 72 y.o. female who resides in [place], [place].    Reason for visit:  Coronary artery disease involving native coronary artery of (7 month follow up)    Assessment     Problem List Items Addressed This Visit        Cardiology Problems    Coronary artery disease involving native coronary artery of native heart with angina pectoris (HCC) - Primary (Chronic)    Overview     · Echo (09/27/2012):  LVEF 60% to 65%.  No significant valve abnormality  · Cardiac catheterization for abnormal stress (11/09/2012): HEIDI to proximal LAD.  LVEF 60%.  · Myocardial perfusion study (09/10/2015):  No reversible ischemia.  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 Estrella  · Colonoscopy demonstrated multiple polyps and angio ectasia  · PillCam showed few AVMs in the mid ileum.  Push enteroscopy recommended if bleeding continues         Relevant Medications    vitamin B-12 (CYANOCOBALAMIN) 1000 MCG tablet    Current smoker    Current Assessment & Plan     · Smoking cessation recommended, patient declined            Plan   • Smoking cessation strongly recommended  • No changes in medical therapy      Follow-up   Return in about 1 year (around  "2/3/2024).        Ander Newell returns to the office today.  She was hospitalized in [place] with blood loss anemia (again).  She received 3 pints of blood.  She underwent push enteroscopy earlier last year and was found to have multiple AVMs in the ileum.  She was taken off aspirin and is on no anticoagulation presently.    Patient admits occasional chest pain symptoms which are not different than in the past.  She continues to smoke cigarettes.  She is not interested in smoking cessation.    Review of Systems   Cardiovascular: Negative.    Respiratory: Negative.        Current Outpatient Medications   Medication Instructions   • B-D INS SYRINGE 0.5CC/31GX5/16 31G X 5/16\" 0.5 ML misc use as directed once daily   • bisacodyl (DULCOLAX) 5 mg, Oral, Daily PRN   • empagliflozin (JARDIANCE) 10 mg, Oral, Daily   • FeroSul 325 mg, Oral, Daily With Breakfast   • furosemide (LASIX) 20 mg, Oral, Daily PRN, swelling   • HYDROcodone-acetaminophen (NORCO) 5-325 MG per tablet 1 tablet, Oral, Every 8 Hours PRN   • Insulin Pen Needle 31G X 6 MM misc 1 each, Does not apply   • Insulin Syringe-Needle U-100 30G X 5/16\" 0.5 ML misc 1 each by Does not apply route daily   • Insulin Syringe-Needle U-100 31G X 5/16\" 1 ML misc 1 each by Does not apply route daily   • lactulose (CHRONULAC) 10 g, Oral, Daily   • linaclotide (LINZESS) 290 mcg, Oral, Every Morning Before Breakfast   • linagliptin (TRADJENTA) 5 mg, Oral, Daily   • metFORMIN (GLUCOPHAGE) 1,000 mg, Oral, 2 Times Daily With Meals   • metoprolol succinate XL (TOPROL-XL) 25 mg, Oral, Daily   • pantoprazole (PROTONIX) 40 MG EC tablet TAKE 1 TABLET BY MOUTH TWICE DAILY BEFORE MEALS   • rosuvastatin (CRESTOR) 20 mg, Oral, Daily   • umeclidinium-vilanterol (Anoro Ellipta) 62.5-25 MCG/INH aerosol powder  inhaler 1 puff, Inhalation, As Needed   • vitamin B-12 (CYANOCOBALAMIN) 1,000 mcg, Oral, Daily       Objective     /64 (BP Location: Right arm, Patient Position: Sitting, " "Cuff Size: Adult)   Pulse 86   Ht 157.5 cm (62\")   Wt 64.9 kg (143 lb)   SpO2 99%   BMI 26.16 kg/m²       Constitutional:       Appearance: Healthy appearance. Well-developed.   Eyes:      General: Lids are normal. No scleral icterus.  HENT:      Head: Normocephalic and atraumatic.   Neck:      Thyroid: No thyroid mass.      Vascular: No carotid bruit or JVD. JVD normal.   Pulmonary:      Effort: Pulmonary effort is normal.      Breath sounds: Normal breath sounds.   Cardiovascular:      Normal rate. Regular rhythm.      Murmurs: There is no murmur.      No gallop.   Musculoskeletal:      Extremities: No clubbing present.Skin:     General: Skin is warm and dry. There is no cyanosis.   Neurological:      General: No focal deficit present.      Mental Status: Alert.   Psychiatric:         Attention and Perception: Attention normal.         Behavior: Behavior normal. Behavior is cooperative.         Result Review  (reviewed with patient):            Lab Results   Component Value Date    GLUCOSE 230 (H) 07/01/2022    BUN 13 07/01/2022    CREATININE 1.01 (H) 07/01/2022    EGFRIFNONA 41 (L) 01/24/2022    BCR 12.9 07/01/2022    K 3.9 07/01/2022    CO2 25.0 07/01/2022    CALCIUM 10.1 07/01/2022    PROTENTOTREF 6.7 07/01/2022    ALBUMIN 4.30 07/01/2022    LABIL2 1.8 07/01/2022    AST 43 (H) 07/01/2022    ALT 48 (H) 07/01/2022     Lab Results   Component Value Date    WBC 5.7 11/29/2022    HGB 10.3 (L) 11/29/2022    HCT 33.5 (L) 11/29/2022    MCV 85.5 11/29/2022     11/29/2022     Lab Results   Component Value Date    CHLPL 86 05/10/2022    TRIG 102 05/10/2022    HDL 40 05/10/2022    LDL 26 05/10/2022     Lab Results   Component Value Date    HGBA1C 7.3 (H) 11/22/2022       Advance Care Planning   ACP discussion was held with the patient during this visit. Patient does not have an advance directive, information provided.       Fidencio Celestin MD, FACC, [facility]  2/3/2023  "

## 2023-02-03 NOTE — ASSESSMENT & PLAN NOTE
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

## 2023-02-06 DIAGNOSIS — M51.36 DEGENERATIVE DISC DISEASE, LUMBAR: ICD-10-CM

## 2023-02-06 RX ORDER — HYDROCODONE BITARTRATE AND ACETAMINOPHEN 5; 325 MG/1; MG/1
1 TABLET ORAL 2 TIMES DAILY PRN
Qty: 30 TABLET | Refills: 0 | Status: SHIPPED | OUTPATIENT
Start: 2023-02-06 | End: 2023-03-08

## 2023-03-08 DIAGNOSIS — M51.36 DEGENERATIVE DISC DISEASE, LUMBAR: ICD-10-CM

## 2023-03-08 RX ORDER — HYDROCODONE BITARTRATE AND ACETAMINOPHEN 5; 325 MG/1; MG/1
1 TABLET ORAL 2 TIMES DAILY PRN
Qty: 30 TABLET | Refills: 0 | Status: SHIPPED | OUTPATIENT
Start: 2023-03-08 | End: 2023-04-07

## 2023-03-13 ENCOUNTER — HOSPITAL ENCOUNTER (OUTPATIENT)
Facility: HOSPITAL | Age: 73
Discharge: HOME | End: 2023-03-13
Payer: MEDICARE

## 2023-03-13 DIAGNOSIS — Z00.00 INITIAL MEDICARE ANNUAL WELLNESS VISIT: ICD-10-CM

## 2023-03-13 DIAGNOSIS — D64.9 ANEMIA, UNSPECIFIED TYPE: ICD-10-CM

## 2023-03-13 DIAGNOSIS — Z79.4 TYPE 2 DIABETES MELLITUS WITH DIABETIC NEUROPATHY, WITH LONG-TERM CURRENT USE OF INSULIN (HCC): ICD-10-CM

## 2023-03-13 DIAGNOSIS — E11.40 TYPE 2 DIABETES MELLITUS WITH DIABETIC NEUROPATHY, WITH LONG-TERM CURRENT USE OF INSULIN (HCC): ICD-10-CM

## 2023-03-13 DIAGNOSIS — I10 ESSENTIAL HYPERTENSION: ICD-10-CM

## 2023-03-13 LAB
A/G RATIO: 2 (ref 0.8–2)
ALBUMIN SERPL-MCNC: 4.3 G/DL (ref 3.4–4.8)
ALP BLD-CCNC: 119 U/L (ref 25–100)
ALT SERPL-CCNC: 24 U/L (ref 4–36)
ANION GAP SERPL CALCULATED.3IONS-SCNC: 12 MMOL/L (ref 3–16)
AST SERPL-CCNC: 36 U/L (ref 8–33)
BASOPHILS ABSOLUTE: 0 K/UL (ref 0–0.1)
BASOPHILS RELATIVE PERCENT: 0.5 %
BILIRUB SERPL-MCNC: 0.4 MG/DL (ref 0.3–1.2)
BUN BLDV-MCNC: 16 MG/DL (ref 6–20)
CALCIUM SERPL-MCNC: 9.9 MG/DL (ref 8.5–10.5)
CHLORIDE BLD-SCNC: 103 MMOL/L (ref 98–107)
CO2: 27 MMOL/L (ref 20–30)
CREAT SERPL-MCNC: 0.9 MG/DL (ref 0.4–1.2)
CREATININE URINE: 71.8 MG/DL (ref 1.5–300)
EOSINOPHILS ABSOLUTE: 0 K/UL (ref 0–0.4)
EOSINOPHILS RELATIVE PERCENT: 0.7 %
FERRITIN: 25.1 NG/ML (ref 22–322)
GFR SERPL CREATININE-BSD FRML MDRD: >60 ML/MIN/{1.73_M2}
GLOBULIN: 2.2 G/DL
GLUCOSE BLD-MCNC: 90 MG/DL (ref 74–106)
HBA1C MFR BLD: 6.1 %
HCT VFR BLD CALC: 34.6 % (ref 37–47)
HEMOGLOBIN: 11.3 G/DL (ref 11.5–16.5)
IMMATURE GRANULOCYTES #: 0 K/UL
IMMATURE GRANULOCYTES %: 0.2 % (ref 0–5)
IRON SATURATION: 22 % (ref 15–50)
IRON: 77 UG/DL (ref 37–145)
LYMPHOCYTES ABSOLUTE: 1 K/UL (ref 1.5–4)
LYMPHOCYTES RELATIVE PERCENT: 22.6 %
MCH RBC QN AUTO: 30.3 PG (ref 27–32)
MCHC RBC AUTO-ENTMCNC: 32.7 G/DL (ref 31–35)
MCV RBC AUTO: 92.8 FL (ref 80–100)
MICROALBUMIN UR-MCNC: 34.2 MG/DL (ref 0–22)
MICROALBUMIN/CREAT UR-RTO: 476.3 MG/G (ref 0–30)
MONOCYTES ABSOLUTE: 0.4 K/UL (ref 0.2–0.8)
MONOCYTES RELATIVE PERCENT: 8.1 %
NEUTROPHILS ABSOLUTE: 3 K/UL (ref 2–7.5)
NEUTROPHILS RELATIVE PERCENT: 67.9 %
PDW BLD-RTO: 13.5 % (ref 11–16)
PLATELET # BLD: 124 K/UL (ref 150–400)
PMV BLD AUTO: 11.3 FL (ref 6–10)
POTASSIUM SERPL-SCNC: 3.6 MMOL/L (ref 3.4–5.1)
RBC # BLD: 3.73 M/UL (ref 3.8–5.8)
SODIUM BLD-SCNC: 142 MMOL/L (ref 136–145)
TOTAL IRON BINDING CAPACITY: 357 UG/DL (ref 250–450)
TOTAL PROTEIN: 6.5 G/DL (ref 6.4–8.3)
WBC # BLD: 4.3 K/UL (ref 4–11)

## 2023-03-13 PROCEDURE — 82728 ASSAY OF FERRITIN: CPT

## 2023-03-13 PROCEDURE — 82043 UR ALBUMIN QUANTITATIVE: CPT

## 2023-03-13 PROCEDURE — 83540 ASSAY OF IRON: CPT

## 2023-03-13 PROCEDURE — 83036 HEMOGLOBIN GLYCOSYLATED A1C: CPT

## 2023-03-13 PROCEDURE — 80053 COMPREHEN METABOLIC PANEL: CPT

## 2023-03-13 PROCEDURE — 85025 COMPLETE CBC W/AUTO DIFF WBC: CPT

## 2023-03-13 PROCEDURE — 83550 IRON BINDING TEST: CPT

## 2023-03-13 PROCEDURE — 82570 ASSAY OF URINE CREATININE: CPT

## 2023-03-16 ENCOUNTER — OFFICE VISIT (OUTPATIENT)
Dept: PRIMARY CARE CLINIC | Age: 73
End: 2023-03-16
Payer: MEDICARE

## 2023-03-16 VITALS
WEIGHT: 139.6 LBS | OXYGEN SATURATION: 99 % | HEART RATE: 65 BPM | SYSTOLIC BLOOD PRESSURE: 126 MMHG | TEMPERATURE: 98.2 F | DIASTOLIC BLOOD PRESSURE: 62 MMHG | BODY MASS INDEX: 23.96 KG/M2

## 2023-03-16 DIAGNOSIS — N18.30 CHRONIC RENAL FAILURE, STAGE 3 (MODERATE), UNSPECIFIED WHETHER STAGE 3A OR 3B CKD (HCC): ICD-10-CM

## 2023-03-16 DIAGNOSIS — Z79.4 TYPE 2 DIABETES MELLITUS WITH DIABETIC NEUROPATHY, WITH LONG-TERM CURRENT USE OF INSULIN (HCC): ICD-10-CM

## 2023-03-16 DIAGNOSIS — D64.9 ANEMIA, UNSPECIFIED TYPE: ICD-10-CM

## 2023-03-16 DIAGNOSIS — K74.60 CIRRHOSIS OF LIVER WITHOUT ASCITES, UNSPECIFIED HEPATIC CIRRHOSIS TYPE (HCC): ICD-10-CM

## 2023-03-16 DIAGNOSIS — I10 ESSENTIAL HYPERTENSION: ICD-10-CM

## 2023-03-16 DIAGNOSIS — E11.40 TYPE 2 DIABETES MELLITUS WITH DIABETIC NEUROPATHY, WITH LONG-TERM CURRENT USE OF INSULIN (HCC): ICD-10-CM

## 2023-03-16 DIAGNOSIS — R29.6 FREQUENT FALLS: Primary | ICD-10-CM

## 2023-03-16 DIAGNOSIS — I71.21 ANEURYSM OF ASCENDING AORTA WITHOUT RUPTURE (HCC): ICD-10-CM

## 2023-03-16 PROCEDURE — 1123F ACP DISCUSS/DSCN MKR DOCD: CPT | Performed by: INTERNAL MEDICINE

## 2023-03-16 PROCEDURE — 3017F COLORECTAL CA SCREEN DOC REV: CPT | Performed by: INTERNAL MEDICINE

## 2023-03-16 PROCEDURE — G8484 FLU IMMUNIZE NO ADMIN: HCPCS | Performed by: INTERNAL MEDICINE

## 2023-03-16 PROCEDURE — 1090F PRES/ABSN URINE INCON ASSESS: CPT | Performed by: INTERNAL MEDICINE

## 2023-03-16 PROCEDURE — 2022F DILAT RTA XM EVC RTNOPTHY: CPT | Performed by: INTERNAL MEDICINE

## 2023-03-16 PROCEDURE — 3078F DIAST BP <80 MM HG: CPT | Performed by: INTERNAL MEDICINE

## 2023-03-16 PROCEDURE — 3044F HG A1C LEVEL LT 7.0%: CPT | Performed by: INTERNAL MEDICINE

## 2023-03-16 PROCEDURE — 99214 OFFICE O/P EST MOD 30 MIN: CPT | Performed by: INTERNAL MEDICINE

## 2023-03-16 PROCEDURE — G8420 CALC BMI NORM PARAMETERS: HCPCS | Performed by: INTERNAL MEDICINE

## 2023-03-16 PROCEDURE — G8427 DOCREV CUR MEDS BY ELIG CLIN: HCPCS | Performed by: INTERNAL MEDICINE

## 2023-03-16 PROCEDURE — 3074F SYST BP LT 130 MM HG: CPT | Performed by: INTERNAL MEDICINE

## 2023-03-16 PROCEDURE — 4004F PT TOBACCO SCREEN RCVD TLK: CPT | Performed by: INTERNAL MEDICINE

## 2023-03-16 PROCEDURE — G8399 PT W/DXA RESULTS DOCUMENT: HCPCS | Performed by: INTERNAL MEDICINE

## 2023-03-16 ASSESSMENT — ENCOUNTER SYMPTOMS
SINUS PRESSURE: 0
WHEEZING: 0
BACK PAIN: 1
COUGH: 0
VOMITING: 0
SHORTNESS OF BREATH: 0
NAUSEA: 0
EYE DISCHARGE: 0
SORE THROAT: 0
ABDOMINAL PAIN: 0

## 2023-03-16 NOTE — PROGRESS NOTES
Chief Complaint   Patient presents with    Diabetes    Anemia       Have you seen any other physician or provider since your last visit no    Have you had any other diagnostic tests since your last visit? Yes - labs    Have you changed or stopped any medications since your last visit? no     Diabetic retinal exam completed this year? No                       * If yes please have patient sign a records release to obtain record to update Health Maintenance                       * If no, please order referral for patient to be scheduled         SUBJECTIVE:    Patient ID: Rhianna Conner is a 72 y. o.female. Chief Complaint   Patient presents with    Diabetes    Anemia         HPI:  Patient has had diabetes for the past several years. She has been compliant with the medications and denies any side effects from it. She has been monitoring fingersticks on a daily basis. Her fingerstick range is between . She denies any hypoglycemic symptoms. She has been following a diabetic diet and has not been active. Her last eye exam was more than a year ago. She is using oral meds and Insulin. She is on Levemir 55 units nightly. Patient with hx of anemia. Patient denies any bleeding, hematemesis, or melena. Patient energy is down. Patient has been compliant with treatment. Patient states she is having some blurred vision. States she has fallen twice since last visit. She is having difficulty walking today due to injury to her back. She reported that she feels like she gets really weak when she fell. Patient's medications, allergies, past medical, surgical, social and family histories were reviewed and updated as appropriate in electronic medical record.         Outpatient Medications Marked as Taking for the 3/16/23 encounter (Office Visit) with Loli Canchola MD   Medication Sig Dispense Refill    HYDROcodone-acetaminophen (NORCO) 5-325 MG per tablet Take 1 tablet by mouth 2 times daily as needed for Pain

## 2023-03-23 PROBLEM — N17.9 ACUTE RENAL FAILURE (ARF) (HCC): Status: RESOLVED | Noted: 2022-03-18 | Resolved: 2023-03-23

## 2023-04-04 ENCOUNTER — HOSPITAL ENCOUNTER (EMERGENCY)
Facility: HOSPITAL | Age: 73
Discharge: HOME | End: 2023-04-04
Attending: EMERGENCY MEDICINE
Payer: MEDICARE

## 2023-04-04 ENCOUNTER — APPOINTMENT (OUTPATIENT)
Dept: GENERAL RADIOLOGY | Facility: HOSPITAL | Age: 73
End: 2023-04-04
Payer: MEDICARE

## 2023-04-04 VITALS
OXYGEN SATURATION: 100 % | SYSTOLIC BLOOD PRESSURE: 170 MMHG | TEMPERATURE: 98.1 F | WEIGHT: 139 LBS | RESPIRATION RATE: 20 BRPM | HEIGHT: 64 IN | HEART RATE: 70 BPM | DIASTOLIC BLOOD PRESSURE: 64 MMHG | BODY MASS INDEX: 23.73 KG/M2

## 2023-04-04 DIAGNOSIS — M54.32 SCIATICA OF LEFT SIDE: Primary | ICD-10-CM

## 2023-04-04 PROCEDURE — 72110 X-RAY EXAM L-2 SPINE 4/>VWS: CPT

## 2023-04-04 PROCEDURE — 94761 N-INVAS EAR/PLS OXIMETRY MLT: CPT

## 2023-04-04 PROCEDURE — 6360000002 HC RX W HCPCS: Performed by: EMERGENCY MEDICINE

## 2023-04-04 PROCEDURE — 73502 X-RAY EXAM HIP UNI 2-3 VIEWS: CPT

## 2023-04-04 RX ORDER — DEXAMETHASONE SODIUM PHOSPHATE 4 MG/ML
4 INJECTION, SOLUTION INTRA-ARTICULAR; INTRALESIONAL; INTRAMUSCULAR; INTRAVENOUS; SOFT TISSUE ONCE
Status: COMPLETED | OUTPATIENT
Start: 2023-04-04 | End: 2023-04-04

## 2023-04-04 RX ORDER — METHYLPREDNISOLONE 4 MG/1
TABLET ORAL
Qty: 1 KIT | Refills: 0 | Status: SHIPPED | OUTPATIENT
Start: 2023-04-04 | End: 2023-04-10

## 2023-04-04 RX ORDER — KETOROLAC TROMETHAMINE 10 MG/1
10 TABLET, FILM COATED ORAL EVERY 6 HOURS PRN
Qty: 20 TABLET | Refills: 0 | Status: SHIPPED | OUTPATIENT
Start: 2023-04-04 | End: 2024-04-03

## 2023-04-04 RX ORDER — KETOROLAC TROMETHAMINE 30 MG/ML
15 INJECTION, SOLUTION INTRAMUSCULAR; INTRAVENOUS ONCE
Status: COMPLETED | OUTPATIENT
Start: 2023-04-04 | End: 2023-04-04

## 2023-04-04 RX ADMIN — DEXAMETHASONE SODIUM PHOSPHATE 4 MG: 4 INJECTION, SOLUTION INTRAMUSCULAR; INTRAVENOUS at 08:05

## 2023-04-04 RX ADMIN — KETOROLAC TROMETHAMINE 15 MG: 30 INJECTION, SOLUTION INTRAMUSCULAR; INTRAVENOUS at 08:05

## 2023-04-04 ASSESSMENT — PAIN DESCRIPTION - ONSET: ONSET: PROGRESSIVE

## 2023-04-04 ASSESSMENT — LIFESTYLE VARIABLES
HOW OFTEN DO YOU HAVE A DRINK CONTAINING ALCOHOL: NEVER
HOW MANY STANDARD DRINKS CONTAINING ALCOHOL DO YOU HAVE ON A TYPICAL DAY: PATIENT DOES NOT DRINK

## 2023-04-04 ASSESSMENT — PAIN DESCRIPTION - ORIENTATION: ORIENTATION: LEFT

## 2023-04-04 ASSESSMENT — PAIN SCALES - GENERAL: PAINLEVEL_OUTOF10: 10

## 2023-04-04 ASSESSMENT — PAIN - FUNCTIONAL ASSESSMENT
PAIN_FUNCTIONAL_ASSESSMENT: PREVENTS OR INTERFERES SOME ACTIVE ACTIVITIES AND ADLS
PAIN_FUNCTIONAL_ASSESSMENT: 0-10

## 2023-04-04 ASSESSMENT — PAIN DESCRIPTION - LOCATION: LOCATION: LEG

## 2023-04-04 ASSESSMENT — PAIN DESCRIPTION - DESCRIPTORS: DESCRIPTORS: ACHING

## 2023-04-04 ASSESSMENT — PAIN DESCRIPTION - FREQUENCY: FREQUENCY: CONTINUOUS

## 2023-04-04 ASSESSMENT — PAIN DESCRIPTION - PAIN TYPE: TYPE: CHRONIC PAIN

## 2023-04-04 NOTE — ED NOTES
Pt/family given d/c orders verbally and written. Pt/Family with no questions or concerns r/t d/c. Pt ambulatory to POV. No acute distress noted on d/c.       Maia Tovar RN  04/04/23 [id number]

## 2023-04-04 NOTE — ED PROVIDER NOTES
TAKE 1 TABLET BY MOUTH TWICE DAILY WITH FOOD    METOPROLOL SUCCINATE (TOPROL XL) 25 MG EXTENDED RELEASE TABLET    take 1 tablet by mouth once daily    NITROGLYCERIN (NITROSTAT) 0.4 MG SL TABLET    Place 1 tablet under the tongue every 5 minutes as needed for Chest pain    PANTOPRAZOLE (PROTONIX) 40 MG TABLET    Take 1 tablet by mouth 2 times daily (before meals)    ROSUVASTATIN (CRESTOR) 20 MG TABLET    TAKE 1 TABLET BY MOUTH EVERY DAY    UMECLIDINIUM-VILANTEROL (ANORO ELLIPTA) 62.5-25 MCG/INH AEPB INHALER    Inhale 1 puff into the lungs daily       ALLERGIES     Codeine    FAMILY HISTORY       Family History   Problem Relation Age of Onset    Diabetes Mother     High Blood Pressure Mother     Cancer Mother         pancreatic    Diabetes Father     Heart Disease Father     High Blood Pressure Father     Breast Cancer Sister     Cancer Sister         lung, breast    Cancer Brother         throat          SOCIAL HISTORY       Social History     Socioeconomic History    Marital status:      Spouse name: None    Number of children: None    Years of education: None    Highest education level: None   Tobacco Use    Smoking status: Every Day     Packs/day: 1.00     Years: 40.00     Pack years: 40.00     Types: Cigarettes    Smokeless tobacco: Never    Tobacco comments:     states she is not willing to stop and this is a stress reliever for her.     Vaping Use    Vaping Use: Never used   Substance and Sexual Activity    Alcohol use: No    Drug use: No     Social Determinants of Health     Financial Resource Strain: Medium Risk    Difficulty of Paying Living Expenses: Somewhat hard   Food Insecurity: No Food Insecurity    Worried About Running Out of Food in the Last Year: Never true    Ran Out of Food in the Last Year: Never true   Physical Activity: Insufficiently Active    Days of Exercise per Week: 7 days    Minutes of Exercise per Session: 10 min         PHYSICAL EXAM    (up to 7 for level 4, 8 or more for level

## 2023-04-20 RX ORDER — EMPAGLIFLOZIN 10 MG/1
TABLET, FILM COATED ORAL DAILY
Qty: 30 TABLET | Refills: 3 | Status: SHIPPED | OUTPATIENT
Start: 2023-04-20

## 2023-04-27 ENCOUNTER — OFFICE VISIT (OUTPATIENT)
Dept: PRIMARY CARE CLINIC | Age: 73
End: 2023-04-27
Payer: MEDICARE

## 2023-04-27 ENCOUNTER — APPOINTMENT (OUTPATIENT)
Dept: CT IMAGING | Facility: HOSPITAL | Age: 73
End: 2023-04-27
Attending: INTERNAL MEDICINE
Payer: MEDICARE

## 2023-04-27 ENCOUNTER — HOSPITAL ENCOUNTER (OUTPATIENT)
Facility: HOSPITAL | Age: 73
Setting detail: OBSERVATION
LOS: 1 days | Discharge: HOME | End: 2023-04-29
Attending: INTERNAL MEDICINE | Admitting: INTERNAL MEDICINE
Payer: MEDICARE

## 2023-04-27 VITALS
DIASTOLIC BLOOD PRESSURE: 67 MMHG | BODY MASS INDEX: 23.52 KG/M2 | OXYGEN SATURATION: 98 % | SYSTOLIC BLOOD PRESSURE: 155 MMHG | TEMPERATURE: 97.3 F | WEIGHT: 137 LBS | HEART RATE: 85 BPM

## 2023-04-27 DIAGNOSIS — R55 SYNCOPE, UNSPECIFIED SYNCOPE TYPE: ICD-10-CM

## 2023-04-27 DIAGNOSIS — I65.23 BILATERAL CAROTID ARTERY STENOSIS: Primary | ICD-10-CM

## 2023-04-27 DIAGNOSIS — E08.69 DIABETES MELLITUS DUE TO UNDERLYING CONDITION WITH OTHER SPECIFIED COMPLICATION, WITH LONG-TERM CURRENT USE OF INSULIN (HCC): ICD-10-CM

## 2023-04-27 DIAGNOSIS — M51.36 DEGENERATIVE DISC DISEASE, LUMBAR: ICD-10-CM

## 2023-04-27 DIAGNOSIS — Z86.73 H/O: CVA (CEREBROVASCULAR ACCIDENT): ICD-10-CM

## 2023-04-27 DIAGNOSIS — R29.6 FREQUENT FALLS: Primary | ICD-10-CM

## 2023-04-27 DIAGNOSIS — D61.818 PANCYTOPENIA (HCC): ICD-10-CM

## 2023-04-27 DIAGNOSIS — K55.20 AVM (ARTERIOVENOUS MALFORMATION) OF SMALL BOWEL, ACQUIRED: ICD-10-CM

## 2023-04-27 DIAGNOSIS — Z79.4 DIABETES MELLITUS DUE TO UNDERLYING CONDITION WITH OTHER SPECIFIED COMPLICATION, WITH LONG-TERM CURRENT USE OF INSULIN (HCC): ICD-10-CM

## 2023-04-27 DIAGNOSIS — R53.1 GENERALIZED WEAKNESS: ICD-10-CM

## 2023-04-27 DIAGNOSIS — I10 ESSENTIAL HYPERTENSION: ICD-10-CM

## 2023-04-27 LAB
ALBUMIN SERPL-MCNC: 3.8 G/DL (ref 3.4–4.8)
ALBUMIN/GLOB SERPL: 1.5 {RATIO} (ref 0.8–2)
ALP SERPL-CCNC: 96 U/L (ref 25–100)
ALT SERPL-CCNC: 28 U/L (ref 4–36)
ANION GAP SERPL CALCULATED.3IONS-SCNC: 12 MMOL/L (ref 3–16)
AST SERPL-CCNC: 31 U/L (ref 8–33)
BASOPHILS # BLD: 0 K/UL (ref 0–0.1)
BASOPHILS NFR BLD: 0.3 %
BILIRUB SERPL-MCNC: 0.6 MG/DL (ref 0.3–1.2)
BUN SERPL-MCNC: 11 MG/DL (ref 6–20)
CALCIUM SERPL-MCNC: 9.6 MG/DL (ref 8.5–10.5)
CHLORIDE SERPL-SCNC: 100 MMOL/L (ref 98–107)
CO2 SERPL-SCNC: 26 MMOL/L (ref 20–30)
CREAT SERPL-MCNC: 0.9 MG/DL (ref 0.4–1.2)
EOSINOPHIL # BLD: 0 K/UL (ref 0–0.4)
EOSINOPHIL NFR BLD: 0.5 %
ERYTHROCYTE [DISTWIDTH] IN BLOOD BY AUTOMATED COUNT: 14.6 % (ref 11–16)
GFR SERPLBLD CREATININE-BSD FMLA CKD-EPI: >60 ML/MIN/{1.73_M2}
GLOBULIN SER CALC-MCNC: 2.5 G/DL
GLUCOSE BLD-MCNC: 152 MG/DL (ref 74–106)
GLUCOSE BLD-MCNC: 178 MG/DL (ref 74–106)
GLUCOSE SERPL-MCNC: 178 MG/DL (ref 74–106)
HCT VFR BLD AUTO: 29.9 % (ref 37–47)
HGB BLD-MCNC: 10 G/DL (ref 11.5–16.5)
IMM GRANULOCYTES # BLD: 0 K/UL
IMM GRANULOCYTES NFR BLD: 0.5 % (ref 0–5)
LYMPHOCYTES # BLD: 0.9 K/UL (ref 1.5–4)
LYMPHOCYTES NFR BLD: 23 %
MCH RBC QN AUTO: 29.9 PG (ref 27–32)
MCHC RBC AUTO-ENTMCNC: 33.4 G/DL (ref 31–35)
MCV RBC AUTO: 89.3 FL (ref 80–100)
MONOCYTES # BLD: 0.4 K/UL (ref 0.2–0.8)
MONOCYTES NFR BLD: 9 %
NEUTROPHILS # BLD: 2.6 K/UL (ref 2–7.5)
NEUTS SEG NFR BLD: 66.7 %
PERFORMED ON: ABNORMAL
PERFORMED ON: ABNORMAL
PLATELET # BLD AUTO: 128 K/UL (ref 150–400)
PMV BLD AUTO: 11.3 FL (ref 6–10)
POTASSIUM SERPL-SCNC: 3.7 MMOL/L (ref 3.4–5.1)
PROT SERPL-MCNC: 6.3 G/DL (ref 6.4–8.3)
RBC # BLD AUTO: 3.35 M/UL (ref 3.8–5.8)
SARS-COV-2 RDRP RESP QL NAA+PROBE: NOT DETECTED
SODIUM SERPL-SCNC: 138 MMOL/L (ref 136–145)
WBC # BLD AUTO: 3.9 K/UL (ref 4–11)

## 2023-04-27 PROCEDURE — 6370000000 HC RX 637 (ALT 250 FOR IP): Performed by: PHYSICIAN ASSISTANT

## 2023-04-27 PROCEDURE — G0378 HOSPITAL OBSERVATION PER HR: HCPCS

## 2023-04-27 PROCEDURE — 87635 SARS-COV-2 COVID-19 AMP PRB: CPT

## 2023-04-27 PROCEDURE — G8427 DOCREV CUR MEDS BY ELIG CLIN: HCPCS | Performed by: NURSE PRACTITIONER

## 2023-04-27 PROCEDURE — 85025 COMPLETE CBC W/AUTO DIFF WBC: CPT

## 2023-04-27 PROCEDURE — 3017F COLORECTAL CA SCREEN DOC REV: CPT | Performed by: NURSE PRACTITIONER

## 2023-04-27 PROCEDURE — 96372 THER/PROPH/DIAG INJ SC/IM: CPT

## 2023-04-27 PROCEDURE — 3075F SYST BP GE 130 - 139MM HG: CPT | Performed by: NURSE PRACTITIONER

## 2023-04-27 PROCEDURE — 80053 COMPREHEN METABOLIC PANEL: CPT

## 2023-04-27 PROCEDURE — 36415 COLL VENOUS BLD VENIPUNCTURE: CPT

## 2023-04-27 PROCEDURE — 81001 URINALYSIS AUTO W/SCOPE: CPT

## 2023-04-27 PROCEDURE — 1123F ACP DISCUSS/DSCN MKR DOCD: CPT | Performed by: NURSE PRACTITIONER

## 2023-04-27 PROCEDURE — 3078F DIAST BP <80 MM HG: CPT | Performed by: NURSE PRACTITIONER

## 2023-04-27 PROCEDURE — 4004F PT TOBACCO SCREEN RCVD TLK: CPT | Performed by: NURSE PRACTITIONER

## 2023-04-27 PROCEDURE — 99214 OFFICE O/P EST MOD 30 MIN: CPT | Performed by: NURSE PRACTITIONER

## 2023-04-27 PROCEDURE — 1090F PRES/ABSN URINE INCON ASSESS: CPT | Performed by: NURSE PRACTITIONER

## 2023-04-27 PROCEDURE — 70450 CT HEAD/BRAIN W/O DYE: CPT

## 2023-04-27 PROCEDURE — 6360000002 HC RX W HCPCS: Performed by: PHYSICIAN ASSISTANT

## 2023-04-27 PROCEDURE — G8420 CALC BMI NORM PARAMETERS: HCPCS | Performed by: NURSE PRACTITIONER

## 2023-04-27 PROCEDURE — G8399 PT W/DXA RESULTS DOCUMENT: HCPCS | Performed by: NURSE PRACTITIONER

## 2023-04-27 RX ORDER — HYDROCODONE BITARTRATE AND ACETAMINOPHEN 5; 325 MG/1; MG/1
1 TABLET ORAL 2 TIMES DAILY PRN
Status: DISCONTINUED | OUTPATIENT
Start: 2023-04-27 | End: 2023-04-29 | Stop reason: HOSPADM

## 2023-04-27 RX ORDER — DEXTROSE MONOHYDRATE 100 MG/ML
125 INJECTION, SOLUTION INTRAVENOUS PRN
Status: DISCONTINUED | OUTPATIENT
Start: 2023-04-27 | End: 2023-04-29 | Stop reason: HOSPADM

## 2023-04-27 RX ORDER — ISOSORBIDE MONONITRATE 60 MG/1
60 TABLET, EXTENDED RELEASE ORAL DAILY
Status: DISCONTINUED | OUTPATIENT
Start: 2023-04-27 | End: 2023-04-28

## 2023-04-27 RX ORDER — INSULIN GLARGINE 100 [IU]/ML
25 INJECTION, SOLUTION SUBCUTANEOUS NIGHTLY
Status: DISCONTINUED | OUTPATIENT
Start: 2023-04-27 | End: 2023-04-28

## 2023-04-27 RX ORDER — ACETAMINOPHEN 650 MG/1
650 SUPPOSITORY RECTAL EVERY 6 HOURS PRN
Status: DISCONTINUED | OUTPATIENT
Start: 2023-04-27 | End: 2023-04-29 | Stop reason: HOSPADM

## 2023-04-27 RX ORDER — PANTOPRAZOLE SODIUM 40 MG/1
40 TABLET, DELAYED RELEASE ORAL
Status: DISCONTINUED | OUTPATIENT
Start: 2023-04-27 | End: 2023-04-29 | Stop reason: HOSPADM

## 2023-04-27 RX ORDER — DEXTROSE MONOHYDRATE 100 MG/ML
INJECTION, SOLUTION INTRAVENOUS CONTINUOUS PRN
Status: DISCONTINUED | OUTPATIENT
Start: 2023-04-27 | End: 2023-04-29 | Stop reason: HOSPADM

## 2023-04-27 RX ORDER — ROSUVASTATIN CALCIUM 20 MG/1
20 TABLET, COATED ORAL NIGHTLY
Status: DISCONTINUED | OUTPATIENT
Start: 2023-04-28 | End: 2023-04-29 | Stop reason: HOSPADM

## 2023-04-27 RX ORDER — POLYETHYLENE GLYCOL 3350 17 G/17G
17 POWDER, FOR SOLUTION ORAL DAILY PRN
Status: DISCONTINUED | OUTPATIENT
Start: 2023-04-27 | End: 2023-04-29 | Stop reason: HOSPADM

## 2023-04-27 RX ORDER — METOPROLOL SUCCINATE 25 MG/1
25 TABLET, EXTENDED RELEASE ORAL DAILY
Status: DISCONTINUED | OUTPATIENT
Start: 2023-04-27 | End: 2023-04-28

## 2023-04-27 RX ORDER — FERROUS SULFATE TAB EC 324 MG (65 MG FE EQUIVALENT) 324 (65 FE) MG
324 TABLET DELAYED RESPONSE ORAL 2 TIMES DAILY WITH MEALS
Status: DISCONTINUED | OUTPATIENT
Start: 2023-04-27 | End: 2023-04-29

## 2023-04-27 RX ORDER — ONDANSETRON 2 MG/ML
4 INJECTION INTRAMUSCULAR; INTRAVENOUS EVERY 6 HOURS PRN
Status: DISCONTINUED | OUTPATIENT
Start: 2023-04-27 | End: 2023-04-29 | Stop reason: HOSPADM

## 2023-04-27 RX ORDER — FERROUS SULFATE 325(65) MG
324 TABLET ORAL 2 TIMES DAILY WITH MEALS
Status: DISCONTINUED | OUTPATIENT
Start: 2023-04-27 | End: 2023-04-27

## 2023-04-27 RX ORDER — INSULIN LISPRO 100 [IU]/ML
0-4 INJECTION, SOLUTION INTRAVENOUS; SUBCUTANEOUS
Status: DISCONTINUED | OUTPATIENT
Start: 2023-04-27 | End: 2023-04-29 | Stop reason: HOSPADM

## 2023-04-27 RX ORDER — DEXTROSE MONOHYDRATE 100 MG/ML
250 INJECTION, SOLUTION INTRAVENOUS PRN
Status: DISCONTINUED | OUTPATIENT
Start: 2023-04-27 | End: 2023-04-29 | Stop reason: HOSPADM

## 2023-04-27 RX ORDER — ALOGLIPTIN 12.5 MG/1
12.5 TABLET, FILM COATED ORAL DAILY
Status: DISCONTINUED | OUTPATIENT
Start: 2023-04-27 | End: 2023-04-29 | Stop reason: HOSPADM

## 2023-04-27 RX ORDER — NICOTINE 21 MG/24HR
1 PATCH, TRANSDERMAL 24 HOURS TRANSDERMAL DAILY
Status: DISCONTINUED | OUTPATIENT
Start: 2023-04-27 | End: 2023-04-29 | Stop reason: HOSPADM

## 2023-04-27 RX ORDER — ENOXAPARIN SODIUM 100 MG/ML
40 INJECTION SUBCUTANEOUS NIGHTLY
Status: DISCONTINUED | OUTPATIENT
Start: 2023-04-27 | End: 2023-04-29 | Stop reason: HOSPADM

## 2023-04-27 RX ORDER — INSULIN LISPRO 100 [IU]/ML
0-4 INJECTION, SOLUTION INTRAVENOUS; SUBCUTANEOUS NIGHTLY
Status: DISCONTINUED | OUTPATIENT
Start: 2023-04-27 | End: 2023-04-29 | Stop reason: HOSPADM

## 2023-04-27 RX ORDER — ONDANSETRON 4 MG/1
4 TABLET, ORALLY DISINTEGRATING ORAL EVERY 8 HOURS PRN
Status: DISCONTINUED | OUTPATIENT
Start: 2023-04-27 | End: 2023-04-29 | Stop reason: HOSPADM

## 2023-04-27 RX ORDER — ACETAMINOPHEN 325 MG/1
650 TABLET ORAL EVERY 6 HOURS PRN
Status: DISCONTINUED | OUTPATIENT
Start: 2023-04-27 | End: 2023-04-29 | Stop reason: HOSPADM

## 2023-04-27 RX ADMIN — ENOXAPARIN SODIUM 40 MG: 100 INJECTION SUBCUTANEOUS at 21:31

## 2023-04-27 RX ADMIN — HYDROCODONE BITARTRATE AND ACETAMINOPHEN 1 TABLET: 5; 325 TABLET ORAL at 22:38

## 2023-04-27 RX ADMIN — METFORMIN HYDROCHLORIDE 1000 MG: 500 TABLET, FILM COATED ORAL at 17:30

## 2023-04-27 RX ADMIN — INSULIN GLARGINE 25 UNITS: 100 INJECTION, SOLUTION SUBCUTANEOUS at 21:32

## 2023-04-27 RX ADMIN — PANTOPRAZOLE SODIUM 40 MG: 40 TABLET, DELAYED RELEASE ORAL at 17:30

## 2023-04-27 ASSESSMENT — PAIN SCALES - GENERAL
PAINLEVEL_OUTOF10: 6
PAINLEVEL_OUTOF10: 6

## 2023-04-27 ASSESSMENT — PAIN DESCRIPTION - ORIENTATION: ORIENTATION: LOWER

## 2023-04-27 ASSESSMENT — PAIN DESCRIPTION - LOCATION: LOCATION: BACK

## 2023-04-27 ASSESSMENT — LIFESTYLE VARIABLES: HOW OFTEN DO YOU HAVE A DRINK CONTAINING ALCOHOL: NEVER

## 2023-04-27 ASSESSMENT — PAIN DESCRIPTION - ONSET: ONSET: GRADUAL

## 2023-04-27 ASSESSMENT — ENCOUNTER SYMPTOMS: BACK PAIN: 1

## 2023-04-27 ASSESSMENT — PAIN DESCRIPTION - DESCRIPTORS: DESCRIPTORS: ACHING

## 2023-04-27 ASSESSMENT — PAIN DESCRIPTION - FREQUENCY: FREQUENCY: INTERMITTENT

## 2023-04-27 NOTE — PROGRESS NOTES
Positive for arthralgias, back pain and myalgias. Neurological:  Positive for syncope and weakness. Negative for dizziness. All other systems reviewed and are negative. Past Medical History:   Diagnosis Date    CAD (coronary artery disease)     Cirrhosis (HCC)     COPD (chronic obstructive pulmonary disease) (HCC)     Cystic fibrosis (HCC)     Diabetes mellitus (HCC)     GERD (gastroesophageal reflux disease)     H/O: CVA (cardiovascular accident)     HTN (hypertension)     Mass     on chest     Tobacco abuse     Vitamin B12 deficiency      Past Surgical History:   Procedure Laterality Date    CARDIAC CATHETERIZATION      CARPAL TUNNEL RELEASE Bilateral     CHOLECYSTECTOMY      CORONARY ANGIOPLASTY WITH STENT PLACEMENT      HYSTERECTOMY (CERVIX STATUS UNKNOWN)      JOINT REPLACEMENT Bilateral 10/15/2016    knees    TOTAL KNEE ARTHROPLASTY Bilateral 10/18/2016    at [facility]     Family History   Problem Relation Age of Onset    Diabetes Mother     High Blood Pressure Mother     Cancer Mother         pancreatic    Diabetes Father     Heart Disease Father     High Blood Pressure Father     Breast Cancer Sister     Cancer Sister         lung, breast    Cancer Brother         throat      Social History     Tobacco Use   Smoking Status Every Day    Packs/day: 1.00    Years: 40.00    Pack years: 40.00    Types: Cigarettes   Smokeless Tobacco Never   Tobacco Comments    states she is not willing to stop and this is a stress reliever for her. OBJECTIVE:   Wt Readings from Last 3 Encounters:   04/27/23 137 lb (62.1 kg)   04/04/23 139 lb (63 kg)   03/16/23 139 lb 9.6 oz (63.3 kg)     BP Readings from Last 3 Encounters:   04/27/23 (!) 155/67   04/04/23 (!) 170/64   03/16/23 126/62       BP (!) 155/67 (Position: Sitting)   Pulse 85   Temp 97.3 °F (36.3 °C)   Wt 137 lb (62.1 kg)   SpO2 98%   BMI 23.52 kg/m²    Orthostatics WNL    Physical Exam  Vitals and nursing note reviewed.    Constitutional:       Appearance:

## 2023-04-27 NOTE — PLAN OF CARE
Problem: Discharge Planning  Goal: Discharge to home or other facility with appropriate resources  Outcome: Progressing  Flowsheets (Taken 4/27/2023 1611 by Jos Luke, INDIRA)  Discharge to home or other facility with appropriate resources: Identify discharge learning needs (meds, wound care, etc)     Problem: Safety - Adult  Goal: Free from fall injury  4/27/2023 1700 by Shivam Ni RN  Outcome: Progressing  4/27/2023 1619 by Jos Luke RN  Outcome: Progressing     Problem: Musculoskeletal - Adult  Goal: Return mobility to safest level of function  4/27/2023 1619 by Jos Luke RN  Outcome: Progressing

## 2023-04-27 NOTE — PLAN OF CARE
Problem: Musculoskeletal - Adult  Goal: Return mobility to safest level of function  Outcome: Progressing     Problem: Safety - Adult  Goal: Free from fall injury  Outcome: Progressing

## 2023-04-28 ENCOUNTER — APPOINTMENT (OUTPATIENT)
Dept: CT IMAGING | Facility: HOSPITAL | Age: 73
End: 2023-04-28
Attending: INTERNAL MEDICINE
Payer: MEDICARE

## 2023-04-28 PROBLEM — I77.9 CAROTID DISEASE, BILATERAL (HCC): Status: ACTIVE | Noted: 2023-04-28

## 2023-04-28 PROBLEM — E08.9 DIABETES MELLITUS DUE TO UNDERLYING CONDITION, WITH LONG-TERM CURRENT USE OF INSULIN (HCC): Status: ACTIVE | Noted: 2023-04-28

## 2023-04-28 PROBLEM — Z79.4 DIABETES MELLITUS DUE TO UNDERLYING CONDITION, WITH LONG-TERM CURRENT USE OF INSULIN (HCC): Status: ACTIVE | Noted: 2023-04-28

## 2023-04-28 LAB
ALBUMIN SERPL-MCNC: 3.3 G/DL (ref 3.4–4.8)
ALBUMIN/GLOB SERPL: 1.5 {RATIO} (ref 0.8–2)
ALP SERPL-CCNC: 85 U/L (ref 25–100)
ALT SERPL-CCNC: 22 U/L (ref 4–36)
AMMONIA PLAS-SCNC: 23 MCG/DL (ref 19–87)
ANION GAP SERPL CALCULATED.3IONS-SCNC: 13 MMOL/L (ref 3–16)
AST SERPL-CCNC: 21 U/L (ref 8–33)
BACTERIA URNS QL MICRO: ABNORMAL /HPF
BASOPHILS # BLD: 0 K/UL (ref 0–0.1)
BASOPHILS NFR BLD: 0.5 %
BILIRUB SERPL-MCNC: 0.5 MG/DL (ref 0.3–1.2)
BILIRUB UR QL STRIP.AUTO: NEGATIVE
BUN SERPL-MCNC: 12 MG/DL (ref 6–20)
CALCIUM SERPL-MCNC: 9.1 MG/DL (ref 8.5–10.5)
CHLORIDE SERPL-SCNC: 102 MMOL/L (ref 98–107)
CHOLEST SERPL-MCNC: 57 MG/DL (ref 0–200)
CLARITY UR: CLEAR
CO2 SERPL-SCNC: 25 MMOL/L (ref 20–30)
COLOR UR: YELLOW
CREAT SERPL-MCNC: 0.9 MG/DL (ref 0.4–1.2)
EOSINOPHIL # BLD: 0 K/UL (ref 0–0.4)
EOSINOPHIL NFR BLD: 1.1 %
EPI CELLS #/AREA URNS HPF: ABNORMAL /HPF (ref 0–5)
ERYTHROCYTE [DISTWIDTH] IN BLOOD BY AUTOMATED COUNT: 14.6 % (ref 11–16)
FERRITIN SERPL IA-MCNC: 29.1 NG/ML (ref 22–322)
FOLATE SERPL-MCNC: 9 NG/ML
GFR SERPLBLD CREATININE-BSD FMLA CKD-EPI: >60 ML/MIN/{1.73_M2}
GLOBULIN SER CALC-MCNC: 2.2 G/DL
GLUCOSE BLD-MCNC: 124 MG/DL (ref 74–106)
GLUCOSE BLD-MCNC: 144 MG/DL (ref 74–106)
GLUCOSE BLD-MCNC: 92 MG/DL (ref 74–106)
GLUCOSE BLD-MCNC: 95 MG/DL (ref 74–106)
GLUCOSE SERPL-MCNC: 138 MG/DL (ref 74–106)
GLUCOSE UR STRIP.AUTO-MCNC: 500 MG/DL
HCT VFR BLD AUTO: 27.9 % (ref 37–47)
HDLC SERPL-MCNC: 29 MG/DL (ref 40–60)
HGB BLD-MCNC: 9.2 G/DL (ref 11.5–16.5)
HGB UR QL STRIP.AUTO: NEGATIVE
IMM GRANULOCYTES # BLD: 0 K/UL
IMM GRANULOCYTES NFR BLD: 0.3 % (ref 0–5)
IRON SATN MFR SERPL: 20 % (ref 15–50)
IRON SERPL-MCNC: 57 UG/DL (ref 37–145)
KETONES UR STRIP.AUTO-MCNC: NEGATIVE MG/DL
LDLC SERPL CALC-MCNC: 5 MG/DL
LEUKOCYTE ESTERASE UR QL STRIP.AUTO: NEGATIVE
LYMPHOCYTES # BLD: 1.2 K/UL (ref 1.5–4)
LYMPHOCYTES NFR BLD: 30.5 %
MCH RBC QN AUTO: 30.1 PG (ref 27–32)
MCHC RBC AUTO-ENTMCNC: 33 G/DL (ref 31–35)
MCV RBC AUTO: 91.2 FL (ref 80–100)
MONOCYTES # BLD: 0.4 K/UL (ref 0.2–0.8)
MONOCYTES NFR BLD: 9.5 %
NEUTROPHILS # BLD: 2.2 K/UL (ref 2–7.5)
NEUTS SEG NFR BLD: 58.1 %
NITRITE UR QL STRIP.AUTO: NEGATIVE
PERFORMED ON: ABNORMAL
PERFORMED ON: ABNORMAL
PERFORMED ON: NORMAL
PERFORMED ON: NORMAL
PH UR STRIP.AUTO: 5 [PH] (ref 5–8)
PLATELET # BLD AUTO: 119 K/UL (ref 150–400)
PMV BLD AUTO: 11.4 FL (ref 6–10)
POTASSIUM SERPL-SCNC: 4.2 MMOL/L (ref 3.4–5.1)
PROT SERPL-MCNC: 5.5 G/DL (ref 6.4–8.3)
PROT UR STRIP.AUTO-MCNC: 30 MG/DL
RBC # BLD AUTO: 3.06 M/UL (ref 3.8–5.8)
RBC #/AREA URNS HPF: ABNORMAL /HPF (ref 0–4)
SODIUM SERPL-SCNC: 140 MMOL/L (ref 136–145)
SP GR UR STRIP.AUTO: 1.01 (ref 1–1.03)
TIBC SERPL-MCNC: 283 UG/DL (ref 250–450)
TRIGL SERPL-MCNC: 116 MG/DL (ref 0–249)
UA COMPLETE W REFLEX CULTURE PNL UR: ABNORMAL
UA DIPSTICK W REFLEX MICRO PNL UR: YES
URN SPEC COLLECT METH UR: ABNORMAL
UROBILINOGEN UR STRIP-ACNC: 0.2 E.U./DL
VIT B12 SERPL-MCNC: 729 PG/ML (ref 211–911)
VLDLC SERPL CALC-MCNC: 23 MG/DL
WBC # BLD AUTO: 3.8 K/UL (ref 4–11)
WBC #/AREA URNS HPF: ABNORMAL /HPF (ref 0–5)
YEAST URNS QL MICRO: PRESENT /HPF

## 2023-04-28 PROCEDURE — 97116 GAIT TRAINING THERAPY: CPT

## 2023-04-28 PROCEDURE — 99406 BEHAV CHNG SMOKING 3-10 MIN: CPT | Performed by: INTERNAL MEDICINE

## 2023-04-28 PROCEDURE — 6370000000 HC RX 637 (ALT 250 FOR IP): Performed by: INTERNAL MEDICINE

## 2023-04-28 PROCEDURE — 82140 ASSAY OF AMMONIA: CPT

## 2023-04-28 PROCEDURE — 99222 1ST HOSP IP/OBS MODERATE 55: CPT | Performed by: INTERNAL MEDICINE

## 2023-04-28 PROCEDURE — G0378 HOSPITAL OBSERVATION PER HR: HCPCS

## 2023-04-28 PROCEDURE — 97161 PT EVAL LOW COMPLEX 20 MIN: CPT

## 2023-04-28 PROCEDURE — 36415 COLL VENOUS BLD VENIPUNCTURE: CPT

## 2023-04-28 PROCEDURE — 80053 COMPREHEN METABOLIC PANEL: CPT

## 2023-04-28 PROCEDURE — 80061 LIPID PANEL: CPT

## 2023-04-28 PROCEDURE — 97165 OT EVAL LOW COMPLEX 30 MIN: CPT

## 2023-04-28 PROCEDURE — 83550 IRON BINDING TEST: CPT

## 2023-04-28 PROCEDURE — 70498 CT ANGIOGRAPHY NECK: CPT

## 2023-04-28 PROCEDURE — 6370000000 HC RX 637 (ALT 250 FOR IP): Performed by: PHYSICIAN ASSISTANT

## 2023-04-28 PROCEDURE — 96372 THER/PROPH/DIAG INJ SC/IM: CPT

## 2023-04-28 PROCEDURE — 6360000002 HC RX W HCPCS: Performed by: PHYSICIAN ASSISTANT

## 2023-04-28 PROCEDURE — 6360000004 HC RX CONTRAST MEDICATION: Performed by: INTERNAL MEDICINE

## 2023-04-28 PROCEDURE — 82746 ASSAY OF FOLIC ACID SERUM: CPT

## 2023-04-28 PROCEDURE — 82607 VITAMIN B-12: CPT

## 2023-04-28 PROCEDURE — 70496 CT ANGIOGRAPHY HEAD: CPT

## 2023-04-28 PROCEDURE — 83540 ASSAY OF IRON: CPT

## 2023-04-28 PROCEDURE — 97530 THERAPEUTIC ACTIVITIES: CPT

## 2023-04-28 PROCEDURE — 85025 COMPLETE CBC W/AUTO DIFF WBC: CPT

## 2023-04-28 PROCEDURE — 2580000003 HC RX 258: Performed by: PHYSICIAN ASSISTANT

## 2023-04-28 PROCEDURE — 82728 ASSAY OF FERRITIN: CPT

## 2023-04-28 RX ORDER — INSULIN GLARGINE 100 [IU]/ML
20 INJECTION, SOLUTION SUBCUTANEOUS NIGHTLY
Status: DISCONTINUED | OUTPATIENT
Start: 2023-04-28 | End: 2023-04-29

## 2023-04-28 RX ORDER — FLUCONAZOLE 100 MG/1
200 TABLET ORAL DAILY
Status: DISCONTINUED | OUTPATIENT
Start: 2023-04-28 | End: 2023-04-29 | Stop reason: HOSPADM

## 2023-04-28 RX ORDER — SODIUM CHLORIDE 9 MG/ML
INJECTION, SOLUTION INTRAVENOUS CONTINUOUS
Status: ACTIVE | OUTPATIENT
Start: 2023-04-28 | End: 2023-04-29

## 2023-04-28 RX ORDER — ASPIRIN 81 MG/1
81 TABLET, CHEWABLE ORAL DAILY
Status: DISCONTINUED | OUTPATIENT
Start: 2023-04-28 | End: 2023-04-29 | Stop reason: HOSPADM

## 2023-04-28 RX ADMIN — PANTOPRAZOLE SODIUM 40 MG: 40 TABLET, DELAYED RELEASE ORAL at 17:26

## 2023-04-28 RX ADMIN — FERROUS SULFATE TAB EC 324 MG (65 MG FE EQUIVALENT) 324 MG: 324 (65 FE) TABLET DELAYED RESPONSE at 08:57

## 2023-04-28 RX ADMIN — INSULIN GLARGINE 20 UNITS: 100 INJECTION, SOLUTION SUBCUTANEOUS at 20:53

## 2023-04-28 RX ADMIN — ISOSORBIDE MONONITRATE 60 MG: 60 TABLET, EXTENDED RELEASE ORAL at 08:57

## 2023-04-28 RX ADMIN — SODIUM CHLORIDE: 9 INJECTION, SOLUTION INTRAVENOUS at 17:29

## 2023-04-28 RX ADMIN — ENOXAPARIN SODIUM 40 MG: 100 INJECTION SUBCUTANEOUS at 20:53

## 2023-04-28 RX ADMIN — EMPAGLIFLOZIN 10 MG: 10 TABLET, FILM COATED ORAL at 08:57

## 2023-04-28 RX ADMIN — HYDROCODONE BITARTRATE AND ACETAMINOPHEN 1 TABLET: 5; 325 TABLET ORAL at 12:31

## 2023-04-28 RX ADMIN — ALOGLIPTIN 12.5 MG: 12.5 TABLET, FILM COATED ORAL at 08:57

## 2023-04-28 RX ADMIN — METFORMIN HYDROCHLORIDE 1000 MG: 500 TABLET, FILM COATED ORAL at 08:57

## 2023-04-28 RX ADMIN — FERROUS SULFATE TAB EC 324 MG (65 MG FE EQUIVALENT) 324 MG: 324 (65 FE) TABLET DELAYED RESPONSE at 17:26

## 2023-04-28 RX ADMIN — METOPROLOL SUCCINATE 25 MG: 25 TABLET, EXTENDED RELEASE ORAL at 08:57

## 2023-04-28 RX ADMIN — METFORMIN HYDROCHLORIDE 1000 MG: 500 TABLET, FILM COATED ORAL at 17:26

## 2023-04-28 RX ADMIN — PANTOPRAZOLE SODIUM 40 MG: 40 TABLET, DELAYED RELEASE ORAL at 06:34

## 2023-04-28 RX ADMIN — ROSUVASTATIN CALCIUM 20 MG: 20 TABLET, FILM COATED ORAL at 20:52

## 2023-04-28 RX ADMIN — FLUCONAZOLE 200 MG: 100 TABLET ORAL at 09:52

## 2023-04-28 RX ADMIN — ASPIRIN 81 MG 81 MG: 81 TABLET ORAL at 14:43

## 2023-04-28 RX ADMIN — IOPAMIDOL 100 ML: 755 INJECTION, SOLUTION INTRAVENOUS at 11:35

## 2023-04-28 ASSESSMENT — PAIN SCALES - GENERAL: PAINLEVEL_OUTOF10: 7

## 2023-04-28 ASSESSMENT — PAIN DESCRIPTION - LOCATION: LOCATION: BACK

## 2023-04-28 NOTE — ACP (ADVANCE CARE PLANNING)
Advance Care Planning     General Advance Care Planning (ACP) Conversation    Date of Conversation: 4/27/2023  Conducted with: Patient with Decision Making Capacity    Healthcare Decision Maker:    Primary Decision Maker: Brett Saint - [phone]    Secondary Decision Maker: YaniraRussel - Niece/Nephew - [phone]  Click here to complete Healthcare Decision Makers including selection of the Healthcare Decision Maker Relationship (ie \"Primary\"). Today we documented Decision Maker(s) consistent with Legal Next of Kin hierarchy.     Content/Action Overview:  Has NO ACP documents/care preferences - information provided, considering goals and options  Reviewed DNR/DNI and patient confirms current DNR status - completed forms on file (place new order if needed)    Length of Voluntary ACP Conversation in minutes:  <16 minutes (Non-Billable)

## 2023-04-28 NOTE — CARE COORDINATION
Order for RW sent to local DME as pt has no preference. RW delivered to [facility]sd for pending DC this weekend.   CM will reassess PRN

## 2023-04-28 NOTE — PLAN OF CARE
Problem: Discharge Planning  Goal: Discharge to home or other facility with appropriate resources  Outcome: Progressing     Problem: Safety - Adult  Goal: Free from fall injury  Outcome: Progressing     Problem: Chronic Conditions and Co-morbidities  Goal: Patient's chronic conditions and co-morbidity symptoms are monitored and maintained or improved  Outcome: Progressing     Problem: Musculoskeletal - Adult  Goal: Return mobility to safest level of function  Outcome: Progressing  Goal: Maintain proper alignment of affected body part  Outcome: Progressing  Goal: Return ADL status to a safe level of function  Outcome: Progressing

## 2023-04-28 NOTE — CARE COORDINATION
Lives With: Significant other  Type of Home:  ([facility])  Home Layout: One level  Home Access: Stairs to enter with rails  Entrance Stairs - Number of Steps: 1 ADDY  Bathroom Shower/Tub: Walk-in shower  Bathroom Toilet: Standard  Bathroom Accessibility: Not accessible  Home Equipment: Easter Kayser, quad  Has the patient had two or more falls in the past year or any fall with injury in the past year?: Yes  ADL Assistance: [address] [place] [address] Avenue: Independent  Homemaking Responsibilities: Yes  Ambulation Assistance: Independent (uses cane PRN, community ambulator)  Transfer Assistance: Independent  Active : Yes     Lives with SO. Has cane, quad cane. I at baseline. Needs RW at DC. No further DC needs noted at this time.

## 2023-04-28 NOTE — H&P
rupture (HCC) [I71.20]   dilatation to the ascending thoracic aorta measures 4.1 cm on CT scan dated October 20 7-2022. Make sure blood pressure, diabetes and lipid profile under good control. Long conversation with her regarding smoking cessation. May need CT chest for further evaluation. 10/09/2019    Essential hypertension [I10]  Blood pressure in acceptable range. We will monitor for now. Discussed appropriate diet and the importance of smoking cessation. 08/12/2015    CAD (coronary artery disease) [I25.10]  Make sure blood pressure and lipid profile under good control. Restart baby aspirin. Monitor closely with her history of GI bleed related to AVM. Long conversation regarding smoking cessation. H/O: CVA (cerebrovascular accident) [Z86.73]  Patient with a prior history of CVA. Used to be on antiplatelet which were discontinued related to history of GI bleed related to AVM. Restart baby aspirin and monitor closely per vascular especially with the findings of moderate to severe stenosis as mentioned on CTA. Make sure blood pressure and lipid profile under good control. Long conversation with the patient regarding her ongoing smoking and the need to quit.           Mary Sands MD certifies per CMS regulation for 42 .15(a), that the patient may reasonably be expected to be discharged or transferred to a hospital within 96 hours after admission to [address] [place] [address]      Electronically signed by Mary Sands MD on 4/28/2023 at 10:37 PM

## 2023-04-29 VITALS
WEIGHT: 136.7 LBS | TEMPERATURE: 97.7 F | OXYGEN SATURATION: 96 % | HEIGHT: 63 IN | BODY MASS INDEX: 24.22 KG/M2 | HEART RATE: 59 BPM | SYSTOLIC BLOOD PRESSURE: 130 MMHG | DIASTOLIC BLOOD PRESSURE: 52 MMHG | RESPIRATION RATE: 18 BRPM

## 2023-04-29 LAB
ALBUMIN SERPL-MCNC: 3.3 G/DL (ref 3.4–4.8)
ALBUMIN/GLOB SERPL: 1.7 {RATIO} (ref 0.8–2)
ALP SERPL-CCNC: 80 U/L (ref 25–100)
ALT SERPL-CCNC: 19 U/L (ref 4–36)
ANION GAP SERPL CALCULATED.3IONS-SCNC: 12 MMOL/L (ref 3–16)
AST SERPL-CCNC: 19 U/L (ref 8–33)
BASOPHILS # BLD: 0 K/UL (ref 0–0.1)
BASOPHILS NFR BLD: 1 %
BILIRUB SERPL-MCNC: 0.4 MG/DL (ref 0.3–1.2)
BUN SERPL-MCNC: 15 MG/DL (ref 6–20)
CALCIUM SERPL-MCNC: 9 MG/DL (ref 8.5–10.5)
CHLORIDE SERPL-SCNC: 103 MMOL/L (ref 98–107)
CO2 SERPL-SCNC: 24 MMOL/L (ref 20–30)
CREAT SERPL-MCNC: 1 MG/DL (ref 0.4–1.2)
EOSINOPHIL # BLD: 0 K/UL (ref 0–0.4)
EOSINOPHIL NFR BLD: 1 %
ERYTHROCYTE [DISTWIDTH] IN BLOOD BY AUTOMATED COUNT: 14.7 % (ref 11–16)
GFR SERPLBLD CREATININE-BSD FMLA CKD-EPI: >60 ML/MIN/{1.73_M2}
GLOBULIN SER CALC-MCNC: 2 G/DL
GLUCOSE BLD-MCNC: 177 MG/DL (ref 74–106)
GLUCOSE BLD-MCNC: 76 MG/DL (ref 74–106)
GLUCOSE SERPL-MCNC: 70 MG/DL (ref 74–106)
HCT VFR BLD AUTO: 26.2 % (ref 37–47)
HGB BLD-MCNC: 8.6 G/DL (ref 11.5–16.5)
IMM GRANULOCYTES # BLD: 0 K/UL
IMM GRANULOCYTES NFR BLD: 0.7 % (ref 0–5)
LYMPHOCYTES # BLD: 0.9 K/UL (ref 1.5–4)
LYMPHOCYTES NFR BLD: 31.5 %
MCH RBC QN AUTO: 29.7 PG (ref 27–32)
MCHC RBC AUTO-ENTMCNC: 32.8 G/DL (ref 31–35)
MCV RBC AUTO: 90.3 FL (ref 80–100)
MONOCYTES # BLD: 0.3 K/UL (ref 0.2–0.8)
MONOCYTES NFR BLD: 11.8 %
NEUTROPHILS # BLD: 1.6 K/UL (ref 2–7.5)
NEUTS SEG NFR BLD: 54 %
PERFORMED ON: ABNORMAL
PERFORMED ON: NORMAL
PLATELET # BLD AUTO: 121 K/UL (ref 150–400)
PMV BLD AUTO: 11.2 FL (ref 6–10)
POTASSIUM SERPL-SCNC: 3.8 MMOL/L (ref 3.4–5.1)
PROT SERPL-MCNC: 5.3 G/DL (ref 6.4–8.3)
RBC # BLD AUTO: 2.9 M/UL (ref 3.8–5.8)
SODIUM SERPL-SCNC: 139 MMOL/L (ref 136–145)
WBC # BLD AUTO: 2.9 K/UL (ref 4–11)

## 2023-04-29 PROCEDURE — 99238 HOSP IP/OBS DSCHRG MGMT 30/<: CPT | Performed by: INTERNAL MEDICINE

## 2023-04-29 PROCEDURE — 6370000000 HC RX 637 (ALT 250 FOR IP): Performed by: PHYSICIAN ASSISTANT

## 2023-04-29 PROCEDURE — G0378 HOSPITAL OBSERVATION PER HR: HCPCS

## 2023-04-29 PROCEDURE — 85025 COMPLETE CBC W/AUTO DIFF WBC: CPT

## 2023-04-29 PROCEDURE — 80053 COMPREHEN METABOLIC PANEL: CPT

## 2023-04-29 PROCEDURE — 36415 COLL VENOUS BLD VENIPUNCTURE: CPT

## 2023-04-29 PROCEDURE — 6370000000 HC RX 637 (ALT 250 FOR IP): Performed by: INTERNAL MEDICINE

## 2023-04-29 RX ORDER — PANTOPRAZOLE SODIUM 40 MG/1
40 TABLET, DELAYED RELEASE ORAL
Qty: 60 TABLET | Refills: 0 | Status: SHIPPED | OUTPATIENT
Start: 2023-04-29

## 2023-04-29 RX ORDER — NICOTINE 21 MG/24HR
1 PATCH, TRANSDERMAL 24 HOURS TRANSDERMAL DAILY
Qty: 30 PATCH | Refills: 0 | Status: SHIPPED | OUTPATIENT
Start: 2023-04-30

## 2023-04-29 RX ORDER — ASPIRIN 81 MG/1
81 TABLET, CHEWABLE ORAL DAILY
Qty: 30 TABLET | Refills: 0 | Status: SHIPPED | OUTPATIENT
Start: 2023-04-30

## 2023-04-29 RX ORDER — FLUCONAZOLE 200 MG/1
200 TABLET ORAL DAILY
Qty: 1 TABLET | Refills: 0 | Status: SHIPPED | OUTPATIENT
Start: 2023-04-30 | End: 2023-05-01

## 2023-04-29 RX ORDER — HYDRALAZINE HYDROCHLORIDE 25 MG/1
25 TABLET, FILM COATED ORAL 2 TIMES DAILY PRN
Qty: 60 TABLET | Refills: 0 | Status: SHIPPED | OUTPATIENT
Start: 2023-04-29

## 2023-04-29 RX ORDER — INSULIN GLARGINE 100 [IU]/ML
10 INJECTION, SOLUTION SUBCUTANEOUS NIGHTLY
Status: DISCONTINUED | OUTPATIENT
Start: 2023-04-29 | End: 2023-04-29 | Stop reason: HOSPADM

## 2023-04-29 RX ADMIN — EMPAGLIFLOZIN 10 MG: 10 TABLET, FILM COATED ORAL at 08:09

## 2023-04-29 RX ADMIN — HYDROCODONE BITARTRATE AND ACETAMINOPHEN 1 TABLET: 5; 325 TABLET ORAL at 00:07

## 2023-04-29 RX ADMIN — METOPROLOL TARTRATE 12.5 MG: 25 TABLET, FILM COATED ORAL at 08:10

## 2023-04-29 RX ADMIN — FERROUS SULFATE TAB EC 324 MG (65 MG FE EQUIVALENT) 324 MG: 324 (65 FE) TABLET DELAYED RESPONSE at 08:09

## 2023-04-29 RX ADMIN — FLUCONAZOLE 200 MG: 100 TABLET ORAL at 08:09

## 2023-04-29 RX ADMIN — PANTOPRAZOLE SODIUM 40 MG: 40 TABLET, DELAYED RELEASE ORAL at 05:26

## 2023-04-29 RX ADMIN — ASPIRIN 81 MG 81 MG: 81 TABLET ORAL at 08:10

## 2023-04-29 RX ADMIN — ALOGLIPTIN 12.5 MG: 12.5 TABLET, FILM COATED ORAL at 08:09

## 2023-04-29 ASSESSMENT — PAIN SCALES - GENERAL: PAINLEVEL_OUTOF10: 9

## 2023-04-29 NOTE — DISCHARGE SUMMARY
over the phone with [facility] neurosurgeon Dr. Quan Minaya on 4/28. Per our conversation these are not emergent findings although they could be attributing to her frequent falls, dizziness, paresthesias. He will schedule her as an outpatient and she may benefit from intervention in future regarding carotid artery disease. He recommends trial of baby aspirin and monitoring hgb closely and monitoring closely for bleeding. Discussed this with patient and then again with her  per patient request. They are agreeable to trial of ASA and outpatient NS follow up. Hgb slightly lower at 8.6. she did receive IVFs. She denies any evidence of bleeding. Anemia workup negative. On oral iron supplement and PPI bid. Will monitor as outpatient especially with resuming baby ASA and repeat labs ordered on 5/4. Told patient and family to call NS if they do not hear from them this week to get scheduled for a follow up appointment. PT/OT evaluated her and cleared for dc home with walker. Few other issues addressed during hospitalization include abnormal urinalysis with yeast (Started on Diflucan for 3 days), bradycardia in 50s to low 60s (metoprolol discontinued with reported dizziness and symptoms above), and AM glucose as low as 70 (lantus decreased to 20 units and patient educated on how to titrate dose as needed and keep glucose log). Also noted to have positive orthostatics 4/28 which improved with IVF hydration and were negative prior to discharge. F/u with PCP and neurosurgery as outpatient. Vital Signs  Temp: 97.7 °F (36.5 °C)  Heart Rate: 59  Resp: 18  BP: (!) 130/52  SpO2: 96 %  O2 Device: None (Room air)       Vital signs reviewed in electronic chart. Physical exam  Constitutional:  Well developed, well nourished, no acute distress  Eyes:  PERRL, no scleral icterus, conjunctiva normal   HENT:  Atraumatic, external ears normal, nose normal, oropharynx moist, no pharyngeal exudates.  Neck- supple, no

## 2023-04-29 NOTE — FLOWSHEET NOTE
04/29/23 1000   Assessment   Charting Type Shift assessment   Psychosocial   Psychosocial (WDL) WDL   Neurological   Neuro (WDL) WDL   Level of Consciousness 0   [place] Coma Scale   Eye Opening 4   Best Verbal Response 5   Best Motor Response 6   [place] Coma Scale Score 15   HEENT (Head, Ears, Eyes, Nose, & Throat)   HEENT (WDL) X   Right Eye Glasses; Impaired vision   Left Eye Glasses; Impaired vision   Teeth Dentures upper   Respiratory   Respiratory (WDL) WDL   Respiratory Pattern Regular   Respiratory Depth Normal   L Breath Sounds Clear   R Breath Sounds Clear   Breath Sounds   Right Upper Lobe Clear   Right Middle Lobe Clear   Right Lower Lobe Clear   Left Upper Lobe Clear   Left Lower Lobe Clear   Cardiac   Cardiac (WDL) WDL   Cardiac Monitor   Telemetry Box Number [id number]   Telemetry Monitor Alarm Parameters    Gastrointestinal   Abdominal (WDL) WDL   RUQ Bowel Sounds Active   LUQ Bowel Sounds Active   RLQ Bowel Sounds Active   LLQ Bowel Sounds Active   Genitourinary   Genitourinary (WDL) WDL   Peripheral Vascular   Peripheral Vascular (WDL) WDL   Skin Integumentary    Skin Integumentary (WDL) X   Skin Color Pink   Skin Condition/Temp Warm   Skin Integrity Abrasion   Location scattered   Skin Integrity Site 2   Skin Integrity Location 2 Ecchymosis;Abrasion   Location 2 lt hand   Musculoskeletal   Musculoskeletal (WDL) X   RL Extremity Weakness   LL Extremity Weakness

## 2023-04-29 NOTE — PROGRESS NOTES
Medication Reconciliation completed with fill history from [facility] and I spoke with patient who had family at bedside. Isosorbide Mono 60 mg ER qd had not been filled in over a year and patient or family member did not recognize it.  Marked it 'not taking' on home med list.
Pt dc at this time.  IV's and tele removed.  Pt educated on follow up appointment and new medications.  Pt left floor ambulatory.  
General  Chart Reviewed: Yes  Patient assessed for rehabilitation services?: Yes  Family / Caregiver Present: No  Referring Practitioner: Jessie Zhang PA-C  Diagnosis: Pre syncope  Subjective  Subjective: Pt direct admit after MD appt yesterday. Pt reports she is having falls. Social/Functional History  Social/Functional History  Lives With: Significant other  Type of Home:  ([facility])  Home Layout: One level  Home Access: Stairs to enter with rails  Entrance Stairs - Number of Steps: 1 ADDY  Bathroom Shower/Tub: Walk-in shower  Bathroom Toilet: Standard  Bathroom Accessibility: Not accessible  Home Equipment: Lizeth Mantle, quad  Has the patient had two or more falls in the past year or any fall with injury in the past year?: Yes  ADL Assistance: [address] [place] [address] Avenue: Independent  Homemaking Responsibilities: Yes  Ambulation Assistance: Independent (uses cane PRN, community ambulator)  Transfer Assistance: Independent  Active : Yes       Objective   Heart Rate: 58  Heart Rate Source: Monitor  BP: (!) 144/59  BP Location: Right upper arm  Patient Position: Sitting  MAP (Calculated): 87  Resp: 18  SpO2: 98 %  O2 Device: None (Room air)          Observation/Palpation  Observation: Pt received sitting upright in bed, NAD, pleasant and cooperative, Room air     Bed Mobility Training  Bed Mobility Training: No  Balance  Sitting: Intact  Standing: Intact  Transfer Training  Transfer Training: No  Gait  Overall Level of Assistance: Stand-by assistance  Distance (ft): 400 Feet  Assistive Device: Gait belt;Walker, rolling     AROM: Within functional limits  PROM: Within functional limits  Strength:  Within functional limits  Coordination: Within functional limits  Tone: Normal  Sensation: Impaired  ADL  LE Dressing: Independent  Toileting Skilled Clinical Factors: Declines need        Bed mobility  Rolling to Right: Modified independent  Supine to Sit: Modified independent  Scooting: Modified
within reach, Left in bed     Restrictions  Restrictions/Precautions  Restrictions/Precautions: Fall Risk, General Precautions  Required Braces or Orthoses?: No     Subjective   Pain: LBP 3/10  General  Chart Reviewed: Yes  Patient assessed for rehabilitation services?: Yes  Family / Caregiver Present: No  Referring Practitioner: VIGNESH Montgomery  Follows Commands: Within Functional Limits         Social/Functional History  Social/Functional History  Lives With: Significant other  Type of Home:  ([facility] [place])  Home Layout: One level  Home Access: Stairs to enter with rails  Entrance Stairs - Number of Steps: 1 ADDY  Bathroom Shower/Tub: Walk-in shower  Bathroom Toilet: Standard  Bathroom Accessibility: Not accessible  Home Equipment: rodrigo Hadring  Has the patient had two or more falls in the past year or any fall with injury in the past year?: Yes  ADL Assistance: [address] [place] [address] Avenue: Independent  Homemaking Responsibilities: Yes  Ambulation Assistance: Independent (uses cane PRN, community ambulator)  Transfer Assistance: Independent  Active : Yes  Vision/Hearing  Vision  Vision: Impaired  Vision Exceptions: Wears glasses for reading  Hearing  Hearing: Within functional limits    Cognition   Orientation  Overall Orientation Status: Within Functional Limits  Cognition  Overall Cognitive Status: WFL     Objective   Heart Rate: 58  Heart Rate Source: Monitor  BP: (!) 144/59  BP Location: Right upper arm  Patient Position: Sitting  MAP (Calculated): 87  Resp: 18  SpO2: 98 %  O2 Device: None (Room air)     Observation/Palpation  Observation: Pt received sitting upright in bed, NAD, pleasant and cooperative, Room air  Gross Assessment  AROM: Within functional limits  PROM: Within functional limits  Strength:  Within functional limits  Coordination: Within functional limits                 Bed Mobility Training  Bed Mobility Training: No  Balance  Sitting: Intact  Standing: Intact  Transfer

## 2023-04-29 NOTE — DISCHARGE INSTRUCTIONS
Disposition: home  Discharged Condition: Stable  Activity: activity as tolerated with walker  Diet: cardiac diet and diabetic diet  Follow Up: Primary Care Provider in 1 week. Follow up with neurosurgery Dr. Burton Beckwith as scheduled. [facility] clinic is supposed to call patient's daughter Matthew Francisco but if the patient or her daughter do not hear from them this week then they should call to be scheduled ([phone])  Patient to proceed with the following lab (cbc, bmp) on 5/4/23  Check your blood sugar twice per day and write down the readings to take to your PCP appointment. Due to low blood sugar your lantus was changed to 20 units nightly and can be titrated as needed.

## 2023-04-29 NOTE — FLOWSHEET NOTE
04/28/23 2045   Assessment   Charting Type Shift assessment   Psychosocial   Psychosocial (WDL) WDL   Neurological   Neuro (WDL) WDL   Level of Consciousness 0   [place] Coma Scale   Eye Opening 4   Best Verbal Response 5   Best Motor Response 6   [place] Coma Scale Score 15   HEENT (Head, Ears, Eyes, Nose, & Throat)   HEENT (WDL) X   Right Eye Glasses; Impaired vision   Left Eye Glasses; Impaired vision   Teeth Dentures upper   Respiratory   Respiratory (WDL) WDL   Respiratory Pattern Regular   Respiratory Depth Normal   L Breath Sounds Clear   R Breath Sounds Clear   Breath Sounds   Right Upper Lobe Clear   Right Middle Lobe Clear   Right Lower Lobe Clear   Left Upper Lobe Clear   Left Lower Lobe Clear   Cardiac   Cardiac (WDL) WDL   Gastrointestinal   Abdominal (WDL) WDL   RUQ Bowel Sounds Active   LUQ Bowel Sounds Active   RLQ Bowel Sounds Active   LLQ Bowel Sounds Active   Genitourinary   Genitourinary (WDL) WDL   Peripheral Vascular   Peripheral Vascular (WDL) WDL   Skin Integumentary    Skin Integumentary (WDL) X   Skin Color Pink   Skin Condition/Temp Warm   Skin Integrity Abrasion   Location Scattered   Musculoskeletal   Musculoskeletal (WDL) X   RL Extremity Weakness   LL Extremity Weakness

## 2023-04-29 NOTE — PLAN OF CARE
Problem: Safety - Adult  Goal: Free from fall injury  4/29/2023 0959 by Dora Gerber RN  Outcome: Progressing  4/28/2023 2132 by Sheba Wu LPN  Outcome: Progressing     Problem: Chronic Conditions and Co-morbidities  Goal: Patient's chronic conditions and co-morbidity symptoms are monitored and maintained or improved  Outcome: Progressing

## 2023-05-01 ENCOUNTER — CARE COORDINATION (OUTPATIENT)
Dept: CARE COORDINATION | Age: 73
End: 2023-05-01

## 2023-05-01 NOTE — CARE COORDINATION
[facility] Care Transitions Initial Follow Up Call    Call within 2 business days of discharge: Yes    Patient Current Location:  Home: [address] [phone]    Care Transition Nurse contacted the patient by telephone to perform post hospital discharge assessment. Verified name and  with patient as identifiers. Provided introduction to self, and explanation of the Care Transition Nurse role. Patient: Blessing Grijalva Patient : 1950   MRN: [id number]  Reason for Admission: near syncope  Discharge Date: 23 RARS: Readmission Risk Score: 17.5      Last Discharge  Street       Date Complaint Diagnosis Description Type Department Provider    23  Bilateral carotid artery stenosis . .. Admission (Discharged) [facility] Ramirez MS Kathyanne Runner, MD            Was this an external facility discharge? No Discharge Facility: [facility]    Challenges to be reviewed by the provider   Additional needs identified to be addressed with provider: No  none               Method of communication with provider: none. Kira Dumont says she remains a little dizzy off and on as well as her back is hurting. She requests a refill of hydrocodone, stating she is out. No issues eating or drinking. Fasting blood sugar this morning was 106. She confirmed that she has decreased her dosage of lantus to 20 units. We discussed her [place] appointment. Care Transition Nurse reviewed discharge instructions with patient who verbalized understanding. The patient was given an opportunity to ask questions and does not have any further questions or concerns at this time. Were discharge instructions available to patient? Yes. Reviewed appropriate site of care based on symptoms and resources available to patient including: PCP. The patient agrees to contact the PCP office for questions related to their healthcare. Advance Care Planning:   Does patient have an Advance Directive: not on file.     Medication reconciliation was

## 2023-05-04 ENCOUNTER — OFFICE VISIT (OUTPATIENT)
Dept: PRIMARY CARE CLINIC | Age: 73
End: 2023-05-04

## 2023-05-04 ENCOUNTER — HOSPITAL ENCOUNTER (OUTPATIENT)
Facility: HOSPITAL | Age: 73
Discharge: HOME | End: 2023-05-04
Payer: MEDICARE

## 2023-05-04 VITALS
RESPIRATION RATE: 18 BRPM | BODY MASS INDEX: 25.08 KG/M2 | WEIGHT: 141.6 LBS | DIASTOLIC BLOOD PRESSURE: 64 MMHG | HEART RATE: 68 BPM | OXYGEN SATURATION: 97 % | SYSTOLIC BLOOD PRESSURE: 138 MMHG

## 2023-05-04 DIAGNOSIS — D64.9 ANEMIA, UNSPECIFIED TYPE: ICD-10-CM

## 2023-05-04 DIAGNOSIS — I71.21 ANEURYSM OF ASCENDING AORTA WITHOUT RUPTURE (HCC): ICD-10-CM

## 2023-05-04 DIAGNOSIS — I10 ESSENTIAL HYPERTENSION: ICD-10-CM

## 2023-05-04 DIAGNOSIS — E08.69 DIABETES MELLITUS DUE TO UNDERLYING CONDITION WITH OTHER SPECIFIED COMPLICATION, WITH LONG-TERM CURRENT USE OF INSULIN (HCC): ICD-10-CM

## 2023-05-04 DIAGNOSIS — D61.818 PANCYTOPENIA (HCC): ICD-10-CM

## 2023-05-04 DIAGNOSIS — E11.40 TYPE 2 DIABETES MELLITUS WITH DIABETIC NEUROPATHY, WITH LONG-TERM CURRENT USE OF INSULIN (HCC): ICD-10-CM

## 2023-05-04 DIAGNOSIS — K55.20 AVM (ARTERIOVENOUS MALFORMATION) OF SMALL BOWEL, ACQUIRED: ICD-10-CM

## 2023-05-04 DIAGNOSIS — I25.10 CORONARY ARTERY DISEASE INVOLVING NATIVE HEART WITHOUT ANGINA PECTORIS, UNSPECIFIED VESSEL OR LESION TYPE: ICD-10-CM

## 2023-05-04 DIAGNOSIS — Z79.4 TYPE 2 DIABETES MELLITUS WITH DIABETIC NEUROPATHY, WITH LONG-TERM CURRENT USE OF INSULIN (HCC): ICD-10-CM

## 2023-05-04 DIAGNOSIS — I65.23 BILATERAL CAROTID ARTERY STENOSIS: Primary | ICD-10-CM

## 2023-05-04 DIAGNOSIS — K74.60 CIRRHOSIS OF LIVER WITHOUT ASCITES, UNSPECIFIED HEPATIC CIRRHOSIS TYPE (HCC): ICD-10-CM

## 2023-05-04 DIAGNOSIS — Z79.4 DIABETES MELLITUS DUE TO UNDERLYING CONDITION WITH OTHER SPECIFIED COMPLICATION, WITH LONG-TERM CURRENT USE OF INSULIN (HCC): ICD-10-CM

## 2023-05-04 DIAGNOSIS — Z09 HOSPITAL DISCHARGE FOLLOW-UP: ICD-10-CM

## 2023-05-04 DIAGNOSIS — E53.8 VITAMIN B12 DEFICIENCY: ICD-10-CM

## 2023-05-04 PROCEDURE — 80053 COMPREHEN METABOLIC PANEL: CPT

## 2023-05-04 PROCEDURE — 85025 COMPLETE CBC W/AUTO DIFF WBC: CPT

## 2023-05-04 RX ORDER — CYANOCOBALAMIN 1000 UG/ML
1000 INJECTION, SOLUTION INTRAMUSCULAR; SUBCUTANEOUS ONCE
Status: COMPLETED | OUTPATIENT
Start: 2023-05-04 | End: 2023-05-04

## 2023-05-04 RX ADMIN — CYANOCOBALAMIN 1000 MCG: 1000 INJECTION, SOLUTION INTRAMUSCULAR; SUBCUTANEOUS at 14:24

## 2023-05-04 SDOH — ECONOMIC STABILITY: INCOME INSECURITY: HOW HARD IS IT FOR YOU TO PAY FOR THE VERY BASICS LIKE FOOD, HOUSING, MEDICAL CARE, AND HEATING?: NOT HARD AT ALL

## 2023-05-04 SDOH — ECONOMIC STABILITY: FOOD INSECURITY: WITHIN THE PAST 12 MONTHS, THE FOOD YOU BOUGHT JUST DIDN'T LAST AND YOU DIDN'T HAVE MONEY TO GET MORE.: NEVER TRUE

## 2023-05-04 SDOH — ECONOMIC STABILITY: HOUSING INSECURITY
IN THE LAST 12 MONTHS, WAS THERE A TIME WHEN YOU DID NOT HAVE A STEADY PLACE TO SLEEP OR SLEPT IN A SHELTER (INCLUDING NOW)?: NO

## 2023-05-04 SDOH — ECONOMIC STABILITY: FOOD INSECURITY: WITHIN THE PAST 12 MONTHS, YOU WORRIED THAT YOUR FOOD WOULD RUN OUT BEFORE YOU GOT MONEY TO BUY MORE.: NEVER TRUE

## 2023-05-04 ASSESSMENT — ENCOUNTER SYMPTOMS
WHEEZING: 0
EYE DISCHARGE: 0
ABDOMINAL PAIN: 0
SINUS PRESSURE: 0
COUGH: 0
SORE THROAT: 0
VOMITING: 0
SHORTNESS OF BREATH: 0
NAUSEA: 0

## 2023-05-04 ASSESSMENT — PATIENT HEALTH QUESTIONNAIRE - PHQ9
SUM OF ALL RESPONSES TO PHQ9 QUESTIONS 1 & 2: 0
SUM OF ALL RESPONSES TO PHQ QUESTIONS 1-9: 0
SUM OF ALL RESPONSES TO PHQ QUESTIONS 1-9: 0
1. LITTLE INTEREST OR PLEASURE IN DOING THINGS: 0
SUM OF ALL RESPONSES TO PHQ QUESTIONS 1-9: 0
SUM OF ALL RESPONSES TO PHQ QUESTIONS 1-9: 0
2. FEELING DOWN, DEPRESSED OR HOPELESS: 0

## 2023-05-05 ENCOUNTER — TELEPHONE (OUTPATIENT)
Dept: PRIMARY CARE CLINIC | Age: 73
End: 2023-05-05

## 2023-05-05 LAB
ALBUMIN SERPL-MCNC: 3.8 G/DL (ref 3.4–4.8)
ALBUMIN/GLOB SERPL: 2 {RATIO} (ref 0.8–2)
ALP SERPL-CCNC: 107 U/L (ref 25–100)
ALT SERPL-CCNC: 41 U/L (ref 4–36)
ANION GAP SERPL CALCULATED.3IONS-SCNC: 12 MMOL/L (ref 3–16)
AST SERPL-CCNC: 51 U/L (ref 8–33)
BASOPHILS # BLD: 0 K/UL (ref 0–0.1)
BASOPHILS NFR BLD: 0.7 %
BILIRUB SERPL-MCNC: 0.4 MG/DL (ref 0.3–1.2)
BUN SERPL-MCNC: 18 MG/DL (ref 6–20)
CALCIUM SERPL-MCNC: 9.6 MG/DL (ref 8.5–10.5)
CHLORIDE SERPL-SCNC: 100 MMOL/L (ref 98–107)
CO2 SERPL-SCNC: 27 MMOL/L (ref 20–30)
CREAT SERPL-MCNC: 1 MG/DL (ref 0.4–1.2)
EOSINOPHIL # BLD: 0 K/UL (ref 0–0.4)
EOSINOPHIL NFR BLD: 1.4 %
ERYTHROCYTE [DISTWIDTH] IN BLOOD BY AUTOMATED COUNT: 15.6 % (ref 11–16)
GFR SERPLBLD CREATININE-BSD FMLA CKD-EPI: >60 ML/MIN/{1.73_M2}
GLOBULIN SER CALC-MCNC: 1.9 G/DL
GLUCOSE SERPL-MCNC: 226 MG/DL (ref 74–106)
HCT VFR BLD AUTO: 26.9 % (ref 37–47)
HGB BLD-MCNC: 8.6 G/DL (ref 11.5–16.5)
IMM GRANULOCYTES # BLD: 0 K/UL
IMM GRANULOCYTES NFR BLD: 0.4 % (ref 0–5)
LYMPHOCYTES # BLD: 0.8 K/UL (ref 1.5–4)
LYMPHOCYTES NFR BLD: 28.1 %
MCH RBC QN AUTO: 29.8 PG (ref 27–32)
MCHC RBC AUTO-ENTMCNC: 32 G/DL (ref 31–35)
MCV RBC AUTO: 93.1 FL (ref 80–100)
MONOCYTES # BLD: 0.3 K/UL (ref 0.2–0.8)
MONOCYTES NFR BLD: 9.8 %
NEUTROPHILS # BLD: 1.7 K/UL (ref 2–7.5)
NEUTS SEG NFR BLD: 59.6 %
PLATELET # BLD AUTO: 131 K/UL (ref 150–400)
PMV BLD AUTO: 11.9 FL (ref 6–10)
POTASSIUM SERPL-SCNC: 3.9 MMOL/L (ref 3.4–5.1)
PROT SERPL-MCNC: 5.7 G/DL (ref 6.4–8.3)
RBC # BLD AUTO: 2.89 M/UL (ref 3.8–5.8)
SODIUM SERPL-SCNC: 139 MMOL/L (ref 136–145)
WBC # BLD AUTO: 2.9 K/UL (ref 4–11)

## 2023-05-08 DIAGNOSIS — M51.36 DEGENERATIVE DISC DISEASE, LUMBAR: ICD-10-CM

## 2023-05-08 RX ORDER — HYDROCODONE BITARTRATE AND ACETAMINOPHEN 5; 325 MG/1; MG/1
1 TABLET ORAL 2 TIMES DAILY PRN
Qty: 30 TABLET | Refills: 0 | Status: SHIPPED | OUTPATIENT
Start: 2023-05-08 | End: 2023-06-07

## 2023-05-14 ASSESSMENT — ENCOUNTER SYMPTOMS: BACK PAIN: 1

## 2023-05-23 ENCOUNTER — OFFICE VISIT (OUTPATIENT)
Dept: NEUROSURGERY | Facility: CLINIC | Age: 73
End: 2023-05-23
Payer: MEDICARE

## 2023-05-23 ENCOUNTER — HOSPITAL ENCOUNTER (OUTPATIENT)
Dept: MRI IMAGING | Facility: HOSPITAL | Age: 73
Discharge: HOME | End: 2023-05-23
Admitting: STUDENT IN AN ORGANIZED HEALTH CARE EDUCATION/TRAINING PROGRAM
Payer: MEDICARE

## 2023-05-23 VITALS
SYSTOLIC BLOOD PRESSURE: 158 MMHG | DIASTOLIC BLOOD PRESSURE: 70 MMHG | TEMPERATURE: 97.5 F | BODY MASS INDEX: 26.06 KG/M2 | WEIGHT: 141.6 LBS | HEIGHT: 62 IN

## 2023-05-23 DIAGNOSIS — I65.23 STENOSIS OF BOTH INTERNAL CAROTID ARTERIES: ICD-10-CM

## 2023-05-23 DIAGNOSIS — I65.23 STENOSIS OF BOTH INTERNAL CAROTID ARTERIES: Primary | ICD-10-CM

## 2023-05-23 PROCEDURE — 70551 MRI BRAIN STEM W/O DYE: CPT

## 2023-05-23 RX ORDER — ASPIRIN 81 MG/1
81 TABLET, CHEWABLE ORAL DAILY
COMMUNITY
Start: 2023-04-29

## 2023-05-23 NOTE — PROGRESS NOTES
"Patient: Karol Lopez  : 1950    Primary Care Provider: Romaine Eugene MD    Requesting Provider: As above      Chief Complaint: Carotid Artery Disease and syncope and collapse      History of Present Illness: This is a 73 y.o. female who is at an outside hospital a few weeks ago due to an episode of syncope and altered mental status.  She underwent a stroke imaging work-up which showed diffuse atherosclerotic disease of the intracranial extracranial internal carotid arteries.  The patient is on an aspirin, but has not been placed on other antiplatelet medications due to issues with her hemoglobin dropping and requiring blood transfusion.  Difficult to get a good history from the patient in terms of a prior stroke event.  She does have a right-sided facial droop and some mild weakness in her right hand .  Unfortunately, the patient did not bring her discs with her today so I wanted to review the CTA's.    PMHX  Allergies:  Allergies   Allergen Reactions   • Codeine GI Intolerance     Medications    Current Outpatient Medications:   •  aspirin 81 MG chewable tablet, Chew 1 tablet Daily., Disp: , Rfl:   •  B-D INS SYRINGE 0.5CC/31GX5/16 31G X 5/16\" 0.5 ML misc, use as directed once daily, Disp: , Rfl: 0  •  bisacodyl (DULCOLAX) 5 MG EC tablet, Take 1 tablet by mouth Daily As Needed., Disp: , Rfl:   •  empagliflozin (Jardiance) 10 MG tablet tablet, Take 1 tablet by mouth Daily., Disp: 90 tablet, Rfl: 3  •  ferrous sulfate (FerrouSul) 325 (65 FE) MG tablet, Take 1 tablet by mouth Daily With Breakfast., Disp: 30 tablet, Rfl: 3  •  furosemide (LASIX) 20 MG tablet, Take 1 tablet by mouth Daily As Needed. swelling, Disp: , Rfl:   •  HYDROcodone-acetaminophen (NORCO) 5-325 MG per tablet, Take 1 tablet by mouth Every 8 (Eight) Hours As Needed (PRN PAIN). (Patient taking differently: Take 1 tablet by mouth Daily. As needed), Disp: 30 tablet, Rfl: 0  •  Insulin Pen Needle 31G X 6 MM misc, 1 each., Disp: , Rfl: " "  •  Insulin Syringe-Needle U-100 30G X 5/16\" 0.5 ML misc, 1 each by Does not apply route daily, Disp: , Rfl:   •  Insulin Syringe-Needle U-100 31G X 5/16\" 1 ML misc, 1 each by Does not apply route daily, Disp: , Rfl:   •  lactulose (CHRONULAC) 10 GM/15ML solution, Take 15 mL by mouth Daily., Disp: , Rfl:   •  linaclotide (LINZESS) 290 MCG capsule capsule, Take 1 capsule by mouth Every Morning Before Breakfast., Disp: , Rfl:   •  linagliptin (TRADJENTA) 5 MG tablet tablet, Take 1 tablet by mouth Daily., Disp: 30 tablet, Rfl:   •  metFORMIN (GLUCOPHAGE) 1000 MG tablet, Take 1 tablet by mouth 2 (Two) Times a Day With Meals., Disp: , Rfl:   •  metoprolol succinate XL (TOPROL-XL) 25 MG 24 hr tablet, Take 1 tablet by mouth Daily., Disp: 90 tablet, Rfl: 3  •  pantoprazole (PROTONIX) 40 MG EC tablet, TAKE 1 TABLET BY MOUTH TWICE DAILY BEFORE MEALS, Disp: 180 tablet, Rfl: 3  •  rosuvastatin (CRESTOR) 20 MG tablet, Take 1 tablet by mouth Daily., Disp: 90 tablet, Rfl: 3  •  umeclidinium-vilanterol (Anoro Ellipta) 62.5-25 MCG/INH aerosol powder  inhaler, Inhale 1 puff As Needed., Disp: , Rfl:   •  vitamin B-12 (CYANOCOBALAMIN) 1000 MCG tablet, Take 1 tablet by mouth Daily., Disp: , Rfl:   Past Medical History:  Past Medical History:   Diagnosis Date   • Acid reflux    • Anxiety    • Cataracts, bilateral    • Cirrhosis of liver    • Constipation    • COPD (chronic obstructive pulmonary disease)    • Coronary artery disease    • CTS (carpal tunnel syndrome)    • Diabetes    • Hearing loss    • Hypercholesteremia    • Hypertension    • Impaired functional mobility, balance, gait, and endurance    • Lower back pain    • Osteoarthritis    • Stroke     2013-weak in right arm   • Tattoos     x2   • Tobacco abuse    • Tumor     in between breast closer to right breast   • Vitamin B12 deficiency    • Wears dentures     upper only   • Wears glasses      Past Surgical History:  Past Surgical History:   Procedure Laterality Date   • " BREAST BIOPSY Right 5/10/2019    Procedure: BREAST BIOPSY RIGHT;  Surgeon: Kiana Frey MD;  Location: [facility] OR;  Service: General   • CARPAL TUNNEL RELEASE Bilateral    • CHOLECYSTECTOMY     • COLONOSCOPY N/A 9/30/2021    Procedure: COLONOSCOPY WITH POLYPECTOMY AND ABLATION OF AVM;  Surgeon: Russell Davila MD;  Location: [facility] ENDOSCOPY;  Service: Gastroenterology;  Laterality: N/A;   • CORONARY ANGIOPLASTY WITH STENT PLACEMENT  2012   • ENDOSCOPY N/A 9/29/2021    Procedure: ESOPHAGOGASTRODUODENOSCOPY with biopsies;  Surgeon: Russell Davila MD;  Location: [facility] ENDOSCOPY;  Service: Gastroenterology;  Laterality: N/A;   • ENTEROSCOPY SMALL BOWEL N/A 11/16/2021    Procedure: ESOPHAGOGASTRODUODENOSCOPY WITH SMALL BOWEL ENTEROSCOPY and biopsy;  Surgeon: Russell Davila MD;  Location: [facility] ENDOSCOPY;  Service: Gastroenterology;  Laterality: N/A;   • HYSTERECTOMY      complete   • JOINT REPLACEMENT Bilateral     knee replacements   • TOTAL KNEE ARTHROPLASTY Bilateral 10/18/2016    MAUREEN Palomares MD     Social Hx:  Social History     Tobacco Use   • Smoking status: Every Day     Packs/day: 1.00     Years: 46.00     Pack years: 46.00     Types: Cigarettes   • Smokeless tobacco: Never   Vaping Use   • Vaping Use: Never used   Substance Use Topics   • Alcohol use: No   • Drug use: No     Family Hx:  Family History   Problem Relation Age of Onset   • Hypertension Other    • Diabetes Mother    • Hypertension Mother    • Cancer Mother    • Diabetes Father    • Hypertension Father    • Heart disease Father    • Cancer Sister    • Cancer Brother      Review of Systems:        Review of Systems   Constitutional: Negative for activity change, appetite change, chills, diaphoresis, fatigue, fever and unexpected weight change.   HENT: Negative for congestion, dental problem, drooling, ear discharge, ear pain, facial swelling, hearing loss, mouth sores, nosebleeds, postnasal drip, rhinorrhea, sinus  "pressure, sneezing, sore throat, tinnitus, trouble swallowing and voice change.    Eyes: Negative for photophobia, pain, discharge, redness, itching and visual disturbance.   Respiratory: Negative for apnea, cough, choking, chest tightness, shortness of breath, wheezing and stridor.    Cardiovascular: Negative for chest pain, palpitations and leg swelling.   Gastrointestinal: Negative for abdominal distention, abdominal pain, anal bleeding, blood in stool, constipation, diarrhea, nausea, rectal pain and vomiting.   Endocrine: Negative for cold intolerance, heat intolerance, polydipsia, polyphagia and polyuria.   Genitourinary: Negative for decreased urine volume, difficulty urinating, dysuria, enuresis, flank pain, frequency, genital sores, hematuria and urgency.   Musculoskeletal: Positive for gait problem. Negative for arthralgias, back pain, joint swelling, myalgias, neck pain and neck stiffness.   Skin: Negative for color change, pallor, rash and wound.   Allergic/Immunologic: Negative for environmental allergies, food allergies and immunocompromised state.   Neurological: Positive for syncope. Negative for dizziness, tremors, seizures, facial asymmetry, speech difficulty, weakness, light-headedness, numbness and headaches.   Hematological: Negative for adenopathy. Does not bruise/bleed easily.   Psychiatric/Behavioral: Negative for agitation, behavioral problems, confusion, decreased concentration, dysphoric mood, hallucinations, self-injury, sleep disturbance and suicidal ideas. The patient is not nervous/anxious and is not hyperactive.    All other systems reviewed and are negative.       Physical Exam:   /70 (BP Location: Left arm, Patient Position: Sitting, Cuff Size: Adult)   Temp 97.5 °F (36.4 °C) (Infrared)   Ht 157.5 cm (62\")   Wt 64.2 kg (141 lb 9.6 oz)   BMI 25.90 kg/m²   Awake, alert and oriented x 3  Speech f/c  Opens eyes spont  Pupils 3 mm rx bilaterally  Extraocular muscles intact " bilaterally  Normal sensation to light touch in all 3 distributions of CN V bilaterally  Right-sided facial droop  Tongue midline  5/5 in all 4 ext right hand  is 4 out of 5  Normal sensation to light touch in all 4 ext  2+DTR's  No perales's or clonus bilaterally  No pronator drift or dysmetria  Gait not assessed    Diagnostic Studies:  All neurological imaging studies were independently reviewed unless stated otherwise    Assessment/Plan:  This is a 73 y.o. female who presented to an outside hospital several weeks ago with an episode of syncope and altered mental status.  Stroke imaging at that time reviewed diffuse intracranial and extracranial atherosclerotic disease of the internal carotid arteries.  She did not undergo an MRI. In talking to the patient, it is difficult to discern if and how often she has had TIA or stroke events.  She does seem to have some issues which would relate to the left hemisphere given her right facial droop and weakness in the right hand .  To better evaluate this, I am going to obtain a brain MRI.  We will work on obtaining the patient's CTA so that I can review them.  We will plan to have the patient follow-up with me after her MRI is complete.  She may benefit from a diagnostic angiogram for complete evaluation of her diffuse atherosclerotic disease.  In the meantime, she should remain on aspirin.    Diagnoses and all orders for this visit:    1. Stenosis of both internal carotid arteries (Primary)        iMke Schrader MD  05/23/23  13:07 EDT

## 2023-05-26 DIAGNOSIS — Z00.6 EXAM FOR CLINICAL RESEARCH: Primary | ICD-10-CM

## 2023-05-29 DIAGNOSIS — D64.9 ANEMIA, UNSPECIFIED TYPE: Primary | ICD-10-CM

## 2023-05-30 ENCOUNTER — OFFICE VISIT (OUTPATIENT)
Dept: NEUROSURGERY | Facility: CLINIC | Age: 73
End: 2023-05-30

## 2023-05-30 VITALS
TEMPERATURE: 97.1 F | BODY MASS INDEX: 25.95 KG/M2 | HEIGHT: 62 IN | WEIGHT: 141 LBS | SYSTOLIC BLOOD PRESSURE: 110 MMHG | DIASTOLIC BLOOD PRESSURE: 52 MMHG

## 2023-05-30 DIAGNOSIS — I65.23 STENOSIS OF BOTH INTERNAL CAROTID ARTERIES: Primary | ICD-10-CM

## 2023-05-30 NOTE — PROGRESS NOTES
"NEUROSURGERY PROGRESS NOTE    Patient: Karol Lopez  : 1950    Primary Care Provider: Romaine Eugene MD    Chief Complaint: Carotid stenosis    Subjective: This is a 73-year-old female who I previously evaluated for bilateral carotid stenosis.  After her last visit, I referred her for an MRI and she comes in today for follow-up.  She continues to have episodes which she describes as headache and fogginess.  She then feels diffusely weak in her entire body.  She has no symptoms that lateralize to one side or the other of her body.  Her MRI shows no prior hemispheric strokes.    Objective    Vital Signs: Blood pressure 110/52, temperature 97.1 °F (36.2 °C), temperature source Infrared, height 157.5 cm (62\"), weight 64 kg (141 lb), not currently breastfeeding.    Physical Exam  Awake, alert and oriented x 3  Opens eyes spont  Pupils 3 mm rx bilat  Extraocular muscles intact bilaterally  Mild right facial weakness  Tongue midline  5/5 in all 4 ext right hand  is 4 out of 5    Current Medications:   Current Outpatient Medications:   •  aspirin 81 MG chewable tablet, Chew 1 tablet Daily., Disp: , Rfl:   •  B-D INS SYRINGE 0.5CC/31GX5/16 31G X 5/16\" 0.5 ML misc, use as directed once daily, Disp: , Rfl: 0  •  bisacodyl (DULCOLAX) 5 MG EC tablet, Take 1 tablet by mouth Daily As Needed., Disp: , Rfl:   •  empagliflozin (Jardiance) 10 MG tablet tablet, Take 1 tablet by mouth Daily., Disp: 90 tablet, Rfl: 3  •  ferrous sulfate (FerrouSul) 325 (65 FE) MG tablet, Take 1 tablet by mouth Daily With Breakfast., Disp: 30 tablet, Rfl: 3  •  furosemide (LASIX) 20 MG tablet, Take 1 tablet by mouth Daily As Needed. swelling, Disp: , Rfl:   •  HYDROcodone-acetaminophen (NORCO) 5-325 MG per tablet, Take 1 tablet by mouth Every 8 (Eight) Hours As Needed (PRN PAIN). (Patient taking differently: Take 1 tablet by mouth Daily. As needed), Disp: 30 tablet, Rfl: 0  •  Insulin Pen Needle 31G X 6 MM misc, 1 each., Disp: , Rfl:   • " " Insulin Syringe-Needle U-100 30G X 5/16\" 0.5 ML misc, 1 each by Does not apply route daily, Disp: , Rfl:   •  Insulin Syringe-Needle U-100 31G X 5/16\" 1 ML misc, 1 each by Does not apply route daily, Disp: , Rfl:   •  lactulose (CHRONULAC) 10 GM/15ML solution, Take 15 mL by mouth Daily., Disp: , Rfl:   •  linaclotide (LINZESS) 290 MCG capsule capsule, Take 1 capsule by mouth Every Morning Before Breakfast., Disp: , Rfl:   •  linagliptin (TRADJENTA) 5 MG tablet tablet, Take 1 tablet by mouth Daily., Disp: 30 tablet, Rfl:   •  metFORMIN (GLUCOPHAGE) 1000 MG tablet, Take 1 tablet by mouth 2 (Two) Times a Day With Meals., Disp: , Rfl:   •  metoprolol succinate XL (TOPROL-XL) 25 MG 24 hr tablet, Take 1 tablet by mouth Daily., Disp: 90 tablet, Rfl: 3  •  pantoprazole (PROTONIX) 40 MG EC tablet, TAKE 1 TABLET BY MOUTH TWICE DAILY BEFORE MEALS, Disp: 180 tablet, Rfl: 3  •  rosuvastatin (CRESTOR) 20 MG tablet, Take 1 tablet by mouth Daily., Disp: 90 tablet, Rfl: 3  •  umeclidinium-vilanterol (Anoro Ellipta) 62.5-25 MCG/INH aerosol powder  inhaler, Inhale 1 puff As Needed., Disp: , Rfl:   •  vitamin B-12 (CYANOCOBALAMIN) 1000 MCG tablet, Take 1 tablet by mouth Daily., Disp: , Rfl:      Laboratory Results:                              Brief Urine Lab Results  (Last result in the past 365 days)      Color   Clarity   Blood   Leuk Est   Nitrite   Protein   CREAT   Urine HCG        04/27/23 0336 Yellow   Clear   Negative   Negative   Negative                 Microbiology Results (last 10 days)     ** No results found for the last 240 hours. **          Diagnostic Imaging: I reviewed and independently interpreted the new imaging.     Assessment/Plan:  This is a 73-year-old female who I previously evaluated for bilateral carotid stenosis.  The patient's brain MRI shows her known prior small slava infarct, but there is no evidence of infarct in either hemisphere.  The patient's episodes of headache and fogginess are not consistent " with a TIA event.  She has no lateralizing symptoms and as such I think she is asymptomatic with regards to her carotid stenosis.  I would recommend maximal medical management for this with aspirin, Plavix and a statin.  The patient lives several hours away and would like to be followed by her PCP.  I would recommend placing her on all 3 of these medications and for her to get carotid Dopplers every 6 months.  I am available if there are changes noted in these carotid Dopplers or if she were to develop TIA-like symptoms.  At this time, I will defer to the PCP for long-term monitoring.    Diagnoses and all orders for this visit:    1. Stenosis of both internal carotid arteries (Primary)        Mike Schrader MD  05/30/23  13:58 EDT

## 2023-05-31 ENCOUNTER — HOSPITAL ENCOUNTER (OUTPATIENT)
Facility: HOSPITAL | Age: 73
Discharge: HOME | End: 2023-05-31
Payer: MEDICARE

## 2023-05-31 DIAGNOSIS — D64.9 ANEMIA, UNSPECIFIED TYPE: ICD-10-CM

## 2023-05-31 LAB
ALBUMIN SERPL-MCNC: 4.1 G/DL (ref 3.4–4.8)
ALBUMIN/GLOB SERPL: 2.2 {RATIO} (ref 0.8–2)
ALP SERPL-CCNC: 78 U/L (ref 25–100)
ALT SERPL-CCNC: 9 U/L (ref 4–36)
ANION GAP SERPL CALCULATED.3IONS-SCNC: 17 MMOL/L (ref 3–16)
AST SERPL-CCNC: 11 U/L (ref 8–33)
BASOPHILS # BLD: 0 K/UL (ref 0–0.1)
BASOPHILS NFR BLD: 0.4 %
BILIRUB SERPL-MCNC: 0.3 MG/DL (ref 0.3–1.2)
BUN SERPL-MCNC: 17 MG/DL (ref 6–20)
CALCIUM SERPL-MCNC: 9.8 MG/DL (ref 8.5–10.5)
CHLORIDE SERPL-SCNC: 101 MMOL/L (ref 98–107)
CO2 SERPL-SCNC: 21 MMOL/L (ref 20–30)
CREAT SERPL-MCNC: 1.3 MG/DL (ref 0.4–1.2)
EOSINOPHIL # BLD: 0 K/UL (ref 0–0.4)
EOSINOPHIL NFR BLD: 0.8 %
ERYTHROCYTE [DISTWIDTH] IN BLOOD BY AUTOMATED COUNT: 14.6 % (ref 11–16)
FERRITIN SERPL IA-MCNC: 11.9 NG/ML (ref 22–322)
GFR SERPLBLD CREATININE-BSD FMLA CKD-EPI: 44 ML/MIN/{1.73_M2}
GLOBULIN SER CALC-MCNC: 1.9 G/DL
GLUCOSE SERPL-MCNC: 143 MG/DL (ref 74–106)
HCT VFR BLD AUTO: 22.8 % (ref 37–47)
HGB BLD-MCNC: 7 G/DL (ref 11.5–16.5)
IMM GRANULOCYTES # BLD: 0 K/UL
IMM GRANULOCYTES NFR BLD: 0.4 % (ref 0–5)
IRON SATN MFR SERPL: 22 % (ref 15–50)
IRON SERPL-MCNC: 83 UG/DL (ref 37–145)
LYMPHOCYTES # BLD: 0.8 K/UL (ref 1.5–4)
LYMPHOCYTES NFR BLD: 16.9 %
MCH RBC QN AUTO: 27.5 PG (ref 27–32)
MCHC RBC AUTO-ENTMCNC: 30.7 G/DL (ref 31–35)
MCV RBC AUTO: 89.4 FL (ref 80–100)
MONOCYTES # BLD: 0.3 K/UL (ref 0.2–0.8)
MONOCYTES NFR BLD: 6.8 %
NEUTROPHILS # BLD: 3.6 K/UL (ref 2–7.5)
NEUTS SEG NFR BLD: 74.7 %
PLATELET # BLD AUTO: 159 K/UL (ref 150–400)
PMV BLD AUTO: 12.1 FL (ref 6–10)
POTASSIUM SERPL-SCNC: 3.1 MMOL/L (ref 3.4–5.1)
PROT SERPL-MCNC: 6 G/DL (ref 6.4–8.3)
RBC # BLD AUTO: 2.55 M/UL (ref 3.8–5.8)
SODIUM SERPL-SCNC: 139 MMOL/L (ref 136–145)
TIBC SERPL-MCNC: 377 UG/DL (ref 250–450)
WBC # BLD AUTO: 4.9 K/UL (ref 4–11)

## 2023-05-31 PROCEDURE — 83550 IRON BINDING TEST: CPT

## 2023-05-31 PROCEDURE — 80053 COMPREHEN METABOLIC PANEL: CPT

## 2023-05-31 PROCEDURE — 82728 ASSAY OF FERRITIN: CPT

## 2023-05-31 PROCEDURE — 83540 ASSAY OF IRON: CPT

## 2023-05-31 PROCEDURE — 85025 COMPLETE CBC W/AUTO DIFF WBC: CPT

## 2023-05-31 NOTE — RESULT ENCOUNTER NOTE
Spoke with the patient regarding her blood work. Worsening iron deficiency anemia in addition to hypokalemia and slight worsening renal function. Patient reported feeling weaker and dizzy. She reported black stool. Advised her to go to the emergency room but she refused. Patient will be admitted directly tomorrow but was informed to go to the emergency room or call 911 with any worsening symptoms.

## 2023-06-01 ENCOUNTER — HOSPITAL ENCOUNTER (INPATIENT)
Facility: HOSPITAL | Age: 73
LOS: 2 days | Discharge: HOME | DRG: 684 | End: 2023-06-03
Attending: INTERNAL MEDICINE | Admitting: INTERNAL MEDICINE
Payer: MEDICARE

## 2023-06-01 DIAGNOSIS — D64.9 ANEMIA, UNSPECIFIED TYPE: Primary | ICD-10-CM

## 2023-06-01 PROBLEM — E87.6 HYPOKALEMIA: Status: ACTIVE | Noted: 2023-06-01

## 2023-06-01 PROBLEM — R55 PRE-SYNCOPE: Status: RESOLVED | Noted: 2023-04-27 | Resolved: 2023-06-01

## 2023-06-01 LAB
ABO + RH BLD: NORMAL
ALBUMIN SERPL-MCNC: 4.1 G/DL (ref 3.4–4.8)
ALBUMIN/GLOB SERPL: 1.9 {RATIO} (ref 0.8–2)
ALP SERPL-CCNC: 75 U/L (ref 25–100)
ALT SERPL-CCNC: 9 U/L (ref 4–36)
ANION GAP SERPL CALCULATED.3IONS-SCNC: 19 MMOL/L (ref 3–16)
AST SERPL-CCNC: 11 U/L (ref 8–33)
BACTERIA URNS QL MICRO: ABNORMAL /HPF
BASOPHILS # BLD: 0 K/UL (ref 0–0.1)
BASOPHILS NFR BLD: 0.2 %
BILIRUB SERPL-MCNC: 0.3 MG/DL (ref 0.3–1.2)
BILIRUB UR QL STRIP.AUTO: NEGATIVE
BLD GP AB SCN SERPL QL: NORMAL
BUN SERPL-MCNC: 17 MG/DL (ref 6–20)
CALCIUM SERPL-MCNC: 10 MG/DL (ref 8.5–10.5)
CHLORIDE SERPL-SCNC: 99 MMOL/L (ref 98–107)
CLARITY UR: CLEAR
CO2 SERPL-SCNC: 19 MMOL/L (ref 20–30)
COLOR UR: YELLOW
CREAT SERPL-MCNC: 1.3 MG/DL (ref 0.4–1.2)
EKG ATRIAL RATE: 129 BPM
EKG DIAGNOSIS: NORMAL
EKG Q-T INTERVAL: 370 MS
EKG QRS DURATION: 82 MS
EKG QTC CALCULATION (BAZETT): 489 MS
EKG R AXIS: 22 DEGREES
EKG T AXIS: -27 DEGREES
EKG VENTRICULAR RATE: 105 BPM
EOSINOPHIL # BLD: 0 K/UL (ref 0–0.4)
EOSINOPHIL NFR BLD: 0.6 %
EPI CELLS #/AREA URNS HPF: ABNORMAL /HPF (ref 0–5)
ERYTHROCYTE [DISTWIDTH] IN BLOOD BY AUTOMATED COUNT: 14.6 % (ref 11–16)
FOLATE SERPL-MCNC: 10.55 NG/ML
GFR SERPLBLD CREATININE-BSD FMLA CKD-EPI: 44 ML/MIN/{1.73_M2}
GLOBULIN SER CALC-MCNC: 2.2 G/DL
GLUCOSE BLD-MCNC: 142 MG/DL (ref 74–106)
GLUCOSE BLD-MCNC: 145 MG/DL (ref 74–106)
GLUCOSE BLD-MCNC: 164 MG/DL (ref 74–106)
GLUCOSE SERPL-MCNC: 147 MG/DL (ref 74–106)
GLUCOSE UR STRIP.AUTO-MCNC: >=1000 MG/DL
HBA1C MFR BLD: 6.1 %
HCT VFR BLD AUTO: 23 % (ref 37–47)
HGB BLD-MCNC: 7.1 G/DL (ref 11.5–16.5)
HGB UR QL STRIP.AUTO: NEGATIVE
IMM GRANULOCYTES # BLD: 0 K/UL
IMM GRANULOCYTES NFR BLD: 0.6 % (ref 0–5)
IRON SATN MFR SERPL: 17 % (ref 15–50)
IRON SERPL-MCNC: 68 UG/DL (ref 37–145)
KETONES UR STRIP.AUTO-MCNC: NEGATIVE MG/DL
LEUKOCYTE ESTERASE UR QL STRIP.AUTO: NEGATIVE
LYMPHOCYTES # BLD: 0.7 K/UL (ref 1.5–4)
LYMPHOCYTES NFR BLD: 14 %
MAGNESIUM SERPL-MCNC: 2 MG/DL (ref 1.7–2.4)
MCH RBC QN AUTO: 27.5 PG (ref 27–32)
MCHC RBC AUTO-ENTMCNC: 30.9 G/DL (ref 31–35)
MCV RBC AUTO: 89.1 FL (ref 80–100)
MONOCYTES # BLD: 0.4 K/UL (ref 0.2–0.8)
MONOCYTES NFR BLD: 7.5 %
NEUTROPHILS # BLD: 3.7 K/UL (ref 2–7.5)
NEUTS SEG NFR BLD: 77.1 %
NITRITE UR QL STRIP.AUTO: NEGATIVE
PERFORMED ON: ABNORMAL
PH UR STRIP.AUTO: 5 [PH] (ref 5–8)
PLATELET # BLD AUTO: 170 K/UL (ref 150–400)
PMV BLD AUTO: 11.3 FL (ref 6–10)
POTASSIUM SERPL-SCNC: 2.7 MMOL/L (ref 3.4–5.1)
PROT SERPL-MCNC: 6.3 G/DL (ref 6.4–8.3)
PROT UR STRIP.AUTO-MCNC: 100 MG/DL
RBC # BLD AUTO: 2.58 M/UL (ref 3.8–5.8)
RBC #/AREA URNS HPF: ABNORMAL /HPF (ref 0–4)
SODIUM SERPL-SCNC: 137 MMOL/L (ref 136–145)
SP GR UR STRIP.AUTO: 1.02 (ref 1–1.03)
TIBC SERPL-MCNC: 395 UG/DL (ref 250–450)
UA COMPLETE W REFLEX CULTURE PNL UR: ABNORMAL
UA DIPSTICK W REFLEX MICRO PNL UR: YES
URN SPEC COLLECT METH UR: ABNORMAL
UROBILINOGEN UR STRIP-ACNC: 0.2 E.U./DL
VIT B12 SERPL-MCNC: 598 PG/ML (ref 211–911)
WBC # BLD AUTO: 4.8 K/UL (ref 4–11)
WBC #/AREA URNS HPF: ABNORMAL /HPF (ref 0–5)
YEAST URNS QL MICRO: PRESENT /HPF

## 2023-06-01 PROCEDURE — 83550 IRON BINDING TEST: CPT

## 2023-06-01 PROCEDURE — 86900 BLOOD TYPING SEROLOGIC ABO: CPT

## 2023-06-01 PROCEDURE — 97530 THERAPEUTIC ACTIVITIES: CPT

## 2023-06-01 PROCEDURE — 83036 HEMOGLOBIN GLYCOSYLATED A1C: CPT

## 2023-06-01 PROCEDURE — 86920 COMPATIBILITY TEST SPIN: CPT

## 2023-06-01 PROCEDURE — 97161 PT EVAL LOW COMPLEX 20 MIN: CPT

## 2023-06-01 PROCEDURE — G0378 HOSPITAL OBSERVATION PER HR: HCPCS

## 2023-06-01 PROCEDURE — 86850 RBC ANTIBODY SCREEN: CPT

## 2023-06-01 PROCEDURE — 82607 VITAMIN B-12: CPT

## 2023-06-01 PROCEDURE — 82746 ASSAY OF FOLIC ACID SERUM: CPT

## 2023-06-01 PROCEDURE — 80053 COMPREHEN METABOLIC PANEL: CPT

## 2023-06-01 PROCEDURE — P9016 RBC LEUKOCYTES REDUCED: HCPCS

## 2023-06-01 PROCEDURE — G0379 DIRECT REFER HOSPITAL OBSERV: HCPCS

## 2023-06-01 PROCEDURE — 93005 ELECTROCARDIOGRAM TRACING: CPT

## 2023-06-01 PROCEDURE — 92523 SPEECH SOUND LANG COMPREHEN: CPT

## 2023-06-01 PROCEDURE — 92610 EVALUATE SWALLOWING FUNCTION: CPT

## 2023-06-01 PROCEDURE — 36415 COLL VENOUS BLD VENIPUNCTURE: CPT

## 2023-06-01 PROCEDURE — 83735 ASSAY OF MAGNESIUM: CPT

## 2023-06-01 PROCEDURE — 81001 URINALYSIS AUTO W/SCOPE: CPT

## 2023-06-01 PROCEDURE — 97165 OT EVAL LOW COMPLEX 30 MIN: CPT

## 2023-06-01 PROCEDURE — 6370000000 HC RX 637 (ALT 250 FOR IP): Performed by: PHYSICIAN ASSISTANT

## 2023-06-01 PROCEDURE — 86901 BLOOD TYPING SEROLOGIC RH(D): CPT

## 2023-06-01 PROCEDURE — 85025 COMPLETE CBC W/AUTO DIFF WBC: CPT

## 2023-06-01 PROCEDURE — 83540 ASSAY OF IRON: CPT

## 2023-06-01 PROCEDURE — 36430 TRANSFUSION BLD/BLD COMPNT: CPT

## 2023-06-01 RX ORDER — HYDRALAZINE HYDROCHLORIDE 25 MG/1
25 TABLET, FILM COATED ORAL 2 TIMES DAILY PRN
Status: DISCONTINUED | OUTPATIENT
Start: 2023-06-01 | End: 2023-06-03 | Stop reason: HOSPADM

## 2023-06-01 RX ORDER — FERROUS SULFATE TAB EC 324 MG (65 MG FE EQUIVALENT) 324 (65 FE) MG
324 TABLET DELAYED RESPONSE ORAL 2 TIMES DAILY
Status: DISCONTINUED | OUTPATIENT
Start: 2023-06-01 | End: 2023-06-03 | Stop reason: HOSPADM

## 2023-06-01 RX ORDER — DEXTROSE MONOHYDRATE 100 MG/ML
250 INJECTION, SOLUTION INTRAVENOUS PRN
Status: DISCONTINUED | OUTPATIENT
Start: 2023-06-01 | End: 2023-06-03 | Stop reason: HOSPADM

## 2023-06-01 RX ORDER — ALOGLIPTIN 12.5 MG/1
12.5 TABLET, FILM COATED ORAL DAILY
Status: DISCONTINUED | OUTPATIENT
Start: 2023-06-01 | End: 2023-06-03 | Stop reason: HOSPADM

## 2023-06-01 RX ORDER — ACETAMINOPHEN 650 MG/1
650 SUPPOSITORY RECTAL EVERY 6 HOURS PRN
Status: DISCONTINUED | OUTPATIENT
Start: 2023-06-01 | End: 2023-06-03 | Stop reason: HOSPADM

## 2023-06-01 RX ORDER — POLYETHYLENE GLYCOL 3350 17 G/17G
17 POWDER, FOR SOLUTION ORAL DAILY PRN
Status: DISCONTINUED | OUTPATIENT
Start: 2023-06-01 | End: 2023-06-03 | Stop reason: HOSPADM

## 2023-06-01 RX ORDER — POTASSIUM CHLORIDE 750 MG/1
40 CAPSULE, EXTENDED RELEASE ORAL 2 TIMES DAILY
Status: COMPLETED | OUTPATIENT
Start: 2023-06-01 | End: 2023-06-01

## 2023-06-01 RX ORDER — DEXTROSE MONOHYDRATE 100 MG/ML
INJECTION, SOLUTION INTRAVENOUS CONTINUOUS PRN
Status: DISCONTINUED | OUTPATIENT
Start: 2023-06-01 | End: 2023-06-03 | Stop reason: HOSPADM

## 2023-06-01 RX ORDER — DIPHENHYDRAMINE HCL 25 MG
50 TABLET ORAL ONCE
Status: COMPLETED | OUTPATIENT
Start: 2023-06-01 | End: 2023-06-01

## 2023-06-01 RX ORDER — ONDANSETRON 2 MG/ML
4 INJECTION INTRAMUSCULAR; INTRAVENOUS EVERY 6 HOURS PRN
Status: DISCONTINUED | OUTPATIENT
Start: 2023-06-01 | End: 2023-06-03 | Stop reason: HOSPADM

## 2023-06-01 RX ORDER — ONDANSETRON 4 MG/1
4 TABLET, ORALLY DISINTEGRATING ORAL EVERY 8 HOURS PRN
Status: DISCONTINUED | OUTPATIENT
Start: 2023-06-01 | End: 2023-06-03 | Stop reason: HOSPADM

## 2023-06-01 RX ORDER — INSULIN LISPRO 100 [IU]/ML
0-4 INJECTION, SOLUTION INTRAVENOUS; SUBCUTANEOUS NIGHTLY
Status: DISCONTINUED | OUTPATIENT
Start: 2023-06-01 | End: 2023-06-03 | Stop reason: HOSPADM

## 2023-06-01 RX ORDER — DEXTROSE MONOHYDRATE 100 MG/ML
125 INJECTION, SOLUTION INTRAVENOUS PRN
Status: DISCONTINUED | OUTPATIENT
Start: 2023-06-01 | End: 2023-06-03 | Stop reason: HOSPADM

## 2023-06-01 RX ORDER — INSULIN LISPRO 100 [IU]/ML
0-4 INJECTION, SOLUTION INTRAVENOUS; SUBCUTANEOUS
Status: DISCONTINUED | OUTPATIENT
Start: 2023-06-01 | End: 2023-06-03 | Stop reason: HOSPADM

## 2023-06-01 RX ORDER — ACETAMINOPHEN 325 MG/1
650 TABLET ORAL EVERY 6 HOURS PRN
Status: DISCONTINUED | OUTPATIENT
Start: 2023-06-01 | End: 2023-06-03 | Stop reason: HOSPADM

## 2023-06-01 RX ORDER — METOPROLOL SUCCINATE 25 MG/1
25 TABLET, EXTENDED RELEASE ORAL DAILY
COMMUNITY

## 2023-06-01 RX ORDER — ROSUVASTATIN CALCIUM 10 MG/1
10 TABLET, COATED ORAL NIGHTLY
Status: DISCONTINUED | OUTPATIENT
Start: 2023-06-01 | End: 2023-06-02

## 2023-06-01 RX ORDER — SODIUM CHLORIDE 9 MG/ML
INJECTION, SOLUTION INTRAVENOUS PRN
Status: DISCONTINUED | OUTPATIENT
Start: 2023-06-01 | End: 2023-06-03 | Stop reason: HOSPADM

## 2023-06-01 RX ORDER — PANTOPRAZOLE SODIUM 40 MG/1
40 TABLET, DELAYED RELEASE ORAL
Status: DISCONTINUED | OUTPATIENT
Start: 2023-06-01 | End: 2023-06-03 | Stop reason: HOSPADM

## 2023-06-01 RX ORDER — INSULIN GLARGINE 100 [IU]/ML
20 INJECTION, SOLUTION SUBCUTANEOUS NIGHTLY
Status: DISCONTINUED | OUTPATIENT
Start: 2023-06-01 | End: 2023-06-03 | Stop reason: HOSPADM

## 2023-06-01 RX ORDER — HYDROCODONE BITARTRATE AND ACETAMINOPHEN 5; 325 MG/1; MG/1
1 TABLET ORAL 2 TIMES DAILY PRN
Status: DISCONTINUED | OUTPATIENT
Start: 2023-06-01 | End: 2023-06-03 | Stop reason: HOSPADM

## 2023-06-01 RX ADMIN — POTASSIUM CHLORIDE 40 MEQ: 750 CAPSULE, EXTENDED RELEASE ORAL at 21:31

## 2023-06-01 RX ADMIN — INSULIN GLARGINE 20 UNITS: 100 INJECTION, SOLUTION SUBCUTANEOUS at 21:32

## 2023-06-01 RX ADMIN — POTASSIUM CHLORIDE 40 MEQ: 750 CAPSULE, EXTENDED RELEASE ORAL at 11:16

## 2023-06-01 RX ADMIN — METFORMIN HYDROCHLORIDE 500 MG: 500 TABLET, FILM COATED ORAL at 17:32

## 2023-06-01 RX ADMIN — POLYETHYLENE GLYCOL 3350 17 G: 17 POWDER, FOR SOLUTION ORAL at 13:34

## 2023-06-01 RX ADMIN — DIPHENHYDRAMINE HYDROCHLORIDE 50 MG: 25 TABLET ORAL at 14:53

## 2023-06-01 RX ADMIN — FERROUS SULFATE TAB EC 324 MG (65 MG FE EQUIVALENT) 324 MG: 324 (65 FE) TABLET DELAYED RESPONSE at 21:31

## 2023-06-01 RX ADMIN — HYDROCODONE BITARTRATE AND ACETAMINOPHEN 1 TABLET: 5; 325 TABLET ORAL at 21:36

## 2023-06-01 RX ADMIN — ROSUVASTATIN 10 MG: 10 TABLET, FILM COATED ORAL at 21:31

## 2023-06-01 RX ADMIN — ACETAMINOPHEN 650 MG: 325 TABLET, FILM COATED ORAL at 11:54

## 2023-06-01 RX ADMIN — PANTOPRAZOLE SODIUM 40 MG: 40 TABLET, DELAYED RELEASE ORAL at 17:32

## 2023-06-01 ASSESSMENT — PAIN SCALES - GENERAL
PAINLEVEL_OUTOF10: 6
PAINLEVEL_OUTOF10: 6
PAINLEVEL_OUTOF10: 7
PAINLEVEL_OUTOF10: 6

## 2023-06-01 ASSESSMENT — PAIN DESCRIPTION - ORIENTATION
ORIENTATION: RIGHT;LEFT
ORIENTATION: OTHER (COMMENT)

## 2023-06-01 ASSESSMENT — PAIN - FUNCTIONAL ASSESSMENT: PAIN_FUNCTIONAL_ASSESSMENT: PREVENTS OR INTERFERES SOME ACTIVE ACTIVITIES AND ADLS

## 2023-06-01 ASSESSMENT — PAIN DESCRIPTION - LOCATION
LOCATION: LEG
LOCATION: HEAD

## 2023-06-01 ASSESSMENT — PAIN DESCRIPTION - DESCRIPTORS
DESCRIPTORS: ACHING
DESCRIPTORS: CRAMPING

## 2023-06-01 ASSESSMENT — PAIN DESCRIPTION - ONSET: ONSET: AWAKENED FROM SLEEP

## 2023-06-01 ASSESSMENT — PAIN DESCRIPTION - FREQUENCY: FREQUENCY: INTERMITTENT

## 2023-06-01 ASSESSMENT — PAIN DESCRIPTION - PAIN TYPE: TYPE: ACUTE PAIN

## 2023-06-01 NOTE — PLAN OF CARE
Problem: Discharge Planning  Goal: Discharge to home or other facility with appropriate resources  Outcome: Progressing  Flowsheets (Taken 6/1/2023 0944 by Genesis Milelr RN)  Discharge to home or other facility with appropriate resources: Identify barriers to discharge with patient and caregiver     Problem: Pain  Goal: Verbalizes/displays adequate comfort level or baseline comfort level  Outcome: Progressing     Problem: Safety - Adult  Goal: Free from fall injury  6/1/2023 1808 by Kenan Garcia RN  Outcome: Progressing  6/1/2023 0947 by Genesis Miller RN  Outcome: Progressing     Problem: ABCDS Injury Assessment  Goal: Absence of physical injury  Outcome: Progressing     Problem: Skin/Tissue Integrity  Goal: Absence of new skin breakdown  Description: 1. Monitor for areas of redness and/or skin breakdown  2. Assess vascular access sites hourly  3. Every 4-6 hours minimum:  Change oxygen saturation probe site  4. Every 4-6 hours:  If on nasal continuous positive airway pressure, respiratory therapy assess nares and determine need for appliance change or resting period.   Outcome: Progressing     Problem: Chronic Conditions and Co-morbidities  Goal: Patient's chronic conditions and co-morbidity symptoms are monitored and maintained or improved  6/1/2023 1808 by Kenan Garcia RN  Outcome: Progressing  6/1/2023 0947 by Genesis Miller RN  Outcome: Progressing

## 2023-06-02 ENCOUNTER — APPOINTMENT (OUTPATIENT)
Dept: CT IMAGING | Facility: HOSPITAL | Age: 73
DRG: 684 | End: 2023-06-02
Attending: INTERNAL MEDICINE
Payer: MEDICARE

## 2023-06-02 PROBLEM — D64.9 ANEMIA: Status: ACTIVE | Noted: 2023-06-02

## 2023-06-02 LAB
ALBUMIN SERPL-MCNC: 3.6 G/DL (ref 3.4–4.8)
ALBUMIN/GLOB SERPL: 1.9 {RATIO} (ref 0.8–2)
ALP SERPL-CCNC: 69 U/L (ref 25–100)
ALT SERPL-CCNC: 10 U/L (ref 4–36)
ANION GAP SERPL CALCULATED.3IONS-SCNC: 11 MMOL/L (ref 3–16)
AST SERPL-CCNC: 13 U/L (ref 8–33)
BASOPHILS # BLD: 0 K/UL (ref 0–0.1)
BASOPHILS NFR BLD: 0.3 %
BILIRUB SERPL-MCNC: 0.4 MG/DL (ref 0.3–1.2)
BUN SERPL-MCNC: 17 MG/DL (ref 6–20)
CALCIUM SERPL-MCNC: 9.4 MG/DL (ref 8.5–10.5)
CHLORIDE SERPL-SCNC: 106 MMOL/L (ref 98–107)
CO2 SERPL-SCNC: 23 MMOL/L (ref 20–30)
CREAT SERPL-MCNC: 1.1 MG/DL (ref 0.4–1.2)
EOSINOPHIL # BLD: 0 K/UL (ref 0–0.4)
EOSINOPHIL NFR BLD: 0.5 %
ERYTHROCYTE [DISTWIDTH] IN BLOOD BY AUTOMATED COUNT: 15.1 % (ref 11–16)
GFR SERPLBLD CREATININE-BSD FMLA CKD-EPI: 54 ML/MIN/{1.73_M2}
GLOBULIN SER CALC-MCNC: 1.9 G/DL
GLUCOSE BLD-MCNC: 121 MG/DL (ref 74–106)
GLUCOSE BLD-MCNC: 175 MG/DL (ref 74–106)
GLUCOSE BLD-MCNC: 225 MG/DL (ref 74–106)
GLUCOSE BLD-MCNC: 262 MG/DL (ref 74–106)
GLUCOSE SERPL-MCNC: 117 MG/DL (ref 74–106)
HCT VFR BLD AUTO: 21.5 % (ref 37–47)
HCT VFR BLD AUTO: 25.5 % (ref 37–47)
HGB BLD-MCNC: 6.9 G/DL (ref 11.5–16.5)
HGB BLD-MCNC: 8.1 G/DL (ref 11.5–16.5)
IMM GRANULOCYTES # BLD: 0 K/UL
IMM GRANULOCYTES NFR BLD: 0.3 % (ref 0–5)
LYMPHOCYTES # BLD: 0.9 K/UL (ref 1.5–4)
LYMPHOCYTES NFR BLD: 24 %
MCH RBC QN AUTO: 27.6 PG (ref 27–32)
MCHC RBC AUTO-ENTMCNC: 32.1 G/DL (ref 31–35)
MCV RBC AUTO: 86 FL (ref 80–100)
MONOCYTES # BLD: 0.3 K/UL (ref 0.2–0.8)
MONOCYTES NFR BLD: 7.9 %
NEUTROPHILS # BLD: 2.5 K/UL (ref 2–7.5)
NEUTS SEG NFR BLD: 67 %
PERFORMED ON: ABNORMAL
PLATELET # BLD AUTO: 150 K/UL (ref 150–400)
PMV BLD AUTO: 11.5 FL (ref 6–10)
POTASSIUM SERPL-SCNC: 4 MMOL/L (ref 3.4–5.1)
PROT SERPL-MCNC: 5.5 G/DL (ref 6.4–8.3)
RBC # BLD AUTO: 2.5 M/UL (ref 3.8–5.8)
SODIUM SERPL-SCNC: 140 MMOL/L (ref 136–145)
WBC # BLD AUTO: 3.8 K/UL (ref 4–11)

## 2023-06-02 PROCEDURE — 80053 COMPREHEN METABOLIC PANEL: CPT

## 2023-06-02 PROCEDURE — 85014 HEMATOCRIT: CPT

## 2023-06-02 PROCEDURE — 99223 1ST HOSP IP/OBS HIGH 75: CPT | Performed by: PHYSICIAN ASSISTANT

## 2023-06-02 PROCEDURE — 97530 THERAPEUTIC ACTIVITIES: CPT

## 2023-06-02 PROCEDURE — 36430 TRANSFUSION BLD/BLD COMPNT: CPT

## 2023-06-02 PROCEDURE — 74177 CT ABD & PELVIS W/CONTRAST: CPT

## 2023-06-02 PROCEDURE — 97802 MEDICAL NUTRITION INDIV IN: CPT

## 2023-06-02 PROCEDURE — 36415 COLL VENOUS BLD VENIPUNCTURE: CPT

## 2023-06-02 PROCEDURE — 1200000000 HC SEMI PRIVATE

## 2023-06-02 PROCEDURE — 6370000000 HC RX 637 (ALT 250 FOR IP): Performed by: PHYSICIAN ASSISTANT

## 2023-06-02 PROCEDURE — 85018 HEMOGLOBIN: CPT

## 2023-06-02 PROCEDURE — 6360000004 HC RX CONTRAST MEDICATION: Performed by: INTERNAL MEDICINE

## 2023-06-02 PROCEDURE — 85025 COMPLETE CBC W/AUTO DIFF WBC: CPT

## 2023-06-02 PROCEDURE — 71260 CT THORAX DX C+: CPT

## 2023-06-02 PROCEDURE — 97116 GAIT TRAINING THERAPY: CPT

## 2023-06-02 RX ORDER — ROSUVASTATIN CALCIUM 20 MG/1
20 TABLET, COATED ORAL NIGHTLY
Status: DISCONTINUED | OUTPATIENT
Start: 2023-06-02 | End: 2023-06-03 | Stop reason: HOSPADM

## 2023-06-02 RX ORDER — SODIUM CHLORIDE 9 MG/ML
INJECTION, SOLUTION INTRAVENOUS PRN
Status: DISCONTINUED | OUTPATIENT
Start: 2023-06-02 | End: 2023-06-03 | Stop reason: HOSPADM

## 2023-06-02 RX ORDER — METOPROLOL SUCCINATE 25 MG/1
25 TABLET, EXTENDED RELEASE ORAL DAILY
Status: DISCONTINUED | OUTPATIENT
Start: 2023-06-02 | End: 2023-06-03 | Stop reason: HOSPADM

## 2023-06-02 RX ORDER — DIPHENHYDRAMINE HCL 25 MG
25 CAPSULE ORAL ONCE
Status: COMPLETED | OUTPATIENT
Start: 2023-06-02 | End: 2023-06-02

## 2023-06-02 RX ORDER — FLUCONAZOLE 100 MG/1
200 TABLET ORAL DAILY
Status: DISCONTINUED | OUTPATIENT
Start: 2023-06-02 | End: 2023-06-03 | Stop reason: HOSPADM

## 2023-06-02 RX ADMIN — FLUCONAZOLE 200 MG: 100 TABLET ORAL at 11:40

## 2023-06-02 RX ADMIN — ACETAMINOPHEN 650 MG: 325 TABLET, FILM COATED ORAL at 06:09

## 2023-06-02 RX ADMIN — FERROUS SULFATE TAB EC 324 MG (65 MG FE EQUIVALENT) 324 MG: 324 (65 FE) TABLET DELAYED RESPONSE at 08:39

## 2023-06-02 RX ADMIN — ACETAMINOPHEN 650 MG: 325 TABLET, FILM COATED ORAL at 11:40

## 2023-06-02 RX ADMIN — INSULIN LISPRO 1 UNITS: 100 INJECTION, SOLUTION INTRAVENOUS; SUBCUTANEOUS at 11:15

## 2023-06-02 RX ADMIN — DIPHENHYDRAMINE HYDROCHLORIDE 25 MG: 25 CAPSULE ORAL at 11:40

## 2023-06-02 RX ADMIN — PANTOPRAZOLE SODIUM 40 MG: 40 TABLET, DELAYED RELEASE ORAL at 06:09

## 2023-06-02 RX ADMIN — EMPAGLIFLOZIN 10 MG: 10 TABLET, FILM COATED ORAL at 08:38

## 2023-06-02 RX ADMIN — POLYETHYLENE GLYCOL 3350 17 G: 17 POWDER, FOR SOLUTION ORAL at 08:48

## 2023-06-02 RX ADMIN — ROSUVASTATIN CALCIUM 20 MG: 20 TABLET, FILM COATED ORAL at 20:45

## 2023-06-02 RX ADMIN — PANTOPRAZOLE SODIUM 40 MG: 40 TABLET, DELAYED RELEASE ORAL at 16:21

## 2023-06-02 RX ADMIN — ALOGLIPTIN 12.5 MG: 12.5 TABLET, FILM COATED ORAL at 08:39

## 2023-06-02 RX ADMIN — METFORMIN HYDROCHLORIDE 500 MG: 500 TABLET, FILM COATED ORAL at 08:39

## 2023-06-02 RX ADMIN — INSULIN GLARGINE 20 UNITS: 100 INJECTION, SOLUTION SUBCUTANEOUS at 20:48

## 2023-06-02 RX ADMIN — IOPAMIDOL 100 ML: 755 INJECTION, SOLUTION INTRAVENOUS at 15:43

## 2023-06-02 RX ADMIN — METOPROLOL SUCCINATE 25 MG: 25 TABLET, EXTENDED RELEASE ORAL at 16:21

## 2023-06-02 RX ADMIN — FERROUS SULFATE TAB EC 324 MG (65 MG FE EQUIVALENT) 324 MG: 324 (65 FE) TABLET DELAYED RESPONSE at 20:45

## 2023-06-02 ASSESSMENT — PAIN DESCRIPTION - LOCATION: LOCATION: LEG

## 2023-06-02 ASSESSMENT — PAIN SCALES - GENERAL
PAINLEVEL_OUTOF10: 0
PAINLEVEL_OUTOF10: 5

## 2023-06-02 ASSESSMENT — PAIN DESCRIPTION - DESCRIPTORS: DESCRIPTORS: CRAMPING

## 2023-06-02 NOTE — ACP (ADVANCE CARE PLANNING)
Advance Care Planning     General Advance Care Planning (ACP) Conversation    Date of Conversation: 6/1/2023  Conducted with: Patient with Decision Making Capacity per staff on admit    Healthcare Decision Maker:    Primary Decision Maker: Keara Whitley Child - [phone]    Secondary Decision Maker: Elisa Hamilton - Niece/Nephew - [phone]    Supplemental (Other) Decision Maker: PrewittBlack faust - Niece/Nephew - [phone]  Click here to complete Healthcare Decision Makers including selection of the Healthcare Decision Maker Relationship (ie \"Primary\"). Today we documented Decision Maker(s) consistent with Legal Next of Kin hierarchy.     Content/Action Overview:  Has NO ACP documents/care preferences - information provided, considering goals and options  Reviewed DNR/DNI and patient confirms current DNR status - completed forms on file (place new order if needed)    Length of Voluntary ACP Conversation in minutes:  <16 minutes (Non-Billable)
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
## 2023-06-02 NOTE — CARE COORDINATION
Lives With: Other (comment) (ex -)  Type of Home:  ([facility])  Home Layout: One level  Home Access: Stairs to enter with rails  Entrance Stairs - Number of Steps: 1 ADDY  Bathroom Shower/Tub: Walk-in shower  Bathroom Toilet: Standard  Bathroom Equipment: Shower chair  Bathroom Accessibility: Walker accessible  Home Equipment: Walker, rolling, Cane, Cane, quad  Has the patient had two or more falls in the past year or any fall with injury in the past year?: Yes  ADL Assistance: [address] [place] [address]: Independent (pt and spouse assist with cooking/cleaning tasks)  Homemaking Responsibilities: Yes  Ambulation Assistance: Independent (Furniture walks, uses cane PRN)  Transfer Assistance: Independent  Active : No     Lives with ex-. Has SC, Rw, cane, quad cane. I at baseline. Cleared for DC with HH by therapy.

## 2023-06-03 VITALS
RESPIRATION RATE: 18 BRPM | WEIGHT: 137.5 LBS | HEART RATE: 70 BPM | BODY MASS INDEX: 24.36 KG/M2 | TEMPERATURE: 97.5 F | DIASTOLIC BLOOD PRESSURE: 53 MMHG | SYSTOLIC BLOOD PRESSURE: 132 MMHG | OXYGEN SATURATION: 98 % | HEIGHT: 63 IN

## 2023-06-03 LAB
ALBUMIN SERPL-MCNC: 3.8 G/DL (ref 3.4–4.8)
ALBUMIN/GLOB SERPL: 1.9 {RATIO} (ref 0.8–2)
ALP SERPL-CCNC: 84 U/L (ref 25–100)
ALT SERPL-CCNC: 20 U/L (ref 4–36)
ANION GAP SERPL CALCULATED.3IONS-SCNC: 11 MMOL/L (ref 3–16)
AST SERPL-CCNC: 31 U/L (ref 8–33)
BASOPHILS # BLD: 0 K/UL (ref 0–0.1)
BASOPHILS NFR BLD: 0.2 %
BILIRUB SERPL-MCNC: 0.5 MG/DL (ref 0.3–1.2)
BUN SERPL-MCNC: 18 MG/DL (ref 6–20)
CALCIUM SERPL-MCNC: 9.6 MG/DL (ref 8.5–10.5)
CHLORIDE SERPL-SCNC: 103 MMOL/L (ref 98–107)
CO2 SERPL-SCNC: 23 MMOL/L (ref 20–30)
CREAT SERPL-MCNC: 1.1 MG/DL (ref 0.4–1.2)
EOSINOPHIL # BLD: 0 K/UL (ref 0–0.4)
EOSINOPHIL NFR BLD: 0.7 %
ERYTHROCYTE [DISTWIDTH] IN BLOOD BY AUTOMATED COUNT: 14.6 % (ref 11–16)
GFR SERPLBLD CREATININE-BSD FMLA CKD-EPI: 54 ML/MIN/{1.73_M2}
GLOBULIN SER CALC-MCNC: 2 G/DL
GLUCOSE BLD-MCNC: 139 MG/DL (ref 74–106)
GLUCOSE SERPL-MCNC: 126 MG/DL (ref 74–106)
HCT VFR BLD AUTO: 26 % (ref 37–47)
HGB BLD-MCNC: 8.2 G/DL (ref 11.5–16.5)
IMM GRANULOCYTES # BLD: 0 K/UL
IMM GRANULOCYTES NFR BLD: 0.2 % (ref 0–5)
LYMPHOCYTES # BLD: 0.9 K/UL (ref 1.5–4)
LYMPHOCYTES NFR BLD: 21 %
MCH RBC QN AUTO: 27.1 PG (ref 27–32)
MCHC RBC AUTO-ENTMCNC: 31.5 G/DL (ref 31–35)
MCV RBC AUTO: 85.8 FL (ref 80–100)
MONOCYTES # BLD: 0.4 K/UL (ref 0.2–0.8)
MONOCYTES NFR BLD: 10.6 %
NEUTROPHILS # BLD: 2.8 K/UL (ref 2–7.5)
NEUTS SEG NFR BLD: 67.3 %
PERFORMED ON: ABNORMAL
PLATELET # BLD AUTO: 152 K/UL (ref 150–400)
PMV BLD AUTO: 11.4 FL (ref 6–10)
POTASSIUM SERPL-SCNC: 3.6 MMOL/L (ref 3.4–5.1)
PROT SERPL-MCNC: 5.8 G/DL (ref 6.4–8.3)
RBC # BLD AUTO: 3.03 M/UL (ref 3.8–5.8)
SODIUM SERPL-SCNC: 137 MMOL/L (ref 136–145)
WBC # BLD AUTO: 4.2 K/UL (ref 4–11)

## 2023-06-03 PROCEDURE — 6370000000 HC RX 637 (ALT 250 FOR IP): Performed by: PHYSICIAN ASSISTANT

## 2023-06-03 PROCEDURE — 99238 HOSP IP/OBS DSCHRG MGMT 30/<: CPT | Performed by: PHYSICIAN ASSISTANT

## 2023-06-03 PROCEDURE — 36415 COLL VENOUS BLD VENIPUNCTURE: CPT

## 2023-06-03 PROCEDURE — 85025 COMPLETE CBC W/AUTO DIFF WBC: CPT

## 2023-06-03 PROCEDURE — 80053 COMPREHEN METABOLIC PANEL: CPT

## 2023-06-03 RX ORDER — POTASSIUM CHLORIDE 750 MG/1
40 CAPSULE, EXTENDED RELEASE ORAL ONCE
Status: COMPLETED | OUTPATIENT
Start: 2023-06-03 | End: 2023-06-03

## 2023-06-03 RX ORDER — FLUCONAZOLE 200 MG/1
200 TABLET ORAL DAILY
Qty: 1 TABLET | Refills: 0 | Status: SHIPPED | OUTPATIENT
Start: 2023-06-04 | End: 2023-06-05

## 2023-06-03 RX ADMIN — FERROUS SULFATE TAB EC 324 MG (65 MG FE EQUIVALENT) 324 MG: 324 (65 FE) TABLET DELAYED RESPONSE at 08:44

## 2023-06-03 RX ADMIN — PANTOPRAZOLE SODIUM 40 MG: 40 TABLET, DELAYED RELEASE ORAL at 06:12

## 2023-06-03 RX ADMIN — EMPAGLIFLOZIN 10 MG: 10 TABLET, FILM COATED ORAL at 08:43

## 2023-06-03 RX ADMIN — ALOGLIPTIN 12.5 MG: 12.5 TABLET, FILM COATED ORAL at 08:43

## 2023-06-03 RX ADMIN — METFORMIN HYDROCHLORIDE 500 MG: 500 TABLET, FILM COATED ORAL at 08:43

## 2023-06-03 RX ADMIN — FLUCONAZOLE 200 MG: 100 TABLET ORAL at 08:43

## 2023-06-03 RX ADMIN — POTASSIUM CHLORIDE 40 MEQ: 10 CAPSULE, COATED, EXTENDED RELEASE ORAL at 09:18

## 2023-06-03 RX ADMIN — METOPROLOL SUCCINATE 25 MG: 25 TABLET, EXTENDED RELEASE ORAL at 08:43

## 2023-06-03 RX ADMIN — POLYETHYLENE GLYCOL 3350 17 G: 17 POWDER, FOR SOLUTION ORAL at 08:54
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
## 2023-06-03 NOTE — FLOWSHEET NOTE
06/03/23 0855   Assessment   Charting Type Shift assessment   Psychosocial   Psychosocial (WDL) WDL   Neurological   Neuro (WDL) WDL   Des Coma Scale   Eye Opening 4   Best Verbal Response 5   Best Motor Response 6   Des Coma Scale Score 15   HEENT (Head, Ears, Eyes, Nose, & Throat)   HEENT (WDL) X   Right Eye Impaired vision;Glasses   Left Eye Impaired vision;Glasses   Teeth Dentures upper   Respiratory   Respiratory (WDL) WDL   Breath Sounds   Right Upper Lobe Clear   Right Middle Lobe Clear   Right Lower Lobe Diminished   Left Upper Lobe Clear   Left Lower Lobe Diminished   Cough/Sputum   Cough Dry   Cardiac   Cardiac (WDL) X   Cardiac Regularity Regularly Irregular   Cardiac Rhythm Atrial fib   Cardiac Monitor   Telemetry Box Number [id number]   Telemetry Monitor Alarm Parameters    Cardiac/Telemetry Monitor On Portable telemetry pack applied   Alarm Audible Yes   Gastrointestinal   Abdominal (WDL) X   Abdomen Inspection Distended;Soft;Rotund   Genitourinary   Genitourinary (WDL) WDL   Peripheral Vascular   Peripheral Vascular (WDL) WDL   RUE Neurovascular Assessment   Capillary Refill Less than/Equal to 3 seconds   Color Pink   Temperature Warm   R Radial Pulse +2 (Moderate)   LUE Neurovascular Assessment   Capillary Refill Less than/Equal to 3 seconds   Color Pink   Temperature Warm   L Radial Pulse +2 (Moderate)   RLE Neurovascular Assessment   Capillary Refill Less than/Equal to 3 seconds   Color Pink   Temperature Warm   R Pedal Pulse +1   LLE Neurovascular Assessment   Color Pink   Temperature Warm   L Pedal Pulse +1   Skin Integumentary    Skin Integumentary (WDL) X   Skin Color Pale   Skin Condition/Temp Dry; Warm

## 2023-06-03 NOTE — DISCHARGE SUMMARY
hypodense cystic lesion in the left adnexa compared to multiple priors. BOWEL: Normal diameter, nonobstructed. No focal bowel wall thickening or surrounding inflammatory fat stranding. LYMPH NODES: No abnormally enlarged nodes. PERITONEUM/RETROPERITONEUM: No ascites or free air. VESSELS: Atherosclerotic calcification of the abdominal aorta without aneurysmal dilatation. ABDOMINAL WALL: No acute abnormality. BONES: No acute abnormality. IMPRESSION:      Cirrhotic liver with associated splenomegaly. No acute intra-abdominal process. Consults:   IP CONSULT TO CASE MANAGEMENT  IP CONSULT TO DIETITIAN  PT OT    Briefly:   70-year-old female with PMH of CAD s/p PCI, cirrhosis, 4.1 cm ascending thoracic aorta aneurysm, splenomegaly, pulmonary hypertension, COPD, diabetes mellitus type 2, GERD, CVA, hypertension, ongoing tobacco abuse, vitamin B12 deficiency and anemia who was admitted for anemia. Hospital Course: Active Hospital Problems     Diagnosis Date Noted    General weakness [R53.1]  -suspect due to acute on chronic anemia  -PT OT consulted with recs for home with home health PT  -CM consulted for dc planning assistance. Will place consult to arrange home health PT.    11/23/2022    Hypokalemia [E87.6]  -replaced  -monitor and replace PRN    06/01/2023    Personal history of nicotine dependence [Z87.891]  -pt continues to go out and smoke  -offer NRT if agreeable to not going to smoke  -counseled on cessation    03/18/2022    Cirrhosis ([facility] [K74.60]  -per chart review, CT chest without contrast 10/4/2022 showed evidence of cirrhotic appearing liver with splenomegaly. No focal liver lesions noted on the unenhanced examination.  -its possible patient's anemia is contributed to her liver disease in addition to her chronic kidney disease.  WBC also low on labs 6/2.  -repeat CT a/p 6/2: Cirrhotic liver with associated splenomegaly  -Consider

## 2023-06-03 NOTE — PLAN OF CARE
Problem: Discharge Planning  Goal: Discharge to home or other facility with appropriate resources  Outcome: Progressing     Problem: Pain  Goal: Verbalizes/displays adequate comfort level or baseline comfort level  Outcome: Progressing     Problem: Safety - Adult  Goal: Free from fall injury  6/2/2023 2250 by Katie Donahue RN  Outcome: Progressing  6/2/2023 1208 by Alberto Denise RN  Outcome: Progressing     Problem: ABCDS Injury Assessment  Goal: Absence of physical injury  Outcome: Progressing     Problem: Skin/Tissue Integrity  Goal: Absence of new skin breakdown  Description: 1. Monitor for areas of redness and/or skin breakdown  2. Assess vascular access sites hourly  3. Every 4-6 hours minimum:  Change oxygen saturation probe site  4. Every 4-6 hours:  If on nasal continuous positive airway pressure, respiratory therapy assess nares and determine need for appliance change or resting period.   Outcome: Progressing     Problem: Chronic Conditions and Co-morbidities  Goal: Patient's chronic conditions and co-morbidity symptoms are monitored and maintained or improved  Outcome: Progressing

## 2023-06-05 ENCOUNTER — CARE COORDINATION (OUTPATIENT)
Dept: CARE COORDINATION | Age: 73
End: 2023-06-05

## 2023-06-05 NOTE — CARE COORDINATION
patient discharged with a pulse oximeter? no    Non-face-to-face services provided:  Scheduled appointment with PCPTammi  Obtained and reviewed discharge summary and/or continuity of care documents    Offered patient enrollment in the Remote Patient Monitoring (RPM) program for in-home monitoring: NA.    Care Transitions 24 Hour Call    Schedule Follow Up Appointment with PCP: Completed  Do you have a copy of your discharge instructions?: Yes  Do you have all of your prescriptions and are they filled?: Yes (Comment: per daughter)  Have you been contacted by a [address]?: No  Have you scheduled your follow up appointment?: No  Do you feel like you have everything you need to keep you well at home?: Yes  Care Transitions Interventions         Discussed follow-up appointments. If no appointment was previously scheduled, appointment scheduling offered: No.   Is follow up appointment scheduled within 7 days of discharge? Yes. Follow Up  Future Appointments   Date Time Provider Kayla Roberts   6/12/2023  1:15 PM TIMOTHY Layton [facility]   7/6/2023  2:30 PM Silvestre Osborne MD [facility]       Care Transition Nurse provided contact information. No further follow-up call indicated based on severity of symptoms and risk factors.   Plan for next call: symptom management-david Arcos RN

## 2023-06-08 LAB
EKG ATRIAL RATE: 129 BPM
EKG DIAGNOSIS: NORMAL
EKG Q-T INTERVAL: 370 MS
EKG QRS DURATION: 82 MS
EKG QTC CALCULATION (BAZETT): 489 MS
EKG R AXIS: 22 DEGREES
EKG T AXIS: -27 DEGREES
EKG VENTRICULAR RATE: 105 BPM

## 2023-06-22 ENCOUNTER — TELEPHONE (OUTPATIENT)
Dept: SURGERY | Facility: CLINIC | Age: 73
End: 2023-06-22

## 2023-06-22 NOTE — TELEPHONE ENCOUNTER
L/M for patient to call back.  She was referred to Dr. Mondragon by Vandana Sweeney for anemia.  She has previously seen Dr. Davila for colonoscopy and is scheduled to return there Sept 2023.    I need to know if she requested to see us instead of [facility].  If not she can return to Dr. Davila and I will send a message back to Vandana Sweeney.

## 2023-07-06 ENCOUNTER — OFFICE VISIT (OUTPATIENT)
Dept: PRIMARY CARE CLINIC | Age: 73
End: 2023-07-06
Payer: MEDICARE

## 2023-07-06 ENCOUNTER — HOSPITAL ENCOUNTER (INPATIENT)
Facility: HOSPITAL | Age: 73
LOS: 3 days | Discharge: HOME | DRG: 812 | End: 2023-07-09
Attending: INTERNAL MEDICINE | Admitting: INTERNAL MEDICINE
Payer: MEDICARE

## 2023-07-06 VITALS
HEART RATE: 110 BPM | OXYGEN SATURATION: 100 % | WEIGHT: 141.8 LBS | RESPIRATION RATE: 18 BRPM | SYSTOLIC BLOOD PRESSURE: 138 MMHG | DIASTOLIC BLOOD PRESSURE: 66 MMHG | BODY MASS INDEX: 25.12 KG/M2

## 2023-07-06 DIAGNOSIS — E11.40 TYPE 2 DIABETES MELLITUS WITH DIABETIC NEUROPATHY, WITH LONG-TERM CURRENT USE OF INSULIN (HCC): ICD-10-CM

## 2023-07-06 DIAGNOSIS — R53.1 GENERAL WEAKNESS: ICD-10-CM

## 2023-07-06 DIAGNOSIS — D50.9 IRON DEFICIENCY ANEMIA, UNSPECIFIED IRON DEFICIENCY ANEMIA TYPE: Primary | ICD-10-CM

## 2023-07-06 DIAGNOSIS — R53.83 FATIGUE, UNSPECIFIED TYPE: ICD-10-CM

## 2023-07-06 DIAGNOSIS — M51.36 DEGENERATIVE DISC DISEASE, LUMBAR: ICD-10-CM

## 2023-07-06 DIAGNOSIS — K92.1 MELENA: Primary | ICD-10-CM

## 2023-07-06 DIAGNOSIS — Z79.4 TYPE 2 DIABETES MELLITUS WITH DIABETIC NEUROPATHY, WITH LONG-TERM CURRENT USE OF INSULIN (HCC): ICD-10-CM

## 2023-07-06 DIAGNOSIS — I65.23 BILATERAL CAROTID ARTERY STENOSIS: ICD-10-CM

## 2023-07-06 LAB
ABO + RH BLD: NORMAL
ALBUMIN SERPL-MCNC: 3.9 G/DL (ref 3.4–4.8)
ALBUMIN/GLOB SERPL: 1.5 {RATIO} (ref 0.8–2)
ALP SERPL-CCNC: 93 U/L (ref 25–100)
ALT SERPL-CCNC: 15 U/L (ref 4–36)
ANION GAP SERPL CALCULATED.3IONS-SCNC: 12 MMOL/L (ref 3–16)
AST SERPL-CCNC: 18 U/L (ref 8–33)
BASOPHILS # BLD: 0 K/UL (ref 0–0.1)
BASOPHILS NFR BLD: 0.5 %
BILIRUB SERPL-MCNC: 0.3 MG/DL (ref 0.3–1.2)
BLD GP AB SCN SERPL QL: NORMAL
BUN SERPL-MCNC: 13 MG/DL (ref 6–20)
CALCIUM SERPL-MCNC: 9.7 MG/DL (ref 8.5–10.5)
CHLORIDE SERPL-SCNC: 101 MMOL/L (ref 98–107)
CO2 SERPL-SCNC: 23 MMOL/L (ref 20–30)
CREAT SERPL-MCNC: 1 MG/DL (ref 0.4–1.2)
EOSINOPHIL # BLD: 0 K/UL (ref 0–0.4)
EOSINOPHIL NFR BLD: 1 %
ERYTHROCYTE [DISTWIDTH] IN BLOOD BY AUTOMATED COUNT: 14.8 % (ref 11–16)
FERRITIN SERPL IA-MCNC: 8.3 NG/ML (ref 22–322)
FOLATE SERPL-MCNC: 12.31 NG/ML
GFR SERPLBLD CREATININE-BSD FMLA CKD-EPI: >60 ML/MIN/{1.73_M2}
GLOBULIN SER CALC-MCNC: 2.6 G/DL
GLUCOSE BLD-MCNC: 311 MG/DL (ref 74–106)
GLUCOSE SERPL-MCNC: 189 MG/DL (ref 74–106)
HCT VFR BLD AUTO: 23.2 % (ref 37–47)
HGB BLD-MCNC: 6.9 G/DL (ref 11.5–16.5)
IMM GRANULOCYTES # BLD: 0 K/UL
IMM GRANULOCYTES NFR BLD: 0.5 % (ref 0–5)
IRON SATN MFR SERPL: 5 % (ref 15–50)
IRON SERPL-MCNC: 19 UG/DL (ref 37–145)
LYMPHOCYTES # BLD: 0.7 K/UL (ref 1.5–4)
LYMPHOCYTES NFR BLD: 17.3 %
MAGNESIUM SERPL-MCNC: 2.1 MG/DL (ref 1.7–2.4)
MCH RBC QN AUTO: 26 PG (ref 27–32)
MCHC RBC AUTO-ENTMCNC: 29.7 G/DL (ref 31–35)
MCV RBC AUTO: 87.5 FL (ref 80–100)
MONOCYTES # BLD: 0.3 K/UL (ref 0.2–0.8)
MONOCYTES NFR BLD: 7.5 %
NEUTROPHILS # BLD: 3.1 K/UL (ref 2–7.5)
NEUTS SEG NFR BLD: 73.2 %
PERFORMED ON: ABNORMAL
PLATELET # BLD AUTO: 168 K/UL (ref 150–400)
PMV BLD AUTO: 11.7 FL (ref 6–10)
POTASSIUM SERPL-SCNC: 4.2 MMOL/L (ref 3.4–5.1)
PROT SERPL-MCNC: 6.5 G/DL (ref 6.4–8.3)
RBC # BLD AUTO: 2.65 M/UL (ref 3.8–5.8)
SARS-COV-2 RDRP RESP QL NAA+PROBE: NOT DETECTED
SODIUM SERPL-SCNC: 136 MMOL/L (ref 136–145)
TIBC SERPL-MCNC: 400 UG/DL (ref 250–450)
TSH SERPL DL<=0.005 MIU/L-ACNC: 2.4 UIU/ML (ref 0.27–4.2)
WBC # BLD AUTO: 4.2 K/UL (ref 4–11)

## 2023-07-06 PROCEDURE — G8427 DOCREV CUR MEDS BY ELIG CLIN: HCPCS | Performed by: INTERNAL MEDICINE

## 2023-07-06 PROCEDURE — 3078F DIAST BP <80 MM HG: CPT | Performed by: INTERNAL MEDICINE

## 2023-07-06 PROCEDURE — 6370000000 HC RX 637 (ALT 250 FOR IP): Performed by: NURSE PRACTITIONER

## 2023-07-06 PROCEDURE — 36415 COLL VENOUS BLD VENIPUNCTURE: CPT

## 2023-07-06 PROCEDURE — 80053 COMPREHEN METABOLIC PANEL: CPT

## 2023-07-06 PROCEDURE — 1200000000 HC SEMI PRIVATE

## 2023-07-06 PROCEDURE — 86901 BLOOD TYPING SEROLOGIC RH(D): CPT

## 2023-07-06 PROCEDURE — 1090F PRES/ABSN URINE INCON ASSESS: CPT | Performed by: INTERNAL MEDICINE

## 2023-07-06 PROCEDURE — 1123F ACP DISCUSS/DSCN MKR DOCD: CPT | Performed by: INTERNAL MEDICINE

## 2023-07-06 PROCEDURE — 2022F DILAT RTA XM EVC RTNOPTHY: CPT | Performed by: INTERNAL MEDICINE

## 2023-07-06 PROCEDURE — 82728 ASSAY OF FERRITIN: CPT

## 2023-07-06 PROCEDURE — 86920 COMPATIBILITY TEST SPIN: CPT

## 2023-07-06 PROCEDURE — 30233N1 TRANSFUSION OF NONAUTOLOGOUS RED BLOOD CELLS INTO PERIPHERAL VEIN, PERCUTANEOUS APPROACH: ICD-10-PCS | Performed by: INTERNAL MEDICINE

## 2023-07-06 PROCEDURE — 82746 ASSAY OF FOLIC ACID SERUM: CPT

## 2023-07-06 PROCEDURE — 87635 SARS-COV-2 COVID-19 AMP PRB: CPT

## 2023-07-06 PROCEDURE — 84443 ASSAY THYROID STIM HORMONE: CPT

## 2023-07-06 PROCEDURE — G8399 PT W/DXA RESULTS DOCUMENT: HCPCS | Performed by: INTERNAL MEDICINE

## 2023-07-06 PROCEDURE — 99214 OFFICE O/P EST MOD 30 MIN: CPT | Performed by: INTERNAL MEDICINE

## 2023-07-06 PROCEDURE — 85025 COMPLETE CBC W/AUTO DIFF WBC: CPT

## 2023-07-06 PROCEDURE — 3044F HG A1C LEVEL LT 7.0%: CPT | Performed by: INTERNAL MEDICINE

## 2023-07-06 PROCEDURE — 93005 ELECTROCARDIOGRAM TRACING: CPT

## 2023-07-06 PROCEDURE — G8420 CALC BMI NORM PARAMETERS: HCPCS | Performed by: INTERNAL MEDICINE

## 2023-07-06 PROCEDURE — 86850 RBC ANTIBODY SCREEN: CPT

## 2023-07-06 PROCEDURE — 6370000000 HC RX 637 (ALT 250 FOR IP): Performed by: INTERNAL MEDICINE

## 2023-07-06 PROCEDURE — 3017F COLORECTAL CA SCREEN DOC REV: CPT | Performed by: INTERNAL MEDICINE

## 2023-07-06 PROCEDURE — 83735 ASSAY OF MAGNESIUM: CPT

## 2023-07-06 PROCEDURE — 4004F PT TOBACCO SCREEN RCVD TLK: CPT | Performed by: INTERNAL MEDICINE

## 2023-07-06 PROCEDURE — P9016 RBC LEUKOCYTES REDUCED: HCPCS

## 2023-07-06 PROCEDURE — 83550 IRON BINDING TEST: CPT

## 2023-07-06 PROCEDURE — 3074F SYST BP LT 130 MM HG: CPT | Performed by: INTERNAL MEDICINE

## 2023-07-06 PROCEDURE — 83540 ASSAY OF IRON: CPT

## 2023-07-06 PROCEDURE — 86900 BLOOD TYPING SEROLOGIC ABO: CPT

## 2023-07-06 PROCEDURE — 2580000003 HC RX 258: Performed by: NURSE PRACTITIONER

## 2023-07-06 RX ORDER — SODIUM CHLORIDE 9 MG/ML
INJECTION, SOLUTION INTRAVENOUS PRN
Status: DISCONTINUED | OUTPATIENT
Start: 2023-07-06 | End: 2023-07-09 | Stop reason: HOSPADM

## 2023-07-06 RX ORDER — INSULIN LISPRO 100 [IU]/ML
0-4 INJECTION, SOLUTION INTRAVENOUS; SUBCUTANEOUS
Status: DISCONTINUED | OUTPATIENT
Start: 2023-07-07 | End: 2023-07-09 | Stop reason: HOSPADM

## 2023-07-06 RX ORDER — INSULIN LISPRO 100 [IU]/ML
0-4 INJECTION, SOLUTION INTRAVENOUS; SUBCUTANEOUS NIGHTLY
Status: DISCONTINUED | OUTPATIENT
Start: 2023-07-06 | End: 2023-07-09 | Stop reason: HOSPADM

## 2023-07-06 RX ORDER — INSULIN GLARGINE 100 [IU]/ML
35 INJECTION, SOLUTION SUBCUTANEOUS NIGHTLY
Status: DISCONTINUED | OUTPATIENT
Start: 2023-07-06 | End: 2023-07-09 | Stop reason: HOSPADM

## 2023-07-06 RX ORDER — ASPIRIN 81 MG/1
81 TABLET, CHEWABLE ORAL DAILY
Status: DISCONTINUED | OUTPATIENT
Start: 2023-07-06 | End: 2023-07-09 | Stop reason: HOSPADM

## 2023-07-06 RX ORDER — HYDRALAZINE HYDROCHLORIDE 25 MG/1
25 TABLET, FILM COATED ORAL EVERY 8 HOURS PRN
Status: DISCONTINUED | OUTPATIENT
Start: 2023-07-06 | End: 2023-07-09 | Stop reason: HOSPADM

## 2023-07-06 RX ORDER — DIPHENHYDRAMINE HCL 25 MG
25 TABLET ORAL ONCE
Status: COMPLETED | OUTPATIENT
Start: 2023-07-06 | End: 2023-07-06

## 2023-07-06 RX ORDER — ACETAMINOPHEN 650 MG/1
650 SUPPOSITORY RECTAL EVERY 6 HOURS PRN
Status: DISCONTINUED | OUTPATIENT
Start: 2023-07-06 | End: 2023-07-09 | Stop reason: HOSPADM

## 2023-07-06 RX ORDER — ONDANSETRON 2 MG/ML
4 INJECTION INTRAMUSCULAR; INTRAVENOUS EVERY 6 HOURS PRN
Status: DISCONTINUED | OUTPATIENT
Start: 2023-07-06 | End: 2023-07-09 | Stop reason: HOSPADM

## 2023-07-06 RX ORDER — METOPROLOL SUCCINATE 25 MG/1
25 TABLET, EXTENDED RELEASE ORAL DAILY
Status: DISCONTINUED | OUTPATIENT
Start: 2023-07-06 | End: 2023-07-07

## 2023-07-06 RX ORDER — SODIUM CHLORIDE 0.9 % (FLUSH) 0.9 %
10 SYRINGE (ML) INJECTION PRN
Status: DISCONTINUED | OUTPATIENT
Start: 2023-07-06 | End: 2023-07-09 | Stop reason: HOSPADM

## 2023-07-06 RX ORDER — FERROUS SULFATE TAB EC 324 MG (65 MG FE EQUIVALENT) 324 (65 FE) MG
325 TABLET DELAYED RESPONSE ORAL 2 TIMES DAILY
Status: DISCONTINUED | OUTPATIENT
Start: 2023-07-06 | End: 2023-07-07

## 2023-07-06 RX ORDER — SODIUM CHLORIDE 0.9 % (FLUSH) 0.9 %
5-40 SYRINGE (ML) INJECTION EVERY 12 HOURS SCHEDULED
Status: DISCONTINUED | OUTPATIENT
Start: 2023-07-06 | End: 2023-07-09 | Stop reason: HOSPADM

## 2023-07-06 RX ORDER — ONDANSETRON 4 MG/1
4 TABLET, ORALLY DISINTEGRATING ORAL EVERY 8 HOURS PRN
Status: DISCONTINUED | OUTPATIENT
Start: 2023-07-06 | End: 2023-07-09 | Stop reason: HOSPADM

## 2023-07-06 RX ORDER — ACETAMINOPHEN 325 MG/1
650 TABLET ORAL EVERY 6 HOURS PRN
Status: DISCONTINUED | OUTPATIENT
Start: 2023-07-06 | End: 2023-07-09 | Stop reason: HOSPADM

## 2023-07-06 RX ORDER — HYDROCODONE BITARTRATE AND ACETAMINOPHEN 5; 325 MG/1; MG/1
1 TABLET ORAL 2 TIMES DAILY PRN
Qty: 30 TABLET | Refills: 0 | Status: CANCELLED | OUTPATIENT
Start: 2023-07-06 | End: 2023-08-05

## 2023-07-06 RX ORDER — FERROUS SULFATE 325(65) MG
325 TABLET ORAL 2 TIMES DAILY
Status: DISCONTINUED | OUTPATIENT
Start: 2023-07-06 | End: 2023-07-06 | Stop reason: SDUPTHER

## 2023-07-06 RX ORDER — ALOGLIPTIN 12.5 MG/1
12.5 TABLET, FILM COATED ORAL DAILY
Status: DISCONTINUED | OUTPATIENT
Start: 2023-07-06 | End: 2023-07-09 | Stop reason: HOSPADM

## 2023-07-06 RX ORDER — DEXTROSE MONOHYDRATE 100 MG/ML
INJECTION, SOLUTION INTRAVENOUS CONTINUOUS PRN
Status: DISCONTINUED | OUTPATIENT
Start: 2023-07-06 | End: 2023-07-09 | Stop reason: HOSPADM

## 2023-07-06 RX ORDER — ROSUVASTATIN CALCIUM 20 MG/1
20 TABLET, COATED ORAL NIGHTLY
Status: DISCONTINUED | OUTPATIENT
Start: 2023-07-06 | End: 2023-07-09 | Stop reason: HOSPADM

## 2023-07-06 RX ORDER — HYDROCODONE BITARTRATE AND ACETAMINOPHEN 5; 325 MG/1; MG/1
1 TABLET ORAL 2 TIMES DAILY PRN
Status: DISCONTINUED | OUTPATIENT
Start: 2023-07-06 | End: 2023-07-09 | Stop reason: HOSPADM

## 2023-07-06 RX ADMIN — INSULIN LISPRO 4 UNITS: 100 INJECTION, SOLUTION INTRAVENOUS; SUBCUTANEOUS at 20:55

## 2023-07-06 RX ADMIN — SODIUM CHLORIDE: 9 INJECTION, SOLUTION INTRAVENOUS at 23:58

## 2023-07-06 RX ADMIN — ROSUVASTATIN 20 MG: 20 TABLET, FILM COATED ORAL at 20:51

## 2023-07-06 RX ADMIN — SODIUM CHLORIDE, PRESERVATIVE FREE 10 ML: 5 INJECTION INTRAVENOUS at 20:53

## 2023-07-06 RX ADMIN — HYDROCODONE BITARTRATE AND ACETAMINOPHEN 1 TABLET: 5; 325 TABLET ORAL at 20:51

## 2023-07-06 RX ADMIN — FERROUS SULFATE TAB EC 324 MG (65 MG FE EQUIVALENT) 324 MG: 324 (65 FE) TABLET DELAYED RESPONSE at 20:52

## 2023-07-06 RX ADMIN — INSULIN GLARGINE 35 UNITS: 100 INJECTION, SOLUTION SUBCUTANEOUS at 20:56

## 2023-07-06 RX ADMIN — ALOGLIPTIN 12.5 MG: 12.5 TABLET, FILM COATED ORAL at 19:24

## 2023-07-06 RX ADMIN — METFORMIN HYDROCHLORIDE 1000 MG: 500 TABLET, FILM COATED ORAL at 19:24

## 2023-07-06 RX ADMIN — ACETAMINOPHEN 650 MG: 325 TABLET, FILM COATED ORAL at 23:19

## 2023-07-06 RX ADMIN — DIPHENHYDRAMINE HYDROCHLORIDE 25 MG: 25 TABLET ORAL at 23:19

## 2023-07-06 RX ADMIN — EMPAGLIFLOZIN 10 MG: 10 TABLET, FILM COATED ORAL at 19:24

## 2023-07-06 ASSESSMENT — PAIN DESCRIPTION - ORIENTATION
ORIENTATION: RIGHT;LEFT
ORIENTATION: RIGHT;LEFT

## 2023-07-06 ASSESSMENT — PAIN SCALES - GENERAL
PAINLEVEL_OUTOF10: 0
PAINLEVEL_OUTOF10: 8
PAINLEVEL_OUTOF10: 8

## 2023-07-06 ASSESSMENT — PAIN DESCRIPTION - DESCRIPTORS
DESCRIPTORS: ACHING
DESCRIPTORS: CRAMPING

## 2023-07-06 ASSESSMENT — PAIN DESCRIPTION - LOCATION
LOCATION: LEG
LOCATION: LEG

## 2023-07-07 PROBLEM — N18.30 CKD (CHRONIC KIDNEY DISEASE) STAGE 3, GFR 30-59 ML/MIN (HCC): Status: RESOLVED | Noted: 2021-04-27 | Resolved: 2023-07-07

## 2023-07-07 PROBLEM — N18.31 STAGE 3A CHRONIC KIDNEY DISEASE (HCC): Status: ACTIVE | Noted: 2021-04-27

## 2023-07-07 PROBLEM — I49.1 PAC (PREMATURE ATRIAL CONTRACTION): Status: ACTIVE | Noted: 2023-07-07

## 2023-07-07 LAB
ALBUMIN SERPL-MCNC: 3.4 G/DL (ref 3.4–4.8)
ALBUMIN/GLOB SERPL: 1.5 {RATIO} (ref 0.8–2)
ALP SERPL-CCNC: 82 U/L (ref 25–100)
ALT SERPL-CCNC: 13 U/L (ref 4–36)
ANION GAP SERPL CALCULATED.3IONS-SCNC: 14 MMOL/L (ref 3–16)
AST SERPL-CCNC: 17 U/L (ref 8–33)
BASOPHILS # BLD: 0 K/UL (ref 0–0.1)
BASOPHILS NFR BLD: 0.5 %
BILIRUB SERPL-MCNC: 0.7 MG/DL (ref 0.3–1.2)
BUN SERPL-MCNC: 16 MG/DL (ref 6–20)
CALCIUM SERPL-MCNC: 9 MG/DL (ref 8.5–10.5)
CHLORIDE SERPL-SCNC: 102 MMOL/L (ref 98–107)
CO2 SERPL-SCNC: 21 MMOL/L (ref 20–30)
CREAT SERPL-MCNC: 1 MG/DL (ref 0.4–1.2)
EOSINOPHIL # BLD: 0.1 K/UL (ref 0–0.4)
EOSINOPHIL NFR BLD: 1.4 %
ERYTHROCYTE [DISTWIDTH] IN BLOOD BY AUTOMATED COUNT: 14.5 % (ref 11–16)
GFR SERPLBLD CREATININE-BSD FMLA CKD-EPI: >60 ML/MIN/{1.73_M2}
GLOBULIN SER CALC-MCNC: 2.3 G/DL
GLUCOSE BLD-MCNC: 103 MG/DL (ref 74–106)
GLUCOSE BLD-MCNC: 108 MG/DL (ref 74–106)
GLUCOSE BLD-MCNC: 140 MG/DL (ref 74–106)
GLUCOSE BLD-MCNC: 94 MG/DL (ref 74–106)
GLUCOSE SERPL-MCNC: 132 MG/DL (ref 74–106)
HCT VFR BLD AUTO: 28.9 % (ref 37–47)
HGB BLD-MCNC: 9 G/DL (ref 11.5–16.5)
IMM GRANULOCYTES # BLD: 0 K/UL
IMM GRANULOCYTES NFR BLD: 0.2 % (ref 0–5)
LYMPHOCYTES # BLD: 0.7 K/UL (ref 1.5–4)
LYMPHOCYTES NFR BLD: 15.8 %
MCH RBC QN AUTO: 27.1 PG (ref 27–32)
MCHC RBC AUTO-ENTMCNC: 31.1 G/DL (ref 31–35)
MCV RBC AUTO: 87 FL (ref 80–100)
MONOCYTES # BLD: 0.4 K/UL (ref 0.2–0.8)
MONOCYTES NFR BLD: 8.4 %
NEUTROPHILS # BLD: 3.3 K/UL (ref 2–7.5)
NEUTS SEG NFR BLD: 73.7 %
PERFORMED ON: ABNORMAL
PERFORMED ON: ABNORMAL
PERFORMED ON: NORMAL
PERFORMED ON: NORMAL
PLATELET # BLD AUTO: 152 K/UL (ref 150–400)
PMV BLD AUTO: 10.9 FL (ref 6–10)
POTASSIUM SERPL-SCNC: 3.7 MMOL/L (ref 3.4–5.1)
PROT SERPL-MCNC: 5.7 G/DL (ref 6.4–8.3)
RBC # BLD AUTO: 3.32 M/UL (ref 3.8–5.8)
SODIUM SERPL-SCNC: 137 MMOL/L (ref 136–145)
WBC # BLD AUTO: 4.4 K/UL (ref 4–11)

## 2023-07-07 PROCEDURE — 1200000000 HC SEMI PRIVATE

## 2023-07-07 PROCEDURE — 6370000000 HC RX 637 (ALT 250 FOR IP): Performed by: INTERNAL MEDICINE

## 2023-07-07 PROCEDURE — 99406 BEHAV CHNG SMOKING 3-10 MIN: CPT | Performed by: INTERNAL MEDICINE

## 2023-07-07 PROCEDURE — 97161 PT EVAL LOW COMPLEX 20 MIN: CPT

## 2023-07-07 PROCEDURE — 36430 TRANSFUSION BLD/BLD COMPNT: CPT

## 2023-07-07 PROCEDURE — 36415 COLL VENOUS BLD VENIPUNCTURE: CPT

## 2023-07-07 PROCEDURE — 80053 COMPREHEN METABOLIC PANEL: CPT

## 2023-07-07 PROCEDURE — 6360000002 HC RX W HCPCS: Performed by: INTERNAL MEDICINE

## 2023-07-07 PROCEDURE — 2580000003 HC RX 258: Performed by: INTERNAL MEDICINE

## 2023-07-07 PROCEDURE — 2580000003 HC RX 258: Performed by: NURSE PRACTITIONER

## 2023-07-07 PROCEDURE — 6370000000 HC RX 637 (ALT 250 FOR IP): Performed by: NURSE PRACTITIONER

## 2023-07-07 PROCEDURE — 99223 1ST HOSP IP/OBS HIGH 75: CPT | Performed by: INTERNAL MEDICINE

## 2023-07-07 PROCEDURE — 97165 OT EVAL LOW COMPLEX 30 MIN: CPT

## 2023-07-07 PROCEDURE — 85025 COMPLETE CBC W/AUTO DIFF WBC: CPT

## 2023-07-07 RX ORDER — PANTOPRAZOLE SODIUM 40 MG/1
40 TABLET, DELAYED RELEASE ORAL
Status: DISCONTINUED | OUTPATIENT
Start: 2023-07-08 | End: 2023-07-09 | Stop reason: HOSPADM

## 2023-07-07 RX ORDER — FERROUS SULFATE TAB EC 324 MG (65 MG FE EQUIVALENT) 324 (65 FE) MG
324 TABLET DELAYED RESPONSE ORAL 2 TIMES DAILY WITH MEALS
Status: DISCONTINUED | OUTPATIENT
Start: 2023-07-07 | End: 2023-07-09 | Stop reason: HOSPADM

## 2023-07-07 RX ORDER — METOPROLOL SUCCINATE 50 MG/1
50 TABLET, EXTENDED RELEASE ORAL DAILY
Status: DISCONTINUED | OUTPATIENT
Start: 2023-07-08 | End: 2023-07-08

## 2023-07-07 RX ORDER — FUROSEMIDE 20 MG/1
20 TABLET ORAL ONCE
Status: COMPLETED | OUTPATIENT
Start: 2023-07-07 | End: 2023-07-07

## 2023-07-07 RX ORDER — FUROSEMIDE 20 MG/1
20 TABLET ORAL DAILY PRN
COMMUNITY

## 2023-07-07 RX ADMIN — HYDROCODONE BITARTRATE AND ACETAMINOPHEN 1 TABLET: 5; 325 TABLET ORAL at 20:19

## 2023-07-07 RX ADMIN — ACETAMINOPHEN 650 MG: 325 TABLET, FILM COATED ORAL at 04:19

## 2023-07-07 RX ADMIN — FERROUS SULFATE TAB EC 324 MG (65 MG FE EQUIVALENT) 324 MG: 324 (65 FE) TABLET DELAYED RESPONSE at 08:53

## 2023-07-07 RX ADMIN — METFORMIN HYDROCHLORIDE 1000 MG: 500 TABLET, FILM COATED ORAL at 16:22

## 2023-07-07 RX ADMIN — EMPAGLIFLOZIN 10 MG: 10 TABLET, FILM COATED ORAL at 08:54

## 2023-07-07 RX ADMIN — Medication 10 ML: at 08:54

## 2023-07-07 RX ADMIN — FERROUS SULFATE TAB EC 324 MG (65 MG FE EQUIVALENT) 324 MG: 324 (65 FE) TABLET DELAYED RESPONSE at 16:23

## 2023-07-07 RX ADMIN — ALOGLIPTIN 12.5 MG: 12.5 TABLET, FILM COATED ORAL at 08:54

## 2023-07-07 RX ADMIN — ROSUVASTATIN 20 MG: 20 TABLET, FILM COATED ORAL at 20:15

## 2023-07-07 RX ADMIN — IRON SUCROSE 300 MG: 20 INJECTION, SOLUTION INTRAVENOUS at 13:19

## 2023-07-07 RX ADMIN — ASPIRIN 81 MG 81 MG: 81 TABLET ORAL at 08:54

## 2023-07-07 RX ADMIN — FUROSEMIDE 20 MG: 20 TABLET ORAL at 13:27

## 2023-07-07 RX ADMIN — METFORMIN HYDROCHLORIDE 1000 MG: 500 TABLET, FILM COATED ORAL at 08:54

## 2023-07-07 RX ADMIN — METOPROLOL SUCCINATE 25 MG: 25 TABLET, EXTENDED RELEASE ORAL at 08:54

## 2023-07-07 ASSESSMENT — PAIN SCALES - GENERAL
PAINLEVEL_OUTOF10: 8
PAINLEVEL_OUTOF10: 0
PAINLEVEL_OUTOF10: 8

## 2023-07-07 ASSESSMENT — PAIN DESCRIPTION - LOCATION
LOCATION: LEG
LOCATION: LEG

## 2023-07-07 ASSESSMENT — PAIN DESCRIPTION - DESCRIPTORS
DESCRIPTORS: BURNING;CRAMPING
DESCRIPTORS: CRAMPING

## 2023-07-07 ASSESSMENT — PAIN DESCRIPTION - ORIENTATION
ORIENTATION: RIGHT;LEFT
ORIENTATION: RIGHT;LEFT

## 2023-07-08 LAB
ALBUMIN SERPL-MCNC: 3.2 G/DL (ref 3.4–4.8)
ALBUMIN/GLOB SERPL: 1.5 {RATIO} (ref 0.8–2)
ALP SERPL-CCNC: 82 U/L (ref 25–100)
ALT SERPL-CCNC: 15 U/L (ref 4–36)
ANION GAP SERPL CALCULATED.3IONS-SCNC: 11 MMOL/L (ref 3–16)
AST SERPL-CCNC: 21 U/L (ref 8–33)
BASOPHILS # BLD: 0 K/UL (ref 0–0.1)
BASOPHILS NFR BLD: 0.6 %
BILIRUB SERPL-MCNC: 0.4 MG/DL (ref 0.3–1.2)
BUN SERPL-MCNC: 18 MG/DL (ref 6–20)
CALCIUM SERPL-MCNC: 8.8 MG/DL (ref 8.5–10.5)
CHLORIDE SERPL-SCNC: 100 MMOL/L (ref 98–107)
CO2 SERPL-SCNC: 24 MMOL/L (ref 20–30)
CREAT SERPL-MCNC: 1.1 MG/DL (ref 0.4–1.2)
EOSINOPHIL # BLD: 0.1 K/UL (ref 0–0.4)
EOSINOPHIL NFR BLD: 1 %
ERYTHROCYTE [DISTWIDTH] IN BLOOD BY AUTOMATED COUNT: 14.7 % (ref 11–16)
GFR SERPLBLD CREATININE-BSD FMLA CKD-EPI: 54 ML/MIN/{1.73_M2}
GLOBULIN SER CALC-MCNC: 2.2 G/DL
GLUCOSE BLD-MCNC: 102 MG/DL (ref 74–106)
GLUCOSE BLD-MCNC: 144 MG/DL (ref 74–106)
GLUCOSE BLD-MCNC: 190 MG/DL (ref 74–106)
GLUCOSE BLD-MCNC: 218 MG/DL (ref 74–106)
GLUCOSE SERPL-MCNC: 93 MG/DL (ref 74–106)
HCT VFR BLD AUTO: 26.8 % (ref 37–47)
HGB BLD-MCNC: 8.3 G/DL (ref 11.5–16.5)
IMM GRANULOCYTES # BLD: 0 K/UL
IMM GRANULOCYTES NFR BLD: 0.4 % (ref 0–5)
LYMPHOCYTES # BLD: 1 K/UL (ref 1.5–4)
LYMPHOCYTES NFR BLD: 19.6 %
MCH RBC QN AUTO: 27.3 PG (ref 27–32)
MCHC RBC AUTO-ENTMCNC: 31 G/DL (ref 31–35)
MCV RBC AUTO: 88.2 FL (ref 80–100)
MONOCYTES # BLD: 0.5 K/UL (ref 0.2–0.8)
MONOCYTES NFR BLD: 9.1 %
NEUTROPHILS # BLD: 3.5 K/UL (ref 2–7.5)
NEUTS SEG NFR BLD: 69.3 %
PERFORMED ON: ABNORMAL
PERFORMED ON: NORMAL
PLATELET # BLD AUTO: 164 K/UL (ref 150–400)
PMV BLD AUTO: 11.7 FL (ref 6–10)
POTASSIUM SERPL-SCNC: 3.9 MMOL/L (ref 3.4–5.1)
PROT SERPL-MCNC: 5.4 G/DL (ref 6.4–8.3)
RBC # BLD AUTO: 3.04 M/UL (ref 3.8–5.8)
SODIUM SERPL-SCNC: 135 MMOL/L (ref 136–145)
WBC # BLD AUTO: 5 K/UL (ref 4–11)

## 2023-07-08 PROCEDURE — 99232 SBSQ HOSP IP/OBS MODERATE 35: CPT | Performed by: INTERNAL MEDICINE

## 2023-07-08 PROCEDURE — 6370000000 HC RX 637 (ALT 250 FOR IP): Performed by: NURSE PRACTITIONER

## 2023-07-08 PROCEDURE — 2580000003 HC RX 258: Performed by: INTERNAL MEDICINE

## 2023-07-08 PROCEDURE — 2580000003 HC RX 258: Performed by: NURSE PRACTITIONER

## 2023-07-08 PROCEDURE — 85025 COMPLETE CBC W/AUTO DIFF WBC: CPT

## 2023-07-08 PROCEDURE — 36415 COLL VENOUS BLD VENIPUNCTURE: CPT

## 2023-07-08 PROCEDURE — 6370000000 HC RX 637 (ALT 250 FOR IP): Performed by: INTERNAL MEDICINE

## 2023-07-08 PROCEDURE — 1200000000 HC SEMI PRIVATE

## 2023-07-08 PROCEDURE — 6360000002 HC RX W HCPCS: Performed by: INTERNAL MEDICINE

## 2023-07-08 PROCEDURE — 80053 COMPREHEN METABOLIC PANEL: CPT

## 2023-07-08 RX ORDER — METOPROLOL SUCCINATE 25 MG/1
25 TABLET, EXTENDED RELEASE ORAL DAILY
Status: DISCONTINUED | OUTPATIENT
Start: 2023-07-09 | End: 2023-07-09 | Stop reason: HOSPADM

## 2023-07-08 RX ORDER — ROPINIROLE 0.25 MG/1
0.25 TABLET, FILM COATED ORAL NIGHTLY
Status: DISCONTINUED | OUTPATIENT
Start: 2023-07-08 | End: 2023-07-09 | Stop reason: HOSPADM

## 2023-07-08 RX ORDER — TIZANIDINE 4 MG/1
4 TABLET ORAL 3 TIMES DAILY PRN
Status: DISCONTINUED | OUTPATIENT
Start: 2023-07-08 | End: 2023-07-09 | Stop reason: HOSPADM

## 2023-07-08 RX ADMIN — ROPINIROLE HYDROCHLORIDE 0.25 MG: 0.25 TABLET, FILM COATED ORAL at 20:17

## 2023-07-08 RX ADMIN — METFORMIN HYDROCHLORIDE 1000 MG: 500 TABLET, FILM COATED ORAL at 08:17

## 2023-07-08 RX ADMIN — Medication 10 ML: at 20:28

## 2023-07-08 RX ADMIN — EMPAGLIFLOZIN 10 MG: 10 TABLET, FILM COATED ORAL at 08:17

## 2023-07-08 RX ADMIN — IRON SUCROSE 300 MG: 20 INJECTION, SOLUTION INTRAVENOUS at 13:42

## 2023-07-08 RX ADMIN — HYDROCODONE BITARTRATE AND ACETAMINOPHEN 1 TABLET: 5; 325 TABLET ORAL at 20:18

## 2023-07-08 RX ADMIN — ACETAMINOPHEN 650 MG: 325 TABLET, FILM COATED ORAL at 00:41

## 2023-07-08 RX ADMIN — ROPINIROLE HYDROCHLORIDE 0.25 MG: 0.25 TABLET, FILM COATED ORAL at 03:08

## 2023-07-08 RX ADMIN — ROSUVASTATIN 20 MG: 20 TABLET, FILM COATED ORAL at 20:17

## 2023-07-08 RX ADMIN — INSULIN GLARGINE 35 UNITS: 100 INJECTION, SOLUTION SUBCUTANEOUS at 20:19

## 2023-07-08 RX ADMIN — FERROUS SULFATE TAB EC 324 MG (65 MG FE EQUIVALENT) 324 MG: 324 (65 FE) TABLET DELAYED RESPONSE at 08:17

## 2023-07-08 RX ADMIN — PANTOPRAZOLE SODIUM 40 MG: 40 TABLET, DELAYED RELEASE ORAL at 05:57

## 2023-07-08 RX ADMIN — METFORMIN HYDROCHLORIDE 1000 MG: 500 TABLET, FILM COATED ORAL at 17:27

## 2023-07-08 RX ADMIN — INSULIN LISPRO 1 UNITS: 100 INJECTION, SOLUTION INTRAVENOUS; SUBCUTANEOUS at 17:26

## 2023-07-08 RX ADMIN — TIZANIDINE 4 MG: 4 TABLET ORAL at 20:18

## 2023-07-08 RX ADMIN — DICLOFENAC SODIUM 4 G: 10 GEL TOPICAL at 03:09

## 2023-07-08 RX ADMIN — ALOGLIPTIN 12.5 MG: 12.5 TABLET, FILM COATED ORAL at 08:17

## 2023-07-08 RX ADMIN — FERROUS SULFATE TAB EC 324 MG (65 MG FE EQUIVALENT) 324 MG: 324 (65 FE) TABLET DELAYED RESPONSE at 17:27

## 2023-07-08 RX ADMIN — Medication 10 ML: at 08:17

## 2023-07-08 RX ADMIN — TIZANIDINE 4 MG: 4 TABLET ORAL at 03:08

## 2023-07-08 ASSESSMENT — PAIN DESCRIPTION - LOCATION: LOCATION: LEG

## 2023-07-08 ASSESSMENT — PAIN SCALES - GENERAL
PAINLEVEL_OUTOF10: 9
PAINLEVEL_OUTOF10: 9

## 2023-07-09 VITALS
RESPIRATION RATE: 18 BRPM | WEIGHT: 144.06 LBS | SYSTOLIC BLOOD PRESSURE: 139 MMHG | BODY MASS INDEX: 24.59 KG/M2 | OXYGEN SATURATION: 96 % | HEIGHT: 64 IN | TEMPERATURE: 98.2 F | HEART RATE: 62 BPM | DIASTOLIC BLOOD PRESSURE: 64 MMHG

## 2023-07-09 LAB
ANION GAP SERPL CALCULATED.3IONS-SCNC: 11 MMOL/L (ref 3–16)
BASOPHILS # BLD: 0 K/UL (ref 0–0.1)
BASOPHILS NFR BLD: 0.2 %
BUN SERPL-MCNC: 20 MG/DL (ref 6–20)
CALCIUM SERPL-MCNC: 9.2 MG/DL (ref 8.5–10.5)
CHLORIDE SERPL-SCNC: 103 MMOL/L (ref 98–107)
CO2 SERPL-SCNC: 25 MMOL/L (ref 20–30)
CREAT SERPL-MCNC: 1.2 MG/DL (ref 0.4–1.2)
EOSINOPHIL # BLD: 0.1 K/UL (ref 0–0.4)
EOSINOPHIL NFR BLD: 1.2 %
ERYTHROCYTE [DISTWIDTH] IN BLOOD BY AUTOMATED COUNT: 14.7 % (ref 11–16)
GFR SERPLBLD CREATININE-BSD FMLA CKD-EPI: 48 ML/MIN/{1.73_M2}
GLUCOSE BLD-MCNC: 158 MG/DL (ref 74–106)
GLUCOSE BLD-MCNC: 178 MG/DL (ref 74–106)
GLUCOSE SERPL-MCNC: 143 MG/DL (ref 74–106)
HCT VFR BLD AUTO: 28.2 % (ref 37–47)
HGB BLD-MCNC: 8.5 G/DL (ref 11.5–16.5)
IMM GRANULOCYTES # BLD: 0 K/UL
IMM GRANULOCYTES NFR BLD: 0.7 % (ref 0–5)
LYMPHOCYTES # BLD: 1 K/UL (ref 1.5–4)
LYMPHOCYTES NFR BLD: 22.8 %
MCH RBC QN AUTO: 26.7 PG (ref 27–32)
MCHC RBC AUTO-ENTMCNC: 30.1 G/DL (ref 31–35)
MCV RBC AUTO: 88.7 FL (ref 80–100)
MONOCYTES # BLD: 0.5 K/UL (ref 0.2–0.8)
MONOCYTES NFR BLD: 10.8 %
NEUTROPHILS # BLD: 2.7 K/UL (ref 2–7.5)
NEUTS SEG NFR BLD: 64.3 %
PERFORMED ON: ABNORMAL
PERFORMED ON: ABNORMAL
PLATELET # BLD AUTO: 156 K/UL (ref 150–400)
PMV BLD AUTO: 10.9 FL (ref 6–10)
POTASSIUM SERPL-SCNC: 4.2 MMOL/L (ref 3.4–5.1)
RBC # BLD AUTO: 3.18 M/UL (ref 3.8–5.8)
SODIUM SERPL-SCNC: 139 MMOL/L (ref 136–145)
WBC # BLD AUTO: 4.3 K/UL (ref 4–11)

## 2023-07-09 PROCEDURE — 6360000002 HC RX W HCPCS: Performed by: INTERNAL MEDICINE

## 2023-07-09 PROCEDURE — 99238 HOSP IP/OBS DSCHRG MGMT 30/<: CPT | Performed by: INTERNAL MEDICINE

## 2023-07-09 PROCEDURE — 80048 BASIC METABOLIC PNL TOTAL CA: CPT

## 2023-07-09 PROCEDURE — 2580000003 HC RX 258: Performed by: NURSE PRACTITIONER

## 2023-07-09 PROCEDURE — 85025 COMPLETE CBC W/AUTO DIFF WBC: CPT

## 2023-07-09 PROCEDURE — 36415 COLL VENOUS BLD VENIPUNCTURE: CPT

## 2023-07-09 PROCEDURE — 6370000000 HC RX 637 (ALT 250 FOR IP): Performed by: INTERNAL MEDICINE

## 2023-07-09 PROCEDURE — 6370000000 HC RX 637 (ALT 250 FOR IP): Performed by: NURSE PRACTITIONER

## 2023-07-09 PROCEDURE — 2580000003 HC RX 258: Performed by: INTERNAL MEDICINE

## 2023-07-09 RX ORDER — HYDROCODONE BITARTRATE AND ACETAMINOPHEN 5; 325 MG/1; MG/1
1 TABLET ORAL 2 TIMES DAILY PRN
Qty: 30 TABLET | Refills: 0 | Status: SHIPPED | OUTPATIENT
Start: 2023-07-09 | End: 2023-08-08

## 2023-07-09 RX ORDER — DOCUSATE SODIUM 100 MG/1
100 CAPSULE, LIQUID FILLED ORAL ONCE
Status: COMPLETED | OUTPATIENT
Start: 2023-07-09 | End: 2023-07-09

## 2023-07-09 RX ORDER — POLYETHYLENE GLYCOL 3350 17 G/17G
17 POWDER, FOR SOLUTION ORAL ONCE
Status: COMPLETED | OUTPATIENT
Start: 2023-07-09 | End: 2023-07-09

## 2023-07-09 RX ADMIN — METOPROLOL SUCCINATE 25 MG: 25 TABLET, EXTENDED RELEASE ORAL at 08:08

## 2023-07-09 RX ADMIN — EMPAGLIFLOZIN 10 MG: 10 TABLET, FILM COATED ORAL at 08:07

## 2023-07-09 RX ADMIN — Medication 10 ML: at 08:09

## 2023-07-09 RX ADMIN — ALOGLIPTIN 12.5 MG: 12.5 TABLET, FILM COATED ORAL at 08:07

## 2023-07-09 RX ADMIN — DOCUSATE SODIUM 100 MG: 100 CAPSULE, LIQUID FILLED ORAL at 08:07

## 2023-07-09 RX ADMIN — PANTOPRAZOLE SODIUM 40 MG: 40 TABLET, DELAYED RELEASE ORAL at 05:41

## 2023-07-09 RX ADMIN — ACETAMINOPHEN 650 MG: 325 TABLET, FILM COATED ORAL at 05:41

## 2023-07-09 RX ADMIN — FERROUS SULFATE TAB EC 324 MG (65 MG FE EQUIVALENT) 324 MG: 324 (65 FE) TABLET DELAYED RESPONSE at 08:08

## 2023-07-09 RX ADMIN — IRON SUCROSE 300 MG: 20 INJECTION, SOLUTION INTRAVENOUS at 08:18

## 2023-07-09 RX ADMIN — POLYETHYLENE GLYCOL 3350 17 G: 17 POWDER, FOR SOLUTION ORAL at 08:06

## 2023-07-09 RX ADMIN — METFORMIN HYDROCHLORIDE 1000 MG: 500 TABLET, FILM COATED ORAL at 08:07

## 2023-07-09 ASSESSMENT — PAIN DESCRIPTION - LOCATION: LOCATION: HEAD

## 2023-07-11 ENCOUNTER — CARE COORDINATION (OUTPATIENT)
Dept: CARE COORDINATION | Age: 73
End: 2023-07-11

## 2023-07-11 LAB
EKG ATRIAL RATE: 84 BPM
EKG DIAGNOSIS: NORMAL
EKG P AXIS: 80 DEGREES
EKG P-R INTERVAL: 192 MS
EKG Q-T INTERVAL: 382 MS
EKG QRS DURATION: 86 MS
EKG QTC CALCULATION (BAZETT): 464 MS
EKG R AXIS: -8 DEGREES
EKG T AXIS: 42 DEGREES
EKG VENTRICULAR RATE: 89 BPM

## 2023-07-11 NOTE — CARE COORDINATION
[facility] Care Transitions Initial Follow Up Call    Call within 2 business days of discharge: Yes    Patient Current Location:  Home: [address] [place] [id number]    Care Transition Nurse contacted the patient by telephone to perform post hospital discharge assessment. Verified name and  with patient as identifiers. Provided introduction to self, and explanation of the Care Transition Nurse role. Patient: Carlos Alberto Russell Patient : 1950   MRN: [id number]  Reason for Admission: Anemia, weakness  Discharge Date: 23 RARS: Readmission Risk Score: 18.5      Last Discharge [address] [facility]       Date Complaint Diagnosis Description Type Department Provider    23  Iron deficiency anemia, unspecified iron deficiency anemia type . .. Admission (Discharged) Dameon Spears MD            Was this an external facility discharge? No Discharge Facility: [facility]    Challenges to be reviewed by the provider   Additional needs identified to be addressed with provider: No  none               Method of communication with provider: chart routing. [place] Clarity states that she is feeling much better and stronger. No issues eating or drinking or with meds. We scheduled her for a hfu appointment. Care Transition Nurse reviewed discharge instructions with patient who verbalized understanding. The patient was given an opportunity to ask questions and does not have any further questions or concerns at this time. Were discharge instructions available to patient? Yes. Reviewed appropriate site of care based on symptoms and resources available to patient including: PCP. The patient agrees to contact the PCP office for questions related to their healthcare. Advance Care Planning:   Does patient have an Advance Directive: not on file. Medication reconciliation was performed with patient, who verbalizes understanding of administration of home medications.  Medications reviewed, 1111F entered: N/A    Was

## 2023-07-16 ASSESSMENT — ENCOUNTER SYMPTOMS
ABDOMINAL PAIN: 0
SORE THROAT: 0
EYE DISCHARGE: 0
NAUSEA: 0
SHORTNESS OF BREATH: 1
BACK PAIN: 1
VOMITING: 0
SINUS PRESSURE: 0
COUGH: 0
WHEEZING: 0

## 2023-07-19 ENCOUNTER — OFFICE VISIT (OUTPATIENT)
Dept: PRIMARY CARE CLINIC | Age: 73
End: 2023-07-19

## 2023-07-19 VITALS
DIASTOLIC BLOOD PRESSURE: 70 MMHG | WEIGHT: 142.6 LBS | SYSTOLIC BLOOD PRESSURE: 130 MMHG | OXYGEN SATURATION: 98 % | RESPIRATION RATE: 18 BRPM | HEART RATE: 52 BPM | BODY MASS INDEX: 24.48 KG/M2

## 2023-07-19 DIAGNOSIS — I65.23 BILATERAL CAROTID ARTERY STENOSIS: ICD-10-CM

## 2023-07-19 DIAGNOSIS — D50.9 IRON DEFICIENCY ANEMIA, UNSPECIFIED IRON DEFICIENCY ANEMIA TYPE: Primary | ICD-10-CM

## 2023-07-19 DIAGNOSIS — K74.60 HEPATIC CIRRHOSIS, UNSPECIFIED HEPATIC CIRRHOSIS TYPE, UNSPECIFIED WHETHER ASCITES PRESENT (HCC): ICD-10-CM

## 2023-07-19 DIAGNOSIS — I49.1 PAC (PREMATURE ATRIAL CONTRACTION): ICD-10-CM

## 2023-07-19 DIAGNOSIS — E11.40 TYPE 2 DIABETES MELLITUS WITH DIABETIC NEUROPATHY, WITH LONG-TERM CURRENT USE OF INSULIN (HCC): ICD-10-CM

## 2023-07-19 DIAGNOSIS — Z09 HOSPITAL DISCHARGE FOLLOW-UP: ICD-10-CM

## 2023-07-19 DIAGNOSIS — Z79.4 TYPE 2 DIABETES MELLITUS WITH DIABETIC NEUROPATHY, WITH LONG-TERM CURRENT USE OF INSULIN (HCC): ICD-10-CM

## 2023-07-19 RX ORDER — FUROSEMIDE 20 MG/1
TABLET ORAL
Qty: 30 TABLET | Refills: 2 | Status: SHIPPED | OUTPATIENT
Start: 2023-07-19

## 2023-07-19 NOTE — PROGRESS NOTES
Chief Complaint   Patient presents with    Follow-Up from Hospital       Have you seen any other physician or provider since your last visit yes - mw    Have you had any other diagnostic tests since your last visit? yes -     Have you changed or stopped any medications since your last visit?  yes - levemir 50   nightly
Judgment normal.       Lab Results   Component Value Date/Time     07/09/2023 05:22 AM    K 4.2 07/09/2023 05:22 AM    K 3.9 07/08/2023 04:58 AM     07/09/2023 05:22 AM    CO2 25 07/09/2023 05:22 AM    GLUCOSE 143 07/09/2023 05:22 AM    BUN 20 07/09/2023 05:22 AM    CREATININE 1.2 07/09/2023 05:22 AM    CALCIUM 9.2 07/09/2023 05:22 AM    PROT 5.4 07/08/2023 04:58 AM    LABALBU 3.2 07/08/2023 04:58 AM    BILITOT 0.4 07/08/2023 04:58 AM    ALT 15 07/08/2023 04:58 AM    AST 21 07/08/2023 04:58 AM       Hemoglobin A1C (%)   Date Value   06/01/2023 6.1 (H)     Microscopic Examination (no units)   Date Value   06/01/2023 YES     LDL Calculated (mg/dL)   Date Value   04/28/2023 5         Lab Results   Component Value Date/Time    WBC 4.3 07/09/2023 05:22 AM    NEUTROABS 2.7 07/09/2023 05:22 AM    HGB 8.5 07/09/2023 05:22 AM    HCT 28.2 07/09/2023 05:22 AM    MCV 88.7 07/09/2023 05:22 AM     07/09/2023 05:22 AM       Lab Results   Component Value Date    TSH 2.40 07/06/2023       Assessment/Plan:  1. Iron deficiency anemia, unspecified iron deficiency anemia type  Status post recent blood transfusion and iron infusion. Patient did have extensive GI work-up about a year ago by Dr. Vish Hughes and she was found to have multiple colon polyps and angiectasia. PillCam showed few AVM reachable only with push enteroscopy/retrograde enteroscopy. Recommendation to manage at [facility] with any complication related to bleeding. Continue GI prophylaxis. Continue to monitor closely. May need to hold aspirin in the future with any worsening hemoglobin level/bleeding issues even though is really needed with her vascular path. 2. Type 2 diabetes mellitus with diabetic neuropathy, with long-term current use of insulin (HCC)  Stable. I advised her regarding diabetic diet, exercise and weight control. Also, I advised her to stay on the current medication, monitor her fingerstick closely.   I am going to check the

## 2023-08-04 RX ORDER — ASPIRIN 81 MG/1
TABLET, CHEWABLE ORAL
Qty: 90 TABLET | Refills: 1 | Status: SHIPPED | OUTPATIENT
Start: 2023-08-04

## 2023-08-04 RX ORDER — HYDRALAZINE HYDROCHLORIDE 25 MG/1
TABLET, FILM COATED ORAL
Qty: 180 TABLET | Refills: 1 | Status: SHIPPED | OUTPATIENT
Start: 2023-08-04

## 2023-08-04 ASSESSMENT — ENCOUNTER SYMPTOMS
ABDOMINAL PAIN: 0
SORE THROAT: 0
NAUSEA: 0
WHEEZING: 0
VOMITING: 0
COUGH: 0
BACK PAIN: 1
SHORTNESS OF BREATH: 0
EYE DISCHARGE: 0
SINUS PRESSURE: 0

## 2023-08-07 RX ORDER — EMPAGLIFLOZIN 10 MG/1
TABLET, FILM COATED ORAL DAILY
Qty: 30 TABLET | Refills: 3 | Status: SHIPPED | OUTPATIENT
Start: 2023-08-07

## 2023-08-10 DIAGNOSIS — M51.36 DEGENERATIVE DISC DISEASE, LUMBAR: ICD-10-CM

## 2023-08-11 RX ORDER — HYDROCODONE BITARTRATE AND ACETAMINOPHEN 5; 325 MG/1; MG/1
1 TABLET ORAL 2 TIMES DAILY PRN
Qty: 30 TABLET | Refills: 0 | Status: SHIPPED | OUTPATIENT
Start: 2023-08-11 | End: 2023-09-10

## 2023-08-16 ENCOUNTER — HOSPITAL ENCOUNTER (OUTPATIENT)
Facility: HOSPITAL | Age: 73
Discharge: HOME | End: 2023-08-16
Payer: MEDICARE

## 2023-08-16 DIAGNOSIS — D50.9 IRON DEFICIENCY ANEMIA, UNSPECIFIED IRON DEFICIENCY ANEMIA TYPE: ICD-10-CM

## 2023-08-16 DIAGNOSIS — R53.1 GENERAL WEAKNESS: ICD-10-CM

## 2023-08-16 LAB
ALBUMIN SERPL-MCNC: 4.1 G/DL (ref 3.4–4.8)
ALBUMIN/GLOB SERPL: 2.1 {RATIO} (ref 0.8–2)
ALP SERPL-CCNC: 113 U/L (ref 25–100)
ALT SERPL-CCNC: 25 U/L (ref 4–36)
ANION GAP SERPL CALCULATED.3IONS-SCNC: 20 MMOL/L (ref 3–16)
AST SERPL-CCNC: 31 U/L (ref 8–33)
BASOPHILS # BLD: 0 K/UL (ref 0–0.1)
BASOPHILS NFR BLD: 0.4 %
BILIRUB SERPL-MCNC: 0.3 MG/DL (ref 0.3–1.2)
BUN SERPL-MCNC: 9 MG/DL (ref 6–20)
CALCIUM SERPL-MCNC: 9 MG/DL (ref 8.5–10.5)
CHLORIDE SERPL-SCNC: 109 MMOL/L (ref 98–107)
CK SERPL-CCNC: 90 U/L (ref 26–174)
CO2 SERPL-SCNC: 21 MMOL/L (ref 20–30)
CREAT SERPL-MCNC: 1.1 MG/DL (ref 0.4–1.2)
EOSINOPHIL # BLD: 0 K/UL (ref 0–0.4)
EOSINOPHIL NFR BLD: 0.9 %
ERYTHROCYTE [DISTWIDTH] IN BLOOD BY AUTOMATED COUNT: 15.3 % (ref 11–16)
GFR SERPLBLD CREATININE-BSD FMLA CKD-EPI: 54 ML/MIN/{1.73_M2}
GLOBULIN SER CALC-MCNC: 2 G/DL
GLUCOSE SERPL-MCNC: 130 MG/DL (ref 74–106)
HCT VFR BLD AUTO: 33.7 % (ref 37–47)
HGB BLD-MCNC: 10.5 G/DL (ref 11.5–16.5)
IMM GRANULOCYTES # BLD: 0 K/UL
IMM GRANULOCYTES NFR BLD: 0.2 % (ref 0–5)
LYMPHOCYTES # BLD: 0.7 K/UL (ref 1.5–4)
LYMPHOCYTES NFR BLD: 15.8 %
MAGNESIUM SERPL-MCNC: 1.9 MG/DL (ref 1.7–2.4)
MCH RBC QN AUTO: 28.5 PG (ref 27–32)
MCHC RBC AUTO-ENTMCNC: 31.2 G/DL (ref 31–35)
MCV RBC AUTO: 91.3 FL (ref 80–100)
MONOCYTES # BLD: 0.3 K/UL (ref 0.2–0.8)
MONOCYTES NFR BLD: 7.3 %
NEUTROPHILS # BLD: 3.4 K/UL (ref 2–7.5)
NEUTS SEG NFR BLD: 75.4 %
PLATELET # BLD AUTO: 145 K/UL (ref 150–400)
PMV BLD AUTO: 11.2 FL (ref 6–10)
POTASSIUM SERPL-SCNC: 3.5 MMOL/L (ref 3.4–5.1)
PROT SERPL-MCNC: 6.1 G/DL (ref 6.4–8.3)
RBC # BLD AUTO: 3.69 M/UL (ref 3.8–5.8)
SODIUM SERPL-SCNC: 150 MMOL/L (ref 136–145)
WBC # BLD AUTO: 4.5 K/UL (ref 4–11)

## 2023-08-16 PROCEDURE — 85025 COMPLETE CBC W/AUTO DIFF WBC: CPT

## 2023-08-16 PROCEDURE — 82550 ASSAY OF CK (CPK): CPT

## 2023-08-16 PROCEDURE — 80053 COMPREHEN METABOLIC PANEL: CPT

## 2023-08-16 PROCEDURE — 83735 ASSAY OF MAGNESIUM: CPT

## 2023-09-06 ENCOUNTER — OFFICE VISIT (OUTPATIENT)
Dept: PRIMARY CARE CLINIC | Age: 73
End: 2023-09-06
Payer: MEDICARE

## 2023-09-06 VITALS
HEART RATE: 77 BPM | BODY MASS INDEX: 23.79 KG/M2 | WEIGHT: 138.6 LBS | RESPIRATION RATE: 18 BRPM | DIASTOLIC BLOOD PRESSURE: 74 MMHG | OXYGEN SATURATION: 98 % | SYSTOLIC BLOOD PRESSURE: 164 MMHG

## 2023-09-06 DIAGNOSIS — I10 ESSENTIAL HYPERTENSION: ICD-10-CM

## 2023-09-06 DIAGNOSIS — I65.23 BILATERAL CAROTID ARTERY STENOSIS: ICD-10-CM

## 2023-09-06 DIAGNOSIS — Z79.4 TYPE 2 DIABETES MELLITUS WITH DIABETIC NEUROPATHY, WITH LONG-TERM CURRENT USE OF INSULIN (HCC): Primary | ICD-10-CM

## 2023-09-06 DIAGNOSIS — K74.60 HEPATIC CIRRHOSIS, UNSPECIFIED HEPATIC CIRRHOSIS TYPE, UNSPECIFIED WHETHER ASCITES PRESENT (HCC): ICD-10-CM

## 2023-09-06 DIAGNOSIS — E53.8 VITAMIN B12 DEFICIENCY: ICD-10-CM

## 2023-09-06 DIAGNOSIS — E11.40 TYPE 2 DIABETES MELLITUS WITH DIABETIC NEUROPATHY, WITH LONG-TERM CURRENT USE OF INSULIN (HCC): Primary | ICD-10-CM

## 2023-09-06 DIAGNOSIS — M51.36 DEGENERATIVE DISC DISEASE, LUMBAR: ICD-10-CM

## 2023-09-06 DIAGNOSIS — D50.9 IRON DEFICIENCY ANEMIA, UNSPECIFIED IRON DEFICIENCY ANEMIA TYPE: ICD-10-CM

## 2023-09-06 PROCEDURE — 3044F HG A1C LEVEL LT 7.0%: CPT | Performed by: INTERNAL MEDICINE

## 2023-09-06 PROCEDURE — 3078F DIAST BP <80 MM HG: CPT | Performed by: INTERNAL MEDICINE

## 2023-09-06 PROCEDURE — 3074F SYST BP LT 130 MM HG: CPT | Performed by: INTERNAL MEDICINE

## 2023-09-06 PROCEDURE — 3017F COLORECTAL CA SCREEN DOC REV: CPT | Performed by: INTERNAL MEDICINE

## 2023-09-06 PROCEDURE — 2022F DILAT RTA XM EVC RTNOPTHY: CPT | Performed by: INTERNAL MEDICINE

## 2023-09-06 PROCEDURE — 4004F PT TOBACCO SCREEN RCVD TLK: CPT | Performed by: INTERNAL MEDICINE

## 2023-09-06 PROCEDURE — 96372 THER/PROPH/DIAG INJ SC/IM: CPT | Performed by: INTERNAL MEDICINE

## 2023-09-06 PROCEDURE — 1090F PRES/ABSN URINE INCON ASSESS: CPT | Performed by: INTERNAL MEDICINE

## 2023-09-06 PROCEDURE — 1123F ACP DISCUSS/DSCN MKR DOCD: CPT | Performed by: INTERNAL MEDICINE

## 2023-09-06 PROCEDURE — G8427 DOCREV CUR MEDS BY ELIG CLIN: HCPCS | Performed by: INTERNAL MEDICINE

## 2023-09-06 PROCEDURE — G8420 CALC BMI NORM PARAMETERS: HCPCS | Performed by: INTERNAL MEDICINE

## 2023-09-06 PROCEDURE — 99214 OFFICE O/P EST MOD 30 MIN: CPT | Performed by: INTERNAL MEDICINE

## 2023-09-06 PROCEDURE — G8399 PT W/DXA RESULTS DOCUMENT: HCPCS | Performed by: INTERNAL MEDICINE

## 2023-09-06 RX ORDER — HYDROCODONE BITARTRATE AND ACETAMINOPHEN 5; 325 MG/1; MG/1
1 TABLET ORAL 2 TIMES DAILY PRN
Qty: 45 TABLET | Refills: 0 | Status: SHIPPED | OUTPATIENT
Start: 2023-09-06 | End: 2023-10-06

## 2023-09-06 RX ORDER — CYANOCOBALAMIN 1000 UG/ML
1000 INJECTION, SOLUTION INTRAMUSCULAR; SUBCUTANEOUS ONCE
Status: COMPLETED | OUTPATIENT
Start: 2023-09-06 | End: 2023-09-06

## 2023-09-06 RX ORDER — CYANOCOBALAMIN 1000 UG/ML
1000 INJECTION, SOLUTION INTRAMUSCULAR; SUBCUTANEOUS
Qty: 2 ML | Refills: 10 | Status: SHIPPED | OUTPATIENT
Start: 2023-09-06

## 2023-09-06 RX ADMIN — CYANOCOBALAMIN 1000 MCG: 1000 INJECTION, SOLUTION INTRAMUSCULAR; SUBCUTANEOUS at 15:25

## 2023-09-06 ASSESSMENT — ENCOUNTER SYMPTOMS
NAUSEA: 0
COUGH: 0
SINUS PRESSURE: 0
BACK PAIN: 1
ABDOMINAL PAIN: 0
VOMITING: 0
SORE THROAT: 0
WHEEZING: 0
SHORTNESS OF BREATH: 0
EYE DISCHARGE: 0

## 2023-09-06 NOTE — PROGRESS NOTES
Chief Complaint   Patient presents with    Diabetes    Anemia     Fs avg 120's   Levemir 50 nightly    Have you seen any other physician or provider since your last visit no    Have you had any other diagnostic tests since your last visit? no    Have you changed or stopped any medications since your last visit? no
other practitioners and to notify our office ASAP if this happened on emergent basis.    - HYDROcodone-acetaminophen (NORCO) 5-325 MG per tablet; Take 1 tablet by mouth 2 times daily as needed for Pain for up to 30 days. Dispense: 45 tablet; Refill: 0        Orders Placed This Encounter   Medications    cyanocobalamin injection 1,000 mcg    cyanocobalamin 1000 MCG/ML injection     Sig: Inject 1 mL into the muscle every 14 days     Dispense:  2 mL     Refill:  10     Plz provide needles and syringes    HYDROcodone-acetaminophen (NORCO) 5-325 MG per tablet     Sig: Take 1 tablet by mouth 2 times daily as needed for Pain for up to 30 days. Dispense:  45 tablet     Refill:  0     Reduce doses taken as pain becomes manageable      IKelli MA am scribing for and in the presence of Tamiko Lopez MD on this date of 09/06/23 at 2:49 PM    I, Dr. Tamiko Lopez, personally performed the services described in the documentation as scribed by Kelli Ames MA, in my presence and it is both accurate and complete.

## 2023-09-25 ENCOUNTER — HOSPITAL ENCOUNTER (EMERGENCY)
Facility: HOSPITAL | Age: 73
Discharge: HOME | End: 2023-09-25
Attending: HOSPITALIST
Payer: MEDICARE

## 2023-09-25 VITALS
DIASTOLIC BLOOD PRESSURE: 57 MMHG | RESPIRATION RATE: 16 BRPM | TEMPERATURE: 98.4 F | BODY MASS INDEX: 25.76 KG/M2 | SYSTOLIC BLOOD PRESSURE: 149 MMHG | WEIGHT: 140 LBS | HEART RATE: 71 BPM | HEIGHT: 62 IN | OXYGEN SATURATION: 100 %

## 2023-09-25 DIAGNOSIS — M53.3 SACROILIAC JOINT PAIN: Primary | ICD-10-CM

## 2023-09-25 DIAGNOSIS — M25.552 LEFT HIP PAIN: ICD-10-CM

## 2023-09-25 DIAGNOSIS — M51.36 DEGENERATIVE DISC DISEASE, LUMBAR: ICD-10-CM

## 2023-09-25 PROCEDURE — 6360000002 HC RX W HCPCS: Performed by: HOSPITALIST

## 2023-09-25 PROCEDURE — 99284 EMERGENCY DEPT VISIT MOD MDM: CPT

## 2023-09-25 PROCEDURE — 96372 THER/PROPH/DIAG INJ SC/IM: CPT

## 2023-09-25 RX ADMIN — HYDROMORPHONE HYDROCHLORIDE 1 MG: 1 INJECTION, SOLUTION INTRAMUSCULAR; INTRAVENOUS; SUBCUTANEOUS at 11:55

## 2023-09-25 ASSESSMENT — PATIENT HEALTH QUESTIONNAIRE - PHQ9
2. FEELING DOWN, DEPRESSED OR HOPELESS: 0
SUM OF ALL RESPONSES TO PHQ QUESTIONS 1-9: 0
1. LITTLE INTEREST OR PLEASURE IN DOING THINGS: 0
SUM OF ALL RESPONSES TO PHQ QUESTIONS 1-9: 0
SUM OF ALL RESPONSES TO PHQ9 QUESTIONS 1 & 2: 0

## 2023-09-25 ASSESSMENT — PAIN - FUNCTIONAL ASSESSMENT
PAIN_FUNCTIONAL_ASSESSMENT: 0-10
PAIN_FUNCTIONAL_ASSESSMENT: 0-10

## 2023-09-25 ASSESSMENT — PAIN DESCRIPTION - PAIN TYPE: TYPE: ACUTE PAIN

## 2023-09-25 ASSESSMENT — PAIN SCALES - GENERAL
PAINLEVEL_OUTOF10: 5
PAINLEVEL_OUTOF10: 9

## 2023-09-25 ASSESSMENT — PAIN DESCRIPTION - ORIENTATION: ORIENTATION: LEFT

## 2023-09-25 ASSESSMENT — LIFESTYLE VARIABLES: HOW OFTEN DO YOU HAVE A DRINK CONTAINING ALCOHOL: NEVER

## 2023-09-25 ASSESSMENT — PAIN DESCRIPTION - LOCATION: LOCATION: HIP;LEG

## 2023-09-25 NOTE — ED TRIAGE NOTES
Pt arrives pov. She is in a wheel chair. She states that her hip hurts - left side and she is unable to walk because of pain. She states that the pain started yesterday. No injury or trauma. Pain started about dinner time. She was sitting and when she started to get up is when her hip and leg started to hurt. She rates her pain 9/10. She did take her lortab this morning.
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
## 2023-09-25 NOTE — ED NOTES
Reviewed discharge plan with Marty Gregory. Encouraged her to f/u with Ifeoma Wade MD and she understood. NAD noted on discharge. Pt taken to the lobby in wheel chair where she was met by her s/o. Pt did state that her pain was down to a 5/10.     Electronically signed by Suad Mondragon RN on 9/25/2023 at 12:36 PM       Suad Mondragon RN  09/25/23 2097

## 2023-09-27 ENCOUNTER — OFFICE VISIT (OUTPATIENT)
Dept: PRIMARY CARE CLINIC | Age: 73
End: 2023-09-27
Payer: MEDICARE

## 2023-09-27 ENCOUNTER — APPOINTMENT (OUTPATIENT)
Dept: GENERAL RADIOLOGY | Facility: HOSPITAL | Age: 73
End: 2023-09-27
Attending: INTERNAL MEDICINE
Payer: MEDICARE

## 2023-09-27 ENCOUNTER — HOSPITAL ENCOUNTER (OUTPATIENT)
Facility: HOSPITAL | Age: 73
Setting detail: OBSERVATION
Discharge: HOME | End: 2023-09-28
Attending: INTERNAL MEDICINE | Admitting: INTERNAL MEDICINE
Payer: MEDICARE

## 2023-09-27 VITALS
SYSTOLIC BLOOD PRESSURE: 164 MMHG | HEART RATE: 65 BPM | OXYGEN SATURATION: 99 % | RESPIRATION RATE: 18 BRPM | WEIGHT: 139.4 LBS | BODY MASS INDEX: 25.5 KG/M2 | DIASTOLIC BLOOD PRESSURE: 72 MMHG

## 2023-09-27 DIAGNOSIS — M25.552 LEFT HIP PAIN: ICD-10-CM

## 2023-09-27 DIAGNOSIS — D50.9 IRON DEFICIENCY ANEMIA, UNSPECIFIED IRON DEFICIENCY ANEMIA TYPE: ICD-10-CM

## 2023-09-27 DIAGNOSIS — M54.9 INTRACTABLE BACK PAIN: Primary | ICD-10-CM

## 2023-09-27 DIAGNOSIS — M54.42 ACUTE MIDLINE LOW BACK PAIN WITH LEFT-SIDED SCIATICA: Primary | ICD-10-CM

## 2023-09-27 LAB
ALBUMIN SERPL-MCNC: 3.9 G/DL (ref 3.4–4.8)
ALBUMIN/GLOB SERPL: 1.5 {RATIO} (ref 0.8–2)
ALP SERPL-CCNC: 128 U/L (ref 25–100)
ALT SERPL-CCNC: 16 U/L (ref 4–36)
ANION GAP SERPL CALCULATED.3IONS-SCNC: 13 MMOL/L (ref 3–16)
AST SERPL-CCNC: 20 U/L (ref 8–33)
BACTERIA URNS QL MICRO: ABNORMAL /HPF
BASOPHILS # BLD: 0 K/UL (ref 0–0.1)
BASOPHILS NFR BLD: 0.2 %
BILIRUB SERPL-MCNC: 0.5 MG/DL (ref 0.3–1.2)
BILIRUB UR QL STRIP.AUTO: NEGATIVE
BUN SERPL-MCNC: 10 MG/DL (ref 6–20)
CALCIUM SERPL-MCNC: 9.3 MG/DL (ref 8.5–10.5)
CHLORIDE SERPL-SCNC: 101 MMOL/L (ref 98–107)
CK SERPL-CCNC: 106 U/L (ref 26–174)
CLARITY UR: CLEAR
CO2 SERPL-SCNC: 25 MMOL/L (ref 20–30)
COLOR UR: YELLOW
CREAT SERPL-MCNC: 1 MG/DL (ref 0.4–1.2)
EOSINOPHIL # BLD: 0 K/UL (ref 0–0.4)
EOSINOPHIL NFR BLD: 1 %
EPI CELLS #/AREA URNS HPF: ABNORMAL /HPF (ref 0–5)
ERYTHROCYTE [DISTWIDTH] IN BLOOD BY AUTOMATED COUNT: 13.9 % (ref 11–16)
FOLATE SERPL-MCNC: 9.13 NG/ML
GFR SERPLBLD CREATININE-BSD FMLA CKD-EPI: >60 ML/MIN/{1.73_M2}
GLOBULIN SER CALC-MCNC: 2.6 G/DL
GLUCOSE BLD-MCNC: 245 MG/DL (ref 74–106)
GLUCOSE BLD-MCNC: 287 MG/DL (ref 74–106)
GLUCOSE BLD-MCNC: 356 MG/DL (ref 74–106)
GLUCOSE SERPL-MCNC: 238 MG/DL (ref 74–106)
GLUCOSE UR STRIP.AUTO-MCNC: >=1000 MG/DL
HBA1C MFR BLD: 9.1 %
HCT VFR BLD AUTO: 33.1 % (ref 37–47)
HGB BLD-MCNC: 10.6 G/DL (ref 11.5–16.5)
HGB UR QL STRIP.AUTO: ABNORMAL
IMM GRANULOCYTES # BLD: 0 K/UL
IMM GRANULOCYTES NFR BLD: 0.5 % (ref 0–5)
IRON SATN MFR SERPL: 22 % (ref 15–50)
IRON SERPL-MCNC: 61 UG/DL (ref 37–145)
KETONES UR STRIP.AUTO-MCNC: NEGATIVE MG/DL
LEUKOCYTE ESTERASE UR QL STRIP.AUTO: NEGATIVE
LYMPHOCYTES # BLD: 0.8 K/UL (ref 1.5–4)
LYMPHOCYTES NFR BLD: 20.3 %
MCH RBC QN AUTO: 28.7 PG (ref 27–32)
MCHC RBC AUTO-ENTMCNC: 32 G/DL (ref 31–35)
MCV RBC AUTO: 89.7 FL (ref 80–100)
MONOCYTES # BLD: 0.3 K/UL (ref 0.2–0.8)
MONOCYTES NFR BLD: 6.9 %
NEUTROPHILS # BLD: 2.9 K/UL (ref 2–7.5)
NEUTS SEG NFR BLD: 71.1 %
NITRITE UR QL STRIP.AUTO: NEGATIVE
PERFORMED ON: ABNORMAL
PH UR STRIP.AUTO: 5.5 [PH] (ref 5–8)
PLATELET # BLD AUTO: 140 K/UL (ref 150–400)
PMV BLD AUTO: 11.8 FL (ref 6–10)
POTASSIUM SERPL-SCNC: 3.8 MMOL/L (ref 3.4–5.1)
PROT SERPL-MCNC: 6.5 G/DL (ref 6.4–8.3)
PROT UR STRIP.AUTO-MCNC: 30 MG/DL
RBC # BLD AUTO: 3.69 M/UL (ref 3.8–5.8)
RBC #/AREA URNS HPF: ABNORMAL /HPF (ref 0–4)
SODIUM SERPL-SCNC: 139 MMOL/L (ref 136–145)
SP GR UR STRIP.AUTO: 1.01 (ref 1–1.03)
TIBC SERPL-MCNC: 274 UG/DL (ref 250–450)
TSH SERPL-MCNC: 2.38 UIU/ML (ref 0.27–4.2)
UA COMPLETE W REFLEX CULTURE PNL UR: ABNORMAL
UA DIPSTICK W REFLEX MICRO PNL UR: YES
URN SPEC COLLECT METH UR: ABNORMAL
UROBILINOGEN UR STRIP-ACNC: 0.2 E.U./DL
VIT B12 SERPL-MCNC: 690 PG/ML (ref 211–911)
WBC # BLD AUTO: 4.1 K/UL (ref 4–11)
WBC #/AREA URNS HPF: ABNORMAL /HPF (ref 0–5)
YEAST URNS QL MICRO: PRESENT /HPF

## 2023-09-27 PROCEDURE — 83036 HEMOGLOBIN GLYCOSYLATED A1C: CPT

## 2023-09-27 PROCEDURE — G8419 CALC BMI OUT NRM PARAM NOF/U: HCPCS | Performed by: INTERNAL MEDICINE

## 2023-09-27 PROCEDURE — G8399 PT W/DXA RESULTS DOCUMENT: HCPCS | Performed by: INTERNAL MEDICINE

## 2023-09-27 PROCEDURE — 6370000000 HC RX 637 (ALT 250 FOR IP): Performed by: PHYSICIAN ASSISTANT

## 2023-09-27 PROCEDURE — 83550 IRON BINDING TEST: CPT

## 2023-09-27 PROCEDURE — 1090F PRES/ABSN URINE INCON ASSESS: CPT | Performed by: INTERNAL MEDICINE

## 2023-09-27 PROCEDURE — 72070 X-RAY EXAM THORAC SPINE 2VWS: CPT

## 2023-09-27 PROCEDURE — 4004F PT TOBACCO SCREEN RCVD TLK: CPT | Performed by: INTERNAL MEDICINE

## 2023-09-27 PROCEDURE — 82746 ASSAY OF FOLIC ACID SERUM: CPT

## 2023-09-27 PROCEDURE — 81001 URINALYSIS AUTO W/SCOPE: CPT

## 2023-09-27 PROCEDURE — 2500000003 HC RX 250 WO HCPCS: Performed by: PHYSICIAN ASSISTANT

## 2023-09-27 PROCEDURE — G0379 DIRECT REFER HOSPITAL OBSERV: HCPCS

## 2023-09-27 PROCEDURE — 84443 ASSAY THYROID STIM HORMONE: CPT

## 2023-09-27 PROCEDURE — 1123F ACP DISCUSS/DSCN MKR DOCD: CPT | Performed by: INTERNAL MEDICINE

## 2023-09-27 PROCEDURE — 80053 COMPREHEN METABOLIC PANEL: CPT

## 2023-09-27 PROCEDURE — 82607 VITAMIN B-12: CPT

## 2023-09-27 PROCEDURE — 99213 OFFICE O/P EST LOW 20 MIN: CPT | Performed by: INTERNAL MEDICINE

## 2023-09-27 PROCEDURE — 85025 COMPLETE CBC W/AUTO DIFF WBC: CPT

## 2023-09-27 PROCEDURE — 96374 THER/PROPH/DIAG INJ IV PUSH: CPT

## 2023-09-27 PROCEDURE — 3074F SYST BP LT 130 MM HG: CPT | Performed by: INTERNAL MEDICINE

## 2023-09-27 PROCEDURE — G0378 HOSPITAL OBSERVATION PER HR: HCPCS

## 2023-09-27 PROCEDURE — 72100 X-RAY EXAM L-S SPINE 2/3 VWS: CPT

## 2023-09-27 PROCEDURE — 36415 COLL VENOUS BLD VENIPUNCTURE: CPT

## 2023-09-27 PROCEDURE — G8427 DOCREV CUR MEDS BY ELIG CLIN: HCPCS | Performed by: INTERNAL MEDICINE

## 2023-09-27 PROCEDURE — 82550 ASSAY OF CK (CPK): CPT

## 2023-09-27 PROCEDURE — 3078F DIAST BP <80 MM HG: CPT | Performed by: INTERNAL MEDICINE

## 2023-09-27 PROCEDURE — 6360000002 HC RX W HCPCS: Performed by: PHYSICIAN ASSISTANT

## 2023-09-27 PROCEDURE — 83540 ASSAY OF IRON: CPT

## 2023-09-27 PROCEDURE — 3017F COLORECTAL CA SCREEN DOC REV: CPT | Performed by: INTERNAL MEDICINE

## 2023-09-27 RX ORDER — HYDROCODONE BITARTRATE AND ACETAMINOPHEN 5; 325 MG/1; MG/1
1 TABLET ORAL EVERY 6 HOURS PRN
Status: DISCONTINUED | OUTPATIENT
Start: 2023-09-27 | End: 2023-09-28 | Stop reason: HOSPADM

## 2023-09-27 RX ORDER — ACETAMINOPHEN 325 MG/1
650 TABLET ORAL EVERY 6 HOURS PRN
Status: DISCONTINUED | OUTPATIENT
Start: 2023-09-27 | End: 2023-09-28 | Stop reason: HOSPADM

## 2023-09-27 RX ORDER — ROSUVASTATIN CALCIUM 20 MG/1
20 TABLET, COATED ORAL NIGHTLY
Status: DISCONTINUED | OUTPATIENT
Start: 2023-09-27 | End: 2023-09-28 | Stop reason: HOSPADM

## 2023-09-27 RX ORDER — INSULIN GLARGINE 100 [IU]/ML
30 INJECTION, SOLUTION SUBCUTANEOUS NIGHTLY
Status: DISCONTINUED | OUTPATIENT
Start: 2023-09-27 | End: 2023-09-28

## 2023-09-27 RX ORDER — INSULIN LISPRO 100 [IU]/ML
0-4 INJECTION, SOLUTION INTRAVENOUS; SUBCUTANEOUS
Status: DISCONTINUED | OUTPATIENT
Start: 2023-09-27 | End: 2023-09-27

## 2023-09-27 RX ORDER — ONDANSETRON 4 MG/1
4 TABLET, ORALLY DISINTEGRATING ORAL EVERY 8 HOURS PRN
Status: DISCONTINUED | OUTPATIENT
Start: 2023-09-27 | End: 2023-09-28 | Stop reason: HOSPADM

## 2023-09-27 RX ORDER — INSULIN LISPRO 100 [IU]/ML
0-4 INJECTION, SOLUTION INTRAVENOUS; SUBCUTANEOUS NIGHTLY
Status: DISCONTINUED | OUTPATIENT
Start: 2023-09-27 | End: 2023-09-28

## 2023-09-27 RX ORDER — ALOGLIPTIN 12.5 MG/1
12.5 TABLET, FILM COATED ORAL DAILY
Status: DISCONTINUED | OUTPATIENT
Start: 2023-09-27 | End: 2023-09-28 | Stop reason: HOSPADM

## 2023-09-27 RX ORDER — ACETAMINOPHEN 650 MG/1
650 SUPPOSITORY RECTAL EVERY 6 HOURS PRN
Status: DISCONTINUED | OUTPATIENT
Start: 2023-09-27 | End: 2023-09-28 | Stop reason: HOSPADM

## 2023-09-27 RX ORDER — METOPROLOL SUCCINATE 25 MG/1
25 TABLET, EXTENDED RELEASE ORAL DAILY
Status: DISCONTINUED | OUTPATIENT
Start: 2023-09-27 | End: 2023-09-28 | Stop reason: HOSPADM

## 2023-09-27 RX ORDER — INSULIN LISPRO 100 [IU]/ML
0-4 INJECTION, SOLUTION INTRAVENOUS; SUBCUTANEOUS NIGHTLY
Status: DISCONTINUED | OUTPATIENT
Start: 2023-09-27 | End: 2023-09-27

## 2023-09-27 RX ORDER — ASPIRIN 81 MG/1
81 TABLET, CHEWABLE ORAL DAILY
Status: DISCONTINUED | OUTPATIENT
Start: 2023-09-27 | End: 2023-09-28 | Stop reason: HOSPADM

## 2023-09-27 RX ORDER — INSULIN LISPRO 100 [IU]/ML
0-8 INJECTION, SOLUTION INTRAVENOUS; SUBCUTANEOUS
Status: DISCONTINUED | OUTPATIENT
Start: 2023-09-27 | End: 2023-09-28

## 2023-09-27 RX ORDER — ONDANSETRON 2 MG/ML
4 INJECTION INTRAMUSCULAR; INTRAVENOUS EVERY 6 HOURS PRN
Status: DISCONTINUED | OUTPATIENT
Start: 2023-09-27 | End: 2023-09-28 | Stop reason: HOSPADM

## 2023-09-27 RX ORDER — MORPHINE SULFATE 2 MG/ML
2 INJECTION, SOLUTION INTRAMUSCULAR; INTRAVENOUS EVERY 4 HOURS PRN
Status: DISCONTINUED | OUTPATIENT
Start: 2023-09-27 | End: 2023-09-28 | Stop reason: HOSPADM

## 2023-09-27 RX ORDER — TIZANIDINE 4 MG/1
4 TABLET ORAL 2 TIMES DAILY PRN
Status: DISCONTINUED | OUTPATIENT
Start: 2023-09-27 | End: 2023-09-28 | Stop reason: HOSPADM

## 2023-09-27 RX ORDER — IBUPROFEN 600 MG/1
1 TABLET ORAL PRN
Status: DISCONTINUED | OUTPATIENT
Start: 2023-09-27 | End: 2023-09-28 | Stop reason: HOSPADM

## 2023-09-27 RX ORDER — POLYETHYLENE GLYCOL 3350 17 G/17G
17 POWDER, FOR SOLUTION ORAL DAILY PRN
Status: DISCONTINUED | OUTPATIENT
Start: 2023-09-27 | End: 2023-09-28 | Stop reason: HOSPADM

## 2023-09-27 RX ORDER — DEXTROSE MONOHYDRATE 100 MG/ML
INJECTION, SOLUTION INTRAVENOUS CONTINUOUS PRN
Status: DISCONTINUED | OUTPATIENT
Start: 2023-09-27 | End: 2023-09-28 | Stop reason: HOSPADM

## 2023-09-27 RX ORDER — PANTOPRAZOLE SODIUM 40 MG/1
40 TABLET, DELAYED RELEASE ORAL
Status: DISCONTINUED | OUTPATIENT
Start: 2023-09-27 | End: 2023-09-28 | Stop reason: HOSPADM

## 2023-09-27 RX ORDER — FLUCONAZOLE 100 MG/1
200 TABLET ORAL DAILY
Status: DISCONTINUED | OUTPATIENT
Start: 2023-09-28 | End: 2023-09-28 | Stop reason: HOSPADM

## 2023-09-27 RX ADMIN — INSULIN GLARGINE 30 UNITS: 100 INJECTION, SOLUTION SUBCUTANEOUS at 20:47

## 2023-09-27 RX ADMIN — METHYLPREDNISOLONE SODIUM SUCCINATE 60 MG: 125 INJECTION INTRAMUSCULAR; INTRAVENOUS at 14:45

## 2023-09-27 RX ADMIN — INSULIN LISPRO 2 UNITS: 100 INJECTION, SOLUTION INTRAVENOUS; SUBCUTANEOUS at 13:34

## 2023-09-27 RX ADMIN — HYDROCODONE BITARTRATE AND ACETAMINOPHEN 1 TABLET: 5; 325 TABLET ORAL at 20:46

## 2023-09-27 RX ADMIN — INSULIN LISPRO 8 UNITS: 100 INJECTION, SOLUTION INTRAVENOUS; SUBCUTANEOUS at 17:47

## 2023-09-27 RX ADMIN — ROSUVASTATIN 20 MG: 20 TABLET, FILM COATED ORAL at 20:46

## 2023-09-27 RX ADMIN — PANTOPRAZOLE SODIUM 40 MG: 40 TABLET, DELAYED RELEASE ORAL at 17:14

## 2023-09-27 RX ADMIN — TIZANIDINE 4 MG: 4 TABLET ORAL at 20:46

## 2023-09-27 RX ADMIN — METFORMIN HYDROCHLORIDE 1000 MG: 500 TABLET, FILM COATED ORAL at 17:14

## 2023-09-27 SDOH — ECONOMIC STABILITY: HOUSING INSECURITY: IN THE LAST 12 MONTHS, HOW MANY PLACES HAVE YOU LIVED?: 1

## 2023-09-27 SDOH — ECONOMIC STABILITY: INCOME INSECURITY: IN THE LAST 12 MONTHS, WAS THERE A TIME WHEN YOU WERE NOT ABLE TO PAY THE MORTGAGE OR RENT ON TIME?: NO

## 2023-09-27 SDOH — ECONOMIC STABILITY: TRANSPORTATION INSECURITY
IN THE PAST 12 MONTHS, HAS THE LACK OF TRANSPORTATION KEPT YOU FROM MEDICAL APPOINTMENTS OR FROM GETTING MEDICATIONS?: NO

## 2023-09-27 SDOH — ECONOMIC STABILITY: TRANSPORTATION INSECURITY
IN THE PAST 12 MONTHS, HAS LACK OF TRANSPORTATION KEPT YOU FROM MEETINGS, WORK, OR FROM GETTING THINGS NEEDED FOR DAILY LIVING?: NO

## 2023-09-27 ASSESSMENT — PAIN SCALES - GENERAL
PAINLEVEL_OUTOF10: 8
PAINLEVEL_OUTOF10: 7

## 2023-09-27 ASSESSMENT — ENCOUNTER SYMPTOMS
VOMITING: 0
ABDOMINAL PAIN: 0
EYE DISCHARGE: 0
SHORTNESS OF BREATH: 0
SORE THROAT: 0
NAUSEA: 0
SINUS PRESSURE: 0
WHEEZING: 0
COUGH: 0
BACK PAIN: 1

## 2023-09-27 ASSESSMENT — SOCIAL DETERMINANTS OF HEALTH (SDOH)
HOW OFTEN DO YOU ATTENT MEETINGS OF THE CLUB OR ORGANIZATION YOU BELONG TO?: NEVER
WITHIN THE LAST YEAR, HAVE YOU BEEN HUMILIATED OR EMOTIONALLY ABUSED IN OTHER WAYS BY YOUR PARTNER OR EX-PARTNER?: NO
DO YOU BELONG TO ANY CLUBS OR ORGANIZATIONS SUCH AS CHURCH GROUPS UNIONS, FRATERNAL OR ATHLETIC GROUPS, OR SCHOOL GROUPS?: NO
IN A TYPICAL WEEK, HOW MANY TIMES DO YOU TALK ON THE PHONE WITH FAMILY, FRIENDS, OR NEIGHBORS?: MORE THAN THREE TIMES A WEEK
HOW OFTEN DO YOU ATTEND CHURCH OR RELIGIOUS SERVICES?: NEVER
WITHIN THE LAST YEAR, HAVE YOU BEEN KICKED, HIT, SLAPPED, OR OTHERWISE PHYSICALLY HURT BY YOUR PARTNER OR EX-PARTNER?: NO
HOW OFTEN DO YOU GET TOGETHER WITH FRIENDS OR RELATIVES?: MORE THAN THREE TIMES A WEEK
WITHIN THE LAST YEAR, HAVE TO BEEN RAPED OR FORCED TO HAVE ANY KIND OF SEXUAL ACTIVITY BY YOUR PARTNER OR EX-PARTNER?: NO
WITHIN THE LAST YEAR, HAVE YOU BEEN AFRAID OF YOUR PARTNER OR EX-PARTNER?: NO
HOW HARD IS IT FOR YOU TO PAY FOR THE VERY BASICS LIKE FOOD, HOUSING, MEDICAL CARE, AND HEATING?: SOMEWHAT HARD
ARE YOU MARRIED, WIDOWED, DIVORCED, SEPARATED, NEVER MARRIED, OR LIVING WITH A PARTNER?: LIVING WITH PARTNER

## 2023-09-27 ASSESSMENT — LIFESTYLE VARIABLES: HOW OFTEN DO YOU HAVE A DRINK CONTAINING ALCOHOL: NEVER

## 2023-09-27 ASSESSMENT — PAIN DESCRIPTION - ONSET: ONSET: AWAKENED FROM SLEEP

## 2023-09-27 ASSESSMENT — PAIN DESCRIPTION - LOCATION
LOCATION: BACK
LOCATION: BACK

## 2023-09-27 ASSESSMENT — PAIN DESCRIPTION - PAIN TYPE: TYPE: CHRONIC PAIN

## 2023-09-27 ASSESSMENT — PAIN DESCRIPTION - FREQUENCY: FREQUENCY: INTERMITTENT

## 2023-09-27 ASSESSMENT — PAIN DESCRIPTION - DESCRIPTORS: DESCRIPTORS: ACHING

## 2023-09-27 ASSESSMENT — PAIN DESCRIPTION - ORIENTATION: ORIENTATION: LOWER

## 2023-09-27 NOTE — PROGRESS NOTES
Medication Reconciliation completed with fill history from [facility] in [place] and patient interview.  -not taking Anoro Ellipta. Patient stated she never got a prescription for it.   -patient confirmed she is using 35 units of Levemir at night  -Glucagon 1 mg injection was a one time administration. Patient does not use this medication at home.

## 2023-09-27 NOTE — PROGRESS NOTES
Form received at [facility] on 1/6/2021.     Please note that it takes 7-10 business days for completion.     Signed authorization received with form.       Pt admitted as direct admit. Placed in bed in room. Vital signs completed. Pt oriented to room and call system.

## 2023-09-27 NOTE — PROGRESS NOTES
Chief Complaint   Patient presents with    Follow-up     [facility]       Have you seen any other physician or provider since your last visit yes - [facility]    Have you had any other diagnostic tests since your last visit? no    Have you changed or stopped any medications since your last visit? no

## 2023-09-27 NOTE — PROGRESS NOTES
SUBJECTIVE:    Patient ID: Alton Lopez is a 72 y. o.female. Chief Complaint   Patient presents with    Follow-up     ER [facility]    Flank Pain     Both sides hurting and feel numb     Knee Pain     Left one went out on her and felt numb         HPI:  Patient presents to clinic today related to ED follow up. Upon arriving at ED on 2023 patients states that her hip hurts - left side and she is unable to walk because of pain. She states that the pain started 2023. No injury or trauma. Pain started about dinner time. She was sitting and when she started to get up is when her hip and leg started to hurt. She reported that the pain occasionally shoots down from the lower back toward her mid left thigh. She reports that her energy is down. Having significant difficulty ambulating. Her pain is moderate to severe. Worse with activity. Better with staying still/rest.  Pain medication been helping some. Patient's medications, allergies, past medical, surgical, social and family histories were reviewed and updated as appropriate in electronic medical record. Outpatient Medications Marked as Taking for the 23 encounter (Office Visit) with Zen Lira MD   Medication Sig Dispense Refill    cyanocobalamin 1000 MCG/ML injection Inject 1 mL into the muscle every 14 days 2 mL 10    HYDROcodone-acetaminophen (NORCO) 5-325 MG per tablet Take 1 tablet by mouth 2 times daily as needed for Pain for up to 30 days.  45 tablet 0    JARDIANCE 10 MG tablet TAKE 1 TABLET BY MOUTH DAILY 30 tablet 3    aspirin 81 MG chewable tablet CHEW AND SWALLOW 1 TABLET BY MOUTH DAILY 90 tablet 1    hydrALAZINE (APRESOLINE) 25 MG tablet TAKE 1 TABLET BY MOUTH TWICE DAILY AS NEEDED FOR SYSTOLIC BLOOD PRESSURE OVER 165 180 tablet 1    furosemide (LASIX) 20 MG tablet TAKE 1 TABLET BY MOUTH DAILY AS NEEDED FOR SWELLING 30 tablet 2    linagliptin (TRADJENTA) 5 MG tablet TAKE 1 TABLET BY MOUTH EVERY DAY 90 tablet 0

## 2023-09-28 VITALS
WEIGHT: 143.44 LBS | RESPIRATION RATE: 18 BRPM | HEIGHT: 63 IN | SYSTOLIC BLOOD PRESSURE: 119 MMHG | TEMPERATURE: 97.7 F | HEART RATE: 54 BPM | OXYGEN SATURATION: 95 % | BODY MASS INDEX: 25.41 KG/M2 | DIASTOLIC BLOOD PRESSURE: 50 MMHG

## 2023-09-28 LAB
ALBUMIN SERPL-MCNC: 3.7 G/DL (ref 3.4–4.8)
ALBUMIN/GLOB SERPL: 1.8 {RATIO} (ref 0.8–2)
ALP SERPL-CCNC: 116 U/L (ref 25–100)
ALT SERPL-CCNC: 17 U/L (ref 4–36)
ANION GAP SERPL CALCULATED.3IONS-SCNC: 12 MMOL/L (ref 3–16)
AST SERPL-CCNC: 22 U/L (ref 8–33)
BASOPHILS # BLD: 0 K/UL (ref 0–0.1)
BASOPHILS NFR BLD: 0 %
BILIRUB SERPL-MCNC: 0.3 MG/DL (ref 0.3–1.2)
BUN SERPL-MCNC: 21 MG/DL (ref 6–20)
CALCIUM SERPL-MCNC: 8.9 MG/DL (ref 8.5–10.5)
CHLORIDE SERPL-SCNC: 95 MMOL/L (ref 98–107)
CO2 SERPL-SCNC: 23 MMOL/L (ref 20–30)
CREAT SERPL-MCNC: 1.2 MG/DL (ref 0.4–1.2)
EOSINOPHIL # BLD: 0 K/UL (ref 0–0.4)
EOSINOPHIL NFR BLD: 0 %
ERYTHROCYTE [DISTWIDTH] IN BLOOD BY AUTOMATED COUNT: 13.4 % (ref 11–16)
GFR SERPLBLD CREATININE-BSD FMLA CKD-EPI: 48 ML/MIN/{1.73_M2}
GLOBULIN SER CALC-MCNC: 2.1 G/DL
GLUCOSE BLD-MCNC: 371 MG/DL (ref 74–106)
GLUCOSE BLD-MCNC: 443 MG/DL (ref 74–106)
GLUCOSE BLD-MCNC: 455 MG/DL (ref 74–106)
GLUCOSE SERPL-MCNC: 456 MG/DL (ref 74–106)
HCT VFR BLD AUTO: 29.1 % (ref 37–47)
HGB BLD-MCNC: 9.7 G/DL (ref 11.5–16.5)
IMM GRANULOCYTES # BLD: 0 K/UL
IMM GRANULOCYTES NFR BLD: 0.5 % (ref 0–5)
LYMPHOCYTES # BLD: 0.5 K/UL (ref 1.5–4)
LYMPHOCYTES NFR BLD: 11.5 %
MCH RBC QN AUTO: 29.1 PG (ref 27–32)
MCHC RBC AUTO-ENTMCNC: 33.3 G/DL (ref 31–35)
MCV RBC AUTO: 87.4 FL (ref 80–100)
MONOCYTES # BLD: 0.1 K/UL (ref 0.2–0.8)
MONOCYTES NFR BLD: 2.5 %
NEUTROPHILS # BLD: 3.7 K/UL (ref 2–7.5)
NEUTS SEG NFR BLD: 85.5 %
PERFORMED ON: ABNORMAL
PLATELET # BLD AUTO: 131 K/UL (ref 150–400)
PMV BLD AUTO: 11.9 FL (ref 6–10)
POTASSIUM SERPL-SCNC: 4.1 MMOL/L (ref 3.4–5.1)
PROT SERPL-MCNC: 5.8 G/DL (ref 6.4–8.3)
RBC # BLD AUTO: 3.33 M/UL (ref 3.8–5.8)
SODIUM SERPL-SCNC: 130 MMOL/L (ref 136–145)
WBC # BLD AUTO: 4.4 K/UL (ref 4–11)

## 2023-09-28 PROCEDURE — 36415 COLL VENOUS BLD VENIPUNCTURE: CPT

## 2023-09-28 PROCEDURE — 6370000000 HC RX 637 (ALT 250 FOR IP): Performed by: PHYSICIAN ASSISTANT

## 2023-09-28 PROCEDURE — G0378 HOSPITAL OBSERVATION PER HR: HCPCS

## 2023-09-28 PROCEDURE — 85025 COMPLETE CBC W/AUTO DIFF WBC: CPT

## 2023-09-28 PROCEDURE — 80053 COMPREHEN METABOLIC PANEL: CPT

## 2023-09-28 PROCEDURE — 97116 GAIT TRAINING THERAPY: CPT

## 2023-09-28 PROCEDURE — 97165 OT EVAL LOW COMPLEX 30 MIN: CPT

## 2023-09-28 PROCEDURE — 99235 HOSP IP/OBS SAME DATE MOD 70: CPT | Performed by: INTERNAL MEDICINE

## 2023-09-28 PROCEDURE — 97161 PT EVAL LOW COMPLEX 20 MIN: CPT

## 2023-09-28 RX ORDER — INSULIN LISPRO 100 [IU]/ML
0-16 INJECTION, SOLUTION INTRAVENOUS; SUBCUTANEOUS
Status: DISCONTINUED | OUTPATIENT
Start: 2023-09-28 | End: 2023-09-28 | Stop reason: HOSPADM

## 2023-09-28 RX ORDER — INSULIN GLARGINE 100 [IU]/ML
35 INJECTION, SOLUTION SUBCUTANEOUS NIGHTLY
Status: DISCONTINUED | OUTPATIENT
Start: 2023-09-28 | End: 2023-09-28 | Stop reason: HOSPADM

## 2023-09-28 RX ORDER — INSULIN LISPRO 100 [IU]/ML
0-4 INJECTION, SOLUTION INTRAVENOUS; SUBCUTANEOUS NIGHTLY
Status: DISCONTINUED | OUTPATIENT
Start: 2023-09-28 | End: 2023-09-28 | Stop reason: HOSPADM

## 2023-09-28 RX ORDER — TIZANIDINE 4 MG/1
4 TABLET ORAL DAILY PRN
Qty: 30 TABLET | Refills: 0 | Status: SHIPPED | OUTPATIENT
Start: 2023-09-28 | End: 2023-10-28

## 2023-09-28 RX ORDER — INSULIN LISPRO 100 [IU]/ML
15 INJECTION, SOLUTION INTRAVENOUS; SUBCUTANEOUS ONCE
Status: COMPLETED | OUTPATIENT
Start: 2023-09-28 | End: 2023-09-28

## 2023-09-28 RX ORDER — FLUCONAZOLE 200 MG/1
200 TABLET ORAL DAILY
Qty: 2 TABLET | Refills: 0 | Status: SHIPPED | OUTPATIENT
Start: 2023-09-29 | End: 2023-10-01

## 2023-09-28 RX ADMIN — INSULIN LISPRO 16 UNITS: 100 INJECTION, SOLUTION INTRAVENOUS; SUBCUTANEOUS at 11:05

## 2023-09-28 RX ADMIN — HYDROCODONE BITARTRATE AND ACETAMINOPHEN 1 TABLET: 5; 325 TABLET ORAL at 05:38

## 2023-09-28 RX ADMIN — FLUCONAZOLE 200 MG: 100 TABLET ORAL at 09:40

## 2023-09-28 RX ADMIN — METFORMIN HYDROCHLORIDE 1000 MG: 500 TABLET, FILM COATED ORAL at 09:40

## 2023-09-28 RX ADMIN — PANTOPRAZOLE SODIUM 40 MG: 40 TABLET, DELAYED RELEASE ORAL at 05:38

## 2023-09-28 RX ADMIN — ASPIRIN 81 MG 81 MG: 81 TABLET ORAL at 09:40

## 2023-09-28 RX ADMIN — INSULIN LISPRO 15 UNITS: 100 INJECTION, SOLUTION INTRAVENOUS; SUBCUTANEOUS at 06:59

## 2023-09-28 RX ADMIN — ALOGLIPTIN 12.5 MG: 12.5 TABLET, FILM COATED ORAL at 09:40

## 2023-09-28 RX ADMIN — EMPAGLIFLOZIN 10 MG: 10 TABLET, FILM COATED ORAL at 09:40

## 2023-09-28 ASSESSMENT — PAIN DESCRIPTION - ORIENTATION: ORIENTATION: LEFT;RIGHT

## 2023-09-28 ASSESSMENT — PAIN DESCRIPTION - LOCATION: LOCATION: FOOT

## 2023-09-28 NOTE — ACP (ADVANCE CARE PLANNING)
Advance Care Planning     General Advance Care Planning (ACP) Conversation    Date of Conversation: 9/27/2023  Conducted with: Patient with Decision Making Capacity per staff on admit    Healthcare Decision Maker:    Primary Decision Maker: Piedad Ross Child - [phone]    Secondary Decision Maker: Brianna Tyson - Niece/Nephew - [phone]    Supplemental (Other) Decision Maker: Black Rivera - Niece/Nephew - [phone]  Click here to complete Healthcare Decision Makers including selection of the Healthcare Decision Maker Relationship (ie \"Primary\"). Today we documented Decision Maker(s) consistent with Legal Next of Kin hierarchy.     Content/Action Overview:  Has NO ACP documents/care preferences - information provided, considering goals and options  Reviewed DNR/DNI and patient confirms current DNR status - completed forms on file (place new order if needed)    Length of Voluntary ACP Conversation in minutes:  <16 minutes (Non-Billable)

## 2023-09-28 NOTE — H&P
Short Stay Summary      Patient ID: Mamta Devine       Patient's PCP: Wilda Warren MD    Admit Date: 9/27/2023     Discharge Date:   9/28/2023    Admitting Physician: Wilda Warren MD    Discharge Physician: VIGNESH Camargo     Reason for this admission:   Acute back pain     Discharge Diagnoses: Active Hospital Problems    Diagnosis Date Noted    Intractable back pain [M54.9] 09/27/2023    Personal history of nicotine dependence [Z87.891] 03/18/2022    Status post bilateral knee replacements [Z96.653] 05/08/2017    Type 2 diabetes mellitus with diabetic neuropathy ([address]) [E11.40] 08/12/2015    CAD (coronary artery disease) [I25.10]     COPD (chronic obstructive pulmonary disease) ([address]) [J44.9]        Procedures:  XR LUMBAR SPINE (2-3 VIEWS)   Final Result   1. No acute fracture or subluxation. 2.  Mild multilevel degenerative disease. XR THORACIC SPINE (2 VIEWS)   Final Result   1. No acute fracture or subluxation. Consults:   None  PT/OT    HISTORY OF PRESENT ILLNESS:   The patient is a 72 y.o. female with PMH of COPD, CAD, DM II, osteoarthritis, degenerative disc disease, nicotine dependence who presents due to acute on chronic back pain and left knee pain She states she was in the ER a couple of days ago and got IV dilaudid which helped some and then she was discharged home. No imaging at that time. She denies fall or trauma. States when she takes her pain pill it only helped a little bit. She was directly admitted by her PCP For further evaluation. Review of system  Constitutional:  Denies fever or chills. Eyes:  Denies change in visual acuity or discharge. HENT:  Denies nasal congestion or sore throat. Respiratory:  Denies cough or shortness of breath. Cardiovascular:  Denies chest pain, palpitation or swelling in LEs. GI:  Denies abdominal pain, nausea, vomiting, bloody stools or diarrhea. :  Denies dysuria or frequency.    Musculoskeletal:  admits to acute on injection Inject 1 mL into the muscle every 14 days 9/6/23   Wilda Warren MD   HYDROcodone-acetaminophen [facility]) 5-325 MG per tablet Take 1 tablet by mouth 2 times daily as needed for Pain for up to 30 days.  9/6/23 10/6/23  Wilda Warren MD   JARDIANCE 10 MG tablet TAKE 1 TABLET BY MOUTH DAILY 8/7/23   Wilda Warren MD   aspirin 81 MG chewable tablet CHEW AND SWALLOW 1 TABLET BY MOUTH DAILY 8/4/23   Wilda Warren MD   hydrALAZINE (APRESOLINE) 25 MG tablet TAKE 1 TABLET BY MOUTH TWICE DAILY AS NEEDED FOR SYSTOLIC BLOOD PRESSURE OVER 165 8/4/23   Wilda Warren MD   furosemide (LASIX) 20 MG tablet TAKE 1 TABLET BY MOUTH DAILY AS NEEDED FOR SWELLING 7/19/23   Wilda Warren MD   linagliptin (TRADJENTA) 5 MG tablet TAKE 1 TABLET BY MOUTH EVERY DAY 7/10/23   Wilda Warren MD   metFORMIN (GLUCOPHAGE) 1000 MG tablet TAKE 1 TABLET BY MOUTH TWICE DAILY WITH FOOD 6/7/23   Wilda Warren MD   insulin detemir (LEVEMIR FLEXTOUCH) 100 UNIT/ML injection pen Inject 35 Units into the skin nightly 6/3/23 9/27/23  VIGNESH Teresa   metoprolol succinate (TOPROL XL) 25 MG extended release tablet Take 1 tablet by mouth daily    Historical Provider, MD   pantoprazole (PROTONIX) 40 MG tablet Take 1 tablet by mouth 2 times daily (before meals) 4/29/23   VIGNESH Camargo   ferrous sulfate (IRON 325) 325 (65 Fe) MG tablet Take 1 tablet by mouth in the morning and at bedtime  Patient taking differently: Take 1 tablet by mouth in the morning and at bedtime otc 11/24/22   VIGNESH Camargo   rosuvastatin (CRESTOR) 20 MG tablet TAKE 1 TABLET BY MOUTH EVERY DAY 8/29/22   Wilda Warren MD   umeclidinium-vilanterol [facility] ELLIP) 62.5-25 MCG/INH AEPB inhaler Inhale 1 puff into the lungs daily  Patient not taking: Reported on 9/27/2023 2/21/22   Wilda Warren MD   Insulin Pen Needle 31G X 6 MM MISC 1 each by Does not apply route daily 5/17/21   Wilda Warren MD   blood glucose monitor strips QID Dx E11.9  Patient taking differently: 1 strip 2 times

## 2023-09-28 NOTE — PROGRESS NOTES
Pts blood sugar on lab was 456, first fingerstick blood sugar was 443, repeated it and got 455, sent message to on call provider

## 2023-09-28 NOTE — PROGRESS NOTES
Pt/family given d/c orders verbally and written. Pt/Family with no questions or concerns r/t d/c. Pt to POV via w/c. No acute distress noted on d/c.

## 2023-09-28 NOTE — PLAN OF CARE
Problem: Discharge Planning  Goal: Discharge to home or other facility with appropriate resources  Outcome: Progressing  Flowsheets (Taken 9/28/2023 0147 by Lcuila Matamoros RN)  Discharge to home or other facility with appropriate resources:   Identify barriers to discharge with patient and caregiver   Refer to discharge planning if patient needs post-hospital services based on physician order or complex needs related to functional status, cognitive ability or social support system     Problem: Pain  Goal: Verbalizes/displays adequate comfort level or baseline comfort level  Outcome: Progressing     Problem: Safety - Adult  Goal: Free from fall injury  Outcome: Progressing     Problem: Musculoskeletal - Adult  Goal: Return mobility to safest level of function  Outcome: Progressing  Flowsheets (Taken 9/28/2023 0147 by Lucila Matamoros RN)  Return Mobility to Safest Level of Function:   Assess patient stability and activity tolerance for standing, transferring and ambulating with or without assistive devices   Assist with transfers and ambulation using safe patient handling equipment as needed  Goal: Maintain proper alignment of affected body part  Outcome: Progressing  Flowsheets (Taken 9/28/2023 0147 by Lucila Matamoros RN)  Maintain proper alignment of affected body part: Support and protect limb and body alignment per provider's orders  Goal: Return ADL status to a safe level of function  Outcome: Progressing  Flowsheets (Taken 9/28/2023 0147 by Lucila Matamoros RN)  Return ADL Status to a Safe Level of Function:   Assess activities of daily living deficits and provide assistive devices as needed   Administer medication as ordered     Problem: Chronic Conditions and Co-morbidities  Goal: Patient's chronic conditions and co-morbidity symptoms are monitored and maintained or improved  Outcome: Progressing

## 2023-09-28 NOTE — DISCHARGE INSTRUCTIONS
Disposition: home    Discharged Condition: Stable    Activity: activity as tolerated    Diet: cardiac diet and diabetic diet    Follow Up: Primary Care Provider in one week. Follow up with pain clinic as scheduled.

## 2023-09-28 NOTE — CARE COORDINATION
Case Management Assessment Discharge Note    Date / Time of Note:  09/28/23 1:35 PM  Discharge Note Completed by: Vasyl Santos RN      Patient Name: Berenice Mcarthur   YOB: 1950  Diagnosis: Back pain [M54.9]  Intractable back pain [M54.9]   Date / Time: 9/27/2023 11:35 AM    Current PCP: Trino Still MD  Clinic patient: Yes    Hospitalization in the last 30 days: No    Advance Directives:  Code Status: DNR    Financial:  Payor: Jas Guerra / Plan: Ernie Lopez / Product Type: *No Product type* /      Pharmacy:    [facility] [address] [place] [address] [phone]  [address] [place] [phone]  Phone: [phone] Fax: [phone]    [id number] [place] [address] [phone] [facility] [phone]  [address] [place] [phone]  Phone: [phone] Fax: [phone]    [facility], [address] - P [phone] - F [phone]  [address]  [address]  [address] [facility] [place] [address]  Phone: [phone] Fax: [phone]      Assistance purchasing medications?: Potential Assistance Purchasing Medications: No  Assistance provided by Case Management: None at this time    Does patient want to participate in local refill/ meds to beds program?: Yes (Spoke with patient)    Meds To Beds General Rules:  1. Can ONLY be done Monday- Friday between 8:30am-5pm  2. Prescription(s) must be in pharmacy by 3pm to be filled same day  3. Copy of patient's insurance/ prescription drug card and patient face sheet must be sent along with the prescription(s)  4. Cost of Rx cannot be added to hospital bill. If financial assistance is needed, please contact unit  or ;  or  CANNOT provide pharmacy voucher for patients co-pays  5.  Patients can then  the prescription on their way out of the hospital at discharge, or pharmacy can deliver to the bedside if staff is available. (payment due at time of pick-up or delivery - cash, check, or card accepted)     Able to afford home medications/ co-pay costs: Yes    DISCHARGE Disposition: Home with outpt therapy order      IMM Completed: No. Not indicated     Transportation:  Transportation PLAN for discharge: family   Mode of Transport: Private Car  Reason for medical transport: Not Applicable    Transport form completed: Not Indicated    Referrals made at [facility] for outpatient continued care: Outpatient PT/OT    Additional CM Notes: Pt to go home today. No DC needs noted at this time. PA to send pt with written order for outpt therapy. The Plan for Transition of Care is related to the following treatment goals of Back pain [M54.9]  Intractable back pain [M54.9]    The Patient and/or patient representative Leticia Tee and her family were provided with a choice of provider and agrees with the discharge plan Yes    Freedom of choice list was provided with basic dialogue that supports the patient's individualized plan of care/goals and shares the quality data associated with the providers.  Yes    Care Transitions patient: Yes

## 2023-09-28 NOTE — DISCHARGE SUMMARY
Short Stay Summary      Patient ID: Kinza Singh       Patient's PCP: Tamiko Lopez MD    Admit Date: 9/27/2023     Discharge Date:   9/28/2023    Admitting Physician: Tamiko Lopez MD    Discharge Physician: VIGNESH Gamez     Reason for this admission:   Acute back pain     Discharge Diagnoses: Active Hospital Problems    Diagnosis Date Noted    Intractable back pain [M54.9] 09/27/2023    Personal history of nicotine dependence [Z87.891] 03/18/2022    Status post bilateral knee replacements [Z96.653] 05/08/2017    Type 2 diabetes mellitus with diabetic neuropathy ([address]) [E11.40] 08/12/2015    CAD (coronary artery disease) [I25.10]     COPD (chronic obstructive pulmonary disease) ([address]) [J44.9]        Procedures:  XR LUMBAR SPINE (2-3 VIEWS)   Final Result   1. No acute fracture or subluxation. 2.  Mild multilevel degenerative disease. XR THORACIC SPINE (2 VIEWS)   Final Result   1. No acute fracture or subluxation. Consults:   None  PT/OT    HISTORY OF PRESENT ILLNESS:   The patient is a 72 y.o. female with PMH of COPD, CAD, DM II, osteoarthritis, degenerative disc disease, nicotine dependence who presents due to acute on chronic back pain and left knee pain She states she was in the ER a couple of days ago and got IV dilaudid which helped some and then she was discharged home. No imaging at that time. She denies fall or trauma. States when she takes her pain pill it only helped a little bit. She was directly admitted by her PCP For further evaluation. Review of system  Constitutional:  Denies fever or chills. Eyes:  Denies change in visual acuity or discharge. HENT:  Denies nasal congestion or sore throat. Respiratory:  Denies cough or shortness of breath. Cardiovascular:  Denies chest pain, palpitation or swelling in LEs. GI:  Denies abdominal pain, nausea, vomiting, bloody stools or diarrhea. :  Denies dysuria or frequency.    Musculoskeletal:  admits to acute on injection Inject 1 mL into the muscle every 14 days 9/6/23   Ana Ball MD   HYDROcodone-acetaminophen [facility]) 5-325 MG per tablet Take 1 tablet by mouth 2 times daily as needed for Pain for up to 30 days.  9/6/23 10/6/23  Ana Ball MD   JARDIANCE 10 MG tablet TAKE 1 TABLET BY MOUTH DAILY 8/7/23   Ana Ball MD   aspirin 81 MG chewable tablet CHEW AND SWALLOW 1 TABLET BY MOUTH DAILY 8/4/23   Ana Ball MD   hydrALAZINE (APRESOLINE) 25 MG tablet TAKE 1 TABLET BY MOUTH TWICE DAILY AS NEEDED FOR SYSTOLIC BLOOD PRESSURE OVER 165 8/4/23   Ana Ball MD   furosemide (LASIX) 20 MG tablet TAKE 1 TABLET BY MOUTH DAILY AS NEEDED FOR SWELLING 7/19/23   Ana Ball MD   linagliptin (TRADJENTA) 5 MG tablet TAKE 1 TABLET BY MOUTH EVERY DAY 7/10/23   Ana Ball MD   metFORMIN (GLUCOPHAGE) 1000 MG tablet TAKE 1 TABLET BY MOUTH TWICE DAILY WITH FOOD 6/7/23   Ana Ball MD   insulin detemir (LEVEMIR FLEXTOUCH) 100 UNIT/ML injection pen Inject 35 Units into the skin nightly 6/3/23 9/27/23  VIGNESH Teresa   metoprolol succinate (TOPROL XL) 25 MG extended release tablet Take 1 tablet by mouth daily    Historical Provider, MD   pantoprazole (PROTONIX) 40 MG tablet Take 1 tablet by mouth 2 times daily (before meals) 4/29/23   VIGNESH Younger   ferrous sulfate (IRON 325) 325 (65 Fe) MG tablet Take 1 tablet by mouth in the morning and at bedtime  Patient taking differently: Take 1 tablet by mouth in the morning and at bedtime otc 11/24/22   VIGNESH Younger   rosuvastatin (CRESTOR) 20 MG tablet TAKE 1 TABLET BY MOUTH EVERY DAY 8/29/22   Ana Ball MD   umeclidinium-vilanterol [facility] ELLIPTA) 62.5-25 MCG/INH AEPB inhaler Inhale 1 puff into the lungs daily  Patient not taking: Reported on 9/27/2023 2/21/22   Ana Ball MD   Insulin Pen Needle 31G X 6 MM MISC 1 each by Does not apply route daily 5/17/21   Ana Ball MD   blood glucose monitor strips QID Dx E11.9  Patient taking differently: 1 strip 2 times

## 2023-09-28 NOTE — PLAN OF CARE
Problem: Discharge Planning  Goal: Discharge to home or other facility with appropriate resources  9/28/2023 1452 by Amy Martinez RN  Outcome: Adequate for Discharge  9/28/2023 1131 by Amy Martinez RN  Outcome: Progressing  Flowsheets (Taken 9/28/2023 0147 by Marleny García, RN)  Discharge to home or other facility with appropriate resources:   Identify barriers to discharge with patient and caregiver   Refer to discharge planning if patient needs post-hospital services based on physician order or complex needs related to functional status, cognitive ability or social support system     Problem: Pain  Goal: Verbalizes/displays adequate comfort level or baseline comfort level  9/28/2023 1452 by Amy Martinez RN  Outcome: Adequate for Discharge  9/28/2023 1131 by Amy Martinez RN  Outcome: Progressing     Problem: Safety - Adult  Goal: Free from fall injury  9/28/2023 1452 by Amy Martinez RN  Outcome: Adequate for Discharge  9/28/2023 1131 by Amy Martinez RN  Outcome: Progressing     Problem: Musculoskeletal - Adult  Goal: Return mobility to safest level of function  9/28/2023 1452 by Amy Martinez RN  Outcome: Adequate for Discharge  9/28/2023 1131 by Amy Martinez RN  Outcome: Progressing  Flowsheets (Taken 9/28/2023 0147 by Marleny García, RN)  Return Mobility to Safest Level of Function:   Assess patient stability and activity tolerance for standing, transferring and ambulating with or without assistive devices   Assist with transfers and ambulation using safe patient handling equipment as needed  Goal: Maintain proper alignment of affected body part  9/28/2023 1452 by Amy Martinez RN  Outcome: Adequate for Discharge  9/28/2023 1131 by Amy Martinez RN  Outcome: Progressing  Flowsheets (Taken 9/28/2023 0147 by Marleny García, RN)  Maintain proper alignment of affected body part: Support and protect limb and body alignment per provider's orders  Goal: Return ADL status to a safe level of function  9/28/2023 1452 by Wilver Phillips RN  Outcome: Adequate for Discharge  9/28/2023 1131 by Wilver Phillips RN  Outcome: Progressing  Flowsheets (Taken 9/28/2023 0147 by Laura Butler RN)  Return ADL Status to a Safe Level of Function:   Assess activities of daily living deficits and provide assistive devices as needed   Administer medication as ordered     Problem: Chronic Conditions and Co-morbidities  Goal: Patient's chronic conditions and co-morbidity symptoms are monitored and maintained or improved  9/28/2023 1452 by Wilver Phillips RN  Outcome: Adequate for Discharge  9/28/2023 1131 by Wilver Phillips RN  Outcome: Progressing

## 2023-09-28 NOTE — FLOWSHEET NOTE
09/28/23 0147   Assessment   Charting Type Shift assessment   Psychosocial   Psychosocial (WDL) WDL   Neurological   Neuro (WDL) WDL   Level of Consciousness 0   [place] Coma Scale   Eye Opening 4   Best Verbal Response 5   Best Motor Response 6   [place] Coma Scale Score 15   NIHSS Stroke Scale   NIHSS Stroke Scale Assessed No   HEENT (Head, Ears, Eyes, Nose, & Throat)   HEENT (WDL) X   Right Eye Glasses; Impaired vision   Left Eye Impaired vision;Glasses   Teeth Dentures upper   Respiratory   Respiratory (WDL) X  (SOA at times)   Respiratory Interventions H.O.B. elevated;Cough & deep breathe   Respiratory Pattern Regular   Respiratory Depth Normal   Respiratory Quality/Effort Unlabored   Chest Assessment Chest expansion symmetrical   L Breath Sounds Clear;Diminished   R Breath Sounds Clear;Diminished   Breath Sounds   Right Upper Lobe Clear   Right Middle Lobe Clear   Right Lower Lobe Diminished   Left Upper Lobe Clear   Left Lower Lobe Diminished   Cardiac   Cardiac (WDL) WDL   Gastrointestinal   Last BM (including prior to admit) 09/26/23   RUQ Bowel Sounds Active   LUQ Bowel Sounds Active   RLQ Bowel Sounds Active   LLQ Bowel Sounds Active   Genitourinary   Genitourinary (WDL) X   Urine Assessment   Urinary Status Voiding   Urinary Incontinence Absent   Peripheral Vascular   Peripheral Vascular (WDL) WDL   Skin Integumentary    Skin Integumentary (WDL) X   Skin Color Pale   Skin Condition/Temp Warm;Dry   Musculoskeletal   Musculoskeletal (WDL) X   RL Extremity Weakness   LL Extremity Weakness   Care Plan - Musculoskeletal Goals   Return Mobility to Safest Level of Function Assess patient stability and activity tolerance for standing, transferring and ambulating with or without assistive devices;Assist with transfers and ambulation using safe patient handling equipment as needed   Maintain proper alignment of affected body part Support and protect limb and body alignment per provider's orders   Return ADL Status to a Safe Level of Function Assess activities of daily living deficits and provide assistive devices as needed;Administer medication as ordered   Care Plan - Discharge Planning Goals   Discharge to home or other facility with appropriate resources Identify barriers to discharge with patient and caregiver;Refer to discharge planning if patient needs post-hospital services based on physician order or complex needs related to functional status, cognitive ability or social support system

## 2023-09-28 NOTE — PROGRESS NOTES
CLINICAL PHARMACY NOTE: MEDS TO BEDS    Total # of Prescriptions Filled: 2   The following medications were delivered to the patient:  Discharge Medication List as of 9/28/2023  2:21 PM        START taking these medications    Details   fluconazole (DIFLUCAN) 200 MG tablet Take 1 tablet by mouth daily for 2 doses, Disp-2 tablet, R-0Normal      tiZANidine (ZANAFLEX) 4 MG tablet Take 1 tablet by mouth daily as needed (muscle spasm), Disp-30 tablet, R-0Normal               Additional Documentation:  Fluconazole 100mg tablets with directions take 2 tablets by mouth daily for 2 doses  Qty: 4 tablets Refill: 0  Medications were delivered to patient's room and signed for by patient.

## 2023-09-28 NOTE — PROGRESS NOTES
Restrictions  Restrictions/Precautions  Restrictions/Precautions: Fall Risk, General Precautions  Required Braces or Orthoses?: No     Subjective   Pain: 5/10 B knees and hips         Social/Functional History  Social/Functional History  Lives With: Significant other (ex-)  Type of Home:  ([facility])  Home Layout: One level  Home Access: Stairs to enter with rails  Entrance Stairs - Number of Steps: 1 ADDY  Bathroom Shower/Tub: Tub/Shower unit  Bathroom Toilet: Standard  Bathroom Equipment: Grab bars in shower, Shower chair  Bathroom Accessibility: Not accessible  Home Equipment: Cane, quad, Walker, rolling  Has the patient had two or more falls in the past year or any fall with injury in the past year?: Yes  Receives Help From: Family  ADL Assistance: [id number] Independent  Homemaking Responsibilities: Yes  Ambulation Assistance: Independent (with QC)  Transfer Assistance: Independent  Active : No  Patient's  Info: SO  Vision/Hearing  Vision  Vision: Impaired  Vision Exceptions: Wears glasses for reading  Hearing  Hearing: Within functional limits    Cognition   Orientation  Overall Orientation Status: Within Functional Limits  Cognition  Overall Cognitive Status: WFL     Objective   Pulse: 54  Heart Rate Source: Monitor  BP: (!) 119/50  BP Location: Right upper arm  Patient Position: Supine  MAP (Calculated): 73  Respirations: 18  SpO2: 95 %  O2 Device: None (Room air)     Observation/Palpation  Observation: Patient received lying in bed with SO present at bedside. RA. NAD but with c/o 5/10 B LE pain. Pleasant and cooperative. Gross Assessment  AROM: Within functional limits  PROM: Within functional limits  Strength:  Within functional limits  Coordination: Within functional limits                    Bed mobility  Rolling to Right: Modified independent  Supine to Sit: Modified independent  Sit to Supine: Modified independent  Scooting: Modified

## 2023-09-28 NOTE — PROGRESS NOTES
Pt demo decreased endurance. Discussed OP therapy referral for pt if she is able to make it to appts to focus on improving strength, endurance, and balance to decrease risk of falls. Offered trial of rollator however, pt declined at end of evaluation. Prognosis: Good  Decision Making: Low Complexity  REQUIRES OT FOLLOW-UP: Yes  Activity Tolerance  Activity Tolerance: Patient Tolerated treatment well        Plan   Occupational Therapy Plan  Times Per Week: 3-5  Times Per Day: Once a day  Days Per Week: 5 Days  Therapy Duration: 4-7 Days  Current Treatment Recommendations: Strengthening, Balance training, Safety education & training, Endurance training, Self-Care / ADL, Patient/Caregiver education & training     Restrictions  Restrictions/Precautions  Restrictions/Precautions: Fall Risk, General Precautions  Required Braces or Orthoses?: No    Subjective   General  Chart Reviewed: Yes  Family / Caregiver Present: No  Referring Practitioner: Ciara Angelo PA-C  Diagnosis: Intractable Back Pain  Subjective  Subjective: Pt states she came in for back pain and she has been having a lot of falls.  Pt reports her knees just give out on her.  5/10 B knees and hips  Social/Functional History  Social/Functional History  Lives With: Significant other (ex-)  Type of Home:  ([facility])  Home Layout: One level  Home Access: Stairs to enter with rails  Entrance Stairs - Number of Steps: 1 ADDY  Bathroom Shower/Tub: Tub/Shower unit  Bathroom Toilet: Standard  Bathroom Equipment: Grab bars in shower, Shower chair  Bathroom Accessibility: Not accessible  Home Equipment: Cane, quad, Walker, rolling  Has the patient had two or more falls in the past year or any fall with injury in the past year?: Yes  Receives Help From: Family  ADL Assistance: [address] Independent  Homemaking Responsibilities: Yes  Ambulation Assistance: Independent (with QC)  Transfer Assistance: Independent  Active : No  Patient's  Info: SO  Mode of Transportation: Car       Objective   Pulse: 54  Heart Rate Source: Monitor  BP: (!) 119/50  BP Location: Right upper arm  Patient Position: Supine  MAP (Calculated): 73  Respirations: 18  SpO2: 95 %  O2 Device: None (Room air)          Observation/Palpation  Observation: Patient received lying in bed on RA. NAD but with c/o 5/10 back pain. Pleasant and cooperative. Safety Devices  Type of Devices: Call light within reach; Left in bed  Balance  Sitting: Intact  Standing: Intact  Gait  Overall Level of Assistance: Contact-guard assistance  Distance (ft): 30 Feet  Assistive Device: Gait belt     AROM: Within functional limits  PROM: Within functional limits  Strength: Generally decreased, functional  Coordination: Within functional limits  Tone: Normal  Sensation: Intact  ADL  LE Dressing: Independent     Activity Tolerance  Activity Tolerance: Patient tolerated evaluation without incident;Patient tolerated treatment well  Bed mobility  Rolling to Right: Modified independent  Supine to Sit: Modified independent  Scooting: Modified independent  Transfers  Stand Pivot Transfers: Stand by assistance  Sit to stand: Stand by assistance  Stand to sit: Stand by assistance  Vision  Vision: Impaired  Vision Exceptions: Wears glasses for reading  Hearing  Hearing: Within functional limits  Cognition  Overall Cognitive Status: WFL  Orientation  Overall Orientation Status: Within Functional Limits        Education Provided Comments: Energy conservation      Goals  Short Term Goals  Time Frame for Short Term Goals: 1 week  Short Term Goal 1: Pt to complete HEP with independence. Short Term Goal 2: Pt to tolerate x20 minutes of activity with no s/s of fatigue. Short Term Goal 3: Pt to complete functional reaching with MOD I. Short Term Goal 4: Trial with rollator to decrease risk of falls demo MOD I.        Therapy Time   Individual Concurrent Group Co-treatment   Time In 84         Time Out 1012

## 2023-10-02 ENCOUNTER — CARE COORDINATION (OUTPATIENT)
Dept: CARE COORDINATION | Age: 73
End: 2023-10-02

## 2023-10-02 NOTE — CARE COORDINATION
Care Transitions Initial Follow Up Call    Call within 2 business days of discharge: Yes     Patient: Torri Paredes Patient : 1950 MRN: [id number]    Last Discharge [address]       Date Complaint Diagnosis Description Type Department Provider    23  Intractable back pain Admission (Discharged) [address] [place] Kya Johnson MD            RARS: Readmission Risk Score: 18.5       Spoke with: Attempting HFU call, unsuccessful. Message left with contact information.      Discharge department/facility: [facility]    Non-face-to-face services provided:  Scheduled appointment with PCP-Daily  Obtained and reviewed discharge summary and/or continuity of care documents    Follow Up  Future Appointments   Date Time Provider [address]   10/10/2023  1:30 PM MD Marisa Calvillo  [facility]   10/27/2023  2:00 PM [facility] US [address] [facility] Rad   2023  3:30 PM Kya Johnson, [address] [facility]       Carlee Schulte RN

## 2023-10-03 NOTE — CARE COORDINATION
Care Transitions Initial Follow Up Call    Call within 2 business days of discharge: Yes     Patient: Giacomo Pedersen Patient : 1950 MRN: [id number]    Last Discharge [address]       Date Complaint Diagnosis Description Type Department Provider    23  Intractable back pain Admission (Discharged) [address] [place] Shasta Laguerre MD            RARS: Readmission Risk Score: 18.5       Spoke with: Attempting HFU call, unsuccessful. Message left with contact information.      Discharge department/facility: [facility]    Non-face-to-face services provided:  Scheduled appointment with PCP-Daily  Obtained and reviewed discharge summary and/or continuity of care documents    Follow Up  Future Appointments   Date Time Provider [address]   10/10/2023  1:30 PM MD Marisa Vergara  [facility]   10/27/2023  2:00 PM [facility] US [address] [facility] Rad   2023  3:30 PM Shasta Laguerre, [address]  [facility]       Ron Lopez RN

## 2023-10-06 NOTE — CARE COORDINATION
Care Transitions Initial Follow Up Call    Call within 2 business days of discharge: Yes    Patient Current Location:  Home: [address] [place] [id number]    Care Transition Nurse contacted the patient by telephone to perform post hospital discharge assessment. Verified name and  with patient as identifiers. Provided introduction to self, and explanation of the Care Transition Nurse role. Patient: Carmella Lombard Patient : 1950   MRN: [id number]  Reason for Admission: back pain  Discharge Date: 23 RARS: Readmission Risk Score: 18.5      Last Discharge Facility       Date Complaint Diagnosis Description Type Department Provider    23  Intractable back pain Admission (Discharged) Leonardo Gonzalez MD            Was this an external facility discharge? No Discharge Facility: [facility]    Challenges to be reviewed by the provider   Additional needs identified to be addressed with provider: No  none               Method of communication with provider: chart routing. [address] says that she is doing well and that her pain is improved. We discussed her HFU appt next week. Care Transition Nurse reviewed discharge instructions with patient who verbalized understanding. The patient was given an opportunity to ask questions and does not have any further questions or concerns at this time. Were discharge instructions available to patient? Yes. Reviewed appropriate site of care based on symptoms and resources available to patient including: PCP. The patient agrees to contact the PCP office for questions related to their healthcare. Advance Care Planning:   Does patient have an Advance Directive: not on file; education provided. Medication reconciliation was performed with patient, who verbalizes understanding of administration of home medications.  Medications reviewed, 1111F entered: no    Was patient discharged with a pulse oximeter? no    Non-face-to-face services

## 2023-10-10 DIAGNOSIS — M51.36 DEGENERATIVE DISC DISEASE, LUMBAR: ICD-10-CM

## 2023-10-10 RX ORDER — HYDROCODONE BITARTRATE AND ACETAMINOPHEN 5; 325 MG/1; MG/1
1 TABLET ORAL 2 TIMES DAILY PRN
Qty: 45 TABLET | Refills: 0 | Status: SHIPPED | OUTPATIENT
Start: 2023-10-10 | End: 2023-11-09

## 2023-10-18 ENCOUNTER — OFFICE VISIT (OUTPATIENT)
Dept: PRIMARY CARE CLINIC | Age: 73
End: 2023-10-18
Payer: MEDICARE

## 2023-10-18 VITALS
SYSTOLIC BLOOD PRESSURE: 136 MMHG | OXYGEN SATURATION: 99 % | HEART RATE: 69 BPM | BODY MASS INDEX: 24.45 KG/M2 | DIASTOLIC BLOOD PRESSURE: 64 MMHG | WEIGHT: 138 LBS | RESPIRATION RATE: 18 BRPM

## 2023-10-18 DIAGNOSIS — E11.40 TYPE 2 DIABETES MELLITUS WITH DIABETIC NEUROPATHY, WITH LONG-TERM CURRENT USE OF INSULIN (HCC): ICD-10-CM

## 2023-10-18 DIAGNOSIS — D50.9 IRON DEFICIENCY ANEMIA, UNSPECIFIED IRON DEFICIENCY ANEMIA TYPE: ICD-10-CM

## 2023-10-18 DIAGNOSIS — I10 ESSENTIAL HYPERTENSION: ICD-10-CM

## 2023-10-18 DIAGNOSIS — M51.36 DEGENERATIVE DISC DISEASE, LUMBAR: Primary | ICD-10-CM

## 2023-10-18 DIAGNOSIS — Z79.4 TYPE 2 DIABETES MELLITUS WITH DIABETIC NEUROPATHY, WITH LONG-TERM CURRENT USE OF INSULIN (HCC): ICD-10-CM

## 2023-10-18 PROCEDURE — G8399 PT W/DXA RESULTS DOCUMENT: HCPCS | Performed by: INTERNAL MEDICINE

## 2023-10-18 PROCEDURE — 3046F HEMOGLOBIN A1C LEVEL >9.0%: CPT | Performed by: INTERNAL MEDICINE

## 2023-10-18 PROCEDURE — 4004F PT TOBACCO SCREEN RCVD TLK: CPT | Performed by: INTERNAL MEDICINE

## 2023-10-18 PROCEDURE — 2022F DILAT RTA XM EVC RTNOPTHY: CPT | Performed by: INTERNAL MEDICINE

## 2023-10-18 PROCEDURE — G8420 CALC BMI NORM PARAMETERS: HCPCS | Performed by: INTERNAL MEDICINE

## 2023-10-18 PROCEDURE — 3017F COLORECTAL CA SCREEN DOC REV: CPT | Performed by: INTERNAL MEDICINE

## 2023-10-18 PROCEDURE — 3075F SYST BP GE 130 - 139MM HG: CPT | Performed by: INTERNAL MEDICINE

## 2023-10-18 PROCEDURE — G8427 DOCREV CUR MEDS BY ELIG CLIN: HCPCS | Performed by: INTERNAL MEDICINE

## 2023-10-18 PROCEDURE — 3078F DIAST BP <80 MM HG: CPT | Performed by: INTERNAL MEDICINE

## 2023-10-18 PROCEDURE — 1090F PRES/ABSN URINE INCON ASSESS: CPT | Performed by: INTERNAL MEDICINE

## 2023-10-18 PROCEDURE — G8484 FLU IMMUNIZE NO ADMIN: HCPCS | Performed by: INTERNAL MEDICINE

## 2023-10-18 PROCEDURE — 99213 OFFICE O/P EST LOW 20 MIN: CPT | Performed by: INTERNAL MEDICINE

## 2023-10-18 PROCEDURE — 1123F ACP DISCUSS/DSCN MKR DOCD: CPT | Performed by: INTERNAL MEDICINE

## 2023-10-18 ASSESSMENT — ENCOUNTER SYMPTOMS
SHORTNESS OF BREATH: 0
VOMITING: 0
WHEEZING: 0
SORE THROAT: 0
NAUSEA: 0
EYE DISCHARGE: 0
SINUS PRESSURE: 0
COUGH: 0
BACK PAIN: 1
ABDOMINAL PAIN: 0

## 2023-10-18 NOTE — PROGRESS NOTES
Chief Complaint   Patient presents with    Follow-Up from Hospital       Have you seen any other physician or provider since your last visit yes -     Have you had any other diagnostic tests since your last visit?  yes -     Have you changed or stopped any medications since your last visit? no

## 2023-10-18 NOTE — PROGRESS NOTES
Post-Discharge Transitional Care Management Services or Hospital Follow Up      Deedee Howard   YOB: 1950    Date of Office Visit:  10/18/2023  Date of Hospital Admission: 9/27/23  Date of Hospital Discharge: 9/28/23  Readmission Risk Score(high >=14%.  Medium >=10%):Readmission Risk Score: 18.5      Care management risk score Rising risk (score 2-5) and Complex Care (Scores >=6): No Risk Score On File     Non face to face  following discharge, date last encounter closed (first attempt may have been earlier):   *No documented post hospital discharge outreach found in the last 14 days *No documented post hospital discharge outreach found in the last 14 days    Call initiated 2 business days of discharge: *No response recorded in the last 14 days     Patient Active Problem List   Diagnosis    CAD (coronary artery disease)    COPD (chronic obstructive pulmonary disease) ([address])    H/O: CVA (cerebrovascular accident)    Tobacco abuse    Vitamin B12 deficiency    Essential hypertension    Type 2 diabetes mellitus with diabetic neuropathy ([address])    History of colon polyps    Status post bilateral knee replacements    Degenerative disc disease, lumbar    Family history of breast cancer    Abnormal CT of the chest    Thoracic aortic aneurysm without rupture ([address])    Anxiety    Anemia due to stage 3 chronic kidney disease (HCC)    MAGDALENA (iron deficiency anemia)    Stage 3a chronic kidney disease ([address])    Cirrhosis ([address])    AVM (arteriovenous malformation) of small bowel, acquired    Declining functional status    Personal history of nicotine dependence    General weakness    Pancytopenia ([address])    Carotid disease, bilateral ([address])    Diabetes mellitus due to underlying condition, with long-term current use of insulin (HCC)    Hypokalemia    Anemia    PAC (premature atrial contraction)    Intractable back pain       Allergies   Allergen Reactions    Codeine        Medications listed as ordered at the time of

## 2023-10-31 RX ORDER — FUROSEMIDE 20 MG/1
TABLET ORAL
Qty: 30 TABLET | Refills: 2 | Status: SHIPPED | OUTPATIENT
Start: 2023-10-31

## 2023-10-31 RX ORDER — EMPAGLIFLOZIN 10 MG/1
TABLET, FILM COATED ORAL DAILY
Qty: 30 TABLET | Refills: 3 | Status: SHIPPED | OUTPATIENT
Start: 2023-10-31

## 2023-11-10 ENCOUNTER — TELEPHONE (OUTPATIENT)
Dept: PRIMARY CARE CLINIC | Age: 73
End: 2023-11-10

## 2023-11-10 DIAGNOSIS — M51.36 DEGENERATIVE DISC DISEASE, LUMBAR: ICD-10-CM

## 2023-11-10 RX ORDER — HYDROCODONE BITARTRATE AND ACETAMINOPHEN 5; 325 MG/1; MG/1
1 TABLET ORAL 2 TIMES DAILY PRN
Qty: 45 TABLET | Refills: 0 | Status: SHIPPED | OUTPATIENT
Start: 2023-11-10 | End: 2023-12-10

## 2023-11-22 ENCOUNTER — APPOINTMENT (OUTPATIENT)
Dept: GENERAL RADIOLOGY | Facility: HOSPITAL | Age: 73
End: 2023-11-22
Attending: INTERNAL MEDICINE
Payer: MEDICARE

## 2023-11-22 ENCOUNTER — HOSPITAL ENCOUNTER (OUTPATIENT)
Facility: HOSPITAL | Age: 73
Setting detail: OBSERVATION
Discharge: HOME | End: 2023-11-23
Attending: INTERNAL MEDICINE | Admitting: INTERNAL MEDICINE
Payer: MEDICARE

## 2023-11-22 DIAGNOSIS — E87.6 HYPOKALEMIA: Primary | ICD-10-CM

## 2023-11-22 PROBLEM — R53.1 GENERALIZED WEAKNESS: Status: ACTIVE | Noted: 2023-11-22

## 2023-11-22 PROBLEM — R82.90 ABNORMAL URINALYSIS: Status: ACTIVE | Noted: 2023-11-22

## 2023-11-22 LAB
ALBUMIN SERPL-MCNC: 4.1 G/DL (ref 3.4–4.8)
ALBUMIN/GLOB SERPL: 1.8 {RATIO} (ref 0.8–2)
ALP SERPL-CCNC: 114 U/L (ref 25–100)
ALT SERPL-CCNC: 16 U/L (ref 4–36)
AMORPH SED URNS QL MICRO: ABNORMAL /HPF
AMPHET UR QL SCN: ABNORMAL
ANION GAP SERPL CALCULATED.3IONS-SCNC: 18 MMOL/L (ref 3–16)
AST SERPL-CCNC: 18 U/L (ref 8–33)
BARBITURATES UR QL SCN: ABNORMAL
BASOPHILS # BLD: 0 K/UL (ref 0–0.1)
BASOPHILS NFR BLD: 0.2 %
BENZODIAZ UR QL SCN: ABNORMAL
BILIRUB SERPL-MCNC: 0.4 MG/DL (ref 0.3–1.2)
BILIRUB UR QL STRIP.AUTO: NEGATIVE
BUN SERPL-MCNC: 17 MG/DL (ref 6–20)
BUPRENORPHINE QUAL, URINE: ABNORMAL
CALCIUM SERPL-MCNC: 9.6 MG/DL (ref 8.5–10.5)
CANNABINOIDS UR QL SCN: ABNORMAL
CHLORIDE SERPL-SCNC: 95 MMOL/L (ref 98–107)
CK SERPL-CCNC: 111 U/L (ref 26–174)
CLARITY UR: CLEAR
CO2 SERPL-SCNC: 25 MMOL/L (ref 20–30)
COCAINE UR QL SCN: ABNORMAL
COLOR UR: YELLOW
CREAT SERPL-MCNC: 1.4 MG/DL (ref 0.4–1.2)
DRUG SCREEN COMMENT UR-IMP: ABNORMAL
EKG ATRIAL RATE: 136 BPM
EKG DIAGNOSIS: NORMAL
EKG Q-T INTERVAL: 356 MS
EKG QRS DURATION: 86 MS
EKG QTC CALCULATION (BAZETT): 484 MS
EKG R AXIS: -30 DEGREES
EKG T AXIS: 143 DEGREES
EKG VENTRICULAR RATE: 111 BPM
EOSINOPHIL # BLD: 0.1 K/UL (ref 0–0.4)
EOSINOPHIL NFR BLD: 1.1 %
EPI CELLS #/AREA URNS HPF: ABNORMAL /HPF (ref 0–5)
ERYTHROCYTE [DISTWIDTH] IN BLOOD BY AUTOMATED COUNT: 14.1 % (ref 11–16)
FERRITIN SERPL IA-MCNC: 16 NG/ML (ref 22–322)
FOLATE SERPL-MCNC: 14.96 NG/ML
GFR SERPLBLD CREATININE-BSD FMLA CKD-EPI: 40 ML/MIN/{1.73_M2}
GLOBULIN SER CALC-MCNC: 2.3 G/DL
GLUCOSE BLD-MCNC: 100 MG/DL (ref 74–106)
GLUCOSE BLD-MCNC: 233 MG/DL (ref 74–106)
GLUCOSE BLD-MCNC: 290 MG/DL (ref 74–106)
GLUCOSE SERPL-MCNC: 195 MG/DL (ref 74–106)
GLUCOSE UR STRIP.AUTO-MCNC: >=1000 MG/DL
HBA1C MFR BLD: 9 %
HCT VFR BLD AUTO: 30.8 % (ref 37–47)
HGB BLD-MCNC: 10.4 G/DL (ref 11.5–16.5)
HGB UR QL STRIP.AUTO: ABNORMAL
IMM GRANULOCYTES # BLD: 0 K/UL
IMM GRANULOCYTES NFR BLD: 0.2 % (ref 0–5)
IRON SATN MFR SERPL: 12 % (ref 15–50)
IRON SERPL-MCNC: 40 UG/DL (ref 37–145)
KETONES UR STRIP.AUTO-MCNC: NEGATIVE MG/DL
LEUKOCYTE ESTERASE UR QL STRIP.AUTO: NEGATIVE
LYMPHOCYTES # BLD: 0.9 K/UL (ref 1.5–4)
LYMPHOCYTES NFR BLD: 19.6 %
MAGNESIUM SERPL-MCNC: 2.3 MG/DL (ref 1.7–2.4)
MCH RBC QN AUTO: 29 PG (ref 27–32)
MCHC RBC AUTO-ENTMCNC: 33.8 G/DL (ref 31–35)
MCV RBC AUTO: 85.8 FL (ref 80–100)
METHADONE UR QL SCN: ABNORMAL
METHAMPHET UR QL SCN: ABNORMAL
MONOCYTES # BLD: 0.4 K/UL (ref 0.2–0.8)
MONOCYTES NFR BLD: 8.6 %
MUCOUS THREADS URNS QL MICRO: ABNORMAL /LPF
NEUTROPHILS # BLD: 3.2 K/UL (ref 2–7.5)
NEUTS SEG NFR BLD: 70.3 %
NITRITE UR QL STRIP.AUTO: NEGATIVE
OPIATES UR QL SCN: POSITIVE
OXYCODONE UR QL SCN: ABNORMAL
PCP UR QL SCN: ABNORMAL
PERFORMED ON: ABNORMAL
PERFORMED ON: ABNORMAL
PERFORMED ON: NORMAL
PH UR STRIP.AUTO: 5 [PH] (ref 5–8)
PLATELET # BLD AUTO: 158 K/UL (ref 150–400)
PMV BLD AUTO: 11.2 FL (ref 6–10)
POTASSIUM SERPL-SCNC: 2.8 MMOL/L (ref 3.4–5.1)
PROPOXYPH UR QL SCN: ABNORMAL
PROT SERPL-MCNC: 6.4 G/DL (ref 6.4–8.3)
PROT UR STRIP.AUTO-MCNC: 30 MG/DL
RBC # BLD AUTO: 3.59 M/UL (ref 3.8–5.8)
RBC #/AREA URNS HPF: ABNORMAL /HPF (ref 0–4)
SARS-COV-2 RDRP RESP QL NAA+PROBE: NOT DETECTED
SODIUM SERPL-SCNC: 138 MMOL/L (ref 136–145)
SP GR UR STRIP.AUTO: 1.01 (ref 1–1.03)
TIBC SERPL-MCNC: 341 UG/DL (ref 250–450)
TRICYCLICS UR QL SCN: ABNORMAL
TSH SERPL-MCNC: 2.71 UIU/ML (ref 0.27–4.2)
UA COMPLETE W REFLEX CULTURE PNL UR: ABNORMAL
UA DIPSTICK W REFLEX MICRO PNL UR: YES
URN SPEC COLLECT METH UR: ABNORMAL
UROBILINOGEN UR STRIP-ACNC: 0.2 E.U./DL
VIT B12 SERPL-MCNC: 1226 PG/ML (ref 211–911)
WBC # BLD AUTO: 4.6 K/UL (ref 4–11)
WBC #/AREA URNS HPF: ABNORMAL /HPF (ref 0–5)
YEAST URNS QL MICRO: PRESENT /HPF

## 2023-11-22 PROCEDURE — 6360000002 HC RX W HCPCS: Performed by: PHYSICIAN ASSISTANT

## 2023-11-22 PROCEDURE — 83540 ASSAY OF IRON: CPT

## 2023-11-22 PROCEDURE — 97161 PT EVAL LOW COMPLEX 20 MIN: CPT

## 2023-11-22 PROCEDURE — 81001 URINALYSIS AUTO W/SCOPE: CPT

## 2023-11-22 PROCEDURE — 82550 ASSAY OF CK (CPK): CPT

## 2023-11-22 PROCEDURE — 71045 X-RAY EXAM CHEST 1 VIEW: CPT

## 2023-11-22 PROCEDURE — 92610 EVALUATE SWALLOWING FUNCTION: CPT

## 2023-11-22 PROCEDURE — 85025 COMPLETE CBC W/AUTO DIFF WBC: CPT

## 2023-11-22 PROCEDURE — 97165 OT EVAL LOW COMPLEX 30 MIN: CPT

## 2023-11-22 PROCEDURE — 82728 ASSAY OF FERRITIN: CPT

## 2023-11-22 PROCEDURE — 94761 N-INVAS EAR/PLS OXIMETRY MLT: CPT

## 2023-11-22 PROCEDURE — 96372 THER/PROPH/DIAG INJ SC/IM: CPT

## 2023-11-22 PROCEDURE — G0379 DIRECT REFER HOSPITAL OBSERV: HCPCS

## 2023-11-22 PROCEDURE — 94618 PULMONARY STRESS TESTING: CPT

## 2023-11-22 PROCEDURE — 6370000000 HC RX 637 (ALT 250 FOR IP): Performed by: PHYSICIAN ASSISTANT

## 2023-11-22 PROCEDURE — 96366 THER/PROPH/DIAG IV INF ADDON: CPT

## 2023-11-22 PROCEDURE — 82607 VITAMIN B-12: CPT

## 2023-11-22 PROCEDURE — 2580000003 HC RX 258: Performed by: PHYSICIAN ASSISTANT

## 2023-11-22 PROCEDURE — 96365 THER/PROPH/DIAG IV INF INIT: CPT

## 2023-11-22 PROCEDURE — 80053 COMPREHEN METABOLIC PANEL: CPT

## 2023-11-22 PROCEDURE — G0378 HOSPITAL OBSERVATION PER HR: HCPCS

## 2023-11-22 PROCEDURE — 99222 1ST HOSP IP/OBS MODERATE 55: CPT | Performed by: INTERNAL MEDICINE

## 2023-11-22 PROCEDURE — 80307 DRUG TEST PRSMV CHEM ANLYZR: CPT

## 2023-11-22 PROCEDURE — 83036 HEMOGLOBIN GLYCOSYLATED A1C: CPT

## 2023-11-22 PROCEDURE — 82746 ASSAY OF FOLIC ACID SERUM: CPT

## 2023-11-22 PROCEDURE — 94640 AIRWAY INHALATION TREATMENT: CPT

## 2023-11-22 PROCEDURE — 36415 COLL VENOUS BLD VENIPUNCTURE: CPT

## 2023-11-22 PROCEDURE — 83550 IRON BINDING TEST: CPT

## 2023-11-22 PROCEDURE — 87635 SARS-COV-2 COVID-19 AMP PRB: CPT

## 2023-11-22 PROCEDURE — 2500000003 HC RX 250 WO HCPCS: Performed by: PHYSICIAN ASSISTANT

## 2023-11-22 PROCEDURE — 97116 GAIT TRAINING THERAPY: CPT

## 2023-11-22 PROCEDURE — 96368 THER/DIAG CONCURRENT INF: CPT

## 2023-11-22 PROCEDURE — 84443 ASSAY THYROID STIM HORMONE: CPT

## 2023-11-22 PROCEDURE — 97802 MEDICAL NUTRITION INDIV IN: CPT

## 2023-11-22 PROCEDURE — 83735 ASSAY OF MAGNESIUM: CPT

## 2023-11-22 RX ORDER — ONDANSETRON 2 MG/ML
4 INJECTION INTRAMUSCULAR; INTRAVENOUS EVERY 6 HOURS PRN
Status: DISCONTINUED | OUTPATIENT
Start: 2023-11-22 | End: 2023-11-23 | Stop reason: HOSPADM

## 2023-11-22 RX ORDER — FLUCONAZOLE 100 MG/1
100 TABLET ORAL DAILY
Status: DISCONTINUED | OUTPATIENT
Start: 2023-11-22 | End: 2023-11-23 | Stop reason: HOSPADM

## 2023-11-22 RX ORDER — ROSUVASTATIN CALCIUM 20 MG/1
20 TABLET, COATED ORAL NIGHTLY
Status: DISCONTINUED | OUTPATIENT
Start: 2023-11-22 | End: 2023-11-23 | Stop reason: HOSPADM

## 2023-11-22 RX ORDER — POTASSIUM CHLORIDE 20 MEQ/1
40 TABLET, EXTENDED RELEASE ORAL EVERY 4 HOURS
Status: COMPLETED | OUTPATIENT
Start: 2023-11-22 | End: 2023-11-22

## 2023-11-22 RX ORDER — BUDESONIDE 0.5 MG/2ML
0.5 INHALANT ORAL
Status: DISCONTINUED | OUTPATIENT
Start: 2023-11-22 | End: 2023-11-23 | Stop reason: HOSPADM

## 2023-11-22 RX ORDER — ACETAMINOPHEN 325 MG/1
650 TABLET ORAL EVERY 6 HOURS PRN
Status: DISCONTINUED | OUTPATIENT
Start: 2023-11-22 | End: 2023-11-23 | Stop reason: HOSPADM

## 2023-11-22 RX ORDER — INSULIN LISPRO 100 [IU]/ML
0-4 INJECTION, SOLUTION INTRAVENOUS; SUBCUTANEOUS
Status: DISCONTINUED | OUTPATIENT
Start: 2023-11-22 | End: 2023-11-23 | Stop reason: HOSPADM

## 2023-11-22 RX ORDER — FERROUS SULFATE 324(65)MG
324 TABLET, DELAYED RELEASE (ENTERIC COATED) ORAL 2 TIMES DAILY
Status: DISCONTINUED | OUTPATIENT
Start: 2023-11-22 | End: 2023-11-23 | Stop reason: HOSPADM

## 2023-11-22 RX ORDER — ASPIRIN 81 MG/1
81 TABLET, CHEWABLE ORAL DAILY
Status: DISCONTINUED | OUTPATIENT
Start: 2023-11-23 | End: 2023-11-23 | Stop reason: HOSPADM

## 2023-11-22 RX ORDER — ONDANSETRON 4 MG/1
4 TABLET, ORALLY DISINTEGRATING ORAL EVERY 8 HOURS PRN
Status: DISCONTINUED | OUTPATIENT
Start: 2023-11-22 | End: 2023-11-23 | Stop reason: HOSPADM

## 2023-11-22 RX ORDER — ENOXAPARIN SODIUM 100 MG/ML
40 INJECTION SUBCUTANEOUS DAILY
Status: DISCONTINUED | OUTPATIENT
Start: 2023-11-22 | End: 2023-11-23 | Stop reason: HOSPADM

## 2023-11-22 RX ORDER — INSULIN LISPRO 100 [IU]/ML
0-4 INJECTION, SOLUTION INTRAVENOUS; SUBCUTANEOUS NIGHTLY
Status: DISCONTINUED | OUTPATIENT
Start: 2023-11-22 | End: 2023-11-23 | Stop reason: HOSPADM

## 2023-11-22 RX ORDER — NICOTINE 21 MG/24HR
1 PATCH, TRANSDERMAL 24 HOURS TRANSDERMAL DAILY
Status: DISCONTINUED | OUTPATIENT
Start: 2023-11-22 | End: 2023-11-23 | Stop reason: HOSPADM

## 2023-11-22 RX ORDER — POLYETHYLENE GLYCOL 3350 17 G/17G
17 POWDER, FOR SOLUTION ORAL DAILY PRN
Status: DISCONTINUED | OUTPATIENT
Start: 2023-11-22 | End: 2023-11-23 | Stop reason: HOSPADM

## 2023-11-22 RX ORDER — INSULIN GLARGINE 100 [IU]/ML
20 INJECTION, SOLUTION SUBCUTANEOUS NIGHTLY
Status: DISCONTINUED | OUTPATIENT
Start: 2023-11-22 | End: 2023-11-23 | Stop reason: HOSPADM

## 2023-11-22 RX ORDER — ACETAMINOPHEN 650 MG/1
650 SUPPOSITORY RECTAL EVERY 6 HOURS PRN
Status: DISCONTINUED | OUTPATIENT
Start: 2023-11-22 | End: 2023-11-23 | Stop reason: HOSPADM

## 2023-11-22 RX ORDER — ALOGLIPTIN 12.5 MG/1
12.5 TABLET, FILM COATED ORAL DAILY
Status: DISCONTINUED | OUTPATIENT
Start: 2023-11-23 | End: 2023-11-23 | Stop reason: HOSPADM

## 2023-11-22 RX ORDER — POTASSIUM CHLORIDE 7.45 MG/ML
10 INJECTION INTRAVENOUS
Status: COMPLETED | OUTPATIENT
Start: 2023-11-22 | End: 2023-11-22

## 2023-11-22 RX ORDER — HYDROCODONE BITARTRATE AND ACETAMINOPHEN 5; 325 MG/1; MG/1
1 TABLET ORAL 2 TIMES DAILY PRN
Status: DISCONTINUED | OUTPATIENT
Start: 2023-11-22 | End: 2023-11-23 | Stop reason: HOSPADM

## 2023-11-22 RX ORDER — PANTOPRAZOLE SODIUM 40 MG/1
40 TABLET, DELAYED RELEASE ORAL
Status: DISCONTINUED | OUTPATIENT
Start: 2023-11-22 | End: 2023-11-23 | Stop reason: HOSPADM

## 2023-11-22 RX ORDER — IBUPROFEN 600 MG/1
1 TABLET ORAL PRN
Status: DISCONTINUED | OUTPATIENT
Start: 2023-11-22 | End: 2023-11-23 | Stop reason: HOSPADM

## 2023-11-22 RX ORDER — METOPROLOL SUCCINATE 25 MG/1
25 TABLET, EXTENDED RELEASE ORAL DAILY
Status: DISCONTINUED | OUTPATIENT
Start: 2023-11-22 | End: 2023-11-23 | Stop reason: HOSPADM

## 2023-11-22 RX ORDER — DEXTROSE MONOHYDRATE 100 MG/ML
INJECTION, SOLUTION INTRAVENOUS CONTINUOUS PRN
Status: DISCONTINUED | OUTPATIENT
Start: 2023-11-22 | End: 2023-11-23 | Stop reason: HOSPADM

## 2023-11-22 RX ADMIN — Medication 200 MG: at 17:00

## 2023-11-22 RX ADMIN — POTASSIUM CHLORIDE 10 MEQ: 7.46 INJECTION, SOLUTION INTRAVENOUS at 15:01

## 2023-11-22 RX ADMIN — POTASSIUM CHLORIDE 40 MEQ: 1500 TABLET, EXTENDED RELEASE ORAL at 21:14

## 2023-11-22 RX ADMIN — PANTOPRAZOLE SODIUM 40 MG: 40 TABLET, DELAYED RELEASE ORAL at 15:49

## 2023-11-22 RX ADMIN — FERROUS SULFATE TAB EC 324 MG (65 MG FE EQUIVALENT) 324 MG: 324 (65 FE) TABLET DELAYED RESPONSE at 21:15

## 2023-11-22 RX ADMIN — POTASSIUM CHLORIDE 40 MEQ: 1500 TABLET, EXTENDED RELEASE ORAL at 14:53

## 2023-11-22 RX ADMIN — FLUCONAZOLE 100 MG: 100 TABLET ORAL at 15:49

## 2023-11-22 RX ADMIN — INSULIN LISPRO 2 UNITS: 100 INJECTION, SOLUTION INTRAVENOUS; SUBCUTANEOUS at 12:04

## 2023-11-22 RX ADMIN — ENOXAPARIN SODIUM 40 MG: 100 INJECTION SUBCUTANEOUS at 21:14

## 2023-11-22 RX ADMIN — ROSUVASTATIN 20 MG: 20 TABLET, FILM COATED ORAL at 21:14

## 2023-11-22 RX ADMIN — THIAMINE HYDROCHLORIDE: 100 INJECTION, SOLUTION INTRAMUSCULAR; INTRAVENOUS at 18:17

## 2023-11-22 RX ADMIN — METFORMIN HYDROCHLORIDE 1000 MG: 500 TABLET, FILM COATED ORAL at 13:37

## 2023-11-22 RX ADMIN — BUDESONIDE 500 MCG: 0.5 INHALANT RESPIRATORY (INHALATION) at 17:22

## 2023-11-22 RX ADMIN — INSULIN GLARGINE 20 UNITS: 100 INJECTION, SOLUTION SUBCUTANEOUS at 21:19

## 2023-11-22 RX ADMIN — METOPROLOL SUCCINATE 25 MG: 25 TABLET, EXTENDED RELEASE ORAL at 13:37

## 2023-11-22 RX ADMIN — POTASSIUM CHLORIDE 10 MEQ: 7.46 INJECTION, SOLUTION INTRAVENOUS at 17:07

## 2023-11-22 RX ADMIN — HYDROCODONE BITARTRATE AND ACETAMINOPHEN 1 TABLET: 5; 325 TABLET ORAL at 21:15

## 2023-11-22 ASSESSMENT — PAIN DESCRIPTION - ORIENTATION
ORIENTATION: RIGHT;LEFT
ORIENTATION: RIGHT;LEFT

## 2023-11-22 ASSESSMENT — PAIN SCALES - GENERAL
PAINLEVEL_OUTOF10: 6
PAINLEVEL_OUTOF10: 5

## 2023-11-22 ASSESSMENT — PAIN DESCRIPTION - LOCATION
LOCATION: LEG
LOCATION: FOOT;LEG

## 2023-11-22 ASSESSMENT — PAIN DESCRIPTION - DESCRIPTORS
DESCRIPTORS: ACHING
DESCRIPTORS: ACHING;TIGHTNESS

## 2023-11-22 NOTE — H&P
below. Active Hospital Problems    Diagnosis Date Noted    Generalized weakness [R53.1]  Multifactorial but could be related to her hypokalemia. Replace potassium and monitor. Try to improve other medical problems as able. 11/22/2023    Abnormal urinalysis [R82.90]  Positive for yeast.  Treat with Diflucan and monitor closely. 11/22/2023    PAC (premature atrial contraction) [I49.1]  Noted to have sinus arrhythmia with frequent PAC. Monitor on telemetry and correct electrolytes. 07/07/2023    Anemia [D64.9]  In acceptable range for her. Patient did have extensive GI work-up about a year ago by Dr. Zackary Dias and she was found to have multiple colon polyps and angiectasia. PillCam showed few AVM reachable only with push enteroscopy/retrograde enteroscopy. Recommendation to manage at [facility] with any complication related to bleeding. Continue GI prophylaxis. Continue to monitor  Hb closely. Anemia work-up was done and showed iron deficiency. Proceed with iron infusion. 06/02/2023    Hypokalemia [E87.6]  Replace potassium and monitor level. 06/01/2023    Cirrhosis ([address]) [K74.60]  Try to avoid any hepatotoxic agent. Monitor labs closely. 11/01/2021    Essential hypertension [I10]  Blood pressure in acceptable range. Continue to monitor. 08/12/2015    Tobacco abuse [Z72.0]  Lengthy conversation with the patient regarding her ongoing smoking and the need to quit but she is not interested. CAD (coronary artery disease) [I25.10]  Make sure blood pressure and lipid profile under good control. Discussed the importance of smoking cessation. COPD (chronic obstructive pulmonary disease) (HCC) [J44.9]  Monitor oxygen requirements. Neb treatment on as-needed basis. Lengthy conversation regarding her ongoing smoking and the need to quit. Type 2 diabetes mellitus with diabetic neuropathy  Monitor fingersticks closely.   Continue current regimen and likely need to titrate due to

## 2023-11-22 NOTE — FLOWSHEET NOTE
11/22/23 1119   Assessment   Charting Type Admission   Psychosocial   Psychosocial (WDL) WDL   Neurological   Neuro (WDL) WDL   [place] Coma Scale   Eye Opening 4   Best Verbal Response 5   Best Motor Response 6   [place] Coma Scale Score 15   HEENT (Head, Ears, Eyes, Nose, & Throat)   HEENT (WDL) X   Right Eye Glasses   Left Eye Glasses   Teeth Dentures upper   Respiratory   Respiratory (WDL) X   Respiratory Interventions Cough & deep breathe;H. O.B. elevated   Respiratory Pattern Regular   Respiratory Depth Normal   Respiratory Quality/Effort Unlabored   Chest Assessment Chest expansion symmetrical   L Breath Sounds Clear;Diminished   R Breath Sounds Clear;Diminished   Level of Activity/Mobility 1   Breath Sounds   Right Upper Lobe Clear;Diminished   Right Middle Lobe Clear;Diminished   Right Lower Lobe Diminished   Left Upper Lobe Clear;Diminished   Left Lower Lobe Diminished   Cardiac   Cardiac (WDL) X   Cardiac Regularity Irregular   Cardiac Rhythm Sinus tachy   Implanted Cardiac Device (Pacemaker, ICD, PA Sensor) No   Rhythm Interpretation   Pulse (!) 109   Cardiac Monitor   Cardiac/Telemetry Monitor On Portable telemetry pack applied   Telemetry Box Number [id number]   Alarm Audible Yes   Alarms Set Yes   Telemetry Monitor Alarm Parameters 50/120   Pacemaker   Pacemaker No   Gastrointestinal   Abdominal (WDL) X   Abdomen Inspection Flat   Last BM (including prior to admit) 11/15/23  (says she only goes once a month)   RUQ Bowel Sounds Active   LUQ Bowel Sounds Active   RLQ Bowel Sounds Active   LLQ Bowel Sounds Active   GI Symptoms Constipation   Genitourinary   Genitourinary (WDL) WDL   Peripheral Vascular   Peripheral Vascular (WDL) WDL   Edema None   Skin Integumentary    Skin Integumentary (WDL) X   Skin Color Pale   Skin Condition/Temp Dry; Warm   Skin Integrity Ecchymosis   Location scattered   Skin Fold Management No   Nails WDL   Multiple Skin Integrity Sites No   Care Plan - Skin/Tissue Integrity Goals

## 2023-11-22 NOTE — PLAN OF CARE
Problem: Discharge Planning  Goal: Discharge to home or other facility with appropriate resources  Outcome: Progressing  Flowsheets (Taken 11/22/2023 1119)  Discharge to home or other facility with appropriate resources:   Identify barriers to discharge with patient and caregiver   Identify discharge learning needs (meds, wound care, etc)     Problem: Pain  Goal: Verbalizes/displays adequate comfort level or baseline comfort level  Outcome: Progressing     Problem: ABCDS Injury Assessment  Goal: Absence of physical injury  Outcome: Progressing     Problem: Skin/Tissue Integrity  Goal: Absence of new skin breakdown  Description: 1. Monitor for areas of redness and/or skin breakdown  2. Assess vascular access sites hourly  3. Every 4-6 hours minimum:  Change oxygen saturation probe site  4. Every 4-6 hours:  If on nasal continuous positive airway pressure, respiratory therapy assess nares and determine need for appliance change or resting period.   Outcome: Progressing     Problem: Safety - Adult  Goal: Free from fall injury  Outcome: Progressing     Problem: Nutrition Deficit:  Goal: Optimize nutritional status  Outcome: Progressing     Problem: Chronic Conditions and Co-morbidities  Goal: Patient's chronic conditions and co-morbidity symptoms are monitored and maintained or improved  Outcome: Progressing

## 2023-11-22 NOTE — CONSULTS
On: Kcal/kg  Weight Used for Energy Requirements: Current  Energy (kcal/day): 1682 (27 kcal/kg)  Weight Used for Protein Requirements: Current  Protein (g/day): 50 (0.8 g/kg; CKD)  Method Used for Fluid Requirements: 1 ml/kcal  Fluid (ml/day): 1682    Nutrition Diagnosis:   Predicted inadequate energy intake related to psychological cause or life stress as evidenced by poor intake prior to admission    Nutrition Interventions:   Food and/or Nutrient Delivery: Modify Current Diet, Start Oral Nutrition Supplement  Nutrition Education/Counseling: Education not indicated  Coordination of Nutrition Care: Continue to monitor while inpatient  Plan of Care discussed with: Pt    Goals:     Goals: Meet at least 75% of estimated needs       Nutrition Monitoring and Evaluation:   Behavioral-Environmental Outcomes: None Identified  Food/Nutrient Intake Outcomes: Food and Nutrient Intake, Supplement Intake  Physical Signs/Symptoms Outcomes: Biochemical Data, Nutrition Focused Physical Findings, Weight    Discharge Planning:     Too soon to determine     Milton Adorno RD

## 2023-11-23 VITALS
DIASTOLIC BLOOD PRESSURE: 68 MMHG | HEART RATE: 67 BPM | TEMPERATURE: 97.9 F | OXYGEN SATURATION: 98 % | HEIGHT: 63 IN | BODY MASS INDEX: 24.34 KG/M2 | WEIGHT: 137.4 LBS | RESPIRATION RATE: 18 BRPM | SYSTOLIC BLOOD PRESSURE: 123 MMHG

## 2023-11-23 LAB
ALBUMIN SERPL-MCNC: 3.6 G/DL (ref 3.4–4.8)
ALBUMIN/GLOB SERPL: 1.7 {RATIO} (ref 0.8–2)
ALP SERPL-CCNC: 102 U/L (ref 25–100)
ALT SERPL-CCNC: 14 U/L (ref 4–36)
ANION GAP SERPL CALCULATED.3IONS-SCNC: 10 MMOL/L (ref 3–16)
AST SERPL-CCNC: 18 U/L (ref 8–33)
BASOPHILS # BLD: 0 K/UL (ref 0–0.1)
BASOPHILS NFR BLD: 0.2 %
BILIRUB SERPL-MCNC: 0.3 MG/DL (ref 0.3–1.2)
BUN SERPL-MCNC: 18 MG/DL (ref 6–20)
CALCIUM SERPL-MCNC: 8.4 MG/DL (ref 8.5–10.5)
CHLORIDE SERPL-SCNC: 102 MMOL/L (ref 98–107)
CO2 SERPL-SCNC: 26 MMOL/L (ref 20–30)
CREAT SERPL-MCNC: 1.3 MG/DL (ref 0.4–1.2)
EOSINOPHIL # BLD: 0.1 K/UL (ref 0–0.4)
EOSINOPHIL NFR BLD: 1.1 %
ERYTHROCYTE [DISTWIDTH] IN BLOOD BY AUTOMATED COUNT: 14.1 % (ref 11–16)
GFR SERPLBLD CREATININE-BSD FMLA CKD-EPI: 44 ML/MIN/{1.73_M2}
GLOBULIN SER CALC-MCNC: 2.1 G/DL
GLUCOSE BLD-MCNC: 208 MG/DL (ref 74–106)
GLUCOSE SERPL-MCNC: 194 MG/DL (ref 74–106)
HCT VFR BLD AUTO: 29.6 % (ref 37–47)
HGB BLD-MCNC: 9.5 G/DL (ref 11.5–16.5)
IMM GRANULOCYTES # BLD: 0 K/UL
IMM GRANULOCYTES NFR BLD: 0.2 % (ref 0–5)
LYMPHOCYTES # BLD: 1 K/UL (ref 1.5–4)
LYMPHOCYTES NFR BLD: 22.5 %
MAGNESIUM SERPL-MCNC: 2.2 MG/DL (ref 1.7–2.4)
MCH RBC QN AUTO: 28.6 PG (ref 27–32)
MCHC RBC AUTO-ENTMCNC: 32.1 G/DL (ref 31–35)
MCV RBC AUTO: 89.2 FL (ref 80–100)
MONOCYTES # BLD: 0.4 K/UL (ref 0.2–0.8)
MONOCYTES NFR BLD: 9.9 %
NEUTROPHILS # BLD: 2.9 K/UL (ref 2–7.5)
NEUTS SEG NFR BLD: 66.1 %
PERFORMED ON: ABNORMAL
PLATELET # BLD AUTO: 153 K/UL (ref 150–400)
PMV BLD AUTO: 11.8 FL (ref 6–10)
POTASSIUM SERPL-SCNC: 4.2 MMOL/L (ref 3.4–5.1)
PROT SERPL-MCNC: 5.7 G/DL (ref 6.4–8.3)
RBC # BLD AUTO: 3.32 M/UL (ref 3.8–5.8)
SODIUM SERPL-SCNC: 138 MMOL/L (ref 136–145)
WBC # BLD AUTO: 4.4 K/UL (ref 4–11)

## 2023-11-23 PROCEDURE — 96366 THER/PROPH/DIAG IV INF ADDON: CPT

## 2023-11-23 PROCEDURE — 80053 COMPREHEN METABOLIC PANEL: CPT

## 2023-11-23 PROCEDURE — G0378 HOSPITAL OBSERVATION PER HR: HCPCS

## 2023-11-23 PROCEDURE — 6370000000 HC RX 637 (ALT 250 FOR IP): Performed by: PHYSICIAN ASSISTANT

## 2023-11-23 PROCEDURE — 94640 AIRWAY INHALATION TREATMENT: CPT

## 2023-11-23 PROCEDURE — 6360000002 HC RX W HCPCS: Performed by: PHYSICIAN ASSISTANT

## 2023-11-23 PROCEDURE — 36415 COLL VENOUS BLD VENIPUNCTURE: CPT

## 2023-11-23 PROCEDURE — 96368 THER/DIAG CONCURRENT INF: CPT

## 2023-11-23 PROCEDURE — 85025 COMPLETE CBC W/AUTO DIFF WBC: CPT

## 2023-11-23 PROCEDURE — 99238 HOSP IP/OBS DSCHRG MGMT 30/<: CPT | Performed by: PHYSICIAN ASSISTANT

## 2023-11-23 PROCEDURE — 83735 ASSAY OF MAGNESIUM: CPT

## 2023-11-23 RX ORDER — FLUCONAZOLE 100 MG/1
100 TABLET ORAL DAILY
Qty: 1 TABLET | Refills: 0 | Status: SHIPPED | OUTPATIENT
Start: 2023-11-24 | End: 2023-11-25

## 2023-11-23 RX ORDER — POTASSIUM CHLORIDE 750 MG/1
10 TABLET, EXTENDED RELEASE ORAL DAILY
Qty: 30 TABLET | Refills: 0 | Status: SHIPPED | OUTPATIENT
Start: 2023-11-23

## 2023-11-23 RX ADMIN — HYDROCODONE BITARTRATE AND ACETAMINOPHEN 1 TABLET: 5; 325 TABLET ORAL at 05:44

## 2023-11-23 RX ADMIN — BUDESONIDE 500 MCG: 0.5 INHALANT RESPIRATORY (INHALATION) at 05:53

## 2023-11-23 RX ADMIN — INSULIN LISPRO 1 UNITS: 100 INJECTION, SOLUTION INTRAVENOUS; SUBCUTANEOUS at 08:05

## 2023-11-23 RX ADMIN — ALOGLIPTIN 12.5 MG: 12.5 TABLET, FILM COATED ORAL at 08:05

## 2023-11-23 RX ADMIN — POLYETHYLENE GLYCOL 3350 17 G: 17 POWDER, FOR SOLUTION ORAL at 06:25

## 2023-11-23 RX ADMIN — PANTOPRAZOLE SODIUM 40 MG: 40 TABLET, DELAYED RELEASE ORAL at 05:45

## 2023-11-23 RX ADMIN — Medication 200 MG: at 09:51

## 2023-11-23 RX ADMIN — ASPIRIN 81 MG 81 MG: 81 TABLET ORAL at 08:05

## 2023-11-23 RX ADMIN — METOPROLOL SUCCINATE 25 MG: 25 TABLET, EXTENDED RELEASE ORAL at 08:04

## 2023-11-23 RX ADMIN — EMPAGLIFLOZIN 10 MG: 10 TABLET, FILM COATED ORAL at 08:05

## 2023-11-23 RX ADMIN — FLUCONAZOLE 100 MG: 100 TABLET ORAL at 08:04

## 2023-11-23 RX ADMIN — FERROUS SULFATE TAB EC 324 MG (65 MG FE EQUIVALENT) 324 MG: 324 (65 FE) TABLET DELAYED RESPONSE at 08:05

## 2023-11-23 ASSESSMENT — PAIN SCALES - GENERAL: PAINLEVEL_OUTOF10: 5

## 2023-11-23 ASSESSMENT — PAIN DESCRIPTION - DESCRIPTORS: DESCRIPTORS: ACHING

## 2023-11-23 ASSESSMENT — PAIN DESCRIPTION - LOCATION: LOCATION: BACK

## 2023-11-23 NOTE — DISCHARGE SUMMARY
Disposition: home  Discharged Condition: Stable  Activity: activity as tolerated  Diet: cardiac diet and diabetic diet  Follow Up: Primary Care Provider in 1 week  Patient to proceed with the following lab (cbc, bmp) in 1 week    Labs: For convenience and continuity at follow-up the following most recent labs are provided:       Lab Results   Component Value Date/Time    WBC 4.4 11/23/2023 04:19 AM    HGB 9.5 11/23/2023 04:19 AM    HCT 29.6 11/23/2023 04:19 AM     11/23/2023 04:19 AM          Lab Results   Component Value Date/Time     11/23/2023 04:19 AM    K 4.2 11/23/2023 04:19 AM     11/23/2023 04:19 AM    CO2 26 11/23/2023 04:19 AM    BUN 18 11/23/2023 04:19 AM    CREATININE 1.3 11/23/2023 04:19 AM          Lab Results   Component Value Date/Time    ALKPHOS 102 11/23/2023 04:19 AM    ALT 14 11/23/2023 04:19 AM    AST 18 11/23/2023 04:19 AM    PROT 5.7 11/23/2023 04:19 AM    BILITOT 0.3 11/23/2023 04:19 AM    LABALBU 3.6 11/23/2023 04:19 AM         Discharge Medications:     Current Discharge Medication List             Details   fluconazole (DIFLUCAN) 100 MG tablet Take 1 tablet by mouth daily for 1 dose  Qty: 1 tablet, Refills: 0      potassium chloride (KLOR-CON M) 10 MEQ extended release tablet Take 1 tablet by mouth daily  Qty: 30 tablet, Refills: 0                Details   umeclidinium-vilanterol (ANORO ELLIPTA) 62.5-25 MCG/ACT inhaler Inhale 1 puff into the lungs daily  Qty: 1 each, Refills: 0                Details   HYDROcodone-acetaminophen (NORCO) 5-325 MG per tablet Take 1 tablet by mouth 2 times daily as needed for Pain for up to 30 days.   Qty: 45 tablet, Refills: 0    Comments: Reduce doses taken as pain becomes manageable  Associated Diagnoses: Degenerative disc disease, lumbar      furosemide (LASIX) 20 MG tablet TAKE 1 TABLET BY MOUTH DAILY AS NEEDED FOR SWELLING  Qty: 30 tablet, Refills: 2      JARDIANCE 10 MG tablet TAKE 1 TABLET BY MOUTH DAILY  Qty: 30 tablet,

## 2023-11-23 NOTE — FLOWSHEET NOTE
11/23/23 0800   Assessment   Charting Type Shift assessment   Psychosocial   Psychosocial (WDL) WDL   Neurological   Neuro (WDL) WDL   Des Coma Scale   Eye Opening 4   Best Verbal Response 5   Best Motor Response 6   [place] Coma Scale Score 15   HEENT (Head, Ears, Eyes, Nose, & Throat)   HEENT (WDL) X   Right Eye Glasses   Left Eye Glasses   Teeth Dentures upper   Respiratory   Respiratory (WDL) X   Respiratory Pattern Regular   Respiratory Depth Normal   Respiratory Quality/Effort Unlabored   Chest Assessment Chest expansion symmetrical   L Breath Sounds Clear;Diminished   R Breath Sounds Clear;Diminished   Breath Sounds   Right Upper Lobe Clear;Diminished   Right Middle Lobe Clear;Diminished   Right Lower Lobe Diminished   Left Upper Lobe Clear;Diminished   Left Lower Lobe Diminished   Cardiac   Cardiac (WDL) X   Cardiac Regularity Irregular   Cardiac Rhythm Sinus rhythm   Cardiac Monitor   Cardiac/Telemetry Monitor On Portable telemetry pack applied   Telemetry Box Number [id number]   Alarm Audible Yes   Alarms Set Yes   Telemetry Monitor Alarm Parameters 50/120   Pacemaker   Pacemaker No   Gastrointestinal   Abdominal (WDL) X   Abdomen Inspection Flat   RUQ Bowel Sounds Active   LUQ Bowel Sounds Active   RLQ Bowel Sounds Active   LLQ Bowel Sounds Active   GI Symptoms Constipation   Genitourinary   Genitourinary (WDL) WDL   Peripheral Vascular   Peripheral Vascular (WDL) WDL   Edema None   Skin Integumentary    Skin Integumentary (WDL) X   Skin Color Pale   Skin Condition/Temp Dry; Warm   Skin Integrity Ecchymosis   Location scattered   Skin Fold Management No   Musculoskeletal   Musculoskeletal (WDL) WDL

## 2023-11-23 NOTE — PLAN OF CARE
Problem: Discharge Planning  Goal: Discharge to home or other facility with appropriate resources  11/23/2023 0504 by Leonel Lazo RN  Outcome: Progressing  11/22/2023 1739 by Cathy Hernandez RN  Outcome: Progressing  Flowsheets (Taken 11/22/2023 1119)  Discharge to home or other facility with appropriate resources:   Identify barriers to discharge with patient and caregiver   Identify discharge learning needs (meds, wound care, etc)     Problem: Pain  Goal: Verbalizes/displays adequate comfort level or baseline comfort level  11/22/2023 1739 by Cathy Hernandez RN  Outcome: Progressing     Problem: ABCDS Injury Assessment  Goal: Absence of physical injury  11/23/2023 0504 by Leonel Lazo RN  Outcome: Progressing  11/22/2023 1739 by Cathy Hernandez RN  Outcome: Progressing     Problem: Skin/Tissue Integrity  Goal: Absence of new skin breakdown  Description: 1. Monitor for areas of redness and/or skin breakdown  2. Assess vascular access sites hourly  3. Every 4-6 hours minimum:  Change oxygen saturation probe site  4. Every 4-6 hours:  If on nasal continuous positive airway pressure, respiratory therapy assess nares and determine need for appliance change or resting period.   11/23/2023 0504 by Leonel Lazo RN  Outcome: Progressing  11/22/2023 1739 by Cathy Hernandez RN  Outcome: Progressing 40.3

## 2023-11-23 NOTE — PLAN OF CARE
Problem: Discharge Planning  Goal: Discharge to home or other facility with appropriate resources  11/23/2023 1001 by Andre Herrera RN  Outcome: Completed  11/23/2023 0504 by Derrick Hylton RN  Outcome: Progressing     Problem: Pain  Goal: Verbalizes/displays adequate comfort level or baseline comfort level  Outcome: Completed     Problem: ABCDS Injury Assessment  Goal: Absence of physical injury  11/23/2023 1001 by Andre Herrera RN  Outcome: Completed  11/23/2023 0504 by Derrick Hylton RN  Outcome: Progressing     Problem: Skin/Tissue Integrity  Goal: Absence of new skin breakdown  Description: 1. Monitor for areas of redness and/or skin breakdown  2. Assess vascular access sites hourly  3. Every 4-6 hours minimum:  Change oxygen saturation probe site  4. Every 4-6 hours:  If on nasal continuous positive airway pressure, respiratory therapy assess nares and determine need for appliance change or resting period.   11/23/2023 1001 by Andre Herrera RN  Outcome: Completed  11/23/2023 0504 by Derrick Hylton RN  Outcome: Progressing     Problem: Safety - Adult  Goal: Free from fall injury  Outcome: Completed     Problem: Nutrition Deficit:  Goal: Optimize nutritional status  Outcome: Completed     Problem: Chronic Conditions and Co-morbidities  Goal: Patient's chronic conditions and co-morbidity symptoms are monitored and maintained or improved  Outcome: Completed
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
## 2023-11-23 NOTE — DISCHARGE INSTRUCTIONS
Disposition: home  Discharged Condition: Stable  Activity: activity as tolerated  Diet: cardiac diet and diabetic diet  Follow Up: Primary Care Provider in 1 week  Patient to proceed with the following lab (cbc, bmp) in 1 week

## 2023-11-24 NOTE — CARE COORDINATION
Patient states she is feeling better. She said medications were reviewed with her prior to leaving the hospital and she feels she has what she needs to take care of herself. Patient asked me to make appointment with PCP, which is closed today due to holiday. I will make appointment next week and contact patient, again. Patient also said she understood to not take Lasix until labs are taken. Patient said she does not \"got to town\" often, so she plans on having labs done the same day as her PCP appointment. She had no other questions at the time.

## 2023-11-27 ENCOUNTER — CARE COORDINATION (OUTPATIENT)
Dept: PRIMARY CARE CLINIC | Age: 73
End: 2023-11-27

## 2023-11-27 NOTE — CARE COORDINATION
Care Transitions Initial Follow Up Call    Call within 2 business days of discharge: Yes    Patient Current Location:  Home: [address] [place] [address]    Care Transition Nurse contacted the patient by telephone to perform post hospital discharge assessment. Verified name and  with patient as identifiers. Provided introduction to self, and explanation of the Care Transition Nurse role. Patient: Jorge Trevizo Patient : 1950   MRN: [id number]  Reason for Admission: weakness, hypokalemia  Discharge Date: 23 RARS: Readmission Risk Score: 18.5      Last Discharge Facility       Date Complaint Diagnosis Description Type Department Provider    23  Hypokalemia Admission (Discharged) George Joiner MD            Was this an external facility discharge? No Discharge Facility: [facility]    Challenges to be reviewed by the provider   Additional needs identified to be addressed with provider: No  none               Method of communication with provider: chart routing. Patient states she is doing ok, definitely better than before admission. No issues with medications. Daughter will be taking her to appointment tomorrow. Care Transition Nurse reviewed discharge instructions with patient who verbalized understanding. The patient was given an opportunity to ask questions and does not have any further questions or concerns at this time. Were discharge instructions available to patient? Yes. Reviewed appropriate site of care based on symptoms and resources available to patient including: PCP. The patient agrees to contact the PCP office for questions related to their healthcare. Advance Care Planning:   Does patient have an Advance Directive: not on file; education provided. Medication reconciliation was performed with patient, who verbalizes understanding of administration of home medications.  Medications reviewed, 1111F entered: N/A    Was patient discharged with a pulse

## 2023-11-28 ENCOUNTER — HOSPITAL ENCOUNTER (OUTPATIENT)
Facility: HOSPITAL | Age: 73
Discharge: HOME | End: 2023-11-28
Payer: MEDICARE

## 2023-11-28 ENCOUNTER — OFFICE VISIT (OUTPATIENT)
Dept: PRIMARY CARE CLINIC | Age: 73
End: 2023-11-28

## 2023-11-28 VITALS
RESPIRATION RATE: 18 BRPM | SYSTOLIC BLOOD PRESSURE: 136 MMHG | HEART RATE: 98 BPM | WEIGHT: 137 LBS | DIASTOLIC BLOOD PRESSURE: 62 MMHG | OXYGEN SATURATION: 99 % | BODY MASS INDEX: 24.27 KG/M2

## 2023-11-28 DIAGNOSIS — Z79.4 TYPE 2 DIABETES MELLITUS WITH DIABETIC NEUROPATHY, WITH LONG-TERM CURRENT USE OF INSULIN (HCC): ICD-10-CM

## 2023-11-28 DIAGNOSIS — E87.6 HYPOKALEMIA: ICD-10-CM

## 2023-11-28 DIAGNOSIS — R53.83 FATIGUE, UNSPECIFIED TYPE: ICD-10-CM

## 2023-11-28 DIAGNOSIS — I49.1 PAC (PREMATURE ATRIAL CONTRACTION): ICD-10-CM

## 2023-11-28 DIAGNOSIS — D50.9 IRON DEFICIENCY ANEMIA, UNSPECIFIED IRON DEFICIENCY ANEMIA TYPE: ICD-10-CM

## 2023-11-28 DIAGNOSIS — E11.40 TYPE 2 DIABETES MELLITUS WITH DIABETIC NEUROPATHY, WITH LONG-TERM CURRENT USE OF INSULIN (HCC): ICD-10-CM

## 2023-11-28 DIAGNOSIS — I10 ESSENTIAL HYPERTENSION: ICD-10-CM

## 2023-11-28 DIAGNOSIS — K74.60 CIRRHOSIS OF LIVER WITHOUT ASCITES, UNSPECIFIED HEPATIC CIRRHOSIS TYPE (HCC): ICD-10-CM

## 2023-11-28 DIAGNOSIS — E53.8 VITAMIN B12 DEFICIENCY: ICD-10-CM

## 2023-11-28 DIAGNOSIS — Z09 HOSPITAL DISCHARGE FOLLOW-UP: ICD-10-CM

## 2023-11-28 DIAGNOSIS — E87.6 HYPOKALEMIA: Primary | ICD-10-CM

## 2023-11-28 LAB
ANION GAP SERPL CALCULATED.3IONS-SCNC: 19 MMOL/L (ref 3–16)
BUN SERPL-MCNC: 13 MG/DL (ref 6–20)
CALCIUM SERPL-MCNC: 9.4 MG/DL (ref 8.5–10.5)
CHLORIDE SERPL-SCNC: 97 MMOL/L (ref 98–107)
CO2 SERPL-SCNC: 23 MMOL/L (ref 20–30)
CREAT SERPL-MCNC: 1.3 MG/DL (ref 0.4–1.2)
EKG ATRIAL RATE: 136 BPM
EKG DIAGNOSIS: NORMAL
EKG Q-T INTERVAL: 356 MS
EKG QRS DURATION: 86 MS
EKG QTC CALCULATION (BAZETT): 484 MS
EKG R AXIS: -30 DEGREES
EKG T AXIS: 143 DEGREES
EKG VENTRICULAR RATE: 111 BPM
ERYTHROCYTE [DISTWIDTH] IN BLOOD BY AUTOMATED COUNT: 14.6 % (ref 11–16)
GFR SERPLBLD CREATININE-BSD FMLA CKD-EPI: 44 ML/MIN/{1.73_M2}
GLUCOSE SERPL-MCNC: 200 MG/DL (ref 74–106)
HCT VFR BLD AUTO: 33.3 % (ref 37–47)
HGB BLD-MCNC: 10.9 G/DL (ref 11.5–16.5)
MCH RBC QN AUTO: 29.1 PG (ref 27–32)
MCHC RBC AUTO-ENTMCNC: 32.7 G/DL (ref 31–35)
MCV RBC AUTO: 89 FL (ref 80–100)
PLATELET # BLD AUTO: 189 K/UL (ref 150–400)
PMV BLD AUTO: 11.9 FL (ref 6–10)
POTASSIUM SERPL-SCNC: 3.4 MMOL/L (ref 3.4–5.1)
RBC # BLD AUTO: 3.74 M/UL (ref 3.8–5.8)
SODIUM SERPL-SCNC: 139 MMOL/L (ref 136–145)
WBC # BLD AUTO: 5.5 K/UL (ref 4–11)

## 2023-11-28 PROCEDURE — 85027 COMPLETE CBC AUTOMATED: CPT

## 2023-11-28 PROCEDURE — 80048 BASIC METABOLIC PNL TOTAL CA: CPT

## 2023-11-28 RX ORDER — CYANOCOBALAMIN 1000 UG/ML
1000 INJECTION, SOLUTION INTRAMUSCULAR; SUBCUTANEOUS ONCE
Status: COMPLETED | OUTPATIENT
Start: 2023-11-28 | End: 2023-11-28

## 2023-11-28 RX ADMIN — CYANOCOBALAMIN 1000 MCG: 1000 INJECTION, SOLUTION INTRAMUSCULAR; SUBCUTANEOUS at 13:29

## 2023-11-28 NOTE — PROGRESS NOTES
Chief Complaint   Patient presents with    Follow-Up from Hospital       Have you seen any other physician or provider since your last visit yes - mw     Have you had any other diagnostic tests since your last visit? yes -     Have you changed or stopped any medications since your last visit?  yes -  looks like raissa sent potassium and anoro but pt states she ddnt get it

## 2023-11-29 ENCOUNTER — TRANSCRIBE ORDERS (OUTPATIENT)
Dept: ADMINISTRATIVE | Age: 73
End: 2023-11-29

## 2023-11-29 DIAGNOSIS — R53.83 FATIGUE, UNSPECIFIED TYPE: Primary | ICD-10-CM

## 2023-11-29 DIAGNOSIS — I49.1 PAC (PREMATURE ATRIAL CONTRACTION): ICD-10-CM

## 2023-11-30 ENCOUNTER — HOSPITAL ENCOUNTER (OUTPATIENT)
Dept: RESPIRATORY THERAPY | Facility: HOSPITAL | Age: 73
Discharge: HOME | End: 2023-11-30
Payer: MEDICARE

## 2023-11-30 DIAGNOSIS — I49.1 PAC (PREMATURE ATRIAL CONTRACTION): ICD-10-CM

## 2023-11-30 DIAGNOSIS — R53.83 FATIGUE, UNSPECIFIED TYPE: ICD-10-CM

## 2023-11-30 PROCEDURE — 93225 XTRNL ECG REC<48 HRS REC: CPT

## 2023-11-30 NOTE — THERAPY TREATMENT NOTE
Mom sent an email saying she cleaned out her voicemail and that I can call her again and leave referral information in a voicemail.      Called mom and left Gary Mims's contact information in voicemail.  PRABHJOT and can sign encounter to close.       Patient Name: Karol Lopez  : 1950    MRN: [id number]                              Today's Date: 3/2/2021       Admit Date: 2021    Visit Dx:     ICD-10-CM ICD-9-CM   1. Dysphagia due to recent stroke  I69.391 438.82   2. Absent gag reflex  R29.2 796.1   3. Weakness  R53.1 780.79   4. Facial droop  R29.810 781.94   5. Right pontine stroke (CMS/[facility])  I63.50 434.91   6. Essential hypertension  I10 401.9     Patient Active Problem List   Diagnosis   • Coronary artery disease   • Osteoarthritis   • Anxiety   • COPD (chronic obstructive pulmonary disease) (CMS/[facility])   • Right pontine stroke (CMS/[facility])   • Acid reflux   • Essential hypertension   • Heart attack (CMS/[facility])   • Vitamin B12 deficiency   • Tobacco abuse   • TIA (transient ischemic attack)   • Recent Bilateral K[facility] Repl[facility] (10/18/16) [facility]   • Diabetes (CMS/[facility])   • Impaired mobility and ADLs   • Breast mass   • Dysphagia due to recent stroke   • Type 2 diabetes mellitus, with long-term current use of insulin (CMS/[facility])   • Acute renal failure (ARF) (CMS/[facility])     Past Medical History:   Diagnosis Date   • Abdominal pain    • Acid reflux    • Anxiety    • Bruises easily    • Cataracts, bilateral    • Constipation    • COPD (chronic obstructive pulmonary disease) (CMS/[facility])    • Coronary artery disease    • CTS (carpal tunnel syndrome)    • Diabetes (CMS/[facility])    • Elevated cholesterol    • Hearing loss    • Heart attack (CMS/[facility])     s/p stents   • Hypercholesteremia    • Hypertension    • Impaired functional mobility, balance, gait, and endurance    • Lower back pain    • Osteoarthritis    • Piercing     ears only   • Stroke (CMS/[facility])     -weak in right arm   • Tattoos     x2   • Tobacco abuse    • Tumor     in between breast closer to right breast   • Vitamin B12 deficiency    • Wears dentures     upper only   • Wears glasses      Past Surgical History:   Procedure Laterality Date   • BREAST BIOPSY Right 5/10/2019     Procedure: BREAST BIOPSY RIGHT;  Surgeon: Kiana Frey MD;  Location[facility];  Service: General   • CARPAL TUNNEL RELEASE Bilateral    • CHOLECYSTECTOMY     • CORONARY ANGIOPLASTY WITH STENT PLACEMENT  2012   • HYSTERECTOMY      complete   • JOINT REPLACEMENT Bilateral     knee replacements   • TOTAL KNEE ARTHROPLASTY Bilateral 10/18/2016    MAUREEN Palomares MD     General Information     Row Name 03/02/21 1152          Physical Therapy Time and Intention    Document Type  therapy note (daily note)  -TW     Mode of Treatment  physical therapy  -TW     Row Name 03/02/21 1152          General Information    Patient Profile Reviewed  yes  -TW     Existing Precautions/Restrictions  fall  -TW     Barriers to Rehab  medically complex speech difficulties due to prior CVA  -TW     Row Name 03/02/21 1152          Cognition    Orientation Status (Cognition)  oriented x 4  -TW     Row Name 03/02/21 1152          Safety Issues, Functional Mobility    Safety Issues Affecting Function (Mobility)  safety precaution awareness;safety precautions follow-through/compliance  -TW     Impairments Affecting Function (Mobility)  balance;endurance/activity tolerance  -TW       User Key  (r) = Recorded By, (t) = Taken By, (c) = Cosigned By    Initials Name Provider Type    TW Tatyana Hicks, PT Physical Therapist        Mobility     Row Name 03/02/21 1152          Bed Mobility    Bed Mobility  bed mobility (all) activities  -TW     Supine-Sit Pratt (Bed Mobility)  independent  -TW     Row Name 03/02/21 1152          Transfers    Comment (Transfers)  Pt demonstrated good safety awareness with transfers and verbalized understanding of not getting up without assist.  -TW     Row Name 03/02/21 1152          Bed-Chair Transfer    Bed-Chair Pratt (Transfers)  standby assist  -TW     Row Name 03/02/21 1152          Sit-Stand Transfer    Sit-Stand Pratt (Transfers)  modified independence  -TW     Assistive Device (Sit-Stand  Transfers)  -- gt belt in use  -TW     Row Name 03/02/21 1152          Gait/Stairs (Locomotion)    [place] Level (Gait)  contact guard  -TW     Assistive Device (Gait)  -- gt belt and HHA  -TW     Distance in Feet (Gait)  220 ft  -TW     Deviations/Abnormal Patterns (Gait)  stride length decreased;base of support, wide  -TW     Left Sided Gait Deviations  heel strike decreased  -TW       User Key  (r) = Recorded By, (t) = Taken By, (c) = Cosigned By    Initials Name Provider Type    Tatyana Zhao PT Physical Therapist        Obj/Interventions     Row Name 03/02/21 1152          Balance    Balance Assessment  sitting static balance;sitting dynamic balance;standing static balance;standing dynamic balance  -TW     Static Sitting Balance  WFL;unsupported  -TW     Dynamic Sitting Balance  WFL;unsupported  -TW     Static Standing Balance  WFL;unsupported  -TW     Dynamic Standing Balance  mild impairment;supported  -TW       User Key  (r) = Recorded By, (t) = Taken By, (c) = Cosigned By    Initials Name Provider Type    Tatyana Zhao PT Physical Therapist        Goals/Plan    No documentation.       Clinical Impression     Row Name 03/02/21 1152          Pain    Additional Documentation  Pain Scale: Numbers Pre/Post-Treatment (Group)  -TW     Row Name 03/02/21 1152          Pain Scale: Numbers Pre/Post-Treatment    Pretreatment Pain Rating  0/10 - no pain  -TW     Posttreatment Pain Rating  0/10 - no pain  -TW     Row Name 03/02/21 1152          Plan of Care Review    Plan of Care Reviewed With  patient  -TW     Outcome Summary  PT treatment completed this date with pt showing improvement in her gait endurance ambulating 220 ft with HHA. Pt continues to demonstrate good safety awareness with her mobility. Continue current PT POC.  -TW     Row Name 03/02/21 1152          Vital Signs    Pretreatment Heart Rate (beats/min)  60  -TW     Posttreatment Heart Rate (beats/min)  58  -TW     Pre SpO2 (%)  96  -TW      O2 Delivery Pre Treatment  room air  -TW     O2 Delivery Intra Treatment  room air  -TW     Post SpO2 (%)  98  -TW     O2 Delivery Post Treatment  room air  -TW     Pre Patient Position  Supine  -TW     Intra Patient Position  Standing  -TW     Post Patient Position  Sitting  -TW     Row Name 03/02/21 1152          Positioning and Restraints    Pre-Treatment Position  in bed  -TW     Post Treatment Position  chair  -TW     In Chair  sitting;call light within reach;encouraged to call for assist lunch tray set up for pt  -TW       User Key  (r) = Recorded By, (t) = Taken By, (c) = Cosigned By    Initials Name Provider Type    Tatyana Zhao, BRONSON Physical Therapist        Outcome Measures     Row Name 03/02/21 1152          How much help from another person do you currently need...    Turning from your back to your side while in flat bed without using bedrails?  4  -TW     Moving from lying on back to sitting on the side of a flat bed without bedrails?  4  -TW     Moving to and from a bed to a chair (including a wheelchair)?  4  -TW     Standing up from a chair using your arms (e.g., wheelchair, bedside chair)?  4  -TW     Climbing 3-5 steps with a railing?  3  -TW     To walk in hospital room?  3  -TW     AM-PAC 6 Clicks Score (PT)  22  -TW     Row Name 03/02/21 1152          Functional Assessment    Outcome Measure Options  AM-PAC 6 Clicks Basic Mobility (PT)  -TW       User Key  (r) = Recorded By, (t) = Taken By, (c) = Cosigned By    Initials Name Provider Type    Tatyana Zhao PT Physical Therapist        Physical Therapy Education                 Title: PT OT SLP Therapies (In Progress)     Topic: Physical Therapy (In Progress)     Point: Mobility training (Done)     Learning Progress Summary           Patient Acceptance, E,D, DU by RAJWINDER at 3/2/2021 1152    Comment: Pt education for wt shifting technique during gait to improve steppign with RLE.    Acceptance, E,TB, VU by  at 3/1/2021 1840    Comment: Role of  PT per POC                   Point: Home exercise program (Not Started)     Learner Progress:  Not documented in this visit.          Point: Body mechanics (Not Started)     Learner Progress:  Not documented in this visit.          Point: Precautions (Not Started)     Learner Progress:  Not documented in this visit.                      User Key     Initials Effective Dates Name Provider Type [facility] 04/03/18 -  Diila Palmer PT Physical Therapist PT    TW 03/26/19 -  Tatyana Hicks PT Physical Therapist PT              PT Recommendation and Plan     Plan of Care Reviewed With: patient  Outcome Summary: PT treatment completed this date with pt showing improvement in her gait endurance ambulating 220 ft with HHA. Pt continues to demonstrate good safety awareness with her mobility. Continue current PT POC.     Time Calculation:   PT Charges     Row Name 03/02/21 1152             Time Calculation    Start Time  1131  -TW      Stop Time  1152  -TW      Time Calculation (min)  21 min  -TW      PT Received On  03/02/21  -TW         Timed Charges    52707 - Gait Training Minutes   21  -TW        User Key  (r) = Recorded By, (t) = Taken By, (c) = Cosigned By    Initials Name Provider Type    TW Tatyana Hicks PT Physical Therapist        Therapy Charges for Today     Code Description Service Date Service Provider Modifiers Qty    [id number] HC GAIT TRAINING EA 15 MIN 3/2/2021 Tatyana Hicks PT GP 1          PT G-Codes  Outcome Measure Options: AM-PAC 6 Clicks Basic Mobility (PT)  AM-PAC 6 Clicks Score (PT): 22  AM-PAC 6 Clicks Score (OT): 24    Tatyana Hicks PT  3/2/2021

## 2023-12-03 ASSESSMENT — ENCOUNTER SYMPTOMS
EYE DISCHARGE: 0
NAUSEA: 0
BACK PAIN: 1
COUGH: 0
ABDOMINAL PAIN: 0
WHEEZING: 0
SHORTNESS OF BREATH: 0
SORE THROAT: 0
SINUS PRESSURE: 0
VOMITING: 0

## 2023-12-07 DIAGNOSIS — M51.36 DEGENERATIVE DISC DISEASE, LUMBAR: ICD-10-CM

## 2023-12-07 RX ORDER — HYDROCODONE BITARTRATE AND ACETAMINOPHEN 5; 325 MG/1; MG/1
1 TABLET ORAL 2 TIMES DAILY PRN
Qty: 45 TABLET | Refills: 0 | Status: SHIPPED | OUTPATIENT
Start: 2023-12-07 | End: 2024-01-06

## 2023-12-12 ENCOUNTER — TELEPHONE (OUTPATIENT)
Dept: CARDIOLOGY | Facility: CLINIC | Age: 73
End: 2023-12-12
Payer: MEDICARE

## 2023-12-12 DIAGNOSIS — I48.19 PERSISTENT ATRIAL FIBRILLATION: Primary | ICD-10-CM

## 2023-12-12 PROBLEM — I48.91 ATRIAL FIBRILLATION: Status: ACTIVE | Noted: 2023-12-12

## 2023-12-12 NOTE — TELEPHONE ENCOUNTER
She is not a candidate for anticoagulation due to chronic GI blood loss anemia.    Increase metoprolol dose to 50 mg daily, probably needs further increase to 100 mg daily if BP tolerates.    Obtain echocardiogram    H. C. Fidencio Celestin MD [facility], [facility]  Interventional and General Cardiology

## 2023-12-13 DIAGNOSIS — R53.83 OTHER FATIGUE: Primary | ICD-10-CM

## 2023-12-13 DIAGNOSIS — I48.19 PERSISTENT ATRIAL FIBRILLATION: ICD-10-CM

## 2023-12-29 ENCOUNTER — CARE COORDINATION (OUTPATIENT)
Dept: PRIMARY CARE CLINIC | Age: 73
End: 2023-12-29

## 2023-12-31 ENCOUNTER — HOSPITAL ENCOUNTER (EMERGENCY)
Facility: HOSPITAL | Age: 73
Discharge: LEFT BEFORE DISCHARGE | End: 2023-12-31

## 2024-01-02 ENCOUNTER — OFFICE VISIT (OUTPATIENT)
Dept: PRIMARY CARE CLINIC | Age: 74
End: 2024-01-02
Payer: MEDICARE

## 2024-01-02 ENCOUNTER — HOSPITAL ENCOUNTER (OUTPATIENT)
Facility: HOSPITAL | Age: 74
Discharge: HOME | End: 2024-01-02
Payer: MEDICARE

## 2024-01-02 VITALS
OXYGEN SATURATION: 99 % | HEART RATE: 64 BPM | TEMPERATURE: 97.3 F | SYSTOLIC BLOOD PRESSURE: 164 MMHG | DIASTOLIC BLOOD PRESSURE: 68 MMHG

## 2024-01-02 DIAGNOSIS — I10 ESSENTIAL HYPERTENSION: ICD-10-CM

## 2024-01-02 DIAGNOSIS — R53.1 GENERALIZED WEAKNESS: ICD-10-CM

## 2024-01-02 DIAGNOSIS — D63.1 ANEMIA DUE TO STAGE 3 CHRONIC KIDNEY DISEASE, UNSPECIFIED WHETHER STAGE 3A OR 3B CKD (HCC): ICD-10-CM

## 2024-01-02 DIAGNOSIS — R53.1 GENERALIZED WEAKNESS: Primary | ICD-10-CM

## 2024-01-02 DIAGNOSIS — N18.30 ANEMIA DUE TO STAGE 3 CHRONIC KIDNEY DISEASE, UNSPECIFIED WHETHER STAGE 3A OR 3B CKD (HCC): ICD-10-CM

## 2024-01-02 LAB
APPEARANCE FLUID: NORMAL
BILIRUBIN, POC: NORMAL
BLOOD URINE, POC: NORMAL
CLARITY, POC: NORMAL
COLOR, POC: YELLOW
GLUCOSE URINE, POC: NORMAL
INFLUENZA A ANTIBODY: NEGATIVE
INFLUENZA B ANTIBODY: NEGATIVE
KETONES, POC: NORMAL
LEUKOCYTE EST, POC: NORMAL
Lab: NORMAL
NITRITE, POC: NORMAL
PH, POC: 5.5
PROTEIN, POC: NORMAL
QC PASS/FAIL: NORMAL
SARS-COV-2 RDRP RESP QL NAA+PROBE: NEGATIVE
SPECIFIC GRAVITY, POC: 1.02
UROBILINOGEN, POC: NORMAL

## 2024-01-02 PROCEDURE — G8399 PT W/DXA RESULTS DOCUMENT: HCPCS | Performed by: NURSE PRACTITIONER

## 2024-01-02 PROCEDURE — G8427 DOCREV CUR MEDS BY ELIG CLIN: HCPCS | Performed by: NURSE PRACTITIONER

## 2024-01-02 PROCEDURE — 3077F SYST BP >= 140 MM HG: CPT | Performed by: NURSE PRACTITIONER

## 2024-01-02 PROCEDURE — G8484 FLU IMMUNIZE NO ADMIN: HCPCS | Performed by: NURSE PRACTITIONER

## 2024-01-02 PROCEDURE — 83735 ASSAY OF MAGNESIUM: CPT

## 2024-01-02 PROCEDURE — 3078F DIAST BP <80 MM HG: CPT | Performed by: NURSE PRACTITIONER

## 2024-01-02 PROCEDURE — 4004F PT TOBACCO SCREEN RCVD TLK: CPT | Performed by: NURSE PRACTITIONER

## 2024-01-02 PROCEDURE — 85025 COMPLETE CBC W/AUTO DIFF WBC: CPT

## 2024-01-02 PROCEDURE — 1090F PRES/ABSN URINE INCON ASSESS: CPT | Performed by: NURSE PRACTITIONER

## 2024-01-02 PROCEDURE — 36415 COLL VENOUS BLD VENIPUNCTURE: CPT

## 2024-01-02 PROCEDURE — 3017F COLORECTAL CA SCREEN DOC REV: CPT | Performed by: NURSE PRACTITIONER

## 2024-01-02 PROCEDURE — 1123F ACP DISCUSS/DSCN MKR DOCD: CPT | Performed by: NURSE PRACTITIONER

## 2024-01-02 PROCEDURE — 80053 COMPREHEN METABOLIC PANEL: CPT

## 2024-01-02 PROCEDURE — G8420 CALC BMI NORM PARAMETERS: HCPCS | Performed by: NURSE PRACTITIONER

## 2024-01-02 PROCEDURE — 81002 URINALYSIS NONAUTO W/O SCOPE: CPT | Performed by: NURSE PRACTITIONER

## 2024-01-02 PROCEDURE — 99213 OFFICE O/P EST LOW 20 MIN: CPT | Performed by: NURSE PRACTITIONER

## 2024-01-02 NOTE — PROGRESS NOTES
SUBJECTIVE:    Patient ID: Maude Corrales is a 73 y.o.female.    Chief Complaint   Patient presents with    Extremity Weakness     Pt reports the weakness started Friday and has continued, pt also has no appetite.     Fatigue         HPI:    Patient presents to clinic with worsening generalized weakness and fatigue 3 days.  Patient reports decreased appetite and just not feeling well.  Having urine output.  No fever, chills, body aches.  Reports she thinks she needs her blood checked because sometimes her potassium is low or her blood/iron.  No sick contacts.  No swelling.  No chest pain.  No palpitations.  No syncope.  No relieving or worsening factors.  Does not check glucose at home.  No treatments.    Patient's medications, allergies, past medical, surgical, social and family histories were reviewed and updated as appropriate in electronic medical record.        Outpatient Medications Marked as Taking for the 1/2/24 encounter (Office Visit) with Taryn Duarte APRN - CNP   Medication Sig Dispense Refill    linagliptin (TRADJENTA) 5 MG tablet TAKE 1 TABLET BY MOUTH EVERY DAY 90 tablet 0    HYDROcodone-acetaminophen (NORCO) 5-325 MG per tablet Take 1 tablet by mouth 2 times daily as needed for Pain for up to 30 days. 45 tablet 0    furosemide (LASIX) 20 MG tablet TAKE 1 TABLET BY MOUTH DAILY AS NEEDED FOR SWELLING 30 tablet 2    JARDIANCE 10 MG tablet TAKE 1 TABLET BY MOUTH DAILY 30 tablet 3    cyanocobalamin 1000 MCG/ML injection Inject 1 mL into the muscle every 14 days 2 mL 10    aspirin 81 MG chewable tablet CHEW AND SWALLOW 1 TABLET BY MOUTH DAILY 90 tablet 1    hydrALAZINE (APRESOLINE) 25 MG tablet TAKE 1 TABLET BY MOUTH TWICE DAILY AS NEEDED FOR SYSTOLIC BLOOD PRESSURE OVER 165 180 tablet 1    metFORMIN (GLUCOPHAGE) 1000 MG tablet TAKE 1 TABLET BY MOUTH TWICE DAILY WITH FOOD 180 tablet 2    metoprolol succinate (TOPROL XL) 25 MG extended release tablet Take 1 tablet by mouth daily      pantoprazole

## 2024-01-02 NOTE — PROGRESS NOTES
Chief Complaint   Patient presents with    Extremity Weakness     Pt reports the weakness started Friday and has continued, pt also has no appetite.     Fatigue       Have you seen any other physician or provider since your last visit no    Have you had any other diagnostic tests since your last visit? no    Have you changed or stopped any medications since your last visit? no

## 2024-01-03 LAB
ALBUMIN SERPL-MCNC: 4 G/DL (ref 3.4–4.8)
ALBUMIN/GLOB SERPL: 2 {RATIO} (ref 0.8–2)
ALP SERPL-CCNC: 123 U/L (ref 25–100)
ALT SERPL-CCNC: 9 U/L (ref 4–36)
ANION GAP SERPL CALCULATED.3IONS-SCNC: 14 MMOL/L (ref 3–16)
AST SERPL-CCNC: 19 U/L (ref 8–33)
BASOPHILS # BLD: 0 K/UL (ref 0–0.1)
BASOPHILS NFR BLD: 0.4 %
BILIRUB SERPL-MCNC: 0.3 MG/DL (ref 0.3–1.2)
BUN SERPL-MCNC: 11 MG/DL (ref 6–20)
CALCIUM SERPL-MCNC: 9.1 MG/DL (ref 8.5–10.5)
CHLORIDE SERPL-SCNC: 104 MMOL/L (ref 98–107)
CO2 SERPL-SCNC: 22 MMOL/L (ref 20–30)
CREAT SERPL-MCNC: 1.1 MG/DL (ref 0.4–1.2)
EOSINOPHIL # BLD: 0.1 K/UL (ref 0–0.4)
EOSINOPHIL NFR BLD: 1.3 %
ERYTHROCYTE [DISTWIDTH] IN BLOOD BY AUTOMATED COUNT: 15.8 % (ref 11–16)
GFR SERPLBLD CREATININE-BSD FMLA CKD-EPI: 53 ML/MIN/{1.73_M2}
GLOBULIN SER CALC-MCNC: 2 G/DL
GLUCOSE SERPL-MCNC: 125 MG/DL (ref 74–106)
HCT VFR BLD AUTO: 29.6 % (ref 37–47)
HGB BLD-MCNC: 9.3 G/DL (ref 11.5–16.5)
IMM GRANULOCYTES # BLD: 0 K/UL
IMM GRANULOCYTES NFR BLD: 0.4 % (ref 0–5)
LYMPHOCYTES # BLD: 0.9 K/UL (ref 1.5–4)
LYMPHOCYTES NFR BLD: 18.9 %
MAGNESIUM SERPL-MCNC: 2.1 MG/DL (ref 1.7–2.4)
MCH RBC QN AUTO: 27.4 PG (ref 27–32)
MCHC RBC AUTO-ENTMCNC: 31.4 G/DL (ref 31–35)
MCV RBC AUTO: 87.3 FL (ref 80–100)
MONOCYTES # BLD: 0.5 K/UL (ref 0.2–0.8)
MONOCYTES NFR BLD: 9.9 %
NEUTROPHILS # BLD: 3.2 K/UL (ref 2–7.5)
NEUTS SEG NFR BLD: 69.1 %
PLATELET # BLD AUTO: 165 K/UL (ref 150–400)
PMV BLD AUTO: 12.7 FL (ref 6–10)
POTASSIUM SERPL-SCNC: 3.8 MMOL/L (ref 3.4–5.1)
PROT SERPL-MCNC: 6 G/DL (ref 6.4–8.3)
RBC # BLD AUTO: 3.39 M/UL (ref 3.8–5.8)
SODIUM SERPL-SCNC: 140 MMOL/L (ref 136–145)
WBC # BLD AUTO: 4.7 K/UL (ref 4–11)

## 2024-01-04 DIAGNOSIS — M51.36 DEGENERATIVE DISC DISEASE, LUMBAR: ICD-10-CM

## 2024-01-04 RX ORDER — HYDROCODONE BITARTRATE AND ACETAMINOPHEN 5; 325 MG/1; MG/1
1 TABLET ORAL 2 TIMES DAILY PRN
Qty: 45 TABLET | Refills: 0 | Status: SHIPPED | OUTPATIENT
Start: 2024-01-04 | End: 2024-02-03

## 2024-01-17 RX ORDER — FUROSEMIDE 20 MG/1
TABLET ORAL
Qty: 30 TABLET | Refills: 2 | Status: SHIPPED | OUTPATIENT
Start: 2024-01-17

## 2024-01-17 RX ORDER — HYDRALAZINE HYDROCHLORIDE 25 MG/1
TABLET, FILM COATED ORAL
Qty: 180 TABLET | Refills: 1 | Status: SHIPPED | OUTPATIENT
Start: 2024-01-17

## 2024-01-17 RX ORDER — ASPIRIN 81 MG/1
TABLET, CHEWABLE ORAL
Qty: 90 TABLET | Refills: 1 | Status: SHIPPED | OUTPATIENT
Start: 2024-01-17

## 2024-02-05 DIAGNOSIS — M51.36 DEGENERATIVE DISC DISEASE, LUMBAR: ICD-10-CM

## 2024-02-05 RX ORDER — HYDROCODONE BITARTRATE AND ACETAMINOPHEN 5; 325 MG/1; MG/1
1 TABLET ORAL 2 TIMES DAILY PRN
Qty: 45 TABLET | Refills: 0 | Status: SHIPPED | OUTPATIENT
Start: 2024-02-05 | End: 2024-03-06

## 2024-02-08 ENCOUNTER — APPOINTMENT (OUTPATIENT)
Dept: GENERAL RADIOLOGY | Facility: HOSPITAL | Age: 74
End: 2024-02-08
Payer: MEDICARE

## 2024-02-08 ENCOUNTER — APPOINTMENT (OUTPATIENT)
Dept: CT IMAGING | Facility: HOSPITAL | Age: 74
End: 2024-02-08
Payer: MEDICARE

## 2024-02-08 ENCOUNTER — HOSPITAL ENCOUNTER (OUTPATIENT)
Facility: HOSPITAL | Age: 74
Setting detail: OBSERVATION
Discharge: HOME | End: 2024-02-10
Attending: EMERGENCY MEDICINE | Admitting: INTERNAL MEDICINE
Payer: MEDICARE

## 2024-02-08 DIAGNOSIS — R13.12 OROPHARYNGEAL DYSPHAGIA: ICD-10-CM

## 2024-02-08 DIAGNOSIS — Z86.73 HISTORY OF STROKE: ICD-10-CM

## 2024-02-08 DIAGNOSIS — R29.90 STROKE-LIKE SYMPTOMS: ICD-10-CM

## 2024-02-08 DIAGNOSIS — I65.29 STENOSIS OF CAROTID ARTERY, UNSPECIFIED LATERALITY: ICD-10-CM

## 2024-02-08 DIAGNOSIS — R41.841 COGNITIVE COMMUNICATION DISORDER: ICD-10-CM

## 2024-02-08 DIAGNOSIS — I65.23 BILATERAL CAROTID ARTERY STENOSIS: ICD-10-CM

## 2024-02-08 DIAGNOSIS — D64.9 ANEMIA, UNSPECIFIED TYPE: ICD-10-CM

## 2024-02-08 DIAGNOSIS — R73.9 HYPERGLYCEMIA: Primary | ICD-10-CM

## 2024-02-08 PROBLEM — K74.60 CIRRHOSIS OF LIVER: Status: ACTIVE | Noted: 2024-02-08

## 2024-02-08 PROBLEM — I77.9 CAROTID ARTERY DISEASE: Status: ACTIVE | Noted: 2024-02-08

## 2024-02-08 PROBLEM — E11.65 HYPERGLYCEMIA DUE TO DIABETES MELLITUS: Status: ACTIVE | Noted: 2024-02-08

## 2024-02-08 PROBLEM — R41.82 AMS (ALTERED MENTAL STATUS): Status: ACTIVE | Noted: 2024-02-08

## 2024-02-08 LAB
ALBUMIN SERPL-MCNC: 3.7 G/DL (ref 3.5–5.2)
ALBUMIN/GLOB SERPL: 1.6 G/DL
ALP SERPL-CCNC: 105 U/L (ref 39–117)
ALT SERPL W P-5'-P-CCNC: 17 U/L (ref 1–33)
AMMONIA BLD-SCNC: 19 UMOL/L (ref 11–51)
AMPHET+METHAMPHET UR QL: NEGATIVE
AMPHETAMINES UR QL: NEGATIVE
ANION GAP SERPL CALCULATED.3IONS-SCNC: 14 MMOL/L (ref 5–15)
APAP SERPL-MCNC: <5 MCG/ML (ref 0–30)
APTT PPP: 26.7 SECONDS (ref 22–39)
AST SERPL-CCNC: 20 U/L (ref 1–32)
ATMOSPHERIC PRESS: ABNORMAL MM[HG]
B PARAPERT DNA SPEC QL NAA+PROBE: NOT DETECTED
B PERT DNA SPEC QL NAA+PROBE: NOT DETECTED
B-OH-BUTYR SERPL-SCNC: 0.26 MMOL/L (ref 0.02–0.27)
BARBITURATES UR QL SCN: NEGATIVE
BASE EXCESS BLDV CALC-SCNC: 0.3 MMOL/L (ref -2–2)
BASOPHILS # BLD AUTO: 0.02 10*3/MM3 (ref 0–0.2)
BASOPHILS NFR BLD AUTO: 0.5 % (ref 0–1.5)
BDY SITE: ABNORMAL
BENZODIAZ UR QL SCN: NEGATIVE
BILIRUB SERPL-MCNC: 0.4 MG/DL (ref 0–1.2)
BILIRUB UR QL STRIP: NEGATIVE
BODY TEMPERATURE: 37
BUN BLDA-MCNC: 23 MG/DL (ref 8–26)
BUN SERPL-MCNC: 20 MG/DL (ref 8–23)
BUN/CREAT SERPL: 13.4 (ref 7–25)
BUPRENORPHINE SERPL-MCNC: NEGATIVE NG/ML
C PNEUM DNA NPH QL NAA+NON-PROBE: NOT DETECTED
CA-I BLDA-SCNC: 1.2 MMOL/L (ref 1.2–1.32)
CALCIUM SPEC-SCNC: 9.1 MG/DL (ref 8.6–10.5)
CANNABINOIDS SERPL QL: NEGATIVE
CHLORIDE BLDA-SCNC: 91 MMOL/L (ref 98–109)
CHLORIDE SERPL-SCNC: 93 MMOL/L (ref 98–107)
CLARITY UR: CLEAR
CO2 BLDA-SCNC: 22 MMOL/L (ref 24–29)
CO2 BLDA-SCNC: 27.2 MMOL/L (ref 22–33)
CO2 SERPL-SCNC: 23 MMOL/L (ref 22–29)
COCAINE UR QL: NEGATIVE
COHGB MFR BLD: 4.3 %
COLOR UR: YELLOW
CREAT BLDA-MCNC: 1.5 MG/DL (ref 0.6–1.3)
CREAT SERPL-MCNC: 1.49 MG/DL (ref 0.57–1)
D DIMER PPP FEU-MCNC: <0.27 MCGFEU/ML (ref 0–0.71)
D-LACTATE SERPL-SCNC: 1.7 MMOL/L (ref 0.5–2)
D-LACTATE SERPL-SCNC: 4.8 MMOL/L (ref 0.5–2)
DEPRECATED RDW RBC AUTO: 49.9 FL (ref 37–54)
EGFRCR SERPLBLD CKD-EPI 2021: 37.1 ML/MIN/1.73
EGFRCR SERPLBLD CKD-EPI 2021: 37.4 ML/MIN/1.73
EOSINOPHIL # BLD AUTO: 0.03 10*3/MM3 (ref 0–0.4)
EOSINOPHIL NFR BLD AUTO: 0.7 % (ref 0.3–6.2)
EPAP: 0
ERYTHROCYTE [DISTWIDTH] IN BLOOD BY AUTOMATED COUNT: 15.4 % (ref 12.3–15.4)
ETHANOL BLD-MCNC: <10 MG/DL (ref 0–10)
FENTANYL UR-MCNC: NEGATIVE NG/ML
FERRITIN SERPL-MCNC: 133.5 NG/ML (ref 13–150)
FLUAV SUBTYP SPEC NAA+PROBE: NOT DETECTED
FLUBV RNA ISLT QL NAA+PROBE: NOT DETECTED
GLOBULIN UR ELPH-MCNC: 2.3 GM/DL
GLUCOSE BLDC GLUCOMTR-MCNC: 289 MG/DL (ref 70–130)
GLUCOSE BLDC GLUCOMTR-MCNC: 379 MG/DL (ref 70–130)
GLUCOSE BLDC GLUCOMTR-MCNC: 431 MG/DL (ref 70–130)
GLUCOSE BLDC GLUCOMTR-MCNC: 458 MG/DL (ref 70–130)
GLUCOSE BLDC GLUCOMTR-MCNC: >599 MG/DL (ref 70–130)
GLUCOSE SERPL-MCNC: 635 MG/DL (ref 65–99)
GLUCOSE UR STRIP-MCNC: ABNORMAL MG/DL
HADV DNA SPEC NAA+PROBE: NOT DETECTED
HBA1C MFR BLD: 8.3 % (ref 4.8–5.6)
HCO3 BLDV-SCNC: 25.8 MMOL/L (ref 22–28)
HCOV 229E RNA SPEC QL NAA+PROBE: NOT DETECTED
HCOV HKU1 RNA SPEC QL NAA+PROBE: NOT DETECTED
HCOV NL63 RNA SPEC QL NAA+PROBE: NOT DETECTED
HCOV OC43 RNA SPEC QL NAA+PROBE: NOT DETECTED
HCT VFR BLD AUTO: 24.4 % (ref 34–46.6)
HCT VFR BLD AUTO: 26.2 % (ref 34–46.6)
HCT VFR BLDA CALC: 24 % (ref 38–51)
HGB BLD-MCNC: 7.8 G/DL (ref 12–15.9)
HGB BLD-MCNC: 8.2 G/DL (ref 12–15.9)
HGB BLDA-MCNC: 8 G/DL (ref 14–18)
HGB BLDA-MCNC: 8.2 G/DL (ref 12–17)
HGB UR QL STRIP.AUTO: NEGATIVE
HMPV RNA NPH QL NAA+NON-PROBE: NOT DETECTED
HOLD SPECIMEN: NORMAL
HPIV1 RNA ISLT QL NAA+PROBE: NOT DETECTED
HPIV2 RNA SPEC QL NAA+PROBE: NOT DETECTED
HPIV3 RNA NPH QL NAA+PROBE: NOT DETECTED
HPIV4 P GENE NPH QL NAA+PROBE: NOT DETECTED
IMM GRANULOCYTES # BLD AUTO: 0.02 10*3/MM3 (ref 0–0.05)
IMM GRANULOCYTES NFR BLD AUTO: 0.5 % (ref 0–0.5)
INHALED O2 CONCENTRATION: 21 %
INR PPP: 1.14 (ref 0.89–1.12)
INR PPP: 1.3 (ref 0.8–1.2)
IPAP: 0
IRON 24H UR-MRATE: 36 MCG/DL (ref 37–145)
IRON SATN MFR SERPL: 8 % (ref 20–50)
KETONES UR QL STRIP: NEGATIVE
LEUKOCYTE ESTERASE UR QL STRIP.AUTO: NEGATIVE
LYMPHOCYTES # BLD AUTO: 0.75 10*3/MM3 (ref 0.7–3.1)
LYMPHOCYTES NFR BLD AUTO: 18.3 % (ref 19.6–45.3)
M PNEUMO IGG SER IA-ACNC: NOT DETECTED
MAGNESIUM SERPL-MCNC: 2.3 MG/DL (ref 1.6–2.4)
MCH RBC QN AUTO: 27.7 PG (ref 26.6–33)
MCHC RBC AUTO-ENTMCNC: 31.3 G/DL (ref 31.5–35.7)
MCV RBC AUTO: 88.5 FL (ref 79–97)
METHADONE UR QL SCN: NEGATIVE
METHGB BLD QL: 0.4 %
MODALITY: ABNORMAL
MONOCYTES # BLD AUTO: 0.36 10*3/MM3 (ref 0.1–0.9)
MONOCYTES NFR BLD AUTO: 8.8 % (ref 5–12)
NEUTROPHILS NFR BLD AUTO: 2.91 10*3/MM3 (ref 1.7–7)
NEUTROPHILS NFR BLD AUTO: 71.2 % (ref 42.7–76)
NITRITE UR QL STRIP: NEGATIVE
NRBC BLD AUTO-RTO: 0 /100 WBC (ref 0–0.2)
OPIATES UR QL: POSITIVE
OSMOLALITY SERPL: 314 MOSM/KG (ref 275–295)
OXYCODONE UR QL SCN: NEGATIVE
OXYHGB MFR BLDV: 61.2 %
PAW @ PEAK INSP FLOW SETTING VENT: 0 CMH2O
PCO2 BLDV: 45.2 MM HG (ref 41–51)
PCP UR QL SCN: NEGATIVE
PH BLDV: 7.37 PH UNITS (ref 7.31–7.41)
PH UR STRIP.AUTO: 6 [PH] (ref 5–8)
PHOSPHATE SERPL-MCNC: 4 MG/DL (ref 2.5–4.5)
PLATELET # BLD AUTO: 154 10*3/MM3 (ref 140–450)
PMV BLD AUTO: 12.2 FL (ref 6–12)
PO2 BLDV: 34.4 MM HG (ref 27–53)
POTASSIUM BLDA-SCNC: 3.9 MMOL/L (ref 3.5–4.9)
POTASSIUM SERPL-SCNC: 3.9 MMOL/L (ref 3.5–5.2)
PROCALCITONIN SERPL-MCNC: 0.36 NG/ML (ref 0–0.25)
PROT SERPL-MCNC: 6 G/DL (ref 6–8.5)
PROT UR QL STRIP: ABNORMAL
PROTHROMBIN TIME: 14.7 SECONDS (ref 12.2–14.5)
PROTHROMBIN TIME: 14.9 SECONDS (ref 12.8–15.2)
RBC # BLD AUTO: 2.96 10*6/MM3 (ref 3.77–5.28)
RETICS # AUTO: 0.09 10*6/MM3 (ref 0.02–0.13)
RETICS/RBC NFR AUTO: 3 % (ref 0.7–1.9)
RHINOVIRUS RNA SPEC NAA+PROBE: NOT DETECTED
RSV RNA NPH QL NAA+NON-PROBE: NOT DETECTED
SALICYLATES SERPL-MCNC: <0.3 MG/DL
SARS-COV-2 RNA NPH QL NAA+NON-PROBE: NOT DETECTED
SODIUM BLD-SCNC: 130 MMOL/L (ref 138–146)
SODIUM SERPL-SCNC: 130 MMOL/L (ref 136–145)
SODIUM UR-SCNC: 32 MMOL/L
SP GR UR STRIP: 1.04 (ref 1–1.03)
T4 FREE SERPL-MCNC: 0.94 NG/DL (ref 0.93–1.7)
TIBC SERPL-MCNC: 434 MCG/DL (ref 298–536)
TOTAL RATE: 0 BREATHS/MINUTE
TRANSFERRIN SERPL-MCNC: 291 MG/DL (ref 200–360)
TRICYCLICS UR QL SCN: NEGATIVE
TROPONIN T SERPL HS-MCNC: 25 NG/L
TSH SERPL DL<=0.05 MIU/L-ACNC: 2.42 UIU/ML (ref 0.27–4.2)
UROBILINOGEN UR QL STRIP: ABNORMAL
WBC NRBC COR # BLD AUTO: 4.09 10*3/MM3 (ref 3.4–10.8)
WHOLE BLOOD HOLD COAG: NORMAL
WHOLE BLOOD HOLD SPECIMEN: NORMAL

## 2024-02-08 PROCEDURE — 96366 THER/PROPH/DIAG IV INF ADDON: CPT

## 2024-02-08 PROCEDURE — 99214 OFFICE O/P EST MOD 30 MIN: CPT | Performed by: NURSE PRACTITIONER

## 2024-02-08 PROCEDURE — 96375 TX/PRO/DX INJ NEW DRUG ADDON: CPT

## 2024-02-08 PROCEDURE — 85730 THROMBOPLASTIN TIME PARTIAL: CPT | Performed by: EMERGENCY MEDICINE

## 2024-02-08 PROCEDURE — 85014 HEMATOCRIT: CPT | Performed by: EMERGENCY MEDICINE

## 2024-02-08 PROCEDURE — 83036 HEMOGLOBIN GLYCOSYLATED A1C: CPT | Performed by: EMERGENCY MEDICINE

## 2024-02-08 PROCEDURE — 81003 URINALYSIS AUTO W/O SCOPE: CPT | Performed by: EMERGENCY MEDICINE

## 2024-02-08 PROCEDURE — 82948 REAGENT STRIP/BLOOD GLUCOSE: CPT

## 2024-02-08 PROCEDURE — 70498 CT ANGIOGRAPHY NECK: CPT

## 2024-02-08 PROCEDURE — 96365 THER/PROPH/DIAG IV INF INIT: CPT

## 2024-02-08 PROCEDURE — 25010000002 POTASSIUM CHLORIDE PER 2 MEQ: Performed by: EMERGENCY MEDICINE

## 2024-02-08 PROCEDURE — 82746 ASSAY OF FOLIC ACID SERUM: CPT | Performed by: INTERNAL MEDICINE

## 2024-02-08 PROCEDURE — 84300 ASSAY OF URINE SODIUM: CPT | Performed by: INTERNAL MEDICINE

## 2024-02-08 PROCEDURE — 85610 PROTHROMBIN TIME: CPT | Performed by: EMERGENCY MEDICINE

## 2024-02-08 PROCEDURE — 85379 FIBRIN DEGRADATION QUANT: CPT | Performed by: INTERNAL MEDICINE

## 2024-02-08 PROCEDURE — 82607 VITAMIN B-12: CPT | Performed by: INTERNAL MEDICINE

## 2024-02-08 PROCEDURE — 84540 ASSAY OF URINE/UREA-N: CPT | Performed by: INTERNAL MEDICINE

## 2024-02-08 PROCEDURE — P9612 CATHETERIZE FOR URINE SPEC: HCPCS

## 2024-02-08 PROCEDURE — 85014 HEMATOCRIT: CPT | Performed by: INTERNAL MEDICINE

## 2024-02-08 PROCEDURE — 82728 ASSAY OF FERRITIN: CPT | Performed by: INTERNAL MEDICINE

## 2024-02-08 PROCEDURE — G0378 HOSPITAL OBSERVATION PER HR: HCPCS

## 2024-02-08 PROCEDURE — 82010 KETONE BODYS QUAN: CPT | Performed by: INTERNAL MEDICINE

## 2024-02-08 PROCEDURE — 80053 COMPREHEN METABOLIC PANEL: CPT | Performed by: EMERGENCY MEDICINE

## 2024-02-08 PROCEDURE — 85025 COMPLETE CBC W/AUTO DIFF WBC: CPT | Performed by: EMERGENCY MEDICINE

## 2024-02-08 PROCEDURE — 84100 ASSAY OF PHOSPHORUS: CPT | Performed by: EMERGENCY MEDICINE

## 2024-02-08 PROCEDURE — 99291 CRITICAL CARE FIRST HOUR: CPT

## 2024-02-08 PROCEDURE — 84145 PROCALCITONIN (PCT): CPT | Performed by: INTERNAL MEDICINE

## 2024-02-08 PROCEDURE — 36415 COLL VENOUS BLD VENIPUNCTURE: CPT

## 2024-02-08 PROCEDURE — 82077 ASSAY SPEC XCP UR&BREATH IA: CPT | Performed by: EMERGENCY MEDICINE

## 2024-02-08 PROCEDURE — 25810000003 SODIUM CHLORIDE 0.9 % SOLUTION: Performed by: EMERGENCY MEDICINE

## 2024-02-08 PROCEDURE — 82805 BLOOD GASES W/O2 SATURATION: CPT

## 2024-02-08 PROCEDURE — 0042T HC CT CEREBRAL PERFUSION W/WO CONTRAST: CPT

## 2024-02-08 PROCEDURE — 71045 X-RAY EXAM CHEST 1 VIEW: CPT

## 2024-02-08 PROCEDURE — 82570 ASSAY OF URINE CREATININE: CPT | Performed by: INTERNAL MEDICINE

## 2024-02-08 PROCEDURE — 83540 ASSAY OF IRON: CPT | Performed by: INTERNAL MEDICINE

## 2024-02-08 PROCEDURE — 84466 ASSAY OF TRANSFERRIN: CPT | Performed by: INTERNAL MEDICINE

## 2024-02-08 PROCEDURE — 93005 ELECTROCARDIOGRAM TRACING: CPT | Performed by: EMERGENCY MEDICINE

## 2024-02-08 PROCEDURE — 87205 SMEAR GRAM STAIN: CPT | Performed by: INTERNAL MEDICINE

## 2024-02-08 PROCEDURE — 25510000001 IOPAMIDOL PER 1 ML: Performed by: EMERGENCY MEDICINE

## 2024-02-08 PROCEDURE — 83605 ASSAY OF LACTIC ACID: CPT | Performed by: INTERNAL MEDICINE

## 2024-02-08 PROCEDURE — 80307 DRUG TEST PRSMV CHEM ANLYZR: CPT | Performed by: EMERGENCY MEDICINE

## 2024-02-08 PROCEDURE — 70496 CT ANGIOGRAPHY HEAD: CPT

## 2024-02-08 PROCEDURE — 99223 1ST HOSP IP/OBS HIGH 75: CPT | Performed by: INTERNAL MEDICINE

## 2024-02-08 PROCEDURE — 84443 ASSAY THYROID STIM HORMONE: CPT | Performed by: EMERGENCY MEDICINE

## 2024-02-08 PROCEDURE — 85045 AUTOMATED RETICULOCYTE COUNT: CPT | Performed by: INTERNAL MEDICINE

## 2024-02-08 PROCEDURE — 80179 DRUG ASSAY SALICYLATE: CPT | Performed by: EMERGENCY MEDICINE

## 2024-02-08 PROCEDURE — 83930 ASSAY OF BLOOD OSMOLALITY: CPT | Performed by: EMERGENCY MEDICINE

## 2024-02-08 PROCEDURE — 82140 ASSAY OF AMMONIA: CPT | Performed by: INTERNAL MEDICINE

## 2024-02-08 PROCEDURE — 80143 DRUG ASSAY ACETAMINOPHEN: CPT | Performed by: EMERGENCY MEDICINE

## 2024-02-08 PROCEDURE — 80047 BASIC METABLC PNL IONIZED CA: CPT | Performed by: EMERGENCY MEDICINE

## 2024-02-08 PROCEDURE — 84439 ASSAY OF FREE THYROXINE: CPT | Performed by: EMERGENCY MEDICINE

## 2024-02-08 PROCEDURE — 70450 CT HEAD/BRAIN W/O DYE: CPT

## 2024-02-08 PROCEDURE — 84484 ASSAY OF TROPONIN QUANT: CPT | Performed by: EMERGENCY MEDICINE

## 2024-02-08 PROCEDURE — 83735 ASSAY OF MAGNESIUM: CPT | Performed by: EMERGENCY MEDICINE

## 2024-02-08 PROCEDURE — 0202U NFCT DS 22 TRGT SARS-COV-2: CPT | Performed by: INTERNAL MEDICINE

## 2024-02-08 PROCEDURE — 85018 HEMOGLOBIN: CPT | Performed by: INTERNAL MEDICINE

## 2024-02-08 RX ORDER — SODIUM CHLORIDE AND POTASSIUM CHLORIDE 150; 450 MG/100ML; MG/100ML
250 INJECTION, SOLUTION INTRAVENOUS CONTINUOUS PRN
Status: DISCONTINUED | OUTPATIENT
Start: 2024-02-08 | End: 2024-02-09

## 2024-02-08 RX ORDER — SODIUM CHLORIDE 9 MG/ML
250 INJECTION, SOLUTION INTRAVENOUS CONTINUOUS PRN
Status: DISCONTINUED | OUTPATIENT
Start: 2024-02-08 | End: 2024-02-09

## 2024-02-08 RX ORDER — SODIUM CHLORIDE AND POTASSIUM CHLORIDE 300; 900 MG/100ML; MG/100ML
250 INJECTION, SOLUTION INTRAVENOUS CONTINUOUS PRN
Status: DISCONTINUED | OUTPATIENT
Start: 2024-02-08 | End: 2024-02-09

## 2024-02-08 RX ORDER — PANTOPRAZOLE SODIUM 40 MG/10ML
40 INJECTION, POWDER, LYOPHILIZED, FOR SOLUTION INTRAVENOUS EVERY 12 HOURS SCHEDULED
Status: DISCONTINUED | OUTPATIENT
Start: 2024-02-08 | End: 2024-02-10 | Stop reason: ALTCHOICE

## 2024-02-08 RX ORDER — DEXTROSE AND SODIUM CHLORIDE 5; .9 G/100ML; G/100ML
150 INJECTION, SOLUTION INTRAVENOUS CONTINUOUS PRN
Status: DISCONTINUED | OUTPATIENT
Start: 2024-02-08 | End: 2024-02-09

## 2024-02-08 RX ORDER — SODIUM CHLORIDE 0.9 % (FLUSH) 0.9 %
10 SYRINGE (ML) INJECTION EVERY 12 HOURS SCHEDULED
Status: DISCONTINUED | OUTPATIENT
Start: 2024-02-08 | End: 2024-02-10 | Stop reason: HOSPADM

## 2024-02-08 RX ORDER — ASPIRIN 300 MG/1
300 SUPPOSITORY RECTAL DAILY
Status: DISCONTINUED | OUTPATIENT
Start: 2024-02-09 | End: 2024-02-10 | Stop reason: HOSPADM

## 2024-02-08 RX ORDER — ROSUVASTATIN CALCIUM 20 MG/1
20 TABLET, COATED ORAL NIGHTLY
Status: DISCONTINUED | OUTPATIENT
Start: 2024-02-09 | End: 2024-02-10 | Stop reason: HOSPADM

## 2024-02-08 RX ORDER — SODIUM CHLORIDE AND POTASSIUM CHLORIDE 150; 900 MG/100ML; MG/100ML
250 INJECTION, SOLUTION INTRAVENOUS CONTINUOUS PRN
Status: DISCONTINUED | OUTPATIENT
Start: 2024-02-08 | End: 2024-02-09

## 2024-02-08 RX ORDER — ASPIRIN 81 MG/1
324 TABLET, CHEWABLE ORAL DAILY
Status: DISCONTINUED | OUTPATIENT
Start: 2024-02-09 | End: 2024-02-10 | Stop reason: HOSPADM

## 2024-02-08 RX ORDER — SODIUM CHLORIDE 9 MG/ML
40 INJECTION, SOLUTION INTRAVENOUS AS NEEDED
Status: DISCONTINUED | OUTPATIENT
Start: 2024-02-08 | End: 2024-02-09

## 2024-02-08 RX ORDER — DEXTROSE MONOHYDRATE, SODIUM CHLORIDE, AND POTASSIUM CHLORIDE 50; 1.49; 4.5 G/1000ML; G/1000ML; G/1000ML
150 INJECTION, SOLUTION INTRAVENOUS CONTINUOUS PRN
Status: DISCONTINUED | OUTPATIENT
Start: 2024-02-08 | End: 2024-02-09

## 2024-02-08 RX ORDER — SODIUM CHLORIDE 0.9 % (FLUSH) 0.9 %
10 SYRINGE (ML) INJECTION AS NEEDED
Status: DISCONTINUED | OUTPATIENT
Start: 2024-02-08 | End: 2024-02-09

## 2024-02-08 RX ORDER — SODIUM CHLORIDE 450 MG/100ML
250 INJECTION, SOLUTION INTRAVENOUS CONTINUOUS PRN
Status: DISCONTINUED | OUTPATIENT
Start: 2024-02-08 | End: 2024-02-09

## 2024-02-08 RX ORDER — DEXTROSE AND SODIUM CHLORIDE 5; .45 G/100ML; G/100ML
150 INJECTION, SOLUTION INTRAVENOUS CONTINUOUS PRN
Status: DISCONTINUED | OUTPATIENT
Start: 2024-02-08 | End: 2024-02-09

## 2024-02-08 RX ORDER — SODIUM CHLORIDE 0.9 % (FLUSH) 0.9 %
10 SYRINGE (ML) INJECTION AS NEEDED
Status: DISCONTINUED | OUTPATIENT
Start: 2024-02-08 | End: 2024-02-10 | Stop reason: HOSPADM

## 2024-02-08 RX ORDER — DEXTROSE MONOHYDRATE 25 G/50ML
10-50 INJECTION, SOLUTION INTRAVENOUS
Status: DISCONTINUED | OUTPATIENT
Start: 2024-02-08 | End: 2024-02-09

## 2024-02-08 RX ORDER — SODIUM CHLORIDE 0.9 % (FLUSH) 0.9 %
10 SYRINGE (ML) INJECTION EVERY 12 HOURS SCHEDULED
Status: DISCONTINUED | OUTPATIENT
Start: 2024-02-09 | End: 2024-02-10 | Stop reason: HOSPADM

## 2024-02-08 RX ORDER — DEXTROSE MONOHYDRATE, SODIUM CHLORIDE, AND POTASSIUM CHLORIDE 50; 2.98; 4.5 G/1000ML; G/1000ML; G/1000ML
150 INJECTION, SOLUTION INTRAVENOUS CONTINUOUS PRN
Status: DISCONTINUED | OUTPATIENT
Start: 2024-02-08 | End: 2024-02-09

## 2024-02-08 RX ORDER — DEXTROSE MONOHYDRATE, SODIUM CHLORIDE, AND POTASSIUM CHLORIDE 50; 2.98; 9 G/1000ML; G/1000ML; G/1000ML
150 INJECTION, SOLUTION INTRAVENOUS CONTINUOUS PRN
Status: DISCONTINUED | OUTPATIENT
Start: 2024-02-08 | End: 2024-02-09

## 2024-02-08 RX ORDER — DEXTROSE MONOHYDRATE, SODIUM CHLORIDE, AND POTASSIUM CHLORIDE 50; 1.49; 9 G/1000ML; G/1000ML; G/1000ML
150 INJECTION, SOLUTION INTRAVENOUS CONTINUOUS PRN
Status: DISCONTINUED | OUTPATIENT
Start: 2024-02-08 | End: 2024-02-09

## 2024-02-08 RX ORDER — PANTOPRAZOLE SODIUM 40 MG/1
40 TABLET, DELAYED RELEASE ORAL
Status: CANCELLED | OUTPATIENT
Start: 2024-02-09

## 2024-02-08 RX ORDER — IBUPROFEN 600 MG/1
1 TABLET ORAL
Status: DISCONTINUED | OUTPATIENT
Start: 2024-02-08 | End: 2024-02-09

## 2024-02-08 RX ORDER — NICOTINE POLACRILEX 4 MG
15 LOZENGE BUCCAL
Status: DISCONTINUED | OUTPATIENT
Start: 2024-02-08 | End: 2024-02-09

## 2024-02-08 RX ADMIN — PANTOPRAZOLE SODIUM 40 MG: 40 INJECTION, POWDER, FOR SOLUTION INTRAVENOUS at 22:28

## 2024-02-08 RX ADMIN — POTASSIUM CHLORIDE AND SODIUM CHLORIDE 250 ML/HR: 450; 150 INJECTION, SOLUTION INTRAVENOUS at 20:21

## 2024-02-08 RX ADMIN — INSULIN HUMAN 5.4 UNITS/HR: 1 INJECTION, SOLUTION INTRAVENOUS at 19:52

## 2024-02-08 RX ADMIN — SODIUM CHLORIDE 1000 ML/HR: 9 INJECTION, SOLUTION INTRAVENOUS at 19:41

## 2024-02-08 RX ADMIN — INSULIN HUMAN 5 UNITS/HR: 1 INJECTION, SOLUTION INTRAVENOUS at 22:29

## 2024-02-08 RX ADMIN — INSULIN HUMAN 4.6 UNITS/HR: 1 INJECTION, SOLUTION INTRAVENOUS at 21:28

## 2024-02-08 RX ADMIN — Medication 10 ML: at 20:22

## 2024-02-08 RX ADMIN — INSULIN HUMAN 4 UNITS/HR: 1 INJECTION, SOLUTION INTRAVENOUS at 20:55

## 2024-02-08 RX ADMIN — SODIUM CHLORIDE 1000 ML: 9 INJECTION, SOLUTION INTRAVENOUS at 19:41

## 2024-02-08 RX ADMIN — IOPAMIDOL 115 ML: 755 INJECTION, SOLUTION INTRAVENOUS at 19:15

## 2024-02-08 NOTE — CONSULTS
Stroke Consult Note    Patient Name: Karol Lopez   MRN: [id number]  Age: 73 y.o.  Sex: female  : 1950    Primary Care Physician: Romaine Eugene MD  Referring Physician: Dr. Carr    TIME STROKE TEAM CALLED:  EST     TIME PATIENT SEEN:  EST    Handedness: Right  Race:     Chief Complaint/Reason for Consultation: Right-sided weakness, dysarthria    HPI:Karol Lopez is a 73-year-old female with a PMH significant for stroke (residual RUE weakness per EMR), A-fib (not on OAC), carotid disease, T2DM, HTN, HLD, tobacco abuse, COPD, impaired functional mobility, carpal tunnel syndrome, CAD, GIB, CKD 3, and cirrhosis who presents to [facility] ED via EMS with complaints of generalized weakness and slurred speech noted by family at approximately 1600.  Patient had reportedly not been feeling well for several days.  EMS was called.  They noted that her right side was weaker than her left and that she was confused.  Patient also reported difficulty swallowing.  Her glucose was too high to register on the monitor per EMS.  She was brought to the [facility] ED for further evaluation.    /57. NIH 6 on arrival for drowsiness, confusion, difficulty following commands, RLE drift and sensory deficit.  CT head with chronic left basal ganglia infarct, left slava and small vessel changes.  CTP with with no evidence of LVO.  CTA H/N with 80% right ICA stenosis, 70% left ICA stenosis. She is not a candidate for TNK as last known well is unclear.  She will be admitted for further evaluation.    Last Known Normal Date/Time: Unclear      Review of Systems   Constitutional:  Positive for fatigue. Negative for chills and fever.   HENT:  Positive for trouble swallowing. Negative for congestion.    Eyes:  Negative for photophobia and visual disturbance.   Respiratory:  Negative for cough and shortness of breath.    Cardiovascular:  Negative for chest pain and palpitations.   Gastrointestinal:  Negative for nausea and  vomiting.   Musculoskeletal:  Positive for gait problem.   Neurological:  Positive for facial asymmetry, speech difficulty and weakness.   Psychiatric/Behavioral:  Positive for confusion.       Past Medical History:   Diagnosis Date    Acid reflux     Anxiety     Cataracts, bilateral     Cirrhosis of liver     Constipation     COPD (chronic obstructive pulmonary disease)     Coronary artery disease     CTS (carpal tunnel syndrome)     Diabetes     Hearing loss     Hypercholesteremia     Hypertension     Impaired functional mobility, balance, gait, and endurance     Lower back pain     Osteoarthritis     Stroke     2013-weak in right arm    Tattoos     x2    Tobacco abuse     Tumor     in between breast closer to right breast    Vitamin B12 deficiency     Wears dentures     upper only    Wears glasses      Past Surgical History:   Procedure Laterality Date    BREAST BIOPSY Right 5/10/2019    Procedure: BREAST BIOPSY RIGHT;  Surgeon: Kiana Frey MD;  Location: [facility] OR;  Service: General    CARPAL TUNNEL RELEASE Bilateral     CHOLECYSTECTOMY      COLONOSCOPY N/A 9/30/2021    Procedure: COLONOSCOPY WITH POLYPECTOMY AND ABLATION OF AVM;  Surgeon: Russell Davila MD;  Location: [facility] ENDOSCOPY;  Service: Gastroenterology;  Laterality: N/A;    CORONARY ANGIOPLASTY WITH STENT PLACEMENT  2012    ENDOSCOPY N/A 9/29/2021    Procedure: ESOPHAGOGASTRODUODENOSCOPY with biopsies;  Surgeon: Russell Davila MD;  Location: [facility] ENDOSCOPY;  Service: Gastroenterology;  Laterality: N/A;    ENTEROSCOPY SMALL BOWEL N/A 11/16/2021    Procedure: ESOPHAGOGASTRODUODENOSCOPY WITH SMALL BOWEL ENTEROSCOPY and biopsy;  Surgeon: Russell Davila MD;  Location: [facility] ENDOSCOPY;  Service: Gastroenterology;  Laterality: N/A;    HYSTERECTOMY      complete    JOINT REPLACEMENT Bilateral     knee replacements    TOTAL KNEE ARTHROPLASTY Bilateral 10/18/2016    MAUREEN Palomares MD     Family History   Problem Relation Age of  "Onset    Hypertension Other     Diabetes Mother     Hypertension Mother     Cancer Mother     Diabetes Father     Hypertension Father     Heart disease Father     Cancer Sister     Cancer Brother      Social History     Socioeconomic History    Marital status:    Tobacco Use    Smoking status: Every Day     Packs/day: 1.00     Years: 46.00     Additional pack years: 0.00     Total pack years: 46.00     Types: Cigarettes    Smokeless tobacco: Never   Vaping Use    Vaping Use: Never used   Substance and Sexual Activity    Alcohol use: No    Drug use: No    Sexual activity: Defer     Allergies   Allergen Reactions    Codeine GI Intolerance     Prior to Admission medications    Medication Sig Start Date End Date Taking? Authorizing Provider   aspirin 81 MG chewable tablet Chew 1 tablet Daily. 4/29/23   Reji Stoll MD B-D INS SYRINGE 0.5CC/31GX5/16 31G X 5/16\" 0.5 ML misc use as directed once daily 10/5/16   Reji Stoll MD   bisacodyl (DULCOLAX) 5 MG EC tablet Take 1 tablet by mouth Daily As Needed. 3/19/22   Reji Stoll MD   empagliflozin (Jardiance) 10 MG tablet tablet Take 1 tablet by mouth Daily. 7/1/22   Luis A Celestin IV, MD   ferrous sulfate (FerrouSul) 325 (65 FE) MG tablet Take 1 tablet by mouth Daily With Breakfast. 9/30/21   Fabiola Ceron DO   furosemide (LASIX) 20 MG tablet Take 1 tablet by mouth Daily As Needed. swelling 10/6/21   Reji Stoll MD   HYDROcodone-acetaminophen (NORCO) 5-325 MG per tablet Take 1 tablet by mouth Every 8 (Eight) Hours As Needed (PRN PAIN).  Patient taking differently: Take 1 tablet by mouth Daily. As needed 11/7/16   Jovan Palomares MD   Insulin Pen Needle 31G X 6 MM misc 1 each. 5/17/21   Reji Stoll MD   Insulin Syringe-Needle U-100 30G X 5/16\" 0.5 ML misc 1 each by Does not apply route daily 6/30/16   Reji Stoll MD   Insulin Syringe-Needle U-100 31G X 5/16\" 1 ML misc 1 each by Does not " apply route daily 6/5/15   Reji Stoll MD   lactulose (CHRONULAC) 10 GM/15ML solution Take 15 mL by mouth Daily. 3/24/22   Reji Stoll MD   linaclotide (LINZESS) 290 MCG capsule capsule Take 1 capsule by mouth Every Morning Before Breakfast.    Reji Stoll MD   linagliptin (TRADJENTA) 5 MG tablet tablet Take 1 tablet by mouth Daily. 7/1/22   Luis A Celestin IV, MD   metFORMIN (GLUCOPHAGE) 1000 MG tablet Take 1 tablet by mouth 2 (Two) Times a Day With Meals. 7/1/22   Luis A Celestin IV, MD   metoprolol succinate XL (TOPROL-XL) 25 MG 24 hr tablet Take 1 tablet by mouth Daily. 2/3/23   Luis A Celestin IV, MD   pantoprazole (PROTONIX) 40 MG EC tablet TAKE 1 TABLET BY MOUTH TWICE DAILY BEFORE MEALS 10/3/22   Sara Solis PA-C   rosuvastatin (CRESTOR) 20 MG tablet Take 1 tablet by mouth Daily. 2/3/23   Luis A Celestin IV, MD   umeclidinium-vilanterol (Anoro Ellipta) 62.5-25 MCG/INH aerosol powder  inhaler Inhale 1 puff As Needed. 2/21/22   Reji Stoll MD   vitamin B-12 (CYANOCOBALAMIN) 1000 MCG tablet Take 1 tablet by mouth Daily.    Reji Stoll MD            Neurological Exam  Mental Status  Drowsy. Oriented only to person. Mild dysarthria present. Language is fluent with no aphasia.    Cranial Nerves  CN II: Blinks to visual threat in all quadrants.  CN III, IV, VI: Extraocular movements intact bilaterally. Pupils equal round and reactive to light bilaterally. Tracks without difficulty.  CN V: Facial sensation is normal.  CN VII: Full and symmetric facial movement.  CN IX, X: Palate elevates symmetrically  CN XI: Shoulder shrug strength is normal.  CN XII: Tongue midline without atrophy or fasciculations.    Motor    RLE drift.    Sensory  Light touch abnormality: RLE sensory deficit.     Coordination    JUAN CARLOS due to difficulty following commands.      Physical Exam  Constitutional:       Appearance: She is ill-appearing.       Comments: Drowsy, arouses easily to voice.   HENT:      Mouth/Throat:      Mouth: Mucous membranes are dry.   Eyes:      Extraocular Movements: Extraocular movements intact.      Pupils: Pupils are equal, round, and reactive to light.   Pulmonary:      Effort: Pulmonary effort is normal. No respiratory distress.   Abdominal:      General: There is no distension.      Palpations: Abdomen is soft.   Musculoskeletal:      Right lower leg: No edema.      Left lower leg: No edema.   Skin:     General: Skin is warm and dry.   Neurological:      Mental Status: She is disoriented.      Cranial Nerves: Dysarthria present.      Sensory: Sensory deficit present.      Motor: Weakness present.         Acute Stroke Data    Thrombolytic Inclusion / Exclusion Criteria    Time: 18:59 EST  Person Administering Scale: AUBREY Montiel    Inclusion Criteria  [x]   18 years of age or greater   []   Onset of symptoms < 4.5 hours before beginning treatment (stroke onset = time patient was last seen well or without symptoms).   []   Diagnosis of acute ischemic stroke causing measurable disabling deficit (Complete Hemianopia, Any Aphasia, Visual or Sensory Extinction, Any weakness limiting sustained effort against gravity)   []   Any remaining deficit considered potentially disabling in view of patient and practitioner   Exclusion criteria (Do not proceed with Alteplase if any are checked under exclusion criteria)  [x]   Onset unknown or GREATER than 4.5 hours   []   ICH on CT/MRI   []   CT demonstrates hypodensity representing acute or subacute infarct   []   Significant head trauma or prior stroke in the previous 3 months   []   Symptoms suggestive of subarachnoid hemorrhage   []   History of un-ruptured intracranial aneurysm GREATER than 10 mm   []   Recent intracranial or intraspinal surgery within the last 3 months   []   Arterial puncture at a non-compressible site in the previous 7 days   []   Active internal bleeding   []    Acute bleeding tendency   []   Platelet count LESS than 100,000 for known hematological diseases such as leukemia, thrombocytopenia or chronic cirrhosis   []   Current use of anticoagulant with INR GREATER than 1.7 or PT GREATER than 15 seconds, aPTT GREATER than 40 seconds   []   Heparin received within 48 hours, resulting in abnormally elevated aPTT GREATER than upper limit of normal   []   Current use of direct thrombin inhibitors or direct factor Xa inhibitors in the past 48 hours   []   Elevated blood pressure refractory to treatment (systolic GREATER than 185 mm/Hg or diastolic  GREATER than 110 mm/Hg   []   Suspected infective endocarditis and aortic arch dissection   []   Current use of therapeutic treatment dose of low-molecular-weight heparin (LMWH) within the previous 24 hours   []   Structural GI malignancy or bleed   Relative exclusion for all patients  []   Only minor non-disabling symptoms   []   Pregnancy   []   Seizure at onset with postictal residual neurological impairments   []   Major surgery or previous trauma within past 14 days   []   History of previous spontaneous ICH, intracranial neoplasm, or AV malformation   []   Postpartum (within previous 14 days)   []   Recent GI or urinary tract hemorrhage (within previous 21 days)   []   Recent acute MI (within previous 3 months)   []   History of un-ruptured intracranial aneurysm LESS than 10 mm   []   History of ruptured intracranial aneurysm   []   Blood glucose LESS than 50 mg/dL (2.7 mmol/L)   []   Dural puncture within the last 7 days   []   Known GREATER than 10 cerebral microbleeds   Additional exclusions for patients with symptoms onset between 3 and 4.5 hours.  []   Age > 80.   []   On any anticoagulants regardless of INR  >>> Warfarin (Coumadin), Heparin, Enoxaparin (Lovenox), fondaparinux (Arixtra), bivalirudin (Angiomax), Argatroban, dabigatran (Pradaxa), rivaroxaban (Xarelto), or apixaban (Eliquis)   []   Severe stroke (NIHSS > 25).    []   History of BOTH diabetes and previous ischemic stroke.   []   The risks and benefits have been discussed with the patient or family related to the administration of IV thrombolytic therapy for stroke symptoms.   []   I have discussed and reviewed the patient's case and imaging with the attending prior to IV thrombolytic therapy.    Time IV thrombolytic administered       Hospital Meds:  Scheduled- sodium chloride, 1,000 mL, Intravenous, Once      Infusions-     PRNs-   Insert Peripheral IV **AND** sodium chloride    sodium chloride    Functional Status Prior to Current Stroke/Kemper Score: Unknown    NIH Stroke Scale  Time: 18:59 EST  Person Administering Scale: AUBREY Montiel  Interval: baseline  1a. Level of Consciousness: 1-->Not alert, but arousable by minor stimulation to obey, answer, or respond  1b. LOC Questions: 1-->Answers one question correctly  1c. LOC Commands: 1-->Performs one task correctly  2. Best Gaze: 0-->Normal  3. Visual: 0-->No visual loss  4. Facial Palsy: 0-->Normal symmetrical movements  5a. Motor Arm, Left: 0-->No drift, limb holds 90 (or 45) degrees for full 10 secs  5b. Motor Arm, Right: 0-->No drift, limb holds 90 (or 45) degrees for full 10 secs  6a. Motor Leg, Left: 0-->No drift, leg holds 30 degree position for full 5 secs  6b. Motor Leg, Right: 1-->Drift, leg falls by the end of the 5-sec period but does not hit bed  7. Limb Ataxia: 0-->Absent  8. Sensory: 1-->Mild-to-moderate sensory loss, patient feels pinprick is less sharp or is dull on the affected side, or there is a loss of superficial pain with pinprick, but patient is aware of being touched  9. Best Language: 0-->No aphasia, normal  10. Dysarthria: 1-->Mild-to-moderate dysarthria, patient slurs at least some words and, at worst, can be understood with some difficulty  11. Extinction and Inattention (formerly Neglect): 0-->No abnormality    Total (NIH Stroke Scale): 6        Results Reviewed:  I have  personally reviewed current lab, radiology, and data.  CT Head Without Contrast Stroke Protocol    Result Date: 2/8/2024  Impression: No evidence of acute intracranial abnormality. Similar chronic findings, including remote infarcts in the left corona radiata/left basal ganglia and left slava. Mild parenchymal atrophy. Electronically Signed: Omid French MD  2/8/2024 7:13 PM EST  Workstation ID: [id number]       Results for orders placed during the hospital encounter of 02/28/21    Adult Transthoracic Echo Complete W/ Cont if Necessary Per Protocol    Interpretation Summary  1.  Normal left ventricular size and systolic function, LVEF 60-65%.  2.  Mild concentric LVH.  3.  Normal LV diastolic filling pattern.  4.  Normal left atrial volume index.  5.  Normal right ventricular size and systolic function.  6.  Mild calcification of aortic valve without significant stenosis.  7.  Trace MR, TR, and PI.  8.  Negative bubble study with no evidence of intracardiac or intrapulmonary shunt.       Assessment/Plan:    73-year-old female with a PMH significant for stroke (residual RUE weakness per EMR), A-fib (not on OAC), carotid disease, T2DM, HTN, HLD, tobacco abuse, COPD, impaired functional mobility, carpal tunnel syndrome, CAD, GIB, CKD 3, and cirrhosis who presents to [facility] ED via EMS with complaints of generalized weakness and slurred speech noted by family at approximately 1600.  Patient had reportedly not been feeling well for several days.  EMS was called.  They noted that her right side was weaker than her left and that she was confused.  Patient also reported difficulty swallowing.  Her glucose was too high to register on the monitor per EMS.  She was brought to the [facility] ED for further evaluation.  CT head with no acute findings.  NIH 6.  She is not a candidate for TNK as last known well is unclear.  She is not a candidate for neurointervention as no LVO was noted on advanced imaging.  CTA H/N with 80% proximal right  ICA stenosis and 70% LICA stenosis.  She will be admitted for further evaluation.    Antiplatelet PTA: Aspirin 81 mg  Anticoagulant PTA: None        AMS, worsening right-sided weakness, dysarthria  History of multiple strokes  Proximal R ICA stenosis 80%, 70% LICA stenosis  -Possible TIA, stroke, stroke recrudescence, or metabolic in etiology (glucose > 599 in the ED, )  -MRI brain pending  -TTE pending  -Carotid duplex pending  -Aspirin 325mg, Monitor H&H 24/8.2 today.  Platelets 154.  -Activity as tolerated  -N.p.o.  -PT/OT/SLP  -HTN; SBP less than 200.  Avoid hypotension given bilateral carotid stenosis.  Fluid bolus ordered in the ED.  -HLD; lipid panel in AM. Continue home Crestor  -T2DM; glucose greater than 599 in the ED.  Suspect possible DKA.  Hemoglobin A1c pending.  Management per primary team.  -UA, chest x-ray, labs pending  -Tobacco abuse; smoking cessation counseling  -Discussed with Dr. Carr, the patient, nursing staff.  Stroke neurology will continue to follow.  Please call with any questions or concerns.    AUBREY Montiel, AGACNP-BC  February 8, 2024  18:59 EST

## 2024-02-08 NOTE — Clinical Note
Level of Care: Telemetry [5]   Diagnosis: Hyperglycemia due to diabetes mellitus [7959246]   Admitting Physician: BEATRICE STEPHENS [[id number]]   Attending Physician: BEATRICE STEPHENS [[id number]]   Bed Request Comments: tele

## 2024-02-09 ENCOUNTER — APPOINTMENT (OUTPATIENT)
Dept: CARDIOLOGY | Facility: HOSPITAL | Age: 74
End: 2024-02-09
Payer: MEDICARE

## 2024-02-09 ENCOUNTER — APPOINTMENT (OUTPATIENT)
Dept: GENERAL RADIOLOGY | Facility: HOSPITAL | Age: 74
End: 2024-02-09
Payer: MEDICARE

## 2024-02-09 ENCOUNTER — APPOINTMENT (OUTPATIENT)
Dept: MRI IMAGING | Facility: HOSPITAL | Age: 74
End: 2024-02-09
Payer: MEDICARE

## 2024-02-09 LAB
ANION GAP SERPL CALCULATED.3IONS-SCNC: 10 MMOL/L (ref 5–15)
ANION GAP SERPL CALCULATED.3IONS-SCNC: 10 MMOL/L (ref 5–15)
ANION GAP SERPL CALCULATED.3IONS-SCNC: 11 MMOL/L (ref 5–15)
ASCENDING AORTA: 3.7 CM
BASOPHILS # BLD AUTO: 0.02 10*3/MM3 (ref 0–0.2)
BASOPHILS NFR BLD AUTO: 0.5 % (ref 0–1.5)
BH CV ECHO MEAS - AO MAX PG: 5.6 MMHG
BH CV ECHO MEAS - AO MEAN PG: 3 MMHG
BH CV ECHO MEAS - AO ROOT DIAM: 3.2 CM
BH CV ECHO MEAS - AO V2 MAX: 118 CM/SEC
BH CV ECHO MEAS - AO V2 VTI: 28 CM
BH CV ECHO MEAS - AVA(I,D): 2.31 CM2
BH CV ECHO MEAS - EDV(CUBED): 74.1 ML
BH CV ECHO MEAS - EDV(MOD-SP2): 50.3 ML
BH CV ECHO MEAS - EDV(MOD-SP4): 60.5 ML
BH CV ECHO MEAS - EF(MOD-BP): 59.2 %
BH CV ECHO MEAS - EF(MOD-SP2): 60.2 %
BH CV ECHO MEAS - EF(MOD-SP4): 56.4 %
BH CV ECHO MEAS - ESV(CUBED): 46.7 ML
BH CV ECHO MEAS - ESV(MOD-SP2): 20 ML
BH CV ECHO MEAS - ESV(MOD-SP4): 26.4 ML
BH CV ECHO MEAS - FS: 14.3 %
BH CV ECHO MEAS - IVS/LVPW: 0.77 CM
BH CV ECHO MEAS - IVSD: 1 CM
BH CV ECHO MEAS - LA DIMENSION: 3 CM
BH CV ECHO MEAS - LV DIASTOLIC VOL/BSA (35-75): 37.5 CM2
BH CV ECHO MEAS - LV MASS(C)D: 167.4 GRAMS
BH CV ECHO MEAS - LV MAX PG: 3.1 MMHG
BH CV ECHO MEAS - LV MEAN PG: 2 MMHG
BH CV ECHO MEAS - LV SYSTOLIC VOL/BSA (12-30): 16.4 CM2
BH CV ECHO MEAS - LV V1 MAX: 88.6 CM/SEC
BH CV ECHO MEAS - LV V1 VTI: 22.8 CM
BH CV ECHO MEAS - LVIDD: 4.2 CM
BH CV ECHO MEAS - LVIDS: 3.6 CM
BH CV ECHO MEAS - LVOT AREA: 2.8 CM2
BH CV ECHO MEAS - LVOT DIAM: 1.9 CM
BH CV ECHO MEAS - LVPWD: 1.3 CM
BH CV ECHO MEAS - MV MAX PG: 4.1 MMHG
BH CV ECHO MEAS - MV MEAN PG: 2 MMHG
BH CV ECHO MEAS - MV V2 VTI: 32.4 CM
BH CV ECHO MEAS - MVA(VTI): 2 CM2
BH CV ECHO MEAS - PA ACC TIME: 0.11 SEC
BH CV ECHO MEAS - PA V2 MAX: 114 CM/SEC
BH CV ECHO MEAS - RAP SYSTOLE: 3 MMHG
BH CV ECHO MEAS - RVSP: 21 MMHG
BH CV ECHO MEAS - SI(MOD-SP2): 18.8 ML/M2
BH CV ECHO MEAS - SI(MOD-SP4): 21.1 ML/M2
BH CV ECHO MEAS - SV(LVOT): 64.6 ML
BH CV ECHO MEAS - SV(MOD-SP2): 30.3 ML
BH CV ECHO MEAS - SV(MOD-SP4): 34.1 ML
BH CV ECHO MEAS - TAPSE (>1.6): 2.22 CM
BH CV ECHO MEAS - TR MAX PG: 17.8 MMHG
BH CV ECHO MEAS - TR MAX VEL: 211 CM/SEC
BH CV ECHO SHUNT ASSESSMENT PERFORMED (HIDDEN SCRIPTING): 1
BH CV VAS BP LEFT ARM: NORMAL MMHG
BH CV XLRA - RV BASE: 2.4 CM
BH CV XLRA - RV LENGTH: 7 CM
BH CV XLRA - RV MID: 2.3 CM
BH CV XLRA - TDI S': 14.4 CM/SEC
BH CV XLRA MEAS LEFT DIST CCA EDV: 13.8 CM/SEC
BH CV XLRA MEAS LEFT DIST CCA PSV: 58.5 CM/SEC
BH CV XLRA MEAS LEFT DIST ICA EDV: 32 CM/SEC
BH CV XLRA MEAS LEFT DIST ICA PSV: 106 CM/SEC
BH CV XLRA MEAS LEFT ICA/CCA RATIO: 2.05
BH CV XLRA MEAS LEFT MID CCA EDV: 11.3 CM/SEC
BH CV XLRA MEAS LEFT MID CCA PSV: 72.7 CM/SEC
BH CV XLRA MEAS LEFT MID ICA EDV: 54.2 CM/SEC
BH CV XLRA MEAS LEFT MID ICA PSV: 150 CM/SEC
BH CV XLRA MEAS LEFT PROX CCA EDV: 16.1 CM/SEC
BH CV XLRA MEAS LEFT PROX CCA PSV: 108 CM/SEC
BH CV XLRA MEAS LEFT PROX ECA EDV: 14 CM/SEC
BH CV XLRA MEAS LEFT PROX ECA PSV: 184 CM/SEC
BH CV XLRA MEAS LEFT PROX ICA EDV: 25.6 CM/SEC
BH CV XLRA MEAS LEFT PROX ICA PSV: 73.7 CM/SEC
BH CV XLRA MEAS LEFT PROX SCLA PSV: 126 CM/SEC
BH CV XLRA MEAS LEFT VERTEBRAL A EDV: 12.1 CM/SEC
BH CV XLRA MEAS LEFT VERTEBRAL A PSV: 58.2 CM/SEC
BH CV XLRA MEAS RIGHT DIST CCA EDV: 9 CM/SEC
BH CV XLRA MEAS RIGHT DIST CCA PSV: 35 CM/SEC
BH CV XLRA MEAS RIGHT DIST ICA EDV: 20 CM/SEC
BH CV XLRA MEAS RIGHT DIST ICA PSV: 99 CM/SEC
BH CV XLRA MEAS RIGHT ICA/CCA RATIO: 3.26
BH CV XLRA MEAS RIGHT MID CCA EDV: 12.8 CM/SEC
BH CV XLRA MEAS RIGHT MID CCA PSV: 61.4 CM/SEC
BH CV XLRA MEAS RIGHT MID ICA EDV: 37.3 CM/SEC
BH CV XLRA MEAS RIGHT MID ICA PSV: 154 CM/SEC
BH CV XLRA MEAS RIGHT PROX CCA EDV: 12.3 CM/SEC
BH CV XLRA MEAS RIGHT PROX CCA PSV: 66.8 CM/SEC
BH CV XLRA MEAS RIGHT PROX ECA PSV: 199 CM/SEC
BH CV XLRA MEAS RIGHT PROX ICA EDV: 47.2 CM/SEC
BH CV XLRA MEAS RIGHT PROX ICA PSV: 199 CM/SEC
BH CV XLRA MEAS RIGHT PROX SCLA PSV: 82 CM/SEC
BH CV XLRA MEAS RIGHT VERTEBRAL A EDV: 15 CM/SEC
BH CV XLRA MEAS RIGHT VERTEBRAL A PSV: 86 CM/SEC
BUN SERPL-MCNC: 16 MG/DL (ref 8–23)
BUN SERPL-MCNC: 18 MG/DL (ref 8–23)
BUN SERPL-MCNC: 19 MG/DL (ref 8–23)
BUN/CREAT SERPL: 14.5 (ref 7–25)
BUN/CREAT SERPL: 14.8 (ref 7–25)
BUN/CREAT SERPL: 16.4 (ref 7–25)
CALCIUM SPEC-SCNC: 8.8 MG/DL (ref 8.6–10.5)
CALCIUM SPEC-SCNC: 8.9 MG/DL (ref 8.6–10.5)
CALCIUM SPEC-SCNC: 8.9 MG/DL (ref 8.6–10.5)
CHLORIDE SERPL-SCNC: 100 MMOL/L (ref 98–107)
CHLORIDE SERPL-SCNC: 102 MMOL/L (ref 98–107)
CHLORIDE SERPL-SCNC: 107 MMOL/L (ref 98–107)
CHOLEST SERPL-MCNC: 72 MG/DL (ref 0–200)
CO2 SERPL-SCNC: 21 MMOL/L (ref 22–29)
CO2 SERPL-SCNC: 23 MMOL/L (ref 22–29)
CO2 SERPL-SCNC: 23 MMOL/L (ref 22–29)
CREAT SERPL-MCNC: 1.08 MG/DL (ref 0.57–1)
CREAT SERPL-MCNC: 1.16 MG/DL (ref 0.57–1)
CREAT SERPL-MCNC: 1.24 MG/DL (ref 0.57–1)
CREAT UR-MCNC: 22.8 MG/DL
DEPRECATED RDW RBC AUTO: 48.3 FL (ref 37–54)
EGFRCR SERPLBLD CKD-EPI 2021: 46.6 ML/MIN/1.73
EGFRCR SERPLBLD CKD-EPI 2021: 50.5 ML/MIN/1.73
EGFRCR SERPLBLD CKD-EPI 2021: 55 ML/MIN/1.73
EOSINOPHIL # BLD AUTO: 0.04 10*3/MM3 (ref 0–0.4)
EOSINOPHIL NFR BLD AUTO: 1 % (ref 0.3–6.2)
EOSINOPHIL SPEC QL MICRO: 0 % EOS/100 CELLS (ref 0–0)
ERYTHROCYTE [DISTWIDTH] IN BLOOD BY AUTOMATED COUNT: 15 % (ref 12.3–15.4)
FOLATE SERPL-MCNC: 8.41 NG/ML (ref 4.78–24.2)
GLUCOSE BLDC GLUCOMTR-MCNC: 146 MG/DL (ref 70–130)
GLUCOSE BLDC GLUCOMTR-MCNC: 153 MG/DL (ref 70–130)
GLUCOSE BLDC GLUCOMTR-MCNC: 157 MG/DL (ref 70–130)
GLUCOSE BLDC GLUCOMTR-MCNC: 159 MG/DL (ref 70–130)
GLUCOSE BLDC GLUCOMTR-MCNC: 164 MG/DL (ref 70–130)
GLUCOSE BLDC GLUCOMTR-MCNC: 168 MG/DL (ref 70–130)
GLUCOSE BLDC GLUCOMTR-MCNC: 169 MG/DL (ref 70–130)
GLUCOSE BLDC GLUCOMTR-MCNC: 173 MG/DL (ref 70–130)
GLUCOSE BLDC GLUCOMTR-MCNC: 178 MG/DL (ref 70–130)
GLUCOSE BLDC GLUCOMTR-MCNC: 183 MG/DL (ref 70–130)
GLUCOSE BLDC GLUCOMTR-MCNC: 200 MG/DL (ref 70–130)
GLUCOSE BLDC GLUCOMTR-MCNC: 210 MG/DL (ref 70–130)
GLUCOSE BLDC GLUCOMTR-MCNC: 210 MG/DL (ref 70–130)
GLUCOSE BLDC GLUCOMTR-MCNC: 235 MG/DL (ref 70–130)
GLUCOSE BLDC GLUCOMTR-MCNC: 258 MG/DL (ref 70–130)
GLUCOSE BLDC GLUCOMTR-MCNC: 290 MG/DL (ref 70–130)
GLUCOSE BLDC GLUCOMTR-MCNC: >599 MG/DL (ref 70–130)
GLUCOSE SERPL-MCNC: 139 MG/DL (ref 65–99)
GLUCOSE SERPL-MCNC: 146 MG/DL (ref 65–99)
GLUCOSE SERPL-MCNC: 203 MG/DL (ref 65–99)
HCT VFR BLD AUTO: 26 % (ref 34–46.6)
HCT VFR BLD AUTO: 27 % (ref 34–46.6)
HCT VFR BLD AUTO: 27 % (ref 34–46.6)
HDLC SERPL-MCNC: 23 MG/DL (ref 40–60)
HGB BLD-MCNC: 8.3 G/DL (ref 12–15.9)
HGB BLD-MCNC: 8.4 G/DL (ref 12–15.9)
HGB BLD-MCNC: 8.6 G/DL (ref 12–15.9)
IMM GRANULOCYTES # BLD AUTO: 0.02 10*3/MM3 (ref 0–0.05)
IMM GRANULOCYTES NFR BLD AUTO: 0.5 % (ref 0–0.5)
LDLC SERPL CALC-MCNC: 19 MG/DL (ref 0–100)
LDLC/HDLC SERPL: 0.52 {RATIO}
LEFT ATRIUM VOLUME INDEX: 28.9 ML/M2
LYMPHOCYTES # BLD AUTO: 0.86 10*3/MM3 (ref 0.7–3.1)
LYMPHOCYTES NFR BLD AUTO: 21.8 % (ref 19.6–45.3)
MAGNESIUM SERPL-MCNC: 2.1 MG/DL (ref 1.6–2.4)
MAGNESIUM SERPL-MCNC: 2.1 MG/DL (ref 1.6–2.4)
MAGNESIUM SERPL-MCNC: 2.2 MG/DL (ref 1.6–2.4)
MCH RBC QN AUTO: 27.3 PG (ref 26.6–33)
MCHC RBC AUTO-ENTMCNC: 31.1 G/DL (ref 31.5–35.7)
MCV RBC AUTO: 87.7 FL (ref 79–97)
MONOCYTES # BLD AUTO: 0.42 10*3/MM3 (ref 0.1–0.9)
MONOCYTES NFR BLD AUTO: 10.7 % (ref 5–12)
NEUTROPHILS NFR BLD AUTO: 2.58 10*3/MM3 (ref 1.7–7)
NEUTROPHILS NFR BLD AUTO: 65.5 % (ref 42.7–76)
NRBC BLD AUTO-RTO: 0 /100 WBC (ref 0–0.2)
PHOSPHATE SERPL-MCNC: 2.4 MG/DL (ref 2.5–4.5)
PHOSPHATE SERPL-MCNC: 2.8 MG/DL (ref 2.5–4.5)
PHOSPHATE SERPL-MCNC: 2.9 MG/DL (ref 2.5–4.5)
PLATELET # BLD AUTO: 141 10*3/MM3 (ref 140–450)
PMV BLD AUTO: 11.2 FL (ref 6–12)
POTASSIUM SERPL-SCNC: 3 MMOL/L (ref 3.5–5.2)
POTASSIUM SERPL-SCNC: 3.1 MMOL/L (ref 3.5–5.2)
POTASSIUM SERPL-SCNC: 4.2 MMOL/L (ref 3.5–5.2)
POTASSIUM SERPL-SCNC: 4.3 MMOL/L (ref 3.5–5.2)
QT INTERVAL: 422 MS
QTC INTERVAL: 445 MS
RBC # BLD AUTO: 3.08 10*6/MM3 (ref 3.77–5.28)
SODIUM SERPL-SCNC: 133 MMOL/L (ref 136–145)
SODIUM SERPL-SCNC: 136 MMOL/L (ref 136–145)
SODIUM SERPL-SCNC: 138 MMOL/L (ref 136–145)
TRIGL SERPL-MCNC: 185 MG/DL (ref 0–150)
UUN 24H UR-MCNC: 155 MG/DL
VIT B12 BLD-MCNC: 571 PG/ML (ref 211–946)
VLDLC SERPL-MCNC: 30 MG/DL (ref 5–40)
WBC NRBC COR # BLD AUTO: 3.94 10*3/MM3 (ref 3.4–10.8)

## 2024-02-09 PROCEDURE — 92610 EVALUATE SWALLOWING FUNCTION: CPT | Performed by: SPEECH-LANGUAGE PATHOLOGIST

## 2024-02-09 PROCEDURE — 97161 PT EVAL LOW COMPLEX 20 MIN: CPT

## 2024-02-09 PROCEDURE — 93880 EXTRACRANIAL BILAT STUDY: CPT | Performed by: INTERNAL MEDICINE

## 2024-02-09 PROCEDURE — 25010000002 POTASSIUM CHLORIDE 10 MEQ/100ML SOLUTION: Performed by: STUDENT IN AN ORGANIZED HEALTH CARE EDUCATION/TRAINING PROGRAM

## 2024-02-09 PROCEDURE — 94799 UNLISTED PULMONARY SVC/PX: CPT

## 2024-02-09 PROCEDURE — 85025 COMPLETE CBC W/AUTO DIFF WBC: CPT | Performed by: INTERNAL MEDICINE

## 2024-02-09 PROCEDURE — 82948 REAGENT STRIP/BLOOD GLUCOSE: CPT

## 2024-02-09 PROCEDURE — 87040 BLOOD CULTURE FOR BACTERIA: CPT | Performed by: INTERNAL MEDICINE

## 2024-02-09 PROCEDURE — 84100 ASSAY OF PHOSPHORUS: CPT | Performed by: INTERNAL MEDICINE

## 2024-02-09 PROCEDURE — 70551 MRI BRAIN STEM W/O DYE: CPT

## 2024-02-09 PROCEDURE — G0378 HOSPITAL OBSERVATION PER HR: HCPCS

## 2024-02-09 PROCEDURE — 83735 ASSAY OF MAGNESIUM: CPT | Performed by: INTERNAL MEDICINE

## 2024-02-09 PROCEDURE — 92611 MOTION FLUOROSCOPY/SWALLOW: CPT

## 2024-02-09 PROCEDURE — 63710000001 INSULIN DETEMIR PER 5 UNITS: Performed by: STUDENT IN AN ORGANIZED HEALTH CARE EDUCATION/TRAINING PROGRAM

## 2024-02-09 PROCEDURE — 96366 THER/PROPH/DIAG IV INF ADDON: CPT

## 2024-02-09 PROCEDURE — 25010000002 POTASSIUM CHLORIDE 10 MEQ/100ML SOLUTION: Performed by: INTERNAL MEDICINE

## 2024-02-09 PROCEDURE — 80048 BASIC METABOLIC PNL TOTAL CA: CPT | Performed by: INTERNAL MEDICINE

## 2024-02-09 PROCEDURE — 93880 EXTRACRANIAL BILAT STUDY: CPT

## 2024-02-09 PROCEDURE — 92523 SPEECH SOUND LANG COMPREHEN: CPT | Performed by: SPEECH-LANGUAGE PATHOLOGIST

## 2024-02-09 PROCEDURE — 97165 OT EVAL LOW COMPLEX 30 MIN: CPT

## 2024-02-09 PROCEDURE — 25010000002 POTASSIUM CHLORIDE PER 2 MEQ OF POTASSIUM: Performed by: INTERNAL MEDICINE

## 2024-02-09 PROCEDURE — 97530 THERAPEUTIC ACTIVITIES: CPT

## 2024-02-09 PROCEDURE — 25810000003 DEXTROSE-NACL PER 500 ML: Performed by: INTERNAL MEDICINE

## 2024-02-09 PROCEDURE — 96368 THER/DIAG CONCURRENT INF: CPT

## 2024-02-09 PROCEDURE — 84132 ASSAY OF SERUM POTASSIUM: CPT | Performed by: INTERNAL MEDICINE

## 2024-02-09 PROCEDURE — 85018 HEMOGLOBIN: CPT | Performed by: INTERNAL MEDICINE

## 2024-02-09 PROCEDURE — 80061 LIPID PANEL: CPT | Performed by: NURSE PRACTITIONER

## 2024-02-09 PROCEDURE — 96376 TX/PRO/DX INJ SAME DRUG ADON: CPT

## 2024-02-09 PROCEDURE — 93306 TTE W/DOPPLER COMPLETE: CPT

## 2024-02-09 PROCEDURE — 85014 HEMATOCRIT: CPT | Performed by: INTERNAL MEDICINE

## 2024-02-09 PROCEDURE — 74230 X-RAY XM SWLNG FUNCJ C+: CPT

## 2024-02-09 PROCEDURE — 63710000001 INSULIN LISPRO (HUMAN) PER 5 UNITS: Performed by: STUDENT IN AN ORGANIZED HEALTH CARE EDUCATION/TRAINING PROGRAM

## 2024-02-09 PROCEDURE — 99232 SBSQ HOSP IP/OBS MODERATE 35: CPT | Performed by: STUDENT IN AN ORGANIZED HEALTH CARE EDUCATION/TRAINING PROGRAM

## 2024-02-09 PROCEDURE — 93306 TTE W/DOPPLER COMPLETE: CPT | Performed by: INTERNAL MEDICINE

## 2024-02-09 RX ORDER — IBUPROFEN 600 MG/1
1 TABLET ORAL
Status: DISCONTINUED | OUTPATIENT
Start: 2024-02-09 | End: 2024-02-10 | Stop reason: HOSPADM

## 2024-02-09 RX ORDER — SODIUM CHLORIDE 0.9 % (FLUSH) 0.9 %
10 SYRINGE (ML) INJECTION AS NEEDED
Status: DISCONTINUED | OUTPATIENT
Start: 2024-02-09 | End: 2024-02-10 | Stop reason: HOSPADM

## 2024-02-09 RX ORDER — SODIUM CHLORIDE 9 MG/ML
40 INJECTION, SOLUTION INTRAVENOUS AS NEEDED
Status: DISCONTINUED | OUTPATIENT
Start: 2024-02-09 | End: 2024-02-10 | Stop reason: HOSPADM

## 2024-02-09 RX ORDER — POTASSIUM CHLORIDE 7.45 MG/ML
10 INJECTION INTRAVENOUS
Status: DISPENSED | OUTPATIENT
Start: 2024-02-09 | End: 2024-02-09

## 2024-02-09 RX ORDER — ACETAMINOPHEN 325 MG/1
650 TABLET ORAL EVERY 4 HOURS PRN
Status: DISCONTINUED | OUTPATIENT
Start: 2024-02-09 | End: 2024-02-10 | Stop reason: HOSPADM

## 2024-02-09 RX ORDER — NICOTINE 21 MG/24HR
1 PATCH, TRANSDERMAL 24 HOURS TRANSDERMAL EVERY 24 HOURS
Status: DISCONTINUED | OUTPATIENT
Start: 2024-02-09 | End: 2024-02-10 | Stop reason: HOSPADM

## 2024-02-09 RX ORDER — BISACODYL 10 MG
10 SUPPOSITORY, RECTAL RECTAL DAILY PRN
Status: DISCONTINUED | OUTPATIENT
Start: 2024-02-09 | End: 2024-02-10 | Stop reason: HOSPADM

## 2024-02-09 RX ORDER — BISACODYL 5 MG/1
5 TABLET, DELAYED RELEASE ORAL DAILY PRN
Status: DISCONTINUED | OUTPATIENT
Start: 2024-02-09 | End: 2024-02-10 | Stop reason: HOSPADM

## 2024-02-09 RX ORDER — POTASSIUM CHLORIDE 7.45 MG/ML
10 INJECTION INTRAVENOUS
Status: COMPLETED | OUTPATIENT
Start: 2024-02-09 | End: 2024-02-09

## 2024-02-09 RX ORDER — FERROUS SULFATE 325(65) MG
325 TABLET ORAL
Status: DISCONTINUED | OUTPATIENT
Start: 2024-02-09 | End: 2024-02-10 | Stop reason: HOSPADM

## 2024-02-09 RX ORDER — NICOTINE POLACRILEX 4 MG
15 LOZENGE BUCCAL
Status: DISCONTINUED | OUTPATIENT
Start: 2024-02-09 | End: 2024-02-10 | Stop reason: HOSPADM

## 2024-02-09 RX ORDER — NITROGLYCERIN 0.4 MG/1
0.4 TABLET SUBLINGUAL
Status: DISCONTINUED | OUTPATIENT
Start: 2024-02-09 | End: 2024-02-10 | Stop reason: HOSPADM

## 2024-02-09 RX ORDER — INSULIN LISPRO 100 [IU]/ML
2-9 INJECTION, SOLUTION INTRAVENOUS; SUBCUTANEOUS
Status: DISCONTINUED | OUTPATIENT
Start: 2024-02-09 | End: 2024-02-10 | Stop reason: HOSPADM

## 2024-02-09 RX ORDER — DEXTROSE MONOHYDRATE, SODIUM CHLORIDE, AND POTASSIUM CHLORIDE 50; 2.98; 9 G/1000ML; G/1000ML; G/1000ML
150 INJECTION, SOLUTION INTRAVENOUS CONTINUOUS PRN
Status: DISCONTINUED | OUTPATIENT
Start: 2024-02-09 | End: 2024-02-09

## 2024-02-09 RX ORDER — SODIUM CHLORIDE 0.9 % (FLUSH) 0.9 %
10 SYRINGE (ML) INJECTION EVERY 12 HOURS SCHEDULED
Status: DISCONTINUED | OUTPATIENT
Start: 2024-02-09 | End: 2024-02-10 | Stop reason: HOSPADM

## 2024-02-09 RX ORDER — INSULIN LISPRO 100 [IU]/ML
5 INJECTION, SOLUTION INTRAVENOUS; SUBCUTANEOUS
Status: DISCONTINUED | OUTPATIENT
Start: 2024-02-09 | End: 2024-02-10 | Stop reason: HOSPADM

## 2024-02-09 RX ORDER — LACTULOSE 10 G/15ML
10 SOLUTION ORAL DAILY
Status: DISCONTINUED | OUTPATIENT
Start: 2024-02-09 | End: 2024-02-10 | Stop reason: HOSPADM

## 2024-02-09 RX ORDER — AMOXICILLIN 250 MG
2 CAPSULE ORAL 2 TIMES DAILY PRN
Status: DISCONTINUED | OUTPATIENT
Start: 2024-02-09 | End: 2024-02-10 | Stop reason: HOSPADM

## 2024-02-09 RX ORDER — ARFORMOTEROL TARTRATE 15 UG/2ML
15 SOLUTION RESPIRATORY (INHALATION)
Status: DISCONTINUED | OUTPATIENT
Start: 2024-02-09 | End: 2024-02-10 | Stop reason: HOSPADM

## 2024-02-09 RX ORDER — ACETAMINOPHEN 650 MG/1
650 SUPPOSITORY RECTAL EVERY 4 HOURS PRN
Status: DISCONTINUED | OUTPATIENT
Start: 2024-02-09 | End: 2024-02-10 | Stop reason: HOSPADM

## 2024-02-09 RX ORDER — POLYETHYLENE GLYCOL 3350 17 G/17G
17 POWDER, FOR SOLUTION ORAL DAILY PRN
Status: DISCONTINUED | OUTPATIENT
Start: 2024-02-09 | End: 2024-02-10 | Stop reason: HOSPADM

## 2024-02-09 RX ORDER — DEXTROSE MONOHYDRATE 25 G/50ML
25 INJECTION, SOLUTION INTRAVENOUS
Status: DISCONTINUED | OUTPATIENT
Start: 2024-02-09 | End: 2024-02-10 | Stop reason: HOSPADM

## 2024-02-09 RX ORDER — ACETAMINOPHEN 160 MG/5ML
650 SOLUTION ORAL EVERY 4 HOURS PRN
Status: DISCONTINUED | OUTPATIENT
Start: 2024-02-09 | End: 2024-02-10 | Stop reason: HOSPADM

## 2024-02-09 RX ADMIN — POTASSIUM CHLORIDE 10 MEQ: 7.46 INJECTION, SOLUTION INTRAVENOUS at 09:15

## 2024-02-09 RX ADMIN — LACTULOSE 10 G: 20 SOLUTION ORAL at 09:15

## 2024-02-09 RX ADMIN — ARFORMOTEROL TARTRATE 15 MCG: 15 SOLUTION RESPIRATORY (INHALATION) at 20:50

## 2024-02-09 RX ADMIN — INSULIN HUMAN 4.6 UNITS/HR: 1 INJECTION, SOLUTION INTRAVENOUS at 10:10

## 2024-02-09 RX ADMIN — Medication 10 ML: at 21:01

## 2024-02-09 RX ADMIN — DEXTROSE MONOHYDRATE, SODIUM CHLORIDE, AND POTASSIUM CHLORIDE 150 ML/HR: 50; 9; 1.49 INJECTION, SOLUTION INTRAVENOUS at 01:20

## 2024-02-09 RX ADMIN — INSULIN LISPRO 4 UNITS: 100 INJECTION, SOLUTION INTRAVENOUS; SUBCUTANEOUS at 17:20

## 2024-02-09 RX ADMIN — ACETAMINOPHEN 650 MG: 325 TABLET ORAL at 09:35

## 2024-02-09 RX ADMIN — Medication 10 ML: at 01:32

## 2024-02-09 RX ADMIN — BARIUM SULFATE 100 ML: 0.81 POWDER, FOR SUSPENSION ORAL at 14:38

## 2024-02-09 RX ADMIN — ROSUVASTATIN CALCIUM 20 MG: 20 TABLET ORAL at 21:01

## 2024-02-09 RX ADMIN — INSULIN LISPRO 5 UNITS: 100 INJECTION, SOLUTION INTRAVENOUS; SUBCUTANEOUS at 17:21

## 2024-02-09 RX ADMIN — INSULIN LISPRO 2 UNITS: 100 INJECTION, SOLUTION INTRAVENOUS; SUBCUTANEOUS at 11:42

## 2024-02-09 RX ADMIN — BARIUM SULFATE 20 ML: 400 PASTE ORAL at 14:38

## 2024-02-09 RX ADMIN — EMPAGLIFLOZIN 10 MG: 10 TABLET, FILM COATED ORAL at 09:14

## 2024-02-09 RX ADMIN — PANTOPRAZOLE SODIUM 40 MG: 40 INJECTION, POWDER, FOR SOLUTION INTRAVENOUS at 21:00

## 2024-02-09 RX ADMIN — FERROUS SULFATE TAB 325 MG (65 MG ELEMENTAL FE) 325 MG: 325 (65 FE) TAB at 09:14

## 2024-02-09 RX ADMIN — POTASSIUM CHLORIDE 10 MEQ: 7.46 INJECTION, SOLUTION INTRAVENOUS at 11:03

## 2024-02-09 RX ADMIN — INSULIN LISPRO 6 UNITS: 100 INJECTION, SOLUTION INTRAVENOUS; SUBCUTANEOUS at 20:59

## 2024-02-09 RX ADMIN — ASPIRIN 324 MG: 81 TABLET, CHEWABLE ORAL at 09:12

## 2024-02-09 RX ADMIN — POTASSIUM CHLORIDE 10 MEQ: 7.46 INJECTION, SOLUTION INTRAVENOUS at 13:24

## 2024-02-09 RX ADMIN — BARIUM SULFATE 50 ML: 400 SUSPENSION ORAL at 14:38

## 2024-02-09 RX ADMIN — POTASSIUM CHLORIDE 10 MEQ: 7.46 INJECTION, SOLUTION INTRAVENOUS at 07:29

## 2024-02-09 RX ADMIN — POTASSIUM CHLORIDE, DEXTROSE MONOHYDRATE AND SODIUM CHLORIDE 150 ML/HR: 300; 5; 450 INJECTION, SOLUTION INTRAVENOUS at 08:08

## 2024-02-09 RX ADMIN — POTASSIUM CHLORIDE: 149 INJECTION, SOLUTION, CONCENTRATE INTRAVENOUS at 07:30

## 2024-02-09 RX ADMIN — Medication 10 ML: at 09:16

## 2024-02-09 RX ADMIN — Medication 10 ML: at 00:41

## 2024-02-09 RX ADMIN — INSULIN DETEMIR 10 UNITS: 100 INJECTION, SOLUTION SUBCUTANEOUS at 11:42

## 2024-02-09 RX ADMIN — PANTOPRAZOLE SODIUM 40 MG: 40 INJECTION, POWDER, FOR SOLUTION INTRAVENOUS at 09:14

## 2024-02-09 RX ADMIN — POTASSIUM CHLORIDE 10 MEQ: 7.46 INJECTION, SOLUTION INTRAVENOUS at 03:26

## 2024-02-09 RX ADMIN — POTASSIUM CHLORIDE 10 MEQ: 7.46 INJECTION, SOLUTION INTRAVENOUS at 05:41

## 2024-02-09 NOTE — CONSULTS
Diabetes Education    Patient Name:  Karol Lopez  YOB: 1950  MRN: [id number]  Admit Date:  2/8/2024        Reviewed chart for diabetes education consult.  Noted history of diabetes.  Noted A1c oif 8.3%.  Attempted to speak with Ms. Lopez this morning and she was having ECHO.  Returned to follow up and she had family in the room.  She asked if I could come back later.  Returned this afternoon after lunch, and she was off the floor and no family was in the room.  She is not a candidate for stroke and diabetes follow up class because her BMI is less than 25.  Will continue to follow for diabetes education.  Thank you for this referral.       Electronically signed by:  Mary Mcdonough RN  02/09/24 15:00 EST

## 2024-02-09 NOTE — CASE MANAGEMENT/SOCIAL WORK
Discharge Planning Assessment  Psychiatric     Patient Name: Karol Lopez  MRN: [id number]  Today's Date: 2/9/2024    Admit Date: 2/8/2024    Plan: Home with family   Discharge Needs Assessment       Row Name 02/09/24 1256       Living Environment    People in Home significant other    Current Living Arrangements home    Potentially Unsafe Housing Conditions none    Primary Care Provided by self    Provides Primary Care For no one    Quality of Family Relationships supportive    Able to Return to Prior Arrangements yes       Resource/Environmental Concerns    Resource/Environmental Concerns none    Transportation Concerns none       Transition Planning    Patient/Family Anticipates Transition to home    Patient/Family Anticipated Services at Transition none    Transportation Anticipated family or friend will provide       Discharge Needs Assessment    Equipment Currently Used at Home cane, quad    Provided Post Acute Provider List? N/A    N/A Provider List Comment Does not anticipate any discharge needs at this time    Provided Post Acute Provider Quality & Resource List? N/A                   Discharge Plan       Row Name 02/09/24 1303       Plan    Plan Home with family    Patient/Family in Agreement with Plan yes    Provided Post Acute Provider List? N/A    Provided Post Acute Provider Quality & Resource List? N/A    Plan Comments MSW spoke with pt and pt's family at bedside. Pt is independent in ADL's and IADL's. Pt reports she does have a can and walker at home if she ever needs to use. MSW discussed PT recommendations for home health. Pt denies home health needs at this time and reports she has the help and support she needs at home. Pt has prescription coverage with her insurance. Pt's PCP is Romaine Eugene. Pt had no discharge needs or concerns at this time. Pt plans to discharge home with family when medically ready.    Final Discharge Disposition Code 01 - home or self-care                  Continued  Care and Services - Admitted Since 2/8/2024    Coordination has not been started for this encounter.       Expected Discharge Date and Time       Expected Discharge Date Expected Discharge Time    Feb 11, 2024            Demographic Summary       Row Name 02/09/24 1256       General Information    Reason for Consult discharge planning                   Functional Status       Row Name 02/09/24 1256       Functional Status, IADL    Medications independent    Meal Preparation independent    Housekeeping independent    Laundry independent    Shopping independent                   Psychosocial    No documentation.                  Abuse/Neglect    No documentation.                  Legal    No documentation.                  Substance Abuse    No documentation.                  Patient Forms    No documentation.                     JOSE MANUEL Bustillos

## 2024-02-09 NOTE — MBS/VFSS/FEES
Acute Care - Speech Language Pathology   Swallow Initial Evaluation  [place]  Modified Barium Swallow Study (MBS)     Patient Name: Karol Lopez  : 1950  MRN: [id number]  Today's Date: 2024               Admit Date: 2024    Visit Dx:     ICD-10-CM ICD-9-CM   1. Hyperglycemia  R73.9 790.29   2. History of stroke  Z86.73 V12.54   3. Stroke-like symptoms  R29.90 781.99   4. Anemia, unspecified type  D64.9 285.9   5. Stenosis of carotid artery, unspecified laterality  I65.29 433.10   6. Oropharyngeal dysphagia  R13.12 787.22   7. Cognitive communication disorder  R41.841 315.32     Patient Active Problem List   Diagnosis    Coronary artery disease involving native coronary artery of native heart with angina pectoris    Anxiety    COPD (chronic obstructive pulmonary disease)    Acid reflux    Vitamin B12 deficiency    Current smoker    Type 2 diabetes mellitus, with long-term current use of insulin    Impaired mobility and ADLs    EDITH (acute kidney injury)    Melena    Anemia    Gastrointestinal hemorrhage    Hyperlipidemia LDL goal <70    Claudication    Persistent atrial fibrillation    AMS (altered mental status)    Hyperglycemia due to diabetes mellitus    Cirrhosis of liver    Carotid artery disease     Past Medical History:   Diagnosis Date    Acid reflux     Anxiety     Cataracts, bilateral     Cirrhosis of liver     Constipation     COPD (chronic obstructive pulmonary disease)     Coronary artery disease     CTS (carpal tunnel syndrome)     Diabetes     Hearing loss     Hypercholesteremia     Hypertension     Impaired functional mobility, balance, gait, and endurance     Lower back pain     Osteoarthritis     Stroke     2013-weak in right arm    Tattoos     x2    Tobacco abuse     Tumor     in between breast closer to right breast    Vitamin B12 deficiency     Wears dentures     upper only    Wears glasses      Past Surgical History:   Procedure Laterality Date    BREAST BIOPSY Right  5/10/2019    Procedure: BREAST BIOPSY RIGHT;  Surgeon: Kiana Frey MD;  Location: [facility] OR;  Service: General    CARPAL TUNNEL RELEASE Bilateral     CHOLECYSTECTOMY      COLONOSCOPY N/A 9/30/2021    Procedure: COLONOSCOPY WITH POLYPECTOMY AND ABLATION OF AVM;  Surgeon: Russell Davila MD;  Location: [facility] ENDOSCOPY;  Service: Gastroenterology;  Laterality: N/A;    CORONARY ANGIOPLASTY WITH STENT PLACEMENT  2012    ENDOSCOPY N/A 9/29/2021    Procedure: ESOPHAGOGASTRODUODENOSCOPY with biopsies;  Surgeon: Russell Davila MD;  Location: [facility] ENDOSCOPY;  Service: Gastroenterology;  Laterality: N/A;    ENTEROSCOPY SMALL BOWEL N/A 11/16/2021    Procedure: ESOPHAGOGASTRODUODENOSCOPY WITH SMALL BOWEL ENTEROSCOPY and biopsy;  Surgeon: Russell Davila MD;  Location: [facility] ENDOSCOPY;  Service: Gastroenterology;  Laterality: N/A;    HYSTERECTOMY      complete    JOINT REPLACEMENT Bilateral     knee replacements    TOTAL KNEE ARTHROPLASTY Bilateral 10/18/2016    MAUREEN Palomares MD       SLP Recommendation and Plan  SLP Swallowing Diagnosis: mild, oral dysphagia, pharyngeal dysphagia (02/09/24 1430)  SLP Diet Recommendation: regular textures, thin liquids, other (see comments) (If regular solids too much for pt to chew, adjust back to soft/chopped.) (02/09/24 1430)  Recommended Precautions and Strategies: upright posture during/after eating, general aspiration precautions (02/09/24 1430)  SLP Rec. for Method of Medication Administration: meds whole, with puree (02/09/24 1430)     Monitor for Signs of Aspiration: yes, notify SLP if any concerns, other (see comments) (If any cough or other s/s aspiration, adjust to nectar-thick liquids and notify SLP.) (02/09/24 1430)     Swallow Criteria for Skilled Therapeutic Interventions Met: demonstrates skilled criteria (02/09/24 1430)  Anticipated Discharge Disposition (SLP): home with home health (02/09/24 1430)  Rehab Potential/Prognosis, Swallowing: good, to  achieve stated therapy goals (02/09/24 1430)  Therapy Frequency (Swallow): 5 days per week (02/09/24 1430)  Predicted Duration Therapy Intervention (Days): until discharge (02/09/24 1430)  Oral Care Recommendations: Oral Care BID/PRN, Toothbrush (02/09/24 1430)                                        Plan of Care Reviewed With: patient      SWALLOW EVALUATION (last 72 hours)       SLP Adult Swallow Evaluation       Row Name 02/09/24 1430 02/09/24 0850                Rehab Evaluation    Document Type evaluation  -SM --       Subjective Information no complaints  -SM --       Patient Observations alert;cooperative  -SM --       Patient Effort good  -SM --          General Information    Current Method of Nutrition chopped;nectar/syrup-thick liquids  - NPO  -       Prior Level of Function-Communication -- unknown  -       Prior Level of Function-Swallowing -- other (see comments)  reported difficulty swallowing  -       Plans/Goals Discussed with patient;agreed upon  -SM --       Barriers to Rehab none identified  -SM --       Patient's Goals for Discharge patient did not state  - patient did not state  -          Pain Scale: Numbers Pre/Post-Treatment    Pretreatment Pain Rating 0/10 - no pain  -SM --       Posttreatment Pain Rating 0/10 - no pain  -SM --          Oral Motor Structure and Function    Secretion Management -- WNL/WFL  -          General Eating/Swallowing Observations    Respiratory Support Currently in Use -- room air  -       Eating/Swallowing Skills -- fed by SLP  -       Positioning During Eating -- upright in chair  -       Utensils Used -- spoon;cup;straw  -       Consistencies Trialed -- regular textures;pureed;ice chips;thin liquids;nectar/syrup-thick liquids  -          Clinical Swallow Eval    Pharyngeal Phase -- suspected pharyngeal impairment  -       Clinical Swallow Evaluation Summary -- Noted worse R facial droop than baseline and pt reported increased numbness.  Overt s/s of aspiration w/ trials of thin liquids. No glaring overt s/s w/ trials of nectar thick liquids, puree or solids. Will plan to complete MBS today to further assess  -          Pharyngeal Phase Concerns    Pharyngeal Phase Concerns -- wet vocal quality  -       Wet Vocal Quality -- thin  -CJ          MBS/VFSS    Utensils Used spoon;cup;straw  -SM --       Consistencies Trialed thin liquids;nectar/syrup-thick liquids;pureed;regular textures;mixed consistency  -SM --          MBS/VFSS Interpretation    Oral Prep Phase impaired oral phase of swallowing  -SM --       Oral Transit Phase impaired  -SM --       Oral Residue impaired  -SM --          Oral Preparatory Phase    Oral Preparatory Phase prolonged manipulation  -SM --       Prolonged Manipulation regular textures;secondary to reduced lingual strength  -SM --          Oral Transit Phase    Impaired Oral Transit Phase increased A-P transit time;premature spillage of liquids into pharynx  -SM --          Oral Residue    Impaired Oral Residue lingual residue  -SM --       Lingual Residue regular textures;secondary to reduced lingual strength  -SM --       Response to Oral Residue cleared residue;with spontaneous subsequent swallow;with liquid wash  -SM --          Initiation of Pharyngeal Swallow    Initiation of Pharyngeal Swallow bolus in pyriform sinuses  -SM --       Pharyngeal Phase impaired pharyngeal phase of swallowing  -SM --       Penetration During the Swallow thin liquids;secondary to delayed swallow initiation or mistiming;secondary to reduced laryngeal elevation;secondary to reduced vestibular closure  -SM --       Depth of Penetration deep;other (see comments)  though transient and cleared upon completion of the swallow  -SM --       Rosenbek's Scale thin:;2--->level 2  -SM --       Pharyngeal Residue pudding/puree;regular textures;valleculae;secondary to reduced base of tongue retraction;secondary to reduced posterior pharyngeal wall  stripping;other (see comments)  mild  - --       Pharyngeal Phase, Comment No aspiration. Will advance back to thin liquids though if any s/s aspiratio during meals, may clinically do better on nectar-thick liquids with meals and thin H2O between meals.  - --          SLP Evaluation Clinical Impression    SLP Swallowing Diagnosis mild;oral dysphagia;pharyngeal dysphagia  - suspected pharyngeal dysphagia  -       Functional Impact risk of aspiration/pneumonia  - risk of aspiration/pneumonia  -       Rehab Potential/Prognosis, Swallowing good, to achieve stated therapy goals  - good, to achieve stated therapy goals  -       Swallow Criteria for Skilled Therapeutic Interventions Met demonstrates skilled criteria  - demonstrates skilled criteria  -          Recommendations    Therapy Frequency (Swallow) 5 days per week  -SM --       Predicted Duration Therapy Intervention (Days) until discharge  - --       SLP Diet Recommendation regular textures;thin liquids;other (see comments)  If regular solids too much for pt to chew, adjust back to soft/chopped.  - soft to chew textures;chopped;no mixed consistencies;nectar thick liquids  until [facility]  -       Recommended Diagnostics -- reassess via VFSS ([facility] [place])  -       Recommended Precautions and Strategies upright posture during/after eating;general aspiration precautions  - general aspiration precautions;small bites of food and sips of liquid;upright posture during/after eating  -       Oral Care Recommendations Oral Care BID/PRN;Toothbrush  - Oral Care BID/PRN;Toothbrush  -       SLP Rec. for Method of Medication Administration meds whole;with puree  - meds whole;with puree;as tolerated  -       Monitor for Signs of Aspiration yes;notify SLP if any concerns;other (see comments)  If any cough or other s/s aspiration, adjust to nectar-thick liquids and notify SLP.  - yes;notify SLP if any concerns  -       Anticipated Discharge Disposition  (SLP) home with home health  -SM --                 User Key  (r) = Recorded By, (t) = Taken By, (c) = Cosigned By      Initials Name Effective Dates    Yoselin Cole, MS CCC-SLP 02/03/23 -     Dari Hernandes, MS CCC-SLP 07/11/23 -                     EDUCATION  The patient has been educated in the following areas:   Dysphagia (Swallowing Impairment) Modified Diet Instruction.        SLP GOALS       Row Name 02/09/24 1430 02/09/24 0850          (LTG) Patient will demonstrate functional swallow for    Diet Texture (Demonstrate functional swallow) regular textures  -SM --     Liquid viscosity (Demonstrate functional swallow) thin liquids  -SM --     [place] (Demonstrate functional swallow) independently (over 90% accuracy)  -SM --     Time Frame (Demonstrate functional swallow) by discharge  -SM --        (STG) Patient will tolerate trials of    Consistencies Trialed (Tolerate trials) regular textures;thin liquids  -SM --     Desired Outcome (Tolerate trials) without signs/symptoms of aspiration;with adequate oral prep/transit/clearance  -SM --     [place] (Tolerate trials) independently (over 90% accuracy)  -SM --     Time Frame (Tolerate trials) by discharge  -SM --        (STG) Lingual Strengthening Goal 1 (SLP)    Activity (Lingual Strengthening Goal 1, SLP) increase lingual tone/sensation/control/coordination/movement;increase tongue back strength  -SM --     Increase Lingual Tone/Sensation/Control/Coordination/Movement swallow trials;lingual resistance exercises  -SM --     Increase Tongue Back Strength lingual resistance exercises  -SM --     [place]/Accuracy (Lingual Strengthening Goal 1, SLP) with minimal cues (75-90% accuracy)  -SM --     Time Frame (Lingual Strengthening Goal 1, SLP) short term goal (STG)  -SM --        (STG) Pharyngeal Strengthening Exercise Goal 1 (SLP)    Activity (Pharyngeal Strengthening Goal 1, SLP) increase timing;increase superior movement of the  hyolaryngeal complex;increase closure at entrance to airway/closure of airway at glottis;increase squeeze/positive pressure generation  -SM --     Increase Timing prepping - 3 second prep or suck swallow or 3-step swallow  -SM --     Increase Superior Movement of the Hyolaryngeal Complex effortful pitch glide (falsetto + pharyngeal squeeze)  -SM --     Increase Closure at Entrance to Airway/Closure of Airway at Glottis supraglottic swallow  -SM --     Increase Squeeze/Positive Pressure Generation hard effortful swallow  -SM --     [place]/Accuracy (Pharyngeal Strengthening Goal 1, SLP) with minimal cues (75-90% accuracy)  -SM --     Time Frame (Pharyngeal Strengthening Goal 1, SLP) short term goal (STG)  -SM --        Patient will demonstrate functional speech skills for return to discharge environment    [place] -- with minimal cues  -     Time frame -- by discharge  -CJ     Progress/Outcomes -- new goal  -        Patient will demonstrate functional cognitive-linguistic skills for return to discharge environment    [place] -- with minimal cues  -CJ     Time frame -- by discharge  -CJ     Progress/Outcomes -- new goal  -CJ        Phonation Goal 1 (SLP)    Improve Phonation By Goal 1 (SLP) -- using loud speech;90%;with minimal cues (75-90%)  -CJ     Time Frame (Phonation Goal 1, SLP) -- short term goal (STG)  -CJ     Progress/Outcomes (Phonation Goal 1, SLP) -- new goal  -CJ        Articulation Goal 1 (SLP)    Improve Articulation Goal 1 (SLP) -- by over-articulating at phrase level;by over-articulating in connected speech;90%;with minimal cues (75-90%)  -CJ     Time Frame (Articulation Goal 1, SLP) -- short term goal (STG)  -CJ     Progress/Outcomes (Articulation Goal 1, SLP) -- new goal  -CJ        Memory Skills Goal 1 (SLP)    Improve Memory Skills Through Goal 1 (SLP) -- recalling unrelated word lists immediately;recalling unrelated word lists with an imposed delay;recall details of the  day;90%;with minimal cues (75-90%)  -     Time Frame (Memory Skills Goal 1, SLP) -- short term goal (STG)  -CJ     Progress/Outcomes (Memory Skills Goal 1, SLP) -- new goal  -        Organizational Skills Goal 1 (SLP)    Improve Thought Organization Through Goal 1 (SLP) -- completing a convergent naming task;completing a divergent naming task;abstract;naming similarities and differences;90%;with minimal cues (75-90%)  -CJ     Time Frame (Thought Organization Skills Goal 1, SLP) -- short term goal (STG)  -CJ     Progress/Outcomes (Thought Organization Skills Goal 1, SLP) -- new goal  -        Functional Problem Solving Skills Goal 1 (SLP)    Improve Problem Solving Through Goal 1 (SLP) -- sequence steps in a task;name items for a task;determine solutions to simple ADL/safety problems;90%;with minimal cues (75-90%)  -     Time Frame (Problem Solving Goal 1, SLP) -- short term goal (STG)  -CJ     Progress/Outcomes (Problem Solving Goal 1, SLP) -- new goal  -               User Key  (r) = Recorded By, (t) = Taken By, (c) = Cosigned By      Initials Name Provider Type    Yoselin Cole, MS CCC-SLP Speech and Language Pathologist    Dari Hernandes, MS CCC-SLP Speech and Language Pathologist                       Time Calculation:    Time Calculation- SLP       Row Name 02/09/24 1600 02/09/24 1432          Time Calculation- SLP    SLP Start Time 1430  -SM 0850  -     SLP Received On 02/09/24  - 02/09/24  -        Untimed Charges    78586-EF Eval Speech and Production w/ Language Minutes -- 40  -     57458-SO Eval Oral Pharyng Swallow Minutes -- 39  -     87842-KK Motion Fluoro Eval Swallow Minutes 45  -SM --        Total Minutes    Untimed Charges Total Minutes 45  -SM 79  -CJ      Total Minutes 45  -SM 79  -CJ               User Key  (r) = Recorded By, (t) = Taken By, (c) = Cosigned By      Initials Name Provider Type    Yoselin Cole, MS CCC-SLP Speech and Language Pathologist     Dari Hernandes, MS CCC-SLP Speech and Language Pathologist                    Therapy Charges for Today       Code Description Service Date Service Provider Modifiers Qty    [id number] HC ST MOTION FLUORO EVAL SWALLOW 3 2/9/2024 Yoselin Haynes, MS CCC-SLP GN 1                 Yoselin Haynes, MS CCC-SLP  2/9/2024

## 2024-02-09 NOTE — PROGRESS NOTES
[facility] Medicine Services  PROGRESS NOTE    Patient Name: Karol Lopez  : 1950  MRN: [id number]    Date of Admission: 2024  Primary Care Physician: Romaine Eugene MD    Subjective   Subjective     CC:  Follow-up altered mental status    HPI:  Was on insulin drip overnight.  Afebrile.  On room air.  Reports her dizziness has resolved.  Has some shortness of breath.  No chest pain.  No GI or  symptoms.      Objective   Objective     Vital Signs:   Temp:  [97.6 °F (36.4 °C)-98.6 °F (37 °C)] 98 °F (36.7 °C)  Heart Rate:  [61-69] 64  Resp:  [17-19] 19  BP: (131-155)/(61-86) 153/66     Physical Exam:  Constitutional: No acute distress, awake, alert  HENT: NCAT, mucous membranes moist  Respiratory: Clear to auscultation bilaterally, respiratory effort normal   Cardiovascular: RRR, no murmurs  Gastrointestinal: Normoactive bowel sounds, soft, nontender, nondistended  Musculoskeletal: No bilateral ankle edema  Psychiatric: Appropriate affect, cooperative  Neurologic: Oriented to self, hospital, , strength symmetric in all extremities, Cranial Nerves grossly intact to confrontation, speech clear, no asterixis  Skin: No rashes on exposed skin      Results Reviewed:  LAB RESULTS:      Lab 24  0405 24  0052 24  2317 24  2150 24  1902 24  1857   WBC 3.94  --   --   --   --  4.09   HEMOGLOBIN 8.4* 8.3*  --  7.8*  --  8.2*   HEMOGLOBIN, POC  --   --   --   --  8.2*  --    HEMATOCRIT 27.0* 26.0*  --  24.4*  --  26.2*   HEMATOCRIT POC  --   --   --   --  24*  --    PLATELETS 141  --   --   --   --  154   NEUTROS ABS 2.58  --   --   --   --  2.91   IMMATURE GRANS (ABS) 0.02  --   --   --   --  0.02   LYMPHS ABS 0.86  --   --   --   --  0.75   MONOS ABS 0.42  --   --   --   --  0.36   EOS ABS 0.04  --   --   --   --  0.03   MCV 87.7  --   --   --   --  88.5   PROCALCITONIN  --   --   --   --   --  0.36*   LACTATE  --   --  1.7  --   --  4.8*    PROTIME  --   --   --   --   --  14.9  14.7*   APTT  --   --   --   --   --  26.7   D DIMER QUANT  --   --   --   --   --  <0.27         Lab 02/09/24  0405 02/09/24  0053 02/08/24  1902 02/08/24  1857   SODIUM 136 133*  --  130*   POTASSIUM 3.0* 3.1*  --  3.9   CHLORIDE 102 100  --  93*   CO2 23.0 23.0  --  23.0   ANION GAP 11.0 10.0  --  14.0   BUN 18 19  --  20   CREATININE 1.24* 1.16* 1.50* 1.49*   EGFR 46.6* 50.5* 37.1* 37.4*   GLUCOSE 139* 203*  --  635*   CALCIUM 8.9 8.9  --  9.1   MAGNESIUM 2.1 2.2  --  2.3   PHOSPHORUS 2.9 2.8  --  4.0   HEMOGLOBIN A1C  --   --   --  8.30*   TSH  --   --   --  2.420         Lab 02/08/24  1857   TOTAL PROTEIN 6.0   ALBUMIN 3.7   GLOBULIN 2.3   ALT (SGPT) 17   AST (SGOT) 20   BILIRUBIN 0.4   ALK PHOS 105         Lab 02/08/24  1857   HSTROP T 25*   PROTIME 14.9  14.7*   INR 1.14*  1.3*         Lab 02/09/24  0405   CHOLESTEROL 72   LDL CHOL 19   HDL CHOL 23*   TRIGLYCERIDES 185*         Lab 02/08/24  1857   IRON 36*   IRON SATURATION (TSAT) 8*   TIBC 434   TRANSFERRIN 291   FERRITIN 133.50   FOLATE 8.41   VITAMIN B 12 571         Lab 02/08/24  1940   FIO2 21   CARBOXYHEMOGLOBIN (VENOUS) 4.3     Brief Urine Lab Results  (Last result in the past 365 days)        Color   Clarity   Blood   Leuk Est   Nitrite   Protein   CREAT   Urine HCG        02/08/24 2009             22.8         02/08/24 2009 Yellow   Clear   Negative   Negative   Negative   Trace                   Microbiology Results Abnormal       Procedure Component Value - Date/Time    Eosinophil Smear - Urine, Urine, Clean Catch [[id number]]  (Normal) Collected: 02/08/24 2234    Lab Status: Final result Specimen: Urine, Clean Catch Updated: 02/09/24 0051     Eosinophil Smear 0 % EOS/100 Cells     Narrative:      No eosinophil seen    COVID PRE-OP / PRE-PROCEDURE SCREENING ORDER (NO ISOLATION) - Swab, Nasopharynx [[id number]]  (Normal) Collected: 02/08/24 2150    Lab Status: Final result Specimen: Swab from Nasopharynx  Updated: 02/08/24 2313    Narrative:      The following orders were created for panel order COVID PRE-OP / PRE-PROCEDURE SCREENING ORDER (NO ISOLATION) - Swab, Nasopharynx.  Procedure                               Abnormality         Status                     ---------                               -----------         ------                     Respiratory Panel PCR w/...[[id number]]  Normal              Final result                 Please view results for these tests on the individual orders.    Respiratory Panel PCR w/COVID-19(SARS-CoV-2) TYREL/CESAR/JAYME/PAD/COR/ELVIE In-House, NP Swab in UTM/VTM, 2 HR TAT - Swab, Nasopharynx [[id number]]  (Normal) Collected: 02/08/24 2150    Lab Status: Final result Specimen: Swab from Nasopharynx Updated: 02/08/24 2313     ADENOVIRUS, PCR Not Detected     Coronavirus 229E Not Detected     Coronavirus HKU1 Not Detected     Coronavirus NL63 Not Detected     Coronavirus OC43 Not Detected     COVID19 Not Detected     Human Metapneumovirus Not Detected     Human Rhinovirus/Enterovirus Not Detected     Influenza A PCR Not Detected     Influenza B PCR Not Detected     Parainfluenza Virus 1 Not Detected     Parainfluenza Virus 2 Not Detected     Parainfluenza Virus 3 Not Detected     Parainfluenza Virus 4 Not Detected     RSV, PCR Not Detected     Bordetella pertussis pcr Not Detected     Bordetella parapertussis PCR Not Detected     Chlamydophila pneumoniae PCR Not Detected     Mycoplasma pneumo by PCR Not Detected    Narrative:      In the setting of a positive respiratory panel with a viral infection PLUS a negative procalcitonin without other underlying concern for bacterial infection, consider observing off antibiotics or discontinuation of antibiotics and continue supportive care. If the respiratory panel is positive for atypical bacterial infection (Bordetella pertussis, Chlamydophila pneumoniae, or Mycoplasma pneumoniae), consider antibiotic de-escalation to target atypical bacterial  infection.            MRI Brain Without Contrast    Result Date: 2/9/2024  MRI BRAIN WO CONTRAST Date of Exam: 2/9/2024 2:52 AM EST Indication: Stroke, follow up.  Comparison: None available. Technique:  Routine multiplanar/multisequence sequence images of the brain were obtained without contrast administration. Findings: No acute infarct is present on diffusion weighted sequences. Midline structures are normal and the craniocervical junction appears satisfactory. Age-related changes are present with relatively advanced generalized volume loss and typical pontine and periventricular leukomalacia. There are also small areas of chronic lacunar infarct, most notably within the left basal ganglia. There is otherwise no evidence of intracranial hemorrhage, mass or mass effect. There is ex vacuo prominence of the ventricles and sulci. The orbits are normal. The paranasal sinuses are grossly clear.     Impression: Impression: Advanced age-related changes are noted as above. There is otherwise no evidence of acute infarct, hemorrhage, mass or mass effect. Electronically Signed: Andreas Thibodeaux MD  2/9/2024 4:25 AM EST  Workstation ID: [id number]    XR Chest 1 View    Result Date: 2/8/2024  XR CHEST 1 VW Date of Exam: 2/8/2024 7:24 PM EST Indication: Acute Stroke Protocol (onset < 12 hrs) Comparison: 11/15/2021 Findings: No focal consolidation. Right lower lobe calcified granuloma no pneumothorax or pleural effusion. Cardiac size is normal. The visualized clavicles appear intact. No displaced rib fractures. The visualized upper abdomen is normal.     Impression: Impression: No acute cardiopulmonary disease. Electronically Signed: Anthony Dumont MD  2/8/2024 7:30 PM EST  Workstation ID: [id number]    CT Angiogram Head w AI Analysis of LVO    Result Date: 2/8/2024  CT ANGIOGRAM HEAD W AI ANALYSIS OF LVO, CT CEREBRAL PERFUSION W WO CONTRAST, CT ANGIOGRAM NECK Date of Exam: 2/8/2024 6:57 PM EST Indication: Neuro deficit, acute stroke  suspected Neuro deficit, acute stroke suspected. Comparison: 2/28/2021 Technique: CTA of the head was performed after the uneventful intravenous administration of 115 cc Isovue-370 . Reconstructed coronal and sagittal images were also obtained. In addition, a 3-D volume rendered image was created for interpretation. Automated exposure control and iterative reconstruction methods were used. Findings: CT perfusion demonstrates no area of less than 30% cerebral blood flow or elevated Tmax greater than 6 seconds. No mismatch. No decreased cerebral blood volume. No evidence of infarct or infarct penumbra. CTA of the head and neck demonstrates atheromatous disease of the aortic arch and origins of the great vessels. Severe atheromatous disease of the right carotid bulb with a residual vessel lumen of 1 mm and a native vessel lumen diameter of 5 mm consistent with greater than 80% stenosis according to NASCET criteria. Tortuosity of the extracranial right internal carotid artery at the skull base. Plaque-like calcification of the intracranial segments of the right internal carotid artery. The right  carotid terminus is normal. The visualized branches of the right anterior and middle cerebral arteries are normal. Severe atheromatous disease of the left carotid bulb extending into the left internal carotid artery with a minimal residual vessel lumen diameter of approximately 2 mm and a mid vessel lumen diameter measured more distally of 5 mm consistent with approximately 70% stenosis according to NASCET criteria. Tortuosity of the extracranial segments of the internal carotid artery at the skull base. Plaque-like atheromatous disease involving the intracranial segments of the left internal carotid artery. The left carotid terminus is normal. The visualized branches of the left anterior and middle cerebral arteries are normal. Stable vascular malformation of the lateral left frontal lobe. This is nonspecific and may represent  a small arteriovenous shunt. Both vertebral arteries arise from the subclavian arteries. The left vertebral artery is dominant. Mild atheromatous disease of the vertebral arteries. The vertebral arteries supply the basilar artery. The basilar artery and basilar artery tip are normal. The basilar artery terminates in bilateral posterior cerebral arteries. No abnormal intracranial enhancement. No intracranial mass. Bilateral lens replacements. The paranasal sinuses are clear. The mastoid air cells are aerated. The nasopharynx is clear. No cervical adenopathy. The thyroid gland appears normal. The lung apices are clear. Degenerative changes of the cervical spine     Impression: No vessel occlusion. No intracranial infarct. There is approximately 80% stenosis of the proximal right internal carotid artery and approximately 70% stenosis of the proximal left internal carotid artery according to NASCET criteria. Electronically Signed: Anthony Dumont MD  2/8/2024 7:24 PM EST  Workstation ID: [id number]    CT Angiogram Neck    Result Date: 2/8/2024  CT ANGIOGRAM HEAD W AI ANALYSIS OF LVO, CT CEREBRAL PERFUSION W WO CONTRAST, CT ANGIOGRAM NECK Date of Exam: 2/8/2024 6:57 PM EST Indication: Neuro deficit, acute stroke suspected Neuro deficit, acute stroke suspected. Comparison: 2/28/2021 Technique: CTA of the head was performed after the uneventful intravenous administration of 115 cc Isovue-370 . Reconstructed coronal and sagittal images were also obtained. In addition, a 3-D volume rendered image was created for interpretation. Automated exposure control and iterative reconstruction methods were used. Findings: CT perfusion demonstrates no area of less than 30% cerebral blood flow or elevated Tmax greater than 6 seconds. No mismatch. No decreased cerebral blood volume. No evidence of infarct or infarct penumbra. CTA of the head and neck demonstrates atheromatous disease of the aortic arch and origins of the great vessels. Severe  atheromatous disease of the right carotid bulb with a residual vessel lumen of 1 mm and a native vessel lumen diameter of 5 mm consistent with greater than 80% stenosis according to NASCET criteria. Tortuosity of the extracranial right internal carotid artery at the skull base. Plaque-like calcification of the intracranial segments of the right internal carotid artery. The right  carotid terminus is normal. The visualized branches of the right anterior and middle cerebral arteries are normal. Severe atheromatous disease of the left carotid bulb extending into the left internal carotid artery with a minimal residual vessel lumen diameter of approximately 2 mm and a mid vessel lumen diameter measured more distally of 5 mm consistent with approximately 70% stenosis according to NASCET criteria. Tortuosity of the extracranial segments of the internal carotid artery at the skull base. Plaque-like atheromatous disease involving the intracranial segments of the left internal carotid artery. The left carotid terminus is normal. The visualized branches of the left anterior and middle cerebral arteries are normal. Stable vascular malformation of the lateral left frontal lobe. This is nonspecific and may represent a small arteriovenous shunt. Both vertebral arteries arise from the subclavian arteries. The left vertebral artery is dominant. Mild atheromatous disease of the vertebral arteries. The vertebral arteries supply the basilar artery. The basilar artery and basilar artery tip are normal. The basilar artery terminates in bilateral posterior cerebral arteries. No abnormal intracranial enhancement. No intracranial mass. Bilateral lens replacements. The paranasal sinuses are clear. The mastoid air cells are aerated. The nasopharynx is clear. No cervical adenopathy. The thyroid gland appears normal. The lung apices are clear. Degenerative changes of the cervical spine     Impression: No vessel occlusion. No intracranial  infarct. There is approximately 80% stenosis of the proximal right internal carotid artery and approximately 70% stenosis of the proximal left internal carotid artery according to NASCET criteria. Electronically Signed: Anthony Dumont MD  2/8/2024 7:24 PM EST  Workstation ID: [id number]    CT CEREBRAL PERFUSION WITH & WITHOUT CONTRAST    Result Date: 2/8/2024  CT ANGIOGRAM HEAD W AI ANALYSIS OF LVO, CT CEREBRAL PERFUSION W WO CONTRAST, CT ANGIOGRAM NECK Date of Exam: 2/8/2024 6:57 PM EST Indication: Neuro deficit, acute stroke suspected Neuro deficit, acute stroke suspected. Comparison: 2/28/2021 Technique: CTA of the head was performed after the uneventful intravenous administration of 115 cc Isovue-370 . Reconstructed coronal and sagittal images were also obtained. In addition, a 3-D volume rendered image was created for interpretation. Automated exposure control and iterative reconstruction methods were used. Findings: CT perfusion demonstrates no area of less than 30% cerebral blood flow or elevated Tmax greater than 6 seconds. No mismatch. No decreased cerebral blood volume. No evidence of infarct or infarct penumbra. CTA of the head and neck demonstrates atheromatous disease of the aortic arch and origins of the great vessels. Severe atheromatous disease of the right carotid bulb with a residual vessel lumen of 1 mm and a native vessel lumen diameter of 5 mm consistent with greater than 80% stenosis according to NASCET criteria. Tortuosity of the extracranial right internal carotid artery at the skull base. Plaque-like calcification of the intracranial segments of the right internal carotid artery. The right  carotid terminus is normal. The visualized branches of the right anterior and middle cerebral arteries are normal. Severe atheromatous disease of the left carotid bulb extending into the left internal carotid artery with a minimal residual vessel lumen diameter of approximately 2 mm and a mid vessel lumen  diameter measured more distally of 5 mm consistent with approximately 70% stenosis according to NASCET criteria. Tortuosity of the extracranial segments of the internal carotid artery at the skull base. Plaque-like atheromatous disease involving the intracranial segments of the left internal carotid artery. The left carotid terminus is normal. The visualized branches of the left anterior and middle cerebral arteries are normal. Stable vascular malformation of the lateral left frontal lobe. This is nonspecific and may represent a small arteriovenous shunt. Both vertebral arteries arise from the subclavian arteries. The left vertebral artery is dominant. Mild atheromatous disease of the vertebral arteries. The vertebral arteries supply the basilar artery. The basilar artery and basilar artery tip are normal. The basilar artery terminates in bilateral posterior cerebral arteries. No abnormal intracranial enhancement. No intracranial mass. Bilateral lens replacements. The paranasal sinuses are clear. The mastoid air cells are aerated. The nasopharynx is clear. No cervical adenopathy. The thyroid gland appears normal. The lung apices are clear. Degenerative changes of the cervical spine     Impression: No vessel occlusion. No intracranial infarct. There is approximately 80% stenosis of the proximal right internal carotid artery and approximately 70% stenosis of the proximal left internal carotid artery according to NASCET criteria. Electronically Signed: Anthony Dumont MD  2/8/2024 7:24 PM EST  Workstation ID: [id number]    CT Head Without Contrast Stroke Protocol    Result Date: 2/8/2024  CT HEAD WO CONTRAST STROKE PROTOCOL Date of Exam: 2/8/2024 6:56 PM EST Indication: Neuro deficit, acute, stroke suspected Neuro deficit, acute stroke suspected. Comparison: Head CT 2/20/2021, brain MRI 5/23/2023. Technique: Axial CT images were obtained of the head without contrast administration.  Reconstructed coronal images were also  obtained. Automated exposure control and iterative construction methods were used. Scan Time: 18:52 Results discussed with the stroke team via telephone by Omid French MD at 18:59. Findings: Remote infarcts in the left corona radiata/left basal ganglia and right slava.No evidence of acute intracranial hemorrhage or mass effect. No extra-axial collection. The gray white matter differentiation is preserved. Ventricles and sulci are symmetric. Mild parenchymal atrophy. The mastoid air cells and paranasal sinuses are well aerated. Globes and extraocular muscles are unremarkable. No acute or suspicious osseous abnormality. Soft tissues within normal limits.     Impression: Impression: No evidence of acute intracranial abnormality. Similar chronic findings, including remote infarcts in the left corona radiata/left basal ganglia and left slava. Mild parenchymal atrophy. Electronically Signed: Omid French MD  2/8/2024 7:13 PM EST  Workstation ID: [id number]     Results for orders placed during the hospital encounter of 02/28/21    Adult Transthoracic Echo Complete W/ Cont if Necessary Per Protocol    Interpretation Summary  1.  Normal left ventricular size and systolic function, LVEF 60-65%.  2.  Mild concentric LVH.  3.  Normal LV diastolic filling pattern.  4.  Normal left atrial volume index.  5.  Normal right ventricular size and systolic function.  6.  Mild calcification of aortic valve without significant stenosis.  7.  Trace MR, TR, and PI.  8.  Negative bubble study with no evidence of intracardiac or intrapulmonary shunt.      Current medications:  Scheduled Meds:arformoterol, 15 mcg, Nebulization, BID - RT   And  tiotropium bromide monohydrate, 2 puff, Inhalation, Daily - RT  aspirin, 324 mg, Oral, Daily   Or  aspirin, 300 mg, Rectal, Daily  empagliflozin, 10 mg, Oral, Daily  ferrous sulfate, 325 mg, Oral, Daily With Breakfast  lactulose, 10 g, Oral, Daily  nicotine, 1 patch, Transdermal, Q24H  pantoprazole, 40  mg, Intravenous, Q12H  potassium chloride, 10 mEq, Intravenous, Q1H  rosuvastatin, 20 mg, Oral, Nightly  sodium chloride, 10 mL, Intravenous, Q12H  sodium chloride, 10 mL, Intravenous, Q12H  sodium chloride, 10 mL, Intravenous, Q12H      Continuous Infusions:dextrose 5 % and sodium chloride 0.45 %, 150 mL/hr  dextrose 5 % and sodium chloride 0.45 % with KCl 20 mEq/L, 150 mL/hr  dextrose 5 % and sodium chloride 0.45 % with KCl 40 mEq/L, 150 mL/hr  dextrose 5 % and sodium chloride 0.9 %, 150 mL/hr  dextrose 5 % and sodium chloride 0.9 % with KCl 20 mEq, 150 mL/hr, Last Rate: 150 mL/hr (02/09/24 0120)  dextrose 5 % and sodium chloride 0.9 % with KCl 40 mEq/L infusion,   insulin, 0-100 Units/hr, Last Rate: 5.3 Units/hr (02/09/24 0647)  sodium chloride 0.45 % 1,000 mL with potassium chloride 40 mEq infusion, 250 mL/hr  sodium chloride, 250 mL/hr  sodium chloride 0.45 % with KCl 20 mEq, 250 mL/hr, Last Rate: Stopped (02/09/24 0120)  sodium chloride, 250 mL/hr  sodium chloride 0.9 % with KCl 20 mEq, 250 mL/hr  sodium chloride 0.9 % with KCl 40 mEq/L, 250 mL/hr      PRN Meds:.  acetaminophen **OR** acetaminophen **OR** acetaminophen    senna-docusate sodium **AND** polyethylene glycol **AND** bisacodyl **AND** bisacodyl    Calcium Replacement - Follow Nurse / BPA Driven Protocol    dextrose    dextrose    dextrose 5 % and sodium chloride 0.45 %    dextrose 5 % and sodium chloride 0.45 % with KCl 20 mEq/L    dextrose 5 % and sodium chloride 0.45 % with KCl 40 mEq/L    dextrose 5 % and sodium chloride 0.9 %    dextrose 5 % and sodium chloride 0.9 % with KCl 20 mEq    dextrose 5 % and sodium chloride 0.9 % with KCl 40 mEq/L infusion    glucagon (human recombinant)    Magnesium Standard Dose Replacement - Follow Nurse / BPA Driven Protocol    nicotine polacrilex    nitroglycerin    Phosphorus Replacement - Follow Nurse / BPA Driven Protocol    Potassium Replacement - Follow Nurse / BPA Driven Protocol    sodium chloride 0.45 %  1,000 mL with potassium chloride 40 mEq infusion    sodium chloride    sodium chloride 0.45 % with KCl 20 mEq    [COMPLETED] Insert Peripheral IV **AND** sodium chloride    sodium chloride    sodium chloride    sodium chloride    sodium chloride    sodium chloride    sodium chloride    sodium chloride    sodium chloride    sodium chloride 0.9 % with KCl 20 mEq    sodium chloride 0.9 % with KCl 40 mEq/L    Assessment & Plan   Assessment & Plan     Active Hospital Problems    Diagnosis  POA    **AMS (altered mental status) [R41.82]  Yes    Hyperglycemia due to diabetes mellitus [E11.65]  Yes    Cirrhosis of liver [K74.60]  Unknown    Carotid artery disease [I77.9]  Unknown    Persistent atrial fibrillation [I48.19]  Yes    Hyperlipidemia LDL goal <70 [E78.5]  Yes    Anemia [D64.9]  Yes    EDITH (acute kidney injury) [N17.9]  Yes    Current smoker [F17.200]  Yes    Coronary artery disease involving native coronary artery of native heart with angina pectoris [I25.119]  Yes    COPD (chronic obstructive pulmonary disease) [J44.9]  Yes    Anxiety [F41.9]  Yes      Resolved Hospital Problems   No resolved problems to display.        Brief Hospital Course to date:  Karol Lopez is a 73 y.o. female with a PMH significant for WILCOX cirrhosis, prior CVA with residual right-sided weakness per EMR (left-sided weakness per pt), atrial fibrillation not on AC, carotid artery disease, HTN, CAD, HLD, tobacco use disorder, COPD, CKD stage III, history of GI bleed, who presented to the ED due to loss of consciousness.  MRI brain showed advanced age-related changes, but no evidence of acute infarct, hemorrhage, mass or mass effect.  Chest x-ray with no acute cardiopulmonary disease.  CTA head/neck showed no vessel occlusion, but showed approximately 80% stenosis of the proximal right internal carotid artery and approximately 70% stenosis of the proximal left internal carotid artery.    This patient's problems and plans were partially  entered by my partner and updated as appropriate by me 02/09/24.    Altered mental status, worsening right-sided weakness, dysarthria  Prior CVA  Carotid artery disease  CAD  HLD  HTN  --Neurology consulted  --serial NIH/neurochecks  --continue aspirin 81mg oral/ 300 rectal daily  --high intensity statin  --LDL 19, A1c 8.3%, tsh 2.4  --PT/OT/SLP  --Telemetry  --BP goals per neuro  --echo with bubble done, result pending  -Carotid ultrasound done, result pending     Altered mental status, resolved  --Suspect this was related to severe hyperglycemia  --Ammonia 19, UDS+ opiates, EtOH wnl, VBG WNL, UA not consistent with infection, chest x-ray with no acute disease  --Follow-up blood cultures     Hyperglycemia  Type 2 diabetes  - Insulin drip was started on admission for severe hyperglycemia--> transition to basal/bolus insulin   - Not in DKA  --Hold home oral meds     Anemia  History of GI bleed  - Hemoglobin baseline appears to be 8-10  - B12, folate okay  - IV Protonix, continue iron supplementation     Cirrhosis  - Continue home lactulose     Elevated Cr  CKD  --Baseline Cr appears to be 1-1.3; Cr 1.5 on admission --> improved  --IV fluids  --Follow acute urinary retention protocol  --Hold nephrotoxic medications  --A.m. labs     Chronic tobacco use  --counseled on cessation  --nicotine patch     Persistent atrial fibrillation  - Not anticoagulated  - Monitor on telemetry    Expected Discharge Location and Transportation: Home, likely tomorrow  Expected Discharge   Expected Discharge Date: 2/11/2024; Expected Discharge Time:      DVT prophylaxis:  Mechanical DVT prophylaxis orders are present.         AM-PAC 6 Clicks Score (PT): 16 (02/09/24 0338)    CODE STATUS:   Code Status and Medical Interventions:   Ordered at: 02/09/24 0017     Medical Intervention Limits:    NO intubation (DNI)     Level Of Support Discussed With:    Patient     Code Status (Patient has no pulse and is not breathing):    No CPR (Do Not  Attempt to Resuscitate)     Medical Interventions (Patient has pulse or is breathing):    Limited Support       Tamiko Berry MD  02/09/24

## 2024-02-09 NOTE — PLAN OF CARE
Goal Outcome Evaluation:  Plan of Care Reviewed With: patient           Outcome Evaluation: PT eval completed. Patient alert and oriented x 3. Presenting with deficits in general BLE strength ( R> L), standing dynamic balance, and functional endurance effecting functional mobility below baseline. Patient will benefit from skilled IP PT services to address impairments for return to PLOF. Recommend home with assist and HHPT at [facility].      Anticipated Discharge Disposition (PT): home with assist, home with home health

## 2024-02-09 NOTE — ED PROVIDER NOTES
"Subjective   History of Present Illness  73-year-old female presents via EMS to be evaluated for \"possible stroke.\"  The patient is a poor historian at this time.  According to EMS personnel, who provides majority of the patient's history, the patient has not felt well for the past \"2 days.\"  This afternoon, she reportedly had a near syncopal episode and EMS was called to the scene at home.  On their arrival the patient was noted to be confused with right-sided weakness and was subsequently brought to our facility for evaluation.  Her glucose was reading \"high.\"  EMS personnel is unsure as to whether or not the patient has any residual deficits from a prior stroke.  They are also unsure as to whether or not she is anticoagulated.  Awaiting arrival of family to further corroborate her history.      Review of Systems   Unable to perform ROS: Mental status change   Neurological:  Positive for facial asymmetry and weakness.   Psychiatric/Behavioral:  Positive for confusion.    All other systems reviewed and are negative.      Past Medical History:   Diagnosis Date    Acid reflux     Anxiety     Cataracts, bilateral     Cirrhosis of liver     Constipation     COPD (chronic obstructive pulmonary disease)     Coronary artery disease     CTS (carpal tunnel syndrome)     Diabetes     Hearing loss     Hypercholesteremia     Hypertension     Impaired functional mobility, balance, gait, and endurance     Lower back pain     Osteoarthritis     Stroke     2013-weak in right arm    Tattoos     x2    Tobacco abuse     Tumor     in between breast closer to right breast    Vitamin B12 deficiency     Wears dentures     upper only    Wears glasses        Allergies   Allergen Reactions    Codeine GI Intolerance       Past Surgical History:   Procedure Laterality Date    BREAST BIOPSY Right 5/10/2019    Procedure: BREAST BIOPSY RIGHT;  Surgeon: Kiana Frey MD;  Location: [facility];  Service: General    CARPAL TUNNEL RELEASE " Bilateral     CHOLECYSTECTOMY      COLONOSCOPY N/A 9/30/2021    Procedure: COLONOSCOPY WITH POLYPECTOMY AND ABLATION OF AVM;  Surgeon: Russell Davila MD;  Location: [facility] ENDOSCOPY;  Service: Gastroenterology;  Laterality: N/A;    CORONARY ANGIOPLASTY WITH STENT PLACEMENT  2012    ENDOSCOPY N/A 9/29/2021    Procedure: ESOPHAGOGASTRODUODENOSCOPY with biopsies;  Surgeon: Russell Davila MD;  Location: [facility] ENDOSCOPY;  Service: Gastroenterology;  Laterality: N/A;    ENTEROSCOPY SMALL BOWEL N/A 11/16/2021    Procedure: ESOPHAGOGASTRODUODENOSCOPY WITH SMALL BOWEL ENTEROSCOPY and biopsy;  Surgeon: Russell Davila MD;  Location: [facility] ENDOSCOPY;  Service: Gastroenterology;  Laterality: N/A;    HYSTERECTOMY      complete    JOINT REPLACEMENT Bilateral     knee replacements    TOTAL KNEE ARTHROPLASTY Bilateral 10/18/2016    MAUREEN Palomares MD       Family History   Problem Relation Age of Onset    Hypertension Other     Diabetes Mother     Hypertension Mother     Cancer Mother     Diabetes Father     Hypertension Father     Heart disease Father     Cancer Sister     Cancer Brother        Social History     Socioeconomic History    Marital status:    Tobacco Use    Smoking status: Every Day     Packs/day: 1.00     Years: 46.00     Additional pack years: 0.00     Total pack years: 46.00     Types: Cigarettes    Smokeless tobacco: Never   Vaping Use    Vaping Use: Never used   Substance and Sexual Activity    Alcohol use: No    Drug use: No    Sexual activity: Defer           Objective   Physical Exam  Vitals and nursing note reviewed.   Constitutional:       Appearance: She is well-developed. She is not diaphoretic.      Comments: Chronically ill-appearing female, somnolent but arousable, appears confused   HENT:      Head: Normocephalic and atraumatic.      Comments: Dry tongue and mucous membranes  Eyes:      Pupils: Pupils are equal, round, and reactive to light.   Neck:      Comments: No  meningeal signs or nuchal rigidity  Cardiovascular:      Rate and Rhythm: Normal rate and regular rhythm.      Heart sounds: Normal heart sounds. No murmur heard.     No friction rub. No gallop.   Pulmonary:      Effort: Pulmonary effort is normal. No respiratory distress.      Breath sounds: Normal breath sounds. No wheezing or rales.   Abdominal:      General: Bowel sounds are normal. There is no distension.      Palpations: Abdomen is soft. There is no mass.      Tenderness: There is no abdominal tenderness. There is no guarding or rebound.   Musculoskeletal:         General: No signs of injury.      Cervical back: Neck supple.   Skin:     General: Skin is warm and dry.      Findings: No erythema or rash.   Neurological:      Comments: Alert to person and month but not year, appears confused, following stable commands, drift noted with strength testing of right lower extremity, subtle right-sided facial droop present   Psychiatric:      Comments: Unable to adequately evaluate         Critical Care    Performed by: J Carlos Carr MD  Authorized by: J Carlos Carr MD    Critical care provider statement:     Critical care time (minutes):  37    Critical care was necessary to treat or prevent imminent or life-threatening deterioration of the following conditions:  CNS failure or compromise and endocrine crisis    Critical care was time spent personally by me on the following activities:  Development of treatment plan with patient or surrogate, discussions with consultants, evaluation of patient's response to treatment, examination of patient, obtaining history from patient or surrogate, ordering and performing treatments and interventions, ordering and review of laboratory studies, ordering and review of radiographic studies, pulse oximetry, re-evaluation of patient's condition and review of old charts             ED Course  ED Course as of 02/08/24 [address] [place] [address] Feb 08, 2024 1857 73-year-old female  "presents via EMS to be evaluated for \"possible stroke.\"  The patient has not felt well for the past \"2 days.\"  This afternoon, the patient reportedly had a near syncopal episode and EMS was called to scene.  On their arrival the patient was noted to be confused with right-sided weakness and was brought to our facility to be evaluated.  She reportedly has a history of a prior stroke; however, it is unclear as to whether or not she has any residual deficits.  It is unclear per EMS personnel as to whether or not the patient is anticoagulated.  On arrival, the patient does have subtle right-sided weakness and confusion.  A code stroke was initiated on arrival and she was taken directly to CT where was met by stroke navigator.  CT head is negative for intracranial hemorrhage.  The patient is not a candidate for TNK.  We will obtain labs and further advanced imaging, and we will see admission to the hospital pending return of workup. [DD]   1912 Labs are consistent with DKA.  IV fluids initiated.  Insulin drip initiated.  We will see admission to the hospital pending return of remainder of workup. [DD]   1927 Advanced imaging revealed no vascular lesion amenable to neurointervention.  Our stroke team is recommending admission to the hospital and will continue to follow on consult.  I am in agreement. [DD]   2003 After reviewing the patient's labs and imaging, I discussed the patient's case with our hospitalist, Dr. Jaquez, and the patient will be admitted under her care for further evaluation and treatment.  The patient is hemodynamically stable at this time and aware/agreeable with the plan. [DD]   2010 I personally and independently reviewed the patient's CT images and findings, and I am in agreement with the radiologist regarding CT interpretation--particular regarding carotid stenosis. [DD]   2010 I personally and independently viewed the patient's x-ray images myself, and I am in agreement with the radiologist's " reading for final interpretation--particularly, there is no pneumonia noted. [DD]   2014 NIHSS of 6. [DD]      ED Course User Index  [DD] J Carlos Carr MD                          Total (NIH Stroke Scale): 6        Recent Results (from the past 24 hour(s))   Comprehensive Metabolic Panel    Collection Time: 02/08/24  6:57 PM    Specimen: Blood   Result Value Ref Range    Glucose 635 (C) 65 - 99 mg/dL    BUN 20 8 - 23 mg/dL    Creatinine 1.49 (H) 0.57 - 1.00 mg/dL    Sodium 130 (L) 136 - 145 mmol/L    Potassium 3.9 3.5 - 5.2 mmol/L    Chloride 93 (L) 98 - 107 mmol/L    CO2 23.0 22.0 - 29.0 mmol/L    Calcium 9.1 8.6 - 10.5 mg/dL    Total Protein 6.0 6.0 - 8.5 g/dL    Albumin 3.7 3.5 - 5.2 g/dL    ALT (SGPT) 17 1 - 33 U/L    AST (SGOT) 20 1 - 32 U/L    Alkaline Phosphatase 105 39 - 117 U/L    Total Bilirubin 0.4 0.0 - 1.2 mg/dL    Globulin 2.3 gm/dL    A/G Ratio 1.6 g/dL    BUN/Creatinine Ratio 13.4 7.0 - 25.0    Anion Gap 14.0 5.0 - 15.0 mmol/L    eGFR 37.4 (L) >60.0 mL/min/1.73   Protime-INR    Collection Time: 02/08/24  6:57 PM    Specimen: Blood   Result Value Ref Range    Protime 14.7 (H) 12.2 - 14.5 Seconds    INR 1.14 (H) 0.89 - 1.12   T4, Free    Collection Time: 02/08/24  6:57 PM    Specimen: Blood   Result Value Ref Range    Free T4 0.94 0.93 - 1.70 ng/dL   TSH    Collection Time: 02/08/24  6:57 PM    Specimen: Blood   Result Value Ref Range    TSH 2.420 0.270 - 4.200 uIU/mL   Magnesium    Collection Time: 02/08/24  6:57 PM    Specimen: Blood   Result Value Ref Range    Magnesium 2.3 1.6 - 2.4 mg/dL   CBC Auto Differential    Collection Time: 02/08/24  6:57 PM    Specimen: Blood   Result Value Ref Range    WBC 4.09 3.40 - 10.80 10*3/mm3    RBC 2.96 (L) 3.77 - 5.28 10*6/mm3    Hemoglobin 8.2 (L) 12.0 - 15.9 g/dL    Hematocrit 26.2 (L) 34.0 - 46.6 %    MCV 88.5 79.0 - 97.0 fL    MCH 27.7 26.6 - 33.0 pg    MCHC 31.3 (L) 31.5 - 35.7 g/dL    RDW 15.4 12.3 - 15.4 %    RDW-SD 49.9 37.0 - 54.0 fl    MPV 12.2  (H) 6.0 - 12.0 fL    Platelets 154 140 - 450 10*3/mm3    Neutrophil % 71.2 42.7 - 76.0 %    Lymphocyte % 18.3 (L) 19.6 - 45.3 %    Monocyte % 8.8 5.0 - 12.0 %    Eosinophil % 0.7 0.3 - 6.2 %    Basophil % 0.5 0.0 - 1.5 %    Immature Grans % 0.5 0.0 - 0.5 %    Neutrophils, Absolute 2.91 1.70 - 7.00 10*3/mm3    Lymphocytes, Absolute 0.75 0.70 - 3.10 10*3/mm3    Monocytes, Absolute 0.36 0.10 - 0.90 10*3/mm3    Eosinophils, Absolute 0.03 0.00 - 0.40 10*3/mm3    Basophils, Absolute 0.02 0.00 - 0.20 10*3/mm3    Immature Grans, Absolute 0.02 0.00 - 0.05 10*3/mm3    nRBC 0.0 0.0 - 0.2 /100 WBC   Single High Sensitivity Troponin T    Collection Time: 02/08/24  6:57 PM    Specimen: Blood   Result Value Ref Range    HS Troponin T 25 (H) <14 ng/L   POCT Protime/INR    Collection Time: 02/08/24  6:57 PM    Specimen: Blood   Result Value Ref Range    Protime 14.9 12.8 - 15.2 seconds    INR 1.3 (H) 0.8 - 1.2   aPTT    Collection Time: 02/08/24  6:57 PM    Specimen: Blood   Result Value Ref Range    PTT 26.7 22.0 - 39.0 seconds   Green Top (Gel)    Collection Time: 02/08/24  6:57 PM   Result Value Ref Range    Extra Tube Hold for add-ons.    Lavender Top    Collection Time: 02/08/24  6:57 PM   Result Value Ref Range    Extra Tube hold for add-on    Gold Top - SST    Collection Time: 02/08/24  6:57 PM   Result Value Ref Range    Extra Tube Hold for add-ons.    Light Blue Top    Collection Time: 02/08/24  6:57 PM   Result Value Ref Range    Extra Tube Hold for add-ons.    Acetaminophen Level    Collection Time: 02/08/24  6:57 PM    Specimen: Blood   Result Value Ref Range    Acetaminophen <5.0 0.0 - 30.0 mcg/mL   Ethanol    Collection Time: 02/08/24  6:57 PM    Specimen: Blood   Result Value Ref Range    Ethanol <10 0 - 10 mg/dL   Salicylate Level    Collection Time: 02/08/24  6:57 PM    Specimen: Blood   Result Value Ref Range    Salicylate <0.3 <=30.0 mg/dL   Phosphorus    Collection Time: 02/08/24  6:57 PM    Specimen: Blood    Result Value Ref Range    Phosphorus 4.0 2.5 - 4.5 mg/dL   Osmolality, Serum    Collection Time: 02/08/24  6:57 PM    Specimen: Blood   Result Value Ref Range    Osmolality 314 (H) 275 - 295 mOsm/kg   Hemoglobin A1c    Collection Time: 02/08/24  6:57 PM    Specimen: Blood   Result Value Ref Range    Hemoglobin A1C 8.30 (H) 4.80 - 5.60 %   Beta Hydroxybutyrate Quantitative    Collection Time: 02/08/24  6:57 PM    Specimen: Blood   Result Value Ref Range    Beta-Hydroxybutyrate Quant 0.263 0.020 - 0.270 mmol/L   POC CHEM 8    Collection Time: 02/08/24  7:02 PM    Specimen: Blood   Result Value Ref Range    Glucose >599 (C) 70 - 130 mg/dL    BUN 23 8 - 26 mg/dL    Creatinine 1.50 (H) 0.60 - 1.30 mg/dL    Sodium 130 (L) 138 - 146 mmol/L    POC Potassium 3.9 3.5 - 4.9 mmol/L    Chloride 91 (L) 98 - 109 mmol/L    Total CO2 22 (L) 24 - 29 mmol/L    Hemoglobin 8.2 (L) 12.0 - 17.0 g/dL    Hematocrit 24 (L) 38 - 51 %    Ionized Calcium 1.20 1.20 - 1.32 mmol/L    eGFR 37.1 (L) >60.0 mL/min/1.73   ECG 12 Lead ED Triage Standing Order; Acute Stroke (Onset <24 hrs)    Collection Time: 02/08/24  7:25 PM   Result Value Ref Range    QT Interval 422 ms    QTC Interval 445 ms   Blood Gas, Venous With Co-Ox    Collection Time: 02/08/24  7:40 PM    Specimen: Venous Blood   Result Value Ref Range    Site Nurse/Dr Draw     pH, Venous 7.366 7.310 - 7.410 pH Units    pCO2, Venous 45.2 41.0 - 51.0 mm Hg    pO2, Venous 34.4 27.0 - 53.0 mm Hg    HCO3, Venous 25.8 22.0 - 28.0 mmol/L    Base Excess, Venous 0.3 -2.0 - 2.0 mmol/L    Hemoglobin, Blood Gas 8.0 (L) 14 - 18 g/dL    Oxyhemoglobin Venous 61.2 %    Methemoglobin Venous 0.4 %    Carboxyhemoglobin Venous 4.3 %    CO2 Content 27.2 22 - 33 mmol/L    Temperature 37.0     Barometric Pressure for Blood Gas      Modality Room Air     FIO2 21 %    Rate 0 Breaths/minute    PIP 0 cmH2O    IPAP 0     EPAP 0    Urinalysis With Culture If Indicated - Straight Cath    Collection Time: 02/08/24  8:09  PM    Specimen: Straight Cath; Urine   Result Value Ref Range    Color, UA Yellow Yellow, Straw    Appearance, UA Clear Clear    pH, UA 6.0 5.0 - 8.0    Specific Gravity, UA 1.039 (H) 1.001 - 1.030    Glucose, UA >=1000 mg/dL (3+) (A) Negative    Ketones, UA Negative Negative    Bilirubin, UA Negative Negative    Blood, UA Negative Negative    Protein, UA Trace (A) Negative    Leuk Esterase, UA Negative Negative    Nitrite, UA Negative Negative    Urobilinogen, UA 0.2 E.U./dL 0.2 - 1.0 E.U./dL   Urine Drug Screen - Urine, Clean Catch    Collection Time: 02/08/24  8:09 PM    Specimen: Urine, Clean Catch   Result Value Ref Range    THC, Screen, Urine Negative Negative    Phencyclidine (PCP), Urine Negative Negative    Cocaine Screen, Urine Negative Negative    Methamphetamine, Ur Negative Negative    Opiate Screen Positive (A) Negative    Amphetamine Screen, Urine Negative Negative    Benzodiazepine Screen, Urine Negative Negative    Tricyclic Antidepressants Screen Negative Negative    Methadone Screen, Urine Negative Negative    Barbiturates Screen, Urine Negative Negative    Oxycodone Screen, Urine Negative Negative    Buprenorphine, Screen, Urine Negative Negative   Fentanyl, Urine - Urine, Clean Catch    Collection Time: 02/08/24  8:09 PM    Specimen: Urine, Clean Catch   Result Value Ref Range    Fentanyl, Urine Negative Negative   POC Glucose Once    Collection Time: 02/08/24  8:50 PM    Specimen: Blood   Result Value Ref Range    Glucose 458 (C) 70 - 130 mg/dL     Note: In addition to lab results from this visit, the labs listed above may include labs taken at another facility or during a different encounter within the last 24 hours. Please correlate lab times with ED admission and discharge times for further clarification of the services performed during this visit.    XR Chest 1 View   Final Result   Impression: No acute cardiopulmonary disease.      Electronically Signed: Anthony Dumont MD     2/8/2024 7:30  "PM EST     Workstation ID: [id number]      CT Angiogram Head w AI Analysis of LVO   Final Result   No vessel occlusion. No intracranial infarct. There is approximately 80% stenosis of the proximal right internal carotid artery and approximately 70% stenosis of the proximal left internal carotid artery according to NASCET criteria.      Electronically Signed: Anthony Dumont MD     2/8/2024 7:24 PM EST     Workstation ID: [id number]      CT Angiogram Neck   Final Result   No vessel occlusion. No intracranial infarct. There is approximately 80% stenosis of the proximal right internal carotid artery and approximately 70% stenosis of the proximal left internal carotid artery according to NASCET criteria.      Electronically Signed: Anthony Dumont MD     2/8/2024 7:24 PM EST     Workstation ID: [id number]      CT CEREBRAL PERFUSION WITH & WITHOUT CONTRAST   Final Result   No vessel occlusion. No intracranial infarct. There is approximately 80% stenosis of the proximal right internal carotid artery and approximately 70% stenosis of the proximal left internal carotid artery according to NASCET criteria.      Electronically Signed: Anthony Dumont MD     2/8/2024 7:24 PM EST     Workstation ID: [id number]      CT Head Without Contrast Stroke Protocol   Final Result   Impression:      No evidence of acute intracranial abnormality.      Similar chronic findings, including remote infarcts in the left corona radiata/left basal ganglia and left slava. Mild parenchymal atrophy.         Electronically Signed: Omid French MD     2/8/2024 7:13 PM EST     Workstation ID: [id number]        Vitals:    02/08/24 1928 02/08/24 1942 02/08/24 2025   BP: 134/72  147/86   Pulse: 64  69   Resp: 17  18   Temp:  97.6 °F (36.4 °C)    TempSrc:  Oral    SpO2: 97%  96%   Weight:  61.2 kg (135 lb)    Height:  157.5 cm (62\")      Medications   sodium chloride 0.9 % flush 10 mL (has no administration in time range)   sodium chloride 0.9 % flush 10 mL (has no " administration in time range)   sodium chloride 0.9 % flush 10 mL (10 mL Intravenous Given 2/8/24 2022)   sodium chloride 0.9 % flush 10 mL (has no administration in time range)   sodium chloride 0.9 % infusion 40 mL (has no administration in time range)   dextrose (GLUTOSE) oral gel 15 g (has no administration in time range)   dextrose (D50W) (25 g/50 mL) IV injection 10-50 mL (has no administration in time range)   glucagon (GLUCAGEN) injection 1 mg (has no administration in time range)   sodium chloride 0.9 % bolus (1,000 mL/hr Intravenous New Bag 2/8/24 1941)   sodium chloride 0.9 % infusion (has no administration in time range)   sodium chloride 0.9 % with KCl 20 mEq/L infusion (has no administration in time range)   sodium chloride 0.9 % with KCl 40 mEq/L infusion (has no administration in time range)   dextrose 5 % and sodium chloride 0.9 % infusion (has no administration in time range)   dextrose 5 % and sodium chloride 0.9 % with KCl 20 mEq/L infusion (has no administration in time range)   dextrose 5 % and sodium chloride 0.9 % with KCl 40 mEq/L infusion (has no administration in time range)   sodium chloride 0.45 % infusion (has no administration in time range)   sodium chloride 0.45 % with KCl 20 mEq/L infusion (250 mL/hr Intravenous New Bag 2/8/24 2021)   sodium chloride 0.45 % 1,000 mL with potassium chloride 40 mEq infusion (has no administration in time range)   dextrose 5 % and sodium chloride 0.45 % infusion (has no administration in time range)   dextrose 5 % and sodium chloride 0.45 % with KCl 20 mEq/L infusion (has no administration in time range)   dextrose 5 % and sodium chloride 0.45 % with KCl 40 mEq/L infusion (has no administration in time range)   insulin regular 1 unit/mL in 0.9% sodium chloride (Glucommander) (4 Units/hr Intravenous New Bag 2/8/24 2055)   Potassium Replacement - Follow Nurse / BPA Driven Protocol (has no administration in time range)   Magnesium Standard Dose  Replacement - Follow Nurse / BPA Driven Protocol (has no administration in time range)   Phosphorus Replacement - Follow Nurse / BPA Driven Protocol (has no administration in time range)   Calcium Replacement - Follow Nurse / BPA Driven Protocol (has no administration in time range)   sodium chloride 0.9 % bolus 1,000 mL (1,000 mL Intravenous New Bag 2/8/24 1941)   iopamidol (ISOVUE-370) 76 % injection 100 mL (115 mL Intravenous Given 2/8/24 1915)     ECG/EMG Results (last 24 hours)       Procedure Component Value Units Date/Time    ECG 12 Lead ED Triage Standing Order; Acute Stroke (Onset <24 hrs) [[id number]] Collected: 02/08/24 1925     Updated: 02/08/24 1925     QT Interval 422 ms      QTC Interval 445 ms     Narrative:      Test Reason : ED Triage Standing Order~  Blood Pressure :   */*   mmHG  Vent. Rate :  67 BPM     Atrial Rate :  67 BPM     P-R Int : 218 ms          QRS Dur : 102 ms      QT Int : 422 ms       P-R-T Axes :  46  -8  11 degrees     QTc Int : 445 ms    Sinus rhythm with sinus arrhythmia with 1st degree AV block  Otherwise normal ECG  When compared with ECG of 06-FEB-2015 11:39,  HI interval has increased  Nonspecific T wave abnormality now evident in Inferior leads    Referred By: ERMD           Confirmed By:           ECG 12 Lead ED Triage Standing Order; Acute Stroke (Onset <24 hrs)   Preliminary Result   Test Reason : ED Triage Standing Order~   Blood Pressure :   */*   mmHG   Vent. Rate :  67 BPM     Atrial Rate :  67 BPM      P-R Int : 218 ms          QRS Dur : 102 ms       QT Int : 422 ms       P-R-T Axes :  46  -8  11 degrees      QTc Int : 445 ms      Sinus rhythm with sinus arrhythmia with 1st degree AV block   Otherwise normal ECG   When compared with ECG of 06-FEB-2015 11:39,   HI interval has increased   Nonspecific T wave abnormality now evident in Inferior leads      Referred By: ERMD           Confirmed By:                     Medical Decision Making  Amount and/or Complexity of  Data Reviewed  Labs: ordered.  Radiology: ordered.  ECG/medicine tests: ordered.    Risk  OTC drugs.  Prescription drug management.  Decision regarding hospitalization.        Final diagnoses:   Hyperglycemia   History of stroke   Stroke-like symptoms   Anemia, unspecified type   Stenosis of carotid artery, unspecified laterality       ED Disposition  ED Disposition       ED Disposition   Decision to Admit    Condition   --    Comment   --               No follow-up provider specified.       Medication List      No changes were made to your prescriptions during this visit.            J Carlos Carr MD  02/08/24 [id number]

## 2024-02-09 NOTE — PLAN OF CARE
Goal Outcome Evaluation:  Plan of Care Reviewed With: patient        Progress: no change  Outcome Evaluation: Pt presents at baseline for ADL performance w/ symmetrical BUE strength and coordination/sensation intact. Mild dynamic standing balance deficit, defer to PT for any further mobility needs. OT signing off, please reconsult if needed. Rec d/c to home with assist prn when medically appropriate.      Anticipated Discharge Disposition (OT): home, home with assist

## 2024-02-09 NOTE — H&P
[facility] Medicine Services  HISTORY AND PHYSICAL    Patient Name: Karol Lopez  : 1950  MRN: [id number]  Primary Care Physician: Romaine Eugene MD  Date of admission: 2024      Subjective   Subjective     Chief Complaint:  Altered mental status    HPI:  Karol Lopez is a 73 y.o. female with a PMH significant for Roe cirrhosis, history of stroke with residual right-sided weakness per EMR (left-sided weakness per pt), atrial fibrillation not on anticoagulation, carotid artery disease, HTN, CAD, HLD, tobacco abuse, COPD, CAD, CKD stage III, history of GI bleed who to the ED due to loss of consciousness.  Patient reports loss of consciousness today while laying in bed.  She says that prior to onset of symptoms she had severe bilateral leg cramping.  Denies vomiting, chest pain, shortness of breath.  Per EMR, patient complained of weakness, slurred speech observed by family which began today around 4 PM, had not been feeling well for few days.  EMS reported right-sided weakness and confusion.      Personal History     Past Medical History:   Diagnosis Date    Acid reflux     Anxiety     Cataracts, bilateral     Cirrhosis of liver     Constipation     COPD (chronic obstructive pulmonary disease)     Coronary artery disease     CTS (carpal tunnel syndrome)     Diabetes     Hearing loss     Hypercholesteremia     Hypertension     Impaired functional mobility, balance, gait, and endurance     Lower back pain     Osteoarthritis     Stroke     2013-weak in right arm    Tattoos     x2    Tobacco abuse     Tumor     in between breast closer to right breast    Vitamin B12 deficiency     Wears dentures     upper only    Wears glasses            Past Surgical History:   Procedure Laterality Date    BREAST BIOPSY Right 5/10/2019    Procedure: BREAST BIOPSY RIGHT;  Surgeon: Kiana Frey MD;  Location: [facility];  Service: General    CARPAL TUNNEL RELEASE Bilateral      CHOLECYSTECTOMY      COLONOSCOPY N/A 9/30/2021    Procedure: COLONOSCOPY WITH POLYPECTOMY AND ABLATION OF AVM;  Surgeon: Russell Davila MD;  Location: [facility] ENDOSCOPY;  Service: Gastroenterology;  Laterality: N/A;    CORONARY ANGIOPLASTY WITH STENT PLACEMENT  2012    ENDOSCOPY N/A 9/29/2021    Procedure: ESOPHAGOGASTRODUODENOSCOPY with biopsies;  Surgeon: Russell Davila MD;  Location: [facility] ENDOSCOPY;  Service: Gastroenterology;  Laterality: N/A;    ENTEROSCOPY SMALL BOWEL N/A 11/16/2021    Procedure: ESOPHAGOGASTRODUODENOSCOPY WITH SMALL BOWEL ENTEROSCOPY and biopsy;  Surgeon: Russell Davila MD;  Location: [facility] ENDOSCOPY;  Service: Gastroenterology;  Laterality: N/A;    HYSTERECTOMY      complete    JOINT REPLACEMENT Bilateral     knee replacements    TOTAL KNEE ARTHROPLASTY Bilateral 10/18/2016    MAUREEN Palomares MD       Family History: family history includes Cancer in her brother, mother, and sister; Diabetes in her father and mother; Heart disease in her father; Hypertension in her father, mother, and another family member.     Social History:  reports that she has been smoking cigarettes. She has a 46.00 pack-year smoking history. She has never used smokeless tobacco. She reports that she does not drink alcohol and does not use drugs.  Social History     Social History Narrative    Not on file       Medications:  Available home medication information reviewed.  HYDROcodone-acetaminophen, Insulin Pen Needle, Insulin Syringe-Needle U-100, Umeclidinium-Vilanterol, aspirin, bisacodyl, empagliflozin, ferrous sulfate, furosemide, lactulose, linaclotide, linagliptin, metFORMIN, metoprolol succinate XL, pantoprazole, rosuvastatin, and vitamin B-12    Allergies   Allergen Reactions    Codeine GI Intolerance       Objective   Objective     Vital Signs:   Temp:  [97.6 °F (36.4 °C)] 97.6 °F (36.4 °C)  Heart Rate:  [64-69] 68  Resp:  [17-18] 18  BP: (134-155)/(72-86) 155/74  Total (NIH Stroke  Scale): 6    Physical Exam   Constitutional: Awake, alert  Eyes: PERRLA, sclerae anicteric, no conjunctival injection  HENT: NCAT, mucous membranes moist  Neck: Supple, no thyromegaly, no lymphadenopathy, trachea midline  Respiratory: Clear to auscultation bilaterally, nonlabored respirations   Cardiovascular: RRR, no murmurs, rubs, or gallops, palpable pedal pulses bilaterally  Gastrointestinal: Positive bowel sounds, soft, nontender, nondistended  Musculoskeletal: No bilateral ankle edema, no clubbing or cyanosis to extremities  Psychiatric: Appropriate affect, cooperative  Neurologic: Oriented x 3, strength diminished on the left compared to the right hand upper and lower extremities, Cranial Nerves grossly intact to confrontation, speech slurred  Skin: No rashes      Result Review:  I have personally reviewed the results from the time of this admission to 2/8/2024 21:49 EST and agree with these findings:  [x]  Laboratory list / accordion  []  Microbiology  [x]  Radiology  [x]  EKG/Telemetry   []  Cardiology/Vascular   []  Pathology  [x]  Old records      LAB RESULTS:      Lab 02/08/24 1902 02/08/24 1857   WBC  --  4.09   HEMOGLOBIN  --  8.2*   HEMOGLOBIN, POC 8.2*  --    HEMATOCRIT  --  26.2*   HEMATOCRIT POC 24*  --    PLATELETS  --  154   NEUTROS ABS  --  2.91   IMMATURE GRANS (ABS)  --  0.02   LYMPHS ABS  --  0.75   MONOS ABS  --  0.36   EOS ABS  --  0.03   MCV  --  88.5   PROTIME  --  14.9  14.7*   INR  --  1.14*  1.3*   APTT  --  26.7   D DIMER QUANT  --  <0.27         Lab 02/08/24 1902 02/08/24 1857   SODIUM  --  130*   POTASSIUM  --  3.9   CHLORIDE  --  93*   CO2  --  23.0   ANION GAP  --  14.0   BUN  --  20   CREATININE 1.50* 1.49*   EGFR 37.1* 37.4*   GLUCOSE  --  635*   CALCIUM  --  9.1   MAGNESIUM  --  2.3   PHOSPHORUS  --  4.0   HEMOGLOBIN A1C  --  8.30*   TSH  --  2.420         Lab 02/08/24 1857   TOTAL PROTEIN 6.0   ALBUMIN 3.7   GLOBULIN 2.3   ALT (SGPT) 17   AST (SGOT) 20   BILIRUBIN 0.4    ALK PHOS 105         Lab 02/08/24  1857   HSTROP T 25*                 Lab 02/08/24  1940   FIO2 21   CARBOXYHEMOGLOBIN (VENOUS) 4.3     UA          9/27/2023    19:00 11/22/2023    13:41 2/8/2024    20:09   Urinalysis   Specific Gravity, UA 1.010     1.010     1.039    Ketones, UA   Negative    Blood, UA TRACE-INTACT     TRACE-INTACT     Negative    Leukocytes, UA Negative     Negative     Negative    Nitrite, UA Negative     Negative     Negative       Details          This result is from an external source.               Microbiology Results (last 10 days)       ** No results found for the last 240 hours. **            XR Chest 1 View    Result Date: 2/8/2024  XR CHEST 1 VW Date of Exam: 2/8/2024 7:24 PM EST Indication: Acute Stroke Protocol (onset < 12 hrs) Comparison: 11/15/2021 Findings: No focal consolidation. Right lower lobe calcified granuloma no pneumothorax or pleural effusion. Cardiac size is normal. The visualized clavicles appear intact. No displaced rib fractures. The visualized upper abdomen is normal.     Impression: Impression: No acute cardiopulmonary disease. Electronically Signed: Anthony Dumont MD  2/8/2024 7:30 PM EST  Workstation ID: [id number]    CT Angiogram Head w AI Analysis of LVO    Result Date: 2/8/2024  CT ANGIOGRAM HEAD W AI ANALYSIS OF LVO, CT CEREBRAL PERFUSION W WO CONTRAST, CT ANGIOGRAM NECK Date of Exam: 2/8/2024 6:57 PM EST Indication: Neuro deficit, acute stroke suspected Neuro deficit, acute stroke suspected. Comparison: 2/28/2021 Technique: CTA of the head was performed after the uneventful intravenous administration of 115 cc Isovue-370 . Reconstructed coronal and sagittal images were also obtained. In addition, a 3-D volume rendered image was created for interpretation. Automated exposure control and iterative reconstruction methods were used. Findings: CT perfusion demonstrates no area of less than 30% cerebral blood flow or elevated Tmax greater than 6 seconds. No  mismatch. No decreased cerebral blood volume. No evidence of infarct or infarct penumbra. CTA of the head and neck demonstrates atheromatous disease of the aortic arch and origins of the great vessels. Severe atheromatous disease of the right carotid bulb with a residual vessel lumen of 1 mm and a native vessel lumen diameter of 5 mm consistent with greater than 80% stenosis according to NASCET criteria. Tortuosity of the extracranial right internal carotid artery at the skull base. Plaque-like calcification of the intracranial segments of the right internal carotid artery. The right  carotid terminus is normal. The visualized branches of the right anterior and middle cerebral arteries are normal. Severe atheromatous disease of the left carotid bulb extending into the left internal carotid artery with a minimal residual vessel lumen diameter of approximately 2 mm and a mid vessel lumen diameter measured more distally of 5 mm consistent with approximately 70% stenosis according to NASCET criteria. Tortuosity of the extracranial segments of the internal carotid artery at the skull base. Plaque-like atheromatous disease involving the intracranial segments of the left internal carotid artery. The left carotid terminus is normal. The visualized branches of the left anterior and middle cerebral arteries are normal. Stable vascular malformation of the lateral left frontal lobe. This is nonspecific and may represent a small arteriovenous shunt. Both vertebral arteries arise from the subclavian arteries. The left vertebral artery is dominant. Mild atheromatous disease of the vertebral arteries. The vertebral arteries supply the basilar artery. The basilar artery and basilar artery tip are normal. The basilar artery terminates in bilateral posterior cerebral arteries. No abnormal intracranial enhancement. No intracranial mass. Bilateral lens replacements. The paranasal sinuses are clear. The mastoid air cells are aerated. The  nasopharynx is clear. No cervical adenopathy. The thyroid gland appears normal. The lung apices are clear. Degenerative changes of the cervical spine     Impression: No vessel occlusion. No intracranial infarct. There is approximately 80% stenosis of the proximal right internal carotid artery and approximately 70% stenosis of the proximal left internal carotid artery according to NASCET criteria. Electronically Signed: Anthony Dumont MD  2/8/2024 7:24 PM EST  Workstation ID: [id number]    CT Angiogram Neck    Result Date: 2/8/2024  CT ANGIOGRAM HEAD W AI ANALYSIS OF LVO, CT CEREBRAL PERFUSION W WO CONTRAST, CT ANGIOGRAM NECK Date of Exam: 2/8/2024 6:57 PM EST Indication: Neuro deficit, acute stroke suspected Neuro deficit, acute stroke suspected. Comparison: 2/28/2021 Technique: CTA of the head was performed after the uneventful intravenous administration of 115 cc Isovue-370 . Reconstructed coronal and sagittal images were also obtained. In addition, a 3-D volume rendered image was created for interpretation. Automated exposure control and iterative reconstruction methods were used. Findings: CT perfusion demonstrates no area of less than 30% cerebral blood flow or elevated Tmax greater than 6 seconds. No mismatch. No decreased cerebral blood volume. No evidence of infarct or infarct penumbra. CTA of the head and neck demonstrates atheromatous disease of the aortic arch and origins of the great vessels. Severe atheromatous disease of the right carotid bulb with a residual vessel lumen of 1 mm and a native vessel lumen diameter of 5 mm consistent with greater than 80% stenosis according to NASCET criteria. Tortuosity of the extracranial right internal carotid artery at the skull base. Plaque-like calcification of the intracranial segments of the right internal carotid artery. The right  carotid terminus is normal. The visualized branches of the right anterior and middle cerebral arteries are normal. Severe atheromatous  disease of the left carotid bulb extending into the left internal carotid artery with a minimal residual vessel lumen diameter of approximately 2 mm and a mid vessel lumen diameter measured more distally of 5 mm consistent with approximately 70% stenosis according to NASCET criteria. Tortuosity of the extracranial segments of the internal carotid artery at the skull base. Plaque-like atheromatous disease involving the intracranial segments of the left internal carotid artery. The left carotid terminus is normal. The visualized branches of the left anterior and middle cerebral arteries are normal. Stable vascular malformation of the lateral left frontal lobe. This is nonspecific and may represent a small arteriovenous shunt. Both vertebral arteries arise from the subclavian arteries. The left vertebral artery is dominant. Mild atheromatous disease of the vertebral arteries. The vertebral arteries supply the basilar artery. The basilar artery and basilar artery tip are normal. The basilar artery terminates in bilateral posterior cerebral arteries. No abnormal intracranial enhancement. No intracranial mass. Bilateral lens replacements. The paranasal sinuses are clear. The mastoid air cells are aerated. The nasopharynx is clear. No cervical adenopathy. The thyroid gland appears normal. The lung apices are clear. Degenerative changes of the cervical spine     Impression: No vessel occlusion. No intracranial infarct. There is approximately 80% stenosis of the proximal right internal carotid artery and approximately 70% stenosis of the proximal left internal carotid artery according to NASCET criteria. Electronically Signed: Anthony Dumont MD  2/8/2024 7:24 PM EST  Workstation ID: [id number]    CT CEREBRAL PERFUSION WITH & WITHOUT CONTRAST    Result Date: 2/8/2024  CT ANGIOGRAM HEAD W AI ANALYSIS OF LVO, CT CEREBRAL PERFUSION W WO CONTRAST, CT ANGIOGRAM NECK Date of Exam: 2/8/2024 6:57 PM EST Indication: Neuro deficit, acute  stroke suspected Neuro deficit, acute stroke suspected. Comparison: 2/28/2021 Technique: CTA of the head was performed after the uneventful intravenous administration of 115 cc Isovue-370 . Reconstructed coronal and sagittal images were also obtained. In addition, a 3-D volume rendered image was created for interpretation. Automated exposure control and iterative reconstruction methods were used. Findings: CT perfusion demonstrates no area of less than 30% cerebral blood flow or elevated Tmax greater than 6 seconds. No mismatch. No decreased cerebral blood volume. No evidence of infarct or infarct penumbra. CTA of the head and neck demonstrates atheromatous disease of the aortic arch and origins of the great vessels. Severe atheromatous disease of the right carotid bulb with a residual vessel lumen of 1 mm and a native vessel lumen diameter of 5 mm consistent with greater than 80% stenosis according to NASCET criteria. Tortuosity of the extracranial right internal carotid artery at the skull base. Plaque-like calcification of the intracranial segments of the right internal carotid artery. The right  carotid terminus is normal. The visualized branches of the right anterior and middle cerebral arteries are normal. Severe atheromatous disease of the left carotid bulb extending into the left internal carotid artery with a minimal residual vessel lumen diameter of approximately 2 mm and a mid vessel lumen diameter measured more distally of 5 mm consistent with approximately 70% stenosis according to NASCET criteria. Tortuosity of the extracranial segments of the internal carotid artery at the skull base. Plaque-like atheromatous disease involving the intracranial segments of the left internal carotid artery. The left carotid terminus is normal. The visualized branches of the left anterior and middle cerebral arteries are normal. Stable vascular malformation of the lateral left frontal lobe. This is nonspecific and may  represent a small arteriovenous shunt. Both vertebral arteries arise from the subclavian arteries. The left vertebral artery is dominant. Mild atheromatous disease of the vertebral arteries. The vertebral arteries supply the basilar artery. The basilar artery and basilar artery tip are normal. The basilar artery terminates in bilateral posterior cerebral arteries. No abnormal intracranial enhancement. No intracranial mass. Bilateral lens replacements. The paranasal sinuses are clear. The mastoid air cells are aerated. The nasopharynx is clear. No cervical adenopathy. The thyroid gland appears normal. The lung apices are clear. Degenerative changes of the cervical spine     Impression: No vessel occlusion. No intracranial infarct. There is approximately 80% stenosis of the proximal right internal carotid artery and approximately 70% stenosis of the proximal left internal carotid artery according to NASCET criteria. Electronically Signed: Anthony Dumont MD  2/8/2024 7:24 PM EST  Workstation ID: [id number]    CT Head Without Contrast Stroke Protocol    Result Date: 2/8/2024  CT HEAD WO CONTRAST STROKE PROTOCOL Date of Exam: 2/8/2024 6:56 PM EST Indication: Neuro deficit, acute, stroke suspected Neuro deficit, acute stroke suspected. Comparison: Head CT 2/20/2021, brain MRI 5/23/2023. Technique: Axial CT images were obtained of the head without contrast administration.  Reconstructed coronal images were also obtained. Automated exposure control and iterative construction methods were used. Scan Time: 18:52 Results discussed with the stroke team via telephone by Omid French MD at 18:59. Findings: Remote infarcts in the left corona radiata/left basal ganglia and right slava.No evidence of acute intracranial hemorrhage or mass effect. No extra-axial collection. The gray white matter differentiation is preserved. Ventricles and sulci are symmetric. Mild parenchymal atrophy. The mastoid air cells and paranasal sinuses are well  aerated. Globes and extraocular muscles are unremarkable. No acute or suspicious osseous abnormality. Soft tissues within normal limits.     Impression: Impression: No evidence of acute intracranial abnormality. Similar chronic findings, including remote infarcts in the left corona radiata/left basal ganglia and left slava. Mild parenchymal atrophy. Electronically Signed: Omid French MD  2/8/2024 7:13 PM EST  Workstation ID: [id number]     Results for orders placed during the hospital encounter of 02/28/21    Adult Transthoracic Echo Complete W/ Cont if Necessary Per Protocol    Interpretation Summary  1.  Normal left ventricular size and systolic function, LVEF 60-65%.  2.  Mild concentric LVH.  3.  Normal LV diastolic filling pattern.  4.  Normal left atrial volume index.  5.  Normal right ventricular size and systolic function.  6.  Mild calcification of aortic valve without significant stenosis.  7.  Trace MR, TR, and PI.  8.  Negative bubble study with no evidence of intracardiac or intrapulmonary shunt.      Assessment & Plan   Assessment & Plan       AMS (altered mental status)    Coronary artery disease involving native coronary artery of native heart with angina pectoris    Anxiety    COPD (chronic obstructive pulmonary disease)    Current smoker    EDITH (acute kidney injury)    Anemia    Hyperlipidemia LDL goal <70    Persistent atrial fibrillation    Hyperglycemia due to diabetes mellitus    Cirrhosis of liver    Karol Lopez is a 73 y.o. female with a PMH significant for Roe cirrhosis, history of stroke with residual right-sided weakness per EMR (left-sided weakness per pt), atrial fibrillation not on anticoagulation, carotid artery disease, HTN, CAD, HLD, tobacco abuse, COPD, CAD, CKD stage III, history of GI bleed who to the ED due to loss of consciousness.      Rule out stroke  History of stroke  Carotid artery disease  --stroke cecile, neurology consulted  --serial NIH/neurochecks  --aspirin 324  given  --continue aspirin 81mg oral/ 300 rectal daily  --high intensity statin  --lipid panel, a1c, tsh  --echo with bubble  --CT head, CTA head and neck, CT perfusion noted above  --MRI pending  --PT/OT/SLP  --Telemetry  --Permissive hypertension pending MRI results  -Carotid ultrasound for a.m.    Altered mental status  --Check ammonia  --UDS, ethanol WNL  --VBG WNL  --Neurology consult  --UA  --Chest x-ray  --Continue to rule out underlying infectious sources  --CT head shows above    Hyperglycemia  Type 2 diabetes  - Insulin drip  - Not in DKA  --Hold home oral meds    Anemia  History of GI bleed  - History of cirrhosis  - Anemia panel  - IV Protonix  - FOB  - Consider GI consult    Cirrhosis  - Continue home lactulose  --Ammonia pending    EDITH, POA  --IV fluids  -- Follow acute urinary retention protocol  --urine sodium/urea nitrogen, urine creatinine  -- Consider renal ultrasound/CT abdomen  --consider nephrology consult in am if no improvement with IV fluids  --Hold nephrotoxic medications  --A.m. labs    Chronic tobacco use  --counseled on cessation  --nicotine patch    CAD  HLD  - Continue home meds    HTN  - Hold home meds pending MRI    Persistent atrial fibrillation  - Not anticoagulated  - Monitor on telemetry      Total time spent: 75 minutes  Time spent includes time reviewing chart, face-to-face time, counseling patient/family/caregiver, ordering medications/tests/procedures, communicating with other health care professionals, documenting clinical information in the electronic health record, and coordination of care.       DVT prophylaxis:  Mechanical DVT prophylaxis orders are signed and held.            CODE STATUS:    Code Status and Medical Interventions:   Ordered at: 02/08/24 7592     Level Of Support Discussed With:    Patient     Code Status (Patient has no pulse and is not breathing):    CPR (Attempt to Resuscitate)     Medical Interventions (Patient has pulse or is breathing):    Full  Support       Expected Discharge   Expected discharge date/ time has not been documented.     Xochitl Jaquez,   02/08/24

## 2024-02-09 NOTE — THERAPY EVALUATION
Acute Care - Speech Language Pathology Initial Evaluation  [facility]  Cognitive-Communication Evaluation  +Clinical Swallow Evaluation       Patient Name: Karol Lopez  : 1950  MRN: [id number]  Today's Date: 2024               Admit Date: 2024     Visit Dx:    ICD-10-CM ICD-9-CM   1. Hyperglycemia  R73.9 790.29   2. History of stroke  Z86.73 V12.54   3. Stroke-like symptoms  R29.90 781.99   4. Anemia, unspecified type  D64.9 285.9   5. Stenosis of carotid artery, unspecified laterality  I65.29 433.10     Patient Active Problem List   Diagnosis    Coronary artery disease involving native coronary artery of native heart with angina pectoris    Anxiety    COPD (chronic obstructive pulmonary disease)    Acid reflux    Vitamin B12 deficiency    Current smoker    Type 2 diabetes mellitus, with long-term current use of insulin    Impaired mobility and ADLs    EDITH (acute kidney injury)    Melena    Anemia    Gastrointestinal hemorrhage    Hyperlipidemia LDL goal <70    Claudication    Persistent atrial fibrillation    AMS (altered mental status)    Hyperglycemia due to diabetes mellitus    Cirrhosis of liver    Carotid artery disease     Past Medical History:   Diagnosis Date    Acid reflux     Anxiety     Cataracts, bilateral     Cirrhosis of liver     Constipation     COPD (chronic obstructive pulmonary disease)     Coronary artery disease     CTS (carpal tunnel syndrome)     Diabetes     Hearing loss     Hypercholesteremia     Hypertension     Impaired functional mobility, balance, gait, and endurance     Lower back pain     Osteoarthritis     Stroke     2013-weak in right arm    Tattoos     x2    Tobacco abuse     Tumor     in between breast closer to right breast    Vitamin B12 deficiency     Wears dentures     upper only    Wears glasses      Past Surgical History:   Procedure Laterality Date    BREAST BIOPSY Right 5/10/2019    Procedure: BREAST BIOPSY RIGHT;  Surgeon: Kiana Frey MD;   Location: [facility] OR;  Service: General    CARPAL TUNNEL RELEASE Bilateral     CHOLECYSTECTOMY      COLONOSCOPY N/A 9/30/2021    Procedure: COLONOSCOPY WITH POLYPECTOMY AND ABLATION OF AVM;  Surgeon: Russell Davila MD;  Location: [facility] ENDOSCOPY;  Service: Gastroenterology;  Laterality: N/A;    CORONARY ANGIOPLASTY WITH STENT PLACEMENT  2012    ENDOSCOPY N/A 9/29/2021    Procedure: ESOPHAGOGASTRODUODENOSCOPY with biopsies;  Surgeon: Russell Davila MD;  Location: [facility] ENDOSCOPY;  Service: Gastroenterology;  Laterality: N/A;    ENTEROSCOPY SMALL BOWEL N/A 11/16/2021    Procedure: ESOPHAGOGASTRODUODENOSCOPY WITH SMALL BOWEL ENTEROSCOPY and biopsy;  Surgeon: Russell Davila MD;  Location: [facility] ENDOSCOPY;  Service: Gastroenterology;  Laterality: N/A;    HYSTERECTOMY      complete    JOINT REPLACEMENT Bilateral     knee replacements    TOTAL KNEE ARTHROPLASTY Bilateral 10/18/2016    MAUREEN Palomares MD   SLP Recommendation and Plan  SLP Swallowing Diagnosis: suspected pharyngeal dysphagia (02/09/24 0850)  SLP Diet Recommendation: soft to chew textures, chopped, no mixed consistencies, nectar thick liquids (until MBS) (02/09/24 0850)  Recommended Precautions and Strategies: general aspiration precautions, small bites of food and sips of liquid, upright posture during/after eating (02/09/24 0850)  SLP Rec. for Method of Medication Administration: meds whole, with puree, as tolerated (02/09/24 0850)     Monitor for Signs of Aspiration: yes, notify SLP if any concerns (02/09/24 0850)  Recommended Diagnostics: reassess via VFSS ([facility]) (02/09/24 0850)  Swallow Criteria for Skilled Therapeutic Interventions Met: demonstrates skilled criteria (02/09/24 0850)  Anticipated Discharge Disposition (SLP): home with home health (02/09/24 0850)  Rehab Potential/Prognosis, Swallowing: good, to achieve stated therapy goals (02/09/24 0850)     Predicted Duration Therapy Intervention (Days): until discharge (02/09/24  0850)  Oral Care Recommendations: Oral Care BID/PRN, Toothbrush (02/09/24 0850)          SLP Recommendation and Plan  SLP Diagnosis: mild-moderate, cognitive-linguistic disorder, mild, dysarthria (02/09/24 0850)           Swallow Criteria for Skilled Therapeutic Interventions Met: demonstrates skilled criteria (02/09/24 0850)  SLC Criteria for Skilled Therapy Interventions Met: yes (02/09/24 0850)  Anticipated Discharge Disposition (SLP): home with home health (02/09/24 0850)        Therapy Frequency (SLP SLC): 5 days per week (02/09/24 0850)  Predicted Duration Therapy Intervention (Days): until discharge (02/09/24 0850)  Oral Care Recommendations: Oral Care BID/PRN, Toothbrush (02/09/24 0850)                                 SLP EVALUATION (last 72 hours)       SLP SLC Evaluation       Row Name 02/09/24 0850                   Communication Assessment/Intervention    Document Type evaluation  -CJ        Subjective Information no complaints  -CJ        Patient Observations alert;cooperative  -CJ        Patient/Family/Caregiver Comments/Observations no family present  -CJ        Patient Effort good  -CJ        Symptoms Noted During/After Treatment none  -CJ           General Information    Patient Profile Reviewed yes  -CJ        Pertinent History Of Current Problem Pt adm on stroke pathway w/ ams; sig h/o CAD, COPD, EDITH, anemia, DM2. Imaging revealed chronic L BG lacunar infarct. MBS @ [facility] 3/1/21 w/ recs for nectar thick liquids  -CJ        Precautions/Limitations, Vision WFL;for purposes of eval  -CJ        Precautions/Limitations, Hearing WFL;for purposes of eval  -CJ        Prior Level of Function-Communication other (see comments)  -CJ        Plans/Goals Discussed with patient;agreed upon  -CJ        Barriers to Rehab none identified  -CJ        Patient's Goals for Discharge patient did not state  -CJ           Pain    Additional Documentation Pain Scale: FACES Pre/Post-Treatment (Group)  -CJ           Pain  Scale: FACES Pre/Post-Treatment    Pain: FACES Scale, Pretreatment 0-->no hurt  -CJ        Posttreatment Pain Rating 0-->no hurt  -CJ           Comprehension Assessment/Intervention    Comprehension Assessment/Intervention Auditory Comprehension  -CJ           Auditory Comprehension Assessment/Intervention    Auditory Comprehension (Communication) WFL  -CJ           Expression Assessment/Intervention    Expression Assessment/Intervention verbal expression  -CJ           Verbal Expression Assessment/Intervention    Verbal Expression WFL  -CJ           Oral Motor Structure and Function    Oral Motor Structure and Function mild impairment  -CJ        Dentition Assessment natural, present and adequate  -CJ        Mucosal Quality moist, healthy  -CJ           Oral Musculature and Cranial Nerve Assessment    Oral Motor General Assessment oral labial or buccal impairment;lingual impairment  -CJ        Oral Labial or Buccal Impairment, Detail, Cranial Nerve VII (Facial): right labial droop  -CJ        Lingual Impairment, Detail. Cranial Nerves IX, XII (Glossopharyngeal and Hypoglossal) reduced strength right  -CJ           Motor Speech Assessment/Intervention    Motor Speech Function mild impairment  -CJ        Characteristics Consistent with Dysarthria slow rate;decreased articulation;decreased intensity  -CJ        Speech intelligibility 70%;in quiet environment;in connected speech;with unfamiliar listener  -CJ           Cognitive Assessment Intervention- SLP    Cognitive Function (Cognition) mild impairment;moderate impairment  -CJ        Orientation Status (Cognition) WFL;time;person;place;situation  -CJ        Memory (Cognitive) mild impairment;functional;short-term;auditory  -CJ        Attention (Cognitive) WFL;sustained  -CJ        Thought Organization (Cognitive) mild impairment;moderate impairment;abstract divergent;abstract convergent  -CJ        Reasoning (Cognitive) mild impairment;complex  -CJ        Problem  Solving (Cognitive) mild impairment;divergent  -CJ           SLP Evaluation Clinical Impressions    SLP Diagnosis mild-moderate;cognitive-linguistic disorder;mild;dysarthria  -CJ        Rehab Potential/Prognosis good  -CJ        SLC Criteria for Skilled Therapy Interventions Met yes  -CJ        Functional Impact functional impact in ADLs;difficulty in expressing complex messages  -CJ           Recommendations    Therapy Frequency (SLP SLC) 5 days per week  -CJ        Predicted Duration Therapy Intervention (Days) until discharge  -CJ        Anticipated Discharge Disposition (SLP) home with home health  -CJ                  User Key  (r) = Recorded By, (t) = Taken By, (c) = Cosigned By      Initials Name Effective Dates    CJ Dari Becerra, MS CCC-SLP 07/11/23 -                        EDUCATION  The patient has been educated in the following areas:     Communication Impairment Dysphagia (Swallowing Impairment) Oral Care/Hydration.           SLP GOALS       Row Name 02/09/24 0850             Patient will demonstrate functional speech skills for return to discharge environment    [place] with minimal cues  -CJ      Time frame by discharge  -CJ      Progress/Outcomes new goal  -CJ         Patient will demonstrate functional cognitive-linguistic skills for return to discharge environment    [place] with minimal cues  -CJ      Time frame by discharge  -CJ      Progress/Outcomes new goal  -CJ         Phonation Goal 1 (SLP)    Improve Phonation By Goal 1 (SLP) using loud speech;90%;with minimal cues (75-90%)  -CJ      Time Frame (Phonation Goal 1, SLP) short term goal (STG)  -CJ      Progress/Outcomes (Phonation Goal 1, SLP) new goal  -CJ         Articulation Goal 1 (SLP)    Improve Articulation Goal 1 (SLP) by over-articulating at phrase level;by over-articulating in connected speech;90%;with minimal cues (75-90%)  -CJ      Time Frame (Articulation Goal 1, SLP) short term goal (STG)  -CJ      Progress/Outcomes  (Articulation Goal 1, SLP) new goal  -CJ         Memory Skills Goal 1 (SLP)    Improve Memory Skills Through Goal 1 (SLP) recalling unrelated word lists immediately;recalling unrelated word lists with an imposed delay;recall details of the day;90%;with minimal cues (75-90%)  -CJ      Time Frame (Memory Skills Goal 1, SLP) short term goal (STG)  -CJ      Progress/Outcomes (Memory Skills Goal 1, SLP) new goal  -CJ         Organizational Skills Goal 1 (SLP)    Improve Thought Organization Through Goal 1 (SLP) completing a convergent naming task;completing a divergent naming task;abstract;naming similarities and differences;90%;with minimal cues (75-90%)  -CJ      Time Frame (Thought Organization Skills Goal 1, SLP) short term goal (STG)  -CJ      Progress/Outcomes (Thought Organization Skills Goal 1, SLP) new goal  -CJ         Functional Problem Solving Skills Goal 1 (SLP)    Improve Problem Solving Through Goal 1 (SLP) sequence steps in a task;name items for a task;determine solutions to simple ADL/safety problems;90%;with minimal cues (75-90%)  -CJ      Time Frame (Problem Solving Goal 1, SLP) short term goal (STG)  -CJ      Progress/Outcomes (Problem Solving Goal 1, SLP) new goal  -CJ                User Key  (r) = Recorded By, (t) = Taken By, (c) = Cosigned By      Initials Name Provider Type    Dari Hernandes MS CCC-SLP Speech and Language Pathologist                            Time Calculation:      Time Calculation- SLP       Row Name 02/09/24 1432             Time Calculation- SLP    SLP Start Time 0850  -      SLP Received On 02/09/24  -CJ         Untimed Charges    33789-WV Eval Speech and Production w/ Language Minutes 40  -CJ      43335-LQ Eval Oral Pharyng Swallow Minutes 39  -CJ         Total Minutes    Untimed Charges Total Minutes 79  -CJ       Total Minutes 79  -CJ                User Key  (r) = Recorded By, (t) = Taken By, (c) = Cosigned By      Initials Name Provider Type    Dari Hernandes  JAYDEN, MS CCC-SLP Speech and Language Pathologist                    Therapy Charges for Today       Code Description Service Date Service Provider Modifiers Qty    64387096385 HC ST EVAL ORAL PHARYNG SWALLOW 3 2024 Dari Becerra, MS CCC-SLP GN 1    59221858182 HC ST EVAL SPEECH AND PROD W LANG  3 2024 Dari Becerra, MS CCC-SLP GN 1                       Dari Becerra MS CCC-SLP  2024   and Acute Care - Speech Language Pathology   Swallow Initial Evaluation [facility]     Patient Name: Karol Lopez  : 1950  MRN: [id number]  Today's Date: 2024               Admit Date: 2024    Visit Dx:     ICD-10-CM ICD-9-CM   1. Hyperglycemia  R73.9 790.29   2. History of stroke  Z86.73 V12.54   3. Stroke-like symptoms  R29.90 781.99   4. Anemia, unspecified type  D64.9 285.9   5. Stenosis of carotid artery, unspecified laterality  I65.29 433.10     Patient Active Problem List   Diagnosis    Coronary artery disease involving native coronary artery of native heart with angina pectoris    Anxiety    COPD (chronic obstructive pulmonary disease)    Acid reflux    Vitamin B12 deficiency    Current smoker    Type 2 diabetes mellitus, with long-term current use of insulin    Impaired mobility and ADLs    EDITH (acute kidney injury)    Melena    Anemia    Gastrointestinal hemorrhage    Hyperlipidemia LDL goal <70    Claudication    Persistent atrial fibrillation    AMS (altered mental status)    Hyperglycemia due to diabetes mellitus    Cirrhosis of liver    Carotid artery disease     Past Medical History:   Diagnosis Date    Acid reflux     Anxiety     Cataracts, bilateral     Cirrhosis of liver     Constipation     COPD (chronic obstructive pulmonary disease)     Coronary artery disease     CTS (carpal tunnel syndrome)     Diabetes     Hearing loss     Hypercholesteremia     Hypertension     Impaired functional mobility, balance, gait, and endurance     Lower back pain     Osteoarthritis     Stroke      2013-weak in right arm    Tattoos     x2    Tobacco abuse     Tumor     in between breast closer to right breast    Vitamin B12 deficiency     Wears dentures     upper only    Wears glasses      Past Surgical History:   Procedure Laterality Date    BREAST BIOPSY Right 5/10/2019    Procedure: BREAST BIOPSY RIGHT;  Surgeon: Kiana Frey MD;  Location: [facility] OR;  Service: General    CARPAL TUNNEL RELEASE Bilateral     CHOLECYSTECTOMY      COLONOSCOPY N/A 9/30/2021    Procedure: COLONOSCOPY WITH POLYPECTOMY AND ABLATION OF AVM;  Surgeon: Russell Davila MD;  Location: [facility] ENDOSCOPY;  Service: Gastroenterology;  Laterality: N/A;    CORONARY ANGIOPLASTY WITH STENT PLACEMENT  2012    ENDOSCOPY N/A 9/29/2021    Procedure: ESOPHAGOGASTRODUODENOSCOPY with biopsies;  Surgeon: Russell Davila MD;  Location: [facility] ENDOSCOPY;  Service: Gastroenterology;  Laterality: N/A;    ENTEROSCOPY SMALL BOWEL N/A 11/16/2021    Procedure: ESOPHAGOGASTRODUODENOSCOPY WITH SMALL BOWEL ENTEROSCOPY and biopsy;  Surgeon: Russell Davila MD;  Location: [facility] ENDOSCOPY;  Service: Gastroenterology;  Laterality: N/A;    HYSTERECTOMY      complete    JOINT REPLACEMENT Bilateral     knee replacements    TOTAL KNEE ARTHROPLASTY Bilateral 10/18/2016    MAUREEN Palomares MD       SLP Recommendation and Plan  SLP Swallowing Diagnosis: suspected pharyngeal dysphagia (02/09/24 0850)  SLP Diet Recommendation: soft to chew textures, chopped, no mixed consistencies, nectar thick liquids (until MBS) (02/09/24 0850)  Recommended Precautions and Strategies: general aspiration precautions, small bites of food and sips of liquid, upright posture during/after eating (02/09/24 0850)  SLP Rec. for Method of Medication Administration: meds whole, with puree, as tolerated (02/09/24 0850)     Monitor for Signs of Aspiration: yes, notify SLP if any concerns (02/09/24 0850)  Recommended Diagnostics: reassess via VFSS ([facility]) (02/09/24 0850)  Swallow  Criteria for Skilled Therapeutic Interventions Met: demonstrates skilled criteria (02/09/24 0850)  Anticipated Discharge Disposition (SLP): home with home health (02/09/24 0850)  Rehab Potential/Prognosis, Swallowing: good, to achieve stated therapy goals (02/09/24 0850)     Predicted Duration Therapy Intervention (Days): until discharge (02/09/24 0850)  Oral Care Recommendations: Oral Care BID/PRN, Toothbrush (02/09/24 0850)          SWALLOW EVALUATION (last 72 hours)       SLP Adult Swallow Evaluation       Row Name 02/09/24 0850       General Information    Current Method of Nutrition NPO  -CJ    Prior Level of Function-Communication unknown  -CJ    Prior Level of Function-Swallowing other (see comments)  reported difficulty swallowing  -CJ    Patient's Goals for Discharge patient did not state  -       Oral Motor Structure and Function    Secretion Management WNL/WFL  -       General Eating/Swallowing Observations    Respiratory Support Currently in Use room air  -CJ    Eating/Swallowing Skills fed by SLP  -CJ    Positioning During Eating upright in chair  -CJ    Utensils Used spoon;cup;straw  -CJ    Consistencies Trialed regular textures;pureed;ice chips;thin liquids;nectar/syrup-thick liquids  -CJ       Clinical Swallow Eval    Pharyngeal Phase suspected pharyngeal impairment  -    Clinical Swallow Evaluation Summary Noted worse R facial droop than baseline and pt reported increased numbness. Overt s/s of aspiration w/ trials of thin liquids. No glaring overt s/s w/ trials of nectar thick liquids, puree or solids. Will plan to complete MBS today to further assess  -CJ       Pharyngeal Phase Concerns    Pharyngeal Phase Concerns wet vocal quality  -    Wet Vocal Quality thin  -CJ       SLP Evaluation Clinical Impression    SLP Swallowing Diagnosis suspected pharyngeal dysphagia  -CJ    Functional Impact risk of aspiration/pneumonia  -CJ    Rehab Potential/Prognosis, Swallowing good, to achieve stated  therapy goals  -    Swallow Criteria for Skilled Therapeutic Interventions Met demonstrates skilled criteria  -       Recommendations    SLP Diet Recommendation soft to chew textures;chopped;no mixed consistencies;nectar thick liquids  until MBS  -    Recommended Diagnostics reassess via VFSS ([facility])  -    Recommended Precautions and Strategies general aspiration precautions;small bites of food and sips of liquid;upright posture during/after eating  -CJ    Oral Care Recommendations Oral Care BID/PRN;Toothbrush  -    SLP Rec. for Method of Medication Administration meds whole;with puree;as tolerated  -    Monitor for Signs of Aspiration yes;notify SLP if any concerns  -              User Key  (r) = Recorded By, (t) = Taken By, (c) = Cosigned By      Initials Name Effective Dates     Dari Becerra, MS CCC-SLP 07/11/23 -                     EDUCATION  The patient has been educated in the following areas:   Dysphagia (Swallowing Impairment) Oral Care/Hydration.        SLP GOALS       Row Name 02/09/24 0850             Patient will demonstrate functional speech skills for return to discharge environment    [place] with minimal cues  -CJ      Time frame by discharge  -CJ      Progress/Outcomes new goal  -         Patient will demonstrate functional cognitive-linguistic skills for return to discharge environment    [place] with minimal cues  -CJ      Time frame by discharge  -CJ      Progress/Outcomes new goal  -CJ         Phonation Goal 1 (SLP)    Improve Phonation By Goal 1 (SLP) using loud speech;90%;with minimal cues (75-90%)  -CJ      Time Frame (Phonation Goal 1, SLP) short term goal (STG)  -CJ      Progress/Outcomes (Phonation Goal 1, SLP) new goal  -CJ         Articulation Goal 1 (SLP)    Improve Articulation Goal 1 (SLP) by over-articulating at phrase level;by over-articulating in connected speech;90%;with minimal cues (75-90%)  -CJ      Time Frame (Articulation Goal 1, SLP) short term  goal (STG)  -CJ      Progress/Outcomes (Articulation Goal 1, SLP) new goal  -CJ         Memory Skills Goal 1 (SLP)    Improve Memory Skills Through Goal 1 (SLP) recalling unrelated word lists immediately;recalling unrelated word lists with an imposed delay;recall details of the day;90%;with minimal cues (75-90%)  -CJ      Time Frame (Memory Skills Goal 1, SLP) short term goal (STG)  -CJ      Progress/Outcomes (Memory Skills Goal 1, SLP) new goal  -CJ         Organizational Skills Goal 1 (SLP)    Improve Thought Organization Through Goal 1 (SLP) completing a convergent naming task;completing a divergent naming task;abstract;naming similarities and differences;90%;with minimal cues (75-90%)  -CJ      Time Frame (Thought Organization Skills Goal 1, SLP) short term goal (STG)  -CJ      Progress/Outcomes (Thought Organization Skills Goal 1, SLP) new goal  -CJ         Functional Problem Solving Skills Goal 1 (SLP)    Improve Problem Solving Through Goal 1 (SLP) sequence steps in a task;name items for a task;determine solutions to simple ADL/safety problems;90%;with minimal cues (75-90%)  -CJ      Time Frame (Problem Solving Goal 1, SLP) short term goal (STG)  -CJ      Progress/Outcomes (Problem Solving Goal 1, SLP) new goal  -CJ                User Key  (r) = Recorded By, (t) = Taken By, (c) = Cosigned By      Initials Name Provider Type    Dari Hernandes, MS CCC-SLP Speech and Language Pathologist                       Time Calculation:    Time Calculation- SLP       Row Name 02/09/24 1432             Time Calculation- SLP    SLP Start Time 0850  -CJ      SLP Received On 02/09/24  -CJ         Untimed Charges    34272-CO Eval Speech and Production w/ Language Minutes 40  -CJ      92559-RH Eval Oral Pharyng Swallow Minutes 39  -CJ         Total Minutes    Untimed Charges Total Minutes 79  -CJ       Total Minutes 79  -CJ                User Key  (r) = Recorded By, (t) = Taken By, (c) = Cosigned By      Initials Name  Provider Type     Dari Becerra, MS CCC-SLP Speech and Language Pathologist                    Therapy Charges for Today       Code Description Service Date Service Provider Modifiers Qty    57653383479 HC ST EVAL ORAL PHARYNG SWALLOW 3 2/9/2024 Dari Becerra, MS CCC-SLP GN 1    47674740420 HC ST EVAL SPEECH AND PROD W LANG  3 2/9/2024 Dari Becerra, MS CCC-SLP GN 1                 Dari Becerra MS CCC-SLP  2/9/2024

## 2024-02-09 NOTE — THERAPY EVALUATION
Patient Name: Karol Lopez  : 1950    MRN: [id number]                              Today's Date: 2024       Admit Date: 2024    Visit Dx:     ICD-10-CM ICD-9-CM   1. Hyperglycemia  R73.9 790.29   2. History of stroke  Z86.73 V12.54   3. Stroke-like symptoms  R29.90 781.99   4. Anemia, unspecified type  D64.9 285.9   5. Stenosis of carotid artery, unspecified laterality  I65.29 433.10     Patient Active Problem List   Diagnosis    Coronary artery disease involving native coronary artery of native heart with angina pectoris    Anxiety    COPD (chronic obstructive pulmonary disease)    Acid reflux    Vitamin B12 deficiency    Current smoker    Type 2 diabetes mellitus, with long-term current use of insulin    Impaired mobility and ADLs    EDITH (acute kidney injury)    Melena    Anemia    Gastrointestinal hemorrhage    Hyperlipidemia LDL goal <70    Claudication    Persistent atrial fibrillation    AMS (altered mental status)    Hyperglycemia due to diabetes mellitus    Cirrhosis of liver    Carotid artery disease     Past Medical History:   Diagnosis Date    Acid reflux     Anxiety     Cataracts, bilateral     Cirrhosis of liver     Constipation     COPD (chronic obstructive pulmonary disease)     Coronary artery disease     CTS (carpal tunnel syndrome)     Diabetes     Hearing loss     Hypercholesteremia     Hypertension     Impaired functional mobility, balance, gait, and endurance     Lower back pain     Osteoarthritis     Stroke     2013-weak in right arm    Tattoos     x2    Tobacco abuse     Tumor     in between breast closer to right breast    Vitamin B12 deficiency     Wears dentures     upper only    Wears glasses      Past Surgical History:   Procedure Laterality Date    BREAST BIOPSY Right 5/10/2019    Procedure: BREAST BIOPSY RIGHT;  Surgeon: Kiana Frey MD;  Location: [facility];  Service: General    CARPAL TUNNEL RELEASE Bilateral     CHOLECYSTECTOMY      COLONOSCOPY N/A 2021     Procedure: COLONOSCOPY WITH POLYPECTOMY AND ABLATION OF AVM;  Surgeon: Russell Davila MD;  Location: [facility] ENDOSCOPY;  Service: Gastroenterology;  Laterality: N/A;    CORONARY ANGIOPLASTY WITH STENT PLACEMENT  2012    ENDOSCOPY N/A 9/29/2021    Procedure: ESOPHAGOGASTRODUODENOSCOPY with biopsies;  Surgeon: Russell Davila MD;  Location: [facility] ENDOSCOPY;  Service: Gastroenterology;  Laterality: N/A;    ENTEROSCOPY SMALL BOWEL N/A 11/16/2021    Procedure: ESOPHAGOGASTRODUODENOSCOPY WITH SMALL BOWEL ENTEROSCOPY and biopsy;  Surgeon: Russell Davila MD;  Location: [facility] ENDOSCOPY;  Service: Gastroenterology;  Laterality: N/A;    HYSTERECTOMY      complete    JOINT REPLACEMENT Bilateral     knee replacements    TOTAL KNEE ARTHROPLASTY Bilateral 10/18/2016    MAUREEN Palomares MD      General Information       Row Name 02/09/24 1138          Physical Therapy Time and Intention    Document Type evaluation  -LO     Mode of Treatment individual therapy;physical therapy  -LO       Row Name 02/09/24 1138          General Information    Patient Profile Reviewed yes  -LO     Prior Level of Function independent:;all household mobility;community mobility;gait;transfer;bed mobility  SPC at baseline  -LO     Existing Precautions/Restrictions fall;other (see comments)  hx R CVA with residual weakness  -LO     Barriers to Rehab medically complex;previous functional deficit  -LO       Row Name 02/09/24 1138          Living Environment    People in Home significant other  -LO       Row Name 02/09/24 1138          Home Main Entrance    Number of Stairs, Main Entrance one  -LO       Row Name 02/09/24 1138          Stairs Within Home, Primary    Stairs, Within Home, Primary camper  -LO     Number of Stairs, Within Home, Primary none  -LO       Row Name 02/09/24 1138          Cognition    Orientation Status (Cognition) oriented x 3  -LO       Row Name 02/09/24 1138          Safety Issues, Functional Mobility    Safety  Issues Affecting Function (Mobility) insight into deficits/self-awareness;safety precaution awareness;safety precautions follow-through/compliance  -LO     Impairments Affecting Function (Mobility) balance;endurance/activity tolerance;strength  -LO               User Key  (r) = Recorded By, (t) = Taken By, (c) = Cosigned By      Initials Name Provider Type    Katerina Juan PT Physical Therapist                   Mobility       Row Name 02/09/24 1139          Bed Mobility    Bed Mobility sit-supine;scooting/bridging  -LO     Scooting/Bridging [place] (Bed Mobility) standby assist;verbal cues  -LO     Sit-Supine [place] (Bed Mobility) contact guard;verbal cues  -LO     Assistive Device (Bed Mobility) head of bed elevated  -LO     Comment, (Bed Mobility) vc for scooting in bed, physical assist to scoot superior in bed  -LO       Row Name 02/09/24 1139          Transfers    Comment, (Transfers) recliner>stand at IV pole>commode in BR>stand at IV pole>EOB; mild unsteadiness upon standing  -LO       Row Name 02/09/24 1139          Sit-Stand Transfer    Sit-Stand [place] (Transfers) minimum assist (75% patient effort);other (see comments)  UE support  -LO     Assistive Device (Sit-Stand Transfers) other (see comments)  UE support  -LO       Row Name 02/09/24 1139          Gait/Stairs (Locomotion)    [place] Level (Gait) not tested  -LO     Distance in Feet (Gait) 10 + 10  -LO     Deviations/Abnormal Patterns (Gait) bilateral deviations;garima decreased;gait speed decreased;stride length decreased  -LO     Bilateral Gait Deviations heel strike decreased  -LO     Comment, (Gait/Stairs) Ambulates with hold onto IV Pole with reduced step length, height, speed. Would benefit from FWW for gait safety  -LO               User Key  (r) = Recorded By, (t) = Taken By, (c) = Cosigned By      Initials Name Provider Type    Katerina Juan PT Physical Therapist                   Obj/Interventions       Row Name  02/09/24 1142          Range of Motion Comprehensive    General Range of Motion bilateral lower extremity ROM WNL  -LO       Row Name 02/09/24 1142          Strength Comprehensive (MMT)    General Manual Muscle Testing (MMT) Assessment lower extremity strength deficits identified  -LO     Comment, General Manual Muscle Testing (MMT) Assessment RLE grossly 4/5, LLE grossly 4+/5  -LO       Row Name 02/09/24 1142          Motor Skills    Motor Skills functional endurance  -LO     Functional Endurance reduced from baseline, fatigues easily  -LO       Row Name 02/09/24 1142          Balance    Balance Assessment sitting static balance;sitting dynamic balance;standing static balance;standing dynamic balance  -LO     Static Sitting Balance independent  -LO     Dynamic Sitting Balance standby assist  -LO     Position, Sitting Balance unsupported;sitting in chair  -LO     Static Standing Balance contact guard  -LO     Dynamic Standing Balance contact guard  -LO     Position/Device Used, Standing Balance supported;other (see comments)  IV pole  -LO     Comment, Balance FWW CGA for safety during gait  -LO       Row Name 02/09/24 1142          Sensory Assessment (Somatosensory)    Sensory Assessment (Somatosensory) other (see comments)  RLE with reduced sensation as compared to LLE  -LO               User Key  (r) = Recorded By, (t) = Taken By, (c) = Cosigned By      Initials Name Provider Type    Katerina Juan, PT Physical Therapist                   Goals/Plan       Row Name 02/09/24 1153          Bed Mobility Goal 1 (PT)    Activity/Assistive Device (Bed Mobility Goal 1, PT) bed mobility activities, all  -LO     [place] Level/Cues Needed (Bed Mobility Goal 1, PT) independent  -LO     Time Frame (Bed Mobility Goal 1, PT) long term goal (LTG);10 days  -LO       Row Name 02/09/24 1153          Transfer Goal 1 (PT)    Activity/Assistive Device (Transfer Goal 1, PT) transfers, all  -LO     [place] Level/Cues Needed  (Transfer Goal 1, PT) independent  -LO     Time Frame (Transfer Goal 1, PT) long term goal (LTG);10 days  -LO       Row Name 02/09/24 1153          Gait Training Goal 1 (PT)    Activity/Assistive Device (Gait Training Goal 1, PT) gait (walking locomotion);assistive device use;improve balance and speed;cane, straight  -LO     [place] Level (Gait Training Goal 1, PT) modified independence  -LO     Distance (Gait Training Goal 1, PT) 50  -LO     Time Frame (Gait Training Goal 1, PT) long term goal (LTG);10 days  -LO       Row Name 02/09/24 1153          Stairs Goal 1 (PT)    Activity/Assistive Device (Stairs Goal 1, PT) ascending stairs;descending stairs;cane, straight  -LO     [place] Level/Cues Needed (Stairs Goal 1, PT) modified independence  -LO     Number of Stairs (Stairs Goal 1, PT) 1  -LO     Time Frame (Stairs Goal 1, PT) long term goal (LTG)  -       Row Name 02/09/24 1153          Therapy Assessment/Plan (PT)    Planned Therapy Interventions (PT) balance training;bed mobility training;gait training;home exercise program;neuromuscular re-education;transfer training;strengthening;stair training;patient/family education  -LO               User Key  (r) = Recorded By, (t) = Taken By, (c) = Cosigned By      Initials Name Provider Type    Katerina Juan, PT Physical Therapist                   Clinical Impression       Row Name 02/09/24 1144          Pain    Pretreatment Pain Rating 0/10 - no pain  -LO     Posttreatment Pain Rating 0/10 - no pain  -LO       Row Name 02/09/24 1144          Plan of Care Review    Plan of Care Reviewed With patient  -LO     Outcome Evaluation PT eval completed. Patient alert and oriented x 3. Presenting with deficits in general BLE strength ( R> L), standing dynamic balance, and functional endurance effecting functional mobility below baseline. Patient will benefit from skilled IP PT services to address impairments for return to PLOF. Recommend home with assist and HHPT  at dc.  -LO       Row Name 02/09/24 1144          Therapy Assessment/Plan (PT)    Rehab Potential (PT) good, to achieve stated therapy goals  -LO     Criteria for Skilled Interventions Met (PT) yes;meets criteria;skilled treatment is necessary  -LO     Therapy Frequency (PT) daily  -LO       Row Name 02/09/24 1144          Vital Signs    Pre Systolic BP Rehab 141  -LO     Pre Treatment Diastolic BP 65  -LO     Post Systolic BP Rehab 159  -LO     Post Treatment Diastolic BP 77  -LO     Pretreatment Heart Rate (beats/min) 70  -LO     Posttreatment Heart Rate (beats/min) 67  -LO     Pre SpO2 (%) 97  -LO     O2 Delivery Pre Treatment room air  -LO     O2 Delivery Intra Treatment room air  -LO     O2 Delivery Post Treatment room air  -LO     Pre Patient Position Sitting  -LO     Intra Patient Position Standing  -LO     Post Patient Position Supine  -LO     Rest Breaks  2  -LO       Row Name 02/09/24 1144          Positioning and Restraints    Pre-Treatment Position sitting in chair/recliner  -LO     Post Treatment Position bed  -LO     In Bed notified nsg;supine;fowlers;with other staff  with ECHO  -LO               User Key  (r) = Recorded By, (t) = Taken By, (c) = Cosigned By      Initials Name Provider Type    Katerina Juan, PT Physical Therapist                   Outcome Measures       Row Name 02/09/24 1154 02/09/24 0338       How much help from another person do you currently need...    Turning from your back to your side while in flat bed without using bedrails? 3  -LO 3  -SM    Moving from lying on back to sitting on the side of a flat bed without bedrails? 3  -LO 3  -SM    Moving to and from a bed to a chair (including a wheelchair)? 3  -LO 3  -SM    Standing up from a chair using your arms (e.g., wheelchair, bedside chair)? 3  -LO 3  -SM    Climbing 3-5 steps with a railing? 3  -LO 2  -SM    To walk in hospital room? 3  -LO 2  -SM    AM-PAC 6 Clicks Score (PT) 18  -LO 16  -SM    Highest Level of Mobility Goal  6 --> Walk 10 steps or more  - 5 --> Static standing  -      Row Name 02/09/24 0941          Modified [place]ie Scale    Modified Dixie Scale 1 - No significant disability despite symptoms.  Able to carry out all usual duties and activities.  -       Row Name 02/09/24 1154 02/09/24 0941       Functional Assessment    Outcome Measure Options AM-PAC 6 Clicks Basic Mobility (PT)  - AM-PAC 6 Clicks Daily Activity (OT);Modified Dixie  -CS              User Key  (r) = Recorded By, (t) = Taken By, (c) = Cosigned By      Initials Name Provider Type    LO Katerina Paulino, PT Physical Therapist    CS Guero Jackson, OT Occupational Therapist    SM Myesha Gonslaes, RN Registered Nurse                                 Physical Therapy Education       Title: PT OT SLP Therapies (In Progress)       Topic: Physical Therapy (Done)       Point: Mobility training (Done)       Learning Progress Summary             Patient Acceptance, E, VU,NR by  at 2/9/2024 1050    Comment: PT POC                         Point: Home exercise program (Done)       Learning Progress Summary             Patient Acceptance, E, VU,NR by  at 2/9/2024 1050    Comment: PT POC                         Point: Body mechanics (Done)       Learning Progress Summary             Patient Acceptance, E, VU,NR by  at 2/9/2024 1050    Comment: PT POC                         Point: Precautions (Done)       Learning Progress Summary             Patient Acceptance, E, VU,NR by  at 2/9/2024 1050    Comment: PT POC                                         User Key       Initials Effective Dates Name Provider Type Discipline     06/16/21 -  Katerina Paulino, PT Physical Therapist PT                  PT Recommendation and Plan  Planned Therapy Interventions (PT): balance training, bed mobility training, gait training, home exercise program, neuromuscular re-education, transfer training, strengthening, stair training, patient/family education  Plan of Care Reviewed  With: patient  Outcome Evaluation: PT eval completed. Patient alert and oriented x 3. Presenting with deficits in general BLE strength ( R> L), standing dynamic balance, and functional endurance effecting functional mobility below baseline. Patient will benefit from skilled IP PT services to address impairments for return to PLOF. Recommend home with assist and HHPT at [place].     Time Calculation:   PT Evaluation Complexity  History, PT Evaluation Complexity: 1-2 personal factors and/or comorbidities  Examination of Body Systems (PT Eval Complexity): 1-2 elements  Clinical Presentation (PT Evaluation Complexity): evolving  Clinical Decision Making (PT Evaluation Complexity): low complexity  Overall Complexity (PT Evaluation Complexity): low complexity     PT Charges       Row Name 02/09/24 1050             Time Calculation    Start Time 1050  -LO      PT Received On 02/09/24  -LO      PT Goal Re-Cert Due Date 02/19/24  -LO         Timed Charges    83830 - Gait Training Minutes  6  -LO      20385 - PT Therapeutic Activity Minutes 7  -LO         Untimed Charges    PT Eval/Re-eval Minutes 38  -LO         Total Minutes    Timed Charges Total Minutes 13  -LO      Untimed Charges Total Minutes 38  -LO       Total Minutes 51  -LO                User Key  (r) = Recorded By, (t) = Taken By, (c) = Cosigned By      Initials Name Provider Type    LO Katerina Paulino, PT Physical Therapist                  Therapy Charges for Today       Code Description Service Date Service Provider Modifiers Qty    74263144879 HC PT THERAPEUTIC ACT EA 15 MIN 2/9/2024 Katerina Paulino, PT GP 1    82147410705 HC PT EVAL LOW COMPLEXITY 3 2/9/2024 Katerina Paulino, PT GP 1            PT G-Codes  Outcome Measure Options: AM-PAC 6 Clicks Basic Mobility (PT)  AM-PAC 6 Clicks Score (PT): 18  AM-PAC 6 Clicks Score (OT): 23  Modified [place] Alberto Scale: 1 - No significant disability despite symptoms.  Able to carry out all usual duties and activities.  PT Discharge  Summary  Anticipated Discharge Disposition (PT): home with assist, home with home health    Katerina Paulino, PT  2/9/2024

## 2024-02-09 NOTE — PLAN OF CARE
Goal Outcome Evaluation:  Plan of Care Reviewed With: patient            MBS completed. Ok regular solids and thin liquids + general aspiration precautions.       Anticipated Discharge Disposition (SLP): home with home health         SLP Swallowing Diagnosis: mild, oral dysphagia, pharyngeal dysphagia (02/09/24 1430)

## 2024-02-09 NOTE — ED NOTES
Karol Lopez    Nursing Report ED to Floor:  Mental status: alert and oriented x4  Ambulatory status: unable to assess  Oxygen Therapy:  room air  Cardiac Rhythm: sinus humera   Admitted from: ed  Safety Concerns:  hyperglycemia  Social Issues: NA  ED Room #:  18    ED Nurse Phone Extension - [phone] or may call [phone].      HPI:   Chief Complaint   Patient presents with    Stroke       Past Medical History:  Past Medical History:   Diagnosis Date    Acid reflux     Anxiety     Cataracts, bilateral     Cirrhosis of liver     Constipation     COPD (chronic obstructive pulmonary disease)     Coronary artery disease     CTS (carpal tunnel syndrome)     Diabetes     Hearing loss     Hypercholesteremia     Hypertension     Impaired functional mobility, balance, gait, and endurance     Lower back pain     Osteoarthritis     Stroke     2013-weak in right arm    Tattoos     x2    Tobacco abuse     Tumor     in between breast closer to right breast    Vitamin B12 deficiency     Wears dentures     upper only    Wears glasses         Past Surgical History:  Past Surgical History:   Procedure Laterality Date    BREAST BIOPSY Right 5/10/2019    Procedure: BREAST BIOPSY RIGHT;  Surgeon: Kiana Frey MD;  Location: [facility] OR;  Service: General    CARPAL TUNNEL RELEASE Bilateral     CHOLECYSTECTOMY      COLONOSCOPY N/A 9/30/2021    Procedure: COLONOSCOPY WITH POLYPECTOMY AND ABLATION OF AVM;  Surgeon: Russell Davila MD;  Location: [facility] ENDOSCOPY;  Service: Gastroenterology;  Laterality: N/A;    CORONARY ANGIOPLASTY WITH STENT PLACEMENT  2012    ENDOSCOPY N/A 9/29/2021    Procedure: ESOPHAGOGASTRODUODENOSCOPY with biopsies;  Surgeon: Russell Davila MD;  Location: [facility] ENDOSCOPY;  Service: Gastroenterology;  Laterality: N/A;    ENTEROSCOPY SMALL BOWEL N/A 11/16/2021    Procedure: ESOPHAGOGASTRODUODENOSCOPY WITH SMALL BOWEL ENTEROSCOPY and biopsy;  Surgeon: Russell Davila MD;  Location: [facility]  ENDOSCOPY;  Service: Gastroenterology;  Laterality: N/A;    HYSTERECTOMY      complete    JOINT REPLACEMENT Bilateral     knee replacements    TOTAL KNEE ARTHROPLASTY Bilateral 10/18/2016    MAUREEN Palomares MD        Admitting Doctor:   Xochitl Jaquez DO    Consulting Provider(s):  Consults       Date and Time Order Name Status Description    2/8/2024  6:55 PM Inpatient Neurology Consult Stroke Completed              Admitting Diagnosis:   The primary encounter diagnosis was Hyperglycemia. Diagnoses of History of stroke, Stroke-like symptoms, Anemia, unspecified type, and Stenosis of carotid artery, unspecified laterality were also pertinent to this visit.    Most Recent Vitals:   Vitals:    02/08/24 2025 02/08/24 2030 02/08/24 2100 02/08/24 2230   BP: 147/86 150/76 155/74 133/68   Pulse: 69 69 68 62   Resp: 18      Temp:       TempSrc:       SpO2: 96% 95% 96% 95%   Weight:       Height:           Active LDAs/IV Access:   Lines, Drains & Airways       Active LDAs       Name Placement date Placement time Site Days    Peripheral IV 02/08/24 1853 Left Antecubital 02/08/24 1853  Antecubital  less than 1    Peripheral IV 02/08/24 1941 Posterior;Right Forearm 02/08/24 1941  Forearm  less than 1    Peripheral IV 02/1952 Distal;Left;Posterior Forearm 02/1952  Forearm  less than 1                    Labs (abnormal labs have a star):   Labs Reviewed   COMPREHENSIVE METABOLIC PANEL - Abnormal; Notable for the following components:       Result Value    Glucose 635 (*)     Creatinine 1.49 (*)     Sodium 130 (*)     Chloride 93 (*)     eGFR 37.4 (*)     All other components within normal limits    Narrative:     GFR Normal >60  Chronic Kidney Disease <60  Kidney Failure <15    The GFR formula is only valid for adults with stable renal function between ages 18 and 70.   PROTIME-INR - Abnormal; Notable for the following components:    Protime 14.7 (*)     INR 1.14 (*)     All other components within normal limits    URINALYSIS W/ CULTURE IF INDICATED - Abnormal; Notable for the following components:    Specific Gravity, UA 1.039 (*)     Glucose, UA >=1000 mg/dL (3+) (*)     Protein, UA Trace (*)     All other components within normal limits    Narrative:     In absence of clinical symptoms, the presence of pyuria, bacteria, and/or nitrites on the urinalysis result does not correlate with infection.  Urine microscopic not indicated.   CBC WITH AUTO DIFFERENTIAL - Abnormal; Notable for the following components:    RBC 2.96 (*)     Hemoglobin 8.2 (*)     Hematocrit 26.2 (*)     MCHC 31.3 (*)     MPV 12.2 (*)     Lymphocyte % 18.3 (*)     All other components within normal limits   SINGLE HSTROPONIN T - Abnormal; Notable for the following components:    HS Troponin T 25 (*)     All other components within normal limits    Narrative:     High Sensitive Troponin T Reference Range:  <14.0 ng/L- Negative Female for AMI  <22.0 ng/L- Negative Male for AMI  >=14 - Abnormal Female indicating possible myocardial injury.  >=22 - Abnormal Male indicating possible myocardial injury.   Clinicians would have to utilize clinical acumen, EKG, Troponin, and serial changes to determine if it is an Acute Myocardial Infarction or myocardial injury due to an underlying chronic condition.        URINE DRUG SCREEN - Abnormal; Notable for the following components:    Opiate Screen Positive (*)     All other components within normal limits    Narrative:     Cutoff For Drugs Screened:    Amphetamines               500 ng/ml  Barbiturates               200 ng/ml  Benzodiazepines            150 ng/ml  Cocaine                    150 ng/ml  Methadone                  200 ng/ml  Opiates                    100 ng/ml  Phencyclidine               25 ng/ml  THC                         50 ng/ml  Methamphetamine            500 ng/ml  Tricyclic Antidepressants  300 ng/ml  Oxycodone                  100 ng/ml  Buprenorphine               10 ng/ml    The normal value  "for all drugs tested is negative. This report includes unconfirmed screening results, with the cutoff values listed, to be used for medical treatment purposes only.  Unconfirmed results must not be used for non-medical purposes such as employment or legal testing.  Clinical consideration should be applied to any drug of abuse test, particularly when unconfirmed results are used.     OSMOLALITY, SERUM - Abnormal; Notable for the following components:    Osmolality 314 (*)     All other components within normal limits   HEMOGLOBIN A1C - Abnormal; Notable for the following components:    Hemoglobin A1C 8.30 (*)     All other components within normal limits    Narrative:     Hemoglobin A1C Ranges:    Increased Risk for Diabetes  5.7% to 6.4%  Diabetes                     >= 6.5%  Diabetic Goal                < 7.0%   BLOOD GAS, VENOUS W/CO-OXIMETRY - Abnormal; Notable for the following components:    Hemoglobin, Blood Gas 8.0 (*)     All other components within normal limits   PROCALCITONIN - Abnormal; Notable for the following components:    Procalcitonin 0.36 (*)     All other components within normal limits    Narrative:     As a Marker for Sepsis (Non-Neonates):    1. <0.5 ng/mL represents a low risk of severe sepsis and/or septic shock.  2. >2 ng/mL represents a high risk of severe sepsis and/or septic shock.    As a Marker for Lower Respiratory Tract Infections that require antibiotic therapy:    PCT on Admission    Antibiotic Therapy       6-12 Hrs later    >0.5                Strongly Recommended  >0.25 - <0.5        Recommended   0.1 - 0.25          Discouraged              Remeasure/reassess PCT  <0.1                Strongly Discouraged     Remeasure/reassess PCT    As 28 day mortality risk marker: \"Change in Procalcitonin Result\" (>80% or <=80%) if Day 0 (or Day 1) and Day 4 values are available. Refer to http://www.[facility]-pct-calculator.com    Change in PCT <=80%  A decrease of PCT levels below or equal to " 80% defines a positive change in PCT test result representing a higher risk for 28-day all-cause mortality of patients diagnosed with severe sepsis for septic shock.    Change in PCT >80%  A decrease of PCT levels of more than 80% defines a negative change in PCT result representing a lower risk for 28-day all-cause mortality of patients diagnosed with severe sepsis or septic shock.      IRON PROFILE - Abnormal; Notable for the following components:    Iron 36 (*)     Iron Saturation (TSAT) 8 (*)     All other components within normal limits   HEMOGLOBIN AND HEMATOCRIT, BLOOD - Abnormal; Notable for the following components:    Hemoglobin 7.8 (*)     Hematocrit 24.4 (*)     All other components within normal limits   LACTIC ACID, PLASMA - Abnormal; Notable for the following components:    Lactate 4.8 (*)     All other components within normal limits   POCT CHEM 8 - Abnormal; Notable for the following components:    Glucose >599 (*)     Creatinine 1.50 (*)     Sodium 130 (*)     Chloride 91 (*)     Total CO2 22 (*)     Hemoglobin 8.2 (*)     Hematocrit 24 (*)     eGFR 37.1 (*)     All other components within normal limits   POCT PROTIME - INR - Abnormal; Notable for the following components:    INR 1.3 (*)     All other components within normal limits   POCT GLUCOSE FINGERSTICK - Abnormal; Notable for the following components:    Glucose 458 (*)     All other components within normal limits   POCT GLUCOSE FINGERSTICK - Abnormal; Notable for the following components:    Glucose 431 (*)     All other components within normal limits   POCT GLUCOSE FINGERSTICK - Abnormal; Notable for the following components:    Glucose 379 (*)     All other components within normal limits   T4, FREE - Normal    Narrative:     Results may be falsely increased if patient taking Biotin.     TSH - Normal   MAGNESIUM - Normal   APTT - Normal    Narrative:     PTT = The equivalent PTT values for the therapeutic range of heparin levels at 0.3  "to 0.5 U/ml are 60 to 70 seconds.   ACETAMINOPHEN LEVEL - Normal   ETHANOL - Normal    Narrative:     Elevated lactic acid concentration and lactate dehydrogenase(LD) activity may falsely elevate enzymatically determined ethanol levels. Not for legal purposes.    SALICYLATE LEVEL - Normal   PHOSPHORUS - Normal   D-DIMER, QUANTITATIVE - Normal    Narrative:     According to the assay 's published package insert, a normal (<0.50 MCGFEU/mL) D-dimer result in conjunction with a non-high clinical probability assessment, excludes deep vein thrombosis (DVT) and pulmonary embolism (PE) with high sensitivity.    D-dimer values increase with age and this can make VTE exclusion of an older population difficult. To address this, the [employer], based on best available evidence and recent guidelines, recommends that clinicians use age-adjusted D-dimer thresholds in patients greater than 50 years of age with: a) a low probability of PE who do not meet all Pulmonary Embolism Rule Out Criteria, or b) in those with intermediate probability of PE.   The formula for an age-adjusted D-dimer cut-off is \"age/100\".  For example, a 60 year old patient would have an age-adjusted cut-off of 0.60 MCGFEU/mL and an 80 year old 0.80 MCGFEU/mL.   BETA HYDROXYBUTYRATE QUANTITATIVE - Normal    Narrative:     In the assessment of possible diabetic ketoacidosis, the test should be interpreted along with other clinical and laboratory findings.  A level greater than 1 mmol/L should require further evaluation and levels of more than 3 mmol/L require immediate medical review.   FENTANYL, URINE - Normal    Narrative:     Negative Threshold:      Fentanyl 5 ng/mL     The normal value for the drug tested is negative. This report includes final unconfirmed screening results to be used for medical treatment purposes only. Unconfirmed results must not be used for non-medical purposes such as employment or legal testing. Clinical " consideration should be applied to any drug of abuse test, particularly when unconfirmed results are used.          AMMONIA - Normal   FERRITIN - Normal    Narrative:     Results may be falsely decreased if patient taking Biotin.     COVID PRE-OP / PRE-PROCEDURE SCREENING ORDER (NO ISOLATION)    Narrative:     The following orders were created for panel order COVID PRE-OP / PRE-PROCEDURE SCREENING ORDER (NO ISOLATION) - Swab, Nasopharynx.  Procedure                               Abnormality         Status                     ---------                               -----------         ------                     Respiratory Panel PCR w/...[[id number]]                      In process                   Please view results for these tests on the individual orders.   RESPIRATORY PANEL PCR W/ COVID-19 (SARS-COV-2), NP SWAB IN UTM/VTP, 2 HR TAT   EOSINOPHIL SMEAR   BLOOD CULTURE   BLOOD CULTURE   BLOOD GAS, VENOUS   SODIUM, URINE, RANDOM    Narrative:     Reference intervals for random urine have not been established.  Clinical usage is dependent upon physician's interpretation in combination with other laboratory tests.      RAINBOW DRAW    Narrative:     The following orders were created for panel order [place] Draw.  Procedure                               Abnormality         Status                     ---------                               -----------         ------                     Green Top (Gel)[[id number]]                                  Final result               Lavender Top[[id number]]                                     Final result               Gold Top - SST[[id number]]                                   Final result               Galarza Top[[id number]]                                         In process                 Light Blue Top[[id number]]                                   Final result                 Please view results for these tests on the individual orders.   BASIC METABOLIC PANEL   MAGNESIUM    PHOSPHORUS   VITAMIN B12   FOLATE   OCCULT BLOOD X 1, STOOL   RETICULOCYTES   HEMOGLOBIN AND HEMATOCRIT, BLOOD   HEMOGLOBIN AND HEMATOCRIT, BLOOD   CREATININE URINE RANDOM (KIDNEY FUNCTION) GFR COMPONENT   UREA NITROGEN, URINE   LACTIC ACID, REFLEX   POCT CHEM 8   CBC AND DIFFERENTIAL    Narrative:     The following orders were created for panel order CBC & Differential.  Procedure                               Abnormality         Status                     ---------                               -----------         ------                     CBC Auto Differential[[id number]]        Abnormal            Final result                 Please view results for these tests on the individual orders.   GREEN TOP   LAVENDER TOP   GOLD TOP - SST   LIGHT BLUE TOP   GRAY TOP       Meds Given in ED:   Medications   sodium chloride 0.9 % flush 10 mL (has no administration in time range)   sodium chloride 0.9 % flush 10 mL (has no administration in time range)   sodium chloride 0.9 % flush 10 mL (10 mL Intravenous Given 2/8/24 2022)   sodium chloride 0.9 % flush 10 mL (has no administration in time range)   sodium chloride 0.9 % infusion 40 mL (has no administration in time range)   dextrose (GLUTOSE) oral gel 15 g (has no administration in time range)   dextrose (D50W) (25 g/50 mL) IV injection 10-50 mL (has no administration in time range)   glucagon (GLUCAGEN) injection 1 mg (has no administration in time range)   sodium chloride 0.9 % bolus (0 mL/hr Intravenous Stopped 2/8/24 2230)   sodium chloride 0.9 % infusion (has no administration in time range)   sodium chloride 0.9 % with KCl 20 mEq/L infusion (has no administration in time range)   sodium chloride 0.9 % with KCl 40 mEq/L infusion (has no administration in time range)   dextrose 5 % and sodium chloride 0.9 % infusion (has no administration in time range)   dextrose 5 % and sodium chloride 0.9 % with KCl 20 mEq/L infusion (has no administration in time range)   dextrose  5 % and sodium chloride 0.9 % with KCl 40 mEq/L infusion (has no administration in time range)   sodium chloride 0.45 % infusion (has no administration in time range)   sodium chloride 0.45 % with KCl 20 mEq/L infusion (250 mL/hr Intravenous New Bag 2/8/24 2021)   sodium chloride 0.45 % 1,000 mL with potassium chloride 40 mEq infusion (has no administration in time range)   dextrose 5 % and sodium chloride 0.45 % infusion (has no administration in time range)   dextrose 5 % and sodium chloride 0.45 % with KCl 20 mEq/L infusion (has no administration in time range)   dextrose 5 % and sodium chloride 0.45 % with KCl 40 mEq/L infusion (has no administration in time range)   insulin regular 1 unit/mL in 0.9% sodium chloride (Glucommander) (5 Units/hr Intravenous New Bag 2/8/24 2229)   Potassium Replacement - Follow Nurse / BPA Driven Protocol (has no administration in time range)   Magnesium Standard Dose Replacement - Follow Nurse / BPA Driven Protocol (has no administration in time range)   Phosphorus Replacement - Follow Nurse / BPA Driven Protocol (has no administration in time range)   Calcium Replacement - Follow Nurse / BPA Driven Protocol (has no administration in time range)   pantoprazole (PROTONIX) injection 40 mg (40 mg Intravenous Given 2/8/24 2228)   sodium chloride 0.9 % bolus 1,000 mL (0 mL Intravenous Stopped 2/8/24 2230)   iopamidol (ISOVUE-370) 76 % injection 100 mL (115 mL Intravenous Given 2/8/24 1915)     dextrose 5 % and sodium chloride 0.45 %, 150 mL/hr  dextrose 5 % and sodium chloride 0.45 % with KCl 20 mEq/L, 150 mL/hr  dextrose 5 % and sodium chloride 0.45 % with KCl 40 mEq/L, 150 mL/hr  dextrose 5 % and sodium chloride 0.9 %, 150 mL/hr  dextrose 5 % and sodium chloride 0.9 % with KCl 20 mEq, 150 mL/hr  dextrose 5% and sodium chloride 0.9% with KCl 40 mEq/L, 150 mL/hr  insulin, 0-100 Units/hr, Last Rate: 5 Units/hr (02/08/24 2229)  sodium chloride 0.45 % 1,000 mL with potassium chloride 40  mEq infusion, 250 mL/hr  sodium chloride, 250 mL/hr  sodium chloride 0.45 % with KCl 20 mEq, 250 mL/hr, Last Rate: 250 mL/hr (02/08/24 2021)  sodium chloride, 250 mL/hr  sodium chloride 0.9 % with KCl 20 mEq, 250 mL/hr  sodium chloride 0.9 % with KCl 40 mEq/L, 250 mL/hr

## 2024-02-09 NOTE — THERAPY DISCHARGE NOTE
Acute Care - Occupational Therapy Discharge  [facility]    Patient Name: Karol Lopez  : 1950    MRN: [id number]                              Today's Date: 2024       Admit Date: 2024    Visit Dx:     ICD-10-CM ICD-9-CM   1. Hyperglycemia  R73.9 790.29   2. History of stroke  Z86.73 V12.54   3. Stroke-like symptoms  R29.90 781.99   4. Anemia, unspecified type  D64.9 285.9   5. Stenosis of carotid artery, unspecified laterality  I65.29 433.10     Patient Active Problem List   Diagnosis    Coronary artery disease involving native coronary artery of native heart with angina pectoris    Anxiety    COPD (chronic obstructive pulmonary disease)    Acid reflux    Vitamin B12 deficiency    Current smoker    Type 2 diabetes mellitus, with long-term current use of insulin    Impaired mobility and ADLs    EDITH (acute kidney injury)    Melena    Anemia    Gastrointestinal hemorrhage    Hyperlipidemia LDL goal <70    Claudication    Persistent atrial fibrillation    AMS (altered mental status)    Hyperglycemia due to diabetes mellitus    Cirrhosis of liver    Carotid artery disease     Past Medical History:   Diagnosis Date    Acid reflux     Anxiety     Cataracts, bilateral     Cirrhosis of liver     Constipation     COPD (chronic obstructive pulmonary disease)     Coronary artery disease     CTS (carpal tunnel syndrome)     Diabetes     Hearing loss     Hypercholesteremia     Hypertension     Impaired functional mobility, balance, gait, and endurance     Lower back pain     Osteoarthritis     Stroke     2013-weak in right arm    Tattoos     x2    Tobacco abuse     Tumor     in between breast closer to right breast    Vitamin B12 deficiency     Wears dentures     upper only    Wears glasses      Past Surgical History:   Procedure Laterality Date    BREAST BIOPSY Right 5/10/2019    Procedure: BREAST BIOPSY RIGHT;  Surgeon: Kiana Frey MD;  Location: [facility];  Service: General    CARPAL TUNNEL RELEASE  Bilateral     CHOLECYSTECTOMY      COLONOSCOPY N/A 9/30/2021    Procedure: COLONOSCOPY WITH POLYPECTOMY AND ABLATION OF AVM;  Surgeon: Russell Davila MD;  Location: [facility] ENDOSCOPY;  Service: Gastroenterology;  Laterality: N/A;    CORONARY ANGIOPLASTY WITH STENT PLACEMENT  2012    ENDOSCOPY N/A 9/29/2021    Procedure: ESOPHAGOGASTRODUODENOSCOPY with biopsies;  Surgeon: Russell Davila MD;  Location: [facility] ENDOSCOPY;  Service: Gastroenterology;  Laterality: N/A;    ENTEROSCOPY SMALL BOWEL N/A 11/16/2021    Procedure: ESOPHAGOGASTRODUODENOSCOPY WITH SMALL BOWEL ENTEROSCOPY and biopsy;  Surgeon: Russell Davila MD;  Location: [facility] ENDOSCOPY;  Service: Gastroenterology;  Laterality: N/A;    HYSTERECTOMY      complete    JOINT REPLACEMENT Bilateral     knee replacements    TOTAL KNEE ARTHROPLASTY Bilateral 10/18/2016    MAUREEN Palomares MD      General Information       Row Name 02/09/24 0933          OT Time and Intention    Document Type discharge evaluation/summary  -CS     Mode of Treatment occupational therapy  -CS       Row Name 02/09/24 0933          General Information    Patient Profile Reviewed yes  -CS     Prior Level of Function independent:;all household mobility;ADL's  Pt reports use of SPC for most mobility, lives in [facility] w/ ex-, has a stool and grab bar in shower stall. No longer drives.  -CS     Existing Precautions/Restrictions fall  -CS     Barriers to Rehab medically complex  -CS       Row Name 02/09/24 0933          Living Environment    People in Home significant other  -CS       Row Name 02/09/24 0933          Home Main Entrance    Number of Stairs, Main Entrance one  -CS       Row Name 02/09/24 0933          Stairs Within Home, Primary    Stairs, Within Home, Primary camper  -CS     Number of Stairs, Within Home, Primary none  -CS       Row Name 02/09/24 0933          Cognition    Orientation Status (Cognition) oriented x 3  -CS       Row Name 02/09/24  0933          Safety Issues, Functional Mobility    Safety Issues Affecting Function (Mobility) insight into deficits/self-awareness;safety precaution awareness;safety precautions follow-through/compliance  -CS     Impairments Affecting Function (Mobility) balance;endurance/activity tolerance  -CS     Comment, Safety Issues/Impairments (Mobility) mild dynamic standing balance deficit  -CS               User Key  (r) = Recorded By, (t) = Taken By, (c) = Cosigned By      Initials Name Provider Type    CS Guero Jackson OT Occupational Therapist                   Mobility/ADL's       Row Name 02/09/24 0934          Bed Mobility    Bed Mobility supine-sit;scooting/bridging  -CS     Scooting/Bridging [place] (Bed Mobility) independent  -CS     Supine-Sit [place] (Bed Mobility) modified independence  -CS     Assistive Device (Bed Mobility) head of bed elevated  -CS     Comment, (Bed Mobility) appropriate sequencing intact, no dizziness reported upon sitting  -       Row Name 02/09/24 0934          Transfers    Transfers bed-chair transfer;toilet transfer  -       Row Name 02/09/24 0934          Bed-Chair Transfer    Bed-Chair [place] (Transfers) supervision  -CS     Assistive Device (Bed-Chair Transfers) walker, front-wheeled  -       Row Name 02/09/24 0934          Toilet Transfer    Type (Toilet Transfer) sit-stand;stand-sit  -     [place] Level (Toilet Transfer) standby assist;verbal cues  -     Assistive Device (Toilet Transfer) commode;grab bars/safety frame  -       Row Name 02/09/24 0934          Functional Mobility    Functional Mobility- Comment defer to PT for specifics, Supervision for in-room distance w/ RW to toilet, sinkside, and recliner. No LOB  -       Row Name 02/09/24 0934          Activities of Daily Living    BADL Assessment/Intervention upper body dressing;lower body dressing;grooming;feeding;toileting  -       Row Name 02/09/24 0934          Upper Body Dressing  Assessment/Training    [place] Level (Upper Body Dressing) don;pajama/robe;supervision  -CS     Comment, (Upper Body Dressing) line mngt  -CS       Row Name 02/09/24 0934          Lower Body Dressing Assessment/Training    [place] Level (Lower Body Dressing) don;socks;independent;pants/bottoms;standby assist  -CS     Position (Lower Body Dressing) edge of bed sitting  -CS     Comment, (Lower Body Dressing) SBA for underwear threading, reach to distal BLEs intact  -CS       Row Name 02/09/24 0934          Grooming Assessment/Training    [place] Level (Grooming) hair care, combing/brushing;wash face, hands;supervision  -CS     Position (Grooming) supported standing  -CS     Comment, (Grooming) cues for improved safety/positioning w/ RW use at sinkside  -CS       Row Name 02/09/24 0934          Self-Feeding Assessment/Training    [place] Level (Feeding) feeding skills;other (see comments)  -CS     Comment, (Feeding) NPO awaiting speech  -       Row Name 02/09/24 0934          Toileting Assessment/Training    [place] Level (Toileting) adjust/manage clothing;perform perineal hygiene;supervision  -CS     Assistive Devices (Toileting) commode;grab bar/safety frame  -CS               User Key  (r) = Recorded By, (t) = Taken By, (c) = Cosigned By      Initials Name Provider Type    CS Guero Jackson, OT Occupational Therapist                   Obj/Interventions       Row Name 02/09/24 0937          Sensory Assessment (Somatosensory)    Sensory Assessment (Somatosensory) bilateral UE  -CS     Bilateral UE Sensory Assessment general sensation;light touch awareness;light touch localization;intact  -       Row Name 02/09/24 0937          Vision Assessment/Intervention    Visual Impairment/Limitations WFL;corrective lenses for reading  -       Row Name 02/09/24 0937          Range of Motion Comprehensive    General Range of Motion bilateral upper extremity ROM WFL  -       Row Name 02/09/24 0937           Strength Comprehensive (MMT)    General Manual Muscle Testing (MMT) Assessment no strength deficits identified  -CS     Comment, General Manual Muscle Testing (MMT) Assessment BUE grossly 4+/5, no significant asymmetry observed  -CS       Row Name 02/09/24 0937          Motor Skills    Motor Skills coordination;functional endurance  -CS     Coordination bimanual skills;WFL  -CS     Functional Endurance O2 sats stable on RA  -CS       Row Name 02/09/24 0937          Balance    Balance Assessment sitting static balance;sitting dynamic balance;standing static balance;standing dynamic balance  -CS     Static Sitting Balance independent  -CS     Dynamic Sitting Balance independent  -CS     Position, Sitting Balance unsupported;sitting edge of bed  -CS     Static Standing Balance supervision  -CS     Dynamic Standing Balance standby assist  -CS     Position/Device Used, Standing Balance supported;walker, front-wheeled  -CS     Balance Interventions sitting;standing;sit to stand;occupation based/functional task  -CS     Comment, Balance no LOB during ADL completion or related mobility w/ RW  -CS               User Key  (r) = Recorded By, (t) = Taken By, (c) = Cosigned By      Initials Name Provider Type    Guero Arellano OT Occupational Therapist                   Goals/Plan    No documentation.                  Clinical Impression       Row Name 02/09/24 0938          Pain Assessment    Pretreatment Pain Rating 0/10 - no pain  -CS     Posttreatment Pain Rating 0/10 - no pain  -CS       Row Name 02/09/24 0938          Plan of Care Review    Plan of Care Reviewed With patient  -CS     Progress no change  -CS     Outcome Evaluation Pt presents at baseline for ADL performance w/ symmetrical BUE strength and coordination/sensation intact. Mild dynamic standing balance deficit, defer to PT for any further mobility needs. OT signing off, please reconsult if needed. Rec d/c to home with assist prn when medically  appropriate.  -CS       Row Name 02/09/24 0938          Therapy Assessment/Plan (OT)    Criteria for Skilled Therapeutic Interventions Met (OT) no;does not meet criteria for skilled intervention  -CS     Therapy Frequency (OT) evaluation only  -CS       Row Name 02/09/24 0938          Therapy Plan Review/Discharge Plan (OT)    Anticipated Discharge Disposition (OT) home;home with assist  -CS       Row Name 02/09/24 0938          Vital Signs    Pre Systolic BP Rehab 129  RN cleared for eval  -CS     Pre Treatment Diastolic BP 84  -CS     Post Systolic BP Rehab 167  -CS     Post Treatment Diastolic BP 67  -CS     O2 Delivery Pre Treatment room air  -CS     O2 Delivery Intra Treatment room air  -CS     O2 Delivery Post Treatment room air  -CS     Pre Patient Position Supine  -CS     Intra Patient Position Standing  -CS     Post Patient Position Sitting  -CS       Row Name 02/09/24 0938          Positioning and Restraints    Pre-Treatment Position in bed  -CS     Post Treatment Position chair  -CS     In Chair notified nsg;reclined;sitting;call light within reach;encouraged to call for assist;exit alarm on;LUE elevated;RUE elevated;waffle cushion;legs elevated  -CS               User Key  (r) = Recorded By, (t) = Taken By, (c) = Cosigned By      Initials Name Provider Type    CS Guero Jackson, OT Occupational Therapist                   Outcome Measures       Row Name 02/09/24 0941          How much help from another is currently needed...    Putting on and taking off regular lower body clothing? 4  -CS     Bathing (including washing, rinsing, and drying) 3  -CS     Toileting (which includes using toilet bed pan or urinal) 4  -CS     Putting on and taking off regular upper body clothing 4  -CS     Taking care of personal grooming (such as brushing teeth) 4  -CS     Eating meals 4  -CS     AM-PAC 6 Clicks Score (OT) 23  -CS       Row Name 02/09/24 0338          How much help from another person do you currently  need...    Turning from your back to your side while in flat bed without using bedrails? 3  -SM     Moving from lying on back to sitting on the side of a flat bed without bedrails? 3  -SM     Moving to and from a bed to a chair (including a wheelchair)? 3  -SM     Standing up from a chair using your arms (e.g., wheelchair, bedside chair)? 3  -SM     Climbing 3-5 steps with a railing? 2  -SM     To walk in hospital room? 2  -SM     AM-PAC 6 Clicks Score (PT) 16  -SM     Highest Level of Mobility Goal 5 --> Static standing  -SM       Row Name 02/09/24 0941          Modified [place] Scale    Modified [place] Scale 1 - No significant disability despite symptoms.  Able to carry out all usual duties and activities.  -CS       Row Name 02/09/24 0941          Functional Assessment    Outcome Measure Options AM-PAC 6 Clicks Daily Activity (OT);Modified [place]  -CS               User Key  (r) = Recorded By, (t) = Taken By, (c) = Cosigned By      Initials Name Provider Type    Guero Arellano OT Occupational Therapist    Myesha Krause, RN Registered Nurse                  Occupational Therapy Education       Title: PT OT SLP Therapies (In Progress)       Topic: Occupational Therapy (In Progress)       Point: ADL training (Not Started)       Description:   Instruct learner(s) on proper safety adaptation and remediation techniques during self care or transfers.   Instruct in proper use of assistive devices.                  Learner Progress:  Not documented in this visit.              Point: Home exercise program (Not Started)       Description:   Instruct learner(s) on appropriate technique for monitoring, assisting and/or progressing therapeutic exercises/activities.                  Learner Progress:  Not documented in this visit.              Point: Precautions (Done)       Description:   Instruct learner(s) on prescribed precautions during self-care and functional transfers.                  Learning Progress  Summary             Patient Acceptance, E,D, VU,DU by  at 2/9/2024 0942    Comment: in-room safety awareness                         Point: Body mechanics (Not Started)       Description:   Instruct learner(s) on proper positioning and spine alignment during self-care, functional mobility activities and/or exercises.                  Learner Progress:  Not documented in this visit.                              User Key       Initials Effective Dates Name Provider Type Discipline     06/16/21 -  Guero Jackson OT Occupational Therapist OT                  OT Recommendation and Plan  Therapy Frequency (OT): evaluation only  Plan of Care Review  Plan of Care Reviewed With: patient  Progress: no change  Outcome Evaluation: Pt presents at baseline for ADL performance w/ symmetrical BUE strength and coordination/sensation intact. Mild dynamic standing balance deficit, defer to PT for any further mobility needs. OT signing off, please reconsult if needed. Rec d/c to home with assist prn when medically appropriate.  Plan of Care Reviewed With: patient  Outcome Evaluation: Pt presents at baseline for ADL performance w/ symmetrical BUE strength and coordination/sensation intact. Mild dynamic standing balance deficit, defer to PT for any further mobility needs. OT signing off, please reconsult if needed. Rec d/c to home with assist prn when medically appropriate.     Time Calculation:   Evaluation Complexity (OT)  Review Occupational Profile/Medical/Therapy History Complexity: expanded/moderate complexity  Assessment, Occupational Performance/Identification of Deficit Complexity: 1-3 performance deficits  Clinical Decision Making Complexity (OT): problem focused assessment/low complexity  Overall Complexity of Evaluation (OT): low complexity     Time Calculation- OT       Row Name 02/09/24 0943             Time Calculation- OT    OT Start Time 0740  -      OT Received On 02/09/24  -         Untimed Charges    OT  Eval/Re-eval Minutes 48  -CS         Total Minutes    Untimed Charges Total Minutes 48  -CS       Total Minutes 48  -CS                User Key  (r) = Recorded By, (t) = Taken By, (c) = Cosigned By      Initials Name Provider Type    CS Guero Jackson OT Occupational Therapist                  Therapy Charges for Today       Code Description Service Date Service Provider Modifiers Qty    [id number]  OT EVAL LOW COMPLEXITY 4 2/9/2024 Guero Jackson OT GO 1               OT Discharge Summary  Anticipated Discharge Disposition (OT): home, home with assist  Reason for Discharge: At baseline function (for ADL performance)  Discharge Destination: Home, Home with assist    Guero Jackson OT  2/9/2024

## 2024-02-10 ENCOUNTER — READMISSION MANAGEMENT (OUTPATIENT)
Dept: CALL CENTER | Facility: HOSPITAL | Age: 74
End: 2024-02-10
Payer: MEDICARE

## 2024-02-10 VITALS
DIASTOLIC BLOOD PRESSURE: 87 MMHG | HEART RATE: 63 BPM | WEIGHT: 144.1 LBS | HEIGHT: 62 IN | SYSTOLIC BLOOD PRESSURE: 181 MMHG | TEMPERATURE: 97.9 F | BODY MASS INDEX: 26.52 KG/M2 | OXYGEN SATURATION: 97 % | RESPIRATION RATE: 18 BRPM

## 2024-02-10 PROBLEM — N17.9 AKI (ACUTE KIDNEY INJURY): Status: RESOLVED | Noted: 2021-02-28 | Resolved: 2024-02-10

## 2024-02-10 PROBLEM — R41.82 AMS (ALTERED MENTAL STATUS): Status: RESOLVED | Noted: 2024-02-08 | Resolved: 2024-02-10

## 2024-02-10 LAB
ANION GAP SERPL CALCULATED.3IONS-SCNC: 8 MMOL/L (ref 5–15)
BUN SERPL-MCNC: 12 MG/DL (ref 8–23)
BUN/CREAT SERPL: 12.4 (ref 7–25)
CALCIUM SPEC-SCNC: 8.3 MG/DL (ref 8.6–10.5)
CHLORIDE SERPL-SCNC: 110 MMOL/L (ref 98–107)
CO2 SERPL-SCNC: 23 MMOL/L (ref 22–29)
CREAT SERPL-MCNC: 0.97 MG/DL (ref 0.57–1)
DEPRECATED RDW RBC AUTO: 49.1 FL (ref 37–54)
EGFRCR SERPLBLD CKD-EPI 2021: 62.6 ML/MIN/1.73
ERYTHROCYTE [DISTWIDTH] IN BLOOD BY AUTOMATED COUNT: 15.5 % (ref 12.3–15.4)
GLUCOSE BLDC GLUCOMTR-MCNC: 187 MG/DL (ref 70–130)
GLUCOSE BLDC GLUCOMTR-MCNC: 358 MG/DL (ref 70–130)
GLUCOSE SERPL-MCNC: 123 MG/DL (ref 65–99)
HCT VFR BLD AUTO: 25.1 % (ref 34–46.6)
HGB BLD-MCNC: 8.2 G/DL (ref 12–15.9)
MAGNESIUM SERPL-MCNC: 2.2 MG/DL (ref 1.6–2.4)
MCH RBC QN AUTO: 28.3 PG (ref 26.6–33)
MCHC RBC AUTO-ENTMCNC: 32.7 G/DL (ref 31.5–35.7)
MCV RBC AUTO: 86.6 FL (ref 79–97)
PHOSPHATE SERPL-MCNC: 3.1 MG/DL (ref 2.5–4.5)
PLATELET # BLD AUTO: 137 10*3/MM3 (ref 140–450)
PMV BLD AUTO: 11.8 FL (ref 6–12)
POTASSIUM SERPL-SCNC: 3.9 MMOL/L (ref 3.5–5.2)
RBC # BLD AUTO: 2.9 10*6/MM3 (ref 3.77–5.28)
SODIUM SERPL-SCNC: 141 MMOL/L (ref 136–145)
WBC NRBC COR # BLD AUTO: 2.96 10*3/MM3 (ref 3.4–10.8)

## 2024-02-10 PROCEDURE — 84100 ASSAY OF PHOSPHORUS: CPT | Performed by: STUDENT IN AN ORGANIZED HEALTH CARE EDUCATION/TRAINING PROGRAM

## 2024-02-10 PROCEDURE — 82948 REAGENT STRIP/BLOOD GLUCOSE: CPT

## 2024-02-10 PROCEDURE — 63710000001 INSULIN LISPRO (HUMAN) PER 5 UNITS: Performed by: STUDENT IN AN ORGANIZED HEALTH CARE EDUCATION/TRAINING PROGRAM

## 2024-02-10 PROCEDURE — 63710000001 INSULIN DETEMIR PER 5 UNITS: Performed by: STUDENT IN AN ORGANIZED HEALTH CARE EDUCATION/TRAINING PROGRAM

## 2024-02-10 PROCEDURE — 96376 TX/PRO/DX INJ SAME DRUG ADON: CPT

## 2024-02-10 PROCEDURE — 85027 COMPLETE CBC AUTOMATED: CPT | Performed by: STUDENT IN AN ORGANIZED HEALTH CARE EDUCATION/TRAINING PROGRAM

## 2024-02-10 PROCEDURE — 83735 ASSAY OF MAGNESIUM: CPT | Performed by: STUDENT IN AN ORGANIZED HEALTH CARE EDUCATION/TRAINING PROGRAM

## 2024-02-10 PROCEDURE — 94640 AIRWAY INHALATION TREATMENT: CPT

## 2024-02-10 PROCEDURE — 80048 BASIC METABOLIC PNL TOTAL CA: CPT | Performed by: STUDENT IN AN ORGANIZED HEALTH CARE EDUCATION/TRAINING PROGRAM

## 2024-02-10 PROCEDURE — G0378 HOSPITAL OBSERVATION PER HR: HCPCS

## 2024-02-10 PROCEDURE — 99239 HOSP IP/OBS DSCHRG MGMT >30: CPT | Performed by: INTERNAL MEDICINE

## 2024-02-10 RX ORDER — CLOPIDOGREL BISULFATE 75 MG/1
75 TABLET ORAL DAILY
Qty: 30 TABLET | Refills: 1 | Status: SHIPPED | OUTPATIENT
Start: 2024-02-10

## 2024-02-10 RX ORDER — PANTOPRAZOLE SODIUM 40 MG/1
40 TABLET, DELAYED RELEASE ORAL
Status: DISCONTINUED | OUTPATIENT
Start: 2024-02-10 | End: 2024-02-10 | Stop reason: HOSPADM

## 2024-02-10 RX ORDER — HYDROCODONE BITARTRATE AND ACETAMINOPHEN 5; 325 MG/1; MG/1
1 TABLET ORAL DAILY
Status: CANCELLED
Start: 2024-02-10

## 2024-02-10 RX ADMIN — INSULIN LISPRO 8 UNITS: 100 INJECTION, SOLUTION INTRAVENOUS; SUBCUTANEOUS at 11:46

## 2024-02-10 RX ADMIN — LACTULOSE 10 G: 20 SOLUTION ORAL at 08:22

## 2024-02-10 RX ADMIN — INSULIN LISPRO 2 UNITS: 100 INJECTION, SOLUTION INTRAVENOUS; SUBCUTANEOUS at 08:24

## 2024-02-10 RX ADMIN — ACETAMINOPHEN 650 MG: 325 TABLET ORAL at 08:22

## 2024-02-10 RX ADMIN — ASPIRIN 324 MG: 81 TABLET, CHEWABLE ORAL at 08:22

## 2024-02-10 RX ADMIN — PANTOPRAZOLE SODIUM 40 MG: 40 INJECTION, POWDER, FOR SOLUTION INTRAVENOUS at 08:21

## 2024-02-10 RX ADMIN — Medication 10 ML: at 08:25

## 2024-02-10 RX ADMIN — TIOTROPIUM BROMIDE INHALATION SPRAY 2 PUFF: 3.12 SPRAY, METERED RESPIRATORY (INHALATION) at 08:40

## 2024-02-10 RX ADMIN — FERROUS SULFATE TAB 325 MG (65 MG ELEMENTAL FE) 325 MG: 325 (65 FE) TAB at 08:22

## 2024-02-10 RX ADMIN — INSULIN LISPRO 5 UNITS: 100 INJECTION, SOLUTION INTRAVENOUS; SUBCUTANEOUS at 08:24

## 2024-02-10 RX ADMIN — INSULIN DETEMIR 10 UNITS: 100 INJECTION, SOLUTION SUBCUTANEOUS at 08:25

## 2024-02-10 RX ADMIN — EMPAGLIFLOZIN 10 MG: 10 TABLET, FILM COATED ORAL at 08:22

## 2024-02-10 RX ADMIN — INSULIN LISPRO 5 UNITS: 100 INJECTION, SOLUTION INTRAVENOUS; SUBCUTANEOUS at 11:46

## 2024-02-10 NOTE — ACP (ADVANCE CARE PLANNING)
Spoke to pt about ACP/LW.  Pt says she already has one but a copy has not been given to the hospital.  Pt reports that she is being discharged.  Encouraged her to bring a copy next time she is here at this hospital.

## 2024-02-10 NOTE — DISCHARGE INSTR - APPOINTMENTS
Called PCP and office was closed. Please call Dr. Eugene on next week day and make follow up appointment in 1 week. [phone]

## 2024-02-10 NOTE — OUTREACH NOTE
Prep Survey      Flowsheet Row Responses   [facility] facility patient discharged from? [facility]   Is LACE score < 7 ? No   Eligibility Readm Mgmt   Discharge diagnosis AMS (altered mental status)   Does the patient have one of the following disease processes/diagnoses(primary or secondary)? Other   Does the patient have Home health ordered? No   Is there a DME ordered? No   Comments regarding appointments f/u in Stroke clinic 1 month.  ambulatory referral to Neurology. f/u with PCP, Dr. Romaine Eugene in 1 week.   Medication alerts for this patient see AVS   Prep survey completed? Yes            Jessie MARISCAL - Registered Nurse

## 2024-02-10 NOTE — DISCHARGE SUMMARY
[facility]  DISCHARGE SUMMARY    Patient Name: Karol Lopez  : 1950  MRN: [id number]    Date of Admission: 2024  6:50 PM  Date of Discharge:  2/10/23  Primary Care Physician: Romaine Eugene MD    Consults       Date and Time Order Name Status Description    2024 12:11 AM Inpatient Neurology Consult Stroke      2024  6:55 PM Inpatient Neurology Consult Stroke Completed             Hospital Course     Presenting Problem: AMS    Active Hospital Problems    Diagnosis  POA    **AMS (altered mental status) [R41.82]  Yes    Hyperglycemia due to diabetes mellitus [E11.65]  Yes    Cirrhosis of liver [K74.60]  Unknown    Carotid artery disease [I77.9]  Unknown    Persistent atrial fibrillation [I48.19]  Yes    Hyperlipidemia LDL goal <70 [E78.5]  Yes    Anemia [D64.9]  Yes    EDITH (acute kidney injury) [N17.9]  Yes    Current smoker [F17.200]  Yes    Coronary artery disease involving native coronary artery of native heart with angina pectoris [I25.119]  Yes    COPD (chronic obstructive pulmonary disease) [J44.9]  Yes    Anxiety [F41.9]  Yes      Resolved Hospital Problems   No resolved problems to display.          Hospital Course:  Karol Lopez is a 73 y.o. female with a PMH significant for WILCOX cirrhosis, prior CVA with residual right-sided weakness per EMR (left-sided weakness per pt), atrial fibrillation not on AC, carotid artery disease, HTN, CAD, HLD, tobacco use disorder, COPD, CKD stage III, history of GI bleed, who presented to the ED due to loss of consciousness.  MRI brain showed advanced age-related changes, but no evidence of acute infarct, hemorrhage, mass or mass effect.  Chest x-ray with no acute cardiopulmonary disease.  CTA head/neck showed no vessel occlusion, but showed approximately 80% stenosis of the proximal right internal carotid artery and approximately 70% stenosis of the proximal left internal carotid artery.    Right-sided  weakness, dysarthria, unclear etiology  Prior CVA  Carotid artery disease  Persistent Afib, history of GI bleed  -LDL 19, A1c 8.3%, tsh 2.4  -MRI of the brain showed no acute infarct, hemorrhage or mass  CT perfusion shows an 80% stenosis of the proximal right internal carotid artery and 70% stenosis of the left internal carotid artery. No vessel occlusion.   -Echo shows a normal EF and no PFO.  -Right internal carotid artery shows a 50 to 69% stenosis, left internal carotid artery shows a less than 50% stenosis on carotid duplex  - Discussed with Dr. Bunn. Recommended ASA 81mg and plavix 75mg daily due to her carotid stenosis with follow-up in stroke clinic in 1 month.   - Of note, patient not on anticoagulation for her persistent Afib, possibly related to history of GI bleeding, limited history. Consider resuming as an outpatient if appropriate given history.   -Continue home Crestor.      Altered mental status, unspecified resolved  Suspect this was related to hyperglycemia, back to baseline.      Hyperglycemia  Type 2 diabetes  Insulin drip was started on admission for severe hyperglycemia and then she was transitioned to basal bolus.  She will continue home Jardiance, metformin and Tradjenta.  Recommend she follow-up closely with her PCP regarding her glucose management, may benefit from a basal bolus regimen if sugars cannot be controlled but will defer to PCP.    Elevated Cr, resolved  CKD  Baseline Cr appears to be 1-1.3; Cr 1.5 on admission, back to baseline after IV fluids       Discharge Follow Up Recommendations for outpatient labs/diagnostics:  Follow-up with PCP in 1 to 2 weeks  Follow-up in stroke clinic in 1 month     Day of Discharge     HPI:   Patient is doing well this morning, wants to go home. At her baseline. Denies any weakness.   Vital Signs:   Temp:  [97.6 °F (36.4 °C)-98 °F (36.7 °C)] 97.6 °F (36.4 °C)  Heart Rate:  [58-82] 61  Resp:  [16-18] 18  BP: (134-164)/(63-76)  164/76      Physical Exam:  Constitutional: No acute distress, awake, alert  Respiratory: Clear to auscultation bilaterally, respiratory effort normal   Cardiovascular: RRR  Gastrointestinal: Positive bowel sounds, soft, nontender, nondistended  Musculoskeletal: No bilateral ankle edema  Psychiatric: Appropriate affect, cooperative  Neurologic: Oriented x 3, no focal deficits, no facial droop.         Pertinent  and/or Most Recent Results     LAB RESULTS:      Lab 02/10/24  0341 02/09/24  0857 02/09/24  0405 02/09/24  0052 02/08/24  2317 02/08/24  2150 02/08/24  1902 02/08/24  1857   WBC 2.96*  --  3.94  --   --   --   --  4.09   HEMOGLOBIN 8.2* 8.6* 8.4* 8.3*  --  7.8*  --  8.2*   HEMOGLOBIN, POC  --   --   --   --   --   --    < >  --    HEMATOCRIT 25.1* 27.0* 27.0* 26.0*  --  24.4*  --  26.2*   HEMATOCRIT POC  --   --   --   --   --   --    < >  --    PLATELETS 137*  --  141  --   --   --   --  154   NEUTROS ABS  --   --  2.58  --   --   --   --  2.91   IMMATURE GRANS (ABS)  --   --  0.02  --   --   --   --  0.02   LYMPHS ABS  --   --  0.86  --   --   --   --  0.75   MONOS ABS  --   --  0.42  --   --   --   --  0.36   EOS ABS  --   --  0.04  --   --   --   --  0.03   MCV 86.6  --  87.7  --   --   --   --  88.5   PROCALCITONIN  --   --   --   --   --   --   --  0.36*   LACTATE  --   --   --   --  1.7  --   --  4.8*   PROTIME  --   --   --   --   --   --   --  14.9  14.7*   APTT  --   --   --   --   --   --   --  26.7   D DIMER QUANT  --   --   --   --   --   --   --  <0.27    < > = values in this interval not displayed.         Lab 02/10/24  0341 02/09/24  1547 02/09/24  0857 02/09/24  0405 02/09/24  0053 02/08/24  1902 02/08/24  1857   SODIUM 141  --  138 136 133*  --  130*   POTASSIUM 3.9 4.3 4.2 3.0* 3.1*  --  3.9   CHLORIDE 110*  --  107 102 100  --  93*   CO2 23.0  --  21.0* 23.0 23.0  --  23.0   ANION GAP 8.0  --  10.0 11.0 10.0  --  14.0   BUN 12  --  16 18 19  --  20   CREATININE 0.97  --  1.08* 1.24*  1.16* 1.50* 1.49*   EGFR 62.6  --  55.0* 46.6* 50.5* 37.1* 37.4*   GLUCOSE 123*  --  146* 139* 203*  --  635*   CALCIUM 8.3*  --  8.8 8.9 8.9  --  9.1   MAGNESIUM 2.2  --  2.1 2.1 2.2  --  2.3   PHOSPHORUS 3.1  --  2.4* 2.9 2.8  --  4.0   HEMOGLOBIN A1C  --   --   --   --   --   --  8.30*   TSH  --   --   --   --   --   --  2.420         Lab 02/08/24  1857   TOTAL PROTEIN 6.0   ALBUMIN 3.7   GLOBULIN 2.3   ALT (SGPT) 17   AST (SGOT) 20   BILIRUBIN 0.4   ALK PHOS 105         Lab 02/08/24  1857   HSTROP T 25*   PROTIME 14.9  14.7*   INR 1.14*  1.3*         Lab 02/09/24  0405   CHOLESTEROL 72   LDL CHOL 19   HDL CHOL 23*   TRIGLYCERIDES 185*         Lab 02/08/24  1857   IRON 36*   IRON SATURATION (TSAT) 8*   TIBC 434   TRANSFERRIN 291   FERRITIN 133.50   FOLATE 8.41   VITAMIN B 12 571         Lab 02/08/24  1940   FIO2 21   CARBOXYHEMOGLOBIN (VENOUS) 4.3     Brief Urine Lab Results  (Last result in the past 365 days)        Color   Clarity   Blood   Leuk Est   Nitrite   Protein   CREAT   Urine HCG        02/08/24 2009             22.8         02/08/24 2009 Yellow   Clear   Negative   Negative   Negative   Trace                 Microbiology Results (last 10 days)       Procedure Component Value - Date/Time    Blood Culture - Blood, Hand, Left [[id number]]  (Normal) Collected: 02/09/24 0052    Lab Status: Preliminary result Specimen: Blood from Hand, Left Updated: 02/10/24 0215     Blood Culture No growth at 24 hours    Blood Culture - Blood, Hand, Right [[id number]]  (Normal) Collected: 02/09/24 0052    Lab Status: Preliminary result Specimen: Blood from Hand, Right Updated: 02/10/24 0215     Blood Culture No growth at 24 hours    Eosinophil Smear - Urine, Urine, Clean Catch [[id number]]  (Normal) Collected: 02/08/24 2234    Lab Status: Final result Specimen: Urine, Clean Catch Updated: 02/09/24 0051     Eosinophil Smear 0 % EOS/100 Cells     Narrative:      No eosinophil seen    COVID PRE-OP / PRE-PROCEDURE SCREENING  ORDER (NO ISOLATION) - Swab, Nasopharynx [[id number]]  (Normal) Collected: 02/08/24 2150    Lab Status: Final result Specimen: Swab from Nasopharynx Updated: 02/08/24 2313    Narrative:      The following orders were created for panel order COVID PRE-OP / PRE-PROCEDURE SCREENING ORDER (NO ISOLATION) - Swab, Nasopharynx.  Procedure                               Abnormality         Status                     ---------                               -----------         ------                     Respiratory Panel PCR w/...[[id number]]  Normal              Final result                 Please view results for these tests on the individual orders.    Respiratory Panel PCR w/COVID-19(SARS-CoV-2) TYREL/CESAR/JAYME/PAD/COR/ELVIE In-House, NP Swab in UTM/VTM, 2 HR TAT - Swab, Nasopharynx [[id number]]  (Normal) Collected: 02/08/24 2150    Lab Status: Final result Specimen: Swab from Nasopharynx Updated: 02/08/24 2313     ADENOVIRUS, PCR Not Detected     Coronavirus 229E Not Detected     Coronavirus HKU1 Not Detected     Coronavirus NL63 Not Detected     Coronavirus OC43 Not Detected     COVID19 Not Detected     Human Metapneumovirus Not Detected     Human Rhinovirus/Enterovirus Not Detected     Influenza A PCR Not Detected     Influenza B PCR Not Detected     Parainfluenza Virus 1 Not Detected     Parainfluenza Virus 2 Not Detected     Parainfluenza Virus 3 Not Detected     Parainfluenza Virus 4 Not Detected     RSV, PCR Not Detected     Bordetella pertussis pcr Not Detected     Bordetella parapertussis PCR Not Detected     Chlamydophila pneumoniae PCR Not Detected     Mycoplasma pneumo by PCR Not Detected    Narrative:      In the setting of a positive respiratory panel with a viral infection PLUS a negative procalcitonin without other underlying concern for bacterial infection, consider observing off antibiotics or discontinuation of antibiotics and continue supportive care. If the respiratory panel is positive for atypical bacterial  infection (Bordetella pertussis, Chlamydophila pneumoniae, or Mycoplasma pneumoniae), consider antibiotic de-escalation to target atypical bacterial infection.            FL Video Swallow With Speech Single Contrast    Result Date: 2/9/2024  FL VIDEO SWALLOW W SPEECH SINGLE-CONTRAST Date of Exam: 2/9/2024 2:23 PM EST Indication: dysphagia Comparison: None available. Technique:   The speech pathologist administered food and/or liquid mixed with barium to the patient with cine/video imaging.  Imaging assistance was provided to the speech pathologist and an image was saved. Fluoroscopic Time: 1 minute and 6 seconds Number of Images: 10 associated fluoroscopic loops were saved Findings: No aspiration was seen during fluoroscopic guided modified barium swallow series. Please see speech therapy report for full details and recommendations.     Impression: Fluoroscopy provided for a modified barium swallow. No aspiration was seen during swallowing evaluation. Please see speech therapy report for full details and recommendations. Electronically Signed: Aurelio Sherman MD  2/9/2024 4:38 PM EST  Workstation ID: [id number]    Duplex Carotid Ultrasound CAR    Result Date: 2/9/2024    Right internal carotid artery demonstrates a 50-69% stenosis.   Left internal carotid artery demonstrates a less than 50% stenosis.     Adult Transthoracic Echo Complete W/ Cont if Necessary Per Protocol (With Agitated Saline)    Result Date: 2/9/2024    Left ventricular systolic function is normal. Calculated left ventricular EF = 59.2% Left ventricular ejection fraction appears to be 61 - 65%.   Saline test results are negative for right to left atrial level shunt.   Estimated right ventricular systolic pressure from tricuspid regurgitation is normal (<35 mmHg). Calculated right ventricular systolic pressure from tricuspid regurgitation is 21 mmHg.     MRI Brain Without Contrast    Result Date: 2/9/2024  MRI BRAIN WO CONTRAST Date of Exam: 2/9/2024  2:52 AM EST Indication: Stroke, follow up.  Comparison: None available. Technique:  Routine multiplanar/multisequence sequence images of the brain were obtained without contrast administration. Findings: No acute infarct is present on diffusion weighted sequences. Midline structures are normal and the craniocervical junction appears satisfactory. Age-related changes are present with relatively advanced generalized volume loss and typical pontine and periventricular leukomalacia. There are also small areas of chronic lacunar infarct, most notably within the left basal ganglia. There is otherwise no evidence of intracranial hemorrhage, mass or mass effect. There is ex vacuo prominence of the ventricles and sulci. The orbits are normal. The paranasal sinuses are grossly clear.     Impression: Advanced age-related changes are noted as above. There is otherwise no evidence of acute infarct, hemorrhage, mass or mass effect. Electronically Signed: Andreas Thibodeaux MD  2/9/2024 4:25 AM EST  Workstation ID: [id number]    XR Chest 1 View    Result Date: 2/8/2024  XR CHEST 1 VW Date of Exam: 2/8/2024 7:24 PM EST Indication: Acute Stroke Protocol (onset < 12 hrs) Comparison: 11/15/2021 Findings: No focal consolidation. Right lower lobe calcified granuloma no pneumothorax or pleural effusion. Cardiac size is normal. The visualized clavicles appear intact. No displaced rib fractures. The visualized upper abdomen is normal.     Impression: No acute cardiopulmonary disease. Electronically Signed: Anthony Dumont MD  2/8/2024 7:30 PM EST  Workstation ID: [id number]    CT Angiogram Head w AI Analysis of LVO    Result Date: 2/8/2024  CT ANGIOGRAM HEAD W AI ANALYSIS OF LVO, CT CEREBRAL PERFUSION W WO CONTRAST, CT ANGIOGRAM NECK Date of Exam: 2/8/2024 6:57 PM EST Indication: Neuro deficit, acute stroke suspected Neuro deficit, acute stroke suspected. Comparison: 2/28/2021 Technique: CTA of the head was performed after the uneventful intravenous  administration of 115 cc Isovue-370 . Reconstructed coronal and sagittal images were also obtained. In addition, a 3-D volume rendered image was created for interpretation. Automated exposure control and iterative reconstruction methods were used. Findings: CT perfusion demonstrates no area of less than 30% cerebral blood flow or elevated Tmax greater than 6 seconds. No mismatch. No decreased cerebral blood volume. No evidence of infarct or infarct penumbra. CTA of the head and neck demonstrates atheromatous disease of the aortic arch and origins of the great vessels. Severe atheromatous disease of the right carotid bulb with a residual vessel lumen of 1 mm and a native vessel lumen diameter of 5 mm consistent with greater than 80% stenosis according to NASCET criteria. Tortuosity of the extracranial right internal carotid artery at the skull base. Plaque-like calcification of the intracranial segments of the right internal carotid artery. The right  carotid terminus is normal. The visualized branches of the right anterior and middle cerebral arteries are normal. Severe atheromatous disease of the left carotid bulb extending into the left internal carotid artery with a minimal residual vessel lumen diameter of approximately 2 mm and a mid vessel lumen diameter measured more distally of 5 mm consistent with approximately 70% stenosis according to NASCET criteria. Tortuosity of the extracranial segments of the internal carotid artery at the skull base. Plaque-like atheromatous disease involving the intracranial segments of the left internal carotid artery. The left carotid terminus is normal. The visualized branches of the left anterior and middle cerebral arteries are normal. Stable vascular malformation of the lateral left frontal lobe. This is nonspecific and may represent a small arteriovenous shunt. Both vertebral arteries arise from the subclavian arteries. The left vertebral artery is dominant. Mild  atheromatous disease of the vertebral arteries. The vertebral arteries supply the basilar artery. The basilar artery and basilar artery tip are normal. The basilar artery terminates in bilateral posterior cerebral arteries. No abnormal intracranial enhancement. No intracranial mass. Bilateral lens replacements. The paranasal sinuses are clear. The mastoid air cells are aerated. The nasopharynx is clear. No cervical adenopathy. The thyroid gland appears normal. The lung apices are clear. Degenerative changes of the cervical spine     No vessel occlusion. No intracranial infarct. There is approximately 80% stenosis of the proximal right internal carotid artery and approximately 70% stenosis of the proximal left internal carotid artery according to NASCET criteria. Electronically Signed: Anthony Dumont MD  2/8/2024 7:24 PM EST  Workstation ID: [id number]    CT Angiogram Neck    Result Date: 2/8/2024  CT ANGIOGRAM HEAD W AI ANALYSIS OF LVO, CT CEREBRAL PERFUSION W WO CONTRAST, CT ANGIOGRAM NECK Date of Exam: 2/8/2024 6:57 PM EST Indication: Neuro deficit, acute stroke suspected Neuro deficit, acute stroke suspected. Comparison: 2/28/2021 Technique: CTA of the head was performed after the uneventful intravenous administration of 115 cc Isovue-370 . Reconstructed coronal and sagittal images were also obtained. In addition, a 3-D volume rendered image was created for interpretation. Automated exposure control and iterative reconstruction methods were used. Findings: CT perfusion demonstrates no area of less than 30% cerebral blood flow or elevated Tmax greater than 6 seconds. No mismatch. No decreased cerebral blood volume. No evidence of infarct or infarct penumbra. CTA of the head and neck demonstrates atheromatous disease of the aortic arch and origins of the great vessels. Severe atheromatous disease of the right carotid bulb with a residual vessel lumen of 1 mm and a native vessel lumen diameter of 5 mm consistent with  greater than 80% stenosis according to NASCET criteria. Tortuosity of the extracranial right internal carotid artery at the skull base. Plaque-like calcification of the intracranial segments of the right internal carotid artery. The right  carotid terminus is normal. The visualized branches of the right anterior and middle cerebral arteries are normal. Severe atheromatous disease of the left carotid bulb extending into the left internal carotid artery with a minimal residual vessel lumen diameter of approximately 2 mm and a mid vessel lumen diameter measured more distally of 5 mm consistent with approximately 70% stenosis according to NASCET criteria. Tortuosity of the extracranial segments of the internal carotid artery at the skull base. Plaque-like atheromatous disease involving the intracranial segments of the left internal carotid artery. The left carotid terminus is normal. The visualized branches of the left anterior and middle cerebral arteries are normal. Stable vascular malformation of the lateral left frontal lobe. This is nonspecific and may represent a small arteriovenous shunt. Both vertebral arteries arise from the subclavian arteries. The left vertebral artery is dominant. Mild atheromatous disease of the vertebral arteries. The vertebral arteries supply the basilar artery. The basilar artery and basilar artery tip are normal. The basilar artery terminates in bilateral posterior cerebral arteries. No abnormal intracranial enhancement. No intracranial mass. Bilateral lens replacements. The paranasal sinuses are clear. The mastoid air cells are aerated. The nasopharynx is clear. No cervical adenopathy. The thyroid gland appears normal. The lung apices are clear. Degenerative changes of the cervical spine     No vessel occlusion. No intracranial infarct. There is approximately 80% stenosis of the proximal right internal carotid artery and approximately 70% stenosis of the proximal left internal carotid  artery according to NASCET criteria. Electronically Signed: Anthony Dumont MD  2/8/2024 7:24 PM EST  Workstation ID: [id number]    CT CEREBRAL PERFUSION WITH & WITHOUT CONTRAST    Result Date: 2/8/2024  CT ANGIOGRAM HEAD W AI ANALYSIS OF LVO, CT CEREBRAL PERFUSION W WO CONTRAST, CT ANGIOGRAM NECK Date of Exam: 2/8/2024 6:57 PM EST Indication: Neuro deficit, acute stroke suspected Neuro deficit, acute stroke suspected. Comparison: 2/28/2021 Technique: CTA of the head was performed after the uneventful intravenous administration of 115 cc Isovue-370 . Reconstructed coronal and sagittal images were also obtained. In addition, a 3-D volume rendered image was created for interpretation. Automated exposure control and iterative reconstruction methods were used. Findings: CT perfusion demonstrates no area of less than 30% cerebral blood flow or elevated Tmax greater than 6 seconds. No mismatch. No decreased cerebral blood volume. No evidence of infarct or infarct penumbra. CTA of the head and neck demonstrates atheromatous disease of the aortic arch and origins of the great vessels. Severe atheromatous disease of the right carotid bulb with a residual vessel lumen of 1 mm and a native vessel lumen diameter of 5 mm consistent with greater than 80% stenosis according to NASCET criteria. Tortuosity of the extracranial right internal carotid artery at the skull base. Plaque-like calcification of the intracranial segments of the right internal carotid artery. The right  carotid terminus is normal. The visualized branches of the right anterior and middle cerebral arteries are normal. Severe atheromatous disease of the left carotid bulb extending into the left internal carotid artery with a minimal residual vessel lumen diameter of approximately 2 mm and a mid vessel lumen diameter measured more distally of 5 mm consistent with approximately 70% stenosis according to NASCET criteria. Tortuosity of the extracranial segments of the  internal carotid artery at the skull base. Plaque-like atheromatous disease involving the intracranial segments of the left internal carotid artery. The left carotid terminus is normal. The visualized branches of the left anterior and middle cerebral arteries are normal. Stable vascular malformation of the lateral left frontal lobe. This is nonspecific and may represent a small arteriovenous shunt. Both vertebral arteries arise from the subclavian arteries. The left vertebral artery is dominant. Mild atheromatous disease of the vertebral arteries. The vertebral arteries supply the basilar artery. The basilar artery and basilar artery tip are normal. The basilar artery terminates in bilateral posterior cerebral arteries. No abnormal intracranial enhancement. No intracranial mass. Bilateral lens replacements. The paranasal sinuses are clear. The mastoid air cells are aerated. The nasopharynx is clear. No cervical adenopathy. The thyroid gland appears normal. The lung apices are clear. Degenerative changes of the cervical spine     No vessel occlusion. No intracranial infarct. There is approximately 80% stenosis of the proximal right internal carotid artery and approximately 70% stenosis of the proximal left internal carotid artery according to NASCET criteria. Electronically Signed: Anthony Dumont MD  2/8/2024 7:24 PM EST  Workstation ID: [id number]    CT Head Without Contrast Stroke Protocol    Result Date: 2/8/2024  CT HEAD WO CONTRAST STROKE PROTOCOL Date of Exam: 2/8/2024 6:56 PM EST Indication: Neuro deficit, acute, stroke suspected Neuro deficit, acute stroke suspected. Comparison: Head CT 2/20/2021, brain MRI 5/23/2023. Technique: Axial CT images were obtained of the head without contrast administration.  Reconstructed coronal images were also obtained. Automated exposure control and iterative construction methods were used. Scan Time: 18:52 Results discussed with the stroke team via telephone by Omid French MD  at 18:59. Findings: Remote infarcts in the left corona radiata/left basal ganglia and right slava.No evidence of acute intracranial hemorrhage or mass effect. No extra-axial collection. The gray white matter differentiation is preserved. Ventricles and sulci are symmetric. Mild parenchymal atrophy. The mastoid air cells and paranasal sinuses are well aerated. Globes and extraocular muscles are unremarkable. No acute or suspicious osseous abnormality. Soft tissues within normal limits.     Impression: No evidence of acute intracranial abnormality. Similar chronic findings, including remote infarcts in the left corona radiata/left basal ganglia and left slava. Mild parenchymal atrophy. Electronically Signed: Omid French MD  2/8/2024 7:13 PM EST  Workstation ID: [id number]     Results for orders placed during the hospital encounter of 02/08/24    Duplex Carotid Ultrasound CAR    Interpretation Summary    Right internal carotid artery demonstrates a 50-69% stenosis.    Left internal carotid artery demonstrates a less than 50% stenosis.      Results for orders placed during the hospital encounter of 02/08/24    Duplex Carotid Ultrasound CAR    Interpretation Summary    Right internal carotid artery demonstrates a 50-69% stenosis.    Left internal carotid artery demonstrates a less than 50% stenosis.      Results for orders placed during the hospital encounter of 02/08/24    Adult Transthoracic Echo Complete W/ Cont if Necessary Per Protocol (With Agitated Saline)    Interpretation Summary    Left ventricular systolic function is normal. Calculated left ventricular EF = 59.2% Left ventricular ejection fraction appears to be 61 - 65%.    Saline test results are negative for right to left atrial level shunt.    Estimated right ventricular systolic pressure from tricuspid regurgitation is normal (<35 mmHg). Calculated right ventricular systolic pressure from tricuspid regurgitation is 21 mmHg.      Plan for Follow-up of  "Pending Labs/Results:   Pending Labs       Order Current Status    Blood Culture - Blood, Hand, Left Preliminary result    Blood Culture - Blood, Hand, Right Preliminary result          Discharge Details        Discharge Medications        Continue These Medications        Instructions Start Date   Insulin Pen Needle 31G X 6 MM misc   1 each, Does not apply      Insulin Syringe-Needle U-100 31G X 5/16\" 1 ML misc   1 each by Does not apply route daily      Insulin Syringe-Needle U-100 30G X 5/16\" 0.5 ML misc   1 each by Does not apply route daily      B-D INS SYRINGE 0.5CC/31GX5/16 31G X 5/16\" 0.5 ML misc  Generic drug: Insulin Syringe-Needle U-100   use as directed once daily             ASK your doctor about these medications        Instructions Start Date   Anoro Ellipta 62.5-25 MCG/INH aerosol powder  inhaler  Generic drug: Umeclidinium-Vilanterol   1 puff, Inhalation, As Needed      aspirin 81 MG chewable tablet   81 mg, Oral, Daily      bisacodyl 5 MG EC tablet  Commonly known as: DULCOLAX   5 mg, Oral, Daily PRN      empagliflozin 10 MG tablet tablet  Commonly known as: Jardiance   10 mg, Oral, Daily      FeroSul 325 (65 Fe) MG tablet  Generic drug: ferrous sulfate   325 mg, Oral, Daily With Breakfast      furosemide 20 MG tablet  Commonly known as: LASIX   20 mg, Oral, Daily PRN, swelling      HYDROcodone-acetaminophen 5-325 MG per tablet  Commonly known as: NORCO   1 tablet, Oral, Every 8 Hours PRN      lactulose 10 GM/15ML solution  Commonly known as: CHRONULAC   10 g, Oral, Daily      linaclotide 290 MCG capsule capsule  Commonly known as: LINZESS   290 mcg, Oral, Every Morning Before Breakfast      linagliptin 5 MG tablet tablet  Commonly known as: TRADJENTA   5 mg, Oral, Daily      metFORMIN 1000 MG tablet  Commonly known as: GLUCOPHAGE   1,000 mg, Oral, 2 Times Daily With Meals      metoprolol succinate XL 25 MG 24 hr tablet  Commonly known as: TOPROL-XL   25 mg, Oral, Daily      pantoprazole 40 MG EC " tablet  Commonly known as: PROTONIX   TAKE 1 TABLET BY MOUTH TWICE DAILY BEFORE MEALS      rosuvastatin 20 MG tablet  Commonly known as: CRESTOR   20 mg, Oral, Daily      vitamin B-12 1000 MCG tablet  Commonly known as: CYANOCOBALAMIN   1,000 mcg, Oral, Daily               Allergies   Allergen Reactions    Codeine GI Intolerance         Discharge Disposition:      Diet:  Hospital:  Diet Order   Procedures    Diet: Cardiac Diets, Diabetic Diets; Healthy Heart (2-3 Na+); Consistent Carbohydrate; Texture: Regular Texture (IDDSI 7); Fluid Consistency: Thin (IDDSI 0)            Activity:      Restrictions or Other Recommendations:         CODE STATUS:    Code Status and Medical Interventions:   Ordered at: 02/09/24 0017     Medical Intervention Limits:    NO intubation (DNI)     Level Of Support Discussed With:    Patient     Code Status (Patient has no pulse and is not breathing):    No CPR (Do Not Attempt to Resuscitate)     Medical Interventions (Patient has pulse or is breathing):    Limited Support       No future appointments.              Roselyn Dc MD  02/10/24      Time Spent on Discharge:  I spent  45  minutes on this discharge activity which included: face-to-face encounter with the patient, reviewing the data in the system, coordination of the care with the nursing staff as well as consultants, documentation, and entering orders.

## 2024-02-12 NOTE — CONSULTS
"Not a candidate for the stroke / diabetes class as \"MRI of the brain showed no acute infarct, hemorrhage or mass \" per d/c notes  "

## 2024-02-13 ENCOUNTER — CARE COORDINATION (OUTPATIENT)
Dept: PRIMARY CARE CLINIC | Age: 74
End: 2024-02-13

## 2024-02-13 RX ORDER — CLOPIDOGREL BISULFATE 75 MG/1
75 TABLET ORAL DAILY
COMMUNITY

## 2024-02-13 NOTE — CARE COORDINATION
administration of home medications. Medications reviewed, 1111F entered: no    Was patient discharged with a pulse oximeter? no    Non-face-to-face services provided:  Scheduled appointment with PCP-Daily  Obtained and reviewed discharge summary and/or continuity of care documents    Offered patient enrollment in the Remote Patient Monitoring (RPM) program for in-home monitoring: Patient is not eligible for RPM program.    Care Transitions 24 Hour Call    Schedule Follow Up Appointment with PCP: Completed  Do you have a copy of your discharge instructions?: Yes  Do you have all of your prescriptions and are they filled?: Yes (Comment: per daughter)  Have you been contacted by a [facility] Pharmacist?: No  Have you scheduled your follow up appointment?: Yes  How are you going to get to your appointment?: Car - family or friend to transport  Do you feel like you have everything you need to keep you well at home?: Yes  Care Transitions Interventions         Discussed follow-up appointments. If no appointment was previously scheduled, appointment scheduling offered: Yes.   Is follow up appointment scheduled within 7 days of discharge? Yes.    Follow Up  Future Appointments   Date Time Provider Department Center   2/14/2024  2:45 PM Lazarus Ambrosio MD [facility]   4/11/2024  2:45 PM Lazarus Ambrosio MD [facility]       Care Transition Nurse provided contact information.  Plan for follow-up call in 5-7 days based on severity of symptoms and risk factors.  Plan for next call: self management-lyudmilas    Jaswant Nam RN

## 2024-02-14 ENCOUNTER — READMISSION MANAGEMENT (OUTPATIENT)
Dept: CALL CENTER | Facility: HOSPITAL | Age: 74
End: 2024-02-14
Payer: MEDICARE

## 2024-02-14 ENCOUNTER — OFFICE VISIT (OUTPATIENT)
Dept: PRIMARY CARE CLINIC | Age: 74
End: 2024-02-14

## 2024-02-14 VITALS
OXYGEN SATURATION: 98 % | BODY MASS INDEX: 24.48 KG/M2 | HEART RATE: 53 BPM | RESPIRATION RATE: 18 BRPM | DIASTOLIC BLOOD PRESSURE: 56 MMHG | WEIGHT: 138.2 LBS | SYSTOLIC BLOOD PRESSURE: 132 MMHG

## 2024-02-14 DIAGNOSIS — I65.23 BILATERAL CAROTID ARTERY STENOSIS: ICD-10-CM

## 2024-02-14 DIAGNOSIS — Z79.4 TYPE 2 DIABETES MELLITUS WITH DIABETIC NEUROPATHY, WITH LONG-TERM CURRENT USE OF INSULIN (HCC): ICD-10-CM

## 2024-02-14 DIAGNOSIS — R55 SYNCOPE, UNSPECIFIED SYNCOPE TYPE: Primary | ICD-10-CM

## 2024-02-14 DIAGNOSIS — K74.60 CIRRHOSIS OF LIVER WITHOUT ASCITES, UNSPECIFIED HEPATIC CIRRHOSIS TYPE (HCC): ICD-10-CM

## 2024-02-14 DIAGNOSIS — D50.9 IRON DEFICIENCY ANEMIA, UNSPECIFIED IRON DEFICIENCY ANEMIA TYPE: ICD-10-CM

## 2024-02-14 DIAGNOSIS — I10 ESSENTIAL HYPERTENSION: ICD-10-CM

## 2024-02-14 DIAGNOSIS — Z09 HOSPITAL DISCHARGE FOLLOW-UP: ICD-10-CM

## 2024-02-14 DIAGNOSIS — E11.40 TYPE 2 DIABETES MELLITUS WITH DIABETIC NEUROPATHY, WITH LONG-TERM CURRENT USE OF INSULIN (HCC): ICD-10-CM

## 2024-02-14 LAB
BACTERIA SPEC AEROBE CULT: NORMAL
BACTERIA SPEC AEROBE CULT: NORMAL
QT INTERVAL: 422 MS
QTC INTERVAL: 445 MS

## 2024-02-14 RX ORDER — INSULIN LISPRO 100 [IU]/ML
10 INJECTION, SOLUTION INTRAVENOUS; SUBCUTANEOUS
Qty: 5 ADJUSTABLE DOSE PRE-FILLED PEN SYRINGE | Refills: 1 | Status: SHIPPED | OUTPATIENT
Start: 2024-02-14

## 2024-02-14 NOTE — PROGRESS NOTES
Chief Complaint   Patient presents with    Follow-Up from Hospital       Have you seen any other physician or provider since your last visit yes - yehuda castellanos    Have you had any other diagnostic tests since your last visit? yes -     Have you changed or stopped any medications since your last visit? yes - started plavix

## 2024-02-14 NOTE — PROGRESS NOTES
Post-Discharge Transitional Care Management Services or Hospital Follow Up    Maude Corrales   YOB: 1950    Date of Office Visit:  2/14/2024  Date of Hospital Admission: 2/8/24  Date of Hospital Discharge: 2/10/24  Readmission Risk Score(high >=14%. Medium >=10%):Readmission Risk Score: 18.5      Care management risk score Rising risk (score 2-5) and Complex Care (Scores >=6): No Risk Score On File     Non face to face  following discharge, date last encounter closed (first attempt may have been earlier):   02/13/2024 02/13/2024    Call initiated 2 business days of discharge: Yes     Patient Active Problem List   Diagnosis    CAD (coronary artery disease)    COPD (chronic obstructive pulmonary disease) (HCC)    H/O: CVA (cerebrovascular accident)    Tobacco abuse    Vitamin B12 deficiency    Essential hypertension    Type 2 diabetes mellitus with diabetic neuropathy (HCC)    History of colon polyps    Status post bilateral knee replacements    Degenerative disc disease, lumbar    Family history of breast cancer    Abnormal CT of the chest    Thoracic aortic aneurysm without rupture (HCC)    Anxiety    Anemia due to stage 3 chronic kidney disease (HCC)    MAGDALENA (iron deficiency anemia)    Stage 3a chronic kidney disease (HCC)    Cirrhosis (HCC)    AVM (arteriovenous malformation) of small bowel, acquired    Declining functional status    Personal history of nicotine dependence    General weakness    Pancytopenia (HCC)    Carotid disease, bilateral (HCC)    Diabetes mellitus due to underlying condition, with long-term current use of insulin (HCC)    Hypokalemia    Anemia    PAC (premature atrial contraction)    Intractable back pain    Generalized weakness    Abnormal urinalysis       Allergies   Allergen Reactions    Codeine        Medications listed as ordered at the time of discharge from hospital     Medication List            Accurate as of February 14, 2024  3:27 PM. If you have any questions,

## 2024-02-15 RX ORDER — GLUCOSAMINE HCL/CHONDROITIN SU 500-400 MG
CAPSULE ORAL
Qty: 100 STRIP | Refills: 5 | Status: SHIPPED | OUTPATIENT
Start: 2024-02-15

## 2024-02-16 RX ORDER — EMPAGLIFLOZIN 10 MG/1
TABLET, FILM COATED ORAL DAILY
Qty: 30 TABLET | Refills: 3 | Status: SHIPPED | OUTPATIENT
Start: 2024-02-16

## 2024-02-22 ENCOUNTER — TELEPHONE (OUTPATIENT)
Dept: PRIMARY CARE CLINIC | Age: 74
End: 2024-02-22

## 2024-02-22 NOTE — TELEPHONE ENCOUNTER
Per pt taking 10 units TID after meals informed MK he said to have her take 35 units levemir now and monitor and call in the morning with readings pt leo.

## 2024-02-22 NOTE — TELEPHONE ENCOUNTER
There is no 20 units on the sliding scale to start with.  May need to be in the hospital to get her regulated if she is agreeable.  If does not want to go to the hospital then let me know and I can come up with a plan

## 2024-02-22 NOTE — TELEPHONE ENCOUNTER
Pt called stating that her insulin is not controlling her blood sugar \"taking fast acting 3 times daily like you told her last\" Is not taking the Levemir at night. Blood glucose over 500 today, and was over 400 yesterday. Does not want to go to ER

## 2024-02-22 NOTE — TELEPHONE ENCOUNTER
Pt states she does not have AVS, but states you told her to do sliding scale with meals \"took 20 units at 9 this morning\"    Verbalized that she was taking 50 units of Levemir at night until 2/14 visit. \"Did not know she was supposed to be taking 2 different kinds of insulin\"     Going to recheck and take 10 more units of Humalog if still over 500    Could not reach daughter, but left message to return call

## 2024-02-28 DIAGNOSIS — I25.119 CORONARY ARTERY DISEASE INVOLVING NATIVE CORONARY ARTERY OF NATIVE HEART WITH ANGINA PECTORIS: ICD-10-CM

## 2024-02-28 RX ORDER — METOPROLOL SUCCINATE 25 MG/1
25 TABLET, EXTENDED RELEASE ORAL DAILY
Qty: 90 TABLET | Refills: 3 | Status: SHIPPED | OUTPATIENT
Start: 2024-02-28

## 2024-03-02 ENCOUNTER — HOSPITAL ENCOUNTER (OUTPATIENT)
Facility: HOSPITAL | Age: 74
Setting detail: OBSERVATION
Discharge: HOME | End: 2024-03-04
Attending: HOSPITALIST | Admitting: INTERNAL MEDICINE
Payer: MEDICARE

## 2024-03-02 ENCOUNTER — APPOINTMENT (OUTPATIENT)
Dept: GENERAL RADIOLOGY | Facility: HOSPITAL | Age: 74
End: 2024-03-02
Payer: MEDICARE

## 2024-03-02 DIAGNOSIS — D50.9 IRON DEFICIENCY ANEMIA, UNSPECIFIED IRON DEFICIENCY ANEMIA TYPE: ICD-10-CM

## 2024-03-02 DIAGNOSIS — E11.65 TYPE 2 DIABETES MELLITUS WITH HYPERGLYCEMIA, WITHOUT LONG-TERM CURRENT USE OF INSULIN (HCC): Primary | ICD-10-CM

## 2024-03-02 DIAGNOSIS — E87.6 HYPOKALEMIA: ICD-10-CM

## 2024-03-02 DIAGNOSIS — R73.9 HYPERGLYCEMIA: ICD-10-CM

## 2024-03-02 LAB
ACETONE SERPL QL SCN: NEGATIVE
ALBUMIN SERPL-MCNC: 3.7 G/DL (ref 3.4–4.8)
ALBUMIN/GLOB SERPL: 1.5 {RATIO} (ref 0.8–2)
ALP SERPL-CCNC: 114 U/L (ref 25–100)
ALT SERPL-CCNC: 27 U/L (ref 4–36)
ANION GAP SERPL CALCULATED.3IONS-SCNC: 14 MMOL/L (ref 3–16)
AST SERPL-CCNC: 22 U/L (ref 8–33)
BACTERIA URNS QL MICRO: ABNORMAL /HPF
BASE EXCESS BLDV CALC-SCNC: -0.3 MMOL/L (ref -3–3)
BASOPHILS # BLD: 0 K/UL (ref 0–0.1)
BASOPHILS NFR BLD: 0.4 %
BILIRUB SERPL-MCNC: 0.3 MG/DL (ref 0.3–1.2)
BILIRUB UR QL STRIP.AUTO: NEGATIVE
BUN SERPL-MCNC: 21 MG/DL (ref 6–20)
CALCIUM SERPL-MCNC: 9.3 MG/DL (ref 8.5–10.5)
CHLORIDE SERPL-SCNC: 95 MMOL/L (ref 98–107)
CHP ED QC CHECK: NORMAL
CLARITY UR: ABNORMAL
CO2 BLDV-SCNC: 25 MMOL/L
CO2 SERPL-SCNC: 23 MMOL/L (ref 20–30)
COLOR UR: YELLOW
CREAT SERPL-MCNC: 1.2 MG/DL (ref 0.4–1.2)
EOSINOPHIL # BLD: 0.1 K/UL (ref 0–0.4)
EOSINOPHIL NFR BLD: 1.3 %
EPI CELLS #/AREA URNS HPF: ABNORMAL /HPF (ref 0–5)
ERYTHROCYTE [DISTWIDTH] IN BLOOD BY AUTOMATED COUNT: 14.4 % (ref 11–16)
FLUAV AG NPH QL: NEGATIVE
FLUBV AG NPH QL: NEGATIVE
GFR SERPLBLD CREATININE-BSD FMLA CKD-EPI: 48 ML/MIN/{1.73_M2}
GLOBULIN SER CALC-MCNC: 2.4 G/DL
GLUCOSE BLD-MCNC: 148 MG/DL (ref 74–106)
GLUCOSE BLD-MCNC: 244 MG/DL
GLUCOSE BLD-MCNC: 244 MG/DL (ref 74–106)
GLUCOSE BLD-MCNC: 494 MG/DL (ref 74–106)
GLUCOSE SERPL-MCNC: 439 MG/DL (ref 74–106)
GLUCOSE UR STRIP.AUTO-MCNC: >=1000 MG/DL
HCO3 BLDV-SCNC: 23.7 MMOL/L (ref 23–29)
HCT VFR BLD AUTO: 26.3 % (ref 37–47)
HGB BLD-MCNC: 8.2 G/DL (ref 11.5–16.5)
HGB UR QL STRIP.AUTO: NEGATIVE
IMM GRANULOCYTES # BLD: 0 K/UL
IMM GRANULOCYTES NFR BLD: 0.2 % (ref 0–5)
KETONES UR STRIP.AUTO-MCNC: NEGATIVE MG/DL
LEUKOCYTE ESTERASE UR QL STRIP.AUTO: NEGATIVE
LYMPHOCYTES # BLD: 0.9 K/UL (ref 1.5–4)
LYMPHOCYTES NFR BLD: 19.8 %
MCH RBC QN AUTO: 25.9 PG (ref 27–32)
MCHC RBC AUTO-ENTMCNC: 31.2 G/DL (ref 31–35)
MCV RBC AUTO: 83 FL (ref 80–100)
MONOCYTES # BLD: 0.4 K/UL (ref 0.2–0.8)
MONOCYTES NFR BLD: 8.9 %
NEUTROPHILS # BLD: 3.3 K/UL (ref 2–7.5)
NEUTS SEG NFR BLD: 69.4 %
NITRITE UR QL STRIP.AUTO: NEGATIVE
O2 THERAPY: ABNORMAL
PCO2 BLDV: 35.4 MMHG (ref 40–50)
PERFORMED ON: ABNORMAL
PH BLDV: 7.44 [PH] (ref 7.35–7.45)
PH UR STRIP.AUTO: 5.5 [PH] (ref 5–8)
PLATELET # BLD AUTO: 144 K/UL (ref 150–400)
PMV BLD AUTO: 11.4 FL (ref 6–10)
PO2 BLDV: 55.3 MMHG (ref 25–40)
POTASSIUM SERPL-SCNC: 3.6 MMOL/L (ref 3.4–5.1)
PROT SERPL-MCNC: 6.1 G/DL (ref 6.4–8.3)
PROT UR STRIP.AUTO-MCNC: 30 MG/DL
RBC # BLD AUTO: 3.17 M/UL (ref 3.8–5.8)
RBC #/AREA URNS HPF: ABNORMAL /HPF (ref 0–4)
SAO2 % BLDV: 88 %
SARS-COV-2 RDRP RESP QL NAA+PROBE: NOT DETECTED
SODIUM SERPL-SCNC: 132 MMOL/L (ref 136–145)
SP GR UR STRIP.AUTO: <=1.005 (ref 1–1.03)
TROPONIN, HIGH SENSITIVITY: 22 NG/L (ref 0–14)
TROPONIN, HIGH SENSITIVITY: 22 NG/L (ref 0–14)
UA COMPLETE W REFLEX CULTURE PNL UR: ABNORMAL
UA DIPSTICK W REFLEX MICRO PNL UR: YES
URN SPEC COLLECT METH UR: ABNORMAL
UROBILINOGEN UR STRIP-ACNC: 0.2 E.U./DL
WBC # BLD AUTO: 4.7 K/UL (ref 4–11)
WBC #/AREA URNS HPF: ABNORMAL /HPF (ref 0–5)

## 2024-03-02 PROCEDURE — 71045 X-RAY EXAM CHEST 1 VIEW: CPT

## 2024-03-02 PROCEDURE — 81001 URINALYSIS AUTO W/SCOPE: CPT

## 2024-03-02 PROCEDURE — 82009 KETONE BODYS QUAL: CPT

## 2024-03-02 PROCEDURE — 87804 INFLUENZA ASSAY W/OPTIC: CPT

## 2024-03-02 PROCEDURE — 96366 THER/PROPH/DIAG IV INF ADDON: CPT

## 2024-03-02 PROCEDURE — 82803 BLOOD GASES ANY COMBINATION: CPT

## 2024-03-02 PROCEDURE — 96365 THER/PROPH/DIAG IV INF INIT: CPT

## 2024-03-02 PROCEDURE — 93005 ELECTROCARDIOGRAM TRACING: CPT

## 2024-03-02 PROCEDURE — G0378 HOSPITAL OBSERVATION PER HR: HCPCS

## 2024-03-02 PROCEDURE — 87635 SARS-COV-2 COVID-19 AMP PRB: CPT

## 2024-03-02 PROCEDURE — 36415 COLL VENOUS BLD VENIPUNCTURE: CPT

## 2024-03-02 PROCEDURE — 2580000003 HC RX 258: Performed by: HOSPITALIST

## 2024-03-02 PROCEDURE — 99285 EMERGENCY DEPT VISIT HI MDM: CPT

## 2024-03-02 PROCEDURE — 80053 COMPREHEN METABOLIC PANEL: CPT

## 2024-03-02 PROCEDURE — 6370000000 HC RX 637 (ALT 250 FOR IP): Performed by: HOSPITALIST

## 2024-03-02 PROCEDURE — 85025 COMPLETE CBC W/AUTO DIFF WBC: CPT

## 2024-03-02 PROCEDURE — 84484 ASSAY OF TROPONIN QUANT: CPT

## 2024-03-02 RX ORDER — DEXTROSE MONOHYDRATE 100 MG/ML
INJECTION, SOLUTION INTRAVENOUS CONTINUOUS PRN
Status: DISCONTINUED | OUTPATIENT
Start: 2024-03-02 | End: 2024-03-03

## 2024-03-02 RX ORDER — 0.9 % SODIUM CHLORIDE 0.9 %
1000 INTRAVENOUS SOLUTION INTRAVENOUS ONCE
Status: COMPLETED | OUTPATIENT
Start: 2024-03-02 | End: 2024-03-02

## 2024-03-02 RX ORDER — IBUPROFEN 600 MG/1
1 TABLET ORAL PRN
Status: DISCONTINUED | OUTPATIENT
Start: 2024-03-02 | End: 2024-03-03

## 2024-03-02 RX ADMIN — SODIUM CHLORIDE 1000 ML: 9 INJECTION, SOLUTION INTRAVENOUS at 17:46

## 2024-03-02 RX ADMIN — INSULIN HUMAN 8.6 UNITS/HR: 1 INJECTION, SOLUTION INTRAVENOUS at 17:44

## 2024-03-02 ASSESSMENT — PAIN SCALES - GENERAL: PAINLEVEL_OUTOF10: 0

## 2024-03-02 NOTE — ED PROVIDER NOTES
[facility]  EMERGENCY DEPARTMENT ENCOUNTER        Pt Name: Maude Corrales  MRN: [id number]  Birthdate 1950  Date of evaluation: 3/2/2024  Provider: Vadim King DO  PCP: Lazarus Ambrosio MD  Note Started: 4:39 PM EST 3/2/24    CHIEF COMPLAINT       Chief Complaint   Patient presents with    Hyperglycemia     States it was over 600 today, prior to arrival. Keeps going up and down and up and down. Took an insulin shot at home prior to arrival.     Dizziness     Feels like she is \"going to pass out\".        HISTORY OF PRESENT ILLNESS: 1 or more Elements     History from : Patient    Limitations to history : None    Maude Corrales is a 74 y.o. female who presents to the emergency department for hyperglycemia.  This was the patient's initial complaint however discussing findings with her she does have multiple more complaints.  She states her blood sugars been running elevated for the past several days.  She advises she has been using her Levemir at night she is also been using her fast acting 3 times a day she did contact her primary care provider 2 days ago but did not want to come to the emergency department that time however she presents today because of her hyperglycemia.  Fingerstick glucose here was 494.  Patient states that she has had bodyaches and cough for at least the past week.  She states it is productive with clear to white sputum.  She has had headache.  She denies any earache or sore throat she has had nasal drainage.  She denies any loss of taste or smell.  She states she does feel short of breath at times because of the cough she also has chest pain but she states that is how she knows that she has high blood sugar because every time her blood sugar is elevated she has chest discomfort.  She denies nausea vomiting or diarrhea.  She also admits to dysuria and increased urinary frequency.  Patient think she could possibly urinary tract infection.  Positive for

## 2024-03-03 LAB
AMMONIA PLAS-SCNC: 25 MCG/DL (ref 19–87)
ANION GAP SERPL CALCULATED.3IONS-SCNC: 11 MMOL/L (ref 3–16)
BASOPHILS # BLD: 0 K/UL (ref 0–0.1)
BASOPHILS NFR BLD: 0.8 %
BUN SERPL-MCNC: 22 MG/DL (ref 6–20)
CALCIUM SERPL-MCNC: 9.1 MG/DL (ref 8.5–10.5)
CHLORIDE SERPL-SCNC: 101 MMOL/L (ref 98–107)
CO2 SERPL-SCNC: 25 MMOL/L (ref 20–30)
EOSINOPHIL # BLD: 0.1 K/UL (ref 0–0.4)
EOSINOPHIL NFR BLD: 1.8 %
ERYTHROCYTE [DISTWIDTH] IN BLOOD BY AUTOMATED COUNT: 14.4 % (ref 11–16)
FOLATE SERPL-MCNC: 7.66 NG/ML
GFR SERPLBLD CREATININE-BSD FMLA CKD-EPI: 53 ML/MIN/{1.73_M2}
GLUCOSE BLD-MCNC: 228 MG/DL (ref 74–106)
GLUCOSE BLD-MCNC: 234 MG/DL (ref 74–106)
GLUCOSE BLD-MCNC: 301 MG/DL (ref 74–106)
GLUCOSE BLD-MCNC: 301 MG/DL (ref 74–106)
GLUCOSE BLD-MCNC: 382 MG/DL (ref 74–106)
GLUCOSE BLD-MCNC: 405 MG/DL (ref 74–106)
GLUCOSE SERPL-MCNC: 297 MG/DL (ref 74–106)
HCT VFR BLD AUTO: 27.4 % (ref 37–47)
HGB BLD-MCNC: 8.7 G/DL (ref 11.5–16.5)
IMM GRANULOCYTES # BLD: 0 K/UL
IMM GRANULOCYTES NFR BLD: 0.5 % (ref 0–5)
IRON SATN MFR SERPL: 6 % (ref 15–50)
IRON SERPL-MCNC: 20 UG/DL (ref 37–145)
LYMPHOCYTES # BLD: 0.9 K/UL (ref 1.5–4)
LYMPHOCYTES NFR BLD: 22.6 %
MAGNESIUM SERPL-MCNC: 2.2 MG/DL (ref 1.7–2.4)
MCH RBC QN AUTO: 26.4 PG (ref 27–32)
MCHC RBC AUTO-ENTMCNC: 31.8 G/DL (ref 31–35)
MCV RBC AUTO: 83 FL (ref 80–100)
MONOCYTES # BLD: 0.4 K/UL (ref 0.2–0.8)
MONOCYTES NFR BLD: 9.9 %
NEUTROPHILS # BLD: 2.5 K/UL (ref 2–7.5)
NEUTS SEG NFR BLD: 64.4 %
PERFORMED ON: ABNORMAL
PLATELET # BLD AUTO: 164 K/UL (ref 150–400)
PMV BLD AUTO: 12.4 FL (ref 6–10)
RBC # BLD AUTO: 3.3 M/UL (ref 3.8–5.8)
SODIUM SERPL-SCNC: 137 MMOL/L (ref 136–145)
TIBC SERPL-MCNC: 349 UG/DL (ref 250–450)
VIT B12 SERPL-MCNC: 554 PG/ML (ref 211–911)
WBC # BLD AUTO: 3.9 K/UL (ref 4–11)

## 2024-03-03 PROCEDURE — 99222 1ST HOSP IP/OBS MODERATE 55: CPT | Performed by: PHYSICIAN ASSISTANT

## 2024-03-03 PROCEDURE — 82140 ASSAY OF AMMONIA: CPT

## 2024-03-03 PROCEDURE — 2580000003 HC RX 258: Performed by: INTERNAL MEDICINE

## 2024-03-03 PROCEDURE — 83735 ASSAY OF MAGNESIUM: CPT

## 2024-03-03 PROCEDURE — G0378 HOSPITAL OBSERVATION PER HR: HCPCS

## 2024-03-03 PROCEDURE — 6370000000 HC RX 637 (ALT 250 FOR IP): Performed by: PHYSICIAN ASSISTANT

## 2024-03-03 PROCEDURE — 82746 ASSAY OF FOLIC ACID SERUM: CPT

## 2024-03-03 PROCEDURE — 36415 COLL VENOUS BLD VENIPUNCTURE: CPT

## 2024-03-03 PROCEDURE — 6370000000 HC RX 637 (ALT 250 FOR IP): Performed by: INTERNAL MEDICINE

## 2024-03-03 PROCEDURE — 82607 VITAMIN B-12: CPT

## 2024-03-03 PROCEDURE — 6360000002 HC RX W HCPCS: Performed by: PHYSICIAN ASSISTANT

## 2024-03-03 PROCEDURE — 80048 BASIC METABOLIC PNL TOTAL CA: CPT

## 2024-03-03 PROCEDURE — 85025 COMPLETE CBC W/AUTO DIFF WBC: CPT

## 2024-03-03 PROCEDURE — 83550 IRON BINDING TEST: CPT

## 2024-03-03 PROCEDURE — 83540 ASSAY OF IRON: CPT

## 2024-03-03 PROCEDURE — 82728 ASSAY OF FERRITIN: CPT

## 2024-03-03 PROCEDURE — 96367 TX/PROPH/DG ADDL SEQ IV INF: CPT

## 2024-03-03 RX ORDER — ALOGLIPTIN 12.5 MG/1
12.5 TABLET, FILM COATED ORAL DAILY
Status: DISCONTINUED | OUTPATIENT
Start: 2024-03-04 | End: 2024-03-04 | Stop reason: HOSPADM

## 2024-03-03 RX ORDER — ONDANSETRON 4 MG/1
4 TABLET, ORALLY DISINTEGRATING ORAL EVERY 8 HOURS PRN
Status: DISCONTINUED | OUTPATIENT
Start: 2024-03-03 | End: 2024-03-04 | Stop reason: HOSPADM

## 2024-03-03 RX ORDER — INSULIN LISPRO 100 [IU]/ML
0-4 INJECTION, SOLUTION INTRAVENOUS; SUBCUTANEOUS NIGHTLY
Status: DISCONTINUED | OUTPATIENT
Start: 2024-03-03 | End: 2024-03-04 | Stop reason: HOSPADM

## 2024-03-03 RX ORDER — SODIUM CHLORIDE 0.9 % (FLUSH) 0.9 %
5-40 SYRINGE (ML) INJECTION PRN
Status: DISCONTINUED | OUTPATIENT
Start: 2024-03-03 | End: 2024-03-04 | Stop reason: HOSPADM

## 2024-03-03 RX ORDER — SODIUM CHLORIDE 0.9 % (FLUSH) 0.9 %
5-40 SYRINGE (ML) INJECTION EVERY 12 HOURS SCHEDULED
Status: DISCONTINUED | OUTPATIENT
Start: 2024-03-03 | End: 2024-03-04 | Stop reason: HOSPADM

## 2024-03-03 RX ORDER — INSULIN GLARGINE 100 [IU]/ML
45 INJECTION, SOLUTION SUBCUTANEOUS NIGHTLY
Status: DISCONTINUED | OUTPATIENT
Start: 2024-03-03 | End: 2024-03-04 | Stop reason: HOSPADM

## 2024-03-03 RX ORDER — POTASSIUM CHLORIDE 750 MG/1
40 CAPSULE, EXTENDED RELEASE ORAL ONCE
Status: COMPLETED | OUTPATIENT
Start: 2024-03-03 | End: 2024-03-03

## 2024-03-03 RX ORDER — POLYETHYLENE GLYCOL 3350 17 G/17G
17 POWDER, FOR SOLUTION ORAL DAILY PRN
Status: DISCONTINUED | OUTPATIENT
Start: 2024-03-03 | End: 2024-03-04 | Stop reason: HOSPADM

## 2024-03-03 RX ORDER — SUCRALFATE 1 G/1
1 TABLET ORAL EVERY 6 HOURS SCHEDULED
Status: DISCONTINUED | OUTPATIENT
Start: 2024-03-03 | End: 2024-03-04 | Stop reason: HOSPADM

## 2024-03-03 RX ORDER — PANTOPRAZOLE SODIUM 40 MG/1
40 TABLET, DELAYED RELEASE ORAL
Status: DISCONTINUED | OUTPATIENT
Start: 2024-03-03 | End: 2024-03-04 | Stop reason: HOSPADM

## 2024-03-03 RX ORDER — ACETAMINOPHEN 650 MG/1
650 SUPPOSITORY RECTAL EVERY 6 HOURS PRN
Status: DISCONTINUED | OUTPATIENT
Start: 2024-03-03 | End: 2024-03-04 | Stop reason: HOSPADM

## 2024-03-03 RX ORDER — DEXTROSE MONOHYDRATE 100 MG/ML
INJECTION, SOLUTION INTRAVENOUS CONTINUOUS PRN
Status: DISCONTINUED | OUTPATIENT
Start: 2024-03-03 | End: 2024-03-04 | Stop reason: HOSPADM

## 2024-03-03 RX ORDER — SODIUM CHLORIDE 9 MG/ML
INJECTION, SOLUTION INTRAVENOUS PRN
Status: DISCONTINUED | OUTPATIENT
Start: 2024-03-03 | End: 2024-03-04 | Stop reason: HOSPADM

## 2024-03-03 RX ORDER — INSULIN LISPRO 100 [IU]/ML
0-4 INJECTION, SOLUTION INTRAVENOUS; SUBCUTANEOUS EVERY 4 HOURS
Status: DISCONTINUED | OUTPATIENT
Start: 2024-03-03 | End: 2024-03-04 | Stop reason: HOSPADM

## 2024-03-03 RX ORDER — ONDANSETRON 2 MG/ML
4 INJECTION INTRAMUSCULAR; INTRAVENOUS EVERY 6 HOURS PRN
Status: DISCONTINUED | OUTPATIENT
Start: 2024-03-03 | End: 2024-03-04 | Stop reason: HOSPADM

## 2024-03-03 RX ORDER — POTASSIUM CHLORIDE 750 MG/1
10 TABLET, EXTENDED RELEASE ORAL DAILY
Status: DISCONTINUED | OUTPATIENT
Start: 2024-03-03 | End: 2024-03-04 | Stop reason: HOSPADM

## 2024-03-03 RX ORDER — INSULIN LISPRO 100 [IU]/ML
4 INJECTION, SOLUTION INTRAVENOUS; SUBCUTANEOUS
Status: DISCONTINUED | OUTPATIENT
Start: 2024-03-03 | End: 2024-03-04

## 2024-03-03 RX ORDER — POTASSIUM CHLORIDE 20 MEQ/1
40 TABLET, EXTENDED RELEASE ORAL PRN
Status: DISCONTINUED | OUTPATIENT
Start: 2024-03-03 | End: 2024-03-04 | Stop reason: HOSPADM

## 2024-03-03 RX ORDER — MAGNESIUM SULFATE IN WATER 40 MG/ML
2000 INJECTION, SOLUTION INTRAVENOUS PRN
Status: DISCONTINUED | OUTPATIENT
Start: 2024-03-03 | End: 2024-03-04 | Stop reason: HOSPADM

## 2024-03-03 RX ORDER — ALOGLIPTIN 12.5 MG/1
25 TABLET, FILM COATED ORAL DAILY
Status: DISCONTINUED | OUTPATIENT
Start: 2024-03-03 | End: 2024-03-03

## 2024-03-03 RX ORDER — POTASSIUM CHLORIDE 7.45 MG/ML
10 INJECTION INTRAVENOUS PRN
Status: DISCONTINUED | OUTPATIENT
Start: 2024-03-03 | End: 2024-03-04 | Stop reason: HOSPADM

## 2024-03-03 RX ORDER — HYDROCODONE BITARTRATE AND ACETAMINOPHEN 5; 325 MG/1; MG/1
1 TABLET ORAL 2 TIMES DAILY PRN
Status: DISCONTINUED | OUTPATIENT
Start: 2024-03-03 | End: 2024-03-04 | Stop reason: HOSPADM

## 2024-03-03 RX ORDER — IBUPROFEN 600 MG/1
1 TABLET ORAL PRN
Status: DISCONTINUED | OUTPATIENT
Start: 2024-03-03 | End: 2024-03-04 | Stop reason: HOSPADM

## 2024-03-03 RX ORDER — METOPROLOL SUCCINATE 25 MG/1
25 TABLET, EXTENDED RELEASE ORAL DAILY
Status: DISCONTINUED | OUTPATIENT
Start: 2024-03-03 | End: 2024-03-04 | Stop reason: HOSPADM

## 2024-03-03 RX ORDER — ROSUVASTATIN CALCIUM 20 MG/1
20 TABLET, COATED ORAL NIGHTLY
Status: DISCONTINUED | OUTPATIENT
Start: 2024-03-03 | End: 2024-03-04 | Stop reason: HOSPADM

## 2024-03-03 RX ORDER — ACETAMINOPHEN 325 MG/1
650 TABLET ORAL EVERY 6 HOURS PRN
Status: DISCONTINUED | OUTPATIENT
Start: 2024-03-03 | End: 2024-03-04 | Stop reason: HOSPADM

## 2024-03-03 RX ORDER — INSULIN GLARGINE 100 [IU]/ML
40 INJECTION, SOLUTION SUBCUTANEOUS NIGHTLY
Status: DISCONTINUED | OUTPATIENT
Start: 2024-03-03 | End: 2024-03-03

## 2024-03-03 RX ADMIN — INSULIN LISPRO 4 UNITS: 100 INJECTION, SOLUTION INTRAVENOUS; SUBCUTANEOUS at 11:18

## 2024-03-03 RX ADMIN — INSULIN LISPRO 4 UNITS: 100 INJECTION, SOLUTION INTRAVENOUS; SUBCUTANEOUS at 21:53

## 2024-03-03 RX ADMIN — METFORMIN HYDROCHLORIDE 1000 MG: 500 TABLET, FILM COATED ORAL at 08:22

## 2024-03-03 RX ADMIN — METFORMIN HYDROCHLORIDE 1000 MG: 500 TABLET, FILM COATED ORAL at 16:14

## 2024-03-03 RX ADMIN — ROSUVASTATIN CALCIUM 20 MG: 20 TABLET, COATED ORAL at 01:24

## 2024-03-03 RX ADMIN — INSULIN LISPRO 4 UNITS: 100 INJECTION, SOLUTION INTRAVENOUS; SUBCUTANEOUS at 01:23

## 2024-03-03 RX ADMIN — HYDROCODONE BITARTRATE AND ACETAMINOPHEN 1 TABLET: 5; 325 TABLET ORAL at 23:49

## 2024-03-03 RX ADMIN — SODIUM CHLORIDE, PRESERVATIVE FREE 10 ML: 5 INJECTION INTRAVENOUS at 21:53

## 2024-03-03 RX ADMIN — ROSUVASTATIN CALCIUM 20 MG: 20 TABLET, COATED ORAL at 21:48

## 2024-03-03 RX ADMIN — METOPROLOL SUCCINATE 25 MG: 25 TABLET, EXTENDED RELEASE ORAL at 08:23

## 2024-03-03 RX ADMIN — SUCRALFATE 1 G: 1 TABLET ORAL at 16:14

## 2024-03-03 RX ADMIN — SUCRALFATE 1 G: 1 TABLET ORAL at 10:44

## 2024-03-03 RX ADMIN — Medication 300 MG: at 11:22

## 2024-03-03 RX ADMIN — INSULIN LISPRO 3 UNITS: 100 INJECTION, SOLUTION INTRAVENOUS; SUBCUTANEOUS at 06:03

## 2024-03-03 RX ADMIN — PANTOPRAZOLE SODIUM 40 MG: 40 TABLET, DELAYED RELEASE ORAL at 06:03

## 2024-03-03 RX ADMIN — INSULIN GLARGINE 40 UNITS: 100 INJECTION, SOLUTION SUBCUTANEOUS at 01:20

## 2024-03-03 RX ADMIN — INSULIN LISPRO 4 UNITS: 100 INJECTION, SOLUTION INTRAVENOUS; SUBCUTANEOUS at 02:37

## 2024-03-03 RX ADMIN — INSULIN LISPRO 4 UNITS: 100 INJECTION, SOLUTION INTRAVENOUS; SUBCUTANEOUS at 11:19

## 2024-03-03 RX ADMIN — SODIUM CHLORIDE, PRESERVATIVE FREE 10 ML: 5 INJECTION INTRAVENOUS at 08:22

## 2024-03-03 RX ADMIN — HYDROCODONE BITARTRATE AND ACETAMINOPHEN 1 TABLET: 5; 325 TABLET ORAL at 10:43

## 2024-03-03 RX ADMIN — EMPAGLIFLOZIN 10 MG: 10 TABLET, FILM COATED ORAL at 08:22

## 2024-03-03 RX ADMIN — POTASSIUM CHLORIDE 40 MEQ: 750 CAPSULE, EXTENDED RELEASE ORAL at 10:44

## 2024-03-03 RX ADMIN — INSULIN LISPRO 1 UNITS: 100 INJECTION, SOLUTION INTRAVENOUS; SUBCUTANEOUS at 08:19

## 2024-03-03 RX ADMIN — ALOGLIPTIN 25 MG: 12.5 TABLET, FILM COATED ORAL at 08:21

## 2024-03-03 RX ADMIN — INSULIN GLARGINE 45 UNITS: 100 INJECTION, SOLUTION SUBCUTANEOUS at 21:53

## 2024-03-03 RX ADMIN — INSULIN LISPRO 4 UNITS: 100 INJECTION, SOLUTION INTRAVENOUS; SUBCUTANEOUS at 17:30

## 2024-03-03 RX ADMIN — PANTOPRAZOLE SODIUM 40 MG: 40 TABLET, DELAYED RELEASE ORAL at 16:14

## 2024-03-03 RX ADMIN — POTASSIUM CHLORIDE 10 MEQ: 750 TABLET, EXTENDED RELEASE ORAL at 08:21

## 2024-03-03 RX ADMIN — HYDROCODONE BITARTRATE AND ACETAMINOPHEN 1 TABLET: 5; 325 TABLET ORAL at 01:28

## 2024-03-03 RX ADMIN — INSULIN LISPRO 1 UNITS: 100 INJECTION, SOLUTION INTRAVENOUS; SUBCUTANEOUS at 17:31

## 2024-03-03 RX ADMIN — INSULIN LISPRO 4 UNITS: 100 INJECTION, SOLUTION INTRAVENOUS; SUBCUTANEOUS at 08:20

## 2024-03-03 ASSESSMENT — PAIN DESCRIPTION - ORIENTATION
ORIENTATION: LEFT;RIGHT
ORIENTATION: LOWER

## 2024-03-03 ASSESSMENT — PAIN SCALES - GENERAL
PAINLEVEL_OUTOF10: 8

## 2024-03-03 ASSESSMENT — PAIN DESCRIPTION - LOCATION
LOCATION: BACK
LOCATION: BACK;LEG
LOCATION: BACK
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
## 2024-03-03 NOTE — PROGRESS NOTES
4 Eyes Skin Assessment     NAME:  Maude Corrales  YOB: 1950  MEDICAL RECORD NUMBER:  [id number]    The patient is being assessed for  Admission    I agree that at least one RN has performed a thorough Head to Toe Skin Assessment on the patient. ALL assessment sites listed below have been assessed.      Areas assessed by both nurses:    Head, Face, Ears, Arms, Elbows, Hands, and Sacrum. Buttock, Coccyx, Ischium        Does the Patient have a Wound? No noted wound(s)       Hector Prevention initiated by RN: No  Wound Care Orders initiated by RN: No    Pressure Injury (Stage 3,4, Unstageable, DTI, NWPT, and Complex wounds) if present, place Wound referral order by RN under : No    New Ostomies, if present place, Ostomy referral order under : No     Nurse 1 eSignature: Electronically signed by Wyatt Vinson RN on 3/3/24 at 7:12 AM EST    **SHARE this note so that the co-signing nurse can place an eSignature**    Nurse 2 eSignature: Electronically signed by Bita Langston RN on 3/3/24 at 10:18 PM EST

## 2024-03-03 NOTE — FLOWSHEET NOTE
03/03/24 0238   Assessment   Charting Type Admission   Psychosocial   Psychosocial (WDL) WDL   Neurological   Neuro (WDL) WDL   Level of Consciousness 0   Des Coma Scale   Eye Opening 4   Best Verbal Response 5   Best Motor Response 6   [place] Coma Scale Score 15   HEENT (Head, Ears, Eyes, Nose, & Throat)   HEENT (WDL) X   Right Eye Glasses   Left Eye Glasses   Teeth Dentures upper   Respiratory   Respiratory (WDL) WDL   Cardiac   Cardiac (WDL) WDL   Gastrointestinal   Abdominal (WDL) X   Last BM (including prior to admit) 03/02/24   RUQ Bowel Sounds Active   LUQ Bowel Sounds Active   RLQ Bowel Sounds Active   LLQ Bowel Sounds Active   Genitourinary   Genitourinary (WDL) WDL   Peripheral Vascular   Peripheral Vascular (WDL) WDL   Dual Clinician Skin Assessment   Dual Skin Assessment (4 Eyes) WDL   Skin Integumentary    Skin Integumentary (WDL) X   Skin Color Pale   Skin Condition/Temp Warm;Dry   Musculoskeletal   Musculoskeletal (WDL) X   RL Extremity Weakness   LL Extremity Weakness

## 2024-03-03 NOTE — FLOWSHEET NOTE
03/03/24 0820   Assessment   Charting Type Admission   Psychosocial   Psychosocial (WDL) WDL   Neurological   Neuro (WDL) WDL   Level of Consciousness 0   Des Coma Scale   Eye Opening 4   Best Verbal Response 5   Best Motor Response 6   [place] Coma Scale Score 15   HEENT (Head, Ears, Eyes, Nose, & Throat)   HEENT (WDL) X   Right Eye Glasses   Left Eye Glasses   Teeth Dentures upper   Respiratory   Respiratory (WDL) WDL   Cardiac   Cardiac (WDL) WDL   Gastrointestinal   Abdominal (WDL) X   RUQ Bowel Sounds Active   LUQ Bowel Sounds Active   RLQ Bowel Sounds Active   LLQ Bowel Sounds Active   Genitourinary   Genitourinary (WDL) WDL   Peripheral Vascular   Peripheral Vascular (WDL) WDL   Skin Integumentary    Skin Integumentary (WDL) X   Skin Color Pale   Skin Condition/Temp Warm;Dry   Musculoskeletal   Musculoskeletal (WDL) X   RL Extremity Weakness   LL Extremity Weakness

## 2024-03-03 NOTE — ED NOTES
Medicine list still in progress from triage. Pt unable to advise what medicine she takes, no family at bedside. Will relay to nurse that Med Rec needs completed when RN to RN report is given at admission.

## 2024-03-03 NOTE — FLOWSHEET NOTE
03/02/24 2042   BED TRACKING   Time Bed Requested? 2042   Time Bed Assigned? 2042   Bed Assignment 8       Pt will go to [facility].

## 2024-03-04 VITALS
BODY MASS INDEX: 26.4 KG/M2 | HEIGHT: 63 IN | OXYGEN SATURATION: 98 % | RESPIRATION RATE: 16 BRPM | WEIGHT: 149 LBS | DIASTOLIC BLOOD PRESSURE: 64 MMHG | HEART RATE: 57 BPM | TEMPERATURE: 97.5 F | SYSTOLIC BLOOD PRESSURE: 152 MMHG

## 2024-03-04 DIAGNOSIS — M51.36 DEGENERATIVE DISC DISEASE, LUMBAR: ICD-10-CM

## 2024-03-04 LAB
ALBUMIN SERPL-MCNC: 3.5 G/DL (ref 3.4–4.8)
ALBUMIN/GLOB SERPL: 1.5 {RATIO} (ref 0.8–2)
ALP SERPL-CCNC: 118 U/L (ref 25–100)
ALT SERPL-CCNC: 29 U/L (ref 4–36)
ANION GAP SERPL CALCULATED.3IONS-SCNC: 11 MMOL/L (ref 3–16)
AST SERPL-CCNC: 36 U/L (ref 8–33)
BASOPHILS # BLD: 0 K/UL (ref 0–0.1)
BASOPHILS NFR BLD: 0.5 %
BILIRUB SERPL-MCNC: <0.2 MG/DL (ref 0.3–1.2)
BUN SERPL-MCNC: 18 MG/DL (ref 6–20)
CALCIUM SERPL-MCNC: 9.1 MG/DL (ref 8.5–10.5)
CHLORIDE SERPL-SCNC: 103 MMOL/L (ref 98–107)
CO2 SERPL-SCNC: 22 MMOL/L (ref 20–30)
CREAT SERPL-MCNC: 1.1 MG/DL (ref 0.4–1.2)
EOSINOPHIL # BLD: 0.1 K/UL (ref 0–0.4)
EOSINOPHIL NFR BLD: 1.9 %
ERYTHROCYTE [DISTWIDTH] IN BLOOD BY AUTOMATED COUNT: 14.4 % (ref 11–16)
GFR SERPLBLD CREATININE-BSD FMLA CKD-EPI: 53 ML/MIN/{1.73_M2}
GLOBULIN SER CALC-MCNC: 2.3 G/DL
GLUCOSE BLD-MCNC: 155 MG/DL (ref 74–106)
GLUCOSE BLD-MCNC: 171 MG/DL (ref 74–106)
GLUCOSE BLD-MCNC: 172 MG/DL (ref 74–106)
GLUCOSE BLD-MCNC: 194 MG/DL (ref 74–106)
GLUCOSE BLD-MCNC: 249 MG/DL (ref 74–106)
GLUCOSE SERPL-MCNC: 108 MG/DL (ref 74–106)
HCT VFR BLD AUTO: 26.9 % (ref 37–47)
HGB BLD-MCNC: 8.2 G/DL (ref 11.5–16.5)
IMM GRANULOCYTES # BLD: 0 K/UL
IMM GRANULOCYTES NFR BLD: 0.5 % (ref 0–5)
LYMPHOCYTES # BLD: 0.8 K/UL (ref 1.5–4)
LYMPHOCYTES NFR BLD: 22 %
MAGNESIUM SERPL-MCNC: 2.3 MG/DL (ref 1.7–2.4)
MCH RBC QN AUTO: 25.6 PG (ref 27–32)
MCHC RBC AUTO-ENTMCNC: 30.5 G/DL (ref 31–35)
MCV RBC AUTO: 84.1 FL (ref 80–100)
MONOCYTES # BLD: 0.4 K/UL (ref 0.2–0.8)
MONOCYTES NFR BLD: 11.6 %
NEUTROPHILS # BLD: 2.4 K/UL (ref 2–7.5)
NEUTS SEG NFR BLD: 63.5 %
PERFORMED ON: ABNORMAL
PLATELET # BLD AUTO: 147 K/UL (ref 150–400)
PMV BLD AUTO: 12.2 FL (ref 6–10)
POTASSIUM SERPL-SCNC: 4 MMOL/L (ref 3.4–5.1)
PROT SERPL-MCNC: 5.8 G/DL (ref 6.4–8.3)
RBC # BLD AUTO: 3.2 M/UL (ref 3.8–5.8)
SODIUM SERPL-SCNC: 136 MMOL/L (ref 136–145)
WBC # BLD AUTO: 3.7 K/UL (ref 4–11)

## 2024-03-04 PROCEDURE — 97161 PT EVAL LOW COMPLEX 20 MIN: CPT

## 2024-03-04 PROCEDURE — 83735 ASSAY OF MAGNESIUM: CPT

## 2024-03-04 PROCEDURE — 2580000003 HC RX 258: Performed by: INTERNAL MEDICINE

## 2024-03-04 PROCEDURE — 6360000002 HC RX W HCPCS: Performed by: PHYSICIAN ASSISTANT

## 2024-03-04 PROCEDURE — 80053 COMPREHEN METABOLIC PANEL: CPT

## 2024-03-04 PROCEDURE — 6370000000 HC RX 637 (ALT 250 FOR IP): Performed by: INTERNAL MEDICINE

## 2024-03-04 PROCEDURE — G0378 HOSPITAL OBSERVATION PER HR: HCPCS

## 2024-03-04 PROCEDURE — 36415 COLL VENOUS BLD VENIPUNCTURE: CPT

## 2024-03-04 PROCEDURE — 85025 COMPLETE CBC W/AUTO DIFF WBC: CPT

## 2024-03-04 PROCEDURE — 6370000000 HC RX 637 (ALT 250 FOR IP): Performed by: PHYSICIAN ASSISTANT

## 2024-03-04 PROCEDURE — 97165 OT EVAL LOW COMPLEX 30 MIN: CPT

## 2024-03-04 PROCEDURE — 99238 HOSP IP/OBS DSCHRG MGMT 30/<: CPT | Performed by: INTERNAL MEDICINE

## 2024-03-04 PROCEDURE — 96366 THER/PROPH/DIAG IV INF ADDON: CPT

## 2024-03-04 RX ORDER — PANTOPRAZOLE SODIUM 40 MG/1
40 TABLET, DELAYED RELEASE ORAL
Qty: 60 TABLET | Refills: 0 | Status: SHIPPED | OUTPATIENT
Start: 2024-03-04

## 2024-03-04 RX ORDER — INSULIN LISPRO 100 [IU]/ML
5 INJECTION, SOLUTION INTRAVENOUS; SUBCUTANEOUS
Qty: 5 ADJUSTABLE DOSE PRE-FILLED PEN SYRINGE | Refills: 0 | Status: SHIPPED | OUTPATIENT
Start: 2024-03-04

## 2024-03-04 RX ORDER — ASPIRIN 81 MG/1
81 TABLET, CHEWABLE ORAL DAILY
Status: DISCONTINUED | OUTPATIENT
Start: 2024-03-04 | End: 2024-03-04 | Stop reason: HOSPADM

## 2024-03-04 RX ORDER — INSULIN LISPRO 100 [IU]/ML
5 INJECTION, SOLUTION INTRAVENOUS; SUBCUTANEOUS
Status: DISCONTINUED | OUTPATIENT
Start: 2024-03-04 | End: 2024-03-04 | Stop reason: HOSPADM

## 2024-03-04 RX ORDER — POTASSIUM CHLORIDE 750 MG/1
10 TABLET, EXTENDED RELEASE ORAL DAILY
Qty: 30 TABLET | Refills: 0 | Status: SHIPPED | OUTPATIENT
Start: 2024-03-04

## 2024-03-04 RX ORDER — NITROGLYCERIN 0.4 MG/1
0.4 TABLET SUBLINGUAL EVERY 5 MIN PRN
Qty: 25 TABLET | Refills: 0 | Status: SHIPPED | OUTPATIENT
Start: 2024-03-04

## 2024-03-04 RX ORDER — FERROUS SULFATE 325(65) MG
325 TABLET ORAL 2 TIMES DAILY
Qty: 60 TABLET | Refills: 0 | Status: SHIPPED | OUTPATIENT
Start: 2024-03-04

## 2024-03-04 RX ADMIN — POTASSIUM CHLORIDE 10 MEQ: 750 TABLET, EXTENDED RELEASE ORAL at 08:44

## 2024-03-04 RX ADMIN — SUCRALFATE 1 G: 1 TABLET ORAL at 11:31

## 2024-03-04 RX ADMIN — HYDROCODONE BITARTRATE AND ACETAMINOPHEN 1 TABLET: 5; 325 TABLET ORAL at 11:34

## 2024-03-04 RX ADMIN — SUCRALFATE 1 G: 1 TABLET ORAL at 01:51

## 2024-03-04 RX ADMIN — INSULIN LISPRO 5 UNITS: 100 INJECTION, SOLUTION INTRAVENOUS; SUBCUTANEOUS at 11:38

## 2024-03-04 RX ADMIN — ALOGLIPTIN 12.5 MG: 12.5 TABLET, FILM COATED ORAL at 08:44

## 2024-03-04 RX ADMIN — METOPROLOL SUCCINATE 25 MG: 25 TABLET, EXTENDED RELEASE ORAL at 08:44

## 2024-03-04 RX ADMIN — INSULIN LISPRO 4 UNITS: 100 INJECTION, SOLUTION INTRAVENOUS; SUBCUTANEOUS at 01:51

## 2024-03-04 RX ADMIN — SUCRALFATE 1 G: 1 TABLET ORAL at 06:24

## 2024-03-04 RX ADMIN — METFORMIN HYDROCHLORIDE 1000 MG: 500 TABLET, FILM COATED ORAL at 08:44

## 2024-03-04 RX ADMIN — ASPIRIN 81 MG 81 MG: 81 TABLET ORAL at 11:31

## 2024-03-04 RX ADMIN — SODIUM CHLORIDE, PRESERVATIVE FREE 10 ML: 5 INJECTION INTRAVENOUS at 08:44

## 2024-03-04 RX ADMIN — EMPAGLIFLOZIN 10 MG: 10 TABLET, FILM COATED ORAL at 08:45

## 2024-03-04 RX ADMIN — IRON SUCROSE 300 MG: 20 INJECTION, SOLUTION INTRAVENOUS at 11:32

## 2024-03-04 RX ADMIN — INSULIN LISPRO 5 UNITS: 100 INJECTION, SOLUTION INTRAVENOUS; SUBCUTANEOUS at 08:42

## 2024-03-04 RX ADMIN — PANTOPRAZOLE SODIUM 40 MG: 40 TABLET, DELAYED RELEASE ORAL at 06:24

## 2024-03-04 ASSESSMENT — PAIN DESCRIPTION - DESCRIPTORS: DESCRIPTORS: ACHING

## 2024-03-04 ASSESSMENT — PAIN SCALES - GENERAL: PAINLEVEL_OUTOF10: 5

## 2024-03-04 ASSESSMENT — PAIN DESCRIPTION - LOCATION: LOCATION: BACK

## 2024-03-04 NOTE — ACP (ADVANCE CARE PLANNING)
Advance Care Planning     General Advance Care Planning (ACP) Conversation    Date of Conversation: 3/4/2024  Conducted with: Patient with Decision Making Capacity    Healthcare Decision Maker:    Primary Decision Maker: Terrie Weston - Milena - [phone]    Secondary Decision Maker: Russel Doyle - Niece/Nephew - [phone]    Supplemental (Other) Decision Maker: Black Rivera - Niece/Nephew - [phone]  Click here to complete Healthcare Decision Makers including selection of the Healthcare Decision Maker Relationship (ie \"Primary\").   Today we documented Decision Maker(s) consistent with Legal Next of Kin hierarchy.    Content/Action Overview:  Has ACP document(s) on file - reflects the patient's care preferences  Reviewed DNR/DNI and patient elects DNR order - referred to ACP Clinical Specialist & placed order  artificial nutrition, ventilation preferences, and resuscitation preferences      Length of Voluntary ACP Conversation in minutes:  <16 minutes (Non-Billable)    Emmanuel Ross            
Loss

## 2024-03-04 NOTE — CARE COORDINATION
Prior to discharge, told patient of follow-up appointment with Dr. Ambrosio set for 2/12/2024 at 3:15 PM.

## 2024-03-04 NOTE — DISCHARGE INSTRUCTIONS
Disposition: home with HH   Discharged Condition: Stable  Activity: activity as tolerated  Diet: cardiac diet, diabetic diet, and low fat, low cholesterol diet  Follow Up: Primary Care Provider in 1 week. Follow up with [facility] as previously scheduled.     Patient to proceed with the following lab (cbc, bmp) in 1 week    Take aspirin only do not take plavix unless directed to restart by your PCP or stroke clinic based on repeat labs     Check your blood sugar three times daily and write down these readings. When your PCP office calls you please tell them what your blood sugars have been so medications can be adjusted as needed. Jaswant Nam from Dr. Ambrosio' clinic will be calling you to discuss this.  Diabetic

## 2024-03-04 NOTE — PLAN OF CARE
Problem: Discharge Planning  Goal: Discharge to home or other facility with appropriate resources  Outcome: Adequate for Discharge     Problem: Safety - Adult  Goal: Free from fall injury  3/4/2024 1424 by Heidi Deleon, RN  Outcome: Adequate for Discharge  3/4/2024 0139 by Pedro Pink, RN  Outcome: Progressing     Problem: Skin/Tissue Integrity - Adult  Goal: Skin integrity remains intact  Outcome: Adequate for Discharge
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
## 2024-03-04 NOTE — DISCHARGE SUMMARY
Discharge Summary      Patient ID: Maude Corrales       Patient's PCP: Lazarus Ambrosio MD    Admit Date: 3/2/2024     Discharge Date:   3/4/2024    Admitting Provider: Lazarus Ambrosio MD    Discharging Provider: VIGNESH Artis     Reason for this admission:   Hyperglycemia  Anemia   Declining functional status    Discharge Diagnoses:     Active Hospital Problems    Diagnosis Date Noted    Hyperglycemia [R73.9] 03/02/2024    Anemia [D64.9] 06/02/2023    Carotid disease, bilateral (HCC) [I77.9] 04/28/2023    Personal history of nicotine dependence [Z87.891] 03/18/2022    Declining functional status [R53.81] 03/17/2022    Cirrhosis (HCC) [K74.60] 11/01/2021    Essential hypertension [I10] 08/12/2015    Type 2 diabetes mellitus with diabetic neuropathy (HCC) [E11.40] 08/12/2015    H/O: CVA (cerebrovascular accident) [Z86.73]        Procedures:  XR CHEST PORTABLE   Final Result   No acute findings.      Electronically signed by Jolanta Grajeda            Consults:   IP CONSULT TO DIETITIAN  PT/OT    Briefly:   Maude Corrales is a pleasant 73 yo female with PMH of DM II, HTN, CVA, anemia, carotid stenosis, sanchez cirrhosis, and others who was admitted due to hyperglycemia, generalized weakness, and worsening anemia.    Hospital Course:       Hyperglycemia [R73.9]  See under DM   03/02/2024    Anemia [D64.9]  Worsening since last admit at outside facility where plavix added to her regimen. She has not been able to tolerate anticoagulation In the past with falls and GIB. Hgb 8.2 on 3/2 and lab reported Hgb 7.5 on 3/3. Hgb 8.2 on 3/4. Both ASA and plavix held during this admit. Patient unable to provide stool sample for Hemoccult. PPI BID and carafate ordered. Iron deficient and venofer infusion 300 x 2 ordered. She states she takes oral iron. B12 wnl and folate 7.66. continue home folic acid. With Hgb 8.2 today will resume ASA only at dsicharge with repeat CBC in 1 week. Could consider resuming plavix then. F/u with

## 2024-03-04 NOTE — PLAN OF CARE
Problem: Safety - Adult  Goal: Free from fall injury  Outcome: Progressing     Problem: Gastrointestinal - Adult  Goal: Minimal or absence of nausea and vomiting  3/3/2024 1451 by María Elena Gonzalez RN  Outcome: Progressing     Problem: Genitourinary - Adult  Goal: Absence of urinary retention  3/3/2024 1451 by María Elena Gonzalez RN  Outcome: Progressing     Problem: Infection - Adult  Goal: Absence of infection at discharge  3/3/2024 1451 by María Elena Gonzalez RN  Outcome: Progressing     Problem: Metabolic/Fluid and Electrolytes - Adult  Goal: Electrolytes maintained within normal limits  3/3/2024 1451 by María Elena Gonzalez RN  Outcome: Progressing

## 2024-03-04 NOTE — FLOWSHEET NOTE
03/03/24 2100   Assessment   Charting Type Shift assessment   Psychosocial   Psychosocial (WDL) WDL   Neurological   Neuro (WDL) WDL   Level of Consciousness 0   [place] Coma Scale   Eye Opening 4   Best Verbal Response 5   Best Motor Response 6   Des Coma Scale Score 15   HEENT (Head, Ears, Eyes, Nose, & Throat)   HEENT (WDL) X   Right Eye Glasses   Left Eye Glasses   Teeth Dentures upper   Respiratory   Respiratory (WDL) WDL   Respiratory Interventions H.O.B. elevated   Cardiac   Cardiac (WDL) WDL   Gastrointestinal   Abdominal (WDL) X   RUQ Bowel Sounds Active   LUQ Bowel Sounds Active   RLQ Bowel Sounds Active   LLQ Bowel Sounds Active   Genitourinary   Genitourinary (WDL) WDL   Peripheral Vascular   Peripheral Vascular (WDL) WDL   Skin Integumentary    Skin Integumentary (WDL) X   Skin Color Pale   Skin Condition/Temp Dry;Warm   Musculoskeletal   Musculoskeletal (WDL) X   RL Extremity Weakness   LL Extremity Weakness

## 2024-03-04 NOTE — CARE COORDINATION
Case Management Assessment Discharge Note    Date / Time of Note:  03/04/24 10:50 AM  Discharge Note Completed by: Heidi Elizondo RN      Patient Name: Maude Corrales   YOB: 1950  Diagnosis: Hyperglycemia [R73.9]  Type 2 diabetes mellitus with hyperglycemia, without long-term current use of insulin (HCC) [E11.65]   Date / Time: 3/2/2024  4:36 PM    Current PCP: Lazarus Ambrosio MD  Clinic patient: Yes    Hospitalization in the last 30 days: No    Advance Directives:  Code Status: Full Code    Financial:  Payor: HU[employer]NA MEDICARE / Plan: HU[employer]NA CHOICE-PPO MEDICARE / Product Type: *No Product type* /      Pharmacy:    [facility] [place] [address] [phone] -  [phone]  [address] [place] [id number]  Phone: [phone] Fax: [phone]    [id number] [address] [phone] -  [phone]  [address] [place] [id number]  Phone: [phone] Fax: [phone]    [facility] [address] [facility] [address]  P [phone] - F [phone]  [address] [address]  Phone: [phone] Fax: [phone]      Does patient want to participate in local refill/ meds to beds program?: Yes    Meds To Beds General Rules:  1. Can ONLY be done Monday- Friday between 8:30am-5pm  2. Prescription(s) must be in pharmacy by 3pm to be filled same day  3.Copy of patient's insurance/ prescription drug card and patient face sheet must be sent along with the prescription(s)  4. Cost of Rx cannot be added to hospital bill. If financial assistance is needed, please contact unit  or ;  or  CANNOT provide pharmacy voucher for patients co-pays  5. Patients can then  the prescription on their way out of the hospital at discharge, or pharmacy can deliver to the bedside if staff is available. (payment due at time of pick-up or delivery - cash,
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
## 2024-03-04 NOTE — PROGRESS NOTES
Patient noted to have IV infiltrated from Iron infusion. Small amount of swelling noted to the left FA IV site area. Notified Chey MEDELLIN stated to apply ice pack to area. Ice pack applied patient tolerated well. Discharge instruction given to the patient and the patients family. Patient and family verbalized understanding.

## 2024-03-04 NOTE — CONSULTS
Nutrition Note    Went to visit patient to provide diet education, but patient was getting her things ready to be discharged. Education materials were provided to pt. RD will call patient to follow-up.     Electronically signed by India Juarez RD on 3/4/24 at 2:26 PM EST

## 2024-03-04 NOTE — PROGRESS NOTES
CLINICAL PHARMACY NOTE: MEDS TO BEDS    Total # of Prescriptions Filled: 5   The following medications were delivered to the patient:  Current Discharge Medication List      Potassium Chloride ER 10 meq tablet  Take 1 tablet by mouth once a day Qty: 30 tablets Refill: 0  Iron 325 mg tablet Take 1 tablet by mouth every morning and at bedtime  Qty: 60 tablets Refill: 0  Lantus Solostar 100 units/ml   Inject 45 units under the skin every night  Qty: 15 ml  (1 box)  Refill: 0  Nitroglycerin 0.4 mg sl tablet  Dissolve 1 tablet under the tongue for chest pain-If pain remains after 5 min, call 911 and repeat dose.  Max 3 tablets in 15 minutes Qty: 25 tablets Refill: 0  Pantoprazole 40 mg tablet  Take 1 tablet by mouth twice a day before meals Qty: 60 tablets Refill: 0    Additional Documentation:  Rx for Humalog was not able to be filled at this time (refill too soon) and was transferred to [facility] at patient's request.     Medications were delivered to patient's room and signed for by patient.

## 2024-03-05 PROBLEM — E11.65 TYPE 2 DIABETES MELLITUS WITH HYPERGLYCEMIA, WITHOUT LONG-TERM CURRENT USE OF INSULIN (HCC): Status: ACTIVE | Noted: 2024-03-05

## 2024-03-05 RX ORDER — HYDROCODONE BITARTRATE AND ACETAMINOPHEN 5; 325 MG/1; MG/1
1 TABLET ORAL 2 TIMES DAILY PRN
Qty: 45 TABLET | Refills: 0 | Status: SHIPPED | OUTPATIENT
Start: 2024-03-05 | End: 2024-04-04

## 2024-03-05 NOTE — CARE COORDINATION
Left voice mail reminding patient of follow-up appointment with Dr. Ambrosio 3/12/24 at 3:15 PM. Reminded patient to repeat CBC and BMP.   normal...

## 2024-03-06 ENCOUNTER — CARE COORDINATION (OUTPATIENT)
Dept: PRIMARY CARE CLINIC | Age: 74
End: 2024-03-06

## 2024-03-06 NOTE — CARE COORDINATION
Care Transitions Initial Follow Up Call    Call within 2 business days of discharge: Yes     Patient: Maude Corrales Patient : 1950 MRN: [id number]    Last Discharge Facility       Date Complaint Diagnosis Description Type Department Provider    3/2/24 Hyperglycemia; Dizziness Type 2 diabetes mellitus with hyperglycemia, without long-term current use of insulin (HCC) ... ED to Hosp-Admission (Discharged) (ADMITTED) [facility] [place] Lazarus Ambrosio MD; Vadim King DO            RARS: Readmission Risk Score: 18.5       Spoke with: Attempting HFU unsuccessful.  Phone straight to voicemail.  Message left with contact information.  I did call and speak with daughter Ms Weston.  She tells me that Maude \" can be difficult and may not answer her phone or talk to me depending on her mood\".  She also states that \"she may not check her blood sugar if she doesn't want too\".  We did discuss HFU appointment and changed to Thursday per daughter request to help with her work schedule.  Daughter did ask if I knew what appointments \"out of town\" that Maude has scheduled and if there is transportation that can be arranged for her.  I let her know that I would investigate.  I called [facility] home health listed in chart for assistance but they do not have a referral.  Call placed to hospital for clarification of home health.      Discharge department/facility: [facility]    Non-face-to-face services provided:  Scheduled appointment with PCP-Daily  Obtained and reviewed discharge summary and/or continuity of care documents    Follow Up  Future Appointments   Date Time Provider Department Center   3/14/2024  3:00 PM Lazarus Ambrosio MD [facility]   2024  2:45 PM [facility]       Jaswant Nam RN

## 2024-03-07 NOTE — CARE COORDINATION
Jaswant Nam, RN  Manager Care Coordination  [phone]     I spoke with Maude today and she tells me that she is doing well and feeling well.  I explained that I was to call and touch base with her about her blood sugar and her insulin usage.  Maude states that she has been checking her blood sugar and it has been \"between 200 and 300\".  I explained that we definitely want it lower than that and if she can check it and write down the time of day and number several times a day and give me those readings, I can show to Dr Ambrosio and make insulin adjustments.  She verified that she is taking 45 units of lantus and 5 units of regular insulin with her meals.  She also confirmed that she stopped taking plavix.  I reminded her of her HFU appointment next Thursday and that she needs to bring her blood sugar readings with her.  I also told her I would call her Monday to check on her progress.  She verbalized understanding.

## 2024-03-11 ENCOUNTER — CARE COORDINATION (OUTPATIENT)
Dept: PRIMARY CARE CLINIC | Age: 74
End: 2024-03-11

## 2024-03-11 NOTE — CARE COORDINATION
Jaswant Nam, RN  Manager Care Coordination  [phone]     Attempting to fu on blood sugar.  Message left with contact information.

## 2024-03-13 ENCOUNTER — CARE COORDINATION (OUTPATIENT)
Dept: PRIMARY CARE CLINIC | Age: 74
End: 2024-03-13

## 2024-03-13 NOTE — CARE COORDINATION
Jaswant Nam, RN  Manager Care Coordination  [phone]     I called on Maude today for fu on her blood sugar.  She tells me she is 300 this morning.  She confirmed she is taking her short acting insulin 3 times daily as well as 50 units of long acting at night.  I asked that she please bring either her glucometer or her blood sugars written down for her appointment tomorrow.  She was able to verify the time with me and states she will be there.

## 2024-03-14 ENCOUNTER — OFFICE VISIT (OUTPATIENT)
Dept: PRIMARY CARE CLINIC | Age: 74
End: 2024-03-14

## 2024-03-14 VITALS
SYSTOLIC BLOOD PRESSURE: 126 MMHG | WEIGHT: 142 LBS | RESPIRATION RATE: 18 BRPM | DIASTOLIC BLOOD PRESSURE: 60 MMHG | BODY MASS INDEX: 25.15 KG/M2 | OXYGEN SATURATION: 96 % | HEART RATE: 76 BPM

## 2024-03-14 DIAGNOSIS — G25.81 RLS (RESTLESS LEGS SYNDROME): ICD-10-CM

## 2024-03-14 DIAGNOSIS — I10 ESSENTIAL HYPERTENSION: ICD-10-CM

## 2024-03-14 DIAGNOSIS — K74.60 CIRRHOSIS OF LIVER WITHOUT ASCITES, UNSPECIFIED HEPATIC CIRRHOSIS TYPE (HCC): ICD-10-CM

## 2024-03-14 DIAGNOSIS — Z79.4 TYPE 2 DIABETES MELLITUS WITH DIABETIC NEUROPATHY, WITH LONG-TERM CURRENT USE OF INSULIN (HCC): Primary | ICD-10-CM

## 2024-03-14 DIAGNOSIS — E11.40 TYPE 2 DIABETES MELLITUS WITH DIABETIC NEUROPATHY, WITH LONG-TERM CURRENT USE OF INSULIN (HCC): Primary | ICD-10-CM

## 2024-03-14 DIAGNOSIS — D50.9 IRON DEFICIENCY ANEMIA, UNSPECIFIED IRON DEFICIENCY ANEMIA TYPE: ICD-10-CM

## 2024-03-14 DIAGNOSIS — Z09 HOSPITAL DISCHARGE FOLLOW-UP: ICD-10-CM

## 2024-03-14 RX ORDER — ROPINIROLE 0.25 MG/1
0.25 TABLET, FILM COATED ORAL 3 TIMES DAILY
Qty: 270 TABLET | Refills: 0 | Status: SHIPPED | OUTPATIENT
Start: 2024-03-14

## 2024-03-14 RX ORDER — ROPINIROLE 0.25 MG/1
0.25 TABLET, FILM COATED ORAL 3 TIMES DAILY
Qty: 90 TABLET | Refills: 1 | Status: SHIPPED | OUTPATIENT
Start: 2024-03-14 | End: 2024-03-14

## 2024-03-14 NOTE — PROGRESS NOTES
Chief Complaint   Patient presents with    Follow-Up from Hospital       Have you seen any other physician or provider since your last visit yes -    Have you had any other diagnostic tests since your last visit? no    Have you changed or stopped any medications since your last visit?      I

## 2024-03-14 NOTE — PROGRESS NOTES
Post-Discharge Transitional Care Management Services or Hospital Follow Up      Maude Corrales   YOB: 1950    Date of Office Visit:  3/14/2024  Date of Hospital Admission: 3/2/24  Date of Hospital Discharge: 3/4/24  Readmission Risk Score(high >=14%. Medium >=10%):Readmission Risk Score: 18.5      Care management risk score Rising risk (score 2-5) and Complex Care (Scores >=6): No Risk Score On File     Non face to face  following discharge, date last encounter closed (first attempt may have been earlier):   03/06/2024 03/06/2024    Call initiated 2 business days of discharge: Yes     Patient Active Problem List   Diagnosis    CAD (coronary artery disease)    COPD (chronic obstructive pulmonary disease) (HCC)    H/O: CVA (cerebrovascular accident)    Tobacco abuse    Vitamin B12 deficiency    Essential hypertension    Type 2 diabetes mellitus with diabetic neuropathy (HCC)    History of colon polyps    Status post bilateral knee replacements    Degenerative disc disease, lumbar    Family history of breast cancer    Abnormal CT of the chest    Thoracic aortic aneurysm without rupture (HCC)    Anxiety    Anemia due to stage 3 chronic kidney disease (HCC)    MAGDALENA (iron deficiency anemia)    Stage 3a chronic kidney disease (HCC)    Cirrhosis (HCC)    AVM (arteriovenous malformation) of small bowel, acquired    Declining functional status    Personal history of nicotine dependence    General weakness    Pancytopenia (HCC)    Carotid disease, bilateral (HCC)    Diabetes mellitus due to underlying condition, with long-term current use of insulin (HCC)    Hypokalemia    Anemia    PAC (premature atrial contraction)    Intractable back pain    Generalized weakness    Abnormal urinalysis    Hyperglycemia    Type 2 diabetes mellitus with hyperglycemia, without long-term current use of insulin (HCC)       Allergies   Allergen Reactions    Codeine        Medications listed as ordered at the time of discharge from

## 2024-03-21 ENCOUNTER — HOSPITAL ENCOUNTER (OUTPATIENT)
Facility: HOSPITAL | Age: 74
Discharge: HOME | End: 2024-03-21
Payer: MEDICARE

## 2024-03-21 DIAGNOSIS — R73.9 HYPERGLYCEMIA: ICD-10-CM

## 2024-03-21 DIAGNOSIS — D50.9 IRON DEFICIENCY ANEMIA, UNSPECIFIED IRON DEFICIENCY ANEMIA TYPE: ICD-10-CM

## 2024-03-21 DIAGNOSIS — E87.6 HYPOKALEMIA: ICD-10-CM

## 2024-03-21 LAB
ANION GAP SERPL CALCULATED.3IONS-SCNC: 15 MMOL/L (ref 3–16)
BUN SERPL-MCNC: 20 MG/DL (ref 6–20)
CALCIUM SERPL-MCNC: 10 MG/DL (ref 8.5–10.5)
CHLORIDE SERPL-SCNC: 98 MMOL/L (ref 98–107)
CO2 SERPL-SCNC: 26 MMOL/L (ref 20–30)
CREAT SERPL-MCNC: 1.3 MG/DL (ref 0.4–1.2)
ERYTHROCYTE [DISTWIDTH] IN BLOOD BY AUTOMATED COUNT: 17.7 % (ref 11–16)
GFR SERPLBLD CREATININE-BSD FMLA CKD-EPI: 44 ML/MIN/{1.73_M2}
GLUCOSE SERPL-MCNC: 358 MG/DL (ref 74–106)
HCT VFR BLD AUTO: 34.3 % (ref 37–47)
HGB BLD-MCNC: 10.4 G/DL (ref 11.5–16.5)
MCH RBC QN AUTO: 27.2 PG (ref 27–32)
MCHC RBC AUTO-ENTMCNC: 30.3 G/DL (ref 31–35)
MCV RBC AUTO: 89.8 FL (ref 80–100)
PLATELET # BLD AUTO: 158 K/UL (ref 150–400)
PMV BLD AUTO: 11.9 FL (ref 6–10)
POTASSIUM SERPL-SCNC: 4.2 MMOL/L (ref 3.4–5.1)
RBC # BLD AUTO: 3.82 M/UL (ref 3.8–5.8)
SODIUM SERPL-SCNC: 139 MMOL/L (ref 136–145)
WBC # BLD AUTO: 3.6 K/UL (ref 4–11)

## 2024-03-21 PROCEDURE — 80048 BASIC METABOLIC PNL TOTAL CA: CPT

## 2024-03-21 PROCEDURE — 85027 COMPLETE CBC AUTOMATED: CPT

## 2024-04-09 ENCOUNTER — OFFICE VISIT (OUTPATIENT)
Dept: PRIMARY CARE CLINIC | Age: 74
End: 2024-04-09
Payer: MEDICARE

## 2024-04-09 VITALS
RESPIRATION RATE: 18 BRPM | SYSTOLIC BLOOD PRESSURE: 136 MMHG | WEIGHT: 144.8 LBS | HEART RATE: 75 BPM | BODY MASS INDEX: 25.65 KG/M2 | OXYGEN SATURATION: 98 % | DIASTOLIC BLOOD PRESSURE: 72 MMHG

## 2024-04-09 DIAGNOSIS — M51.36 DEGENERATIVE DISC DISEASE, LUMBAR: ICD-10-CM

## 2024-04-09 DIAGNOSIS — D50.9 IRON DEFICIENCY ANEMIA, UNSPECIFIED IRON DEFICIENCY ANEMIA TYPE: ICD-10-CM

## 2024-04-09 DIAGNOSIS — Z79.4 TYPE 2 DIABETES MELLITUS WITH DIABETIC NEUROPATHY, WITH LONG-TERM CURRENT USE OF INSULIN (HCC): Primary | ICD-10-CM

## 2024-04-09 DIAGNOSIS — E11.40 TYPE 2 DIABETES MELLITUS WITH DIABETIC NEUROPATHY, WITH LONG-TERM CURRENT USE OF INSULIN (HCC): Primary | ICD-10-CM

## 2024-04-09 DIAGNOSIS — K74.60 CIRRHOSIS OF LIVER WITHOUT ASCITES, UNSPECIFIED HEPATIC CIRRHOSIS TYPE (HCC): ICD-10-CM

## 2024-04-09 DIAGNOSIS — E53.8 VITAMIN B12 DEFICIENCY: ICD-10-CM

## 2024-04-09 PROCEDURE — 99214 OFFICE O/P EST MOD 30 MIN: CPT | Performed by: INTERNAL MEDICINE

## 2024-04-09 PROCEDURE — 3017F COLORECTAL CA SCREEN DOC REV: CPT | Performed by: INTERNAL MEDICINE

## 2024-04-09 PROCEDURE — 3046F HEMOGLOBIN A1C LEVEL >9.0%: CPT | Performed by: INTERNAL MEDICINE

## 2024-04-09 PROCEDURE — 4004F PT TOBACCO SCREEN RCVD TLK: CPT | Performed by: INTERNAL MEDICINE

## 2024-04-09 PROCEDURE — G8427 DOCREV CUR MEDS BY ELIG CLIN: HCPCS | Performed by: INTERNAL MEDICINE

## 2024-04-09 PROCEDURE — G8399 PT W/DXA RESULTS DOCUMENT: HCPCS | Performed by: INTERNAL MEDICINE

## 2024-04-09 PROCEDURE — 1090F PRES/ABSN URINE INCON ASSESS: CPT | Performed by: INTERNAL MEDICINE

## 2024-04-09 PROCEDURE — G8419 CALC BMI OUT NRM PARAM NOF/U: HCPCS | Performed by: INTERNAL MEDICINE

## 2024-04-09 PROCEDURE — 2022F DILAT RTA XM EVC RTNOPTHY: CPT | Performed by: INTERNAL MEDICINE

## 2024-04-09 PROCEDURE — 1123F ACP DISCUSS/DSCN MKR DOCD: CPT | Performed by: INTERNAL MEDICINE

## 2024-04-09 PROCEDURE — 96372 THER/PROPH/DIAG INJ SC/IM: CPT | Performed by: INTERNAL MEDICINE

## 2024-04-09 PROCEDURE — 3078F DIAST BP <80 MM HG: CPT | Performed by: INTERNAL MEDICINE

## 2024-04-09 PROCEDURE — 3075F SYST BP GE 130 - 139MM HG: CPT | Performed by: INTERNAL MEDICINE

## 2024-04-09 RX ORDER — HYDROCODONE BITARTRATE AND ACETAMINOPHEN 5; 325 MG/1; MG/1
1 TABLET ORAL 2 TIMES DAILY PRN
Qty: 45 TABLET | Refills: 0 | Status: SHIPPED | OUTPATIENT
Start: 2024-04-09 | End: 2024-05-09

## 2024-04-09 RX ORDER — CYANOCOBALAMIN 1000 UG/ML
1000 INJECTION, SOLUTION INTRAMUSCULAR; SUBCUTANEOUS ONCE
Status: COMPLETED | OUTPATIENT
Start: 2024-04-09 | End: 2024-04-09

## 2024-04-09 RX ADMIN — CYANOCOBALAMIN 1000 MCG: 1000 INJECTION, SOLUTION INTRAMUSCULAR; SUBCUTANEOUS at 14:34

## 2024-04-09 NOTE — PROGRESS NOTES
Chief Complaint   Patient presents with    Diabetes     Fs avg has been 200   Levemir 30-40 at night   Humalog ss w readings     Have you seen any other physician or provider since your last visit no    Have you had any other diagnostic tests since your last visit? no    Have you changed or stopped any medications since your last visit? no     
Auto Differential; Future  - Iron and TIBC; Future  - Ferritin; Future    5. Cirrhosis of liver without ascites, unspecified hepatic cirrhosis type (HCC)  Try to avoid any hepatotoxic agent.  Monitor labs closely.  Make sure other medical problems specially diabetes under good control.    - Comprehensive Metabolic Panel; Future  - CBC with Auto Differential; Future  - Iron and TIBC; Future  - Ferritin; Future        Orders Placed This Encounter   Medications   • HYDROcodone-acetaminophen (NORCO) 5-325 MG per tablet     Sig: Take 1 tablet by mouth 2 times daily as needed for Pain for up to 30 days.     Dispense:  45 tablet     Refill:  0     Reduce doses taken as pain becomes manageable   • cyanocobalamin injection 1,000 mcg      IMelia LPN am scribing for and in the presence of Lazarus Ambrosio MD on this date 4/9/2024 at 2:00 PM.    I, Dr. Lazarus Ambrosio, personally performed the services described in the documentation as scribed by Melia Hay LPN, in my presence and it is both accurate and complete.

## 2024-04-15 RX ORDER — FUROSEMIDE 20 MG/1
TABLET ORAL
Qty: 30 TABLET | Refills: 2 | Status: SHIPPED | OUTPATIENT
Start: 2024-04-15

## 2024-04-18 ENCOUNTER — CARE COORDINATION (OUTPATIENT)
Dept: PRIMARY CARE CLINIC | Age: 74
End: 2024-04-18

## 2024-05-01 DIAGNOSIS — M51.36 DEGENERATIVE DISC DISEASE, LUMBAR: ICD-10-CM

## 2024-05-01 RX ORDER — HYDROCODONE BITARTRATE AND ACETAMINOPHEN 5; 325 MG/1; MG/1
1 TABLET ORAL 2 TIMES DAILY PRN
Qty: 45 TABLET | Refills: 0 | Status: SHIPPED | OUTPATIENT
Start: 2024-05-01 | End: 2024-05-31

## 2024-06-06 ENCOUNTER — HOSPITAL ENCOUNTER (OUTPATIENT)
Facility: HOSPITAL | Age: 74
Discharge: HOME | End: 2024-06-06
Payer: MEDICARE

## 2024-06-06 ENCOUNTER — OFFICE VISIT (OUTPATIENT)
Dept: PRIMARY CARE CLINIC | Age: 74
End: 2024-06-06

## 2024-06-06 VITALS
HEART RATE: 101 BPM | BODY MASS INDEX: 25.72 KG/M2 | WEIGHT: 145.2 LBS | OXYGEN SATURATION: 98 % | RESPIRATION RATE: 18 BRPM

## 2024-06-06 DIAGNOSIS — K59.00 CONSTIPATION, UNSPECIFIED CONSTIPATION TYPE: ICD-10-CM

## 2024-06-06 DIAGNOSIS — D50.9 IRON DEFICIENCY ANEMIA, UNSPECIFIED IRON DEFICIENCY ANEMIA TYPE: ICD-10-CM

## 2024-06-06 DIAGNOSIS — K74.60 CIRRHOSIS OF LIVER WITHOUT ASCITES, UNSPECIFIED HEPATIC CIRRHOSIS TYPE (HCC): ICD-10-CM

## 2024-06-06 DIAGNOSIS — E53.8 VITAMIN B12 DEFICIENCY: ICD-10-CM

## 2024-06-06 DIAGNOSIS — E11.40 TYPE 2 DIABETES MELLITUS WITH DIABETIC NEUROPATHY, WITH LONG-TERM CURRENT USE OF INSULIN (HCC): ICD-10-CM

## 2024-06-06 DIAGNOSIS — I10 ESSENTIAL HYPERTENSION: ICD-10-CM

## 2024-06-06 DIAGNOSIS — M51.36 DEGENERATIVE DISC DISEASE, LUMBAR: ICD-10-CM

## 2024-06-06 DIAGNOSIS — E11.40 TYPE 2 DIABETES MELLITUS WITH DIABETIC NEUROPATHY, WITH LONG-TERM CURRENT USE OF INSULIN (HCC): Primary | ICD-10-CM

## 2024-06-06 DIAGNOSIS — Z79.4 TYPE 2 DIABETES MELLITUS WITH DIABETIC NEUROPATHY, WITH LONG-TERM CURRENT USE OF INSULIN (HCC): ICD-10-CM

## 2024-06-06 DIAGNOSIS — Z79.4 TYPE 2 DIABETES MELLITUS WITH DIABETIC NEUROPATHY, WITH LONG-TERM CURRENT USE OF INSULIN (HCC): Primary | ICD-10-CM

## 2024-06-06 DIAGNOSIS — Z87.891 PERSONAL HISTORY OF TOBACCO USE: ICD-10-CM

## 2024-06-06 PROCEDURE — 80053 COMPREHEN METABOLIC PANEL: CPT

## 2024-06-06 PROCEDURE — 83550 IRON BINDING TEST: CPT

## 2024-06-06 PROCEDURE — 85025 COMPLETE CBC W/AUTO DIFF WBC: CPT

## 2024-06-06 PROCEDURE — 82728 ASSAY OF FERRITIN: CPT

## 2024-06-06 PROCEDURE — 83036 HEMOGLOBIN GLYCOSYLATED A1C: CPT

## 2024-06-06 PROCEDURE — 83540 ASSAY OF IRON: CPT

## 2024-06-06 RX ORDER — ROPINIROLE 0.25 MG/1
0.25 TABLET, FILM COATED ORAL 3 TIMES DAILY
Qty: 270 TABLET | Refills: 0 | Status: SHIPPED | OUTPATIENT
Start: 2024-06-06

## 2024-06-06 RX ORDER — FUROSEMIDE 20 MG/1
TABLET ORAL
Qty: 30 TABLET | Refills: 2 | Status: SHIPPED | OUTPATIENT
Start: 2024-06-06

## 2024-06-06 RX ORDER — HYDROCODONE BITARTRATE AND ACETAMINOPHEN 5; 325 MG/1; MG/1
1 TABLET ORAL 2 TIMES DAILY PRN
Qty: 45 TABLET | Refills: 0 | Status: SHIPPED | OUTPATIENT
Start: 2024-06-06 | End: 2024-07-06

## 2024-06-06 RX ORDER — LACTULOSE 10 G/15ML
20 SOLUTION ORAL DAILY PRN
Qty: 473 ML | Refills: 0 | Status: SHIPPED | OUTPATIENT
Start: 2024-06-06

## 2024-06-06 ASSESSMENT — ENCOUNTER SYMPTOMS
WHEEZING: 0
SORE THROAT: 0
VOMITING: 0
NAUSEA: 0
EYE DISCHARGE: 0
SINUS PRESSURE: 0
COUGH: 0
SHORTNESS OF BREATH: 0
ABDOMINAL PAIN: 0

## 2024-06-06 NOTE — PROGRESS NOTES
Chief Complaint   Patient presents with    Diabetes   Levemir 50 nightly   Hasn't been taking humalog     Have you seen any other physician or provider since your last visit no    Have you had any other diagnostic tests since your last visit? no    Have you changed or stopped any medications since your last visit? no       
abuse or addiction at this time. Will monitor closely.   4. UDS has been compliant. Will randomly check drug screen.   5. Mario Alberto completed and compliant, will check every 3 months.   6. Advised her that UDS and possible pill counts and frequent monitoring will be a part of her care.   7. Patient has medication agreement and consent to treat with this office. Patient has been informed not to seek or obtain medication from other practitioners and to notify our office ASAP if this happened on emergent basis.    - HYDROcodone-acetaminophen (NORCO) 5-325 MG per tablet; Take 1 tablet by mouth 2 times daily as needed for Pain for up to 30 days.  Dispense: 45 tablet; Refill: 0    3. Vitamin B12 deficiency  Will cont on B12 injection on a monthly basis.    4. Iron deficiency anemia, unspecified iron deficiency anemia type  Patient did have extensive GI work-up about a year ago by Dr. Garza and she was found to have multiple colon polyps and angiectasia.  PillCam showed few AVM reachable only with push enteroscopy/retrograde enteroscopy.  Recommendation to manage at [facility] with any complication related to bleeding.  Continue GI prophylaxis.  Continue to monitor  Hb closely. I am going to send anemia work up and treat accordingly.  Seems to be stable but unable to tolerate anything on top of aspirin in regard to anticoagulation or antiplatelet (even though will benefit from dual antiplatelet but risks outweigh the benefit.  Patient and her daughter are aware and in agreement).  Monitor hemoglobin level and iron studies.  Last hemoglobin level was up to 10.4.    5. Essential hypertension  BP is stable. I have advised her on low-sodium diet, exercise and weight control.  I am going to continue current medication. Will monitor her renal function every few months, have advised her to check blood pressure frequently and to keep a record of this.     6. Cirrhosis of liver without ascites, unspecified hepatic cirrhosis type

## 2024-06-07 LAB
ALBUMIN SERPL-MCNC: 4.2 G/DL (ref 3.4–4.8)
ALP SERPL-CCNC: 180 U/L (ref 25–100)
ALT SERPL-CCNC: 19 U/L (ref 4–36)
AST SERPL-CCNC: 25 U/L (ref 8–33)
BASOPHILS # BLD: 0 K/UL (ref 0–0.1)
BASOPHILS NFR BLD: 0.8 %
BILIRUB SERPL-MCNC: 0.4 MG/DL (ref 0.3–1.2)
CALCIUM SERPL-MCNC: 9.4 MG/DL (ref 8.5–10.5)
CHLORIDE SERPL-SCNC: 95 MMOL/L (ref 98–107)
CO2 SERPL-SCNC: 25 MMOL/L (ref 20–30)
CREAT SERPL-MCNC: 1.2 MG/DL (ref 0.4–1.2)
EOSINOPHIL # BLD: 0.1 K/UL (ref 0–0.4)
EOSINOPHIL NFR BLD: 1.2 %
ERYTHROCYTE [DISTWIDTH] IN BLOOD BY AUTOMATED COUNT: 14.7 % (ref 11–16)
FERRITIN SERPL IA-MCNC: 39.2 NG/ML (ref 22–322)
GLOBULIN SER CALC-MCNC: 2.4 G/DL
GLUCOSE SERPL-MCNC: 296 MG/DL (ref 74–106)
HBA1C MFR BLD: 9.5 %
HCT VFR BLD AUTO: 40.4 % (ref 37–47)
HGB BLD-MCNC: 13.3 G/DL (ref 11.5–16.5)
IMM GRANULOCYTES # BLD: 0 K/UL
IMM GRANULOCYTES NFR BLD: 0.4 % (ref 0–5)
IRON SATN MFR SERPL: 11 % (ref 15–50)
IRON SERPL-MCNC: 38 UG/DL (ref 37–145)
LYMPHOCYTES # BLD: 0.9 K/UL (ref 1.5–4)
LYMPHOCYTES NFR BLD: 16.9 %
MCH RBC QN AUTO: 28.1 PG (ref 27–32)
MCHC RBC AUTO-ENTMCNC: 32.9 G/DL (ref 31–35)
MCV RBC AUTO: 85.2 FL (ref 80–100)
MONOCYTES # BLD: 0.4 K/UL (ref 0.2–0.8)
MONOCYTES NFR BLD: 7.8 %
NEUTROPHILS # BLD: 3.7 K/UL (ref 2–7.5)
NEUTS SEG NFR BLD: 72.9 %
PLATELET # BLD AUTO: 138 K/UL (ref 150–400)
PMV BLD AUTO: 12 FL (ref 6–10)
POTASSIUM SERPL-SCNC: 3.1 MMOL/L (ref 3.4–5.1)
PROT SERPL-MCNC: 6.6 G/DL (ref 6.4–8.3)
RBC # BLD AUTO: 4.74 M/UL (ref 3.8–5.8)
SODIUM SERPL-SCNC: 137 MMOL/L (ref 136–145)
TIBC SERPL-MCNC: 334 UG/DL (ref 250–450)
WBC # BLD AUTO: 5 K/UL (ref 4–11)

## 2024-06-08 ASSESSMENT — ENCOUNTER SYMPTOMS: BACK PAIN: 1

## 2024-07-02 DIAGNOSIS — M51.36 DEGENERATIVE DISC DISEASE, LUMBAR: ICD-10-CM

## 2024-07-02 RX ORDER — HYDROCODONE BITARTRATE AND ACETAMINOPHEN 5; 325 MG/1; MG/1
1 TABLET ORAL 2 TIMES DAILY PRN
Qty: 45 TABLET | Refills: 0 | Status: SHIPPED | OUTPATIENT
Start: 2024-07-02 | End: 2024-08-01

## 2024-07-23 ENCOUNTER — TELEPHONE (OUTPATIENT)
Dept: PRIMARY CARE CLINIC | Age: 74
End: 2024-07-23

## 2024-07-29 RX ORDER — HYDRALAZINE HYDROCHLORIDE 25 MG/1
TABLET, FILM COATED ORAL
Qty: 180 TABLET | Refills: 1 | Status: SHIPPED | OUTPATIENT
Start: 2024-07-29

## 2024-08-01 DIAGNOSIS — M51.36 DEGENERATIVE DISC DISEASE, LUMBAR: ICD-10-CM

## 2024-08-01 RX ORDER — HYDROCODONE BITARTRATE AND ACETAMINOPHEN 5; 325 MG/1; MG/1
1 TABLET ORAL 2 TIMES DAILY PRN
Qty: 45 TABLET | Refills: 0 | Status: SHIPPED | OUTPATIENT
Start: 2024-08-01 | End: 2024-08-31

## 2024-08-05 ENCOUNTER — HOSPITAL ENCOUNTER (EMERGENCY)
Facility: HOSPITAL | Age: 74
Discharge: HOME | End: 2024-08-05
Attending: EMERGENCY MEDICINE
Payer: MEDICARE

## 2024-08-05 VITALS
SYSTOLIC BLOOD PRESSURE: 165 MMHG | OXYGEN SATURATION: 98 % | RESPIRATION RATE: 17 BRPM | TEMPERATURE: 97.8 F | BODY MASS INDEX: 22.5 KG/M2 | HEIGHT: 66 IN | WEIGHT: 140 LBS | DIASTOLIC BLOOD PRESSURE: 61 MMHG | HEART RATE: 72 BPM

## 2024-08-05 DIAGNOSIS — J11.1 INFLUENZA-LIKE ILLNESS: Primary | ICD-10-CM

## 2024-08-05 LAB
ALBUMIN SERPL-MCNC: 3.7 G/DL (ref 3.4–4.8)
ALBUMIN/GLOB SERPL: 1.3 {RATIO} (ref 0.8–2)
ALP SERPL-CCNC: 197 U/L (ref 25–100)
ALT SERPL-CCNC: 37 U/L (ref 4–36)
ANION GAP SERPL CALCULATED.3IONS-SCNC: 12 MMOL/L (ref 3–16)
AST SERPL-CCNC: 61 U/L (ref 8–33)
BASOPHILS # BLD: 0 K/UL (ref 0–0.1)
BASOPHILS NFR BLD: 0.8 %
BILIRUB SERPL-MCNC: 0.4 MG/DL (ref 0.3–1.2)
BILIRUB UR QL STRIP.AUTO: NEGATIVE
BUN SERPL-MCNC: 14 MG/DL (ref 6–20)
CALCIUM SERPL-MCNC: 9.6 MG/DL (ref 8.5–10.5)
CHLORIDE SERPL-SCNC: 98 MMOL/L (ref 98–107)
CLARITY UR: CLEAR
CO2 SERPL-SCNC: 27 MMOL/L (ref 20–30)
COLOR UR: YELLOW
CREAT SERPL-MCNC: 1.1 MG/DL (ref 0.4–1.2)
EOSINOPHIL # BLD: 0.1 K/UL (ref 0–0.4)
EOSINOPHIL NFR BLD: 1.6 %
ERYTHROCYTE [DISTWIDTH] IN BLOOD BY AUTOMATED COUNT: 13.8 % (ref 11–16)
FLUAV AG NPH QL: NEGATIVE
FLUBV AG NPH QL: NEGATIVE
GFR SERPLBLD CREATININE-BSD FMLA CKD-EPI: 53 ML/MIN/{1.73_M2}
GLOBULIN SER CALC-MCNC: 2.8 G/DL
GLUCOSE SERPL-MCNC: 374 MG/DL (ref 74–106)
GLUCOSE UR STRIP.AUTO-MCNC: >=1000 MG/DL
HCT VFR BLD AUTO: 35.9 % (ref 37–47)
HGB BLD-MCNC: 11.6 G/DL (ref 11.5–16.5)
HGB UR QL STRIP.AUTO: NEGATIVE
IMM GRANULOCYTES # BLD: 0 K/UL
IMM GRANULOCYTES NFR BLD: 0.3 % (ref 0–5)
KETONES UR STRIP.AUTO-MCNC: NEGATIVE MG/DL
LEUKOCYTE ESTERASE UR QL STRIP.AUTO: NEGATIVE
LYMPHOCYTES # BLD: 0.8 K/UL (ref 1.5–4)
LYMPHOCYTES NFR BLD: 20 %
MCH RBC QN AUTO: 27.6 PG (ref 27–32)
MCHC RBC AUTO-ENTMCNC: 32.3 G/DL (ref 31–35)
MCV RBC AUTO: 85.3 FL (ref 80–100)
MONOCYTES # BLD: 0.3 K/UL (ref 0.2–0.8)
MONOCYTES NFR BLD: 8.9 %
NEUTROPHILS # BLD: 2.6 K/UL (ref 2–7.5)
NEUTS SEG NFR BLD: 68.4 %
NITRITE UR QL STRIP.AUTO: NEGATIVE
PH UR STRIP.AUTO: 6.5 [PH] (ref 5–8)
PLATELET # BLD AUTO: 128 K/UL (ref 150–400)
PMV BLD AUTO: 11.3 FL (ref 6–10)
POTASSIUM SERPL-SCNC: 3.9 MMOL/L (ref 3.4–5.1)
PROT SERPL-MCNC: 6.5 G/DL (ref 6.4–8.3)
PROT UR STRIP.AUTO-MCNC: NEGATIVE MG/DL
RBC # BLD AUTO: 4.21 M/UL (ref 3.8–5.8)
SARS-COV-2 RDRP RESP QL NAA+PROBE: NOT DETECTED
SODIUM SERPL-SCNC: 137 MMOL/L (ref 136–145)
SP GR UR STRIP.AUTO: 1.01 (ref 1–1.03)
UA COMPLETE W REFLEX CULTURE PNL UR: ABNORMAL
UA DIPSTICK W REFLEX MICRO PNL UR: ABNORMAL
URN SPEC COLLECT METH UR: ABNORMAL
UROBILINOGEN UR STRIP-ACNC: 0.2 E.U./DL
WBC # BLD AUTO: 3.8 K/UL (ref 4–11)

## 2024-08-05 PROCEDURE — 80053 COMPREHEN METABOLIC PANEL: CPT

## 2024-08-05 PROCEDURE — 87804 INFLUENZA ASSAY W/OPTIC: CPT

## 2024-08-05 PROCEDURE — 81003 URINALYSIS AUTO W/O SCOPE: CPT

## 2024-08-05 PROCEDURE — 87635 SARS-COV-2 COVID-19 AMP PRB: CPT

## 2024-08-05 PROCEDURE — 36415 COLL VENOUS BLD VENIPUNCTURE: CPT

## 2024-08-05 PROCEDURE — 99284 EMERGENCY DEPT VISIT MOD MDM: CPT

## 2024-08-05 PROCEDURE — 85025 COMPLETE CBC W/AUTO DIFF WBC: CPT

## 2024-08-05 ASSESSMENT — PAIN - FUNCTIONAL ASSESSMENT: PAIN_FUNCTIONAL_ASSESSMENT: 0-10

## 2024-08-05 ASSESSMENT — PAIN SCALES - GENERAL: PAINLEVEL_OUTOF10: 9

## 2024-08-05 ASSESSMENT — PAIN DESCRIPTION - ORIENTATION: ORIENTATION: LOWER

## 2024-08-05 ASSESSMENT — PAIN DESCRIPTION - DESCRIPTORS: DESCRIPTORS: SHARP

## 2024-08-05 ASSESSMENT — PAIN DESCRIPTION - LOCATION: LOCATION: BACK

## 2024-08-05 NOTE — ED TRIAGE NOTES
Patient presents today with productive cough, body aches, chills, and weakness for 2 days. She denies being exposed to illness. She has hydrocodone at home, and took one for body aches. She had not improvement of pain. She also has low back pain, and is experiencing dysuria. She has had kidney infections in the past. She reports that she was admitted to this facility 4-5 months ago for fluids and blood administration.

## 2024-08-05 NOTE — ED NOTES
The patient is unable to void at this time, but will call for assistance when she can. Call bell within reach.

## 2024-08-06 NOTE — ED NOTES
IV removed without complication. Discharge instructions discussed with patient, verbalizes understanding. Patient discharged home via POV.  in on the way to this facility. The patient wishes to wait outside for him.

## 2024-08-06 NOTE — ED PROVIDER NOTES
rosuvastatin (CRESTOR) 20 MG tablet TAKE 1 TABLET BY MOUTH EVERY  tablet 0    Insulin Pen Needle 31G X 6 MM MISC 1 each by Does not apply route daily 100 each 3    glucose monitoring kit (FREESTYLE) monitoring kit 1 kit by Does not apply route daily E11.40 (Patient taking differently: 1 kit by Does not apply route 2 times daily E11.40) 1 kit 0    Insulin Syringe-Needle U-100 (B-D INS SYR ULTRAFINE 1CC/31G) 31G X 5/16\" 1 ML MISC USE ONCE A DAY AS DIRECTED  DX E11.9 100 each 3       PHYSICAL EXAM:  ED Triage Vitals   BP Temp Temp Source Pulse Respirations SpO2 Height Weight - Scale   08/05/24 1838 08/05/24 1838 08/05/24 1838 08/05/24 1838 08/05/24 1838 08/05/24 1838 08/05/24 1855 08/05/24 1855   (!) 188/74 97.8 °F (36.6 °C) Oral 80 16 97 % 1.676 m (5' 6\") 63.5 kg (140 lb)        Physical Exam   Heart regular rate and rhythm and lungs clear to auscultation bilaterally.  Abdomen benign.  Patient in no apparent distress.    DIAGNOSTIC RESULTS   LABS:   Labs Reviewed   URINALYSIS WITH REFLEX TO CULTURE - Abnormal; Notable for the following components:       Result Value    Glucose, Ur >=1000 (*)     All other components within normal limits   CBC WITH AUTO DIFFERENTIAL - Abnormal; Notable for the following components:    WBC 3.8 (*)     Hematocrit 35.9 (*)     Platelets 128 (*)     MPV 11.3 (*)     Lymphocytes Absolute 0.8 (*)     All other components within normal limits   COMPREHENSIVE METABOLIC PANEL - Abnormal; Notable for the following components:    Glucose 374 (*)     Est, Glom Filt Rate 53 (*)     Alkaline Phosphatase 197 (*)     ALT 37 (*)     AST 61 (*)     All other components within normal limits   RAPID INFLUENZA A/B ANTIGENS   COVID-19, RAPID      When ordered only abnormal lab results are displayed. All other labs were within normal range or not returned as of this dictation.         EKG interpreted by me: Normal sinus rhythm with rate of 64, normal QTc, ST segments nonischemic, rightward axis,

## 2024-08-08 ENCOUNTER — HOSPITAL ENCOUNTER (OUTPATIENT)
Facility: HOSPITAL | Age: 74
Setting detail: OBSERVATION
Discharge: HOME | End: 2024-08-09
Attending: INTERNAL MEDICINE | Admitting: INTERNAL MEDICINE
Payer: MEDICARE

## 2024-08-08 ENCOUNTER — OFFICE VISIT (OUTPATIENT)
Dept: PRIMARY CARE CLINIC | Age: 74
End: 2024-08-08
Payer: MEDICARE

## 2024-08-08 ENCOUNTER — APPOINTMENT (OUTPATIENT)
Dept: CT IMAGING | Facility: HOSPITAL | Age: 74
End: 2024-08-08
Attending: INTERNAL MEDICINE
Payer: MEDICARE

## 2024-08-08 VITALS
HEART RATE: 86 BPM | SYSTOLIC BLOOD PRESSURE: 192 MMHG | DIASTOLIC BLOOD PRESSURE: 74 MMHG | OXYGEN SATURATION: 98 % | TEMPERATURE: 97.6 F | BODY MASS INDEX: 22.6 KG/M2 | WEIGHT: 140 LBS

## 2024-08-08 DIAGNOSIS — E11.40 TYPE 2 DIABETES MELLITUS WITH DIABETIC NEUROPATHY, WITH LONG-TERM CURRENT USE OF INSULIN (HCC): Primary | ICD-10-CM

## 2024-08-08 DIAGNOSIS — I10 ESSENTIAL HYPERTENSION: ICD-10-CM

## 2024-08-08 DIAGNOSIS — Z79.4 TYPE 2 DIABETES MELLITUS WITH DIABETIC NEUROPATHY, WITH LONG-TERM CURRENT USE OF INSULIN (HCC): Primary | ICD-10-CM

## 2024-08-08 DIAGNOSIS — K74.60 CIRRHOSIS OF LIVER WITHOUT ASCITES, UNSPECIFIED HEPATIC CIRRHOSIS TYPE (HCC): ICD-10-CM

## 2024-08-08 PROBLEM — I16.0 HYPERTENSIVE URGENCY: Status: ACTIVE | Noted: 2024-08-08

## 2024-08-08 LAB
ALBUMIN SERPL-MCNC: 4 G/DL (ref 3.4–4.8)
ALBUMIN/GLOB SERPL: 1.3 {RATIO} (ref 0.8–2)
ALP SERPL-CCNC: 210 U/L (ref 25–100)
ALT SERPL-CCNC: 52 U/L (ref 4–36)
ANION GAP SERPL CALCULATED.3IONS-SCNC: 14 MMOL/L (ref 3–16)
AST SERPL-CCNC: 51 U/L (ref 8–33)
BASOPHILS # BLD: 0 K/UL (ref 0–0.1)
BASOPHILS NFR BLD: 0.5 %
BILIRUB SERPL-MCNC: 0.4 MG/DL (ref 0.3–1.2)
BUN SERPL-MCNC: 9 MG/DL (ref 6–20)
CALCIUM SERPL-MCNC: 9 MG/DL (ref 8.5–10.5)
CHLORIDE SERPL-SCNC: 97 MMOL/L (ref 98–107)
CHP ED QC CHECK: ABNORMAL
CO2 SERPL-SCNC: 25 MMOL/L (ref 20–30)
CREAT SERPL-MCNC: 1.1 MG/DL (ref 0.4–1.2)
EKG ATRIAL RATE: 70 BPM
EKG DIAGNOSIS: NORMAL
EKG P AXIS: 77 DEGREES
EKG P-R INTERVAL: 198 MS
EKG Q-T INTERVAL: 404 MS
EKG QRS DURATION: 84 MS
EKG QTC CALCULATION (BAZETT): 436 MS
EKG R AXIS: -1 DEGREES
EKG T AXIS: 82 DEGREES
EKG VENTRICULAR RATE: 70 BPM
EOSINOPHIL # BLD: 0.1 K/UL (ref 0–0.4)
EOSINOPHIL NFR BLD: 1.4 %
ERYTHROCYTE [DISTWIDTH] IN BLOOD BY AUTOMATED COUNT: 14 % (ref 11–16)
GFR SERPLBLD CREATININE-BSD FMLA CKD-EPI: 53 ML/MIN/{1.73_M2}
GLOBULIN SER CALC-MCNC: 3.2 G/DL
GLUCOSE BLD-MCNC: 206 MG/DL (ref 74–106)
GLUCOSE BLD-MCNC: 234 MG/DL (ref 74–106)
GLUCOSE BLD-MCNC: 322 MG/DL
GLUCOSE SERPL-MCNC: 247 MG/DL (ref 74–106)
HCT VFR BLD AUTO: 38.2 % (ref 37–47)
HGB BLD-MCNC: 12.1 G/DL (ref 11.5–16.5)
IMM GRANULOCYTES # BLD: 0 K/UL
IMM GRANULOCYTES NFR BLD: 0.2 % (ref 0–5)
LYMPHOCYTES # BLD: 0.8 K/UL (ref 1.5–4)
LYMPHOCYTES NFR BLD: 17.3 %
MAGNESIUM SERPL-MCNC: 2 MG/DL (ref 1.7–2.4)
MCH RBC QN AUTO: 26.8 PG (ref 27–32)
MCHC RBC AUTO-ENTMCNC: 31.7 G/DL (ref 31–35)
MCV RBC AUTO: 84.5 FL (ref 80–100)
MONOCYTES # BLD: 0.4 K/UL (ref 0.2–0.8)
MONOCYTES NFR BLD: 8.1 %
NEUTROPHILS # BLD: 3.2 K/UL (ref 2–7.5)
NEUTS SEG NFR BLD: 72.5 %
PERFORMED ON: ABNORMAL
PERFORMED ON: ABNORMAL
PLATELET # BLD AUTO: 132 K/UL (ref 150–400)
PMV BLD AUTO: 11.6 FL (ref 6–10)
POTASSIUM SERPL-SCNC: 3 MMOL/L (ref 3.4–5.1)
PROT SERPL-MCNC: 7.2 G/DL (ref 6.4–8.3)
RBC # BLD AUTO: 4.52 M/UL (ref 3.8–5.8)
SODIUM SERPL-SCNC: 136 MMOL/L (ref 136–145)
TROPONIN, HIGH SENSITIVITY: 23 NG/L (ref 0–14)
TROPONIN, HIGH SENSITIVITY: 23 NG/L (ref 0–14)
WBC # BLD AUTO: 4.4 K/UL (ref 4–11)

## 2024-08-08 PROCEDURE — 3017F COLORECTAL CA SCREEN DOC REV: CPT | Performed by: INTERNAL MEDICINE

## 2024-08-08 PROCEDURE — G0378 HOSPITAL OBSERVATION PER HR: HCPCS

## 2024-08-08 PROCEDURE — 83735 ASSAY OF MAGNESIUM: CPT

## 2024-08-08 PROCEDURE — G8399 PT W/DXA RESULTS DOCUMENT: HCPCS | Performed by: INTERNAL MEDICINE

## 2024-08-08 PROCEDURE — 80053 COMPREHEN METABOLIC PANEL: CPT

## 2024-08-08 PROCEDURE — 6360000002 HC RX W HCPCS: Performed by: PHYSICIAN ASSISTANT

## 2024-08-08 PROCEDURE — 6370000000 HC RX 637 (ALT 250 FOR IP): Performed by: PHYSICIAN ASSISTANT

## 2024-08-08 PROCEDURE — 96372 THER/PROPH/DIAG INJ SC/IM: CPT

## 2024-08-08 PROCEDURE — 93005 ELECTROCARDIOGRAM TRACING: CPT

## 2024-08-08 PROCEDURE — 1090F PRES/ABSN URINE INCON ASSESS: CPT | Performed by: INTERNAL MEDICINE

## 2024-08-08 PROCEDURE — 3078F DIAST BP <80 MM HG: CPT | Performed by: INTERNAL MEDICINE

## 2024-08-08 PROCEDURE — 70450 CT HEAD/BRAIN W/O DYE: CPT

## 2024-08-08 PROCEDURE — 3046F HEMOGLOBIN A1C LEVEL >9.0%: CPT | Performed by: INTERNAL MEDICINE

## 2024-08-08 PROCEDURE — 36415 COLL VENOUS BLD VENIPUNCTURE: CPT

## 2024-08-08 PROCEDURE — G8427 DOCREV CUR MEDS BY ELIG CLIN: HCPCS | Performed by: INTERNAL MEDICINE

## 2024-08-08 PROCEDURE — 1124F ACP DISCUSS-NO DSCNMKR DOCD: CPT | Performed by: INTERNAL MEDICINE

## 2024-08-08 PROCEDURE — 3077F SYST BP >= 140 MM HG: CPT | Performed by: INTERNAL MEDICINE

## 2024-08-08 PROCEDURE — 2022F DILAT RTA XM EVC RTNOPTHY: CPT | Performed by: INTERNAL MEDICINE

## 2024-08-08 PROCEDURE — 82962 GLUCOSE BLOOD TEST: CPT | Performed by: INTERNAL MEDICINE

## 2024-08-08 PROCEDURE — 6370000000 HC RX 637 (ALT 250 FOR IP): Performed by: INTERNAL MEDICINE

## 2024-08-08 PROCEDURE — 4004F PT TOBACCO SCREEN RCVD TLK: CPT | Performed by: INTERNAL MEDICINE

## 2024-08-08 PROCEDURE — G8420 CALC BMI NORM PARAMETERS: HCPCS | Performed by: INTERNAL MEDICINE

## 2024-08-08 PROCEDURE — 99213 OFFICE O/P EST LOW 20 MIN: CPT | Performed by: INTERNAL MEDICINE

## 2024-08-08 PROCEDURE — G0379 DIRECT REFER HOSPITAL OBSERV: HCPCS

## 2024-08-08 PROCEDURE — 84484 ASSAY OF TROPONIN QUANT: CPT

## 2024-08-08 PROCEDURE — 85025 COMPLETE CBC W/AUTO DIFF WBC: CPT

## 2024-08-08 RX ORDER — ROPINIROLE 0.25 MG/1
0.25 TABLET, FILM COATED ORAL 3 TIMES DAILY
Status: DISCONTINUED | OUTPATIENT
Start: 2024-08-08 | End: 2024-08-09 | Stop reason: HOSPADM

## 2024-08-08 RX ORDER — INSULIN LISPRO 100 [IU]/ML
0-4 INJECTION, SOLUTION INTRAVENOUS; SUBCUTANEOUS NIGHTLY
Status: DISCONTINUED | OUTPATIENT
Start: 2024-08-08 | End: 2024-08-09 | Stop reason: HOSPADM

## 2024-08-08 RX ORDER — HYDROCODONE BITARTRATE AND ACETAMINOPHEN 5; 325 MG/1; MG/1
1 TABLET ORAL 2 TIMES DAILY PRN
Status: DISCONTINUED | OUTPATIENT
Start: 2024-08-08 | End: 2024-08-09 | Stop reason: HOSPADM

## 2024-08-08 RX ORDER — FUROSEMIDE 20 MG/1
20 TABLET ORAL DAILY PRN
Status: DISCONTINUED | OUTPATIENT
Start: 2024-08-08 | End: 2024-08-09 | Stop reason: HOSPADM

## 2024-08-08 RX ORDER — ASPIRIN 81 MG/1
81 TABLET, CHEWABLE ORAL DAILY
Status: DISCONTINUED | OUTPATIENT
Start: 2024-08-08 | End: 2024-08-09 | Stop reason: HOSPADM

## 2024-08-08 RX ORDER — POLYETHYLENE GLYCOL 3350 17 G/17G
17 POWDER, FOR SOLUTION ORAL DAILY PRN
Status: DISCONTINUED | OUTPATIENT
Start: 2024-08-08 | End: 2024-08-09 | Stop reason: HOSPADM

## 2024-08-08 RX ORDER — INSULIN GLARGINE 100 [IU]/ML
INJECTION, SOLUTION SUBCUTANEOUS
Status: ON HOLD | COMMUNITY
Start: 2024-07-25 | End: 2024-08-09 | Stop reason: HOSPADM

## 2024-08-08 RX ORDER — FERROUS SULFATE 324(65)MG
325 TABLET, DELAYED RELEASE (ENTERIC COATED) ORAL 2 TIMES DAILY
Status: DISCONTINUED | OUTPATIENT
Start: 2024-08-08 | End: 2024-08-09 | Stop reason: HOSPADM

## 2024-08-08 RX ORDER — POTASSIUM CHLORIDE 750 MG/1
10 TABLET, EXTENDED RELEASE ORAL DAILY
Status: DISCONTINUED | OUTPATIENT
Start: 2024-08-08 | End: 2024-08-09 | Stop reason: HOSPADM

## 2024-08-08 RX ORDER — INSULIN LISPRO 100 [IU]/ML
0-4 INJECTION, SOLUTION INTRAVENOUS; SUBCUTANEOUS
Status: DISCONTINUED | OUTPATIENT
Start: 2024-08-08 | End: 2024-08-09 | Stop reason: HOSPADM

## 2024-08-08 RX ORDER — METOPROLOL SUCCINATE 25 MG/1
25 TABLET, EXTENDED RELEASE ORAL DAILY
Status: DISCONTINUED | OUTPATIENT
Start: 2024-08-08 | End: 2024-08-08

## 2024-08-08 RX ORDER — CARVEDILOL 6.25 MG/1
6.25 TABLET ORAL 2 TIMES DAILY WITH MEALS
Status: DISCONTINUED | OUTPATIENT
Start: 2024-08-08 | End: 2024-08-09 | Stop reason: HOSPADM

## 2024-08-08 RX ORDER — PANTOPRAZOLE SODIUM 40 MG/1
40 TABLET, DELAYED RELEASE ORAL
Status: DISCONTINUED | OUTPATIENT
Start: 2024-08-08 | End: 2024-08-09 | Stop reason: HOSPADM

## 2024-08-08 RX ORDER — HYDRALAZINE HYDROCHLORIDE 25 MG/1
50 TABLET, FILM COATED ORAL EVERY 8 HOURS SCHEDULED
Status: DISCONTINUED | OUTPATIENT
Start: 2024-08-08 | End: 2024-08-09 | Stop reason: HOSPADM

## 2024-08-08 RX ORDER — ACETAMINOPHEN 325 MG/1
650 TABLET ORAL EVERY 4 HOURS PRN
Status: DISCONTINUED | OUTPATIENT
Start: 2024-08-08 | End: 2024-08-09 | Stop reason: HOSPADM

## 2024-08-08 RX ORDER — LACTULOSE 10 G/15ML
20 SOLUTION ORAL DAILY PRN
Status: DISCONTINUED | OUTPATIENT
Start: 2024-08-08 | End: 2024-08-09 | Stop reason: HOSPADM

## 2024-08-08 RX ORDER — POTASSIUM CHLORIDE 20 MEQ/1
40 TABLET, EXTENDED RELEASE ORAL ONCE
Status: COMPLETED | OUTPATIENT
Start: 2024-08-08 | End: 2024-08-08

## 2024-08-08 RX ORDER — ROSUVASTATIN CALCIUM 20 MG/1
20 TABLET, COATED ORAL NIGHTLY
Status: DISCONTINUED | OUTPATIENT
Start: 2024-08-08 | End: 2024-08-09 | Stop reason: HOSPADM

## 2024-08-08 RX ORDER — ENOXAPARIN SODIUM 100 MG/ML
40 INJECTION SUBCUTANEOUS EVERY 24 HOURS
Status: DISCONTINUED | OUTPATIENT
Start: 2024-08-08 | End: 2024-08-09 | Stop reason: HOSPADM

## 2024-08-08 RX ORDER — ALOGLIPTIN 12.5 MG/1
12.5 TABLET, FILM COATED ORAL DAILY
Status: DISCONTINUED | OUTPATIENT
Start: 2024-08-08 | End: 2024-08-09 | Stop reason: HOSPADM

## 2024-08-08 RX ADMIN — HYDRALAZINE HYDROCHLORIDE 50 MG: 25 TABLET ORAL at 17:44

## 2024-08-08 RX ADMIN — FERROUS SULFATE TAB EC 324 MG (65 MG FE EQUIVALENT) 324 MG: 324 (65 FE) TABLET DELAYED RESPONSE at 20:29

## 2024-08-08 RX ADMIN — ENOXAPARIN SODIUM 40 MG: 100 INJECTION SUBCUTANEOUS at 17:45

## 2024-08-08 RX ADMIN — HYDROCODONE BITARTRATE AND ACETAMINOPHEN 1 TABLET: 5; 325 TABLET ORAL at 18:53

## 2024-08-08 RX ADMIN — CARVEDILOL 6.25 MG: 6.25 TABLET, FILM COATED ORAL at 20:30

## 2024-08-08 RX ADMIN — INSULIN LISPRO 1 UNITS: 100 INJECTION, SOLUTION INTRAVENOUS; SUBCUTANEOUS at 17:45

## 2024-08-08 RX ADMIN — POTASSIUM CHLORIDE 40 MEQ: 1500 TABLET, EXTENDED RELEASE ORAL at 20:36

## 2024-08-08 RX ADMIN — ROSUVASTATIN CALCIUM 20 MG: 20 TABLET, COATED ORAL at 20:28

## 2024-08-08 RX ADMIN — ROPINIROLE HYDROCHLORIDE 0.25 MG: 0.25 TABLET, FILM COATED ORAL at 20:29

## 2024-08-08 RX ADMIN — METFORMIN HYDROCHLORIDE 1000 MG: 500 TABLET ORAL at 17:44

## 2024-08-08 ASSESSMENT — PAIN SCALES - GENERAL: PAINLEVEL_OUTOF10: 8

## 2024-08-08 NOTE — FLOWSHEET NOTE
Pt direct admit for obs from Dr Ambrosio for hypertensive urgency. Pt transported to room 7 via WC. Pt oriented to room and call light. Pt appears in no acute distress. SBP elevated in the 190s. Provider notified. Meds ordered and administered. Will recheck. Pt placed on continuous telemetry monitoring and was noted to be in afib rhythm. No history of afib per chart review and pt interview. Provider notified. EKG and troponin ordered and obtained. Pt denies any needs att. Pt encouraged to call out with any needs. Call light and bedside table within reach. Bed alarm placed on.        08/08/24 2724   Assessment   Charting Type Admission   Psychosocial   Psychosocial (WDL) WDL   Neurological   Neuro (WDL) WDL   Level of Consciousness 0   Swallow Screening   Is the patient unable to remain alert for testing? No   Is the patient on a modified diet (thickened liquids) due to pre-existing dysphagia? No   Is there presence of existing enteral tube feeding via the stomach or nose? No   Is there presence of head-of-bed restrictions (less than 30 degrees)? No   Is there presence of tracheotomy tube? No   Is the patient ordered nothing-by-mouth status? No   3 oz Water Swallow Screen Pass   [place] Coma Scale   Eye Opening 4   Best Verbal Response 5   Best Motor Response 6   [place] Coma Scale Score 15   HEENT (Head, Ears, Eyes, Nose, & Throat)   HEENT (WDL) X   Teeth Dentures upper   Respiratory   Respiratory (WDL) WDL   Respiratory Pattern Regular   Respiratory Depth Normal   Respiratory Quality/Effort Unlabored   Chest Assessment Chest expansion symmetrical;Trachea midline   L Breath Sounds Clear   R Breath Sounds Clear   Cardiac   Cardiac (WDL) X   Cardiac Regularity Irregular   Heart Sounds S1, S2   Cardiac Rhythm Atrial fib   Cardiac Monitor   Cardiac/Telemetry Monitor On Portable telemetry pack applied   Gastrointestinal   Abdominal (WDL) WDL   Abdomen Inspection Soft;Rounded   Last BM (including prior to admit) 08/08/24

## 2024-08-08 NOTE — PROGRESS NOTES
Chief Complaint   Patient presents with    Diabetes     2 month follow up. Patient's been out of test strips and not taken her blood sugar for a week. Patient's blood sugar today in office was 322      Fatigue     Patient has been really tired lately and having a lot of headaches. She states she wants to sleep all the time.            Have you seen any other physician or provider since your last visit yes - ER visit Sunday evening    Have you had any other diagnostic tests since your last visit? yes - labs    Have you changed or stopped any medications since your last visit? no     Patient has not been taking her b12 injections recently.

## 2024-08-08 NOTE — PROGRESS NOTES
SUBJECTIVE:    Patient ID: Maude Corrales is a 74 y.o.female.    Chief Complaint   Patient presents with    Diabetes     2 month follow up. Patient's been out of test strips and not taken her blood sugar for a week. Patient's blood sugar today in office was 322      Fatigue     Patient has been really tired lately and having a lot of headaches. She states she wants to sleep all the time.            HPI:  Patient presented today complaining of elevated glucose level and elevated blood pressure.  She reported this been going on for the past several days at least.  She reported that she went to the emergency room for this problem and nothing was done on that part and she was informed to follow-up with PCP.  Patient reported that she checks her glucose every few days and it is usually over 300.  She did not bring any readings with her.  Mobility is very limited.  She reported feeling tired and having occasional headache.  Systolic blood pressure 192 during this visit.  She was 160s-170s when she went to the emergency room.  Patient reported being compliant with taking her medications.  She has been using 45-50 units of Levemir.  She continues to smoke.  Glucose in the emergency room was 374 on her CMP.    Patient's medications, allergies, past medical, surgical, social and family histories were reviewed and updated as appropriate in electronic medical record.        Outpatient Medications Marked as Taking for the 8/8/24 encounter (Office Visit) with Lazarus Ambrosio MD   Medication Sig Dispense Refill    HYDROcodone-acetaminophen (NORCO) 5-325 MG per tablet Take 1 tablet by mouth 2 times daily as needed for Pain for up to 30 days. 45 tablet 0    hydrALAZINE (APRESOLINE) 25 MG tablet TAKE 1 TABLET BY MOUTH TWICE DAILY AS NEEDED FOR SYSTOLIC BLOOD PRESSURE OVER 165 180 tablet 1    insulin detemir (LEVEMIR FLEXTOUCH) 100 UNIT/ML injection pen Inject 45 Units into the skin nightly 13.5 mL 1    furosemide (LASIX) 20 MG

## 2024-08-08 NOTE — PLAN OF CARE
Problem: Discharge Planning  Goal: Discharge to home or other facility with appropriate resources  Outcome: Progressing     Problem: Safety - Adult  Goal: Free from fall injury  Outcome: Progressing     Problem: Cardiovascular - Adult  Goal: Absence of cardiac dysrhythmias or at baseline  Outcome: Progressing     Problem: Skin/Tissue Integrity - Adult  Goal: Skin integrity remains intact  Outcome: Progressing

## 2024-08-09 VITALS
WEIGHT: 141.8 LBS | HEART RATE: 64 BPM | RESPIRATION RATE: 18 BRPM | DIASTOLIC BLOOD PRESSURE: 57 MMHG | TEMPERATURE: 97.7 F | SYSTOLIC BLOOD PRESSURE: 143 MMHG | OXYGEN SATURATION: 96 % | BODY MASS INDEX: 22.89 KG/M2

## 2024-08-09 PROBLEM — E11.22 TYPE 2 DIABETES MELLITUS WITH CHRONIC KIDNEY DISEASE, WITH LONG-TERM CURRENT USE OF INSULIN (HCC): Status: ACTIVE | Noted: 2024-03-05

## 2024-08-09 PROBLEM — Z79.4 TYPE 2 DIABETES MELLITUS WITH CHRONIC KIDNEY DISEASE, WITH LONG-TERM CURRENT USE OF INSULIN (HCC): Status: ACTIVE | Noted: 2024-03-05

## 2024-08-09 LAB
25(OH)D3 SERPL-MCNC: 22.7 NG/ML (ref 32–100)
ANION GAP SERPL CALCULATED.3IONS-SCNC: 11 MMOL/L (ref 3–16)
BUN SERPL-MCNC: 12 MG/DL (ref 6–20)
CALCIUM SERPL-MCNC: 9.1 MG/DL (ref 8.5–10.5)
CHLORIDE SERPL-SCNC: 101 MMOL/L (ref 98–107)
CO2 SERPL-SCNC: 25 MMOL/L (ref 20–30)
CREAT SERPL-MCNC: 1.1 MG/DL (ref 0.4–1.2)
EKG ATRIAL RATE: 63 BPM
EKG DIAGNOSIS: NORMAL
EKG P AXIS: -58 DEGREES
EKG P-R INTERVAL: 168 MS
EKG Q-T INTERVAL: 416 MS
EKG QRS DURATION: 82 MS
EKG QTC CALCULATION (BAZETT): 425 MS
EKG R AXIS: 39 DEGREES
EKG T AXIS: 93 DEGREES
EKG VENTRICULAR RATE: 63 BPM
FOLATE SERPL-MCNC: 7.7 NG/ML
GFR SERPLBLD CREATININE-BSD FMLA CKD-EPI: 53 ML/MIN/{1.73_M2}
GLUCOSE BLD-MCNC: 139 MG/DL (ref 74–106)
GLUCOSE BLD-MCNC: 228 MG/DL (ref 74–106)
GLUCOSE SERPL-MCNC: 217 MG/DL (ref 74–106)
PERFORMED ON: ABNORMAL
PERFORMED ON: ABNORMAL
POTASSIUM SERPL-SCNC: 4.7 MMOL/L (ref 3.4–5.1)
SODIUM SERPL-SCNC: 137 MMOL/L (ref 136–145)
TSH SERPL DL<=0.005 MIU/L-ACNC: 2.54 UIU/ML (ref 0.27–4.2)

## 2024-08-09 PROCEDURE — 80048 BASIC METABOLIC PNL TOTAL CA: CPT

## 2024-08-09 PROCEDURE — 6370000000 HC RX 637 (ALT 250 FOR IP): Performed by: INTERNAL MEDICINE

## 2024-08-09 PROCEDURE — 99235 HOSP IP/OBS SAME DATE MOD 70: CPT | Performed by: INTERNAL MEDICINE

## 2024-08-09 PROCEDURE — 6360000002 HC RX W HCPCS: Performed by: INTERNAL MEDICINE

## 2024-08-09 PROCEDURE — G0378 HOSPITAL OBSERVATION PER HR: HCPCS

## 2024-08-09 PROCEDURE — 96372 THER/PROPH/DIAG INJ SC/IM: CPT

## 2024-08-09 PROCEDURE — 93005 ELECTROCARDIOGRAM TRACING: CPT

## 2024-08-09 PROCEDURE — 84443 ASSAY THYROID STIM HORMONE: CPT

## 2024-08-09 PROCEDURE — 86480 TB TEST CELL IMMUN MEASURE: CPT

## 2024-08-09 PROCEDURE — 82306 VITAMIN D 25 HYDROXY: CPT

## 2024-08-09 PROCEDURE — 82746 ASSAY OF FOLIC ACID SERUM: CPT

## 2024-08-09 PROCEDURE — 36415 COLL VENOUS BLD VENIPUNCTURE: CPT

## 2024-08-09 PROCEDURE — 97161 PT EVAL LOW COMPLEX 20 MIN: CPT

## 2024-08-09 PROCEDURE — 6370000000 HC RX 637 (ALT 250 FOR IP): Performed by: PHYSICIAN ASSISTANT

## 2024-08-09 PROCEDURE — 97116 GAIT TRAINING THERAPY: CPT

## 2024-08-09 RX ORDER — POTASSIUM CHLORIDE 750 MG/1
40 CAPSULE, EXTENDED RELEASE ORAL ONCE
Status: COMPLETED | OUTPATIENT
Start: 2024-08-09 | End: 2024-08-09

## 2024-08-09 RX ORDER — CYANOCOBALAMIN 1000 UG/ML
1000 INJECTION, SOLUTION INTRAMUSCULAR; SUBCUTANEOUS ONCE
Status: COMPLETED | OUTPATIENT
Start: 2024-08-09 | End: 2024-08-09

## 2024-08-09 RX ORDER — CARVEDILOL 6.25 MG/1
6.25 TABLET ORAL 2 TIMES DAILY WITH MEALS
Qty: 60 TABLET | Refills: 3 | Status: SHIPPED | OUTPATIENT
Start: 2024-08-09

## 2024-08-09 RX ORDER — HYDRALAZINE HYDROCHLORIDE 50 MG/1
50 TABLET, FILM COATED ORAL 2 TIMES DAILY
Qty: 90 TABLET | Refills: 3 | Status: SHIPPED | OUTPATIENT
Start: 2024-08-09

## 2024-08-09 RX ORDER — LISINOPRIL 5 MG/1
5 TABLET ORAL DAILY
Status: DISCONTINUED | OUTPATIENT
Start: 2024-08-09 | End: 2024-08-09 | Stop reason: HOSPADM

## 2024-08-09 RX ORDER — INSULIN LISPRO 100 [IU]/ML
5 INJECTION, SOLUTION INTRAVENOUS; SUBCUTANEOUS
COMMUNITY

## 2024-08-09 RX ORDER — LISINOPRIL 5 MG/1
5 TABLET ORAL DAILY
Qty: 30 TABLET | Refills: 3 | Status: SHIPPED | OUTPATIENT
Start: 2024-08-10

## 2024-08-09 RX ORDER — INSULIN GLARGINE 100 [IU]/ML
45 INJECTION, SOLUTION SUBCUTANEOUS NIGHTLY
COMMUNITY

## 2024-08-09 RX ADMIN — PANTOPRAZOLE SODIUM 40 MG: 40 TABLET, DELAYED RELEASE ORAL at 06:28

## 2024-08-09 RX ADMIN — ROPINIROLE HYDROCHLORIDE 0.25 MG: 0.25 TABLET, FILM COATED ORAL at 08:29

## 2024-08-09 RX ADMIN — CYANOCOBALAMIN 1000 MCG: 1000 INJECTION, SOLUTION INTRAMUSCULAR; SUBCUTANEOUS at 08:31

## 2024-08-09 RX ADMIN — LISINOPRIL 5 MG: 5 TABLET ORAL at 08:30

## 2024-08-09 RX ADMIN — POTASSIUM CHLORIDE 10 MEQ: 750 TABLET, EXTENDED RELEASE ORAL at 08:30

## 2024-08-09 RX ADMIN — FERROUS SULFATE TAB EC 324 MG (65 MG FE EQUIVALENT) 324 MG: 324 (65 FE) TABLET DELAYED RESPONSE at 08:30

## 2024-08-09 RX ADMIN — CARVEDILOL 6.25 MG: 6.25 TABLET, FILM COATED ORAL at 08:30

## 2024-08-09 RX ADMIN — EMPAGLIFLOZIN 10 MG: 10 TABLET, FILM COATED ORAL at 08:30

## 2024-08-09 RX ADMIN — METFORMIN HYDROCHLORIDE 1000 MG: 500 TABLET ORAL at 08:30

## 2024-08-09 RX ADMIN — INSULIN LISPRO 1 UNITS: 100 INJECTION, SOLUTION INTRAVENOUS; SUBCUTANEOUS at 11:34

## 2024-08-09 RX ADMIN — ASPIRIN 81 MG 81 MG: 81 TABLET ORAL at 08:30

## 2024-08-09 RX ADMIN — HYDRALAZINE HYDROCHLORIDE 50 MG: 25 TABLET ORAL at 06:27

## 2024-08-09 RX ADMIN — ALOGLIPTIN 12.5 MG: 12.5 TABLET, FILM COATED ORAL at 08:31

## 2024-08-09 RX ADMIN — POTASSIUM CHLORIDE 40 MEQ: 750 CAPSULE, EXTENDED RELEASE ORAL at 10:31

## 2024-08-09 NOTE — CARE COORDINATION
Called pt and went over her new medications from the meds to beds program.  Explained that the carvedilol will be replacing the metoprolol and that the hydralazine is being changed from 25mg po bid prn to 50mg po bid scheduled.  Also explained that lisinopril 5mg po daily is being added.    Of note, provider Lydia Castillo clarified that she wants the pt to resume her home diabetes medication regimen as she was taking it prior to admission.  Pt states that she was taking Lantus 45 units nightly prior to admission.  However, the Lantus was marked to stop taking upon dc.  Per provider Lydia Castillo PA she will add the Lantus back to the dc med list to continue upon DC.    The pt did state during conversation that she thought the Lantus was for her blood pressure.  Provided education to pt that the Lantus is for her diabetes, not blood pressure.  Asked INDIRA Gonzalez to provider more in-depth education to pt as well regarding the Lantus.    Baldev Domingo, JeffreyD

## 2024-08-09 NOTE — PROGRESS NOTES
Discharge instructions reviewed with pt at bedside. Pt verbalizes understanding and denies any questions. Pt transported to POV via WC.

## 2024-08-09 NOTE — FLOWSHEET NOTE
08/09/24 0830   Assessment   Charting Type Shift assessment   Psychosocial   Psychosocial (WDL) WDL   Neurological   Neuro (WDL) WDL   Level of Consciousness 0   [place] Coma Scale   Eye Opening 4   Best Verbal Response 5   Best Motor Response 6   Eds Coma Scale Score 15   HEENT (Head, Ears, Eyes, Nose, & Throat)   HEENT (WDL) X   Teeth Dentures upper   Respiratory   Respiratory (WDL) WDL   Respiratory Pattern Regular   Respiratory Depth Normal   Respiratory Quality/Effort Unlabored   Chest Assessment Chest expansion symmetrical;Trachea midline   L Breath Sounds Clear   R Breath Sounds Clear   Cardiac   Cardiac (WDL) X   Cardiac Regularity Irregular   Heart Sounds S1, S2   Cardiac Rhythm Atrial fib   Cardiac Monitor   Alarm Audible Yes   Telemetry Box Number [id number]   Telemetry Monitor Alarm Parameters    Gastrointestinal   Abdominal (WDL) WDL   Abdomen Inspection Soft;Rounded   RUQ Bowel Sounds Active   LUQ Bowel Sounds Active   RLQ Bowel Sounds Active   LLQ Bowel Sounds Active   Genitourinary   Genitourinary (WDL) WDL   Peripheral Vascular   Peripheral Vascular (WDL) WDL   Skin Integumentary    Skin Integumentary (WDL) X   Skin Color Pink   Skin Condition/Temp Warm;Dry   Skin Integrity Ecchymosis   Location scattered   Musculoskeletal   Musculoskeletal (WDL) X   RL Extremity Weakness   LL Extremity Weakness

## 2024-08-09 NOTE — CARE COORDINATION
Case Management Assessment  Initial Evaluation    Date/Time of Evaluation: 8/9/2024 10:32 AM  Assessment Completed by: Heidi Elizondo RN    If patient is discharged prior to next notation, then this note serves as note for discharge by case management.    Patient Name: Maude Corrales                   YOB: 1950  Diagnosis: Hypertensive urgency [I16.0]  Hyperglycemia [R73.9]  Declining functional status [R53.81]                   Date / Time: 8/8/2024  4:41 PM    Patient Admission Status: Observation   Readmission Risk (Low < 19, Mod (19-27), High > 27): Readmission Risk Score: 13.3    Current PCP: Lazarus Ambrosio MD  PCP verified by CM? Yes    Chart Reviewed: Yes      History Provided by: Patient, Medical Record  Patient Orientation: Alert and Oriented    Patient Cognition: Alert    Hospitalization in the last 30 days (Readmission):  No    If yes, Readmission Assessment in CM Navigator will be completed.    Advance Directives:      Code Status: DNR   Patient's Primary Decision Maker is: Patient Declined (Legal Next of Kin Remains as Decision Maker)    Primary Decision Maker: Terrie Weston - Child - [phone]    Secondary Decision Maker: Russel Doyle - Niece/Nephew - [phone]    Supplemental (Other) Decision Maker: Black Rivera - Niece/Nephew - [phone]    Discharge Planning:    Patient lives with: Other (Comment) (ex-husabnd) Type of Home: Trailer/Mobile Home  Primary Care Giver: Self  Patient Support Systems include: Other (Comment), Family Members (lives with ex-)   Current Financial resources: Medicare  Current community resources: None  Current services prior to admission: Durable Medical Equipment            Current DME: Shower Chair, Cane, Walker (quad cane, RW, grab bars)            Type of Home Care services:  None    ADLS  Prior functional level: Independent in ADLs/IADLs  Current functional level: Independent in ADLs/IADLs      Family can provide assistance at DC:  Yes  Would you like Case Management to discuss the discharge plan with any other family members/significant others, and if so, who? No  Plans to Return to Present Housing: Yes  Other Identified Issues/Barriers to RETURNING to current housing: none  Potential Assistance needed at discharge: N/A            Potential DME:  none  Patient expects to discharge to: Trailer/mobile home  Plan for transportation at discharge: Self    Financial    Payor: HU[employer] MEDICARE / Plan: HU[employer] CHOICE-PPO MEDICARE / Product Type: *No Product type* /     Does insurance require precert for SNF: Yes    Potential assistance Purchasing Medications: No  Meds-to-Beds request:        [facility] -  [phone] - F [phone]  [address] [place] [id number]  Phone: [phone] Fax: [phone]    [id number] [address] [facility] [phone] - F [phone]  [address] [place] [address]  Phone: [phone] Fax: [phone]    [facility], [place] - [address] - P [phone] - F [phone]  [address]  [place] [address]  Phone: [phone] Fax: [phone]      Notes:    Factors facilitating achievement of predicted outcomes: Family support, Motivated, Cooperative, Pleasant, Has needed Durable Medical Equipment at home, and Knowledge about rehab    Barriers to discharge: Medical complications    Additional Case Management Notes: Lives with ex-.  Has SC, grab bars, quad cane, RW.  No DC needs noted at this time.      The Plan for Transition of Care is related to the following treatment goals of Hypertensive urgency [I16.0]  Hyperglycemia [R73.9]  Declining functional status [R53.81]

## 2024-08-09 NOTE — PROGRESS NOTES
After receiving and reviewing the pharmacy fill history from [facility] and interviewing patient with the list of discrepancies that were found when compared to the home med list, patient discharge note had already been made and PA had left for the day. The following were the discrepancies found:  -Humalog 5 u tid reported not taking. Patient confirmed she is taking daily  -Lantus 45 u qhs reported not taking. Patient confirmed she is taking daily.  -Rosuvastatin 20 mg was last filled 12/11/2023. Patient is unsure if she is taking at this time stating that whatever she fill is what she takes.  -Ferrous Sulfate 324 mg bid was last filled as M2B on 3/4/2024 for a 30 day supply.   -Potassium 10 meq qd was last filled as M2B on 3/4/2024 for a 30 day supply.  -Pantoprazole 40 mg bid was last filled as M2B on 3/4/2024 for a 30 day supply.

## 2024-08-09 NOTE — PROGRESS NOTES
PA did give the go head to correct the home med list in order to give the patient a correct discharge summary. These changes were made on home med list.    After receiving and reviewing the pharmacy fill history from [facility] and interviewing patient with the list of discrepancies that were found when compared to the home med list, patient discharge note had already been made and PA had left for the day. The following were the discrepancies found:  -Humalog 5 u tid reported not taking. Patient confirmed she is taking daily  -Lantus 45 u qhs reported not taking. Patient confirmed she is taking daily.  -Rosuvastatin 20 mg was last filled 12/11/2023. Patient is unsure if she is taking at this time stating that whatever she fill is what she takes.  -Ferrous Sulfate 324 mg bid was last filled as M2B on 3/4/2024 for a 30 day supply.   -Potassium 10 meq qd was last filled as M2B on 3/4/2024 for a 30 day supply.  -Pantoprazole 40 mg bid was last filled as M2B on 3/4/2024 for a 30 day supply.

## 2024-08-09 NOTE — H&P
Short Stay Summary      Patient ID: Maude Corrales      Patient's PCP: Lazarus Ambrosio MD    Admit Date: 8/8/2024     Discharge Date: 8/9/2024      Admitting Physician: Lazarus Ambrosio MD    Discharge Physician: VIGNESH Teresa     Reason for this admission:   Uncontrolled blood pressure  Uncontrolled diabetes mellitus type 2    Discharge Diagnoses:     Active Hospital Problems    Diagnosis Date Noted    Hypertensive urgency [I16.0] 08/08/2024    Type 2 diabetes mellitus with chronic kidney disease, with long-term current use of insulin (HCC) [E11.22, Z79.4] 03/05/2024    Hypokalemia [E87.6] 06/01/2023    Anemia due to stage 3 chronic kidney disease (HCC) [N18.30, D63.1] 04/27/2021    Anxiety [F41.9] 06/02/2020    History of CVA (cerebrovascular accident) [Z86.73]        Procedures:  CT HEAD WO CONTRAST   Final Result   1. Stable atrophic changes   2. Periventricular microangiopathic ischemic changes, chronic small vessel disease   3. Old left basal ganglion ischemic infarct and stable old right pontine lacunar infarct   4. No acute interval changes. No evidence of acute intracranial hemorrhage      Electronically signed by Miles White MD            Consults:   IP CONSULT TO CASE MANAGEMENT  PT OT     HISTORY OF PRESENT ILLNESS:   The patient is a 74 y.o. female with PMH of CVA, CAD, cirrhosis, COPD, cystic fibrosis, DMII, GERD, HTN, tobacco abuse, vitamin B12 deficiency and anemia who presented as a direct admit for uncontrolled BP and DMII.  Per chart review, patient had been evaluated in the emergency department on 8/5 after presenting for generalized bodyaches, cough and back pain.  Patient endorsed ongoing symptoms to her PCP.  ER workup on 8/5 was essentially negative.  Patient was diagnosed with influenza-like illness and discharged home.  Upon presentation to PCP on 8/8, patient's blood pressure found to be elevated and she was sent for admission. Upon arrival to the medical unit, BP was 198/82.

## 2024-08-09 NOTE — PLAN OF CARE
Problem: Discharge Planning  Goal: Discharge to home or other facility with appropriate resources  8/9/2024 0154 by Chas Delgadillo RN  Outcome: Progressing  Flowsheets (Taken 8/8/2024 2036)  Discharge to home or other facility with appropriate resources:   Identify barriers to discharge with patient and caregiver   Arrange for needed discharge resources and transportation as appropriate  8/8/2024 1816 by María Elena Gonzalez RN  Outcome: Progressing     Problem: Safety - Adult  Goal: Free from fall injury  8/9/2024 0154 by Chas Delgadillo RN  Outcome: Progressing  8/8/2024 1816 by María Elena Gonzalez RN  Outcome: Progressing     Problem: Cardiovascular - Adult  Goal: Absence of cardiac dysrhythmias or at baseline  8/8/2024 1816 by María Elena Gonzalez RN  Outcome: Progressing     Problem: Skin/Tissue Integrity - Adult  Goal: Skin integrity remains intact  8/8/2024 1816 by María Elena Gonzalez RN  Outcome: Progressing     Problem: Musculoskeletal - Adult  Goal: Return mobility to safest level of function  Recent Flowsheet Documentation  Taken 8/8/2024 2036 by Chas Delgadillo RN  Return Mobility to Safest Level of Function:   Assess patient stability and activity tolerance for standing, transferring and ambulating with or without assistive devices   Assist with transfers and ambulation using safe patient handling equipment as needed     Problem: Metabolic/Fluid and Electrolytes - Adult  Goal: Electrolytes maintained within normal limits  Recent Flowsheet Documentation  Taken 8/8/2024 2036 by Chas Delgadillo RN  Electrolytes maintained within normal limits:   Monitor labs and assess patient for signs and symptoms of electrolyte imbalances   Administer electrolyte replacement as ordered   Monitor response to electrolyte replacements, including repeat lab results as appropriate

## 2024-08-09 NOTE — ACP (ADVANCE CARE PLANNING)
Advance Care Planning     General Advance Care Planning (ACP) Conversation    Date of Conversation: 8/9/2024  Conducted with: Patient with Decision Making Capacity  Other persons present: None    Healthcare Decision Maker:   Primary Decision Maker: Terrie Weston - Milena - [phone]    Secondary Decision Maker: Russel Doyle - Niece/Nephew - [phone]    Supplemental (Other) Decision Maker: Black Rivera - Niece/Nephew - [phone]     Today we documented Decision Maker(s) consistent with Legal Next of Kin hierarchy.  Content/Action Overview:  Has ACP document(s) on file - reflects the patient's care preferences  Reviewed DNR/DNI and patient confirms current DNR status - completed forms on file (place new order if needed)  artificial nutrition, ventilation preferences, and resuscitation preferences      Length of Voluntary ACP Conversation in minutes:  <16 minutes (Non-Billable)    Emmanuel Ross

## 2024-08-09 NOTE — CARE COORDINATION
Pt reports she is currently independent with ADL's and mobility. No equipment needs or concerns and that she is being discharged today. Occupational Therapy evaluation deferred at this time.

## 2024-08-09 NOTE — DISCHARGE INSTRUCTIONS
Carvedilol will replace metoprolol. STOP taking metoprolol.   Check blood sugar with each meal and at bedtime. Keep a log to take to your next PCP appointment.  Continue taking your Lantus as you were at home for your blood sugar.

## 2024-08-09 NOTE — DISCHARGE SUMMARY
(PROTONIX) 40 MG tablet Take 1 tablet by mouth 2 times daily (before meals) 3/4/24   Chey Norton PA   potassium chloride (KLOR-CON M) 10 MEQ extended release tablet Take 1 tablet by mouth daily 3/4/24   Chey Norton PA   blood glucose monitor strips TID and PRN Dx E11.9 2/15/24   Lazarus Ambrosio MD   aspirin 81 MG chewable tablet CHEW AND SWALLOW 1 TABLET BY MOUTH DAILY 1/17/24   Lazarus Ambrosio MD   rosuvastatin (CRESTOR) 20 MG tablet TAKE 1 TABLET BY MOUTH EVERY DAY 8/29/22   Lazarus Ambrosio MD   Insulin Pen Needle 31G X 6 MM MISC 1 each by Does not apply route daily 5/17/21   Lazarus Ambrosio MD   glucose monitoring kit (FREESTYLE) monitoring kit 1 kit by Does not apply route daily E11.40  Patient taking differently: 1 kit by Does not apply route 2 times daily E11.40 6/3/20   Lazarus Ambrosio MD   Insulin Syringe-Needle U-100 (B-D INS SYR ULTRAFINE 1CC/31G) 31G X 5/16\" 1 ML MISC USE ONCE A DAY AS DIRECTED  DX E11.9 4/17/17   Lazarus Ambrosio MD       Vital Signs  Temp: 97.7 °F (36.5 °C)  Pulse: 64  Respirations: 18  BP: (!) 143/57  SpO2: 96 %  O2 Device: None (Room air)       Vital signs reviewed in electronic chart.    Physical exam  Constitutional:  Well developed, well nourished, no acute distress.  Eyes:  conjunctiva normal, EOMI.  HENT:  Atraumatic, external ears normal, external nose/nares normal, oropharynx moist, no pharyngeal exudates.   Neck:  Supple. No JVD or thyromegaly.  Respiratory:  No respiratory distress, normal breath sounds, no rales, no wheezing.   Cardiovascular:  Normal rate, normal rhythm, no murmurs, no gallops, no rubs.  GI:  Soft, nondistended, normal bowel sounds, nontender, no organomegaly, no mass.  :  No costovertebral angle tenderness.  Musculoskeletal:  No edema, no tenderness, no obvious deformities. Patient is moving all extremities.   Integument:  Well hydrated, no rash.   Lymphatic:  No cervical or axillary lymphadenopathy noted.   Neurologic:  Alert & oriented x 3,  no focal  home and present log to PCP at follow-up.  For patient's diabetes mellitus, her home regimen was continued.  Blood glucoses ranged from 139-234.  No adjustments were made.  Patient instructed to monitor blood glucoses at home and present log to PCP for further titration of regimen.    For patient's anemia, this was taken from PCP notes: \"Patient did have extensive GI work-up about a year ago by Dr. Garza and she was found to have multiple colon polyps and angiectasia.  PillCam showed few AVM reachable only with push enteroscopy/retrograde enteroscopy.  Recommendation to manage at [facility] with any complication related to bleeding.  Continue GI prophylaxis.  Continue to monitor  Hb closely. I am going to send anemia work up and treat accordingly.  Seems to be stable but unable to tolerate anything on top of aspirin in regard to anticoagulation or antiplatelet (even though will benefit from dual antiplatelet but risks outweigh the benefit.  Patient and her daughter are aware and in agreement).  Monitor hemoglobin level and iron studies.  Last hemoglobin level was up to 10.4.\"  Hemoglobin was 12.1 on this visit.    Patient reports feeling better today.  She will be discharged home.    Disposition: home    Discharged Condition: Stable    Activity: activity as tolerated  Diet: diabetic diet  Follow Up: Primary Care Physician in one week    Discharge Medications:     Current Discharge Medication List             Details   lisinopril (PRINIVIL;ZESTRIL) 5 MG tablet Take 1 tablet by mouth daily  Qty: 30 tablet, Refills: 3      carvedilol (COREG) 6.25 MG tablet Take 1 tablet by mouth 2 times daily (with meals)  Qty: 60 tablet, Refills: 3                Details   hydrALAZINE (APRESOLINE) 50 MG tablet Take 1 tablet by mouth in the morning and at bedtime  Qty: 90 tablet, Refills: 3                Details   HYDROcodone-acetaminophen (NORCO) 5-325 MG per tablet Take 1 tablet by mouth 2 times daily as needed for Pain for up
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
## 2024-08-09 NOTE — PLAN OF CARE
Problem: Discharge Planning  Goal: Discharge to home or other facility with appropriate resources  8/9/2024 1049 by Juliana Bolton RN  Outcome: Adequate for Discharge  8/9/2024 0154 by Chas Delgadillo RN  Outcome: Progressing  Flowsheets (Taken 8/8/2024 2036)  Discharge to home or other facility with appropriate resources:   Identify barriers to discharge with patient and caregiver   Arrange for needed discharge resources and transportation as appropriate     Problem: Safety - Adult  Goal: Free from fall injury  8/9/2024 1049 by Juliana Bolton RN  Outcome: Adequate for Discharge  8/9/2024 0154 by Chas Delgadillo RN  Outcome: Progressing     Problem: Cardiovascular - Adult  Goal: Absence of cardiac dysrhythmias or at baseline  Outcome: Adequate for Discharge     Problem: Skin/Tissue Integrity - Adult  Goal: Skin integrity remains intact  Outcome: Adequate for Discharge     Problem: Musculoskeletal - Adult  Goal: Return mobility to safest level of function  Outcome: Adequate for Discharge     Problem: Gastrointestinal - Adult  Goal: Minimal or absence of nausea and vomiting  Outcome: Adequate for Discharge  Goal: Maintains or returns to baseline bowel function  Outcome: Adequate for Discharge  Goal: Maintains adequate nutritional intake  Outcome: Adequate for Discharge     Problem: Genitourinary - Adult  Goal: Absence of urinary retention  Outcome: Adequate for Discharge     Problem: Infection - Adult  Goal: Absence of infection at discharge  Outcome: Adequate for Discharge     Problem: Metabolic/Fluid and Electrolytes - Adult  Goal: Electrolytes maintained within normal limits  Outcome: Adequate for Discharge     Problem: Chronic Conditions and Co-morbidities  Goal: Patient's chronic conditions and co-morbidity symptoms are monitored and maintained or improved  Outcome: Adequate for Discharge

## 2024-08-09 NOTE — PROGRESS NOTES
CLINICAL PHARMACY NOTE: MEDS TO BEDS    Total # of Prescriptions Filled: 3   The following medications were delivered to the patient:  Discharge Medication List as of 8/9/2024 11:37 AM        START taking these medications    Details   lisinopril (PRINIVIL;ZESTRIL) 5 MG tablet Take 1 tablet by mouth daily, Disp-30 tablet, R-3Normal      carvedilol (COREG) 6.25 MG tablet Take 1 tablet by mouth 2 times daily (with meals), Disp-60 tablet, R-3Normal         Hydralazine 50 mg tablets  Take 1 tablet by mouth every morning and at bedtime.  Qty:90 tablets Refill: 3      Additional Documentation:  Filled 90 day supply of Lisinopril and Carvedilol. Patient requested any remaining refills be transferred to [facility].      Medications were delivered to patient's room and signed for by patient.

## 2024-08-12 ENCOUNTER — CARE COORDINATION (OUTPATIENT)
Dept: MEDSURG UNIT | Facility: HOSPITAL | Age: 74
End: 2024-08-12

## 2024-08-12 DIAGNOSIS — Z79.4 TYPE 2 DIABETES MELLITUS WITH DIABETIC NEUROPATHY, WITH LONG-TERM CURRENT USE OF INSULIN (HCC): Primary | ICD-10-CM

## 2024-08-12 DIAGNOSIS — E11.40 TYPE 2 DIABETES MELLITUS WITH DIABETIC NEUROPATHY, WITH LONG-TERM CURRENT USE OF INSULIN (HCC): Primary | ICD-10-CM

## 2024-08-12 LAB
GAMMA INTERFERON BACKGROUND BLD IA-ACNC: 0.03 IU/ML
M TB IFN-G BLD-IMP: NEGATIVE
M TB IFN-G CD4+ BCKGRND COR BLD-ACNC: 0 IU/ML
M TB IFN-G CD4+CD8+ BCKGRND COR BLD-ACNC: 0.02 IU/ML
MITOGEN IGNF BCKGRD COR BLD-ACNC: 9.97 IU/ML

## 2024-08-12 RX ORDER — POTASSIUM CHLORIDE 750 MG/1
10 TABLET, EXTENDED RELEASE ORAL DAILY
Qty: 90 TABLET | Refills: 1 | Status: SHIPPED | OUTPATIENT
Start: 2024-08-12

## 2024-08-12 RX ORDER — PANTOPRAZOLE SODIUM 40 MG/1
40 TABLET, DELAYED RELEASE ORAL
Qty: 180 TABLET | Refills: 1 | Status: SHIPPED | OUTPATIENT
Start: 2024-08-12

## 2024-08-12 RX ORDER — FUROSEMIDE 20 MG
TABLET ORAL
Qty: 30 TABLET | Refills: 2 | Status: SHIPPED | OUTPATIENT
Start: 2024-08-12

## 2024-08-12 RX ORDER — ASPIRIN 81 MG/1
81 TABLET, CHEWABLE ORAL DAILY
Qty: 90 TABLET | Refills: 1 | Status: SHIPPED | OUTPATIENT
Start: 2024-08-12

## 2024-08-12 RX ORDER — LACTULOSE 10 G/15ML
20 SOLUTION ORAL DAILY PRN
Qty: 473 ML | Refills: 0 | Status: SHIPPED | OUTPATIENT
Start: 2024-08-12

## 2024-08-12 RX ORDER — FERROUS SULFATE 325(65) MG
325 TABLET ORAL 2 TIMES DAILY
Qty: 180 TABLET | Refills: 1 | Status: SHIPPED | OUTPATIENT
Start: 2024-08-12

## 2024-08-13 ENCOUNTER — CARE COORDINATION (OUTPATIENT)
Dept: MEDSURG UNIT | Facility: HOSPITAL | Age: 74
End: 2024-08-13

## 2024-08-13 NOTE — CARE COORDINATION
Care Transitions Initial Follow Up Call    Outreach made within 2 business days of discharge: Yes    Patient: Maude Corrales Patient : 1950   MRN: [id number]  Reason for Admission: hypertension  Discharge Date: 24       Spoke with: patient    Discharge department/facility: [facility]    TCM Interactive Patient Contact:  Was patient able to fill all prescriptions: Yes  Was patient instructed to bring all medications to the follow-up visit: Yes  Is patient taking all medications as directed in the discharge summary? Yes  Does patient understand their discharge instructions: Yes  Does patient have questions or concerns that need addressed prior to 7-14 day follow up office visit: no    Additional needs identified to be addressed with provider  No needs identified             Scheduled appointment with PCP within 7-14 days    Follow Up  Future Appointments   Date Time Provider Department Center   2024  3:15 PM Lazarus Ambrosio MD [facility] DEP       Kit Calderon

## 2024-08-13 NOTE — CARE COORDINATION
Spoke with patient, who said she is feeling fine. She said her medicines were explained prior to discharge and she has everything she needs to stay at her home. She has a follow-up appointment with Dr. Ambrosio 8/14/2024 at 3:15 PM. She had no questions at the time.

## 2024-08-14 ENCOUNTER — OFFICE VISIT (OUTPATIENT)
Dept: PRIMARY CARE CLINIC | Age: 74
End: 2024-08-14
Payer: MEDICARE

## 2024-08-14 VITALS
BODY MASS INDEX: 23.57 KG/M2 | RESPIRATION RATE: 18 BRPM | SYSTOLIC BLOOD PRESSURE: 153 MMHG | HEART RATE: 71 BPM | OXYGEN SATURATION: 96 % | DIASTOLIC BLOOD PRESSURE: 63 MMHG | WEIGHT: 146 LBS

## 2024-08-14 DIAGNOSIS — Z79.4 TYPE 2 DIABETES MELLITUS WITH DIABETIC NEUROPATHY, WITH LONG-TERM CURRENT USE OF INSULIN (HCC): Primary | ICD-10-CM

## 2024-08-14 DIAGNOSIS — R42 DIZZINESS: ICD-10-CM

## 2024-08-14 DIAGNOSIS — E11.40 TYPE 2 DIABETES MELLITUS WITH DIABETIC NEUROPATHY, WITH LONG-TERM CURRENT USE OF INSULIN (HCC): Primary | ICD-10-CM

## 2024-08-14 DIAGNOSIS — E53.8 VITAMIN B12 DEFICIENCY: ICD-10-CM

## 2024-08-14 DIAGNOSIS — K74.60 CIRRHOSIS OF LIVER WITHOUT ASCITES, UNSPECIFIED HEPATIC CIRRHOSIS TYPE (HCC): ICD-10-CM

## 2024-08-14 DIAGNOSIS — I10 ESSENTIAL HYPERTENSION: ICD-10-CM

## 2024-08-14 PROCEDURE — G8420 CALC BMI NORM PARAMETERS: HCPCS | Performed by: INTERNAL MEDICINE

## 2024-08-14 PROCEDURE — 99214 OFFICE O/P EST MOD 30 MIN: CPT | Performed by: INTERNAL MEDICINE

## 2024-08-14 PROCEDURE — G8427 DOCREV CUR MEDS BY ELIG CLIN: HCPCS | Performed by: INTERNAL MEDICINE

## 2024-08-14 PROCEDURE — 2022F DILAT RTA XM EVC RTNOPTHY: CPT | Performed by: INTERNAL MEDICINE

## 2024-08-14 PROCEDURE — 3078F DIAST BP <80 MM HG: CPT | Performed by: INTERNAL MEDICINE

## 2024-08-14 PROCEDURE — G8399 PT W/DXA RESULTS DOCUMENT: HCPCS | Performed by: INTERNAL MEDICINE

## 2024-08-14 PROCEDURE — 3046F HEMOGLOBIN A1C LEVEL >9.0%: CPT | Performed by: INTERNAL MEDICINE

## 2024-08-14 PROCEDURE — 3077F SYST BP >= 140 MM HG: CPT | Performed by: INTERNAL MEDICINE

## 2024-08-14 PROCEDURE — 96372 THER/PROPH/DIAG INJ SC/IM: CPT | Performed by: INTERNAL MEDICINE

## 2024-08-14 PROCEDURE — 3017F COLORECTAL CA SCREEN DOC REV: CPT | Performed by: INTERNAL MEDICINE

## 2024-08-14 PROCEDURE — 1124F ACP DISCUSS-NO DSCNMKR DOCD: CPT | Performed by: INTERNAL MEDICINE

## 2024-08-14 PROCEDURE — 4004F PT TOBACCO SCREEN RCVD TLK: CPT | Performed by: INTERNAL MEDICINE

## 2024-08-14 PROCEDURE — 1090F PRES/ABSN URINE INCON ASSESS: CPT | Performed by: INTERNAL MEDICINE

## 2024-08-14 RX ORDER — CYANOCOBALAMIN 1000 UG/ML
1000 INJECTION, SOLUTION INTRAMUSCULAR; SUBCUTANEOUS ONCE
Status: COMPLETED | OUTPATIENT
Start: 2024-08-14 | End: 2024-08-14

## 2024-08-14 RX ORDER — INSULIN LISPRO 100 [IU]/ML
5 INJECTION, SOLUTION INTRAVENOUS; SUBCUTANEOUS
Qty: 5 ADJUSTABLE DOSE PRE-FILLED PEN SYRINGE | Refills: 0 | Status: SHIPPED | OUTPATIENT
Start: 2024-08-14

## 2024-08-14 RX ADMIN — CYANOCOBALAMIN 1000 MCG: 1000 INJECTION, SOLUTION INTRAMUSCULAR; SUBCUTANEOUS at 15:36

## 2024-08-14 ASSESSMENT — ENCOUNTER SYMPTOMS
SORE THROAT: 0
SHORTNESS OF BREATH: 0
BACK PAIN: 1
NAUSEA: 0
EYE DISCHARGE: 0
COUGH: 0
SINUS PRESSURE: 0
WHEEZING: 0
VOMITING: 0
ABDOMINAL PAIN: 0

## 2024-08-14 NOTE — PROGRESS NOTES
Chief Complaint   Patient presents with    Follow-Up from Hospital       Have you seen any other physician or provider since your last visit yes -     Have you had any other diagnostic tests since your last visit? yes -     Have you changed or stopped any medications since your last visit? no       
06/06/2024     2. Essential hypertension  Blood pressure is elevated.  I have advised her on low-sodium diet, exercise and weight control.  I am going to continue current medication and have her titrate hydralazine to 2-3/day or more if needed. Will monitor her renal function every few months, have advised her to check blood pressure frequently and to keep a record of this.  Patient to swing by in few days to report BP readings.    3. Cirrhosis of liver without ascites, unspecified hepatic cirrhosis type (HCC)  Try to avoid an hepatotoxic agent. Make sure diabetes and lipid profile under good control. Monitor labs closely.  Discussed the importance of taking lactulose at least few times per week specially when there is any possible mental status changes/slight confusion.    4. Dizziness  Improved.  Try to make sure blood pressure and diabetes under good control.  Encourage increase activity level.  I have lengthy conversation with the patient and her daughter was present regarding safety tips including using walker all the time and discussed fall precautions specially with her generalized weakness and unsteady gait.  Discussed the potential complication from walking her age group specially with her multiple and complicated medical history.    5. Vitamin B12 deficiency  Will cont on B12 injection on a monthly basis.    - cyanocobalamin injection 1,000 mcg        Medical Decision Making: moderate complexity    Inpatient course: Discharge summary reviewed- see chart.      I, Melia Hay LPN am scribing for and in the presence of Lazarus Ambrosio MD on this date 8/14/2024 at 3:00 PM.    I, Dr. Lazarus Ambrosio, personally performed the services described in the documentation as scribed by Melia Hay LPN, in my presence and it is both accurate and complete.

## 2024-08-17 ASSESSMENT — ENCOUNTER SYMPTOMS
SINUS PRESSURE: 0
BACK PAIN: 1
NAUSEA: 0
VOMITING: 0
SHORTNESS OF BREATH: 0
ABDOMINAL PAIN: 0
COUGH: 0
EYE DISCHARGE: 0
SORE THROAT: 0
WHEEZING: 0

## 2024-08-19 RX ORDER — ASPIRIN 81 MG/1
81 TABLET, CHEWABLE ORAL
Qty: 90 TABLET | Refills: 1 | OUTPATIENT
Start: 2024-08-19

## 2024-08-19 RX ORDER — EMPAGLIFLOZIN 10 MG/1
10 TABLET, FILM COATED ORAL DAILY
Qty: 30 TABLET | Refills: 3 | Status: SHIPPED | OUTPATIENT
Start: 2024-08-19

## 2024-08-19 RX ORDER — ROPINIROLE 0.25 MG/1
0.25 TABLET, FILM COATED ORAL 3 TIMES DAILY
Qty: 270 TABLET | Refills: 0 | Status: SHIPPED | OUTPATIENT
Start: 2024-08-19

## 2024-08-20 LAB
EKG ATRIAL RATE: 63 BPM
EKG ATRIAL RATE: 70 BPM
EKG DIAGNOSIS: NORMAL
EKG DIAGNOSIS: NORMAL
EKG P AXIS: -58 DEGREES
EKG P AXIS: 77 DEGREES
EKG P-R INTERVAL: 168 MS
EKG P-R INTERVAL: 198 MS
EKG Q-T INTERVAL: 404 MS
EKG Q-T INTERVAL: 416 MS
EKG QRS DURATION: 82 MS
EKG QRS DURATION: 84 MS
EKG QTC CALCULATION (BAZETT): 425 MS
EKG QTC CALCULATION (BAZETT): 436 MS
EKG R AXIS: -1 DEGREES
EKG R AXIS: 39 DEGREES
EKG T AXIS: 82 DEGREES
EKG T AXIS: 93 DEGREES
EKG VENTRICULAR RATE: 63 BPM
EKG VENTRICULAR RATE: 70 BPM

## 2024-08-23 DIAGNOSIS — M51.36 DEGENERATIVE DISC DISEASE, LUMBAR: ICD-10-CM

## 2024-08-23 RX ORDER — HYDROCODONE BITARTRATE AND ACETAMINOPHEN 5; 325 MG/1; MG/1
1 TABLET ORAL 2 TIMES DAILY PRN
Qty: 45 TABLET | Refills: 0 | Status: SHIPPED | OUTPATIENT
Start: 2024-08-23 | End: 2024-09-22

## 2024-08-30 ENCOUNTER — OFFICE VISIT (OUTPATIENT)
Dept: PRIMARY CARE CLINIC | Age: 74
End: 2024-08-30
Payer: MEDICARE

## 2024-08-30 VITALS
HEART RATE: 66 BPM | DIASTOLIC BLOOD PRESSURE: 61 MMHG | SYSTOLIC BLOOD PRESSURE: 114 MMHG | TEMPERATURE: 97.5 F | OXYGEN SATURATION: 98 %

## 2024-08-30 DIAGNOSIS — R19.7 DIARRHEA, UNSPECIFIED TYPE: Primary | ICD-10-CM

## 2024-08-30 PROCEDURE — G8428 CUR MEDS NOT DOCUMENT: HCPCS | Performed by: PHYSICIAN ASSISTANT

## 2024-08-30 PROCEDURE — 1090F PRES/ABSN URINE INCON ASSESS: CPT | Performed by: PHYSICIAN ASSISTANT

## 2024-08-30 PROCEDURE — 3074F SYST BP LT 130 MM HG: CPT | Performed by: PHYSICIAN ASSISTANT

## 2024-08-30 PROCEDURE — 3017F COLORECTAL CA SCREEN DOC REV: CPT | Performed by: PHYSICIAN ASSISTANT

## 2024-08-30 PROCEDURE — 3078F DIAST BP <80 MM HG: CPT | Performed by: PHYSICIAN ASSISTANT

## 2024-08-30 PROCEDURE — G8420 CALC BMI NORM PARAMETERS: HCPCS | Performed by: PHYSICIAN ASSISTANT

## 2024-08-30 PROCEDURE — 4004F PT TOBACCO SCREEN RCVD TLK: CPT | Performed by: PHYSICIAN ASSISTANT

## 2024-08-30 PROCEDURE — G8399 PT W/DXA RESULTS DOCUMENT: HCPCS | Performed by: PHYSICIAN ASSISTANT

## 2024-08-30 PROCEDURE — 99213 OFFICE O/P EST LOW 20 MIN: CPT | Performed by: PHYSICIAN ASSISTANT

## 2024-08-30 PROCEDURE — 1124F ACP DISCUSS-NO DSCNMKR DOCD: CPT | Performed by: PHYSICIAN ASSISTANT

## 2024-08-30 RX ORDER — LOPERAMIDE HYDROCHLORIDE 1 MG/5ML
1 SOLUTION ORAL 3 TIMES DAILY PRN
Qty: 118 ML | Refills: 0 | Status: SHIPPED | OUTPATIENT
Start: 2024-08-30 | End: 2024-09-06

## 2024-08-30 ASSESSMENT — ENCOUNTER SYMPTOMS
DIARRHEA: 1
RESPIRATORY NEGATIVE: 1

## 2024-08-30 NOTE — PROGRESS NOTES
Chief Complaint   Patient presents with    Diarrhea     Pt says she has had diarrhea on and off for the last two weeks and feel so weak. Says no new meds or dietary changes.        Have you seen any other physician or provider since your last visit no    Have you had any other diagnostic tests since your last visit? no    Have you changed or stopped any medications since your last visit? no       
Leukocytes; Future  -     GI Bacterial Pathogens By PCR; Future  -     C DIFF TOXIN/ANTIGEN; Future  -     OVA & PARASITE ID/COUNT #1; Future  -     POCT occult blood stool; Future         Plan:      Patient here with a week of diarrhea.  States it is on the most every single day.  Watery.  Did fecal occult blood in clinic due to \"black stool.\"  Negative.  Likely from iron and Pepto.  Will treat symptomatically with Imodium.  Will get GI panel and wait on results.  Any worsening or concern for dehydration go to ER.    Orders Placed This Encounter   Procedures    Fecal Leukocytes     Standing Status:   Future     Standing Expiration Date:   8/30/2025    GI Bacterial Pathogens By PCR     Standing Status:   Future     Standing Expiration Date:   8/30/2025    C DIFF TOXIN/ANTIGEN     Standing Status:   Future     Standing Expiration Date:   8/30/2025    OVA & PARASITE ID/COUNT #1     Standing Status:   Future     Standing Expiration Date:   8/30/2025    POCT occult blood stool     Standing Status:   Future     Standing Expiration Date:   8/30/2025      Orders Placed This Encounter   Medications    loperamide (ANTI-DIARRHEAL) 1 MG/5ML solution     Sig: Take 5 mLs by mouth 3 times daily as needed for Diarrhea     Dispense:  118 mL     Refill:  0       DDx includes but is not limited to -C. difficile, infectious diarrhea, gastroenteritis, colitis    Discussed ddx/dx and tx plan with pt. Discussed risks vs benefits of tx. Pt and/or Guardian is/are A&Ox3 and verbally acknowledges understanding. Pt agrees with tx plan. Pt agrees if acute worsening to go to the ER for evaluation and tx, or return to clinic for re-evaluation. Otherwise, pt should f/u with PCP. Pt verbally acknowledges understanding of results of any tests, procedures and/or imaging done at this visit.    Parts of this document/medical record have been dictated via Dragon [employer] speech recognition software, which may cause errors in spelling or

## 2024-09-25 DIAGNOSIS — M51.36 DEGENERATIVE DISC DISEASE, LUMBAR: ICD-10-CM

## 2024-09-25 RX ORDER — HYDROCODONE BITARTRATE AND ACETAMINOPHEN 5; 325 MG/1; MG/1
1 TABLET ORAL 2 TIMES DAILY PRN
Qty: 45 TABLET | Refills: 0 | Status: SHIPPED | OUTPATIENT
Start: 2024-09-25 | End: 2024-10-25

## 2024-10-09 ENCOUNTER — APPOINTMENT (OUTPATIENT)
Dept: GENERAL RADIOLOGY | Facility: HOSPITAL | Age: 74
End: 2024-10-09
Payer: MEDICARE

## 2024-10-09 ENCOUNTER — HOSPITAL ENCOUNTER (INPATIENT)
Facility: HOSPITAL | Age: 74
LOS: 1 days | Discharge: HOME | End: 2024-10-10
Attending: EMERGENCY MEDICINE | Admitting: INTERNAL MEDICINE
Payer: MEDICARE

## 2024-10-09 ENCOUNTER — APPOINTMENT (OUTPATIENT)
Dept: CT IMAGING | Facility: HOSPITAL | Age: 74
End: 2024-10-09
Payer: MEDICARE

## 2024-10-09 DIAGNOSIS — R73.9 HYPERGLYCEMIA: ICD-10-CM

## 2024-10-09 DIAGNOSIS — R41.0 DISORIENTATION: ICD-10-CM

## 2024-10-09 DIAGNOSIS — Z86.73 HISTORY OF CVA (CEREBROVASCULAR ACCIDENT): Primary | ICD-10-CM

## 2024-10-09 DIAGNOSIS — I10 ESSENTIAL HYPERTENSION: ICD-10-CM

## 2024-10-09 PROBLEM — E11.00 HYPEROSMOLAR HYPERGLYCEMIC STATE (HHS) (HCC): Status: ACTIVE | Noted: 2024-10-09

## 2024-10-09 LAB
ALBUMIN SERPL-MCNC: 4.3 G/DL (ref 3.4–4.8)
ALBUMIN SERPL-MCNC: 4.4 G/DL (ref 3.4–4.8)
ALBUMIN/GLOB SERPL: 1.2 {RATIO} (ref 0.8–2)
ALBUMIN/GLOB SERPL: 1.2 {RATIO} (ref 0.8–2)
ALP SERPL-CCNC: 212 U/L (ref 25–100)
ALP SERPL-CCNC: 222 U/L (ref 25–100)
ALT SERPL-CCNC: 48 U/L (ref 4–36)
ALT SERPL-CCNC: 51 U/L (ref 4–36)
AMMONIA PLAS-SCNC: 52 MCG/DL (ref 19–87)
ANION GAP SERPL CALCULATED.3IONS-SCNC: 15 MMOL/L (ref 3–16)
ANION GAP SERPL CALCULATED.3IONS-SCNC: 16 MMOL/L (ref 3–16)
ANION GAP SERPL CALCULATED.3IONS-SCNC: 18 MMOL/L (ref 3–16)
AST SERPL-CCNC: 20 U/L (ref 8–33)
AST SERPL-CCNC: 23 U/L (ref 8–33)
BACTERIA URNS QL MICRO: ABNORMAL /HPF
BASE EXCESS BLDA CALC-SCNC: 0.5 MMOL/L (ref -3–3)
BASOPHILS # BLD: 0 K/UL (ref 0–0.1)
BASOPHILS NFR BLD: 0.2 %
BILIRUB SERPL-MCNC: 0.8 MG/DL (ref 0.3–1.2)
BILIRUB SERPL-MCNC: 1 MG/DL (ref 0.3–1.2)
BILIRUB UR QL STRIP.AUTO: NEGATIVE
BUN SERPL-MCNC: 21 MG/DL (ref 6–20)
BUN SERPL-MCNC: 23 MG/DL (ref 6–20)
BUN SERPL-MCNC: 24 MG/DL (ref 6–20)
CALCIUM SERPL-MCNC: 10.4 MG/DL (ref 8.5–10.5)
CALCIUM SERPL-MCNC: 10.4 MG/DL (ref 8.5–10.5)
CALCIUM SERPL-MCNC: 9.8 MG/DL (ref 8.5–10.5)
CHLORIDE SERPL-SCNC: 103 MMOL/L (ref 98–107)
CHLORIDE SERPL-SCNC: 87 MMOL/L (ref 98–107)
CHLORIDE SERPL-SCNC: 95 MMOL/L (ref 98–107)
CLARITY UR: CLEAR
CO2 BLDA-SCNC: 25.6 MMOL/L (ref 24–30)
CO2 SERPL-SCNC: 23 MMOL/L (ref 20–30)
CO2 SERPL-SCNC: 23 MMOL/L (ref 20–30)
CO2 SERPL-SCNC: 24 MMOL/L (ref 20–30)
COLOR UR: YELLOW
CREAT SERPL-MCNC: 1.2 MG/DL (ref 0.4–1.2)
CREAT SERPL-MCNC: 1.2 MG/DL (ref 0.4–1.2)
CREAT SERPL-MCNC: 1.5 MG/DL (ref 0.4–1.2)
EOSINOPHIL # BLD: 0 K/UL (ref 0–0.4)
EOSINOPHIL NFR BLD: 0.2 %
EPI CELLS #/AREA URNS HPF: ABNORMAL /HPF (ref 0–5)
ERYTHROCYTE [DISTWIDTH] IN BLOOD BY AUTOMATED COUNT: 14.3 % (ref 11–16)
GFR SERPLBLD CREATININE-BSD FMLA CKD-EPI: 37 ML/MIN/{1.73_M2}
GFR SERPLBLD CREATININE-BSD FMLA CKD-EPI: 48 ML/MIN/{1.73_M2}
GFR SERPLBLD CREATININE-BSD FMLA CKD-EPI: 48 ML/MIN/{1.73_M2}
GLOBULIN SER CALC-MCNC: 3.5 G/DL
GLOBULIN SER CALC-MCNC: 3.7 G/DL
GLUCOSE BLD-MCNC: 224 MG/DL (ref 74–106)
GLUCOSE BLD-MCNC: 230 MG/DL (ref 74–106)
GLUCOSE BLD-MCNC: 248 MG/DL (ref 74–106)
GLUCOSE BLD-MCNC: 308 MG/DL (ref 74–106)
GLUCOSE BLD-MCNC: 340 MG/DL (ref 74–106)
GLUCOSE BLD-MCNC: 367 MG/DL (ref 74–106)
GLUCOSE BLD-MCNC: 424 MG/DL (ref 74–106)
GLUCOSE BLD-MCNC: 477 MG/DL (ref 74–106)
GLUCOSE SERPL-MCNC: 1215 MG/DL (ref 74–106)
GLUCOSE SERPL-MCNC: 418 MG/DL (ref 74–106)
GLUCOSE SERPL-MCNC: 828 MG/DL (ref 74–106)
GLUCOSE UR STRIP.AUTO-MCNC: >=1000 MG/DL
HBA1C MFR BLD: 10.8 %
HCO3 BLDA-SCNC: 24.4 MMOL/L (ref 22–26)
HCT VFR BLD AUTO: 42.5 % (ref 37–47)
HGB BLD-MCNC: 13.8 G/DL (ref 11.5–16.5)
HGB UR QL STRIP.AUTO: ABNORMAL
IMM GRANULOCYTES # BLD: 0 K/UL
IMM GRANULOCYTES NFR BLD: 0.2 % (ref 0–5)
INHALED O2 FLOW RATE: 0.21 %
KETONES UR STRIP.AUTO-MCNC: NEGATIVE MG/DL
LACTATE BLDV-SCNC: 3.2 MMOL/L (ref 0.4–2)
LACTATE BLDV-SCNC: 3.4 MMOL/L (ref 0.4–2)
LACTATE BLDV-SCNC: 3.8 MMOL/L (ref 0.4–2)
LEUKOCYTE ESTERASE UR QL STRIP.AUTO: NEGATIVE
LYMPHOCYTES # BLD: 0.4 K/UL (ref 1.5–4)
LYMPHOCYTES NFR BLD: 6.5 %
MAGNESIUM SERPL-MCNC: 2.2 MG/DL (ref 1.7–2.4)
MCH RBC QN AUTO: 27.4 PG (ref 27–32)
MCHC RBC AUTO-ENTMCNC: 32.5 G/DL (ref 31–35)
MCV RBC AUTO: 84.5 FL (ref 80–100)
MONOCYTES # BLD: 0.2 K/UL (ref 0.2–0.8)
MONOCYTES NFR BLD: 3.6 %
MUCOUS THREADS URNS QL MICRO: ABNORMAL /LPF
NEUTROPHILS # BLD: 5.2 K/UL (ref 2–7.5)
NEUTS SEG NFR BLD: 89.3 %
NITRITE UR QL STRIP.AUTO: NEGATIVE
O2 THERAPY: ABNORMAL
PCO2 BLDA: 37 MMHG (ref 35–45)
PERFORMED ON: ABNORMAL
PH BLDA: 7.44 [PH] (ref 7.35–7.45)
PH UR STRIP.AUTO: 5.5 [PH] (ref 5–8)
PHOSPHATE SERPL-MCNC: 2.2 MG/DL (ref 2.5–4.5)
PLATELET # BLD AUTO: 154 K/UL (ref 150–400)
PMV BLD AUTO: 11.8 FL (ref 6–10)
PO2 BLDA: 72.5 MMHG (ref 80–100)
POTASSIUM SERPL-SCNC: 3.1 MMOL/L (ref 3.4–5.1)
POTASSIUM SERPL-SCNC: 4.3 MMOL/L (ref 3.4–5.1)
POTASSIUM SERPL-SCNC: 4.4 MMOL/L (ref 3.4–5.1)
PROT SERPL-MCNC: 7.8 G/DL (ref 6.4–8.3)
PROT SERPL-MCNC: 8.1 G/DL (ref 6.4–8.3)
PROT UR STRIP.AUTO-MCNC: 100 MG/DL
RBC # BLD AUTO: 5.03 M/UL (ref 3.8–5.8)
RBC #/AREA URNS HPF: ABNORMAL /HPF (ref 0–4)
SAO2 % BLDA: 95.2 %
SARS-COV-2 RDRP RESP QL NAA+PROBE: NOT DETECTED
SODIUM SERPL-SCNC: 129 MMOL/L (ref 136–145)
SODIUM SERPL-SCNC: 134 MMOL/L (ref 136–145)
SODIUM SERPL-SCNC: 141 MMOL/L (ref 136–145)
SP GR UR STRIP.AUTO: 1.01 (ref 1–1.03)
TSH SERPL-MCNC: 0.84 UIU/ML (ref 0.27–4.2)
UA COMPLETE W REFLEX CULTURE PNL UR: ABNORMAL
UA DIPSTICK W REFLEX MICRO PNL UR: YES
URN SPEC COLLECT METH UR: ABNORMAL
UROBILINOGEN UR STRIP-ACNC: 0.2 E.U./DL
WBC # BLD AUTO: 5.8 K/UL (ref 4–11)
WBC #/AREA URNS HPF: ABNORMAL /HPF (ref 0–5)
YEAST URNS QL MICRO: PRESENT /HPF

## 2024-10-09 PROCEDURE — 6360000002 HC RX W HCPCS: Performed by: EMERGENCY MEDICINE

## 2024-10-09 PROCEDURE — 36415 COLL VENOUS BLD VENIPUNCTURE: CPT

## 2024-10-09 PROCEDURE — 71045 X-RAY EXAM CHEST 1 VIEW: CPT

## 2024-10-09 PROCEDURE — 36600 WITHDRAWAL OF ARTERIAL BLOOD: CPT

## 2024-10-09 PROCEDURE — 6370000000 HC RX 637 (ALT 250 FOR IP): Performed by: EMERGENCY MEDICINE

## 2024-10-09 PROCEDURE — 83735 ASSAY OF MAGNESIUM: CPT

## 2024-10-09 PROCEDURE — 81001 URINALYSIS AUTO W/SCOPE: CPT

## 2024-10-09 PROCEDURE — 82140 ASSAY OF AMMONIA: CPT

## 2024-10-09 PROCEDURE — 6370000000 HC RX 637 (ALT 250 FOR IP): Performed by: PHYSICIAN ASSISTANT

## 2024-10-09 PROCEDURE — 80053 COMPREHEN METABOLIC PANEL: CPT

## 2024-10-09 PROCEDURE — 85025 COMPLETE CBC W/AUTO DIFF WBC: CPT

## 2024-10-09 PROCEDURE — 2580000003 HC RX 258: Performed by: EMERGENCY MEDICINE

## 2024-10-09 PROCEDURE — 96361 HYDRATE IV INFUSION ADD-ON: CPT

## 2024-10-09 PROCEDURE — 2580000003 HC RX 258: Performed by: PHYSICIAN ASSISTANT

## 2024-10-09 PROCEDURE — 93005 ELECTROCARDIOGRAM TRACING: CPT

## 2024-10-09 PROCEDURE — 6360000002 HC RX W HCPCS: Performed by: PHYSICIAN ASSISTANT

## 2024-10-09 PROCEDURE — 70450 CT HEAD/BRAIN W/O DYE: CPT

## 2024-10-09 PROCEDURE — 83605 ASSAY OF LACTIC ACID: CPT

## 2024-10-09 PROCEDURE — 84443 ASSAY THYROID STIM HORMONE: CPT

## 2024-10-09 PROCEDURE — 87635 SARS-COV-2 COVID-19 AMP PRB: CPT

## 2024-10-09 PROCEDURE — 83036 HEMOGLOBIN GLYCOSYLATED A1C: CPT

## 2024-10-09 PROCEDURE — 82803 BLOOD GASES ANY COMBINATION: CPT

## 2024-10-09 PROCEDURE — 99285 EMERGENCY DEPT VISIT HI MDM: CPT

## 2024-10-09 PROCEDURE — 96374 THER/PROPH/DIAG INJ IV PUSH: CPT

## 2024-10-09 PROCEDURE — 2000000000 HC ICU R&B

## 2024-10-09 PROCEDURE — 83930 ASSAY OF BLOOD OSMOLALITY: CPT

## 2024-10-09 PROCEDURE — 84100 ASSAY OF PHOSPHORUS: CPT

## 2024-10-09 RX ORDER — NICOTINE 21 MG/24HR
1 PATCH, TRANSDERMAL 24 HOURS TRANSDERMAL DAILY
Status: DISCONTINUED | OUTPATIENT
Start: 2024-10-10 | End: 2024-10-10 | Stop reason: HOSPADM

## 2024-10-09 RX ORDER — IBUPROFEN 600 MG/1
1 TABLET ORAL PRN
Status: DISCONTINUED | OUTPATIENT
Start: 2024-10-09 | End: 2024-10-10 | Stop reason: HOSPADM

## 2024-10-09 RX ORDER — ASPIRIN 81 MG/1
81 TABLET, CHEWABLE ORAL DAILY
Status: DISCONTINUED | OUTPATIENT
Start: 2024-10-09 | End: 2024-10-09

## 2024-10-09 RX ORDER — SODIUM CHLORIDE 9 MG/ML
INJECTION, SOLUTION INTRAVENOUS CONTINUOUS
Status: DISCONTINUED | OUTPATIENT
Start: 2024-10-09 | End: 2024-10-09

## 2024-10-09 RX ORDER — POTASSIUM CHLORIDE 750 MG/1
40 CAPSULE, EXTENDED RELEASE ORAL ONCE
OUTPATIENT
Start: 2024-10-09

## 2024-10-09 RX ORDER — 0.9 % SODIUM CHLORIDE 0.9 %
1000 INTRAVENOUS SOLUTION INTRAVENOUS ONCE
Status: COMPLETED | OUTPATIENT
Start: 2024-10-09 | End: 2024-10-10

## 2024-10-09 RX ORDER — MAGNESIUM SULFATE IN WATER 40 MG/ML
2000 INJECTION, SOLUTION INTRAVENOUS PRN
Status: DISCONTINUED | OUTPATIENT
Start: 2024-10-09 | End: 2024-10-09

## 2024-10-09 RX ORDER — HYDRALAZINE HYDROCHLORIDE 20 MG/ML
5 INJECTION INTRAMUSCULAR; INTRAVENOUS ONCE
Status: COMPLETED | OUTPATIENT
Start: 2024-10-09 | End: 2024-10-09

## 2024-10-09 RX ORDER — CARVEDILOL 6.25 MG/1
6.25 TABLET ORAL 2 TIMES DAILY WITH MEALS
Status: DISCONTINUED | OUTPATIENT
Start: 2024-10-10 | End: 2024-10-10 | Stop reason: HOSPADM

## 2024-10-09 RX ORDER — INSULIN GLARGINE 100 [IU]/ML
25 INJECTION, SOLUTION SUBCUTANEOUS NIGHTLY
Status: CANCELLED | OUTPATIENT
Start: 2024-10-09

## 2024-10-09 RX ORDER — NICOTINE 21 MG/24HR
1 PATCH, TRANSDERMAL 24 HOURS TRANSDERMAL DAILY
Status: DISCONTINUED | OUTPATIENT
Start: 2024-10-09 | End: 2024-10-09

## 2024-10-09 RX ORDER — CARVEDILOL 6.25 MG/1
6.25 TABLET ORAL 2 TIMES DAILY WITH MEALS
Status: DISCONTINUED | OUTPATIENT
Start: 2024-10-09 | End: 2024-10-09

## 2024-10-09 RX ORDER — ALOGLIPTIN 12.5 MG/1
12.5 TABLET, FILM COATED ORAL DAILY
Status: DISCONTINUED | OUTPATIENT
Start: 2024-10-10 | End: 2024-10-10 | Stop reason: HOSPADM

## 2024-10-09 RX ORDER — ACETAMINOPHEN 325 MG/1
650 TABLET ORAL EVERY 4 HOURS PRN
Status: DISCONTINUED | OUTPATIENT
Start: 2024-10-09 | End: 2024-10-10 | Stop reason: HOSPADM

## 2024-10-09 RX ORDER — INSULIN GLARGINE 100 [IU]/ML
25 INJECTION, SOLUTION SUBCUTANEOUS
Status: DISCONTINUED | OUTPATIENT
Start: 2024-10-09 | End: 2024-10-10

## 2024-10-09 RX ORDER — 0.9 % SODIUM CHLORIDE 0.9 %
15 INTRAVENOUS SOLUTION INTRAVENOUS ONCE
Status: COMPLETED | OUTPATIENT
Start: 2024-10-09 | End: 2024-10-09

## 2024-10-09 RX ORDER — SODIUM CHLORIDE 450 MG/100ML
INJECTION, SOLUTION INTRAVENOUS CONTINUOUS
Status: DISCONTINUED | OUTPATIENT
Start: 2024-10-09 | End: 2024-10-09

## 2024-10-09 RX ORDER — POTASSIUM CHLORIDE 7.45 MG/ML
10 INJECTION INTRAVENOUS PRN
Status: DISCONTINUED | OUTPATIENT
Start: 2024-10-09 | End: 2024-10-09

## 2024-10-09 RX ORDER — ALOGLIPTIN 12.5 MG/1
12.5 TABLET, FILM COATED ORAL DAILY
Status: DISCONTINUED | OUTPATIENT
Start: 2024-10-09 | End: 2024-10-09

## 2024-10-09 RX ORDER — INSULIN LISPRO 100 [IU]/ML
0-4 INJECTION, SOLUTION INTRAVENOUS; SUBCUTANEOUS
Status: DISCONTINUED | OUTPATIENT
Start: 2024-10-09 | End: 2024-10-10

## 2024-10-09 RX ORDER — LISINOPRIL 5 MG/1
5 TABLET ORAL DAILY
Status: DISCONTINUED | OUTPATIENT
Start: 2024-10-10 | End: 2024-10-10 | Stop reason: HOSPADM

## 2024-10-09 RX ORDER — DEXTROSE MONOHYDRATE AND SODIUM CHLORIDE 5; .45 G/100ML; G/100ML
INJECTION, SOLUTION INTRAVENOUS CONTINUOUS PRN
Status: DISCONTINUED | OUTPATIENT
Start: 2024-10-09 | End: 2024-10-10 | Stop reason: HOSPADM

## 2024-10-09 RX ORDER — ASPIRIN 81 MG/1
81 TABLET, CHEWABLE ORAL DAILY
Status: DISCONTINUED | OUTPATIENT
Start: 2024-10-10 | End: 2024-10-10 | Stop reason: HOSPADM

## 2024-10-09 RX ORDER — DEXTROSE MONOHYDRATE 100 MG/ML
INJECTION, SOLUTION INTRAVENOUS CONTINUOUS PRN
Status: DISCONTINUED | OUTPATIENT
Start: 2024-10-09 | End: 2024-10-10 | Stop reason: HOSPADM

## 2024-10-09 RX ORDER — FLUCONAZOLE 100 MG/1
200 TABLET ORAL ONCE
Status: COMPLETED | OUTPATIENT
Start: 2024-10-09 | End: 2024-10-09

## 2024-10-09 RX ORDER — LISINOPRIL 5 MG/1
5 TABLET ORAL ONCE
Status: COMPLETED | OUTPATIENT
Start: 2024-10-09 | End: 2024-10-09

## 2024-10-09 RX ORDER — POTASSIUM CHLORIDE 750 MG/1
40 CAPSULE, EXTENDED RELEASE ORAL ONCE
Status: COMPLETED | OUTPATIENT
Start: 2024-10-09 | End: 2024-10-09

## 2024-10-09 RX ORDER — DEXTROSE MONOHYDRATE AND SODIUM CHLORIDE 5; .45 G/100ML; G/100ML
INJECTION, SOLUTION INTRAVENOUS CONTINUOUS PRN
Status: DISCONTINUED | OUTPATIENT
Start: 2024-10-09 | End: 2024-10-09

## 2024-10-09 RX ORDER — LISINOPRIL 5 MG/1
5 TABLET ORAL DAILY
Status: DISCONTINUED | OUTPATIENT
Start: 2024-10-10 | End: 2024-10-09

## 2024-10-09 RX ORDER — SODIUM CHLORIDE 9 MG/ML
INJECTION, SOLUTION INTRAVENOUS CONTINUOUS
Status: DISCONTINUED | OUTPATIENT
Start: 2024-10-09 | End: 2024-10-10 | Stop reason: HOSPADM

## 2024-10-09 RX ADMIN — POTASSIUM CHLORIDE 10 MEQ: 7.46 INJECTION, SOLUTION INTRAVENOUS at 15:47

## 2024-10-09 RX ADMIN — INSULIN GLARGINE 25 UNITS: 100 INJECTION, SOLUTION SUBCUTANEOUS at 22:06

## 2024-10-09 RX ADMIN — SODIUM CHLORIDE: 9 INJECTION, SOLUTION INTRAVENOUS at 23:49

## 2024-10-09 RX ADMIN — SODIUM CHLORIDE: 9 INJECTION, SOLUTION INTRAVENOUS at 12:54

## 2024-10-09 RX ADMIN — POTASSIUM CHLORIDE 10 MEQ: 7.46 INJECTION, SOLUTION INTRAVENOUS at 15:46

## 2024-10-09 RX ADMIN — SODIUM CHLORIDE: 4.5 INJECTION, SOLUTION INTRAVENOUS at 20:56

## 2024-10-09 RX ADMIN — HUMAN INSULIN 6 UNITS: 100 INJECTION, SOLUTION SUBCUTANEOUS at 11:09

## 2024-10-09 RX ADMIN — ASPIRIN 81 MG 81 MG: 81 TABLET ORAL at 15:38

## 2024-10-09 RX ADMIN — HYDRALAZINE HYDROCHLORIDE 5 MG: 20 INJECTION INTRAMUSCULAR; INTRAVENOUS at 12:27

## 2024-10-09 RX ADMIN — FLUCONAZOLE 200 MG: 100 TABLET ORAL at 11:09

## 2024-10-09 RX ADMIN — DEXTROSE AND SODIUM CHLORIDE: 5; 450 INJECTION, SOLUTION INTRAVENOUS at 21:24

## 2024-10-09 RX ADMIN — LISINOPRIL 5 MG: 5 TABLET ORAL at 13:22

## 2024-10-09 RX ADMIN — CARVEDILOL 6.25 MG: 6.25 TABLET, FILM COATED ORAL at 15:38

## 2024-10-09 RX ADMIN — ALOGLIPTIN 12.5 MG: 12.5 TABLET, FILM COATED ORAL at 15:39

## 2024-10-09 RX ADMIN — POTASSIUM CHLORIDE 40 MEQ: 750 CAPSULE, EXTENDED RELEASE ORAL at 15:37

## 2024-10-09 RX ADMIN — INSULIN HUMAN 6 UNITS/HR: 1 INJECTION, SOLUTION INTRAVENOUS at 12:59

## 2024-10-09 RX ADMIN — POTASSIUM CHLORIDE 10 MEQ: 7.46 INJECTION, SOLUTION INTRAVENOUS at 17:52

## 2024-10-09 RX ADMIN — POTASSIUM CHLORIDE 10 MEQ: 7.46 INJECTION, SOLUTION INTRAVENOUS at 21:13

## 2024-10-09 RX ADMIN — SODIUM CHLORIDE: 4.5 INJECTION, SOLUTION INTRAVENOUS at 17:03

## 2024-10-09 RX ADMIN — POTASSIUM CHLORIDE 10 MEQ: 7.46 INJECTION, SOLUTION INTRAVENOUS at 14:41

## 2024-10-09 RX ADMIN — POTASSIUM CHLORIDE 10 MEQ: 7.46 INJECTION, SOLUTION INTRAVENOUS at 20:10

## 2024-10-09 RX ADMIN — SODIUM CHLORIDE 1000 ML: 9 INJECTION, SOLUTION INTRAVENOUS at 11:07

## 2024-10-09 RX ADMIN — SODIUM CHLORIDE 924 ML: 9 INJECTION, SOLUTION INTRAVENOUS at 12:53

## 2024-10-09 ASSESSMENT — PAIN - FUNCTIONAL ASSESSMENT: PAIN_FUNCTIONAL_ASSESSMENT: NONE - DENIES PAIN

## 2024-10-09 NOTE — PROGRESS NOTES
4 Eyes Skin Assessment     NAME:  Maude Corrales  YOB: 1950  MEDICAL RECORD NUMBER:  [id number]    The patient is being assessed for  Admission    I agree that at least one RN has performed a thorough Head to Toe Skin Assessment on the patient. ALL assessment sites listed below have been assessed.      Areas assessed by both nurses:    Head, Face, Ears, Shoulders, Back, Chest, Arms, Elbows, Hands, Sacrum. Buttock, Coccyx, Ischium, and Legs. Feet and Heels        Does the Patient have a Wound? No noted wound(s)       Hector Prevention initiated by RN: Yes  Wound Care Orders initiated by RN: No    Pressure Injury (Stage 3,4, Unstageable, DTI, NWPT, and Complex wounds) if present, place Wound referral order by RN under : No    New Ostomies, if present place, Ostomy referral order under : No     Nurse 1 eSignature: Electronically signed by Regla Suarez RN on 10/9/24 at 4:55 PM EDT    **SHARE this note so that the co-signing nurse can place an eSignature**    Nurse 2 eSignature: {Esignature:[id number]}

## 2024-10-09 NOTE — ED TRIAGE NOTES
Pt brought in by daughter for new confusion.  Dtr saw pt on Monday and she was not confused at that time.  She was sick with diarrhea and had a poor appetite but she was at her baseline which per daughter is alert and oriented.  Dtr advises that pt gets confused when she is severely anemic and has required blood transfusions in the past.  Pt lives with her .  Dtr does not know pt hx.  She did advise,however, that pt had a stroke in the past with some residual effects including slight facial droop.  Pt was able to tell me her name.    Spoke with jack at [employer] and she will fax pt med list

## 2024-10-09 NOTE — ED NOTES
Call to [facility] pharmacy.  Per wade pt only used harness for a meds to bed

## 2024-10-09 NOTE — PLAN OF CARE
Problem: Chronic Conditions and Co-morbidities  Goal: Patient's chronic conditions and co-morbidity symptoms are monitored and maintained or improved  Outcome: Progressing     Problem: Discharge Planning  Goal: Discharge to home or other facility with appropriate resources  Outcome: Progressing  Flowsheets (Taken 10/9/2024 6310)  Discharge to home or other facility with appropriate resources:   Identify barriers to discharge with patient and caregiver   Arrange for needed discharge resources and transportation as appropriate   Identify discharge learning needs (meds, wound care, etc)   Refer to discharge planning if patient needs post-hospital services based on physician order or complex needs related to functional status, cognitive ability or social support system     Problem: Safety - Adult  Goal: Free from fall injury  Outcome: Progressing
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
## 2024-10-09 NOTE — ED NOTES
Dr garcia aware of bg of \"hi\" and pt elevated bp.  Call to pharmacy pt has not picked up meds since August and bp meds were a 30 supply in August.  The only medications the pt picked up in September were norco and metformin

## 2024-10-09 NOTE — PROGRESS NOTES
Pt admitted from ER to Room 8(ICU) with Dx Hyperglycemia under Dr. Ambrosio' services. Pt oriented to room,call bell, tv, phone and bathroom. No acute distress noted on arrival. Telemetry placed on pt. Pt with H/O: COPD,CAD,IDDM,HTN,CVA..Continues on insulin drip @ 6 units.

## 2024-10-10 VITALS
DIASTOLIC BLOOD PRESSURE: 63 MMHG | RESPIRATION RATE: 19 BRPM | OXYGEN SATURATION: 98 % | HEART RATE: 63 BPM | HEIGHT: 64 IN | TEMPERATURE: 98.5 F | SYSTOLIC BLOOD PRESSURE: 150 MMHG | WEIGHT: 141.3 LBS | BODY MASS INDEX: 24.12 KG/M2

## 2024-10-10 PROBLEM — F03.90 DEMENTIA (HCC): Status: ACTIVE | Noted: 2024-10-10

## 2024-10-10 PROBLEM — Z91.199 MEDICALLY NONCOMPLIANT: Status: ACTIVE | Noted: 2024-10-10

## 2024-10-10 PROBLEM — E11.9 TYPE 2 DIABETES MELLITUS (HCC): Status: ACTIVE | Noted: 2024-03-05

## 2024-10-10 PROBLEM — R41.82 AMS (ALTERED MENTAL STATUS): Status: ACTIVE | Noted: 2024-10-10

## 2024-10-10 LAB
ALBUMIN SERPL-MCNC: 3.4 G/DL (ref 3.4–4.8)
ALBUMIN/GLOB SERPL: 1.3 {RATIO} (ref 0.8–2)
ALP SERPL-CCNC: 159 U/L (ref 25–100)
ALT SERPL-CCNC: 36 U/L (ref 4–36)
ANION GAP SERPL CALCULATED.3IONS-SCNC: 8 MMOL/L (ref 3–16)
AST SERPL-CCNC: 35 U/L (ref 8–33)
BASOPHILS # BLD: 0 K/UL (ref 0–0.1)
BASOPHILS NFR BLD: 0.6 %
BILIRUB SERPL-MCNC: 0.6 MG/DL (ref 0.3–1.2)
BUN SERPL-MCNC: 21 MG/DL (ref 6–20)
CALCIUM SERPL-MCNC: 9 MG/DL (ref 8.5–10.5)
CHLORIDE SERPL-SCNC: 99 MMOL/L (ref 98–107)
CO2 SERPL-SCNC: 25 MMOL/L (ref 20–30)
CREAT SERPL-MCNC: 1.1 MG/DL (ref 0.4–1.2)
EOSINOPHIL # BLD: 0.1 K/UL (ref 0–0.4)
EOSINOPHIL NFR BLD: 1.1 %
ERYTHROCYTE [DISTWIDTH] IN BLOOD BY AUTOMATED COUNT: 14.3 % (ref 11–16)
GFR SERPLBLD CREATININE-BSD FMLA CKD-EPI: 53 ML/MIN/{1.73_M2}
GLOBULIN SER CALC-MCNC: 2.7 G/DL
GLUCOSE BLD-MCNC: 231 MG/DL (ref 74–106)
GLUCOSE BLD-MCNC: 369 MG/DL (ref 74–106)
GLUCOSE BLD-MCNC: 439 MG/DL (ref 74–106)
GLUCOSE BLD-MCNC: 452 MG/DL (ref 74–106)
GLUCOSE BLD-MCNC: 469 MG/DL (ref 74–106)
GLUCOSE SERPL-MCNC: 218 MG/DL (ref 74–106)
HCT VFR BLD AUTO: 33.2 % (ref 37–47)
HGB BLD-MCNC: 11.2 G/DL (ref 11.5–16.5)
IMM GRANULOCYTES # BLD: 0 K/UL
IMM GRANULOCYTES NFR BLD: 0.3 % (ref 0–5)
LACTATE BLDV-SCNC: 1.8 MMOL/L (ref 0.4–2)
LYMPHOCYTES # BLD: 1.3 K/UL (ref 1.5–4)
LYMPHOCYTES NFR BLD: 19.9 %
MCH RBC QN AUTO: 28 PG (ref 27–32)
MCHC RBC AUTO-ENTMCNC: 33.7 G/DL (ref 31–35)
MCV RBC AUTO: 83 FL (ref 80–100)
MONOCYTES # BLD: 0.5 K/UL (ref 0.2–0.8)
MONOCYTES NFR BLD: 7 %
NEUTROPHILS # BLD: 4.6 K/UL (ref 2–7.5)
NEUTS SEG NFR BLD: 71.1 %
OSMOLALITY SERPL: 330 MOSM/KG (ref 280–301)
PERFORMED ON: ABNORMAL
PLATELET # BLD AUTO: 134 K/UL (ref 150–400)
PMV BLD AUTO: 10.5 FL (ref 6–10)
POTASSIUM SERPL-SCNC: 4.2 MMOL/L (ref 3.4–5.1)
PROT SERPL-MCNC: 6.1 G/DL (ref 6.4–8.3)
RBC # BLD AUTO: 4 M/UL (ref 3.8–5.8)
SODIUM SERPL-SCNC: 132 MMOL/L (ref 136–145)
WBC # BLD AUTO: 6.4 K/UL (ref 4–11)

## 2024-10-10 PROCEDURE — 97161 PT EVAL LOW COMPLEX 20 MIN: CPT

## 2024-10-10 PROCEDURE — 92610 EVALUATE SWALLOWING FUNCTION: CPT

## 2024-10-10 PROCEDURE — 85025 COMPLETE CBC W/AUTO DIFF WBC: CPT

## 2024-10-10 PROCEDURE — 94761 N-INVAS EAR/PLS OXIMETRY MLT: CPT

## 2024-10-10 PROCEDURE — 51798 US URINE CAPACITY MEASURE: CPT

## 2024-10-10 PROCEDURE — 99236 HOSP IP/OBS SAME DATE HI 85: CPT | Performed by: INTERNAL MEDICINE

## 2024-10-10 PROCEDURE — 97166 OT EVAL MOD COMPLEX 45 MIN: CPT

## 2024-10-10 PROCEDURE — 97116 GAIT TRAINING THERAPY: CPT

## 2024-10-10 PROCEDURE — 80053 COMPREHEN METABOLIC PANEL: CPT

## 2024-10-10 PROCEDURE — 6370000000 HC RX 637 (ALT 250 FOR IP): Performed by: PHYSICIAN ASSISTANT

## 2024-10-10 PROCEDURE — 83605 ASSAY OF LACTIC ACID: CPT

## 2024-10-10 PROCEDURE — 36415 COLL VENOUS BLD VENIPUNCTURE: CPT

## 2024-10-10 RX ORDER — ROSUVASTATIN CALCIUM 20 MG/1
TABLET, COATED ORAL
Qty: 30 TABLET | Refills: 0 | Status: SHIPPED | OUTPATIENT
Start: 2024-10-10

## 2024-10-10 RX ORDER — FERROUS SULFATE 325(65) MG
325 TABLET ORAL 2 TIMES DAILY
Qty: 60 TABLET | Refills: 0 | Status: SHIPPED | OUTPATIENT
Start: 2024-10-10

## 2024-10-10 RX ORDER — INSULIN GLARGINE 100 [IU]/ML
30 INJECTION, SOLUTION SUBCUTANEOUS NIGHTLY
Status: DISCONTINUED | OUTPATIENT
Start: 2024-10-10 | End: 2024-10-10 | Stop reason: HOSPADM

## 2024-10-10 RX ORDER — HYDRALAZINE HYDROCHLORIDE 50 MG/1
50 TABLET, FILM COATED ORAL 2 TIMES DAILY
Qty: 60 TABLET | Refills: 0 | Status: SHIPPED | OUTPATIENT
Start: 2024-10-10

## 2024-10-10 RX ORDER — INSULIN LISPRO 100 [IU]/ML
0-16 INJECTION, SOLUTION INTRAVENOUS; SUBCUTANEOUS
Status: DISCONTINUED | OUTPATIENT
Start: 2024-10-10 | End: 2024-10-10 | Stop reason: HOSPADM

## 2024-10-10 RX ORDER — CARVEDILOL 6.25 MG/1
6.25 TABLET ORAL 2 TIMES DAILY WITH MEALS
Qty: 60 TABLET | Refills: 0 | Status: SHIPPED | OUTPATIENT
Start: 2024-10-10

## 2024-10-10 RX ORDER — NITROGLYCERIN 0.4 MG/1
0.4 TABLET SUBLINGUAL EVERY 5 MIN PRN
Qty: 25 TABLET | Refills: 0 | Status: SHIPPED | OUTPATIENT
Start: 2024-10-10

## 2024-10-10 RX ORDER — INSULIN LISPRO 100 [IU]/ML
20 INJECTION, SOLUTION INTRAVENOUS; SUBCUTANEOUS ONCE
Status: COMPLETED | OUTPATIENT
Start: 2024-10-10 | End: 2024-10-10

## 2024-10-10 RX ORDER — INSULIN GLARGINE 100 [IU]/ML
45 INJECTION, SOLUTION SUBCUTANEOUS NIGHTLY
Qty: 10 ML | Refills: 0 | Status: SHIPPED
Start: 2024-10-10 | End: 2024-10-10 | Stop reason: HOSPADM

## 2024-10-10 RX ORDER — ASPIRIN 81 MG/1
81 TABLET, CHEWABLE ORAL DAILY
Qty: 30 TABLET | Refills: 0 | Status: SHIPPED | OUTPATIENT
Start: 2024-10-10

## 2024-10-10 RX ORDER — LACTULOSE 10 G/15ML
20 SOLUTION ORAL DAILY PRN
Qty: 473 ML | Refills: 0 | Status: SHIPPED | OUTPATIENT
Start: 2024-10-10

## 2024-10-10 RX ORDER — LISINOPRIL 5 MG/1
5 TABLET ORAL DAILY
Qty: 30 TABLET | Refills: 0 | Status: SHIPPED | OUTPATIENT
Start: 2024-10-10

## 2024-10-10 RX ORDER — DONEPEZIL HYDROCHLORIDE 5 MG/1
5 TABLET, FILM COATED ORAL NIGHTLY
Qty: 30 TABLET | Refills: 3 | Status: SHIPPED | OUTPATIENT
Start: 2024-10-10

## 2024-10-10 RX ORDER — INSULIN LISPRO 100 [IU]/ML
0-16 INJECTION, SOLUTION INTRAVENOUS; SUBCUTANEOUS
Qty: 14.4 ML | Refills: 0 | Status: SHIPPED
Start: 2024-10-10 | End: 2024-10-17 | Stop reason: SDUPTHER

## 2024-10-10 RX ORDER — INSULIN LISPRO 100 [IU]/ML
15 INJECTION, SOLUTION INTRAVENOUS; SUBCUTANEOUS ONCE
Status: COMPLETED | OUTPATIENT
Start: 2024-10-10 | End: 2024-10-10

## 2024-10-10 RX ADMIN — CARVEDILOL 6.25 MG: 6.25 TABLET, FILM COATED ORAL at 08:35

## 2024-10-10 RX ADMIN — INSULIN LISPRO 1 UNITS: 100 INJECTION, SOLUTION INTRAVENOUS; SUBCUTANEOUS at 00:06

## 2024-10-10 RX ADMIN — INSULIN LISPRO 15 UNITS: 100 INJECTION, SOLUTION INTRAVENOUS; SUBCUTANEOUS at 12:48

## 2024-10-10 RX ADMIN — ASPIRIN 81 MG 81 MG: 81 TABLET ORAL at 08:36

## 2024-10-10 RX ADMIN — INSULIN LISPRO 20 UNITS: 100 INJECTION, SOLUTION INTRAVENOUS; SUBCUTANEOUS at 11:36

## 2024-10-10 RX ADMIN — LISINOPRIL 5 MG: 5 TABLET ORAL at 08:35

## 2024-10-10 RX ADMIN — ALOGLIPTIN 12.5 MG: 12.5 TABLET, FILM COATED ORAL at 08:35

## 2024-10-10 RX ADMIN — INSULIN LISPRO 1 UNITS: 100 INJECTION, SOLUTION INTRAVENOUS; SUBCUTANEOUS at 08:36

## 2024-10-10 NOTE — DISCHARGE INSTRUCTIONS
Disposition: home    Discharged Condition: Stable    Activity: activity as tolerated    Diet: cardiac diet and diabetic diet    Follow Up: Primary Care Provider in one week

## 2024-10-10 NOTE — PROGRESS NOTES
improve her overall functional status prior to D/c.  Therapy Prognosis: Good  Decision Making: Low Complexity  Requires PT Follow-Up: Yes  Activity Tolerance  Activity Tolerance: Patient tolerated evaluation without incident;Patient limited by endurance    Plan  Physical Therapy Plan  General Plan: 3-5 times per week  Days Per Week: 5 Days  Current Treatment Recommendations: Strengthening, Balance training, Functional mobility training, Transfer training, Endurance training, Gait training, Home exercise program, Safety education & training, Patient/Caregiver education & training, Therapeutic activities  Safety Devices  Type of Devices: Call light within reach, Sitter present, Left in chair    Restrictions  Restrictions/Precautions  Restrictions/Precautions: Fall Risk, General Precautions  Required Braces or Orthoses?: No     Subjective  Pain: No c/o pain today.  General  Patient assessed for rehabilitation services?: Yes  Family / Caregiver Present: No  Follows Commands: Within Functional Limits         Social/Functional History  Social/Functional History  Lives With: Significant other  Type of Home: Mobile home  Home Layout: One level  Home Access: Stairs to enter without rails  Entrance Stairs - Number of Steps: 1 ADDY  Bathroom Shower/Tub: Tub/Shower unit  Bathroom Toilet: Standard  Bathroom Equipment: Shower chair, 3-in-1 commode  Bathroom Accessibility: Not accessible  Home Equipment: Walker - Rolling, Cane - Quad  Has the patient had two or more falls in the past year or any fall with injury in the past year?: Yes  Receives Help From: Other (comment) (SO)  ADL Assistance: Independent  Homemaking Assistance: Independent  Homemaking Responsibilities: Yes  Ambulation Assistance: Independent (uses QC PRN)  Transfer Assistance: Independent  Active : No  Vision/Hearing  Vision  Vision: Impaired  Vision Exceptions: Wears glasses for reading  Hearing  Hearing: Exceptions to WFL  Hearing Exceptions: Hard of  understanding      Therapy Time   Individual Concurrent Group Co-treatment   Time In 0936         Time Out 1006         Minutes 30               This note will serve as DC summary in the event of pt discharge.   Porsha Borrego, PT

## 2024-10-10 NOTE — FLOWSHEET NOTE
10/09/24 2000   Assessment   Charting Type Shift assessment   Psychosocial   Psychosocial (WDL) WDL   Neurological   Neuro (WDL) X   Level of Consciousness 1   Orientation Level Oriented to person;Oriented to situation   L Hand  Weak   Gag Present   Des Coma Scale   Eye Opening 4   Best Verbal Response 5   Best Motor Response 6   Des Coma Scale Score 15   HEENT (Head, Ears, Eyes, Nose, & Throat)   HEENT (WDL) X   Right Eye Blurred   Left Eye Blurred   Respiratory   Respiratory (WDL) WDL   Breath Sounds   Respiratory Pattern Regular   Right Upper Lobe Clear   Right Middle Lobe Clear   Right Lower Lobe Clear   Left Upper Lobe Clear   Left Lower Lobe Clear   Cardiac   Cardiac (WDL) WDL   Gastrointestinal   Abdominal (WDL) WDL   Last BM (including prior to admit) 10/09/24   RUQ Bowel Sounds Active   LUQ Bowel Sounds Active   RLQ Bowel Sounds Active   LLQ Bowel Sounds Active   Genitourinary   Genitourinary (WDL) WDL   Urine Urgency   Urine Urgency Yes  (incont at times)   Urine Assessment   Urinary Incontinence Present   Peripheral Vascular   Peripheral Vascular (WDL) WDL   Skin Integumentary    Skin Integumentary (WDL) WDL

## 2024-10-10 NOTE — CARE COORDINATION
Case Management Assessment  Initial Evaluation    Date/Time of Evaluation: 10/10/2024 10:38 AM  Assessment Completed by: Heidi Elizondo RN    If patient is discharged prior to next notation, then this note serves as note for discharge by case management.    Patient Name: Maude Corrales                   YOB: 1950  Diagnosis: Essential hypertension [I10]  Disorientation [R41.0]  Hyperglycemia [R73.9]  History of CVA (cerebrovascular accident) [Z86.73]  Hyperosmolar hyperglycemic state (HHS) (HCC) [E11.00]                   Date / Time: 10/9/2024  9:21 AM    Patient Admission Status: Inpatient   Readmission Risk (Low < 19, Mod (19-27), High > 27): Readmission Risk Score: 13.9    Current PCP: Lazarus Ambrosio MD  PCP verified by CM? Yes    Chart Reviewed: Yes      History Provided by: Medical Record, Patient, Child/Family  Patient Orientation: Alert and Oriented    Patient Cognition: Dementia / Early Alzheimer's    Hospitalization in the last 30 days (Readmission):  No    If yes, Readmission Assessment in CM Navigator will be completed.    Advance Directives:      Code Status: Prior   Patient's Primary Decision Maker is:      Primary Decision Maker: Terrie Weston - Child - [phone]    Secondary Decision Maker: Russel Bass - Niece/Nephew - [phone]    Supplemental (Other) Decision Maker: Black Rivera - Niece/Nephew - [phone]    Discharge Planning:    Patient lives with: Spouse/Significant Other Type of Home: Trailer/Mobile Home  Primary Care Giver: Spouse  Patient Support Systems include: Spouse/Significant Other, Children, Family Members   Current Financial resources: Medicare  Current community resources: None  Current services prior to admission: Durable Medical Equipment            Current DME: Shower Chair, Bedside Commode, Walker, Cane (quad cane, RW, 3-in-1 BSC, SC)            Type of Home Care services:  None    ADLS  Prior functional level: Independent in ADLs/IADLs  Current  functional level: Independent in ADLs/IADLs    Family can provide assistance at DC: Yes  Would you like Case Management to discuss the discharge plan with any other family members/significant others, and if so, who? No  Plans to Return to Present Housing: Yes  Other Identified Issues/Barriers to RETURNING to current housing: none - will need 24/7 care  Potential Assistance needed at discharge: N/A            Potential DME:  none  Patient expects to discharge to: Trailer/mobile home  Plan for transportation at discharge:      Financial    Payor: [employer] MEDICARE / Plan: [employer] CHOICE-PPO MEDICARE / Product Type: *No Product type* /     Does insurance require precert for SNF: Yes    Potential assistance Purchasing Medications: No  Meds-to-Beds request: Yes      [facility] [place] [address] -  [phone] - F [phone]  [address] [place] [address]  Phone: [phone] Fax: [phone]    [id number] [address] [facility] [place] - P [phone] - F [phone]  [address] [place] [address]  Phone: [phone] Fax: [phone]    [facility] [place] - [address] - P [phone] - F [phone]  [address]  [facility] [address]  Phone: [phone] Fax: [phone]      Notes:    Factors facilitating achievement of predicted outcomes: Family support, Cooperative, Pleasant, Has needed Durable Medical Equipment at home, and Knowledge about rehab    Barriers to discharge: will need 24/7 care - family to provide    Additional Case Management Notes: Lives with SO.  Has SC, 3-in-1 BSC, RW, quad cane.  No DME per therapy eval.  Home with assist.  Oupt care coordinator to follow up at [facility] to assist with medication education.     The Plan for Transition of Care is related to the following treatment goals of Essential hypertension [I10]  Disorientation [R41.0]  Hyperglycemia [R73.9]  History of CVA (cerebrovascular

## 2024-10-10 NOTE — PROGRESS NOTES
DC instructions reviewed with pt at bedside. Insulin dosing also reviewed with pt's daughter via telephone. Pt's daughter states she comes once a week to help pt organize medications for the week. Pt transported to POV via WC.

## 2024-10-10 NOTE — PROGRESS NOTES
CLINICAL PHARMACY NOTE: MEDS TO BEDS    Total # of Prescriptions Filled:  12   The following medications were delivered to the patient:  Current Discharge Medication List        START taking these medications    Details   donepezil (ARICEPT) 5 MG tablet Take 1 tablet by mouth nightly  Qty: 30 tablet, Refills: 3         Carvedilol 6.25 mg tablet  Take 1 tablet by mouth twice a day with meals       Additional Documentation:

## 2024-10-10 NOTE — PLAN OF CARE
Problem: Chronic Conditions and Co-morbidities  Goal: Patient's chronic conditions and co-morbidity symptoms are monitored and maintained or improved  10/9/2024 2038 by Wyatt Vinson RN  Outcome: Progressing  10/9/2024 1800 by Regla Suarez RN  Outcome: Progressing     Problem: Discharge Planning  Goal: Discharge to home or other facility with appropriate resources  10/9/2024 2038 by Wyatt Vinson RN  Outcome: Progressing  10/9/2024 1800 by Regla Suarez RN  Outcome: Progressing  Flowsheets (Taken 10/9/2024 1625)  Discharge to home or other facility with appropriate resources:   Identify barriers to discharge with patient and caregiver   Arrange for needed discharge resources and transportation as appropriate   Identify discharge learning needs (meds, wound care, etc)   Refer to discharge planning if patient needs post-hospital services based on physician order or complex needs related to functional status, cognitive ability or social support system     Problem: Safety - Adult  Goal: Free from fall injury  10/9/2024 2038 by Wyatt Vinson RN  Outcome: Progressing  10/9/2024 1800 by Regla Suarez RN  Outcome: Progressing

## 2024-10-10 NOTE — FLOWSHEET NOTE
10/10/24 0835   Assessment   Charting Type Shift assessment   Psychosocial   Psychosocial (WDL) WDL   Neurological   Neuro (WDL) X   Level of Consciousness 1   Orientation Level Oriented to person;Oriented to place;Oriented to time;Disoriented to situation   Paxton Coma Scale   Eye Opening 4   Best Verbal Response 5   Best Motor Response 6   Paxton Coma Scale Score 15   HEENT (Head, Ears, Eyes, Nose, & Throat)   HEENT (WDL) X   Right Eye Blurred   Left Eye Blurred   Respiratory   Respiratory (WDL) WDL   Breath Sounds   Respiratory Pattern Regular   Right Upper Lobe Clear   Right Middle Lobe Clear   Right Lower Lobe Clear   Left Upper Lobe Clear   Left Lower Lobe Clear   Cardiac   Cardiac (WDL) WDL   Gastrointestinal   Abdominal (WDL) WDL   RUQ Bowel Sounds Active   LUQ Bowel Sounds Active   RLQ Bowel Sounds Active   LLQ Bowel Sounds Active   Genitourinary   Genitourinary (WDL) WDL  (incontinent at times)   Peripheral Vascular   Peripheral Vascular (WDL) WDL   Skin Integumentary    Skin Integumentary (WDL) WDL
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
## 2024-10-10 NOTE — CARE COORDINATION
Case Management Assessment Discharge Note    Date / Time of Note:  10/10/24 12:22 PM  Discharge Note Completed by: Heidi Elizondo RN      Patient Name: Maude Corrales   YOB: 1950  Diagnosis: Essential hypertension [I10]  Disorientation [R41.0]  Hyperglycemia [R73.9]  History of CVA (cerebrovascular accident) [Z86.73]  Hyperosmolar hyperglycemic state (HHS) (HCC) [E11.00]   Date / Time: 10/9/2024  9:21 AM    Current PCP: Lazarus Ambrosio MD  Clinic patient: Yes    Hospitalization in the last 30 days: No    Advance Directives:  Code Status: Prior    Financial:  Payor: HU[employer]NA MEDICARE / Plan: HU[employer]NA CHOICE-PPO MEDICARE / Product Type: *No Product type* /      Pharmacy:    [facility] [phone] -  [phone]  [address] [place] [id number]  Phone: [phone] Fax: [phone]    [facility] ST[facility] #[id number] [address] [phone] -  [phone]  [address] [place] [id number]  Phone: [phone] Fax: [phone]    [facility] [address] [facility] [address] [phone] - F [phone]  [address] [facility] [address] [address]  Phone: [phone] Fax: [phone]      Assistance purchasing medications?: Potential Assistance Purchasing Medications: No  Assistance provided by Case Management:     Does patient want to participate in local refill/ meds to beds program?: Yes    Meds To Beds General Rules:  1. Can ONLY be done Monday- Friday between 8:30am-5pm  2. Prescription(s) must be in pharmacy by 3pm to be filled same day  3.Copy of patient's insurance/ prescription drug card and patient face sheet must be sent along with the prescription(s)  4. Cost of Rx cannot be added to hospital bill. If financial assistance is needed, please contact unit  or ;  or  CANNOT provide pharmacy voucher for patients co-pays  5. Patients can then

## 2024-10-10 NOTE — FLOWSHEET NOTE
10/10/24 0000   Assessment   Charting Type Reassessment   Psychosocial   Psychosocial (WDL) WDL   Neurological   Neuro (WDL) X   Level of Consciousness 1   Orientation Level Oriented to person;Oriented to situation   L Hand  Weak   Gag Present   [place] Coma Scale   Eye Opening 4   Best Verbal Response 5   Best Motor Response 6   [place] Coma Scale Score 15   HEENT (Head, Ears, Eyes, Nose, & Throat)   HEENT (WDL) X   Right Eye Blurred   Left Eye Blurred   Respiratory   Respiratory (WDL) WDL   Breath Sounds   Respiratory Pattern Regular   Right Upper Lobe Clear   Right Middle Lobe Clear   Right Lower Lobe Clear   Left Upper Lobe Clear   Left Lower Lobe Clear   Cardiac   Cardiac (WDL) WDL   Gastrointestinal   Abdominal (WDL) WDL   Last BM (including prior to admit) 10/09/24   RUQ Bowel Sounds Active   LUQ Bowel Sounds Active   RLQ Bowel Sounds Active   LLQ Bowel Sounds Active   Genitourinary   Genitourinary (WDL) WDL   Urine Urgency   Urine Urgency Yes  (incont at times)   Peripheral Vascular   Peripheral Vascular (WDL) WDL   Skin Integumentary    Skin Integumentary (WDL) WDL

## 2024-10-10 NOTE — DISCHARGE SUMMARY
TABLET BY MOUTH THREE TIMES DAILY  Patient not taking: Reported on 10/9/2024 8/19/24   Lazarus Ambrosio MD   sublingual tablet cyanocobalamin 2500 MCG SUBL Place 1 tablet under the tongue daily  Patient not taking: Reported on 10/9/2024 8/14/24   Lazarus Ambrosio MD   insulin lispro, 1 Unit Dial, (HUMALOG KWIKPEN) 100 UNIT/ML SOPN Inject 5 Units into the skin 3 times daily (before meals) 8/14/24   Lazarus Ambrosio MD   vitamin D (CHOLECALCIFEROL) 25 MCG (1000 UT) TABS tablet Take 1 tablet by mouth daily  Patient not taking: Reported on 10/9/2024 8/14/24   Lazarus Ambrosio MD   pantoprazole (PROTONIX) 40 MG tablet Take 1 tablet by mouth 2 times daily (before meals)  Patient not taking: Reported on 10/9/2024 8/12/24   Lazarus Ambrosio MD   potassium chloride (KLOR-CON M) 10 MEQ extended release tablet Take 1 tablet by mouth daily  Patient not taking: Reported on 10/9/2024 8/12/24   Lazarus Ambrosio MD   furosemide (LASIX) 20 MG tablet Take 1 tablet by mouth daily prn for swelling  Patient not taking: Reported on 10/9/2024 8/12/24   Lazarus Ambrosio MD   insulin lispro (HUMALOG) 100 UNIT/ML SOLN injection vial Inject 5 Units into the skin 3 times daily (with meals)  Patient not taking: Reported on 10/9/2024    ProviderIsabel MD   blood glucose monitor strips TID and PRN Dx E11.9  Patient not taking: Reported on 10/9/2024 2/15/24   Lazarus Ambrosio MD   Insulin Pen Needle 31G X 6 MM MISC 1 each by Does not apply route daily  Patient not taking: Reported on 10/9/2024 5/17/21   Lazarus Ambrosio MD   glucose monitoring kit (FREESTYLE) monitoring kit 1 kit by Does not apply route daily E11.40  Patient not taking: Reported on 10/9/2024 6/3/20   Lazarus Ambrosio MD   Insulin Syringe-Needle U-100 (B-D INS SYR ULTRAFINE 1CC/31G) 31G X 5/16\" 1 ML MISC USE ONCE A DAY AS DIRECTED  DX E11.9  Patient not taking: Reported on 10/9/2024 4/17/17   Lazarus Ambrosio MD       Vital Signs  Temp: 98.5 °F (36.9 °C)  Pulse: 63  Respirations: 19  BP: (!)  150/63  SpO2: 98 %  O2 Device: None (Room air)       Vital signs reviewed in electronic chart.    Physical exam  Constitutional:  Well developed, well nourished, no acute distress.  Eyes:  PERRL, conjunctiva normal, EOMI.  HENT:  Atraumatic, external ears normal, external nose/nares normal, oropharynx moist, no pharyngeal exudates.   Neck:  Supple. No JVD or thyromegaly.  Respiratory:  No respiratory distress on RA, normal breath sounds, no rales, no wheezing.   Cardiovascular:  Normal rate, normal rhythm, no gallops, no rubs.  GI:  Soft, nondistended, normal bowel sounds, nontender, no organomegaly, no mass.  :  No costovertebral angle tenderness.  Musculoskeletal:  No edema, no tenderness, no obvious deformities. Patient is moving all extremities.   Integument:  Well hydrated, no rash.   Neurologic:  Alert & oriented to self, place, situation.  Memory problem noted. Slightly dysarthric speech (chronic). no focal deficits noted. Strength is equal throughout.  Psychiatric:  Speech and behavior appropriate. Blunted affect.      Lab Results   Component Value Date    WBC 6.4 10/10/2024    HGB 11.2 (L) 10/10/2024    HCT 33.2 (L) 10/10/2024    MCV 83.0 10/10/2024     (L) 10/10/2024       Lab Results   Component Value Date     (L) 10/10/2024    K 4.2 10/10/2024    CL 99 10/10/2024    CO2 25 10/10/2024    BUN 21 (H) 10/10/2024    CREATININE 1.1 10/10/2024    GLUCOSE 218 (H) 10/10/2024    CALCIUM 9.0 10/10/2024    BILITOT 0.6 10/10/2024    ALKPHOS 159 (H) 10/10/2024    AST 35 (H) 10/10/2024    ALT 36 10/10/2024    LABGLOM 53 (L) 10/10/2024    GFRAA 45 (L) 10/04/2022    AGRATIO 1.3 10/10/2024    GLOB 2.7 10/10/2024              Assessment and Plan/Hospital course:     Active Hospital Problems    Diagnosis Date Noted    Medically noncompliant [Z91.199] 10/10/2024    Dementia (HCC) [F03.90] 10/10/2024    AMS (altered mental status) [R41.82] 10/10/2024    Hyperosmolar hyperglycemic state (HHS) ([facility]) [E11.00]

## 2024-10-10 NOTE — FLOWSHEET NOTE
10/10/24 0400   Assessment   Charting Type Reassessment   Psychosocial   Psychosocial (WDL) WDL   Neurological   Neuro (WDL) X   Level of Consciousness 1   Orientation Level Oriented to person;Oriented to situation   L Hand  Weak   Gag Present   Des Coma Scale   Eye Opening 4   Best Verbal Response 5   Best Motor Response 6   Art Coma Scale Score 15   HEENT (Head, Ears, Eyes, Nose, & Throat)   HEENT (WDL) X   Right Eye Blurred   Left Eye Blurred   Respiratory   Respiratory (WDL) WDL   Breath Sounds   Respiratory Pattern Regular   Right Upper Lobe Clear   Right Middle Lobe Clear   Right Lower Lobe Clear   Left Upper Lobe Clear   Left Lower Lobe Clear   Cardiac   Cardiac (WDL) WDL   Rhythm Interpretation   Pulse 69   Gastrointestinal   Abdominal (WDL) WDL   Last BM (including prior to admit) 10/09/24   RUQ Bowel Sounds Active   LUQ Bowel Sounds Active   RLQ Bowel Sounds Active   LLQ Bowel Sounds Active   Genitourinary   Genitourinary (WDL) WDL   Urine Urgency   Urine Urgency Yes  (incont at times)   Urine Assessment   Bladder Scan Volume (mL) 343 mL   Peripheral Vascular   Peripheral Vascular (WDL) WDL   Skin Integumentary    Skin Integumentary (WDL) WDL

## 2024-10-10 NOTE — PROGRESS NOTES
SLP ALL NOTES  Facility/Department: [facility] SURG   CLINICAL BEDSIDE SWALLOW EVALUATION    NAME: Maude Corrales  : 1950  MRN: [id number]    ADMISSION DATE: 10/9/2024  ADMITTING DIAGNOSIS: has CAD (coronary artery disease); COPD (chronic obstructive pulmonary disease) (HCC); History of CVA (cerebrovascular accident); Tobacco abuse; Vitamin B12 deficiency; Essential hypertension; Type 2 diabetes mellitus with diabetic neuropathy (HCC); History of colon polyps; Status post bilateral knee replacements; Degenerative disc disease, lumbar; Family history of breast cancer; Abnormal CT of the chest; Thoracic aortic aneurysm without rupture (HCC); Anxiety; Anemia due to stage 3 chronic kidney disease (HCC); MAGDALENA (iron deficiency anemia); Stage 3a chronic kidney disease (HCC); Cirrhosis (HCC); AVM (arteriovenous malformation) of small bowel, acquired; Declining functional status; Personal history of nicotine dependence; General weakness; Pancytopenia (HCC); Carotid disease, bilateral (HCC); Diabetes mellitus due to underlying condition, with long-term current use of insulin (HCC); Hypokalemia; Anemia; PAC (premature atrial contraction); Intractable back pain; Generalized weakness; Abnormal urinalysis; Hyperglycemia; Type 2 diabetes mellitus with chronic kidney disease, with long-term current use of insulin (HCC); Hypertensive urgency; Hyperosmolar hyperglycemic state (HHS) (HCC); Medically noncompliant; Dementia (HCC); and AMS (altered mental status) on their problem list.  ONSET DATE: 10/9/2024    Recent Chest Xray/CT of Chest: (10/9/2024)  IMPRESSION:   No acute cardiopulmonary abnormality.     Date of Eval: 10/10/2024  Evaluating Therapist: CHAUNCEY Feng    Current Diet level:  Current Diet : Soft and Bite-Sized  Current Liquid Diet : Thin    Primary Complaint  No complaints voiced this date.    Pain:  Pain Assessment  Pain Assessment: None - Denies Pain    Reason for Referral  Maude Corrales was

## 2024-10-11 ENCOUNTER — CARE COORDINATION (OUTPATIENT)
Dept: PRIMARY CARE CLINIC | Age: 74
End: 2024-10-11

## 2024-10-11 NOTE — H&P
Short Stay Summary      Patient ID: Maude Corrales       Patient's PCP: Lazarus Ambrosio MD    Admit Date: 10/9/2024     Discharge Date: 10/10/2024      Admitting Physician: Lazarus Ambrosio MD    Discharge Physician: VIGNESH Artis     Reason for this admission:   Hyperosmolar hyperglycemic state   Hypertensive urgency  AMS    Discharge Diagnoses:     Active Hospital Problems    Diagnosis Date Noted    Medically noncompliant [Z91.199] 10/10/2024    Dementia (HCC) [F03.90] 10/10/2024    AMS (altered mental status) [R41.82] 10/10/2024    Hyperosmolar hyperglycemic state (HHS) (HCC) [E11.00] 10/09/2024    Hypertensive urgency [I16.0] 08/08/2024    Type 2 diabetes mellitus (HCC) [E11.9] 03/05/2024    Cirrhosis (HCC) [K74.60] 11/01/2021    History of CVA (cerebrovascular accident) [Z86.73]        Procedures:  CT HEAD WO CONTRAST   Final Result      1. Stable exam with no acute intracranial abnormality.      Electronically signed by Paul JOHN CHEST PORTABLE   Final Result   No acute cardiopulmonary abnormality.      Electronically signed by Kiran Navarro            Consults:   IP CONSULT TO DIABETES EDUCATOR  PT/OT    HISTORY OF PRESENT ILLNESS:   The patient is a 74 y.o. female with PMH of HTN, DM II, cirrhosis, CVA, and others who presents from home due to altered mental status. Daughter brought the patient to the ER for new confusion that she did not notice when she visited on Monday. Patient unable to provide history but was able to state her name. ER labs significant for glucose  1215, bun 24, cr 1.5, AG 18, bicarb 24, alk phos 222, ALT , lactic acid 3.2, A1c 10.8. she was started on an insulin drip. /87, . Admitted to ICU for suspected Hyperosmolar hyperglycemic state and continued on IVFs and insulin drip.     Review of system  Obtained from patient, chart review,  Constitutional:  Denies fever or chills. Positive for chronic fatigue.  Eyes:  Denies change in visual acuity or

## 2024-10-11 NOTE — CARE COORDINATION
Care Transitions Initial Follow Up Call    Call within 2 business days of discharge: Yes     Patient: Maude Corrales Patient : 1950 MRN: [id number]    Last Discharge Facility       Date Complaint Diagnosis Description Type Department Provider    10/9/24 Altered Mental Status History of CVA (cerebrovascular accident) ... ED to Hosp-Admission (Discharged) (ADMITTED) [facility] Lazarus Deleon MD; Serena Kilgore ...            RARS: Readmission Risk Score: 13.9       Spoke with: Multiple attempts made for TCM call, number will not ring through.      Discharge department/facility: [facility]    Non-face-to-face services provided:  Scheduled appointment with PCP-Daily  Obtained and reviewed discharge summary and/or continuity of care documents    Follow Up  Future Appointments   Date Time Provider Department Center   10/14/2024 12:00 PM Lazarus Ambrosio MD [facility] DEP   2024  2:45 PM Lazarus Ambrosio MD [facility] DEP       Jaswant Nam RN

## 2024-10-14 ENCOUNTER — OFFICE VISIT (OUTPATIENT)
Dept: PRIMARY CARE CLINIC | Age: 74
End: 2024-10-14

## 2024-10-14 VITALS
BODY MASS INDEX: 24.55 KG/M2 | RESPIRATION RATE: 18 BRPM | OXYGEN SATURATION: 98 % | WEIGHT: 143 LBS | DIASTOLIC BLOOD PRESSURE: 58 MMHG | SYSTOLIC BLOOD PRESSURE: 106 MMHG | HEART RATE: 86 BPM

## 2024-10-14 DIAGNOSIS — D64.9 ANEMIA, UNSPECIFIED TYPE: ICD-10-CM

## 2024-10-14 DIAGNOSIS — R41.3 IMPAIRED MEMORY: ICD-10-CM

## 2024-10-14 DIAGNOSIS — E11.40 TYPE 2 DIABETES MELLITUS WITH DIABETIC NEUROPATHY, WITH LONG-TERM CURRENT USE OF INSULIN (HCC): Primary | ICD-10-CM

## 2024-10-14 DIAGNOSIS — I10 ESSENTIAL HYPERTENSION: ICD-10-CM

## 2024-10-14 DIAGNOSIS — Z87.891 PERSONAL HISTORY OF TOBACCO USE: ICD-10-CM

## 2024-10-14 DIAGNOSIS — Z79.4 TYPE 2 DIABETES MELLITUS WITH DIABETIC NEUROPATHY, WITH LONG-TERM CURRENT USE OF INSULIN (HCC): Primary | ICD-10-CM

## 2024-10-14 DIAGNOSIS — K74.60 CIRRHOSIS OF LIVER WITHOUT ASCITES, UNSPECIFIED HEPATIC CIRRHOSIS TYPE (HCC): ICD-10-CM

## 2024-10-14 RX ORDER — BLOOD-GLUCOSE METER
1 KIT MISCELLANEOUS DAILY
Qty: 1 KIT | Refills: 0 | Status: SHIPPED | OUTPATIENT
Start: 2024-10-14

## 2024-10-14 RX ORDER — GLUCOSAMINE HCL/CHONDROITIN SU 500-400 MG
CAPSULE ORAL
Qty: 100 STRIP | Refills: 5 | Status: SHIPPED | OUTPATIENT
Start: 2024-10-14

## 2024-10-14 RX ORDER — LANCETS 30 GAUGE
1 EACH MISCELLANEOUS 3 TIMES DAILY
Qty: 300 EACH | Refills: 1 | Status: SHIPPED | OUTPATIENT
Start: 2024-10-14

## 2024-10-14 RX ORDER — GLUCOSAMINE HCL/CHONDROITIN SU 500-400 MG
CAPSULE ORAL
Qty: 100 STRIP | Refills: 5 | Status: SHIPPED | OUTPATIENT
Start: 2024-10-14 | End: 2024-10-14

## 2024-10-14 SDOH — ECONOMIC STABILITY: FOOD INSECURITY: WITHIN THE PAST 12 MONTHS, THE FOOD YOU BOUGHT JUST DIDN'T LAST AND YOU DIDN'T HAVE MONEY TO GET MORE.: NEVER TRUE

## 2024-10-14 SDOH — ECONOMIC STABILITY: FOOD INSECURITY: WITHIN THE PAST 12 MONTHS, YOU WORRIED THAT YOUR FOOD WOULD RUN OUT BEFORE YOU GOT MONEY TO BUY MORE.: NEVER TRUE

## 2024-10-14 SDOH — ECONOMIC STABILITY: INCOME INSECURITY: HOW HARD IS IT FOR YOU TO PAY FOR THE VERY BASICS LIKE FOOD, HOUSING, MEDICAL CARE, AND HEATING?: NOT VERY HARD

## 2024-10-14 ASSESSMENT — ENCOUNTER SYMPTOMS
NAUSEA: 0
ABDOMINAL PAIN: 0
WHEEZING: 0
SINUS PRESSURE: 0
COUGH: 0
EYE DISCHARGE: 0
SHORTNESS OF BREATH: 0
VOMITING: 0
SORE THROAT: 0

## 2024-10-14 NOTE — PROGRESS NOTES
started on insulin drip and she was admitted to the intensive care unit.  Also received antihypertensive meds.  Her fingersticks and blood pressure has improved significantly.  Unfortunately for some reason patient was not taking her medication at home.  Daughter reported that she has been involved in reminding her of taking her medications.  Unfortunately there reported that her glucometer is not working and he did not notify me with this still this morning during the visit.    Patient has had diabetes for the past several years.  She has been compliant with the medications and denies any side effects from it. She has not been monitoring fingersticks since her glucometer is not working. She denies any hypoglycemic symptoms. She has  been following a diabetic diet and has not been active.  Her last eye exam was greater than a year ago.  She is using oral meds and Insulin.   She is on 5 units before meals but she is not sure about her Levemir dose.      Blood pressure is stable.      Patient's medications, allergies, past medical, surgical, social and family histories were reviewed and updated as appropriate in electronic medical record.      Review of Systems   Constitutional:  Positive for fatigue. Negative for chills and fever.   HENT:  Negative for congestion, sinus pressure and sore throat.    Eyes:  Negative for discharge and visual disturbance.   Respiratory:  Negative for cough, shortness of breath and wheezing.    Cardiovascular:  Negative for chest pain and palpitations.   Gastrointestinal:  Negative for abdominal pain, nausea and vomiting.   Endocrine: Negative for cold intolerance and heat intolerance.   Genitourinary:  Negative for dysuria, frequency and urgency.   Musculoskeletal:  Positive for arthralgias, back pain and gait problem.   Skin:  Negative for rash and wound.   Neurological:  Positive for weakness. Negative for syncope, numbness and headaches.        Impaired memory   Hematological:

## 2024-10-14 NOTE — CARE COORDINATION
Care Transitions Initial Follow Up Call    Call within 2 business days of discharge: Yes     Patient: Maude Corrales Patient : 1950 MRN: [id number]    Last Discharge Facility       Date Complaint Diagnosis Description Type Department Provider    10/9/24 Altered Mental Status History of CVA (cerebrovascular accident) ... ED to Hosp-Admission (Discharged) (ADMITTED) [facility] Lazarus Deleon MD; eSrena Kilgore ...            RARS: Readmission Risk Score: 13.9       Spoke with: Attempting HFU call, unsuccessful.  Unable to leave message.     Discharge department/facility: [facility]    Non-face-to-face services provided:  Scheduled appointment with PCP-Daily  Obtained and reviewed discharge summary and/or continuity of care documents    Follow Up  Future Appointments   Date Time Provider Department Center   10/14/2024 12:00 PM Lazarus Ambrosio MD [facility]   2024  2:45 PM Lazarus Ambrosio MD [facility]       Jaswant Nam RN

## 2024-10-15 ASSESSMENT — ENCOUNTER SYMPTOMS: BACK PAIN: 1

## 2024-10-16 DIAGNOSIS — M51.369 DEGENERATIVE DISC DISEASE, LUMBAR: ICD-10-CM

## 2024-10-16 RX ORDER — ACYCLOVIR 400 MG/1
TABLET ORAL
Qty: 3 EACH | Refills: 3 | Status: SHIPPED | OUTPATIENT
Start: 2024-10-16

## 2024-10-16 RX ORDER — HYDROCODONE BITARTRATE AND ACETAMINOPHEN 5; 325 MG/1; MG/1
1 TABLET ORAL 2 TIMES DAILY PRN
Qty: 45 TABLET | Refills: 0 | Status: SHIPPED | OUTPATIENT
Start: 2024-10-16 | End: 2024-11-15

## 2024-10-16 RX ORDER — ACYCLOVIR 400 MG/1
TABLET ORAL
Qty: 1 EACH | Refills: 0 | Status: SHIPPED | OUTPATIENT
Start: 2024-10-16

## 2024-10-17 DIAGNOSIS — R73.9 HYPERGLYCEMIA: ICD-10-CM

## 2024-10-17 DIAGNOSIS — E11.40 TYPE 2 DIABETES MELLITUS WITH DIABETIC NEUROPATHY, WITH LONG-TERM CURRENT USE OF INSULIN (HCC): Primary | ICD-10-CM

## 2024-10-17 DIAGNOSIS — Z79.4 TYPE 2 DIABETES MELLITUS WITH DIABETIC NEUROPATHY, WITH LONG-TERM CURRENT USE OF INSULIN (HCC): Primary | ICD-10-CM

## 2024-10-17 RX ORDER — INSULIN LISPRO 100 [IU]/ML
0-16 INJECTION, SOLUTION INTRAVENOUS; SUBCUTANEOUS
Qty: 14.4 ML | Refills: 0 | Status: SHIPPED | OUTPATIENT
Start: 2024-10-17 | End: 2024-11-16

## 2024-10-17 NOTE — PROGRESS NOTES
Chief Complaint   Patient presents with    Follow-Up from Hospital       Have you seen any other physician or provider since your last visit yes -     Have you had any other diagnostic tests since your last visit? yes -     Have you changed or stopped any medications since your last visit?  yes - hearing difficulty

## 2024-11-04 DIAGNOSIS — E11.40 TYPE 2 DIABETES MELLITUS WITH DIABETIC NEUROPATHY, WITH LONG-TERM CURRENT USE OF INSULIN (HCC): ICD-10-CM

## 2024-11-04 DIAGNOSIS — Z79.4 TYPE 2 DIABETES MELLITUS WITH DIABETIC NEUROPATHY, WITH LONG-TERM CURRENT USE OF INSULIN (HCC): ICD-10-CM

## 2024-11-04 RX ORDER — DONEPEZIL HYDROCHLORIDE 5 MG/1
5 TABLET, FILM COATED ORAL NIGHTLY
Qty: 30 TABLET | Refills: 3 | Status: SHIPPED | OUTPATIENT
Start: 2024-11-04

## 2024-11-04 RX ORDER — FERROUS SULFATE 325(65) MG
325 TABLET ORAL 2 TIMES DAILY
Qty: 60 TABLET | Refills: 3 | Status: SHIPPED | OUTPATIENT
Start: 2024-11-04

## 2024-11-04 RX ORDER — LISINOPRIL 5 MG/1
5 TABLET ORAL DAILY
Qty: 30 TABLET | Refills: 3 | Status: SHIPPED | OUTPATIENT
Start: 2024-11-04

## 2024-11-04 RX ORDER — LACTULOSE 10 G/15ML
20 SOLUTION ORAL DAILY PRN
Qty: 473 ML | Refills: 1 | Status: SHIPPED | OUTPATIENT
Start: 2024-11-04

## 2024-11-04 RX ORDER — ROSUVASTATIN CALCIUM 20 MG/1
TABLET, COATED ORAL
Qty: 30 TABLET | Refills: 3 | Status: SHIPPED | OUTPATIENT
Start: 2024-11-04

## 2024-11-04 RX ORDER — INSULIN LISPRO 100 [IU]/ML
5 INJECTION, SOLUTION INTRAVENOUS; SUBCUTANEOUS
Qty: 5 ADJUSTABLE DOSE PRE-FILLED PEN SYRINGE | Refills: 0 | Status: SHIPPED | OUTPATIENT
Start: 2024-11-04

## 2024-11-04 RX ORDER — FUROSEMIDE 20 MG/1
TABLET ORAL
Qty: 30 TABLET | Refills: 3 | Status: SHIPPED | OUTPATIENT
Start: 2024-11-04

## 2024-11-04 RX ORDER — CARVEDILOL 6.25 MG/1
6.25 TABLET ORAL 2 TIMES DAILY WITH MEALS
Qty: 60 TABLET | Refills: 3 | Status: SHIPPED | OUTPATIENT
Start: 2024-11-04

## 2024-11-04 RX ORDER — NITROGLYCERIN 0.4 MG/1
0.4 TABLET SUBLINGUAL EVERY 5 MIN PRN
Qty: 25 TABLET | Refills: 0 | Status: SHIPPED | OUTPATIENT
Start: 2024-11-04

## 2024-11-04 RX ORDER — HYDRALAZINE HYDROCHLORIDE 50 MG/1
50 TABLET, FILM COATED ORAL 2 TIMES DAILY
Qty: 60 TABLET | Refills: 3 | Status: SHIPPED | OUTPATIENT
Start: 2024-11-04

## 2024-11-04 RX ORDER — ROPINIROLE 0.25 MG/1
0.25 TABLET, FILM COATED ORAL 3 TIMES DAILY
Qty: 270 TABLET | Refills: 1 | Status: SHIPPED | OUTPATIENT
Start: 2024-11-04

## 2024-11-05 RX ORDER — ROPINIROLE 0.25 MG/1
0.25 TABLET, FILM COATED ORAL 3 TIMES DAILY
Qty: 270 TABLET | Refills: 1 | OUTPATIENT
Start: 2024-11-05

## 2024-11-13 DIAGNOSIS — M51.369 DEGENERATIVE DISC DISEASE, LUMBAR: ICD-10-CM

## 2024-11-13 NOTE — TELEPHONE ENCOUNTER
3 mth f/u 12/5/24  PDMP Monitoring:    Last PDMP Brendon as Reviewed:  Review User Review Instant Review Result   DOLORES ABREU 9/25/2024  3:51 PM Reviewed PDMP [1]     [unfilled]  Urine Drug Screenings (1 yr)       Drug Screen, Multiple, Urine  Collected: 11/22/2023  1:41 PM (Final result)              Urine Drug Screen  Resulted: 3/12/2020 (Final result)              DRUG SCREEN MULTI URINE  Resulted: 1/12/2019 (Final result)              DRUG SCREEN MULTI URINE  Resulted: 5/10/2017 (Final result)              Urine Drug Screen  Resulted: 7/10/2015 (Final result)              Urine Drug Screen  Resulted: 7/6/2015 (Final result)                  Medication Contract and Consent for Opioid Use Documents Filed       Patient Documents       Type of Document Status Date Received Received By Description    Medication Contract [Status Missing]  KIM CHATMAN 6/30/16 Medication Agreement and Informed Consent- Norco, Valium

## 2024-11-14 RX ORDER — HYDROCODONE BITARTRATE AND ACETAMINOPHEN 5; 325 MG/1; MG/1
1 TABLET ORAL 2 TIMES DAILY PRN
Qty: 28 TABLET | Refills: 0 | Status: SHIPPED | OUTPATIENT
Start: 2024-11-14 | End: 2024-11-28

## 2024-12-05 ENCOUNTER — OFFICE VISIT (OUTPATIENT)
Age: 74
End: 2024-12-05
Payer: MEDICARE

## 2024-12-05 VITALS
HEART RATE: 63 BPM | DIASTOLIC BLOOD PRESSURE: 64 MMHG | OXYGEN SATURATION: 99 % | SYSTOLIC BLOOD PRESSURE: 126 MMHG | WEIGHT: 144.8 LBS | RESPIRATION RATE: 18 BRPM | BODY MASS INDEX: 24.85 KG/M2

## 2024-12-05 DIAGNOSIS — I10 ESSENTIAL HYPERTENSION: ICD-10-CM

## 2024-12-05 DIAGNOSIS — R41.3 IMPAIRED MEMORY: ICD-10-CM

## 2024-12-05 DIAGNOSIS — M51.360 DEGENERATION OF INTERVERTEBRAL DISC OF LUMBAR REGION WITH DISCOGENIC BACK PAIN: ICD-10-CM

## 2024-12-05 DIAGNOSIS — K74.60 CIRRHOSIS OF LIVER WITHOUT ASCITES, UNSPECIFIED HEPATIC CIRRHOSIS TYPE (HCC): ICD-10-CM

## 2024-12-05 DIAGNOSIS — Z23 NEED FOR INFLUENZA VACCINATION: ICD-10-CM

## 2024-12-05 DIAGNOSIS — E11.40 TYPE 2 DIABETES MELLITUS WITH DIABETIC NEUROPATHY, WITH LONG-TERM CURRENT USE OF INSULIN (HCC): Primary | ICD-10-CM

## 2024-12-05 DIAGNOSIS — D64.9 ANEMIA, UNSPECIFIED TYPE: ICD-10-CM

## 2024-12-05 DIAGNOSIS — Z79.4 TYPE 2 DIABETES MELLITUS WITH DIABETIC NEUROPATHY, WITH LONG-TERM CURRENT USE OF INSULIN (HCC): Primary | ICD-10-CM

## 2024-12-05 DIAGNOSIS — E53.8 VITAMIN B12 DEFICIENCY: ICD-10-CM

## 2024-12-05 PROCEDURE — 1090F PRES/ABSN URINE INCON ASSESS: CPT | Performed by: INTERNAL MEDICINE

## 2024-12-05 PROCEDURE — G8482 FLU IMMUNIZE ORDER/ADMIN: HCPCS | Performed by: INTERNAL MEDICINE

## 2024-12-05 PROCEDURE — 4004F PT TOBACCO SCREEN RCVD TLK: CPT | Performed by: INTERNAL MEDICINE

## 2024-12-05 PROCEDURE — G8420 CALC BMI NORM PARAMETERS: HCPCS | Performed by: INTERNAL MEDICINE

## 2024-12-05 PROCEDURE — 3078F DIAST BP <80 MM HG: CPT | Performed by: INTERNAL MEDICINE

## 2024-12-05 PROCEDURE — G8399 PT W/DXA RESULTS DOCUMENT: HCPCS | Performed by: INTERNAL MEDICINE

## 2024-12-05 PROCEDURE — 1123F ACP DISCUSS/DSCN MKR DOCD: CPT | Performed by: INTERNAL MEDICINE

## 2024-12-05 PROCEDURE — 3046F HEMOGLOBIN A1C LEVEL >9.0%: CPT | Performed by: INTERNAL MEDICINE

## 2024-12-05 PROCEDURE — 3074F SYST BP LT 130 MM HG: CPT | Performed by: INTERNAL MEDICINE

## 2024-12-05 PROCEDURE — G8427 DOCREV CUR MEDS BY ELIG CLIN: HCPCS | Performed by: INTERNAL MEDICINE

## 2024-12-05 PROCEDURE — 3017F COLORECTAL CA SCREEN DOC REV: CPT | Performed by: INTERNAL MEDICINE

## 2024-12-05 PROCEDURE — 99214 OFFICE O/P EST MOD 30 MIN: CPT | Performed by: INTERNAL MEDICINE

## 2024-12-05 PROCEDURE — 2022F DILAT RTA XM EVC RTNOPTHY: CPT | Performed by: INTERNAL MEDICINE

## 2024-12-05 PROCEDURE — 1160F RVW MEDS BY RX/DR IN RCRD: CPT | Performed by: INTERNAL MEDICINE

## 2024-12-05 PROCEDURE — 1159F MED LIST DOCD IN RCRD: CPT | Performed by: INTERNAL MEDICINE

## 2024-12-05 RX ORDER — HYDROCODONE BITARTRATE AND ACETAMINOPHEN 5; 325 MG/1; MG/1
1 TABLET ORAL 2 TIMES DAILY PRN
Qty: 45 TABLET | Refills: 0 | Status: CANCELLED | OUTPATIENT
Start: 2024-12-05 | End: 2025-01-04

## 2024-12-05 NOTE — PROGRESS NOTES
SUBJECTIVE:    Patient ID: Maude Corrales is a 74 y.o.female.    Chief Complaint   Patient presents with    Diabetes     Fs range has been up and down states goes down in 90's at night in the mornings runs above 200.         HPI:  Patient has had diabetes for the past several years.  She has been compliant with the medications and denies any side effects from it. She has been monitoring fingersticks on a daily basis.  Her fingerstick range is between 100-200. She denies any hypoglycemic symptoms. She has been following a diabetic diet and has not been active.  Her last eye exam was greater than a year ago.  She is using oral meds and Insulin.   She is on 5 units before breakfast, 5 units before lunch and 5 before dinner (mostly twice/day).  She is also on Lantus 50 units (if I do not forget it!!!!).    BP has been stable.     Patient's medications, allergies, past medical, surgical, social and family histories were reviewed and updated as appropriate in electronic medical record.        Outpatient Medications Marked as Taking for the 12/5/24 encounter (Office Visit) with Lazarus Ambrosio MD   Medication Sig Dispense Refill    HYDROcodone-acetaminophen (NORCO) 5-325 MG per tablet Take 1 tablet by mouth 2 times daily as needed for Pain for up to 14 days. Max Daily Amount: 2 tablets 28 tablet 0    carvedilol (COREG) 6.25 MG tablet Take 1 tablet by mouth 2 times daily (with meals) 60 tablet 3    donepezil (ARICEPT) 5 MG tablet Take 1 tablet by mouth nightly 30 tablet 3    empagliflozin (JARDIANCE) 10 MG tablet Take 1 tablet by mouth daily 30 tablet 3    ferrous sulfate (IRON 325) 325 (65 Fe) MG tablet Take 1 tablet by mouth in the morning and at bedtime 60 tablet 3    hydrALAZINE (APRESOLINE) 50 MG tablet Take 1 tablet by mouth in the morning and at bedtime 60 tablet 3    linagliptin (TRADJENTA) 5 MG tablet TAKE 1 TABLET BY MOUTH EVERY DAY 30 tablet 3    lisinopril (PRINIVIL;ZESTRIL) 5 MG tablet Take 1 tablet by mouth

## 2024-12-05 NOTE — PATIENT INSTRUCTIONS
ips to Help You Stop Smoking       Cigarette smoking is a preventable cause of death in the [place]. If you have thought about quitting but haven't been able to, here are some reasons why you should and some ways to do it.   Here's Why   Quitting smoking now can decrease your risk of getting smoking-related illnesses like:   Heart disease   Stroke   Several types of cancer, including:   Lung   Mouth   Esophagus   Larynx   Bladder   Pancreas   Kidney   Chronic lung diseases:   Bronchitis   Emphysema   Asthma   Cataracts   Macular degeneration   Thyroid conditions   Hearing loss   Erectile dysfunction   Dementia   Osteoporosis   Here's How   Once you've decided to quit smoking, set your target quit date a few weeks away. In the time leading up to your quit day, try some of these ideas offered by the [employer] of the [employer] to help you successfully quit smoking.   For the best results, work with your doctor. Together, you can test your lung function and compare the results to those of a nonsmoking person. The results can be given to you as your lung age. Finding out your lung age right after having the test done may help you to stop smoking.   Your doctor can also discuss with you all of your options and refer you to smoking-cessation support groups. You may wish to use nicotine replacement (gum, patches, inhaler) or one of the prescription medications that have been shown to increase quit rates and prolong abstinence from smoking. But whatever you and your doctor decide on these matters, it will still be you who decides when an how to quit. Here are some techniques:   Switch Brands   Switch to a brand you find distasteful.   Change to a brand that is low in tar and nicotine a couple of weeks before your target quit date. This will help change your smoking behavior. However, do not smoke more cigarettes, inhale them more often or more deeply, or place your fingertips over

## 2024-12-05 NOTE — PROGRESS NOTES
Chief Complaint   Patient presents with    Diabetes     Fs range has been up and down states goes down in 90's at night in the mornings runs above 200.     Lantus 50 at night  Humalog ss     Have you seen any other physician or provider since your last visit no    Have you had any other diagnostic tests since your last visit? no    Have you changed or stopped any medications since your last visit? no

## 2024-12-09 DIAGNOSIS — M51.369 DEGENERATIVE DISC DISEASE, LUMBAR: ICD-10-CM

## 2024-12-09 RX ORDER — HYDROCODONE BITARTRATE AND ACETAMINOPHEN 5; 325 MG/1; MG/1
1 TABLET ORAL 2 TIMES DAILY PRN
Qty: 45 TABLET | Refills: 0 | Status: SHIPPED | OUTPATIENT
Start: 2024-12-09 | End: 2025-01-08

## 2024-12-09 NOTE — TELEPHONE ENCOUNTER
Progress Note    Patient: Uriel Bishop MRN: [id number]  SSN: [id number]    YOB: 1933  Age: 82 y.o. Sex: male        ADMITTED:  10/31/2017 to Meg Ambriz MD  for Gross hematuria         Uriel Bishop was admitted for Gross hematuria. Catheter changed / irrigated. Now pink on moderate cbi. No pain    Vitals:  Temp (24hrs), Av.3 °F (36.8 °C), Min:98 °F (36.7 °C), Max:98.5 °F (36.9 °C)     Blood pressure (!) 119/105, pulse 95, temperature 98 °F (36.7 °C), resp. rate 21, height 5' 10\" (1.778 m), weight 68.4 kg (150 lb 12.7 oz), SpO2 96 %. I&O's:  10/31 1901 -  0700  In: 22508.5 [P.O.:530; I.V.:2562.5]  Out: 84410 [Urine:87432]         Exam:   NAD. abdomen soft     Labs:   Recent Labs      17   0543  17   1249  17   0425   WBC  20.5*  9.8  8.5   HGB  8.1*  9.0*  9.3*   HCT  23.5*  27.4*  27.1*   PLT  136*  104*  81*     Recent Labs      17   0543  17   0425  10/31/17   1536   NA  130*  127*  125*   K  4.0  4.7  4.6   CL  95*  91*  86*   CO2  27  27  28   GLU  157*  147*  111*   BUN  21*  22*  22*   CREA  0.96  1.02  1.15   CA  9.1  9.2  9.2        Cultures:      Imaging:       Assessment:     - Principal Problem:    Gross hematuria (10/31/2017)    Active Problems:    COPD exacerbation (HCC) (10/31/2017)      [facility] ([facility] ()      Mediastinal mass ()      Pulmonary nodule ()      HTN (hypertension) ()      Thrombocytopenia (HCC) (10/31/2017)      Paroxysmal atrial fibrillation ([facility] (2017)        Plan:     - hematuria, ?  Small bladdder bass vs clot  - plts improved  - recommend continue cbi for now, cysto / fulguration if worse, hgb continues to drop    Signed By: Nighat Mas MD - 2017 [facility] gave #28 on 11/14/24 (normally gets 45)  Mario Alberto reviewed by [facility] 11/14/24    3 mth f/u 2/6/25

## 2024-12-16 PROCEDURE — G0008 ADMIN INFLUENZA VIRUS VAC: HCPCS | Performed by: INTERNAL MEDICINE

## 2024-12-16 PROCEDURE — 90653 IIV ADJUVANT VACCINE IM: CPT | Performed by: INTERNAL MEDICINE

## 2024-12-27 DIAGNOSIS — M51.369 DEGENERATIVE DISC DISEASE, LUMBAR: ICD-10-CM

## 2024-12-30 RX ORDER — HYDROCODONE BITARTRATE AND ACETAMINOPHEN 5; 325 MG/1; MG/1
1 TABLET ORAL 2 TIMES DAILY PRN
Qty: 45 TABLET | Refills: 0 | Status: SHIPPED | OUTPATIENT
Start: 2024-12-30 | End: 2025-01-29

## 2025-01-07 ENCOUNTER — APPOINTMENT (OUTPATIENT)
Dept: CT IMAGING | Facility: HOSPITAL | Age: 75
End: 2025-01-07
Payer: MEDICARE

## 2025-01-07 ENCOUNTER — APPOINTMENT (OUTPATIENT)
Dept: GENERAL RADIOLOGY | Facility: HOSPITAL | Age: 75
End: 2025-01-07
Payer: MEDICARE

## 2025-01-07 ENCOUNTER — HOSPITAL ENCOUNTER (INPATIENT)
Facility: HOSPITAL | Age: 75
LOS: 7 days | Discharge: NURSING, REHAB OR LONG-TERM CARE | End: 2025-01-14
Attending: STUDENT IN AN ORGANIZED HEALTH CARE EDUCATION/TRAINING PROGRAM | Admitting: INTERNAL MEDICINE
Payer: MEDICARE

## 2025-01-07 DIAGNOSIS — Z79.02 ANTIPLATELET OR ANTITHROMBOTIC LONG-TERM USE: ICD-10-CM

## 2025-01-07 DIAGNOSIS — D64.9 NORMOCYTIC ANEMIA: ICD-10-CM

## 2025-01-07 DIAGNOSIS — K74.69 OTHER CIRRHOSIS OF LIVER: ICD-10-CM

## 2025-01-07 DIAGNOSIS — Z86.73 HISTORY OF STROKE: ICD-10-CM

## 2025-01-07 DIAGNOSIS — R47.01 APHASIA: ICD-10-CM

## 2025-01-07 DIAGNOSIS — G89.4 CHRONIC PAIN SYNDROME: Primary | ICD-10-CM

## 2025-01-07 DIAGNOSIS — R47.9 DIFFICULTY WITH SPEECH: ICD-10-CM

## 2025-01-07 DIAGNOSIS — I65.22 LEFT CAROTID ARTERY OCCLUSION: ICD-10-CM

## 2025-01-07 DIAGNOSIS — R13.12 OROPHARYNGEAL DYSPHAGIA: ICD-10-CM

## 2025-01-07 DIAGNOSIS — R41.82 ALTERED MENTAL STATUS, UNSPECIFIED ALTERED MENTAL STATUS TYPE: ICD-10-CM

## 2025-01-07 LAB
ALBUMIN SERPL-MCNC: 4 G/DL (ref 3.5–5.2)
ALBUMIN/GLOB SERPL: 1.4 G/DL
ALP SERPL-CCNC: 124 U/L (ref 39–117)
ALT SERPL W P-5'-P-CCNC: 25 U/L (ref 1–33)
AMPHET+METHAMPHET UR QL: NEGATIVE
AMPHETAMINES UR QL: NEGATIVE
ANION GAP SERPL CALCULATED.3IONS-SCNC: 15 MMOL/L (ref 5–15)
APAP SERPL-MCNC: <5 MCG/ML (ref 0–30)
AST SERPL-CCNC: 16 U/L (ref 1–32)
ATMOSPHERIC PRESS: ABNORMAL MM[HG]
B PARAPERT DNA SPEC QL NAA+PROBE: NOT DETECTED
B PERT DNA SPEC QL NAA+PROBE: NOT DETECTED
B-OH-BUTYR SERPL-SCNC: 0.86 MMOL/L (ref 0.02–0.27)
BARBITURATES UR QL SCN: NEGATIVE
BASE EXCESS BLDV CALC-SCNC: -0.8 MMOL/L (ref -2–2)
BASOPHILS # BLD AUTO: 0.03 10*3/MM3 (ref 0–0.2)
BASOPHILS NFR BLD AUTO: 0.4 % (ref 0–1.5)
BDY SITE: ABNORMAL
BENZODIAZ UR QL SCN: NEGATIVE
BILIRUB SERPL-MCNC: 0.5 MG/DL (ref 0–1.2)
BILIRUB UR QL STRIP: NEGATIVE
BODY TEMPERATURE: 37
BUN SERPL-MCNC: 25 MG/DL (ref 8–23)
BUN/CREAT SERPL: 17.2 (ref 7–25)
BUPRENORPHINE SERPL-MCNC: NEGATIVE NG/ML
C PNEUM DNA NPH QL NAA+NON-PROBE: NOT DETECTED
CALCIUM SPEC-SCNC: 9.8 MG/DL (ref 8.6–10.5)
CANNABINOIDS SERPL QL: NEGATIVE
CHLORIDE SERPL-SCNC: 101 MMOL/L (ref 98–107)
CK SERPL-CCNC: 88 U/L (ref 20–180)
CLARITY UR: CLEAR
CO2 BLDA-SCNC: 25.7 MMOL/L (ref 22–33)
CO2 SERPL-SCNC: 23 MMOL/L (ref 22–29)
COCAINE UR QL: NEGATIVE
COHGB MFR BLD: 2.3 %
COLOR UR: YELLOW
CREAT SERPL-MCNC: 1.45 MG/DL (ref 0.57–1)
CRP SERPL-MCNC: <0.3 MG/DL (ref 0–0.5)
D-LACTATE SERPL-SCNC: 1.7 MMOL/L (ref 0.5–2)
DEPRECATED RDW RBC AUTO: 45.1 FL (ref 37–54)
EGFRCR SERPLBLD CKD-EPI 2021: 38.4 ML/MIN/1.73
EOSINOPHIL # BLD AUTO: 0.07 10*3/MM3 (ref 0–0.4)
EOSINOPHIL NFR BLD AUTO: 1 % (ref 0.3–6.2)
EPAP: 0
ERYTHROCYTE [DISTWIDTH] IN BLOOD BY AUTOMATED COUNT: 13.8 % (ref 12.3–15.4)
ETHANOL BLD-MCNC: <10 MG/DL (ref 0–10)
FENTANYL UR-MCNC: NEGATIVE NG/ML
FLUAV SUBTYP SPEC NAA+PROBE: NOT DETECTED
FLUBV RNA ISLT QL NAA+PROBE: NOT DETECTED
GLOBULIN UR ELPH-MCNC: 2.9 GM/DL
GLUCOSE BLDC GLUCOMTR-MCNC: 260 MG/DL (ref 70–130)
GLUCOSE SERPL-MCNC: 295 MG/DL (ref 65–99)
GLUCOSE UR STRIP-MCNC: ABNORMAL MG/DL
HADV DNA SPEC NAA+PROBE: NOT DETECTED
HCO3 BLDV-SCNC: 24.4 MMOL/L (ref 22–28)
HCOV 229E RNA SPEC QL NAA+PROBE: NOT DETECTED
HCOV HKU1 RNA SPEC QL NAA+PROBE: NOT DETECTED
HCOV NL63 RNA SPEC QL NAA+PROBE: NOT DETECTED
HCOV OC43 RNA SPEC QL NAA+PROBE: NOT DETECTED
HCT VFR BLD AUTO: 37.1 % (ref 34–46.6)
HGB BLD-MCNC: 12.3 G/DL (ref 12–15.9)
HGB BLDA-MCNC: 12.4 G/DL (ref 14–18)
HGB UR QL STRIP.AUTO: NEGATIVE
HMPV RNA NPH QL NAA+NON-PROBE: NOT DETECTED
HPIV1 RNA ISLT QL NAA+PROBE: NOT DETECTED
HPIV2 RNA SPEC QL NAA+PROBE: NOT DETECTED
HPIV3 RNA NPH QL NAA+PROBE: NOT DETECTED
HPIV4 P GENE NPH QL NAA+PROBE: NOT DETECTED
IMM GRANULOCYTES # BLD AUTO: 0.03 10*3/MM3 (ref 0–0.05)
IMM GRANULOCYTES NFR BLD AUTO: 0.4 % (ref 0–0.5)
INHALED O2 CONCENTRATION: 21 %
IPAP: 0
KETONES UR QL STRIP: NEGATIVE
LEUKOCYTE ESTERASE UR QL STRIP.AUTO: NEGATIVE
LIPASE SERPL-CCNC: 32 U/L (ref 13–60)
LYMPHOCYTES # BLD AUTO: 1.17 10*3/MM3 (ref 0.7–3.1)
LYMPHOCYTES NFR BLD AUTO: 17.4 % (ref 19.6–45.3)
M PNEUMO IGG SER IA-ACNC: NOT DETECTED
MAGNESIUM SERPL-MCNC: 2.3 MG/DL (ref 1.6–2.4)
MCH RBC QN AUTO: 29.8 PG (ref 26.6–33)
MCHC RBC AUTO-ENTMCNC: 33.2 G/DL (ref 31.5–35.7)
MCV RBC AUTO: 89.8 FL (ref 79–97)
METHADONE UR QL SCN: NEGATIVE
METHGB BLD QL: 0.2 %
MODALITY: ABNORMAL
MONOCYTES # BLD AUTO: 0.46 10*3/MM3 (ref 0.1–0.9)
MONOCYTES NFR BLD AUTO: 6.8 % (ref 5–12)
NEUTROPHILS NFR BLD AUTO: 4.96 10*3/MM3 (ref 1.7–7)
NEUTROPHILS NFR BLD AUTO: 74 % (ref 42.7–76)
NITRITE UR QL STRIP: NEGATIVE
NRBC BLD AUTO-RTO: 0 /100 WBC (ref 0–0.2)
NT-PROBNP SERPL-MCNC: 271 PG/ML (ref 0–900)
OPIATES UR QL: NEGATIVE
OXYCODONE UR QL SCN: NEGATIVE
OXYHGB MFR BLDV: 74.2 %
PAW @ PEAK INSP FLOW SETTING VENT: 0 CMH2O
PCO2 BLDV: 41.4 MM HG (ref 41–51)
PCP UR QL SCN: NEGATIVE
PH BLDV: 7.38 PH UNITS (ref 7.31–7.41)
PH UR STRIP.AUTO: <=5 [PH] (ref 5–8)
PHOSPHATE SERPL-MCNC: 3.9 MG/DL (ref 2.5–4.5)
PLATELET # BLD AUTO: 116 10*3/MM3 (ref 140–450)
PMV BLD AUTO: 11.7 FL (ref 6–12)
PO2 BLDV: 40.4 MM HG (ref 27–53)
POTASSIUM SERPL-SCNC: 4 MMOL/L (ref 3.5–5.2)
PROCALCITONIN SERPL-MCNC: 0.14 NG/ML (ref 0–0.25)
PROT SERPL-MCNC: 6.9 G/DL (ref 6–8.5)
PROT UR QL STRIP: ABNORMAL
RBC # BLD AUTO: 4.13 10*6/MM3 (ref 3.77–5.28)
RHINOVIRUS RNA SPEC NAA+PROBE: NOT DETECTED
RSV RNA NPH QL NAA+NON-PROBE: NOT DETECTED
SALICYLATES SERPL-MCNC: <0.3 MG/DL
SARS-COV-2 RNA NPH QL NAA+NON-PROBE: NOT DETECTED
SODIUM SERPL-SCNC: 139 MMOL/L (ref 136–145)
SP GR UR STRIP: 1.02 (ref 1–1.03)
TOTAL RATE: 0 BREATHS/MINUTE
TRICYCLICS UR QL SCN: NEGATIVE
TROPONIN T SERPL HS-MCNC: 29 NG/L
TSH SERPL DL<=0.05 MIU/L-ACNC: 1.34 UIU/ML (ref 0.27–4.2)
UROBILINOGEN UR QL STRIP: ABNORMAL
WBC NRBC COR # BLD AUTO: 6.72 10*3/MM3 (ref 3.4–10.8)

## 2025-01-07 PROCEDURE — 70450 CT HEAD/BRAIN W/O DYE: CPT

## 2025-01-07 PROCEDURE — 93005 ELECTROCARDIOGRAM TRACING: CPT | Performed by: STUDENT IN AN ORGANIZED HEALTH CARE EDUCATION/TRAINING PROGRAM

## 2025-01-07 PROCEDURE — 82805 BLOOD GASES W/O2 SATURATION: CPT

## 2025-01-07 PROCEDURE — 80179 DRUG ASSAY SALICYLATE: CPT | Performed by: STUDENT IN AN ORGANIZED HEALTH CARE EDUCATION/TRAINING PROGRAM

## 2025-01-07 PROCEDURE — 80053 COMPREHEN METABOLIC PANEL: CPT | Performed by: STUDENT IN AN ORGANIZED HEALTH CARE EDUCATION/TRAINING PROGRAM

## 2025-01-07 PROCEDURE — 99223 1ST HOSP IP/OBS HIGH 75: CPT | Performed by: NURSE PRACTITIONER

## 2025-01-07 PROCEDURE — 83690 ASSAY OF LIPASE: CPT | Performed by: STUDENT IN AN ORGANIZED HEALTH CARE EDUCATION/TRAINING PROGRAM

## 2025-01-07 PROCEDURE — 84484 ASSAY OF TROPONIN QUANT: CPT | Performed by: STUDENT IN AN ORGANIZED HEALTH CARE EDUCATION/TRAINING PROGRAM

## 2025-01-07 PROCEDURE — 70498 CT ANGIOGRAPHY NECK: CPT

## 2025-01-07 PROCEDURE — 80307 DRUG TEST PRSMV CHEM ANLYZR: CPT | Performed by: STUDENT IN AN ORGANIZED HEALTH CARE EDUCATION/TRAINING PROGRAM

## 2025-01-07 PROCEDURE — 94799 UNLISTED PULMONARY SVC/PX: CPT

## 2025-01-07 PROCEDURE — 99291 CRITICAL CARE FIRST HOUR: CPT

## 2025-01-07 PROCEDURE — 84443 ASSAY THYROID STIM HORMONE: CPT | Performed by: STUDENT IN AN ORGANIZED HEALTH CARE EDUCATION/TRAINING PROGRAM

## 2025-01-07 PROCEDURE — 25810000003 SODIUM CHLORIDE 0.9 % SOLUTION: Performed by: STUDENT IN AN ORGANIZED HEALTH CARE EDUCATION/TRAINING PROGRAM

## 2025-01-07 PROCEDURE — 82948 REAGENT STRIP/BLOOD GLUCOSE: CPT

## 2025-01-07 PROCEDURE — 25810000003 SODIUM CHLORIDE 0.9 % SOLUTION

## 2025-01-07 PROCEDURE — 84145 PROCALCITONIN (PCT): CPT | Performed by: STUDENT IN AN ORGANIZED HEALTH CARE EDUCATION/TRAINING PROGRAM

## 2025-01-07 PROCEDURE — 83605 ASSAY OF LACTIC ACID: CPT | Performed by: STUDENT IN AN ORGANIZED HEALTH CARE EDUCATION/TRAINING PROGRAM

## 2025-01-07 PROCEDURE — 36415 COLL VENOUS BLD VENIPUNCTURE: CPT

## 2025-01-07 PROCEDURE — 83880 ASSAY OF NATRIURETIC PEPTIDE: CPT | Performed by: STUDENT IN AN ORGANIZED HEALTH CARE EDUCATION/TRAINING PROGRAM

## 2025-01-07 PROCEDURE — 82550 ASSAY OF CK (CPK): CPT | Performed by: STUDENT IN AN ORGANIZED HEALTH CARE EDUCATION/TRAINING PROGRAM

## 2025-01-07 PROCEDURE — 85025 COMPLETE CBC W/AUTO DIFF WBC: CPT | Performed by: STUDENT IN AN ORGANIZED HEALTH CARE EDUCATION/TRAINING PROGRAM

## 2025-01-07 PROCEDURE — 82077 ASSAY SPEC XCP UR&BREATH IA: CPT | Performed by: STUDENT IN AN ORGANIZED HEALTH CARE EDUCATION/TRAINING PROGRAM

## 2025-01-07 PROCEDURE — 0202U NFCT DS 22 TRGT SARS-COV-2: CPT | Performed by: STUDENT IN AN ORGANIZED HEALTH CARE EDUCATION/TRAINING PROGRAM

## 2025-01-07 PROCEDURE — 87040 BLOOD CULTURE FOR BACTERIA: CPT | Performed by: STUDENT IN AN ORGANIZED HEALTH CARE EDUCATION/TRAINING PROGRAM

## 2025-01-07 PROCEDURE — 70496 CT ANGIOGRAPHY HEAD: CPT

## 2025-01-07 PROCEDURE — 84100 ASSAY OF PHOSPHORUS: CPT | Performed by: STUDENT IN AN ORGANIZED HEALTH CARE EDUCATION/TRAINING PROGRAM

## 2025-01-07 PROCEDURE — 25510000001 IOPAMIDOL PER 1 ML: Performed by: STUDENT IN AN ORGANIZED HEALTH CARE EDUCATION/TRAINING PROGRAM

## 2025-01-07 PROCEDURE — 82010 KETONE BODYS QUAN: CPT | Performed by: STUDENT IN AN ORGANIZED HEALTH CARE EDUCATION/TRAINING PROGRAM

## 2025-01-07 PROCEDURE — 81003 URINALYSIS AUTO W/O SCOPE: CPT | Performed by: STUDENT IN AN ORGANIZED HEALTH CARE EDUCATION/TRAINING PROGRAM

## 2025-01-07 PROCEDURE — P9612 CATHETERIZE FOR URINE SPEC: HCPCS

## 2025-01-07 PROCEDURE — 86140 C-REACTIVE PROTEIN: CPT | Performed by: STUDENT IN AN ORGANIZED HEALTH CARE EDUCATION/TRAINING PROGRAM

## 2025-01-07 PROCEDURE — 80143 DRUG ASSAY ACETAMINOPHEN: CPT | Performed by: STUDENT IN AN ORGANIZED HEALTH CARE EDUCATION/TRAINING PROGRAM

## 2025-01-07 PROCEDURE — 83735 ASSAY OF MAGNESIUM: CPT | Performed by: STUDENT IN AN ORGANIZED HEALTH CARE EDUCATION/TRAINING PROGRAM

## 2025-01-07 PROCEDURE — 71045 X-RAY EXAM CHEST 1 VIEW: CPT

## 2025-01-07 RX ORDER — NICOTINE POLACRILEX 4 MG
15 LOZENGE BUCCAL
Status: DISCONTINUED | OUTPATIENT
Start: 2025-01-07 | End: 2025-01-14 | Stop reason: HOSPADM

## 2025-01-07 RX ORDER — SODIUM CHLORIDE 0.9 % (FLUSH) 0.9 %
10 SYRINGE (ML) INJECTION EVERY 12 HOURS SCHEDULED
Status: DISCONTINUED | OUTPATIENT
Start: 2025-01-07 | End: 2025-01-08

## 2025-01-07 RX ORDER — SODIUM CHLORIDE 9 MG/ML
40 INJECTION, SOLUTION INTRAVENOUS AS NEEDED
Status: DISCONTINUED | OUTPATIENT
Start: 2025-01-07 | End: 2025-01-08

## 2025-01-07 RX ORDER — DEXTROSE MONOHYDRATE 25 G/50ML
25 INJECTION, SOLUTION INTRAVENOUS
Status: DISCONTINUED | OUTPATIENT
Start: 2025-01-07 | End: 2025-01-14 | Stop reason: HOSPADM

## 2025-01-07 RX ORDER — POLYETHYLENE GLYCOL 3350 17 G/17G
17 POWDER, FOR SOLUTION ORAL DAILY PRN
Status: DISCONTINUED | OUTPATIENT
Start: 2025-01-07 | End: 2025-01-14 | Stop reason: HOSPADM

## 2025-01-07 RX ORDER — ACETAMINOPHEN 650 MG/1
650 SUPPOSITORY RECTAL EVERY 4 HOURS PRN
Status: DISCONTINUED | OUTPATIENT
Start: 2025-01-07 | End: 2025-01-09

## 2025-01-07 RX ORDER — ACETAMINOPHEN 325 MG/1
650 TABLET ORAL EVERY 4 HOURS PRN
Status: DISCONTINUED | OUTPATIENT
Start: 2025-01-07 | End: 2025-01-14 | Stop reason: HOSPADM

## 2025-01-07 RX ORDER — SODIUM CHLORIDE 0.9 % (FLUSH) 0.9 %
10 SYRINGE (ML) INJECTION AS NEEDED
Status: DISCONTINUED | OUTPATIENT
Start: 2025-01-07 | End: 2025-01-08

## 2025-01-07 RX ORDER — BISACODYL 10 MG
10 SUPPOSITORY, RECTAL RECTAL DAILY PRN
Status: DISCONTINUED | OUTPATIENT
Start: 2025-01-07 | End: 2025-01-14 | Stop reason: HOSPADM

## 2025-01-07 RX ORDER — SODIUM CHLORIDE 9 MG/ML
75 INJECTION, SOLUTION INTRAVENOUS CONTINUOUS
Status: ACTIVE | OUTPATIENT
Start: 2025-01-07 | End: 2025-01-08

## 2025-01-07 RX ORDER — AMOXICILLIN 250 MG
2 CAPSULE ORAL 2 TIMES DAILY
Status: DISCONTINUED | OUTPATIENT
Start: 2025-01-07 | End: 2025-01-14 | Stop reason: HOSPADM

## 2025-01-07 RX ORDER — ARFORMOTEROL TARTRATE 15 UG/2ML
15 SOLUTION RESPIRATORY (INHALATION)
Status: DISCONTINUED | OUTPATIENT
Start: 2025-01-07 | End: 2025-01-07

## 2025-01-07 RX ORDER — IOPAMIDOL 755 MG/ML
75 INJECTION, SOLUTION INTRAVASCULAR
Status: COMPLETED | OUTPATIENT
Start: 2025-01-07 | End: 2025-01-07

## 2025-01-07 RX ORDER — IBUPROFEN 600 MG/1
1 TABLET ORAL
Status: DISCONTINUED | OUTPATIENT
Start: 2025-01-07 | End: 2025-01-14 | Stop reason: HOSPADM

## 2025-01-07 RX ORDER — DONEPEZIL HYDROCHLORIDE 5 MG/1
5 TABLET, FILM COATED ORAL NIGHTLY
Status: DISCONTINUED | OUTPATIENT
Start: 2025-01-07 | End: 2025-01-14 | Stop reason: HOSPADM

## 2025-01-07 RX ORDER — IPRATROPIUM BROMIDE AND ALBUTEROL SULFATE 2.5; .5 MG/3ML; MG/3ML
3 SOLUTION RESPIRATORY (INHALATION)
Status: DISCONTINUED | OUTPATIENT
Start: 2025-01-07 | End: 2025-01-12

## 2025-01-07 RX ORDER — IPRATROPIUM BROMIDE AND ALBUTEROL SULFATE 2.5; .5 MG/3ML; MG/3ML
3 SOLUTION RESPIRATORY (INHALATION) EVERY 4 HOURS PRN
Status: DISCONTINUED | OUTPATIENT
Start: 2025-01-07 | End: 2025-01-12 | Stop reason: SDUPTHER

## 2025-01-07 RX ORDER — BISACODYL 5 MG/1
5 TABLET, DELAYED RELEASE ORAL DAILY PRN
Status: DISCONTINUED | OUTPATIENT
Start: 2025-01-07 | End: 2025-01-14 | Stop reason: HOSPADM

## 2025-01-07 RX ORDER — NITROGLYCERIN 0.4 MG/1
0.4 TABLET SUBLINGUAL
Status: DISCONTINUED | OUTPATIENT
Start: 2025-01-07 | End: 2025-01-14 | Stop reason: HOSPADM

## 2025-01-07 RX ADMIN — Medication 10 ML: at 21:54

## 2025-01-07 RX ADMIN — IPRATROPIUM BROMIDE AND ALBUTEROL SULFATE 3 ML: 2.5; .5 SOLUTION RESPIRATORY (INHALATION) at 20:36

## 2025-01-07 RX ADMIN — IOPAMIDOL 75 ML: 755 INJECTION, SOLUTION INTRAVENOUS at 15:50

## 2025-01-07 RX ADMIN — SODIUM CHLORIDE 1000 ML: 9 INJECTION, SOLUTION INTRAVENOUS at 16:11

## 2025-01-07 RX ADMIN — SODIUM CHLORIDE 75 ML/HR: 9 INJECTION, SOLUTION INTRAVENOUS at 21:57

## 2025-01-07 RX ADMIN — SODIUM CHLORIDE 1000 ML: 9 INJECTION, SOLUTION INTRAVENOUS at 18:30

## 2025-01-07 RX ADMIN — MUPIROCIN 1 APPLICATION: 20 OINTMENT TOPICAL at 23:19

## 2025-01-07 RX ADMIN — ACETAMINOPHEN 650 MG: 650 SUPPOSITORY RECTAL at 22:38

## 2025-01-07 NOTE — CONSULTS
"Stroke Consult Note    Patient Name: Karol oLpez   MRN: [id number]  Age: 74 y.o.  Sex: female  : 1950    Primary Care Physician: Romaine Eugene MD  Referring Physician: Dr. Lars Kraus    TIME STROKE TEAM CALLED: 1510 EST     TIME PATIENT SEEN: 1530 EST on arrival to [facility]    Handedness: Right  Race:     Chief Complaint/Reason for Consultation: Altered mental status, possible left sided weakness    Subjective .  HPI:   Kraol Lopez is a 74-year-old  female with a past medical history significant for stroke (residual right upper extremity weakness), atrial fibrillation (not on anticoagulation), bilateral carotid disease, T2DM, HTN, HLD, MCI, tobacco abuse, COPD, impaired functional mobility, CAD, GIB, CKD 3, intermittent medication noncompliance.  She reports to [facility] emergency department via EMS with a last known well of approximately 1 to 2 days ago.  Information gathered from her daughter who lives in [place] who reports that the patients  reports that she has been not feeling well with reported potential left-sided weakness.  Unfortunately we are quite data deficient on her symptoms and presentation to the emergency department.    On arrival blood pressure 170 systolic.  She appears to be in a catatonic type state.  When asked her name she will answer \"I do not know\".  She does not follow any commands.  She is noted to be covered in dried feces. She does blink to threat bilaterally. She has non purposeful movement in extremities, but overall minimal movement.  She does not withdraw to deep painful stimuli in any extremity.  CT head shows chronic left hemispheric infarcts.  CTA head and neck shows occlusion of the left ICA and significant stenosis of the right ICA.  Unfortunately patient cannot be fully anticoagulated for her atrial fibrillation secondary to history of significant GI bleeding.  She also previously did not tolerate dual " antiplatelet therapy secondary to GI bleeding and thrombocytopenia.  (Brilinta discontinued 2022, discharged 2/2023 with plavix, unable to find documentation as to when this was discontinued) Plan discussed with Dr. Perez as well as Dr. Flowers.  No emergent intervention will be proceeded at this time.  She will be admitted to the neuro ICU for further workup and evaluation.  Admitted to  Last Known Normal Date/Time: > 24 hours EST     Review of Systems   Unable to perform ROS: Mental status change      Past Medical History:   Diagnosis Date    Acid reflux     Anxiety     Cataracts, bilateral     Cirrhosis of liver     Constipation     COPD (chronic obstructive pulmonary disease)     Coronary artery disease     CTS (carpal tunnel syndrome)     Diabetes     Hearing loss     Hypercholesteremia     Hypertension     Impaired functional mobility, balance, gait, and endurance     Lower back pain     Osteoarthritis     Stroke     2013-weak in right arm    Tattoos     x2    Tobacco abuse     Tumor     in between breast closer to right breast    Vitamin B12 deficiency     Wears dentures     upper only    Wears glasses      Past Surgical History:   Procedure Laterality Date    BREAST BIOPSY Right 5/10/2019    Procedure: BREAST BIOPSY RIGHT;  Surgeon: Kiana Frey MD;  Location: [facility] OR;  Service: General    CARPAL TUNNEL RELEASE Bilateral     CHOLECYSTECTOMY      COLONOSCOPY N/A 9/30/2021    Procedure: COLONOSCOPY WITH POLYPECTOMY AND ABLATION OF AVM;  Surgeon: Russell Davila MD;  Location: [facility] ENDOSCOPY;  Service: Gastroenterology;  Laterality: N/A;    CORONARY ANGIOPLASTY WITH STENT PLACEMENT  2012    ENDOSCOPY N/A 9/29/2021    Procedure: ESOPHAGOGASTRODUODENOSCOPY with biopsies;  Surgeon: Russell Davila MD;  Location: [facility] ENDOSCOPY;  Service: Gastroenterology;  Laterality: N/A;    ENTEROSCOPY SMALL BOWEL N/A 11/16/2021    Procedure: ESOPHAGOGASTRODUODENOSCOPY WITH SMALL BOWEL ENTEROSCOPY and  "biopsy;  Surgeon: Russell Davila MD;  Location: [facility] ENDOSCOPY;  Service: Gastroenterology;  Laterality: N/A;    HYSTERECTOMY      complete    JOINT REPLACEMENT Bilateral     knee replacements    TOTAL KNEE ARTHROPLASTY Bilateral 10/18/2016    MAUREEN Palomares MD     Family History   Problem Relation Age of Onset    Hypertension Other     Diabetes Mother     Hypertension Mother     Cancer Mother     Diabetes Father     Hypertension Father     Heart disease Father     Cancer Sister     Cancer Brother      Social History     Socioeconomic History    Marital status:    Tobacco Use    Smoking status: Every Day     Current packs/day: 1.00     Average packs/day: 1 pack/day for 46.0 years (46.0 ttl pk-yrs)     Types: Cigarettes    Smokeless tobacco: Never   Vaping Use    Vaping status: Never Used   Substance and Sexual Activity    Alcohol use: No    Drug use: No    Sexual activity: Defer     Allergies   Allergen Reactions    Codeine GI Intolerance     Prior to Admission medications    Medication Sig Start Date End Date Taking? Authorizing Provider   aspirin 81 MG chewable tablet Chew 1 tablet Daily. 4/29/23   Reji Stoll MD B-D INS SYRINGE 0.5CC/31GX5/16 31G X 5/16\" 0.5 ML misc use as directed once daily 10/5/16   Reji Stoll MD   bisacodyl (DULCOLAX) 5 MG EC tablet Take 1 tablet by mouth Daily As Needed. 3/19/22   Reji Stoll MD   clopidogrel (Plavix) 75 MG tablet Take 1 tablet by mouth Daily. 2/10/24   Roselyn Dc MD   empagliflozin (Jardiance) 10 MG tablet tablet Take 1 tablet by mouth Daily. 7/1/22   Luis A Celestin IV, MD   ferrous sulfate (FerrouSul) 325 (65 FE) MG tablet Take 1 tablet by mouth Daily With Breakfast. 9/30/21   Fabiola Ceron DO   furosemide (LASIX) 20 MG tablet Take 1 tablet by mouth Daily As Needed. swelling 10/6/21   Reji Stoll MD   HYDROcodone-acetaminophen (NORCO) 5-325 MG per tablet Take 1 tablet by mouth Every 8 (Eight) " "Hours As Needed (PRN PAIN). 11/7/16   Jovan Palomares MD   Insulin Pen Needle 31G X 6 MM misc 1 each. 5/17/21   Reji Stoll MD   Insulin Syringe-Needle U-100 30G X 5/16\" 0.5 ML misc 1 each by Does not apply route daily 6/30/16   Reji Stoll MD   Insulin Syringe-Needle U-100 31G X 5/16\" 1 ML misc 1 each by Does not apply route daily 6/5/15   Reji Stoll MD   lactulose (CHRONULAC) 10 GM/15ML solution Take 15 mL by mouth Daily. 3/24/22   Reji Stoll MD   linaclotide (LINZESS) 290 MCG capsule capsule Take 1 capsule by mouth Every Morning Before Breakfast.    Reji Stoll MD   linagliptin (TRADJENTA) 5 MG tablet tablet Take 1 tablet by mouth Daily. 7/1/22   Luis A Celestin IV, MD   metFORMIN (GLUCOPHAGE) 1000 MG tablet Take 1 tablet by mouth 2 (Two) Times a Day With Meals. 7/1/22   Luis A Celestin IV, MD   metoprolol succinate XL (TOPROL-XL) 25 MG 24 hr tablet TAKE 1 TABLET BY MOUTH DAILY 2/28/24   Luis A Celestin IV, MD   pantoprazole (PROTONIX) 40 MG EC tablet TAKE 1 TABLET BY MOUTH TWICE DAILY BEFORE MEALS 10/3/22   Sara Solis PA-C   rosuvastatin (CRESTOR) 20 MG tablet Take 1 tablet by mouth Daily. 2/3/23   Luis A Celestin IV, MD   umeclidinium-vilanterol (Anoro Ellipta) 62.5-25 MCG/INH aerosol powder  inhaler Inhale 1 puff As Needed. 2/21/22   Reji Stoll MD   vitamin B-12 (CYANOCOBALAMIN) 1000 MCG tablet Take 1 tablet by mouth Daily.    Reji Stoll MD             Objective        Neurological Exam  Mental Status  Awake and alert.  No true comprehensible speech, is able to whisper \"I do not know\" intermittently .    Cranial Nerves  CN II: Visual fields full to confrontation. Blinks to visual threat bilaterally .  CN III, IV, VI: Pupils equal round and reactive to light bilaterally.  CN VIII: Hearing appears to be intact .  CN IX, X:  Right: Palate is normal.  Left: Palate is normal.    Motor  Normal " muscle bulk throughout. No fasciculations present. Normal muscle tone.      Physical Exam  Constitutional:       General: She is awake. She is not in acute distress.     Appearance: She is ill-appearing.   HENT:      Mouth/Throat:      Pharynx: Oropharynx is clear.   Eyes:      Pupils: Pupils are equal, round, and reactive to light.   Pulmonary:      Effort: Pulmonary effort is normal.   Musculoskeletal:         General: Normal range of motion.      Cervical back: Normal range of motion.   Skin:     General: Skin is warm and dry.   Neurological:      Mental Status: She is alert. She is disoriented.      Cranial Nerves: Cranial nerve deficit present.      Motor: Weakness present.   Psychiatric:         Speech: She is noncommunicative.         Cognition and Memory: Cognition is impaired.       Acute Stroke Data    IV Thrombolytic (TPA/Tenecteplase) Inclusion / Exclusion Criteria    Time: 15:44 EST  Person Administering Scale: AUBREY Orantes    Inclusion Criteria  [x]   18 years of age or greater   []   Onset of symptoms < 4.5 hours before beginning treatment (stroke onset = time patient was last seen well or without symptoms).   []   Diagnosis of acute ischemic stroke causing measurable disabling deficit (Complete Hemianopia, Any Aphasia, Visual or Sensory Extinction, Any weakness limiting sustained effort against gravity)   []   Any remaining deficit considered potentially disabling in view of patient and practitioner   Exclusion criteria (Do not proceed with Alteplase if any are checked under exclusion criteria)  [x]   Onset unknown or GREATER than 4.5 hours   []   ICH on CT/MRI   []   CT demonstrates hypodensity representing acute or subacute infarct   []   Significant head trauma or prior stroke in the previous 3 months   []   Symptoms suggestive of subarachnoid hemorrhage   []   History of un-ruptured intracranial aneurysm GREATER than 10 mm   []   Recent intracranial or intraspinal surgery within the  last 3 months   []   Arterial puncture at a non-compressible site in the previous 7 days   []   Active internal bleeding   []   Acute bleeding tendency   []   Platelet count LESS than 100,000 for known hematological diseases such as leukemia, thrombocytopenia or chronic cirrhosis   []   Current use of anticoagulant with INR GREATER than 1.7 or PT GREATER than 15 seconds, aPTT GREATER than 40 seconds   []   Heparin received within 48 hours, resulting in abnormally elevated aPTT GREATER than upper limit of normal   []   Current use of direct thrombin inhibitors or direct factor Xa inhibitors in the past 48 hours   []   Elevated blood pressure refractory to treatment (systolic GREATER than 185 mm/Hg or diastolic  GREATER than 110 mm/Hg   []   Suspected infective endocarditis and aortic arch dissection   []   Current use of therapeutic treatment dose of low-molecular-weight heparin (LMWH) within the previous 24 hours   []   Structural GI malignancy or bleed   Relative exclusion for all patients  []   Only minor nondisabling symptoms   []   Pregnancy   []   Seizure at onset with postictal residual neurological impairments   []   Major surgery or previous trauma within past 14 days   []   History of previous spontaneous ICH, intracranial neoplasm, or AV malformation   []   Postpartum (within previous 14 days)   []   Recent GI or urinary tract hemorrhage (within previous 21 days)   []   Recent acute MI (within previous 3 months)   []   History of unruptured intracranial aneurysm LESS than 10 mm   []   History of ruptured intracranial aneurysm   []   Blood glucose LESS than 50 mg/dL (2.7 mmol/L)   []   Dural puncture within the last 7 days   []   Known GREATER than 10 cerebral microbleeds   Additional exclusions for patients with symptoms onset between 3 and 4.5 hours.  []   Age > 80.   []   On any anticoagulants regardless of INR  >>> Warfarin (Coumadin), Heparin, Enoxaparin (Lovenox), fondaparinux (Arixtra), bivalirudin  (Angiomax), Argatroban, dabigatran (Pradaxa), rivaroxaban (Xarelto), or apixaban (Eliquis)   []   Severe stroke (NIHSS > 25).   []   History of BOTH diabetes and previous ischemic stroke.   []   The risks and benefits have been discussed with the patient or family related to the administration of IV alteplase for stroke symptoms.   []   I have discussed and reviewed the patient's case and imaging with the attending prior to IV Thrombolytic (TPA/Tenecteplase).   NA Time Thrombolytic administered       Hospital Meds:  Scheduled-   Infusions- No current facility-administered medications for this encounter.     PRNs-     Functional Status Prior to Current Stroke/[place] Score: 2-3    NIH Stroke Scale  Time: 15:44 EST  Person Administering Scale: AUBREY Orantes    1a  Level of consciousness: 1=not alert but arousable by minor stimulation to obey, answer or respond   1b. LOC questions:  2=Performs neither task correctly   1c. LOC commands: 2=Performs neither task correctly   2.  Best Gaze: 0=normal   3.  Visual: 0=No visual loss   4. Facial Palsy: 2=Partial paralysis (total or near total paralysis of the lower face)   5a.  Motor left arm: 0=No drift, limb holds 90 (or 45) degrees for full 10 seconds   5b.  Motor right arm: 4=No movement   6a. motor left le=Drift, limb holds 90 (or 45) degrees but drifts down before full 10 seconds: does not hit bed   6b  Motor right leg:  3=No effort against gravity, limb falls   7. Limb Ataxia: 0=Absent   8.  Sensory: 1=Mild to moderate sensory loss; patient feels pinprick is less sharp or is dull on the affected side; there is a loss of superficial pain with pinprick but patient is aware She is being touched   9. Best Language:  1=Mild to moderate aphasia; some obvious loss of fluency or facility of comprehension without significant limitation on ideas expressed or form of expression.   10. Dysarthria: 1=Mild to moderate, patient slurs at least some words and at worst, can  be understood with some difficulty   11. Extinction and Inattention: 0=No abnormality    Total:   18       Results Reviewed:  I have personally reviewed current lab, radiology, and data and agree with results.    CT Head Without Contrast    Result Date: 1/7/2025  CT HEAD WO CONTRAST Date of Exam: 1/7/2025 3:35 PM EST Indication: stroke. Comparison: None available. Technique: Axial CT images were obtained of the head without contrast administration.  Automated exposure control and iterative construction methods were used. Findings: Motion artifact limits exam. There is no evidence of hemorrhage. There is no mass effect or midline shift. There is diffuse brain atrophy. Periventricular hypodense areas compatible with chronic small vessel ischemia. There is a chronic left frontal infarct with encephalomalacia and  volume loss. Also chronic left gangliocapsular lacunar infarct. Tiny chronic right pontine lacunar infarct There is no extracerebral collection. Ventricles are normal in size and configuration for patient's stated age. Posterior fossa is within normal limits. Calvarium and skull base appear intact.  Visualized sinuses show no air fluid levels. Visualized orbits are unremarkable.     Impression: Impression: 1.No acute intracranial abnormality identified. 2.Chronic left frontal infarct. Chronic left gangliocapsular and right pontine lacunar infarcts. 3.Diffuse brain atrophy with chronic small vessel ischemia. Electronically Signed: Dave Shine MD  1/7/2025 4:02 PM EST  Workstation ID: [id number]     CT ANGIOGRAM HEAD W AI ANALYSIS OF LVO, CT ANGIOGRAM NECK     Date of Exam: 1/7/2025 3:44 PM EST     Indication: stroke.     Comparison: 2/9/2024     Technique: CTA of the head and neck was performed after the uneventful intravenous administration of 75 cc Isovue-370 IV contrast . Reconstructed coronal and sagittal images were also obtained. In addition, a 3-D volume rendered image was created for   interpretation.  Automated exposure control and iterative reconstruction methods were used.     Findings: Mild atheromatous disease of the aortic arch and origins of the great vessels. Severe atheromatous disease of the distal right common carotid artery through the right carotid bulb extending into the proximal right internal carotid artery. There   is minimal residual flow noted within the proximal right internal carotid artery with a residual vessel lumen diameter of approximately 1 mm and a native vessel lumen diameter of consistent with at least 80% stenosis per NASCET criteria. Severe   atheromatous disease involving the intracranial segments of the right internal carotid artery. The right carotid terminus is normal. The visualized branches of the right anterior and middle cerebral arteries are normal.     Moderate atheromatous disease of the distal left common carotid artery with severe atheromatous disease of the left carotid bulb and proximal left internal carotid artery. Minimal residual vessel luminal diameter of 2 mm of the proximal left internal   carotid artery with a native vessel lumen diameter of 4 mm consistent with 50% stenosis. 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 This is likely filled via retrograde flow from the [place]   and a the anterior communicating artery. The left carotid terminus is normal. 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 The visualized branches of the   left anterior and middle cerebral arteries appear patent.     Both vertebral arteries arise from the subclavian arteries. The  vertebral arteries are codominant. Minimal atheromatous disease of the vertebral arteries. Both vertebral arteries supply the basilar artery. The basilar artery and basilar artery tip are   normal. The basilar artery terminates in bilateral posterior cerebral arteries which appear normal.     The intracranial venous sinuses are patent. No intracranial hemorrhage is noted.     Prior bilateral lens replacements. The paranasal sinuses are clear. The mastoid air cells are aerated. The nasopharynx is clear. No adenopathy. Subcentimeter thyroid nodules. 0.5 cm subpleural right apical pulmonary nodule on image #88. This is stable   compared with the prior study. The visualized clavicles are intact. The visualized superior ribs are intact. Degenerative changes of the cervical spine.     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 The ophthalmic artery remains patent. This is new compared with the examination performed on 2/8/2024. This   is likely filled via retrograde flow from the [place] and a the anterior communicating artery.  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  4.Additional vascular and nonvascular findings as described above.  5.Stable 0.5 cm subpleural right apical pulmonary nodule on image #88.     Findings were discussed with Dr. Kraus at 4:47 p.m. on 1/7/2025      Electronically Signed: Anthony Dumont MD    1/7/2025 4:49 PM EST    Workstation ID: [id number]    ECG 12 Lead Stroke Evaluation   Preliminary Result   Test Reason : Stroke Evaluation   Blood Pressure :   */*   mmHG   Vent. Rate :  75 BPM     Atrial Rate :  75 BPM      P-R Int : 192  ms          QRS Dur : 104 ms       QT Int : 446 ms       P-R-T Axes :  52 -14  34 degrees     QTcB Int : 498 ms      Sinus rhythm with premature supraventricular complexes   Nonspecific ST abnormality   Abnormal ECG   When compared with ECG of 08-Feb-2024 19:25,   premature supraventricular complexes are now present   QT has lengthened      Referred By: ed           Confirmed By:         Lab Results   Component Value Date    GLUCOSE 295 (H) 01/07/2025    BUN 25 (H) 01/07/2025    CREATININE 1.45 (H) 01/07/2025     01/07/2025    K 4.0 01/07/2025     01/07/2025    CALCIUM 9.8 01/07/2025    PROTEINTOT 6.9 01/07/2025    ALBUMIN 4.0 01/07/2025    ALT 25 01/07/2025    AST 16 01/07/2025    ALKPHOS 124 (H) 01/07/2025    BILITOT 0.5 01/07/2025    GLOB 2.9 01/07/2025    AGRATIO 1.4 01/07/2025    BCR 17.2 01/07/2025    ANIONGAP 15.0 01/07/2025    EGFR 38.4 (L) 01/07/2025     WBC   Date Value Ref Range Status   01/07/2025 6.72 3.40 - 10.80 10*3/mm3 Final     RBC   Date Value Ref Range Status   01/07/2025 4.13 3.77 - 5.28 10*6/mm3 Final     Hemoglobin   Date Value Ref Range Status   01/07/2025 12.3 12.0 - 15.9 g/dL Final     Hematocrit   Date Value Ref Range Status   01/07/2025 37.1 34.0 - 46.6 % Final     MCV   Date Value Ref Range Status   01/07/2025 89.8 79.0 - 97.0 fL Final     MCH   Date Value Ref Range Status   01/07/2025 29.8 26.6 - 33.0 pg Final     MCHC   Date Value Ref Range Status   01/07/2025 33.2 31.5 - 35.7 g/dL Final     RDW   Date Value Ref Range Status   01/07/2025 13.8 12.3 - 15.4 % Final     RDW-SD   Date Value Ref Range Status   01/07/2025 45.1 37.0 - 54.0 fl Final     MPV   Date Value Ref Range Status   01/07/2025 11.7 6.0 - 12.0 fL Final     Platelets   Date Value Ref Range Status   01/07/2025 116 (L) 140 - 450 10*3/mm3 Final     Neutrophil %   Date Value Ref Range Status   01/07/2025 74.0 42.7 - 76.0 % Final     Lymphocyte %   Date Value Ref Range Status   01/07/2025 17.4 (L) 19.6 - 45.3 % Final      Monocyte %   Date Value Ref Range Status   01/07/2025 6.8 5.0 - 12.0 % Final     Eosinophil %   Date Value Ref Range Status   01/07/2025 1.0 0.3 - 6.2 % Final     Basophil %   Date Value Ref Range Status   01/07/2025 0.4 0.0 - 1.5 % Final     Immature Grans %   Date Value Ref Range Status   01/07/2025 0.4 0.0 - 0.5 % Final     Neutrophils, Absolute   Date Value Ref Range Status   01/07/2025 4.96 1.70 - 7.00 10*3/mm3 Final     Lymphocytes, Absolute   Date Value Ref Range Status   01/07/2025 1.17 0.70 - 3.10 10*3/mm3 Final     Monocytes, Absolute   Date Value Ref Range Status   01/07/2025 0.46 0.10 - 0.90 10*3/mm3 Final     Eosinophils, Absolute   Date Value Ref Range Status   01/07/2025 0.07 0.00 - 0.40 10*3/mm3 Final     Basophils, Absolute   Date Value Ref Range Status   01/07/2025 0.03 0.00 - 0.20 10*3/mm3 Final     Immature Grans, Absolute   Date Value Ref Range Status   01/07/2025 0.03 0.00 - 0.05 10*3/mm3 Final     nRBC   Date Value Ref Range Status   01/07/2025 0.0 0.0 - 0.2 /100 WBC Final       Assessment/Plan:  74 year old  female with multiple vascular risk factors who presented [facility] emergency department via EMS for complaints of altered mental status, reported left-sided weakness, and aphasia.  Need to be an appropriate IV thrombolytic therapy candidate secondary to extended last known well.  Not deemed to be an appropriate emergent endovascular therapy candidate as any potential benefit would be outweighed by significant risk.    Antiplatelet PTA: Aspirin  Anticoagulant PTA: None      Altered Mental Status  Reported Left Sided Weakness   Aphasia  -CVA order set  -MRI brain without contrast, routine  -Keep head of the bed elevated at least 30 degrees  -Avoid hypotension with goal -200 to allow for adequate perfusion, overall management per ICU medicine team  -Secondary to thrombocytopenia and history of significant GI bleeding we will continue aspirin 325 mg  monotherapy  -Platelets today 116, continue to trend  -Bilateral carotid ultrasound in a.m.  -PT/OT/SLP to see and assess when appropriate  -TTE, defer bubble, previously completed  -NIHSS neurochecks per protocol  -CT head for any neurologic decline  -Hemoglobin A1c and FLP in a.m.      Stroke neurology continue to follow.  Plan of care discussed with Dr. Kraus as well as the patient's daughter via telephone via Dr Kraus.  Thank you for the consult and care of his patient.  Please call with any further questions or concerns.    Miladis Blackmon, APRN  January 7, 2025  15:44 EST    Addendum:    Return to the patient's room approximately 1730 and her daughter is present at the bedside.  She reports that she has had a similar episode to this with hyperglycemia and hospital admission a couple months ago.  Patient is able to tell me her daughter's name, however is unable to tell me her own.  She does follow some simple commands with reassurance and prompting.  She has no gaze preference and blinks to threat bilaterally.  She is able to touch her nose with her left upper extremity.  She has noted right upper and right lower extremity stiffness without movement.  Daughter reports that this is her baseline from her prior strokes.

## 2025-01-07 NOTE — LETTER
EMS Transport Request  For use at [facility], [place], Prakash, Sher, and Gaitan only   Patient Name: Karol Lopez : 1950   Weight:59.1 kg (130 lb 4.7 oz) Pick-up Location: [facility] BLS/ALS: BLS/ALS: BLS   Insurance: MEDICARE Auth End Date:    Pre-Cert #: D/C Summary complete:    Destination: Other [facility]   Contact Precautions: None   Equipment (O2, Fluids, etc.): None   Arrive By Date/Time: Monday,  Stretcher/WC: Stretcher   CM Requesting: Tamiko Nuñez RN Ext: 1958   Notes/Medical Necessity: pt unable to sit/stand without assistance     ______________________________________________________________________    *Only 2 patient bags OR 1 carry-on size bag are permitted.  Wheelchairs and walkers CANNOT transported with the patient. Acknowledge: Yes

## 2025-01-07 NOTE — ED PROVIDER NOTES
EMERGENCY DEPARTMENT ENCOUNTER    Pt Name: Karol Lopez  MRN: [id number]  Pt :   1950  Room Number:    Date of encounter:  2025  PCP: Romaine Eugene MD  ED Provider: Lars Kraus MD    Historian: EMS and later daughter      HPI:  Chief Complaint: Altered mental status, speech changes, right-sided weakness        Context: Karol Lopez is a 74-year-old woman brought in by EMS because of altered mental status.  They report that for the last 2 to 3 days she has been having confusion and speech changes.  They also said that the  reported that she had demonstrated shuffling when she walked.  They found her to be weak on the right.  They said they were contacted by her daughter in [place] and were not able to get much else in the way of history from the .  Upon arrival here she is whispering when I asked her name she can tell me that she does not know but otherwise is not following any commands.  EMS reports she is able to care for herself at baseline per the .  They report that her fingerstick blood sugar was 280.  She was hemodynamically stable for them in route.  No other history is known at this time.       PAST MEDICAL HISTORY  Past Medical History:   Diagnosis Date    Acid reflux     Anxiety     Cataracts, bilateral     Cirrhosis of liver     Constipation     COPD (chronic obstructive pulmonary disease)     Coronary artery disease     CTS (carpal tunnel syndrome)     Diabetes     Hearing loss     Hypercholesteremia     Hypertension     Impaired functional mobility, balance, gait, and endurance     Lower back pain     Osteoarthritis     Stroke     2013-weak in right arm    Tattoos     x2    Tobacco abuse     Tumor     in between breast closer to right breast    Vitamin B12 deficiency     Wears dentures     upper only    Wears glasses          PAST SURGICAL HISTORY  Past Surgical History:   Procedure Laterality Date    BREAST BIOPSY Right 5/10/2019    Procedure:  BREAST BIOPSY RIGHT;  Surgeon: Kiana Frey MD;  Location: [facility] OR;  Service: General    CARPAL TUNNEL RELEASE Bilateral     CHOLECYSTECTOMY      COLONOSCOPY N/A 9/30/2021    Procedure: COLONOSCOPY WITH POLYPECTOMY AND ABLATION OF AVM;  Surgeon: Russell Davila MD;  Location: [facility] ENDOSCOPY;  Service: Gastroenterology;  Laterality: N/A;    CORONARY ANGIOPLASTY WITH STENT PLACEMENT  2012    ENDOSCOPY N/A 9/29/2021    Procedure: ESOPHAGOGASTRODUODENOSCOPY with biopsies;  Surgeon: Russell Davila MD;  Location: [facility] ENDOSCOPY;  Service: Gastroenterology;  Laterality: N/A;    ENTEROSCOPY SMALL BOWEL N/A 11/16/2021    Procedure: ESOPHAGOGASTRODUODENOSCOPY WITH SMALL BOWEL ENTEROSCOPY and biopsy;  Surgeon: Russell Davila MD;  Location: [facility] ENDOSCOPY;  Service: Gastroenterology;  Laterality: N/A;    HYSTERECTOMY      complete    JOINT REPLACEMENT Bilateral     knee replacements    TOTAL KNEE ARTHROPLASTY Bilateral 10/18/2016    MAUREEN Palomares MD         FAMILY HISTORY  Family History   Problem Relation Age of Onset    Hypertension Other     Diabetes Mother     Hypertension Mother     Cancer Mother     Diabetes Father     Hypertension Father     Heart disease Father     Cancer Sister     Cancer Brother          SOCIAL HISTORY  Social History     Socioeconomic History    Marital status:    Tobacco Use    Smoking status: Every Day     Current packs/day: 1.00     Average packs/day: 1 pack/day for 46.0 years (46.0 ttl pk-yrs)     Types: Cigarettes    Smokeless tobacco: Never   Vaping Use    Vaping status: Never Used   Substance and Sexual Activity    Alcohol use: No    Drug use: No    Sexual activity: Defer         ALLERGIES  Codeine        REVIEW OF SYSTEMS  Review of Systems       All systems reviewed and negative except for those discussed in HPI.       PHYSICAL EXAM    I have reviewed the triage vital signs and nursing notes.    ED Triage Vitals [01/07/25 1609]   Temp Heart Rate Resp  BP SpO2   98 °F (36.7 °C) 64 18 (!) 200/72 97 %      Temp src Heart Rate Source Patient Position BP Location FiO2 (%)   Oral Monitor -- -- --       Physical Exam  GENERAL:   Appears chronically ill  HENT: Nares patent.  Dry mucous membranes  EYES: No scleral icterus.  CV: Regular rhythm, regular rate.  RESPIRATORY: Normal effort.  No audible wheezes, rales or rhonchi.  ABDOMEN: Soft, nontender  MUSCULOSKELETAL: No deformities.   NEURO: Awake and will follow me with her eyes but can only whisper and when asked her name says I do not know, intermittently moves extremities spontaneously but unable to follow commands,  SKIN: Warm, dry, no rash visualized.      LAB RESULTS  Recent Results (from the past 24 hours)   Blood Gas, Venous With Co-Ox    Collection Time: 01/07/25  4:04 PM    Specimen: Venous Blood   Result Value Ref Range    Site Right Radial     pH, Venous 7.379 7.310 - 7.410 pH Units    pCO2, Venous 41.4 41.0 - 51.0 mm Hg    pO2, Venous 40.4 27.0 - 53.0 mm Hg    HCO3, Venous 24.4 22.0 - 28.0 mmol/L    Base Excess, Venous -0.8 -2.0 - 2.0 mmol/L    Hemoglobin, Blood Gas 12.4 (L) 14 - 18 g/dL    Oxyhemoglobin Venous 74.2 %    Methemoglobin Venous 0.2 %    Carboxyhemoglobin Venous 2.3 %    CO2 Content 25.7 22 - 33 mmol/L    Temperature 37.0     Barometric Pressure for Blood Gas      Modality Room Air     FIO2 21 %    Rate 0 Breaths/minute    PIP 0 cmH2O    IPAP 0     EPAP 0    Comprehensive Metabolic Panel    Collection Time: 01/07/25  4:07 PM    Specimen: Blood   Result Value Ref Range    Glucose 295 (H) 65 - 99 mg/dL    BUN 25 (H) 8 - 23 mg/dL    Creatinine 1.45 (H) 0.57 - 1.00 mg/dL    Sodium 139 136 - 145 mmol/L    Potassium 4.0 3.5 - 5.2 mmol/L    Chloride 101 98 - 107 mmol/L    CO2 23.0 22.0 - 29.0 mmol/L    Calcium 9.8 8.6 - 10.5 mg/dL    Total Protein 6.9 6.0 - 8.5 g/dL    Albumin 4.0 3.5 - 5.2 g/dL    ALT (SGPT) 25 1 - 33 U/L    AST (SGOT) 16 1 - 32 U/L    Alkaline Phosphatase 124 (H) 39 - 117 U/L     Total Bilirubin 0.5 0.0 - 1.2 mg/dL    Globulin 2.9 gm/dL    A/G Ratio 1.4 g/dL    BUN/Creatinine Ratio 17.2 7.0 - 25.0    Anion Gap 15.0 5.0 - 15.0 mmol/L    eGFR 38.4 (L) >60.0 mL/min/1.73   Lipase    Collection Time: 01/07/25  4:07 PM    Specimen: Blood   Result Value Ref Range    Lipase 32 13 - 60 U/L   BNP    Collection Time: 01/07/25  4:07 PM    Specimen: Blood   Result Value Ref Range    proBNP 271.0 0.0 - 900.0 pg/mL   Lactic Acid, Plasma    Collection Time: 01/07/25  4:07 PM    Specimen: Blood   Result Value Ref Range    Lactate 1.7 0.5 - 2.0 mmol/L   C-reactive Protein    Collection Time: 01/07/25  4:07 PM    Specimen: Blood   Result Value Ref Range    C-Reactive Protein <0.30 0.00 - 0.50 mg/dL   Salicylate Level    Collection Time: 01/07/25  4:07 PM    Specimen: Blood   Result Value Ref Range    Salicylate <0.3 <=30.0 mg/dL   Ethanol    Collection Time: 01/07/25  4:07 PM    Specimen: Blood   Result Value Ref Range    Ethanol <10 0 - 10 mg/dL   Acetaminophen Level    Collection Time: 01/07/25  4:07 PM    Specimen: Blood   Result Value Ref Range    Acetaminophen <5.0 0.0 - 30.0 mcg/mL   CK    Collection Time: 01/07/25  4:07 PM    Specimen: Blood   Result Value Ref Range    Creatine Kinase 88 20 - 180 U/L   Magnesium    Collection Time: 01/07/25  4:07 PM    Specimen: Blood   Result Value Ref Range    Magnesium 2.3 1.6 - 2.4 mg/dL   Phosphorus    Collection Time: 01/07/25  4:07 PM    Specimen: Blood   Result Value Ref Range    Phosphorus 3.9 2.5 - 4.5 mg/dL   Respiratory Panel PCR w/COVID-19(SARS-CoV-2) TYREL/CESAR/JAYME/PAD/COR/ELVIE In-House, NP Swab in [facility], 2 HR TAT - Swab, Nasopharynx    Collection Time: 01/07/25  4:07 PM    Specimen: Nasopharynx; Swab   Result Value Ref Range    ADENOVIRUS, PCR Not Detected Not Detected    Coronavirus 229E Not Detected Not Detected    Coronavirus HKU1 Not Detected Not Detected    Coronavirus NL63 Not Detected Not Detected    Coronavirus OC43 Not Detected Not Detected     COVID19 Not Detected Not Detected - Ref. Range    Human Metapneumovirus Not Detected Not Detected    Human Rhinovirus/Enterovirus Not Detected Not Detected    Influenza A PCR Not Detected Not Detected    Influenza B PCR Not Detected Not Detected    Parainfluenza Virus 1 Not Detected Not Detected    Parainfluenza Virus 2 Not Detected Not Detected    Parainfluenza Virus 3 Not Detected Not Detected    Parainfluenza Virus 4 Not Detected Not Detected    RSV, PCR Not Detected Not Detected    Bordetella pertussis pcr Not Detected Not Detected    Bordetella parapertussis PCR Not Detected Not Detected    Chlamydophila pneumoniae PCR Not Detected Not Detected    Mycoplasma pneumo by PCR Not Detected Not Detected   CBC Auto Differential    Collection Time: 01/07/25  4:07 PM    Specimen: Blood   Result Value Ref Range    WBC 6.72 3.40 - 10.80 10*3/mm3    RBC 4.13 3.77 - 5.28 10*6/mm3    Hemoglobin 12.3 12.0 - 15.9 g/dL    Hematocrit 37.1 34.0 - 46.6 %    MCV 89.8 79.0 - 97.0 fL    MCH 29.8 26.6 - 33.0 pg    MCHC 33.2 31.5 - 35.7 g/dL    RDW 13.8 12.3 - 15.4 %    RDW-SD 45.1 37.0 - 54.0 fl    MPV 11.7 6.0 - 12.0 fL    Platelets 116 (L) 140 - 450 10*3/mm3    Neutrophil % 74.0 42.7 - 76.0 %    Lymphocyte % 17.4 (L) 19.6 - 45.3 %    Monocyte % 6.8 5.0 - 12.0 %    Eosinophil % 1.0 0.3 - 6.2 %    Basophil % 0.4 0.0 - 1.5 %    Immature Grans % 0.4 0.0 - 0.5 %    Neutrophils, Absolute 4.96 1.70 - 7.00 10*3/mm3    Lymphocytes, Absolute 1.17 0.70 - 3.10 10*3/mm3    Monocytes, Absolute 0.46 0.10 - 0.90 10*3/mm3    Eosinophils, Absolute 0.07 0.00 - 0.40 10*3/mm3    Basophils, Absolute 0.03 0.00 - 0.20 10*3/mm3    Immature Grans, Absolute 0.03 0.00 - 0.05 10*3/mm3    nRBC 0.0 0.0 - 0.2 /100 WBC   ECG 12 Lead Stroke Evaluation    Collection Time: 01/07/25  4:16 PM   Result Value Ref Range    QT Interval 446 ms    QTC Interval 498 ms   Urinalysis With Microscopic If Indicated (No Culture) - Straight Cath    Collection Time: 01/07/25  4:48 PM     Specimen: Straight Cath; Urine   Result Value Ref Range    Color, UA Yellow Yellow, Straw    Appearance, UA Clear Clear    pH, UA <=5.0 5.0 - 8.0    Specific Gravity, UA 1.024 1.001 - 1.030    Glucose, UA >=1000 mg/dL (3+) (A) Negative    Ketones, UA Negative Negative    Bilirubin, UA Negative Negative    Blood, UA Negative Negative    Protein, UA Trace (A) Negative    Leuk Esterase, UA Negative Negative    Nitrite, UA Negative Negative    Urobilinogen, UA 0.2 E.U./dL 0.2 - 1.0 E.U./dL   Urine Drug Screen - Urine, Clean Catch    Collection Time: 01/07/25  4:49 PM    Specimen: Urine, Clean Catch   Result Value Ref Range    THC, Screen, Urine Negative Negative    Phencyclidine (PCP), Urine Negative Negative    Cocaine Screen, Urine Negative Negative    Methamphetamine, Ur Negative Negative    Opiate Screen Negative Negative    Amphetamine Screen, Urine Negative Negative    Benzodiazepine Screen, Urine Negative Negative    Tricyclic Antidepressants Screen Negative Negative    Methadone Screen, Urine Negative Negative    Barbiturates Screen, Urine Negative Negative    Oxycodone Screen, Urine Negative Negative    Buprenorphine, Screen, Urine Negative Negative   Fentanyl, Urine - Urine, Clean Catch    Collection Time: 01/07/25  4:49 PM    Specimen: Urine, Clean Catch   Result Value Ref Range    Fentanyl, Urine Negative Negative       If labs were ordered, I independently reviewed the results and considered them in treating the patient.        RADIOLOGY  XR Chest 1 View    Result Date: 1/7/2025  XR CHEST 1 VW Date of Exam: 1/7/2025 5:00 PM EST Indication: Altered mental status Comparison: Chest radiograph dated 2/8/2024 Findings: The cardiomediastinal silhouette is within normal limits. Coronary artery calcifications. Pulmonary vascularity appears normal. There is no focal airspace consolidation, pleural effusion, or pneumothorax. There are degenerative changes of the thoracic spine.     Impression: No acute  cardiopulmonary abnormality. Electronically Signed: Ezequiel Concepcion  1/7/2025 5:31 PM EST  Workstation ID: [id number]    CT Angiogram Head w AI Analysis of LVO    Result Date: 1/7/2025  CT ANGIOGRAM HEAD W AI ANALYSIS OF LVO, CT ANGIOGRAM NECK Date of Exam: 1/7/2025 3:44 PM EST Indication: stroke. Comparison: 2/9/2024 Technique: CTA of the head and neck was performed after the uneventful intravenous administration of 75 cc Isovue-370 IV contrast . Reconstructed coronal and sagittal images were also obtained. In addition, a 3-D volume rendered image was created for interpretation. Automated exposure control and iterative reconstruction methods were used. Findings: Mild atheromatous disease of the aortic arch and origins of the great vessels. Severe atheromatous disease of the distal right common carotid artery through the right carotid bulb extending into the proximal right internal carotid artery. There  is minimal residual flow noted within the proximal right internal carotid artery with a residual vessel lumen diameter of approximately 1 mm and a native vessel lumen diameter of consistent with at least 80% stenosis per NASCET criteria. Severe atheromatous disease involving the intracranial segments of the right internal carotid artery. The right carotid terminus is normal. The visualized branches of the right anterior and middle cerebral arteries are normal. Moderate atheromatous disease of the distal left common carotid artery with severe atheromatous disease of the left carotid bulb and proximal left internal carotid artery. Minimal residual vessel luminal diameter of 2 mm of the proximal left internal carotid artery with a native vessel lumen diameter of 4 mm consistent with 50% stenosis. 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 This is likely filled via retrograde flow from the [place] and a the anterior communicating artery. The left carotid terminus is normal. 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 The visualized branches of the left anterior and middle cerebral arteries appear patent. Both vertebral arteries arise from the subclavian arteries. The vertebral arteries are codominant. Minimal atheromatous disease of the vertebral arteries. Both vertebral arteries supply the basilar artery. The basilar artery and basilar artery tip are normal. The basilar artery terminates in bilateral posterior cerebral arteries which appear normal. The intracranial venous sinuses are patent. No intracranial hemorrhage is noted. Prior bilateral lens replacements. The paranasal sinuses are clear. The mastoid air cells are aerated. The nasopharynx is clear. No adenopathy. Subcentimeter thyroid nodules. 0.5 cm subpleural right apical pulmonary nodule on image #88. This is stable compared with the prior study. The visualized clavicles are intact. The visualized superior ribs are intact. Degenerative changes of the cervical spine.     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 The ophthalmic artery remains patent. This is new compared with the examination performed on 2/8/2024. This  is likely filled via retrograde flow from the [place] and a the anterior communicating artery. 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 4.Additional vascular and nonvascular findings as described above. 5.Stable 0.5 cm subpleural right apical pulmonary nodule on image #88. Findings were discussed with Dr. Kraus at 4:47 p.m. on 1/7/2025 Electronically Signed: Anthony Dumont MD  1/7/2025 4:49 PM EST  Workstation ID: [id number]    CT Angiogram Neck    Result Date: 1/7/2025  CT ANGIOGRAM HEAD W AI ANALYSIS OF LVO, CT ANGIOGRAM NECK Date of Exam: 1/7/2025 3:44 PM EST Indication: stroke. Comparison: 2/9/2024 Technique: CTA of the head and neck was performed after the uneventful intravenous administration of 75 cc Isovue-370 IV contrast . Reconstructed coronal and sagittal images were also obtained. In addition, a 3-D volume rendered image was created for interpretation. Automated exposure control and iterative reconstruction methods were used. Findings: Mild atheromatous disease of the aortic arch and origins of the great vessels. Severe atheromatous disease of the distal right common carotid artery through the right carotid bulb extending into the proximal right internal carotid artery. There  is minimal residual flow noted within the proximal right internal carotid artery with a residual vessel lumen diameter of approximately 1 mm and a native vessel lumen diameter of consistent with at least 80% stenosis per NASCET criteria. Severe atheromatous disease involving the intracranial segments of the right internal carotid artery. The right carotid terminus is normal. The visualized branches of the right anterior and middle cerebral arteries are normal. Moderate atheromatous disease of the distal left common carotid artery with severe atheromatous disease of the left carotid bulb and proximal left internal carotid artery. Minimal residual vessel luminal diameter of 2 mm of the proximal left internal carotid artery with a native vessel lumen diameter of 4 mm consistent with 50% stenosis. Immediately distal to this  level there is rapid asymmetric attenuation of the contrast column compared with the right which is new compared with the examination performed on 2/8/2024 resulting in complete occlusion of the extracranial left internal carotid artery which extends through the ophthalmic segment of the left internal carotid artery. This is likely filled via retrograde flow from the [place] and a the anterior communicating artery. The left carotid terminus is normal. 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 The visualized branches of the left anterior and middle cerebral arteries appear patent. Both vertebral arteries arise from the subclavian arteries. The vertebral arteries are codominant. Minimal atheromatous disease of the vertebral arteries. Both vertebral arteries supply the basilar artery. The basilar artery and basilar artery tip are normal. The basilar artery terminates in bilateral posterior cerebral arteries which appear normal. The intracranial venous sinuses are patent. No intracranial hemorrhage is noted. Prior bilateral lens replacements. The paranasal sinuses are clear. The mastoid air cells are aerated. The nasopharynx is clear. No adenopathy. Subcentimeter thyroid nodules. 0.5 cm subpleural right apical pulmonary nodule on image #88. This is stable compared with the prior study. The visualized clavicles are intact. The visualized superior ribs are intact. Degenerative changes of the cervical spine.     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 The ophthalmic artery remains patent. This is new compared with the examination performed on 2/8/2024. This  is likely filled via retrograde flow from the [place] and a the anterior communicating artery. 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 4.Additional vascular and nonvascular findings as described above. 5.Stable 0.5 cm subpleural right apical pulmonary nodule on image #88. Findings were discussed with Dr. Kraus at 4:47 p.m. on 1/7/2025 Electronically Signed: Anthony Dumont MD  1/7/2025 4:49 PM EST  Workstation ID: [id number]    CT Head Without Contrast    Result Date: 1/7/2025  CT HEAD WO CONTRAST Date of Exam: 1/7/2025 3:35 PM EST Indication: stroke. Comparison: None available. Technique: Axial CT images were obtained of the head without contrast administration.  Automated exposure control and iterative construction methods were used. Findings: Motion artifact limits exam. There is no evidence of hemorrhage. There is no mass effect or midline shift. There is diffuse brain atrophy. Periventricular hypodense areas compatible with chronic small vessel ischemia. There is a chronic left frontal infarct with encephalomalacia and  volume loss. Also chronic left gangliocapsular lacunar infarct. Tiny chronic right pontine lacunar infarct There is no extracerebral collection. Ventricles are normal in size and configuration for patient's stated age. Posterior fossa is within normal limits. Calvarium and skull base appear intact.  Visualized sinuses show no air fluid levels. Visualized orbits are unremarkable.     Impression: 1.No acute intracranial abnormality identified. 2.Chronic left frontal infarct. Chronic left gangliocapsular and right pontine lacunar infarcts. 3.Diffuse brain atrophy with chronic small vessel ischemia. Electronically Signed: Dave Shine MD  1/7/2025 4:02 PM EST  Workstation ID: [id number]     I ordered and independently reviewed the above noted radiographic studies.      I viewed images of Noncon head  CT personally reviewed while in the scanner with the patient which showed multiple scattered prior strokes per my independent interpretation.  CTAs of the head and neck reviewed with neurostroke team complete left carotid occlusion with collateral flow as well as right-sided carotid stenosis    See radiologist's dictation for official interpretation.        PROCEDURES    Procedures    ECG 12 Lead Stroke Evaluation   Preliminary Result   Test Reason : Stroke Evaluation   Blood Pressure :   */*   mmHG   Vent. Rate :  75 BPM     Atrial Rate :  75 BPM      P-R Int : 192 ms          QRS Dur : 104 ms       QT Int : 446 ms       P-R-T Axes :  52 -14  34 degrees     QTcB Int : 498 ms      Sinus rhythm with premature supraventricular complexes   Nonspecific ST abnormality   Abnormal ECG   When compared with ECG of 08-Feb-2024 19:25,   premature supraventricular complexes are now present   QT has lengthened      Referred By: ed           Confirmed By:           MEDICATIONS GIVEN IN ER    Medications   sodium chloride 0.9 % bolus 1,000 mL (1,000 mL Intravenous New Bag 1/7/25 1830)   sodium chloride 0.9 % bolus 1,000 mL (0 mL Intravenous Stopped 1/7/25 1715)   iopamidol (ISOVUE-370) 76 % injection 75 mL (75 mL Intravenous Given 1/7/25 1550)         MEDICAL DECISION MAKING, PROGRESS, and CONSULTS    All labs, if obtained, have been independently reviewed by me.  All radiology studies, if obtained, have been reviewed by me and the radiologist dictating the report.  All EKG's, if obtained, have been independently viewed and interpreted by me/my attending physician.      Discussion below represents my analysis of pertinent findings related to patient's condition, differential diagnosis, treatment plan and final disposition.                                          Differential diagnosis:    Hemorrhagic stroke, ischemic stroke, sepsis, encephalopathy, anemia, electrolyte abnormality      Additional sources:    - Discussed/ obtained  information from independent historians: EMS and later daughter in [place] by telephone    - External (non-ED) record review:  Chart review of previous hospitalization shows history of:  WILCOX cirrhosis, prior CVA with residual right-sided weakness per EMR (left-sided weakness per pt), atrial fibrillation not on AC, carotid artery disease, HTN, CAD, HLD, tobacco use disorder, COPD, CKD stage III, history of GI bleed,     - Chronic or social conditions impacting care: Stroke, cirrhosis, history of GI bleed, thrombocytopenia, carotid artery disease, hypertension, tobacco use, COPD, chronic kidney disease        Orders placed during this visit:  Orders Placed This Encounter   Procedures    COVID PRE-OP / PRE-PROCEDURE SCREENING ORDER (NO ISOLATION) - Swab, Nasopharynx    Blood Culture - Blood,    Blood Culture - Blood,    Respiratory Panel PCR w/COVID-19(SARS-CoV-2) [facility] In-House, NP Swab in UTM/VTM, 2 HR TAT - Swab, Nasopharynx    CT Head Without Contrast    XR Chest 1 View    CT Angiogram Head w AI Analysis of LVO    CT Angiogram Neck    MRI Brain Without Contrast    Comprehensive Metabolic Panel    Lipase    Urinalysis With Microscopic If Indicated (No Culture) - Urine, Catheter    High Sensitivity Troponin T    BNP    Blood Gas, Venous -With Co-Ox Panel: Yes    Lactic Acid, Plasma    Procalcitonin    C-reactive Protein    Salicylate Level    Urine Drug Screen - Urine, Clean Catch    Ethanol    Acetaminophen Level    CK    Magnesium    Phosphorus    TSH Rfx On Abnormal To Free T4    Blood Gas, Arterial -With Co-Ox Panel: Yes    CBC Auto Differential    Beta Hydroxybutyrate Quantitative    Blood Gas, Venous With Co-Ox    Fentanyl, Urine - Urine, Clean Catch    Elevate HOB    ECG 12 Lead Stroke Evaluation    ICU / CCU Bed Request ([facility] / [facility] / [facility] ONLY)    CBC & Differential    Ketone Bodies, Serum (Not performed at Issue)         Additional orders considered but not ordered:      ED  Course:    Consultants:      ED Course as of 01/07/25 1845   Tue Jan 07, 2025   1559 This is a 74-year-old woman brought in by EMS because of altered mental status.  They report that for the last 2 to 3 days she has been having confusion and speech changes.  They also said that the  reported that she had demonstrated shuffling when she walked.  They found her to be weak on the right.  They said they were contacted by her daughter in [place] and were not able to get much else in the way of history from the .  Upon arrival here she is whispering when I asked her name she can tell me that she does not know but otherwise is not following any commands.  EMS reports she is able to care for herself at baseline per the .  They report that her fingerstick blood sugar was 280.  She was hemodynamically stable for them in route.  No other history is known at this time. [CC]   1556 Patient arrived clearly encephalopathic and unable to follow commands in any extremity with verbal issues I do have concern for stroke it sounds like we are outside of tenecteplase or thrombectomy window but stroke team is on hand they recommend going ahead and getting a stat head CT CT shows old infarction so they have elected to add on vessel imaging as well.   [CC]   1612 I received a call here from her daughter in [place] who explained to me that her mother has had at least a half dozen strokes going back to 2013 [CC]   1612  she was told by the stepfather that the changes in mental status have only been about 24 hours starting yesterday evening.  She reports that when her mother gets tired she does develop speech difficulty but that her stepfather had mentioned left-sided weakness which would be new for her.  She did not know of any recent illness or other changes. [CC]   1650 Chart review of previous hospitalization shows history of:  WILCOX cirrhosis, prior CVA with residual right-sided weakness per EMR (left-sided weakness per  pt), atrial fibrillation not on AC, carotid artery disease, HTN, CAD, HLD, tobacco use disorder, COPD, CKD stage III, history of GI bleed, [CC]   1842 Noncon head CT personally reviewed on the scanner with the patient I appreciate old strokes most pronounced on the left but nothing acute.  CTAs were performed I was contacted by the on-call radiologist letting me know that she has new complete left carotid occlusion with apparent collateral flow.  Also 80% right carotid bulb stenosis.  I have immediately contacted stroke, Dr. Flowers the stroke neurologist and Dr. Perez the on-call neurosurgeon have been notified.  Due to her history of thrombocytopenia and prior major GI bleed they are not recommending heparin but are recommending ICU admission and maintaining her systolic blood pressure more than 160.  After IV fluids her mental status has improved somewhat she is able to follow some commands.  Right now systolic is around the 180s so I have not had start IV vasopressors.  Labs show significant hyperglycemia glucose of 295 but otherwise no abnormalities that would explain her change in mental status.  This point I think she needs ICU level care for frequent neurologic assessments and vasopressor support should she drop her pressure below 160.  Stroke neurology and neurosurgery are following.  Discussed with Dr. Augilar in the ICU who accepts the admission. [CC]      ED Course User Index  [CC] Lars Kraus MD       40 minutes of critical care provided. This time excludes other billable procedures. Time does include preparation of documents, medical consultations, review of old records, and direct bedside care. Patient is at high risk for life-threatening deterioration due to multiple neurologic deficits with history of stroke from left carotid occlusion managed as a stroke alert.         Shared Decision Making:  After my consideration of clinical presentation and any laboratory/radiology studies  obtained, I discussed the findings with the patient/patient representative who is in agreement with the treatment plan and the final disposition.   Risks and benefits of discharge and/or observation/admission were discussed.       AS OF 18:45 EST VITALS:    BP - (!) 183/69  HR - 66  TEMP - 98 °F (36.7 °C) (Oral)  O2 SATS - 96%                  DIAGNOSIS  Final diagnoses:   Left carotid artery occlusion   Difficulty with speech   History of stroke   Altered mental status, unspecified altered mental status type         DISPOSITION  ICU      Please note that portions of this document were completed with voice recognition software.        Lars Kraus MD  01/07/25 7993

## 2025-01-08 ENCOUNTER — APPOINTMENT (OUTPATIENT)
Dept: CARDIOLOGY | Facility: HOSPITAL | Age: 75
End: 2025-01-08
Payer: MEDICARE

## 2025-01-08 ENCOUNTER — ANCILLARY PROCEDURE (OUTPATIENT)
Dept: SPEECH THERAPY | Facility: HOSPITAL | Age: 75
End: 2025-01-08
Payer: MEDICARE

## 2025-01-08 ENCOUNTER — APPOINTMENT (OUTPATIENT)
Dept: MRI IMAGING | Facility: HOSPITAL | Age: 75
End: 2025-01-08
Payer: MEDICARE

## 2025-01-08 ENCOUNTER — APPOINTMENT (OUTPATIENT)
Dept: GENERAL RADIOLOGY | Facility: HOSPITAL | Age: 75
End: 2025-01-08
Payer: MEDICARE

## 2025-01-08 PROBLEM — G93.40 ENCEPHALOPATHY: Status: ACTIVE | Noted: 2025-01-08

## 2025-01-08 PROBLEM — I63.239: Status: ACTIVE | Noted: 2025-01-08

## 2025-01-08 LAB
ANION GAP SERPL CALCULATED.3IONS-SCNC: 16 MMOL/L (ref 5–15)
BUN SERPL-MCNC: 23 MG/DL (ref 8–23)
BUN/CREAT SERPL: 20 (ref 7–25)
CALCIUM SPEC-SCNC: 9.3 MG/DL (ref 8.6–10.5)
CHLORIDE SERPL-SCNC: 110 MMOL/L (ref 98–107)
CHOLEST SERPL-MCNC: 96 MG/DL (ref 0–200)
CO2 SERPL-SCNC: 19 MMOL/L (ref 22–29)
CREAT SERPL-MCNC: 1.15 MG/DL (ref 0.57–1)
DEPRECATED RDW RBC AUTO: 44 FL (ref 37–54)
EGFRCR SERPLBLD CKD-EPI 2021: 50.7 ML/MIN/1.73
ERYTHROCYTE [DISTWIDTH] IN BLOOD BY AUTOMATED COUNT: 13.6 % (ref 12.3–15.4)
GEN 5 1HR TROPONIN T REFLEX: 38 NG/L
GLUCOSE BLDC GLUCOMTR-MCNC: 217 MG/DL (ref 70–130)
GLUCOSE BLDC GLUCOMTR-MCNC: 268 MG/DL (ref 70–130)
GLUCOSE BLDC GLUCOMTR-MCNC: 296 MG/DL (ref 70–130)
GLUCOSE BLDC GLUCOMTR-MCNC: 396 MG/DL (ref 70–130)
GLUCOSE SERPL-MCNC: 215 MG/DL (ref 65–99)
HBA1C MFR BLD: 8.8 % (ref 4.8–5.6)
HCT VFR BLD AUTO: 34.1 % (ref 34–46.6)
HDLC SERPL-MCNC: 31 MG/DL (ref 40–60)
HGB BLD-MCNC: 11.4 G/DL (ref 12–15.9)
LDLC SERPL CALC-MCNC: 32 MG/DL (ref 0–100)
LDLC/HDLC SERPL: 0.78 {RATIO}
MAGNESIUM SERPL-MCNC: 2.1 MG/DL (ref 1.6–2.4)
MCH RBC QN AUTO: 29.8 PG (ref 26.6–33)
MCHC RBC AUTO-ENTMCNC: 33.4 G/DL (ref 31.5–35.7)
MCV RBC AUTO: 89 FL (ref 79–97)
PLATELET # BLD AUTO: 99 10*3/MM3 (ref 140–450)
PMV BLD AUTO: 11.3 FL (ref 6–12)
POTASSIUM SERPL-SCNC: 3.9 MMOL/L (ref 3.5–5.2)
RBC # BLD AUTO: 3.83 10*6/MM3 (ref 3.77–5.28)
SODIUM SERPL-SCNC: 145 MMOL/L (ref 136–145)
TRIGL SERPL-MCNC: 204 MG/DL (ref 0–150)
TROPONIN T % DELTA: 31 %
TROPONIN T NUMERIC DELTA: 9 NG/L
VLDLC SERPL-MCNC: 33 MG/DL (ref 5–40)
WBC NRBC COR # BLD AUTO: 6.16 10*3/MM3 (ref 3.4–10.8)

## 2025-01-08 PROCEDURE — 94799 UNLISTED PULMONARY SVC/PX: CPT

## 2025-01-08 PROCEDURE — 83036 HEMOGLOBIN GLYCOSYLATED A1C: CPT

## 2025-01-08 PROCEDURE — 25010000002 PHENYLEPHRINE 10 MG/ML SOLUTION 5 ML VIAL: Performed by: NURSE PRACTITIONER

## 2025-01-08 PROCEDURE — 63710000001 INSULIN REGULAR HUMAN PER 5 UNITS: Performed by: STUDENT IN AN ORGANIZED HEALTH CARE EDUCATION/TRAINING PROGRAM

## 2025-01-08 PROCEDURE — 93880 EXTRACRANIAL BILAT STUDY: CPT

## 2025-01-08 PROCEDURE — 63710000001 INSULIN GLARGINE PER 5 UNITS: Performed by: NURSE PRACTITIONER

## 2025-01-08 PROCEDURE — 25810000003 SODIUM CHLORIDE 0.9 % SOLUTION 250 ML FLEX CONT: Performed by: NURSE PRACTITIONER

## 2025-01-08 PROCEDURE — 80048 BASIC METABOLIC PNL TOTAL CA: CPT

## 2025-01-08 PROCEDURE — 99233 SBSQ HOSP IP/OBS HIGH 50: CPT | Performed by: STUDENT IN AN ORGANIZED HEALTH CARE EDUCATION/TRAINING PROGRAM

## 2025-01-08 PROCEDURE — 70551 MRI BRAIN STEM W/O DYE: CPT

## 2025-01-08 PROCEDURE — 80061 LIPID PANEL: CPT | Performed by: NURSE PRACTITIONER

## 2025-01-08 PROCEDURE — 82948 REAGENT STRIP/BLOOD GLUCOSE: CPT

## 2025-01-08 PROCEDURE — 63710000001 INSULIN REGULAR HUMAN PER 5 UNITS: Performed by: INTERNAL MEDICINE

## 2025-01-08 PROCEDURE — 94761 N-INVAS EAR/PLS OXIMETRY MLT: CPT

## 2025-01-08 PROCEDURE — 92523 SPEECH SOUND LANG COMPREHEN: CPT | Performed by: SPEECH-LANGUAGE PATHOLOGIST

## 2025-01-08 PROCEDURE — 74018 RADEX ABDOMEN 1 VIEW: CPT

## 2025-01-08 PROCEDURE — 84484 ASSAY OF TROPONIN QUANT: CPT | Performed by: STUDENT IN AN ORGANIZED HEALTH CARE EDUCATION/TRAINING PROGRAM

## 2025-01-08 PROCEDURE — 97162 PT EVAL MOD COMPLEX 30 MIN: CPT

## 2025-01-08 PROCEDURE — 25810000003 SODIUM CHLORIDE 0.9 % SOLUTION: Performed by: NURSE PRACTITIONER

## 2025-01-08 PROCEDURE — 94664 DEMO&/EVAL PT USE INHALER: CPT

## 2025-01-08 PROCEDURE — 83735 ASSAY OF MAGNESIUM: CPT

## 2025-01-08 PROCEDURE — 93880 EXTRACRANIAL BILAT STUDY: CPT | Performed by: INTERNAL MEDICINE

## 2025-01-08 PROCEDURE — 92612 ENDOSCOPY SWALLOW (FEES) VID: CPT | Performed by: SPEECH-LANGUAGE PATHOLOGIST

## 2025-01-08 PROCEDURE — 92610 EVALUATE SWALLOWING FUNCTION: CPT | Performed by: SPEECH-LANGUAGE PATHOLOGIST

## 2025-01-08 PROCEDURE — 97166 OT EVAL MOD COMPLEX 45 MIN: CPT

## 2025-01-08 PROCEDURE — 85027 COMPLETE CBC AUTOMATED: CPT

## 2025-01-08 RX ORDER — CLOPIDOGREL BISULFATE 75 MG/1
75 TABLET ORAL DAILY
Status: DISCONTINUED | OUTPATIENT
Start: 2025-01-08 | End: 2025-01-14 | Stop reason: HOSPADM

## 2025-01-08 RX ORDER — INSULIN LISPRO 100 [IU]/ML
2-7 INJECTION, SOLUTION INTRAVENOUS; SUBCUTANEOUS
Status: DISCONTINUED | OUTPATIENT
Start: 2025-01-08 | End: 2025-01-08

## 2025-01-08 RX ORDER — ASPIRIN 300 MG/1
300 SUPPOSITORY RECTAL DAILY
Status: DISCONTINUED | OUTPATIENT
Start: 2025-01-08 | End: 2025-01-08

## 2025-01-08 RX ORDER — SODIUM CHLORIDE 9 MG/ML
40 INJECTION, SOLUTION INTRAVENOUS AS NEEDED
Status: DISCONTINUED | OUTPATIENT
Start: 2025-01-08 | End: 2025-01-14 | Stop reason: HOSPADM

## 2025-01-08 RX ORDER — PANTOPRAZOLE SODIUM 40 MG/10ML
40 INJECTION, POWDER, LYOPHILIZED, FOR SOLUTION INTRAVENOUS
Status: DISCONTINUED | OUTPATIENT
Start: 2025-01-09 | End: 2025-01-09

## 2025-01-08 RX ORDER — ASPIRIN 325 MG
325 TABLET ORAL DAILY
Status: DISCONTINUED | OUTPATIENT
Start: 2025-01-08 | End: 2025-01-08

## 2025-01-08 RX ORDER — SODIUM CHLORIDE 0.9 % (FLUSH) 0.9 %
10 SYRINGE (ML) INJECTION AS NEEDED
Status: DISCONTINUED | OUTPATIENT
Start: 2025-01-08 | End: 2025-01-14 | Stop reason: HOSPADM

## 2025-01-08 RX ORDER — ATORVASTATIN CALCIUM 40 MG/1
80 TABLET, FILM COATED ORAL NIGHTLY
Status: DISCONTINUED | OUTPATIENT
Start: 2025-01-08 | End: 2025-01-10

## 2025-01-08 RX ORDER — ASPIRIN 81 MG/1
81 TABLET, CHEWABLE ORAL DAILY
Status: DISCONTINUED | OUTPATIENT
Start: 2025-01-09 | End: 2025-01-14 | Stop reason: HOSPADM

## 2025-01-08 RX ORDER — SODIUM CHLORIDE 0.9 % (FLUSH) 0.9 %
10 SYRINGE (ML) INJECTION EVERY 12 HOURS SCHEDULED
Status: DISCONTINUED | OUTPATIENT
Start: 2025-01-08 | End: 2025-01-14 | Stop reason: HOSPADM

## 2025-01-08 RX ADMIN — MUPIROCIN 1 APPLICATION: 20 OINTMENT TOPICAL at 13:02

## 2025-01-08 RX ADMIN — INSULIN HUMAN 6 UNITS: 100 INJECTION, SOLUTION PARENTERAL at 20:12

## 2025-01-08 RX ADMIN — CLOPIDOGREL BISULFATE 75 MG: 75 TABLET ORAL at 16:58

## 2025-01-08 RX ADMIN — DONEPEZIL HYDROCHLORIDE 5 MG: 5 TABLET, FILM COATED ORAL at 20:12

## 2025-01-08 RX ADMIN — Medication 10 ML: at 20:13

## 2025-01-08 RX ADMIN — IPRATROPIUM BROMIDE AND ALBUTEROL SULFATE 3 ML: 2.5; .5 SOLUTION RESPIRATORY (INHALATION) at 09:18

## 2025-01-08 RX ADMIN — ASPIRIN 325 MG: 325 TABLET ORAL at 13:02

## 2025-01-08 RX ADMIN — INSULIN GLARGINE 10 UNITS: 100 INJECTION, SOLUTION SUBCUTANEOUS at 20:12

## 2025-01-08 RX ADMIN — MUPIROCIN 1 APPLICATION: 20 OINTMENT TOPICAL at 20:12

## 2025-01-08 RX ADMIN — IPRATROPIUM BROMIDE AND ALBUTEROL SULFATE 3 ML: 2.5; .5 SOLUTION RESPIRATORY (INHALATION) at 19:21

## 2025-01-08 RX ADMIN — PHENYLEPHRINE HYDROCHLORIDE 0.5 MCG/KG/MIN: 10 INJECTION INTRAVENOUS at 00:37

## 2025-01-08 RX ADMIN — IPRATROPIUM BROMIDE AND ALBUTEROL SULFATE 3 ML: 2.5; .5 SOLUTION RESPIRATORY (INHALATION) at 16:53

## 2025-01-08 RX ADMIN — INSULIN HUMAN 4 UNITS: 100 INJECTION, SOLUTION PARENTERAL at 13:03

## 2025-01-08 RX ADMIN — ATORVASTATIN CALCIUM 80 MG: 40 TABLET, FILM COATED ORAL at 20:12

## 2025-01-08 RX ADMIN — SODIUM CHLORIDE 500 ML: 9 INJECTION, SOLUTION INTRAVENOUS at 00:03

## 2025-01-08 RX ADMIN — INSULIN HUMAN 4 UNITS: 100 INJECTION, SOLUTION PARENTERAL at 17:01

## 2025-01-08 RX ADMIN — IPRATROPIUM BROMIDE AND ALBUTEROL SULFATE 3 ML: 2.5; .5 SOLUTION RESPIRATORY (INHALATION) at 12:38

## 2025-01-08 NOTE — PLAN OF CARE
Goal Outcome Evaluation:  Plan of Care Reviewed With: patient        Progress: no change  Outcome Evaluation: OT evaluation completed. The pt presents below her functional baseline with generalized weakness (R>L), decreased activity tolerance, balance deficits, and decreased safety awareness warranting continued IP OT services. LBD completed dependently. Pt assisted in supine to sit transfer with modA x1, STS with modA x2, BUE support, and SPT toward the left with maxA x2, BUE support. Recommend a d/c to IRF for best outcome.    Anticipated Discharge Disposition (OT): inpatient rehabilitation facility

## 2025-01-08 NOTE — SIGNIFICANT NOTE
Spoke w/ daughter (Maggy) and confirmed code status.  She informed me that her sister Maris Whitehead is the medical POA and will be here in the morning.      The patient's SO (Bill) has his own medical issues and so the family would like all medical communication be directed through and decision making to be made by the medical POA.

## 2025-01-08 NOTE — THERAPY EVALUATION
Patient Name: Karol Lopez  : 1950    MRN: [id number]                              Today's Date: 2025       Admit Date: 2025    Visit Dx:     ICD-10-CM ICD-9-CM   1. Left carotid artery occlusion  I65.22 433.10   2. Difficulty with speech  R47.9 784.59   3. History of stroke  Z86.73 V12.54   4. Altered mental status, unspecified altered mental status type  R41.82 780.97     Patient Active Problem List   Diagnosis    Coronary artery disease involving native coronary artery of native heart with angina pectoris    Anxiety    COPD (chronic obstructive pulmonary disease)    Acid reflux    Vitamin B12 deficiency    Current smoker    Type 2 diabetes mellitus, with long-term current use of insulin    Impaired mobility and ADLs    Melena    Anemia    Gastrointestinal hemorrhage    Hyperlipidemia LDL goal <70    Claudication    Persistent atrial fibrillation    Hyperglycemia due to diabetes mellitus    Cirrhosis of liver    Carotid artery disease    Encephalopathy    Acute ischemic cerebrovascular accident (CVA) involving internal carotid artery territory     Past Medical History:   Diagnosis Date    Acid reflux     Anxiety     Cataracts, bilateral     Cirrhosis of liver     Constipation     COPD (chronic obstructive pulmonary disease)     Coronary artery disease     CTS (carpal tunnel syndrome)     Diabetes     Hearing loss     Hypercholesteremia     Hypertension     Impaired functional mobility, balance, gait, and endurance     Lower back pain     Osteoarthritis     Stroke     2013-weak in right arm    Tattoos     x2    Tobacco abuse     Tumor     in between breast closer to right breast    Vitamin B12 deficiency     Wears dentures     upper only    Wears glasses      Past Surgical History:   Procedure Laterality Date    BREAST BIOPSY Right 5/10/2019    Procedure: BREAST BIOPSY RIGHT;  Surgeon: Kiana Frey MD;  Location: [facility];  Service: General    CARPAL TUNNEL RELEASE Bilateral      CHOLECYSTECTOMY      COLONOSCOPY N/A 9/30/2021    Procedure: COLONOSCOPY WITH POLYPECTOMY AND ABLATION OF AVM;  Surgeon: Russell Davila MD;  Location: [facility] ENDOSCOPY;  Service: Gastroenterology;  Laterality: N/A;    CORONARY ANGIOPLASTY WITH STENT PLACEMENT  2012    ENDOSCOPY N/A 9/29/2021    Procedure: ESOPHAGOGASTRODUODENOSCOPY with biopsies;  Surgeon: Russell Davila MD;  Location: [facility] ENDOSCOPY;  Service: Gastroenterology;  Laterality: N/A;    ENTEROSCOPY SMALL BOWEL N/A 11/16/2021    Procedure: ESOPHAGOGASTRODUODENOSCOPY WITH SMALL BOWEL ENTEROSCOPY and biopsy;  Surgeon: Russell Davila MD;  Location: [facility] ENDOSCOPY;  Service: Gastroenterology;  Laterality: N/A;    HYSTERECTOMY      complete    JOINT REPLACEMENT Bilateral     knee replacements    TOTAL KNEE ARTHROPLASTY Bilateral 10/18/2016    MAUREEN Palomares MD      General Information       Row Name 01/08/25 1508          OT Time and Intention    Document Type evaluation  -KF     Mode of Treatment occupational therapy;co-treatment  -       Row Name 01/08/25 1508          General Information    Patient Profile Reviewed yes  -KF     Prior Level of Function independent:;all household mobility;bed mobility;ADL's  Pt is a questionable historian, later in the session stating she wasn't sure of her PLOF.  -KF     Existing Precautions/Restrictions fall;other (see comments)  R sided weakness, aphasia  -     Barriers to Rehab medically complex;previous functional deficit;cognitive status  -       Row Name 01/08/25 1508          Living Environment    People in Home spouse  -       Row Name 01/08/25 1508          Home Main Entrance    Number of Stairs, Main Entrance other (see comments)  Pt unsure, TBD further.  -       Row Name 01/08/25 1508          Cognition    Orientation Status (Cognition) oriented to;person;verbal cues/prompts needed for orientation;time;disoriented to;place  -       Row Name 01/08/25 1508          Safety  Issues/Impairments Affecting Functional Mobility    Safety Issues Affecting Function (Mobility) ability to follow commands;awareness of need for assistance;insight into deficits/self-awareness;judgment;safety precaution awareness;safety precautions follow-through/compliance;sequencing abilities  -KF     Impairments Affecting Function (Mobility) balance;cognition;coordination;endurance/activity tolerance;grasp;motor control;motor planning;postural/trunk control;strength  -KF     Cognitive Impairments, Mobility Safety/Performance attention;awareness, need for assistance;insight into deficits/self-awareness;judgment;problem-solving/reasoning;safety precaution awareness;safety precaution follow-through;sequencing abilities  -KF               User Key  (r) = Recorded By, (t) = Taken By, (c) = Cosigned By      Initials Name Provider Type    KF Sharonda Chang OT Occupational Therapist                     Mobility/ADL's       Row Name 01/08/25 1510          Bed Mobility    Bed Mobility supine-sit  -KF     Supine-Sit [place] (Bed Mobility) moderate assist (50% patient effort);1 person assist;verbal cues;nonverbal cues (demo/gesture)  -     Assistive Device (Bed Mobility) bed rails;head of bed elevated;repositioning sheet  -KF     Comment, (Bed Mobility) Increased time and effort needed with cues for sequence  -       Row Name 01/08/25 1510          Transfers    Transfers bed-chair transfer;sit-stand transfer;stand-sit transfer  -KF       Row Name 01/08/25 1510          Bed-Chair Transfer    Bed-Chair [place] (Transfers) maximum assist (25% patient effort);2 person assist;verbal cues;nonverbal cues (demo/gesture)  -     Assistive Device (Bed-Chair Transfers) other (see comments)  BUE support  -KF     Comment, (Bed-Chair Transfer) SPT toward the left from the EOB to the chair  -KF       Row Name 01/08/25 1510          Sit-Stand Transfer    Sit-Stand [place] (Transfers) moderate assist (50% patient  effort);2 person assist;verbal cues;nonverbal cues (demo/gesture)  -KF     Assistive Device (Sit-Stand Transfers) other (see comments)  BUE support  -       Row Name 01/08/25 1510          Stand-Sit Transfer    Stand-Sit [place] (Transfers) moderate assist (50% patient effort);2 person assist;verbal cues;nonverbal cues (demo/gesture)  -KF     Assistive Device (Stand-Sit Transfers) other (see comments)  BUE support  -       Row Name 01/08/25 1510          Activities of Daily Living    BADL Assessment/Intervention lower body dressing  -[facility] Name 01/08/25 1510          Lower Body Dressing Assessment/Training    [place] Level (Lower Body Dressing) don;socks;dependent (less than 25% patient effort)  -     Position (Lower Body Dressing) supine  -               User Key  (r) = Recorded By, (t) = Taken By, (c) = Cosigned By      Initials Name Provider Type    Sharonda Grady OT Occupational Therapist                   Obj/Interventions       Row Name 01/08/25 1512          Sensory Assessment (Somatosensory)    Sensory Assessment Diminished sensation RUE  -[facility] Name 01/08/25 1512          Vision Assessment/Intervention    Vision Assessment Comment Pt reports no visual deficits compared to baseline.  -       Row Name 01/08/25 1512          Range of Motion Comprehensive    General Range of Motion bilateral upper extremity ROM WFL  -[facility] Name 01/08/25 1512          Strength Comprehensive (MMT)    General Manual Muscle Testing (MMT) Assessment upper extremity strength deficits identified  -     Comment, General Manual Muscle Testing (MMT) Assessment Limited RUE motion of command. Pt able to attempt to squeeze R hand with index finger. R elbow/shoulder 0/5  -[facility] Name 01/08/25 1512          Balance    Balance Assessment sitting static balance;sitting dynamic balance;sit to stand dynamic balance;standing static balance;standing dynamic balance  -     Static Sitting  Balance contact guard  -KF     Dynamic Sitting Balance minimal assist;1-person assist  -KF     Position, Sitting Balance supported;sitting edge of bed  -KF     Sit to Stand Dynamic Balance moderate assist;2-person assist;verbal cues;non-verbal cues (demo/gesture)  -KF     Static Standing Balance moderate assist;2-person assist  -KF     Dynamic Standing Balance maximum assist;2-person assist  -KF     Position/Device Used, Standing Balance supported  -KF     Balance Interventions sitting;standing;sit to stand;supported;static;dynamic;occupation based/functional task  -KF               User Key  (r) = Recorded By, (t) = Taken By, (c) = Cosigned By      Initials Name Provider Type    KF Sharonda Chang, OT Occupational Therapist                   Goals/Plan       Row Name 01/08/25 1517          Transfer Goal 1 (OT)    Activity/Assistive Device (Transfer Goal 1, OT) bed-to-chair/chair-to-bed;commode, bedside without drop arms  -KF     [place] Level/Cues Needed (Transfer Goal 1, OT) minimum assist (75% or more patient effort)  -KF     Time Frame (Transfer Goal 1, OT) long term goal (LTG);10 days  -KF     Progress/Outcome (Transfer Goal 1, OT) goal ongoing  -KF       Row Name 01/08/25 1517          Dressing Goal 1 (OT)    Activity/Device (Dressing Goal 1, OT) upper body dressing  -KF     [place]/Cues Needed (Dressing Goal 1, OT) minimum assist (75% or more patient effort)  -KF     Time Frame (Dressing Goal 1, OT) short term goal (STG);5 days  -KF     Strategies/Barriers (Dressing Goal 1, OT) naye technique  -KF     Progress/Outcome (Dressing Goal 1, OT) goal ongoing  -KF       Row Name 01/08/25 1517          Grooming Goal 1 (OT)    Activity/Device (Grooming Goal 1, OT) hair care;oral care;wash face, hands  -KF     [place] (Grooming Goal 1, OT) set-up required  -KF     Time Frame (Grooming Goal 1, OT) long term goal (LTG);10 days  -KF     Strategies/Barriers (Grooming Goal 1, OT) unsupported sitting  -KF        Row Name 01/08/25 1517          Therapy Assessment/Plan (OT)    Planned Therapy Interventions (OT) activity tolerance training;adaptive equipment training;BADL retraining;functional balance retraining;occupation/activity based interventions;patient/caregiver education/training;ROM/therapeutic exercise;strengthening exercise;transfer/mobility retraining  -KF               User Key  (r) = Recorded By, (t) = Taken By, (c) = Cosigned By      Initials Name Provider Type    KF Sharonda Chang, SHAKIR Occupational Therapist                   Clinical Impression       Row Name 01/08/25 1514          Pain Assessment    Pretreatment Pain Rating 0/10 - no pain  -KF     Posttreatment Pain Rating 0/10 - no pain  -KF       Row Name 01/08/25 1514          Plan of Care Review    Plan of Care Reviewed With patient  -KF     Progress no change  -KF     Outcome Evaluation OT evaluation completed. The pt presents below her functional baseline with generalized weakness (R>L), decreased activity tolerance, balance deficits, and decreased safety awareness warranting continued IP OT services. LBD completed dependently. Pt assisted in supine to sit transfer with modA x1, STS with modA x2, BUE support, and SPT toward the left with maxA x2, BUE support. Recommend a d/c to IRF for best outcome.  -KF       Row Name 01/08/25 1514          Therapy Assessment/Plan (OT)    Patient/Family Therapy Goal Statement (OT) Restore PLOF  -KF     Rehab Potential (OT) good  -KF     Criteria for Skilled Therapeutic Interventions Met (OT) yes;skilled treatment is necessary  -KF     Therapy Frequency (OT) daily  -KF     Predicted Duration of Therapy Intervention (OT) 10 days  -KF       Row Name 01/08/25 1514          Therapy Plan Review/Discharge Plan (OT)    Anticipated Discharge Disposition (OT) inpatient rehabilitation facility  -       Row Name 01/08/25 1514          Vital Signs    Pre Systolic BP Rehab 163  -KF     Pre Treatment Diastolic BP 59  -KF      Post Systolic BP Rehab 168  -KF     Post Treatment Diastolic BP 53  -KF     Pretreatment Heart Rate (beats/min) 56  -KF     Posttreatment Heart Rate (beats/min) 51  -KF     Pre SpO2 (%) 98  -KF     O2 Delivery Pre Treatment room air  -KF     Post SpO2 (%) 98  -KF     O2 Delivery Post Treatment room air  -KF     Pre Patient Position Supine  -KF     Intra Patient Position Standing  -KF     Post Patient Position Sitting  -KF       Row Name 01/08/25 1514          Positioning and Restraints    Pre-Treatment Position in bed  -KF     Post Treatment Position chair  -KF     In Chair notified nsg;reclined;call light within reach;encouraged to call for assist;exit alarm on;waffle cushion;on mechanical lift sling;legs elevated;with other staff;RUE elevated  -KF               User Key  (r) = Recorded By, (t) = Taken By, (c) = Cosigned By      Initials Name Provider Type    KF Sharonda Chang, OT Occupational Therapist                   Outcome Measures       Row Name 01/08/25 1518          How much help from another is currently needed...    Putting on and taking off regular lower body clothing? 1  -KF     Bathing (including washing, rinsing, and drying) 2  -KF     Toileting (which includes using toilet bed pan or urinal) 1  -KF     Putting on and taking off regular upper body clothing 2  -KF     Taking care of personal grooming (such as brushing teeth) 2  -KF     Eating meals 2  -KF     AM-PAC 6 Clicks Score (OT) 10  -KF       Row Name 01/08/25 0800          How much help from another person do you currently need...    Turning from your back to your side while in flat bed without using bedrails? 2  -JW     Moving from lying on back to sitting on the side of a flat bed without bedrails? 1  -JW     Moving to and from a bed to a chair (including a wheelchair)? 1  -JW     Standing up from a chair using your arms (e.g., wheelchair, bedside chair)? 1  -JW     Climbing 3-5 steps with a railing? 1  -JW     To walk in hospital room? 1   -     AM-PAC 6 Clicks Score (PT) 7  -       Row Name 01/08/25 1518          Modified [place] Alberto Scale    Pre-Stroke Modified Mercer Scale 6 - Unable to determine (UTD) from the medical record documentation  -     Modified Mercer Scale 4 - Moderately severe disability.  Unable to walk without assistance, and unable to attend to own bodily needs without assistance.  -       Row Name 01/08/25 1518          Functional Assessment    Outcome Measure Options AM-PAC 6 Clicks Daily Activity (OT);Modified [place]cer  -KF               User Key  (r) = Recorded By, (t) = Taken By, (c) = Cosigned By      Initials Name Provider Type    Lizeth Carpenter, RN Registered Nurse    Sharonda Grady OT Occupational Therapist                    Occupational Therapy Education       Title: PT OT SLP Therapies (In Progress)       Topic: Occupational Therapy (In Progress)       Point: ADL training (In Progress)       Description:   Instruct learner(s) on proper safety adaptation and remediation techniques during self care or transfers.   Instruct in proper use of assistive devices.                  Learning Progress Summary            Patient Acceptance, E,TB, NR by  at 1/8/2025 1306                      Point: Home exercise program (In Progress)       Description:   Instruct learner(s) on appropriate technique for monitoring, assisting and/or progressing therapeutic exercises/activities.                  Learning Progress Summary            Patient Acceptance, E,TB, NR by KF at 1/8/2025 1306                      Point: Precautions (In Progress)       Description:   Instruct learner(s) on prescribed precautions during self-care and functional transfers.                  Learning Progress Summary            Patient Acceptance, E,TB, NR by KF at 1/8/2025 1306                      Point: Body mechanics (In Progress)       Description:   Instruct learner(s) on proper positioning and spine alignment during self-care, functional mobility  activities and/or exercises.                  Learning Progress Summary            Patient Acceptance, E,TB, NR by  at 1/8/2025 1306                                      User Key       Initials Effective Dates Name Provider Type Discipline     08/09/23 -  Sharonda Chang, SHAKIR Occupational Therapist OT                  OT Recommendation and Plan  Planned Therapy Interventions (OT): activity tolerance training, adaptive equipment training, BADL retraining, functional balance retraining, occupation/activity based interventions, patient/caregiver education/training, ROM/therapeutic exercise, strengthening exercise, transfer/mobility retraining  Therapy Frequency (OT): daily  Plan of Care Review  Plan of Care Reviewed With: patient  Progress: no change  Outcome Evaluation: OT evaluation completed. The pt presents below her functional baseline with generalized weakness (R>L), decreased activity tolerance, balance deficits, and decreased safety awareness warranting continued IP OT services. LBD completed dependently. Pt assisted in supine to sit transfer with modA x1, STS with modA x2, BUE support, and SPT toward the left with maxA x2, BUE support. Recommend a d/c to IRF for best outcome.     Time Calculation:   Evaluation Complexity (OT)  Review Occupational Profile/Medical/Therapy History Complexity: expanded/moderate complexity  Assessment, Occupational Performance/Identification of Deficit Complexity: 3-5 performance deficits  Clinical Decision Making Complexity (OT): detailed assessment/moderate complexity  Overall Complexity of Evaluation (OT): moderate complexity     Time Calculation- OT       Row Name 01/08/25 1519             Time Calculation- OT    OT Start Time 1306  -KF      OT Received On 01/08/25  -KF      OT Goal Re-Cert Due Date 01/18/25  -KF         Untimed Charges    OT Eval/Re-eval Minutes 47  -KF         Total Minutes    Untimed Charges Total Minutes 47  -KF       Total Minutes 47  -KF                 User Key  (r) = Recorded By, (t) = Taken By, (c) = Cosigned By      Initials Name Provider Type    KF Sharonda Chang OT Occupational Therapist                  Therapy Charges for Today       Code Description Service Date Service Provider Modifiers Qty    [id number] HC OT EVAL MOD COMPLEXITY 4 1/8/2025 Sharonda Chang OT GO 1                 Sharonda Chang OT  1/8/2025

## 2025-01-08 NOTE — PROGRESS NOTES
Stroke Progress Note       Chief Complaint:  AMS    Subjective    Subjective     Subjective:  The patient is lying down in the bed in NAD. Family were at the bedside. The patient is laying in the bed and is having limited interaction with this provider. She is able to follow some of my commands. She stated that she is taking her medication but unsure which medicine she is taking. I tried to call the daughter (Maggy) at the phone number provided [phone] but no answer. I will try to call later. Unclear what is the patient's recent baseline functional level.    No other acute complains at this time    Review of Systems   Constitutional: No fatigue        Objective      Temp:  [97.9 °F (36.6 °C)-98.1 °F (36.7 °C)] 97.9 °F (36.6 °C)  Heart Rate:  [50-73] 61  Resp:  [14-20] 17  BP: (139-200)/() 176/64    Objective    GEN: lying in bed; in NAD  HENT: normocephalic, non-erythematous oropharynx  CV: no LE edema    NEURO:  Mental Status: alert, she is having limited interaction with this provider. She is able to follow some of my simple commands  Speech: hypophonia and slurred speech together with component of expressive aphasia  CN 2-12:  II - PERRLA  III, IV, VI - EOMI  VII - right facial droop  VIII - Auditory acuity grossly intact  XII - Tongue protrudes midline  Motor: The patient can move left upper extremity against gravity.  The patient can also move left lower extremity against gravity.  The patient have no movement in the right upper extremity but with gravity movement in the right lower extremity  Reflexes: negative Phillips's sign BL  Coordination: Deferred  Gait/Station: deferred     Results Review:    I reviewed the patient's new clinical results.    WBC   Date Value Ref Range Status   01/08/2025 6.16 3.40 - 10.80 10*3/mm3 Final     RBC   Date Value Ref Range Status   01/08/2025 3.83 3.77 - 5.28 10*6/mm3 Final     Hemoglobin   Date Value Ref Range Status   01/08/2025 11.4 (L) 12.0 - 15.9 g/dL Final      Hematocrit   Date Value Ref Range Status   01/08/2025 34.1 34.0 - 46.6 % Final     MCV   Date Value Ref Range Status   01/08/2025 89.0 79.0 - 97.0 fL Final     MCH   Date Value Ref Range Status   01/08/2025 29.8 26.6 - 33.0 pg Final     MCHC   Date Value Ref Range Status   01/08/2025 33.4 31.5 - 35.7 g/dL Final     RDW   Date Value Ref Range Status   01/08/2025 13.6 12.3 - 15.4 % Final     RDW-SD   Date Value Ref Range Status   01/08/2025 44.0 37.0 - 54.0 fl Final     MPV   Date Value Ref Range Status   01/08/2025 11.3 6.0 - 12.0 fL Final     Platelets   Date Value Ref Range Status   01/08/2025 99 (L) 140 - 450 10*3/mm3 Final     Neutrophil %   Date Value Ref Range Status   01/07/2025 74.0 42.7 - 76.0 % Final     Lymphocyte %   Date Value Ref Range Status   01/07/2025 17.4 (L) 19.6 - 45.3 % Final     Monocyte %   Date Value Ref Range Status   01/07/2025 6.8 5.0 - 12.0 % Final     Eosinophil %   Date Value Ref Range Status   01/07/2025 1.0 0.3 - 6.2 % Final     Basophil %   Date Value Ref Range Status   01/07/2025 0.4 0.0 - 1.5 % Final     Immature Grans %   Date Value Ref Range Status   01/07/2025 0.4 0.0 - 0.5 % Final     Neutrophils, Absolute   Date Value Ref Range Status   01/07/2025 4.96 1.70 - 7.00 10*3/mm3 Final     Lymphocytes, Absolute   Date Value Ref Range Status   01/07/2025 1.17 0.70 - 3.10 10*3/mm3 Final     Monocytes, Absolute   Date Value Ref Range Status   01/07/2025 0.46 0.10 - 0.90 10*3/mm3 Final     Eosinophils, Absolute   Date Value Ref Range Status   01/07/2025 0.07 0.00 - 0.40 10*3/mm3 Final     Basophils, Absolute   Date Value Ref Range Status   01/07/2025 0.03 0.00 - 0.20 10*3/mm3 Final     Immature Grans, Absolute   Date Value Ref Range Status   01/07/2025 0.03 0.00 - 0.05 10*3/mm3 Final     nRBC   Date Value Ref Range Status   01/07/2025 0.0 0.0 - 0.2 /100 WBC Final       Lab Results   Component Value Date    GLUCOSE 215 (H) 01/08/2025    BUN 23 01/08/2025    CREATININE 1.15 (H)  01/08/2025     01/08/2025    K 3.9 01/08/2025     (H) 01/08/2025    CALCIUM 9.3 01/08/2025    PROTEINTOT 6.9 01/07/2025    ALBUMIN 4.0 01/07/2025    ALT 25 01/07/2025    AST 16 01/07/2025    ALKPHOS 124 (H) 01/07/2025    BILITOT 0.5 01/07/2025    GLOB 2.9 01/07/2025    AGRATIO 1.4 01/07/2025    BCR 20.0 01/08/2025    ANIONGAP 16.0 (H) 01/08/2025    EGFR 50.7 (L) 01/08/2025     MRI Brain Without Contrast    Result Date: 1/8/2025  Impression: 1.Large area of acute/subacute infarction in the distribution of the left middle cerebral artery. 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 3.Atrophy and chronic microvascular ischemia. Electronically Signed: John Paul Cortez MD  1/8/2025 5:00 AM EST  Workstation ID: [id number]    XR Abdomen KUB    Result Date: 1/8/2025  Impression: No radiopaque foreign body or spinal stimulator to prevent MRI. Electronically Signed: John Paul Cortez MD  1/8/2025 3:24 AM EST  Workstation ID: [id number]    XR Chest 1 View    Result Date: 1/7/2025  Impression: No acute cardiopulmonary abnormality. 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 The ophthalmic artery remains patent. This is new compared with the examination performed on 2/8/2024. This  is likely filled via retrograde flow from the [place] and a the anterior communicating artery. 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 4.Additional vascular and nonvascular findings as described above. 5.Stable 0.5 cm subpleural right  apical pulmonary nodule on image #88. Findings were discussed with Dr. Kraus at 4:47 p.m. 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 The ophthalmic artery remains patent. This is new compared with the examination performed on 2/8/2024. This  is likely filled via retrograde flow from the [place] and a the anterior communicating artery. 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 4.Additional vascular and nonvascular findings as described above. 5.Stable 0.5 cm subpleural right apical pulmonary nodule on image #88. Findings were discussed with Dr. Kraus at 4:47 p.m. on 1/7/2025 Electronically Signed: Anthony Dumont MD  1/7/2025 4:49 PM EST  Workstation ID: [id number]    CT Head Without Contrast    Result Date: 1/7/2025  Impression: 1.No acute intracranial abnormality identified. 2.Chronic left frontal infarct. Chronic left gangliocapsular and right pontine lacunar infarcts. 3.Diffuse brain atrophy with chronic small vessel ischemia. Electronically Signed: Dave Shine MD  1/7/2025 4:02 PM EST  Workstation ID: [id number]   Results for orders placed during the hospital encounter of 02/08/24    Adult Transthoracic Echo Complete W/ Cont if Necessary Per Protocol (With Agitated Saline)    Interpretation Summary    Left ventricular systolic function is normal. Calculated left ventricular EF = 59.2% Left ventricular ejection fraction appears to be 61 - 65%.    Saline test results are negative for right to left atrial level  shunt.    Estimated right ventricular systolic pressure from tricuspid regurgitation is normal (<35 mmHg). Calculated right ventricular systolic pressure from tricuspid regurgitation is 21 mmHg.    -CTH wo on 1-7-25 images were personally reviewed and showed no acute ischemic or hemorrhagic stroke  -CTA of the head and neck images from 1-7-25 were personally reviewed and showed no flow limiting stenosis. There is an occlusion of the cervical L ICA with distal reconstitution of the L MCA through the Acom   -MRI brain images from 1-8-25 were personally reviewed and showed an acute/subacute ischemic stroke affecting the L MCA territory likely watershed infarct in the setting of her chronic looking L ICA occlusion   -Transthoracic echocardiogram is pending  -A1c from 1/8/2025 was 8.8%  -LDL from 1/8/2025 was 32      Assessment/Plan     74 year old  female with multiple vascular risk factors who presented [facility] emergency department via EMS for complaints of altered mental status, reported left-sided weakness, and aphasia.  Need to be an appropriate IV thrombolytic therapy candidate secondary to extended last known well.  Not deemed to be an appropriate emergent endovascular therapy candidate as any potential benefit would be outweighed by significant risk.     Antiplatelet PTA: Aspirin  Anticoagulant PTA: None        #Altered Mental Status  #Reported Left Sided Weakness   #Aphasia  #Acute/subacute ischemic stroke affecting the L MCA territory  -Mechanism is likely watershed in the setting of chronically looking L ICA occlusion  -CTH wo on 1-7-25 images were personally reviewed and showed no acute ischemic or hemorrhagic stroke  -CTA of the head and neck images from 1-7-25 were personally reviewed and showed no flow limiting stenosis. There is an occlusion of the cervical L ICA with distal reconstitution of the L MCA through the Acom   -MRI brain images from 1-8-25 were personally reviewed and  showed an acute/subacute ischemic stroke affecting the L MCA territory likely watershed infarct in the setting of her chronic looking L ICA occlusion   -Transthoracic echocardiogram is pending  -A1c from 1/8/2025 was 8.8%  -LDL from 1/8/2025 was 32      Recs:  -Avoid hypotension with goal -180 to allow for adequate perfusion, overall management per ICU medicine team  -Continue aspirin 81 mg daily for secondary stroke prevention  -Would resume Plavix 75 mg daily for secondary stroke prevention.  We will continue to trend platelet and check for any GI bleed given prior concern for thrombocytopenia and GI bleed  -PT/OT/SLP to see and assess when appropriate  -TTE, defer bubble, previously completed  -Bilateral carotid duplex ultrasound to evaluate for the severity of carotid stenosis/occlusion  -NIHSS neurochecks per protocol  -CT head for any neurologic decline      Stroke will continue to follow. Please call for any further questions or concerns  =================================  Elvis Rivera MD, Msc, PhD  Vascular Neurologist  [facility]    I have a detailed discussion with Maggy (the patient's daughter) regarding her mother's condition. After discussing the options we have including BMM to ensure no worsening of her atherosclerosis especially no worsening of her sever stenosis of the R ICA. The daughter is agreeable to starting plavix 75 mg daily on top of ASA with the plan to monitor the patient for any GI bleeding and/or thrombocytopenia. We would also like to get hematology consult to help evaluating the patient's chronic anemia and thrombocytopenia.      =================================  Elvis Rivera MD, Msc, PhD  Vascular Neurologist  [facility]

## 2025-01-08 NOTE — THERAPY EVALUATION
Patient Name: Karol Lopez  : 1950    MRN: [id number]                              Today's Date: 2025       Admit Date: 2025    Visit Dx:     ICD-10-CM ICD-9-CM   1. Left carotid artery occlusion  I65.22 433.10   2. Difficulty with speech  R47.9 784.59   3. History of stroke  Z86.73 V12.54   4. Altered mental status, unspecified altered mental status type  R41.82 780.97     Patient Active Problem List   Diagnosis    Coronary artery disease involving native coronary artery of native heart with angina pectoris    Anxiety    COPD (chronic obstructive pulmonary disease)    Acid reflux    Vitamin B12 deficiency    Current smoker    Type 2 diabetes mellitus, with long-term current use of insulin    Impaired mobility and ADLs    Melena    Anemia    Gastrointestinal hemorrhage    Hyperlipidemia LDL goal <70    Claudication    Persistent atrial fibrillation    Hyperglycemia due to diabetes mellitus    Cirrhosis of liver    Carotid artery disease    Encephalopathy    Acute ischemic cerebrovascular accident (CVA) involving internal carotid artery territory     Past Medical History:   Diagnosis Date    Acid reflux     Anxiety     Cataracts, bilateral     Cirrhosis of liver     Constipation     COPD (chronic obstructive pulmonary disease)     Coronary artery disease     CTS (carpal tunnel syndrome)     Diabetes     Hearing loss     Hypercholesteremia     Hypertension     Impaired functional mobility, balance, gait, and endurance     Lower back pain     Osteoarthritis     Stroke     2013-weak in right arm    Tattoos     x2    Tobacco abuse     Tumor     in between breast closer to right breast    Vitamin B12 deficiency     Wears dentures     upper only    Wears glasses      Past Surgical History:   Procedure Laterality Date    BREAST BIOPSY Right 5/10/2019    Procedure: BREAST BIOPSY RIGHT;  Surgeon: Kiana Frey MD;  [facility];  Service: General    CARPAL TUNNEL RELEASE Bilateral      CHOLECYSTECTOMY      COLONOSCOPY N/A 9/30/2021    Procedure: COLONOSCOPY WITH POLYPECTOMY AND ABLATION OF AVM;  Surgeon: Russell Davila MD;  Location: [facility] ENDOSCOPY;  Service: Gastroenterology;  Laterality: N/A;    CORONARY ANGIOPLASTY WITH STENT PLACEMENT  2012    ENDOSCOPY N/A 9/29/2021    Procedure: ESOPHAGOGASTRODUODENOSCOPY with biopsies;  Surgeon: Russell Davila MD;  Location: [facility] ENDOSCOPY;  Service: Gastroenterology;  Laterality: N/A;    ENTEROSCOPY SMALL BOWEL N/A 11/16/2021    Procedure: ESOPHAGOGASTRODUODENOSCOPY WITH SMALL BOWEL ENTEROSCOPY and biopsy;  Surgeon: Russell Davila MD;  Location: [facility] ENDOSCOPY;  Service: Gastroenterology;  Laterality: N/A;    HYSTERECTOMY      complete    JOINT REPLACEMENT Bilateral     knee replacements    TOTAL KNEE ARTHROPLASTY Bilateral 10/18/2016    MAUREEN Palomares MD      General Information       Row Name 01/08/25 1523          Physical Therapy Time and Intention    Document Type evaluation  -ES     Mode of Treatment physical therapy;co-treatment  -ES       Row Name 01/08/25 1523          General Information    Patient Profile Reviewed yes  -ES     Prior Level of Function independent:;all household mobility;bed mobility  Pt is a questionable historian, later in the session stating she wasn't sure of her PLOF.  -ES     Existing Precautions/Restrictions fall;other (see comments)  R sided weakness, aphasia  -ES     Barriers to Rehab medically complex;previous functional deficit;cognitive status  -ES       Row Name 01/08/25 1523          Living Environment    People in Home spouse  -ES       Row Name 01/08/25 1523          Home Main Entrance    Number of Stairs, Main Entrance other (see comments)  Pt unsure, TBD further.  -ES       Row Name 01/08/25 1523          Cognition    Orientation Status (Cognition) oriented to;person;verbal cues/prompts needed for orientation;time;disoriented to;place  -ES       Row Name 01/08/25 1523          Safety  Issues/Impairments Affecting Functional Mobility    Safety Issues Affecting Function (Mobility) ability to follow commands;awareness of need for assistance;insight into deficits/self-awareness;safety precaution awareness;safety precautions follow-through/compliance;sequencing abilities  -ES     Impairments Affecting Function (Mobility) balance;cognition;coordination;endurance/activity tolerance;grasp;motor control;motor planning;postural/trunk control;strength  -ES     Cognitive Impairments, Mobility Safety/Performance attention;awareness, need for assistance;insight into deficits/self-awareness;safety precaution awareness;safety precaution follow-through;sequencing abilities  -ES               User Key  (r) = Recorded By, (t) = Taken By, (c) = Cosigned By      Initials Name Provider Type    Venessa Hammonds PT Physical Therapist                   Mobility       Row Name 01/08/25 1524          Bed Mobility    Comment, (Bed Mobility) EOB  -ES       Row Name 01/08/25 1524          Bed-Chair Transfer    Bed-Chair [place] (Transfers) maximum assist (25% patient effort);2 person assist;verbal cues;nonverbal cues (demo/gesture)  -ES     Assistive Device (Bed-Chair Transfers) other (see comments)  BUE support  -ES     Comment, (Bed-Chair Transfer) SPT from EOB>chair w/ maxA x2. v/c and t/c for advancement of RLE. Further mobility deferred 2/2 weakness and fatigue.  -ES       Row Name 01/08/25 1524          Sit-Stand Transfer    Sit-Stand [place] (Transfers) moderate assist (50% patient effort);2 person assist;verbal cues;nonverbal cues (demo/gesture)  -ES     Assistive Device (Sit-Stand Transfers) other (see comments)  BUE support  -ES               User Key  (r) = Recorded By, (t) = Taken By, (c) = Cosigned By      Initials Name Provider Type    Venessa Hammonds PT Physical Therapist                   Obj/Interventions       Row Name 01/08/25 1525          Range of Motion Comprehensive    General Range  of Motion bilateral lower extremity ROM WFL  -ES     Comment, General Range of Motion LLE AROM WFL, RLE PROM WFL  -ES       Row Name 01/08/25 1525          Strength Comprehensive (MMT)    General Manual Muscle Testing (MMT) Assessment lower extremity strength deficits identified  -ES     Comment, General Manual Muscle Testing (MMT) Assessment LLE 4/5, RLE functionally observed to be 3+/5. Unable to formally assess due to cognitive status  -ES       Row Name 01/08/25 1525          Balance    Balance Assessment sitting static balance;sitting dynamic balance;standing static balance;standing dynamic balance  -ES     Static Sitting Balance contact guard  -ES     Dynamic Sitting Balance minimal assist  -ES     Position, Sitting Balance supported;sitting in chair;sitting edge of bed  -ES     Static Standing Balance moderate assist;2-person assist  -ES     Dynamic Standing Balance maximum assist;2-person assist;verbal cues  -ES     Position/Device Used, Standing Balance supported  -ES     Balance Interventions sitting;standing;sit to stand;supported;static;dynamic;occupation based/functional task  -ES       Row Name 01/08/25 1525          Sensory Assessment (Somatosensory)    Sensory Assessment (Somatosensory) LE sensation intact  -ES               User Key  (r) = Recorded By, (t) = Taken By, (c) = Cosigned By      Initials Name Provider Type    ES Venessa Mosley, PT Physical Therapist                   Goals/Plan       Row Name 01/08/25 1527          Bed Mobility Goal 1 (PT)    Activity/Assistive Device (Bed Mobility Goal 1, PT) sit to supine/supine to sit  -ES     [place] Level/Cues Needed (Bed Mobility Goal 1, PT) contact guard required  -ES     Time Frame (Bed Mobility Goal 1, PT) short term goal (STG);5 days  -ES       Row Name 01/08/25 1527          Transfer Goal 1 (PT)    Activity/Assistive Device (Transfer Goal 1, PT) sit-to-stand/stand-to-sit;bed-to-chair/chair-to-bed  -ES     [place] Level/Cues Needed  (Transfer Goal 1, PT) contact guard required  -ES     Time Frame (Transfer Goal 1, PT) long term goal (LTG);10 days  -ES       Row Name 01/08/25 1527          Gait Training Goal 1 (PT)    Activity/Assistive Device (Gait Training Goal 1, PT) gait (walking locomotion);decrease fall risk;increase endurance/gait distance  -ES     [place] Level (Gait Training Goal 1, PT) contact guard required  -ES     Distance (Gait Training Goal 1, PT) 150  -ES     Time Frame (Gait Training Goal 1, PT) long term goal (LTG);10 days  -ES       Row Name 01/08/25 1527          Therapy Assessment/Plan (PT)    Planned Therapy Interventions (PT) balance training;bed mobility training;gait training;home exercise program;neuromuscular re-education;patient/family education;strengthening;stair training;postural re-education;transfer training  -ES               User Key  (r) = Recorded By, (t) = Taken By, (c) = Cosigned By      Initials Name Provider Type    ES Venessa Mosley, PT Physical Therapist                   Clinical Impression       Row Name 01/08/25 1526          Pain    Pretreatment Pain Rating 0/10 - no pain  -ES     Posttreatment Pain Rating 0/10 - no pain  -ES     Pre/Posttreatment Pain Comment tolerated  -ES       Row Name 01/08/25 1526          Plan of Care Review    Plan of Care Reviewed With patient  -ES     Progress no change  -ES     Outcome Evaluation PT eval complete. Pt presents with R sided weakness, balance deficits, and decreased activity tolerance warranting skilled IPPT services. Pt required mod-maxA x2 for mobility with cues for sequencing on R side. PT rec IRF at d/c.  -ES       Row Name 01/08/25 1526          Therapy Assessment/Plan (PT)    Rehab Potential (PT) good  -ES     Criteria for Skilled Interventions Met (PT) yes;meets criteria;skilled treatment is necessary  -ES     Therapy Frequency (PT) daily  -ES     Predicted Duration of Therapy Intervention (PT) 10 days  -ES       Row Name 01/08/25 1521           Vital Signs    Pre Systolic BP Rehab 163  -ES     Pre Treatment Diastolic BP 59  -ES     Post Systolic BP Rehab 168  -ES     Post Treatment Diastolic BP 59  -ES     Pretreatment Heart Rate (beats/min) 53  -ES     Posttreatment Heart Rate (beats/min) 52  -ES     Pre SpO2 (%) 97  -ES     O2 Delivery Pre Treatment room air  -ES     O2 Delivery Intra Treatment room air  -ES     Post SpO2 (%) 98  -ES     O2 Delivery Post Treatment room air  -ES     Pre Patient Position Sitting  -ES     Intra Patient Position Standing  -ES     Post Patient Position Sitting  -ES       Row Name 01/08/25 1526          Positioning and Restraints    Pre-Treatment Position in bed  -ES     Post Treatment Position chair  -ES     In Chair notified nsg;reclined;sitting;call light within reach;encouraged to call for assist;exit alarm on;waffle cushion;legs elevated;heels elevated  -ES               User Key  (r) = Recorded By, (t) = Taken By, (c) = Cosigned By      Initials Name Provider Type    Venessa Hammonds, PT Physical Therapist                   Outcome Measures       Row Name 01/08/25 1528 01/08/25 0800       How much help from another person do you currently need...    Turning from your back to your side while in flat bed without using bedrails? 3  -ES 2  -JW    Moving from lying on back to sitting on the side of a flat bed without bedrails? 3  -ES 1  -JW    Moving to and from a bed to a chair (including a wheelchair)? 2  -ES 1  -JW    Standing up from a chair using your arms (e.g., wheelchair, bedside chair)? 2  -ES 1  -JW    Climbing 3-5 steps with a railing? 1  -ES 1  -JW    To walk in hospital room? 1  -ES 1  -JW    AM-PAC 6 Clicks Score (PT) 12  -ES 7  -JW    Highest Level of Mobility Goal 4 --> Transfer to chair/commode  -ES 2 --> Bed activities/dependent transfer  -JW      Row Name 01/08/25 1528 01/08/25 1518       Modified [place] Scale    Pre-Stroke Modified [place]to Scale 6 - Unable to determine (UTD) from the medical record  documentation  -ES 6 - Unable to determine (UTD) from the medical record documentation  -KF    Modified [place] Scale 4 - Moderately severe disability.  Unable to walk without assistance, and unable to attend to own bodily needs without assistance.  -ES 4 - Moderately severe disability.  Unable to walk without assistance, and unable to attend to own bodily needs without assistance.  -KF      Row Name 01/08/25 1528 01/08/25 1518       Functional Assessment    Outcome Measure Options AM-PAC 6 Clicks Basic Mobility (PT);Modified [place]  -ES AM-PAC 6 Clicks Daily Activity (OT);Modified [place]to  -KF              User Key  (r) = Recorded By, (t) = Taken By, (c) = Cosigned By      Initials Name Provider Type    Lizeth Carpenter, RN Registered Nurse    Venessa Hammonds, BRONSON Physical Therapist    Sharonda Grady, OT Occupational Therapist                                 Physical Therapy Education       Title: PT OT SLP Therapies (In Progress)       Topic: Physical Therapy (In Progress)       Point: Mobility training (In Progress)       Learning Progress Summary            Patient Acceptance, E,TB, NR by JOSE at 1/8/2025 1528                      Point: Home exercise program (Not Started)       Learner Progress:  Not documented in this visit.              Point: Body mechanics (In Progress)       Learning Progress Summary            Patient Acceptance, E,TB, NR by JOSE at 1/8/2025 1528                      Point: Precautions (In Progress)       Learning Progress Summary            Patient Acceptance, E,TB, NR by JOSE at 1/8/2025 1528                                      User Key       Initials Effective Dates Name Provider Type Discipline    JOSE 08/11/22 -  Venessa Mosley, PT Physical Therapist PT                  PT Recommendation and Plan  Planned Therapy Interventions (PT): balance training, bed mobility training, gait training, home exercise program, neuromuscular re-education, patient/family education, strengthening,  stair training, postural re-education, transfer training  Progress: no change  Outcome Evaluation: PT eval complete. Pt presents with R sided weakness, balance deficits, and decreased activity tolerance warranting skilled IPPT services. Pt required mod-maxA x2 for mobility with cues for sequencing on R side. PT rec IRF at d/c.     Time Calculation:   PT Evaluation Complexity  History, PT Evaluation Complexity: 1-2 personal factors and/or comorbidities  Examination of Body Systems (PT Eval Complexity): total of 3 or more elements  Clinical Presentation (PT Evaluation Complexity): evolving  Clinical Decision Making (PT Evaluation Complexity): moderate complexity  Overall Complexity (PT Evaluation Complexity): moderate complexity     PT Charges       Row Name 01/08/25 1529             Time Calculation    Start Time 1315  -ES      PT Received On 01/08/25  -ES      PT Goal Re-Cert Due Date 01/18/25  -ES         Untimed Charges    PT Eval/Re-eval Minutes 46  -ES         Total Minutes    Untimed Charges Total Minutes 46  -ES       Total Minutes 46  -ES                User Key  (r) = Recorded By, (t) = Taken By, (c) = Cosigned By      Initials Name Provider Type    Venessa Hammonds PT Physical Therapist                  Therapy Charges for Today       Code Description Service Date Service Provider Modifiers Qty    [id number] HC PT EVAL MOD COMPLEXITY 4 1/8/2025 Venessa Mosley, PT GP 1            PT G-Codes  Outcome Measure Options: AM-PAC 6 Clicks Basic Mobility (PT), Modified [place] Alberto  AM-PAC 6 Clicks Score (PT): 12  AM-PAC 6 Clicks Score (OT): 10  Modified [place] Scale: 4 - Moderately severe disability.  Unable to walk without assistance, and unable to attend to own bodily needs without assistance.  PT Discharge Summary  Anticipated Discharge Disposition (PT): inpatient rehabilitation facility    Venessa Mosley PT  1/8/2025

## 2025-01-08 NOTE — H&P
"  CRITICAL CARE ADMISSION NOTE     Patient's name Karol Lopez MRN: [id number] : 1950-74 y.o.-female  Admission date 2025  Length of stay 0    REASON FOR CONSULTATION / MAIN COMPLAINT  Stroke and Altered Mental Status     HISTORY OF MAIN COMPLAINT    Karol Lopez is a 74 y.o. female who presented to [facility] ED on 25 via EMS with altered mental status and weakness.     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     Per significant other, patient has been confused, with speech changes and shuffling gait over the last 24-48 hours. Of note, patient and  are poor historians and most information was gathered by the patient's daughter who lives in [place].     On arrival to the ED, patient covered in dried feces and was reportedly in a \"catatonic\" state and not following commands. She did not withdraw from noxious stimuli in any extremity. Initial NIHSS 20. CT of the head showed chronic left hemispheric infarcts. CTA H/N with occlusion of the left ICA and significant stenosis of the right ICA. Patient has a history of significant GIB; therefore, cannot be anticoagulated for atrial fibrillation and risk outweighs benefit for heparin drip. She was outside the window for TNK and after discussion with Neurology and Neurosurgery, no emergent intervention was warranted.      She will be admitted to the ICU for higher level of care and for vasopressors if required to keep SBP >160.    PAST MEDICAL HISTORY:  Past Medical History:   Diagnosis Date    Acid reflux     Anxiety     Cataracts, bilateral     Cirrhosis of liver     Constipation     COPD (chronic obstructive pulmonary disease)     Coronary artery disease     CTS (carpal tunnel " syndrome)     Diabetes     Hearing loss     Hypercholesteremia     Hypertension     Impaired functional mobility, balance, gait, and endurance     Lower back pain     Osteoarthritis     Stroke     2013-weak in right arm    Tattoos     x2    Tobacco abuse     Tumor     in between breast closer to right breast    Vitamin B12 deficiency     Wears dentures     upper only    Wears glasses        PAST SURGICAL HISTORY:  Past Surgical History:   Procedure Laterality Date    BREAST BIOPSY Right 5/10/2019    Procedure: BREAST BIOPSY RIGHT;  Surgeon: Kiana Frey MD;  Location: [facility] OR;  Service: General    CARPAL TUNNEL RELEASE Bilateral     CHOLECYSTECTOMY      COLONOSCOPY N/A 9/30/2021    Procedure: COLONOSCOPY WITH POLYPECTOMY AND ABLATION OF AVM;  Surgeon: Russell Davila MD;  Location: [facility] ENDOSCOPY;  Service: Gastroenterology;  Laterality: N/A;    CORONARY ANGIOPLASTY WITH STENT PLACEMENT  2012    ENDOSCOPY N/A 9/29/2021    Procedure: ESOPHAGOGASTRODUODENOSCOPY with biopsies;  Surgeon: Russell Davila MD;  Location: [facility] ENDOSCOPY;  Service: Gastroenterology;  Laterality: N/A;    ENTEROSCOPY SMALL BOWEL N/A 11/16/2021    Procedure: ESOPHAGOGASTRODUODENOSCOPY WITH SMALL BOWEL ENTEROSCOPY and biopsy;  Surgeon: Russell Davila MD;  Location: [facility] ENDOSCOPY;  Service: Gastroenterology;  Laterality: N/A;    HYSTERECTOMY      complete    JOINT REPLACEMENT Bilateral     knee replacements    TOTAL KNEE ARTHROPLASTY Bilateral 10/18/2016    MAUREEN Palomares MD       FAMILY HISTORY:  Family History   Problem Relation Age of Onset    Hypertension Other     Diabetes Mother     Hypertension Mother     Cancer Mother     Diabetes Father     Hypertension Father     Heart disease Father     Cancer Sister     Cancer Brother        SOCIAL HISTORY:  Social History     Tobacco Use    Smoking status: Every Day     Current packs/day: 1.00     Average packs/day: 1 pack/day for 46.0 years (46.0 ttl pk-yrs)      "Types: Cigarettes    Smokeless tobacco: Never   Vaping Use    Vaping status: Never Used   Substance Use Topics    Alcohol use: No    Drug use: No       ALLERGIES:  Allergies   Allergen Reactions    Codeine GI Intolerance       HOME MEDS INCLUDE  Current Outpatient Medications   Medication Instructions    aspirin 81 mg, Oral, Daily    B-D INS SYRINGE 0.5CC/31GX5/16 31G X 5/16\" 0.5 ML misc use as directed once daily    bisacodyl (DULCOLAX) 5 mg, Oral, Daily PRN    clopidogrel (PLAVIX) 75 mg, Oral, Daily    empagliflozin (JARDIANCE) 10 mg, Oral, Daily    FeroSul 325 mg, Oral, Daily With Breakfast    furosemide (LASIX) 20 mg, Oral, Daily PRN, swelling    HYDROcodone-acetaminophen (NORCO) 5-325 MG per tablet 1 tablet, Oral, Every 8 Hours PRN    Insulin Pen Needle 31G X 6 MM misc 1 each, Not Applicable    Insulin Syringe-Needle U-100 30G X 5/16\" 0.5 ML misc 1 each by Does not apply route daily    Insulin Syringe-Needle U-100 31G X 5/16\" 1 ML misc 1 each by Does not apply route daily    lactulose (CHRONULAC) 10 g, Oral, Daily    linaclotide (LINZESS) 290 mcg, Oral, Every Morning Before Breakfast    linagliptin (TRADJENTA) 5 mg, Oral, Daily    metFORMIN (GLUCOPHAGE) 1,000 mg, Oral, 2 Times Daily With Meals    metoprolol succinate XL (TOPROL-XL) 25 mg, Oral, Daily    pantoprazole (PROTONIX) 40 MG EC tablet TAKE 1 TABLET BY MOUTH TWICE DAILY BEFORE MEALS    rosuvastatin (CRESTOR) 20 mg, Oral, Daily    umeclidinium-vilanterol (Anoro Ellipta) 62.5-25 MCG/INH aerosol powder  inhaler 1 puff, Inhalation, As Needed    vitamin B-12 (CYANOCOBALAMIN) 1,000 mcg, Oral, Daily       SCHEDULED MEDS:  insulin regular, 2-9 Units, Subcutaneous, Q6H  ipratropium-albuterol, 3 mL, Nebulization, 4x Daily - RT  sodium chloride, 1,000 mL, Intravenous, Once         CONTINUOUS INFUSIONS:  sodium chloride, 75 mL/hr         REVIEW OF SYSTEMS:    Review of Systems - JUAN CARLOS 2/t mental status change      PHYSICAL EXAMINATION:  /83   Pulse 61   Temp " "98 °F (36.7 °C) (Oral)   Resp 20   Ht 157.5 cm (62\")   Wt 68 kg (150 lb)   SpO2 98%   BMI 27.44 kg/m²     General appearance: ill appearing  Trachea: midline  Lymphatic: no lymphadenopathy.  Chest: symmetric, clear to auscultation bilaterally  Heart: regular rate and rhythm, S1, S2 normal  Gastrointestinal:soft, non-tender  Genitourinary: No CVA tenderness BL, no suprapubic tenderness  Neurological: awake, grossly, not alert  Psychiatric: appropriate mood and affect  Skin: no rash    DATA REVIEW:  1. Medical chart reviewed in detail  2. I reviewed the actual EKG rhythm strips and Pulse Oximetry data on the monitors  3. I reviewed today's lab values and the report of the most recent Chest X ray.  4. In addition, I have independently reviewed the actual image of the most recent chest Xray    Hematology:  Results from last 7 days   Lab Units 01/07/25  1607   WBC 10*3/mm3 6.72   HEMOGLOBIN g/dL 12.3   HEMATOCRIT % 37.1   MCV fL 89.8   PLATELETS 10*3/mm3 116*     Chemistry:  Estimated Creatinine Clearance: 31.7 mL/min (A) (by C-G formula based on SCr of 1.45 mg/dL (H)).  Results from last 7 days   Lab Units 01/07/25  1607   SODIUM mmol/L 139   POTASSIUM mmol/L 4.0   CHLORIDE mmol/L 101   CO2 mmol/L 23.0   BUN mg/dL 25*   CREATININE mg/dL 1.45*   GLUCOSE mg/dL 295*     Results from last 7 days   Lab Units 01/07/25  1607   CALCIUM mg/dL 9.8   MAGNESIUM mg/dL 2.3   PHOSPHORUS mg/dL 3.9     Hepatic Panel:  Results from last 7 days   Lab Units 01/07/25  1607   ALBUMIN g/dL 4.0   TOTAL PROTEIN g/dL 6.9   BILIRUBIN mg/dL 0.5   AST (SGOT) U/L 16   ALT (SGPT) U/L 25   ALK PHOS U/L 124*     Coagulation Labs:       Cardiac Labs:  Results from last 7 days   Lab Units 01/07/25  1607   PROBNP pg/mL 271.0   CK TOTAL U/L 88     Biomarkers:  Results from last 7 days   Lab Units 01/07/25  1607   CRP mg/dL <0.30   LACTATE mmol/L 1.7     U/A  Results from last 7 days   Lab Units 01/07/25  1648   COLOR UA  Yellow   CLARITY UA  Clear " "  PH, URINE  <=5.0   SPECIFIC GRAVITY, URINE  1.024   GLUCOSE UA  >=1000 mg/dL (3+)*   KETONES UA  Negative   BILIRUBIN UA  Negative   PROTEIN UA  Trace*   BLOOD UA  Negative   LEUKOCYTES UA  Negative   NITRITE UA  Negative   UROBILINOGEN UA  0.2 E.U./dL         Arterial Blood Gases:  Results from last 7 days   Lab Units 01/07/25  1604   FIO2 % 21     Microbiology:  No results found for: \"BLOODCX\", \"CULTURES\", \"THROATCX\", \"URINECX\", \"STOOLCX\", \"WOUNDCX\"    Images:  XR Chest 1 View    Result Date: 1/7/2025  Impression: No acute cardiopulmonary abnormality. Electronically Signed: Ezequiel Javier  1/7/2025 5:31 PM EST  Workstation ID: [id number]    CT Angiogram Head w AI Analysis of LVO    Result Date: 1/7/2025  1.Complete occlusion of the extracranial left internal carotid artery which extends to the ophthalmic segment of the left internal carotid artery. The ophthalmic artery remains patent. This is new compared with the examination performed on 2/8/2024. This  is likely filled via retrograde flow from the [place] and a the anterior communicating artery. 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 4.Additional vascular and nonvascular findings as described above. 5.Stable 0.5 cm subpleural right apical pulmonary nodule on image #88. Findings were discussed with Dr. Kraus at 4:47 p.m. on 1/7/2025 Electronically Signed: Anthony Dumont MD  1/7/2025 4:49 PM EST  Workstation ID: [id number]    CT Angiogram Neck    Result Date: 1/7/2025  1.Complete occlusion of the extracranial left internal carotid artery which extends to the ophthalmic segment of the left internal carotid artery. The ophthalmic artery remains patent. This is new compared with the examination performed on " 2/8/2024. This  is likely filled via retrograde flow from the [place] and a the anterior communicating artery. 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 4.Additional vascular and nonvascular findings as described above. 5.Stable 0.5 cm subpleural right apical pulmonary nodule on image #88. Findings were discussed with Dr. Kraus at 4:47 p.m. on 1/7/2025 Electronically Signed: Anthony Dumont MD  1/7/2025 4:49 PM EST  Workstation ID: [id number]    CT Head Without Contrast    Result Date: 1/7/2025  Impression: 1.No acute intracranial abnormality identified. 2.Chronic left frontal infarct. Chronic left gangliocapsular and right pontine lacunar infarcts. 3.Diffuse brain atrophy with chronic small vessel ischemia. Electronically Signed: Dave Shine MD  1/7/2025 4:02 PM EST  Workstation ID: [id number]     Echo:  Results for orders placed during the hospital encounter of 02/08/24    Adult Transthoracic Echo Complete W/ Cont if Necessary Per Protocol (With Agitated Saline)    Interpretation Summary    Left ventricular systolic function is normal. Calculated left ventricular EF = 59.2% Left ventricular ejection fraction appears to be 61 - 65%.    Saline test results are negative for right to left atrial level shunt.    Estimated right ventricular systolic pressure from tricuspid regurgitation is normal (<35 mmHg). Calculated right ventricular systolic pressure from tricuspid regurgitation is 21 mmHg.        ASSESSMENT & PLAN     * No active hospital problems. *       74-year-old female with a past medical history of stroke with residual right upper extremity weakness, atrial fibrillation, bilateral carotid disease, type 2 diabetes, tobacco abuse, COPD and coronary disease who  presented to our hospital via EMS with a last known well of approximately 2 days ago.  According the patient's , she was not feeling well with left-sided weakness.  CT head showed chronic left hemispheric infarcts, CT angiography head and neck shows occlusion of the left ICA significant stenosis of the right ICA.  Admitted to the ICU for blood pressure management.      Plan:  Admit to the ICU for further management.  Maintain SBP greater than 160  Patient may require pressor support to maintain blood pressure elevation  Continue to track patient's NIH score  HOB 30 degrees  PT OT SLP  If the patient's neurofunction declines, obtain repeat CT scan  MRI of the head ordered, pending    Electronically signed: Javier Caal MD  1/7/2025 22:19 EST      Bowel regimen:  Diet: No diet orders on file  GI ppx:   DVT PPX: VTE Prophylaxis:  Mechanical VTE prophylaxis orders are signed & held.         Advance Directives:       Dispo: ICU    I have spent a total of 57 critical care minutes in the management of this patient, apart from any time taken to perform necessary procedures. Critical care time includes time reviewing chart, images, report of images, rounding with nurse, respiratory therapist and pharmacist, and discussions with available family at bedside.    Signed: AURBEY King  19:07 EST 1/7/2025      9 minutes of critical care provided by AUBREY Rodarte in addendum accordance with split/shared billing. This time excludes other billable procedures. Time does include preparation of documents, medical consultations, review of old records, and direct bedside care. Patient is at high risk for life-threatening deterioration due to AIS.   Electronically signed by AUBREY King, 01/07/25, 7:07 PM EST.

## 2025-01-08 NOTE — CASE MANAGEMENT/SOCIAL WORK
Discharge Planning Assessment  [facility]     Patient Name: Karol Lopez  MRN: [id number]  Today's Date: 1/8/2025    Admit Date: 1/7/2025    Plan: IDP   Discharge Needs Assessment       Row Name 01/08/25 1215       Living Environment    People in Home other (see comments)    Unique Family Situation Emeka Hicks, Ex     Primary Care Provided by self    Provides Primary Care For no one    Family Caregiver if Needed child(bobby), adult       Transition Planning    Transportation Anticipated family or friend will provide       Discharge Needs Assessment    Equipment Currently Used at Home none                   Discharge Plan       Row Name 01/08/25 1217       Plan    Plan IDP    Patient/Family in Agreement with Plan yes    Plan Comments  attempted to call pt's daughter, Maris, Medical POA. Maris's phone has been disconnected. CM called pt's daughter, Maggy. She states she lives in [place] and Maris lives in [place], [place], near . Maggy is working on getting Maris's new cell number. Pt lives with her ex  in [facility]. Pt was independent with ADL's, no longer driving. Maris would transport pt, set up her medications, and do her shopping. No DME or OPPT. Maggy states that the pt had Home Health but she does not know the agency name. We discussed rehab to LTC along with rehab to home with Home Health. Maggy understands that these decisions will need to be made when pt is closer to being medically ready for discharge. Maggy states that Maris works at [employer]. PCP-Romaine Eugene. Confirmed Medicare A&B.  will continue to follow.    1224-Maggy/Dtr called CM she gave new phone number for Maris/dtr, Medical POA. CM updated number in pt's emergency contacts for Maris.                   Continued Care and Services - Admitted Since 1/7/2025    No active coordination exists for this encounter.          Demographic Summary       Row Name 01/08/25 1212       General Information     Admission Type inpatient       Contact Information    Permission Granted to Share Info With     Contact Information Obtained for                    Functional Status       Row Name 01/08/25 1215       Functional Status    Usual Activity Tolerance good       Functional Status, IADL    Medications independent    Meal Preparation independent    Housekeeping independent    Laundry independent    Shopping assistive person    IADL Comments Daughter/Maris lives close. Maris sets up pt's meds in med box and does pt's shopping                   Psychosocial    No documentation.                  Abuse/Neglect    No documentation.                  Legal    No documentation.                  Substance Abuse    No documentation.                  Patient Forms    No documentation.                     Maggy Rock RN

## 2025-01-08 NOTE — PLAN OF CARE
Problem: Adult Inpatient Plan of Care  Goal: Plan of Care Review  Outcome: Progressing  Flowsheets (Taken 1/8/2025 0545)  Progress: improving  Outcome Evaluation: MRI done. Bolus given to reach SBP goal of >160mmHg. Phenylephrine started (on/off).  Plan of Care Reviewed With: patient  Goal: Patient-Specific Goal (Individualized)  Outcome: Progressing  Goal: Absence of Hospital-Acquired Illness or Injury  Outcome: Progressing  Intervention: Identify and Manage Fall Risk  Recent Flowsheet Documentation  Taken 1/8/2025 0200 by James Moreno RN  Safety Promotion/Fall Prevention: safety round/check completed  Taken 1/8/2025 0000 by James Moreno RN  Safety Promotion/Fall Prevention: safety round/check completed  Taken 1/7/2025 2200 by James Moreno RN  Safety Promotion/Fall Prevention: safety round/check completed  Intervention: Prevent Skin Injury  Recent Flowsheet Documentation  Taken 1/8/2025 0200 by James Moreno RN  Body Position:   turned   weight shifting  Taken 1/8/2025 0000 by James Moreno RN  Body Position:   turned   weight shifting  Taken 1/7/2025 2200 by James Moreno RN  Body Position:   turned   weight shifting  Skin Protection:   incontinence pads utilized   silicone foam dressing in place  Intervention: Prevent and Manage VTE (Venous Thromboembolism) Risk  Recent Flowsheet Documentation  Taken 1/7/2025 2200 by James Moreno RN  VTE Prevention/Management:   bilateral   SCDs (sequential compression devices) on  Intervention: Prevent Infection  Recent Flowsheet Documentation  Taken 1/8/2025 0200 by James Moreno RN  Infection Prevention:   environmental surveillance performed   hand hygiene promoted  Taken 1/8/2025 0000 by James Moreno RN  Infection Prevention:   environmental surveillance performed   hand hygiene promoted  Taken 1/7/2025 2200 by James Moreno RN  Infection Prevention:   environmental surveillance performed   hand hygiene promoted  Goal: Optimal Comfort and Wellbeing  Outcome:  Progressing  Intervention: Monitor Pain and Promote Comfort  Recent Flowsheet Documentation  Taken 1/8/2025 0200 by James Moreno RN  Pain Management Interventions:   care clustered   position adjusted  Taken 1/8/2025 0000 by James Moreno RN  Pain Management Interventions: care clustered  Taken 1/7/2025 2300 by James Moreno RN  Pain Management Interventions:   care clustered   pain medication given  Taken 1/7/2025 2200 by James Moreno RN  Pain Management Interventions:   care clustered   position adjusted  Goal: Readiness for Transition of Care  Outcome: Progressing  Intervention: Mutually Develop Transition Plan  Recent Flowsheet Documentation  Taken 1/7/2025 2242 by James Moreno RN  Transportation Anticipated: family or friend will provide  Transportation Concerns: none  Patient/Family Anticipated Services at Transition: rehabilitation services  Patient/Family Anticipates Transition to: home with help/services     Problem: Fall Injury Risk  Goal: Absence of Fall and Fall-Related Injury  Outcome: Progressing  Intervention: Identify and Manage Contributors  Recent Flowsheet Documentation  Taken 1/8/2025 0200 by James Moreno RN  Medication Review/Management: medications reviewed  Taken 1/8/2025 0000 by James Moreno RN  Medication Review/Management: medications reviewed  Taken 1/7/2025 2200 by James Moreno RN  Medication Review/Management: medications reviewed  Intervention: Promote Injury-Free Environment  Recent Flowsheet Documentation  Taken 1/8/2025 0200 by James Moreno RN  Safety Promotion/Fall Prevention: safety round/check completed  Taken 1/8/2025 0000 by James Moreno RN  Safety Promotion/Fall Prevention: safety round/check completed  Taken 1/7/2025 2200 by James Moreno RN  Safety Promotion/Fall Prevention: safety round/check completed     Problem: Skin Injury Risk Increased  Goal: Skin Health and Integrity  Outcome: Progressing  Intervention: Optimize Skin Protection  Recent Flowsheet Documentation  Taken  1/8/2025 0200 by James Moreno RN  Activity Management: bedrest  Head of Bed (HOB) Positioning: HOB at 30 degrees  Taken 1/8/2025 0000 by James Moreno RN  Activity Management: bedrest  Head of Bed (HOB) Positioning: HOB at 30 degrees  Taken 1/7/2025 2200 by James Moreno RN  Activity Management: bedrest  Pressure Reduction Techniques:   pressure points protected   weight shift assistance provided  Head of Bed (HOB) Positioning: HOB at 30 degrees  Pressure Reduction Devices:   heel offloading device utilized   positioning supports utilized   specialty bed utilized  Skin Protection:   incontinence pads utilized   silicone foam dressing in place     Problem: Comorbidity Management  Goal: Blood Pressure in Desired Range  Outcome: Progressing  Intervention: Maintain Blood Pressure Management  Recent Flowsheet Documentation  Taken 1/8/2025 0200 by James Moreno RN  Medication Review/Management: medications reviewed  Taken 1/8/2025 0000 by James Moreno RN  Medication Review/Management: medications reviewed  Taken 1/7/2025 2200 by James Moreno RN  Medication Review/Management: medications reviewed   Goal Outcome Evaluation:  Plan of Care Reviewed With: patient        Progress: improving  Outcome Evaluation: MRI done. Bolus given to reach SBP goal of >160mmHg. Phenylephrine started (on/off).

## 2025-01-08 NOTE — MBS/VFSS/FEES
Acute Care - Speech Language Pathology   Swallow Initial Evaluation [facility]  Cognitive-Communication Evaluation  + Clinical Swallow Evaluation  +Fiberoptic Endoscopic Evaluation of Swallowing (FEES)       Patient Name: Karol Lopez  : 1950  MRN: [id number]  Today's Date: 2025               Admit Date: 2025    Visit Dx:     ICD-10-CM ICD-9-CM   1. Left carotid artery occlusion  I65.22 433.10   2. Difficulty with speech  R47.9 784.59   3. History of stroke  Z86.73 V12.54   4. Altered mental status, unspecified altered mental status type  R41.82 780.97   5. Oropharyngeal dysphagia  R13.12 787.22     Patient Active Problem List   Diagnosis    Coronary artery disease involving native coronary artery of native heart with angina pectoris    Anxiety    COPD (chronic obstructive pulmonary disease)    Acid reflux    Vitamin B12 deficiency    Current smoker    Type 2 diabetes mellitus, with long-term current use of insulin    Impaired mobility and ADLs    Melena    Anemia    Gastrointestinal hemorrhage    Hyperlipidemia LDL goal <70    Claudication    Persistent atrial fibrillation    Hyperglycemia due to diabetes mellitus    Cirrhosis of liver    Carotid artery disease    Encephalopathy    Acute ischemic cerebrovascular accident (CVA) involving internal carotid artery territory     Past Medical History:   Diagnosis Date    Acid reflux     Anxiety     Cataracts, bilateral     Cirrhosis of liver     Constipation     COPD (chronic obstructive pulmonary disease)     Coronary artery disease     CTS (carpal tunnel syndrome)     Diabetes     Hearing loss     Hypercholesteremia     Hypertension     Impaired functional mobility, balance, gait, and endurance     Lower back pain     Osteoarthritis     Stroke     -weak in right arm    Tattoos     x2    Tobacco abuse     Tumor     in between breast closer to right breast    Vitamin B12 deficiency     Wears dentures     upper only    Wears glasses      Past  Surgical History:   Procedure Laterality Date    BREAST BIOPSY Right 5/10/2019    Procedure: BREAST BIOPSY RIGHT;  Surgeon: Kiana Frey MD;  Location: [facility] OR;  Service: General    CARPAL TUNNEL RELEASE Bilateral     CHOLECYSTECTOMY      COLONOSCOPY N/A 9/30/2021    Procedure: COLONOSCOPY WITH POLYPECTOMY AND ABLATION OF AVM;  Surgeon: Russell Davila MD;  Location: [facility] ENDOSCOPY;  Service: Gastroenterology;  Laterality: N/A;    CORONARY ANGIOPLASTY WITH STENT PLACEMENT  2012    ENDOSCOPY N/A 9/29/2021    Procedure: ESOPHAGOGASTRODUODENOSCOPY with biopsies;  Surgeon: Russell Davila MD;  Location: [facility] ENDOSCOPY;  Service: Gastroenterology;  Laterality: N/A;    ENTEROSCOPY SMALL BOWEL N/A 11/16/2021    Procedure: ESOPHAGOGASTRODUODENOSCOPY WITH SMALL BOWEL ENTEROSCOPY and biopsy;  Surgeon: Russell Davila MD;  Location: [facility] ENDOSCOPY;  Service: Gastroenterology;  Laterality: N/A;    HYSTERECTOMY      complete    JOINT REPLACEMENT Bilateral     knee replacements    TOTAL KNEE ARTHROPLASTY Bilateral 10/18/2016    MAUREEN Palomares MD       SLP Recommendation and Plan  SLP Swallowing Diagnosis: mild-moderate, oral dysphagia, pharyngeal dysphagia (01/08/25 1325)  SLP Diet Recommendation: soft to chew textures, chopped, no mixed consistencies, nectar thick liquids (01/08/25 1325)  Recommended Precautions and Strategies: upright posture during/after eating, small bites of food and sips of liquid, check mouth frequently for oral residue/pocketing, general aspiration precautions, assist with feeding (01/08/25 1325)  SLP Rec. for Method of Medication Administration: meds crushed, with puree, as tolerated, meds via alternate route (01/08/25 1325)     Monitor for Signs of Aspiration: yes, notify SLP if any concerns (01/08/25 1325)  Recommended Diagnostics: reassess via FEES (01/08/25 0850)  Swallow Criteria for Skilled Therapeutic Interventions Met: demonstrates skilled criteria (01/08/25  1325)  Anticipated Discharge Disposition (SLP): inpatient rehabilitation facility (01/08/25 1325)  Rehab Potential/Prognosis, Swallowing: adequate, monitor progress closely (01/08/25 1325)  Therapy Frequency (Swallow): 5 days per week (01/08/25 1325)  Predicted Duration Therapy Intervention (Days): 2 weeks (01/08/25 1325)  Oral Care Recommendations: Oral Care BID/PRN, Toothbrush (01/08/25 1325)                                        Progress: no change      SWALLOW EVALUATION (Last 72 Hours)       SLP Adult Swallow Evaluation       Row Name 01/08/25 1325 01/08/25 0850       Rehab Evaluation    Document Type evaluation  -CJ evaluation  -CJ    Subjective Information no complaints  -CJ no complaints  -CJ    Patient Observations alert;cooperative  -CJ alert;cooperative  -CJ    Patient/Family/Caregiver Comments/Observations no family present  -CJ no family present  -CJ    Patient Effort good  -CJ good  -CJ    Symptoms Noted During/After Treatment none  -CJ none  -CJ       General Information    Patient Profile Reviewed yes  -CJ yes  -CJ    Pertinent History Of Current Problem see am eval; referred for FEES  -CJ Pt adm w/ encephalopathy on stroke pathway; sig h/o CAD, anxiety, COPD, reflux, DM2, melena, afib, liver cirrhosis, afib. H/o prior stroke, unsure of residual deficits. Pt is a poor historian and sister reports has had difficulty w/ swallowing  -CJ    Current Method of Nutrition NPO  -CJ NPO  -CJ    Precautions/Limitations, Vision WFL;for purposes of eval  -CJ WFL;for purposes of eval  -CJ    Precautions/Limitations, Hearing WFL;for purposes of eval  -CJ WFL;for purposes of eval  -CJ    Prior Level of Function-Communication WFL  -CJ WFL  -CJ    Prior Level of Function-Swallowing other (see comments)  -CJ other (see comments)  prior difficulty per family'  -CJ    Plans/Goals Discussed with patient  -CJ patient  -CJ    Barriers to Rehab previous functional deficit  -CJ previous functional deficit  -CJ    Patient's  Goals for Discharge patient did not state  -CJ patient did not state  -       Pain    Additional Documentation Pain Scale: FACES Pre/Post-Treatment (Group)  -CJ Pain Scale: FACES Pre/Post-Treatment (Group)  -CJ       Pain Scale: FACES Pre/Post-Treatment    Pain: FACES Scale, Pretreatment 0-->no hurt  -CJ 0-->no hurt  -CJ    Posttreatment Pain Rating 0-->no hurt  -CJ 0-->no hurt  -CJ       Oral Motor Structure and Function    Dentition Assessment -- missing teeth  -    Secretion Management -- WNL/WFL  -CJ    Mucosal Quality -- moist, healthy  -       Oral Musculature and Cranial Nerve Assessment    Oral Motor General Assessment -- generalized oral motor weakness  -       General Eating/Swallowing Observations    Respiratory Support Currently in Use -- room air  -    Eating/Swallowing Skills -- fed by SLP;unable to perform self-feeding  -    Positioning During Eating -- upright in bed  -    Utensils Used -- spoon;cup;straw  -    Consistencies Trialed -- pureed;ice chips;thin liquids;nectar/syrup-thick liquids  -       Clinical Swallow Eval    Oral Prep Phase -- impaired  -CJ    Oral Transit -- impaired  -CJ    Oral Residue -- WFL  -    Pharyngeal Phase -- suspected pharyngeal impairment  -    Esophageal Phase -- unremarkable  -       Oral Prep Concerns    Oral Prep Concerns -- increased prep time  -       Oral Transit Concerns    Oral Transit Concerns -- delayed initiation of bolus transit  -       Pharyngeal Phase Concerns    Pharyngeal Phase Concerns -- cough;wet vocal quality  -    Wet Vocal Quality -- thin  -    Cough -- thin;nectar  -    Pharyngeal Phase Concerns, Comment -- Given concerns of previous dysphagia w/ new neuro symptoms as well as overt s/s of aspiration, will complete FEES this date. Continue NPO w/ essential meds crushed in puree until FEES  -       Fiberoptic Endoscopic Evaluation of Swallowing (FEES)    Risks/Benefits Reviewed risks/benefits  explained;patient;agreed to eval  -CJ --    Nasal Entry right:  -CJ --    Scope serial number/identification [id number]  -CJ --       Anatomy and Physiology    Anatomic Considerations anatomic deviation observed (see comments)  prominent posterior pharyngeal wall concerning for cspine component  -CJ --    Velopharyngeal WFL  -CJ --    Base of Tongue symmetrical  -CJ --    Epiglottis WFL  -CJ --    Laryngeal Function Breathing symmetrical  -CJ --    Laryngeal Function Phonation symmetrical  -CJ --    Laryngeal Function to Breath Hold CNA  -CJ --    Secretion Rating Scale (Hang et alMatt 1996) 1- secretions present around the laryngeal vestibule  -CJ --    Secretion Description thin;clear;white  -CJ --    Ice Chips elicited swallow;partially cleared secretions  -CJ --    Spontaneous Swallow frequency reduced  -CJ --    Sensory sensed scope  -CJ --    Utensils Used Spoon;Cup;Straw  -CJ --    Consistencies Trialed thin liquids;nectar-thick liquids;pudding/puree;regular textures;spoon;cup;straw  -CJ --       FEES Interpretation    Oral Phase prolonged manipulation;prespill of liquids into pharynx  -CJ --       Initiation of Pharyngeal Swallow    Initiation of Pharyngeal Swallow bolus in pyriform sinuses  -CJ --    Pharyngeal Phase impaired pharyngeal phase of swallowing  -CJ --    Penetration Before the Swallow thin liquids  -CJ --    Aspiration During the Swallow thin liquids;secondary to delayed swallow initiation or mistiming;secondary to reduced laryngeal elevation;secondary to reduced vestibular closure  -CJ --    Depth of Penetration deep  -CJ --    Response to Penetration No  -CJ --    No spontaneous response to penetration and non-effective laryngeal clearance with cue (see comments)  -CJ --    Response to Aspiration No  -CJ --    No spontaneous response to aspiration with could not produce cough response despite cue  -CJ --    Rosenbek's Scale thin:;8-->Level 8  -CJ --    Residue thin liquids;nectar-thick  liquids;diffuse within pharynx;secondary to reduced posterior pharyngeal wall stripping;secondary to reduced hyolaryngeal excursion  -CJ --    Response to Residue partial residue clearance;with cued swallow;with spontaneous subsequent swallow  -CJ --    Attempted Compensatory Maneuvers bolus size;bolus presentation style;additional subsequent swallow;multiple swallows;throat clear after swallow  -CJ --    Response to Attempted Compensatory Maneuvers did not prevent aspiration  -CJ --    Successful Compensatory Maneuver Competency patient able to;demonstrate compensations;with cues  -CJ --    Pharyngeal Phase, Comment Mild-moderate oropharyngeal dysphagia. Silent aspiration w/ thins during the swallow. Pt unable to consistently perform cued cough to attempt to clear aspirated material. Diffuse residue but worse w/ thin liquids. No penetration or aspiration w/ pudding, solids or nectar thick liquids. pt resistant to trying regular solids. Okay for soft chopped w/ nectar thick liquids, no mixed, okay straws. Will f/u for tx  -CJ --       SLP Evaluation Clinical Impression    SLP Swallowing Diagnosis mild-moderate;oral dysphagia;pharyngeal dysphagia  -CJ suspected pharyngeal dysphagia  -CJ    Functional Impact risk of aspiration/pneumonia  -CJ risk of aspiration/pneumonia  -CJ    Rehab Potential/Prognosis, Swallowing adequate, monitor progress closely  -CJ adequate, monitor progress closely  -CJ    Swallow Criteria for Skilled Therapeutic Interventions Met demonstrates skilled criteria  -CJ demonstrates skilled criteria  -CJ       Recommendations    Therapy Frequency (Swallow) 5 days per week  -CJ --    Predicted Duration Therapy Intervention (Days) 2 weeks  -CJ --    SLP Diet Recommendation soft to chew textures;chopped;no mixed consistencies;nectar thick liquids  -CJ NPO  -CJ    Recommended Diagnostics -- reassess via FEES  -CJ    Recommended Precautions and Strategies upright posture during/after eating;small bites  of food and sips of liquid;check mouth frequently for oral residue/pocketing;general aspiration precautions;assist with feeding  -CJ general aspiration precautions  -CJ    Oral Care Recommendations Oral Care BID/PRN;Toothbrush  -CJ Oral Care BID/PRN;Toothbrush  -CJ    SLP Rec. for Method of Medication Administration meds crushed;with puree;as tolerated;meds via alternate route  -CJ meds crushed;with puree;as tolerated;meds via alternate route  -CJ    Monitor for Signs of Aspiration yes;notify SLP if any concerns  -CJ yes;notify SLP if any concerns  -CJ    Anticipated Discharge Disposition (SLP) inpatient rehabilitation facility  -CJ inpatient rehabilitation facility  -              User Key  (r) = Recorded By, (t) = Taken By, (c) = Cosigned By      Initials Name Effective Dates    Dari Hernandes, MS CCC-SLP 10/22/24 -                     EDUCATION  The patient has been educated in the following areas:   Communication Impairment Dysphagia (Swallowing Impairment) Oral Care/Hydration.        SLP GOALS       Row Name 01/08/25 1325             (LTG) Patient will demonstrate functional swallow for    Diet Texture (Demonstrate functional swallow) soft to chew (whole) textures  -CJ      Liquid viscosity (Demonstrate functional swallow) thin liquids  -CJ      [place] (Demonstrate functional swallow) with minimal cues (75-90% accuracy)  -CJ      Time Frame (Demonstrate functional swallow) 1 week  -CJ      Progress/Outcomes (Demonstrate functional swallow) new goal  -CJ         (STG) Patient will tolerate trials of    Consistencies Trialed (Tolerate trials) soft to chew (chopped) textures;nectar/ mildly thick liquids  -CJ      Desired Outcome (Tolerate trials) without signs/symptoms of aspiration;without signs of distress  -CJ      [place] (Tolerate trials) with minimal cues (75-90% accuracy)  -CJ      Time Frame (Tolerate trials) 1 week  -CJ      Progress/Outcomes (Tolerate trials) new goal  -CJ         (STG)  Lingual Strengthening Goal 1 (SLP)    Activity (Lingual Strengthening Goal 1, SLP) increase tongue back strength;increase lingual tone/sensation/control/coordination/movement  -CJ      Increase Lingual Tone/Sensation/Control/Coordination/Movement swallow trials;lingual resistance exercises  -CJ      Increase Tongue Back Strength lingual resistance exercises  -CJ      Castro/Accuracy (Lingual Strengthening Goal 1, SLP) with moderate cues (50-74% accuracy)  -CJ      Time Frame (Lingual Strengthening Goal 1, SLP) 1 week  -CJ      Progress/Outcomes (Lingual Strengthening Goal 1, SLP) new goal  -CJ         ([facility]) Pharyngeal Strengthening Exercise Goal 1 (SLP)    Activity (Pharyngeal Strengthening Goal 1, SLP) increase timing;increase squeeze/positive pressure generation;increase closure at entrance to airway/closure of airway at glottis;increase anterior movement of the hyolaryngeal complex  -CJ      Increase Timing prepping - 3 second prep or suck swallow or 3-step swallow  -CJ      Increase Anterior Movement of the Hyolaryngeal Complex chin tuck against resistance (CTAR)  -CJ      Increase Closure at Entrance to Airway/Closure of Airway at Glottis supraglottic swallow;maria victoria  -CJ      Increase Squeeze/Positive Pressure Generation hard effortful swallow  -CJ      Castro/Accuracy (Pharyngeal Strengthening Goal 1, SLP) with moderate cues (50-74% accuracy)  -CJ      Time Frame (Pharyngeal Strengthening Goal 1, SLP) 1 week  -CJ      Progress/Outcomes (Pharyngeal Strengthening Goal 1, SLP) new goal  -CJ         Patient will demonstrate functional speech skills for return to discharge environment    Castro with minimal cues  -CJ      Time frame 1 week  -CJ      Progress/Outcomes new goal  -CJ         Patient will demonstrate functional language skills for return to discharge environment     Castro with minimal cues  -CJ      Time frame 1 week  -CJ      Progress/Outcomes new goal  -CJ         SLP  Diagnostic Treatment     Patient will participate in further assessment in the following areas reading comprehension;graphic expression;cognitive-linguistic;clarification of baseline cognitive communication status  -CJ      Time Frame (Diagnostic) 1 week  -CJ      Progress/Outcomes (Additional Goal 1, SLP) new goal  -CJ         Comprehend Questions Goal 1 (SLP)    Improve Ability to Comprehend Questions Goal 1 (SLP) simple wh questions;80%;with minimal cues (75-90%)  -CJ      Time Frame (Comprehend Questions Goal 1, SLP) 1 week  -CJ      Progress/Outcomes (Comprehend Questions Goal 1, SLP) new goal  -CJ         Follow Directions Goal 2 (SLP)    Improve Ability to Follow Directions Goal 1 (SLP) 2 step commands;80%;with minimal cues (75-90%)  -CJ      Time Frame (Follow Directions Goal 1, SLP) 1 week  -CJ      Progress/Outcomes (Follow Directions Goal 1, SLP) new goal  -CJ         Word Retrieval Skills Goal 1 (SLP)    Improve Word Retrieval Skills By Goal 1 (SLP) responsive naming task;high frequency;repeating phrases;completing open ended unstructured sentence;completing functional word finding tasks;90%;with minimal cues (75-90%)  -CJ      Time Frame (Word Retrieval Goal 1, SLP) 1 week  -CJ      Progress/Outcomes (Word Retrieval Goal 1, SLP) new goal  -CJ         Phonation Goal 1 (SLP)    Improve Phonation By Goal 1 (SLP) using loud speech;90%;with minimal cues (75-90%)  -CJ      Time Frame (Phonation Goal 1, SLP) 1 week  -CJ      Progress/Outcomes (Phonation Goal 1, SLP) new goal  -CJ                User Key  (r) = Recorded By, (t) = Taken By, (c) = Cosigned By      Initials Name Provider Type    Dari Hernandes MS CCC-SLP Speech and Language Pathologist                         Time Calculation:    Time Calculation- SLP       Row Name 01/08/25 1535 01/08/25 1106          Time Calculation- SLP    SLP Start Time 1325  -CJ 0850  -CJ     SLP Received On 01/08/25  -CJ 01/08/25  -CJ        Untimed Charges     01981-MD Eval Speech and Production w/ Language Minutes -- 24  -     02306-NF Eval Oral Pharyng Swallow Minutes -- 25  -     78226-WS Fiberoptic Endo Eval Swallow Minutes 69  -CJ --        Total Minutes    Untimed Charges Total Minutes 69  -CJ 49  -CJ      Total Minutes 69  -CJ 49  -CJ               User Key  (r) = Recorded By, (t) = Taken By, (c) = Cosigned By      Initials Name Provider Type     Dari Becerra, MS CCC-SLP Speech and Language Pathologist                    Therapy Charges for Today       Code Description Service Date Service Provider Modifiers Qty    32250029931 HC ST EVAL ORAL PHARYNG SWALLOW 2 2025 Dari Becerra, MS CCC-SLP GN 1    86984474102 HC ST EVAL SPEECH AND PROD W LANG  2 2025 Drai Becerra, MS CCC-SLP GN 1                 Dari Becerra MS CCC-SLP  2025   and Acute Care - Speech Language Pathology Initial Evaluation  [facility]     Patient Name: Karol Lopez  : 1950  MRN: [id number]  Today's Date: 2025               Admit Date: 2025     Visit Dx:    ICD-10-CM ICD-9-CM   1. Left carotid artery occlusion  I65.22 433.10   2. Difficulty with speech  R47.9 784.59   3. History of stroke  Z86.73 V12.54   4. Altered mental status, unspecified altered mental status type  R41.82 780.97   5. Oropharyngeal dysphagia  R13.12 787.22     Patient Active Problem List   Diagnosis    Coronary artery disease involving native coronary artery of native heart with angina pectoris    Anxiety    COPD (chronic obstructive pulmonary disease)    Acid reflux    Vitamin B12 deficiency    Current smoker    Type 2 diabetes mellitus, with long-term current use of insulin    Impaired mobility and ADLs    Melena    Anemia    Gastrointestinal hemorrhage    Hyperlipidemia LDL goal <70    Claudication    Persistent atrial fibrillation    Hyperglycemia due to diabetes mellitus    Cirrhosis of liver    Carotid artery disease    Encephalopathy    Acute ischemic cerebrovascular  accident (CVA) involving internal carotid artery territory     Past Medical History:   Diagnosis Date    Acid reflux     Anxiety     Cataracts, bilateral     Cirrhosis of liver     Constipation     COPD (chronic obstructive pulmonary disease)     Coronary artery disease     CTS (carpal tunnel syndrome)     Diabetes     Hearing loss     Hypercholesteremia     Hypertension     Impaired functional mobility, balance, gait, and endurance     Lower back pain     Osteoarthritis     Stroke     2013-weak in right arm    Tattoos     x2    Tobacco abuse     Tumor     in between breast closer to right breast    Vitamin B12 deficiency     Wears dentures     upper only    Wears glasses      Past Surgical History:   Procedure Laterality Date    BREAST BIOPSY Right 5/10/2019    Procedure: BREAST BIOPSY RIGHT;  Surgeon: Kiana Frey MD;  Location: [facility] OR;  Service: General    CARPAL TUNNEL RELEASE Bilateral     CHOLECYSTECTOMY      COLONOSCOPY N/A 9/30/2021    Procedure: COLONOSCOPY WITH POLYPECTOMY AND ABLATION OF AVM;  Surgeon: Russell Davila MD;  Location: [facility] ENDOSCOPY;  Service: Gastroenterology;  Laterality: N/A;    CORONARY ANGIOPLASTY WITH STENT PLACEMENT  2012    ENDOSCOPY N/A 9/29/2021    Procedure: ESOPHAGOGASTRODUODENOSCOPY with biopsies;  Surgeon: Russell Davila MD;  Location: [facility] ENDOSCOPY;  Service: Gastroenterology;  Laterality: N/A;    ENTEROSCOPY SMALL BOWEL N/A 11/16/2021    Procedure: ESOPHAGOGASTRODUODENOSCOPY WITH SMALL BOWEL ENTEROSCOPY and biopsy;  Surgeon: Russell Davila MD;  Location: [facility] ENDOSCOPY;  Service: Gastroenterology;  Laterality: N/A;    HYSTERECTOMY      complete    JOINT REPLACEMENT Bilateral     knee replacements    TOTAL KNEE ARTHROPLASTY Bilateral 10/18/2016    MAUREEN Palomares MD       SLP Recommendation and Plan  SLP Diagnosis: moderate, aphasia, mild-moderate, dysarthria (01/08/25 0850)        Monitor for Signs of Aspiration: yes, notify SLP if any  concerns (01/08/25 1325)  Swallow Criteria for Skilled Therapeutic Interventions Met: demonstrates skilled criteria (01/08/25 1325)  SLC Criteria for Skilled Therapy Interventions Met: yes (01/08/25 0850)  Anticipated Discharge Disposition (SLP): inpatient rehabilitation facility (01/08/25 1325)     Therapy Frequency (Swallow): 5 days per week (01/08/25 1325)  Therapy Frequency (SLP SLC): 5 days per week (01/08/25 0850)  Predicted Duration Therapy Intervention (Days): 2 weeks (01/08/25 1325)  Oral Care Recommendations: Oral Care BID/PRN, Toothbrush (01/08/25 1325)                          Progress: no change (01/08/25 1533)      SLP EVALUATION (Last 72 Hours)       SLP SLC Evaluation       Row Name 01/08/25 0850                   General Information    Prior Level of Function-Communication unknown  -CJ        Patient's Goals for Discharge patient did not state  -CJ           Comprehension Assessment/Intervention    Comprehension Assessment/Intervention Auditory Comprehension  -CJ           Auditory Comprehension Assessment/Intervention    Auditory Comprehension (Communication) moderate impairment  -CJ        Able to Identify Objects/Pictures (Communication) WFL;body part  -CJ        Answers Questions (Communication) moderate impairment;simple;wh questions  -CJ        Able to Follow Commands (Communication) moderate impairment;2-step  -CJ           Expression Assessment/Intervention    Expression Assessment/Intervention verbal expression  -CJ           Verbal Expression Assessment/Intervention    Verbal Expression moderate impairment  -CJ        Automatic Speech (Communication) WFL;counting 1-20;alphabet  -CJ        Repetition moderate impairment;phrases  -CJ        Phrase Completion moderate impairment;automatic/predictable  -CJ        Responsive Naming moderate impairment;simple  -CJ        Confrontational Naming moderate impairment;low frequency  -CJ        Spontaneous/Functional Words moderate impairment;simple   -CJ        Sentence Formulation moderate impairment;simple  -CJ           Oral Motor Structure and Function    Oral Motor Structure and Function mild impairment  -CJ           Motor Speech Assessment/Intervention    Motor Speech Function mild impairment  -CJ        Characteristics Consistent with Dysarthria decreased articulation;decreased intensity  -CJ        Speech intelligibility 80%;in connected speech;with unfamiliar listener  -CJ           Cognitive Assessment Intervention- SLP    Cognitive Function (Cognition) unable/difficult to assess;other (see comments)  2/2 aphasia  -CJ           SLP Evaluation Clinical Impressions    SLP Diagnosis moderate;aphasia;mild-moderate;dysarthria  -CJ        Rehab Potential/Prognosis good  -CJ        SLC Criteria for Skilled Therapy Interventions Met yes  -CJ        Functional Impact functional impact in ADLs;difficulty communicating wants, needs;difficulty communicating in an emergency  -CJ           Recommendations    Therapy Frequency (SLP SLC) 5 days per week  -CJ        Predicted Duration Therapy Intervention (Days) 2 weeks  -CJ                  User Key  (r) = Recorded By, (t) = Taken By, (c) = Cosigned By      Initials Name Effective Dates     Dari Becerra, MS CCC-SLP 10/22/24 -                        EDUCATION  The patient has been educated in the following areas:     Communication Impairment Dysphagia (Swallowing Impairment).           SLP GOALS       Row Name 01/08/25 1325             (LTG) Patient will demonstrate functional swallow for    Diet Texture (Demonstrate functional swallow) soft to chew (whole) textures  -CJ      Liquid viscosity (Demonstrate functional swallow) thin liquids  -CJ      [place] (Demonstrate functional swallow) with minimal cues (75-90% accuracy)  -CJ      Time Frame (Demonstrate functional swallow) 1 week  -CJ      Progress/Outcomes (Demonstrate functional swallow) new goal  -CJ         (STG) Patient will tolerate trials of     Consistencies Trialed (Tolerate trials) soft to chew (chopped) textures;nectar/ mildly thick liquids  -CJ      Desired Outcome (Tolerate trials) without signs/symptoms of aspiration;without signs of distress  -CJ      [place] (Tolerate trials) with minimal cues (75-90% accuracy)  -CJ      Time Frame (Tolerate trials) 1 week  -CJ      Progress/Outcomes (Tolerate trials) new goal  -CJ         (STG) Lingual Strengthening Goal 1 (SLP)    Activity (Lingual Strengthening Goal 1, SLP) increase tongue back strength;increase lingual tone/sensation/control/coordination/movement  -CJ      Increase Lingual Tone/Sensation/Control/Coordination/Movement swallow trials;lingual resistance exercises  -CJ      Increase Tongue Back Strength lingual resistance exercises  -CJ      [place]/Accuracy (Lingual Strengthening Goal 1, SLP) with moderate cues (50-74% accuracy)  -CJ      Time Frame (Lingual Strengthening Goal 1, SLP) 1 week  -CJ      Progress/Outcomes (Lingual Strengthening Goal 1, SLP) new goal  -CJ         (STG) Pharyngeal Strengthening Exercise Goal 1 (SLP)    Activity (Pharyngeal Strengthening Goal 1, SLP) increase timing;increase squeeze/positive pressure generation;increase closure at entrance to airway/closure of airway at glottis;increase anterior movement of the hyolaryngeal complex  -CJ      Increase Timing prepping - 3 second prep or suck swallow or 3-step swallow  -CJ      Increase Anterior Movement of the Hyolaryngeal Complex chin tuck against resistance (CTAR)  -CJ      Increase Closure at Entrance to Airway/Closure of Airway at Glottis supraglottic swallow;maria victoria  -CJ      Increase Squeeze/Positive Pressure Generation hard effortful swallow  -CJ      [place]/Accuracy (Pharyngeal Strengthening Goal 1, SLP) with moderate cues (50-74% accuracy)  -CJ      Time Frame (Pharyngeal Strengthening Goal 1, SLP) 1 week  -CJ      Progress/Outcomes (Pharyngeal Strengthening Goal 1, SLP) new goal  -CJ          Patient will demonstrate functional speech skills for return to discharge environment    [place] with minimal cues  -CJ      Time frame 1 week  -CJ      Progress/Outcomes new goal  -CJ         Patient will demonstrate functional language skills for return to discharge environment     [place] with minimal cues  -CJ      Time frame 1 week  -CJ      Progress/Outcomes new goal  -CJ         SLP Diagnostic Treatment     Patient will participate in further assessment in the following areas reading comprehension;graphic expression;cognitive-linguistic;clarification of baseline cognitive communication status  -CJ      Time Frame (Diagnostic) 1 week  -CJ      Progress/Outcomes (Additional Goal 1, SLP) new goal  -CJ         Comprehend Questions Goal 1 (SLP)    Improve Ability to Comprehend Questions Goal 1 (SLP) simple wh questions;80%;with minimal cues (75-90%)  -CJ      Time Frame (Comprehend Questions Goal 1, SLP) 1 week  -CJ      Progress/Outcomes (Comprehend Questions Goal 1, SLP) new goal  -CJ         Follow Directions Goal 2 (SLP)    Improve Ability to Follow Directions Goal 1 (SLP) 2 step commands;80%;with minimal cues (75-90%)  -CJ      Time Frame (Follow Directions Goal 1, SLP) 1 week  -CJ      Progress/Outcomes (Follow Directions Goal 1, SLP) new goal  -CJ         Word Retrieval Skills Goal 1 (SLP)    Improve Word Retrieval Skills By Goal 1 (SLP) responsive naming task;high frequency;repeating phrases;completing open ended unstructured sentence;completing functional word finding tasks;90%;with minimal cues (75-90%)  -CJ      Time Frame (Word Retrieval Goal 1, SLP) 1 week  -CJ      Progress/Outcomes (Word Retrieval Goal 1, SLP) new goal  -CJ         Phonation Goal 1 (SLP)    Improve Phonation By Goal 1 (SLP) using loud speech;90%;with minimal cues (75-90%)  -CJ      Time Frame (Phonation Goal 1, SLP) 1 week  -CJ      Progress/Outcomes (Phonation Goal 1, SLP) new goal  -                User Key  (r) =  Recorded By, (t) = Taken By, (c) = Cosigned By      Initials Name Provider Type    Dari Hernandes MS CCC-SLP Speech and Language Pathologist                              Time Calculation:      Time Calculation- SLP       Row Name 01/08/25 1535 01/08/25 1106          Time Calculation- SLP    SLP Start Time 1325  - 0850  -     SLP Received On 01/08/25  - 01/08/25  -        Untimed Charges    41992-VG Eval Speech and Production w/ Language Minutes -- 24  -     43611-VG Eval Oral Pharyng Swallow Minutes -- 25  -     49523-WU [facility] Endo Eval Swallow Minutes 69  -CJ --        Total Minutes    Untimed Charges Total Minutes 69  - 49  -CJ      Total Minutes 69  -CJ 49  -CJ               User Key  (r) = Recorded By, (t) = Taken By, (c) = Cosigned By      Initials Name Provider Type    Dari Hernandes MS CCC-SLP Speech and Language Pathologist                    Therapy Charges for Today       Code Description Service Date Service Provider Modifiers Qty    65332086923 HC ST EVAL ORAL PHARYNG SWALLOW 2 1/8/2025 Dari Becerra MS CCC-SLP GN 1    29231060008 HC ST EVAL SPEECH AND PROD W LANG  2 1/8/2025 Dari Becerra MS CCC-SLP GN 1                       Dari Becerra MS CCC-SLP  1/8/2025

## 2025-01-08 NOTE — PLAN OF CARE
Goal Outcome Evaluation:  Plan of Care Reviewed With: patient        Progress: no change  Outcome Evaluation: PT eval complete. Pt presents with R sided weakness, balance deficits, and decreased activity tolerance warranting skilled IPPT services. Pt required mod-maxA x2 for mobility with cues for sequencing on R side. PT rec IRF at d/c.    Anticipated Discharge Disposition (PT): inpatient rehabilitation facility

## 2025-01-08 NOTE — PLAN OF CARE
Goal Outcome Evaluation:  Plan of Care Reviewed With: patient        Progress: no change       Anticipated Discharge Disposition (SLP): inpatient rehabilitation facility    SLP Diagnosis: moderate, aphasia, mild-moderate, dysarthria (01/08/25 0480)     SLP Swallowing Diagnosis: mild-moderate, oral dysphagia, pharyngeal dysphagia (01/08/25 8101)

## 2025-01-08 NOTE — PROGRESS NOTES
"Critical Care Note     LOS: 1 day   Patient Care Team:  Romaine Eugene MD as PCP - General  Kiana Frey MD as Consulting Physician (General Surgery)  Russell Davila MD as Consulting Physician (Gastroenterology)  Luis A Celestin IV, MD as Consulting Physician (Interventional Cardiology)    Chief Complaint/Reason for visit:    Chief Complaint   Patient presents with    Stroke    Altered Mental Status       Subjective     Interval History:     Patient hospitalized last night with left-sided weakness.  Imaging revealed left hemispheric infarcts with occlusion of her left internal carotid artery and significant stenosis of her right internal carotid artery.  She was not a candidate for TNK because onset was unclear.  She continues to have right hemiparesis.  She does remain afebrile.  She is currently in sinus bradycardia.  On room air her oxygen saturation is 97%.  She is voiding without a catheter.    Review of Systems:    All systems were reviewed and negative except as noted in subjective.    Medical history, surgical history, social history, family history reviewed    Objective     Intake/Output:    Intake/Output Summary (Last 24 hours) at 1/8/2025 1418  Last data filed at 1/8/2025 0600  Gross per 24 hour   Intake 1970 ml   Output 800 ml   Net 1170 ml       Nutrition:  Diet: Cardiac, Diabetic; Healthy Heart (2-3 Na+); Consistent Carbohydrate; No Mixed Consistencies, Feeding Assistance - Nursing; Texture: Soft to Chew (NDD 3); Soft to Chew: Chopped Meat; Fluid Consistency: Nectar Thick    Infusions:  phenylephrine, 0.5-3 mcg/kg/min, Last Rate: 0.5 mcg/kg/min (01/08/25 1141)        Mechanical Ventilator Settings:                                                Telemetry: Sinus rhythm             Vital Signs  Blood pressure 130/44, pulse 56, temperature 98.1 °F (36.7 °C), temperature source Oral, resp. rate 17, height 157.5 cm (62\"), weight 64.8 kg (142 lb 13.7 oz), SpO2 97%, not currently " breastfeeding.    Physical Exam:  General Appearance:  Older woman supine in bed in no respiratory distress   Head:  No visible trauma   Eyes:          Conjunctiva pink, no jaundice   Ears:     Throat: Oral mucosa moist   Neck: Trachea midline, no palpable thyroid   Back:      Lungs:   Symmetric chest expansion.  No wheeze or rhonchi    Heart:  Regular rhythm, S1, S2 auscultated   Abdomen:   Bowel sounds present, soft   Rectal:   Deferred   Extremities: No pretibial edema   Pulses: Palpable pedal pulses   Skin: Warm and dry   Lymph nodes:    Neurologic: Alert, oriented to name.  Neglecting her right side.    Interval:  (shift change)  1a. Level of Consciousness: 1-->Not alert, but arousable by minor stimulation to obey, answer, or respond  1b. LOC Questions: 1-->Answers one question correctly  1c. LOC Commands: 0-->Performs both tasks correctly  2. Best Gaze: 0-->Normal  3. Visual: 0-->No visual loss  4. Facial Palsy: 1-->Minor paralysis (flattened nasolabial fold, asymmetry on smiling)  5a. Motor Arm, Left: 0-->No drift, limb holds 90 (or 45) degrees for full 10 secs  5b. Motor Arm, Right: 2-->Some effort against gravity, limb cannot get to or maintain (if cued) 90 (or 45) degrees, drifts down to bed, but has some effort against gravity  6a. Motor Leg, Left: 0-->No drift, leg holds 30 degree position for full 5 secs  6b. Motor Leg, Right: 2-->Some effort against gravity, leg falls to bed by 5 secs, but has some effort against gravity  7. Limb Ataxia: 0-->Absent  8. Sensory: 1-->Mild-to-moderate sensory loss, patient feels pinprick is less sharp or is dull on the affected side, or there is a loss of superficial pain with pinprick, but patient is aware of being touched  9. Best Language: 1-->Mild-to-moderate aphasia, some obvious loss of fluency or facility of comprehension, without significant limitation on ideas expressed or form of expression. Reduction of speech and/or comprehension, however, makes conversation.  . . (see row details)  10. Dysarthria: 2-->Severe dysarthria, patients speech is so slurred as to be unintelligible in the absence of or out of proportion to any dysphasia, or is mute/anarthric  11. Extinction and Inattention (formerly Neglect): 0-->No abnormality    Total (NIH Stroke Scale): 11       Results Review:     I reviewed the patient's new clinical results.   Results from last 7 days   Lab Units 01/08/25  0532 01/07/25  1607   SODIUM mmol/L 145 139   POTASSIUM mmol/L 3.9 4.0   CHLORIDE mmol/L 110* 101   CO2 mmol/L 19.0* 23.0   BUN mg/dL 23 25*   CREATININE mg/dL 1.15* 1.45*   CALCIUM mg/dL 9.3 9.8   BILIRUBIN mg/dL  --  0.5   ALK PHOS U/L  --  124*   ALT (SGPT) U/L  --  25   AST (SGOT) U/L  --  16   GLUCOSE mg/dL 215* 295*     Results from last 7 days   Lab Units 01/08/25  0532 01/07/25  1607   WBC 10*3/mm3 6.16 6.72   HEMOGLOBIN g/dL 11.4* 12.3   HEMATOCRIT % 34.1 37.1   PLATELETS 10*3/mm3 99* 116*         Lab Results   Component Value Date    BLOODCX No growth at 5 days 02/09/2024    BLOODCX No growth at 5 days 02/09/2024     Lab Results   Component Value Date    URINECX 50,000 CFU/mL Mixed Flory Isolated 02/28/2021       I reviewed the patient's new imaging including images and reports.      MRI BRAIN WO CONTRAST    Date of Exam: 1/8/2025 3:45 AM EST    Indication: stroke rule out.     Comparison: MRI of the brain 2/9/2024; CT head 1/7/2025    Technique:  Routine multiplanar/multisequence sequence images of the brain were obtained without contrast administration.      Findings:  There is diffuse generalized atrophy. There are areas of increased T2 and FLAIR signal throughout the bilateral periventricular white matter consistent with chronic microvascular ischemia.    There is a large area of pathologic restricted diffusion in the distribution of the left middle cerebral artery involving areas of the left frontal and temporal lobes and in the left insular region. Another smaller focus of pathologic  restricted  diffusion is seen in the high posterior left parietal region. There is no convincing acute fracture. There is absence of the flow-void involving the left internal carotid artery to the level of the carotid terminus where flow is likely retrograde through   the anterior aspect of the [place].   Impression:     Impression:  1.Large area of acute/subacute infarction in the distribution of the left middle cerebral artery.  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  3.Atrophy and chronic microvascular ischemia.            Electronically Signed: John Paul Cortez MD   1/8/2025 5:00 AM EST   Workstation ID: [id number]       All medications reviewed.   aspirin, 325 mg, Oral, Daily   Or  aspirin, 300 mg, Rectal, Daily  atorvastatin, 80 mg, Oral, Nightly  donepezil, 5 mg, Oral, Nightly  insulin glargine, 10 Units, Subcutaneous, Nightly  insulin regular, 2-7 Units, Subcutaneous, Q6H  ipratropium-albuterol, 3 mL, Nebulization, 4x Daily - RT  mupirocin, 1 Application, Each Nare, BID  senna-docusate sodium, 2 tablet, Oral, BID  sodium chloride, 10 mL, Intravenous, Q12H          Assessment & Plan       Acute ischemic cerebrovascular accident (CVA) involving internal carotid artery territory    COPD (chronic obstructive pulmonary disease)    Type 2 diabetes mellitus, with long-term current use of insulin    Cirrhosis of liver    Encephalopathy    74-year-old woman, smoker with COPD, hypertension, dyslipidemia, chronic kidney disease, coronary artery disease, Roe cirrhosis, previous atrial fibrillation and prior stroke with residual right upper extremity weakness, bilateral carotid disease who presented with 1 to 2-day onset of worsening confusion, speech changes, gait difficulties.  Imaging revealed occlusion of her left internal carotid artery and stenosis of her right internal carotid artery with a large area of acute and subacute stroke in the left middle cerebral artery distribution.  She has significant  weakness on the right.  She was previously taken off anticoagulation because of GI bleeding.    She has a history of underlying COPD.  She currently has no active wheezing.  She is on room air with a saturation of 96%.    Systolic blood pressures ranging from 130- 200.  Neuro stroke team would like permissive hypertension and therefore she is on phenylephrine at 0.5 mics per kilogram per minute.    She has a history of Roe cirrhosis on abdominal imaging from 2023.  Spleen was enlarged at that time.  EGD in 2021 revealed no varices or ulcerative disease.  Colonoscopy in 2021 revealed several polyps, 2 small angio ectasia lesions that were treated with no evidence of active bleeding.    She has a history of diabetes.  Hemoglobin A1c is 8.8.  Glucoses are running 215-268.    PLAN:    Yesterday her sister was updated and CODE STATUS was shifted to no CPR, no intubation  Aspirin, statin  Nebulized bronchodilators  Sliding scale insulin  Lantus 10 units daily  Permissive hypertension with phenylephrine, goal 160-220    VTE Prophylaxis:SCDS    Stress Ulcer Prophylaxis: none, start Protonix    Dora Mora MD  01/08/25  14:18 EST      Time: Critical care 30 min  I personally provided care to this critically ill patient as documented above.  Critical care time does not include time spent on separately billed procedures.  None of my critical care time was concurrent with other critical care providers.

## 2025-01-09 ENCOUNTER — APPOINTMENT (OUTPATIENT)
Dept: CARDIOLOGY | Facility: HOSPITAL | Age: 75
End: 2025-01-09
Payer: MEDICARE

## 2025-01-09 PROBLEM — Z79.02 ANTIPLATELET OR ANTITHROMBOTIC LONG-TERM USE: Status: ACTIVE | Noted: 2025-01-07

## 2025-01-09 LAB
ALBUMIN SERPL-MCNC: 3.8 G/DL (ref 3.5–5.2)
ALBUMIN/GLOB SERPL: 1.4 G/DL
ALP SERPL-CCNC: 111 U/L (ref 39–117)
ALPHA-FETOPROTEIN: <2 NG/ML (ref 0–8.3)
ALT SERPL W P-5'-P-CCNC: 18 U/L (ref 1–33)
ANION GAP SERPL CALCULATED.3IONS-SCNC: 10 MMOL/L (ref 5–15)
AST SERPL-CCNC: 15 U/L (ref 1–32)
BASOPHILS # BLD AUTO: 0.05 10*3/MM3 (ref 0–0.2)
BASOPHILS NFR BLD AUTO: 0.6 % (ref 0–1.5)
BH CV XLRA MEAS LEFT DIST CCA EDV: 0 CM/SEC
BH CV XLRA MEAS LEFT DIST CCA PSV: 71.3 CM/SEC
BH CV XLRA MEAS LEFT DIST ICA PSV: 35.7 CM/SEC
BH CV XLRA MEAS LEFT ICA/CCA RATIO: 0.95
BH CV XLRA MEAS LEFT MID CCA EDV: 0 CM/SEC
BH CV XLRA MEAS LEFT MID CCA PSV: 86.2 CM/SEC
BH CV XLRA MEAS LEFT MID ICA PSV: 74 CM/SEC
BH CV XLRA MEAS LEFT PROX CCA EDV: 2.35 CM/SEC
BH CV XLRA MEAS LEFT PROX CCA PSV: 100 CM/SEC
BH CV XLRA MEAS LEFT PROX ECA PSV: 211 CM/SEC
BH CV XLRA MEAS LEFT PROX ICA EDV: 0 CM/SEC
BH CV XLRA MEAS LEFT PROX ICA PSV: 81.5 CM/SEC
BH CV XLRA MEAS LEFT PROX SCLA PSV: 212 CM/SEC
BH CV XLRA MEAS LEFT VERTEBRAL A EDV: 11.2 CM/SEC
BH CV XLRA MEAS LEFT VERTEBRAL A PSV: 63.8 CM/SEC
BH CV XLRA MEAS RIGHT DIST CCA EDV: 16.8 CM/SEC
BH CV XLRA MEAS RIGHT DIST CCA PSV: 78.3 CM/SEC
BH CV XLRA MEAS RIGHT DIST ICA EDV: 32 CM/SEC
BH CV XLRA MEAS RIGHT DIST ICA PSV: 145 CM/SEC
BH CV XLRA MEAS RIGHT ICA/CCA RATIO: 2.86
BH CV XLRA MEAS RIGHT MID CCA EDV: 13 CM/SEC
BH CV XLRA MEAS RIGHT MID CCA PSV: 86.4 CM/SEC
BH CV XLRA MEAS RIGHT MID ICA EDV: 55.5 CM/SEC
BH CV XLRA MEAS RIGHT MID ICA PSV: 246 CM/SEC
BH CV XLRA MEAS RIGHT PROX CCA EDV: 17.4 CM/SEC
BH CV XLRA MEAS RIGHT PROX CCA PSV: 134 CM/SEC
BH CV XLRA MEAS RIGHT PROX ECA PSV: 262 CM/SEC
BH CV XLRA MEAS RIGHT PROX ICA EDV: 52 CM/SEC
BH CV XLRA MEAS RIGHT PROX ICA PSV: 224 CM/SEC
BH CV XLRA MEAS RIGHT PROX SCLA PSV: 146 CM/SEC
BH CV XLRA MEAS RIGHT VERTEBRAL A EDV: 14.7 CM/SEC
BH CV XLRA MEAS RIGHT VERTEBRAL A PSV: 82 CM/SEC
BILIRUB SERPL-MCNC: 0.5 MG/DL (ref 0–1.2)
BUN SERPL-MCNC: 25 MG/DL (ref 8–23)
BUN/CREAT SERPL: 20.7 (ref 7–25)
CALCIUM SPEC-SCNC: 10.1 MG/DL (ref 8.6–10.5)
CHLORIDE SERPL-SCNC: 107 MMOL/L (ref 98–107)
CO2 SERPL-SCNC: 25 MMOL/L (ref 22–29)
CREAT SERPL-MCNC: 1.21 MG/DL (ref 0.57–1)
DEPRECATED RDW RBC AUTO: 42.7 FL (ref 37–54)
EGFRCR SERPLBLD CKD-EPI 2021: 47.7 ML/MIN/1.73
EOSINOPHIL # BLD AUTO: 0.13 10*3/MM3 (ref 0–0.4)
EOSINOPHIL NFR BLD AUTO: 1.6 % (ref 0.3–6.2)
ERYTHROCYTE [DISTWIDTH] IN BLOOD BY AUTOMATED COUNT: 13.4 % (ref 12.3–15.4)
GLOBULIN UR ELPH-MCNC: 2.8 GM/DL
GLUCOSE BLDC GLUCOMTR-MCNC: 217 MG/DL (ref 70–130)
GLUCOSE BLDC GLUCOMTR-MCNC: 365 MG/DL (ref 70–130)
GLUCOSE BLDC GLUCOMTR-MCNC: 370 MG/DL (ref 70–130)
GLUCOSE BLDC GLUCOMTR-MCNC: 434 MG/DL (ref 70–130)
GLUCOSE BLDC GLUCOMTR-MCNC: 444 MG/DL (ref 70–130)
GLUCOSE SERPL-MCNC: 171 MG/DL (ref 65–99)
HCT VFR BLD AUTO: 35.2 % (ref 34–46.6)
HGB BLD-MCNC: 12 G/DL (ref 12–15.9)
IMM GRANULOCYTES # BLD AUTO: 0.01 10*3/MM3 (ref 0–0.05)
IMM GRANULOCYTES NFR BLD AUTO: 0.1 % (ref 0–0.5)
LEFT ARM BP: NORMAL MMHG
LYMPHOCYTES # BLD AUTO: 1.99 10*3/MM3 (ref 0.7–3.1)
LYMPHOCYTES NFR BLD AUTO: 24 % (ref 19.6–45.3)
MCH RBC QN AUTO: 29.8 PG (ref 26.6–33)
MCHC RBC AUTO-ENTMCNC: 34.1 G/DL (ref 31.5–35.7)
MCV RBC AUTO: 87.3 FL (ref 79–97)
MONOCYTES # BLD AUTO: 0.75 10*3/MM3 (ref 0.1–0.9)
MONOCYTES NFR BLD AUTO: 9 % (ref 5–12)
NEUTROPHILS NFR BLD AUTO: 5.37 10*3/MM3 (ref 1.7–7)
NEUTROPHILS NFR BLD AUTO: 64.7 % (ref 42.7–76)
NRBC BLD AUTO-RTO: 0 /100 WBC (ref 0–0.2)
PLATELET # BLD AUTO: 137 10*3/MM3 (ref 140–450)
PMV BLD AUTO: 10.7 FL (ref 6–12)
POTASSIUM SERPL-SCNC: 3.5 MMOL/L (ref 3.5–5.2)
POTASSIUM SERPL-SCNC: 4.1 MMOL/L (ref 3.5–5.2)
PROT SERPL-MCNC: 6.6 G/DL (ref 6–8.5)
RBC # BLD AUTO: 4.03 10*6/MM3 (ref 3.77–5.28)
SODIUM SERPL-SCNC: 142 MMOL/L (ref 136–145)
WBC NRBC COR # BLD AUTO: 8.3 10*3/MM3 (ref 3.4–10.8)

## 2025-01-09 PROCEDURE — 82948 REAGENT STRIP/BLOOD GLUCOSE: CPT

## 2025-01-09 PROCEDURE — 99222 1ST HOSP IP/OBS MODERATE 55: CPT | Performed by: INTERNAL MEDICINE

## 2025-01-09 PROCEDURE — 94799 UNLISTED PULMONARY SVC/PX: CPT

## 2025-01-09 PROCEDURE — 80053 COMPREHEN METABOLIC PANEL: CPT | Performed by: INTERNAL MEDICINE

## 2025-01-09 PROCEDURE — 63710000001 INSULIN REGULAR HUMAN PER 5 UNITS: Performed by: STUDENT IN AN ORGANIZED HEALTH CARE EDUCATION/TRAINING PROGRAM

## 2025-01-09 PROCEDURE — 25010000002 POTASSIUM CHLORIDE 10 MEQ/100ML SOLUTION

## 2025-01-09 PROCEDURE — 63710000001 INSULIN GLARGINE PER 5 UNITS: Performed by: INTERNAL MEDICINE

## 2025-01-09 PROCEDURE — 84132 ASSAY OF SERUM POTASSIUM: CPT

## 2025-01-09 PROCEDURE — 82105 ALPHA-FETOPROTEIN SERUM: CPT | Performed by: INTERNAL MEDICINE

## 2025-01-09 PROCEDURE — 63710000001 INSULIN LISPRO (HUMAN) PER 5 UNITS: Performed by: INTERNAL MEDICINE

## 2025-01-09 PROCEDURE — 93306 TTE W/DOPPLER COMPLETE: CPT | Performed by: INTERNAL MEDICINE

## 2025-01-09 PROCEDURE — 85025 COMPLETE CBC W/AUTO DIFF WBC: CPT | Performed by: INTERNAL MEDICINE

## 2025-01-09 PROCEDURE — 99233 SBSQ HOSP IP/OBS HIGH 50: CPT | Performed by: STUDENT IN AN ORGANIZED HEALTH CARE EDUCATION/TRAINING PROGRAM

## 2025-01-09 PROCEDURE — 93306 TTE W/DOPPLER COMPLETE: CPT

## 2025-01-09 RX ORDER — INSULIN LISPRO 100 [IU]/ML
2-7 INJECTION, SOLUTION INTRAVENOUS; SUBCUTANEOUS
Status: DISCONTINUED | OUTPATIENT
Start: 2025-01-09 | End: 2025-01-11

## 2025-01-09 RX ORDER — PANTOPRAZOLE SODIUM 40 MG/1
40 TABLET, DELAYED RELEASE ORAL
Status: DISCONTINUED | OUTPATIENT
Start: 2025-01-10 | End: 2025-01-14 | Stop reason: HOSPADM

## 2025-01-09 RX ORDER — POTASSIUM CHLORIDE 7.45 MG/ML
10 INJECTION INTRAVENOUS
Status: COMPLETED | OUTPATIENT
Start: 2025-01-09 | End: 2025-01-09

## 2025-01-09 RX ADMIN — IPRATROPIUM BROMIDE AND ALBUTEROL SULFATE 3 ML: 2.5; .5 SOLUTION RESPIRATORY (INHALATION) at 20:01

## 2025-01-09 RX ADMIN — INSULIN GLARGINE 20 UNITS: 100 INJECTION, SOLUTION SUBCUTANEOUS at 20:55

## 2025-01-09 RX ADMIN — INSULIN LISPRO 6 UNITS: 100 INJECTION, SOLUTION INTRAVENOUS; SUBCUTANEOUS at 11:52

## 2025-01-09 RX ADMIN — Medication 10 ML: at 08:05

## 2025-01-09 RX ADMIN — INSULIN LISPRO 7 UNITS: 100 INJECTION, SOLUTION INTRAVENOUS; SUBCUTANEOUS at 20:55

## 2025-01-09 RX ADMIN — SENNOSIDES AND DOCUSATE SODIUM 2 TABLET: 50; 8.6 TABLET ORAL at 20:54

## 2025-01-09 RX ADMIN — Medication 10 ML: at 20:56

## 2025-01-09 RX ADMIN — INSULIN HUMAN 3 UNITS: 100 INJECTION, SOLUTION PARENTERAL at 08:05

## 2025-01-09 RX ADMIN — ASPIRIN 81 MG CHEWABLE TABLET 81 MG: 81 TABLET CHEWABLE at 08:04

## 2025-01-09 RX ADMIN — CLOPIDOGREL BISULFATE 75 MG: 75 TABLET ORAL at 08:05

## 2025-01-09 RX ADMIN — POTASSIUM CHLORIDE 10 MEQ: 7.45 INJECTION INTRAVENOUS at 06:15

## 2025-01-09 RX ADMIN — POTASSIUM CHLORIDE 10 MEQ: 7.45 INJECTION INTRAVENOUS at 05:17

## 2025-01-09 RX ADMIN — PANTOPRAZOLE SODIUM 40 MG: 40 INJECTION, POWDER, FOR SOLUTION INTRAVENOUS at 05:18

## 2025-01-09 RX ADMIN — POTASSIUM CHLORIDE 10 MEQ: 7.45 INJECTION INTRAVENOUS at 08:53

## 2025-01-09 RX ADMIN — SENNOSIDES AND DOCUSATE SODIUM 2 TABLET: 50; 8.6 TABLET ORAL at 08:05

## 2025-01-09 RX ADMIN — IPRATROPIUM BROMIDE AND ALBUTEROL SULFATE 3 ML: 2.5; .5 SOLUTION RESPIRATORY (INHALATION) at 08:30

## 2025-01-09 RX ADMIN — IPRATROPIUM BROMIDE AND ALBUTEROL SULFATE 3 ML: 2.5; .5 SOLUTION RESPIRATORY (INHALATION) at 12:04

## 2025-01-09 RX ADMIN — INSULIN LISPRO 6 UNITS: 100 INJECTION, SOLUTION INTRAVENOUS; SUBCUTANEOUS at 17:58

## 2025-01-09 RX ADMIN — POTASSIUM CHLORIDE 10 MEQ: 7.45 INJECTION INTRAVENOUS at 07:24

## 2025-01-09 RX ADMIN — MUPIROCIN 1 APPLICATION: 20 OINTMENT TOPICAL at 20:55

## 2025-01-09 RX ADMIN — MUPIROCIN 1 APPLICATION: 20 OINTMENT TOPICAL at 08:05

## 2025-01-09 RX ADMIN — DONEPEZIL HYDROCHLORIDE 5 MG: 5 TABLET, FILM COATED ORAL at 20:54

## 2025-01-09 RX ADMIN — ATORVASTATIN CALCIUM 80 MG: 40 TABLET, FILM COATED ORAL at 20:54

## 2025-01-09 NOTE — CONSULTS
Diabetes Education    Patient Name:  Karol Lopez  YOB: 1950  MRN: [id number]  Admit Date:  1/7/2025      Chart reviewed per consult, patient GCS charted 13 and confused. May not be appropriate for education today. Please call [phone] for immediate needs.      Electronically signed by:  Prema Morrow RN  01/09/25 09:23 EST

## 2025-01-09 NOTE — CASE MANAGEMENT/SOCIAL WORK
Continued Stay Note  [facility]     Patient Name: Karol Lopez  MRN: [id number]  Today's Date: 1/9/2025    Admit Date: 1/7/2025    Plan: SNF   Discharge Plan       Row Name 01/09/25 1415       Plan    Plan SNF    Plan Comments Spoke with patient's daughter, Maggy, by phone to discuss disposition.  Daughters Maggy and Maris request referral be sent to [facility]; faxed referral to [facility].  CM will continue to follow.                     Discharge Codes    No documentation.                       Anyaa Acosta RN

## 2025-01-09 NOTE — DISCHARGE PLACEMENT REQUEST
"  [phone]    Karol Ferreira Tamiko \"Osiris\" (74 y.o. Female)       Date of Birth   1950    Social Security Number       Address   [address] [place] [address] [place] [address]    Home Phone   [phone]    MRN   [id number]       Jehovah's witness   Anabaptism    Marital Status                               Admission Date   1/7/25    Admission Type   Emergency    Admitting Provider   Javier Caal MD    Attending Provider   Javier Caal MD    Department, Room/Bed   [facility] 2B ICU, N235/1       Discharge Date       Discharge Disposition       Discharge Destination                                 Attending Provider: Javier Caal MD    Allergies: Codeine    Isolation: None   Infection: None   Code Status: No CPR    Ht: 157.5 cm (62\")   Wt: 59.1 kg (130 lb 4.7 oz)    Admission Cmt: None   Principal Problem: Acute ischemic cerebrovascular accident (CVA) involving internal carotid artery territory [I63.239]                   Active Insurance as of 1/7/2025       Primary Coverage       Payor Plan Insurance Group Employer/Plan Group    MEDICARE MEDICARE A & B        Payor Plan Address Payor Plan Phone Number Payor Plan Fax Number Effective Dates    [address] [phone]  3/1/2004 - None Entered    [place] [address]         Subscriber Name Subscriber Birth Date Member ID       KAROL FERREIRA 1950 [id number]                     Emergency Contacts        (Rel.) Home Phone Work Phone Mobile Phone    BlairShaliniMaggy (Daughter) [phone] -- [phone]    KENNY WASSERMAN (Med POA) (Daughter) -- -- [phone]    JoseNoahAnca (Daughter) [phone] -- --    Emeka Hicks (Ex ) (Other) -- -- [phone]                 Physician Progress Notes (last 48 hours)        Dora Mora MD at 01/08/25 1410          Critical Care Note     LOS: 1 day   Patient Care Team:  Romaine Eugene MD as PCP - General  Kiana Frey MD as " "Consulting Physician (General Surgery)  Russell Davila MD as Consulting Physician (Gastroenterology)  Luis A Celestin IV, MD as Consulting Physician (Interventional Cardiology)    Chief Complaint/Reason for visit:    Chief Complaint   Patient presents with    Stroke    Altered Mental Status       Subjective     Interval History:     Patient hospitalized last night with left-sided weakness.  Imaging revealed left hemispheric infarcts with occlusion of her left internal carotid artery and significant stenosis of her right internal carotid artery.  She was not a candidate for TNK because onset was unclear.  She continues to have right hemiparesis.  She does remain afebrile.  She is currently in sinus bradycardia.  On room air her oxygen saturation is 97%.  She is voiding without a catheter.    Review of Systems:    All systems were reviewed and negative except as noted in subjective.    Medical history, surgical history, social history, family history reviewed    Objective     Intake/Output:    Intake/Output Summary (Last 24 hours) at 1/8/2025 1418  Last data filed at 1/8/2025 0600  Gross per 24 hour   Intake 1970 ml   Output 800 ml   Net 1170 ml       Nutrition:  Diet: Cardiac, Diabetic; Healthy Heart (2-3 Na+); Consistent Carbohydrate; No Mixed Consistencies, Feeding Assistance - Nursing; Texture: Soft to Chew (NDD 3); Soft to Chew: Chopped Meat; Fluid Consistency: Nectar Thick    Infusions:  phenylephrine, 0.5-3 mcg/kg/min, Last Rate: 0.5 mcg/kg/min (01/08/25 1141)        Mechanical Ventilator Settings:                                                Telemetry: Sinus rhythm             Vital Signs  Blood pressure 130/44, pulse 56, temperature 98.1 °F (36.7 °C), temperature source Oral, resp. rate 17, height 157.5 cm (62\"), weight 64.8 kg (142 lb 13.7 oz), SpO2 97%, not currently breastfeeding.    Physical Exam:  General Appearance:  Older woman supine in bed in no respiratory distress   Head:  No " visible trauma   Eyes:          Conjunctiva pink, no jaundice   Ears:     Throat: Oral mucosa moist   Neck: Trachea midline, no palpable thyroid   Back:      Lungs:   Symmetric chest expansion.  No wheeze or rhonchi    Heart:  Regular rhythm, S1, S2 auscultated   Abdomen:   Bowel sounds present, soft   Rectal:   Deferred   Extremities: No pretibial edema   Pulses: Palpable pedal pulses   Skin: Warm and dry   Lymph nodes:    Neurologic: Alert, oriented to name.  Neglecting her right side.    Interval:  (shift change)  1a. Level of Consciousness: 1-->Not alert, but arousable by minor stimulation to obey, answer, or respond  1b. LOC Questions: 1-->Answers one question correctly  1c. LOC Commands: 0-->Performs both tasks correctly  2. Best Gaze: 0-->Normal  3. Visual: 0-->No visual loss  4. Facial Palsy: 1-->Minor paralysis (flattened nasolabial fold, asymmetry on smiling)  5a. Motor Arm, Left: 0-->No drift, limb holds 90 (or 45) degrees for full 10 secs  5b. Motor Arm, Right: 2-->Some effort against gravity, limb cannot get to or maintain (if cued) 90 (or 45) degrees, drifts down to bed, but has some effort against gravity  6a. Motor Leg, Left: 0-->No drift, leg holds 30 degree position for full 5 secs  6b. Motor Leg, Right: 2-->Some effort against gravity, leg falls to bed by 5 secs, but has some effort against gravity  7. Limb Ataxia: 0-->Absent  8. Sensory: 1-->Mild-to-moderate sensory loss, patient feels pinprick is less sharp or is dull on the affected side, or there is a loss of superficial pain with pinprick, but patient is aware of being touched  9. Best Language: 1-->Mild-to-moderate aphasia, some obvious loss of fluency or facility of comprehension, without significant limitation on ideas expressed or form of expression. Reduction of speech and/or comprehension, however, makes conversation. . . (see row details)  10. Dysarthria: 2-->Severe dysarthria, patients speech is so slurred as to be unintelligible in  the absence of or out of proportion to any dysphasia, or is mute/anarthric  11. Extinction and Inattention (formerly Neglect): 0-->No abnormality    Total (NIH Stroke Scale): 11       Results Review:     I reviewed the patient's new clinical results.   Results from last 7 days   Lab Units 01/08/25  0532 01/07/25  1607   SODIUM mmol/L 145 139   POTASSIUM mmol/L 3.9 4.0   CHLORIDE mmol/L 110* 101   CO2 mmol/L 19.0* 23.0   BUN mg/dL 23 25*   CREATININE mg/dL 1.15* 1.45*   CALCIUM mg/dL 9.3 9.8   BILIRUBIN mg/dL  --  0.5   ALK PHOS U/L  --  124*   ALT (SGPT) U/L  --  25   AST (SGOT) U/L  --  16   GLUCOSE mg/dL 215* 295*     Results from last 7 days   Lab Units 01/08/25  0532 01/07/25  1607   WBC 10*3/mm3 6.16 6.72   HEMOGLOBIN g/dL 11.4* 12.3   HEMATOCRIT % 34.1 37.1   PLATELETS 10*3/mm3 99* 116*         Lab Results   Component Value Date    BLOODCX No growth at 5 days 02/09/2024    BLOODCX No growth at 5 days 02/09/2024     Lab Results   Component Value Date    URINECX 50,000 CFU/mL Mixed Flory Isolated 02/28/2021       I reviewed the patient's new imaging including images and reports.      MRI BRAIN WO CONTRAST    Date of Exam: 1/8/2025 3:45 AM EST    Indication: stroke rule out.     Comparison: MRI of the brain 2/9/2024; CT head 1/7/2025    Technique:  Routine multiplanar/multisequence sequence images of the brain were obtained without contrast administration.      Findings:  There is diffuse generalized atrophy. There are areas of increased T2 and FLAIR signal throughout the bilateral periventricular white matter consistent with chronic microvascular ischemia.    There is a large area of pathologic restricted diffusion in the distribution of the left middle cerebral artery involving areas of the left frontal and temporal lobes and in the left insular region. Another smaller focus of pathologic restricted  diffusion is seen in the high posterior left parietal region. There is no convincing acute fracture. There is  absence of the flow-void involving the left internal carotid artery to the level of the carotid terminus where flow is likely retrograde through   the anterior aspect of the [place].   Impression:     Impression:  1.Large area of acute/subacute infarction in the distribution of the left middle cerebral artery.  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  3.Atrophy and chronic microvascular ischemia.            Electronically Signed: John Paul Cortez MD   1/8/2025 5:00 AM EST   Workstation ID: [id number]       All medications reviewed.   aspirin, 325 mg, Oral, Daily   Or  aspirin, 300 mg, Rectal, Daily  atorvastatin, 80 mg, Oral, Nightly  donepezil, 5 mg, Oral, Nightly  insulin glargine, 10 Units, Subcutaneous, Nightly  insulin regular, 2-7 Units, Subcutaneous, Q6H  ipratropium-albuterol, 3 mL, Nebulization, 4x Daily - RT  mupirocin, 1 Application, Each Nare, BID  senna-docusate sodium, 2 tablet, Oral, BID  sodium chloride, 10 mL, Intravenous, Q12H          Assessment & Plan       Acute ischemic cerebrovascular accident (CVA) involving internal carotid artery territory    COPD (chronic obstructive pulmonary disease)    Type 2 diabetes mellitus, with long-term current use of insulin    Cirrhosis of liver    Encephalopathy    74-year-old woman, smoker with COPD, hypertension, dyslipidemia, chronic kidney disease, coronary artery disease, Roe cirrhosis, previous atrial fibrillation and prior stroke with residual right upper extremity weakness, bilateral carotid disease who presented with 1 to 2-day onset of worsening confusion, speech changes, gait difficulties.  Imaging revealed occlusion of her left internal carotid artery and stenosis of her right internal carotid artery with a large area of acute and subacute stroke in the left middle cerebral artery distribution.  She has significant weakness on the right.  She was previously taken off anticoagulation because of GI bleeding.    She has a history of  underlying COPD.  She currently has no active wheezing.  She is on room air with a saturation of 96%.    Systolic blood pressures ranging from 130- 200.  Neuro stroke team would like permissive hypertension and therefore she is on phenylephrine at 0.5 mics per kilogram per minute.    She has a history of Roe cirrhosis on abdominal imaging from 2023.  Spleen was enlarged at that time.  EGD in 2021 revealed no varices or ulcerative disease.  Colonoscopy in 2021 revealed several polyps, 2 small angio ectasia lesions that were treated with no evidence of active bleeding.    She has a history of diabetes.  Hemoglobin A1c is 8.8.  Glucoses are running 215-268.    PLAN:    Yesterday her sister was updated and CODE STATUS was shifted to no CPR, no intubation  Aspirin, statin  Nebulized bronchodilators  Sliding scale insulin  Lantus 10 units daily  Permissive hypertension with phenylephrine, goal 160-220    VTE Prophylaxis:SCDS    Stress Ulcer Prophylaxis: none, start Protonix    Dora Mora MD  01/08/25  14:18 EST      Time: Critical care 30 min  I personally provided care to this critically ill patient as documented above.  Critical care time does not include time spent on separately billed procedures.  None of my critical care time was concurrent with other critical care providers.     Electronically signed by Dora Mora MD at 01/08/25 1430       Elvis Rivera MD at 01/08/25 0749          Stroke Progress Note       Chief Complaint:  AMS    Subjective    Subjective     Subjective:  The patient is lying down in the bed in NAD. Family were at the bedside. The patient is laying in the bed and is having limited interaction with this provider. She is able to follow some of my commands. She stated that she is taking her medication but unsure which medicine she is taking. I tried to call the daughter (Maggy) at the phone number provided [phone] but no answer. I will try to call later. Unclear  what is the patient's recent baseline functional level.    No other acute complains at this time    Review of Systems   Constitutional: No fatigue        Objective      Temp:  [97.9 °F (36.6 °C)-98.1 °F (36.7 °C)] 97.9 °F (36.6 °C)  Heart Rate:  [50-73] 61  Resp:  [14-20] 17  BP: (139-200)/() 176/64    Objective    GEN: lying in bed; in NAD  HENT: normocephalic, non-erythematous oropharynx  CV: no LE edema    NEURO:  Mental Status: alert, she is having limited interaction with this provider. She is able to follow some of my simple commands  Speech: hypophonia and slurred speech together with component of expressive aphasia  CN 2-12:  II - PERRLA  III, IV, VI - EOMI  VII - right facial droop  VIII - Auditory acuity grossly intact  XII - Tongue protrudes midline  Motor: The patient can move left upper extremity against gravity.  The patient can also move left lower extremity against gravity.  The patient have no movement in the right upper extremity but with gravity movement in the right lower extremity  Reflexes: negative Phillips's sign BL  Coordination: Deferred  Gait/Station: deferred     Results Review:    I reviewed the patient's new clinical results.    WBC   Date Value Ref Range Status   01/08/2025 6.16 3.40 - 10.80 10*3/mm3 Final     RBC   Date Value Ref Range Status   01/08/2025 3.83 3.77 - 5.28 10*6/mm3 Final     Hemoglobin   Date Value Ref Range Status   01/08/2025 11.4 (L) 12.0 - 15.9 g/dL Final     Hematocrit   Date Value Ref Range Status   01/08/2025 34.1 34.0 - 46.6 % Final     MCV   Date Value Ref Range Status   01/08/2025 89.0 79.0 - 97.0 fL Final     MCH   Date Value Ref Range Status   01/08/2025 29.8 26.6 - 33.0 pg Final     MCHC   Date Value Ref Range Status   01/08/2025 33.4 31.5 - 35.7 g/dL Final     RDW   Date Value Ref Range Status   01/08/2025 13.6 12.3 - 15.4 % Final     RDW-SD   Date Value Ref Range Status   01/08/2025 44.0 37.0 - 54.0 fl Final     MPV   Date Value Ref Range Status    01/08/2025 11.3 6.0 - 12.0 fL Final     Platelets   Date Value Ref Range Status   01/08/2025 99 (L) 140 - 450 10*3/mm3 Final     Neutrophil %   Date Value Ref Range Status   01/07/2025 74.0 42.7 - 76.0 % Final     Lymphocyte %   Date Value Ref Range Status   01/07/2025 17.4 (L) 19.6 - 45.3 % Final     Monocyte %   Date Value Ref Range Status   01/07/2025 6.8 5.0 - 12.0 % Final     Eosinophil %   Date Value Ref Range Status   01/07/2025 1.0 0.3 - 6.2 % Final     Basophil %   Date Value Ref Range Status   01/07/2025 0.4 0.0 - 1.5 % Final     Immature Grans %   Date Value Ref Range Status   01/07/2025 0.4 0.0 - 0.5 % Final     Neutrophils, Absolute   Date Value Ref Range Status   01/07/2025 4.96 1.70 - 7.00 10*3/mm3 Final     Lymphocytes, Absolute   Date Value Ref Range Status   01/07/2025 1.17 0.70 - 3.10 10*3/mm3 Final     Monocytes, Absolute   Date Value Ref Range Status   01/07/2025 0.46 0.10 - 0.90 10*3/mm3 Final     Eosinophils, Absolute   Date Value Ref Range Status   01/07/2025 0.07 0.00 - 0.40 10*3/mm3 Final     Basophils, Absolute   Date Value Ref Range Status   01/07/2025 0.03 0.00 - 0.20 10*3/mm3 Final     Immature Grans, Absolute   Date Value Ref Range Status   01/07/2025 0.03 0.00 - 0.05 10*3/mm3 Final     nRBC   Date Value Ref Range Status   01/07/2025 0.0 0.0 - 0.2 /100 WBC Final       Lab Results   Component Value Date    GLUCOSE 215 (H) 01/08/2025    BUN 23 01/08/2025    CREATININE 1.15 (H) 01/08/2025     01/08/2025    K 3.9 01/08/2025     (H) 01/08/2025    CALCIUM 9.3 01/08/2025    PROTEINTOT 6.9 01/07/2025    ALBUMIN 4.0 01/07/2025    ALT 25 01/07/2025    AST 16 01/07/2025    ALKPHOS 124 (H) 01/07/2025    BILITOT 0.5 01/07/2025    GLOB 2.9 01/07/2025    AGRATIO 1.4 01/07/2025    BCR 20.0 01/08/2025    ANIONGAP 16.0 (H) 01/08/2025    EGFR 50.7 (L) 01/08/2025     MRI Brain Without Contrast    Result Date: 1/8/2025  Impression: 1.Large area of acute/subacute infarction in the distribution  of the left middle cerebral artery. 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 3.Atrophy and chronic microvascular ischemia. Electronically Signed: John Paul Cortez MD  1/8/2025 5:00 AM EST  Workstation ID: [id number]    XR Abdomen KUB    Result Date: 1/8/2025  Impression: No radiopaque foreign body or spinal stimulator to prevent MRI. Electronically Signed: John Paul Cortez MD  1/8/2025 3:24 AM EST  Workstation ID: [id number]    XR Chest 1 View    Result Date: 1/7/2025  Impression: No acute cardiopulmonary abnormality. Electronically Signed: Ezequiellibia ShethConcepcion  1/7/2025 5:31 PM EST  Workstation ID: [id number]    CT Angiogram Head w AI Analysis of LVO    Result Date: 1/7/2025  1.Complete occlusion of the extracranial left internal carotid artery which extends to the ophthalmic segment of the left internal carotid artery. The ophthalmic artery remains patent. This is new compared with the examination performed on 2/8/2024. This  is likely filled via retrograde flow from the [place] and a the anterior communicating artery. 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 4.Additional vascular and nonvascular findings as described above. 5.Stable 0.5 cm subpleural right apical pulmonary nodule on image #88. Findings were discussed with Dr. Kraus at 4:47 p.m. on 1/7/2025 Electronically Signed: Anthony Dumont MD  1/7/2025 4:49 PM EST  Workstation ID: [id number]    CT Angiogram Neck    Result Date: 1/7/2025  1.Complete occlusion of the extracranial left internal carotid artery which extends to the ophthalmic segment of the left internal carotid artery. The ophthalmic artery remains patent. This is new compared with the examination performed on 2/8/2024. This  is likely filled via retrograde  flow from the [place] and a the anterior communicating artery. 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 4.Additional vascular and nonvascular findings as described above. 5.Stable 0.5 cm subpleural right apical pulmonary nodule on image #88. Findings were discussed with Dr. Kraus at 4:47 p.m. on 1/7/2025 Electronically Signed: Anthony Dumont MD  1/7/2025 4:49 PM EST  Workstation ID: [id number]    CT Head Without Contrast    Result Date: 1/7/2025  Impression: 1.No acute intracranial abnormality identified. 2.Chronic left frontal infarct. Chronic left gangliocapsular and right pontine lacunar infarcts. 3.Diffuse brain atrophy with chronic small vessel ischemia. Electronically Signed: Dave Shine MD  1/7/2025 4:02 PM EST  Workstation ID: [id number]   Results for orders placed during the hospital encounter of 02/08/24    Adult Transthoracic Echo Complete W/ Cont if Necessary Per Protocol (With Agitated Saline)    Interpretation Summary    Left ventricular systolic function is normal. Calculated left ventricular EF = 59.2% Left ventricular ejection fraction appears to be 61 - 65%.    Saline test results are negative for right to left atrial level shunt.    Estimated right ventricular systolic pressure from tricuspid regurgitation is normal (<35 mmHg). Calculated right ventricular systolic pressure from tricuspid regurgitation is 21 mmHg.    -CTH wo on 1-7-25 images were personally reviewed and showed no acute ischemic or hemorrhagic stroke  -CTA of the head and neck images from 1-7-25 were personally reviewed and showed no flow limiting stenosis. There is an occlusion of the cervical L ICA with distal reconstitution of the L MCA through the Acom   -MRI brain images  from 1-8-25 were personally reviewed and showed an acute/subacute ischemic stroke affecting the L MCA territory likely watershed infarct in the setting of her chronic looking L ICA occlusion   -Transthoracic echocardiogram is pending  -A1c from 1/8/2025 was 8.8%  -LDL from 1/8/2025 was 32      Assessment/Plan     74 year old  female with multiple vascular risk factors who presented [facility] emergency department via EMS for complaints of altered mental status, reported left-sided weakness, and aphasia.  Need to be an appropriate IV thrombolytic therapy candidate secondary to extended last known well.  Not deemed to be an appropriate emergent endovascular therapy candidate as any potential benefit would be outweighed by significant risk.     Antiplatelet PTA: Aspirin  Anticoagulant PTA: None        #Altered Mental Status  #Reported Left Sided Weakness   #Aphasia  #Acute/subacute ischemic stroke affecting the L MCA territory  -Mechanism is likely watershed in the setting of chronically looking L ICA occlusion  -CTH wo on 1-7-25 images were personally reviewed and showed no acute ischemic or hemorrhagic stroke  -CTA of the head and neck images from 1-7-25 were personally reviewed and showed no flow limiting stenosis. There is an occlusion of the cervical L ICA with distal reconstitution of the L MCA through the Acom   -MRI brain images from 1-8-25 were personally reviewed and showed an acute/subacute ischemic stroke affecting the L MCA territory likely watershed infarct in the setting of her chronic looking L ICA occlusion   -Transthoracic echocardiogram is pending  -A1c from 1/8/2025 was 8.8%  -LDL from 1/8/2025 was 32      Recs:  -Avoid hypotension with goal -180 to allow for adequate perfusion, overall management per ICU medicine team  -Continue aspirin 81 mg daily for secondary stroke prevention  -Would resume Plavix 75 mg daily for secondary stroke prevention.  We will continue to  trend platelet and check for any GI bleed given prior concern for thrombocytopenia and GI bleed  -PT/OT/SLP to see and assess when appropriate  -TTE, defer bubble, previously completed  -Bilateral carotid duplex ultrasound to evaluate for the severity of carotid stenosis/occlusion  -NIHSS neurochecks per protocol  -CT head for any neurologic decline      Stroke will continue to follow. Please call for any further questions or concerns  =================================  Elvis Rivera MD, Msc, PhD  Vascular Neurologist  [facility]    I have a detailed discussion with Maggy (the patient's daughter) regarding her mother's condition. After discussing the options we have including BMM to ensure no worsening of her atherosclerosis especially no worsening of her sever stenosis of the R ICA. The daughter is agreeable to starting plavix 75 mg daily on top of ASA with the plan to monitor the patient for any GI bleeding and/or thrombocytopenia. We would also like to get hematology consult to help evaluating the patient's chronic anemia and thrombocytopenia.      =================================  Elvis Rivera MD, Msc, PhD  Vascular Neurologist  [facility]         Electronically signed by Elvis Rivera MD at 01/08/25 1501          Physical Therapy Notes (last 48 hours)        Venessa Mosley PT at 01/08/25 1315  Version 1 of 1         Goal Outcome Evaluation:  Plan of Care Reviewed With: patient        Progress: no change  Outcome Evaluation: PT eval complete. Pt presents with R sided weakness, balance deficits, and decreased activity tolerance warranting skilled IPPT services. Pt required mod-maxA x2 for mobility with cues for sequencing on R side. PT rec IRF at d/c.    Anticipated Discharge Disposition (PT): inpatient rehabilitation facility                          Electronically signed by Venessa Mosley PT at 01/08/25 2756       Venessa Mosley PT at 01/08/25 4113   Version 1 of          Patient Name: Karol Lopez  : 1950    MRN: [id number]                              Today's Date: 2025       Admit Date: 2025    Visit Dx:     ICD-10-CM ICD-9-CM   1. Left carotid artery occlusion  I65.22 433.10   2. Difficulty with speech  R47.9 784.59   3. History of stroke  Z86.73 V12.54   4. Altered mental status, unspecified altered mental status type  R41.82 780.97     Patient Active Problem List   Diagnosis    Coronary artery disease involving native coronary artery of native heart with angina pectoris    Anxiety    COPD (chronic obstructive pulmonary disease)    Acid reflux    Vitamin B12 deficiency    Current smoker    Type 2 diabetes mellitus, with long-term current use of insulin    Impaired mobility and ADLs    Melena    Anemia    Gastrointestinal hemorrhage    Hyperlipidemia LDL goal <70    Claudication    Persistent atrial fibrillation    Hyperglycemia due to diabetes mellitus    Cirrhosis of liver    Carotid artery disease    Encephalopathy    Acute ischemic cerebrovascular accident (CVA) involving internal carotid artery territory     Past Medical History:   Diagnosis Date    Acid reflux     Anxiety     Cataracts, bilateral     Cirrhosis of liver     Constipation     COPD (chronic obstructive pulmonary disease)     Coronary artery disease     CTS (carpal tunnel syndrome)     Diabetes     Hearing loss     Hypercholesteremia     Hypertension     Impaired functional mobility, balance, gait, and endurance     Lower back pain     Osteoarthritis     Stroke     2013-weak in right arm    Tattoos     x2    Tobacco abuse     Tumor     in between breast closer to right breast    Vitamin B12 deficiency     Wears dentures     upper only    Wears glasses      Past Surgical History:   Procedure Laterality Date    BREAST BIOPSY Right 5/10/2019    Procedure: BREAST BIOPSY RIGHT;  Surgeon: Kiana Frey MD;  Location: [facility];  Service: General    CARPAL TUNNEL RELEASE  Bilateral     CHOLECYSTECTOMY      COLONOSCOPY N/A 9/30/2021    Procedure: COLONOSCOPY WITH POLYPECTOMY AND ABLATION OF AVM;  Surgeon: Russell Davila MD;  Location: [facility] ENDOSCOPY;  Service: Gastroenterology;  Laterality: N/A;    CORONARY ANGIOPLASTY WITH STENT PLACEMENT  2012    ENDOSCOPY N/A 9/29/2021    Procedure: ESOPHAGOGASTRODUODENOSCOPY with biopsies;  Surgeon: Russell Davila MD;  Location: [facility] ENDOSCOPY;  Service: Gastroenterology;  Laterality: N/A;    ENTEROSCOPY SMALL BOWEL N/A 11/16/2021    Procedure: ESOPHAGOGASTRODUODENOSCOPY WITH SMALL BOWEL ENTEROSCOPY and biopsy;  Surgeon: Russell Davila MD;  Location: [facility] ENDOSCOPY;  Service: Gastroenterology;  Laterality: N/A;    HYSTERECTOMY      complete    JOINT REPLACEMENT Bilateral     knee replacements    TOTAL KNEE ARTHROPLASTY Bilateral 10/18/2016    MAUREEN Palomares MD      General Information       Row Name 01/08/25 1523          Physical Therapy Time and Intention    Document Type evaluation  -ES     Mode of Treatment physical therapy;co-treatment  -ES       Row Name 01/08/25 1523          General Information    Patient Profile Reviewed yes  -ES     Prior Level of Function independent:;all household mobility;bed mobility  Pt is a questionable historian, later in the session stating she wasn't sure of her PLOF.  -ES     Existing Precautions/Restrictions fall;other (see comments)  R sided weakness, aphasia  -ES     Barriers to Rehab medically complex;previous functional deficit;cognitive status  -ES       Row Name 01/08/25 1523          Living Environment    People in Home spouse  -ES       Row Name 01/08/25 1523          Home Main Entrance    Number of Stairs, Main Entrance other (see comments)  Pt unsure, TBD further.  -ES       Row Name 01/08/25 1523          Cognition    Orientation Status (Cognition) oriented to;person;verbal cues/prompts needed for orientation;time;disoriented to;place  -ES       Row Name 01/08/25 1523           Safety Issues/Impairments Affecting Functional Mobility    Safety Issues Affecting Function (Mobility) ability to follow commands;awareness of need for assistance;insight into deficits/self-awareness;safety precaution awareness;safety precautions follow-through/compliance;sequencing abilities  -ES     Impairments Affecting Function (Mobility) balance;cognition;coordination;endurance/activity tolerance;grasp;motor control;motor planning;postural/trunk control;strength  -ES     Cognitive Impairments, Mobility Safety/Performance attention;awareness, need for assistance;insight into deficits/self-awareness;safety precaution awareness;safety precaution follow-through;sequencing abilities  -ES               User Key  (r) = Recorded By, (t) = Taken By, (c) = Cosigned By      Initials Name Provider Type    Venessa Hammonds PT Physical Therapist                   Mobility       Row Name 01/08/25 1524          Bed Mobility    Comment, (Bed Mobility) EOB  -ES       Row Name 01/08/25 1524          Bed-Chair Transfer    Bed-Chair [place] (Transfers) maximum assist (25% patient effort);2 person assist;verbal cues;nonverbal cues (demo/gesture)  -ES     Assistive Device (Bed-Chair Transfers) other (see comments)  BUE support  -ES     Comment, (Bed-Chair Transfer) SPT from EOB>chair w/ maxA x2. v/c and t/c for advancement of RLE. Further mobility deferred 2/2 weakness and fatigue.  -ES       Row Name 01/08/25 1524          Sit-Stand Transfer    Sit-Stand [place] (Transfers) moderate assist (50% patient effort);2 person assist;verbal cues;nonverbal cues (demo/gesture)  -ES     Assistive Device (Sit-Stand Transfers) other (see comments)  BUE support  -ES               User Key  (r) = Recorded By, (t) = Taken By, (c) = Cosigned By      Initials Name Provider Type    Venessa Hammonds PT Physical Therapist                   Obj/Interventions       Row Name 01/08/25 1525          Range of Motion Comprehensive     General Range of Motion bilateral lower extremity ROM WFL  -ES     Comment, General Range of Motion LLE AROM WFL, RLE PROM WFL  -ES       Row Name 01/08/25 1525          Strength Comprehensive (MMT)    General Manual Muscle Testing (MMT) Assessment lower extremity strength deficits identified  -ES     Comment, General Manual Muscle Testing (MMT) Assessment LLE 4/5, RLE functionally observed to be 3+/5. Unable to formally assess due to cognitive status  -ES       Row Name 01/08/25 1525          Balance    Balance Assessment sitting static balance;sitting dynamic balance;standing static balance;standing dynamic balance  -ES     Static Sitting Balance contact guard  -ES     Dynamic Sitting Balance minimal assist  -ES     Position, Sitting Balance supported;sitting in chair;sitting edge of bed  -ES     Static Standing Balance moderate assist;2-person assist  -ES     Dynamic Standing Balance maximum assist;2-person assist;verbal cues  -ES     Position/Device Used, Standing Balance supported  -ES     Balance Interventions sitting;standing;sit to stand;supported;static;dynamic;occupation based/functional task  -ES       Row Name 01/08/25 1525          Sensory Assessment (Somatosensory)    Sensory Assessment (Somatosensory) LE sensation intact  -ES               User Key  (r) = Recorded By, (t) = Taken By, (c) = Cosigned By      Initials Name Provider Type    ES Venessa Mosley, PT Physical Therapist                   Goals/Plan       Row Name 01/08/25 1527          Bed Mobility Goal 1 (PT)    Activity/Assistive Device (Bed Mobility Goal 1, PT) sit to supine/supine to sit  -ES     [place] Level/Cues Needed (Bed Mobility Goal 1, PT) contact guard required  -ES     Time Frame (Bed Mobility Goal 1, PT) short term goal (STG);5 days  -ES       Row Name 01/08/25 1527          Transfer Goal 1 (PT)    Activity/Assistive Device (Transfer Goal 1, PT) sit-to-stand/stand-to-sit;bed-to-chair/chair-to-bed  -ES     [place]  Level/Cues Needed (Transfer Goal 1, PT) contact guard required  -ES     Time Frame (Transfer Goal 1, PT) long term goal (LTG);10 days  -ES       Row Name 01/08/25 1527          Gait Training Goal 1 (PT)    Activity/Assistive Device (Gait Training Goal 1, PT) gait (walking locomotion);decrease fall risk;increase endurance/gait distance  -ES     [place] Level (Gait Training Goal 1, PT) contact guard required  -ES     Distance (Gait Training Goal 1, PT) 150  -ES     Time Frame (Gait Training Goal 1, PT) long term goal (LTG);10 days  -ES       Row Name 01/08/25 1527          Therapy Assessment/Plan (PT)    Planned Therapy Interventions (PT) balance training;bed mobility training;gait training;home exercise program;neuromuscular re-education;patient/family education;strengthening;stair training;postural re-education;transfer training  -ES               User Key  (r) = Recorded By, (t) = Taken By, (c) = Cosigned By      Initials Name Provider Type    ES Venessa Mosley, PT Physical Therapist                   Clinical Impression       Row Name 01/08/25 1526          Pain    Pretreatment Pain Rating 0/10 - no pain  -ES     Posttreatment Pain Rating 0/10 - no pain  -ES     Pre/Posttreatment Pain Comment tolerated  -ES       Row Name 01/08/25 1526          Plan of Care Review    Plan of Care Reviewed With patient  -ES     Progress no change  -ES     Outcome Evaluation PT eval complete. Pt presents with R sided weakness, balance deficits, and decreased activity tolerance warranting skilled IPPT services. Pt required mod-maxA x2 for mobility with cues for sequencing on R side. PT rec IRF at d/c.  -ES       Row Name 01/08/25 1526          Therapy Assessment/Plan (PT)    Rehab Potential (PT) good  -ES     Criteria for Skilled Interventions Met (PT) yes;meets criteria;skilled treatment is necessary  -ES     Therapy Frequency (PT) daily  -ES     Predicted Duration of Therapy Intervention (PT) 10 days  -ES       Row Name  01/08/25 1526          Vital Signs    Pre Systolic BP Rehab 163  -ES     Pre Treatment Diastolic BP 59  -ES     Post Systolic BP Rehab 168  -ES     Post Treatment Diastolic BP 59  -ES     Pretreatment Heart Rate (beats/min) 53  -ES     Posttreatment Heart Rate (beats/min) 52  -ES     Pre SpO2 (%) 97  -ES     O2 Delivery Pre Treatment room air  -ES     O2 Delivery Intra Treatment room air  -ES     Post SpO2 (%) 98  -ES     O2 Delivery Post Treatment room air  -ES     Pre Patient Position Sitting  -ES     Intra Patient Position Standing  -ES     Post Patient Position Sitting  -ES       Row Name 01/08/25 1526          Positioning and Restraints    Pre-Treatment Position in bed  -ES     Post Treatment Position chair  -ES     In Chair notified nsg;reclined;sitting;call light within reach;encouraged to call for assist;exit alarm on;waffle cushion;legs elevated;heels elevated  -ES               User Key  (r) = Recorded By, (t) = Taken By, (c) = Cosigned By      Initials Name Provider Type    Venessa Hammonds, PT Physical Therapist                   Outcome Measures       Row Name 01/08/25 1528 01/08/25 0800       How much help from another person do you currently need...    Turning from your back to your side while in flat bed without using bedrails? 3  -ES 2  -JW    Moving from lying on back to sitting on the side of a flat bed without bedrails? 3  -ES 1  -JW    Moving to and from a bed to a chair (including a wheelchair)? 2  -ES 1  -JW    Standing up from a chair using your arms (e.g., wheelchair, bedside chair)? 2  -ES 1  -JW    Climbing 3-5 steps with a railing? 1  -ES 1  -JW    To walk in hospital room? 1  -ES 1  -JW    AM-PAC 6 Clicks Score (PT) 12  -ES 7  -JW    Highest Level of Mobility Goal 4 --> Transfer to chair/commode  -ES 2 --> Bed activities/dependent transfer  -      Row Name 01/08/25 1528 01/08/25 1518       Modified Ruther[place] Scale    Pre-Stroke Modified [place]uther[place] Scale 6 - Unable to determine (UTD) from  the medical record documentation  -ES 6 - Unable to determine (UTD) from the medical record documentation  -    Modified [place] Scale 4 - Moderately severe disability.  Unable to walk without assistance, and unable to attend to own bodily needs without assistance.  -ES 4 - Moderately severe disability.  Unable to walk without assistance, and unable to attend to own bodily needs without assistance.  -KF      Row Name 01/08/25 1528 01/08/25 1518       Functional Assessment    Outcome Measure Options AM-PAC 6 Clicks Basic Mobility (PT);Modified [place]  -ES AM-PAC 6 Clicks Daily Activity (OT);Modified [place]  -KF              User Key  (r) = Recorded By, (t) = Taken By, (c) = Cosigned By      Initials Name Provider Type    Lizeth Carpenter, RN Registered Nurse    Venessa Hammonds, PT Physical Therapist    Sharonda Grady, OT Occupational Therapist                                 Physical Therapy Education       Title: PT OT SLP Therapies (In Progress)       Topic: Physical Therapy (In Progress)       Point: Mobility training (In Progress)       Learning Progress Summary            Patient Acceptance, E,TB, NR by JOSE at 1/8/2025 1528                      Point: Home exercise program (Not Started)       Learner Progress:  Not documented in this visit.              Point: Body mechanics (In Progress)       Learning Progress Summary            Patient Acceptance, E,TB, NR by JOSE at 1/8/2025 1528                      Point: Precautions (In Progress)       Learning Progress Summary            Patient Acceptance, E,TB, NR by JOSE at 1/8/2025 1528                                      User Key       Initials Effective Dates Name Provider Type Discipline     08/11/22 -  Venessa Mosley, PT Physical Therapist PT                  PT Recommendation and Plan  Planned Therapy Interventions (PT): balance training, bed mobility training, gait training, home exercise program, neuromuscular re-education, patient/family education,  strengthening, stair training, postural re-education, transfer training  Progress: no change  Outcome Evaluation: PT eval complete. Pt presents with R sided weakness, balance deficits, and decreased activity tolerance warranting skilled IPPT services. Pt required mod-maxA x2 for mobility with cues for sequencing on R side. PT rec IRF at d/c.     Time Calculation:   PT Evaluation Complexity  History, PT Evaluation Complexity: 1-2 personal factors and/or comorbidities  Examination of Body Systems (PT Eval Complexity): total of 3 or more elements  Clinical Presentation (PT Evaluation Complexity): evolving  Clinical Decision Making (PT Evaluation Complexity): moderate complexity  Overall Complexity (PT Evaluation Complexity): moderate complexity     PT Charges       Row Name 01/08/25 1529             Time Calculation    Start Time 1315  -ES      PT Received On 01/08/25  -ES      PT Goal Re-Cert Due Date 01/18/25  -ES         Untimed Charges    PT Eval/Re-eval Minutes 46  -ES         Total Minutes    Untimed Charges Total Minutes 46  -ES       Total Minutes 46  -ES                User Key  (r) = Recorded By, (t) = Taken By, (c) = Cosigned By      Initials Name Provider Type    ES Venessa Mosley, PT Physical Therapist                  Therapy Charges for Today       Code Description Service Date Service Provider Modifiers Qty    38806099846 HC PT EVAL MOD COMPLEXITY 4 1/8/2025 Venessa Mosley, PT GP 1            PT G-Codes  Outcome Measure Options: AM-PAC 6 Clicks Basic Mobility (PT), Modified [place]  AM-PAC 6 Clicks Score (PT): 12  AM-PAC 6 Clicks Score (OT): 10  Modified [place] Scale: 4 - Moderately severe disability.  Unable to walk without assistance, and unable to attend to own bodily needs without assistance.  PT Discharge Summary  Anticipated Discharge Disposition (PT): inpatient rehabilitation facility    Venessa Mosley PT  1/8/2025      Electronically signed by Venessa Mosley PT at 01/08/25 1527           Occupational Therapy Notes (last 48 hours)        Sharonda Chang, OT at 25 1306          Patient Name: Karol Lopez  : 1950    MRN: [id number]                              Today's Date: 2025       Admit Date: 2025    Visit Dx:     ICD-10-CM ICD-9-CM   1. Left carotid artery occlusion  I65.22 433.10   2. Difficulty with speech  R47.9 784.59   3. History of stroke  Z86.73 V12.54   4. Altered mental status, unspecified altered mental status type  R41.82 780.97     Patient Active Problem List   Diagnosis    Coronary artery disease involving native coronary artery of native heart with angina pectoris    Anxiety    COPD (chronic obstructive pulmonary disease)    Acid reflux    Vitamin B12 deficiency    Current smoker    Type 2 diabetes mellitus, with long-term current use of insulin    Impaired mobility and ADLs    Melena    Anemia    Gastrointestinal hemorrhage    Hyperlipidemia LDL goal <70    Claudication    Persistent atrial fibrillation    Hyperglycemia due to diabetes mellitus    Cirrhosis of liver    Carotid artery disease    Encephalopathy    Acute ischemic cerebrovascular accident (CVA) involving internal carotid artery territory     Past Medical History:   Diagnosis Date    Acid reflux     Anxiety     Cataracts, bilateral     Cirrhosis of liver     Constipation     COPD (chronic obstructive pulmonary disease)     Coronary artery disease     CTS (carpal tunnel syndrome)     Diabetes     Hearing loss     Hypercholesteremia     Hypertension     Impaired functional mobility, balance, gait, and endurance     Lower back pain     Osteoarthritis     Stroke     2013-weak in right arm    Tattoos     x2    Tobacco abuse     Tumor     in between breast closer to right breast    Vitamin B12 deficiency     Wears dentures     upper only    Wears glasses      Past Surgical History:   Procedure Laterality Date    BREAST BIOPSY Right 5/10/2019    Procedure: BREAST BIOPSY RIGHT;  Surgeon: Tk  Kiana DUMONT MD;  Location: [facility] OR;  Service: General    CARPAL TUNNEL RELEASE Bilateral     CHOLECYSTECTOMY      COLONOSCOPY N/A 9/30/2021    Procedure: COLONOSCOPY WITH POLYPECTOMY AND ABLATION OF AVM;  Surgeon: Russell Davila MD;  Location: [facility] ENDOSCOPY;  Service: Gastroenterology;  Laterality: N/A;    CORONARY ANGIOPLASTY WITH STENT PLACEMENT  2012    ENDOSCOPY N/A 9/29/2021    Procedure: ESOPHAGOGASTRODUODENOSCOPY with biopsies;  Surgeon: Russell Davila MD;  Location: [facility] ENDOSCOPY;  Service: Gastroenterology;  Laterality: N/A;    ENTEROSCOPY SMALL BOWEL N/A 11/16/2021    Procedure: ESOPHAGOGASTRODUODENOSCOPY WITH SMALL BOWEL ENTEROSCOPY and biopsy;  Surgeon: Russell Davila MD;  Location: [facility] ENDOSCOPY;  Service: Gastroenterology;  Laterality: N/A;    HYSTERECTOMY      complete    JOINT REPLACEMENT Bilateral     knee replacements    TOTAL KNEE ARTHROPLASTY Bilateral 10/18/2016    MAUREEN Palomares MD      General Information       Row Name 01/08/25 1508          OT Time and Intention    Document Type evaluation  -KF     Mode of Treatment occupational therapy;co-treatment  -       Row Name 01/08/25 1508          General Information    Patient Profile Reviewed yes  -KF     Prior Level of Function independent:;all household mobility;bed mobility;ADL's  Pt is a questionable historian, later in the session stating she wasn't sure of her PLOF.  -KF     Existing Precautions/Restrictions fall;other (see comments)  R sided weakness, aphasia  -KF     Barriers to Rehab medically complex;previous functional deficit;cognitive status  -       Row Name 01/08/25 1508          Living Environment    People in Home spouse  -       Row Name 01/08/25 1508          Home Main Entrance    Number of Stairs, Main Entrance other (see comments)  Pt unsure, TBD further.  -       Row Name 01/08/25 1508          Cognition    Orientation Status (Cognition) oriented to;person;verbal cues/prompts needed for  orientation;time;disoriented to;place  -KF       Row Name 01/08/25 1508          Safety Issues/Impairments Affecting Functional Mobility    Safety Issues Affecting Function (Mobility) ability to follow commands;awareness of need for assistance;insight into deficits/self-awareness;judgment;safety precaution awareness;safety precautions follow-through/compliance;sequencing abilities  -KF     Impairments Affecting Function (Mobility) balance;cognition;coordination;endurance/activity tolerance;grasp;motor control;motor planning;postural/trunk control;strength  -KF     Cognitive Impairments, Mobility Safety/Performance attention;awareness, need for assistance;insight into deficits/self-awareness;judgment;problem-solving/reasoning;safety precaution awareness;safety precaution follow-through;sequencing abilities  -KF               User Key  (r) = Recorded By, (t) = Taken By, (c) = Cosigned By      Initials Name Provider Type    KF Sharonda Chang OT Occupational Therapist                     Mobility/ADL's       Row Name 01/08/25 1510          Bed Mobility    Bed Mobility supine-sit  -KF     Supine-Sit [place] (Bed Mobility) moderate assist (50% patient effort);1 person assist;verbal cues;nonverbal cues (demo/gesture)  -KF     Assistive Device (Bed Mobility) bed rails;head of bed elevated;repositioning sheet  -KF     Comment, (Bed Mobility) Increased time and effort needed with cues for sequence  -       Row Name 01/08/25 1510          Transfers    Transfers bed-chair transfer;sit-stand transfer;stand-sit transfer  -KF       Row Name 01/08/25 1510          Bed-Chair Transfer    Bed-Chair [place] (Transfers) maximum assist (25% patient effort);2 person assist;verbal cues;nonverbal cues (demo/gesture)  -KF     Assistive Device (Bed-Chair Transfers) other (see comments)  BUE support  -KF     Comment, (Bed-Chair Transfer) SPT toward the left from the EOB to the chair  -KF       Row Name 01/08/25 1510           Sit-Stand Transfer    Sit-Stand [place] (Transfers) moderate assist (50% patient effort);2 person assist;verbal cues;nonverbal cues (demo/gesture)  -KF     Assistive Device (Sit-Stand Transfers) other (see comments)  BUE support  -       Row Name 01/08/25 1510          Stand-Sit Transfer    Stand-Sit [place] (Transfers) moderate assist (50% patient effort);2 person assist;verbal cues;nonverbal cues (demo/gesture)  -KF     Assistive Device (Stand-Sit Transfers) other (see comments)  BUE support  -       Row Name 01/08/25 1510          Activities of Daily Living    BADL Assessment/Intervention lower body dressing  -[facility] Name 01/08/25 1510          Lower Body Dressing Assessment/Training    [place] Level (Lower Body Dressing) don;socks;dependent (less than 25% patient effort)  -     Position (Lower Body Dressing) supine  -               User Key  (r) = Recorded By, (t) = Taken By, (c) = Cosigned By      Initials Name Provider Type    Sharonda Grady OT Occupational Therapist                   Obj/Interventions       Row Name 01/08/25 1512          Sensory Assessment (Somatosensory)    Sensory Assessment Diminished sensation RUE  -[facility] Name 01/08/25 1512          Vision Assessment/Intervention    Vision Assessment Comment Pt reports no visual deficits compared to baseline.  -[facility] Name 01/08/25 1512          Range of Motion Comprehensive    General Range of Motion bilateral upper extremity ROM WFL  -[facility] Name 01/08/25 1512          Strength Comprehensive (MMT)    General Manual Muscle Testing (MMT) Assessment upper extremity strength deficits identified  -     Comment, General Manual Muscle Testing (MMT) Assessment Limited RUE motion of command. Pt able to attempt to squeeze R hand with index finger. R elbow/shoulder 0/5  -       Row Name 01/08/25 1512          Balance    Balance Assessment sitting static balance;sitting dynamic balance;sit to stand dynamic  balance;standing static balance;standing dynamic balance  -KF     Static Sitting Balance contact guard  -KF     Dynamic Sitting Balance minimal assist;1-person assist  -KF     Position, Sitting Balance supported;sitting edge of bed  -KF     Sit to Stand Dynamic Balance moderate assist;2-person assist;verbal cues;non-verbal cues (demo/gesture)  -KF     Static Standing Balance moderate assist;2-person assist  -KF     Dynamic Standing Balance maximum assist;2-person assist  -KF     Position/Device Used, Standing Balance supported  -KF     Balance Interventions sitting;standing;sit to stand;supported;static;dynamic;occupation based/functional task  -KF               User Key  (r) = Recorded By, (t) = Taken By, (c) = Cosigned By      Initials Name Provider Type    KF Sharonda Chang, OT Occupational Therapist                   Goals/Plan       Row Name 01/08/25 1517          Transfer Goal 1 (OT)    Activity/Assistive Device (Transfer Goal 1, OT) bed-to-chair/chair-to-bed;commode, bedside without drop arms  -KF     [place] Level/Cues Needed (Transfer Goal 1, OT) minimum assist (75% or more patient effort)  -KF     Time Frame (Transfer Goal 1, OT) long term goal (LTG);10 days  -KF     Progress/Outcome (Transfer Goal 1, OT) goal ongoing  -KF       Row Name 01/08/25 1517          Dressing Goal 1 (OT)    Activity/Device (Dressing Goal 1, OT) upper body dressing  -KF     [place]/Cues Needed (Dressing Goal 1, OT) minimum assist (75% or more patient effort)  -KF     Time Frame (Dressing Goal 1, OT) short term goal (STG);5 days  -KF     Strategies/Barriers (Dressing Goal 1, OT) naye technique  -KF     Progress/Outcome (Dressing Goal 1, OT) goal ongoing  -KF       Row Name 01/08/25 1517          Grooming Goal 1 (OT)    Activity/Device (Grooming Goal 1, OT) hair care;oral care;wash face, hands  -KF     [place] (Grooming Goal 1, OT) set-up required  -KF     Time Frame (Grooming Goal 1, OT) long term goal (LTG);10 days   -KF     Strategies/Barriers (Grooming Goal 1, OT) unsupported sitting  -       Row Name 01/08/25 1517          Therapy Assessment/Plan (OT)    Planned Therapy Interventions (OT) activity tolerance training;adaptive equipment training;BADL retraining;functional balance retraining;occupation/activity based interventions;patient/caregiver education/training;ROM/therapeutic exercise;strengthening exercise;transfer/mobility retraining  -KF               User Key  (r) = Recorded By, (t) = Taken By, (c) = Cosigned By      Initials Name Provider Type    KF Sharonda Chang, SHAKIR Occupational Therapist                   Clinical Impression       Row Name 01/08/25 1514          Pain Assessment    Pretreatment Pain Rating 0/10 - no pain  -KF     Posttreatment Pain Rating 0/10 - no pain  -       Row Name 01/08/25 1514          Plan of Care Review    Plan of Care Reviewed With patient  -KF     Progress no change  -KF     Outcome Evaluation OT evaluation completed. The pt presents below her functional baseline with generalized weakness (R>L), decreased activity tolerance, balance deficits, and decreased safety awareness warranting continued IP OT services. LBD completed dependently. Pt assisted in supine to sit transfer with modA x1, STS with modA x2, BUE support, and SPT toward the left with maxA x2, BUE support. Recommend a d/c to IRF for best outcome.  -       Row Name 01/08/25 1514          Therapy Assessment/Plan (OT)    Patient/Family Therapy Goal Statement (OT) Restore PLOF  -KF     Rehab Potential (OT) good  -     Criteria for Skilled Therapeutic Interventions Met (OT) yes;skilled treatment is necessary  -KF     Therapy Frequency (OT) daily  -KF     Predicted Duration of Therapy Intervention (OT) 10 days  -       Row Name 01/08/25 1514          Therapy Plan Review/Discharge Plan (OT)    Anticipated Discharge Disposition (OT) inpatient rehabilitation facility  -       Row Name 01/08/25 1514          Vital Signs     Pre Systolic BP Rehab 163  -KF     Pre Treatment Diastolic BP 59  -KF     Post Systolic BP Rehab 168  -KF     Post Treatment Diastolic BP 53  -KF     Pretreatment Heart Rate (beats/min) 56  -KF     Posttreatment Heart Rate (beats/min) 51  -KF     Pre SpO2 (%) 98  -KF     O2 Delivery Pre Treatment room air  -KF     Post SpO2 (%) 98  -KF     O2 Delivery Post Treatment room air  -KF     Pre Patient Position Supine  -KF     Intra Patient Position Standing  -KF     Post Patient Position Sitting  -KF       Row Name 01/08/25 1514          Positioning and Restraints    Pre-Treatment Position in bed  -KF     Post Treatment Position chair  -KF     In Chair notified nsg;reclined;call light within reach;encouraged to call for assist;exit alarm on;waffle cushion;on mechanical lift sling;legs elevated;with other staff;RUE elevated  -KF               User Key  (r) = Recorded By, (t) = Taken By, (c) = Cosigned By      Initials Name Provider Type    KF Sharonda Chang, OT Occupational Therapist                   Outcome Measures       Row Name 01/08/25 1518          How much help from another is currently needed...    Putting on and taking off regular lower body clothing? 1  -KF     Bathing (including washing, rinsing, and drying) 2  -KF     Toileting (which includes using toilet bed pan or urinal) 1  -KF     Putting on and taking off regular upper body clothing 2  -KF     Taking care of personal grooming (such as brushing teeth) 2  -KF     Eating meals 2  -KF     AM-PAC 6 Clicks Score (OT) 10  -KF       Row Name 01/08/25 0800          How much help from another person do you currently need...    Turning from your back to your side while in flat bed without using bedrails? 2  -JW     Moving from lying on back to sitting on the side of a flat bed without bedrails? 1  -JW     Moving to and from a bed to a chair (including a wheelchair)? 1  -JW     Standing up from a chair using your arms (e.g., wheelchair, bedside chair)? 1  -JW      Climbing 3-5 steps with a railing? 1  -JW     To walk in hospital room? 1  -JW     AM-PAC 6 Clicks Score (PT) 7  -       Row Name 01/08/25 1518          Modified [place] Scale    Pre-Stroke Modified [place] Scale 6 - Unable to determine (UTD) from the medical record documentation  -     Modified [place] Scale 4 - Moderately severe disability.  Unable to walk without assistance, and unable to attend to own bodily needs without assistance.  -       Row Name 01/08/25 1518          Functional Assessment    Outcome Measure Options AM-PAC 6 Clicks Daily Activity (OT);Modified [place]  -KF               User Key  (r) = Recorded By, (t) = Taken By, (c) = Cosigned By      Initials Name Provider Type    Lizeth Carpenter, RN Registered Nurse    Sharonda Grady OT Occupational Therapist                    Occupational Therapy Education       Title: PT OT SLP Therapies (In Progress)       Topic: Occupational Therapy (In Progress)       Point: ADL training (In Progress)       Description:   Instruct learner(s) on proper safety adaptation and remediation techniques during self care or transfers.   Instruct in proper use of assistive devices.                  Learning Progress Summary            Patient Acceptance, E,TB, NR by  at 1/8/2025 1306                      Point: Home exercise program (In Progress)       Description:   Instruct learner(s) on appropriate technique for monitoring, assisting and/or progressing therapeutic exercises/activities.                  Learning Progress Summary            Patient Acceptance, E,TB, NR by KF at 1/8/2025 1306                      Point: Precautions (In Progress)       Description:   Instruct learner(s) on prescribed precautions during self-care and functional transfers.                  Learning Progress Summary            Patient Acceptance, E,TB, NR by  at 1/8/2025 1306                      Point: Body mechanics (In Progress)       Description:   Instruct learner(s) on  proper positioning and spine alignment during self-care, functional mobility activities and/or exercises.                  Learning Progress Summary            Patient Acceptance, E,TB, NR by  at 1/8/2025 1306                                      User Key       Initials Effective Dates Name Provider Type Discipline     08/09/23 -  Sharonda Chang, OT Occupational Therapist OT                  OT Recommendation and Plan  Planned Therapy Interventions (OT): activity tolerance training, adaptive equipment training, BADL retraining, functional balance retraining, occupation/activity based interventions, patient/caregiver education/training, ROM/therapeutic exercise, strengthening exercise, transfer/mobility retraining  Therapy Frequency (OT): daily  Plan of Care Review  Plan of Care Reviewed With: patient  Progress: no change  Outcome Evaluation: OT evaluation completed. The pt presents below her functional baseline with generalized weakness (R>L), decreased activity tolerance, balance deficits, and decreased safety awareness warranting continued IP OT services. LBD completed dependently. Pt assisted in supine to sit transfer with modA x1, STS with modA x2, BUE support, and SPT toward the left with maxA x2, BUE support. Recommend a d/c to IRF for best outcome.     Time Calculation:   Evaluation Complexity (OT)  Review Occupational Profile/Medical/Therapy History Complexity: expanded/moderate complexity  Assessment, Occupational Performance/Identification of Deficit Complexity: 3-5 performance deficits  Clinical Decision Making Complexity (OT): detailed assessment/moderate complexity  Overall Complexity of Evaluation (OT): moderate complexity     Time Calculation- OT       Row Name 01/08/25 9909             Time Calculation- OT    OT Start Time 1306  -KF      OT Received On 01/08/25  -KF      OT Goal Re-Cert Due Date 01/18/25  -KF         Untimed Charges    OT Eval/Re-eval Minutes 47  -KF         Total Minutes     Untimed Charges Total Minutes 47  -KF       Total Minutes 47  -KF                User Key  (r) = Recorded By, (t) = Taken By, (c) = Cosigned By      Initials Name Provider Type    KF Sharonda Chang OT Occupational Therapist                  Therapy Charges for Today       Code Description Service Date Service Provider Modifiers Qty    25466799048 HC OT EVAL MOD COMPLEXITY 4 2025 Sharonda Chang OT GO 1                 Sharonda Chang OT  2025    Electronically signed by Sharonda Chang OT at 25 1520       Sharonda Chang OT at 25 1306          Goal Outcome Evaluation:  Plan of Care Reviewed With: patient        Progress: no change  Outcome Evaluation: OT evaluation completed. The pt presents below her functional baseline with generalized weakness (R>L), decreased activity tolerance, balance deficits, and decreased safety awareness warranting continued IP OT services. LBD completed dependently. Pt assisted in supine to sit transfer with modA x1, STS with modA x2, BUE support, and SPT toward the left with maxA x2, BUE support. Recommend a d/c to IRF for best outcome.    Anticipated Discharge Disposition (OT): inpatient rehabilitation facility                          Electronically signed by Sharonda Chang OT at 25 1519          Speech Language Pathology Notes (last 48 hours)        Dari Becerra MS CCC-SLP at 25 1535          Acute Care - Speech Language Pathology   Swallow Initial Evaluation [facility]  Cognitive-Communication Evaluation  + Clinical Swallow Evaluation  +Fiberoptic Endoscopic Evaluation of Swallowing (FEES)       Patient Name: Karol Lopez  : 1950  MRN: [id number]  Today's Date: 2025               Admit Date: 2025    Visit Dx:     ICD-10-CM ICD-9-CM   1. Left carotid artery occlusion  I65.22 433.10   2. Difficulty with speech  R47.9 784.59   3. History of stroke  Z86.73 V12.54   4. Altered mental status, unspecified altered mental  status type  R41.82 780.97   5. Oropharyngeal dysphagia  R13.12 787.22     Patient Active Problem List   Diagnosis    Coronary artery disease involving native coronary artery of native heart with angina pectoris    Anxiety    COPD (chronic obstructive pulmonary disease)    Acid reflux    Vitamin B12 deficiency    Current smoker    Type 2 diabetes mellitus, with long-term current use of insulin    Impaired mobility and ADLs    Melena    Anemia    Gastrointestinal hemorrhage    Hyperlipidemia LDL goal <70    Claudication    Persistent atrial fibrillation    Hyperglycemia due to diabetes mellitus    Cirrhosis of liver    Carotid artery disease    Encephalopathy    Acute ischemic cerebrovascular accident (CVA) involving internal carotid artery territory     Past Medical History:   Diagnosis Date    Acid reflux     Anxiety     Cataracts, bilateral     Cirrhosis of liver     Constipation     COPD (chronic obstructive pulmonary disease)     Coronary artery disease     CTS (carpal tunnel syndrome)     Diabetes     Hearing loss     Hypercholesteremia     Hypertension     Impaired functional mobility, balance, gait, and endurance     Lower back pain     Osteoarthritis     Stroke     2013-weak in right arm    Tattoos     x2    Tobacco abuse     Tumor     in between breast closer to right breast    Vitamin B12 deficiency     Wears dentures     upper only    Wears glasses      Past Surgical History:   Procedure Laterality Date    BREAST BIOPSY Right 5/10/2019    Procedure: BREAST BIOPSY RIGHT;  Surgeon: Kiana Frey MD;  Location: [facility] OR;  Service: General    CARPAL TUNNEL RELEASE Bilateral     CHOLECYSTECTOMY      COLONOSCOPY N/A 9/30/2021    Procedure: COLONOSCOPY WITH POLYPECTOMY AND ABLATION OF AVM;  Surgeon: Russell Davila MD;  Location: [facility] ENDOSCOPY;  Service: Gastroenterology;  Laterality: N/A;    CORONARY ANGIOPLASTY WITH STENT PLACEMENT  2012    ENDOSCOPY N/A 9/29/2021    Procedure:  ESOPHAGOGASTRODUODENOSCOPY with biopsies;  Surgeon: Russell Davila MD;  Location: [facility] ENDOSCOPY;  Service: Gastroenterology;  Laterality: N/A;    ENTEROSCOPY SMALL BOWEL N/A 11/16/2021    Procedure: ESOPHAGOGASTRODUODENOSCOPY WITH SMALL BOWEL ENTEROSCOPY and biopsy;  Surgeon: Russell Davila MD;  Location: [facility] ENDOSCOPY;  Service: Gastroenterology;  Laterality: N/A;    HYSTERECTOMY      complete    JOINT REPLACEMENT Bilateral     knee replacements    TOTAL KNEE ARTHROPLASTY Bilateral 10/18/2016    MAUREEN Palomares MD       SLP Recommendation and Plan  SLP Swallowing Diagnosis: mild-moderate, oral dysphagia, pharyngeal dysphagia (01/08/25 1325)  SLP Diet Recommendation: soft to chew textures, chopped, no mixed consistencies, nectar thick liquids (01/08/25 1325)  Recommended Precautions and Strategies: upright posture during/after eating, small bites of food and sips of liquid, check mouth frequently for oral residue/pocketing, general aspiration precautions, assist with feeding (01/08/25 1325)  SLP Rec. for Method of Medication Administration: meds crushed, with puree, as tolerated, meds via alternate route (01/08/25 1325)     Monitor for Signs of Aspiration: yes, notify SLP if any concerns (01/08/25 1325)  Recommended Diagnostics: reassess via FEES (01/08/25 0850)  Swallow Criteria for Skilled Therapeutic Interventions Met: demonstrates skilled criteria (01/08/25 1325)  Anticipated Discharge Disposition (SLP): inpatient rehabilitation facility (01/08/25 1325)  Rehab Potential/Prognosis, Swallowing: adequate, monitor progress closely (01/08/25 1325)  Therapy Frequency (Swallow): 5 days per week (01/08/25 1325)  Predicted Duration Therapy Intervention (Days): 2 weeks (01/08/25 1325)  Oral Care Recommendations: Oral Care BID/PRN, Toothbrush (01/08/25 1325)                                        Progress: no change      SWALLOW EVALUATION (Last 72 Hours)       SLP Adult Swallow Evaluation       Row Name  01/08/25 1325 01/08/25 0850       Rehab Evaluation    Document Type evaluation  -CJ evaluation  -CJ    Subjective Information no complaints  -CJ no complaints  -CJ    Patient Observations alert;cooperative  -CJ alert;cooperative  -CJ    Patient/Family/Caregiver Comments/Observations no family present  -CJ no family present  -CJ    Patient Effort good  -CJ good  -CJ    Symptoms Noted During/After Treatment none  -CJ none  -CJ       General Information    Patient Profile Reviewed yes  -CJ yes  -CJ    Pertinent History Of Current Problem see am eval; referred for FEES  -CJ Pt adm w/ encephalopathy on stroke pathway; sig h/o CAD, anxiety, COPD, reflux, DM2, melena, afib, liver cirrhosis, afib. H/o prior stroke, unsure of residual deficits. Pt is a poor historian and sister reports has had difficulty w/ swallowing  -CJ    Current Method of Nutrition NPO  -CJ NPO  -CJ    Precautions/Limitations, Vision WFL;for purposes of eval  -CJ WFL;for purposes of eval  -CJ    Precautions/Limitations, Hearing WFL;for purposes of eval  -CJ WFL;for purposes of eval  -CJ    Prior Level of Function-Communication WFL  -CJ WFL  -CJ    Prior Level of Function-Swallowing other (see comments)  -CJ other (see comments)  prior difficulty per family'  -CJ    Plans/Goals Discussed with patient  -CJ patient  -CJ    Barriers to Rehab previous functional deficit  -CJ previous functional deficit  -CJ    Patient's Goals for Discharge patient did not state  -CJ patient did not state  -CJ       Pain    Additional Documentation Pain Scale: FACES Pre/Post-Treatment (Group)  -CJ Pain Scale: FACES Pre/Post-Treatment (Group)  -CJ       Pain Scale: FACES Pre/Post-Treatment    Pain: FACES Scale, Pretreatment 0-->no hurt  -CJ 0-->no hurt  -CJ    Posttreatment Pain Rating 0-->no hurt  -CJ 0-->no hurt  -CJ       Oral Motor Structure and Function    Dentition Assessment -- missing teeth  -CJ    Secretion Management -- WNL/WFL  -CJ    Mucosal Quality -- moist,  healthy  -CJ       Oral Musculature and Cranial Nerve Assessment    Oral Motor General Assessment -- generalized oral motor weakness  -CJ       General Eating/Swallowing Observations    Respiratory Support Currently in Use -- room air  -CJ    Eating/Swallowing Skills -- fed by SLP;unable to perform self-feeding  -CJ    Positioning During Eating -- upright in bed  -CJ    Utensils Used -- spoon;cup;straw  -CJ    Consistencies Trialed -- pureed;ice chips;thin liquids;nectar/syrup-thick liquids  -CJ       Clinical Swallow Eval    Oral Prep Phase -- impaired  -CJ    Oral Transit -- impaired  -CJ    Oral Residue -- WFL  -CJ    Pharyngeal Phase -- suspected pharyngeal impairment  -CJ    Esophageal Phase -- unremarkable  -CJ       Oral Prep Concerns    Oral Prep Concerns -- increased prep time  -CJ       Oral Transit Concerns    Oral Transit Concerns -- delayed initiation of bolus transit  -CJ       Pharyngeal Phase Concerns    Pharyngeal Phase Concerns -- cough;wet vocal quality  -CJ    Wet Vocal Quality -- thin  -CJ    Cough -- thin;nectar  -CJ    Pharyngeal Phase Concerns, Comment -- Given concerns of previous dysphagia w/ new neuro symptoms as well as overt s/s of aspiration, will complete FEES this date. Continue NPO w/ essential meds crushed in puree until FEES  -CJ       Fiberoptic Endoscopic Evaluation of Swallowing (FEES)    Risks/Benefits Reviewed risks/benefits explained;patient;agreed to eval  -CJ --    Nasal Entry right:  -CJ --    Scope serial number/identification [id number]  - --       Anatomy and Physiology    Anatomic Considerations anatomic deviation observed (see comments)  prominent posterior pharyngeal wall concerning for cspine component  -CJ --    Velopharyngeal WFL  -CJ --    Base of Tongue symmetrical  -CJ --    Epiglottis WFL  -CJ --    Laryngeal Function Breathing symmetrical  -CJ --    Laryngeal Function Phonation symmetrical  -CJ --    Laryngeal Function to Breath Hold CNA  -CJ --    Secretion  Rating Scale (Hang et al. 1996) 1- secretions present around the laryngeal vestibule  -CJ --    Secretion Description thin;clear;white  -CJ --    Ice Chips elicited swallow;partially cleared secretions  -CJ --    Spontaneous Swallow frequency reduced  -CJ --    Sensory sensed scope  -CJ --    Utensils Used Spoon;Cup;Straw  -CJ --    Consistencies Trialed thin liquids;nectar-thick liquids;pudding/puree;regular textures;spoon;cup;straw  -CJ --       FEES Interpretation    Oral Phase prolonged manipulation;prespill of liquids into pharynx  -CJ --       Initiation of Pharyngeal Swallow    Initiation of Pharyngeal Swallow bolus in pyriform sinuses  -CJ --    Pharyngeal Phase impaired pharyngeal phase of swallowing  -CJ --    Penetration Before the Swallow thin liquids  -CJ --    Aspiration During the Swallow thin liquids;secondary to delayed swallow initiation or mistiming;secondary to reduced laryngeal elevation;secondary to reduced vestibular closure  -CJ --    Depth of Penetration deep  -CJ --    Response to Penetration No  -CJ --    No spontaneous response to penetration and non-effective laryngeal clearance with cue (see comments)  -CJ --    Response to Aspiration No  -CJ --    No spontaneous response to aspiration with could not produce cough response despite cue  -CJ --    Rosenbek's Scale thin:;8-->Level 8  -CJ --    Residue thin liquids;nectar-thick liquids;diffuse within pharynx;secondary to reduced posterior pharyngeal wall stripping;secondary to reduced hyolaryngeal excursion  -CJ --    Response to Residue partial residue clearance;with cued swallow;with spontaneous subsequent swallow  -CJ --    Attempted Compensatory Maneuvers bolus size;bolus presentation style;additional subsequent swallow;multiple swallows;throat clear after swallow  -CJ --    Response to Attempted Compensatory Maneuvers did not prevent aspiration  -CJ --    Successful Compensatory Maneuver Competency patient able to;demonstrate  compensations;with cues  -CJ --    Pharyngeal Phase, Comment Mild-moderate oropharyngeal dysphagia. Silent aspiration w/ thins during the swallow. Pt unable to consistently perform cued cough to attempt to clear aspirated material. Diffuse residue but worse w/ thin liquids. No penetration or aspiration w/ pudding, solids or nectar thick liquids. pt resistant to trying regular solids. Okay for soft chopped w/ nectar thick liquids, no mixed, okay straws. Will f/u for tx  -CJ --       SLP Evaluation Clinical Impression    SLP Swallowing Diagnosis mild-moderate;oral dysphagia;pharyngeal dysphagia  -CJ suspected pharyngeal dysphagia  -CJ    Functional Impact risk of aspiration/pneumonia  -CJ risk of aspiration/pneumonia  -    Rehab Potential/Prognosis, Swallowing adequate, monitor progress closely  -CJ adequate, monitor progress closely  -    Swallow Criteria for Skilled Therapeutic Interventions Met demonstrates skilled criteria  -CJ demonstrates skilled criteria  -       Recommendations    Therapy Frequency (Swallow) 5 days per week  -CJ --    Predicted Duration Therapy Intervention (Days) 2 weeks  -CJ --    SLP Diet Recommendation soft to chew textures;chopped;no mixed consistencies;nectar thick liquids  -CJ NPO  -CJ    Recommended Diagnostics -- reassess via FEES  -CJ    Recommended Precautions and Strategies upright posture during/after eating;small bites of food and sips of liquid;check mouth frequently for oral residue/pocketing;general aspiration precautions;assist with feeding  -CJ general aspiration precautions  -    Oral Care Recommendations Oral Care BID/PRN;Toothbrush  -CJ Oral Care BID/PRN;Toothbrush  -    SLP Rec. for Method of Medication Administration meds crushed;with puree;as tolerated;meds via alternate route  -CJ meds crushed;with puree;as tolerated;meds via alternate route  -    Monitor for Signs of Aspiration yes;notify SLP if any concerns  -CJ yes;notify SLP if any concerns  -CJ     Anticipated Discharge Disposition (SLP) inpatient rehabilitation facility  -CJ inpatient rehabilitation facility  -CJ              User Key  (r) = Recorded By, (t) = Taken By, (c) = Cosigned By      Initials Name Effective Dates    Dari Hernandes, MS CCC-SLP 10/22/24 -                     EDUCATION  The patient has been educated in the following areas:   Communication Impairment Dysphagia (Swallowing Impairment) Oral Care/Hydration.        SLP GOALS       Row Name 01/08/25 1325             (LTG) Patient will demonstrate functional swallow for    Diet Texture (Demonstrate functional swallow) soft to chew (whole) textures  -CJ      Liquid viscosity (Demonstrate functional swallow) thin liquids  -CJ      [place] (Demonstrate functional swallow) with minimal cues (75-90% accuracy)  -CJ      Time Frame (Demonstrate functional swallow) 1 week  -CJ      Progress/Outcomes (Demonstrate functional swallow) new goal  -CJ         (STG) Patient will tolerate trials of    Consistencies Trialed (Tolerate trials) soft to chew (chopped) textures;nectar/ mildly thick liquids  -CJ      Desired Outcome (Tolerate trials) without signs/symptoms of aspiration;without signs of distress  -CJ      [place] (Tolerate trials) with minimal cues (75-90% accuracy)  -CJ      Time Frame (Tolerate trials) 1 week  -CJ      Progress/Outcomes (Tolerate trials) new goal  -CJ         (STG) Lingual Strengthening Goal 1 (SLP)    Activity (Lingual Strengthening Goal 1, SLP) increase tongue back strength;increase lingual tone/sensation/control/coordination/movement  -CJ      Increase Lingual Tone/Sensation/Control/Coordination/Movement swallow trials;lingual resistance exercises  -CJ      Increase Tongue Back Strength lingual resistance exercises  -CJ      [place]/Accuracy (Lingual Strengthening Goal 1, SLP) with moderate cues (50-74% accuracy)  -CJ      Time Frame (Lingual Strengthening Goal 1, SLP) 1 week  -CJ      Progress/Outcomes  (Lingual Strengthening Goal 1, SLP) new goal  -CJ         (STG) Pharyngeal Strengthening Exercise Goal 1 (SLP)    Activity (Pharyngeal Strengthening Goal 1, SLP) increase timing;increase squeeze/positive pressure generation;increase closure at entrance to airway/closure of airway at glottis;increase anterior movement of the hyolaryngeal complex  -CJ      Increase Timing prepping - 3 second prep or suck swallow or 3-step swallow  -CJ      Increase Anterior Movement of the Hyolaryngeal Complex chin tuck against resistance (CTAR)  -CJ      Increase Closure at Entrance to Airway/Closure of Airway at Glottis supraglottic swallow;maria victoria  -CJ      Increase Squeeze/Positive Pressure Generation hard effortful swallow  -CJ      [place]/Accuracy (Pharyngeal Strengthening Goal 1, SLP) with moderate cues (50-74% accuracy)  -CJ      Time Frame (Pharyngeal Strengthening Goal 1, SLP) 1 week  -CJ      Progress/Outcomes (Pharyngeal Strengthening Goal 1, SLP) new goal  -CJ         Patient will demonstrate functional speech skills for return to discharge environment    [place] with minimal cues  -CJ      Time frame 1 week  -CJ      Progress/Outcomes new goal  -CJ         Patient will demonstrate functional language skills for return to discharge environment     [place] with minimal cues  -CJ      Time frame 1 week  -CJ      Progress/Outcomes new goal  -CJ         SLP Diagnostic Treatment     Patient will participate in further assessment in the following areas reading comprehension;graphic expression;cognitive-linguistic;clarification of baseline cognitive communication status  -CJ      Time Frame (Diagnostic) 1 week  -CJ      Progress/Outcomes (Additional Goal 1, SLP) new goal  -CJ         Comprehend Questions Goal 1 (SLP)    Improve Ability to Comprehend Questions Goal 1 (SLP) simple wh questions;80%;with minimal cues (75-90%)  -CJ      Time Frame (Comprehend Questions Goal 1, SLP) 1 week  -CJ      Progress/Outcomes  (Comprehend Questions Goal 1, SLP) new goal  -CJ         Follow Directions Goal 2 (SLP)    Improve Ability to Follow Directions Goal 1 (SLP) 2 step commands;80%;with minimal cues (75-90%)  -CJ      Time Frame (Follow Directions Goal 1, SLP) 1 week  -CJ      Progress/Outcomes (Follow Directions Goal 1, SLP) new goal  -CJ         Word Retrieval Skills Goal 1 (SLP)    Improve Word Retrieval Skills By Goal 1 (SLP) responsive naming task;high frequency;repeating phrases;completing open ended unstructured sentence;completing functional word finding tasks;90%;with minimal cues (75-90%)  -CJ      Time Frame (Word Retrieval Goal 1, SLP) 1 week  -CJ      Progress/Outcomes (Word Retrieval Goal 1, SLP) new goal  -CJ         Phonation Goal 1 (SLP)    Improve Phonation By Goal 1 (SLP) using loud speech;90%;with minimal cues (75-90%)  -CJ      Time Frame (Phonation Goal 1, SLP) 1 week  -CJ      Progress/Outcomes (Phonation Goal 1, SLP) new goal  -CJ                User Key  (r) = Recorded By, (t) = Taken By, (c) = Cosigned By      Initials Name Provider Type    Dari Hernandes MS CCC-SLP Speech and Language Pathologist                         Time Calculation:    Time Calculation- SLP       Row Name 01/08/25 1535 01/08/25 1106          Time Calculation- SLP    SLP Start Time 1325  - 0850  -     SLP Received On 01/08/25  - 01/08/25  -        Untimed Charges    20625-RU Eval Speech and Production w/ Language Minutes -- 24  -     16690-PW Eval Oral Pharyng Swallow Minutes -- 25  -     17454-JW Fiberoptic Endo Eval Swallow Minutes 69  -CJ --        Total Minutes    Untimed Charges Total Minutes 69  -CJ 49  -      Total Minutes 69  -CJ 49  -               User Key  (r) = Recorded By, (t) = Taken By, (c) = Cosigned By      Initials Name Provider Type    Dari Hernandes MS CCC-SLP Speech and Language Pathologist                    Therapy Charges for Today       Code Description Service Date Service Provider  Modifiers Qty    89139081250 HC ST EVAL ORAL PHARYNG SWALLOW 2 2025 Hernan Dari CARPENTER, MS CCC-SLP GN 1    47435890531 HC ST EVAL SPEECH AND PROD W LANG  2 2025 Hernan Dari JAYDEN, MS CCC-SLP GN 1                 Dari Becerra, MS CCC-SLP  2025   and Acute Care - Speech Language Pathology Initial Evaluation  [facility]     Patient Name: Karol Lopez  : 1950  MRN: [id number]  Today's Date: 2025               Admit Date: 2025     Visit Dx:    ICD-10-CM ICD-9-CM   1. Left carotid artery occlusion  I65.22 433.10   2. Difficulty with speech  R47.9 784.59   3. History of stroke  Z86.73 V12.54   4. Altered mental status, unspecified altered mental status type  R41.82 780.97   5. Oropharyngeal dysphagia  R13.12 787.22     Patient Active Problem List   Diagnosis    Coronary artery disease involving native coronary artery of native heart with angina pectoris    Anxiety    COPD (chronic obstructive pulmonary disease)    Acid reflux    Vitamin B12 deficiency    Current smoker    Type 2 diabetes mellitus, with long-term current use of insulin    Impaired mobility and ADLs    Melena    Anemia    Gastrointestinal hemorrhage    Hyperlipidemia LDL goal <70    Claudication    Persistent atrial fibrillation    Hyperglycemia due to diabetes mellitus    Cirrhosis of liver    Carotid artery disease    Encephalopathy    Acute ischemic cerebrovascular accident (CVA) involving internal carotid artery territory     Past Medical History:   Diagnosis Date    Acid reflux     Anxiety     Cataracts, bilateral     Cirrhosis of liver     Constipation     COPD (chronic obstructive pulmonary disease)     Coronary artery disease     CTS (carpal tunnel syndrome)     Diabetes     Hearing loss     Hypercholesteremia     Hypertension     Impaired functional mobility, balance, gait, and endurance     Lower back pain     Osteoarthritis     Stroke     2013-weak in right arm    Tattoos     x2    Tobacco abuse     Tumor     in  between breast closer to right breast    Vitamin B12 deficiency     Wears dentures     upper only    Wears glasses      Past Surgical History:   Procedure Laterality Date    BREAST BIOPSY Right 5/10/2019    Procedure: BREAST BIOPSY RIGHT;  Surgeon: Kiana Frey MD;  Location: [facility] OR;  Service: General    CARPAL TUNNEL RELEASE Bilateral     CHOLECYSTECTOMY      COLONOSCOPY N/A 9/30/2021    Procedure: COLONOSCOPY WITH POLYPECTOMY AND ABLATION OF AVM;  Surgeon: Russell Davila MD;  Location: [facility] ENDOSCOPY;  Service: Gastroenterology;  Laterality: N/A;    CORONARY ANGIOPLASTY WITH STENT PLACEMENT  2012    ENDOSCOPY N/A 9/29/2021    Procedure: ESOPHAGOGASTRODUODENOSCOPY with biopsies;  Surgeon: Russell Davila MD;  Location: [facility] ENDOSCOPY;  Service: Gastroenterology;  Laterality: N/A;    ENTEROSCOPY SMALL BOWEL N/A 11/16/2021    Procedure: ESOPHAGOGASTRODUODENOSCOPY WITH SMALL BOWEL ENTEROSCOPY and biopsy;  Surgeon: Russell Davila MD;  Location: [facility] ENDOSCOPY;  Service: Gastroenterology;  Laterality: N/A;    HYSTERECTOMY      complete    JOINT REPLACEMENT Bilateral     knee replacements    TOTAL KNEE ARTHROPLASTY Bilateral 10/18/2016    MAUREEN Palomares MD       SLP Recommendation and Plan  SLP Diagnosis: moderate, aphasia, mild-moderate, dysarthria (01/08/25 0850)        Monitor for Signs of Aspiration: yes, notify SLP if any concerns (01/08/25 1325)  Swallow Criteria for Skilled Therapeutic Interventions Met: demonstrates skilled criteria (01/08/25 1325)  SLC Criteria for Skilled Therapy Interventions Met: yes (01/08/25 0850)  Anticipated Discharge Disposition (SLP): inpatient rehabilitation facility (01/08/25 1325)     Therapy Frequency (Swallow): 5 days per week (01/08/25 1325)  Therapy Frequency (SLP SLC): 5 days per week (01/08/25 0850)  Predicted Duration Therapy Intervention (Days): 2 weeks (01/08/25 1325)  Oral Care Recommendations: Oral Care BID/PRN, Toothbrush (01/08/25 1325)                           Progress: no change (01/08/25 1073)      SLP EVALUATION (Last 72 Hours)       SLP SLC Evaluation       Row Name 01/08/25 0880                   General Information    Prior Level of Function-Communication unknown  -CJ        Patient's Goals for Discharge patient did not state  -CJ           Comprehension Assessment/Intervention    Comprehension Assessment/Intervention Auditory Comprehension  -CJ           Auditory Comprehension Assessment/Intervention    Auditory Comprehension (Communication) moderate impairment  -CJ        Able to Identify Objects/Pictures (Communication) WFL;body part  -CJ        Answers Questions (Communication) moderate impairment;simple;wh questions  -CJ        Able to Follow Commands (Communication) moderate impairment;2-step  -CJ           Expression Assessment/Intervention    Expression Assessment/Intervention verbal expression  -CJ           Verbal Expression Assessment/Intervention    Verbal Expression moderate impairment  -CJ        Automatic Speech (Communication) WFL;counting 1-20;alphabet  -CJ        Repetition moderate impairment;phrases  -CJ        Phrase Completion moderate impairment;automatic/predictable  -CJ        Responsive Naming moderate impairment;simple  -CJ        Confrontational Naming moderate impairment;low frequency  -CJ        Spontaneous/Functional Words moderate impairment;simple  -CJ        Sentence Formulation moderate impairment;simple  -CJ           Oral Motor Structure and Function    Oral Motor Structure and Function mild impairment  -CJ           Motor Speech Assessment/Intervention    Motor Speech Function mild impairment  -CJ        Characteristics Consistent with Dysarthria decreased articulation;decreased intensity  -CJ        Speech intelligibility 80%;in connected speech;with unfamiliar listener  -CJ           Cognitive Assessment Intervention- SLP    Cognitive Function (Cognition) unable/difficult to assess;other (see comments)   2/2 aphasia  -CJ           SLP Evaluation Clinical Impressions    SLP Diagnosis moderate;aphasia;mild-moderate;dysarthria  -CJ        Rehab Potential/Prognosis good  -CJ        SLC Criteria for Skilled Therapy Interventions Met yes  -CJ        Functional Impact functional impact in ADLs;difficulty communicating wants, needs;difficulty communicating in an emergency  -CJ           Recommendations    Therapy Frequency (SLP SLC) 5 days per week  -CJ        Predicted Duration Therapy Intervention (Days) 2 weeks  -CJ                  User Key  (r) = Recorded By, (t) = Taken By, (c) = Cosigned By      Initials Name Effective Dates    Dari Hernandes, MS CCC-SLP 10/22/24 -                        EDUCATION  The patient has been educated in the following areas:     Communication Impairment Dysphagia (Swallowing Impairment).           SLP GOALS       Row Name 01/08/25 1325             (LTG) Patient will demonstrate functional swallow for    Diet Texture (Demonstrate functional swallow) soft to chew (whole) textures  -CJ      Liquid viscosity (Demonstrate functional swallow) thin liquids  -CJ      [place] (Demonstrate functional swallow) with minimal cues (75-90% accuracy)  -CJ      Time Frame (Demonstrate functional swallow) 1 week  -CJ      Progress/Outcomes (Demonstrate functional swallow) new goal  -CJ         (STG) Patient will tolerate trials of    Consistencies Trialed (Tolerate trials) soft to chew (chopped) textures;nectar/ mildly thick liquids  -CJ      Desired Outcome (Tolerate trials) without signs/symptoms of aspiration;without signs of distress  -CJ      [place] (Tolerate trials) with minimal cues (75-90% accuracy)  -CJ      Time Frame (Tolerate trials) 1 week  -CJ      Progress/Outcomes (Tolerate trials) new goal  -CJ         (STG) Lingual Strengthening Goal 1 (SLP)    Activity (Lingual Strengthening Goal 1, SLP) increase tongue back strength;increase lingual  tone/sensation/control/coordination/movement  -CJ      Increase Lingual Tone/Sensation/Control/Coordination/Movement swallow trials;lingual resistance exercises  -CJ      Increase Tongue Back Strength lingual resistance exercises  -CJ      [place]/Accuracy (Lingual Strengthening Goal 1, SLP) with moderate cues (50-74% accuracy)  -CJ      Time Frame (Lingual Strengthening Goal 1, SLP) 1 week  -CJ      Progress/Outcomes (Lingual Strengthening Goal 1, SLP) new goal  -CJ         (STG) Pharyngeal Strengthening Exercise Goal 1 (SLP)    Activity (Pharyngeal Strengthening Goal 1, SLP) increase timing;increase squeeze/positive pressure generation;increase closure at entrance to airway/closure of airway at glottis;increase anterior movement of the hyolaryngeal complex  -CJ      Increase Timing prepping - 3 second prep or suck swallow or 3-step swallow  -CJ      Increase Anterior Movement of the Hyolaryngeal Complex chin tuck against resistance (CTAR)  -CJ      Increase Closure at Entrance to Airway/Closure of Airway at Glottis supraglottic swallow;maria victoria  -CJ      Increase Squeeze/Positive Pressure Generation hard effortful swallow  -CJ      [place]/Accuracy (Pharyngeal Strengthening Goal 1, SLP) with moderate cues (50-74% accuracy)  -CJ      Time Frame (Pharyngeal Strengthening Goal 1, SLP) 1 week  -CJ      Progress/Outcomes (Pharyngeal Strengthening Goal 1, SLP) new goal  -CJ         Patient will demonstrate functional speech skills for return to discharge environment    [place] with minimal cues  -CJ      Time frame 1 week  -CJ      Progress/Outcomes new goal  -CJ         Patient will demonstrate functional language skills for return to discharge environment     [place] with minimal cues  -CJ      Time frame 1 week  -CJ      Progress/Outcomes new goal  -CJ         SLP Diagnostic Treatment     Patient will participate in further assessment in the following areas reading comprehension;graphic  expression;cognitive-linguistic;clarification of baseline cognitive communication status  -CJ      Time Frame (Diagnostic) 1 week  -CJ      Progress/Outcomes (Additional Goal 1, SLP) new goal  -CJ         Comprehend Questions Goal 1 (SLP)    Improve Ability to Comprehend Questions Goal 1 (SLP) simple wh questions;80%;with minimal cues (75-90%)  -CJ      Time Frame (Comprehend Questions Goal 1, SLP) 1 week  -CJ      Progress/Outcomes (Comprehend Questions Goal 1, SLP) new goal  -CJ         Follow Directions Goal 2 (SLP)    Improve Ability to Follow Directions Goal 1 (SLP) 2 step commands;80%;with minimal cues (75-90%)  -CJ      Time Frame (Follow Directions Goal 1, SLP) 1 week  -CJ      Progress/Outcomes (Follow Directions Goal 1, SLP) new goal  -CJ         Word Retrieval Skills Goal 1 (SLP)    Improve Word Retrieval Skills By Goal 1 (SLP) responsive naming task;high frequency;repeating phrases;completing open ended unstructured sentence;completing functional word finding tasks;90%;with minimal cues (75-90%)  -CJ      Time Frame (Word Retrieval Goal 1, SLP) 1 week  -CJ      Progress/Outcomes (Word Retrieval Goal 1, SLP) new goal  -CJ         Phonation Goal 1 (SLP)    Improve Phonation By Goal 1 (SLP) using loud speech;90%;with minimal cues (75-90%)  -CJ      Time Frame (Phonation Goal 1, SLP) 1 week  -CJ      Progress/Outcomes (Phonation Goal 1, SLP) new goal  -                User Key  (r) = Recorded By, (t) = Taken By, (c) = Cosigned By      Initials Name Provider Type    Dari Hernandes MS CCC-SLP Speech and Language Pathologist                              Time Calculation:      Time Calculation- SLP       Row Name 01/08/25 1535 01/08/25 1106          Time Calculation- SLP    SLP Start Time 1325  -CJ 0850  -     SLP Received On 01/08/25  -CJ 01/08/25  -        Untimed Charges    43303-QM Eval Speech and Production w/ Language Minutes -- 24  -     26561-KZ Eval Oral Pharyng Swallow Minutes -- 25  -      22200-OI Fiberoptic Endo Eval Swallow Minutes 69  -CJ --        Total Minutes    Untimed Charges Total Minutes 69  -CJ 49  -CJ      Total Minutes 69  -CJ 49  -CJ               User Key  (r) = Recorded By, (t) = Taken By, (c) = Cosigned By      Initials Name Provider Type    Dari Hernandes MS CCC-MAXIMILIAN Speech and Language Pathologist                    Therapy Charges for Today       Code Description Service Date Service Provider Modifiers Qty    76472834120 HC ST EVAL ORAL PHARYNG SWALLOW 2 1/8/2025 Dari Becerra MS CCC-SLP GN 1    [id number] HC ST EVAL SPEECH AND PROD W LANG  2 1/8/2025 Dari Becerra MS CCC-SLP GN 1                       MS JONH Lee  1/8/2025    Electronically signed by Dari Becerra MS CCC-MAXIMILIAN at 01/08/25 7972       Dari Becerra MS CCC-SLP at 01/08/25 1530          Goal Outcome Evaluation:  Plan of Care Reviewed With: patient        Progress: no change       Anticipated Discharge Disposition (SLP): inpatient rehabilitation facility    SLP Diagnosis: moderate, aphasia, mild-moderate, dysarthria (01/08/25 0850)     SLP Swallowing Diagnosis: mild-moderate, oral dysphagia, pharyngeal dysphagia (01/08/25 1325)               Electronically signed by Dari Becerra MS CCC-MAXIMILIAN at 01/08/25 9803

## 2025-01-09 NOTE — PROGRESS NOTES
Critical Care Note     LOS: 2 days   Patient Care Team:  Romaine Eugene MD as PCP - General  Kiana Frey MD as Consulting Physician (General Surgery)  Russell Davila MD as Consulting Physician (Gastroenterology)  Luis A Celestin IV, MD as Consulting Physician (Interventional Cardiology)    Chief Complaint/Reason for visit:    Chief Complaint   Patient presents with    Stroke    Altered Mental Status       Subjective     Interval History:     Patient presented with some altered mentation and aphasia with left-sided weakness.  Last known well was outside the window for TNK and imaging revealed an occluded left internal carotid artery that appeared chronic.  She had evidence of watershed stroke.  She is alert today and following some commands.  She continues to have some right sided weakness particularly the upper extremity.  Yesterday speech assessed and noted some mild to moderate dysphagia and recommended soft to chew textures with nectar thick liquids.  She required moderate to max assist of 2 individuals just to mobilize to the chair.  She remains afebrile.  She is sustaining sinus rhythm.  She is on room air with a saturation of 95%.  She voided over 2 L of urine yesterday.  Review of Systems:    All systems were reviewed and negative except as noted in subjective.    Medical history, surgical history, social history, family history reviewed    Objective     Intake/Output:    Intake/Output Summary (Last 24 hours) at 1/9/2025 1539  Last data filed at 1/9/2025 1300  Gross per 24 hour   Intake 388.5 ml   Output 2375 ml   Net -1986.5 ml       Nutrition:  Diet: Cardiac, Diabetic; Healthy Heart (2-3 Na+); Consistent Carbohydrate; No Mixed Consistencies, Feeding Assistance - Nursing; Texture: Soft to Chew (NDD 3); Soft to Chew: Chopped Meat; Fluid Consistency: Nectar Thick    Infusions:  phenylephrine, 0.5-3 mcg/kg/min, Last Rate: Stopped (01/09/25 0537)        Mechanical Ventilator Settings:      "                                           Telemetry: Sinus rhythm             Vital Signs  Blood pressure 156/64, pulse 69, temperature 99 °F (37.2 °C), temperature source Axillary, resp. rate 16, height 157.5 cm (62\"), weight 59.1 kg (130 lb 4.7 oz), SpO2 95%, not currently breastfeeding.    Physical Exam:  General Appearance:  Older woman upright in bed in no respiratory distress   Head:  No visible trauma   Eyes:          Conjunctiva pink, no jaundice   Ears:     Throat: Oral mucosa moist   Neck: Trachea midline, no palpable thyroid   Back:      Lungs:   Symmetric chest expansion.  No wheeze or rhonchi    Heart:  Regular rhythm, S1, S2 auscultated   Abdomen:   Bowel sounds present, soft   Rectal:   Deferred   Extremities: No pretibial edema   Pulses: Palpable pedal pulses   Skin: Warm and dry   Lymph nodes:    Neurologic: Alert, oriented to name.  Neglecting her right side.  Some movement of her right lower extremity but not her right upper extremity    Interval:  (shift change)  1a. Level of Consciousness: 0-->Alert, keenly responsive  1b. LOC Questions: 1-->Answers one question correctly  1c. LOC Commands: 0-->Performs both tasks correctly  2. Best Gaze: 0-->Normal  3. Visual: 0-->No visual loss  4. Facial Palsy: 1-->Minor paralysis (flattened nasolabial fold, asymmetry on smiling)  5a. Motor Arm, Left: 1-->Drift, limb holds 90 (or 45) degrees, but drifts down before full 10 seconds, does not hit bed or other support  5b. Motor Arm, Right: 2-->Some effort against gravity, limb cannot get to or maintain (if cued) 90 (or 45) degrees, drifts down to bed, but has some effort against gravity  6a. Motor Leg, Left: 1-->Drift, leg falls by the end of the 5-sec period but does not hit bed  6b. Motor Leg, Right: 3-->No effort against gravity, leg falls to bed immediately  7. Limb Ataxia: 0-->Absent  8. Sensory: 1-->Mild-to-moderate sensory loss, patient feels pinprick is less sharp or is dull on the affected side, or " there is a loss of superficial pain with pinprick, but patient is aware of being touched  9. Best Language: 1-->Mild-to-moderate aphasia, some obvious loss of fluency or facility of comprehension, without significant limitation on ideas expressed or form of expression. Reduction of speech and/or comprehension, however, makes conversation. . . (see row details)  10. Dysarthria: 1-->Mild-to-moderate dysarthria, patient slurs at least some words and, at worst, can be understood with some difficulty  11. Extinction and Inattention (formerly Neglect): 0-->No abnormality    Total (NIH Stroke Scale): 12       Results Review:     I reviewed the patient's new clinical results.   Results from last 7 days   Lab Units 01/09/25  1405 01/09/25  0358 01/08/25  0532 01/07/25  1607   SODIUM mmol/L  --  142 145 139   POTASSIUM mmol/L 4.1 3.5 3.9 4.0   CHLORIDE mmol/L  --  107 110* 101   CO2 mmol/L  --  25.0 19.0* 23.0   BUN mg/dL  --  25* 23 25*   CREATININE mg/dL  --  1.21* 1.15* 1.45*   CALCIUM mg/dL  --  10.1 9.3 9.8   BILIRUBIN mg/dL  --  0.5  --  0.5   ALK PHOS U/L  --  111  --  124*   ALT (SGPT) U/L  --  18  --  25   AST (SGOT) U/L  --  15  --  16   GLUCOSE mg/dL  --  171* 215* 295*     Results from last 7 days   Lab Units 01/09/25  0359 01/08/25  0532 01/07/25  1607   WBC 10*3/mm3 8.30 6.16 6.72   HEMOGLOBIN g/dL 12.0 11.4* 12.3   HEMATOCRIT % 35.2 34.1 37.1   PLATELETS 10*3/mm3 137* 99* 116*         Lab Results   Component Value Date    BLOODCX No growth at 24 hours 01/07/2025     Lab Results   Component Value Date    URINECX 50,000 CFU/mL Mixed Flory Isolated 02/28/2021       I reviewed the patient's new imaging including images and reports.      Narrative:       Right internal carotid artery demonstrates an estimated stenosis at the  upper end of the 50-69% range.    Antegrade right vertebral flow.    Left internal carotid artery is suspected to be occluded distal to the  areas visualized on the study based on the abnormal  waveforms noted  throughout the common and internal carotid areas noted.    Antegrade left vertebral flow.       MRI BRAIN WO CONTRAST    Date of Exam: 1/8/2025 3:45 AM EST    Indication: stroke rule out.     Comparison: MRI of the brain 2/9/2024; CT head 1/7/2025    Technique:  Routine multiplanar/multisequence sequence images of the brain were obtained without contrast administration.      Findings:  There is diffuse generalized atrophy. There are areas of increased T2 and FLAIR signal throughout the bilateral periventricular white matter consistent with chronic microvascular ischemia.    There is a large area of pathologic restricted diffusion in the distribution of the left middle cerebral artery involving areas of the left frontal and temporal lobes and in the left insular region. Another smaller focus of pathologic restricted  diffusion is seen in the high posterior left parietal region. There is no convincing acute fracture. There is absence of the flow-void involving the left internal carotid artery to the level of the carotid terminus where flow is likely retrograde through   the anterior aspect of the [place].   Impression:     Impression:  1.Large area of acute/subacute infarction in the distribution of the left middle cerebral artery.  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  3.Atrophy and chronic microvascular ischemia.            Electronically Signed: John Paul Cortez MD   1/8/2025 5:00 AM EST   Workstation ID: [id number]       All medications reviewed.   aspirin, 81 mg, Oral, Daily  atorvastatin, 80 mg, Oral, Nightly  clopidogrel, 75 mg, Oral, Daily  donepezil, 5 mg, Oral, Nightly  insulin glargine, 15 Units, Subcutaneous, Nightly  insulin lispro, 2-7 Units, Subcutaneous, 4x Daily AC & at Bedtime  ipratropium-albuterol, 3 mL, Nebulization, 4x Daily - RT  mupirocin, 1 Application, Each Nare, BID  pantoprazole, 40 mg, Intravenous, Q AM  senna-docusate sodium, 2 tablet, Oral, BID  sodium  chloride, 10 mL, Intravenous, Q12H          Assessment & Plan       Acute ischemic cerebrovascular accident (CVA) involving internal carotid artery territory    COPD (chronic obstructive pulmonary disease)    Type 2 diabetes mellitus, with long-term current use of insulin    Cirrhosis of liver    Encephalopathy    74-year-old woman, smoker with COPD, hypertension, dyslipidemia, chronic kidney disease, coronary artery disease, Roe cirrhosis, previous atrial fibrillation and prior stroke with residual right upper extremity weakness, bilateral carotid disease who presented with 1 to 2-day onset of worsening confusion, speech changes, gait difficulties.  Imaging revealed occlusion of her left internal carotid artery and stenosis of her right internal carotid artery with a large area of acute and subacute stroke in the left middle cerebral artery distribution.  She has significant weakness on the right.  She was previously taken off anticoagulation because of GI bleeding.  Carotid duplex does confirm occluded left carotid artery with 50 to 60% narrowing of the right.    She has a history of underlying COPD.  She currently has no active wheezing.  She is on room air with a saturation of 97%.    Systolic blood pressures ranging from 130- 200.  Neuro stroke team would like permissive hypertension and therefore she was on phenylephrine at 0.5 mics per kilogram per minute.  This has been stopped and systolic blood pressure is maintaining at 160.  Goal is 140-180.    She has a history of Roe cirrhosis on abdominal imaging from 2023.  Spleen was enlarged at that time.  EGD in 2021 revealed no varices or ulcerative disease.  Colonoscopy in 2021 revealed several polyps, 2 small angio ectasia lesions that were treated with no evidence of active bleeding.    She has a history of diabetes.  Hemoglobin A1c is 8.8.  Glucoses are running 170-400    PLAN:    Yesterday her sister was updated and CODE STATUS was shifted to no CPR, no  intubation  Aspirin, statin, Plavix  Nebulized bronchodilators, as needed  Sliding scale insulin  Increase Lantus 20 units daily  Tomorrow will add meal coverage if blood sugars remain over 200  Permissive hypertension with phenylephrine, goal 140-180  PT, OT  Anticipate discharge to rehab    VTE Prophylaxis:SCDS    Stress Ulcer Prophylaxis: none, start Protonix    Dora Mora MD  01/09/25  15:39 EST      Time: Critical care 30 min  I personally provided care to this critically ill patient as documented above.  Critical care time does not include time spent on separately billed procedures.  None of my critical care time was concurrent with other critical care providers.

## 2025-01-09 NOTE — PROGRESS NOTES
Stroke Progress Note       Chief Complaint:  AMS    Subjective    Subjective     Subjective:  The patient is lying down in the bed in NAD.  No family was at the bedside.  The patient was more awake this morning and was able to talk to this provider in longer sentences and follow most of my commands.  She denies having any new stroke or strokelike symptoms.    No other acute complains at this time    Review of Systems   Constitutional: No fatigue        Objective      Temp:  [98.1 °F (36.7 °C)-99.3 °F (37.4 °C)] 99.2 °F (37.3 °C)  Heart Rate:  [48-70] 56  Resp:  [16-18] 16  BP: (130-195)/(44-79) 177/71    Objective    GEN: lying in bed; in NAD  HENT: normocephalic, non-erythematous oropharynx  CV: no LE edema    NEURO:  Mental Status: The patient is alert and oriented to self but not to place or situation.  She is able to follow most of my commands  Speech: hypophonia and slurred speech still  CN 2-12:  II - PERRLA  III, IV, VI - EOMI  VII - right facial droop  VIII - Auditory acuity grossly intact  XII - Tongue protrudes midline  Motor: The patient can move left upper extremity against gravity.  The patient can also move left lower extremity against gravity.  The patient have no movement in the right upper and lower extremity with possible inadequate effort by the patient to move her leg  Reflexes: negative Phillips's sign BL  Coordination: Deferred  Gait/Station: deferred     Results Review:    I reviewed the patient's new clinical results.    WBC   Date Value Ref Range Status   01/09/2025 8.30 3.40 - 10.80 10*3/mm3 Final     RBC   Date Value Ref Range Status   01/09/2025 4.03 3.77 - 5.28 10*6/mm3 Final     Hemoglobin   Date Value Ref Range Status   01/09/2025 12.0 12.0 - 15.9 g/dL Final     Hematocrit   Date Value Ref Range Status   01/09/2025 35.2 34.0 - 46.6 % Final     MCV   Date Value Ref Range Status   01/09/2025 87.3 79.0 - 97.0 fL Final     MCH   Date Value Ref Range Status   01/09/2025 29.8 26.6 - 33.0 pg  Final     MCHC   Date Value Ref Range Status   01/09/2025 34.1 31.5 - 35.7 g/dL Final     RDW   Date Value Ref Range Status   01/09/2025 13.4 12.3 - 15.4 % Final     RDW-SD   Date Value Ref Range Status   01/09/2025 42.7 37.0 - 54.0 fl Final     MPV   Date Value Ref Range Status   01/09/2025 10.7 6.0 - 12.0 fL Final     Platelets   Date Value Ref Range Status   01/09/2025 137 (L) 140 - 450 10*3/mm3 Final     Neutrophil %   Date Value Ref Range Status   01/09/2025 64.7 42.7 - 76.0 % Final     Lymphocyte %   Date Value Ref Range Status   01/09/2025 24.0 19.6 - 45.3 % Final     Monocyte %   Date Value Ref Range Status   01/09/2025 9.0 5.0 - 12.0 % Final     Eosinophil %   Date Value Ref Range Status   01/09/2025 1.6 0.3 - 6.2 % Final     Basophil %   Date Value Ref Range Status   01/09/2025 0.6 0.0 - 1.5 % Final     Immature Grans %   Date Value Ref Range Status   01/09/2025 0.1 0.0 - 0.5 % Final     Neutrophils, Absolute   Date Value Ref Range Status   01/09/2025 5.37 1.70 - 7.00 10*3/mm3 Final     Lymphocytes, Absolute   Date Value Ref Range Status   01/09/2025 1.99 0.70 - 3.10 10*3/mm3 Final     Monocytes, Absolute   Date Value Ref Range Status   01/09/2025 0.75 0.10 - 0.90 10*3/mm3 Final     Eosinophils, Absolute   Date Value Ref Range Status   01/09/2025 0.13 0.00 - 0.40 10*3/mm3 Final     Basophils, Absolute   Date Value Ref Range Status   01/09/2025 0.05 0.00 - 0.20 10*3/mm3 Final     Immature Grans, Absolute   Date Value Ref Range Status   01/09/2025 0.01 0.00 - 0.05 10*3/mm3 Final     nRBC   Date Value Ref Range Status   01/09/2025 0.0 0.0 - 0.2 /100 WBC Final       Lab Results   Component Value Date    GLUCOSE 171 (H) 01/09/2025    BUN 25 (H) 01/09/2025    CREATININE 1.21 (H) 01/09/2025     01/09/2025    K 3.5 01/09/2025     01/09/2025    CALCIUM 10.1 01/09/2025    PROTEINTOT 6.6 01/09/2025    ALBUMIN 3.8 01/09/2025    ALT 18 01/09/2025    AST 15 01/09/2025    ALKPHOS 111 01/09/2025    BILITOT 0.5  01/09/2025    GLOB 2.8 01/09/2025    AGRATIO 1.4 01/09/2025    BCR 20.7 01/09/2025    ANIONGAP 10.0 01/09/2025    EGFR 47.7 (L) 01/09/2025     MRI Brain Without Contrast    Result Date: 1/8/2025  Impression: 1.Large area of acute/subacute infarction in the distribution of the left middle cerebral artery. 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 3.Atrophy and chronic microvascular ischemia. Electronically Signed: John Paul Cortez MD  1/8/2025 5:00 AM EST  Workstation ID: [id number]    XR Abdomen KUB    Result Date: 1/8/2025  Impression: No radiopaque foreign body or spinal stimulator to prevent MRI. Electronically Signed: John Paul Cortez MD  1/8/2025 3:24 AM EST  Workstation ID: [id number]    XR Chest 1 View    Result Date: 1/7/2025  Impression: No acute cardiopulmonary abnormality. Electronically Signed: Ezequiel Javier  1/7/2025 5:31 PM EST  Workstation ID: [id number]    CT Angiogram Head w AI Analysis of LVO    Result Date: 1/7/2025  1.Complete occlusion of the extracranial left internal carotid artery which extends to the ophthalmic segment of the left internal carotid artery. The ophthalmic artery remains patent. This is new compared with the examination performed on 2/8/2024. This  is likely filled via retrograde flow from the [place] and a the anterior communicating artery. 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 4.Additional vascular and nonvascular findings as described above. 5.Stable 0.5 cm subpleural right apical pulmonary nodule on image #88. Findings were discussed with Dr. Kraus at 4:47 p.m. on 1/7/2025 Electronically Signed: Anthony Dumont MD  1/7/2025 4:49 PM EST  Workstation ID: [id number]    CT Angiogram Neck    Result Date: 1/7/2025  1.Complete  occlusion of the extracranial left internal carotid artery which extends to the ophthalmic segment of the left internal carotid artery. The ophthalmic artery remains patent. This is new compared with the examination performed on 2/8/2024. This  is likely filled via retrograde flow from the [place] and a the anterior communicating artery. 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 4.Additional vascular and nonvascular findings as described above. 5.Stable 0.5 cm subpleural right apical pulmonary nodule on image #88. Findings were discussed with Dr. Kraus at 4:47 p.m. on 1/7/2025 Electronically Signed: Anthony Dumont MD  1/7/2025 4:49 PM EST  Workstation ID: [id number]    CT Head Without Contrast    Result Date: 1/7/2025  Impression: 1.No acute intracranial abnormality identified. 2.Chronic left frontal infarct. Chronic left gangliocapsular and right pontine lacunar infarcts. 3.Diffuse brain atrophy with chronic small vessel ischemia. Electronically Signed: Dave Shine MD  1/7/2025 4:02 PM EST  Workstation ID: [id number]   Results for orders placed during the hospital encounter of 02/08/24    Adult Transthoracic Echo Complete W/ Cont if Necessary Per Protocol (With Agitated Saline)    Interpretation Summary    Left ventricular systolic function is normal. Calculated left ventricular EF = 59.2% Left ventricular ejection fraction appears to be 61 - 65%.    Saline test results are negative for right to left atrial level shunt.    Estimated right ventricular systolic pressure from tricuspid regurgitation is normal (<35 mmHg). Calculated right ventricular systolic pressure from tricuspid regurgitation is 21 mmHg.    -[facility] wo on 1-7-25 images were personally reviewed and  showed no acute ischemic or hemorrhagic stroke  -CTA of the head and neck images from 1-7-25 were personally reviewed and showed no flow limiting stenosis. There is an occlusion of the cervical L ICA with distal reconstitution of the L MCA through the Acom   -MRI brain images from 1-8-25 were personally reviewed and showed an acute/subacute ischemic stroke affecting the L MCA territory likely watershed infarct in the setting of her chronic looking L ICA occlusion   -Transthoracic echocardiogram is pending  -A1c from 1/8/2025 was 8.8%  -LDL from 1/8/2025 was 32      Assessment/Plan     74 year old  female with multiple vascular risk factors who presented [facility] emergency department via EMS for complaints of altered mental status, reported left-sided weakness, and aphasia.  Need to be an appropriate IV thrombolytic therapy candidate secondary to extended last known well.  Not deemed to be an appropriate emergent endovascular therapy candidate as any potential benefit would be outweighed by significant risk.     Antiplatelet PTA: Aspirin  Anticoagulant PTA: None        #Altered Mental Status  #Reported Left Sided Weakness   #Aphasia  #Acute/subacute ischemic stroke affecting the L MCA territory  -Mechanism is likely watershed in the setting of chronically looking L ICA occlusion  -[facility] wo on 1-7-25 images were personally reviewed and showed no acute ischemic or hemorrhagic stroke  -CTA of the head and neck images from 1-7-25 were personally reviewed and showed no flow limiting stenosis. There is an occlusion of the cervical L ICA with distal reconstitution of the L MCA through the Acom   -MRI brain images from 1-8-25 were personally reviewed and showed an acute/subacute ischemic stroke affecting the L MCA territory likely watershed infarct in the setting of her chronic looking L ICA occlusion   -Transthoracic echocardiogram is pending  -A1c from 1/8/2025 was 8.8%  -LDL from 1/8/2025 was  32      Recs:  -Avoid hypotension with goal -180 to allow for adequate perfusion, overall management per ICU medicine team  -Continue aspirin 81 mg daily for secondary stroke prevention  -Continue Plavix 75 mg daily for secondary stroke prevention.  We will continue to trend platelet and check for any GI bleed given prior concern for thrombocytopenia and GI bleed  -Recommend GI consult to address the ongoing chronic anemia in the setting of possible upper GI bleed secondary to her liver failure/cirrhosis  -PT/OT/SLP to see and assess when appropriate  -Bilateral carotid duplex ultrasound to evaluate for the severity of carotid stenosis/occlusion  -NIHSS neurochecks per protocol  -CT head for any neurologic decline      Stroke will continue to follow. Please call for any further questions or concerns  =================================  Elvis Rivera MD, Msc, PhD  Vascular Neurologist  [facility]

## 2025-01-09 NOTE — CONSULTS
HEMATOLOGY/ONCOLOGY INPATIENT CONSULTATION      REFERRING PHYSICIAN: Javier Caal MD    PRIMARY CARE PROVIDER: Romaine Eugene MD    REASON FOR CONSULTATION: I was asked to see the patient to determine safety of antiplatelet therapy in a patient with mild thrombocytopenia and history of bleeding from antiplatelet therapy.      HISTORY OF PRESENT ILLNESS: 74-year-old lady who presents with progressive strokes over time.  This time she presents with altered mental status that has not improved.  She has had weakness with residual right upper extremity weakness.  In the past she had been on antiplatelet therapy that has supposedly resulted in bleeding.  She has had a CAT scan in 2023 that showed her to have findings suggestive of cirrhosis with a nodular liver.  She had had an EGD with Dr. Davila in 2021 that did not show any varices.  She is currently on aspirin and Plavix.      Allergies   Allergen Reactions    Codeine GI Intolerance       Past Medical History:   Diagnosis Date    Acid reflux     Anxiety     Cataracts, bilateral     Cirrhosis of liver     Constipation     COPD (chronic obstructive pulmonary disease)     Coronary artery disease     CTS (carpal tunnel syndrome)     Diabetes     Hearing loss     Hypercholesteremia     Hypertension     Impaired functional mobility, balance, gait, and endurance     Lower back pain     Osteoarthritis     Stroke     2013-weak in right arm    Tattoos     x2    Tobacco abuse     Tumor     in between breast closer to right breast    Vitamin B12 deficiency     Wears dentures     upper only    Wears glasses          Current Facility-Administered Medications:     acetaminophen (TYLENOL) tablet 650 mg, 650 mg, Oral, Q4H PRN **OR** [DISCONTINUED] acetaminophen (TYLENOL) suppository 650 mg, 650 mg, Rectal, Q4H PRN, Hamlet Steele, AUBREY, 650 mg at 01/07/25 2238    aspirin chewable tablet 81 mg, 81 mg, Oral, Daily, Miladis Blackmon APRN, 81 mg at 01/09/25 0804     atorvastatin (LIPITOR) tablet 80 mg, 80 mg, Oral, Nightly, Miladis Blackmon APRN, 80 mg at 01/08/25 2012    sennosides-docusate (PERICOLACE) 8.6-50 MG per tablet 2 tablet, 2 tablet, Oral, BID, 2 tablet at 01/09/25 0805 **AND** polyethylene glycol (MIRALAX) packet 17 g, 17 g, Oral, Daily PRN **AND** bisacodyl (DULCOLAX) EC tablet 5 mg, 5 mg, Oral, Daily PRN **AND** bisacodyl (DULCOLAX) suppository 10 mg, 10 mg, Rectal, Daily PRN, Hamlet Steele APRN    Calcium Replacement - Follow Nurse / BPA Driven Protocol, , Not Applicable, PRN, Hamlet Steele APRN    clopidogrel (PLAVIX) tablet 75 mg, 75 mg, Oral, Daily, Elvis Rivera MD, 75 mg at 01/09/25 0805    dextrose (D50W) (25 g/50 mL) IV injection 25 g, 25 g, Intravenous, Q15 Min PRN, Sally Alegria APRN    dextrose (GLUTOSE) oral gel 15 g, 15 g, Oral, Q15 Min PRN, Sally Alegria APRN    donepezil (ARICEPT) tablet 5 mg, 5 mg, Oral, Nightly, Hamlet Steele APRN, 5 mg at 01/08/25 2012    glucagon (GLUCAGEN) injection 1 mg, 1 mg, Intramuscular, Q15 Min PRN, Sally Alegria APRN    insulin glargine (LANTUS, SEMGLEE) injection 20 Units, 20 Units, Subcutaneous, Nightly, Dora Mora MD    Insulin Lispro (humaLOG) injection 2-7 Units, 2-7 Units, Subcutaneous, 4x Daily AC & at Bedtime, Dora Mora MD, 6 Units at 01/09/25 1152    ipratropium-albuterol (DUO-NEB) nebulizer solution 3 mL, 3 mL, Nebulization, Q4H PRN, Sally Alegria APRN    ipratropium-albuterol (DUO-NEB) nebulizer solution 3 mL, 3 mL, Nebulization, 4x Daily - RT, Sally Alegria, AUBREY, 3 mL at 01/09/25 1204    Magnesium Cardiology Dose Replacement - Follow Nurse / BPA Driven Protocol, , Not Applicable, PRN, Hamlet Steele APRN    mupirocin (BACTROBAN) 2 % nasal ointment 1 Application, 1 Application, Each Nare, BID, Hamlet Steele APRN, 1 Application at 01/09/25 0805    nitroglycerin (NITROSTAT) SL tablet 0.4 mg, 0.4 mg, Sublingual, Q5  Min Ivette DAVISON Nicholas J, APRN    [START ON 1/10/2025] pantoprazole (PROTONIX) EC tablet 40 mg, 40 mg, Oral, Q AM, Dora Mora MD    Phosphorus Replacement - Follow Nurse / BPA Driven Protocol, , Not Applicable, Ivette DAVISON Nicholas J, APRN    Potassium Replacement - Follow Nurse / BPA Driven Protocol, , Not Applicable, Ivette DAVISON Nicholas J, APRN    sodium chloride 0.9 % flush 10 mL, 10 mL, Intravenous, Q12H, Miladis Blackmon APRN, 10 mL at 01/09/25 0805    sodium chloride 0.9 % flush 10 mL, 10 mL, Intravenous, PRN, Miladis Blackmon APRDEBRA    sodium chloride 0.9 % infusion 40 mL, 40 mL, Intravenous, PRN, Miladis Blackmon APRN    Past Surgical History:   Procedure Laterality Date    BREAST BIOPSY Right 5/10/2019    Procedure: BREAST BIOPSY RIGHT;  Surgeon: Kiana Frey MD;  Location: [facility] OR;  Service: General    CARPAL TUNNEL RELEASE Bilateral     CHOLECYSTECTOMY      COLONOSCOPY N/A 9/30/2021    Procedure: COLONOSCOPY WITH POLYPECTOMY AND ABLATION OF AVM;  Surgeon: Russell Davila MD;  Location: [facility] ENDOSCOPY;  Service: Gastroenterology;  Laterality: N/A;    CORONARY ANGIOPLASTY WITH STENT PLACEMENT  2012    ENDOSCOPY N/A 9/29/2021    Procedure: ESOPHAGOGASTRODUODENOSCOPY with biopsies;  Surgeon: Russell Davila MD;  Location: [facility] ENDOSCOPY;  Service: Gastroenterology;  Laterality: N/A;    ENTEROSCOPY SMALL BOWEL N/A 11/16/2021    Procedure: ESOPHAGOGASTRODUODENOSCOPY WITH SMALL BOWEL ENTEROSCOPY and biopsy;  Surgeon: Russell Davila MD;  Location: [facility] ENDOSCOPY;  Service: Gastroenterology;  Laterality: N/A;    HYSTERECTOMY      complete    JOINT REPLACEMENT Bilateral     knee replacements    TOTAL KNEE ARTHROPLASTY Bilateral 10/18/2016    MAUREEN Palomares MD       Social History     Socioeconomic History    Marital status:    Tobacco Use    Smoking status: Every Day     Current packs/day: 1.00     Average packs/day: 1 pack/day for 46.0 years (46.0 ttl  pk-yrs)     Types: Cigarettes    Smokeless tobacco: Never   Vaping Use    Vaping status: Never Used   Substance and Sexual Activity    Alcohol use: No    Drug use: No    Sexual activity: Defer       Family History   Problem Relation Age of Onset    Hypertension Other     Diabetes Mother     Hypertension Mother     Cancer Mother     Diabetes Father     Hypertension Father     Heart disease Father     Cancer Sister     Cancer Brother        Oncology/Hematology History    No history exists.         REVIEW OF SYSTEMS:  A 14 point review of systems was performed and is negative except as noted below.    Review of Systems   Unable to perform ROS: Mental status change         Objective     Vitals:    01/09/25 1204 01/09/25 1300 01/09/25 1400 01/09/25 1500   BP:  147/62 156/64 (!) 160/130   BP Location:       Patient Position:       Pulse: 55 57 69 63   Resp: 16      Temp:       TempSrc:       SpO2: 95% 98% 95% 95%   Weight:       Height:                       Temp:  [98.7 °F (37.1 °C)-99.3 °F (37.4 °C)] 99 °F (37.2 °C)     Performance Status: 3    Physical Exam    General: well appearing female very confused  HEENT: sclerae anicteric,   Lymphatics: no cervical, supraclavicular, or axillary adenopathy  Cardiovascular: regular rate and rhythm, no murmurs  Lungs: clear to auscultation bilaterally  Abdomen: soft, nontender, nondistended.  No palpable organomegaly  Extremities: Right upper extremity weakness  Skin: no rashes, lesions, bruising, or petechiae      LABS:    Lab Results   Component Value Date    HGB 12.0 01/09/2025    HCT 35.2 01/09/2025    MCV 87.3 01/09/2025     (L) 01/09/2025    WBC 8.30 01/09/2025    NEUTROABS 5.37 01/09/2025    LYMPHSABS 1.99 01/09/2025    MONOSABS 0.75 01/09/2025    EOSABS 0.13 01/09/2025    BASOSABS 0.05 01/09/2025     Lab Results   Component Value Date    GLUCOSE 171 (H) 01/09/2025    BUN 25 (H) 01/09/2025    CREATININE 1.21 (H) 01/09/2025     01/09/2025    K 4.1 01/09/2025      01/09/2025    CO2 25.0 01/09/2025    CALCIUM 10.1 01/09/2025    PROTEINTOT 6.6 01/09/2025    ALBUMIN 3.8 01/09/2025    BILITOT 0.5 01/09/2025    ALKPHOS 111 01/09/2025    AST 15 01/09/2025    ALT 18 01/09/2025         IMAGING    Duplex Carotid Ultrasound CAR    Result Date: 1/9/2025    Right internal carotid artery demonstrates an estimated stenosis at the upper end of the 50-69% range.   Antegrade right vertebral flow.   Left internal carotid artery is suspected to be occluded distal to the areas visualized on the study based on the abnormal waveforms noted throughout the common and internal carotid areas noted.   Antegrade left vertebral flow.     SLP FEES - Fiberoptic Endo Eval Swallow    Result Date: 1/8/2025  This procedure was auto-finalized with no dictation required.    MRI Brain Without Contrast    Result Date: 1/8/2025  MRI BRAIN WO CONTRAST Date of Exam: 1/8/2025 3:45 AM EST Indication: stroke rule out.  Comparison: MRI of the brain 2/9/2024; CT head 1/7/2025 Technique:  Routine multiplanar/multisequence sequence images of the brain were obtained without contrast administration. Findings: There is diffuse generalized atrophy. There are areas of increased T2 and FLAIR signal throughout the bilateral periventricular white matter consistent with chronic microvascular ischemia. There is a large area of pathologic restricted diffusion in the distribution of the left middle cerebral artery involving areas of the left frontal and temporal lobes and in the left insular region. Another smaller focus of pathologic restricted diffusion is seen in the high posterior left parietal region. There is no convincing acute fracture. There is absence of the flow-void involving the left internal carotid artery to the level of the carotid terminus where flow is likely retrograde through  the anterior aspect of the [place].     Impression: 1.Large area of acute/subacute infarction in the distribution of the left  middle cerebral artery. 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 3.Atrophy and chronic microvascular ischemia. Electronically Signed: John Paul Cortez MD  1/8/2025 5:00 AM EST  Workstation ID: [id number]    XR Abdomen KUB    Result Date: 1/8/2025  XR ABDOMEN KUB Date of Exam: 1/8/2025 1:32 AM EST Indication: For MRI clearance Comparison: None available. Findings: There are clips from cholecystectomy. There are clips in the pelvis. The bowel gas pattern is nonspecific. There is no radiopaque foreign body, spinal stimulator or structures to prevent MRI. Review of the patient's chest radiograph demonstrates no pacemaker device or foreign body. Review of the patient's head CT fails to demonstrate a cochlear implant or orbital metal.     Impression: No radiopaque foreign body or spinal stimulator to prevent MRI. Electronically Signed: John Paul Cortez MD  1/8/2025 3:24 AM EST  Workstation ID: [id number]    XR Chest 1 View    Result Date: 1/7/2025  XR CHEST 1 VW Date of Exam: 1/7/2025 5:00 PM EST Indication: Altered mental status Comparison: Chest radiograph dated 2/8/2024 Findings: The cardiomediastinal silhouette is within normal limits. Coronary artery calcifications. Pulmonary vascularity appears normal. There is no focal airspace consolidation, pleural effusion, or pneumothorax. There are degenerative changes of the thoracic spine.     Impression: No acute cardiopulmonary abnormality. Electronically Signed: Ezequiel Shethelor  1/7/2025 5:31 PM EST  Workstation ID: [id number]    CT Angiogram Head w AI Analysis of LVO    Result Date: 1/7/2025  CT ANGIOGRAM HEAD W AI ANALYSIS OF LVO, CT ANGIOGRAM NECK Date of Exam: 1/7/2025 3:44 PM EST Indication: stroke. Comparison: 2/9/2024 Technique: CTA of the head and neck was performed after the uneventful intravenous administration of 75 cc Isovue-370 IV contrast . Reconstructed coronal and sagittal images were also obtained. In addition, a 3-D volume rendered image was created for  interpretation. Automated exposure control and iterative reconstruction methods were used. Findings: Mild atheromatous disease of the aortic arch and origins of the great vessels. Severe atheromatous disease of the distal right common carotid artery through the right carotid bulb extending into the proximal right internal carotid artery. There  is minimal residual flow noted within the proximal right internal carotid artery with a residual vessel lumen diameter of approximately 1 mm and a native vessel lumen diameter of consistent with at least 80% stenosis per NASCET criteria. Severe atheromatous disease involving the intracranial segments of the right internal carotid artery. The right carotid terminus is normal. The visualized branches of the right anterior and middle cerebral arteries are normal. Moderate atheromatous disease of the distal left common carotid artery with severe atheromatous disease of the left carotid bulb and proximal left internal carotid artery. Minimal residual vessel luminal diameter of 2 mm of the proximal left internal carotid artery with a native vessel lumen diameter of 4 mm consistent with 50% stenosis. 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 This is likely filled via retrograde flow from the [place] and a the anterior communicating artery. The left carotid terminus is normal. 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 The visualized branches of the left anterior and middle cerebral arteries appear patent. Both vertebral arteries arise from the subclavian arteries. The vertebral  arteries are codominant. Minimal atheromatous disease of the vertebral arteries. Both vertebral arteries supply the basilar artery. The basilar artery and basilar artery tip are normal. The basilar artery terminates in bilateral posterior cerebral arteries which appear normal. The intracranial venous sinuses are patent. No intracranial hemorrhage is noted. Prior bilateral lens replacements. The paranasal sinuses are clear. The mastoid air cells are aerated. The nasopharynx is clear. No adenopathy. Subcentimeter thyroid nodules. 0.5 cm subpleural right apical pulmonary nodule on image #88. This is stable compared with the prior study. The visualized clavicles are intact. The visualized superior ribs are intact. Degenerative changes of the cervical spine.     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 The ophthalmic artery remains patent. This is new compared with the examination performed on 2/8/2024. This  is likely filled via retrograde flow from the [place] and a the anterior communicating artery. 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 4.Additional vascular and nonvascular findings as described above. 5.Stable 0.5 cm subpleural right apical pulmonary nodule on image #88. Findings were discussed with Dr. Kraus at 4:47 p.m. on 1/7/2025 Electronically Signed: Anthony Dumont MD  1/7/2025 4:49 PM EST  Workstation ID: [id number]    CT Angiogram Neck    Result Date: 1/7/2025  CT ANGIOGRAM HEAD W AI ANALYSIS OF LVO, CT ANGIOGRAM NECK Date of Exam: 1/7/2025 3:44 PM EST Indication: stroke. Comparison: 2/9/2024 Technique: CTA of the head and neck was performed after the  uneventful intravenous administration of 75 cc Isovue-370 IV contrast . Reconstructed coronal and sagittal images were also obtained. In addition, a 3-D volume rendered image was created for interpretation. Automated exposure control and iterative reconstruction methods were used. Findings: Mild atheromatous disease of the aortic arch and origins of the great vessels. Severe atheromatous disease of the distal right common carotid artery through the right carotid bulb extending into the proximal right internal carotid artery. There  is minimal residual flow noted within the proximal right internal carotid artery with a residual vessel lumen diameter of approximately 1 mm and a native vessel lumen diameter of consistent with at least 80% stenosis per NASCET criteria. Severe atheromatous disease involving the intracranial segments of the right internal carotid artery. The right carotid terminus is normal. The visualized branches of the right anterior and middle cerebral arteries are normal. Moderate atheromatous disease of the distal left common carotid artery with severe atheromatous disease of the left carotid bulb and proximal left internal carotid artery. Minimal residual vessel luminal diameter of 2 mm of the proximal left internal carotid artery with a native vessel lumen diameter of 4 mm consistent with 50% stenosis. 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 This is likely filled via retrograde flow from the [place] and a the anterior communicating artery. The left carotid terminus is normal. 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 The visualized branches of the left anterior and middle cerebral arteries appear patent. Both vertebral arteries arise from the subclavian arteries. The vertebral arteries are codominant. Minimal atheromatous disease of the vertebral arteries. Both vertebral arteries supply the basilar artery. The basilar artery and basilar artery tip are normal. The basilar artery terminates in bilateral posterior cerebral arteries which appear normal. The intracranial venous sinuses are patent. No intracranial hemorrhage is noted. Prior bilateral lens replacements. The paranasal sinuses are clear. The mastoid air cells are aerated. The nasopharynx is clear. No adenopathy. Subcentimeter thyroid nodules. 0.5 cm subpleural right apical pulmonary nodule on image #88. This is stable compared with the prior study. The visualized clavicles are intact. The visualized superior ribs are intact. Degenerative changes of the cervical spine.     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 The ophthalmic artery remains patent. This is new compared with the examination performed on 2/8/2024. This  is likely filled via retrograde flow from the [place] and a the anterior communicating artery. 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 4.Additional vascular and nonvascular findings as described above. 5.Stable 0.5 cm subpleural right apical pulmonary nodule on image #88. Findings were discussed with Dr. Kraus at 4:47 p.m. on 1/7/2025 Electronically Signed: Anthony Dumont MD  1/7/2025 4:49 PM EST  Workstation ID: [id number]    CT Head Without Contrast    Result Date:  1/7/2025  CT HEAD WO CONTRAST Date of Exam: 1/7/2025 3:35 PM EST Indication: stroke. Comparison: None available. Technique: Axial CT images were obtained of the head without contrast administration.  Automated exposure control and iterative construction methods were used. Findings: Motion artifact limits exam. There is no evidence of hemorrhage. There is no mass effect or midline shift. There is diffuse brain atrophy. Periventricular hypodense areas compatible with chronic small vessel ischemia. There is a chronic left frontal infarct with encephalomalacia and  volume loss. Also chronic left gangliocapsular lacunar infarct. Tiny chronic right pontine lacunar infarct There is no extracerebral collection. Ventricles are normal in size and configuration for patient's stated age. Posterior fossa is within normal limits. Calvarium and skull base appear intact.  Visualized sinuses show no air fluid levels. Visualized orbits are unremarkable.     Impression: 1.No acute intracranial abnormality identified. 2.Chronic left frontal infarct. Chronic left gangliocapsular and right pontine lacunar infarcts. 3.Diffuse brain atrophy with chronic small vessel ischemia. Electronically Signed: Dave Shine MD  1/7/2025 4:02 PM EST  Workstation ID: [id number]      ASSESSMENT/PLAN:    1.  Thrombocytopenia likely related to liver disease more than ITP.  Unfortunately she has no good options.  She requires aspirin and Plavix to limit her stroke risk.  This in turn leads to increased bleeding risk from her gastropathy related to her liver disease.  Unfortunately this the risk think she is going to have to take.  There is going to be an increased bleeding risk from the GI tract to dual therapy with aspirin and Plavix.  She did have in the past small angioectasias that were treated and hopefully that we will limit the bleeding from occurring.    2.  Multiple strokes with significant mentation changes.  Still with residual right upper  extremity weakness.  Patient fairly confused at this time.        Camila Hernandez MD    1/9/2025

## 2025-01-09 NOTE — PLAN OF CARE
Problem: Adult Inpatient Plan of Care  Goal: Absence of Hospital-Acquired Illness or Injury  Intervention: Identify and Manage Fall Risk  Recent Flowsheet Documentation  Taken 1/9/2025 0600 by Lynette Chirinos RN  Safety Promotion/Fall Prevention:   safety round/check completed   room organization consistent   lighting adjusted   fall prevention program maintained   clutter free environment maintained  Taken 1/9/2025 0400 by Lynette Chirinos RN  Safety Promotion/Fall Prevention:   safety round/check completed   room organization consistent   lighting adjusted   fall prevention program maintained   clutter free environment maintained  Taken 1/9/2025 0200 by Lynette Chirinos RN  Safety Promotion/Fall Prevention:   safety round/check completed   room organization consistent   lighting adjusted   fall prevention program maintained   clutter free environment maintained  Taken 1/9/2025 0000 by Lynette Chirinos RN  Safety Promotion/Fall Prevention:   safety round/check completed   room organization consistent   lighting adjusted   fall prevention program maintained   clutter free environment maintained  Taken 1/8/2025 2200 by Lynette Chirinos RN  Safety Promotion/Fall Prevention:   safety round/check completed   room organization consistent   lighting adjusted   fall prevention program maintained   clutter free environment maintained  Taken 1/8/2025 2000 by Lynette Chirinos RN  Safety Promotion/Fall Prevention:   safety round/check completed   room organization consistent   lighting adjusted   fall prevention program maintained   clutter free environment maintained     Problem: Comorbidity Management  Goal: Blood Pressure in Desired Range  Outcome: Progressing     Problem: Adult Inpatient Plan of Care  Goal: Optimal Comfort and Wellbeing  Outcome: Progressing   Goal Outcome Evaluation:

## 2025-01-09 NOTE — CONSULTS
"[facility]    Referring Provider: Crispin Mora MD    PCP: Romaine Eugene MD    Reason for Consultation: \"Help addressing chronic GI bleed/anemia \"    Chief complaint: Altered mental status    History of present illness:    Karol Lopez is a 74 y.o. female who is admitted with altered mental status and hemiparesis with aphasia.  She is found to have acute/subacute ischemic stroke involving the left MCA territory.  Imaging reveals chronic LICA occlusion.  She has been placed on aspirin and Plavix.  She is noted to have chronic thrombocytopenia and a history of GI bleeding.    Patient had acute anemia in 2021 without apparent overt signs of bleeding.  She underwent endoscopic exam with Dr. Davila in [place].  Upper endoscopy on 9/29/2021 was normal.  Colonoscopy on 9/30/2021 revealed a few nonbleeding AVMs at the hepatic flexure which were thermally ablated.  Bowel prep was poor.  Push enteroscopy on 11/16/2021 was normal.  The patient had been on dual antiplatelet therapy with Brilinta/aspirin for history of stroke.  Brilinta was held around December 2021, with no further problems with severe anemia.  The patient was diagnosed with cirrhosis in 2021 based upon CT scan findings of nodular liver and borderline splenomegaly.  Workup included hepatitis panel and JAMEL which were negative.  She was found to have immunity to hepatitis B but not to hepatitis A.  Unknown if she received vaccine for hepatitis A.  In reviewing PCP notes from October 2024, the patient declined follow-up with gastroenterology \"at this time\".    Allergies:  Codeine    Scheduled Meds:  aspirin, 81 mg, Oral, Daily  atorvastatin, 80 mg, Oral, Nightly  clopidogrel, 75 mg, Oral, Daily  donepezil, 5 mg, Oral, Nightly  insulin glargine, 20 Units, Subcutaneous, Nightly  insulin lispro, 2-7 Units, Subcutaneous, 4x Daily AC & at Bedtime  ipratropium-albuterol, 3 mL, Nebulization, 4x Daily - RT  mupirocin, 1 Application, Each " "Nare, BID  [START ON 1/10/2025] pantoprazole, 40 mg, Oral, Q AM  senna-docusate sodium, 2 tablet, Oral, BID  sodium chloride, 10 mL, Intravenous, Q12H         Infusions:       PRN Meds:    acetaminophen **OR** [DISCONTINUED] acetaminophen    senna-docusate sodium **AND** polyethylene glycol **AND** bisacodyl **AND** bisacodyl    Calcium Replacement - Follow Nurse / BPA Driven Protocol    dextrose    dextrose    glucagon (human recombinant)    ipratropium-albuterol    Magnesium Cardiology Dose Replacement - Follow Nurse / BPA Driven Protocol    nitroglycerin    Phosphorus Replacement - Follow Nurse / BPA Driven Protocol    Potassium Replacement - Follow Nurse / BPA Driven Protocol    sodium chloride    sodium chloride    Home Meds:  Medications Prior to Admission   Medication Sig Dispense Refill Last Dose/Taking    aspirin 81 MG chewable tablet Chew 1 tablet Daily.       B-D INS SYRINGE 0.5CC/31GX5/16 31G X 5/16\" 0.5 ML misc use as directed once daily  0     bisacodyl (DULCOLAX) 5 MG EC tablet Take 1 tablet by mouth Daily As Needed.       clopidogrel (Plavix) 75 MG tablet Take 1 tablet by mouth Daily. 30 tablet 1     empagliflozin (Jardiance) 10 MG tablet tablet Take 1 tablet by mouth Daily. 90 tablet 3     ferrous sulfate (FerrouSul) 325 (65 FE) MG tablet Take 1 tablet by mouth Daily With Breakfast. 30 tablet 3     furosemide (LASIX) 20 MG tablet Take 1 tablet by mouth Daily As Needed. swelling       HYDROcodone-acetaminophen (NORCO) 5-325 MG per tablet Take 1 tablet by mouth Every 8 (Eight) Hours As Needed (PRN PAIN). 30 tablet 0     Insulin Pen Needle 31G X 6 MM misc 1 each.       Insulin Syringe-Needle U-100 30G X 5/16\" 0.5 ML misc 1 each by Does not apply route daily       Insulin Syringe-Needle U-100 31G X 5/16\" 1 ML misc 1 each by Does not apply route daily       lactulose (CHRONULAC) 10 GM/15ML solution Take 15 mL by mouth Daily.       linaclotide (LINZESS) 290 MCG capsule capsule Take 1 capsule by mouth " Every Morning Before Breakfast.       linagliptin (TRADJENTA) 5 MG tablet tablet Take 1 tablet by mouth Daily. 30 tablet      metFORMIN (GLUCOPHAGE) 1000 MG tablet Take 1 tablet by mouth 2 (Two) Times a Day With Meals.       metoprolol succinate XL (TOPROL-XL) 25 MG 24 hr tablet TAKE 1 TABLET BY MOUTH DAILY 90 tablet 3     pantoprazole (PROTONIX) 40 MG EC tablet TAKE 1 TABLET BY MOUTH TWICE DAILY BEFORE MEALS 180 tablet 3     rosuvastatin (CRESTOR) 20 MG tablet Take 1 tablet by mouth Daily. 90 tablet 3     umeclidinium-vilanterol (Anoro Ellipta) 62.5-25 MCG/INH aerosol powder  inhaler Inhale 1 puff As Needed.       vitamin B-12 (CYANOCOBALAMIN) 1000 MCG tablet Take 1 tablet by mouth Daily.          ROS: Review of Systems   Gastrointestinal:  Positive for constipation. Negative for abdominal pain and blood in stool.   Musculoskeletal:  Positive for back pain.       PAST MED HX:  Past Medical History:   Diagnosis Date    Acid reflux     Anxiety     Cataracts, bilateral     Cirrhosis of liver     Constipation     COPD (chronic obstructive pulmonary disease)     Coronary artery disease     CTS (carpal tunnel syndrome)     Diabetes     Hearing loss     Hypercholesteremia     Hypertension     Impaired functional mobility, balance, gait, and endurance     Lower back pain     Osteoarthritis     Stroke     2013-weak in right arm    Tattoos     x2    Tobacco abuse     Tumor     in between breast closer to right breast    Vitamin B12 deficiency     Wears dentures     upper only    Wears glasses        PAST SURG HX:  Past Surgical History:   Procedure Laterality Date    BREAST BIOPSY Right 5/10/2019    Procedure: BREAST BIOPSY RIGHT;  Surgeon: Kiana Frey MD;  Location: [facility] OR;  Service: General    CARPAL TUNNEL RELEASE Bilateral     CHOLECYSTECTOMY      COLONOSCOPY N/A 9/30/2021    Procedure: COLONOSCOPY WITH POLYPECTOMY AND ABLATION OF AVM;  Surgeon: Russell Davila MD;  Location: [facility] ENDOSCOPY;  Service:  "Gastroenterology;  Laterality: N/A;    CORONARY ANGIOPLASTY WITH STENT PLACEMENT  2012    ENDOSCOPY N/A 9/29/2021    Procedure: ESOPHAGOGASTRODUODENOSCOPY with biopsies;  Surgeon: Russell Davila MD;  Location: [facility] ENDOSCOPY;  Service: Gastroenterology;  Laterality: N/A;    ENTEROSCOPY SMALL BOWEL N/A 11/16/2021    Procedure: ESOPHAGOGASTRODUODENOSCOPY WITH SMALL BOWEL ENTEROSCOPY and biopsy;  Surgeon: Russell Davila MD;  Location: [facility] ENDOSCOPY;  Service: Gastroenterology;  Laterality: N/A;    HYSTERECTOMY      complete    JOINT REPLACEMENT Bilateral     knee replacements    TOTAL KNEE ARTHROPLASTY Bilateral 10/18/2016    MAUREEN Palomares MD       FAM HX:  Family History   Problem Relation Age of Onset    Hypertension Other     Diabetes Mother     Hypertension Mother     Cancer Mother     Diabetes Father     Hypertension Father     Heart disease Father     Cancer Sister     Cancer Brother        SOC HX:  Social History     Socioeconomic History    Marital status:    Tobacco Use    Smoking status: Every Day     Current packs/day: 1.00     Average packs/day: 1 pack/day for 46.0 years (46.0 ttl pk-yrs)     Types: Cigarettes    Smokeless tobacco: Never   Vaping Use    Vaping status: Never Used   Substance and Sexual Activity    Alcohol use: No    Drug use: No    Sexual activity: Defer       PHYSICAL EXAM  /86 (BP Location: Left arm, Patient Position: Lying)   Pulse 66   Temp 98.3 °F (36.8 °C) (Oral)   Resp 16   Ht 157.5 cm (62\")   Wt 59.1 kg (130 lb 4.7 oz)   SpO2 94%   BMI 23.83 kg/m²   Wt Readings from Last 3 Encounters:   01/09/25 59.1 kg (130 lb 4.7 oz)   02/10/24 65.4 kg (144 lb 1.6 oz)   05/30/23 64 kg (141 lb)   ,body mass index is 23.83 kg/m².  Physical Exam  Constitutional:       General: She is not in acute distress.     Appearance: She is ill-appearing. She is not toxic-appearing.   HENT:      Head: Normocephalic.      Nose: Nose normal.      Mouth/Throat:      Mouth: " Mucous membranes are moist.   Eyes:      General: No scleral icterus.  Cardiovascular:      Rate and Rhythm: Normal rate.      Pulses: Normal pulses.   Pulmonary:      Effort: Pulmonary effort is normal. No respiratory distress.   Abdominal:      General: There is no distension.      Palpations: Abdomen is soft.      Tenderness: There is no abdominal tenderness. There is no guarding.   Skin:     Comments: No spider telangiectasias.   Neurological:      Mental Status: She is alert.      Comments: Right hemiparesis, mild dysarthria.  No asterixis.   Psychiatric:         Mood and Affect: Mood normal.         Behavior: Behavior normal.     Results Review:   I reviewed the patient's new clinical results.    Lab Results   Component Value Date    WBC 8.30 01/09/2025    HGB 12.0 01/09/2025    HGB 11.4 (L) 01/08/2025    HGB 12.3 01/07/2025    HCT 35.2 01/09/2025    MCV 87.3 01/09/2025     (L) 01/09/2025       Lab Results   Component Value Date    INR 1.14 (H) 02/08/2024    INR 1.3 (H) 02/08/2024    INR 1.01 11/22/2022       Lab Results   Component Value Date    GLUCOSE 171 (H) 01/09/2025    BUN 25 (H) 01/09/2025    CREATININE 1.21 (H) 01/09/2025    EGFRIFNONA 41 (L) 01/24/2022    EGFRIFAFRI >59 02/05/2020    BCR 20.7 01/09/2025     01/09/2025    K 4.1 01/09/2025    CO2 25.0 01/09/2025    CALCIUM 10.1 01/09/2025    PROTENTOTREF 6.7 07/01/2022    ALBUMIN 3.8 01/09/2025    ALKPHOS 111 01/09/2025    BILITOT 0.5 01/09/2025    ALT 18 01/09/2025    AST 15 01/09/2025       ASSESSMENTS/PLANS    1.  Acute/subacute ischemic stroke, left middle cerebral artery territory.  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  2.  History of GI bleeding on dual antiplatelet therapy with Brilinta in 2021.  3.  History of nonbleeding right colonic AVMs in 9/2021.  Colonoscopy prep poor.  4.  History of colon polyps.  Three subcentimeter tubular adenomas removed in 9/2021.  4.  MASLD cirrhosis, compensated.  Hep B immune.  May need  hepatitis A vaccine.  Unable to calculate MELD score without INR.  WILCOX fibrosure in January 2022 showed only F1-F2 fibrosis, however, cirrhotic morphology on several CAT scans in the past 3 years.  No masses seen on abdominal CT in June 2023. No apparent GI follow-up since early 2022  5.  Ongoing tobacco abuse.  6.  Poorly controlled diabetes.  Hemoglobin A1c 8.8.  7.  Chronic constipation.    >> Continue dual antiplatelet therapy with aspirin and Plavix.  >> Plan EGD tomorrow (on DAPT) to assess for portal hypertension and potential sources of GI bleeding.  >> Colonoscopy not feasible at this time due to requirement for thickened liquids.  Recommend colonoscopy with 2-day prep after dysphagia has improved (as outpatient).  If develops overt bleeding, could place NG tube for prep.  >> Hepatitis A total.  May need vaccination.  >> Alpha-fetoprotein.  >> INR  >> Axial imaging of the liver before discharge for hepatoma screening.    I discussed the patient's findings and my recommendations with patient and nursing staff.    Mark I. Brunner, MD  01/09/25  17:33 EST

## 2025-01-10 ENCOUNTER — ANESTHESIA (OUTPATIENT)
Dept: GASTROENTEROLOGY | Facility: HOSPITAL | Age: 75
End: 2025-01-10
Payer: MEDICARE

## 2025-01-10 ENCOUNTER — APPOINTMENT (OUTPATIENT)
Dept: CT IMAGING | Facility: HOSPITAL | Age: 75
End: 2025-01-10
Payer: MEDICARE

## 2025-01-10 ENCOUNTER — ANESTHESIA EVENT (OUTPATIENT)
Dept: GASTROENTEROLOGY | Facility: HOSPITAL | Age: 75
End: 2025-01-10
Payer: MEDICARE

## 2025-01-10 PROBLEM — E43 SEVERE PROTEIN-CALORIE MALNUTRITION: Status: ACTIVE | Noted: 2025-01-10

## 2025-01-10 LAB
ANION GAP SERPL CALCULATED.3IONS-SCNC: 10 MMOL/L (ref 5–15)
AV MEAN PRESS GRAD SYS DOP V1V2: 5 MMHG
AV VMAX SYS DOP: 148 CM/SEC
BH CV ECHO MEAS - AO MAX PG: 8.8 MMHG
BH CV ECHO MEAS - AO ROOT DIAM: 3.6 CM
BH CV ECHO MEAS - AO V2 VTI: 37.6 CM
BH CV ECHO MEAS - AVA(I,D): 2.8 CM2
BH CV ECHO MEAS - EDV(CUBED): 68.9 ML
BH CV ECHO MEAS - EDV(MOD-SP2): 109 ML
BH CV ECHO MEAS - EDV(MOD-SP4): 134 ML
BH CV ECHO MEAS - EF(MOD-SP2): 53.9 %
BH CV ECHO MEAS - EF(MOD-SP4): 61.1 %
BH CV ECHO MEAS - ESV(CUBED): 22 ML
BH CV ECHO MEAS - ESV(MOD-SP2): 50.2 ML
BH CV ECHO MEAS - ESV(MOD-SP4): 52.1 ML
BH CV ECHO MEAS - FS: 31.7 %
BH CV ECHO MEAS - IVS/LVPW: 1 CM
BH CV ECHO MEAS - IVSD: 1.1 CM
BH CV ECHO MEAS - LA DIMENSION: 3.4 CM
BH CV ECHO MEAS - LAT PEAK E' VEL: 6.6 CM/SEC
BH CV ECHO MEAS - LV DIASTOLIC VOL/BSA (35-75): 84.2 CM2
BH CV ECHO MEAS - LV MASS(C)D: 151.3 GRAMS
BH CV ECHO MEAS - LV MAX PG: 5 MMHG
BH CV ECHO MEAS - LV MEAN PG: 2 MMHG
BH CV ECHO MEAS - LV SYSTOLIC VOL/BSA (12-30): 32.7 CM2
BH CV ECHO MEAS - LV V1 MAX: 112 CM/SEC
BH CV ECHO MEAS - LV V1 VTI: 33.6 CM
BH CV ECHO MEAS - LVIDD: 4.1 CM
BH CV ECHO MEAS - LVIDS: 2.8 CM
BH CV ECHO MEAS - LVOT AREA: 3.1 CM2
BH CV ECHO MEAS - LVOT DIAM: 2 CM
BH CV ECHO MEAS - LVPWD: 1.1 CM
BH CV ECHO MEAS - MED PEAK E' VEL: 4.4 CM/SEC
BH CV ECHO MEAS - MV A MAX VEL: 89.1 CM/SEC
BH CV ECHO MEAS - MV DEC SLOPE: 210.5 CM/SEC2
BH CV ECHO MEAS - MV DEC TIME: 0.36 SEC
BH CV ECHO MEAS - MV E MAX VEL: 81.8 CM/SEC
BH CV ECHO MEAS - MV E/A: 0.92
BH CV ECHO MEAS - MV MAX PG: 4 MMHG
BH CV ECHO MEAS - MV MEAN PG: 2 MMHG
BH CV ECHO MEAS - MV P1/2T: 133.9 MSEC
BH CV ECHO MEAS - MV V2 VTI: 42.1 CM
BH CV ECHO MEAS - MVA(P1/2T): 1.64 CM2
BH CV ECHO MEAS - MVA(VTI): 2.5 CM2
BH CV ECHO MEAS - PA ACC TIME: 0.14 SEC
BH CV ECHO MEAS - RAP SYSTOLE: 8 MMHG
BH CV ECHO MEAS - RVSP: 31 MMHG
BH CV ECHO MEAS - SV(LVOT): 105.6 ML
BH CV ECHO MEAS - SV(MOD-SP2): 58.8 ML
BH CV ECHO MEAS - SV(MOD-SP4): 81.9 ML
BH CV ECHO MEAS - SVI(LVOT): 66.3 ML/M2
BH CV ECHO MEAS - SVI(MOD-SP2): 36.9 ML/M2
BH CV ECHO MEAS - SVI(MOD-SP4): 51.5 ML/M2
BH CV ECHO MEAS - TAPSE (>1.6): 2.7 CM
BH CV ECHO MEAS - TR MAX PG: 23.4 MMHG
BH CV ECHO MEAS - TR MAX VEL: 242 CM/SEC
BH CV ECHO MEASUREMENTS AVERAGE E/E' RATIO: 14.87
BH CV XLRA - RV BASE: 4 CM
BH CV XLRA - RV LENGTH: 7.1 CM
BH CV XLRA - RV MID: 3.2 CM
BH CV XLRA - TDI S': 15.7 CM/SEC
BUN SERPL-MCNC: 24 MG/DL (ref 8–23)
BUN/CREAT SERPL: 21.1 (ref 7–25)
CALCIUM SPEC-SCNC: 10.3 MG/DL (ref 8.6–10.5)
CHLORIDE SERPL-SCNC: 105 MMOL/L (ref 98–107)
CO2 SERPL-SCNC: 25 MMOL/L (ref 22–29)
CREAT SERPL-MCNC: 1.14 MG/DL (ref 0.57–1)
DEPRECATED RDW RBC AUTO: 43.5 FL (ref 37–54)
EGFRCR SERPLBLD CKD-EPI 2021: 51.3 ML/MIN/1.73
ERYTHROCYTE [DISTWIDTH] IN BLOOD BY AUTOMATED COUNT: 13.4 % (ref 12.3–15.4)
GLUCOSE BLDC GLUCOMTR-MCNC: 271 MG/DL (ref 70–130)
GLUCOSE BLDC GLUCOMTR-MCNC: 294 MG/DL (ref 70–130)
GLUCOSE BLDC GLUCOMTR-MCNC: 302 MG/DL (ref 70–130)
GLUCOSE BLDC GLUCOMTR-MCNC: 303 MG/DL (ref 70–130)
GLUCOSE BLDC GLUCOMTR-MCNC: 306 MG/DL (ref 70–130)
GLUCOSE BLDC GLUCOMTR-MCNC: 471 MG/DL (ref 70–130)
GLUCOSE BLDC GLUCOMTR-MCNC: 525 MG/DL (ref 70–130)
GLUCOSE SERPL-MCNC: 253 MG/DL (ref 65–99)
HCT VFR BLD AUTO: 36.6 % (ref 34–46.6)
HGB BLD-MCNC: 12.3 G/DL (ref 12–15.9)
INR PPP: 1.18 (ref 0.89–1.12)
LEFT ATRIUM VOLUME INDEX: 25.1 ML/M2
LV EF 2D ECHO EST: 62 %
LV EF BIPLANE MOD: 57.9 %
MAGNESIUM SERPL-MCNC: 2.1 MG/DL (ref 1.6–2.4)
MCH RBC QN AUTO: 29.6 PG (ref 26.6–33)
MCHC RBC AUTO-ENTMCNC: 33.6 G/DL (ref 31.5–35.7)
MCV RBC AUTO: 88.2 FL (ref 79–97)
PHOSPHATE SERPL-MCNC: 2.9 MG/DL (ref 2.5–4.5)
PLATELET # BLD AUTO: 105 10*3/MM3 (ref 140–450)
PMV BLD AUTO: 11.1 FL (ref 6–12)
POTASSIUM SERPL-SCNC: 3.5 MMOL/L (ref 3.5–5.2)
PROTHROMBIN TIME: 15.1 SECONDS (ref 12.2–14.5)
RBC # BLD AUTO: 4.15 10*6/MM3 (ref 3.77–5.28)
SODIUM SERPL-SCNC: 140 MMOL/L (ref 136–145)
WBC NRBC COR # BLD AUTO: 5.51 10*3/MM3 (ref 3.4–10.8)

## 2025-01-10 PROCEDURE — 94664 DEMO&/EVAL PT USE INHALER: CPT

## 2025-01-10 PROCEDURE — 97530 THERAPEUTIC ACTIVITIES: CPT

## 2025-01-10 PROCEDURE — 85610 PROTHROMBIN TIME: CPT | Performed by: INTERNAL MEDICINE

## 2025-01-10 PROCEDURE — 83735 ASSAY OF MAGNESIUM: CPT | Performed by: INTERNAL MEDICINE

## 2025-01-10 PROCEDURE — 82948 REAGENT STRIP/BLOOD GLUCOSE: CPT

## 2025-01-10 PROCEDURE — 86708 HEPATITIS A ANTIBODY: CPT | Performed by: INTERNAL MEDICINE

## 2025-01-10 PROCEDURE — 74177 CT ABD & PELVIS W/CONTRAST: CPT

## 2025-01-10 PROCEDURE — 94799 UNLISTED PULMONARY SVC/PX: CPT

## 2025-01-10 PROCEDURE — 25510000001 IOPAMIDOL PER 1 ML: Performed by: INTERNAL MEDICINE

## 2025-01-10 PROCEDURE — 63710000001 INSULIN LISPRO (HUMAN) PER 5 UNITS: Performed by: INTERNAL MEDICINE

## 2025-01-10 PROCEDURE — 25010000002 PROPOFOL 10 MG/ML EMULSION: Performed by: NURSE ANESTHETIST, CERTIFIED REGISTERED

## 2025-01-10 PROCEDURE — 25010000002 POTASSIUM CHLORIDE 10 MEQ/100ML SOLUTION: Performed by: INTERNAL MEDICINE

## 2025-01-10 PROCEDURE — 25010000002 LIDOCAINE PF 1% 1 % SOLUTION: Performed by: NURSE ANESTHETIST, CERTIFIED REGISTERED

## 2025-01-10 PROCEDURE — 92507 TX SP LANG VOICE COMM INDIV: CPT

## 2025-01-10 PROCEDURE — 25810000003 LACTATED RINGERS PER 1000 ML: Performed by: NURSE ANESTHETIST, CERTIFIED REGISTERED

## 2025-01-10 PROCEDURE — 99232 SBSQ HOSP IP/OBS MODERATE 35: CPT | Performed by: NURSE PRACTITIONER

## 2025-01-10 PROCEDURE — 84100 ASSAY OF PHOSPHORUS: CPT | Performed by: INTERNAL MEDICINE

## 2025-01-10 PROCEDURE — 92526 ORAL FUNCTION THERAPY: CPT

## 2025-01-10 PROCEDURE — 80048 BASIC METABOLIC PNL TOTAL CA: CPT | Performed by: INTERNAL MEDICINE

## 2025-01-10 PROCEDURE — 0DJ08ZZ INSPECTION OF UPPER INTESTINAL TRACT, VIA NATURAL OR ARTIFICIAL OPENING ENDOSCOPIC: ICD-10-PCS | Performed by: INTERNAL MEDICINE

## 2025-01-10 PROCEDURE — 94761 N-INVAS EAR/PLS OXIMETRY MLT: CPT

## 2025-01-10 PROCEDURE — 99232 SBSQ HOSP IP/OBS MODERATE 35: CPT | Performed by: INTERNAL MEDICINE

## 2025-01-10 PROCEDURE — 43235 EGD DIAGNOSTIC BRUSH WASH: CPT | Performed by: INTERNAL MEDICINE

## 2025-01-10 PROCEDURE — 63710000001 INSULIN GLARGINE PER 5 UNITS: Performed by: INTERNAL MEDICINE

## 2025-01-10 PROCEDURE — 85027 COMPLETE CBC AUTOMATED: CPT | Performed by: INTERNAL MEDICINE

## 2025-01-10 RX ORDER — FAMOTIDINE 20 MG/1
20 TABLET, FILM COATED ORAL ONCE
Status: CANCELLED | OUTPATIENT
Start: 2025-01-10 | End: 2025-01-10

## 2025-01-10 RX ORDER — PROPOFOL 10 MG/ML
VIAL (ML) INTRAVENOUS AS NEEDED
Status: DISCONTINUED | OUTPATIENT
Start: 2025-01-10 | End: 2025-01-10 | Stop reason: SURG

## 2025-01-10 RX ORDER — MIDAZOLAM HYDROCHLORIDE 1 MG/ML
0.5 INJECTION, SOLUTION INTRAMUSCULAR; INTRAVENOUS
Status: CANCELLED | OUTPATIENT
Start: 2025-01-10

## 2025-01-10 RX ORDER — FAMOTIDINE 10 MG/ML
20 INJECTION, SOLUTION INTRAVENOUS ONCE
Status: CANCELLED | OUTPATIENT
Start: 2025-01-10 | End: 2025-01-10

## 2025-01-10 RX ORDER — LIDOCAINE HYDROCHLORIDE 10 MG/ML
0.5 INJECTION, SOLUTION EPIDURAL; INFILTRATION; INTRACAUDAL; PERINEURAL ONCE AS NEEDED
Status: CANCELLED | OUTPATIENT
Start: 2025-01-10

## 2025-01-10 RX ORDER — LIDOCAINE HYDROCHLORIDE 10 MG/ML
INJECTION, SOLUTION EPIDURAL; INFILTRATION; INTRACAUDAL; PERINEURAL AS NEEDED
Status: DISCONTINUED | OUTPATIENT
Start: 2025-01-10 | End: 2025-01-10 | Stop reason: SURG

## 2025-01-10 RX ORDER — SODIUM CHLORIDE, SODIUM LACTATE, POTASSIUM CHLORIDE, CALCIUM CHLORIDE 600; 310; 30; 20 MG/100ML; MG/100ML; MG/100ML; MG/100ML
9 INJECTION, SOLUTION INTRAVENOUS CONTINUOUS
Status: CANCELLED | OUTPATIENT
Start: 2025-01-11 | End: 2025-01-11

## 2025-01-10 RX ORDER — SODIUM CHLORIDE 0.9 % (FLUSH) 0.9 %
10 SYRINGE (ML) INJECTION EVERY 12 HOURS SCHEDULED
Status: CANCELLED | OUTPATIENT
Start: 2025-01-10

## 2025-01-10 RX ORDER — POTASSIUM CHLORIDE 7.45 MG/ML
10 INJECTION INTRAVENOUS
Status: DISPENSED | OUTPATIENT
Start: 2025-01-10 | End: 2025-01-10

## 2025-01-10 RX ORDER — SODIUM CHLORIDE, SODIUM LACTATE, POTASSIUM CHLORIDE, CALCIUM CHLORIDE 600; 310; 30; 20 MG/100ML; MG/100ML; MG/100ML; MG/100ML
INJECTION, SOLUTION INTRAVENOUS CONTINUOUS PRN
Status: DISCONTINUED | OUTPATIENT
Start: 2025-01-10 | End: 2025-01-10 | Stop reason: SURG

## 2025-01-10 RX ORDER — ATORVASTATIN CALCIUM 40 MG/1
40 TABLET, FILM COATED ORAL NIGHTLY
Status: DISCONTINUED | OUTPATIENT
Start: 2025-01-10 | End: 2025-01-11

## 2025-01-10 RX ORDER — POTASSIUM CHLORIDE 1.5 G/1.58G
40 POWDER, FOR SOLUTION ORAL EVERY 4 HOURS
Status: DISCONTINUED | OUTPATIENT
Start: 2025-01-10 | End: 2025-01-10

## 2025-01-10 RX ORDER — SODIUM CHLORIDE 0.9 % (FLUSH) 0.9 %
10 SYRINGE (ML) INJECTION AS NEEDED
Status: CANCELLED | OUTPATIENT
Start: 2025-01-10

## 2025-01-10 RX ORDER — INSULIN LISPRO 100 [IU]/ML
3 INJECTION, SOLUTION INTRAVENOUS; SUBCUTANEOUS
Status: DISCONTINUED | OUTPATIENT
Start: 2025-01-10 | End: 2025-01-11

## 2025-01-10 RX ORDER — IOPAMIDOL 755 MG/ML
85 INJECTION, SOLUTION INTRAVASCULAR
Status: COMPLETED | OUTPATIENT
Start: 2025-01-10 | End: 2025-01-10

## 2025-01-10 RX ADMIN — INSULIN LISPRO 3 UNITS: 100 INJECTION, SOLUTION INTRAVENOUS; SUBCUTANEOUS at 18:39

## 2025-01-10 RX ADMIN — POTASSIUM CHLORIDE 10 MEQ: 7.45 INJECTION INTRAVENOUS at 18:38

## 2025-01-10 RX ADMIN — INSULIN LISPRO 4 UNITS: 100 INJECTION, SOLUTION INTRAVENOUS; SUBCUTANEOUS at 18:39

## 2025-01-10 RX ADMIN — SENNOSIDES AND DOCUSATE SODIUM 2 TABLET: 50; 8.6 TABLET ORAL at 20:50

## 2025-01-10 RX ADMIN — MUPIROCIN 1 APPLICATION: 20 OINTMENT TOPICAL at 11:27

## 2025-01-10 RX ADMIN — PANTOPRAZOLE SODIUM 40 MG: 40 TABLET, DELAYED RELEASE ORAL at 05:36

## 2025-01-10 RX ADMIN — Medication 10 ML: at 11:27

## 2025-01-10 RX ADMIN — LIDOCAINE HYDROCHLORIDE 50 MG: 10 INJECTION, SOLUTION EPIDURAL; INFILTRATION; INTRACAUDAL; PERINEURAL at 09:43

## 2025-01-10 RX ADMIN — IPRATROPIUM BROMIDE AND ALBUTEROL SULFATE 3 ML: 2.5; .5 SOLUTION RESPIRATORY (INHALATION) at 19:38

## 2025-01-10 RX ADMIN — Medication 10 ML: at 20:52

## 2025-01-10 RX ADMIN — INSULIN LISPRO 5 UNITS: 100 INJECTION, SOLUTION INTRAVENOUS; SUBCUTANEOUS at 11:27

## 2025-01-10 RX ADMIN — CLOPIDOGREL BISULFATE 75 MG: 75 TABLET ORAL at 11:27

## 2025-01-10 RX ADMIN — POTASSIUM CHLORIDE 10 MEQ: 7.45 INJECTION INTRAVENOUS at 22:10

## 2025-01-10 RX ADMIN — SENNOSIDES AND DOCUSATE SODIUM 2 TABLET: 50; 8.6 TABLET ORAL at 11:27

## 2025-01-10 RX ADMIN — PROPOFOL 10 MG: 10 INJECTION, EMULSION INTRAVENOUS at 09:48

## 2025-01-10 RX ADMIN — POTASSIUM CHLORIDE 10 MEQ: 7.45 INJECTION INTRAVENOUS at 20:52

## 2025-01-10 RX ADMIN — INSULIN LISPRO 7 UNITS: 100 INJECTION, SOLUTION INTRAVENOUS; SUBCUTANEOUS at 21:01

## 2025-01-10 RX ADMIN — ATORVASTATIN CALCIUM 40 MG: 40 TABLET, FILM COATED ORAL at 20:51

## 2025-01-10 RX ADMIN — IPRATROPIUM BROMIDE AND ALBUTEROL SULFATE 3 ML: 2.5; .5 SOLUTION RESPIRATORY (INHALATION) at 15:26

## 2025-01-10 RX ADMIN — PROPOFOL 40 MG: 10 INJECTION, EMULSION INTRAVENOUS at 09:45

## 2025-01-10 RX ADMIN — ASPIRIN 81 MG CHEWABLE TABLET 81 MG: 81 TABLET CHEWABLE at 11:27

## 2025-01-10 RX ADMIN — PROPOFOL 40 MG: 10 INJECTION, EMULSION INTRAVENOUS at 09:43

## 2025-01-10 RX ADMIN — SODIUM CHLORIDE, POTASSIUM CHLORIDE, SODIUM LACTATE AND CALCIUM CHLORIDE: 600; 310; 30; 20 INJECTION, SOLUTION INTRAVENOUS at 09:42

## 2025-01-10 RX ADMIN — IOPAMIDOL 85 ML: 755 INJECTION, SOLUTION INTRAVENOUS at 17:09

## 2025-01-10 RX ADMIN — INSULIN GLARGINE 20 UNITS: 100 INJECTION, SOLUTION SUBCUTANEOUS at 20:51

## 2025-01-10 RX ADMIN — DONEPEZIL HYDROCHLORIDE 5 MG: 5 TABLET, FILM COATED ORAL at 20:51

## 2025-01-10 RX ADMIN — MUPIROCIN 1 APPLICATION: 20 OINTMENT TOPICAL at 20:51

## 2025-01-10 RX ADMIN — IPRATROPIUM BROMIDE AND ALBUTEROL SULFATE 3 ML: 2.5; .5 SOLUTION RESPIRATORY (INHALATION) at 08:06

## 2025-01-10 NOTE — PLAN OF CARE
Goal Outcome Evaluation:  Plan of Care Reviewed With: patient        Progress: improving  Outcome Evaluation: Pt made improvements in STS and bed>chair transfers this date. PT will continue to follow while admitted.    Anticipated Discharge Disposition (PT): inpatient rehabilitation facility

## 2025-01-10 NOTE — PROGRESS NOTES
"          Clinical Nutrition Assessment     Patient Name: Karol Lopez  YOB: 1950  MRN: [id number]  Date of Encounter: 01/10/25 06:49 EST  Admission date: 1/7/2025  Reason for Visit: MDR    Assessment   Nutrition Assessment   Admission Diagnosis:  Encephalopathy [G93.40]    Problem List:    Acute ischemic cerebrovascular accident (CVA) involving internal carotid artery territory    COPD (chronic obstructive pulmonary disease)    Type 2 diabetes mellitus, with long-term current use of insulin    Cirrhosis of liver    Encephalopathy    Antiplatelet or antithrombotic long-term use      PMH:   She  has a past medical history of Acid reflux, Anxiety, Cataracts, bilateral, Cirrhosis of liver, Constipation, COPD (chronic obstructive pulmonary disease), Coronary artery disease, CTS (carpal tunnel syndrome), Diabetes, Hearing loss, Hypercholesteremia, Hypertension, Impaired functional mobility, balance, gait, and endurance, Lower back pain, Osteoarthritis, Stroke, Tattoos, Tobacco abuse, Tumor, Vitamin B12 deficiency, Wears dentures, and Wears glasses.    PSH:  She  has a past surgical history that includes Coronary angioplasty with stent (2012); Carpal tunnel release (Bilateral); Cholecystectomy; Total knee arthroplasty (Bilateral, 10/18/2016); Hysterectomy; Joint replacement (Bilateral); Breast biopsy (Right, 5/10/2019); Esophagogastroduodenoscopy (N/A, 9/29/2021); Colonoscopy (N/A, 9/30/2021); and Small bowel enteroscopy (N/A, 11/16/2021).    Applicable Nutrition History:   (1/9) SLP - FEES - soft/chop, NTL diet    Anthropometrics     Height: Height: 157.5 cm (62\")  Last Filed Weight: Weight: 59.1 kg (130 lb 4.7 oz) (01/09/25 0600)  Method: Weight Method: Bed scale  BMI: BMI (Calculated): 23.8    UBW:  limited hx in EMR and pt confused, per limited data:   Weight       Weight (kg) Weight (lbs) Weight Method Visit Report   5/30/2023 63.957 kg  141 lb   --    2/8/2024 61.236 kg  135 lb  Stated   " "  2/9/2024 61.236 kg  135 lb      2/10/2024 65.363 kg  144 lb 1.6 oz      1/7/2025 68.04 kg  150 lb  Estimated      64.8 kg  142 lb 13.7 oz  Bed scale     1/9/2025 59.1 kg  130 lb 4.7 oz          Weight change: weight loss of 14 lbs (9.7%) over 1 month(s)    Significant?  Yes    Nutrition Focused Physical Exam    Date:  1/10       Patient meets criteria for malnutrition diagnosis, see MSA note.     Subjective   Reported/Observed/Food/Nutrition Related History:     Patient presented with CVA. Discussed in MDR cirrhosis dx and not eating well. Visited with patient and ex-spouse at bedside. Pt partially aphasic and possibly confused, answers \"I dont know\" or \"okay\" to most questions. Shrugs with \"I dont know\" when asked about how she eats at home. Appears very ill with evident physical signs malnutrition. Patient has hx COPD, poorly controlled DM with A1C >10 in October now 8.5, and MASLD Cirrhosis. Per EMR, was lost to gastroenterology follow up. Plan for EGD today. SLP evaluated and gave soft/chop, NTL diet. She states she has never tried supplements, agreeable to this. RD introduced diet education importance low sodium, high protein diet for cirrhosis, encouraged frequent energy/protein dense snacks/small frequent meals. Pt voiced understanding. Pt denies further dietary needs/preferences or nutritional questions/concerns at this time, NKFA.     Current Nutrition Prescription   PO: NPO Diet NPO Type: Sips with Meds  Oral Nutrition Supplement:   Intake: 50% X 1 meal documented    Assessment & Plan   Nutrition Diagnosis   Date:  1/10            Updated:    Problem Malnutrition  severe/chronic   Etiology Decreased ability to consume sufficient energy 2/2 COPD, MASLD Cirrhosis   Signs/Symptoms PO intakes <75% EEN >/=1 m, loss 9.7% body weight X past m, moderate muscle wasting and moderate subcutaneous fat loss   Status: New (suspect more ongoing weight loss, limited data)    Date:  1/10    Updated:     Problem " Swallowing difficulty   Etiology CVA, dysphagia   Signs/Symptoms SLP eval/FEES- soft/chop, NTL diet   Status: New    Goal:   Nutrition to support treatment and Increase intake    Nutrition Intervention      Follow treatment progress, Care plan reviewed, Advise alternate selection, Advised available snacks, Interview for preferences, Menu provided, Encourage intake, Supplement provided, Education provided for malnutrition, cirrhosis    Patient meets criteria malnutrition, see MSA for full assessment.   Ongoing in setting of multiple chronic illnesses with increased metabolic needs.     Encourage PO as tolerated and medically appropriate. Gastroenterology following, plan for EGD.     Sending Magic cups.     Monitoring/Evaluation:   Per protocol, I&O, PO intake, Supplement intake, Pertinent labs, Weight, GI status, Symptoms, POC/GOC, Swallow function    Karol Bee RD, CNSC  Time Spent: 30m

## 2025-01-10 NOTE — THERAPY TREATMENT NOTE
Patient Name: Karol Lopez  : 1950    MRN: [id number]                              Today's Date: 1/10/2025       Admit Date: 2025    Visit Dx:     ICD-10-CM ICD-9-CM   1. Left carotid artery occlusion  I65.22 433.10   2. Difficulty with speech  R47.9 784.59   3. History of stroke  Z86.73 V12.54   4. Altered mental status, unspecified altered mental status type  R41.82 780.97   5. Oropharyngeal dysphagia  R13.12 787.22   6. Other cirrhosis of liver  K74.69 571.5   7. Antiplatelet or antithrombotic long-term use  Z79.02 V58.63     Patient Active Problem List   Diagnosis    Coronary artery disease involving native coronary artery of native heart with angina pectoris    Anxiety    COPD (chronic obstructive pulmonary disease)    Acid reflux    Vitamin B12 deficiency    Current smoker    Type 2 diabetes mellitus, with long-term current use of insulin    Impaired mobility and ADLs    Melena    Anemia    Gastrointestinal hemorrhage    Hyperlipidemia LDL goal <70    Claudication    Persistent atrial fibrillation    Hyperglycemia due to diabetes mellitus    Cirrhosis of liver    Carotid artery disease    Encephalopathy    Acute ischemic cerebrovascular accident (CVA) involving internal carotid artery territory    Antiplatelet or antithrombotic long-term use    Severe protein-calorie malnutrition     Past Medical History:   Diagnosis Date    Acid reflux     Anxiety     Cataracts, bilateral     Cirrhosis of liver     Constipation     COPD (chronic obstructive pulmonary disease)     Coronary artery disease     CTS (carpal tunnel syndrome)     Diabetes     Hearing loss     Hypercholesteremia     Hypertension     Impaired functional mobility, balance, gait, and endurance     Lower back pain     Osteoarthritis     Stroke     2013-weak in right arm    Tattoos     x2    Tobacco abuse     Tumor     in between breast closer to right breast    Vitamin B12 deficiency     Wears dentures     upper only    Wears glasses       Past Surgical History:   Procedure Laterality Date    BREAST BIOPSY Right 5/10/2019    Procedure: BREAST BIOPSY RIGHT;  Surgeon: Kiana Frey MD;  Location: [facility];  Service: General    CARPAL TUNNEL RELEASE Bilateral     CHOLECYSTECTOMY      COLONOSCOPY N/A 9/30/2021    Procedure: COLONOSCOPY WITH POLYPECTOMY AND ABLATION OF AVM;  Surgeon: Russell Davila MD;  Location: [facility];  Service: Gastroenterology;  Laterality: N/A;    CORONARY ANGIOPLASTY WITH STENT PLACEMENT  2012    ENDOSCOPY N/A 9/29/2021    Procedure: ESOPHAGOGASTRODUODENOSCOPY with biopsies;  Surgeon: Russell Davila MD;  Location: [facility];  Service: Gastroenterology;  Laterality: N/A;    ENTEROSCOPY SMALL BOWEL N/A 11/16/2021    Procedure: ESOPHAGOGASTRODUODENOSCOPY WITH SMALL BOWEL ENTEROSCOPY and biopsy;  Surgeon: Russell Davila MD;  Location: [facility];  Service: Gastroenterology;  Laterality: N/A;    HYSTERECTOMY      complete    JOINT REPLACEMENT Bilateral     knee replacements    TOTAL KNEE ARTHROPLASTY Bilateral 10/18/2016    MAUREEN Palomares MD      General Information       Row Name 01/10/25 1423          Physical Therapy Time and Intention    Document Type therapy note (daily note)  -LW     Mode of Treatment physical therapy  -       Row Name 01/10/25 1423          General Information    Patient Profile Reviewed yes  -LW     Existing Precautions/Restrictions fall;other (see comments)  R sided weakness, aphasia  -       Row Name 01/10/25 1423          Cognition    Orientation Status (Cognition) oriented to;person;verbal cues/prompts needed for orientation;time;disoriented to;place  -       Row Name 01/10/25 1423          Safety Issues/Impairments Affecting Functional Mobility    Safety Issues Affecting Function (Mobility) ability to follow commands;awareness of need for assistance;insight into deficits/self-awareness;judgment;problem-solving;safety precaution awareness;safety  precautions follow-through/compliance;sequencing abilities  -     Impairments Affecting Function (Mobility) balance;cognition;coordination;endurance/activity tolerance;grasp;motor control;motor planning;postural/trunk control;strength  -     Cognitive Impairments, Mobility Safety/Performance attention;awareness, need for assistance;insight into deficits/self-awareness;problem-solving/reasoning;judgment;safety precaution awareness;safety precaution follow-through;sequencing abilities  -               User Key  (r) = Recorded By, (t) = Taken By, (c) = Cosigned By      Initials Name Provider Type     Shilpa Polanco PT Physical Therapist                   Mobility       Row Name 01/10/25 1424          Bed Mobility    Bed Mobility supine-sit;scooting/bridging  -     Scooting/Bridging [place] (Bed Mobility) moderate assist (50% patient effort);1 person assist;verbal cues  -     Supine-Sit [place] (Bed Mobility) maximum assist (25% patient effort);1 person assist;1 person to manage equipment;verbal cues  -     Assistive Device (Bed Mobility) bed rails;head of bed elevated;repositioning sheet  -       Row Name 01/10/25 1429          Bed-Chair Transfer    Bed-Chair [place] (Transfers) moderate assist (50% patient effort);2 person assist;verbal cues  -     Assistive Device (Bed-Chair Transfers) other (see comments)  BUE support  -       Row Name 01/10/25 1424          Sit-Stand Transfer    Sit-Stand [place] (Transfers) maximum assist (25% patient effort);1 person assist;verbal cues  -     Assistive Device (Sit-Stand Transfers) other (see comments)  support on therapists arm  -     Comment, (Sit-Stand Transfer) STS x 2 from chair with MaxA for boost, mod for trunk support and light blocking of R knee, glute extension facilitation  -       Row Name 01/10/25 1422          Gait/Stairs (Locomotion)    [place] Level (Gait) not tested  -     Patient was able to Ambulate no, other  medical factors prevent ambulation  -LW     Reason Patient was unable to Ambulate Excessive Weakness  -LW               User Key  (r) = Recorded By, (t) = Taken By, (c) = Cosigned By      Initials Name Provider Type    Shilpa Hargrove PT Physical Therapist                   Obj/Interventions       Row Name 01/10/25 1427          Motor Skills    Therapeutic Exercise other (see comments)  LESVIA R knee extension x 10, x10 LAQ and marching c LLE  -LW       Row Name 01/10/25 1427          Balance    Balance Assessment sitting static balance;sitting dynamic balance;standing static balance;standing dynamic balance  -LW     Static Sitting Balance minimal assist;1-person assist  -LW     Dynamic Sitting Balance minimal assist;1-person assist  -LW     Position, Sitting Balance unsupported;sitting edge of bed  -LW     Static Standing Balance maximum assist;1-person assist;verbal cues  -LW     Dynamic Standing Balance moderate assist;2-person assist;verbal cues  -LW     Position/Device Used, Standing Balance supported;other (see comments)  therapists arms  -LW     Balance Interventions sitting;standing;sit to stand;supported;static;dynamic  -LW               User Key  (r) = Recorded By, (t) = Taken By, (c) = Cosigned By      Initials Name Provider Type    Shilpa Hargrove PT Physical Therapist                   Goals/Plan    No documentation.                  Clinical Impression       Row Name 01/10/25 1429          Pain    Pretreatment Pain Rating 7/10  -LW     Posttreatment Pain Rating 7/10  -LW     Pain Location abdomen  -LW     Pain Side/Orientation generalized  -LW     Pain Management Interventions nursing notified;exercise or physical activity utilized  -LW     Response to Pain Interventions functional ability improved  -LW       Row Name 01/10/25 1429          Plan of Care Review    Plan of Care Reviewed With patient  -LW     Progress improving  -LW     Outcome Evaluation Pt made improvements in STS and bed>chair  transfers this date. PT will continue to follow while admitted.  -LW       Row Name 01/10/25 1429          Vital Signs    Pre Systolic BP Rehab 157  -LW     Pre Treatment Diastolic BP 63  -LW     Pre Patient Position Supine  -LW       Row Name 01/10/25 1429          Positioning and Restraints    Pre-Treatment Position in bed  -LW     Post Treatment Position chair  -LW     In Chair notified nsg;reclined;call light within reach;with nsg;encouraged to call for assist;waffle cushion;legs elevated  nursing staff performing rafy-care and will willow exit alarm  -LW               User Key  (r) = Recorded By, (t) = Taken By, (c) = Cosigned By      Initials Name Provider Type    Shilpa Hargrove PT Physical Therapist                   Outcome Measures       Row Name 01/10/25 1432 01/10/25 0400       How much help from another person do you currently need...    Turning from your back to your side while in flat bed without using bedrails? 2  -LW 3  -HS    Moving from lying on back to sitting on the side of a flat bed without bedrails? 2  -LW 3  -HS    Moving to and from a bed to a chair (including a wheelchair)? 2  -LW 2  -HS    Standing up from a chair using your arms (e.g., wheelchair, bedside chair)? 2  -LW 2  -HS    Climbing 3-5 steps with a railing? 1  -LW 1  -HS    To walk in hospital room? 1  -LW 1  -HS    AM-PAC 6 Clicks Score (PT) 10  -LW 12  -HS    Highest Level of Mobility Goal 4 --> Transfer to chair/commode  -LW 4 --> Transfer to chair/commode  -HS      Row Name 01/10/25 1432          Functional Assessment    Outcome Measure Options AM-PAC 6 Clicks Basic Mobility (PT)  -LW               User Key  (r) = Recorded By, (t) = Taken By, (c) = Cosigned By      Initials Name Provider Type    HS Jin Hu, RN Registered Nurse    Shilpa Hargrove PT Physical Therapist                                 Physical Therapy Education       Title: PT OT SLP Therapies (In Progress)       Topic: Physical Therapy (In Progress)        Point: Mobility training (In Progress)       Learning Progress Summary            Patient Acceptance, E, NR by LW at 1/10/2025 1433    Acceptance, E,TB, NR by ES at 1/8/2025 1528                      Point: Home exercise program (In Progress)       Learning Progress Summary            Patient Acceptance, E, NR by LW at 1/10/2025 1433                      Point: Body mechanics (In Progress)       Learning Progress Summary            Patient Acceptance, E, NR by LW at 1/10/2025 1433    Acceptance, E,TB, NR by ES at 1/8/2025 1528                      Point: Precautions (In Progress)       Learning Progress Summary            Patient Acceptance, E, NR by LW at 1/10/2025 1433    Acceptance, E,TB, NR by ES at 1/8/2025 1528                                      User Key       Initials Effective Dates Name Provider Type Discipline     08/11/22 -  Venessa Mosley PT Physical Therapist PT     11/15/24 -  Shilpa Polanco PT Physical Therapist PT                  PT Recommendation and Plan     Progress: improving  Outcome Evaluation: Pt made improvements in STS and bed>chair transfers this date. PT will continue to follow while admitted.     Time Calculation:         PT Charges       Row Name 01/10/25 1433             Time Calculation    Start Time 1357  -LW      PT Received On 01/10/25  -LW         Timed Charges    81412 - PT Therapeutic Exercise Minutes 5  -LW      03214 - PT Therapeutic Activity Minutes 15  -LW         Total Minutes    Timed Charges Total Minutes 20  -LW       Total Minutes 20  -LW                User Key  (r) = Recorded By, (t) = Taken By, (c) = Cosigned By      Initials Name Provider Type    LW Shilpa Polanco PT Physical Therapist                  Therapy Charges for Today       Code Description Service Date Service Provider Modifiers Qty    81130166892 HC PT THERAPEUTIC ACT EA 15 MIN 1/10/2025 Shilpa Polanco PT GP 1            PT G-Codes  Outcome Measure Options: AM-PAC 6 Clicks Basic Mobility  (PT)  AM-PAC 6 Clicks Score (PT): 10  AM-PAC 6 Clicks Score (OT): 10  Modified [place] Scale: 4 - Moderately severe disability.  Unable to walk without assistance, and unable to attend to own bodily needs without assistance.  PT Discharge Summary  Anticipated Discharge Disposition (PT): inpatient rehabilitation facility    Shilpa Polanco, BRONSON  1/10/2025

## 2025-01-10 NOTE — THERAPY TREATMENT NOTE
Acute Care - Speech Language Pathology   Swallow Treatment Note [facility]     Patient Name: Karol Lopez  : 1950  MRN: [id number]  Today's Date: 1/10/2025               Admit Date: 2025    Visit Dx:     ICD-10-CM ICD-9-CM   1. Left carotid artery occlusion  I65.22 433.10   2. Difficulty with speech  R47.9 784.59   3. History of stroke  Z86.73 V12.54   4. Altered mental status, unspecified altered mental status type  R41.82 780.97   5. Oropharyngeal dysphagia  R13.12 787.22   6. Other cirrhosis of liver  K74.69 571.5   7. Antiplatelet or antithrombotic long-term use  Z79.02 V58.63   8. Aphasia  R47.01 784.3     Patient Active Problem List   Diagnosis    Coronary artery disease involving native coronary artery of native heart with angina pectoris    Anxiety    COPD (chronic obstructive pulmonary disease)    Acid reflux    Vitamin B12 deficiency    Current smoker    Type 2 diabetes mellitus, with long-term current use of insulin    Impaired mobility and ADLs    Melena    Anemia    Gastrointestinal hemorrhage    Hyperlipidemia LDL goal <70    Claudication    Persistent atrial fibrillation    Hyperglycemia due to diabetes mellitus    Cirrhosis of liver    Carotid artery disease    Encephalopathy    Acute ischemic cerebrovascular accident (CVA) involving internal carotid artery territory    Antiplatelet or antithrombotic long-term use    Severe protein-calorie malnutrition     Past Medical History:   Diagnosis Date    Acid reflux     Anxiety     Cataracts, bilateral     Cirrhosis of liver     Constipation     COPD (chronic obstructive pulmonary disease)     Coronary artery disease     CTS (carpal tunnel syndrome)     Diabetes     Hearing loss     Hypercholesteremia     Hypertension     Impaired functional mobility, balance, gait, and endurance     Lower back pain     Osteoarthritis     Stroke     2013-weak in right arm    Tattoos     x2    Tobacco abuse     Tumor     in between breast closer to right  breast    Vitamin B12 deficiency     Wears dentures     upper only    Wears glasses      Past Surgical History:   Procedure Laterality Date    BREAST BIOPSY Right 5/10/2019    Procedure: BREAST BIOPSY RIGHT;  Surgeon: Kiana Frey MD;  Location: [facility] OR;  Service: General    CARPAL TUNNEL RELEASE Bilateral     CHOLECYSTECTOMY      COLONOSCOPY N/A 9/30/2021    Procedure: COLONOSCOPY WITH POLYPECTOMY AND ABLATION OF AVM;  Surgeon: Russell Davila MD;  Location: [facility] ENDOSCOPY;  Service: Gastroenterology;  Laterality: N/A;    CORONARY ANGIOPLASTY WITH STENT PLACEMENT  2012    ENDOSCOPY N/A 9/29/2021    Procedure: ESOPHAGOGASTRODUODENOSCOPY with biopsies;  Surgeon: Russell Davila MD;  Location: [facility] ENDOSCOPY;  Service: Gastroenterology;  Laterality: N/A;    ENTEROSCOPY SMALL BOWEL N/A 11/16/2021    Procedure: ESOPHAGOGASTRODUODENOSCOPY WITH SMALL BOWEL ENTEROSCOPY and biopsy;  Surgeon: Russell Davila MD;  Location: [facility] ENDOSCOPY;  Service: Gastroenterology;  Laterality: N/A;    HYSTERECTOMY      complete    JOINT REPLACEMENT Bilateral     knee replacements    TOTAL KNEE ARTHROPLASTY Bilateral 10/18/2016    MAUREEN Palomares MD       SLP Recommendation and Plan  SLP Swallowing Diagnosis: mild-moderate, oral dysphagia, pharyngeal dysphagia (01/10/25 1415)  SLP Diet Recommendation: soft to chew textures, chopped, no mixed consistencies, nectar thick liquids (01/10/25 1415)  Recommended Precautions and Strategies: upright posture during/after eating, small bites of food and sips of liquid, check mouth frequently for oral residue/pocketing, general aspiration precautions, assist with feeding (01/10/25 1415)  SLP Rec. for Method of Medication Administration: meds crushed, with puree, as tolerated, meds via alternate route (01/10/25 1415)     Monitor for Signs of Aspiration: yes, notify SLP if any concerns (01/10/25 1415)     Swallow Criteria for Skilled Therapeutic Interventions Met:  demonstrates skilled criteria (01/10/25 1415)  Anticipated Discharge Disposition (SLP): inpatient rehabilitation facility (01/10/25 1415)  Rehab Potential/Prognosis, Swallowing: adequate, monitor progress closely (01/10/25 1415)  Therapy Frequency (Swallow): 5 days per week (01/10/25 1415)  Predicted Duration Therapy Intervention (Days): 2 weeks (01/10/25 1415)  Oral Care Recommendations: Oral Care BID/PRN, Toothbrush (01/10/25 1415)        Daily Summary of Progress (SLP): progress toward functional goals as expected (01/10/25 1415)               Treatment Assessment (SLP): continued, dysarthria, aphasia (01/10/25 1415)     Plan for Continued Treatment (SLP): continue treatment per plan of care (01/10/25 1415)                SWALLOW EVALUATION (Last 72 Hours)       SLP Adult Swallow Evaluation       Row Name 01/10/25 1415 01/08/25 1325 01/08/25 0850             Rehab Evaluation    Document Type -- evaluation  -CJ evaluation  -CJ      Subjective Information -- no complaints  -CJ no complaints  -CJ      Patient Observations -- alert;cooperative  -CJ alert;cooperative  -CJ      Patient/Family/Caregiver Comments/Observations -- no family present  -CJ no family present  -CJ      Patient Effort -- good  -CJ good  -CJ      Symptoms Noted During/After Treatment -- none  -CJ none  -CJ         General Information    Patient Profile Reviewed -- yes  -CJ yes  -CJ      Pertinent History Of Current Problem -- see am eval; referred for FEES  -CJ Pt adm w/ encephalopathy on stroke pathway; sig h/o CAD, anxiety, COPD, reflux, DM2, melena, afib, liver cirrhosis, afib. H/o prior stroke, unsure of residual deficits. Pt is a poor historian and sister reports has had difficulty w/ swallowing  -CJ      Current Method of Nutrition -- NPO  -CJ NPO  -CJ      Precautions/Limitations, Vision -- WFL;for purposes of eval  -CJ WFL;for purposes of eval  -CJ      Precautions/Limitations, Hearing -- WFL;for purposes of eval  -CJ WFL;for purposes of  eval  -CJ      Prior Level of Function-Communication -- WFL  -CJ WFL  -CJ      Prior Level of Function-Swallowing -- other (see comments)  -CJ other (see comments)  prior difficulty per family'  -CJ      Plans/Goals Discussed with -- patient  -CJ patient  -CJ      Barriers to Rehab -- previous functional deficit  -CJ previous functional deficit  -CJ      Patient's Goals for Discharge -- patient did not state  -CJ patient did not state  -CJ         Pain    Additional Documentation -- Pain Scale: FACES Pre/Post-Treatment (Group)  -CJ Pain Scale: FACES Pre/Post-Treatment (Group)  -CJ         Pain Scale: FACES Pre/Post-Treatment    Pain: FACES Scale, Pretreatment -- 0-->no hurt  -CJ 0-->no hurt  -CJ      Posttreatment Pain Rating -- 0-->no hurt  -CJ 0-->no hurt  -CJ         Oral Motor Structure and Function    Dentition Assessment -- -- missing teeth  -CJ      Secretion Management -- -- WNL/WFL  -CJ      Mucosal Quality -- -- moist, healthy  -CJ         Oral Musculature and Cranial Nerve Assessment    Oral Motor General Assessment -- -- generalized oral motor weakness  -         General Eating/Swallowing Observations    Respiratory Support Currently in Use -- -- room air  -CJ      Eating/Swallowing Skills -- -- fed by SLP;unable to perform self-feeding  -      Positioning During Eating -- -- upright in bed  -CJ      Utensils Used -- -- spoon;cup;straw  -      Consistencies Trialed -- -- pureed;ice chips;thin liquids;nectar/syrup-thick liquids  -         Clinical Swallow Eval    Oral Prep Phase -- -- impaired  -CJ      Oral Transit -- -- impaired  -CJ      Oral Residue -- -- WFL  -CJ      Pharyngeal Phase -- -- suspected pharyngeal impairment  -CJ      Esophageal Phase -- -- unremarkable  -CJ         Oral Prep Concerns    Oral Prep Concerns -- -- increased prep time  -CJ         Oral Transit Concerns    Oral Transit Concerns -- -- delayed initiation of bolus transit  -CJ         Pharyngeal Phase Concerns     Pharyngeal Phase Concerns -- -- cough;wet vocal quality  -CJ      Wet Vocal Quality -- -- thin  -CJ      Cough -- -- thin;nectar  -CJ      Pharyngeal Phase Concerns, Comment -- -- Given concerns of previous dysphagia w/ new neuro symptoms as well as overt s/s of aspiration, will complete FEES this date. Continue NPO w/ essential meds crushed in puree until FEES  -CJ         Fiberoptic Endoscopic Evaluation of Swallowing (FEES)    Risks/Benefits Reviewed -- risks/benefits explained;patient;agreed to eval  -CJ --      Nasal Entry -- right:  -CJ --      Scope serial number/identification -- [id number]  -CJ --         Anatomy and Physiology    Anatomic Considerations -- anatomic deviation observed (see comments)  prominent posterior pharyngeal wall concerning for cspine component  -CJ --      Velopharyngeal -- WFL  -CJ --      Base of Tongue -- symmetrical  -CJ --      Epiglottis -- WFL  -CJ --      Laryngeal Function Breathing -- symmetrical  -CJ --      Laryngeal Function Phonation -- symmetrical  -CJ --      Laryngeal Function to Breath Hold -- CNA  -CJ --      Secretion Rating Scale (Hang et al. 1996) -- 1- secretions present around the laryngeal vestibule  -CJ --      Secretion Description -- thin;clear;white  -CJ --      Ice Chips -- elicited swallow;partially cleared secretions  -CJ --      Spontaneous Swallow -- frequency reduced  -CJ --      Sensory -- sensed scope  -CJ --      Utensils Used -- Spoon;Cup;Straw  -CJ --      Consistencies Trialed -- thin liquids;nectar-thick liquids;pudding/puree;regular textures;spoon;cup;straw  -CJ --         FEES Interpretation    Oral Phase -- prolonged manipulation;prespill of liquids into pharynx  -CJ --         Initiation of Pharyngeal Swallow    Initiation of Pharyngeal Swallow -- bolus in pyriform sinuses  -CJ --      Pharyngeal Phase -- impaired pharyngeal phase of swallowing  -CJ --      Penetration Before the Swallow -- thin liquids  -CJ --      Aspiration During the  Swallow -- thin liquids;secondary to delayed swallow initiation or mistiming;secondary to reduced laryngeal elevation;secondary to reduced vestibular closure  -CJ --      Depth of Penetration -- deep  -CJ --      Response to Penetration -- No  -CJ --      No spontaneous response to penetration and -- non-effective laryngeal clearance with cue (see comments)  -CJ --      Response to Aspiration -- No  -CJ --      No spontaneous response to aspiration with -- could not produce cough response despite cue  -CJ --      Rosenbek's Scale -- thin:;8-->Level 8  -CJ --      Residue -- thin liquids;nectar-thick liquids;diffuse within pharynx;secondary to reduced posterior pharyngeal wall stripping;secondary to reduced hyolaryngeal excursion  -CJ --      Response to Residue -- partial residue clearance;with cued swallow;with spontaneous subsequent swallow  -CJ --      Attempted Compensatory Maneuvers -- bolus size;bolus presentation style;additional subsequent swallow;multiple swallows;throat clear after swallow  -CJ --      Response to Attempted Compensatory Maneuvers -- did not prevent aspiration  -CJ --      Successful Compensatory Maneuver Competency -- patient able to;demonstrate compensations;with cues  -CJ --      Pharyngeal Phase, Comment -- Mild-moderate oropharyngeal dysphagia. Silent aspiration w/ thins during the swallow. Pt unable to consistently perform cued cough to attempt to clear aspirated material. Diffuse residue but worse w/ thin liquids. No penetration or aspiration w/ pudding, solids or nectar thick liquids. pt resistant to trying regular solids. Okay for soft chopped w/ nectar thick liquids, no mixed, okay straws. Will f/u for tx  -CJ --         SLP Evaluation Clinical Impression    SLP Swallowing Diagnosis mild-moderate;oral dysphagia;pharyngeal dysphagia  -CH mild-moderate;oral dysphagia;pharyngeal dysphagia  -CJ suspected pharyngeal dysphagia  -CJ      Functional Impact risk of aspiration/pneumonia   - risk of aspiration/pneumonia  - risk of aspiration/pneumonia  -      Rehab Potential/Prognosis, Swallowing adequate, monitor progress closely  - adequate, monitor progress closely  -CJ adequate, monitor progress closely  -      Swallow Criteria for Skilled Therapeutic Interventions Met demonstrates skilled criteria  - demonstrates skilled criteria  -CJ demonstrates skilled criteria  -         Recommendations    Therapy Frequency (Swallow) 5 days per week  - 5 days per week  -CJ --      Predicted Duration Therapy Intervention (Days) -- 2 weeks  -CJ --      SLP Diet Recommendation soft to chew textures;chopped;no mixed consistencies;nectar thick liquids  - soft to chew textures;chopped;no mixed consistencies;nectar thick liquids  - NPO  -      Recommended Diagnostics -- -- reassess via FEES  -      Recommended Precautions and Strategies upright posture during/after eating;small bites of food and sips of liquid;check mouth frequently for oral residue/pocketing;general aspiration precautions;assist with feeding  - upright posture during/after eating;small bites of food and sips of liquid;check mouth frequently for oral residue/pocketing;general aspiration precautions;assist with feeding  - general aspiration precautions  -      Oral Care Recommendations Oral Care BID/PRN;Toothbrush  - Oral Care BID/PRN;Toothbrush  -CJ Oral Care BID/PRN;Toothbrush  -      SLP Rec. for Method of Medication Administration meds crushed;with puree;as tolerated;meds via alternate route  - meds crushed;with puree;as tolerated;meds via alternate route  -CJ meds crushed;with puree;as tolerated;meds via alternate route  -      Monitor for Signs of Aspiration yes;notify SLP if any concerns  - yes;notify SLP if any concerns  -CJ yes;notify SLP if any concerns  -      Anticipated Discharge Disposition (SLP) -- inpatient rehabilitation facility  -CJ inpatient rehabilitation facility  -                User  Key  (r) = Recorded By, (t) = Taken By, (c) = Cosigned By      Initials Name Effective Dates    CJ Becerra Dari CARPENTER, MS CCC-SLP 10/22/24 -     CH Sharon Ortega MS CCC-SLP 06/16/21 -                     EDUCATION  The patient has been educated in the following areas:   Home Exercise Program (HEP) Dysphagia (Swallowing Impairment) Oral Care/Hydration Modified Diet Instruction.        SLP GOALS       Row Name 01/10/25 1415 01/08/25 1325          (LTG) Patient will demonstrate functional swallow for    Diet Texture (Demonstrate functional swallow) soft to chew (whole) textures  -CH soft to chew (whole) textures  -CJ     Liquid viscosity (Demonstrate functional swallow) thin liquids  -CH thin liquids  -CJ     [place] (Demonstrate functional swallow) with minimal cues (75-90% accuracy)  -CH with minimal cues (75-90% accuracy)  -CJ     Time Frame (Demonstrate functional swallow) 1 week  -CH 1 week  -CJ     Progress/Outcomes (Demonstrate functional swallow) continuing progress toward goal  -CH new goal  -CJ        (STG) Patient will tolerate trials of    Consistencies Trialed (Tolerate trials) soft to chew (chopped) textures;nectar/ mildly thick liquids  -CH soft to chew (chopped) textures;nectar/ mildly thick liquids  -CJ     Desired Outcome (Tolerate trials) without signs/symptoms of aspiration;without signs of distress  -CH without signs/symptoms of aspiration;without signs of distress  -CJ     [place] (Tolerate trials) with minimal cues (75-90% accuracy)  -CH with minimal cues (75-90% accuracy)  -CJ     Time Frame (Tolerate trials) 1 week  -CH 1 week  -CJ     Progress/Outcomes (Tolerate trials) continuing progress toward goal  -CH new goal  -CJ     Comment (Tolerate trials) No s/s of aspiration with trials of soft solids or nectar thick liquids  - --        (STG) Lingual Strengthening Goal 1 (SLP)    Activity (Lingual Strengthening Goal 1, SLP) increase tongue back strength;increase lingual  tone/sensation/control/coordination/movement  -CH increase tongue back strength;increase lingual tone/sensation/control/coordination/movement  -CJ     Increase Lingual Tone/Sensation/Control/Coordination/Movement swallow trials;lingual resistance exercises  -CH swallow trials;lingual resistance exercises  -CJ     Increase Tongue Back Strength lingual resistance exercises  -CH lingual resistance exercises  -CJ     [place]/Accuracy (Lingual Strengthening Goal 1, SLP) with moderate cues (50-74% accuracy)  -CH with moderate cues (50-74% accuracy)  -CJ     Time Frame (Lingual Strengthening Goal 1, SLP) 1 week  -CH 1 week  -CJ     Progress/Outcomes (Lingual Strengthening Goal 1, SLP) continuing progress toward goal  -CH new goal  -CJ     Comment (Lingual Strengthening Goal 1, SLP) Handout of dysphagia exercises provided, reviewed and completed with min cues/models  - --        (STG) Pharyngeal Strengthening Exercise Goal 1 (SLP)    Activity (Pharyngeal Strengthening Goal 1, SLP) increase timing;increase squeeze/positive pressure generation;increase closure at entrance to airway/closure of airway at glottis;increase anterior movement of the hyolaryngeal complex  - increase timing;increase squeeze/positive pressure generation;increase closure at entrance to airway/closure of airway at glottis;increase anterior movement of the hyolaryngeal complex  -CJ     Increase Timing prepping - 3 second prep or suck swallow or 3-step swallow  -CH prepping - 3 second prep or suck swallow or 3-step swallow  -CJ     Increase Anterior Movement of the Hyolaryngeal Complex chin tuck against resistance (CTAR)  -CH chin tuck against resistance (CTAR)  -CJ     Increase Closure at Entrance to Airway/Closure of Airway at Glottis supraglottic swallow;maria victoria  -CH supraglottic swallow;maria victoria  -CJ     Increase Squeeze/Positive Pressure Generation hard effortful swallow  -CH hard effortful swallow  -CJ     [place]/Accuracy (Pharyngeal  Strengthening Goal 1, SLP) with moderate cues (50-74% accuracy)  -CH with moderate cues (50-74% accuracy)  -CJ     Time Frame (Pharyngeal Strengthening Goal 1, SLP) 1 week  -CH 1 week  -CJ     Progress/Outcomes (Pharyngeal Strengthening Goal 1, SLP) continuing progress toward goal  -CH new goal  -CJ     Comment (Pharyngeal Strengthening Goal 1, SLP) Handout of dysphagia exercises provided, reviewed and completed with min cues/models  -CH --        Patient will demonstrate functional speech skills for return to discharge environment    [place] with minimal cues  -CH with minimal cues  -CJ     Time frame 1 week  -CH 1 week  -CJ     Progress/Outcomes continuing progress toward goal  -CH new goal  -CJ        Patient will demonstrate functional language skills for return to discharge environment     [place] with minimal cues  -CH with minimal cues  -CJ     Time frame 1 week  -CH 1 week  -CJ     Progress/Outcomes continuing progress toward goal  -CH new goal  -CJ        SLP Diagnostic Treatment     Patient will participate in further assessment in the following areas reading comprehension;graphic expression;cognitive-linguistic;clarification of baseline cognitive communication status  - reading comprehension;graphic expression;cognitive-linguistic;clarification of baseline cognitive communication status  -CJ     Time Frame (Diagnostic) 1 week  -CH 1 week  -CJ     Progress/Outcomes (Additional Goal 1, SLP) continuing progress toward goal  -CH new goal  -CJ     Comment (Diagnostic) Patient able to inconsistently read words, time, phone and room numbers from her electronic board board.  -CH --        Comprehend Questions Goal 1 (SLP)    Improve Ability to Comprehend Questions Goal 1 (SLP) simple wh questions;80%;with minimal cues (75-90%)  -CH simple wh questions;80%;with minimal cues (75-90%)  -CJ     Time Frame (Comprehend Questions Goal 1, SLP) 1 week  -CH 1 week  -CJ     Progress (Ability to Comprehend  Questions Goal 1, SLP) 70%;with minimal cues (75-90%)  - --     Progress/Outcomes (Comprehend Questions Goal 1, SLP) continuing progress toward goal  - new goal  -        Follow Directions Goal 2 (SLP)    Improve Ability to Follow Directions Goal 1 (SLP) 2 step commands;80%;with minimal cues (75-90%)  - 2 step commands;80%;with minimal cues (75-90%)  -CJ     Time Frame (Follow Directions Goal 1, SLP) 1 week  -CH 1 week  -CJ     Progress (Ability to Follow Directions Goal 1, SLP) 50%;with minimal cues (75-90%)  - --     Progress/Outcomes (Follow Directions Goal 1, SLP) continuing progress toward goal  - new goal  -        Word Retrieval Skills Goal 1 (SLP)    Improve Word Retrieval Skills By Goal 1 (SLP) responsive naming task;high frequency;repeating phrases;completing open ended unstructured sentence;completing functional word finding tasks;90%;with minimal cues (75-90%)  - responsive naming task;high frequency;repeating phrases;completing open ended unstructured sentence;completing functional word finding tasks;90%;with minimal cues (75-90%)  -CJ     Time Frame (Word Retrieval Goal 1, SLP) 1 week  -CH 1 week  -CJ     Progress (Word Retrieval Skills Goal 1, SLP) 50%;with minimal cues (75-90%)  - --     Progress/Outcomes (Word Retrieval Goal 1, SLP) continuing progress toward goal  - new goal  -     Comment (Word Retrieval Goal 1, SLP) responsive naming, generative naming  - --        Phonation Goal 1 (SLP)    Improve Phonation By Goal 1 (SLP) using loud speech;90%;with minimal cues (75-90%)  - using loud speech;90%;with minimal cues (75-90%)  -CJ     Time Frame (Phonation Goal 1, SLP) 1 week  -CH 1 week  -CJ     Progress (Phonation Goal 1, SLP) 50%;with minimal cues (75-90%)  - --     Progress/Outcomes (Phonation Goal 1, SLP) continuing progress toward goal  - new goal  -               User Key  (r) = Recorded By, (t) = Taken By, (c) = Cosigned By      Initials Name Provider Type     Dari Hernandes MS CCC-SLP Speech and Language Pathologist     Sharon Ortega MS CCC-SLP Speech and Language Pathologist                         Time Calculation:    Time Calculation- SLP       Row Name 01/10/25 1450             Time Calculation- SLP    SLP Start Time 1415  -CH      SLP Received On 01/10/25  -CH         Untimed Charges    37527-OG Treatment/ST Modification Prosth Aug Alter  30  -CH      65859-FJ Treatment Swallow Minutes 25  -CH         Total Minutes    Untimed Charges Total Minutes 55  -CH       Total Minutes 55  -CH                User Key  (r) = Recorded By, (t) = Taken By, (c) = Cosigned By      Initials Name Provider Type    Sharon Rawls, MS CCC-SLP Speech and Language Pathologist                    Therapy Charges for Today       Code Description Service Date Service Provider Modifiers Qty    83421145702 HC ST TREATMENT SWALLOW 2 1/10/2025 Sharon Ortega MS CCC-SLP GN 1    29593269255 HC ST TREATMENT SPEECH 2 1/10/2025 Sharon Ortega MS CCC-SLP GN 1                 Sharon Ortega MS CCC-SLP  1/10/2025   and Acute Care - Speech Language Pathology Treatment Note  [facility]     Patient Name: Karol Lopez  : 1950  MRN: [id number]  Today's Date: 1/10/2025               Admit Date: 2025     Visit Dx:    ICD-10-CM ICD-9-CM   1. Left carotid artery occlusion  I65.22 433.10   2. Difficulty with speech  R47.9 784.59   3. History of stroke  Z86.73 V12.54   4. Altered mental status, unspecified altered mental status type  R41.82 780.97   5. Oropharyngeal dysphagia  R13.12 787.22   6. Other cirrhosis of liver  K74.69 571.5   7. Antiplatelet or antithrombotic long-term use  Z79.02 V58.63   8. Aphasia  R47.01 784.3     Patient Active Problem List   Diagnosis    Coronary artery disease involving native coronary artery of native heart with angina pectoris    Anxiety    COPD (chronic obstructive pulmonary disease)    Acid reflux    Vitamin B12 deficiency     Current smoker    Type 2 diabetes mellitus, with long-term current use of insulin    Impaired mobility and ADLs    Melena    Anemia    Gastrointestinal hemorrhage    Hyperlipidemia LDL goal <70    Claudication    Persistent atrial fibrillation    Hyperglycemia due to diabetes mellitus    Cirrhosis of liver    Carotid artery disease    Encephalopathy    Acute ischemic cerebrovascular accident (CVA) involving internal carotid artery territory    Antiplatelet or antithrombotic long-term use    Severe protein-calorie malnutrition     Past Medical History:   Diagnosis Date    Acid reflux     Anxiety     Cataracts, bilateral     Cirrhosis of liver     Constipation     COPD (chronic obstructive pulmonary disease)     Coronary artery disease     CTS (carpal tunnel syndrome)     Diabetes     Hearing loss     Hypercholesteremia     Hypertension     Impaired functional mobility, balance, gait, and endurance     Lower back pain     Osteoarthritis     Stroke     2013-weak in right arm    Tattoos     x2    Tobacco abuse     Tumor     in between breast closer to right breast    Vitamin B12 deficiency     Wears dentures     upper only    Wears glasses      Past Surgical History:   Procedure Laterality Date    BREAST BIOPSY Right 5/10/2019    Procedure: BREAST BIOPSY RIGHT;  Surgeon: Kiana Frey MD;  Location: [facility] OR;  Service: General    CARPAL TUNNEL RELEASE Bilateral     CHOLECYSTECTOMY      COLONOSCOPY N/A 9/30/2021    Procedure: COLONOSCOPY WITH POLYPECTOMY AND ABLATION OF AVM;  Surgeon: Russell Davila MD;  Location: [facility] ENDOSCOPY;  Service: Gastroenterology;  Laterality: N/A;    CORONARY ANGIOPLASTY WITH STENT PLACEMENT  2012    ENDOSCOPY N/A 9/29/2021    Procedure: ESOPHAGOGASTRODUODENOSCOPY with biopsies;  Surgeon: Russell Davila MD;  Location: [facility] ENDOSCOPY;  Service: Gastroenterology;  Laterality: N/A;    ENTEROSCOPY SMALL BOWEL N/A 11/16/2021    Procedure: ESOPHAGOGASTRODUODENOSCOPY WITH  SMALL BOWEL ENTEROSCOPY and biopsy;  Surgeon: Russell Davila MD;  Location: [facility] ENDOSCOPY;  Service: Gastroenterology;  Laterality: N/A;    HYSTERECTOMY      complete    JOINT REPLACEMENT Bilateral     knee replacements    TOTAL KNEE ARTHROPLASTY Bilateral 10/18/2016    MAUREEN Palomares MD       SLP Recommendation and Plan  SLP Diagnosis: moderate, aphasia, mild-moderate, dysarthria (01/10/25 1415)        Monitor for Signs of Aspiration: yes, notify SLP if any concerns (01/10/25 1415)  Swallow Criteria for Skilled Therapeutic Interventions Met: demonstrates skilled criteria (01/10/25 1415)  SLC Criteria for Skilled Therapy Interventions Met: yes (01/10/25 1415)  Anticipated Discharge Disposition (SLP): inpatient rehabilitation facility (01/10/25 1415)     Therapy Frequency (Swallow): 5 days per week (01/10/25 1415)  Therapy Frequency (SLP SLC): 5 days per week (01/10/25 1415)  Predicted Duration Therapy Intervention (Days): 2 weeks (01/10/25 1415)  Oral Care Recommendations: Oral Care BID/PRN, Toothbrush (01/10/25 1415)     Daily Summary of Progress (SLP): progress toward functional goals as expected (01/10/25 1415)           Treatment Assessment (SLP): continued, dysarthria, aphasia (01/10/25 1415)     Plan for Continued Treatment (SLP): continue treatment per plan of care (01/10/25 1415)         SLP EVALUATION (Last 72 Hours)       SLP SLC Evaluation       Row Name 01/10/25 1415 01/08/25 0892                Communication Assessment/Intervention    Document Type therapy note (daily note)  -CH --       Subjective Information no complaints  -CH --       Patient Observations alert;cooperative;agree to therapy  -CH --       Patient/Family/Caregiver Comments/Observations none present  -CH --       Patient Effort good  -CH --       Symptoms Noted During/After Treatment none  -CH --       Oral Care lip/mouth moisturizer applied  -CH --          General Information    Patient Profile Reviewed yes  -CH --       Prior  Level of Function-Communication -- unknown  -       Patient's Goals for Discharge -- patient did not state  -          Pain Scale: FACES Pre/Post-Treatment    Pain: FACES Scale, Pretreatment 0-->no hurt  -CH --       Posttreatment Pain Rating 0-->no hurt  -CH --          Comprehension Assessment/Intervention    Comprehension Assessment/Intervention -- Auditory Comprehension  -CJ          Auditory Comprehension Assessment/Intervention    Auditory Comprehension (Communication) -- moderate impairment  -CJ       Able to Identify Objects/Pictures (Communication) -- WFL;body part  -CJ       Answers Questions (Communication) -- moderate impairment;simple;wh questions  -CJ       Able to Follow Commands (Communication) -- moderate impairment;2-step  -CJ          Expression Assessment/Intervention    Expression Assessment/Intervention -- verbal expression  -CJ          Verbal Expression Assessment/Intervention    Verbal Expression -- moderate impairment  -       Automatic Speech (Communication) -- WFL;counting 1-20;alphabet  -CJ       Repetition -- moderate impairment;phrases  -CJ       Phrase Completion -- moderate impairment;automatic/predictable  -CJ       Responsive Naming -- moderate impairment;simple  -CJ       Confrontational Naming -- moderate impairment;low frequency  -CJ       Spontaneous/Functional Words -- moderate impairment;simple  -CJ       Sentence Formulation -- moderate impairment;simple  -CJ          Oral Motor Structure and Function    Oral Motor Structure and Function -- mild impairment  -          Motor Speech Assessment/Intervention    Motor Speech Function -- mild impairment  -       Characteristics Consistent with Dysarthria -- decreased articulation;decreased intensity  -       Speech intelligibility -- 80%;in connected speech;with unfamiliar listener  -          Cognitive Assessment Intervention- SLP    Cognitive Function (Cognition) -- unable/difficult to assess;other (see comments)   2/2 aphasia  -          SLP Evaluation Clinical Impressions    SLP Diagnosis moderate;aphasia;mild-moderate;dysarthria  - moderate;aphasia;mild-moderate;dysarthria  -       Rehab Potential/Prognosis good  -CH good  -       SLC Criteria for Skilled Therapy Interventions Met yes  -CH yes  -       Functional Impact functional impact in ADLs;difficulty communicating wants, needs;difficulty communicating in an emergency  -CH functional impact in ADLs;difficulty communicating wants, needs;difficulty communicating in an emergency  -          SLP Treatment Clinical Impressions    Treatment Assessment (SLP) continued;dysarthria;aphasia  - --       Daily Summary of Progress (SLP) progress toward functional goals as expected  - --       Plan for Continued Treatment (SLP) continue treatment per plan of care  - --       Care Plan Review evaluation/treatment results reviewed;care plan/treatment goals reviewed  - --          Recommendations    Therapy Frequency (SLP SLC) 5 days per week  -CH 5 days per week  -       Predicted Duration Therapy Intervention (Days) 2 weeks  - 2 weeks  -       Anticipated Discharge Disposition (SLP) inpatient rehabilitation facility  - --                 User Key  (r) = Recorded By, (t) = Taken By, (c) = Cosigned By      Initials Name Effective Dates     Dari Becerra MS CCC-SLP 10/22/24 -      Sharon Ortega MS CCC-SLP 06/16/21 -                        EDUCATION  The patient has been educated in the following areas:     Cognitive Impairment Communication Impairment.           SLP GOALS       Row Name 01/10/25 1415 01/08/25 1325          (LTG) Patient will demonstrate functional swallow for    Diet Texture (Demonstrate functional swallow) soft to chew (whole) textures  - soft to chew (whole) textures  -     Liquid viscosity (Demonstrate functional swallow) thin liquids  - thin liquids  -     [place] (Demonstrate functional swallow) with minimal cues  (75-90% accuracy)  -CH with minimal cues (75-90% accuracy)  -CJ     Time Frame (Demonstrate functional swallow) 1 week  -CH 1 week  -CJ     Progress/Outcomes (Demonstrate functional swallow) continuing progress toward goal  -CH new goal  -CJ        (STG) Patient will tolerate trials of    Consistencies Trialed (Tolerate trials) soft to chew (chopped) textures;nectar/ mildly thick liquids  - soft to chew (chopped) textures;nectar/ mildly thick liquids  -     Desired Outcome (Tolerate trials) without signs/symptoms of aspiration;without signs of distress  - without signs/symptoms of aspiration;without signs of distress  -     [place] (Tolerate trials) with minimal cues (75-90% accuracy)  -CH with minimal cues (75-90% accuracy)  -CJ     Time Frame (Tolerate trials) 1 week  -CH 1 week  -CJ     Progress/Outcomes (Tolerate trials) continuing progress toward goal  -CH new goal  -CJ     Comment (Tolerate trials) No s/s of aspiration with trials of soft solids or nectar thick liquids  - --        (STG) Lingual Strengthening Goal 1 (SLP)    Activity (Lingual Strengthening Goal 1, SLP) increase tongue back strength;increase lingual tone/sensation/control/coordination/movement  - increase tongue back strength;increase lingual tone/sensation/control/coordination/movement  -     Increase Lingual Tone/Sensation/Control/Coordination/Movement swallow trials;lingual resistance exercises  - swallow trials;lingual resistance exercises  -     Increase Tongue Back Strength lingual resistance exercises  - lingual resistance exercises  -     [place]/Accuracy (Lingual Strengthening Goal 1, SLP) with moderate cues (50-74% accuracy)  -CH with moderate cues (50-74% accuracy)  -CJ     Time Frame (Lingual Strengthening Goal 1, SLP) 1 week  -CH 1 week  -CJ     Progress/Outcomes (Lingual Strengthening Goal 1, SLP) continuing progress toward goal  -CH new goal  -CJ     Comment (Lingual Strengthening Goal 1, SLP)  Handout of dysphagia exercises provided, reviewed and completed with min cues/models  -CH --        (STG) Pharyngeal Strengthening Exercise Goal 1 (SLP)    Activity (Pharyngeal Strengthening Goal 1, SLP) increase timing;increase squeeze/positive pressure generation;increase closure at entrance to airway/closure of airway at glottis;increase anterior movement of the hyolaryngeal complex  -CH increase timing;increase squeeze/positive pressure generation;increase closure at entrance to airway/closure of airway at glottis;increase anterior movement of the hyolaryngeal complex  -CJ     Increase Timing prepping - 3 second prep or suck swallow or 3-step swallow  -CH prepping - 3 second prep or suck swallow or 3-step swallow  -CJ     Increase Anterior Movement of the Hyolaryngeal Complex chin tuck against resistance (CTAR)  -CH chin tuck against resistance (CTAR)  -CJ     Increase Closure at Entrance to Airway/Closure of Airway at Glottis supraglottic swallow;maria victoria  -CH supraglottic swallow;maria victoria  -CJ     Increase Squeeze/Positive Pressure Generation hard effortful swallow  -CH hard effortful swallow  -CJ     [place]/Accuracy (Pharyngeal Strengthening Goal 1, SLP) with moderate cues (50-74% accuracy)  -CH with moderate cues (50-74% accuracy)  -CJ     Time Frame (Pharyngeal Strengthening Goal 1, SLP) 1 week  -CH 1 week  -CJ     Progress/Outcomes (Pharyngeal Strengthening Goal 1, SLP) continuing progress toward goal  -CH new goal  -CJ     Comment (Pharyngeal Strengthening Goal 1, SLP) Handout of dysphagia exercises provided, reviewed and completed with min cues/models  -CH --        Patient will demonstrate functional speech skills for return to discharge environment    [place] with minimal cues  -CH with minimal cues  -CJ     Time frame 1 week  -CH 1 week  -CJ     Progress/Outcomes continuing progress toward goal  -CH new goal  -CJ        Patient will demonstrate functional language skills for return to discharge  environment     [place] with minimal cues  - with minimal cues  -CJ     Time frame 1 week  -CH 1 week  -CJ     Progress/Outcomes continuing progress toward goal  - new goal  -        SLP Diagnostic Treatment     Patient will participate in further assessment in the following areas reading comprehension;graphic expression;cognitive-linguistic;clarification of baseline cognitive communication status  - reading comprehension;graphic expression;cognitive-linguistic;clarification of baseline cognitive communication status  -CJ     Time Frame (Diagnostic) 1 week  -CH 1 week  -CJ     Progress/Outcomes (Additional Goal 1, SLP) continuing progress toward goal  - new goal  -     Comment (Diagnostic) Patient able to inconsistently read words, time, phone and room numbers from her [employer] board board.  - --        Comprehend Questions Goal 1 (SLP)    Improve Ability to Comprehend Questions Goal 1 (SLP) simple wh questions;80%;with minimal cues (75-90%)  - simple wh questions;80%;with minimal cues (75-90%)  -     Time Frame (Comprehend Questions Goal 1, SLP) 1 week  -CH 1 week  -CJ     Progress (Ability to Comprehend Questions Goal 1, SLP) 70%;with minimal cues (75-90%)  - --     Progress/Outcomes (Comprehend Questions Goal 1, SLP) continuing progress toward goal  - new goal  -        Follow Directions Goal 2 (SLP)    Improve Ability to Follow Directions Goal 1 (SLP) 2 step commands;80%;with minimal cues (75-90%)  - 2 step commands;80%;with minimal cues (75-90%)  -     Time Frame (Follow Directions Goal 1, SLP) 1 week  -CH 1 week  -CJ     Progress (Ability to Follow Directions Goal 1, SLP) 50%;with minimal cues (75-90%)  - --     Progress/Outcomes (Follow Directions Goal 1, SLP) continuing progress toward goal  - new goal  -        Word Retrieval Skills Goal 1 (SLP)    Improve Word Retrieval Skills By Goal 1 (SLP) responsive naming task;high frequency;repeating phrases;completing open  ended unstructured sentence;completing functional word finding tasks;90%;with minimal cues (75-90%)  -CH responsive naming task;high frequency;repeating phrases;completing open ended unstructured sentence;completing functional word finding tasks;90%;with minimal cues (75-90%)  -CJ     Time Frame (Word Retrieval Goal 1, SLP) 1 week  -CH 1 week  -CJ     Progress (Word Retrieval Skills Goal 1, SLP) 50%;with minimal cues (75-90%)  -CH --     Progress/Outcomes (Word Retrieval Goal 1, SLP) continuing progress toward goal  -CH new goal  -CJ     Comment (Word Retrieval Goal 1, SLP) responsive naming, generative naming  -CH --        Phonation Goal 1 (SLP)    Improve Phonation By Goal 1 (SLP) using loud speech;90%;with minimal cues (75-90%)  -CH using loud speech;90%;with minimal cues (75-90%)  -CJ     Time Frame (Phonation Goal 1, SLP) 1 week  -CH 1 week  -CJ     Progress (Phonation Goal 1, SLP) 50%;with minimal cues (75-90%)  - --     Progress/Outcomes (Phonation Goal 1, SLP) continuing progress toward goal  -CH new goal  -CJ               User Key  (r) = Recorded By, (t) = Taken By, (c) = Cosigned By      Initials Name Provider Type    Dari Hernandes MS CCC-SLP Speech and Language Pathologist    Sharon Rawls MS CCC-SLP Speech and Language Pathologist                              Time Calculation:      Time Calculation- SLP       Row Name 01/10/25 1450             Time Calculation- SLP    SLP Start Time 1415  -CH      SLP Received On 01/10/25  -         Untimed Charges    69288-BT Treatment/ST Modification Prosth Aug Alter  30  -CH      37712-XD Treatment Swallow Minutes 25  -CH         Total Minutes    Untimed Charges Total Minutes 55  -CH       Total Minutes 55  -CH                User Key  (r) = Recorded By, (t) = Taken By, (c) = Cosigned By      Initials Name Provider Type    Sharon Rawls MS CCC-SLP Speech and Language Pathologist                    Therapy Charges for Today       Code  Description Service Date Service Provider Modifiers Qty    85999336126 HC ST TREATMENT SWALLOW 2 1/10/2025 Sharon Ortega, MS CCC-SLP GN 1    [id number] HC ST TREATMENT SPEECH 2 1/10/2025 Sharon Ortega, MS CCC-SLP GN 1                       Sharon Ortega, MS CCC-SLP  1/10/2025

## 2025-01-10 NOTE — BRIEF OP NOTE
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  Duodenum is normal.  No blood seen in the upper GI tract.    >> Continue dual antiplatelet therapy for stroke.    >> Daily PPI.  >> Plan outpatient colonoscopy in a few months, when dysphagia improved.    Mark I. Brunner, MD     Date: 1/10/2025  Time: 09:51 EST

## 2025-01-10 NOTE — PROGRESS NOTES
Stroke Progress Note       Chief Complaint:  AMS    Subjective    Subjective     Subjective: No adverse events overnight.  No new complaints.  Patient underwent an EGD earlier this morning.  She has persistent right sided weakness and facial droop as well as expressive aphasia.      Review of Systems   Constitutional:  Negative for fever.   HENT:  Negative for trouble swallowing.    Eyes:  Negative for visual disturbance.   Respiratory:  Negative for cough and shortness of breath.    Cardiovascular:  Negative for chest pain and palpitations.   Gastrointestinal:  Negative for nausea and vomiting.   Skin: Negative.    Neurological:  Positive for facial asymmetry, speech difficulty, weakness and numbness. Negative for headaches.   Psychiatric/Behavioral: Negative.              Objective    Objective      Temp:  [97.7 °F (36.5 °C)-98.9 °F (37.2 °C)] 97.7 °F (36.5 °C)  Heart Rate:  [55-69] 64  Resp:  [16-18] 16  BP: (129-174)/() 145/60        Neurological Exam  Mental Status  Alert. Mild dysarthria present. Expressive aphasia present.    Cranial Nerves  CN II: Visual fields full to confrontation.  CN III, IV, VI: Extraocular movements intact bilaterally. Normal lids and orbits bilaterally. Pupils equal round and reactive to light bilaterally.  CN V:  Right: Diminished sensation of the entire right side of the face.  CN VII:  Right: There is central facial weakness.  Left: There is no facial weakness.  CN XI:  Right: Sternocleidomastoid strength is weak.  Left: Sternocleidomastoid strength is normal.    Motor  Normal muscle bulk throughout. No fasciculations present. Normal muscle tone. No abnormal involuntary movements. Strength is 5/5 in all four extremities except as noted.  RUE 0/5, RLE 1/5.    Sensory  Right hemisensory loss.     Reflexes  Right Plantar: downgoing  Left Plantar: downgoing    Gait    Not tested.      Physical Exam  Vitals reviewed.   Constitutional:       Appearance: Normal appearance.   HENT:       Head: Normocephalic and atraumatic.   Eyes:      General: Lids are normal.      Extraocular Movements: Extraocular movements intact.      Pupils: Pupils are equal, round, and reactive to light.   Cardiovascular:      Rate and Rhythm: Normal rate.   Pulmonary:      Effort: Pulmonary effort is normal. No respiratory distress.   Musculoskeletal:      Cervical back: Normal range of motion.      Right lower leg: No edema.      Left lower leg: No edema.   Skin:     General: Skin is warm and dry.   Neurological:      Mental Status: She is alert.      Cranial Nerves: Cranial nerve deficit and dysarthria present.      Sensory: Sensory deficit present.      Motor: Weakness present.   Psychiatric:         Mood and Affect: Mood normal.         Behavior: Behavior normal.         Results Review:    I reviewed the patient's new clinical results.  WBC   Date Value Ref Range Status   01/09/2025 8.30 3.40 - 10.80 10*3/mm3 Final     RBC   Date Value Ref Range Status   01/09/2025 4.03 3.77 - 5.28 10*6/mm3 Final     Hemoglobin   Date Value Ref Range Status   01/09/2025 12.0 12.0 - 15.9 g/dL Final     Hematocrit   Date Value Ref Range Status   01/09/2025 35.2 34.0 - 46.6 % Final     MCV   Date Value Ref Range Status   01/09/2025 87.3 79.0 - 97.0 fL Final     MCH   Date Value Ref Range Status   01/09/2025 29.8 26.6 - 33.0 pg Final     MCHC   Date Value Ref Range Status   01/09/2025 34.1 31.5 - 35.7 g/dL Final     RDW   Date Value Ref Range Status   01/09/2025 13.4 12.3 - 15.4 % Final     RDW-SD   Date Value Ref Range Status   01/09/2025 42.7 37.0 - 54.0 fl Final     MPV   Date Value Ref Range Status   01/09/2025 10.7 6.0 - 12.0 fL Final     Platelets   Date Value Ref Range Status   01/09/2025 137 (L) 140 - 450 10*3/mm3 Final     Neutrophil %   Date Value Ref Range Status   01/09/2025 64.7 42.7 - 76.0 % Final     Lymphocyte %   Date Value Ref Range Status   01/09/2025 24.0 19.6 - 45.3 % Final     Monocyte %   Date Value Ref Range  Status   01/09/2025 9.0 5.0 - 12.0 % Final     Eosinophil %   Date Value Ref Range Status   01/09/2025 1.6 0.3 - 6.2 % Final     Basophil %   Date Value Ref Range Status   01/09/2025 0.6 0.0 - 1.5 % Final     Immature Grans %   Date Value Ref Range Status   01/09/2025 0.1 0.0 - 0.5 % Final     Neutrophils, Absolute   Date Value Ref Range Status   01/09/2025 5.37 1.70 - 7.00 10*3/mm3 Final     Lymphocytes, Absolute   Date Value Ref Range Status   01/09/2025 1.99 0.70 - 3.10 10*3/mm3 Final     Monocytes, Absolute   Date Value Ref Range Status   01/09/2025 0.75 0.10 - 0.90 10*3/mm3 Final     Eosinophils, Absolute   Date Value Ref Range Status   01/09/2025 0.13 0.00 - 0.40 10*3/mm3 Final     Basophils, Absolute   Date Value Ref Range Status   01/09/2025 0.05 0.00 - 0.20 10*3/mm3 Final     Immature Grans, Absolute   Date Value Ref Range Status   01/09/2025 0.01 0.00 - 0.05 10*3/mm3 Final     nRBC   Date Value Ref Range Status   01/09/2025 0.0 0.0 - 0.2 /100 WBC Final     Lab Results   Component Value Date    GLUCOSE 171 (H) 01/09/2025    BUN 25 (H) 01/09/2025    CREATININE 1.21 (H) 01/09/2025     01/09/2025    K 4.1 01/09/2025     01/09/2025    CALCIUM 10.1 01/09/2025    PROTEINTOT 6.6 01/09/2025    ALBUMIN 3.8 01/09/2025    ALT 18 01/09/2025    AST 15 01/09/2025    ALKPHOS 111 01/09/2025    BILITOT 0.5 01/09/2025    GLOB 2.8 01/09/2025    AGRATIO 1.4 01/09/2025    BCR 20.7 01/09/2025    ANIONGAP 10.0 01/09/2025    EGFR 47.7 (L) 01/09/2025     A1c 8.8  LDL 32  Triglycerides 204        MRI Brain Without Contrast     Result Date: 1/8/2025  Impression: 1.Large area of acute/subacute infarction in the distribution of the left middle cerebral artery. 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 3.Atrophy and chronic microvascular ischemia. Electronically Signed: John Paul Cortez MD  1/8/2025 5:00 AM EST  Workstation ID: [id number]        CT Angiogram Head w AI Analysis of LVO     Result Date: 1/7/2025  1.Complete  occlusion of the extracranial left internal carotid artery which extends to the ophthalmic segment of the left internal carotid artery. The ophthalmic artery remains patent. This is new compared with the examination performed on 2/8/2024. This  is likely filled via retrograde flow from the [place] and a the anterior communicating artery. 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 4.Additional vascular and nonvascular findings as described above. 5.Stable 0.5 cm subpleural right apical pulmonary nodule on image #88. Findings were discussed with Dr. Kraus at 4:47 p.m. on 1/7/2025 Electronically Signed: Anthony Dumont MD  1/7/2025 4:49 PM EST  Workstation ID: [id number]     CT Angiogram Neck     Result Date: 1/7/2025  1.Complete occlusion of the extracranial left internal carotid artery which extends to the ophthalmic segment of the left internal carotid artery. The ophthalmic artery remains patent. This is new compared with the examination performed on 2/8/2024. This  is likely filled via retrograde flow from the [place] and a the anterior communicating artery. 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 4.Additional vascular and nonvascular findings as described above. 5.Stable 0.5 cm subpleural right apical pulmonary nodule on image #88. Findings were discussed with Dr. Kraus at 4:47 p.m. on 1/7/2025 Electronically Signed: Anthony Dumont MD  1/7/2025 4:49 PM EST  Workstation ID: [id number]     CT Head Without Contrast      Result Date: 1/7/2025  Impression: 1.No acute intracranial abnormality identified. 2.Chronic left frontal infarct. Chronic left gangliocapsular and right pontine lacunar infarcts. 3.Diffuse brain atrophy with chronic small vessel ischemia. Electronically Signed: Dave Shine MD  1/7/2025 4:02 PM EST  Workstation ID: [id number]   Results for orders placed during the hospital encounter of 02/08/24    -CUS shows right ICa stenosis estimate of 50-69%, left ICA suspected occlusion given waveforms; R ICA/CCA 2.86, L ICA/CCA 0.95     Adult Transthoracic Echo Complete W/ Cont if Necessary Per Protocol (With Agitated Saline)     Interpretation Summary    Left ventricular systolic function is normal. Calculated left ventricular EF = 59.2% Left ventricular ejection fraction appears to be 61 - 65%.    Saline test results are negative for right to left atrial level shunt.    Estimated right ventricular systolic pressure from tricuspid regurgitation is normal (<35 mmHg). Calculated right ventricular systolic pressure from tricuspid regurgitation is 21 mmHg.     -CTH wo on 1-7-25 images were personally reviewed and showed no acute ischemic or hemorrhagic stroke  -CTA of the head and neck images from 1-7-25 were personally reviewed and showed no flow limiting stenosis. There is an occlusion of the cervical L ICA with distal reconstitution of the L MCA through the Acom   -MRI brain images from 1-8-25 were personally reviewed and showed an acute/subacute ischemic stroke affecting the L MCA territory likely watershed infarct in the setting of her chronic looking L ICA occlusion   -A1c from 1/8/2025 was 8.8%  -LDL from 1/8/2025 was 32          Assessment/Plan     Assessment/Plan: 74 year old  female with multiple vascular risk factors who presented [facility] emergency department via EMS for complaints of altered mental status, reported left-sided weakness, and aphasia.  Not an appropriate IV thrombolytic  therapy candidate secondary to extended last known well.  Not deemed to be an appropriate emergent endovascular therapy candidate as any potential benefit would be outweighed by significant risk.     Antiplatelet PTA: Aspirin  Anticoagulant PTA: None          #Acute/subacute ischemic stroke affecting the L MCA territory  -Mechanism is likely watershed in the setting of chronic L ICA occlusion  -Continue aspirin 81 mg daily for secondary stroke prevention  -Continue plavix 75 mg daily for secondary stroke prevention, monitor platelets and s/s GI bleed given her history  -CUS shows right ICA stenosis estimate of 50-69%, left ICA suspected occlusion given abnormal waveforms  -Decrease atorvastatin to 40 mg, LDL of 32  -Avoid hypotension with goal 120-160 systolic  -Continue PT/OT/SLP, therapy recommending IRF at discharge  -Follow-up in the stroke clinic in 4 weeks, ordered        # Thrombocytopenia  # WILCOX cirrhosis  -H&H 12.3/36.6, Platelets 105  -Has been seen by hematology; Dr. Durant suggest thrombocytopenia likely related to liver disease rather than ITP.  Plan to pursue dual antiplatelet therapy to prevent future stroke  -GI following appreciate their assistance; EGD today showed a single column grade 1 varix in the posterior esophagus without high risk stigmata.  Mild portal gastropathy with no blood in the upper GI tract.  Planning for colonoscopy in a few months      # Atrial fibrillation  -Not anticoagulated given history of significant GI bleed        Plan of care discussed with patient nursing and Dr. Rivera.  Stroke workup is complete at this time.  Will follow-up in clinic, please call with questions or concerns thank you        Joanne Uriarte, AUBREY  01/10/25  08:19 EST

## 2025-01-10 NOTE — PROGRESS NOTES
[facility] Medicine Services  PROGRESS NOTE    Patient Name: Karol Lopez  : 1950  MRN: [id number]    Date of Admission: 2025  Primary Care Physician: Romaine Eugene MD    Subjective   Subjective     CC:  stroke    HPI:  Patient indicates no complaints today.  Staff helping her move up in bed.      Objective   Objective     Vital Signs:   Temp:  [97.6 °F (36.4 °C)-98.9 °F (37.2 °C)] 98.2 °F (36.8 °C)  Heart Rate:  [56-75] 59  Resp:  [16-18] 18  BP: (102-174)/() 157/63  Flow (L/min) (Oxygen Therapy):  [2] 2     Physical Exam:  Non toxic appearing, in bed  MM moist  RRR  Breath sounds clear, poor effort  Abdomen soft  Awake, speech soft and slightly slurred but understandable  Right hemiparesis    Results Reviewed:  LAB RESULTS:      Lab 01/10/25  1242 25  0359 25  0603 25  0532 25  1607   WBC 5.51 8.30  --  6.16 6.72   HEMOGLOBIN 12.3 12.0  --  11.4* 12.3   HEMATOCRIT 36.6 35.2  --  34.1 37.1   PLATELETS 105* 137*  --  99* 116*   NEUTROS ABS  --  5.37  --   --  4.96   IMMATURE GRANS (ABS)  --  0.01  --   --  0.03   LYMPHS ABS  --  1.99  --   --  1.17   MONOS ABS  --  0.75  --   --  0.46   EOS ABS  --  0.13  --   --  0.07   MCV 88.2 87.3  --  89.0 89.8   CRP  --   --   --   --  <0.30   PROCALCITONIN  --   --   --   --  0.14   LACTATE  --   --   --   --  1.7   PROTIME 15.1*  --   --   --   --    HSTROP T  --   --  38*  --  29*         Lab 01/10/25  1242 25  1405 25  0358 25  0532 25  1607   SODIUM 140  --  142 145 139   POTASSIUM 3.5 4.1 3.5 3.9 4.0   CHLORIDE 105  --  107 110* 101   CO2 25.0  --  25.0 19.0* 23.0   ANION GAP 10.0  --  10.0 16.0* 15.0   BUN 24*  --  25* 23 25*   CREATININE 1.14*  --  1.21* 1.15* 1.45*   EGFR 51.3*  --  47.7* 50.7* 38.4*   GLUCOSE 253*  --  171* 215* 295*   CALCIUM 10.3  --  10.1 9.3 9.8   MAGNESIUM 2.1  --   --  2.1 2.3   PHOSPHORUS 2.9  --   --   --  3.9   HEMOGLOBIN A1C  --   --   --   8.80*  --    TSH  --   --   --   --  1.340         Lab 01/09/25  0358 01/07/25  1607   TOTAL PROTEIN 6.6 6.9   ALBUMIN 3.8 4.0   GLOBULIN 2.8 2.9   ALT (SGPT) 18 25   AST (SGOT) 15 16   BILIRUBIN 0.5 0.5   ALK PHOS 111 124*   LIPASE  --  32         Lab 01/10/25  1242 01/08/25  0603 01/07/25  1607   PROBNP  --   --  271.0   HSTROP T  --  38* 29*   PROTIME 15.1*  --   --    INR 1.18*  --   --          Lab 01/08/25  0836   CHOLESTEROL 96   LDL CHOL 32   HDL CHOL 31*   TRIGLYCERIDES 204*             Lab 01/07/25  1604   FIO2 21   CARBOXYHEMOGLOBIN (VENOUS) 2.3     Brief Urine Lab Results  (Last result in the past 365 days)        Color   Clarity   Blood   Leuk Est   Nitrite   Protein   CREAT   Urine HCG        01/07/25 1648 Yellow   Clear   Negative   Negative   Negative   Trace                   Microbiology Results Abnormal       None            Duplex Carotid Ultrasound CAR    Result Date: 1/9/2025    Right internal carotid artery demonstrates an estimated stenosis at the upper end of the 50-69% range.   Antegrade right vertebral flow.   Left internal carotid artery is suspected to be occluded distal to the areas visualized on the study based on the abnormal waveforms noted throughout the common and internal carotid areas noted.   Antegrade left vertebral flow.      Results for orders placed during the hospital encounter of 01/07/25    Adult Transthoracic Echo Complete W/ Cont if Necessary Per Protocol (With Agitated Saline)    Interpretation Summary    Left ventricular systolic function is normal. Estimated left ventricular EF = 62%    Left ventricular wall thickness is consistent with mild concentric hypertrophy.    No hemodynamically significant valvular disease present.    Estimated right ventricular systolic pressure from tricuspid regurgitation is normal (<35 mmHg).      Current medications:  Scheduled Meds:aspirin, 81 mg, Oral, Daily  atorvastatin, 80 mg, Oral, Nightly  clopidogrel, 75 mg, Oral,  Daily  donepezil, 5 mg, Oral, Nightly  insulin glargine, 20 Units, Subcutaneous, Nightly  insulin lispro, 2-7 Units, Subcutaneous, 4x Daily AC & at Bedtime  ipratropium-albuterol, 3 mL, Nebulization, 4x Daily - RT  mupirocin, 1 Application, Each Nare, BID  pantoprazole, 40 mg, Oral, Q AM  senna-docusate sodium, 2 tablet, Oral, BID  sodium chloride, 10 mL, Intravenous, Q12H      Continuous Infusions:   PRN Meds:.  acetaminophen **OR** [DISCONTINUED] acetaminophen    senna-docusate sodium **AND** polyethylene glycol **AND** bisacodyl **AND** bisacodyl    Calcium Replacement - Follow Nurse / BPA Driven Protocol    dextrose    dextrose    glucagon (human recombinant)    ipratropium-albuterol    Magnesium Cardiology Dose Replacement - Follow Nurse / BPA Driven Protocol    nitroglycerin    Phosphorus Replacement - Follow Nurse / BPA Driven Protocol    Potassium Replacement - Follow Nurse / BPA Driven Protocol    sodium chloride    sodium chloride    Assessment & Plan   Assessment & Plan     Active Hospital Problems    Diagnosis  POA    **Acute ischemic cerebrovascular accident (CVA) involving internal carotid artery territory [I63.239]  Yes    Severe protein-calorie malnutrition [E43]  Yes    Encephalopathy [G93.40]  Yes    Antiplatelet or antithrombotic long-term use [Z79.02]  Not Applicable    Cirrhosis of liver [K74.60]  Yes    COPD (chronic obstructive pulmonary disease) [J44.9]  Yes    Type 2 diabetes mellitus, with long-term current use of insulin [E11.9, Z79.4]  Not Applicable      Resolved Hospital Problems   No resolved problems to display.        Brief Hospital Course to date:  Karol Lopez is a 74 y.o. female with history of COPD, DM2, WILCOX cirrhosis, GI bleed, CKD, CAD, atrial fibrillation and prior stroke with residual right upper extremity weakness and bilateral carotid disease who presented with 1 to 2-day onset of worsening confusion, speech changes, gait difficulties.  Imaging revealed occlusion of  her left internal carotid artery and stenosis of her right ICA with a large area of acute and subacute stroke in the left MCA distribution.    Right MCA territory stroke  - asa, plavix  - statin  - PT/OT, will likely need inpatient rehab  - avoid hypotension    Dysphagia  - modified diet    COPD  -Continue scheduled nebs    WILCOX cirrhosis  Thrombocytopenia  H/o GI bleed  Esophageal varices  Portal gastropathy  -Patient seen by gastroenterology with EGD 1/10 showing a single: Grade 1 varix in the posterior esophagus without high risk stigmata.  There is also mild portal gastropathy note  -Daily PPI  -Patient will need outpatient colonoscopy in a few months with dysphagia has improved    DM2  - A1c 8.8  - continued elevated glucose  - continue lantus 20 units  - add humalog 3 units with meals  - continue SSI    CKD    Chronic disease related malnutrition  - RD follows    CAD  - h/o HEIDI    H/o atrial fibrillation    Expected Discharge Location and Transportation:   Expected Discharge   Expected Discharge Date: 1/13/2025; Expected Discharge Time:      VTE Prophylaxis:  Mechanical VTE prophylaxis orders are present.         AM-PAC 6 Clicks Score (PT): 12 (01/10/25 0400)    CODE STATUS:   Code Status and Medical Interventions: No CPR (Do Not Attempt to Resuscitate); Limited Support; No intubation (DNI)   Ordered at: 01/07/25 2212     Medical Intervention Limits:    No intubation (DNI)     Code Status (Patient has no pulse and is not breathing):    No CPR (Do Not Attempt to Resuscitate)     Medical Interventions (Patient has pulse or is breathing):    Limited Support       Main Matute MD  01/10/25

## 2025-01-10 NOTE — CONSULTS
"Diabetes Education    Patient Name:  Karol Lopez  YOB: 1950  MRN: [id number]  Admit Date:  1/7/2025    Chart reviewed per consult, attempted to speak to patient today but she was not able to speak to me about her diabetes medications or A1C. Patient would state \"I don't know\". When asked who she lives with she states her  but that he would not know either. Spoke to KIM Johnson who states the patient is confused. Diabetes education to attempt again Monday as able.    Patient does not qualify for the follow up stroke class based on the exclusion criteria of BMI < 25  kg/m2.    Electronically signed by:  Prema Morrow RN  01/10/25 14:13 EST  "

## 2025-01-10 NOTE — PROGRESS NOTES
Malnutrition Severity Assessment    Patient Name:  Karol Lopez  YOB: 1950  MRN: [id number]  Admit Date:  1/7/2025    Patient meets criteria for : Severe Malnutrition (O intakes <75% EEN >/=1 m, loss 9.7% body weight X past m, moderate muscle wasting and moderate subcutaneous fat loss)      Malnutrition Severity Assessment  Malnutrition Type: Chronic Disease - Related Malnutrition  Malnutrition Type (Last 8 Hours)       Malnutrition Severity Assessment       Row Name 01/10/25 0704       Malnutrition Severity Assessment    Malnutrition Type Chronic Disease - Related Malnutrition      Row Name 01/10/25 0704       Insufficient Energy Intake     Insufficient Energy Intake Findings Severe    Insufficient Energy Intake  <75% of est. energy requirement for > or equal to 1 month      Row Name 01/10/25 0704       Unintentional Weight Loss     Unintentional Weight Loss Findings Severe    Unintentional Weight Loss  Weight loss greater than 5% in one month      Row Name 01/10/25 0704       Muscle Loss    Loss of Muscle Mass Findings Moderate    [place] Region Moderate - slight depression    Clavicle Bone Region Moderate - some protrusion in females, visible in males    Acromion Bone Region Moderate - acromion may slightly protrude    Scapular Bone Region Moderate - mild depression, bones may show slightly    Dorsal Hand Region Moderate - slight depression    Patellar Region Moderate - patella more prominent, less muscle definition around patella    Anterior Thigh Region Moderate - mild depression on inner thigh    Posterior Calf Region Moderate - some roundness, slight firmness      Row Name 01/10/25 0704       Fat Loss    Subcutaneous Fat Loss Findings Moderate    Orbital Region  Moderate -  somewhat hollowness, slightly dark circles    Upper Arm Region Moderate - some fat tissue, not ample      Row Name 01/10/25 0704       Criteria Met (Must meet criteria for severity in at least 2 of these categories: M  Wasting, Fat Loss, Fluid, Secondary Signs, Wt. Status, Intake)    Patient meets criteria for  Severe Malnutrition  O intakes <75% EEN >/=1 m, loss 9.7% body weight X past m, moderate muscle wasting and moderate subcutaneous fat loss                    Electronically signed by:  Karol Bee RD  01/10/25 07:05 EST

## 2025-01-10 NOTE — ANESTHESIA POSTPROCEDURE EVALUATION
Patient: Karol Lopez    Procedure Summary       Date: 01/10/25 Room / Location:  [facility]    Anesthesia Start: 0936 Anesthesia Stop: 0956    Procedure: ESOPHAGOGASTRODUODENOSCOPY Diagnosis:       Other cirrhosis of liver      Antiplatelet or antithrombotic long-term use      (Other cirrhosis of liver [K74.69])      (Antiplatelet or antithrombotic long-term use [Z79.02])    Surgeons: Brunner, Mark I, MD Provider: Ankur Lopez MD    Anesthesia Type: general ASA Status: 3            Anesthesia Type: general    Vitals  Vitals Value Taken Time   /55 01/10/25 0956   Temp 97.8 °F (36.6 °C) 01/10/25 0956   Pulse 75 01/10/25 0956   Resp 16 01/10/25 0956   SpO2 94 % 01/10/25 0956           Post Anesthesia Care and Evaluation    Patient location during evaluation: PACU  Patient participation: waiting for patient participation  Level of consciousness: sleepy but conscious    Airway patency: patent  Anesthetic complications: No anesthetic complications  PONV Status: none  Cardiovascular status: blood pressure returned to baseline  Respiratory status: nasal cannula and spontaneous ventilation  Hydration status: acceptable  No anesthesia care post op

## 2025-01-10 NOTE — CASE MANAGEMENT/SOCIAL WORK
Continued Stay Note  [facility]     Patient Name: Karol Lopez  MRN: [id number]  Today's Date: 1/10/2025    Admit Date: 1/7/2025    Plan: SNF   Discharge Plan       Row Name 01/10/25 1058       Plan    Plan SNF    Patient/Family in Agreement with Plan yes    Plan Comments Discussed patient in MDR.  PAtient having EGD today, 1/10.  Her plan is SNF at discharge.  Referral previously faxed to [facility].  Left voicemail with admissions to follow up on referral.  CM will continue to follow.    Final Discharge Disposition Code 03 - skilled nursing facility (SNF)                   Discharge Codes    No documentation.                 Expected Discharge Date and Time       Expected Discharge Date Expected Discharge Time    Jan 13, 2025               Tamiko Nuñez RN

## 2025-01-10 NOTE — ANESTHESIA PREPROCEDURE EVALUATION
Anesthesia Evaluation     Patient summary reviewed and Nursing notes reviewed   no history of anesthetic complications:   NPO Solid Status: > 8 hours  NPO Liquid Status: > 8 hours           Airway   Mallampati: II  TM distance: >3 FB  Neck ROM: full  No difficulty expected  Dental      Pulmonary - normal exam   (+) COPD,  Cardiovascular - normal exam    (+) hypertension, CAD, dysrhythmias, angina, hyperlipidemia,  carotid artery disease      Neuro/Psych  (+) CVA, numbness, psychiatric history  GI/Hepatic/Renal/Endo    (+) GERD, GI bleeding , liver disease, diabetes mellitus    Musculoskeletal     Abdominal    Substance History      OB/GYN          Other   arthritis,                 Anesthesia Plan    ASA 3     general     intravenous induction     Anesthetic plan, risks, benefits, and alternatives have been provided, discussed and informed consent has been obtained with: patient.    Plan discussed with CRNA.    CODE STATUS:    Medical Intervention Limits: No intubation (DNI)  Code Status (Patient has no pulse and is not breathing): No CPR (Do Not Attempt to Resuscitate)  Medical Interventions (Patient has pulse or is breathing): Limited Support

## 2025-01-11 PROBLEM — E11.65 HYPERGLYCEMIA DUE TO DIABETES MELLITUS: Status: RESOLVED | Noted: 2024-02-08 | Resolved: 2025-01-11

## 2025-01-11 PROBLEM — I73.9 CLAUDICATION: Status: RESOLVED | Noted: 2022-07-01 | Resolved: 2025-01-11

## 2025-01-11 PROBLEM — I63.411 CEREBROVASCULAR ACCIDENT (CVA) DUE TO EMBOLISM OF RIGHT MIDDLE CEREBRAL ARTERY: Status: ACTIVE | Noted: 2025-01-08

## 2025-01-11 PROBLEM — I47.29 NSVT (NONSUSTAINED VENTRICULAR TACHYCARDIA): Status: ACTIVE | Noted: 2025-01-11

## 2025-01-11 PROBLEM — I63.512 CEREBROVASCULAR ACCIDENT (CVA) DUE TO OCCLUSION OF LEFT MIDDLE CEREBRAL ARTERY: Status: ACTIVE | Noted: 2025-01-08

## 2025-01-11 PROBLEM — I65.22 OCCLUSION OF LEFT CAROTID ARTERY: Status: ACTIVE | Noted: 2024-02-08

## 2025-01-11 PROBLEM — I48.0 PAROXYSMAL ATRIAL FIBRILLATION: Status: ACTIVE | Noted: 2023-12-12

## 2025-01-11 PROBLEM — Z87.19 HISTORY OF GI BLEED: Status: ACTIVE | Noted: 2025-01-11

## 2025-01-11 PROBLEM — G93.40 ENCEPHALOPATHY: Status: RESOLVED | Noted: 2025-01-08 | Resolved: 2025-01-11

## 2025-01-11 LAB
ANION GAP SERPL CALCULATED.3IONS-SCNC: 11 MMOL/L (ref 5–15)
BUN SERPL-MCNC: 23 MG/DL (ref 8–23)
BUN/CREAT SERPL: 21.7 (ref 7–25)
CALCIUM SPEC-SCNC: 9.8 MG/DL (ref 8.6–10.5)
CHLORIDE SERPL-SCNC: 107 MMOL/L (ref 98–107)
CO2 SERPL-SCNC: 22 MMOL/L (ref 22–29)
CREAT SERPL-MCNC: 1.06 MG/DL (ref 0.57–1)
DEPRECATED RDW RBC AUTO: 43 FL (ref 37–54)
EGFRCR SERPLBLD CKD-EPI 2021: 55.9 ML/MIN/1.73
ERYTHROCYTE [DISTWIDTH] IN BLOOD BY AUTOMATED COUNT: 13.2 % (ref 12.3–15.4)
GLUCOSE BLDC GLUCOMTR-MCNC: 260 MG/DL (ref 70–130)
GLUCOSE BLDC GLUCOMTR-MCNC: 277 MG/DL (ref 70–130)
GLUCOSE BLDC GLUCOMTR-MCNC: 316 MG/DL (ref 70–130)
GLUCOSE BLDC GLUCOMTR-MCNC: 338 MG/DL (ref 70–130)
GLUCOSE BLDC GLUCOMTR-MCNC: 379 MG/DL (ref 70–130)
GLUCOSE SERPL-MCNC: 239 MG/DL (ref 65–99)
HAV AB SER QL IA: NEGATIVE
HCT VFR BLD AUTO: 33.3 % (ref 34–46.6)
HGB BLD-MCNC: 11.2 G/DL (ref 12–15.9)
MAGNESIUM SERPL-MCNC: 2.1 MG/DL (ref 1.6–2.4)
MCH RBC QN AUTO: 29.9 PG (ref 26.6–33)
MCHC RBC AUTO-ENTMCNC: 33.6 G/DL (ref 31.5–35.7)
MCV RBC AUTO: 88.8 FL (ref 79–97)
PLATELET # BLD AUTO: 99 10*3/MM3 (ref 140–450)
PMV BLD AUTO: 11.2 FL (ref 6–12)
POTASSIUM SERPL-SCNC: 4 MMOL/L (ref 3.5–5.2)
POTASSIUM SERPL-SCNC: 4 MMOL/L (ref 3.5–5.2)
QT INTERVAL: 446 MS
QT INTERVAL: 462 MS
QTC INTERVAL: 441 MS
QTC INTERVAL: 498 MS
RBC # BLD AUTO: 3.75 10*6/MM3 (ref 3.77–5.28)
SODIUM SERPL-SCNC: 140 MMOL/L (ref 136–145)
WBC NRBC COR # BLD AUTO: 5.06 10*3/MM3 (ref 3.4–10.8)

## 2025-01-11 PROCEDURE — 94664 DEMO&/EVAL PT USE INHALER: CPT

## 2025-01-11 PROCEDURE — 63710000001 INSULIN LISPRO (HUMAN) PER 5 UNITS: Performed by: INTERNAL MEDICINE

## 2025-01-11 PROCEDURE — 82948 REAGENT STRIP/BLOOD GLUCOSE: CPT

## 2025-01-11 PROCEDURE — 99232 SBSQ HOSP IP/OBS MODERATE 35: CPT | Performed by: INTERNAL MEDICINE

## 2025-01-11 PROCEDURE — 85027 COMPLETE CBC AUTOMATED: CPT | Performed by: INTERNAL MEDICINE

## 2025-01-11 PROCEDURE — 93005 ELECTROCARDIOGRAM TRACING: CPT | Performed by: INTERNAL MEDICINE

## 2025-01-11 PROCEDURE — 94799 UNLISTED PULMONARY SVC/PX: CPT

## 2025-01-11 PROCEDURE — 80048 BASIC METABOLIC PNL TOTAL CA: CPT | Performed by: INTERNAL MEDICINE

## 2025-01-11 PROCEDURE — 63710000001 INSULIN GLARGINE PER 5 UNITS: Performed by: INTERNAL MEDICINE

## 2025-01-11 PROCEDURE — 84132 ASSAY OF SERUM POTASSIUM: CPT | Performed by: INTERNAL MEDICINE

## 2025-01-11 PROCEDURE — 99232 SBSQ HOSP IP/OBS MODERATE 35: CPT | Performed by: NURSE PRACTITIONER

## 2025-01-11 PROCEDURE — 83735 ASSAY OF MAGNESIUM: CPT | Performed by: INTERNAL MEDICINE

## 2025-01-11 RX ORDER — ROSUVASTATIN CALCIUM 20 MG/1
20 TABLET, COATED ORAL NIGHTLY
Status: DISCONTINUED | OUTPATIENT
Start: 2025-01-11 | End: 2025-01-14 | Stop reason: HOSPADM

## 2025-01-11 RX ORDER — INSULIN LISPRO 100 [IU]/ML
2-9 INJECTION, SOLUTION INTRAVENOUS; SUBCUTANEOUS
Status: DISCONTINUED | OUTPATIENT
Start: 2025-01-11 | End: 2025-01-14 | Stop reason: HOSPADM

## 2025-01-11 RX ORDER — INSULIN LISPRO 100 [IU]/ML
5 INJECTION, SOLUTION INTRAVENOUS; SUBCUTANEOUS
Status: DISCONTINUED | OUTPATIENT
Start: 2025-01-11 | End: 2025-01-14

## 2025-01-11 RX ADMIN — IPRATROPIUM BROMIDE AND ALBUTEROL SULFATE 3 ML: 2.5; .5 SOLUTION RESPIRATORY (INHALATION) at 20:08

## 2025-01-11 RX ADMIN — INSULIN LISPRO 5 UNITS: 100 INJECTION, SOLUTION INTRAVENOUS; SUBCUTANEOUS at 11:29

## 2025-01-11 RX ADMIN — CLOPIDOGREL BISULFATE 75 MG: 75 TABLET ORAL at 08:23

## 2025-01-11 RX ADMIN — INSULIN LISPRO 3 UNITS: 100 INJECTION, SOLUTION INTRAVENOUS; SUBCUTANEOUS at 08:23

## 2025-01-11 RX ADMIN — IPRATROPIUM BROMIDE AND ALBUTEROL SULFATE 3 ML: 2.5; .5 SOLUTION RESPIRATORY (INHALATION) at 08:46

## 2025-01-11 RX ADMIN — ROSUVASTATIN 20 MG: 20 TABLET, FILM COATED ORAL at 20:40

## 2025-01-11 RX ADMIN — MUPIROCIN 1 APPLICATION: 20 OINTMENT TOPICAL at 08:24

## 2025-01-11 RX ADMIN — IPRATROPIUM BROMIDE AND ALBUTEROL SULFATE 3 ML: 2.5; .5 SOLUTION RESPIRATORY (INHALATION) at 13:32

## 2025-01-11 RX ADMIN — Medication 10 ML: at 20:46

## 2025-01-11 RX ADMIN — INSULIN LISPRO 5 UNITS: 100 INJECTION, SOLUTION INTRAVENOUS; SUBCUTANEOUS at 17:00

## 2025-01-11 RX ADMIN — INSULIN LISPRO 8 UNITS: 100 INJECTION, SOLUTION INTRAVENOUS; SUBCUTANEOUS at 20:41

## 2025-01-11 RX ADMIN — Medication 10 ML: at 08:24

## 2025-01-11 RX ADMIN — MUPIROCIN 1 APPLICATION: 20 OINTMENT TOPICAL at 20:41

## 2025-01-11 RX ADMIN — INSULIN LISPRO 7 UNITS: 100 INJECTION, SOLUTION INTRAVENOUS; SUBCUTANEOUS at 11:29

## 2025-01-11 RX ADMIN — SENNOSIDES AND DOCUSATE SODIUM 2 TABLET: 50; 8.6 TABLET ORAL at 20:40

## 2025-01-11 RX ADMIN — INSULIN LISPRO 7 UNITS: 100 INJECTION, SOLUTION INTRAVENOUS; SUBCUTANEOUS at 17:00

## 2025-01-11 RX ADMIN — INSULIN LISPRO 4 UNITS: 100 INJECTION, SOLUTION INTRAVENOUS; SUBCUTANEOUS at 08:24

## 2025-01-11 RX ADMIN — ASPIRIN 81 MG CHEWABLE TABLET 81 MG: 81 TABLET CHEWABLE at 08:23

## 2025-01-11 RX ADMIN — IPRATROPIUM BROMIDE AND ALBUTEROL SULFATE 3 ML: 2.5; .5 SOLUTION RESPIRATORY (INHALATION) at 16:21

## 2025-01-11 RX ADMIN — PANTOPRAZOLE SODIUM 40 MG: 40 TABLET, DELAYED RELEASE ORAL at 05:37

## 2025-01-11 RX ADMIN — SENNOSIDES AND DOCUSATE SODIUM 2 TABLET: 50; 8.6 TABLET ORAL at 08:23

## 2025-01-11 RX ADMIN — DONEPEZIL HYDROCHLORIDE 5 MG: 5 TABLET, FILM COATED ORAL at 20:40

## 2025-01-11 RX ADMIN — INSULIN GLARGINE 25 UNITS: 100 INJECTION, SOLUTION SUBCUTANEOUS at 20:41

## 2025-01-11 NOTE — PROGRESS NOTES
"  GI Daily Progress Note  Subjective:    Chief Complaint: Follow-up chronic anemia    Patient resting in bed in no acute distress.  Notes she is feeling okay today.  No new or worsening GI complaints.  Denies any overt signs of bleeding.  Denies pain.    Objective:    /64 (BP Location: Right arm, Patient Position: Lying)   Pulse 56   Temp 97.6 °F (36.4 °C) (Oral)   Resp 18   Ht 157.5 cm (62\")   Wt 59.1 kg (130 lb 4.7 oz)   SpO2 93%   BMI 23.83 kg/m²     Physical Exam  Vitals and nursing note reviewed.   Constitutional:       General: She is not in acute distress.     Appearance: Normal appearance. She is not ill-appearing or toxic-appearing.   Cardiovascular:      Rate and Rhythm: Normal rate and regular rhythm.      Pulses: Normal pulses.      Heart sounds: Normal heart sounds.   Pulmonary:      Effort: Pulmonary effort is normal. No respiratory distress.      Breath sounds: Normal breath sounds.   Abdominal:      General: Abdomen is flat. Bowel sounds are normal. There is no distension.      Palpations: Abdomen is soft. There is no mass.      Tenderness: There is no abdominal tenderness. There is no guarding or rebound.      Hernia: No hernia is present.   Skin:     General: Skin is warm and dry.      Capillary Refill: Capillary refill takes less than 2 seconds.      Coloration: Skin is not jaundiced or pale.   Neurological:      General: No focal deficit present.      Mental Status: She is alert and oriented to person, place, and time. Mental status is at baseline.   Psychiatric:         Mood and Affect: Mood normal.         Behavior: Behavior normal.         Thought Content: Thought content normal.         Judgment: Judgment normal.         Lab  I have personally reviewed most recent cardiac tracings, lab results, and radiology images and interpretations and agree with findings.    Lab Results   Component Value Date    WBC 5.06 01/11/2025    HGB 11.2 (L) 01/11/2025    HGB 12.3 01/10/2025    HGB 12.0 " 01/09/2025    MCV 88.8 01/11/2025    PLT 99 (L) 01/11/2025    INR 1.18 (H) 01/10/2025    INR 1.14 (H) 02/08/2024    INR 1.3 (H) 02/08/2024    INR 1.01 11/22/2022    INR 1.06 11/16/2021       Lab Results   Component Value Date    GLUCOSE 239 (H) 01/11/2025    BUN 23 01/11/2025    CREATININE 1.06 (H) 01/11/2025    EGFRIFNONA 41 (L) 01/24/2022    EGFRIFAFRI >59 02/05/2020    BCR 21.7 01/11/2025     01/11/2025    K 4.0 01/11/2025    CO2 22.0 01/11/2025    CALCIUM 9.8 01/11/2025    PROTENTOTREF 6.7 07/01/2022    ALBUMIN 3.8 01/09/2025    ALKPHOS 111 01/09/2025    BILITOT 0.5 01/09/2025    ALT 18 01/09/2025    AST 15 01/09/2025     Upper GI Endoscopy (01/10/2025 09:13)   EGD per Dr. Brunner  EGD shows a Kalma grade 1 esophageal varices along the posterior esophagus without high or stigmata for bleeding.  There is mild portal hypertensive gastropathy.  Duodenum appears grossly normal.  No blood seen in the upper GI tract.     Latest Reference Range & Units 01/10/25 12:42   Hep A Total Ab Negative  Negative       Assessment:    Acute ischemic cerebrovascular accident (CVA) involving internal carotid artery territory    COPD (chronic obstructive pulmonary disease)    Type 2 diabetes mellitus, with long-term current use of insulin    Cirrhosis of liver    Encephalopathy    Antiplatelet or antithrombotic long-term use    Severe protein-calorie malnutrition    1.  Acute/subacute ischemic stroke, left middle cerebral artery territory.  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  2.  History of GI bleeding on dual antiplatelet therapy with Brilinta in 2021.  S/p EGD with grade 1 esophageal varices and portal hypertensive gastropathy  3.  History of nonbleeding right colonic AVMs in 9/2021.  Colonoscopy prep poor.  4.  History of colon polyps.  Three subcentimeter tubular adenomas removed in 9/2021.  5.  MASLD cirrhosis, compensated.    MELD-Na: 9 points  Child-Vargas: Class A  HCC screening: Up-to-date  6.  Ongoing  tobacco abuse.  7.  Poorly controlled diabetes.  Hemoglobin A1c 8.8.  8.  Chronic constipation.    Plan:  Patient doing well.  Hemoglobin stable.  AFP and CT WNL.    Discussed findings of EGD with patient; grade 1 esophageal varices and portal hypertensive gastropathy noted.    >>> Okay to continue dual antiplatelet therapy given low risk of bleeding as noted on EGD.  >>> Continue PPI  >>> MELD and Child-Vargas as noted above  >>> Patient will need hep a vaccine series initiated before discharge.  >>> Plan for outpatient colonoscopy after dysphagia symptoms have improved  >>> Patient will need outpatient follow-up with gastroenterology at discharge.    Will sign off.  Please call questions.    Javier Galeas, AUBREY  01/11/25  15:09 EST

## 2025-01-11 NOTE — CONSULTS
Inpatient Cardiology Consult  Consult performed by: Mariaelena Cortes APRN  Consult ordered by: Main Matute MD  Reason for consult: Nonsustained ventricular tachycardia, atrial fibrillation      [facility]  Cardiovascular Consultation Note    Active Hospital Problems    Diagnosis  POA    **Cerebrovascular accident (CVA) due to embolism of right middle cerebral artery [I63.411]  Yes     Priority: High    Type 2 diabetes mellitus, with long-term current use of insulin [E11.9, Z79.4]  Not Applicable     Priority: Medium    Coronary artery disease involving native coronary artery of native heart with angina pectoris [I25.119]  Yes     Priority: Medium     Echo (09/27/2012):  LVEF 60% to 65%.  No significant valve abnormality  Cardiac catheterization for abnormal stress (11/09/2012): HEIDI to proximal LAD.  LVEF 60%.  Myocardial perfusion study (09/10/2015):  No reversible ischemia.  LVEF 60%      History of GI bleed [Z87.19]  Not Applicable    NSVT (nonsustained ventricular tachycardia) [I47.29]  No     Echo (1/9/2025): LVEF 62%.  No significant valvular disease.  Asymptomatic nonsustained VT on telemetry, 1/11/2025      Severe protein-calorie malnutrition [E43]  Yes    Encephalopathy [G93.40]  Yes    Antiplatelet or antithrombotic long-term use [Z79.02]  Not Applicable    Cirrhosis of liver [K74.60]  Yes    Carotid artery disease [I77.9]  Yes     Carotid duplex (1/8/2025): R ICA 50-69%.  LICA suspected to be occluded distal to the areas visualized on the study based on the abnormal waveforms noted throughout the common and internal carotid areas noted.       Paroxysmal atrial fibrillation [I48.0]  Yes     Diagnosed during hospitalization, 2023  [id number] (age, female, CAD, HTN, CVA)  24-hour Holter (2023): 100% atrial fibrillation.  Average heart rate 101 bpm  Echo (1/9/2025): LVEF 62%.  No significant valvular disease.      Hyperlipidemia LDL goal <70 [E78.5]  Yes      High intensity statin therapy indicated given the presence of CAD      COPD (chronic obstructive pulmonary disease) [J44.9]  Yes            74-year-old female with history of CAD, persistent atrial fibrillation, CV risk factors as well as nonalcoholic cirrhosis with history of GI bleeds.  The patient historically has not been anticoagulated due recurrent GI bleeding from angioectasia in the stomach and AVMs in the mid ileum.    Patient was admitted to the hospital on 2025 with acute right MCA stroke.  Initially admitted to ICU but transferred to telemetry in the last several days.  This morning patient had episode of nonsustained VT lasting 9 beats.  Patient asymptomatic.    Patient presently with no complaints.    Cardiac risk factors: Known CAD, hyperlipidemia, advanced age, type 2 diabetes mellitus    Past medical and surgical history, social and family history reviewed in EMR.    REVIEW OF SYSTEMS:   H&P ROS reviewed and pertinent CV ROS as noted in HPI.         Vital Sign Min/Max for last 24 hours  Temp  Min: 97.6 °F (36.4 °C)  Max: 98.6 °F (37 °C)   BP  Min: 134/64  Max: 173/64   Pulse  Min: 55  Max: 64   Resp  Min: 16  Max: 18   SpO2  Min: 93 %  Max: 97 %   No data recorded      Intake/Output Summary (Last 24 hours) at 2025 1550  Last data filed at 2025 1500  Gross per 24 hour   Intake --   Output 500 ml   Net -500 ml           Constitutional:       General: Awake.      Appearance: Chronically ill-appearing.   Pulmonary:      Effort: Pulmonary effort is normal.      Breath sounds: Normal breath sounds.   Cardiovascular:      Bradycardia present. Regular rhythm.   Pulses:     Intact distal pulses.   Edema:     Peripheral edema absent.   Skin:     General: Skin is warm and dry.   Neurological:      Mental Status: Alert and oriented to person, place and time.   Psychiatric:         Behavior: Behavior is cooperative.          EK2025: Sinus bradycardia at 55 bpm.    Lab Review:   Labs  reviewed in the electronic medical record.  Pertinent findings include:  Lab Results   Component Value Date    GLUCOSE 239 (H) 01/11/2025    BUN 23 01/11/2025    CREATININE 1.06 (H) 01/11/2025     01/11/2025    K 4.0 01/11/2025     01/11/2025    CALCIUM 9.8 01/11/2025    PROTEINTOT 6.6 01/09/2025    ALBUMIN 3.8 01/09/2025    ALT 18 01/09/2025    AST 15 01/09/2025    ALKPHOS 111 01/09/2025    BILITOT 0.5 01/09/2025    GLOB 2.8 01/09/2025    AGRATIO 1.4 01/09/2025    BCR 21.7 01/11/2025    ANIONGAP 11.0 01/11/2025    EGFR 55.9 (L) 01/11/2025     Lab Results   Component Value Date    WBC 5.06 01/11/2025    HGB 11.2 (L) 01/11/2025    HCT 33.3 (L) 01/11/2025    MCV 88.8 01/11/2025    PLT 99 (L) 01/11/2025     Lab Results   Component Value Date    CHOL 96 01/08/2025    CHLPL 57 04/28/2023    TRIG 204 (H) 01/08/2025    HDL 31 (L) 01/08/2025    LDL 32 01/08/2025     Results from last 7 days   Lab Units 01/08/25  0532   HEMOGLOBIN A1C % 8.80*                        Acute last subacute left MCA  Stroke and territory of occluded left ICA, consistent with watershed infarct  Avoid hypotension    Paroxysmal atrial fibrillation  Known atrial fibrillation  Anticoagulation has been deferred due to recurrent GI bleeding episode    NSVT  Asymptomatic  Structurally normal heart on echo  Obtain nuclear stress test Monday    Coronary artery disease  History of CAD with PCI in 2012  Has not had any chest pain  Troponins elevated but flat in the setting of acute CVA  Aspirin 81 mg daily  Keep n.p.o. after midnight on Sunday for myocardial perfusion study on Monday.    Carotid artery disease  LICA suspected to be occluded at distal area  Aspirin, Plavix and statin    Hyperlipidemia  Total cholesterol 96, triglycerides 204, HDL 31, LDL 32  Will change Lipitor to Crestor 20 mg daily    Cirrhosis of liver  EGD showed grade 1 esophageal varices and portal hypertensive gastropathy    Type 2 diabetes mellitus uncontrolled  Hemoglobin  A1c 8.8         Defer anticoagulation unless neurology feels stroke cardioembolic in nature due to high risk of bleeding  Clopidogrel per neurology  Pharmacologic nuclear stress Monday H. C. Fidencio Celestin MD [facility], [facility]  Interventional and General Cardiology

## 2025-01-11 NOTE — PROGRESS NOTES
[facility] Medicine Services  PROGRESS NOTE    Patient Name: Karol Lopez  : 1950  MRN: [id number]    Date of Admission: 2025  Primary Care Physician: Romaine Eugene MD    Subjective   Subjective     CC:  stroke    HPI:  Remains weak on right side. Occasional cough.      Objective   Objective     Vital Signs:   Temp:  [97.6 °F (36.4 °C)-98.9 °F (37.2 °C)] 98.6 °F (37 °C)  Heart Rate:  [55-75] 56  Resp:  [16-18] 18  BP: (102-173)/(54-71) 145/66  Flow (L/min) (Oxygen Therapy):  [2] 2     Physical Exam:  Non toxic appearing, in bed  MM moist  RRR  Rare expiratory wheeze  Abd soft, NT  Awake, right hemiparesis      Results Reviewed:  LAB RESULTS:      Lab 257 01/10/25  1242 25  0359 25  0603 25  0532 25  1607   WBC 5.06 5.51 8.30  --  6.16 6.72   HEMOGLOBIN 11.2* 12.3 12.0  --  11.4* 12.3   HEMATOCRIT 33.3* 36.6 35.2  --  34.1 37.1   PLATELETS 99* 105* 137*  --  99* 116*   NEUTROS ABS  --   --  5.37  --   --  4.96   IMMATURE GRANS (ABS)  --   --  0.01  --   --  0.03   LYMPHS ABS  --   --  1.99  --   --  1.17   MONOS ABS  --   --  0.75  --   --  0.46   EOS ABS  --   --  0.13  --   --  0.07   MCV 88.8 88.2 87.3  --  89.0 89.8   CRP  --   --   --   --   --  <0.30   PROCALCITONIN  --   --   --   --   --  0.14   LACTATE  --   --   --   --   --  1.7   PROTIME  --  15.1*  --   --   --   --    HSTROP T  --   --   --  38*  --  29*         Lab 25  0437 25  0006 01/10/25  1242 25  1405 25  0358 25  0532 25  1607   SODIUM 140  --  140  --  142 145 139   POTASSIUM 4.0 4.0 3.5 4.1 3.5 3.9 4.0   CHLORIDE 107  --  105  --  107 110* 101   CO2 22.0  --  25.0  --  25.0 19.0* 23.0   ANION GAP 11.0  --  10.0  --  10.0 16.0* 15.0   BUN 23  --  24*  --  25* 23 25*   CREATININE 1.06*  --  1.14*  --  1.21* 1.15* 1.45*   EGFR 55.9*  --  51.3*  --  47.7* 50.7* 38.4*   GLUCOSE 239*  --  253*  --  171* 215* 295*   CALCIUM 9.8  --   10.3  --  10.1 9.3 9.8   MAGNESIUM 2.1  --  2.1  --   --  2.1 2.3   PHOSPHORUS  --   --  2.9  --   --   --  3.9   HEMOGLOBIN A1C  --   --   --   --   --  8.80*  --    TSH  --   --   --   --   --   --  1.340         Lab 01/09/25  0358 01/07/25  1607   TOTAL PROTEIN 6.6 6.9   ALBUMIN 3.8 4.0   GLOBULIN 2.8 2.9   ALT (SGPT) 18 25   AST (SGOT) 15 16   BILIRUBIN 0.5 0.5   ALK PHOS 111 124*   LIPASE  --  32         Lab 01/10/25  1242 01/08/25  0603 01/07/25  1607   PROBNP  --   --  271.0   HSTROP T  --  38* 29*   PROTIME 15.1*  --   --    INR 1.18*  --   --          Lab 01/08/25  0836   CHOLESTEROL 96   LDL CHOL 32   HDL CHOL 31*   TRIGLYCERIDES 204*             Lab 01/07/25  1604   FIO2 21   CARBOXYHEMOGLOBIN (VENOUS) 2.3     Brief Urine Lab Results  (Last result in the past 365 days)        Color   Clarity   Blood   Leuk Est   Nitrite   Protein   CREAT   Urine HCG        01/07/25 1648 Yellow   Clear   Negative   Negative   Negative   Trace                   Microbiology Results Abnormal       None            CT Abdomen Pelvis With Contrast    Result Date: 1/10/2025  CT ABDOMEN PELVIS W CONTRAST Date of Exam: 1/10/2025 5:03 PM EST Indication: Cirrhosis. Comparison: CT of the abdomen and pelvis dated 9/28/2021 Technique: Axial CT images were obtained of the abdomen and pelvis following the uneventful intravenous administration of 85 mL Isovue-370. Reconstructed coronal and sagittal images were also obtained. Automated exposure control and iterative construction methods were used. Findings: Liver: The liver is cirrhotic in morphology. No focal liver lesion is seen. No biliary dilation is seen. Gallbladder: Surgically absent. Pancreas: Unremarkable. Spleen: The spleen is enlarged, measuring 15 cm in length. Adrenal glands: Unremarkable. Genitourinary tract: 1 cm hypodensity at the periphery of the right kidney is too small to fully characterize. This appears unchanged since 9/28/2021, presumably a small cyst. Bilateral  renal hilar vascular calcifications are present. Tiny nonobstructing  renal calculi cannot be excluded. Kidneys are otherwise unremarkable. No hydronephrosis is seen. The visualized portions of the ureters and urinary bladder appear unremarkable. Status post hysterectomy. Suggestion of a 1.5 cm left adnexal cyst. Gastrointestinal tract: Small gastroesophageal varices are noted. Hollow viscera appear otherwise unremarkable. There is no evidence of bowel obstruction Appendix: The appendix is not identified. Other findings: No free air or free fluid is identified. No pathologically enlarged lymph nodes are seen. Vascular calcifications are present. The IVC is unremarkable. Bones and soft tissues: No acute or suspicious osseous or soft tissue lesion is identified. Lung bases: The visualized lung bases are clear. Coronary artery calcifications are seen.     Impression: Impression: 1.No acute abnormality is identified within the abdomen or pelvis. 2.Cirrhosis with sequela of portal hypertension, including splenomegaly and small gastroesophageal varices. No suspicious arterially enhancing liver lesions are seen. 3.Bilateral renal hilar vascular calcifications. Small nonobstructing renal calculi cannot be excluded. 4.Suggestion of a 1.5 cm left adnexal cyst. 5.Additional findings as detailed above. Electronically Signed: Hang Hawkins MD  1/10/2025 5:32 PM EST  Workstation ID: [id number]     Results for orders placed during the hospital encounter of 01/07/25    Adult Transthoracic Echo Complete W/ Cont if Necessary Per Protocol (With Agitated Saline)    Interpretation Summary    Left ventricular systolic function is normal. Estimated left ventricular EF = 62%    Left ventricular wall thickness is consistent with mild concentric hypertrophy.    No hemodynamically significant valvular disease present.    Estimated right ventricular systolic pressure from tricuspid regurgitation is normal (<35 mmHg).      Current  medications:  Scheduled Meds:aspirin, 81 mg, Oral, Daily  atorvastatin, 40 mg, Oral, Nightly  clopidogrel, 75 mg, Oral, Daily  donepezil, 5 mg, Oral, Nightly  insulin glargine, 20 Units, Subcutaneous, Nightly  insulin lispro, 2-7 Units, Subcutaneous, 4x Daily AC & at Bedtime  Insulin Lispro, 3 Units, Subcutaneous, TID With Meals  ipratropium-albuterol, 3 mL, Nebulization, 4x Daily - RT  mupirocin, 1 Application, Each Nare, BID  pantoprazole, 40 mg, Oral, Q AM  senna-docusate sodium, 2 tablet, Oral, BID  sodium chloride, 10 mL, Intravenous, Q12H      Continuous Infusions:   PRN Meds:.  acetaminophen **OR** [DISCONTINUED] acetaminophen    senna-docusate sodium **AND** polyethylene glycol **AND** bisacodyl **AND** bisacodyl    Calcium Replacement - Follow Nurse / BPA Driven Protocol    dextrose    dextrose    glucagon (human recombinant)    ipratropium-albuterol    Magnesium Cardiology Dose Replacement - Follow Nurse / BPA Driven Protocol    nitroglycerin    Phosphorus Replacement - Follow Nurse / BPA Driven Protocol    Potassium Replacement - Follow Nurse / BPA Driven Protocol    sodium chloride    sodium chloride    Assessment & Plan   Assessment & Plan     Active Hospital Problems    Diagnosis  POA    **Acute ischemic cerebrovascular accident (CVA) involving internal carotid artery territory [I63.239]  Yes    Severe protein-calorie malnutrition [E43]  Yes    Encephalopathy [G93.40]  Yes    Antiplatelet or antithrombotic long-term use [Z79.02]  Not Applicable    Cirrhosis of liver [K74.60]  Yes    COPD (chronic obstructive pulmonary disease) [J44.9]  Yes    Type 2 diabetes mellitus, with long-term current use of insulin [E11.9, Z79.4]  Not Applicable      Resolved Hospital Problems   No resolved problems to display.        Brief Hospital Course to date:  Karol Lopez is a 74 y.o. female with history of COPD, DM2, WILCOX cirrhosis, GI bleed, CKD, CAD (PCI LAD 2012), atrial fibrillation, PAD and prior stroke with  residual right upper extremity weakness and bilateral carotid disease who presented with 1 to 2-day onset of worsening confusion, speech changes, gait difficulties.  Imaging revealed occlusion of her left internal carotid artery and stenosis of her right ICA with a large area of acute and subacute stroke in the left MCA distribution.    Right MCA territory stroke  - asa, plavix  - statin  - PT/OT, will likely need inpatient rehab  - avoid hypotension    Dysphagia  - modified diet    COPD  -Continue scheduled nebs    WILCOX cirrhosis  Thrombocytopenia  H/o GI bleed  Esophageal varices  Portal gastropathy  -Patient seen by gastroenterology with EGD 1/10 showing a single: Grade 1 varix in the posterior esophagus without high risk stigmata.  There is also mild portal gastropathy note  -Daily PPI  -Patient will need outpatient colonoscopy in a few months with dysphagia has improved  -hgb 11, plts 99    DM2  - A1c 8.8  - glucose reviewed  - increase lantus from 20 to 25 units nightly  - increase humalog from 3 to 5 units mieals  - increase SSI to moderate scale    CKD  - creat 1.06    Chronic disease related malnutrition  - RD follows    CAD  - h/o HEIDI    H/o atrial fibrillation    VTach  - 7 to 8 beat run overnight  - K 4.0, mag 2.1  - HR 56, will defer B-blocker  - cardiology consult      Expected Discharge Location and Transportation:   Expected Discharge   Expected Discharge Date: 1/13/2025; Expected Discharge Time:      VTE Prophylaxis:  Mechanical VTE prophylaxis orders are present.         AM-PAC 6 Clicks Score (PT): 12 (01/11/25 0600)    CODE STATUS:   Code Status and Medical Interventions: No CPR (Do Not Attempt to Resuscitate); Limited Support; No intubation (DNI)   Ordered at: 01/07/25 2060     Medical Intervention Limits:    No intubation (DNI)     Code Status (Patient has no pulse and is not breathing):    No CPR (Do Not Attempt to Resuscitate)     Medical Interventions (Patient has pulse or is breathing):     Limited Support       Main Matute MD  01/11/25

## 2025-01-12 LAB
ANION GAP SERPL CALCULATED.3IONS-SCNC: 10 MMOL/L (ref 5–15)
BACTERIA SPEC AEROBE CULT: NORMAL
BACTERIA SPEC AEROBE CULT: NORMAL
BUN SERPL-MCNC: 25 MG/DL (ref 8–23)
BUN/CREAT SERPL: 27.5 (ref 7–25)
CALCIUM SPEC-SCNC: 10.1 MG/DL (ref 8.6–10.5)
CHLORIDE SERPL-SCNC: 105 MMOL/L (ref 98–107)
CO2 SERPL-SCNC: 24 MMOL/L (ref 22–29)
CREAT SERPL-MCNC: 0.91 MG/DL (ref 0.57–1)
EGFRCR SERPLBLD CKD-EPI 2021: 67.2 ML/MIN/1.73
GLUCOSE BLDC GLUCOMTR-MCNC: 216 MG/DL (ref 70–130)
GLUCOSE BLDC GLUCOMTR-MCNC: 303 MG/DL (ref 70–130)
GLUCOSE BLDC GLUCOMTR-MCNC: 347 MG/DL (ref 70–130)
GLUCOSE BLDC GLUCOMTR-MCNC: 364 MG/DL (ref 70–130)
GLUCOSE SERPL-MCNC: 218 MG/DL (ref 65–99)
MAGNESIUM SERPL-MCNC: 2.1 MG/DL (ref 1.6–2.4)
POTASSIUM SERPL-SCNC: 4 MMOL/L (ref 3.5–5.2)
SODIUM SERPL-SCNC: 139 MMOL/L (ref 136–145)

## 2025-01-12 PROCEDURE — 92526 ORAL FUNCTION THERAPY: CPT

## 2025-01-12 PROCEDURE — 80048 BASIC METABOLIC PNL TOTAL CA: CPT | Performed by: INTERNAL MEDICINE

## 2025-01-12 PROCEDURE — 25010000002 HYDRALAZINE PER 20 MG: Performed by: PHYSICIAN ASSISTANT

## 2025-01-12 PROCEDURE — 83735 ASSAY OF MAGNESIUM: CPT | Performed by: INTERNAL MEDICINE

## 2025-01-12 PROCEDURE — 94799 UNLISTED PULMONARY SVC/PX: CPT

## 2025-01-12 PROCEDURE — 94761 N-INVAS EAR/PLS OXIMETRY MLT: CPT

## 2025-01-12 PROCEDURE — 63710000001 INSULIN LISPRO (HUMAN) PER 5 UNITS: Performed by: INTERNAL MEDICINE

## 2025-01-12 PROCEDURE — 82948 REAGENT STRIP/BLOOD GLUCOSE: CPT

## 2025-01-12 PROCEDURE — 92507 TX SP LANG VOICE COMM INDIV: CPT

## 2025-01-12 PROCEDURE — 94664 DEMO&/EVAL PT USE INHALER: CPT

## 2025-01-12 PROCEDURE — 63710000001 INSULIN GLARGINE PER 5 UNITS: Performed by: INTERNAL MEDICINE

## 2025-01-12 PROCEDURE — 93005 ELECTROCARDIOGRAM TRACING: CPT | Performed by: INTERNAL MEDICINE

## 2025-01-12 PROCEDURE — 99232 SBSQ HOSP IP/OBS MODERATE 35: CPT | Performed by: INTERNAL MEDICINE

## 2025-01-12 RX ORDER — IPRATROPIUM BROMIDE AND ALBUTEROL SULFATE 2.5; .5 MG/3ML; MG/3ML
3 SOLUTION RESPIRATORY (INHALATION) EVERY 4 HOURS PRN
Status: DISCONTINUED | OUTPATIENT
Start: 2025-01-12 | End: 2025-01-14 | Stop reason: HOSPADM

## 2025-01-12 RX ORDER — HYDRALAZINE HYDROCHLORIDE 20 MG/ML
10 INJECTION INTRAMUSCULAR; INTRAVENOUS ONCE
Status: COMPLETED | OUTPATIENT
Start: 2025-01-12 | End: 2025-01-12

## 2025-01-12 RX ADMIN — MUPIROCIN 1 APPLICATION: 20 OINTMENT TOPICAL at 08:10

## 2025-01-12 RX ADMIN — INSULIN LISPRO 8 UNITS: 100 INJECTION, SOLUTION INTRAVENOUS; SUBCUTANEOUS at 11:52

## 2025-01-12 RX ADMIN — SENNOSIDES AND DOCUSATE SODIUM 2 TABLET: 50; 8.6 TABLET ORAL at 21:06

## 2025-01-12 RX ADMIN — ROSUVASTATIN 20 MG: 20 TABLET, FILM COATED ORAL at 21:06

## 2025-01-12 RX ADMIN — INSULIN LISPRO 5 UNITS: 100 INJECTION, SOLUTION INTRAVENOUS; SUBCUTANEOUS at 08:10

## 2025-01-12 RX ADMIN — DONEPEZIL HYDROCHLORIDE 5 MG: 5 TABLET, FILM COATED ORAL at 21:06

## 2025-01-12 RX ADMIN — SENNOSIDES AND DOCUSATE SODIUM 2 TABLET: 50; 8.6 TABLET ORAL at 08:10

## 2025-01-12 RX ADMIN — INSULIN GLARGINE 25 UNITS: 100 INJECTION, SOLUTION SUBCUTANEOUS at 21:05

## 2025-01-12 RX ADMIN — IPRATROPIUM BROMIDE AND ALBUTEROL SULFATE 3 ML: 2.5; .5 SOLUTION RESPIRATORY (INHALATION) at 09:31

## 2025-01-12 RX ADMIN — PANTOPRAZOLE SODIUM 40 MG: 40 TABLET, DELAYED RELEASE ORAL at 05:08

## 2025-01-12 RX ADMIN — Medication 10 ML: at 21:10

## 2025-01-12 RX ADMIN — CLOPIDOGREL BISULFATE 75 MG: 75 TABLET ORAL at 08:10

## 2025-01-12 RX ADMIN — INSULIN LISPRO 5 UNITS: 100 INJECTION, SOLUTION INTRAVENOUS; SUBCUTANEOUS at 17:14

## 2025-01-12 RX ADMIN — Medication 10 ML: at 08:10

## 2025-01-12 RX ADMIN — ASPIRIN 81 MG CHEWABLE TABLET 81 MG: 81 TABLET CHEWABLE at 08:09

## 2025-01-12 RX ADMIN — INSULIN LISPRO 7 UNITS: 100 INJECTION, SOLUTION INTRAVENOUS; SUBCUTANEOUS at 21:05

## 2025-01-12 RX ADMIN — INSULIN LISPRO 7 UNITS: 100 INJECTION, SOLUTION INTRAVENOUS; SUBCUTANEOUS at 17:13

## 2025-01-12 RX ADMIN — HYDRALAZINE HYDROCHLORIDE 10 MG: 20 INJECTION INTRAMUSCULAR; INTRAVENOUS at 21:06

## 2025-01-12 RX ADMIN — MUPIROCIN 1 APPLICATION: 20 OINTMENT TOPICAL at 21:06

## 2025-01-12 RX ADMIN — INSULIN LISPRO 4 UNITS: 100 INJECTION, SOLUTION INTRAVENOUS; SUBCUTANEOUS at 08:10

## 2025-01-12 RX ADMIN — INSULIN LISPRO 5 UNITS: 100 INJECTION, SOLUTION INTRAVENOUS; SUBCUTANEOUS at 11:53

## 2025-01-12 NOTE — PROGRESS NOTES
Cardiology Progress Note      Reason for visit:    Paroxysmal atrial fibrillaton  Episode of NSVT    IDENTIFICATION: 74-year-old female who resides in [place], [place]    Active Hospital Problems    Diagnosis  POA    **Cerebrovascular accident (CVA) due to occlusion of left middle cerebral artery [I63.512]  Yes     Priority: High     MR brain (1/8/2025): Large acute/subacute infarction in the distribution of left MCA with occlusion of left ICA  Echo (1/9/2025): LVEF 62%.  No significant valvular disease.      Antiplatelet or antithrombotic long-term use [Z79.02]  Not Applicable     Priority: High    Occlusion of left carotid artery [I65.22]  Yes     Priority: High     Carotid duplex (1/8/2025): R ICA 50-69%.  Occluded left ICA      Paroxysmal atrial fibrillation [I48.0]  Yes     Priority: High     Diagnosed during hospitalization, 2023  [id number] (age, female, CAD, HTN, CVA)  24-hour Holter (2023): 100% atrial fibrillation.  Average heart rate 101 bpm  Echo (1/9/2025): LVEF 62%.  No significant valvular disease.      Type 2 diabetes mellitus with hyperglycemia, with long-term current use of insulin [E11.65, Z79.4]  Not Applicable     Priority: High    History of GI bleed [Z87.19]  Not Applicable     Priority: Medium    NSVT (nonsustained ventricular tachycardia) [I47.29]  No     Priority: Medium     Echo (1/9/2025): LVEF 62%.  No significant valvular disease.  Asymptomatic nonsustained VT on telemetry, 1/11/2025      Severe protein-calorie malnutrition [E43]  Yes     Priority: Medium    Cirrhosis of liver [K74.60]  Yes     Priority: Medium    Coronary artery disease involving native coronary artery of native heart without angina pectoris [I25.10]  Yes     Priority: Medium     Cardiac catheterization for abnormal stress (11/09/2012): HEIDI to proximal LAD.  LVEF 60%.  Myocardial perfusion study (09/10/2015):  No reversible ischemia.  LVEF 60%      Hyperlipidemia LDL goal <70 [E78.5]  Yes     Priority: Low      High intensity statin therapy indicated given the presence of CAD      COPD (chronic obstructive pulmonary disease) [J44.9]  Yes     Priority: Low            Patient is sitting up in the bed breathing easy on room air.  She is alert and oriented but still has some difficulty finding correct words.  She denies any chest pain.           Vital Sign Min/Max for last 24 hours  Temp  Min: 97.7 °F (36.5 °C)  Max: 98.6 °F (37 °C)   BP  Min: 123/86  Max: 174/79   Pulse  Min: 54  Max: 67   Resp  Min: 14  Max: 18   SpO2  Min: 93 %  Max: 98 %   No data recorded      Intake/Output Summary (Last 24 hours) at 1/12/2025 1133  Last data filed at 1/12/2025 0500  Gross per 24 hour   Intake --   Output 750 ml   Net -750 ml           Physical Exam  Constitutional:       General: She is awake.   Pulmonary:      Effort: Pulmonary effort is normal.      Breath sounds: Normal breath sounds.   Skin:     General: Skin is warm and dry.   Neurological:      Mental Status: She is alert and oriented to person, place, and time.   Psychiatric:         Behavior: Behavior is cooperative.         Tele: Normal sinus rhythm to sinus bradycardia    Results Review (reviewed the patient's recent labs in the electronic medical record):     EKG (1/12/2025): Sinus bradycardia    Echo (1/10/2025): LVEF 62%.  No valvular disease    Results from last 7 days   Lab Units 01/12/25  0418 01/11/25  0437 01/11/25  0006 01/10/25  1242 01/09/25  1405 01/09/25  0358 01/08/25  0532 01/07/25  1607   SODIUM mmol/L 139 140  --  140  --  142 145 139   POTASSIUM mmol/L 4.0 4.0 4.0 3.5   < > 3.5 3.9 4.0   CHLORIDE mmol/L 105 107  --  105  --  107 110* 101   BUN mg/dL 25* 23  --  24*  --  25* 23 25*   CREATININE mg/dL 0.91 1.06*  --  1.14*  --  1.21* 1.15* 1.45*   MAGNESIUM mg/dL 2.1 2.1  --  2.1  --   --  2.1 2.3    < > = values in this interval not displayed.       Results from last 7 days   Lab Units 01/08/25  0603 01/07/25  1607   HSTROP T ng/L 38* 29*       Results from  last 7 days   Lab Units 01/11/25  0437 01/10/25  1242 01/09/25  0359   WBC 10*3/mm3 5.06 5.51 8.30   HEMOGLOBIN g/dL 11.2* 12.3 12.0   HEMATOCRIT % 33.3* 36.6 35.2   PLATELETS 10*3/mm3 99* 105* 137*       Lab Results   Component Value Date    HGBA1C 8.80 (H) 01/08/2025       Lab Results   Component Value Date    CHOL 96 01/08/2025    CHLPL 57 04/28/2023    TRIG 204 (H) 01/08/2025    HDL 31 (L) 01/08/2025    LDL 32 01/08/2025                Acute subacute left MCA  Stroke and territory of occluded left ICA, consistent with watershed infarct  Started on DAPT by neurology  Avoid hypotension     Paroxysmal atrial fibrillation  Known atrial fibrillation  Anticoagulation has been deferred due to recurrent GI bleeding episode     NSVT  Asymptomatic  Structurally normal heart on echo  Obtain nuclear stress test Monday     Coronary artery disease  History of CAD with PCI in 2012  Has not had any chest pain  Troponins elevated but flat in the setting of acute CVA  Aspirin 81 mg daily  Stress test for Monday     Carotid artery disease  LICA suspected to be occluded at distal area  Aspirin, Plavix and statin     Hyperlipidemia  Total cholesterol 96, triglycerides 204, HDL 31, LDL 32  Change Lipitor to Crestor 20 mg daily     Cirrhosis of liver  EGD showed grade 1 esophageal varices and portal hypertensive gastropathy     Type 2 diabetes mellitus uncontrolled  Hemoglobin A1c 8.8                Defer anticoagulation with Eliquis unless neurology feels stroke is cardioembolic in nature due to high risk of bleeding.  DAPT per neurology  Keep n.p.o. after midnight for stress test tomorrow    Electronically signed by AUBREY Briones, 01/12/25, 11:33 AM EST.

## 2025-01-12 NOTE — PROGRESS NOTES
[facility] Medicine Services  PROGRESS NOTE    Patient Name: Karol Lopez  : 1950  MRN: [id number]    Date of Admission: 2025  Primary Care Physician: Romaine Eugene MD    Subjective   Subjective     CC:  stroke    HPI:  No complaints today. Says she isnt very hungry this morning.       Objective   Objective     Vital Signs:   Temp:  [97.6 °F (36.4 °C)-98.6 °F (37 °C)] 97.7 °F (36.5 °C)  Heart Rate:  [54-67] 54  Resp:  [14-18] 16  BP: (123-174)/(60-86) 165/66     Physical Exam:  Non toxic, in bed  MM moist  RRR  Breath sounds grossly clear bilaterally  Abdomen soft  Awake, speech soft  Flat affect      Results Reviewed:  LAB RESULTS:      Lab 25  0437 01/10/25  1242 25  0359 25  0603 25  0532 25  1607   WBC 5.06 5.51 8.30  --  6.16 6.72   HEMOGLOBIN 11.2* 12.3 12.0  --  11.4* 12.3   HEMATOCRIT 33.3* 36.6 35.2  --  34.1 37.1   PLATELETS 99* 105* 137*  --  99* 116*   NEUTROS ABS  --   --  5.37  --   --  4.96   IMMATURE GRANS (ABS)  --   --  0.01  --   --  0.03   LYMPHS ABS  --   --  1.99  --   --  1.17   MONOS ABS  --   --  0.75  --   --  0.46   EOS ABS  --   --  0.13  --   --  0.07   MCV 88.8 88.2 87.3  --  89.0 89.8   CRP  --   --   --   --   --  <0.30   PROCALCITONIN  --   --   --   --   --  0.14   LACTATE  --   --   --   --   --  1.7   PROTIME  --  15.1*  --   --   --   --    HSTROP T  --   --   --  38*  --  29*         Lab 25  0418 25  0437 25  0006 01/10/25  1242 25  1405 25  0358 25  0532 25  1607   SODIUM 139 140  --  140  --  142 145 139   POTASSIUM 4.0 4.0 4.0 3.5 4.1 3.5 3.9 4.0   CHLORIDE 105 107  --  105  --  107 110* 101   CO2 24.0 22.0  --  25.0  --  25.0 19.0* 23.0   ANION GAP 10.0 11.0  --  10.0  --  10.0 16.0* 15.0   BUN 25* 23  --  24*  --  25* 23 25*   CREATININE 0.91 1.06*  --  1.14*  --  1.21* 1.15* 1.45*   EGFR 67.2 55.9*  --  51.3*  --  47.7* 50.7* 38.4*   GLUCOSE 218* 239*  --   253*  --  171* 215* 295*   CALCIUM 10.1 9.8  --  10.3  --  10.1 9.3 9.8   MAGNESIUM 2.1 2.1  --  2.1  --   --  2.1 2.3   PHOSPHORUS  --   --   --  2.9  --   --   --  3.9   HEMOGLOBIN A1C  --   --   --   --   --   --  8.80*  --    TSH  --   --   --   --   --   --   --  1.340         Lab 01/09/25  0358 01/07/25  1607   TOTAL PROTEIN 6.6 6.9   ALBUMIN 3.8 4.0   GLOBULIN 2.8 2.9   ALT (SGPT) 18 25   AST (SGOT) 15 16   BILIRUBIN 0.5 0.5   ALK PHOS 111 124*   LIPASE  --  32         Lab 01/10/25  1242 01/08/25  0603 01/07/25  1607   PROBNP  --   --  271.0   HSTROP T  --  38* 29*   PROTIME 15.1*  --   --    INR 1.18*  --   --          Lab 01/08/25  0836   CHOLESTEROL 96   LDL CHOL 32   HDL CHOL 31*   TRIGLYCERIDES 204*             Lab 01/07/25  1604   FIO2 21   CARBOXYHEMOGLOBIN (VENOUS) 2.3     Brief Urine Lab Results  (Last result in the past 365 days)        Color   Clarity   Blood   Leuk Est   Nitrite   Protein   CREAT   Urine HCG        01/07/25 1648 Yellow   Clear   Negative   Negative   Negative   Trace                   Microbiology Results Abnormal       None            CT Abdomen Pelvis With Contrast    Result Date: 1/10/2025  CT ABDOMEN PELVIS W CONTRAST Date of Exam: 1/10/2025 5:03 PM EST Indication: Cirrhosis. Comparison: CT of the abdomen and pelvis dated 9/28/2021 Technique: Axial CT images were obtained of the abdomen and pelvis following the uneventful intravenous administration of 85 mL Isovue-370. Reconstructed coronal and sagittal images were also obtained. Automated exposure control and iterative construction methods were used. Findings: Liver: The liver is cirrhotic in morphology. No focal liver lesion is seen. No biliary dilation is seen. Gallbladder: Surgically absent. Pancreas: Unremarkable. Spleen: The spleen is enlarged, measuring 15 cm in length. Adrenal glands: Unremarkable. Genitourinary tract: 1 cm hypodensity at the periphery of the right kidney is too small to fully characterize. This  appears unchanged since 9/28/2021, presumably a small cyst. Bilateral renal hilar vascular calcifications are present. Tiny nonobstructing  renal calculi cannot be excluded. Kidneys are otherwise unremarkable. No hydronephrosis is seen. The visualized portions of the ureters and urinary bladder appear unremarkable. Status post hysterectomy. Suggestion of a 1.5 cm left adnexal cyst. Gastrointestinal tract: Small gastroesophageal varices are noted. Hollow viscera appear otherwise unremarkable. There is no evidence of bowel obstruction Appendix: The appendix is not identified. Other findings: No free air or free fluid is identified. No pathologically enlarged lymph nodes are seen. Vascular calcifications are present. The IVC is unremarkable. Bones and soft tissues: No acute or suspicious osseous or soft tissue lesion is identified. Lung bases: The visualized lung bases are clear. Coronary artery calcifications are seen.     Impression: Impression: 1.No acute abnormality is identified within the abdomen or pelvis. 2.Cirrhosis with sequela of portal hypertension, including splenomegaly and small gastroesophageal varices. No suspicious arterially enhancing liver lesions are seen. 3.Bilateral renal hilar vascular calcifications. Small nonobstructing renal calculi cannot be excluded. 4.Suggestion of a 1.5 cm left adnexal cyst. 5.Additional findings as detailed above. Electronically Signed: Hang Hawkins MD  1/10/2025 5:32 PM EST  Workstation ID: [id number]     Results for orders placed during the hospital encounter of 01/07/25    Adult Transthoracic Echo Complete W/ Cont if Necessary Per Protocol (With Agitated Saline)    Interpretation Summary    Left ventricular systolic function is normal. Estimated left ventricular EF = 62%    Left ventricular wall thickness is consistent with mild concentric hypertrophy.    No hemodynamically significant valvular disease present.    Estimated right ventricular systolic pressure from  tricuspid regurgitation is normal (<35 mmHg).      Current medications:  Scheduled Meds:aspirin, 81 mg, Oral, Daily  clopidogrel, 75 mg, Oral, Daily  donepezil, 5 mg, Oral, Nightly  insulin glargine, 25 Units, Subcutaneous, Nightly  insulin lispro, 2-9 Units, Subcutaneous, 4x Daily AC & at Bedtime  Insulin Lispro, 5 Units, Subcutaneous, TID With Meals  mupirocin, 1 Application, Each Nare, BID  pantoprazole, 40 mg, Oral, Q AM  rosuvastatin, 20 mg, Oral, Nightly  senna-docusate sodium, 2 tablet, Oral, BID  sodium chloride, 10 mL, Intravenous, Q12H      Continuous Infusions:   PRN Meds:.  acetaminophen **OR** [DISCONTINUED] acetaminophen    senna-docusate sodium **AND** polyethylene glycol **AND** bisacodyl **AND** bisacodyl    Calcium Replacement - Follow Nurse / BPA Driven Protocol    dextrose    dextrose    glucagon (human recombinant)    ipratropium-albuterol    Magnesium Cardiology Dose Replacement - Follow Nurse / BPA Driven Protocol    nitroglycerin    Phosphorus Replacement - Follow Nurse / BPA Driven Protocol    Potassium Replacement - Follow Nurse / BPA Driven Protocol    sodium chloride    sodium chloride    Assessment & Plan   Assessment & Plan     Active Hospital Problems    Diagnosis  POA    **Cerebrovascular accident (CVA) due to occlusion of left middle cerebral artery [I63.512]  Yes    History of GI bleed [Z87.19]  Not Applicable    NSVT (nonsustained ventricular tachycardia) [I47.29]  No    Severe protein-calorie malnutrition [E43]  Yes    Antiplatelet or antithrombotic long-term use [Z79.02]  Not Applicable    Cirrhosis of liver [K74.60]  Yes    Occlusion of left carotid artery [I65.22]  Yes    Paroxysmal atrial fibrillation [I48.0]  Yes    Hyperlipidemia LDL goal <70 [E78.5]  Yes    COPD (chronic obstructive pulmonary disease) [J44.9]  Yes    Type 2 diabetes mellitus with hyperglycemia, with long-term current use of insulin [E11.65, Z79.4]  Not Applicable    Coronary artery disease involving native  coronary artery of native heart without angina pectoris [I25.10]  Yes      Resolved Hospital Problems    Diagnosis Date Resolved POA    Encephalopathy [G93.40] 01/11/2025 Yes        Brief Hospital Course to date:  Karol Lopez is a 74 y.o. female with history of COPD, DM2, WILCOX cirrhosis, GI bleed, CKD, CAD (PCI LAD 2012), atrial fibrillation, PAD and prior stroke with residual right upper extremity weakness and bilateral carotid disease who presented with 1 to 2-day onset of worsening confusion, speech changes, gait difficulties.  Imaging revealed occlusion of her left internal carotid artery and stenosis of her right ICA with a large area of acute and subacute stroke in the left MCA distribution.    Right MCA territory stroke  - asa, plavix  - statin  - PT/OT, will likely need inpatient rehab  - avoid hypotension    Dysphagia  - modified diet    COPD  -Continue scheduled nebs    WILCOX cirrhosis  Thrombocytopenia  H/o GI bleed  Esophageal varices  Portal gastropathy  -Patient seen by gastroenterology with EGD 1/10 showing a single: Grade 1 varix in the posterior esophagus without high risk stigmata.  There is also mild portal gastropathy note  -Daily PPI  -Patient will need outpatient colonoscopy in a few months with dysphagia has improved  -cbc am    DM2  - A1c 8.8  - continue lantus 25 units nightly  - continue humalog 5 units meals  - increase SSI to moderate scale    CKD    Chronic disease related malnutrition  - RD follows    CAD  - h/o HEIDI    H/o atrial fibrillation    NSVT  - stress test Monday  - Cardiology follows    Possible adnexal cyst, 1.5 cm  - incidentally noted on CT abdomen pelvis  - further outpatient workup as needed      Expected Discharge Location and Transportation:   Expected Discharge   Expected Discharge Date: 1/13/2025; Expected Discharge Time:      VTE Prophylaxis:  Mechanical VTE prophylaxis orders are present.         AM-PAC 6 Clicks Score (PT): 12 (01/12/25 0600)    CODE STATUS:    Code Status and Medical Interventions: No CPR (Do Not Attempt to Resuscitate); Limited Support; No intubation (DNI)   Ordered at: 01/07/25 7320     Medical Intervention Limits:    No intubation (DNI)     Code Status (Patient has no pulse and is not breathing):    No CPR (Do Not Attempt to Resuscitate)     Medical Interventions (Patient has pulse or is breathing):    Limited Support       Main Matute MD  01/12/25

## 2025-01-12 NOTE — PLAN OF CARE
Goal Outcome Evaluation:              Outcome Evaluation: SLP continues to follow for speech/communication/cognition/swallowing. Baseline deficits per dtr. At baseline, pt with fluctuation in speech/communication. Current level of communication has improved but pt is not at baseline- confirmed by conversation with dtr.  SLP to continue to follow.    Anticipated Discharge Disposition (SLP): skilled nursing facility, inpatient rehabilitation facility             Treatment Assessment (SLP): continued, dysarthria, aphasia, cognitive-linguistic disorder (01/12/25 1300)  Treatment Assessment Comments (SLP): Decreased word finding. Disoriented. Speech is slow but intelligible.  Impaired cognition- pt is not oriented to time/place but recalls information with brief delay.  Residual deficits but per dtr, pt is not back to her baseline despite improvement. Difficulty complyling with swallowing exercises.  Successfulw ith oral exercises with resistance. Otherwise difficulty with execution of exercises/steps. (01/12/25 1300)  Plan for Continued Treatment (SLP): goals adjusted to reflect functional improvements demonstrated (01/12/25 1300)

## 2025-01-12 NOTE — THERAPY TREATMENT NOTE
Acute Care - Speech Language Pathology   Swallow Treatment Note [facility]     Patient Name: Karol Lopez  : 1950  MRN: [id number]  Today's Date: 2025               Admit Date: 2025    Visit Dx:     ICD-10-CM ICD-9-CM   1. Left carotid artery occlusion  I65.22 433.10   2. Difficulty with speech  R47.9 784.59   3. History of stroke  Z86.73 V12.54   4. Altered mental status, unspecified altered mental status type  R41.82 780.97   5. Oropharyngeal dysphagia  R13.12 787.22   6. Other cirrhosis of liver  K74.69 571.5   7. Antiplatelet or antithrombotic long-term use  Z79.02 V58.63   8. Aphasia  R47.01 784.3     Patient Active Problem List   Diagnosis    Coronary artery disease involving native coronary artery of native heart without angina pectoris    Anxiety    COPD (chronic obstructive pulmonary disease)    Acid reflux    Vitamin B12 deficiency    Current smoker    Type 2 diabetes mellitus with hyperglycemia, with long-term current use of insulin    Impaired mobility and ADLs    Melena    Anemia    Gastrointestinal hemorrhage    Hyperlipidemia LDL goal <70    Paroxysmal atrial fibrillation    Cirrhosis of liver    Occlusion of left carotid artery    Cerebrovascular accident (CVA) due to occlusion of left middle cerebral artery    Antiplatelet or antithrombotic long-term use    Severe protein-calorie malnutrition    History of GI bleed    NSVT (nonsustained ventricular tachycardia)     Past Medical History:   Diagnosis Date    Acid reflux     Anxiety     Cataracts, bilateral     Cirrhosis of liver     Constipation     COPD (chronic obstructive pulmonary disease)     Coronary artery disease     CTS (carpal tunnel syndrome)     Diabetes     Hearing loss     Hypercholesteremia     Hypertension     Impaired functional mobility, balance, gait, and endurance     Lower back pain     Osteoarthritis     Stroke     2013-weak in right arm    Tattoos     x2    Tobacco abuse     Tumor     in between breast  closer to right breast    Vitamin B12 deficiency     Wears dentures     upper only    Wears glasses      Past Surgical History:   Procedure Laterality Date    BREAST BIOPSY Right 5/10/2019    Procedure: BREAST BIOPSY RIGHT;  Surgeon: Kiana Frey MD;  Location: [facility] OR;  Service: General    CARPAL TUNNEL RELEASE Bilateral     CHOLECYSTECTOMY      COLONOSCOPY N/A 9/30/2021    Procedure: COLONOSCOPY WITH POLYPECTOMY AND ABLATION OF AVM;  Surgeon: Russell Davila MD;  Location: [facility] ENDOSCOPY;  Service: Gastroenterology;  Laterality: N/A;    CORONARY ANGIOPLASTY WITH STENT PLACEMENT  2012    ENDOSCOPY N/A 9/29/2021    Procedure: ESOPHAGOGASTRODUODENOSCOPY with biopsies;  Surgeon: Russell Davila MD;  Location: [facility] ENDOSCOPY;  Service: Gastroenterology;  Laterality: N/A;    ENTEROSCOPY SMALL BOWEL N/A 11/16/2021    Procedure: ESOPHAGOGASTRODUODENOSCOPY WITH SMALL BOWEL ENTEROSCOPY and biopsy;  Surgeon: Russell Davila MD;  Location: [facility] ENDOSCOPY;  Service: Gastroenterology;  Laterality: N/A;    HYSTERECTOMY      complete    JOINT REPLACEMENT Bilateral     knee replacements    TOTAL KNEE ARTHROPLASTY Bilateral 10/18/2016    MAUREEN Palomares MD       SLP Recommendation and Plan     SLP Diet Recommendation: soft to chew textures, chopped, no mixed consistencies, nectar thick liquids (01/12/25 1300)  Recommended Precautions and Strategies: upright posture during/after eating, small bites of food and sips of liquid, check mouth frequently for oral residue/pocketing, general aspiration precautions, assist with feeding (01/12/25 1300)  SLP Rec. for Method of Medication Administration: meds crushed, with puree, as tolerated, meds via alternate route (01/12/25 1300)     Monitor for Signs of Aspiration: yes, notify SLP if any concerns (01/12/25 1300)  Recommended Diagnostics: reassess via FEES (01/12/25 1300)     Anticipated Discharge Disposition (SLP): skilled nursing facility, inpatient  rehabilitation facility (01/12/25 1300)     Therapy Frequency (Swallow): 5 days per week (01/12/25 1300)  Predicted Duration Therapy Intervention (Days): 2 weeks (01/12/25 1300)  Oral Care Recommendations: Oral Care BID/PRN, Suction toothbrush, Denture Care (01/12/25 1300)        Daily Summary of Progress (SLP): progress toward functional goals as expected (01/12/25 1300)               Treatment Assessment (SLP): continued, dysarthria, aphasia, cognitive-linguistic disorder (01/12/25 1300)  Treatment Assessment Comments (SLP): Decreased word finding. Disoriented. Speech is slow but intelligible.  Impaired cognition- pt is not oriented to time/place but recalls information with brief delay.  Residual deficits but per dtr, pt is not back to her baseline despite improvement. Difficulty complyling with swallowing exercises.  Successfulw ith oral exercises with resistance. Otherwise difficulty with execution of exercises/steps. (01/12/25 1300)  Plan for Continued Treatment (SLP): goals adjusted to reflect functional improvements demonstrated (01/12/25 1300)         Outcome Evaluation: SLP continues to follow for speech/communication/cognition/swallowing. Baseline deficits per dtr. At baseline, pt with fluctuation in speech/communication. Current level of communication has improved but pt is not at baseline- confirmed by conversation with dtr.  SLP to continue to follow.      SWALLOW EVALUATION (Last 72 Hours)       SLP Adult Swallow Evaluation       Row Name 01/12/25 1300 01/10/25 5183                Rehab Evaluation    Oral Care patient refused intervention  -ML --          General Information    Current Method of Nutrition soft to chew textures;chopped;nectar/syrup-thick liquids  -ML --          General Eating/Swallowing Observations    Respiratory Support Currently in Use room air  -ML --          SLP Evaluation Clinical Impression    SLP Swallowing Diagnosis -- mild-moderate;oral dysphagia;pharyngeal dysphagia  -CH        Functional Impact -- risk of aspiration/pneumonia  -       Rehab Potential/Prognosis, Swallowing -- adequate, monitor progress closely  -       Swallow Criteria for Skilled Therapeutic Interventions Met -- demonstrates skilled criteria  -          SLP Treatment Clinical Impressions    Treatment Assessment Comments (SLP) Decreased word finding. Disoriented. Speech is slow but intelligible.  Impaired cognition- pt is not oriented to time/place but recalls information with brief delay.  Residual deficits but per dtr, pt is not back to her baseline despite improvement. Difficulty complyling with swallowing exercises.  Successfulw ith oral exercises with resistance. Otherwise difficulty with execution of exercises/steps.  -ML --          Recommendations    Therapy Frequency (Swallow) 5 days per week  -ML 5 days per week  -       SLP Diet Recommendation soft to chew textures;chopped;no mixed consistencies;nectar thick liquids  - soft to chew textures;chopped;no mixed consistencies;nectar thick liquids  -       Recommended Diagnostics reassess via FEES  -ML --       Recommended Precautions and Strategies upright posture during/after eating;small bites of food and sips of liquid;check mouth frequently for oral residue/pocketing;general aspiration precautions;assist with feeding  - upright posture during/after eating;small bites of food and sips of liquid;check mouth frequently for oral residue/pocketing;general aspiration precautions;assist with feeding  -       Oral Care Recommendations Oral Care BID/PRN;Suction toothbrush;Denture Care  - Oral Care BID/PRN;Toothbrush  -       SLP Rec. for Method of Medication Administration meds crushed;with puree;as tolerated;meds via alternate route  -ML meds crushed;with puree;as tolerated;meds via alternate route  [facility]       Monitor for Signs of Aspiration yes;notify SLP if any concerns  - yes;notify SLP if any concerns  -                 User Key  (r) =  Recorded By, (t) = Taken By, (c) = Cosigned By      Initials Name Effective Dates    ML Raquel Smith MS CCC-SLP 08/30/24 -     CH Sharon Ortega MS CCC-SLP 06/16/21 -                     EDUCATION  The patient has been educated in the following areas:   Dysphagia (Swallowing Impairment) Modified Diet Instruction swallowing exercises .        SLP GOALS       Row Name 01/12/25 1400 01/12/25 1300 01/10/25 7260       (LTG) Patient will demonstrate functional swallow for    Diet Texture (Demonstrate functional swallow) -- soft to chew (whole) textures  -ML soft to chew (whole) textures  -CH    Liquid viscosity (Demonstrate functional swallow) -- thin liquids  -ML thin liquids  -CH    [place] (Demonstrate functional swallow) -- with minimal cues (75-90% accuracy)  -ML with minimal cues (75-90% accuracy)  -CH    Time Frame (Demonstrate functional swallow) -- 1 week  -ML 1 week  -CH    Progress/Outcomes (Demonstrate functional swallow) -- goal ongoing  -ML continuing progress toward goal  -CH    Comment (Demonstrate functional swallow) -- Refused PO trials as just completed lunch.  -ML --       (STG) Patient will tolerate trials of    Consistencies Trialed (Tolerate trials) -- soft to chew (chopped) textures;nectar/ mildly thick liquids  -ML soft to chew (chopped) textures;nectar/ mildly thick liquids  -CH    Desired Outcome (Tolerate trials) -- without signs/symptoms of aspiration;without signs of distress  -ML without signs/symptoms of aspiration;without signs of distress  -CH    [place] (Tolerate trials) -- with minimal cues (75-90% accuracy)  -ML with minimal cues (75-90% accuracy)  -CH    Time Frame (Tolerate trials) -- 1 week  -ML 1 week  -CH    Progress/Outcomes (Tolerate trials) -- goal ongoing  -ML continuing progress toward goal  -CH    Comment (Tolerate trials) -- -- No s/s of aspiration with trials of soft solids or nectar thick liquids  -CH       (STG) Lingual Strengthening Goal 1 (SLP)     Activity (Lingual Strengthening Goal 1, SLP) -- increase tongue back strength;increase lingual tone/sensation/control/coordination/movement  -ML increase tongue back strength;increase lingual tone/sensation/control/coordination/movement  -    Increase Lingual Tone/Sensation/Control/Coordination/Movement -- swallow trials;lingual resistance exercises  -ML swallow trials;lingual resistance exercises  -    Increase Tongue Back Strength -- lingual resistance exercises  -ML lingual resistance exercises  -    [place]/Accuracy (Lingual Strengthening Goal 1, SLP) -- with moderate cues (50-74% accuracy)  -ML with moderate cues (50-74% accuracy)  -    Time Frame (Lingual Strengthening Goal 1, SLP) -- short term goal (STG);1 week  -ML 1 week  -    Progress/Outcomes (Lingual Strengthening Goal 1, SLP) -- continuing progress toward goal  -ML continuing progress toward goal  -CH    Comment (Lingual Strengthening Goal 1, SLP) -- Reviewed swallowing exercises.  Successfully completed lingual exercises with resistance. Reliant on assistance/cues/additional instruction for exercises.  -ML Handout of dysphagia exercises provided, reviewed and completed with min cues/models  -       (STG) Pharyngeal Strengthening Exercise Goal 1 (SLP)    Activity (Pharyngeal Strengthening Goal 1, SLP) -- increase timing;increase squeeze/positive pressure generation;increase closure at entrance to airway/closure of airway at glottis;increase anterior movement of the hyolaryngeal complex  -ML increase timing;increase squeeze/positive pressure generation;increase closure at entrance to airway/closure of airway at glottis;increase anterior movement of the hyolaryngeal complex  -    Increase Timing -- prepping - 3 second prep or suck swallow or 3-step swallow  -ML prepping - 3 second prep or suck swallow or 3-step swallow  -    Increase Anterior Movement of the Hyolaryngeal Complex -- chin tuck against resistance (CTAR)  -ML chin  tuck against resistance (CTAR)  -CH    Increase Closure at Entrance to Airway/Closure of Airway at Glottis -- supraglottic swallow;maria victoria  -ML supraglottic swallow;maria victoria  -CH    Increase Squeeze/Positive Pressure Generation -- hard effortful swallow  -ML hard effortful swallow  -CH    [place]/Accuracy (Pharyngeal Strengthening Goal 1, SLP) -- with moderate cues (50-74% accuracy)  -ML with moderate cues (50-74% accuracy)  -CH    Time Frame (Pharyngeal Strengthening Goal 1, SLP) -- short term goal (STG);1 week  -ML 1 week  -CH    Progress/Outcomes (Pharyngeal Strengthening Goal 1, SLP) -- progress slower than expected  -ML continuing progress toward goal  -CH    Comment (Pharyngeal Strengthening Goal 1, SLP) -- Unable to comply with directions for exercises despite breakdown into simple steps and providing written cues.  -ML Handout of dysphagia exercises provided, reviewed and completed with min cues/models  -CH       Patient will demonstrate functional speech skills for return to discharge environment    [place] -- with minimal cues  -ML with minimal cues  -CH    Time frame -- 1 week  -ML 1 week  -CH    Progress/Outcomes -- good progress toward goal  -ML continuing progress toward goal  -CH    Comments -- Speech intelligibility is 100%- ongoing slow rate but no evidence of imprecise articulation.  Per dtr, pt with decreased speech when fatigued prior to this admission.  -ML --       Patient will demonstrate functional language skills for return to discharge environment     [place] -- with minimal cues  -ML with minimal cues  -CH    Time frame -- 1 week  -ML 1 week  -CH    Barriers -- baseline deficits  -ML --    Progress/Outcomes -- continuing progress toward goal  -ML continuing progress toward goal  -CH    Comments -- Difficulty with word finding but with choice, pt is able to recognize target.  -ML --       SLP Diagnostic Treatment     Patient will participate in further assessment in the  following areas -- reading comprehension;graphic expression;cognitive-linguistic;clarification of baseline cognitive communication status  -ML reading comprehension;graphic expression;cognitive-linguistic;clarification of baseline cognitive communication status  -CH    Time Frame (Diagnostic) -- 1 week  -ML 1 week  -CH    Progress/Outcomes (Additional Goal 1, SLP) -- goal met  -ML continuing progress toward goal  -CH    Comment (Diagnostic) -- Functional reading for simple information at the 1-2 sentence level. Per dtr, pt with difficulty with reading since prior stroke. Writing not addressed today due to hemiplegia right dominant. Per dtr, baseline writing limited to simple information- name/date of birth/address.  -ML Patient able to inconsistently read words, time, phone and room numbers from her [employer] board board.  -CH       Comprehend Questions Goal 1 (SLP)    Improve Ability to Comprehend Questions Goal 1 (SLP) -- simple wh questions;80%;with minimal cues (75-90%)  -ML simple wh questions;80%;with minimal cues (75-90%)  -    Time Frame (Comprehend Questions Goal 1, SLP) -- short term goal (STG);1 week  -ML 1 week  -CH    Progress (Ability to Comprehend Questions Goal 1, SLP) -- with minimal cues (75-90%);60%  -ML 70%;with minimal cues (75-90%)  -    Progress/Outcomes (Comprehend Questions Goal 1, SLP) -- continuing progress toward goal  -ML continuing progress toward goal  -CH    Comment (Comprehend Questions Goal 1, SLP) Improves with choice.  -ML Word finding deficits interfere  -ML --       Follow Directions Goal 2 (SLP)    Improve Ability to Follow Directions Goal 1 (SLP) -- 2 step commands;80%;with minimal cues (75-90%)  -ML 2 step commands;80%;with minimal cues (75-90%)  -    Time Frame (Follow Directions Goal 1, SLP) -- short term goal (STG);1 week  -ML 1 week  -CH    Progress (Ability to Follow Directions Goal 1, SLP) -- -- 50%;with minimal cues (75-90%)  -    Progress/Outcomes (Follow  Directions Goal 1, SLP) -- goal ongoing  -ML continuing progress toward goal  -CH       Word Retrieval Skills Goal 1 (SLP)    Improve Word Retrieval Skills By Goal 1 (SLP) -- responsive naming task;high frequency;repeating phrases;completing open ended unstructured sentence;completing functional word finding tasks;90%;with minimal cues (75-90%)  -ML responsive naming task;high frequency;repeating phrases;completing open ended unstructured sentence;completing functional word finding tasks;90%;with minimal cues (75-90%)  -    Time Frame (Word Retrieval Goal 1, SLP) -- short term goal (STG);1 week  -ML 1 week  -CH    Progress (Word Retrieval Skills Goal 1, SLP) -- 60%  -ML 50%;with minimal cues (75-90%)  -    Progress/Outcomes (Word Retrieval Goal 1, SLP) -- continuing progress toward goal  -ML continuing progress toward goal  -CH    Comment (Word Retrieval Goal 1, SLP) -- Able to complete responsive naming and repeat short phrases/sentences but struggled with simple word finding tasks- description/word association.  - responsive naming, generative naming  -       Phonation Goal 1 (SLP)    Improve Phonation By Goal 1 (SLP) -- using loud speech;90%;with minimal cues (75-90%)  -ML using loud speech;90%;with minimal cues (75-90%)  -    Time Frame (Phonation Goal 1, SLP) -- short term goal (STG);1 week  -ML 1 week  -CH    Progress (Phonation Goal 1, SLP) -- 80%;independently (over 90% accuracy)  -ML 50%;with minimal cues (75-90%)  -    Progress/Outcomes (Phonation Goal 1, SLP) -- good progress toward goal  -ML continuing progress toward goal  -CH    Comment (Phonation Goal 1, SLP) -- Speech is loud without intervention. Speech is clear and intelligible at the word/sentence level.  - --       Awareness of Basic Personal Information Goal 1 (SLP)    Improve Awareness of Basic Personal Information Goal 1 (SLP) -- answering yes/no questions regarding personal/biographical information;answering open-ended questions  regarding personal/biographical information;80%;with minimal cues (75-90%)  -ML --    Time Frame (Awareness of Basic Personal Information Goal 1, SLP) -- short term goal (STG);1 week  -ML --    Progress/Outcomes (Awareness of Basic Personal Information Goal 1, SLP) -- new goal  -ML --              User Key  (r) = Recorded By, (t) = Taken By, (c) = Cosigned By      Initials Name Provider Type    Raquel Muhammad MS CCC-SLP Speech and Language Pathologist    Sharon Rawls MS CCC-SLP Speech and Language Pathologist                         Time Calculation:    Time Calculation- SLP       Row Name 25 1452             Time Calculation- SLP    SLP Start Time 1300  -ML      SLP Received On 25  -ML                User Key  (r) = Recorded By, (t) = Taken By, (c) = Cosigned By      Initials Name Provider Type    Raquel Muhammad, MS CCC-SLP Speech and Language Pathologist                    Therapy Charges for Today       Code Description Service Date Service Provider Modifiers Qty    19403004653 HC ST TREATMENT SWALLOW 2 2025 Raquel Smith MS CCC-SLP GN 1    [id number] HC ST TREATMENT SPEECH 3 2025 Raquel Smith MS CCC-SLP GN 1                 Raquel Smith MS CCC-SLP  2025   and Acute Care - Speech Language Pathology Treatment Note  [facility]     Patient Name: Karol Lopez  : 1950  MRN: [id number]  Today's Date: 2025               Admit Date: 2025     Visit Dx:    ICD-10-CM ICD-9-CM   1. Left carotid artery occlusion  I65.22 433.10   2. Difficulty with speech  R47.9 784.59   3. History of stroke  Z86.73 V12.54   4. Altered mental status, unspecified altered mental status type  R41.82 780.97   5. Oropharyngeal dysphagia  R13.12 787.22   6. Other cirrhosis of liver  K74.69 571.5   7. Antiplatelet or antithrombotic long-term use  Z79.02 V58.63   8. Aphasia  R47.01 784.3     Patient Active Problem List   Diagnosis    Coronary  artery disease involving native coronary artery of native heart without angina pectoris    Anxiety    COPD (chronic obstructive pulmonary disease)    Acid reflux    Vitamin B12 deficiency    Current smoker    Type 2 diabetes mellitus with hyperglycemia, with long-term current use of insulin    Impaired mobility and ADLs    Melena    Anemia    Gastrointestinal hemorrhage    Hyperlipidemia LDL goal <70    Paroxysmal atrial fibrillation    Cirrhosis of liver    Occlusion of left carotid artery    Cerebrovascular accident (CVA) due to occlusion of left middle cerebral artery    Antiplatelet or antithrombotic long-term use    Severe protein-calorie malnutrition    History of GI bleed    NSVT (nonsustained ventricular tachycardia)     Past Medical History:   Diagnosis Date    Acid reflux     Anxiety     Cataracts, bilateral     Cirrhosis of liver     Constipation     COPD (chronic obstructive pulmonary disease)     Coronary artery disease     CTS (carpal tunnel syndrome)     Diabetes     Hearing loss     Hypercholesteremia     Hypertension     Impaired functional mobility, balance, gait, and endurance     Lower back pain     Osteoarthritis     Stroke     2013-weak in right arm    Tattoos     x2    Tobacco abuse     Tumor     in between breast closer to right breast    Vitamin B12 deficiency     Wears dentures     upper only    Wears glasses      Past Surgical History:   Procedure Laterality Date    BREAST BIOPSY Right 5/10/2019    Procedure: BREAST BIOPSY RIGHT;  Surgeon: Kiana Frey MD;  Location: [facility] OR;  Service: General    CARPAL TUNNEL RELEASE Bilateral     CHOLECYSTECTOMY      COLONOSCOPY N/A 9/30/2021    Procedure: COLONOSCOPY WITH POLYPECTOMY AND ABLATION OF AVM;  Surgeon: Russell Davila MD;  Location: [facility] ENDOSCOPY;  Service: Gastroenterology;  Laterality: N/A;    CORONARY ANGIOPLASTY WITH STENT PLACEMENT  2012    ENDOSCOPY N/A 9/29/2021    Procedure: ESOPHAGOGASTRODUODENOSCOPY with biopsies;   Surgeon: Russell Davila MD;  Location: [facility] ENDOSCOPY;  Service: Gastroenterology;  Laterality: N/A;    ENTEROSCOPY SMALL BOWEL N/A 11/16/2021    Procedure: ESOPHAGOGASTRODUODENOSCOPY WITH SMALL BOWEL ENTEROSCOPY and biopsy;  Surgeon: Russell Davila MD;  Location: [facility] ENDOSCOPY;  Service: Gastroenterology;  Laterality: N/A;    HYSTERECTOMY      complete    JOINT REPLACEMENT Bilateral     knee replacements    TOTAL KNEE ARTHROPLASTY Bilateral 10/18/2016    MAUREEN Palomares MD       SLP Recommendation and Plan           Monitor for Signs of Aspiration: yes, notify SLP if any concerns (01/12/25 1300)        Anticipated Discharge Disposition (SLP): skilled nursing facility, inpatient rehabilitation facility (01/12/25 1300)     Therapy Frequency (Swallow): 5 days per week (01/12/25 1300)  Therapy Frequency (SLP SLC): 4 days per week (01/12/25 1300)  Predicted Duration Therapy Intervention (Days): 2 weeks (01/12/25 1300)  Oral Care Recommendations: Oral Care BID/PRN, Suction toothbrush, Denture Care (01/12/25 1300)     Daily Summary of Progress (SLP): progress toward functional goals as expected (01/12/25 1300)           Treatment Assessment (SLP): continued, dysarthria, aphasia, cognitive-linguistic disorder (01/12/25 1300)  Treatment Assessment Comments (SLP): Decreased word finding. Disoriented. Speech is slow but intelligible.  Impaired cognition- pt is not oriented to time/place but recalls information with brief delay.  Residual deficits but per dtr, pt is not back to her baseline despite improvement. Difficulty complyling with swallowing exercises.  Successfulw ith oral exercises with resistance. Otherwise difficulty with execution of exercises/steps. (01/12/25 1300)  Plan for Continued Treatment (SLP): goals adjusted to reflect functional improvements demonstrated (01/12/25 1300)  Outcome Evaluation: SLP continues to follow for speech/communication/cognition/swallowing. Baseline deficits per dtr.  At baseline, pt with fluctuation in speech/communication. Current level of communication has improved but pt is not at baseline- confirmed by conversation with dtr.  SLP to continue to follow. (01/12/25 0629)      SLP EVALUATION (Last 72 Hours)       SLP SLC Evaluation       Row Name 01/12/25 1300 01/10/25 3544                Communication Assessment/Intervention    Document Type therapy note (daily note)  -ML therapy note (daily note)  -       Subjective Information no complaints  -ML no complaints  -CH       Patient Observations alert;agree to therapy  -ML alert;cooperative;agree to therapy  -       Patient/Family/Caregiver Comments/Observations Spoke with pt's dtr re: baseline level of function. Pt with fluctuation in speech/communication/cognition- good/bad days and more dysarthric when tired. Dtr endorses pt is much closer to herself today.  -ML none present  -CH       Patient Effort good  -ML good  -CH       Comment Awake/alert- unable to reposition self in bed  -ML --       Symptoms Noted During/After Treatment none  -ML none  -CH       Oral Care -- lip/mouth moisturizer applied  -          General Information    Patient Profile Reviewed yes  -ML yes  -CH       Pertinent History Of Current Problem See ST hx- Left MCA- acute/subacute. Spoke with dtr re: residual- fluctuation in speech/communication/swallowing. Increased dysarthria when fatigued.  Orientation fluctuated. Difficulty with word finding.  Reading/writing were functional but limited following last stroke.  -ML --       Plans/Goals Discussed with patient and family;agreed upon  -ML --       Barriers to Rehab previous functional deficit  -ML --       Patient's Goals for Discharge patient did not state  -ML --          Pain    Pretreatment Pain Rating 0/10 - no pain  -ML --       Posttreatment Pain Rating 0/10 - no pain  -ML --          Pain Scale: FACES Pre/Post-Treatment    Pain: FACES Scale, Pretreatment -- 0-->no hurt  -       Posttreatment  Pain Rating -- 0-->no hurt  -          SLP Evaluation Clinical Impressions    SLP Diagnosis -- moderate;aphasia;mild-moderate;dysarthria  -       Rehab Potential/Prognosis -- good  -[facility] Criteria for Skilled Therapy Interventions Met -- yes  -       Functional Impact -- functional impact in ADLs;difficulty communicating wants, needs;difficulty communicating in an emergency  -          SLP Treatment Clinical Impressions    Treatment Assessment (SLP) continued;dysarthria;aphasia;cognitive-linguistic disorder  - continued;dysarthria;aphasia  -       Daily Summary of Progress (SLP) progress toward functional goals as expected  - progress toward functional goals as expected  -       Barriers to Overall Progress (SLP) Baseline deficits  - --       Plan for Continued Treatment (SLP) goals adjusted to reflect functional improvements demonstrated  - continue treatment per plan of care  -       Care Plan Review care plan/treatment goals reviewed;patient/other agree to care plan  - evaluation/treatment results reviewed;care plan/treatment goals reviewed  -       Care Plan Review, Other Participant(s) daughter  Via telephone  - --          Recommendations    Therapy Frequency (SLP SLC) 4 days per week  - 5 days per week  -       Predicted Duration Therapy Intervention (Days) 2 weeks  - 2 weeks  -       Anticipated Discharge Disposition (SLP) skilled nursing facility;inpatient rehabilitation facility  - inpatient rehabilitation facility  -                 User Key  (r) = Recorded By, (t) = Taken By, (c) = Cosigned By      Initials Name Effective Dates     Raquel Smith MS CCC-SLP 08/30/24 -      Sharon Ortega MS CCC-SLP 06/16/21 -                        EDUCATION  The patient has been educated in the following areas:     Cognitive Impairment Communication Impairment.           SLP GOALS       Row Name 01/12/25 1400 01/12/25 1300 01/10/25 1415       (LTG) Patient  will demonstrate functional swallow for    Diet Texture (Demonstrate functional swallow) -- soft to chew (whole) textures  -ML soft to chew (whole) textures  -CH    Liquid viscosity (Demonstrate functional swallow) -- thin liquids  -ML thin liquids  -CH    [place] (Demonstrate functional swallow) -- with minimal cues (75-90% accuracy)  -ML with minimal cues (75-90% accuracy)  -CH    Time Frame (Demonstrate functional swallow) -- 1 week  -ML 1 week  -CH    Progress/Outcomes (Demonstrate functional swallow) -- goal ongoing  -ML continuing progress toward goal  -CH    Comment (Demonstrate functional swallow) -- Refused PO trials as just completed lunch.  -ML --       (STG) Patient will tolerate trials of    Consistencies Trialed (Tolerate trials) -- soft to chew (chopped) textures;nectar/ mildly thick liquids  -ML soft to chew (chopped) textures;nectar/ mildly thick liquids  -    Desired Outcome (Tolerate trials) -- without signs/symptoms of aspiration;without signs of distress  -ML without signs/symptoms of aspiration;without signs of distress  -CH    [place] (Tolerate trials) -- with minimal cues (75-90% accuracy)  -ML with minimal cues (75-90% accuracy)  -CH    Time Frame (Tolerate trials) -- 1 week  -ML 1 week  -CH    Progress/Outcomes (Tolerate trials) -- goal ongoing  -ML continuing progress toward goal  -    Comment (Tolerate trials) -- -- No s/s of aspiration with trials of soft solids or nectar thick liquids  -       ([facility]) Lingual Strengthening Goal 1 (SLP)    Activity (Lingual Strengthening Goal 1, SLP) -- increase tongue back strength;increase lingual tone/sensation/control/coordination/movement  -ML increase tongue back strength;increase lingual tone/sensation/control/coordination/movement  -CH    Increase Lingual Tone/Sensation/Control/Coordination/Movement -- swallow trials;lingual resistance exercises  -ML swallow trials;lingual resistance exercises  -    Increase Tongue Back Strength  -- lingual resistance exercises  -ML lingual resistance exercises  -CH    [place]/Accuracy (Lingual Strengthening Goal 1, SLP) -- with moderate cues (50-74% accuracy)  -ML with moderate cues (50-74% accuracy)  -CH    Time Frame (Lingual Strengthening Goal 1, SLP) -- short term goal (STG);1 week  -ML 1 week  -CH    Progress/Outcomes (Lingual Strengthening Goal 1, SLP) -- continuing progress toward goal  -ML continuing progress toward goal  -CH    Comment (Lingual Strengthening Goal 1, SLP) -- Reviewed swallowing exercises.  Successfully completed lingual exercises with resistance. Reliant on assistance/cues/additional instruction for exercises.  -ML Handout of dysphagia exercises provided, reviewed and completed with min cues/models  -CH       (STG) Pharyngeal Strengthening Exercise Goal 1 (SLP)    Activity (Pharyngeal Strengthening Goal 1, SLP) -- increase timing;increase squeeze/positive pressure generation;increase closure at entrance to airway/closure of airway at glottis;increase anterior movement of the hyolaryngeal complex  -ML increase timing;increase squeeze/positive pressure generation;increase closure at entrance to airway/closure of airway at glottis;increase anterior movement of the hyolaryngeal complex  -CH    Increase Timing -- prepping - 3 second prep or suck swallow or 3-step swallow  -ML prepping - 3 second prep or suck swallow or 3-step swallow  -CH    Increase Anterior Movement of the Hyolaryngeal Complex -- chin tuck against resistance (CTAR)  -ML chin tuck against resistance (CTAR)  -CH    Increase Closure at Entrance to Airway/Closure of Airway at Glottis -- supraglottic swallow;maria victoria  -ML supraglottic swallow;maria victoria  -CH    Increase Squeeze/Positive Pressure Generation -- hard effortful swallow  -ML hard effortful swallow  -CH    [place]/Accuracy (Pharyngeal Strengthening Goal 1, SLP) -- with moderate cues (50-74% accuracy)  -ML with moderate cues (50-74% accuracy)  -CH    Time  Frame (Pharyngeal Strengthening Goal 1, SLP) -- short term goal (STG);1 week  -ML 1 week  -CH    Progress/Outcomes (Pharyngeal Strengthening Goal 1, SLP) -- progress slower than expected  -ML continuing progress toward goal  -CH    Comment (Pharyngeal Strengthening Goal 1, SLP) -- Unable to comply with directions for exercises despite breakdown into simple steps and providing written cues.  -ML Handout of dysphagia exercises provided, reviewed and completed with min cues/models  -       Patient will demonstrate functional speech skills for return to discharge environment    [place] -- with minimal cues  -ML with minimal cues  -CH    Time frame -- 1 week  -ML 1 week  -CH    Progress/Outcomes -- good progress toward goal  -ML continuing progress toward goal  -    Comments -- Speech intelligibility is 100%- ongoing slow rate but no evidence of imprecise articulation.  Per dtr, pt with decreased speech when fatigued prior to this admission.  -ML --       Patient will demonstrate functional language skills for return to discharge environment     [place] -- with minimal cues  -ML with minimal cues  -CH    Time frame -- 1 week  -ML 1 week  -CH    Barriers -- baseline deficits  -ML --    Progress/Outcomes -- continuing progress toward goal  -ML continuing progress toward goal  -    Comments -- Difficulty with word finding but with choice, pt is able to recognize target.  -ML --       SLP Diagnostic Treatment     Patient will participate in further assessment in the following areas -- reading comprehension;graphic expression;cognitive-linguistic;clarification of baseline cognitive communication status  - reading comprehension;graphic expression;cognitive-linguistic;clarification of baseline cognitive communication status  -    Time Frame (Diagnostic) -- 1 week  -ML 1 week  -CH    Progress/Outcomes (Additional Goal 1, SLP) -- goal met  -ML continuing progress toward goal  -    Comment (Diagnostic) --  Functional reading for simple information at the 1-2 sentence level. Per dtr, pt with difficulty with reading since prior stroke. Writing not addressed today due to hemiplegia right dominant. Per dtr, baseline writing limited to simple information- name/date of birth/address.  -ML Patient able to inconsistently read words, time, phone and room numbers from her electronic board board.  -CH       Comprehend Questions Goal 1 (SLP)    Improve Ability to Comprehend Questions Goal 1 (SLP) -- simple wh questions;80%;with minimal cues (75-90%)  -ML simple wh questions;80%;with minimal cues (75-90%)  -CH    Time Frame (Comprehend Questions Goal 1, SLP) -- short term goal (STG);1 week  -ML 1 week  -CH    Progress (Ability to Comprehend Questions Goal 1, SLP) -- with minimal cues (75-90%);60%  -ML 70%;with minimal cues (75-90%)  -    Progress/Outcomes (Comprehend Questions Goal 1, SLP) -- continuing progress toward goal  -ML continuing progress toward goal  -CH    Comment (Comprehend Questions Goal 1, SLP) Improves with choice.  -ML Word finding deficits interfere  -ML --       Follow Directions Goal 2 (SLP)    Improve Ability to Follow Directions Goal 1 (SLP) -- 2 step commands;80%;with minimal cues (75-90%)  -ML 2 step commands;80%;with minimal cues (75-90%)  -    Time Frame (Follow Directions Goal 1, SLP) -- short term goal (STG);1 week  -ML 1 week  -CH    Progress (Ability to Follow Directions Goal 1, SLP) -- -- 50%;with minimal cues (75-90%)  -    Progress/Outcomes (Follow Directions Goal 1, SLP) -- goal ongoing  -ML continuing progress toward goal  -CH       Word Retrieval Skills Goal 1 (SLP)    Improve Word Retrieval Skills By Goal 1 (SLP) -- responsive naming task;high frequency;repeating phrases;completing open ended unstructured sentence;completing functional word finding tasks;90%;with minimal cues (75-90%)  - responsive naming task;high frequency;repeating phrases;completing open ended unstructured  sentence;completing functional word finding tasks;90%;with minimal cues (75-90%)  -    Time Frame (Word Retrieval Goal 1, SLP) -- short term goal (STG);1 week  -ML 1 week  -CH    Progress (Word Retrieval Skills Goal 1, SLP) -- 60%  -ML 50%;with minimal cues (75-90%)  -CH    Progress/Outcomes (Word Retrieval Goal 1, SLP) -- continuing progress toward goal  -ML continuing progress toward goal  -CH    Comment (Word Retrieval Goal 1, SLP) -- Able to complete responsive naming and repeat short phrases/sentences but struggled with simple word finding tasks- description/word association.  -ML responsive naming, generative naming  -       Phonation Goal 1 (SLP)    Improve Phonation By Goal 1 (SLP) -- using loud speech;90%;with minimal cues (75-90%)  -ML using loud speech;90%;with minimal cues (75-90%)  -    Time Frame (Phonation Goal 1, SLP) -- short term goal (STG);1 week  -ML 1 week  -CH    Progress (Phonation Goal 1, SLP) -- 80%;independently (over 90% accuracy)  -ML 50%;with minimal cues (75-90%)  -    Progress/Outcomes (Phonation Goal 1, SLP) -- good progress toward goal  -ML continuing progress toward goal  -CH    Comment (Phonation Goal 1, SLP) -- Speech is loud without intervention. Speech is clear and intelligible at the word/sentence level.  -ML --       Awareness of Basic Personal Information Goal 1 (SLP)    Improve Awareness of Basic Personal Information Goal 1 (SLP) -- answering yes/no questions regarding personal/biographical information;answering open-ended questions regarding personal/biographical information;80%;with minimal cues (75-90%)  -ML --    Time Frame (Awareness of Basic Personal Information Goal 1, SLP) -- short term goal (STG);1 week  -ML --    Progress/Outcomes (Awareness of Basic Personal Information Goal 1, SLP) -- new goal  - --              User Key  (r) = Recorded By, (t) = Taken By, (c) = Cosigned By      Initials Name Provider Type     Raquel Smith MS CCC-SLP Speech  and Language Pathologist    Sharon Rawls MS CCC-SLP Speech and Language Pathologist                              Time Calculation:      Time Calculation- SLP       Row Name 01/12/25 1452             Time Calculation- SLP    SLP Start Time 1300  -ML      SLP Received On 01/12/25  -ML                User Key  (r) = Recorded By, (t) = Taken By, (c) = Cosigned By      Initials Name Provider Type    Raquel Muhammad, MS CCC-SLP Speech and Language Pathologist                    Therapy Charges for Today       Code Description Service Date Service Provider Modifiers Qty    38637482107 HC ST TREATMENT SWALLOW 2 1/12/2025 Raquel Smith, MS CCC-SLP GN 1    73501449850 HC ST TREATMENT SPEECH 3 1/12/2025 Raquel Smith, MS CCC-SLP GN 1                       Raquel Smith MS CCC-MAXIMILIAN  1/12/2025

## 2025-01-13 ENCOUNTER — APPOINTMENT (OUTPATIENT)
Dept: CARDIOLOGY | Facility: HOSPITAL | Age: 75
End: 2025-01-13
Payer: MEDICARE

## 2025-01-13 LAB
ALBUMIN SERPL-MCNC: 3.4 G/DL (ref 3.5–5.2)
ALBUMIN/GLOB SERPL: 1.1 G/DL
ALP SERPL-CCNC: 138 U/L (ref 39–117)
ALT SERPL W P-5'-P-CCNC: 30 U/L (ref 1–33)
ANION GAP SERPL CALCULATED.3IONS-SCNC: 10 MMOL/L (ref 5–15)
AST SERPL-CCNC: 37 U/L (ref 1–32)
BH CV REST NUCLEAR ISOTOPE DOSE: 28.7 MCI
BH CV STRESS BP STAGE 2: NORMAL
BH CV STRESS BP STAGE 4: NORMAL
BH CV STRESS COMMENTS STAGE 1: NORMAL
BH CV STRESS DOSE REGADENOSON STAGE 1: 0.4
BH CV STRESS DURATION MIN STAGE 1: 1
BH CV STRESS DURATION MIN STAGE 2: 1
BH CV STRESS DURATION MIN STAGE 3: 1
BH CV STRESS DURATION MIN STAGE 4: 1
BH CV STRESS DURATION SEC STAGE 1: 0
BH CV STRESS DURATION SEC STAGE 2: 0
BH CV STRESS DURATION SEC STAGE 3: 0
BH CV STRESS DURATION SEC STAGE 4: 0
BH CV STRESS HR STAGE 1: 64
BH CV STRESS HR STAGE 2: 81
BH CV STRESS HR STAGE 3: 80
BH CV STRESS HR STAGE 4: 82
BH CV STRESS NUCLEAR ISOTOPE DOSE: 28.7 MCI
BH CV STRESS O2 STAGE 1: 96
BH CV STRESS O2 STAGE 2: 100
BH CV STRESS O2 STAGE 3: 100
BH CV STRESS O2 STAGE 4: 99
BH CV STRESS PROTOCOL 1: NORMAL
BH CV STRESS RECOVERY BP: NORMAL MMHG
BH CV STRESS RECOVERY HR: 77 BPM
BH CV STRESS RECOVERY O2: 97 %
BH CV STRESS STAGE 1: 1
BH CV STRESS STAGE 2: 2
BH CV STRESS STAGE 3: 3
BH CV STRESS STAGE 4: 4
BILIRUB SERPL-MCNC: 0.4 MG/DL (ref 0–1.2)
BUN SERPL-MCNC: 26 MG/DL (ref 8–23)
BUN/CREAT SERPL: 25.2 (ref 7–25)
CALCIUM SPEC-SCNC: 10.1 MG/DL (ref 8.6–10.5)
CHLORIDE SERPL-SCNC: 110 MMOL/L (ref 98–107)
CO2 SERPL-SCNC: 22 MMOL/L (ref 22–29)
CREAT SERPL-MCNC: 1.03 MG/DL (ref 0.57–1)
DEPRECATED RDW RBC AUTO: 44.1 FL (ref 37–54)
EGFRCR SERPLBLD CKD-EPI 2021: 57.9 ML/MIN/1.73
ERYTHROCYTE [DISTWIDTH] IN BLOOD BY AUTOMATED COUNT: 13.5 % (ref 12.3–15.4)
GLOBULIN UR ELPH-MCNC: 3 GM/DL
GLUCOSE BLDC GLUCOMTR-MCNC: 269 MG/DL (ref 70–130)
GLUCOSE BLDC GLUCOMTR-MCNC: 284 MG/DL (ref 70–130)
GLUCOSE BLDC GLUCOMTR-MCNC: 299 MG/DL (ref 70–130)
GLUCOSE BLDC GLUCOMTR-MCNC: 307 MG/DL (ref 70–130)
GLUCOSE BLDC GLUCOMTR-MCNC: 407 MG/DL (ref 70–130)
GLUCOSE BLDC GLUCOMTR-MCNC: 417 MG/DL (ref 70–130)
GLUCOSE SERPL-MCNC: 218 MG/DL (ref 65–99)
HCT VFR BLD AUTO: 34.4 % (ref 34–46.6)
HGB BLD-MCNC: 11.5 G/DL (ref 12–15.9)
MAXIMAL PREDICTED HEART RATE: 148 BPM
MCH RBC QN AUTO: 29.9 PG (ref 26.6–33)
MCHC RBC AUTO-ENTMCNC: 33.4 G/DL (ref 31.5–35.7)
MCV RBC AUTO: 89.6 FL (ref 79–97)
PERCENT MAX PREDICTED HR: 62.84 %
PLATELET # BLD AUTO: 125 10*3/MM3 (ref 140–450)
PMV BLD AUTO: 11.9 FL (ref 6–12)
POTASSIUM SERPL-SCNC: 4.2 MMOL/L (ref 3.5–5.2)
PROT SERPL-MCNC: 6.4 G/DL (ref 6–8.5)
QT INTERVAL: 448 MS
QT INTERVAL: 474 MS
QTC INTERVAL: 440 MS
QTC INTERVAL: 473 MS
RBC # BLD AUTO: 3.84 10*6/MM3 (ref 3.77–5.28)
SODIUM SERPL-SCNC: 142 MMOL/L (ref 136–145)
SPECT HRT GATED+EF W RNC IV: 66 %
STRESS BASELINE BP: NORMAL MMHG
STRESS BASELINE HR: 63 BPM
STRESS O2 SAT REST: 97 %
STRESS PERCENT HR: 74 %
STRESS POST ESTIMATED WORKLOAD: 1 METS
STRESS POST EXERCISE DUR MIN: 4 MIN
STRESS POST EXERCISE DUR SEC: 0 SEC
STRESS POST O2 SAT PEAK: 100 %
STRESS POST PEAK BP: NORMAL MMHG
STRESS POST PEAK HR: 93 BPM
STRESS TARGET HR: 126 BPM
WBC NRBC COR # BLD AUTO: 5.21 10*3/MM3 (ref 3.4–10.8)

## 2025-01-13 PROCEDURE — 93016 CV STRESS TEST SUPVJ ONLY: CPT | Performed by: INTERNAL MEDICINE

## 2025-01-13 PROCEDURE — 97530 THERAPEUTIC ACTIVITIES: CPT

## 2025-01-13 PROCEDURE — 93005 ELECTROCARDIOGRAM TRACING: CPT | Performed by: INTERNAL MEDICINE

## 2025-01-13 PROCEDURE — 63710000001 INSULIN GLARGINE PER 5 UNITS: Performed by: INTERNAL MEDICINE

## 2025-01-13 PROCEDURE — 78431 MYOCRD IMG PET RST&STRS CT: CPT | Performed by: INTERNAL MEDICINE

## 2025-01-13 PROCEDURE — 99232 SBSQ HOSP IP/OBS MODERATE 35: CPT | Performed by: INTERNAL MEDICINE

## 2025-01-13 PROCEDURE — 97535 SELF CARE MNGMENT TRAINING: CPT

## 2025-01-13 PROCEDURE — 25010000002 REGADENOSON 0.4 MG/5ML SOLUTION: Performed by: INTERNAL MEDICINE

## 2025-01-13 PROCEDURE — 93017 CV STRESS TEST TRACING ONLY: CPT

## 2025-01-13 PROCEDURE — 34310000005 RUBIDIUM CHLORIDE: Performed by: INTERNAL MEDICINE

## 2025-01-13 PROCEDURE — 82948 REAGENT STRIP/BLOOD GLUCOSE: CPT

## 2025-01-13 PROCEDURE — 93018 CV STRESS TEST I&R ONLY: CPT | Performed by: INTERNAL MEDICINE

## 2025-01-13 PROCEDURE — 63710000001 INSULIN LISPRO (HUMAN) PER 5 UNITS: Performed by: INTERNAL MEDICINE

## 2025-01-13 PROCEDURE — 85027 COMPLETE CBC AUTOMATED: CPT | Performed by: INTERNAL MEDICINE

## 2025-01-13 PROCEDURE — A9555 RB82 RUBIDIUM: HCPCS | Performed by: INTERNAL MEDICINE

## 2025-01-13 PROCEDURE — 78431 MYOCRD IMG PET RST&STRS CT: CPT

## 2025-01-13 PROCEDURE — 80053 COMPREHEN METABOLIC PANEL: CPT | Performed by: INTERNAL MEDICINE

## 2025-01-13 RX ORDER — REGADENOSON 0.08 MG/ML
0.4 INJECTION, SOLUTION INTRAVENOUS ONCE
Status: COMPLETED | OUTPATIENT
Start: 2025-01-13 | End: 2025-01-13

## 2025-01-13 RX ADMIN — RUBIDIUM CHLORIDE RB-82 1 DOSE: 150 INJECTION, SOLUTION INTRAVENOUS at 08:38

## 2025-01-13 RX ADMIN — INSULIN LISPRO 5 UNITS: 100 INJECTION, SOLUTION INTRAVENOUS; SUBCUTANEOUS at 13:35

## 2025-01-13 RX ADMIN — SENNOSIDES AND DOCUSATE SODIUM 2 TABLET: 50; 8.6 TABLET ORAL at 21:44

## 2025-01-13 RX ADMIN — INSULIN LISPRO 6 UNITS: 100 INJECTION, SOLUTION INTRAVENOUS; SUBCUTANEOUS at 13:35

## 2025-01-13 RX ADMIN — SENNOSIDES AND DOCUSATE SODIUM 2 TABLET: 50; 8.6 TABLET ORAL at 09:44

## 2025-01-13 RX ADMIN — INSULIN LISPRO 5 UNITS: 100 INJECTION, SOLUTION INTRAVENOUS; SUBCUTANEOUS at 17:23

## 2025-01-13 RX ADMIN — INSULIN LISPRO 6 UNITS: 100 INJECTION, SOLUTION INTRAVENOUS; SUBCUTANEOUS at 09:43

## 2025-01-13 RX ADMIN — DONEPEZIL HYDROCHLORIDE 5 MG: 5 TABLET, FILM COATED ORAL at 21:44

## 2025-01-13 RX ADMIN — RUBIDIUM CHLORIDE RB-82 1 DOSE: 150 INJECTION, SOLUTION INTRAVENOUS at 08:49

## 2025-01-13 RX ADMIN — INSULIN LISPRO 9 UNITS: 100 INJECTION, SOLUTION INTRAVENOUS; SUBCUTANEOUS at 17:22

## 2025-01-13 RX ADMIN — INSULIN LISPRO 7 UNITS: 100 INJECTION, SOLUTION INTRAVENOUS; SUBCUTANEOUS at 21:44

## 2025-01-13 RX ADMIN — ROSUVASTATIN 20 MG: 20 TABLET, FILM COATED ORAL at 21:44

## 2025-01-13 RX ADMIN — INSULIN GLARGINE 25 UNITS: 100 INJECTION, SOLUTION SUBCUTANEOUS at 21:44

## 2025-01-13 RX ADMIN — CLOPIDOGREL BISULFATE 75 MG: 75 TABLET ORAL at 09:44

## 2025-01-13 RX ADMIN — Medication 10 ML: at 21:45

## 2025-01-13 RX ADMIN — REGADENOSON 0.4 MG: 0.08 INJECTION, SOLUTION INTRAVENOUS at 08:48

## 2025-01-13 RX ADMIN — PANTOPRAZOLE SODIUM 40 MG: 40 TABLET, DELAYED RELEASE ORAL at 06:15

## 2025-01-13 RX ADMIN — ASPIRIN 81 MG CHEWABLE TABLET 81 MG: 81 TABLET CHEWABLE at 09:44

## 2025-01-13 NOTE — PROGRESS NOTES
Karol Lopez  [id number]  1950   LOS: 6 days   Patient Care Team:  Romaine Eugene MD as PCP - General  Kiana Frey MD as Consulting Physician (General Surgery)  Russell Davila MD as Consulting Physician (Gastroenterology)  Luis A Celestin IV, MD as Consulting Physician (Interventional Cardiology)    Chief Complaint: Follow-up on PAF, CVA, NSVT    Subjective Patient having stress test today.  She is not conversive but nods her head to questions and denied any chest pain, shortness of breath, or palpitations.    Objective     Vital Sign Min/Max for last 24 hours  Temp  Min: 97.6 °F (36.4 °C)  Max: 98.3 °F (36.8 °C)   BP  Min: 131/67  Max: 184/71   Pulse  Min: 55  Max: 73   Resp  Min: 16  Max: 18   SpO2  Min: 94 %  Max: 98 %   No data recorded   Weight  Min: 65.8 kg (145 lb 1 oz)  Max: 65.8 kg (145 lb 1.6 oz)         01/13/25  0600 01/13/25  0834   Weight: 65.8 kg (145 lb 1.6 oz) 65.8 kg (145 lb 1 oz)         Intake/Output Summary (Last 24 hours) at 1/13/2025 0938  Last data filed at 1/12/2025 2240  Gross per 24 hour   Intake --   Output 550 ml   Net -550 ml       Physical Exam:     General Appearance:    Alert, cooperative, in no acute distress   Lungs:     Clear to auscultation,respirations regular, even and                unlabored    Heart:    Regular and normal rate, normal S1 and S2, grade 2/6            murmur, no gallop, no rub, no click   Abdomen:  Extremities:   Soft, nontender, bowel sounds audible x4    No edema, normal range of motion   Pulses:   Pulses palpable and equal bilaterally      Results Review:   Results from last 7 days   Lab Units 01/13/25  0407 01/12/25  0418 01/11/25  0437   SODIUM mmol/L 142 139 140   POTASSIUM mmol/L 4.2 4.0 4.0   CHLORIDE mmol/L 110* 105 107   CO2 mmol/L 22.0 24.0 22.0   BUN mg/dL 26* 25* 23   CREATININE mg/dL 1.03* 0.91 1.06*   GLUCOSE mg/dL 218* 218* 239*   CALCIUM mg/dL 10.1 10.1 9.8     Results from last 7 days   Lab Units  01/13/25  0407 01/11/25  0437 01/10/25  1242   WBC 10*3/mm3 5.21 5.06 5.51   HEMOGLOBIN g/dL 11.5* 11.2* 12.3   HEMATOCRIT % 34.4 33.3* 36.6   PLATELETS 10*3/mm3 125* 99* 105*     Results from last 7 days   Lab Units 01/08/25  0836   CHOLESTEROL mg/dL 96   TRIGLYCERIDES mg/dL 204*   HDL CHOL mg/dL 31*   LDL CHOL mg/dL 32     Results from last 7 days   Lab Units 01/08/25  0532   HEMOGLOBIN A1C % 8.80*     Results from last 7 days   Lab Units 01/08/25  0603 01/07/25  1607   CK TOTAL U/L  --  88   HSTROP T ng/L 38* 29*   ECG 1/13/2025:  Sinus rhythm with premature atrial complexes  Incomplete right bundle branch block  Borderline ECG  When compared with ECG of 12-Jan-2025 06:00, (Unconfirmed)  premature atrial complexes are now present  T wave inversion now evident in Anterior leads    No new chest x-ray to review      Echocardiogram 1/9/2025:    Left ventricular systolic function is normal. Estimated left ventricular EF = 62%    Left ventricular wall thickness is consistent with mild concentric hypertrophy.    No hemodynamically significant valvular disease present.    Estimated right ventricular systolic pressure from tricuspid regurgitation is normal (<35 mmHg).    Medication Review: Reviewed    Assessment & Plan   Acute subacute left MCA  Stroke and territory of occluded left ICA, consistent with watershed infarct  Started on DAPT by neurology  Avoid hypotension  Continue Crestor 20 mg daily     Paroxysmal atrial fibrillation  Known atrial fibrillation, history of GI bleed  Defer anticoagulation with Eliquis unless neurology feels stroke is cardioembolic in nature due to high risk of bleeding.  DAPT per neurology currently.  Currently in sinus bradycardia on telemetry 1/13/2025     NSVT  Asymptomatic, noted on telemetry 1/11/2025  Structurally normal heart on echo 1/9/2025  Patient to have stress test 1/13/2025     Coronary artery disease  History of CAD with PCI in 2012  No chest pain  Troponins elevated but flat  in the setting of acute CVA  Continue aspirin 81 mg daily, Crestor 20 mg daily  Patient to have stress test 1/13/2025     Carotid artery disease  LICA suspected to be occluded at distal area  Continue aspirin 81 mg daily, Plavix 75 mg daily and Crestor 20 mg daily     Hyperlipidemia  Total cholesterol 96, triglycerides 204, HDL 31, LDL 32  Continue Crestor 20 mg daily     Cirrhosis of liver  EGD showed grade 1 esophageal varices and portal hypertensive gastropathy     Type 2 diabetes mellitus uncontrolled  Hemoglobin A1c 8.8%  Treatment per hospitalist         Cerebrovascular accident (CVA) due to occlusion of left middle cerebral artery    Coronary artery disease involving native coronary artery of native heart without angina pectoris    COPD (chronic obstructive pulmonary disease)    Type 2 diabetes mellitus with hyperglycemia, with long-term current use of insulin    Hyperlipidemia LDL goal <70    Paroxysmal atrial fibrillation    Cirrhosis of liver    Occlusion of left carotid artery    Antiplatelet or antithrombotic long-term use    Severe protein-calorie malnutrition    History of GI bleed    NSVT (nonsustained ventricular tachycardia)    Electronically signed by AUBREY Brand, 01/13/25, 10:05 AM EST.     01/13/25  09:38 EST

## 2025-01-13 NOTE — PROGRESS NOTES
[facility] Medicine Services  PROGRESS NOTE    Patient Name: Karol Lopez  : 1950  MRN: [id number]    Date of Admission: 2025  Primary Care Physician: Romaine Eugene MD    Subjective   Subjective     CC:  stroke    HPI:  Stress test today.  Denies chest pain      Objective   Objective     Vital Signs:   Temp:  [97.7 °F (36.5 °C)-98.3 °F (36.8 °C)] 97.7 °F (36.5 °C)  Heart Rate:  [59-74] 74  Resp:  [16] 16  BP: (138-184)/(54-89) 158/64     Physical Exam:  Non toxic, in bed  MM moist  RRR  Breath sounds grossly clear, poor effort  Abd soft,   Awake, speech clear  Hemiparesis persists  Flat affect        Results Reviewed:  LAB RESULTS:      Lab 25  0407 25  0437 01/10/25  1242 25  0359 25  0603 25  0532 25  1607   WBC 5.21 5.06 5.51 8.30  --  6.16 6.72   HEMOGLOBIN 11.5* 11.2* 12.3 12.0  --  11.4* 12.3   HEMATOCRIT 34.4 33.3* 36.6 35.2  --  34.1 37.1   PLATELETS 125* 99* 105* 137*  --  99* 116*   NEUTROS ABS  --   --   --  5.37  --   --  4.96   IMMATURE GRANS (ABS)  --   --   --  0.01  --   --  0.03   LYMPHS ABS  --   --   --  1.99  --   --  1.17   MONOS ABS  --   --   --  0.75  --   --  0.46   EOS ABS  --   --   --  0.13  --   --  0.07   MCV 89.6 88.8 88.2 87.3  --  89.0 89.8   CRP  --   --   --   --   --   --  <0.30   PROCALCITONIN  --   --   --   --   --   --  0.14   LACTATE  --   --   --   --   --   --  1.7   PROTIME  --   --  15.1*  --   --   --   --    HSTROP T  --   --   --   --  38*  --  29*         Lab 25  0407 25  0418 25  0437 25  0006 01/10/25  1242 25  1405 25  0358 25  0532 25  1607   SODIUM 142 139 140  --  140  --  142 145 139   POTASSIUM 4.2 4.0 4.0 4.0 3.5   < > 3.5 3.9 4.0   CHLORIDE 110* 105 107  --  105  --  107 110* 101   CO2 22.0 24.0 22.0  --  25.0  --  25.0 19.0* 23.0   ANION GAP 10.0 10.0 11.0  --  10.0  --  10.0 16.0* 15.0   BUN 26* 25* 23  --  24*  --  25* 23 25*    CREATININE 1.03* 0.91 1.06*  --  1.14*  --  1.21* 1.15* 1.45*   EGFR 57.9* 67.2 55.9*  --  51.3*  --  47.7* 50.7* 38.4*   GLUCOSE 218* 218* 239*  --  253*  --  171* 215* 295*   CALCIUM 10.1 10.1 9.8  --  10.3  --  10.1 9.3 9.8   MAGNESIUM  --  2.1 2.1  --  2.1  --   --  2.1 2.3   PHOSPHORUS  --   --   --   --  2.9  --   --   --  3.9   HEMOGLOBIN A1C  --   --   --   --   --   --   --  8.80*  --    TSH  --   --   --   --   --   --   --   --  1.340    < > = values in this interval not displayed.         Lab 01/13/25  0407 01/09/25  0358 01/07/25  1607   TOTAL PROTEIN 6.4 6.6 6.9   ALBUMIN 3.4* 3.8 4.0   GLOBULIN 3.0 2.8 2.9   ALT (SGPT) 30 18 25   AST (SGOT) 37* 15 16   BILIRUBIN 0.4 0.5 0.5   ALK PHOS 138* 111 124*   LIPASE  --   --  32         Lab 01/10/25  1242 01/08/25  0603 01/07/25  1607   PROBNP  --   --  271.0   HSTROP T  --  38* 29*   PROTIME 15.1*  --   --    INR 1.18*  --   --          Lab 01/08/25  0836   CHOLESTEROL 96   LDL CHOL 32   HDL CHOL 31*   TRIGLYCERIDES 204*             Lab 01/07/25  1604   FIO2 21   CARBOXYHEMOGLOBIN (VENOUS) 2.3     Brief Urine Lab Results  (Last result in the past 365 days)        Color   Clarity   Blood   Leuk Est   Nitrite   Protein   CREAT   Urine HCG        01/07/25 1648 Yellow   Clear   Negative   Negative   Negative   Trace                   Microbiology Results Abnormal       None            No radiology results from the last 24 hrs    Results for orders placed during the hospital encounter of 01/07/25    Adult Transthoracic Echo Complete W/ Cont if Necessary Per Protocol (With Agitated Saline)    Interpretation Summary    Left ventricular systolic function is normal. Estimated left ventricular EF = 62%    Left ventricular wall thickness is consistent with mild concentric hypertrophy.    No hemodynamically significant valvular disease present.    Estimated right ventricular systolic pressure from tricuspid regurgitation is normal (<35 mmHg).      Current  medications:  Scheduled Meds:aspirin, 81 mg, Oral, Daily  clopidogrel, 75 mg, Oral, Daily  donepezil, 5 mg, Oral, Nightly  insulin glargine, 25 Units, Subcutaneous, Nightly  insulin lispro, 2-9 Units, Subcutaneous, 4x Daily AC & at Bedtime  Insulin Lispro, 5 Units, Subcutaneous, TID With Meals  pantoprazole, 40 mg, Oral, Q AM  rosuvastatin, 20 mg, Oral, Nightly  senna-docusate sodium, 2 tablet, Oral, BID  sodium chloride, 10 mL, Intravenous, Q12H      Continuous Infusions:   PRN Meds:.  acetaminophen **OR** [DISCONTINUED] acetaminophen    senna-docusate sodium **AND** polyethylene glycol **AND** bisacodyl **AND** bisacodyl    Calcium Replacement - Follow Nurse / BPA Driven Protocol    dextrose    dextrose    glucagon (human recombinant)    ipratropium-albuterol    Magnesium Cardiology Dose Replacement - Follow Nurse / BPA Driven Protocol    nitroglycerin    Phosphorus Replacement - Follow Nurse / BPA Driven Protocol    Potassium Replacement - Follow Nurse / BPA Driven Protocol    sodium chloride    sodium chloride    Assessment & Plan   Assessment & Plan     Active Hospital Problems    Diagnosis  POA    **Cerebrovascular accident (CVA) due to occlusion of left middle cerebral artery [I63.512]  Yes    History of GI bleed [Z87.19]  Not Applicable    NSVT (nonsustained ventricular tachycardia) [I47.29]  No    Severe protein-calorie malnutrition [E43]  Yes    Antiplatelet or antithrombotic long-term use [Z79.02]  Not Applicable    Cirrhosis of liver [K74.60]  Yes    Occlusion of left carotid artery [I65.22]  Yes    Paroxysmal atrial fibrillation [I48.0]  Yes    Hyperlipidemia LDL goal <70 [E78.5]  Yes    COPD (chronic obstructive pulmonary disease) [J44.9]  Yes    Type 2 diabetes mellitus with hyperglycemia, with long-term current use of insulin [E11.65, Z79.4]  Not Applicable    Coronary artery disease involving native coronary artery of native heart without angina pectoris [I25.10]  Yes      Resolved Hospital Problems     Diagnosis Date Resolved POA    Encephalopathy [G93.40] 01/11/2025 Yes        Brief Hospital Course to date:  Karol Lopez is a 74 y.o. female with history of COPD, DM2, WILCOX cirrhosis, GI bleed, CKD, CAD (PCI LAD 2012), atrial fibrillation, PAD and prior stroke with residual right upper extremity weakness and bilateral carotid disease who presented with 1 to 2-day onset of worsening confusion, speech changes, gait difficulties.  Imaging revealed occlusion of her left internal carotid artery and stenosis of her right ICA with a large area of acute and subacute stroke in the left MCA distribution.    Right MCA territory stroke  - asa, plavix  - statin  - PT/OT, will likely need inpatient rehab  - avoid hypotension    Dysphagia  - modified diet    COPD  -Continue scheduled nebs    WILCOX cirrhosis  Thrombocytopenia  H/o GI bleed  Esophageal varices  Portal gastropathy  -Patient seen by gastroenterology with EGD 1/10 showing a single: Grade 1 varix in the posterior esophagus without high risk stigmata.  There is also mild portal gastropathy note  -Daily PPI  -Patient will need outpatient colonoscopy in a few months with dysphagia has improved  -cbc am    DM2  - A1c 8.8  - continue lantus 25 units nightly  - continue humalog 5 units meals  - increase SSI to moderate scale    CKD    Chronic disease related malnutrition  - RD follows    CAD  - h/o HEIDI    H/o atrial fibrillation    NSVT  - stress test today  - Cardiology follows    Possible adnexal cyst, 1.5 cm  - incidentally noted on CT abdomen pelvis  - further outpatient workup as needed      Expected Discharge Location and Transportation:   Expected Discharge   Expected Discharge Date: 1/14/2025; Expected Discharge Time:      VTE Prophylaxis:  Mechanical VTE prophylaxis orders are present.         AM-PAC 6 Clicks Score (PT): 12 (01/13/25 1782)    CODE STATUS:   Code Status and Medical Interventions: No CPR (Do Not Attempt to Resuscitate); Limited Support; No  intubation (DNI)   Ordered at: 01/07/25 2214     Medical Intervention Limits:    No intubation (DNI)     Code Status (Patient has no pulse and is not breathing):    No CPR (Do Not Attempt to Resuscitate)     Medical Interventions (Patient has pulse or is breathing):    Limited Support       Main Matute MD  01/13/25

## 2025-01-13 NOTE — CONSULTS
Diabetes Education    Patient Name:  Karol Lopez  YOB: 1950  MRN: [id number]  Admit Date:  1/7/2025      Chart reviewed per consult, patient GCS charted 14 and confused. May not be appropriate for education today. Please call [phone] for immediate needs.      Electronically signed by:  Prema Morrow RN  01/13/25 09:16 EST

## 2025-01-13 NOTE — CASE MANAGEMENT/SOCIAL WORK
Continued Stay Note  [facility]     Patient Name: Karol Lopez  MRN: [id number]  Today's Date: 1/13/2025    Admit Date: 1/7/2025    Plan: [facility]   Discharge Plan       Row Name 01/13/25 1142       Plan    Plan [facility]    Patient/Family in Agreement with Plan yes    Plan Comments Discussed patient in MDR.  Patient having a stress test today, 1/13.  Patient's plan is a skilled bed at [facility] when medically ready for discharge. Updated [place].   will continue to follow.    Final Discharge Disposition Code 03 - skilled nursing facility (SNF)                   Discharge Codes    No documentation.                 Expected Discharge Date and Time       Expected Discharge Date Expected Discharge Time    Jan 14, 2025               Tamiko Nuñez RN

## 2025-01-13 NOTE — PLAN OF CARE
Goal Outcome Evaluation:  Plan of Care Reviewed With: patient        Progress: improving  Outcome Evaluation: Pt required less assist for ADL related mobility, remains grossly Mod-MaxA for ADL completion. Good effort during postural and BUE ther-ex, tolerated multiple stands. Remains below baseline, will cont IPOT per POC as tolerated. Rec d/c to IRF for best functional outcomes.    Anticipated Discharge Disposition (OT): inpatient rehabilitation facility

## 2025-01-13 NOTE — THERAPY TREATMENT NOTE
Patient Name: Karol Lopez  : 1950    MRN: [id number]                              Today's Date: 2025       Admit Date: 2025    Visit Dx:     ICD-10-CM ICD-9-CM   1. Left carotid artery occlusion  I65.22 433.10   2. Difficulty with speech  R47.9 784.59   3. History of stroke  Z86.73 V12.54   4. Altered mental status, unspecified altered mental status type  R41.82 780.97   5. Oropharyngeal dysphagia  R13.12 787.22   6. Other cirrhosis of liver  K74.69 571.5   7. Antiplatelet or antithrombotic long-term use  Z79.02 V58.63   8. Aphasia  R47.01 784.3     Patient Active Problem List   Diagnosis    Coronary artery disease involving native coronary artery of native heart without angina pectoris    Anxiety    COPD (chronic obstructive pulmonary disease)    Acid reflux    Vitamin B12 deficiency    Current smoker    Type 2 diabetes mellitus with hyperglycemia, with long-term current use of insulin    Impaired mobility and ADLs    Melena    Anemia    Gastrointestinal hemorrhage    Hyperlipidemia LDL goal <70    Paroxysmal atrial fibrillation    Cirrhosis of liver    Occlusion of left carotid artery    Cerebrovascular accident (CVA) due to occlusion of left middle cerebral artery    Antiplatelet or antithrombotic long-term use    Severe protein-calorie malnutrition    History of GI bleed    NSVT (nonsustained ventricular tachycardia)     Past Medical History:   Diagnosis Date    Acid reflux     Anxiety     Cataracts, bilateral     Cirrhosis of liver     Constipation     COPD (chronic obstructive pulmonary disease)     Coronary artery disease     CTS (carpal tunnel syndrome)     Diabetes     Hearing loss     Hypercholesteremia     Hypertension     Impaired functional mobility, balance, gait, and endurance     Lower back pain     Osteoarthritis     Stroke     -weak in right arm    Tattoos     x2    Tobacco abuse     Tumor     in between breast closer to right breast    Vitamin B12 deficiency     Wears  dentures     upper only    Wears glasses      Past Surgical History:   Procedure Laterality Date    BREAST BIOPSY Right 5/10/2019    Procedure: BREAST BIOPSY RIGHT;  Surgeon: Kiana Frey MD;  Location: [facility] OR;  Service: General    CARPAL TUNNEL RELEASE Bilateral     CHOLECYSTECTOMY      COLONOSCOPY N/A 9/30/2021    Procedure: COLONOSCOPY WITH POLYPECTOMY AND ABLATION OF AVM;  Surgeon: Russell Davila MD;  Location: [facility] ENDOSCOPY;  Service: Gastroenterology;  Laterality: N/A;    CORONARY ANGIOPLASTY WITH STENT PLACEMENT  2012    ENDOSCOPY N/A 9/29/2021    Procedure: ESOPHAGOGASTRODUODENOSCOPY with biopsies;  Surgeon: Russell Davila MD;  Location: [facility] ENDOSCOPY;  Service: Gastroenterology;  Laterality: N/A;    ENDOSCOPY N/A 1/10/2025    Procedure: ESOPHAGOGASTRODUODENOSCOPY;  Surgeon: Brunner, Mark I, MD;  Location: [facility] ENDOSCOPY;  Service: Gastroenterology;  Laterality: N/A;    ENTEROSCOPY SMALL BOWEL N/A 11/16/2021    Procedure: ESOPHAGOGASTRODUODENOSCOPY WITH SMALL BOWEL ENTEROSCOPY and biopsy;  Surgeon: Russell Davila MD;  Location: [facility] ENDOSCOPY;  Service: Gastroenterology;  Laterality: N/A;    HYSTERECTOMY      complete    JOINT REPLACEMENT Bilateral     knee replacements    TOTAL KNEE ARTHROPLASTY Bilateral 10/18/2016    MAUREEN Palomares MD      General Information       Row Name 01/13/25 1434          OT Time and Intention    Document Type therapy note (daily note)  -CS     Mode of Treatment occupational therapy  -CS     Patient Effort good  -CS       Row Name 01/13/25 1434          General Information    Existing Precautions/Restrictions fall;other (see comments)  R-sided weakness, aphasia  -CS     Barriers to Rehab medically complex;previous functional deficit;cognitive status  -CS       Row Name 01/13/25 1434          Cognition    Orientation Status (Cognition) oriented to;person;unable/difficult to assess;place;situation;time  -CS       Row Name 01/13/25 1433           Safety Issues/Impairments Affecting Functional Mobility    Safety Issues Affecting Function (Mobility) insight into deficits/self-awareness;awareness of need for assistance;safety precaution awareness;judgment;problem-solving;safety precautions follow-through/compliance;sequencing abilities  -CS     Impairments Affecting Function (Mobility) balance;cognition;coordination;endurance/activity tolerance;grasp;motor control;motor planning;postural/trunk control;strength  -CS     Cognitive Impairments, Mobility Safety/Performance awareness, need for assistance;insight into deficits/self-awareness;problem-solving/reasoning;judgment;sequencing abilities;safety precaution follow-through;safety precaution awareness  -CS               User Key  (r) = Recorded By, (t) = Taken By, (c) = Cosigned By      Initials Name Provider Type    CS Guero Jackson OT Occupational Therapist                     Mobility/ADL's       Row Name 01/13/25 1445          Bed Mobility    Scooting/Bridging [place] (Bed Mobility) moderate assist (50% patient effort);1 person assist;verbal cues;nonverbal cues (demo/gesture)  -CS     Supine-Sit [place] (Bed Mobility) moderate assist (50% patient effort);2 person assist;verbal cues;nonverbal cues (demo/gesture)  -CS     Assistive Device (Bed Mobility) bed rails;repositioning sheet  -CS     Comment, (Bed Mobility) improved sequencing  -CS       Row Name 01/13/25 1445          Transfers    Transfers sit-stand transfer;bed-chair transfer  -CS     Comment, (Transfers) STS x 3 from EOB for hygiene, steps to recliner with assist for L lateral weight shifting to facilitate RLE step-through  -CS       Row Name 01/13/25 1445          Bed-Chair Transfer    Bed-Chair [place] (Transfers) 2 person assist;verbal cues;nonverbal cues (demo/gesture);minimum assist (75% patient effort)  -CS     Assistive Device (Bed-Chair Transfers) walker, front-wheeled  -CS     Comment, (Bed-Chair Transfer) assist for RW  mngt and safe R grasp on RW  -CS       Row Name 01/13/25 1445          Functional Mobility    Patient was able to Ambulate yes  -CS       Row Name 01/13/25 1445          Activities of Daily Living    BADL Assessment/Intervention upper body dressing;lower body dressing;grooming;toileting  -CS       Row Name 01/13/25 1445          Lower Body Dressing Assessment/Training    [place] Level (Lower Body Dressing) don;socks;dependent (less than 25% patient effort)  -CS     Position (Lower Body Dressing) edge of bed sitting  -CS     Comment, (Lower Body Dressing) progressed  -CS       Row Name 01/13/25 1445          Upper Body Dressing Assessment/Training    [place] Level (Upper Body Dressing) don;pajama/robe;moderate assist (50% patient effort)  -CS     Comment, (Upper Body Dressing) sequencing deficit, dysmetria and weakness persists w/ RUE  -CS       Row Name 01/13/25 1445          Grooming Assessment/Training    [place] Level (Grooming) wash face, hands;set up;hair care, combing/brushing;moderate assist (50% patient effort)  -CS     Position (Grooming) supported sitting  -CS     Comment, (Grooming) performed with LUE, increased assist for posterior head and attention to R side  -CS       Row Name 01/13/25 1445          Toileting Assessment/Training    [place] Level (Toileting) adjust/manage clothing;independent;dependent (less than 25% patient effort)  -CS     Position (Toileting) supported standing  -CS     Comment, (Toileting) Pt with focus on stability/posture during multiple stands for Dep rafy-care 2/2 BM  -CS               User Key  (r) = Recorded By, (t) = Taken By, (c) = Cosigned By      Initials Name Provider Type    Guero Arellano, OT Occupational Therapist                   Obj/Interventions       Row Name 01/13/25 1443          Shoulder (Therapeutic Exercise)    Shoulder (Therapeutic Exercise) strengthening exercise;AAROM (active assistive range of motion)  -     Shoulder AAROM  (Therapeutic Exercise) right;flexion;extension;aDduction;aBduction;external rotation;internal rotation;10 repetitions  -     Shoulder Strengthening (Therapeutic Exercise) left;flexion;extension;scapular stabilization;5 repetitions  Min manual resistance  -       Row Name 01/13/25 1449          Elbow/Forearm (Therapeutic Exercise)    Elbow/Forearm (Therapeutic Exercise) strengthening exercise;AAROM (active assistive range of motion)  -     Elbow/Forearm AAROM (Therapeutic Exercise) flexion;extension;10 repetitions;right  -     Elbow/Forearm Strengthening (Therapeutic Exercise) left;flexion;extension;5 repetitions  -       Row Name 01/13/25 1449          Motor Skills    Motor Skills motor control/coordination interventions  -     Motor Control/Coordination Interventions gross motor coordination activities;therapeutic exercise/ROM  -     Therapeutic Exercise shoulder;elbow/forearm;wrist;hand  -       Row Name 01/13/25 1449          Balance    Balance Assessment sitting static balance;sitting dynamic balance;standing static balance;standing dynamic balance  -     Static Sitting Balance minimal assist;standby assist;verbal cues  -CS     Dynamic Sitting Balance minimal assist;verbal cues  -     Position, Sitting Balance unsupported;sitting edge of bed  -     Static Standing Balance minimal assist;1-person assist;1 person to manage equipment  -CS     Dynamic Standing Balance moderate assist;1-person assist;1 person to manage equipment  -CS     Position/Device Used, Standing Balance supported;walker, front-wheeled  -     Balance Interventions sitting;standing;sit to stand;occupation based/functional task  -               User Key  (r) = Recorded By, (t) = Taken By, (c) = Cosigned By      Initials Name Provider Type    CS Guero Jackson OT Occupational Therapist                   Goals/Plan    No documentation.                  Clinical Impression       Row Name 01/13/25 1451          Pain  Assessment    Additional Documentation Pain Scale: FACES Pre/Post-Treatment (Group)  -       Row Name 01/13/25 1451          Pain Scale: FACES Pre/Post-Treatment    Pain: FACES Scale, Pretreatment 0-->no hurt  -CS     Posttreatment Pain Rating 0-->no hurt  -CS       Row Name 01/13/25 1451          Plan of Care Review    Plan of Care Reviewed With patient  -CS     Progress improving  -CS     Outcome Evaluation Pt required less assist for ADL related mobility, remains grossly Mod-MaxA for ADL completion. Good effort during postural and BUE ther-ex, tolerated multiple stands. Remains below baseline, will cont IPOT per POC as tolerated. Rec d/c to IRF for best functional outcomes.  -       Row Name 01/13/25 1451          Therapy Plan Review/Discharge Plan (OT)    Anticipated Discharge Disposition (OT) inpatient rehabilitation facility  -       Row Name 01/13/25 1451          Vital Signs    Pre Systolic BP Rehab --  RN cleared for tx  -CS     O2 Delivery Pre Treatment room air  -CS     O2 Delivery Intra Treatment room air  -CS     O2 Delivery Post Treatment room air  -CS     Pre Patient Position Supine  -CS     Intra Patient Position Standing  -CS     Post Patient Position Sitting  -[facility] Name 01/13/25 1451          Positioning and Restraints    Pre-Treatment Position in bed  -CS     Post Treatment Position chair  -CS     In Chair notified nsg;reclined;sitting;call light within reach;encouraged to call for assist;exit alarm on;waffle cushion;on mechanical lift sling;legs elevated;heels elevated  -CS               User Key  (r) = Recorded By, (t) = Taken By, (c) = Cosigned By      Initials Name Provider Type     Guero Jackson, OT Occupational Therapist                   Outcome Measures       Row Name 01/13/25 1450          How much help from another is currently needed...    Putting on and taking off regular lower body clothing? 2  -CS     Bathing (including washing, rinsing, and drying) 2  -CS      Toileting (which includes using toilet bed pan or urinal) 2  -CS     Putting on and taking off regular upper body clothing 2  -CS     Taking care of personal grooming (such as brushing teeth) 2  -CS     Eating meals 3  -CS     AM-PAC 6 Clicks Score (OT) 13  -CS       Row Name 01/13/25 0915          How much help from another person do you currently need...    Turning from your back to your side while in flat bed without using bedrails? 3  -SB     Moving from lying on back to sitting on the side of a flat bed without bedrails? 3  -SB     Moving to and from a bed to a chair (including a wheelchair)? 2  -SB     Standing up from a chair using your arms (e.g., wheelchair, bedside chair)? 2  -SB     Climbing 3-5 steps with a railing? 1  -SB     To walk in hospital room? 1  -SB     AM-PAC 6 Clicks Score (PT) 12  -SB     Highest Level of Mobility Goal 4 --> Transfer to chair/commode  -SB       Row Name 01/13/25 1453          Modified Luis Alberto Scale    Modified Luis Alberto Scale 4 - Moderately severe disability.  Unable to walk without assistance, and unable to attend to own bodily needs without assistance.  -CS       Row Name 01/13/25 1453          Functional Assessment    Outcome Measure Options AM-PAC 6 Clicks Daily Activity (OT);Modified [place]  -CS               User Key  (r) = Recorded By, (t) = Taken By, (c) = Cosigned By      Initials Name Provider Type    Guero Arellano OT Occupational Therapist    Tiffanie Johnson, RN Registered Nurse                    Occupational Therapy Education       Title: PT OT SLP Therapies (In Progress)       Topic: Occupational Therapy (Done)       Point: ADL training (Done)       Description:   Instruct learner(s) on proper safety adaptation and remediation techniques during self care or transfers.   Instruct in proper use of assistive devices.                  Learning Progress Summary            Patient Acceptance, E,D, VU,NR by LENKA at 1/13/2025 1454    Acceptance, E,TB, NR by KF at  1/8/2025 1306                      Point: Home exercise program (Done)       Description:   Instruct learner(s) on appropriate technique for monitoring, assisting and/or progressing therapeutic exercises/activities.                  Learning Progress Summary            Patient Acceptance, E,D, VU,NR by  at 1/13/2025 1454    Acceptance, E,TB, NR by KF at 1/8/2025 1306                      Point: Precautions (Done)       Description:   Instruct learner(s) on prescribed precautions during self-care and functional transfers.                  Learning Progress Summary            Patient Acceptance, E,D, VU,NR by  at 1/13/2025 1454    Acceptance, E,TB, NR by KF at 1/8/2025 1306                      Point: Body mechanics (Done)       Description:   Instruct learner(s) on proper positioning and spine alignment during self-care, functional mobility activities and/or exercises.                  Learning Progress Summary            Patient Acceptance, E,D, VU,NR by  at 1/13/2025 1454    Acceptance, E,TB, NR by KF at 1/8/2025 1306                                      User Key       Initials Effective Dates Name Provider Type Discipline     06/16/21 -  Guero Jackson OT Occupational Therapist OT     08/09/23 -  Sharonda Chang OT Occupational Therapist OT                  OT Recommendation and Plan     Plan of Care Review  Plan of Care Reviewed With: patient  Progress: improving  Outcome Evaluation: Pt required less assist for ADL related mobility, remains grossly Mod-MaxA for ADL completion. Good effort during postural and BUE ther-ex, tolerated multiple stands. Remains below baseline, will cont IPOT per POC as tolerated. Rec d/c to IRF for best functional outcomes.     Time Calculation:         Time Calculation- OT       Row Name 01/13/25 1454             Time Calculation- OT    OT Start Time 1355  -      OT Received On 01/13/25  -      OT Goal Re-Cert Due Date 01/18/25  -         Timed Charges    30134 -  OT Therapeutic Exercise Minutes 6  -CS      98614 - OT Therapeutic Activity Minutes 12  -CS      78373 - OT Self Care/Mgmt Minutes 10  -CS         Total Minutes    Timed Charges Total Minutes 28  -CS       Total Minutes 28  -CS                User Key  (r) = Recorded By, (t) = Taken By, (c) = Cosigned By      Initials Name Provider Type    CS Guero Jackson OT Occupational Therapist                  Therapy Charges for Today       Code Description Service Date Service Provider Modifiers Qty    69237005976 HC OT THERAPEUTIC ACT EA 15 MIN 1/13/2025 Guero Jackson OT GO 1    67314173864 HC OT SELF CARE/MGMT/TRAIN EA 15 MIN 1/13/2025 Guero Jackson, OT GO 1    69609754968 HC OT THER SUPP EA 15 MIN 1/13/2025 Guero Jackson, OT GO 1    90010015063 HC OT THER SUPP EA 15 MIN 1/13/2025 Guero Jackson, OT GO 1                 Guero Jackson OT  1/13/2025

## 2025-01-14 VITALS
TEMPERATURE: 97.9 F | SYSTOLIC BLOOD PRESSURE: 134 MMHG | HEART RATE: 60 BPM | OXYGEN SATURATION: 97 % | BODY MASS INDEX: 26.69 KG/M2 | DIASTOLIC BLOOD PRESSURE: 62 MMHG | HEIGHT: 62 IN | RESPIRATION RATE: 18 BRPM | WEIGHT: 145.06 LBS

## 2025-01-14 PROBLEM — I47.29 NSVT (NONSUSTAINED VENTRICULAR TACHYCARDIA): Status: RESOLVED | Noted: 2025-01-11 | Resolved: 2025-01-14

## 2025-01-14 LAB
GLUCOSE BLDC GLUCOMTR-MCNC: 216 MG/DL (ref 70–130)
QT INTERVAL: 460 MS
QTC INTERVAL: 455 MS

## 2025-01-14 PROCEDURE — 99239 HOSP IP/OBS DSCHRG MGMT >30: CPT | Performed by: NURSE PRACTITIONER

## 2025-01-14 PROCEDURE — 63710000001 INSULIN LISPRO (HUMAN) PER 5 UNITS: Performed by: INTERNAL MEDICINE

## 2025-01-14 PROCEDURE — 93005 ELECTROCARDIOGRAM TRACING: CPT | Performed by: INTERNAL MEDICINE

## 2025-01-14 PROCEDURE — 82948 REAGENT STRIP/BLOOD GLUCOSE: CPT

## 2025-01-14 RX ORDER — ACETAMINOPHEN 325 MG/1
650 TABLET ORAL EVERY 4 HOURS PRN
Start: 2025-01-14

## 2025-01-14 RX ORDER — INSULIN LISPRO 100 [IU]/ML
8 INJECTION, SOLUTION INTRAVENOUS; SUBCUTANEOUS
Status: DISCONTINUED | OUTPATIENT
Start: 2025-01-14 | End: 2025-01-14 | Stop reason: HOSPADM

## 2025-01-14 RX ORDER — HYDROCODONE BITARTRATE AND ACETAMINOPHEN 5; 325 MG/1; MG/1
1 TABLET ORAL EVERY 8 HOURS PRN
Qty: 9 TABLET | Refills: 0 | Status: SHIPPED | OUTPATIENT
Start: 2025-01-14

## 2025-01-14 RX ORDER — DONEPEZIL HYDROCHLORIDE 5 MG/1
5 TABLET, FILM COATED ORAL NIGHTLY
Start: 2025-01-14

## 2025-01-14 RX ORDER — PANTOPRAZOLE SODIUM 40 MG/1
40 TABLET, DELAYED RELEASE ORAL DAILY
Start: 2025-01-14

## 2025-01-14 RX ORDER — INSULIN LISPRO 100 [IU]/ML
2-9 INJECTION, SOLUTION INTRAVENOUS; SUBCUTANEOUS
Start: 2025-01-14

## 2025-01-14 RX ORDER — IPRATROPIUM BROMIDE AND ALBUTEROL SULFATE 2.5; .5 MG/3ML; MG/3ML
3 SOLUTION RESPIRATORY (INHALATION) EVERY 4 HOURS PRN
Start: 2025-01-14

## 2025-01-14 RX ADMIN — SENNOSIDES AND DOCUSATE SODIUM 2 TABLET: 50; 8.6 TABLET ORAL at 08:02

## 2025-01-14 RX ADMIN — INSULIN LISPRO 5 UNITS: 100 INJECTION, SOLUTION INTRAVENOUS; SUBCUTANEOUS at 08:02

## 2025-01-14 RX ADMIN — INSULIN LISPRO 4 UNITS: 100 INJECTION, SOLUTION INTRAVENOUS; SUBCUTANEOUS at 08:02

## 2025-01-14 RX ADMIN — CLOPIDOGREL BISULFATE 75 MG: 75 TABLET ORAL at 08:02

## 2025-01-14 RX ADMIN — ASPIRIN 81 MG CHEWABLE TABLET 81 MG: 81 TABLET CHEWABLE at 08:02

## 2025-01-14 RX ADMIN — PANTOPRAZOLE SODIUM 40 MG: 40 TABLET, DELAYED RELEASE ORAL at 06:10

## 2025-01-14 NOTE — DISCHARGE PLACEMENT REQUEST
"Jill Ferreirasindi Morrison \"Osiris\" (74 y.o. Female)     Tamiko Nuñez, RN  [phone]        Date of Birth   1950    Social Security Number       Address   [address] [place] [address] [facility] [address]    Home Phone   [phone]    MRN   [id number]       Oriental orthodox   Nondenominational    Marital Status                               Admission Date   1/7/25    Admission Type   Emergency    Admitting Provider   Kian Zuniga DO    Attending Provider   Kian Zuniga DO    Department, Room/Bed   [facility] 3E, S343/1       Discharge Date       Discharge Disposition   Rehab Facility or Unit (DC - External)    Discharge Destination                                 Attending Provider: Kian Zuniga DO    Allergies: Codeine    Isolation: None   Infection: None   Code Status: No CPR    Ht: 157.5 cm (62.01\")   Wt: 65.8 kg (145 lb 1 oz)    Admission Cmt: None   Principal Problem: Cerebrovascular accident (CVA) due to occlusion of left middle cerebral artery [I63.512]                   Active Insurance as of 1/7/2025       Primary Coverage       Payor Plan Insurance Group Employer/Plan Group    MEDICARE MEDICARE A & B        Payor Plan Address Payor Plan Phone Number Payor Plan Fax Number Effective Dates    [address] [phone]  3/1/2004 - None Entered    [place] [address]         Subscriber Name Subscriber Birth Date Member ID       KAROL FERREIRA 1950 [id number]                     Emergency Contacts        (Rel.) Home Phone Work Phone Mobile Phone    Maggy Pinto (Daughter) [phone] -- [phone]    KENNY WASSERMAN (Med POA) (Daughter) -- -- [phone]    Anca Garcia (Daughter) [phone] -- --    Emeka Hicks (Ex ) (Other) -- -- [phone]                 Discharge Summary        Melissa Lorenzo, APRN at 01/14/25 0940              Psychiatric Medicine Services  DISCHARGE SUMMARY    Patient Name: Karol Tamiko " John  : 1950  MRN: [id number]    Date of Admission: 2025  3:34 PM  Date of Discharge:  2025  Primary Care Physician: Romaine Eugene MD    Consults       Date and Time Order Name Status Description    2025  8:54 AM Inpatient Cardiology Consult Completed     2025  2:52 PM Inpatient Gastroenterology Consult Completed     2025  3:09 PM Inpatient Hematology & Oncology Consult Completed             Hospital Course     Presenting Problem: speech difficulty    Active Hospital Problems    Diagnosis  POA    **Cerebrovascular accident (CVA) due to occlusion of left middle cerebral artery [I63.512]  Yes    History of GI bleed [Z87.19]  Not Applicable    Severe protein-calorie malnutrition [E43]  Yes    Antiplatelet or antithrombotic long-term use [Z79.02]  Not Applicable    Cirrhosis of liver [K74.60]  Yes    Occlusion of left carotid artery [I65.22]  Yes    Paroxysmal atrial fibrillation [I48.0]  Yes    Hyperlipidemia LDL goal <70 [E78.5]  Yes    COPD (chronic obstructive pulmonary disease) [J44.9]  Yes    Type 2 diabetes mellitus with hyperglycemia, with long-term current use of insulin [E11.65, Z79.4]  Not Applicable    Coronary artery disease involving native coronary artery of native heart without angina pectoris [I25.10]  Yes      Resolved Hospital Problems    Diagnosis Date Resolved POA    NSVT (nonsustained ventricular tachycardia) [I47.29] 2025 No    Encephalopathy [G93.40] 2025 Yes          Hospital Course:  Karol Lopez is a 74 y.o. female with history of COPD, DM2, WILCOX cirrhosis, GI bleed, CKD, CAD (PCI LAD ), atrial fibrillation, PAD and prior stroke with residual right upper extremity weakness and bilateral carotid disease who presented with 1 to 2-day onset of worsening confusion, speech changes, gait difficulties.  Imaging revealed occlusion of her left internal carotid artery and stenosis of her right ICA with a large area of acute and subacute stroke in the  left MCA distribution.     Left MCA territory stroke  L ICA Occlusion  - asa, plavix at dc  - statin  - PT/OT, plan rehab  - avoid hypotension, metoprolol discontinued this admit  - TTE with normal heart structure  - follow up with stroke service in one month    NSVT  -  noted on tele monitor, asymptomatic  - TTE normal structure  - nuclear stress completed on 1/13 and normal  - avoid hypotension, continue statin  - NOAC indicated for PAF, but h/o GIB and will continue with DAPT therapy  - follow up with dr. Celestin in 8 weeks     Dysphagia  - modified diet- nectar thick liquids     COPD  -Continue scheduled nebs     WILCOX cirrhosis  Thrombocytopenia  H/o GI bleed  Esophageal varices  Portal gastropathy  -Patient seen by gastroenterology with EGD 1/10 showing a single: Grade 1 varix in the posterior esophagus without high risk stigmata.  There is also mild portal gastropathy note  -Daily PPI  -Patient will need outpatient colonoscopy in a few months with dysphagia has improved     DM2  - A1c 8.8  - continue home regimen and ssi while at rehab     CKD     Chronic disease related malnutrition  - RD follows     CAD  - h/o HEIDI     H/o atrial fibrillation   - failed NOAC w GIB  -  off BB, avoiding hypotension    Possible adnexal cyst, 1.5 cm  - incidentally noted on CT abdomen pelvis  - further outpatient workup as needed         Discharge Follow Up Recommendations for outpatient labs/diagnostics:   Pcp follow up one week post dc rehab- further eval renal cyst per PCP  Follow up with GI, [facility]- 1 month  Follow up with Dr. Celestin 2 mos  Follow up with stroke/ neuro 4 weeks    Day of Discharge     HPI:   Patient has no complaints. Still weak. No chest pain, soa    Review of Systems  Gen- No fevers, chills  CV- No chest pain, palpitations  Resp- No cough, dyspnea  GI- No N/V/D, abd pain        Vital Signs:   Temp:  [97.5 °F (36.4 °C)-98.3 °F (36.8 °C)] 97.9 °F (36.6 °C)  Heart Rate:  [53-74] 58  Resp:  [16-18] 18  BP:  (134-167)/(56-65) 134/62      Physical Exam:  Constitutional: No acute distress, awake, alert, chronically ill appearing  HENT: NCAT, mucous membranes moist  Respiratory: Clear to auscultation bilaterally, respiratory effort normal   Cardiovascular: RRR  Gastrointestinal: Positive bowel sounds, soft, nontender, nondistended  Musculoskeletal: No bilateral ankle edema  Psychiatric: Appropriate affect, cooperative  Neurologic: Oriented x 3, generalized weakness R>L side, speech clear  Skin: No rashes      Pertinent  and/or Most Recent Results     LAB RESULTS:      Lab 01/13/25  0407 01/11/25  0437 01/10/25  1242 01/09/25  0359 01/08/25  0532 01/07/25  1607   WBC 5.21 5.06 5.51 8.30 6.16 6.72   HEMOGLOBIN 11.5* 11.2* 12.3 12.0 11.4* 12.3   HEMATOCRIT 34.4 33.3* 36.6 35.2 34.1 37.1   PLATELETS 125* 99* 105* 137* 99* 116*   NEUTROS ABS  --   --   --  5.37  --  4.96   IMMATURE GRANS (ABS)  --   --   --  0.01  --  0.03   LYMPHS ABS  --   --   --  1.99  --  1.17   MONOS ABS  --   --   --  0.75  --  0.46   EOS ABS  --   --   --  0.13  --  0.07   MCV 89.6 88.8 88.2 87.3 89.0 89.8   CRP  --   --   --   --   --  <0.30   PROCALCITONIN  --   --   --   --   --  0.14   LACTATE  --   --   --   --   --  1.7   PROTIME  --   --  15.1*  --   --   --          Lab 01/13/25  0407 01/12/25  0418 01/11/25 0437 01/11/25  0006 01/10/25  1242 01/09/25  1405 01/09/25  0358 01/08/25  0532 01/07/25  1607   SODIUM 142 139 140  --  140  --  142 145 139   POTASSIUM 4.2 4.0 4.0 4.0 3.5   < > 3.5 3.9 4.0   CHLORIDE 110* 105 107  --  105  --  107 110* 101   CO2 22.0 24.0 22.0  --  25.0  --  25.0 19.0* 23.0   ANION GAP 10.0 10.0 11.0  --  10.0  --  10.0 16.0* 15.0   BUN 26* 25* 23  --  24*  --  25* 23 25*   CREATININE 1.03* 0.91 1.06*  --  1.14*  --  1.21* 1.15* 1.45*   EGFR 57.9* 67.2 55.9*  --  51.3*  --  47.7* 50.7* 38.4*   GLUCOSE 218* 218* 239*  --  253*  --  171* 215* 295*   CALCIUM 10.1 10.1 9.8  --  10.3  --  10.1 9.3 9.8   MAGNESIUM  --  2.1 2.1   --  2.1  --   --  2.1 2.3   PHOSPHORUS  --   --   --   --  2.9  --   --   --  3.9   HEMOGLOBIN A1C  --   --   --   --   --   --   --  8.80*  --    TSH  --   --   --   --   --   --   --   --  1.340    < > = values in this interval not displayed.         Lab 01/13/25  0407 01/09/25  0358 01/07/25  1607   TOTAL PROTEIN 6.4 6.6 6.9   ALBUMIN 3.4* 3.8 4.0   GLOBULIN 3.0 2.8 2.9   ALT (SGPT) 30 18 25   AST (SGOT) 37* 15 16   BILIRUBIN 0.4 0.5 0.5   ALK PHOS 138* 111 124*   LIPASE  --   --  32         Lab 01/10/25  1242 01/08/25  0603 01/07/25  1607   PROBNP  --   --  271.0   HSTROP T  --  38* 29*   PROTIME 15.1*  --   --    INR 1.18*  --   --          Lab 01/08/25  0836   CHOLESTEROL 96   LDL CHOL 32   HDL CHOL 31*   TRIGLYCERIDES 204*             Lab 01/07/25  1604   FIO2 21   CARBOXYHEMOGLOBIN (VENOUS) 2.3     Brief Urine Lab Results  (Last result in the past 365 days)        Color   Clarity   Blood   Leuk Est   Nitrite   Protein   CREAT   Urine HCG        01/07/25 1648 Yellow   Clear   Negative   Negative   Negative   Trace                 Microbiology Results (last 10 days)       Procedure Component Value - Date/Time    Blood Culture - Blood, Hand, Right [[id number]]  (Normal) Collected: 01/07/25 1620    Lab Status: Final result Specimen: Blood from Hand, Right Updated: 01/12/25 2031     Blood Culture No growth at 5 days    COVID PRE-OP / PRE-PROCEDURE SCREENING ORDER (NO ISOLATION) - Swab, Nasopharynx [[id number]]  (Normal) Collected: 01/07/25 1607    Lab Status: Final result Specimen: Swab from Nasopharynx Updated: 01/07/25 1717    Narrative:      The following orders were created for panel order COVID PRE-OP / PRE-PROCEDURE SCREENING ORDER (NO ISOLATION) - Swab, Nasopharynx.  Procedure                               Abnormality         Status                     ---------                               -----------         ------                     Respiratory Panel PCR w/...[[id number]]  Normal              Final  result                 Please view results for these tests on the individual orders.    Respiratory Panel PCR w/COVID-19(SARS-CoV-2) TYREL/CESAR/JAYME/PAD/COR/ELVIE In-House, NP Swab in UTM/VTM, 2 HR TAT - Swab, Nasopharynx [[id number]]  (Normal) Collected: 01/07/25 1607    Lab Status: Final result Specimen: Swab from Nasopharynx Updated: 01/07/25 1717     ADENOVIRUS, PCR Not Detected     Coronavirus 229E Not Detected     Coronavirus HKU1 Not Detected     Coronavirus NL63 Not Detected     Coronavirus OC43 Not Detected     COVID19 Not Detected     Human Metapneumovirus Not Detected     Human Rhinovirus/Enterovirus Not Detected     Influenza A PCR Not Detected     Influenza B PCR Not Detected     Parainfluenza Virus 1 Not Detected     Parainfluenza Virus 2 Not Detected     Parainfluenza Virus 3 Not Detected     Parainfluenza Virus 4 Not Detected     RSV, PCR Not Detected     Bordetella pertussis pcr Not Detected     Bordetella parapertussis PCR Not Detected     Chlamydophila pneumoniae PCR Not Detected     Mycoplasma pneumo by PCR Not Detected    Narrative:      In the setting of a positive respiratory panel with a viral infection PLUS a negative procalcitonin without other underlying concern for bacterial infection, consider observing off antibiotics or discontinuation of antibiotics and continue supportive care. If the respiratory panel is positive for atypical bacterial infection (Bordetella pertussis, Chlamydophila pneumoniae, or Mycoplasma pneumoniae), consider antibiotic de-escalation to target atypical bacterial infection.    Blood Culture - Blood, Arm, Left [[id number]]  (Normal) Collected: 01/07/25 1600    Lab Status: Final result Specimen: Blood from Arm, Left Updated: 01/12/25 2031     Blood Culture No growth at 5 days            Stress Test With Pet Myocardial Perfusion    Result Date: 1/13/2025    Myocardial perfusion imaging indicates a normal myocardial perfusion study with no evidence of ischemia. Impressions  are consistent with a low risk study.   Left ventricular ejection fraction is normal (Calculated EF = 66%).     CT Abdomen Pelvis With Contrast    Result Date: 1/10/2025  CT ABDOMEN PELVIS W CONTRAST Date of Exam: 1/10/2025 5:03 PM EST Indication: Cirrhosis. Comparison: CT of the abdomen and pelvis dated 9/28/2021 Technique: Axial CT images were obtained of the abdomen and pelvis following the uneventful intravenous administration of 85 mL Isovue-370. Reconstructed coronal and sagittal images were also obtained. Automated exposure control and iterative construction methods were used. Findings: Liver: The liver is cirrhotic in morphology. No focal liver lesion is seen. No biliary dilation is seen. Gallbladder: Surgically absent. Pancreas: Unremarkable. Spleen: The spleen is enlarged, measuring 15 cm in length. Adrenal glands: Unremarkable. Genitourinary tract: 1 cm hypodensity at the periphery of the right kidney is too small to fully characterize. This appears unchanged since 9/28/2021, presumably a small cyst. Bilateral renal hilar vascular calcifications are present. Tiny nonobstructing  renal calculi cannot be excluded. Kidneys are otherwise unremarkable. No hydronephrosis is seen. The visualized portions of the ureters and urinary bladder appear unremarkable. Status post hysterectomy. Suggestion of a 1.5 cm left adnexal cyst. Gastrointestinal tract: Small gastroesophageal varices are noted. Hollow viscera appear otherwise unremarkable. There is no evidence of bowel obstruction Appendix: The appendix is not identified. Other findings: No free air or free fluid is identified. No pathologically enlarged lymph nodes are seen. Vascular calcifications are present. The IVC is unremarkable. Bones and soft tissues: No acute or suspicious osseous or soft tissue lesion is identified. Lung bases: The visualized lung bases are clear. Coronary artery calcifications are seen.     Impression: 1.No acute abnormality is  identified within the abdomen or pelvis. 2.Cirrhosis with sequela of portal hypertension, including splenomegaly and small gastroesophageal varices. No suspicious arterially enhancing liver lesions are seen. 3.Bilateral renal hilar vascular calcifications. Small nonobstructing renal calculi cannot be excluded. 4.Suggestion of a 1.5 cm left adnexal cyst. 5.Additional findings as detailed above. Electronically Signed: Hang Hawkins MD  1/10/2025 5:32 PM EST  Workstation ID: [id number]    Duplex Carotid Ultrasound CAR    Result Date: 1/9/2025    Right internal carotid artery demonstrates an estimated stenosis at the upper end of the 50-69% range.   Antegrade right vertebral flow.   Left internal carotid artery is suspected to be occluded distal to the areas visualized on the study based on the abnormal waveforms noted throughout the common and internal carotid areas noted.   Antegrade left vertebral flow.     SLP FEES - Fiberoptic Endo Eval Swallow    Result Date: 1/8/2025  This procedure was auto-finalized with no dictation required.    MRI Brain Without Contrast    Result Date: 1/8/2025  MRI BRAIN WO CONTRAST Date of Exam: 1/8/2025 3:45 AM EST Indication: stroke rule out.  Comparison: MRI of the brain 2/9/2024; CT head 1/7/2025 Technique:  Routine multiplanar/multisequence sequence images of the brain were obtained without contrast administration. Findings: There is diffuse generalized atrophy. There are areas of increased T2 and FLAIR signal throughout the bilateral periventricular white matter consistent with chronic microvascular ischemia. There is a large area of pathologic restricted diffusion in the distribution of the left middle cerebral artery involving areas of the left frontal and temporal lobes and in the left insular region. Another smaller focus of pathologic restricted diffusion is seen in the high posterior left parietal region. There is no convincing acute fracture. There is absence of the flow-void  involving the left internal carotid artery to the level of the carotid terminus where flow is likely retrograde through  the anterior aspect of the [place].     Impression: 1.Large area of acute/subacute infarction in the distribution of the left middle cerebral artery. 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 3.Atrophy and chronic microvascular ischemia. Electronically Signed: John Paul Cortez MD  1/8/2025 5:00 AM EST  Workstation ID: [id number]    XR Abdomen KUB    Result Date: 1/8/2025  XR ABDOMEN KUB Date of Exam: 1/8/2025 1:32 AM EST Indication: For MRI clearance Comparison: None available. Findings: There are clips from cholecystectomy. There are clips in the pelvis. The bowel gas pattern is nonspecific. There is no radiopaque foreign body, spinal stimulator or structures to prevent MRI. Review of the patient's chest radiograph demonstrates no pacemaker device or foreign body. Review of the patient's head CT fails to demonstrate a cochlear implant or orbital metal.     Impression: No radiopaque foreign body or spinal stimulator to prevent MRI. Electronically Signed: John Paul Cortez MD  1/8/2025 3:24 AM EST  Workstation ID: [id number]    XR Chest 1 View    Result Date: 1/7/2025  XR CHEST 1 VW Date of Exam: 1/7/2025 5:00 PM EST Indication: Altered mental status Comparison: Chest radiograph dated 2/8/2024 Findings: The cardiomediastinal silhouette is within normal limits. Coronary artery calcifications. Pulmonary vascularity appears normal. There is no focal airspace consolidation, pleural effusion, or pneumothorax. There are degenerative changes of the thoracic spine.     Impression: No acute cardiopulmonary abnormality. Electronically Signed: Ezequiel Javier  1/7/2025 5:31 PM EST  Workstation ID: [id number]    CT Angiogram Head w AI Analysis of LVO    Result Date: 1/7/2025  CT ANGIOGRAM HEAD W AI ANALYSIS OF LVO, CT ANGIOGRAM NECK Date of Exam: 1/7/2025 3:44 PM EST Indication: stroke. Comparison: 2/9/2024  Technique: CTA of the head and neck was performed after the uneventful intravenous administration of 75 cc Isovue-370 IV contrast . Reconstructed coronal and sagittal images were also obtained. In addition, a 3-D volume rendered image was created for interpretation. Automated exposure control and iterative reconstruction methods were used. Findings: Mild atheromatous disease of the aortic arch and origins of the great vessels. Severe atheromatous disease of the distal right common carotid artery through the right carotid bulb extending into the proximal right internal carotid artery. There  is minimal residual flow noted within the proximal right internal carotid artery with a residual vessel lumen diameter of approximately 1 mm and a native vessel lumen diameter of consistent with at least 80% stenosis per NASCET criteria. Severe atheromatous disease involving the intracranial segments of the right internal carotid artery. The right carotid terminus is normal. The visualized branches of the right anterior and middle cerebral arteries are normal. Moderate atheromatous disease of the distal left common carotid artery with severe atheromatous disease of the left carotid bulb and proximal left internal carotid artery. Minimal residual vessel luminal diameter of 2 mm of the proximal left internal carotid artery with a native vessel lumen diameter of 4 mm consistent with 50% stenosis. 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 This is likely filled via retrograde flow from the [place] and a the anterior communicating artery. The left carotid terminus is normal. 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 The visualized branches of the left anterior and middle cerebral arteries appear patent. Both vertebral arteries arise from the subclavian arteries. The vertebral arteries are codominant. Minimal atheromatous disease of the vertebral arteries. Both vertebral arteries supply the basilar artery. The basilar artery and basilar artery tip are normal. The basilar artery terminates in bilateral posterior cerebral arteries which appear normal. The intracranial venous sinuses are patent. No intracranial hemorrhage is noted. Prior bilateral lens replacements. The paranasal sinuses are clear. The mastoid air cells are aerated. The nasopharynx is clear. No adenopathy. Subcentimeter thyroid nodules. 0.5 cm subpleural right apical pulmonary nodule on image #88. This is stable compared with the prior study. The visualized clavicles are intact. The visualized superior ribs are intact. Degenerative changes of the cervical spine.     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 The ophthalmic artery remains patent. This is new compared with the examination performed on 2/8/2024. This  is likely filled via retrograde flow from the [place] and a the anterior communicating artery. 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 4.Additional vascular and nonvascular findings as described above. 5.Stable 0.5 cm subpleural right apical pulmonary nodule on image #88. Findings were discussed with Dr. Kraus at 4:47 p.m. on 1/7/2025 Electronically Signed: Anthony Dumont MD  1/7/2025 4:49 PM EST  Workstation  ID: [id number]    CT Angiogram Neck    Result Date: 1/7/2025  CT ANGIOGRAM HEAD W AI ANALYSIS OF LVO, CT ANGIOGRAM NECK Date of Exam: 1/7/2025 3:44 PM EST Indication: stroke. Comparison: 2/9/2024 Technique: CTA of the head and neck was performed after the uneventful intravenous administration of 75 cc Isovue-370 IV contrast . Reconstructed coronal and sagittal images were also obtained. In addition, a 3-D volume rendered image was created for interpretation. Automated exposure control and iterative reconstruction methods were used. Findings: Mild atheromatous disease of the aortic arch and origins of the great vessels. Severe atheromatous disease of the distal right common carotid artery through the right carotid bulb extending into the proximal right internal carotid artery. There  is minimal residual flow noted within the proximal right internal carotid artery with a residual vessel lumen diameter of approximately 1 mm and a native vessel lumen diameter of consistent with at least 80% stenosis per NASCET criteria. Severe atheromatous disease involving the intracranial segments of the right internal carotid artery. The right carotid terminus is normal. The visualized branches of the right anterior and middle cerebral arteries are normal. Moderate atheromatous disease of the distal left common carotid artery with severe atheromatous disease of the left carotid bulb and proximal left internal carotid artery. Minimal residual vessel luminal diameter of 2 mm of the proximal left internal carotid artery with a native vessel lumen diameter of 4 mm consistent with 50% stenosis. 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 This  is likely filled via retrograde flow from the [place] and a the anterior communicating artery. The left carotid terminus is normal. 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 The visualized branches of the left anterior and middle cerebral arteries appear patent. Both vertebral arteries arise from the subclavian arteries. The vertebral arteries are codominant. Minimal atheromatous disease of the vertebral arteries. Both vertebral arteries supply the basilar artery. The basilar artery and basilar artery tip are normal. The basilar artery terminates in bilateral posterior cerebral arteries which appear normal. The intracranial venous sinuses are patent. No intracranial hemorrhage is noted. Prior bilateral lens replacements. The paranasal sinuses are clear. The mastoid air cells are aerated. The nasopharynx is clear. No adenopathy. Subcentimeter thyroid nodules. 0.5 cm subpleural right apical pulmonary nodule on image #88. This is stable compared with the prior study. The visualized clavicles are intact. The visualized superior ribs are intact. Degenerative changes of the cervical spine.     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 The ophthalmic artery remains patent. This is new compared with the examination performed on 2/8/2024. This  is likely filled via retrograde flow from the [place] and a the anterior communicating artery. 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 4.Additional vascular and nonvascular findings as described above. 5.Stable 0.5 cm  subpleural right apical pulmonary nodule on image #88. Findings were discussed with Dr. Kraus at 4:47 p.m. on 1/7/2025 Electronically Signed: Anthony Dumont MD  1/7/2025 4:49 PM EST  Workstation ID: [id number]    CT Head Without Contrast    Result Date: 1/7/2025  CT HEAD WO CONTRAST Date of Exam: 1/7/2025 3:35 PM EST Indication: stroke. Comparison: None available. Technique: Axial CT images were obtained of the head without contrast administration.  Automated exposure control and iterative construction methods were used. Findings: Motion artifact limits exam. There is no evidence of hemorrhage. There is no mass effect or midline shift. There is diffuse brain atrophy. Periventricular hypodense areas compatible with chronic small vessel ischemia. There is a chronic left frontal infarct with encephalomalacia and  volume loss. Also chronic left gangliocapsular lacunar infarct. Tiny chronic right pontine lacunar infarct There is no extracerebral collection. Ventricles are normal in size and configuration for patient's stated age. Posterior fossa is within normal limits. Calvarium and skull base appear intact.  Visualized sinuses show no air fluid levels. Visualized orbits are unremarkable.     Impression: 1.No acute intracranial abnormality identified. 2.Chronic left frontal infarct. Chronic left gangliocapsular and right pontine lacunar infarcts. 3.Diffuse brain atrophy with chronic small vessel ischemia. Electronically Signed: Dave Shine MD  1/7/2025 4:02 PM EST  Workstation ID: [id number]     Results for orders placed during the hospital encounter of 01/07/25    Duplex Carotid Ultrasound CAR    Interpretation Summary    Right internal carotid artery demonstrates an estimated stenosis at the upper end of the 50-69% range.    Antegrade right vertebral flow.    Left internal carotid artery is suspected to be occluded distal to the areas visualized on the study based on the abnormal waveforms noted throughout the common  and internal carotid areas noted.    Antegrade left vertebral flow.      Results for orders placed during the hospital encounter of 01/07/25    Duplex Carotid Ultrasound CAR    Interpretation Summary    Right internal carotid artery demonstrates an estimated stenosis at the upper end of the 50-69% range.    Antegrade right vertebral flow.    Left internal carotid artery is suspected to be occluded distal to the areas visualized on the study based on the abnormal waveforms noted throughout the common and internal carotid areas noted.    Antegrade left vertebral flow.      Results for orders placed during the hospital encounter of 01/07/25    Adult Transthoracic Echo Complete W/ Cont if Necessary Per Protocol (With Agitated Saline)    Interpretation Summary    Left ventricular systolic function is normal. Estimated left ventricular EF = 62%    Left ventricular wall thickness is consistent with mild concentric hypertrophy.    No hemodynamically significant valvular disease present.    Estimated right ventricular systolic pressure from tricuspid regurgitation is normal (<35 mmHg).      Plan for Follow-up of Pending Labs/Results:     Discharge Details        Discharge Medications        New Medications        Instructions Start Date   acetaminophen 325 MG tablet  Commonly known as: TYLENOL   650 mg, Oral, Every 4 Hours PRN      donepezil 5 MG tablet  Commonly known as: ARICEPT   5 mg, Oral, Nightly      Insulin Lispro 100 UNIT/ML injection  Commonly known as: humaLOG   2-9 Units, Subcutaneous, 4 Times Daily Before Meals & Nightly      ipratropium-albuterol 0.5-2.5 mg/3 ml nebulizer  Commonly known as: DUO-NEB   3 mL, Nebulization, Every 4 Hours PRN      naloxone 4 MG/0.1ML nasal spray  Commonly known as: NARCAN   Call 911. Don't prime. [address] in 1 nostril for overdose. Repeat in 2-3 minutes in other nostril if no or minimal breathing/responsiveness.             Changes to Medications        Instructions Start Date  "  pantoprazole 40 MG EC tablet  Commonly known as: PROTONIX  What changed: when to take this   40 mg, Oral, Daily             Continue These Medications        Instructions Start Date   Anoro Ellipta 62.5-25 MCG/INH aerosol powder  inhaler  Generic drug: Umeclidinium-Vilanterol   1 puff, Inhalation, As Needed      aspirin 81 MG chewable tablet   81 mg, Oral, Daily      bisacodyl 5 MG EC tablet  Commonly known as: DULCOLAX   5 mg, Oral, Daily PRN      clopidogrel 75 MG tablet  Commonly known as: Plavix   75 mg, Oral, Daily      empagliflozin 10 MG tablet tablet  Commonly known as: Jardiance   10 mg, Oral, Daily      FeroSul 325 (65 Fe) MG tablet  Generic drug: ferrous sulfate   325 mg, Oral, Daily With Breakfast      furosemide 20 MG tablet  Commonly known as: LASIX   20 mg, Oral, Daily PRN, swelling      HYDROcodone-acetaminophen 5-325 MG per tablet  Commonly known as: NORCO   1 tablet, Oral, Every 8 Hours PRN      Insulin Pen Needle 31G X 6 MM misc   1 each, Not Applicable      Insulin Syringe-Needle U-100 31G X 5/16\" 1 ML misc   1 each by Does not apply route daily      Insulin Syringe-Needle U-100 30G X 5/16\" 0.5 ML misc   1 each by Does not apply route daily      B-D INS SYRINGE 0.5CC/31GX5/16 31G X 5/16\" 0.5 ML misc  Generic drug: Insulin Syringe-Needle U-100   use as directed once daily      lactulose 10 GM/15ML solution  Commonly known as: CHRONULAC   10 g, Oral, Daily      linaclotide 290 MCG capsule capsule  Commonly known as: LINZESS   290 mcg, Oral, Every Morning Before Breakfast      linagliptin 5 MG tablet tablet  Commonly known as: TRADJENTA   5 mg, Oral, Daily      metFORMIN 1000 MG tablet  Commonly known as: GLUCOPHAGE   1,000 mg, Oral, 2 Times Daily With Meals      rosuvastatin 20 MG tablet  Commonly known as: CRESTOR   20 mg, Oral, Daily      vitamin B-12 1000 MCG tablet  Commonly known as: CYANOCOBALAMIN   1,000 mcg, Oral, Daily             Stop These Medications      metoprolol succinate XL 25 MG " 24 hr tablet  Commonly known as: TOPROL-XL              Allergies   Allergen Reactions    Codeine GI Intolerance         Discharge Disposition:  Rehab Facility or Unit (DC - External)    Diet:  Hospital:  Diet Order   Procedures    Diet: Diabetic; Consistent Carbohydrate; Texture: Soft to Chew (NDD 3); Soft to Chew: Chopped Meat; Fluid Consistency: Nectar Thick       Diet Instructions       Diet: Cardiac Diets, Diabetic Diets; Healthy Heart (2-3 Na+); Soft to Chew (NDD 3); Chopped Meat; Nectar Thick; Consistent Carbohydrate      Discharge Diet:  Cardiac Diets  Diabetic Diets       Cardiac Diet: Healthy Heart (2-3 Na+)    Texture: Soft to Chew (NDD 3)    Soft to Chew: Chopped Meat    Fluid Consistency: Nectar Thick    Diabetic Diet: Consistent Carbohydrate             Activity:  Activity Instructions       Activity as Tolerated              Restrictions or Other Recommendations:         CODE STATUS:    Code Status and Medical Interventions: No CPR (Do Not Attempt to Resuscitate); Limited Support; No intubation (DNI)   Ordered at: 01/07/25 1268     Medical Intervention Limits:    No intubation (DNI)     Code Status (Patient has no pulse and is not breathing):    No CPR (Do Not Attempt to Resuscitate)     Medical Interventions (Patient has pulse or is breathing):    Limited Support       Future Appointments   Date Time Provider Department Center   1/14/2025 11:00 AM MED 8  [facility]   3/12/2025 10:15 AM Luis A Celestin IV, MD [facility]       Additional Instructions for the Follow-ups that You Need to Schedule       Ambulatory Referral to Neurology   As directed      4 weeks please    Discharge Follow-up with PCP   As directed       Currently Documented PCP:    Romaine Eugene MD    PCP Phone Number:    [phone]     Follow Up Details: 1 week post dc rehab        Discharge Follow-up with Specified Provider: [facility]; 1 Month   As directed      To: [facility]   Follow Up: 1 Month        Discharge  Follow-up with Specified Provider: Dr. Celestin; 2 Months   As directed      To: Dr. Celestin   Follow Up: 2 Months                      AUBREY Hernadez  01/14/25      Time Spent on Discharge:  I spent  38  minutes on this discharge activity which included: face-to-face encounter with the patient, reviewing the data in the system, coordination of the care with the nursing staff as well as consultants, documentation, and entering orders.        Electronically signed by AUBREY Hernadez, 01/14/25, 9:41 AM EST.      Electronically signed by Melissa Lorenzo APRN at 01/14/25 0948

## 2025-01-14 NOTE — DISCHARGE SUMMARY
[facility] Medicine Services  DISCHARGE SUMMARY    Patient Name: Karol Lopez  : 1950  MRN: [id number]    Date of Admission: 2025  3:34 PM  Date of Discharge:  2025  Primary Care Physician: Romaine Eugene MD    Consults       Date and Time Order Name Status Description    2025  8:54 AM Inpatient Cardiology Consult Completed     2025  2:52 PM Inpatient Gastroenterology Consult Completed     2025  3:09 PM Inpatient Hematology & Oncology Consult Completed             Hospital Course     Presenting Problem: speech difficulty    Active Hospital Problems    Diagnosis  POA    **Cerebrovascular accident (CVA) due to occlusion of left middle cerebral artery [I63.512]  Yes    History of GI bleed [Z87.19]  Not Applicable    Severe protein-calorie malnutrition [E43]  Yes    Antiplatelet or antithrombotic long-term use [Z79.02]  Not Applicable    Cirrhosis of liver [K74.60]  Yes    Occlusion of left carotid artery [I65.22]  Yes    Paroxysmal atrial fibrillation [I48.0]  Yes    Hyperlipidemia LDL goal <70 [E78.5]  Yes    COPD (chronic obstructive pulmonary disease) [J44.9]  Yes    Type 2 diabetes mellitus with hyperglycemia, with long-term current use of insulin [E11.65, Z79.4]  Not Applicable    Coronary artery disease involving native coronary artery of native heart without angina pectoris [I25.10]  Yes      Resolved Hospital Problems    Diagnosis Date Resolved POA    NSVT (nonsustained ventricular tachycardia) [I47.29] 2025 No    Encephalopathy [G93.40] 2025 Yes          Hospital Course:  Karol Lopez is a 74 y.o. female with history of COPD, DM2, WILCOX cirrhosis, GI bleed, CKD, CAD (PCI LAD ), atrial fibrillation, PAD and prior stroke with residual right upper extremity weakness and bilateral carotid disease who presented with 1 to 2-day onset of worsening confusion, speech changes, gait difficulties.  Imaging revealed occlusion of her left  internal carotid artery and stenosis of her right ICA with a large area of acute and subacute stroke in the left MCA distribution.     Left MCA territory stroke  L ICA Occlusion  - asa, plavix at dc  - statin  - PT/OT, plan rehab  - avoid hypotension, metoprolol discontinued this admit  - TTE with normal heart structure  - follow up with stroke service in one month    NSVT  -  noted on tele monitor, asymptomatic  - TTE normal structure  - nuclear stress completed on 1/13 and normal  - avoid hypotension, continue statin  - NOAC indicated for PAF, but h/o GIB and will continue with DAPT therapy  - follow up with dr. Celestin in 8 weeks     Dysphagia  - modified diet- nectar thick liquids     COPD  -Continue scheduled nebs     WILCOX cirrhosis  Thrombocytopenia  H/o GI bleed  Esophageal varices  Portal gastropathy  -Patient seen by gastroenterology with EGD 1/10 showing a single: Grade 1 varix in the posterior esophagus without high risk stigmata.  There is also mild portal gastropathy note  -Daily PPI  -Patient will need outpatient colonoscopy in a few months with dysphagia has improved     DM2  - A1c 8.8  - continue home regimen and ssi while at rehab     CKD     Chronic disease related malnutrition  - RD follows     CAD  - h/o HEIDI     H/o atrial fibrillation   - failed NOAC w GIB  -  off BB, avoiding hypotension    Possible adnexal cyst, 1.5 cm  - incidentally noted on CT abdomen pelvis  - further outpatient workup as needed         Discharge Follow Up Recommendations for outpatient labs/diagnostics:   Pcp follow up one week post dc rehab- further eval renal cyst per PCP  Follow up with GI, [facility]- 1 month  Follow up with Dr. Celestin 2 mos  Follow up with stroke/ neuro 4 weeks    Day of Discharge     HPI:   Patient has no complaints. Still weak. No chest pain, soa    Review of Systems  Gen- No fevers, chills  CV- No chest pain, palpitations  Resp- No cough, dyspnea  GI- No N/V/D, abd pain        Vital Signs:   Temp:   [97.5 °F (36.4 °C)-98.3 °F (36.8 °C)] 97.9 °F (36.6 °C)  Heart Rate:  [53-74] 58  Resp:  [16-18] 18  BP: (134-167)/(56-65) 134/62      Physical Exam:  Constitutional: No acute distress, awake, alert, chronically ill appearing  HENT: NCAT, mucous membranes moist  Respiratory: Clear to auscultation bilaterally, respiratory effort normal   Cardiovascular: RRR  Gastrointestinal: Positive bowel sounds, soft, nontender, nondistended  Musculoskeletal: No bilateral ankle edema  Psychiatric: Appropriate affect, cooperative  Neurologic: Oriented x 3, generalized weakness R>L side, speech clear  Skin: No rashes      Pertinent  and/or Most Recent Results     LAB RESULTS:      Lab 01/13/25  0407 01/11/25  0437 01/10/25  1242 01/09/25  0359 01/08/25  0532 01/07/25  1607   WBC 5.21 5.06 5.51 8.30 6.16 6.72   HEMOGLOBIN 11.5* 11.2* 12.3 12.0 11.4* 12.3   HEMATOCRIT 34.4 33.3* 36.6 35.2 34.1 37.1   PLATELETS 125* 99* 105* 137* 99* 116*   NEUTROS ABS  --   --   --  5.37  --  4.96   IMMATURE GRANS (ABS)  --   --   --  0.01  --  0.03   LYMPHS ABS  --   --   --  1.99  --  1.17   MONOS ABS  --   --   --  0.75  --  0.46   EOS ABS  --   --   --  0.13  --  0.07   MCV 89.6 88.8 88.2 87.3 89.0 89.8   CRP  --   --   --   --   --  <0.30   PROCALCITONIN  --   --   --   --   --  0.14   LACTATE  --   --   --   --   --  1.7   PROTIME  --   --  15.1*  --   --   --          Lab 01/13/25 0407 01/12/25  0418 01/11/25  0437 01/11/25  0006 01/10/25  1242 01/09/25  1405 01/09/25  0358 01/08/25  0532 01/07/25  1607   SODIUM 142 139 140  --  140  --  142 145 139   POTASSIUM 4.2 4.0 4.0 4.0 3.5   < > 3.5 3.9 4.0   CHLORIDE 110* 105 107  --  105  --  107 110* 101   CO2 22.0 24.0 22.0  --  25.0  --  25.0 19.0* 23.0   ANION GAP 10.0 10.0 11.0  --  10.0  --  10.0 16.0* 15.0   BUN 26* 25* 23  --  24*  --  25* 23 25*   CREATININE 1.03* 0.91 1.06*  --  1.14*  --  1.21* 1.15* 1.45*   EGFR 57.9* 67.2 55.9*  --  51.3*  --  47.7* 50.7* 38.4*   GLUCOSE 218* 218* 239*   --  253*  --  171* 215* 295*   CALCIUM 10.1 10.1 9.8  --  10.3  --  10.1 9.3 9.8   MAGNESIUM  --  2.1 2.1  --  2.1  --   --  2.1 2.3   PHOSPHORUS  --   --   --   --  2.9  --   --   --  3.9   HEMOGLOBIN A1C  --   --   --   --   --   --   --  8.80*  --    TSH  --   --   --   --   --   --   --   --  1.340    < > = values in this interval not displayed.         Lab 01/13/25  0407 01/09/25  0358 01/07/25  1607   TOTAL PROTEIN 6.4 6.6 6.9   ALBUMIN 3.4* 3.8 4.0   GLOBULIN 3.0 2.8 2.9   ALT (SGPT) 30 18 25   AST (SGOT) 37* 15 16   BILIRUBIN 0.4 0.5 0.5   ALK PHOS 138* 111 124*   LIPASE  --   --  32         Lab 01/10/25  1242 01/08/25  0603 01/07/25  1607   PROBNP  --   --  271.0   HSTROP T  --  38* 29*   PROTIME 15.1*  --   --    INR 1.18*  --   --          Lab 01/08/25  0836   CHOLESTEROL 96   LDL CHOL 32   HDL CHOL 31*   TRIGLYCERIDES 204*             Lab 01/07/25  1604   FIO2 21   CARBOXYHEMOGLOBIN (VENOUS) 2.3     Brief Urine Lab Results  (Last result in the past 365 days)        Color   Clarity   Blood   Leuk Est   Nitrite   Protein   CREAT   Urine HCG        01/07/25 1648 Yellow   Clear   Negative   Negative   Negative   Trace                 Microbiology Results (last 10 days)       Procedure Component Value - Date/Time    Blood Culture - Blood, Hand, Right [[id number]]  (Normal) Collected: 01/07/25 1620    Lab Status: Final result Specimen: Blood from Hand, Right Updated: 01/12/25 2031     Blood Culture No growth at 5 days    COVID PRE-OP / PRE-PROCEDURE SCREENING ORDER (NO ISOLATION) - Swab, Nasopharynx [[id number]]  (Normal) Collected: 01/07/25 1607    Lab Status: Final result Specimen: Swab from Nasopharynx Updated: 01/07/25 1717    Narrative:      The following orders were created for panel order COVID PRE-OP / PRE-PROCEDURE SCREENING ORDER (NO ISOLATION) - Swab, Nasopharynx.  Procedure                               Abnormality         Status                     ---------                               -----------          ------                     Respiratory Panel PCR w/...[[id number]]  Normal              Final result                 Please view results for these tests on the individual orders.    Respiratory Panel PCR w/COVID-19(SARS-CoV-2) TYREL/CESAR/JAYME/PAD/COR/ELVIE In-House, NP Swab in UTM/VTM, 2 HR TAT - Swab, Nasopharynx [[id number]]  (Normal) Collected: 01/07/25 1607    Lab Status: Final result Specimen: Swab from Nasopharynx Updated: 01/07/25 1717     ADENOVIRUS, PCR Not Detected     Coronavirus 229E Not Detected     Coronavirus HKU1 Not Detected     Coronavirus NL63 Not Detected     Coronavirus OC43 Not Detected     COVID19 Not Detected     Human Metapneumovirus Not Detected     Human Rhinovirus/Enterovirus Not Detected     Influenza A PCR Not Detected     Influenza B PCR Not Detected     Parainfluenza Virus 1 Not Detected     Parainfluenza Virus 2 Not Detected     Parainfluenza Virus 3 Not Detected     Parainfluenza Virus 4 Not Detected     RSV, PCR Not Detected     Bordetella pertussis pcr Not Detected     Bordetella parapertussis PCR Not Detected     Chlamydophila pneumoniae PCR Not Detected     Mycoplasma pneumo by PCR Not Detected    Narrative:      In the setting of a positive respiratory panel with a viral infection PLUS a negative procalcitonin without other underlying concern for bacterial infection, consider observing off antibiotics or discontinuation of antibiotics and continue supportive care. If the respiratory panel is positive for atypical bacterial infection (Bordetella pertussis, Chlamydophila pneumoniae, or Mycoplasma pneumoniae), consider antibiotic de-escalation to target atypical bacterial infection.    Blood Culture - Blood, Arm, Left [[id number]]  (Normal) Collected: 01/07/25 1600    Lab Status: Final result Specimen: Blood from Arm, Left Updated: 01/12/25 2031     Blood Culture No growth at 5 days            Stress Test With Pet Myocardial Perfusion    Result Date: 1/13/2025    Myocardial  perfusion imaging indicates a normal myocardial perfusion study with no evidence of ischemia. Impressions are consistent with a low risk study.   Left ventricular ejection fraction is normal (Calculated EF = 66%).     CT Abdomen Pelvis With Contrast    Result Date: 1/10/2025  CT ABDOMEN PELVIS W CONTRAST Date of Exam: 1/10/2025 5:03 PM EST Indication: Cirrhosis. Comparison: CT of the abdomen and pelvis dated 9/28/2021 Technique: Axial CT images were obtained of the abdomen and pelvis following the uneventful intravenous administration of 85 mL Isovue-370. Reconstructed coronal and sagittal images were also obtained. Automated exposure control and iterative construction methods were used. Findings: Liver: The liver is cirrhotic in morphology. No focal liver lesion is seen. No biliary dilation is seen. Gallbladder: Surgically absent. Pancreas: Unremarkable. Spleen: The spleen is enlarged, measuring 15 cm in length. Adrenal glands: Unremarkable. Genitourinary tract: 1 cm hypodensity at the periphery of the right kidney is too small to fully characterize. This appears unchanged since 9/28/2021, presumably a small cyst. Bilateral renal hilar vascular calcifications are present. Tiny nonobstructing  renal calculi cannot be excluded. Kidneys are otherwise unremarkable. No hydronephrosis is seen. The visualized portions of the ureters and urinary bladder appear unremarkable. Status post hysterectomy. Suggestion of a 1.5 cm left adnexal cyst. Gastrointestinal tract: Small gastroesophageal varices are noted. Hollow viscera appear otherwise unremarkable. There is no evidence of bowel obstruction Appendix: The appendix is not identified. Other findings: No free air or free fluid is identified. No pathologically enlarged lymph nodes are seen. Vascular calcifications are present. The IVC is unremarkable. Bones and soft tissues: No acute or suspicious osseous or soft tissue lesion is identified. Lung bases: The visualized lung  bases are clear. Coronary artery calcifications are seen.     Impression: 1.No acute abnormality is identified within the abdomen or pelvis. 2.Cirrhosis with sequela of portal hypertension, including splenomegaly and small gastroesophageal varices. No suspicious arterially enhancing liver lesions are seen. 3.Bilateral renal hilar vascular calcifications. Small nonobstructing renal calculi cannot be excluded. 4.Suggestion of a 1.5 cm left adnexal cyst. 5.Additional findings as detailed above. Electronically Signed: Hang Hawkins MD  1/10/2025 5:32 PM EST  Workstation ID: [id number]    Duplex Carotid Ultrasound CAR    Result Date: 1/9/2025    Right internal carotid artery demonstrates an estimated stenosis at the upper end of the 50-69% range.   Antegrade right vertebral flow.   Left internal carotid artery is suspected to be occluded distal to the areas visualized on the study based on the abnormal waveforms noted throughout the common and internal carotid areas noted.   Antegrade left vertebral flow.     SLP FEES - Fiberoptic Endo Eval Swallow    Result Date: 1/8/2025  This procedure was auto-finalized with no dictation required.    MRI Brain Without Contrast    Result Date: 1/8/2025  MRI BRAIN WO CONTRAST Date of Exam: 1/8/2025 3:45 AM EST Indication: stroke rule out.  Comparison: MRI of the brain 2/9/2024; CT head 1/7/2025 Technique:  Routine multiplanar/multisequence sequence images of the brain were obtained without contrast administration. Findings: There is diffuse generalized atrophy. There are areas of increased T2 and FLAIR signal throughout the bilateral periventricular white matter consistent with chronic microvascular ischemia. There is a large area of pathologic restricted diffusion in the distribution of the left middle cerebral artery involving areas of the left frontal and temporal lobes and in the left insular region. Another smaller focus of pathologic restricted diffusion is seen in the high  posterior left parietal region. There is no convincing acute fracture. There is absence of the flow-void involving the left internal carotid artery to the level of the carotid terminus where flow is likely retrograde through  the anterior aspect of the [place].     Impression: 1.Large area of acute/subacute infarction in the distribution of the left middle cerebral artery. 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 3.Atrophy and chronic microvascular ischemia. Electronically Signed: John Paul Cortez MD  1/8/2025 5:00 AM EST  Workstation ID: [id number]    XR Abdomen KUB    Result Date: 1/8/2025  XR ABDOMEN KUB Date of Exam: 1/8/2025 1:32 AM EST Indication: For MRI clearance Comparison: None available. Findings: There are clips from cholecystectomy. There are clips in the pelvis. The bowel gas pattern is nonspecific. There is no radiopaque foreign body, spinal stimulator or structures to prevent MRI. Review of the patient's chest radiograph demonstrates no pacemaker device or foreign body. Review of the patient's head CT fails to demonstrate a cochlear implant or orbital metal.     Impression: No radiopaque foreign body or spinal stimulator to prevent MRI. Electronically Signed: John Paul Cortez MD  1/8/2025 3:24 AM EST  Workstation ID: [id number]    XR Chest 1 View    Result Date: 1/7/2025  XR CHEST 1 VW Date of Exam: 1/7/2025 5:00 PM EST Indication: Altered mental status Comparison: Chest radiograph dated 2/8/2024 Findings: The cardiomediastinal silhouette is within normal limits. Coronary artery calcifications. Pulmonary vascularity appears normal. There is no focal airspace consolidation, pleural effusion, or pneumothorax. There are degenerative changes of the thoracic spine.     Impression: No acute cardiopulmonary abnormality. Electronically Signed: Ezequiel Shethelor  1/7/2025 5:31 PM EST  Workstation ID: [id number]    CT Angiogram Head w AI Analysis of LVO    Result Date: 1/7/2025  CT ANGIOGRAM HEAD W AI  ANALYSIS OF LVO, CT ANGIOGRAM NECK Date of Exam: 1/7/2025 3:44 PM EST Indication: stroke. Comparison: 2/9/2024 Technique: CTA of the head and neck was performed after the uneventful intravenous administration of 75 cc Isovue-370 IV contrast . Reconstructed coronal and sagittal images were also obtained. In addition, a 3-D volume rendered image was created for interpretation. Automated exposure control and iterative reconstruction methods were used. Findings: Mild atheromatous disease of the aortic arch and origins of the great vessels. Severe atheromatous disease of the distal right common carotid artery through the right carotid bulb extending into the proximal right internal carotid artery. There  is minimal residual flow noted within the proximal right internal carotid artery with a residual vessel lumen diameter of approximately 1 mm and a native vessel lumen diameter of consistent with at least 80% stenosis per NASCET criteria. Severe atheromatous disease involving the intracranial segments of the right internal carotid artery. The right carotid terminus is normal. The visualized branches of the right anterior and middle cerebral arteries are normal. Moderate atheromatous disease of the distal left common carotid artery with severe atheromatous disease of the left carotid bulb and proximal left internal carotid artery. Minimal residual vessel luminal diameter of 2 mm of the proximal left internal carotid artery with a native vessel lumen diameter of 4 mm consistent with 50% stenosis. 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 This is likely filled via retrograde flow from the [place] and a the anterior  communicating artery. The left carotid terminus is normal. 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 The visualized branches of the left anterior and middle cerebral arteries appear patent. Both vertebral arteries arise from the subclavian arteries. The vertebral arteries are codominant. Minimal atheromatous disease of the vertebral arteries. Both vertebral arteries supply the basilar artery. The basilar artery and basilar artery tip are normal. The basilar artery terminates in bilateral posterior cerebral arteries which appear normal. The intracranial venous sinuses are patent. No intracranial hemorrhage is noted. Prior bilateral lens replacements. The paranasal sinuses are clear. The mastoid air cells are aerated. The nasopharynx is clear. No adenopathy. Subcentimeter thyroid nodules. 0.5 cm subpleural right apical pulmonary nodule on image #88. This is stable compared with the prior study. The visualized clavicles are intact. The visualized superior ribs are intact. Degenerative changes of the cervical spine.     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 The ophthalmic artery remains patent. This is new compared with the examination performed on 2/8/2024. This  is likely filled via retrograde flow from the [place] and a the anterior communicating artery. 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 4.Additional vascular and nonvascular findings as described above. 5.Stable 0.5 cm subpleural right apical pulmonary nodule on image #88. Findings were discussed with  Dr. Kraus at 4:47 p.m. on 1/7/2025 Electronically Signed: Anthony Dumont MD  1/7/2025 4:49 PM EST  Workstation ID: [id number]    CT Angiogram Neck    Result Date: 1/7/2025  CT ANGIOGRAM HEAD W AI ANALYSIS OF LVO, CT ANGIOGRAM NECK Date of Exam: 1/7/2025 3:44 PM EST Indication: stroke. Comparison: 2/9/2024 Technique: CTA of the head and neck was performed after the uneventful intravenous administration of 75 cc Isovue-370 IV contrast . Reconstructed coronal and sagittal images were also obtained. In addition, a 3-D volume rendered image was created for interpretation. Automated exposure control and iterative reconstruction methods were used. Findings: Mild atheromatous disease of the aortic arch and origins of the great vessels. Severe atheromatous disease of the distal right common carotid artery through the right carotid bulb extending into the proximal right internal carotid artery. There  is minimal residual flow noted within the proximal right internal carotid artery with a residual vessel lumen diameter of approximately 1 mm and a native vessel lumen diameter of consistent with at least 80% stenosis per NASCET criteria. Severe atheromatous disease involving the intracranial segments of the right internal carotid artery. The right carotid terminus is normal. The visualized branches of the right anterior and middle cerebral arteries are normal. Moderate atheromatous disease of the distal left common carotid artery with severe atheromatous disease of the left carotid bulb and proximal left internal carotid artery. Minimal residual vessel luminal diameter of 2 mm of the proximal left internal carotid artery with a native vessel lumen diameter of 4 mm consistent with 50% stenosis. Immediately distal to this level there is rapid asymmetric attenuation of the contrast column compared with the right which is new compared with the examination performed on 2/8/2024 resulting in complete occlusion of the extracranial left  internal carotid artery which extends through the ophthalmic segment of the left internal carotid artery. This is likely filled via retrograde flow from the [place] and a the anterior communicating artery. The left carotid terminus is normal. 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 The visualized branches of the left anterior and middle cerebral arteries appear patent. Both vertebral arteries arise from the subclavian arteries. The vertebral arteries are codominant. Minimal atheromatous disease of the vertebral arteries. Both vertebral arteries supply the basilar artery. The basilar artery and basilar artery tip are normal. The basilar artery terminates in bilateral posterior cerebral arteries which appear normal. The intracranial venous sinuses are patent. No intracranial hemorrhage is noted. Prior bilateral lens replacements. The paranasal sinuses are clear. The mastoid air cells are aerated. The nasopharynx is clear. No adenopathy. Subcentimeter thyroid nodules. 0.5 cm subpleural right apical pulmonary nodule on image #88. This is stable compared with the prior study. The visualized clavicles are intact. The visualized superior ribs are intact. Degenerative changes of the cervical spine.     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 The ophthalmic artery remains patent. This is new compared with the examination performed on 2/8/2024. This  is likely filled via retrograde flow from the [place] and a the anterior communicating artery. 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 4.Additional vascular and nonvascular findings as described above. 5.Stable 0.5 cm subpleural right apical pulmonary nodule on image #88. Findings were discussed with Dr. Kraus at 4:47 p.m. on 1/7/2025 Electronically Signed: Anthony Dumont MD  1/7/2025 4:49 PM EST  Workstation ID: [id number]    CT Head Without Contrast    Result Date: 1/7/2025  CT HEAD WO CONTRAST Date of Exam: 1/7/2025 3:35 PM EST Indication: stroke. Comparison: None available. Technique: Axial CT images were obtained of the head without contrast administration.  Automated exposure control and iterative construction methods were used. Findings: Motion artifact limits exam. There is no evidence of hemorrhage. There is no mass effect or midline shift. There is diffuse brain atrophy. Periventricular hypodense areas compatible with chronic small vessel ischemia. There is a chronic left frontal infarct with encephalomalacia and  volume loss. Also chronic left gangliocapsular lacunar infarct. Tiny chronic right pontine lacunar infarct There is no extracerebral collection. Ventricles are normal in size and configuration for patient's stated age. Posterior fossa is within normal limits. Calvarium and skull base appear intact.  Visualized sinuses show no air fluid levels. Visualized orbits are unremarkable.     Impression: 1.No acute intracranial abnormality identified. 2.Chronic left frontal infarct. Chronic left gangliocapsular and right pontine lacunar infarcts. 3.Diffuse brain atrophy with chronic small vessel ischemia. Electronically Signed: Dave Shine MD  1/7/2025 4:02 PM EST  Workstation ID: [id number]     Results for orders placed during the hospital encounter of 01/07/25    Duplex Carotid Ultrasound CAR    Interpretation Summary    Right internal carotid artery demonstrates an estimated stenosis at the upper end of the 50-69% range.    Antegrade right vertebral flow.    Left internal carotid artery is suspected to  be occluded distal to the areas visualized on the study based on the abnormal waveforms noted throughout the common and internal carotid areas noted.    Antegrade left vertebral flow.      Results for orders placed during the hospital encounter of 01/07/25    Duplex Carotid Ultrasound CAR    Interpretation Summary    Right internal carotid artery demonstrates an estimated stenosis at the upper end of the 50-69% range.    Antegrade right vertebral flow.    Left internal carotid artery is suspected to be occluded distal to the areas visualized on the study based on the abnormal waveforms noted throughout the common and internal carotid areas noted.    Antegrade left vertebral flow.      Results for orders placed during the hospital encounter of 01/07/25    Adult Transthoracic Echo Complete W/ Cont if Necessary Per Protocol (With Agitated Saline)    Interpretation Summary    Left ventricular systolic function is normal. Estimated left ventricular EF = 62%    Left ventricular wall thickness is consistent with mild concentric hypertrophy.    No hemodynamically significant valvular disease present.    Estimated right ventricular systolic pressure from tricuspid regurgitation is normal (<35 mmHg).      Plan for Follow-up of Pending Labs/Results:     Discharge Details        Discharge Medications        New Medications        Instructions Start Date   acetaminophen 325 MG tablet  Commonly known as: TYLENOL   650 mg, Oral, Every 4 Hours PRN      donepezil 5 MG tablet  Commonly known as: ARICEPT   5 mg, Oral, Nightly      Insulin Lispro 100 UNIT/ML injection  Commonly known as: humaLOG   2-9 Units, Subcutaneous, 4 Times Daily Before Meals & Nightly      ipratropium-albuterol 0.5-2.5 mg/3 ml nebulizer  Commonly known as: DUO-NEB   3 mL, Nebulization, Every 4 Hours PRN      naloxone 4 MG/0.1ML nasal spray  Commonly known as: NARCAN   Call 911. Don't prime. Orient in 1 nostril for overdose. Repeat in 2-3 minutes in other nostril  "if no or minimal breathing/responsiveness.             Changes to Medications        Instructions Start Date   pantoprazole 40 MG EC tablet  Commonly known as: PROTONIX  What changed: when to take this   40 mg, Oral, Daily             Continue These Medications        Instructions Start Date   Anoro Ellipta 62.5-25 MCG/INH aerosol powder  inhaler  Generic drug: Umeclidinium-Vilanterol   1 puff, Inhalation, As Needed      aspirin 81 MG chewable tablet   81 mg, Oral, Daily      bisacodyl 5 MG EC tablet  Commonly known as: DULCOLAX   5 mg, Oral, Daily PRN      clopidogrel 75 MG tablet  Commonly known as: Plavix   75 mg, Oral, Daily      empagliflozin 10 MG tablet tablet  Commonly known as: Jardiance   10 mg, Oral, Daily      FeroSul 325 (65 Fe) MG tablet  Generic drug: ferrous sulfate   325 mg, Oral, Daily With Breakfast      furosemide 20 MG tablet  Commonly known as: LASIX   20 mg, Oral, Daily PRN, swelling      HYDROcodone-acetaminophen 5-325 MG per tablet  Commonly known as: NORCO   1 tablet, Oral, Every 8 Hours PRN      Insulin Pen Needle 31G X 6 MM misc   1 each, Not Applicable      Insulin Syringe-Needle U-100 31G X 5/16\" 1 ML misc   1 each by Does not apply route daily      Insulin Syringe-Needle U-100 30G X 5/16\" 0.5 ML misc   1 each by Does not apply route daily      B-D INS SYRINGE 0.5CC/31GX5/16 31G X 5/16\" 0.5 ML misc  Generic drug: Insulin Syringe-Needle U-100   use as directed once daily      lactulose 10 GM/15ML solution  Commonly known as: CHRONULAC   10 g, Oral, Daily      linaclotide 290 MCG capsule capsule  Commonly known as: LINZESS   290 mcg, Oral, Every Morning Before Breakfast      linagliptin 5 MG tablet tablet  Commonly known as: TRADJENTA   5 mg, Oral, Daily      metFORMIN 1000 MG tablet  Commonly known as: GLUCOPHAGE   1,000 mg, Oral, 2 Times Daily With Meals      rosuvastatin 20 MG tablet  Commonly known as: CRESTOR   20 mg, Oral, Daily      vitamin B-12 1000 MCG tablet  Commonly known as: " CYANOCOBALAMIN   1,000 mcg, Oral, Daily             Stop These Medications      metoprolol succinate XL 25 MG 24 hr tablet  Commonly known as: TOPROL-XL              Allergies   Allergen Reactions    Codeine GI Intolerance         Discharge Disposition:  Rehab Facility or Unit (DC - External)    Diet:  Hospital:  Diet Order   Procedures    Diet: Diabetic; Consistent Carbohydrate; Texture: Soft to Chew (NDD 3); Soft to Chew: Chopped Meat; Fluid Consistency: Nectar Thick       Diet Instructions       Diet: Cardiac Diets, Diabetic Diets; Healthy Heart (2-3 Na+); Soft to Chew (NDD 3); Chopped Meat; Nectar Thick; Consistent Carbohydrate      Discharge Diet:  Cardiac Diets  Diabetic Diets       Cardiac Diet: Healthy Heart (2-3 Na+)    Texture: Soft to Chew (NDD 3)    Soft to Chew: Chopped Meat    Fluid Consistency: Nectar Thick    Diabetic Diet: Consistent Carbohydrate             Activity:  Activity Instructions       Activity as Tolerated              Restrictions or Other Recommendations:         CODE STATUS:    Code Status and Medical Interventions: No CPR (Do Not Attempt to Resuscitate); Limited Support; No intubation (DNI)   Ordered at: 01/07/25 9582     Medical Intervention Limits:    No intubation (DNI)     Code Status (Patient has no pulse and is not breathing):    No CPR (Do Not Attempt to Resuscitate)     Medical Interventions (Patient has pulse or is breathing):    Limited Support       Future Appointments   Date Time Provider Department Center   1/14/2025 11:00 AM MED 8  [facility]   3/12/2025 10:15 AM LuisA Celestin IV, MD [facility]       Additional Instructions for the Follow-ups that You Need to Schedule       Ambulatory Referral to Neurology   As directed      4 weeks please    Discharge Follow-up with PCP   As directed       Currently Documented PCP:    Romaine Eugene MD    PCP Phone Number:    [phone]     Follow Up Details: 1 week post dc rehab        Discharge Follow-up with  Specified Provider: [facility]; 1 Month   As directed      To: [facility]   Follow Up: 1 Month        Discharge Follow-up with Specified Provider: Dr. Celestin; 2 Months   As directed      To: Dr. Celestin   Follow Up: 2 Months                      AUBREY Hernadez  01/14/25      Time Spent on Discharge:  I spent  38  minutes on this discharge activity which included: face-to-face encounter with the patient, reviewing the data in the system, coordination of the care with the nursing staff as well as consultants, documentation, and entering orders.        Electronically signed by AUBREY Hernadez, 01/14/25, 9:41 AM EST.

## 2025-01-14 NOTE — CASE MANAGEMENT/SOCIAL WORK
Case Management Discharge Note      Final Note: Patient's plan is a skilled bed at [facility] today, 1/14.  Nurse to call report to [phone], ask for [facility].  CM will fax discharge summary when available to [phone].  [facility] ambulance will transport at 1100.  PCS faxed to [employer].         Selected Continued Care - Admitted Since 1/7/2025       Destination Coordination complete.      Service Provider Services Address Phone Fax Patient Preferred    [facility] [place] -- [address] [place] [id number] [phone] --              Durable Medical Equipment    No services have been selected for the patient.                Dialysis/Infusion    No services have been selected for the patient.                Home Medical Care    No services have been selected for the patient.                Therapy    No services have been selected for the patient.                Community Resources    No services have been selected for the patient.                Community & DME    No services have been selected for the patient.                    Transportation Services  Ambulance: [facility]    Final Discharge Disposition Code: 03 - skilled nursing facility (SNF)

## 2025-01-14 NOTE — CONSULTS
"Diabetes Education    Patient Name:  Karol Lopez  YOB: 1950  MRN: [id number]  Admit Date:  1/7/2025    Chart review for diabetes educator consult. Patient remained confused, was not able to speak with me about her diabetes. She states I can call her daughter Maris, attempted to reach Maris via phone number listed in epic, no answer. Mailed [employer] \"what is diabetes\" \"blood glucose goals\" and \"what is A1C\" handout to listed address on file.    Electronically signed by:  Prema Morrow RN  01/14/25 13:22 EST  "

## 2025-01-16 ENCOUNTER — APPOINTMENT (OUTPATIENT)
Dept: CT IMAGING | Facility: HOSPITAL | Age: 75
End: 2025-01-16
Attending: INTERNAL MEDICINE
Payer: MEDICARE

## 2025-01-16 ENCOUNTER — OFFICE VISIT (OUTPATIENT)
Age: 75
End: 2025-01-16
Payer: MEDICARE

## 2025-01-16 ENCOUNTER — HOSPITAL ENCOUNTER (INPATIENT)
Facility: HOSPITAL | Age: 75
LOS: 2 days | Discharge: NURSING, REHAB OR LONG-TERM CARE | End: 2025-01-18
Attending: INTERNAL MEDICINE | Admitting: INTERNAL MEDICINE
Payer: MEDICARE

## 2025-01-16 DIAGNOSIS — Z87.19 HISTORY OF GI BLEED: ICD-10-CM

## 2025-01-16 DIAGNOSIS — M51.360 DEGENERATION OF INTERVERTEBRAL DISC OF LUMBAR REGION WITH DISCOGENIC BACK PAIN: Primary | ICD-10-CM

## 2025-01-16 DIAGNOSIS — E83.52 HYPERCALCEMIA: ICD-10-CM

## 2025-01-16 DIAGNOSIS — I10 ESSENTIAL HYPERTENSION: ICD-10-CM

## 2025-01-16 DIAGNOSIS — K74.60 CIRRHOSIS OF LIVER WITHOUT ASCITES, UNSPECIFIED HEPATIC CIRRHOSIS TYPE (HCC): ICD-10-CM

## 2025-01-16 DIAGNOSIS — D64.9 ANEMIA, UNSPECIFIED TYPE: ICD-10-CM

## 2025-01-16 DIAGNOSIS — R41.3 IMPAIRED MEMORY: ICD-10-CM

## 2025-01-16 DIAGNOSIS — E11.40 TYPE 2 DIABETES MELLITUS WITH DIABETIC NEUROPATHY, WITH LONG-TERM CURRENT USE OF INSULIN (HCC): ICD-10-CM

## 2025-01-16 DIAGNOSIS — Z79.4 TYPE 2 DIABETES MELLITUS WITH DIABETIC NEUROPATHY, WITH LONG-TERM CURRENT USE OF INSULIN (HCC): ICD-10-CM

## 2025-01-16 DIAGNOSIS — R53.81 DECLINING FUNCTIONAL STATUS: Primary | ICD-10-CM

## 2025-01-16 DIAGNOSIS — N18.31 CHRONIC KIDNEY DISEASE, STAGE 3A (HCC): ICD-10-CM

## 2025-01-16 LAB
ALBUMIN SERPL-MCNC: 3.8 G/DL (ref 3.4–4.8)
ALBUMIN/GLOB SERPL: 1.2 {RATIO} (ref 0.8–2)
ALP SERPL-CCNC: 154 U/L (ref 25–100)
ALT SERPL-CCNC: 47 U/L (ref 4–36)
AMMONIA PLAS-SCNC: 23 MCG/DL (ref 19–87)
ANION GAP SERPL CALCULATED.3IONS-SCNC: 14 MMOL/L (ref 3–16)
AST SERPL-CCNC: 33 U/L (ref 8–33)
BASOPHILS # BLD: 0 K/UL (ref 0–0.1)
BASOPHILS NFR BLD: 0.5 %
BILIRUB SERPL-MCNC: 0.4 MG/DL (ref 0.3–1.2)
BUN SERPL-MCNC: 27 MG/DL (ref 6–20)
CALCIUM SERPL-MCNC: 10.6 MG/DL (ref 8.5–10.5)
CHLORIDE SERPL-SCNC: 101 MMOL/L (ref 98–107)
CO2 SERPL-SCNC: 24 MMOL/L (ref 20–30)
CREAT SERPL-MCNC: 1.2 MG/DL (ref 0.4–1.2)
EOSINOPHIL # BLD: 0.1 K/UL (ref 0–0.4)
EOSINOPHIL NFR BLD: 1.9 %
ERYTHROCYTE [DISTWIDTH] IN BLOOD BY AUTOMATED COUNT: 13.8 % (ref 11–16)
FERRITIN SERPL IA-MCNC: 62.9 NG/ML (ref 22–322)
FLUAV AG NPH QL: NEGATIVE
FLUBV AG NPH QL: NEGATIVE
GFR SERPLBLD CREATININE-BSD FMLA CKD-EPI: 48 ML/MIN/{1.73_M2}
GLOBULIN SER CALC-MCNC: 3.2 G/DL
GLUCOSE BLD-MCNC: 245 MG/DL (ref 74–106)
GLUCOSE BLD-MCNC: 287 MG/DL (ref 74–106)
GLUCOSE SERPL-MCNC: 220 MG/DL (ref 74–106)
HCT VFR BLD AUTO: 32.7 % (ref 37–47)
HGB BLD-MCNC: 11.3 G/DL (ref 11.5–16.5)
IMM GRANULOCYTES # BLD: 0 K/UL
IMM GRANULOCYTES NFR BLD: 0.6 % (ref 0–5)
IRON SATN MFR SERPL: 16 % (ref 15–50)
IRON SERPL-MCNC: 49 UG/DL (ref 37–145)
LACTATE BLDV-SCNC: 1.8 MMOL/L (ref 0.4–2)
LYMPHOCYTES # BLD: 1.5 K/UL (ref 1.5–4)
LYMPHOCYTES NFR BLD: 23.1 %
MAGNESIUM SERPL-MCNC: 2.2 MG/DL (ref 1.7–2.4)
MCH RBC QN AUTO: 29.6 PG (ref 27–32)
MCHC RBC AUTO-ENTMCNC: 34.6 G/DL (ref 31–35)
MCV RBC AUTO: 85.6 FL (ref 80–100)
MONOCYTES # BLD: 0.6 K/UL (ref 0.2–0.8)
MONOCYTES NFR BLD: 9 %
NEUTROPHILS # BLD: 4.2 K/UL (ref 2–7.5)
NEUTS SEG NFR BLD: 64.9 %
PERFORMED ON: ABNORMAL
PERFORMED ON: ABNORMAL
PLATELET # BLD AUTO: 165 K/UL (ref 150–400)
PMV BLD AUTO: 11.2 FL (ref 6–10)
POTASSIUM SERPL-SCNC: 4.3 MMOL/L (ref 3.4–5.1)
PROT SERPL-MCNC: 7 G/DL (ref 6.4–8.3)
RBC # BLD AUTO: 3.82 M/UL (ref 3.8–5.8)
SARS-COV-2 RDRP RESP QL NAA+PROBE: NOT DETECTED
SODIUM SERPL-SCNC: 139 MMOL/L (ref 136–145)
TIBC SERPL-MCNC: 303 UG/DL (ref 250–450)
WBC # BLD AUTO: 6.4 K/UL (ref 4–11)

## 2025-01-16 PROCEDURE — 82728 ASSAY OF FERRITIN: CPT

## 2025-01-16 PROCEDURE — 36415 COLL VENOUS BLD VENIPUNCTURE: CPT

## 2025-01-16 PROCEDURE — 83550 IRON BINDING TEST: CPT

## 2025-01-16 PROCEDURE — 80053 COMPREHEN METABOLIC PANEL: CPT

## 2025-01-16 PROCEDURE — 82140 ASSAY OF AMMONIA: CPT

## 2025-01-16 PROCEDURE — 92523 SPEECH SOUND LANG COMPREHEN: CPT

## 2025-01-16 PROCEDURE — 83605 ASSAY OF LACTIC ACID: CPT

## 2025-01-16 PROCEDURE — 74176 CT ABD & PELVIS W/O CONTRAST: CPT

## 2025-01-16 PROCEDURE — 3046F HEMOGLOBIN A1C LEVEL >9.0%: CPT | Performed by: INTERNAL MEDICINE

## 2025-01-16 PROCEDURE — 92610 EVALUATE SWALLOWING FUNCTION: CPT

## 2025-01-16 PROCEDURE — 87804 INFLUENZA ASSAY W/OPTIC: CPT

## 2025-01-16 PROCEDURE — 1123F ACP DISCUSS/DSCN MKR DOCD: CPT | Performed by: INTERNAL MEDICINE

## 2025-01-16 PROCEDURE — 6360000002 HC RX W HCPCS: Performed by: INTERNAL MEDICINE

## 2025-01-16 PROCEDURE — 83540 ASSAY OF IRON: CPT

## 2025-01-16 PROCEDURE — 2580000003 HC RX 258: Performed by: INTERNAL MEDICINE

## 2025-01-16 PROCEDURE — 99306 1ST NF CARE HIGH MDM 50: CPT | Performed by: INTERNAL MEDICINE

## 2025-01-16 PROCEDURE — 6370000000 HC RX 637 (ALT 250 FOR IP): Performed by: INTERNAL MEDICINE

## 2025-01-16 PROCEDURE — 2580000003 HC RX 258: Performed by: PHYSICIAN ASSISTANT

## 2025-01-16 PROCEDURE — 83735 ASSAY OF MAGNESIUM: CPT

## 2025-01-16 PROCEDURE — 87635 SARS-COV-2 COVID-19 AMP PRB: CPT

## 2025-01-16 PROCEDURE — 6370000000 HC RX 637 (ALT 250 FOR IP): Performed by: PHYSICIAN ASSISTANT

## 2025-01-16 PROCEDURE — 85025 COMPLETE CBC W/AUTO DIFF WBC: CPT

## 2025-01-16 PROCEDURE — 1200000000 HC SEMI PRIVATE

## 2025-01-16 RX ORDER — INSULIN GLARGINE 100 [IU]/ML
35 INJECTION, SOLUTION SUBCUTANEOUS NIGHTLY
Status: DISCONTINUED | OUTPATIENT
Start: 2025-01-16 | End: 2025-01-18

## 2025-01-16 RX ORDER — LISINOPRIL 5 MG/1
5 TABLET ORAL DAILY
Status: DISCONTINUED | OUTPATIENT
Start: 2025-01-16 | End: 2025-01-16

## 2025-01-16 RX ORDER — HYDROCODONE BITARTRATE AND ACETAMINOPHEN 5; 325 MG/1; MG/1
1 TABLET ORAL EVERY 8 HOURS PRN
Status: ON HOLD | COMMUNITY
End: 2025-01-18

## 2025-01-16 RX ORDER — DEXTROSE MONOHYDRATE 100 MG/ML
INJECTION, SOLUTION INTRAVENOUS CONTINUOUS PRN
Status: DISCONTINUED | OUTPATIENT
Start: 2025-01-16 | End: 2025-01-18 | Stop reason: HOSPADM

## 2025-01-16 RX ORDER — CARVEDILOL 6.25 MG/1
6.25 TABLET ORAL 2 TIMES DAILY WITH MEALS
Status: DISCONTINUED | OUTPATIENT
Start: 2025-01-16 | End: 2025-01-18 | Stop reason: HOSPADM

## 2025-01-16 RX ORDER — IBUPROFEN 600 MG/1
1 TABLET ORAL PRN
Status: DISCONTINUED | OUTPATIENT
Start: 2025-01-16 | End: 2025-01-18 | Stop reason: HOSPADM

## 2025-01-16 RX ORDER — VITAMIN B COMPLEX
1000 TABLET ORAL DAILY
Status: DISCONTINUED | OUTPATIENT
Start: 2025-01-16 | End: 2025-01-18 | Stop reason: HOSPADM

## 2025-01-16 RX ORDER — ALOGLIPTIN 25 MG/1
25 TABLET, FILM COATED ORAL DAILY
Status: ON HOLD | COMMUNITY
End: 2025-01-23 | Stop reason: HOSPADM

## 2025-01-16 RX ORDER — PANTOPRAZOLE SODIUM 40 MG/1
40 TABLET, DELAYED RELEASE ORAL
Status: DISCONTINUED | OUTPATIENT
Start: 2025-01-16 | End: 2025-01-16

## 2025-01-16 RX ORDER — INSULIN LISPRO 100 [IU]/ML
0-4 INJECTION, SOLUTION INTRAVENOUS; SUBCUTANEOUS
Status: DISCONTINUED | OUTPATIENT
Start: 2025-01-16 | End: 2025-01-18 | Stop reason: HOSPADM

## 2025-01-16 RX ORDER — ROSUVASTATIN CALCIUM 20 MG/1
20 TABLET, COATED ORAL NIGHTLY
Status: DISCONTINUED | OUTPATIENT
Start: 2025-01-16 | End: 2025-01-18 | Stop reason: HOSPADM

## 2025-01-16 RX ORDER — SODIUM CHLORIDE 9 MG/ML
INJECTION, SOLUTION INTRAVENOUS CONTINUOUS
Status: ACTIVE | OUTPATIENT
Start: 2025-01-16 | End: 2025-01-17

## 2025-01-16 RX ORDER — HYDRALAZINE HYDROCHLORIDE 25 MG/1
25 TABLET, FILM COATED ORAL 3 TIMES DAILY PRN
Status: DISCONTINUED | OUTPATIENT
Start: 2025-01-16 | End: 2025-01-18 | Stop reason: HOSPADM

## 2025-01-16 RX ORDER — LANOLIN ALCOHOL/MO/W.PET/CERES
1000 CREAM (GRAM) TOPICAL DAILY
COMMUNITY

## 2025-01-16 RX ORDER — HYDROCODONE BITARTRATE AND ACETAMINOPHEN 5; 325 MG/1; MG/1
1 TABLET ORAL 2 TIMES DAILY PRN
Status: DISCONTINUED | OUTPATIENT
Start: 2025-01-16 | End: 2025-01-18 | Stop reason: HOSPADM

## 2025-01-16 RX ORDER — FERROUS SULFATE 324(65)MG
324 TABLET, DELAYED RELEASE (ENTERIC COATED) ORAL 2 TIMES DAILY WITH MEALS
Status: DISCONTINUED | OUTPATIENT
Start: 2025-01-16 | End: 2025-01-17 | Stop reason: ALTCHOICE

## 2025-01-16 RX ORDER — POLYETHYLENE GLYCOL 3350 17 G/17G
17 POWDER, FOR SOLUTION ORAL DAILY PRN
Status: DISCONTINUED | OUTPATIENT
Start: 2025-01-16 | End: 2025-01-18 | Stop reason: HOSPADM

## 2025-01-16 RX ORDER — DONEPEZIL HYDROCHLORIDE 5 MG/1
5 TABLET, FILM COATED ORAL NIGHTLY
Status: DISCONTINUED | OUTPATIENT
Start: 2025-01-16 | End: 2025-01-18 | Stop reason: HOSPADM

## 2025-01-16 RX ORDER — ALOGLIPTIN 12.5 MG/1
12.5 TABLET, FILM COATED ORAL DAILY
Status: DISCONTINUED | OUTPATIENT
Start: 2025-01-16 | End: 2025-01-18 | Stop reason: HOSPADM

## 2025-01-16 RX ORDER — BISACODYL 5 MG/1
5 TABLET, DELAYED RELEASE ORAL DAILY PRN
COMMUNITY

## 2025-01-16 RX ORDER — INSULIN LISPRO 100 [IU]/ML
5 INJECTION, SOLUTION INTRAVENOUS; SUBCUTANEOUS
Status: DISCONTINUED | OUTPATIENT
Start: 2025-01-16 | End: 2025-01-18 | Stop reason: HOSPADM

## 2025-01-16 RX ORDER — IPRATROPIUM BROMIDE AND ALBUTEROL SULFATE 2.5; .5 MG/3ML; MG/3ML
1 SOLUTION RESPIRATORY (INHALATION) EVERY 4 HOURS PRN
COMMUNITY

## 2025-01-16 RX ORDER — INSULIN GLARGINE 100 [IU]/ML
35 INJECTION, SOLUTION SUBCUTANEOUS NIGHTLY
COMMUNITY

## 2025-01-16 RX ORDER — ERTUGLIFLOZIN 5 MG/1
1 TABLET, FILM COATED ORAL DAILY
Status: ON HOLD | COMMUNITY
End: 2025-01-18 | Stop reason: HOSPADM

## 2025-01-16 RX ORDER — ONDANSETRON 4 MG/1
4 TABLET, ORALLY DISINTEGRATING ORAL EVERY 8 HOURS PRN
Status: DISCONTINUED | OUTPATIENT
Start: 2025-01-16 | End: 2025-01-18 | Stop reason: HOSPADM

## 2025-01-16 RX ORDER — SENNOSIDES 8.6 MG
650 CAPSULE ORAL EVERY 4 HOURS PRN
Status: ON HOLD | COMMUNITY
End: 2025-01-23 | Stop reason: HOSPADM

## 2025-01-16 RX ORDER — UMECLIDINIUM BROMIDE AND VILANTEROL TRIFENATATE 62.5; 25 UG/1; UG/1
1 POWDER RESPIRATORY (INHALATION) DAILY PRN
Status: ON HOLD | COMMUNITY
End: 2025-01-23

## 2025-01-16 RX ORDER — HYDRALAZINE HYDROCHLORIDE 25 MG/1
50 TABLET, FILM COATED ORAL 2 TIMES DAILY
Status: DISCONTINUED | OUTPATIENT
Start: 2025-01-16 | End: 2025-01-16

## 2025-01-16 RX ORDER — ACETAMINOPHEN 325 MG/1
650 TABLET ORAL EVERY 4 HOURS PRN
Status: ON HOLD | COMMUNITY
End: 2025-01-16

## 2025-01-16 RX ORDER — ONDANSETRON 2 MG/ML
4 INJECTION INTRAMUSCULAR; INTRAVENOUS EVERY 6 HOURS PRN
Status: DISCONTINUED | OUTPATIENT
Start: 2025-01-16 | End: 2025-01-18 | Stop reason: HOSPADM

## 2025-01-16 RX ORDER — CLOPIDOGREL BISULFATE 75 MG/1
75 TABLET ORAL DAILY
COMMUNITY

## 2025-01-16 RX ORDER — CYANOCOBALAMIN (VITAMIN B-12) 2500 MCG
2500 TABLET, SUBLINGUAL SUBLINGUAL DAILY
Status: DISCONTINUED | OUTPATIENT
Start: 2025-01-16 | End: 2025-01-18 | Stop reason: HOSPADM

## 2025-01-16 RX ORDER — LUBIPROSTONE 24 UG/1
24 CAPSULE ORAL 2 TIMES DAILY WITH MEALS
COMMUNITY

## 2025-01-16 RX ADMIN — INSULIN LISPRO 2 UNITS: 100 INJECTION, SOLUTION INTRAVENOUS; SUBCUTANEOUS at 22:39

## 2025-01-16 RX ADMIN — SODIUM CHLORIDE: 9 INJECTION, SOLUTION INTRAVENOUS at 22:55

## 2025-01-16 RX ADMIN — FERROUS SULFATE TAB EC 324 MG (65 MG FE EQUIVALENT) 324 MG: 324 (65 FE) TABLET DELAYED RESPONSE at 17:30

## 2025-01-16 RX ADMIN — INSULIN GLARGINE 35 UNITS: 100 INJECTION, SOLUTION SUBCUTANEOUS at 22:39

## 2025-01-16 RX ADMIN — SODIUM CHLORIDE 40 MG: 9 INJECTION INTRAMUSCULAR; INTRAVENOUS; SUBCUTANEOUS at 22:56

## 2025-01-16 RX ADMIN — ROSUVASTATIN CALCIUM 20 MG: 20 TABLET, COATED ORAL at 22:37

## 2025-01-16 RX ADMIN — INSULIN LISPRO 5 UNITS: 100 INJECTION, SOLUTION INTRAVENOUS; SUBCUTANEOUS at 17:36

## 2025-01-16 RX ADMIN — METFORMIN HYDROCHLORIDE 500 MG: 500 TABLET ORAL at 17:29

## 2025-01-16 RX ADMIN — Medication 1000 UNITS: at 17:29

## 2025-01-16 RX ADMIN — CARVEDILOL 6.25 MG: 6.25 TABLET, FILM COATED ORAL at 17:29

## 2025-01-16 RX ADMIN — DONEPEZIL HYDROCHLORIDE 5 MG: 5 TABLET, FILM COATED ORAL at 22:37

## 2025-01-16 RX ADMIN — INSULIN LISPRO 2 UNITS: 100 INJECTION, SOLUTION INTRAVENOUS; SUBCUTANEOUS at 17:32

## 2025-01-16 NOTE — PROGRESS NOTES
SLP ALL NOTES  Facility/Department: [facility] MED SURG   CLINICAL BEDSIDE SWALLOW EVALUATION    NAME: Maude Corrales  : 1950  MRN: [id number]    ADMISSION DATE: 2025  ADMITTING DIAGNOSIS: has CAD (coronary artery disease); COPD (chronic obstructive pulmonary disease) (HCC); History of CVA (cerebrovascular accident); Tobacco abuse; Vitamin B12 deficiency; Essential hypertension; Type 2 diabetes mellitus with diabetic neuropathy (HCC); History of colon polyps; Status post bilateral knee replacements; Degenerative disc disease, lumbar; Family history of breast cancer; Abnormal CT of the chest; Thoracic aortic aneurysm without rupture (HCC); Anxiety; Anemia due to stage 3 chronic kidney disease (HCC); MAGDALENA (iron deficiency anemia); Stage 3a chronic kidney disease (HCC); Cirrhosis (HCC); AVM (arteriovenous malformation) of small bowel, acquired; Declining functional status; Personal history of nicotine dependence; General weakness; Pancytopenia (HCC); Carotid disease, bilateral (HCC); Diabetes mellitus due to underlying condition, with long-term current use of insulin (HCC); Hypokalemia; Anemia; PAC (premature atrial contraction); Intractable back pain; Generalized weakness; Abnormal urinalysis; Hyperglycemia; Type 2 diabetes mellitus (HCC); Hypertensive urgency; Hyperosmolar hyperglycemic state (HHS) (HCC); Medically noncompliant; Dementia (HCC); and AMS (altered mental status) on their problem list.  ONSET DATE: 2025    Recent Chest Xray/CT of Chest: (2025)  Findings:   The cardiomediastinal silhouette is within normal limits. Coronary artery calcifications. Pulmonary vascularity appears normal. There is no focal airspace consolidation, pleural effusion, or pneumothorax. There are degenerative changes of the thoracic   spine.     Date of Eval: 2025  Evaluating Therapist: CHAUNCEY Feng    Current Diet level:  Current Diet : Minced and Moist  Current Liquid Diet : Mildly  exercises x5x10 with 90% acc  Goal 4: Patient will participate in FEES study to determine safest, least restrictive diet.  Long-term Goals  Timeframe for Long-term Goals: 2 weeks  Goal 1: Patient will tolerate safest, least restrictive diet with no clinical signs of dysphagia with 100% accuracy to improve overall quality of life.    General  Chart Reviewed: Yes  Subjective  Subjective: BSE completed per PA request secondary to hx of dysphagia, CVA, and weakness. Patient alert, cooperative, and oriented to self only.  Behavior/Cognition: Alert;Cooperative;Pleasant mood  Temperature Spikes Noted: No  Respiratory Status: Room air  O2 Device: None (Room air)  Communication Observation: Aphasia  Follows Directions: Simple  Dentition: Dentures top  Patient Positioning: Upright in bed  Baseline Vocal Quality: Weak;Dysphonic  Volitional Cough: Strong  Prior Dysphagia History: BSE completed at this facility 10/09/2024 SLP recommended soft and bite sized, thin liquids. FEES completed at [facility] lat week, SLP reported silent aspiration on thin liquids, SLP recommended NTL and minced and moist diet.  Consistencies Administered: Soft and Bite-Sized;Pureed;Mildly Thick - cup    Oral Motor Deficits  Labial: Decreased rate;Right droop  Dentition: Upper dentures  Oral Hygiene: Moist;Clean  Oral Hygiene Comments: WFL  Lingual: Decreased rate;Decreased strength  Velum: No Impairment  Mandible: No impairment  Gag: No Impairment  Consistencies Administered: Soft and Bite-Sized;Pureed;Mildly Thick - cup    Oral Phase Dysfunction  Oral Phase  Oral Phase: Exceptions  Oral Phase  Oral Phase - Comment: Patient presented trials of NTL via cup sip, puree, and soft and bite sized texture. Patient presents with R facial droop. Patient demonstrated decreased rate and strength. Patient with decreased laryngeal elevation. Patient with no oral residue. Patient with delayed mastication 40 seconds on trial of soft and bite sized.     Indicators

## 2025-01-16 NOTE — FLOWSHEET NOTE
01/16/25 1455   Assessment   Charting Type Admission   Psychosocial   Psychosocial (WDL) WDL   Neurological   Neuro (WDL) X   Level of Consciousness 0   Orientation Level Oriented to person;Oriented to place;Oriented to time;Oriented to situation   Cognition Appropriate judgement;Appropriate safety awareness;Appropriate attention/concentration;Follows commands   Speech Delayed responses   R Hand  Absent   L Hand  Weak   Des Coma Scale   Eye Opening 4   Best Verbal Response 5   Best Motor Response 6   Ulysses Coma Scale Score 15   HEENT (Head, Ears, Eyes, Nose, & Throat)   HEENT (WDL) X   Teeth Dentures upper   Respiratory   Respiratory Pattern Regular   Respiratory Depth Normal   Respiratory Quality/Effort Unlabored   Breath Sounds   Right Upper Lobe Clear   Right Middle Lobe Clear   Right Lower Lobe Clear;Diminished   Left Upper Lobe Clear   Left Lower Lobe Clear;Diminished   Cardiac   Cardiac (WDL) WDL   Gastrointestinal   Abdominal (WDL) X   Abdomen Inspection Rounded;Soft   RUQ Bowel Sounds Active   LUQ Bowel Sounds Active   RLQ Bowel Sounds Active   LLQ Bowel Sounds Active   Genitourinary   Genitourinary (WDL) WDL   Peripheral Vascular   Peripheral Vascular (WDL) WDL   Anti-Embolism   Anti-Embolism Devices Pneumatic compression devices, below knee   Laterality Bilateral   Anti-Embolism Intervention On   Dual Clinician Skin Assessment   Dual Skin Assessment (4 Eyes) WDL   Second Clinical  (First and Last Name) María Elena Gonzalez   Skin Integumentary    Skin Integumentary (WDL) WDL   Care Plan - Skin/Tissue Integrity Goals   Skin Integrity Remains Intact Monitor for areas of redness and/or skin breakdown;Assess vascular access sites hourly   Musculoskeletal   Musculoskeletal (WDL) X   RUE Flaccid   LUE Weakness   RL Extremity Flaccid   LL Extremity Weakness   Care Plan - Chronic Conditions and Co-Morbidity   Care Plan - Patient's Chronic Conditions and Co-Morbidity Symptoms are Monitored and

## 2025-01-16 NOTE — PROGRESS NOTES
with push enteroscopy/retrograde enteroscopy.  Daughter at the bedside and she is really concerned about the change.  Blood pressure on the low side.  Discussed treatment options with her daughter.  She was in agreement to transfer to the local hospital to maximize medical management without proceeding with any aggressive intervention given her multiple and complicated medical history.  I did contact hospitalist team and patient will be admitted directly for further evaluation and treatment.  All questions and concerns were addressed with the patient and with her daughter.  Both in agreement with the plan of care.    Reviewed available records from recent hospital stay.             Lazarus Ambrosio MD

## 2025-01-16 NOTE — PROGRESS NOTES
Medication [facility].  Added:   -Alogliptin 25 mg qd  -Amitiza 24 mcg bid  -Steglatro 5 mg qd  -Clopidogrel 75 mg qd  -Acetaminophen  mg q4h prn -   -Anoro Ellipta 62.5-25 mcg qd prn - Usually scheduled.  No indication listed for use.  -Bisacodyl 5 mg qd prn  -Duo Nebs q4h prn  -Barrier Cream prn    Not Taking:  -Nitrostat  -Jardiance 10 mg  -Tradjenta 5 mg   -Hydralazine 50 mg  -Lisinopril 5 mg   -Ropinirole 0.25 mg   -Carvedilol 6.25 mg   -Postassium 10 meq  -Vitamin D 1000 u     Changed:  -Pantoprazole 40 mg bid TO qd  -Metformin 500 mg bid TO 1000 mg qd  -Vitamin B 12 2500 mcg qd TO 1000 mcg qd  -Lactulose 10gm/15ml was marked not taking. 15 ml qam scheduled was on MAR.  -Lantus 100u/ml 50 u qd TO 35 u qd  -Added Sliding Scale to Humalog Entry  -Ferrous Sulfate 324 mg bid TO qd prn  -Hydrocodone-APAP 5-325 mg bid TO q8h prn  -Duonebs changed from q4h to q4h prn    Of note, what is the indication for ferrous sulfate PRN?  How does patient or nursing staff know when to administer at Nursing Home?  Please clarify on discharge.    Nursing home acetaminophen is  mg tabs which are normally dosed q8h prn, but are ordered q4h prn.  Consider changing formation upon discharge back to nursing home.    From nursing home, there is no mention of Dexcom monitoring system.  This item was not marked as taking or not taking.

## 2025-01-16 NOTE — PROGRESS NOTES
Other:    Verbal Expression:  [] WFL      [x] Impaired    []  DNT       Impaired    Accuracy   Repetition  Yes 60% accuracy    Automatic Yes 70% accuracy    Confrontation naming (object/ body part)  No    Convergent Yes 20% accuracy    Divergent Yes 30% accuracy    Convey simple needs No    Convey complex needs Yes 50% accuracy      Effective techniques:   [] communication board   [x] decreased rate   [x] over articulate   [x] phonetic cues   [x] word retrieval strategies      Speech  [] WFL      [x] Impaired    []  DNT       Impaired    Accuracy   Pitch Yes    Volume Yes Low volume    Voice quality (hoarse, breathy, strained) Yes Breathy and strained    Resonance (nasality, nasal emission) No    Rate Yes Slow rate   Respiration (forced, audible, inhalatory stridor) No        Dominant hand: [x] Right   []  Left    Cognition:   Attention  [] WFL   [x]  Impaired:   Memory:      Impaired     Accuracy   Immediate  Yes 60% accuracy    Short term Yes 0% accuracy    Long term NT    Working  NT    Daily routine  NT      Problem Solving:  [] WFL      [x] Impaired    []  DNT       Impaired   Accuracy   Manage meds Yes 20% accuracy   Complex function (abstract) Yes    Discharge planning  NT    financial NT    Safety judgement  Yes 50% accuracy      Motor Speech: [x] WFL      [] Impaired    []  DNT        Mild Moderate  Severe  Oral  Verbal Comments   Apraxia            Dysarthria: [x] Mild    [] Moderate    []  Severe       Pragmatics: [x] WFL      [] Impaired    []  DNT        Impaired    Accuracy   Eye contact      Affect      Monotone     Topic maintenance      Turn taking       Comments: SLP administered informal cognitive linguistic evaluation to determine areas of improvement for patient. Patient demonstrated difficulty with immediate and short term memory, answering questions, expressing complex needs, safety judgement, and verbal expression. Patient presents with R facial droop and mild dysarthria. Patient would  1/16/2025

## 2025-01-16 NOTE — PROGRESS NOTES
4 Eyes Skin Assessment     NAME:  Maude Corrales  YOB: 1950  MEDICAL RECORD NUMBER:  [id number]    The patient is being assessed for  Admission    I agree that at least one RN has performed a thorough Head to Toe Skin Assessment on the patient. ALL assessment sites listed below have been assessed.      Areas assessed by both nurses:    Head, Face, Ears, Shoulders, Back, Chest, Arms, Elbows, Hands, Sacrum. Buttock, Coccyx, Ischium, and Legs. Feet and Heels        Does the Patient have a Wound? No noted wound(s)       Hector Prevention initiated by RN: Yes  Wound Care Orders initiated by RN: No    Pressure Injury (Stage 3,4, Unstageable, DTI, NWPT, and Complex wounds) if present, place Wound referral order by RN under : No    New Ostomies, if present place, Ostomy referral order under : No     Nurse 1 eSignature: Electronically signed by Juliana Bolton RN on 1/16/25 at 1:22 PM EST    **SHARE this note so that the co-signing nurse can place an eSignature**    Nurse 2 eSignature: Electronically signed by María Elena Gonzalez RN on 1/16/25 at 1:47 PM EST

## 2025-01-17 PROBLEM — E83.52 HYPERCALCEMIA: Status: ACTIVE | Noted: 2025-01-17

## 2025-01-17 PROBLEM — N39.0 UTI (URINARY TRACT INFECTION): Status: ACTIVE | Noted: 2025-01-17

## 2025-01-17 PROBLEM — R33.9 URINARY RETENTION: Status: ACTIVE | Noted: 2025-01-17

## 2025-01-17 PROBLEM — Z87.19 HISTORY OF GI BLEED: Status: ACTIVE | Noted: 2025-01-17

## 2025-01-17 LAB
25(OH)D3 SERPL-MCNC: 27.4 NG/ML (ref 32–100)
ALBUMIN SERPL-MCNC: 3.5 G/DL (ref 3.4–4.8)
ALBUMIN/GLOB SERPL: 1.3 {RATIO} (ref 0.8–2)
ALP SERPL-CCNC: 135 U/L (ref 25–100)
ALT SERPL-CCNC: 36 U/L (ref 4–36)
ANION GAP SERPL CALCULATED.3IONS-SCNC: 12 MMOL/L (ref 3–16)
AST SERPL-CCNC: 24 U/L (ref 8–33)
BACTERIA URNS QL MICRO: ABNORMAL /HPF
BASOPHILS # BLD: 0 K/UL (ref 0–0.1)
BASOPHILS NFR BLD: 0.5 %
BILIRUB SERPL-MCNC: 0.4 MG/DL (ref 0.3–1.2)
BILIRUB UR QL STRIP.AUTO: NEGATIVE
BUN SERPL-MCNC: 26 MG/DL (ref 6–20)
CALCIUM SERPL-MCNC: 10.2 MG/DL (ref 8.5–10.5)
CHLORIDE SERPL-SCNC: 104 MMOL/L (ref 98–107)
CLARITY UR: ABNORMAL
CO2 SERPL-SCNC: 24 MMOL/L (ref 20–30)
COLOR UR: YELLOW
CREAT SERPL-MCNC: 1.1 MG/DL (ref 0.4–1.2)
EOSINOPHIL # BLD: 0.1 K/UL (ref 0–0.4)
EOSINOPHIL NFR BLD: 1.9 %
EPI CELLS #/AREA URNS HPF: ABNORMAL /HPF (ref 0–5)
ERYTHROCYTE [DISTWIDTH] IN BLOOD BY AUTOMATED COUNT: 13.6 % (ref 11–16)
GFR SERPLBLD CREATININE-BSD FMLA CKD-EPI: 53 ML/MIN/{1.73_M2}
GLOBULIN SER CALC-MCNC: 2.8 G/DL
GLUCOSE BLD-MCNC: 113 MG/DL (ref 74–106)
GLUCOSE BLD-MCNC: 146 MG/DL (ref 74–106)
GLUCOSE BLD-MCNC: 151 MG/DL (ref 74–106)
GLUCOSE BLD-MCNC: 249 MG/DL (ref 74–106)
GLUCOSE SERPL-MCNC: 136 MG/DL (ref 74–106)
GLUCOSE UR STRIP.AUTO-MCNC: >=1000 MG/DL
HCT VFR BLD AUTO: 31.7 % (ref 37–47)
HGB BLD-MCNC: 10.9 G/DL (ref 11.5–16.5)
HGB UR QL STRIP.AUTO: ABNORMAL
IMM GRANULOCYTES # BLD: 0 K/UL
IMM GRANULOCYTES NFR BLD: 0.2 % (ref 0–5)
KETONES UR STRIP.AUTO-MCNC: NEGATIVE MG/DL
LEUKOCYTE ESTERASE UR QL STRIP.AUTO: ABNORMAL
LYMPHOCYTES # BLD: 1.3 K/UL (ref 1.5–4)
LYMPHOCYTES NFR BLD: 20.8 %
MCH RBC QN AUTO: 29.8 PG (ref 27–32)
MCHC RBC AUTO-ENTMCNC: 34.4 G/DL (ref 31–35)
MCV RBC AUTO: 86.6 FL (ref 80–100)
MONOCYTES # BLD: 0.7 K/UL (ref 0.2–0.8)
MONOCYTES NFR BLD: 10.7 %
MUCOUS THREADS URNS QL MICRO: ABNORMAL /LPF
NEUTROPHILS # BLD: 4.1 K/UL (ref 2–7.5)
NEUTS SEG NFR BLD: 65.9 %
NITRITE UR QL STRIP.AUTO: NEGATIVE
PERFORMED ON: ABNORMAL
PH UR STRIP.AUTO: 5 [PH] (ref 5–8)
PLATELET # BLD AUTO: 161 K/UL (ref 150–400)
PMV BLD AUTO: 11.3 FL (ref 6–10)
POTASSIUM SERPL-SCNC: 3.8 MMOL/L (ref 3.4–5.1)
PROT SERPL-MCNC: 6.3 G/DL (ref 6.4–8.3)
PROT UR STRIP.AUTO-MCNC: NEGATIVE MG/DL
RBC # BLD AUTO: 3.66 M/UL (ref 3.8–5.8)
RBC #/AREA URNS HPF: ABNORMAL /HPF (ref 0–4)
SODIUM SERPL-SCNC: 140 MMOL/L (ref 136–145)
SP GR UR STRIP.AUTO: 1.02 (ref 1–1.03)
UA COMPLETE W REFLEX CULTURE PNL UR: YES
UA DIPSTICK W REFLEX MICRO PNL UR: YES
URN SPEC COLLECT METH UR: ABNORMAL
UROBILINOGEN UR STRIP-ACNC: 0.2 E.U./DL
WBC # BLD AUTO: 6.2 K/UL (ref 4–11)
WBC #/AREA URNS HPF: >100 /HPF (ref 0–5)

## 2025-01-17 PROCEDURE — 99222 1ST HOSP IP/OBS MODERATE 55: CPT | Performed by: INTERNAL MEDICINE

## 2025-01-17 PROCEDURE — 6360000002 HC RX W HCPCS: Performed by: INTERNAL MEDICINE

## 2025-01-17 PROCEDURE — 6360000002 HC RX W HCPCS: Performed by: PHYSICIAN ASSISTANT

## 2025-01-17 PROCEDURE — 6370000000 HC RX 637 (ALT 250 FOR IP): Performed by: PHYSICIAN ASSISTANT

## 2025-01-17 PROCEDURE — 6370000000 HC RX 637 (ALT 250 FOR IP): Performed by: INTERNAL MEDICINE

## 2025-01-17 PROCEDURE — 97166 OT EVAL MOD COMPLEX 45 MIN: CPT

## 2025-01-17 PROCEDURE — 80053 COMPREHEN METABOLIC PANEL: CPT

## 2025-01-17 PROCEDURE — 97162 PT EVAL MOD COMPLEX 30 MIN: CPT

## 2025-01-17 PROCEDURE — 2580000003 HC RX 258: Performed by: PHYSICIAN ASSISTANT

## 2025-01-17 PROCEDURE — 87086 URINE CULTURE/COLONY COUNT: CPT

## 2025-01-17 PROCEDURE — 97535 SELF CARE MNGMENT TRAINING: CPT

## 2025-01-17 PROCEDURE — 85025 COMPLETE CBC W/AUTO DIFF WBC: CPT

## 2025-01-17 PROCEDURE — 81001 URINALYSIS AUTO W/SCOPE: CPT

## 2025-01-17 PROCEDURE — 2580000003 HC RX 258: Performed by: INTERNAL MEDICINE

## 2025-01-17 PROCEDURE — 1200000000 HC SEMI PRIVATE

## 2025-01-17 PROCEDURE — 87077 CULTURE AEROBIC IDENTIFY: CPT

## 2025-01-17 PROCEDURE — 97530 THERAPEUTIC ACTIVITIES: CPT

## 2025-01-17 PROCEDURE — 87186 SC STD MICRODIL/AGAR DIL: CPT

## 2025-01-17 PROCEDURE — 82306 VITAMIN D 25 HYDROXY: CPT

## 2025-01-17 PROCEDURE — 36415 COLL VENOUS BLD VENIPUNCTURE: CPT

## 2025-01-17 RX ORDER — FERROUS SULFATE 300 MG/5ML
300 LIQUID (ML) ORAL 2 TIMES DAILY WITH MEALS
Status: DISCONTINUED | OUTPATIENT
Start: 2025-01-17 | End: 2025-01-18 | Stop reason: HOSPADM

## 2025-01-17 RX ADMIN — METFORMIN HYDROCHLORIDE 500 MG: 500 TABLET ORAL at 17:19

## 2025-01-17 RX ADMIN — METFORMIN HYDROCHLORIDE 500 MG: 500 TABLET ORAL at 09:02

## 2025-01-17 RX ADMIN — SODIUM CHLORIDE 40 MG: 9 INJECTION INTRAMUSCULAR; INTRAVENOUS; SUBCUTANEOUS at 09:02

## 2025-01-17 RX ADMIN — INSULIN GLARGINE 35 UNITS: 100 INJECTION, SOLUTION SUBCUTANEOUS at 20:50

## 2025-01-17 RX ADMIN — EMPAGLIFLOZIN 10 MG: 10 TABLET, FILM COATED ORAL at 09:02

## 2025-01-17 RX ADMIN — HYDROCODONE BITARTRATE AND ACETAMINOPHEN 1 TABLET: 5; 325 TABLET ORAL at 09:01

## 2025-01-17 RX ADMIN — INSULIN LISPRO 5 UNITS: 100 INJECTION, SOLUTION INTRAVENOUS; SUBCUTANEOUS at 09:03

## 2025-01-17 RX ADMIN — CEFTRIAXONE 1000 MG: 1 INJECTION, POWDER, FOR SOLUTION INTRAMUSCULAR; INTRAVENOUS at 14:27

## 2025-01-17 RX ADMIN — CARVEDILOL 6.25 MG: 6.25 TABLET, FILM COATED ORAL at 09:02

## 2025-01-17 RX ADMIN — SODIUM CHLORIDE 40 MG: 9 INJECTION INTRAMUSCULAR; INTRAVENOUS; SUBCUTANEOUS at 20:39

## 2025-01-17 RX ADMIN — CARVEDILOL 6.25 MG: 6.25 TABLET, FILM COATED ORAL at 17:19

## 2025-01-17 RX ADMIN — INSULIN LISPRO 1 UNITS: 100 INJECTION, SOLUTION INTRAVENOUS; SUBCUTANEOUS at 11:31

## 2025-01-17 RX ADMIN — Medication 2500 MCG: at 09:02

## 2025-01-17 RX ADMIN — ROSUVASTATIN CALCIUM 20 MG: 20 TABLET, COATED ORAL at 20:39

## 2025-01-17 RX ADMIN — ALOGLIPTIN 12.5 MG: 12.5 TABLET, FILM COATED ORAL at 09:02

## 2025-01-17 RX ADMIN — Medication 1000 UNITS: at 09:02

## 2025-01-17 RX ADMIN — INSULIN LISPRO 5 UNITS: 100 INJECTION, SOLUTION INTRAVENOUS; SUBCUTANEOUS at 11:31

## 2025-01-17 RX ADMIN — DONEPEZIL HYDROCHLORIDE 5 MG: 5 TABLET, FILM COATED ORAL at 20:39

## 2025-01-17 RX ADMIN — FERROUS SULFATE TAB EC 324 MG (65 MG FE EQUIVALENT) 324 MG: 324 (65 FE) TABLET DELAYED RESPONSE at 09:02

## 2025-01-17 RX ADMIN — Medication 300 MG: at 17:19

## 2025-01-17 ASSESSMENT — PAIN SCALES - WONG BAKER: WONGBAKER_NUMERICALRESPONSE: HURTS EVEN MORE

## 2025-01-17 ASSESSMENT — PAIN DESCRIPTION - LOCATION
LOCATION: ABDOMEN
LOCATION: ABDOMEN

## 2025-01-17 NOTE — ACP (ADVANCE CARE PLANNING)
Advance Care Planning     General Advance Care Planning (ACP) Conversation    Date of Conversation: 1/17/2025  Conducted with: Patient with Decision Making Capacity  Other persons present: None    Healthcare Decision Maker:   Primary Decision Maker: Terrie Weston - Milena - [phone]    Secondary Decision Maker: Russel Bass - Niece/Nephew - [phone]    Supplemental (Other) Decision Maker: Black Rivera - Niece/Nephew - [phone]     Today we documented Decision Maker(s) consistent with Legal Next of Kin hierarchy.  Content/Action Overview:  Has ACP document(s) on file - reflects the patient's care preferences  Reviewed DNR/DNI and patient elects DNR order - referred to ACP Clinical Specialist & placed order  artificial nutrition, ventilation preferences, and resuscitation preferences  Code status conversation conducted by nursing staff and/or PA. ACP note placed by .    Length of Voluntary ACP Conversation in minutes:  <16 minutes (Non-Billable)    Emmanuel Ross           Patient Name: Radha Silverio  : 1940    MRN: [id number]                              Today's Date: 2024       Admit Date: 12/3/2024    Visit Dx:     ICD-10-CM ICD-9-CM   1. Acute pain of left knee  M25.562 719.46   2. Effusion, left knee  M25.462 719.06   3. Acute post-operative pain  G89.18 338.18   4. Decreased activities of daily living (ADL)  Z78.9 V49.89     Patient Active Problem List   Diagnosis    Essential hypertension    Mixed hyperlipidemia    Chronic constipation    Acid reflux    Solis esophagus    Type 2 diabetes mellitus without complication, without long-term current use of insulin    Hemorrhoids    Frequent PVCs    Atrial bigeminy    Ectopic atrial rhythm    Primary osteoarthritis of knee    Primary osteoarthritis of right knee    Tinnitus of both ears    Left hip pain    Primary osteoarthritis of left hip    Greater trochanteric bursitis of left hip    Stage 3 chronic kidney disease    Female genital prolapse    Chronic gastritis without bleeding    Degenerative disc disease, lumbar    Weakness of left lower extremity    Neuritis or radiculitis due to rupture of lumbar intervertebral disc    Solis's esophagus without dysplasia    Adenomatous polyp of colon    Intervertebral lumbar disc disorder with myelopathy, lumbar region    Lumbar herniated disc    Acute pain of left knee     Past Medical History:   Diagnosis Date    Anesthesia complication     DURING COLONOSCOPY PATIENT STATES SHE WAS STILL ABLE TO HEAR AND FEEL    Anxiety     Arthritis     Arthritis of back     Solis's esophagus     Cataract     Chronic constipation     Chronic cough 2018    Colon polyps     Diabetes type 2, controlled     Dizziness     DVT (deep venous thrombosis)     Dyspnea     Essential hypertension 2016    Frequent PVCs 2017    GERD (gastroesophageal reflux disease)     Hematochezia 2016    Hyperlipidemia 2016    Hypertension     Lumbosacral disc disease      Palpitations     Pelvic prolapse      Past Surgical History:   Procedure Laterality Date    BREAST BIOPSY Left     cyst at 9 y/o    COLONOSCOPY N/A 10/11/2016    Procedure: COLONOSCOPY TO CECUM, TO TERMINAL ILEUM WITH HOT SNARE POLYPECTOMY;  Surgeon: Palmira Calix MD;  Location: [facility] ENDOSCOPY;  Service:     COLONOSCOPY N/A 10/05/2021    Procedure: COLONOSCOPY TO CECUM WITH COLD POLYPECTOMY;  Surgeon: Palmira Calix MD;  Location: [facility] ENDOSCOPY;  Service: Gastroenterology;  Laterality: N/A;  HISTORY OF COLON POLYPS  COLON POLYP, HEMORRHOIDS, DIVERTICULOSIS    CYST REMOVAL Right     breast    ENDOSCOPY N/A 10/11/2016    Procedure: ESOPHAGOGASTRODUODENOSCOPY WITH BIOPSY;  Surgeon: Palmira Calix MD;  Location: [facility] ENDOSCOPY;  Service:     ENDOSCOPY N/A 10/05/2021    Procedure: ESOPHAGOGASTRODUODENOSCOPY WITH COLD BIOPSIES;  Surgeon: Palmira Calix MD;  Location: [facility] ENDOSCOPY;  Service: Gastroenterology;  Laterality: N/A;  GERD  GASTRITIS    LAPAROSCOPIC CHOLECYSTECTOMY W/ CHOLANGIOGRAPHY      LUMBAR DISCECTOMY Left 11/25/2024    Procedure: Left lumbar 1 to lumbar 2 laminectomy and discectomy with metrx;  Surgeon: Bronson Fitzgerald MD;  Location: [facility] MAIN OR;  Service: Neurosurgery;  Laterality: Left;    MOLE REMOVAL      on foot     OOPHORECTOMY Left     B9    TUBAL ABDOMINAL LIGATION        General Information       Row Name 12/05/24 1036          OT Time and Intention    Document Type therapy note (daily note)  -KR     Mode of Treatment individual therapy;occupational therapy  -KR     Patient Effort good  -KR       Row Name 12/05/24 1036          General Information    Patient Profile Reviewed yes  -KR     Existing Precautions/Restrictions fall  L knee brace donned with mobility  -KR       Row Name 12/05/24 1036          Cognition    Orientation Status (Cognition) oriented x 3  -KR       Row Name 12/05/24 1036          Safety Issues/Impairments Affecting Functional Mobility     Impairments Affecting Function (Mobility) endurance/activity tolerance;pain;balance;strength  -KR               User Key  (r) = Recorded By, (t) = Taken By, (c) = Cosigned By      Initials Name Provider Type    KR Yuliana Masterson OT Occupational Therapist                     Mobility/ADL's       Row Name 12/05/24 1036          Bed Mobility    Supine-Sit [place] (Bed Mobility) supervision  -KR     Sit-Supine [place] (Bed Mobility) supervision  -KR     Comment, (Bed Mobility) pt performed log roll technique with only cues required  -KR       Row Name 12/05/24 1036          Transfers    Transfers sit-stand transfer  -KR       Row Name 12/05/24 1036          Sit-Stand Transfer    Sit-Stand [place] (Transfers) standby assist;supervision  -KR     Assistive Device (Sit-Stand Transfers) walker, front-wheeled  -       Row Name 12/05/24 1036          Functional Mobility    Functional Mobility- Ind. Level contact guard assist;1 person;standby assist  -KR     Functional Mobility- Device walker, front-wheeled  -KR     Functional Mobility- Comment Walked into the bathroom and stood at the sink- toilet -then to the shower all with CGA/SBA and using rwx while L knee brace donned  -       Row Name 12/05/24 1036          Activities of Daily Living    BADL Assessment/Intervention bathing;upper body dressing;lower body dressing;grooming;toileting  -       Row Name 12/05/24 1036          Mobility    Extremity Weight-bearing Status left lower extremity  -KR     Left Lower Extremity (Weight-bearing Status) weight-bearing as tolerated (WBAT)  with knee brace donned for mobility  -       Row Name 12/05/24 1036          Bathing Assessment/Intervention    [place] Level (Bathing) bathing skills;standby assist;contact guard assist  -KR     Position (Bathing) supported sitting  -KR     Comment, (Bathing) Able to cross LE's to wash lower portion of LEs and feet  -KR       Row Name 12/05/24 1036          Upper Body  Dressing Assessment/Training    [place] Level (Upper Body Dressing) upper body dressing skills;set up  -KR     Comment, (Upper Body Dressing) donning gown. able to doff bra with supervision  -KR       Row Name 12/05/24 1036          Lower Body Dressing Assessment/Training    [place] Level (Lower Body Dressing) lower body dressing skills;doff;don;socks;moderate assist (50% patient effort)  -KR     Comment, (Lower Body Dressing) pt able to don R sock without assist from therapist. Required assist to don L sock  -KR       Row Name 12/05/24 1036          Grooming Assessment/Training    [place] Level (Grooming) grooming skills;set up;standby assist;oral care regimen;wash face, hands  -KR     Comment, (Grooming) seated at the sink  -KR       Row Name 12/05/24 1036          Toileting Assessment/Training    [place] Level (Toileting) toileting skills;standby assist;adjust/manage clothing;contact guard assist  -KR     Position (Toileting) supported sitting;supported standing  -KR               User Key  (r) = Recorded By, (t) = Taken By, (c) = Cosigned By      Initials Name Provider Type    Yuliana Garrett OT Occupational Therapist                   Obj/Interventions       Row Name 12/05/24 1040          Balance    Static Sitting Balance supervision  -KR     Dynamic Sitting Balance supervision;standby assist  -KR     Position, Sitting Balance other (see comments)  seated on shower chair  -KR     Static Standing Balance supervision  -KR     Dynamic Standing Balance standby assist;contact guard  -KR     Position/Device Used, Standing Balance walker, front-wheeled;supported  -KR     Balance Interventions sitting;standing  -KR               User Key  (r) = Recorded By, (t) = Taken By, (c) = Cosigned By      Initials Name Provider Type    Yuliana Garrett OT Occupational Therapist                   Goals/Plan    No documentation.                  Clinical Impression       Row Name 12/05/24 1041          Pain  Assessment    Pretreatment Pain Rating 0/10 - no pain  -KR     Posttreatment Pain Rating 0/10 - no pain  -KR       Row Name 12/05/24 1041          Plan of Care Review    Plan of Care Reviewed With patient  -KR     Outcome Evaluation Pt seen today for OT with a focus on ADL participation in preparation for discharge home. She states she has been staying at her son's house since her recent back sx. Today she performed bed mobility with supervision (log roll technique). Stood with SBA/supervision and performed mobility into the bathroom particpating in toileting, grooming seated at the sink and showering. Required SBA/CGA for all showering. Discussed safety with showers at home when able. Pt states her son has a walk in shower with a seat. At her house she has a tub/shower combo, discussed use of a tub bench for safety with transfers and energy conservation. Able to doff/don her R sock, needed assist to don the L sock. Discussed use of reacher to assist with doffing L sock and sock aide to don. pt wore her L knee brace/sleeve with all mobility today. She sat in front of the sink for all grooming with SBA. Pt reports she plans to discharge home to her son's house.  -KR       Row Name 12/05/24 1041          Therapy Plan Review/Discharge Plan (OT)    Anticipated Discharge Disposition (OT) home with outpatient therapy services  -KR       Row Name 12/05/24 1041          Vital Signs    Pre Patient Position Supine  -KR     Intra Patient Position Standing  -KR     Post Patient Position Supine  -KR       Row Name 12/05/24 1041          Positioning and Restraints    Pre-Treatment Position in bed  -KR     Post Treatment Position bed  -KR     In Bed notified nsg;supine;call light within reach;encouraged to call for assist;exit alarm on  -KR               User Key  (r) = Recorded By, (t) = Taken By, (c) = Cosigned By      Initials Name Provider Type    Yuliana Garrett OT Occupational Therapist                   Outcome Measures        Row Name 12/05/24 0820          How much help from another person do you currently need...    Turning from your back to your side while in flat bed without using bedrails? 4  -PP     Moving from lying on back to sitting on the side of a flat bed without bedrails? 3  -PP     Moving to and from a bed to a chair (including a wheelchair)? 3  -PP     Standing up from a chair using your arms (e.g., wheelchair, bedside chair)? 3  -PP     Climbing 3-5 steps with a railing? 2  -PP     To walk in hospital room? 3  -PP     AM-PAC 6 Clicks Score (PT) 18  -PP               User Key  (r) = Recorded By, (t) = Taken By, (c) = Cosigned By      Initials Name Provider Type    PP Jacinda Hopson, RN Registered Nurse                    Occupational Therapy Education       Title: PT OT SLP Therapies (Resolved)       Topic: Occupational Therapy (Resolved)       Point: ADL training (Resolved)       Description:   Instruct learner(s) on proper safety adaptation and remediation techniques during self care or transfers.   Instruct in proper use of assistive devices.                  Learning Progress Summary            Patient Acceptance, E, VU by ES at 12/4/2024 1448    Comment: patient and daughter provided education on role of OT in acute care setting and discharge planning process for time of discharge                      Point: Precautions (Resolved)       Description:   Instruct learner(s) on prescribed precautions during self-care and functional transfers.                  Learning Progress Summary            Patient Acceptance, E, VU by ES at 12/4/2024 1448    Comment: patient and daughter provided education on role of OT in acute care setting and discharge planning process for time of discharge                      Point: Body mechanics (Resolved)       Description:   Instruct learner(s) on proper positioning and spine alignment during self-care, functional mobility activities and/or exercises.                  Learning Progress  Summary            Patient Acceptance, E, VU by BRUNO at 12/4/2024 7153    Comment: patient and daughter provided education on role of OT in acute care setting and discharge planning process for time of discharge                                      User Key       Initials Effective Dates Name Provider Type Discipline    BRUNO 02/22/23 -  Gin Sampson, OTR/L, CSRS Occupational Therapist OT                  OT Recommendation and Plan     Plan of Care Review  Plan of Care Reviewed With: patient  Outcome Evaluation: Pt seen today for OT with a focus on ADL participation in preparation for discharge home. She states she has been staying at her son's house since her recent back sx. Today she performed bed mobility with supervision (log roll technique). Stood with SBA/supervision and performed mobility into the bathroom particpating in toileting, grooming seated at the sink and showering. Required SBA/CGA for all showering. Discussed safety with showers at home when able. Pt states her son has a walk in shower with a seat. At her house she has a tub/shower combo, discussed use of a tub bench for safety with transfers and energy conservation. Able to doff/don her R sock, needed assist to don the L sock. Discussed use of reacher to assist with doffing L sock and sock aide to don. pt wore her L knee brace/sleeve with all mobility today. She sat in front of the sink for all grooming with SBA. Pt reports she plans to discharge home to her son's house.     Time Calculation:         Time Calculation- OT       Row Name 12/05/24 1044             Time Calculation- OT    OT Start Time 0836  -KR      OT Stop Time 0915  -KR      OT Time Calculation (min) 39 min  -KR      Total Timed Code Minutes- OT 39 minute(s)  -KR      OT Received On 12/05/24  -KR      OT - Next Appointment 12/06/24  -KR         Timed Charges    01554 - OT Self Care/Mgmt Minutes 39  -KR         Total Minutes    Timed Charges Total Minutes 39  -KR       Total Minutes 39   -TRACY                User Key  (r) = Recorded By, (t) = Taken By, (c) = Cosigned By      Initials Name Provider Type    Yuliana Garrett OT Occupational Therapist                  Therapy Charges for Today       Code Description Service Date Service Provider Modifiers Qty    63997752825 HC OT SELF CARE/MGMT/TRAIN EA 15 MIN 12/5/2024 Yuliana Masterson OT GO 3                 Yuliana Masterson OT  12/5/2024

## 2025-01-17 NOTE — FLOWSHEET NOTE
01/16/25 2233   Assessment   Charting Type Admission   Psychosocial   Psychosocial (WDL) WDL   Neurological   Neuro (WDL) X   Level of Consciousness 0   Orientation Level Oriented to person;Oriented to place;Oriented to time;Oriented to situation   Cognition Appropriate judgement;Appropriate safety awareness;Appropriate attention/concentration;Follows commands   Speech Delayed responses   R Hand  Absent   L Hand  Weak   Des Coma Scale   Eye Opening 4   Best Verbal Response 5   Best Motor Response 6   [place] Coma Scale Score 15   HEENT (Head, Ears, Eyes, Nose, & Throat)   HEENT (WDL) X   Teeth Dentures upper   Respiratory   Respiratory Pattern Regular   Respiratory Depth Normal   Respiratory Quality/Effort Unlabored   Breath Sounds   Right Upper Lobe Clear   Right Middle Lobe Clear   Right Lower Lobe Clear;Diminished   Left Upper Lobe Clear   Left Lower Lobe Clear;Diminished   Cardiac   Cardiac (WDL) WDL   Gastrointestinal   Abdominal (WDL) X   Abdomen Inspection Rounded;Soft   RUQ Bowel Sounds Active   LUQ Bowel Sounds Active   RLQ Bowel Sounds Active   LLQ Bowel Sounds Active   Genitourinary   Genitourinary (WDL) WDL   Peripheral Vascular   Peripheral Vascular (WDL) WDL   Anti-Embolism   Anti-Embolism Devices Pneumatic compression devices, below knee   Laterality Bilateral   Anti-Embolism Intervention On   Skin Integumentary    Skin Integumentary (WDL) WDL   Musculoskeletal   Musculoskeletal (WDL) X   RUE Flaccid   LUE Weakness   RL Extremity Flaccid   LL Extremity Weakness

## 2025-01-17 NOTE — PLAN OF CARE
Problem: Chronic Conditions and Co-morbidities  Goal: Patient's chronic conditions and co-morbidity symptoms are monitored and maintained or improved  1/17/2025 1602 by Salome Fuentes RN  Outcome: Progressing  1/17/2025 0423 by Wyatt Vinson RN  Outcome: Progressing  Flowsheets (Taken 1/16/2025 1455 by Juliana Bolton RN)  Care Plan - Patient's Chronic Conditions and Co-Morbidity Symptoms are Monitored and Maintained or Improved:   Monitor and assess patient's chronic conditions and comorbid symptoms for stability, deterioration, or improvement   Collaborate with multidisciplinary team to address chronic and comorbid conditions and prevent exacerbation or deterioration   Update acute care plan with appropriate goals if chronic or comorbid symptoms are exacerbated and prevent overall improvement and discharge     Problem: Discharge Planning  Goal: Discharge to home or other facility with appropriate resources  1/17/2025 1602 by Salome Fuentes RN  Outcome: Progressing  1/17/2025 0423 by Wyatt Vinson RN  Outcome: Progressing  Flowsheets (Taken 1/16/2025 1455 by Juliana Bolton RN)  Discharge to home or other facility with appropriate resources: Identify barriers to discharge with patient and caregiver     Problem: Skin/Tissue Integrity  Goal: Absence of new skin breakdown  Description: 1.  Monitor for areas of redness and/or skin breakdown  2.  Assess vascular access sites hourly  3.  Every 4-6 hours minimum:  Change oxygen saturation probe site  4.  Every 4-6 hours:  If on nasal continuous positive airway pressure, respiratory therapy assess nares and determine need for appliance change or resting period.  1/17/2025 1602 by Salome Fuentes RN  Outcome: Progressing  1/17/2025 0423 by Wyatt Vinson RN  Outcome: Progressing     Problem: Safety - Adult  Goal: Free from fall injury  1/17/2025 1602 by Salome Fuentes RN  Outcome: Progressing  1/17/2025 0423 by Wyatt Vinson RN  Outcome:  Progressing

## 2025-01-17 NOTE — PLAN OF CARE
Problem: Chronic Conditions and Co-morbidities  Goal: Patient's chronic conditions and co-morbidity symptoms are monitored and maintained or improved  Outcome: Progressing  Flowsheets (Taken 1/16/2025 4275 by Juliana Bolton, RN)  Care Plan - Patient's Chronic Conditions and Co-Morbidity Symptoms are Monitored and Maintained or Improved:   Monitor and assess patient's chronic conditions and comorbid symptoms for stability, deterioration, or improvement   Collaborate with multidisciplinary team to address chronic and comorbid conditions and prevent exacerbation or deterioration   Update acute care plan with appropriate goals if chronic or comorbid symptoms are exacerbated and prevent overall improvement and discharge     Problem: Discharge Planning  Goal: Discharge to home or other facility with appropriate resources  Outcome: Progressing  Flowsheets (Taken 1/16/2025 1457 by Juliana Bolton, RN)  Discharge to home or other facility with appropriate resources: Identify barriers to discharge with patient and caregiver     Problem: Skin/Tissue Integrity  Goal: Absence of new skin breakdown  Description: 1.  Monitor for areas of redness and/or skin breakdown  2.  Assess vascular access sites hourly  3.  Every 4-6 hours minimum:  Change oxygen saturation probe site  4.  Every 4-6 hours:  If on nasal continuous positive airway pressure, respiratory therapy assess nares and determine need for appliance change or resting period.  Outcome: Progressing     Problem: Safety - Adult  Goal: Free from fall injury  Outcome: Progressing
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
## 2025-01-17 NOTE — CARE COORDINATION
Pt is a resident of [facility].  Plan to return there upon DC.  No DC needs noted at this time. RN to call report at [phone].

## 2025-01-18 VITALS
HEART RATE: 64 BPM | RESPIRATION RATE: 16 BRPM | DIASTOLIC BLOOD PRESSURE: 49 MMHG | TEMPERATURE: 98.6 F | OXYGEN SATURATION: 96 % | SYSTOLIC BLOOD PRESSURE: 145 MMHG

## 2025-01-18 LAB
ALBUMIN SERPL-MCNC: 3.4 G/DL (ref 3.4–4.8)
ALBUMIN/GLOB SERPL: 1.2 {RATIO} (ref 0.8–2)
ALP SERPL-CCNC: 143 U/L (ref 25–100)
ALT SERPL-CCNC: 51 U/L (ref 4–36)
ANION GAP SERPL CALCULATED.3IONS-SCNC: 12 MMOL/L (ref 3–16)
AST SERPL-CCNC: 53 U/L (ref 8–33)
BASOPHILS # BLD: 0 K/UL (ref 0–0.1)
BASOPHILS NFR BLD: 0.2 %
BILIRUB SERPL-MCNC: 0.4 MG/DL (ref 0.3–1.2)
BUN SERPL-MCNC: 25 MG/DL (ref 6–20)
CALCIUM SERPL-MCNC: 9.8 MG/DL (ref 8.5–10.5)
CHLORIDE SERPL-SCNC: 104 MMOL/L (ref 98–107)
CO2 SERPL-SCNC: 23 MMOL/L (ref 20–30)
CREAT SERPL-MCNC: 1.1 MG/DL (ref 0.4–1.2)
EOSINOPHIL # BLD: 0.1 K/UL (ref 0–0.4)
EOSINOPHIL NFR BLD: 1.1 %
ERYTHROCYTE [DISTWIDTH] IN BLOOD BY AUTOMATED COUNT: 13.9 % (ref 11–16)
GFR SERPLBLD CREATININE-BSD FMLA CKD-EPI: 53 ML/MIN/{1.73_M2}
GLOBULIN SER CALC-MCNC: 2.9 G/DL
GLUCOSE BLD-MCNC: 277 MG/DL (ref 74–106)
GLUCOSE BLD-MCNC: 93 MG/DL (ref 74–106)
GLUCOSE SERPL-MCNC: 81 MG/DL (ref 74–106)
HCT VFR BLD AUTO: 29.6 % (ref 37–47)
HGB BLD-MCNC: 10 G/DL (ref 11.5–16.5)
IMM GRANULOCYTES # BLD: 0 K/UL
IMM GRANULOCYTES NFR BLD: 0.2 % (ref 0–5)
LYMPHOCYTES # BLD: 1.1 K/UL (ref 1.5–4)
LYMPHOCYTES NFR BLD: 18 %
MAGNESIUM SERPL-MCNC: 2.2 MG/DL (ref 1.7–2.4)
MCH RBC QN AUTO: 29.5 PG (ref 27–32)
MCHC RBC AUTO-ENTMCNC: 33.8 G/DL (ref 31–35)
MCV RBC AUTO: 87.3 FL (ref 80–100)
MONOCYTES # BLD: 0.6 K/UL (ref 0.2–0.8)
MONOCYTES NFR BLD: 9.1 %
NEUTROPHILS # BLD: 4.5 K/UL (ref 2–7.5)
NEUTS SEG NFR BLD: 71.4 %
PERFORMED ON: ABNORMAL
PERFORMED ON: NORMAL
PLATELET # BLD AUTO: 161 K/UL (ref 150–400)
PMV BLD AUTO: 11.5 FL (ref 6–10)
POTASSIUM SERPL-SCNC: 3.8 MMOL/L (ref 3.4–5.1)
PROT SERPL-MCNC: 6.3 G/DL (ref 6.4–8.3)
RBC # BLD AUTO: 3.39 M/UL (ref 3.8–5.8)
SODIUM SERPL-SCNC: 139 MMOL/L (ref 136–145)
WBC # BLD AUTO: 6.4 K/UL (ref 4–11)

## 2025-01-18 PROCEDURE — 94761 N-INVAS EAR/PLS OXIMETRY MLT: CPT

## 2025-01-18 PROCEDURE — 36415 COLL VENOUS BLD VENIPUNCTURE: CPT

## 2025-01-18 PROCEDURE — 85025 COMPLETE CBC W/AUTO DIFF WBC: CPT

## 2025-01-18 PROCEDURE — 80053 COMPREHEN METABOLIC PANEL: CPT

## 2025-01-18 PROCEDURE — 2580000003 HC RX 258: Performed by: INTERNAL MEDICINE

## 2025-01-18 PROCEDURE — 6370000000 HC RX 637 (ALT 250 FOR IP): Performed by: PHYSICIAN ASSISTANT

## 2025-01-18 PROCEDURE — 99238 HOSP IP/OBS DSCHRG MGMT 30/<: CPT | Performed by: PHYSICIAN ASSISTANT

## 2025-01-18 PROCEDURE — 6360000002 HC RX W HCPCS: Performed by: INTERNAL MEDICINE

## 2025-01-18 PROCEDURE — 83735 ASSAY OF MAGNESIUM: CPT

## 2025-01-18 PROCEDURE — 6370000000 HC RX 637 (ALT 250 FOR IP): Performed by: INTERNAL MEDICINE

## 2025-01-18 RX ORDER — HYDROCODONE BITARTRATE AND ACETAMINOPHEN 5; 325 MG/1; MG/1
1 TABLET ORAL EVERY 8 HOURS PRN
Qty: 90 TABLET | Refills: 0 | Status: ON HOLD | OUTPATIENT
Start: 2025-01-18 | End: 2025-01-22

## 2025-01-18 RX ORDER — CEFUROXIME AXETIL 250 MG/1
250 TABLET ORAL 2 TIMES DAILY
Qty: 10 TABLET | Refills: 0 | Status: ON HOLD | OUTPATIENT
Start: 2025-01-19 | End: 2025-01-22

## 2025-01-18 RX ORDER — CLOPIDOGREL BISULFATE 75 MG/1
75 TABLET ORAL DAILY
Status: DISCONTINUED | OUTPATIENT
Start: 2025-01-18 | End: 2025-01-18 | Stop reason: HOSPADM

## 2025-01-18 RX ORDER — LACTULOSE 10 G/15ML
10 SOLUTION ORAL EVERY MORNING
Qty: 450 ML | Refills: 0 | Status: ON HOLD | OUTPATIENT
Start: 2025-01-18 | End: 2025-01-23 | Stop reason: HOSPADM

## 2025-01-18 RX ORDER — ASPIRIN 81 MG/1
81 TABLET, CHEWABLE ORAL DAILY
Status: DISCONTINUED | OUTPATIENT
Start: 2025-01-18 | End: 2025-01-18 | Stop reason: HOSPADM

## 2025-01-18 RX ORDER — PANTOPRAZOLE SODIUM 40 MG/1
40 TABLET, DELAYED RELEASE ORAL DAILY
Qty: 30 TABLET | Refills: 0 | Status: SHIPPED | OUTPATIENT
Start: 2025-01-18

## 2025-01-18 RX ORDER — INSULIN GLARGINE 100 [IU]/ML
30 INJECTION, SOLUTION SUBCUTANEOUS NIGHTLY
Status: DISCONTINUED | OUTPATIENT
Start: 2025-01-18 | End: 2025-01-18 | Stop reason: HOSPADM

## 2025-01-18 RX ADMIN — Medication 300 MG: at 09:32

## 2025-01-18 RX ADMIN — Medication 2500 MCG: at 09:20

## 2025-01-18 RX ADMIN — ALOGLIPTIN 12.5 MG: 12.5 TABLET, FILM COATED ORAL at 09:20

## 2025-01-18 RX ADMIN — Medication 1000 UNITS: at 09:20

## 2025-01-18 RX ADMIN — EMPAGLIFLOZIN 10 MG: 10 TABLET, FILM COATED ORAL at 09:21

## 2025-01-18 RX ADMIN — SODIUM CHLORIDE 40 MG: 9 INJECTION INTRAMUSCULAR; INTRAVENOUS; SUBCUTANEOUS at 09:20

## 2025-01-18 RX ADMIN — ASPIRIN 81 MG CHEWABLE TABLET 81 MG: 81 TABLET CHEWABLE at 10:13

## 2025-01-18 RX ADMIN — INSULIN LISPRO 5 UNITS: 100 INJECTION, SOLUTION INTRAVENOUS; SUBCUTANEOUS at 12:04

## 2025-01-18 RX ADMIN — METFORMIN HYDROCHLORIDE 500 MG: 500 TABLET ORAL at 09:20

## 2025-01-18 RX ADMIN — CLOPIDOGREL BISULFATE 75 MG: 75 TABLET ORAL at 10:13

## 2025-01-18 RX ADMIN — CARVEDILOL 6.25 MG: 6.25 TABLET, FILM COATED ORAL at 09:20

## 2025-01-18 RX ADMIN — INSULIN LISPRO 2 UNITS: 100 INJECTION, SOLUTION INTRAVENOUS; SUBCUTANEOUS at 11:31

## 2025-01-18 NOTE — PLAN OF CARE
Problem: Chronic Conditions and Co-morbidities  Goal: Patient's chronic conditions and co-morbidity symptoms are monitored and maintained or improved  1/17/2025 2116 by Chey Clay RN  Outcome: Progressing  1/17/2025 1602 by Salome Fuentes RN  Outcome: Progressing     Problem: Discharge Planning  Goal: Discharge to home or other facility with appropriate resources  1/17/2025 2116 by Chey Clay RN  Outcome: Progressing  1/17/2025 1602 by Salome Fuentes RN  Outcome: Progressing     Problem: Skin/Tissue Integrity  Goal: Absence of new skin breakdown  Description: 1.  Monitor for areas of redness and/or skin breakdown  2.  Assess vascular access sites hourly  3.  Every 4-6 hours minimum:  Change oxygen saturation probe site  4.  Every 4-6 hours:  If on nasal continuous positive airway pressure, respiratory therapy assess nares and determine need for appliance change or resting period.  1/17/2025 2116 by Chey Clay RN  Outcome: Progressing  1/17/2025 1602 by Salome Fuentes RN  Outcome: Progressing     Problem: Safety - Adult  Goal: Free from fall injury  1/17/2025 2116 by Chey Clay RN  Outcome: Progressing  1/17/2025 1602 by Salome Fuentes RN  Outcome: Progressing

## 2025-01-18 NOTE — DISCHARGE SUMMARY
nontender, no voluntary guarding  Musculoskeletal:  No cyanosis or obvious acute deformity., right sided hemiplegia  Integument:  Warm and dry.   Neurologic:  Alert & oriented x 3, right sided hemiplegia. Dysarthric speech.  Psychiatric:  Speech and behavior appropriate, flat affect          Disposition: SNF  Discharged Condition: Stable  Activity: activity as tolerated  Diet: cardiac diet and diabetic diet, minced and mosit with nectar thickened liquids  Aspiration precautions   Follow Up: Primary Care Provider in 1 week. Follow up with neuro as scheduled.   PT/OT/SLP consults at nursing facility   Patient to proceed with the following lab (cbc, cmp) around 1/27/25  As mentioned above, the nursing home will need to perform bladder training with the patient and discuss with PCP Dr. Ambrosio on Monday to determine how to proceed from there. Will need to clamp catheter and unclamp when patient reports she needs to urinate. Document how much UOP she when unclamping. If she does not request unclamping please do so at least every 4 hours. Call Dr. Ambrosio on 1/20 for further directions.       Labs: For convenience and continuity at follow-up the following most recent labs are provided:       Lab Results   Component Value Date/Time    WBC 6.4 01/18/2025 04:05 AM    HGB 10.0 01/18/2025 04:05 AM    HCT 29.6 01/18/2025 04:05 AM     01/18/2025 04:05 AM          Lab Results   Component Value Date/Time     01/18/2025 04:05 AM    K 3.8 01/18/2025 04:05 AM    K 3.8 01/17/2025 04:05 AM     01/18/2025 04:05 AM    CO2 23 01/18/2025 04:05 AM    BUN 25 01/18/2025 04:05 AM    CREATININE 1.1 01/18/2025 04:05 AM          Lab Results   Component Value Date/Time    ALKPHOS 143 01/18/2025 04:05 AM    ALT 51 01/18/2025 04:05 AM    AST 53 01/18/2025 04:05 AM    BILITOT 0.4 01/18/2025 04:05 AM         Discharge Medications:      Current Discharge Medication List             Details   cefUROXime (CEFTIN) 250 MG tablet Take 1  irregular blood glucose. Dispense sufficient amount for indicated testing frequency plus additional to accommodate PRN testing needs. Insurance preferred brand  Qty: 100 strip, Refills: 5    Comments: (1) Identify specific brand and product.  (2) Include specific quantity.  (3) Include frequency.  Associated Diagnoses: Type 2 diabetes mellitus with diabetic neuropathy, with long-term current use of insulin (HCC)      aspirin 81 MG chewable tablet Take 1 tablet by mouth daily  Qty: 30 tablet, Refills: 0             14 minutes spent discharging this patient.     The case was discussed with Dr. Ambrosio by phone and plan of care reviewed          Signed:  Electronically signed by VIGNESH Artis on 1/18/2025 at 11:03 AM       Thank you Lazarus Ambrosio MD for the opportunity to be involved in this patient's care. If you have any questions or concerns please feel free to contact me at [phone].

## 2025-01-18 NOTE — PLAN OF CARE
Problem: Chronic Conditions and Co-morbidities  Goal: Patient's chronic conditions and co-morbidity symptoms are monitored and maintained or improved  Outcome: Adequate for Discharge     Problem: Discharge Planning  Goal: Discharge to home or other facility with appropriate resources  Outcome: Adequate for Discharge     Problem: Skin/Tissue Integrity  Goal: Absence of new skin breakdown  Description: 1.  Monitor for areas of redness and/or skin breakdown  2.  Assess vascular access sites hourly  3.  Every 4-6 hours minimum:  Change oxygen saturation probe site  4.  Every 4-6 hours:  If on nasal continuous positive airway pressure, respiratory therapy assess nares and determine need for appliance change or resting period.  Outcome: Adequate for Discharge     Problem: Safety - Adult  Goal: Free from fall injury  Outcome: Adequate for Discharge

## 2025-01-18 NOTE — PLAN OF CARE
Problem: Chronic Conditions and Co-morbidities  Goal: Patient's chronic conditions and co-morbidity symptoms are monitored and maintained or improved  1/17/2025 2117 by Chey Clay RN  Outcome: Progressing  1/17/2025 2116 by Chey Clay RN  Outcome: Progressing  1/17/2025 1602 by Salome Fuentes RN  Outcome: Progressing     Problem: Discharge Planning  Goal: Discharge to home or other facility with appropriate resources  1/17/2025 2117 by Chey Clay RN  Outcome: Progressing  1/17/2025 2116 by Chey Clay RN  Outcome: Progressing  1/17/2025 1602 by Salome Fuentes RN  Outcome: Progressing     Problem: Skin/Tissue Integrity  Goal: Absence of new skin breakdown  Description: 1.  Monitor for areas of redness and/or skin breakdown  2.  Assess vascular access sites hourly  3.  Every 4-6 hours minimum:  Change oxygen saturation probe site  4.  Every 4-6 hours:  If on nasal continuous positive airway pressure, respiratory therapy assess nares and determine need for appliance change or resting period.  1/17/2025 2117 by Chey Clay RN  Outcome: Progressing  1/17/2025 2116 by Chey Clay RN  Outcome: Progressing  1/17/2025 1602 by Salome Fuentes RN  Outcome: Progressing     Problem: Safety - Adult  Goal: Free from fall injury  1/17/2025 2117 by Chey Clay RN  Outcome: Progressing  1/17/2025 2116 by Chey Clay RN  Outcome: Progressing  1/17/2025 1602 by Salome Fuentes RN  Outcome: Progressing

## 2025-01-18 NOTE — DISCHARGE INSTRUCTIONS
Disposition: SNF  Discharged Condition: Stable  Activity: activity as tolerated  Diet: cardiac diet and diabetic diet, minced and mosit with nectar thickened liquids  Aspiration precautions   Follow Up: Primary Care Provider in 1 week. Follow up with neuro as scheduled.   PT/OT/SLP consults at nursing facility   Patient to proceed with the following lab (cbc, cmp) around 1/27/25  As mentioned above, the nursing home will need to perform bladder training with the patient and discuss with PCP Dr. Ambrosio on Monday to determine how to proceed from there. Will need to clamp catheter and unclamp when patient reports she needs to urinate. Document how much UOP she when unclamping. If she does not request unclamping please do so at least every 4 hours. Call Dr. Ambrosio on 1/20 for further directions.

## 2025-01-18 NOTE — PROGRESS NOTES
Report given to Chikis COLLINS at [facility]. Nurse stated understanding of instructions. IV and telemetry removed.  Copy of discharge summary and AVS to be sent with pt.

## 2025-01-19 LAB
BACTERIA UR CULT: ABNORMAL
ORGANISM: ABNORMAL

## 2025-01-21 ENCOUNTER — APPOINTMENT (OUTPATIENT)
Dept: CT IMAGING | Facility: HOSPITAL | Age: 75
End: 2025-01-21
Attending: EMERGENCY MEDICINE
Payer: MEDICARE

## 2025-01-21 ENCOUNTER — HOSPITAL ENCOUNTER (OUTPATIENT)
Facility: HOSPITAL | Age: 75
Setting detail: OBSERVATION
Discharge: OTHER INSTITUTION | End: 2025-01-23
Attending: EMERGENCY MEDICINE | Admitting: INTERNAL MEDICINE
Payer: MEDICARE

## 2025-01-21 ENCOUNTER — TELEPHONE (OUTPATIENT)
Age: 75
End: 2025-01-21

## 2025-01-21 DIAGNOSIS — R53.83 LETHARGY: ICD-10-CM

## 2025-01-21 DIAGNOSIS — N17.9 AKI (ACUTE KIDNEY INJURY) (HCC): ICD-10-CM

## 2025-01-21 DIAGNOSIS — M51.360 DEGENERATION OF INTERVERTEBRAL DISC OF LUMBAR REGION WITH DISCOGENIC BACK PAIN: ICD-10-CM

## 2025-01-21 DIAGNOSIS — Z79.4 TYPE 2 DIABETES MELLITUS WITH DIABETIC NEUROPATHY, WITH LONG-TERM CURRENT USE OF INSULIN (HCC): ICD-10-CM

## 2025-01-21 DIAGNOSIS — N30.00 ACUTE CYSTITIS WITHOUT HEMATURIA: Primary | ICD-10-CM

## 2025-01-21 DIAGNOSIS — E11.40 TYPE 2 DIABETES MELLITUS WITH DIABETIC NEUROPATHY, WITH LONG-TERM CURRENT USE OF INSULIN (HCC): ICD-10-CM

## 2025-01-21 DIAGNOSIS — R10.30 LOWER ABDOMINAL PAIN: ICD-10-CM

## 2025-01-21 PROBLEM — R10.9 ABDOMINAL PAIN: Status: ACTIVE | Noted: 2025-01-21

## 2025-01-21 PROBLEM — N18.32 ACUTE KIDNEY INJURY SUPERIMPOSED ON STAGE 3B CHRONIC KIDNEY DISEASE (HCC): Status: ACTIVE | Noted: 2022-03-18

## 2025-01-21 LAB
ALBUMIN SERPL-MCNC: 3.5 G/DL (ref 3.4–4.8)
ALBUMIN/GLOB SERPL: 1.2 {RATIO} (ref 0.8–2)
ALP SERPL-CCNC: 175 U/L (ref 25–100)
ALT SERPL-CCNC: 76 U/L (ref 4–36)
AMMONIA PLAS-SCNC: 47 MCG/DL (ref 19–87)
ANION GAP SERPL CALCULATED.3IONS-SCNC: 14 MMOL/L (ref 3–16)
AST SERPL-CCNC: 68 U/L (ref 8–33)
BACTERIA URNS QL MICRO: ABNORMAL /HPF
BASOPHILS # BLD: 0 K/UL (ref 0–0.1)
BASOPHILS NFR BLD: 0.5 %
BILIRUB SERPL-MCNC: 0.6 MG/DL (ref 0.3–1.2)
BILIRUB UR QL STRIP.AUTO: NEGATIVE
BUN SERPL-MCNC: 26 MG/DL (ref 6–20)
CALCIUM SERPL-MCNC: 10 MG/DL (ref 8.5–10.5)
CHLORIDE SERPL-SCNC: 99 MMOL/L (ref 98–107)
CLARITY UR: CLEAR
CO2 SERPL-SCNC: 24 MMOL/L (ref 20–30)
COLOR UR: YELLOW
CREAT SERPL-MCNC: 1.4 MG/DL (ref 0.4–1.2)
EOSINOPHIL # BLD: 0.1 K/UL (ref 0–0.4)
EOSINOPHIL NFR BLD: 1.5 %
EPI CELLS #/AREA URNS HPF: ABNORMAL /HPF (ref 0–5)
ERYTHROCYTE [DISTWIDTH] IN BLOOD BY AUTOMATED COUNT: 14.7 % (ref 11–16)
GFR SERPLBLD CREATININE-BSD FMLA CKD-EPI: 40 ML/MIN/{1.73_M2}
GLOBULIN SER CALC-MCNC: 3 G/DL
GLUCOSE BLD-MCNC: 113 MG/DL (ref 74–106)
GLUCOSE BLD-MCNC: 174 MG/DL (ref 74–106)
GLUCOSE BLD-MCNC: 226 MG/DL (ref 74–106)
GLUCOSE SERPL-MCNC: 186 MG/DL (ref 74–106)
GLUCOSE UR STRIP.AUTO-MCNC: 500 MG/DL
HCT VFR BLD AUTO: 29.8 % (ref 37–47)
HGB BLD-MCNC: 10.2 G/DL (ref 11.5–16.5)
HGB UR QL STRIP.AUTO: NEGATIVE
IMM GRANULOCYTES # BLD: 0 K/UL
IMM GRANULOCYTES NFR BLD: 0.2 % (ref 0–5)
KETONES UR STRIP.AUTO-MCNC: NEGATIVE MG/DL
LACTATE BLDV-SCNC: 2.1 MMOL/L (ref 0.4–2)
LEUKOCYTE ESTERASE UR QL STRIP.AUTO: ABNORMAL
LYMPHOCYTES # BLD: 1.1 K/UL (ref 1.5–4)
LYMPHOCYTES NFR BLD: 18.9 %
MCH RBC QN AUTO: 29.7 PG (ref 27–32)
MCHC RBC AUTO-ENTMCNC: 34.2 G/DL (ref 31–35)
MCV RBC AUTO: 86.9 FL (ref 80–100)
MONOCYTES # BLD: 0.6 K/UL (ref 0.2–0.8)
MONOCYTES NFR BLD: 11 %
NEUTROPHILS # BLD: 4 K/UL (ref 2–7.5)
NEUTS SEG NFR BLD: 67.9 %
NITRITE UR QL STRIP.AUTO: NEGATIVE
PERFORMED ON: ABNORMAL
PH UR STRIP.AUTO: 5 [PH] (ref 5–8)
PLATELET # BLD AUTO: 175 K/UL (ref 150–400)
PMV BLD AUTO: 11 FL (ref 6–10)
POTASSIUM SERPL-SCNC: 3.7 MMOL/L (ref 3.4–5.1)
PROCALCITONIN SERPL IA-MCNC: 0.26 NG/ML (ref 0–0.15)
PROT SERPL-MCNC: 6.5 G/DL (ref 6.4–8.3)
PROT UR STRIP.AUTO-MCNC: NEGATIVE MG/DL
RBC # BLD AUTO: 3.43 M/UL (ref 3.8–5.8)
RBC #/AREA URNS HPF: ABNORMAL /HPF (ref 0–4)
SODIUM SERPL-SCNC: 137 MMOL/L (ref 136–145)
SP GR UR STRIP.AUTO: 1.01 (ref 1–1.03)
UA COMPLETE W REFLEX CULTURE PNL UR: ABNORMAL
UA DIPSTICK W REFLEX MICRO PNL UR: YES
URN SPEC COLLECT METH UR: ABNORMAL
UROBILINOGEN UR STRIP-ACNC: 0.2 E.U./DL
WBC # BLD AUTO: 5.8 K/UL (ref 4–11)
WBC #/AREA URNS HPF: ABNORMAL /HPF (ref 0–5)
YEAST URNS QL MICRO: PRESENT /HPF

## 2025-01-21 PROCEDURE — 92523 SPEECH SOUND LANG COMPREHEN: CPT

## 2025-01-21 PROCEDURE — 96360 HYDRATION IV INFUSION INIT: CPT

## 2025-01-21 PROCEDURE — 85025 COMPLETE CBC W/AUTO DIFF WBC: CPT

## 2025-01-21 PROCEDURE — G0378 HOSPITAL OBSERVATION PER HR: HCPCS

## 2025-01-21 PROCEDURE — 92610 EVALUATE SWALLOWING FUNCTION: CPT

## 2025-01-21 PROCEDURE — 81001 URINALYSIS AUTO W/SCOPE: CPT

## 2025-01-21 PROCEDURE — 83605 ASSAY OF LACTIC ACID: CPT

## 2025-01-21 PROCEDURE — 94760 N-INVAS EAR/PLS OXIMETRY 1: CPT

## 2025-01-21 PROCEDURE — 6360000002 HC RX W HCPCS: Performed by: PHYSICIAN ASSISTANT

## 2025-01-21 PROCEDURE — 96365 THER/PROPH/DIAG IV INF INIT: CPT

## 2025-01-21 PROCEDURE — 80053 COMPREHEN METABOLIC PANEL: CPT

## 2025-01-21 PROCEDURE — 51702 INSERT TEMP BLADDER CATH: CPT

## 2025-01-21 PROCEDURE — 36415 COLL VENOUS BLD VENIPUNCTURE: CPT

## 2025-01-21 PROCEDURE — 99222 1ST HOSP IP/OBS MODERATE 55: CPT | Performed by: INTERNAL MEDICINE

## 2025-01-21 PROCEDURE — 6370000000 HC RX 637 (ALT 250 FOR IP): Performed by: EMERGENCY MEDICINE

## 2025-01-21 PROCEDURE — 97166 OT EVAL MOD COMPLEX 45 MIN: CPT

## 2025-01-21 PROCEDURE — 6360000002 HC RX W HCPCS: Performed by: EMERGENCY MEDICINE

## 2025-01-21 PROCEDURE — 96372 THER/PROPH/DIAG INJ SC/IM: CPT

## 2025-01-21 PROCEDURE — 51798 US URINE CAPACITY MEASURE: CPT

## 2025-01-21 PROCEDURE — 84145 PROCALCITONIN (PCT): CPT

## 2025-01-21 PROCEDURE — 82140 ASSAY OF AMMONIA: CPT

## 2025-01-21 PROCEDURE — 97162 PT EVAL MOD COMPLEX 30 MIN: CPT

## 2025-01-21 PROCEDURE — 99285 EMERGENCY DEPT VISIT HI MDM: CPT

## 2025-01-21 PROCEDURE — 6370000000 HC RX 637 (ALT 250 FOR IP): Performed by: PHYSICIAN ASSISTANT

## 2025-01-21 PROCEDURE — 87040 BLOOD CULTURE FOR BACTERIA: CPT

## 2025-01-21 PROCEDURE — 74176 CT ABD & PELVIS W/O CONTRAST: CPT

## 2025-01-21 PROCEDURE — 96361 HYDRATE IV INFUSION ADD-ON: CPT

## 2025-01-21 PROCEDURE — 2580000003 HC RX 258: Performed by: PHYSICIAN ASSISTANT

## 2025-01-21 PROCEDURE — 2580000003 HC RX 258: Performed by: EMERGENCY MEDICINE

## 2025-01-21 RX ORDER — HYDROCODONE BITARTRATE AND ACETAMINOPHEN 5; 325 MG/1; MG/1
1 TABLET ORAL EVERY 8 HOURS PRN
Status: DISCONTINUED | OUTPATIENT
Start: 2025-01-21 | End: 2025-01-23 | Stop reason: HOSPADM

## 2025-01-21 RX ORDER — ALOGLIPTIN 12.5 MG/1
12.5 TABLET, FILM COATED ORAL DAILY
Status: DISCONTINUED | OUTPATIENT
Start: 2025-01-21 | End: 2025-01-21 | Stop reason: SDUPTHER

## 2025-01-21 RX ORDER — FLUCONAZOLE 100 MG/1
200 TABLET ORAL DAILY
Status: DISCONTINUED | OUTPATIENT
Start: 2025-01-21 | End: 2025-01-23 | Stop reason: HOSPADM

## 2025-01-21 RX ORDER — ALOGLIPTIN 12.5 MG/1
12.5 TABLET, FILM COATED ORAL DAILY
Status: DISCONTINUED | OUTPATIENT
Start: 2025-01-21 | End: 2025-01-23 | Stop reason: HOSPADM

## 2025-01-21 RX ORDER — DEXTROSE MONOHYDRATE 100 MG/ML
INJECTION, SOLUTION INTRAVENOUS CONTINUOUS PRN
Status: DISCONTINUED | OUTPATIENT
Start: 2025-01-21 | End: 2025-01-23 | Stop reason: HOSPADM

## 2025-01-21 RX ORDER — ACETAMINOPHEN 325 MG/1
650 TABLET ORAL EVERY 6 HOURS PRN
Status: DISCONTINUED | OUTPATIENT
Start: 2025-01-21 | End: 2025-01-23 | Stop reason: HOSPADM

## 2025-01-21 RX ORDER — ENOXAPARIN SODIUM 100 MG/ML
40 INJECTION SUBCUTANEOUS DAILY
Status: DISCONTINUED | OUTPATIENT
Start: 2025-01-21 | End: 2025-01-23 | Stop reason: HOSPADM

## 2025-01-21 RX ORDER — CLOPIDOGREL BISULFATE 75 MG/1
75 TABLET ORAL DAILY
Status: DISCONTINUED | OUTPATIENT
Start: 2025-01-21 | End: 2025-01-23 | Stop reason: HOSPADM

## 2025-01-21 RX ORDER — POLYETHYLENE GLYCOL 3350 17 G/17G
17 POWDER, FOR SOLUTION ORAL DAILY PRN
Status: DISCONTINUED | OUTPATIENT
Start: 2025-01-21 | End: 2025-01-23 | Stop reason: HOSPADM

## 2025-01-21 RX ORDER — IPRATROPIUM BROMIDE AND ALBUTEROL SULFATE 2.5; .5 MG/3ML; MG/3ML
1 SOLUTION RESPIRATORY (INHALATION) EVERY 4 HOURS PRN
Status: DISCONTINUED | OUTPATIENT
Start: 2025-01-21 | End: 2025-01-23 | Stop reason: HOSPADM

## 2025-01-21 RX ORDER — 0.9 % SODIUM CHLORIDE 0.9 %
500 INTRAVENOUS SOLUTION INTRAVENOUS ONCE
Status: COMPLETED | OUTPATIENT
Start: 2025-01-21 | End: 2025-01-21

## 2025-01-21 RX ORDER — INSULIN LISPRO 100 [IU]/ML
0-4 INJECTION, SOLUTION INTRAVENOUS; SUBCUTANEOUS
Status: DISCONTINUED | OUTPATIENT
Start: 2025-01-21 | End: 2025-01-23 | Stop reason: HOSPADM

## 2025-01-21 RX ORDER — LUBIPROSTONE 24 UG/1
24 CAPSULE ORAL 2 TIMES DAILY WITH MEALS
Status: DISCONTINUED | OUTPATIENT
Start: 2025-01-21 | End: 2025-01-22 | Stop reason: ALTCHOICE

## 2025-01-21 RX ORDER — VITAMIN B COMPLEX
1000 TABLET ORAL DAILY
Status: DISCONTINUED | OUTPATIENT
Start: 2025-01-21 | End: 2025-01-23 | Stop reason: HOSPADM

## 2025-01-21 RX ORDER — INSULIN GLARGINE 100 [IU]/ML
35 INJECTION, SOLUTION SUBCUTANEOUS NIGHTLY
Status: DISCONTINUED | OUTPATIENT
Start: 2025-01-21 | End: 2025-01-23 | Stop reason: HOSPADM

## 2025-01-21 RX ORDER — PANTOPRAZOLE SODIUM 40 MG/1
40 TABLET, DELAYED RELEASE ORAL DAILY
Status: DISCONTINUED | OUTPATIENT
Start: 2025-01-21 | End: 2025-01-23 | Stop reason: HOSPADM

## 2025-01-21 RX ORDER — ASPIRIN 81 MG/1
81 TABLET, CHEWABLE ORAL DAILY
Status: DISCONTINUED | OUTPATIENT
Start: 2025-01-21 | End: 2025-01-23 | Stop reason: HOSPADM

## 2025-01-21 RX ORDER — ACETAMINOPHEN 650 MG/1
650 SUPPOSITORY RECTAL EVERY 6 HOURS PRN
Status: DISCONTINUED | OUTPATIENT
Start: 2025-01-21 | End: 2025-01-23 | Stop reason: HOSPADM

## 2025-01-21 RX ORDER — INSULIN LISPRO 100 [IU]/ML
5 INJECTION, SOLUTION INTRAVENOUS; SUBCUTANEOUS
Status: DISCONTINUED | OUTPATIENT
Start: 2025-01-21 | End: 2025-01-23 | Stop reason: HOSPADM

## 2025-01-21 RX ORDER — CEFUROXIME AXETIL 250 MG/1
250 TABLET ORAL 2 TIMES DAILY
Status: DISCONTINUED | OUTPATIENT
Start: 2025-01-21 | End: 2025-01-23 | Stop reason: HOSPADM

## 2025-01-21 RX ORDER — SODIUM CHLORIDE 9 MG/ML
INJECTION, SOLUTION INTRAVENOUS CONTINUOUS
Status: ACTIVE | OUTPATIENT
Start: 2025-01-21 | End: 2025-01-22

## 2025-01-21 RX ORDER — MULTIVITAMIN WITH IRON
1000 TABLET ORAL DAILY
Status: DISCONTINUED | OUTPATIENT
Start: 2025-01-21 | End: 2025-01-23 | Stop reason: HOSPADM

## 2025-01-21 RX ORDER — ONDANSETRON 2 MG/ML
4 INJECTION INTRAMUSCULAR; INTRAVENOUS EVERY 6 HOURS PRN
Status: DISCONTINUED | OUTPATIENT
Start: 2025-01-21 | End: 2025-01-23 | Stop reason: HOSPADM

## 2025-01-21 RX ORDER — INSULIN ADMIN. SUPPLIES
1 INSULIN PEN (EA) SUBCUTANEOUS DAILY
COMMUNITY

## 2025-01-21 RX ORDER — BISACODYL 5 MG/1
5 TABLET, DELAYED RELEASE ORAL DAILY PRN
Status: DISCONTINUED | OUTPATIENT
Start: 2025-01-21 | End: 2025-01-23 | Stop reason: HOSPADM

## 2025-01-21 RX ORDER — ONDANSETRON 4 MG/1
4 TABLET, ORALLY DISINTEGRATING ORAL EVERY 8 HOURS PRN
Status: DISCONTINUED | OUTPATIENT
Start: 2025-01-21 | End: 2025-01-23 | Stop reason: HOSPADM

## 2025-01-21 RX ORDER — FERROUS SULFATE 324(65)MG
324 TABLET, DELAYED RELEASE (ENTERIC COATED) ORAL 2 TIMES DAILY WITH MEALS
Status: DISCONTINUED | OUTPATIENT
Start: 2025-01-21 | End: 2025-01-23 | Stop reason: HOSPADM

## 2025-01-21 RX ORDER — FLUCONAZOLE 150 MG/1
150 TABLET ORAL ONCE
Status: COMPLETED | OUTPATIENT
Start: 2025-01-21 | End: 2025-01-21

## 2025-01-21 RX ORDER — DONEPEZIL HYDROCHLORIDE 5 MG/1
5 TABLET, FILM COATED ORAL NIGHTLY
Status: DISCONTINUED | OUTPATIENT
Start: 2025-01-21 | End: 2025-01-23 | Stop reason: HOSPADM

## 2025-01-21 RX ORDER — CARVEDILOL 6.25 MG/1
6.25 TABLET ORAL 2 TIMES DAILY WITH MEALS
Status: DISCONTINUED | OUTPATIENT
Start: 2025-01-21 | End: 2025-01-23 | Stop reason: HOSPADM

## 2025-01-21 RX ORDER — IBUPROFEN 600 MG/1
1 TABLET ORAL PRN
Status: DISCONTINUED | OUTPATIENT
Start: 2025-01-21 | End: 2025-01-23 | Stop reason: HOSPADM

## 2025-01-21 RX ADMIN — INSULIN LISPRO 5 UNITS: 100 INJECTION, SOLUTION INTRAVENOUS; SUBCUTANEOUS at 12:03

## 2025-01-21 RX ADMIN — FLUCONAZOLE 200 MG: 100 TABLET ORAL at 10:31

## 2025-01-21 RX ADMIN — EMPAGLIFLOZIN 10 MG: 10 TABLET, FILM COATED ORAL at 10:31

## 2025-01-21 RX ADMIN — ASPIRIN 81 MG CHEWABLE TABLET 81 MG: 81 TABLET CHEWABLE at 10:30

## 2025-01-21 RX ADMIN — SODIUM CHLORIDE 500 ML: 9 INJECTION, SOLUTION INTRAVENOUS at 05:08

## 2025-01-21 RX ADMIN — CYANOCOBALAMIN TAB 500 MCG 1000 MCG: 500 TAB at 10:31

## 2025-01-21 RX ADMIN — ENOXAPARIN SODIUM 40 MG: 100 INJECTION SUBCUTANEOUS at 10:31

## 2025-01-21 RX ADMIN — CARVEDILOL 6.25 MG: 6.25 TABLET, FILM COATED ORAL at 10:36

## 2025-01-21 RX ADMIN — CEFUROXIME AXETIL 250 MG: 250 TABLET ORAL at 10:31

## 2025-01-21 RX ADMIN — CEFUROXIME AXETIL 250 MG: 250 TABLET ORAL at 19:59

## 2025-01-21 RX ADMIN — SODIUM CHLORIDE: 9 INJECTION, SOLUTION INTRAVENOUS at 09:18

## 2025-01-21 RX ADMIN — CARVEDILOL 6.25 MG: 6.25 TABLET, FILM COATED ORAL at 17:32

## 2025-01-21 RX ADMIN — METFORMIN HYDROCHLORIDE 500 MG: 500 TABLET ORAL at 17:33

## 2025-01-21 RX ADMIN — FERROUS SULFATE TAB EC 324 MG (65 MG FE EQUIVALENT) 324 MG: 324 (65 FE) TABLET DELAYED RESPONSE at 10:31

## 2025-01-21 RX ADMIN — FLUCONAZOLE 150 MG: 150 TABLET ORAL at 06:03

## 2025-01-21 RX ADMIN — CLOPIDOGREL BISULFATE 75 MG: 75 TABLET ORAL at 10:31

## 2025-01-21 RX ADMIN — CEFEPIME 1000 MG: 1 INJECTION, POWDER, FOR SOLUTION INTRAMUSCULAR; INTRAVENOUS at 04:38

## 2025-01-21 RX ADMIN — INSULIN LISPRO 1 UNITS: 100 INJECTION, SOLUTION INTRAVENOUS; SUBCUTANEOUS at 12:04

## 2025-01-21 RX ADMIN — PANTOPRAZOLE SODIUM 40 MG: 40 TABLET, DELAYED RELEASE ORAL at 10:31

## 2025-01-21 RX ADMIN — ALOGLIPTIN 12.5 MG: 12.5 TABLET, FILM COATED ORAL at 10:31

## 2025-01-21 RX ADMIN — DONEPEZIL HYDROCHLORIDE 5 MG: 5 TABLET, FILM COATED ORAL at 19:59

## 2025-01-21 RX ADMIN — FERROUS SULFATE TAB EC 324 MG (65 MG FE EQUIVALENT) 324 MG: 324 (65 FE) TABLET DELAYED RESPONSE at 17:32

## 2025-01-21 RX ADMIN — INSULIN LISPRO 5 UNITS: 100 INJECTION, SOLUTION INTRAVENOUS; SUBCUTANEOUS at 17:33

## 2025-01-21 ASSESSMENT — ENCOUNTER SYMPTOMS
SINUS PRESSURE: 0
WHEEZING: 0
EYE DISCHARGE: 0
NAUSEA: 0
ABDOMINAL PAIN: 1
BACK PAIN: 1
SORE THROAT: 0
VOMITING: 0
COUGH: 0
SHORTNESS OF BREATH: 0

## 2025-01-21 NOTE — FLOWSHEET NOTE
01/21/25 0800   Assessment   Charting Type Shift assessment   Psychosocial   Psychosocial (WDL) WDL   Neurological   Neuro (WDL) X   Level of Consciousness 1   Orientation Level Oriented to person;Oriented to situation;Oriented to place;Oriented to time   Cognition Follows commands;Appropriate judgement;Appropriate safety awareness;Appropriate attention/concentration   Speech Delayed responses;Clear   R Hand  Absent   L Hand  Weak   RUE Motor Response Flaccid   LUE Motor Response Responds to command   RLE Motor Response Flaccid   LLE Motor Response Responds to command   [place] Coma Scale   Eye Opening 4   Best Verbal Response 5   Best Motor Response 6   Des Coma Scale Score 15   HEENT (Head, Ears, Eyes, Nose, & Throat)   HEENT (WDL) X   Teeth Dentures upper   Respiratory   Respiratory (WDL) X   Respiratory Interventions Cough & deep breathe;H.O.B. elevated   Respiratory Pattern Regular   Respiratory Depth Shallow   Respiratory Quality/Effort Unlabored   L Breath Sounds Diminished   R Breath Sounds Diminished   Cough/Sputum   Cough None   Cardiac   Cardiac (WDL) WDL   Cardiac Monitor   Cardiac/Telemetry Monitor On No   Gastrointestinal   RUQ Bowel Sounds Active   LUQ Bowel Sounds Active   RLQ Bowel Sounds Active   LLQ Bowel Sounds Active   Abdomen Inspection Soft   Genitourinary   Genitourinary (WDL) X   Urine Assessment   Urinary Status Avila   Urine Color Yellow/straw   Urine Appearance Clear   Peripheral Vascular   Peripheral Vascular (WDL) WDL   Skin Integumentary    Skin Integumentary (WDL) X   Skin Color Pink   Skin Condition/Temp Warm;Dry   Skin Integrity Redness   Location sacrum   Skin Integrity Site 2   Skin Integrity Location 2 Redness   Location 2 heels   Care Plan - Skin/Tissue Integrity Goals   Skin Integrity Remains Intact Monitor for areas of redness and/or skin breakdown;Assess vascular access sites hourly   Musculoskeletal   RUE Flaccid   LUE Weakness   RL Extremity Flaccid   LL

## 2025-01-21 NOTE — TELEPHONE ENCOUNTER
Pt daughter called asking about blood work from last week, states she was admitted again this morning...   
Pharyngitis

## 2025-01-21 NOTE — CARE COORDINATION
Lived with SO prior to CVA.  Currently at [facility] for rehab.  Plan to return to [facility] upon DC from hospital.      01/21/25 0902   Service Assessment   Patient Orientation Alert and Oriented;Person;Place   Cognition Dementia / Early Alzheimer's   History Provided By Medical Record   Primary Caregiver Family  (family and SNF staff)   Support Systems Family Members;Other (Comment);Spouse/Significant Other  (SO, family and SNF staff)   Patient's Healthcare Decision Maker is: Legal Next of Kin   PCP Verified by CM Yes   Last Visit to PCP Within last 3 months   Prior Functional Level Assistance with the following:;Housework;Shopping;Mobility;Bathing;Dressing;Toileting;Cooking  (after CVA)   Current Functional Level Assistance with the following:  (per therapy recs)   Can patient return to prior living arrangement Yes   Ability to make needs known: Good   Family able to assist with home care needs: Yes   Would you like for me to discuss the discharge plan with any other family members/significant others, and if so, who? Yes   Financial Resources Medicare   Community Resources Other (Comment)  (SNF placement)   CM/SW Referral Other (see comment)  (none)   Discharge Planning   Type of Residence Skilled Nursing Facility  ([facility])   Living Arrangements Other (Comment)  ([facility])   Current Services Prior To Admission Skilled Nursing Facility   Potential Assistance Needed Skilled Nursing Facility   DME Ordered? No  (N/A)   Potential Assistance Purchasing Medications No   Type of Home Care Services Nursing Services;Skilled Therapy   Patient expects to be discharged to: Skilled nursing facility   Follow Up Appointment: Best Day/Time  Wednesday AM  (any)   One/Two Story Residence One story   History of falls? 1

## 2025-01-21 NOTE — FLOWSHEET NOTE
01/21/25 0523   BED TRACKING   Time Bed Requested? 0523   Time Bed Assigned? 0523   Bed Assignment 2

## 2025-01-21 NOTE — FLOWSHEET NOTE
01/21/25 0606   Assessment   Charting Type Admission   Psychosocial   Psychosocial (WDL) WDL   Neurological   Neuro (WDL) X   Level of Consciousness 1   Orientation Level Oriented to person;Oriented to situation;Oriented to place;Oriented to time   Cognition Follows commands   Speech Delayed responses;Clear   R Hand  Absent   L Hand  Weak   RUE Motor Response Flaccid   LUE Motor Response Responds to command   RLE Motor Response Flaccid   LLE Motor Response Responds to command   Des Coma Scale   Eye Opening 3   Best Verbal Response 4   Best Motor Response 6   Des Coma Scale Score 13   HEENT (Head, Ears, Eyes, Nose, & Throat)   HEENT (WDL) X   Lips Dry   Respiratory   Respiratory (WDL) X   Respiratory Pattern Regular   Respiratory Depth Shallow   Respiratory Quality/Effort Unlabored   L Breath Sounds Diminished   R Breath Sounds Diminished   Level of Activity/Mobility 2   Cough/Sputum   Cough None   Rhythm Interpretation   Pulse 54   Gastrointestinal   RUQ Bowel Sounds Active   LUQ Bowel Sounds Active   RLQ Bowel Sounds Active   LLQ Bowel Sounds Active   Abdomen Inspection Soft   Urine Assessment   Urinary Status Avila   Urine Color Yellow/straw   Urine Appearance Clear   Peripheral Vascular   Peripheral Vascular (WDL) WDL   Skin Integumentary    Skin Integumentary (WDL) X   Skin Color Pale   Skin Condition/Temp Dry;Warm   Skin Integrity Redness   Location Sacrum   Multiple Skin Integrity Sites Yes   Skin Integrity Site 2   Skin Integrity Location 2 Redness   Location 2 Heels, Bilateral   Preventative Dressing No   Date Applied 01/21/25   Assessed this shift Yes   Musculoskeletal   RUE Flaccid   LUE Weakness   RL Extremity Flaccid   LL Extremity Weakness   Urinary Catheter 01/21/25 Avila   Placement Date/Time: 01/21/25 0350   Present on Admission/Arrival: No  Inserted by: INDIRA Baca  2nd Staff Assisting: INDIRA Anderson  Insertion attempts: 1  Catheter Type: Avila  Catheter Size: 16 FR  Catheter Balloon

## 2025-01-21 NOTE — ED NOTES
Report from Mari Morales at [facility]. ([place])    Stomach pain started at 7pm  Bowel prep given at 9pm   Pain Pill given at 9:30pm Lortab 5-325  Hasn't been eating well, was prescribed megace to boost appetite    Allergies: Codeine  DNR    PMH of stroke, can't talk. Will say 1 or 2 words per normal    Next of kin: Terrie Weston, daughter

## 2025-01-21 NOTE — ACP (ADVANCE CARE PLANNING)
Advance Care Planning     General Advance Care Planning (ACP) Conversation    Date of Conversation: 1/21/2025  Conducted with: Patient with Decision Making Capacity  Other persons present: None    Healthcare Decision Maker:   Primary Decision Maker: Terrie Weston - Mil[place] - [phone]    Secondary Decision Maker: Russel Bass - Niece/Nephew - [phone]    Supplemental (Other) Decision Maker: Black Rivera - Niece/Nephew - [phone]     Today we documented Decision Maker(s) consistent with Legal Next of Kin hierarchy.  Content/Action Overview:  Has ACP document(s) on file - reflects the patient's care preferences  Reviewed DNR/DNI and patient elects DNR order - referred to ACP Clinical Specialist & placed order  artificial nutrition, ventilation preferences, and resuscitation preferences  Code status conversation conducted by nursing staff and/or PA. ACP note placed by .     Length of Voluntary ACP Conversation in minutes:  <16 minutes (Non-Billable)    Emmanuel Ross

## 2025-01-21 NOTE — ED PROVIDER NOTES
[facility] EMERGENCY DEPARTMENT  EMERGENCY DEPARTMENT ENCOUNTER        Pt Name: Maude Corrales  MRN: [id number]  Birthdate 1950  Date of evaluation: 1/21/2025  Provider: Alphonso Villar MD  PCP: Lazarus Ambrosio MD  Note Started: 4:52 AM EST 1/21/25    CHIEF COMPLAINT       Chief Complaint   Patient presents with    Altered Mental Status       HISTORY OF PRESENT ILLNESS: 1 or more Elements     History from : EMS, NH staff    Limitations to history : None    Maude Corrales is a 74 y.o. female who presents to the emergency room via EMS after staff noticed her thriving in pain, complaining with suprapubic discomfort.  She was given 1 Lortab prior to transfer to the emergency room.  Patient was recently hospitalized at [facility] between 1/16-1/18 with UTI and she was discharged with Ceftin, as her urine culture grew E. coli.  At baseline the patient is confused, secondary to dementia.  Staff denies any recent exposure to sick contacts, denies any fever, nausea or vomiting.    Nursing Notes were all reviewed and agreed with or any disagreements were addressed in the HPI.    REVIEW OF SYSTEMS :      Review of Systems   Unable to perform ROS: Dementia       All ROS systems reviewed and negative except as in HPI/MDM    PAST MEDICAL HISTORY     Past Medical History:   Diagnosis Date    Angina pectoris (HCC)     Anxiety disorder     Atherosclerotic heart disease     Atrial fibrillation (HCC)     CAD (coronary artery disease)     Cerebral infarct (HCC)     Chronic kidney disease     Cirrhosis (HCC)     Constipation     COPD (chronic obstructive pulmonary disease) (HCC)     Cystic fibrosis (HCC)     Diabetes mellitus (HCC)     Dysphagia     Encephalopathy     Esophageal varices (HCC)     GERD (gastroesophageal reflux disease)     H/O: CVA (cardiovascular accident)     HTN (hypertension)     Hyperlipidemia     Mass     on chest     LEON (nonalcoholic steatohepatitis)     Osteoarthritis

## 2025-01-21 NOTE — PROGRESS NOTES
SLP ALL NOTES  Facility/Department: [facility] MED SURG   CLINICAL BEDSIDE SWALLOW EVALUATION    NAME: Maude Corrales  : 1950  MRN: [id number]    ADMISSION DATE: 2025  ADMITTING DIAGNOSIS: has CAD (coronary artery disease); COPD (chronic obstructive pulmonary disease) (HCC); History of CVA (cerebrovascular accident); Tobacco abuse; Vitamin B12 deficiency; Essential hypertension; Type 2 diabetes mellitus with diabetic neuropathy (HCC); History of colon polyps; Status post bilateral knee replacements; Degenerative disc disease, lumbar; Family history of breast cancer; Abnormal CT of the chest; Thoracic aortic aneurysm without rupture (HCC); Anxiety; Anemia due to stage 3 chronic kidney disease (HCC); MAGDALENA (iron deficiency anemia); Stage 3a chronic kidney disease (HCC); Cirrhosis (HCC); AVM (arteriovenous malformation) of small bowel, acquired; Declining functional status; Acute kidney injury superimposed on stage 3b chronic kidney disease (HCC); Personal history of nicotine dependence; General weakness; Pancytopenia (HCC); Carotid disease, bilateral (HCC); Diabetes mellitus due to underlying condition, with long-term current use of insulin (HCC); Hypokalemia; Anemia; PAC (premature atrial contraction); Intractable back pain; Generalized weakness; Abnormal urinalysis; Hyperglycemia; Type 2 diabetes mellitus (HCC); Hypertensive urgency; Hyperosmolar hyperglycemic state (HHS) (HCC); Medically noncompliant; Dementia (HCC); AMS (altered mental status); Hypercalcemia; UTI (urinary tract infection); Urinary retention; History of GI bleed; and Abdominal pain on their problem list.  ONSET DATE: 2025    Recent Chest Xray/CT of Chest: (2025)  Findings:   The cardiomediastinal silhouette is within normal limits. Coronary artery calcifications. Pulmonary vascularity appears normal. There is no focal airspace consolidation, pleural effusion, or pneumothorax. There are degenerative changes of the thoracic
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
## 2025-01-21 NOTE — PROGRESS NOTES
Patient is a resident of [facility] and therefore, unable to use Meds to Beds
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
## 2025-01-21 NOTE — ED NOTES
Report from Kingsley, paramedic with [facility]     AMS, lethargic for 45 min per NH staff  Will look at you with eyes open, does not respond      159/75  HR 64  97% room air    DNR
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
## 2025-01-21 NOTE — ED NOTES
Pt awake and following me with her eyes. Pt able to answer yes or no questions appropriately by shaking or nodding her head. When asked if she is in pain, Pt verbalizes \"No\".

## 2025-01-21 NOTE — PLAN OF CARE
Problem: Chronic Conditions and Co-morbidities  Goal: Patient's chronic conditions and co-morbidity symptoms are monitored and maintained or improved  Outcome: Progressing  Flowsheets (Taken 1/21/2025 0800)  Care Plan - Patient's Chronic Conditions and Co-Morbidity Symptoms are Monitored and Maintained or Improved:   Monitor and assess patient's chronic conditions and comorbid symptoms for stability, deterioration, or improvement   Collaborate with multidisciplinary team to address chronic and comorbid conditions and prevent exacerbation or deterioration   Update acute care plan with appropriate goals if chronic or comorbid symptoms are exacerbated and prevent overall improvement and discharge     Problem: Discharge Planning  Goal: Discharge to home or other facility with appropriate resources  Outcome: Progressing  Flowsheets (Taken 1/21/2025 0800)  Discharge to home or other facility with appropriate resources: Identify barriers to discharge with patient and caregiver     Problem: Skin/Tissue Integrity  Goal: Absence of new skin breakdown  Description: 1.  Monitor for areas of redness and/or skin breakdown  2.  Assess vascular access sites hourly  3.  Every 4-6 hours minimum:  Change oxygen saturation probe site  4.  Every 4-6 hours:  If on nasal continuous positive airway pressure, respiratory therapy assess nares and determine need for appliance change or resting period.  Outcome: Progressing     Problem: Safety - Adult  Goal: Free from fall injury  Outcome: Progressing

## 2025-01-21 NOTE — PROGRESS NOTES
4 Eyes Skin Assessment     NAME:  Maude Corrales  YOB: 1950  MEDICAL RECORD NUMBER:  [id number]    The patient is being assessed for  Admission    I agree that at least one RN has performed a thorough Head to Toe Skin Assessment on the patient. ALL assessment sites listed below have been assessed.      Areas assessed by both nurses:    Head, Face, Ears, Shoulders, Back, Chest, Arms, Elbows, Hands, Sacrum. Buttock, Coccyx, Ischium, and Legs. Feet and Heels        Does the Patient have a Wound? No noted wound(s)       Hector Prevention initiated by RN: Yes  Wound Care Orders initiated by RN: No    Pressure Injury (Stage 3,4, Unstageable, DTI, NWPT, and Complex wounds) if present, place Wound referral order by RN under : No    New Ostomies, if present place, Ostomy referral order under : No     Nurse 1 eSignature: Electronically signed by Pedro Pink RN on 1/21/25 at 6:52 AM EST    **SHARE this note so that the co-signing nurse can place an eSignature**    Nurse 2 eSignature: Electronically signed by Wyatt Vinson RN on 1/21/25 at 11:02 PM EST

## 2025-01-21 NOTE — H&P
History and Physical    Patient:  Maude Corrales  :  1950    CHIEF COMPLAINT:    Abdominal pain    HISTORY OF PRESENT ILLNESS:   The patient is a 74 y.o. female resident of [facility] with PMH of GIB, COPD, LEON cirrhosis, atrial fibrillation (No AC due to GIBs), PAD, CKD 3, multiple CVAs (most recent left ICA occlusion and left MCA territory stroke around 25) with residual right-sided hemiplegia and others who presented via EMS from [facility] for abdominal pain. Pt was admitted to this facility from 25-25 for abdominal pain and acute encephalopathy. Workup revealed hypercalcemia and UTI and urinary retention. Pt was treated with antibiotics.  Urine culture grew E. coli and patient was treated with Rocephin while inpatient and transition to Ceftin on discharge.  She was also discharged back to nursing home with Avila catheter in place secondary to urinary retention; however, shortly after arrival, patient complained of pain and requested catheter be removed.  She was doing well until last night.  Patient endorsed abdominal pain and was noted to be writhing around in bed by nursing home staff.  Nursing home staff also concerned about abdominal distention and patient was sent back to the ER for reevaluation.  Upon ED MS arrival, patient noted to be lethargic and altered.  She would look at the paramedics with her eyes open but would not respond.  Blood glucose was 204.  Blood pressure 179/75, heart rate 64 and oxygen saturation 97% on room air.  She was then transported to the ER for evaluation. No fevers, chills, chest pain, nausea, vomiting or diarrhea was reported.  Upon arrival to the ER, patient responded to questions and denied any complaints.  ER workup remarkable for creatinine of 1.4, up from 1.1 on recent labs.    Past Medical History:      Diagnosis Date    Angina pectoris (HCC)     Anxiety disorder     Atherosclerotic heart disease     Atrial fibrillation (HCC)     CAD (coronary artery

## 2025-01-22 LAB
ALBUMIN SERPL-MCNC: 3.3 G/DL (ref 3.4–4.8)
ALBUMIN/GLOB SERPL: 1.3 {RATIO} (ref 0.8–2)
ALP SERPL-CCNC: 176 U/L (ref 25–100)
ALT SERPL-CCNC: 112 U/L (ref 4–36)
AMMONIA PLAS-SCNC: 23 MCG/DL (ref 19–87)
ANION GAP SERPL CALCULATED.3IONS-SCNC: 10 MMOL/L (ref 3–16)
AST SERPL-CCNC: 147 U/L (ref 8–33)
BILIRUB SERPL-MCNC: 0.7 MG/DL (ref 0.3–1.2)
BUN SERPL-MCNC: 22 MG/DL (ref 6–20)
CALCIUM SERPL-MCNC: 9.3 MG/DL (ref 8.5–10.5)
CHLORIDE SERPL-SCNC: 103 MMOL/L (ref 98–107)
CO2 SERPL-SCNC: 25 MMOL/L (ref 20–30)
CREAT SERPL-MCNC: 1 MG/DL (ref 0.4–1.2)
ERYTHROCYTE [DISTWIDTH] IN BLOOD BY AUTOMATED COUNT: 15 % (ref 11–16)
GFR SERPLBLD CREATININE-BSD FMLA CKD-EPI: 60 ML/MIN/{1.73_M2}
GLOBULIN SER CALC-MCNC: 2.6 G/DL
GLUCOSE BLD-MCNC: 106 MG/DL (ref 74–106)
GLUCOSE BLD-MCNC: 111 MG/DL (ref 74–106)
GLUCOSE BLD-MCNC: 143 MG/DL (ref 74–106)
GLUCOSE BLD-MCNC: 211 MG/DL (ref 74–106)
GLUCOSE SERPL-MCNC: 100 MG/DL (ref 74–106)
HCT VFR BLD AUTO: 27.6 % (ref 37–47)
HGB BLD-MCNC: 9.2 G/DL (ref 11.5–16.5)
MCH RBC QN AUTO: 29.5 PG (ref 27–32)
MCHC RBC AUTO-ENTMCNC: 33.3 G/DL (ref 31–35)
MCV RBC AUTO: 88.5 FL (ref 80–100)
PERFORMED ON: ABNORMAL
PERFORMED ON: NORMAL
PLATELET # BLD AUTO: 159 K/UL (ref 150–400)
PMV BLD AUTO: 11 FL (ref 6–10)
POTASSIUM SERPL-SCNC: 3.8 MMOL/L (ref 3.4–5.1)
PROT SERPL-MCNC: 5.9 G/DL (ref 6.4–8.3)
RBC # BLD AUTO: 3.12 M/UL (ref 3.8–5.8)
SODIUM SERPL-SCNC: 138 MMOL/L (ref 136–145)
WBC # BLD AUTO: 5.2 K/UL (ref 4–11)

## 2025-01-22 PROCEDURE — 92610 EVALUATE SWALLOWING FUNCTION: CPT

## 2025-01-22 PROCEDURE — G0378 HOSPITAL OBSERVATION PER HR: HCPCS

## 2025-01-22 PROCEDURE — 85027 COMPLETE CBC AUTOMATED: CPT

## 2025-01-22 PROCEDURE — 51798 US URINE CAPACITY MEASURE: CPT

## 2025-01-22 PROCEDURE — 97802 MEDICAL NUTRITION INDIV IN: CPT

## 2025-01-22 PROCEDURE — 99232 SBSQ HOSP IP/OBS MODERATE 35: CPT | Performed by: INTERNAL MEDICINE

## 2025-01-22 PROCEDURE — 51702 INSERT TEMP BLADDER CATH: CPT

## 2025-01-22 PROCEDURE — 80053 COMPREHEN METABOLIC PANEL: CPT

## 2025-01-22 PROCEDURE — 36415 COLL VENOUS BLD VENIPUNCTURE: CPT

## 2025-01-22 PROCEDURE — 6360000002 HC RX W HCPCS: Performed by: PHYSICIAN ASSISTANT

## 2025-01-22 PROCEDURE — 82140 ASSAY OF AMMONIA: CPT

## 2025-01-22 PROCEDURE — 97530 THERAPEUTIC ACTIVITIES: CPT

## 2025-01-22 PROCEDURE — 96372 THER/PROPH/DIAG INJ SC/IM: CPT

## 2025-01-22 PROCEDURE — 6370000000 HC RX 637 (ALT 250 FOR IP): Performed by: PHYSICIAN ASSISTANT

## 2025-01-22 RX ORDER — MEGESTROL ACETATE 125 MG/ML
200 SUSPENSION ORAL DAILY
Status: ON HOLD | COMMUNITY
End: 2025-01-23 | Stop reason: HOSPADM

## 2025-01-22 RX ORDER — BISACODYL 10 MG
10 SUPPOSITORY, RECTAL RECTAL DAILY PRN
Status: DISCONTINUED | OUTPATIENT
Start: 2025-01-22 | End: 2025-01-23 | Stop reason: HOSPADM

## 2025-01-22 RX ORDER — FLUCONAZOLE 200 MG/1
200 TABLET ORAL DAILY
Qty: 2 TABLET | Refills: 0 | Status: CANCELLED | OUTPATIENT
Start: 2025-01-22 | End: 2025-01-24

## 2025-01-22 RX ADMIN — INSULIN GLARGINE 35 UNITS: 100 INJECTION, SOLUTION SUBCUTANEOUS at 21:56

## 2025-01-22 RX ADMIN — INSULIN LISPRO 1 UNITS: 100 INJECTION, SOLUTION INTRAVENOUS; SUBCUTANEOUS at 11:42

## 2025-01-22 RX ADMIN — EMPAGLIFLOZIN 10 MG: 10 TABLET, FILM COATED ORAL at 08:15

## 2025-01-22 RX ADMIN — BISACODYL 5 MG: 5 TABLET, COATED ORAL at 08:14

## 2025-01-22 RX ADMIN — ASPIRIN 81 MG CHEWABLE TABLET 81 MG: 81 TABLET CHEWABLE at 08:14

## 2025-01-22 RX ADMIN — CEFUROXIME AXETIL 250 MG: 250 TABLET ORAL at 20:34

## 2025-01-22 RX ADMIN — Medication 1000 UNITS: at 08:14

## 2025-01-22 RX ADMIN — CARVEDILOL 6.25 MG: 6.25 TABLET, FILM COATED ORAL at 16:58

## 2025-01-22 RX ADMIN — POLYETHYLENE GLYCOL 3350 17 G: 17 POWDER, FOR SOLUTION ORAL at 08:14

## 2025-01-22 RX ADMIN — FERROUS SULFATE TAB EC 324 MG (65 MG FE EQUIVALENT) 324 MG: 324 (65 FE) TABLET DELAYED RESPONSE at 08:14

## 2025-01-22 RX ADMIN — CYANOCOBALAMIN TAB 500 MCG 1000 MCG: 500 TAB at 08:14

## 2025-01-22 RX ADMIN — METFORMIN HYDROCHLORIDE 500 MG: 500 TABLET ORAL at 16:58

## 2025-01-22 RX ADMIN — FERROUS SULFATE TAB EC 324 MG (65 MG FE EQUIVALENT) 324 MG: 324 (65 FE) TABLET DELAYED RESPONSE at 16:58

## 2025-01-22 RX ADMIN — PANTOPRAZOLE SODIUM 40 MG: 40 TABLET, DELAYED RELEASE ORAL at 08:15

## 2025-01-22 RX ADMIN — ALOGLIPTIN 12.5 MG: 12.5 TABLET, FILM COATED ORAL at 08:14

## 2025-01-22 RX ADMIN — DONEPEZIL HYDROCHLORIDE 5 MG: 5 TABLET, FILM COATED ORAL at 20:34

## 2025-01-22 RX ADMIN — LINACLOTIDE 145 MCG: 145 CAPSULE, GELATIN COATED ORAL at 16:58

## 2025-01-22 RX ADMIN — HYDROCODONE BITARTRATE AND ACETAMINOPHEN 1 TABLET: 5; 325 TABLET ORAL at 20:33

## 2025-01-22 RX ADMIN — CEFUROXIME AXETIL 250 MG: 250 TABLET ORAL at 08:14

## 2025-01-22 RX ADMIN — CARVEDILOL 6.25 MG: 6.25 TABLET, FILM COATED ORAL at 08:14

## 2025-01-22 RX ADMIN — INSULIN LISPRO 1 UNITS: 100 INJECTION, SOLUTION INTRAVENOUS; SUBCUTANEOUS at 21:55

## 2025-01-22 RX ADMIN — ENOXAPARIN SODIUM 40 MG: 100 INJECTION SUBCUTANEOUS at 08:14

## 2025-01-22 RX ADMIN — INSULIN LISPRO 5 UNITS: 100 INJECTION, SOLUTION INTRAVENOUS; SUBCUTANEOUS at 11:42

## 2025-01-22 RX ADMIN — CLOPIDOGREL BISULFATE 75 MG: 75 TABLET ORAL at 08:14

## 2025-01-22 ASSESSMENT — PAIN SCALES - GENERAL: PAINLEVEL_OUTOF10: 10

## 2025-01-22 ASSESSMENT — PAIN DESCRIPTION - LOCATION: LOCATION: ABDOMEN

## 2025-01-22 ASSESSMENT — PAIN DESCRIPTION - DESCRIPTORS: DESCRIPTORS: THROBBING;STABBING;JABBING

## 2025-01-22 NOTE — PROGRESS NOTES
Avila catheter placed per order 16 Persian pt tolerated well - sterile technique-  pt tolerated well urine returned and drained noted 550 ml in bladder and documented

## 2025-01-22 NOTE — PROGRESS NOTES
Physical Therapy  Facility/Department: [facility] MED SURG  Daily Treatment Note  NAME: Maude Corrales  : 1950  MRN: [id number]    Date of Service: 2025    Discharge Recommendations:  24 hour supervision or assist, Long Term Care with PT      Patient Diagnosis(es): The primary encounter diagnosis was Acute cystitis without hematuria. Diagnoses of CAMILO (acute kidney injury) ([facility]), Lethargy, Lower abdominal pain, Degeneration of intervertebral disc of lumbar region with discogenic back pain, and Type 2 diabetes mellitus with diabetic neuropathy, with long-term current use of insulin ([facility]) were also pertinent to this visit.    Assessment  Assessment: Patient awake in bed and agreeable to therapy. Cotreated with OT. Patient transitioned supine to sit with cues and Mod A. Performed cervical and trunk exercises while seated EOB. Patient stood from bedside to hemiwalker with Min A of 2. Able to stand for 3 trials but stopped verbally responding. Sat patient back down and she continued to not verbally respond. Transitioned patient to supine with Max A. Notified RN and PA of change in patient's condition.  Activity Tolerance: Treatment limited secondary to medical complications    Plan  Physical Therapy Plan  General Plan: 3-5 times per week  Days Per Week: 5 Days    Subjective   Patient presents awake in bed, NAD, agreeable to therapy     Objective  Bed Mobility Training  Bed Mobility Training: Yes  Overall Level of Assistance: Moderate assistance  Interventions: Verbal cues;Visual cues  Rolling: Moderate assistance  Supine to Sit: Moderate assistance  Sit to Supine: Maximum assistance  Scooting: Moderate assistance  Balance  Sitting: Impaired  Sitting - Static: Fair (occasional)  Standing: With support    Safety Devices  Type of Devices: Left in bed;Bed alarm in place;Call light within reach;Nurse notified    Goals  Short Term Goals  Time Frame for Short Term Goals: 2 weeks  Short Term Goal 1: Patient will sit EOB

## 2025-01-22 NOTE — FLOWSHEET NOTE
01/22/25 1057   Vital Signs   Temp 97.3 °F (36.3 °C)   Temp Source Oral   Pulse 57   Respirations 16   BP (!) 132/54   MAP (Calculated) 80   Oxygen Therapy   SpO2 94 %   O2 Device None (Room air)

## 2025-01-22 NOTE — FLOWSHEET NOTE
01/22/25 0815   Assessment   Charting Type Shift assessment   Psychosocial   Psychosocial (WDL) WDL   Neurological   Neuro (WDL) X   Level of Consciousness 1   Orientation Level Oriented to person;Oriented to situation;Oriented to place;Disoriented to time   Cognition Follows commands;Appropriate judgement;Appropriate safety awareness;Appropriate attention/concentration   Speech Delayed responses;Clear   R Hand  Absent   L Hand  Weak   RUE Motor Response Flaccid   LUE Motor Response Responds to command   RLE Motor Response Flaccid   LLE Motor Response Responds to command   Swallow Screening   Is the patient unable to remain alert for testing? No   Is the patient on a modified diet (thickened liquids) due to pre-existing dysphagia? No   Is there presence of existing enteral tube feeding via the stomach or nose? No   Is there presence of head-of-bed restrictions (less than 30 degrees)? No   Is there presence of tracheotomy tube? No   Is the patient ordered nothing-by-mouth status? No   3 oz Water Swallow Screen Pass   NIH Stroke Scale   NIH Stroke Scale Assessed No   HEENT (Head, Ears, Eyes, Nose, & Throat)   HEENT (WDL) X   Teeth Dentures upper   Respiratory   Respiratory (WDL) X   Respiratory Pattern Regular   Respiratory Depth Shallow   Respiratory Quality/Effort Unlabored   L Breath Sounds Diminished   R Breath Sounds Diminished   Cough/Sputum   Cough None   Cardiac   Cardiac (WDL) WDL   Gastrointestinal   RUQ Bowel Sounds Active   LUQ Bowel Sounds Active   RLQ Bowel Sounds Active   LLQ Bowel Sounds Active   Abdomen Inspection Soft;Distended   Genitourinary   Genitourinary (WDL) X   Urine Assessment   Urine Color Yellow/straw   Urine Appearance Clear   Peripheral Vascular   Peripheral Vascular (WDL) WDL   Skin Integumentary    Skin Integumentary (WDL) X   Skin Color Pale   Skin Condition/Temp Dry;Warm   Skin Integrity Ecchymosis   Location scattered   Skin Integrity Site 2   Skin Integrity Location 2

## 2025-01-22 NOTE — PROGRESS NOTES
Medication Reconciliation completed with Med List from [facility].    Not Taking:  -Humalog Mix 70/30 200 units bid wc  -Butt Paste    Changed:  -Ferrous Sulfate 325 mg bid TO 1 qd prn  -Megace Oral  mg po TO Megace Oral Suspension 625mg/5ml 200 mg po qd for 10 doses  -Nitrostat was marked 'not taking' but is on Med List  -Furosemide 20 mg qd prn was marked 'not taking' but is on Med List

## 2025-01-22 NOTE — PROGRESS NOTES
SLP ALL NOTES  Facility/Department: [facility] SURG   CLINICAL BEDSIDE SWALLOW EVALUATION    NAME: Maude Corrales  : 1950  MRN: [id number]    ADMISSION DATE: 2025  ADMITTING DIAGNOSIS: has CAD (coronary artery disease); COPD (chronic obstructive pulmonary disease) (HCC); History of CVA (cerebrovascular accident); Tobacco abuse; Vitamin B12 deficiency; Essential hypertension; Type 2 diabetes mellitus with diabetic neuropathy (HCC); History of colon polyps; Status post bilateral knee replacements; Degenerative disc disease, lumbar; Family history of breast cancer; Abnormal CT of the chest; Thoracic aortic aneurysm without rupture (HCC); Anxiety; Anemia due to stage 3 chronic kidney disease (HCC); MAGDALENA (iron deficiency anemia); Stage 3a chronic kidney disease (HCC); Cirrhosis (HCC); AVM (arteriovenous malformation) of small bowel, acquired; Declining functional status; Acute kidney injury superimposed on stage 3b chronic kidney disease (HCC); Personal history of nicotine dependence; General weakness; Pancytopenia (HCC); Carotid disease, bilateral (HCC); Diabetes mellitus due to underlying condition, with long-term current use of insulin (HCC); Hypokalemia; Anemia; PAC (premature atrial contraction); Intractable back pain; Generalized weakness; Abnormal urinalysis; Hyperglycemia; Type 2 diabetes mellitus (HCC); Hypertensive urgency; Hyperosmolar hyperglycemic state (HHS) (HCC); Medically noncompliant; Dementia (HCC); AMS (altered mental status); Hypercalcemia; UTI (urinary tract infection); Urinary retention; History of GI bleed; and Abdominal pain on their problem list.  ONSET DATE:     Recent Chest Xray/CT of Chest: 2025  Impression:   No acute cardiopulmonary abnormality.     Date of Eval: 2025  Evaluating Therapist: CHAUNCEY Milligan    Current Diet level:  Current Diet : Minced and Moist  Current Liquid Diet : Mildly Thick    Primary Complaint  Patient c/o sore throat when

## 2025-01-22 NOTE — PROGRESS NOTES
[facility] aware of pt not communicating verbally this am - pt was working with therapy then quit responding verbally like she was in a trance- [facility] notified - pt blood glucose checked 195-vital signs taken- pt bladder scanned-bladder distended- noted 150 yellow urine in canister- bladder scanned -381 ml of urine in bladder -notified [facility] -ok to place rey catheter per [facility]

## 2025-01-22 NOTE — PROGRESS NOTES
Progress Note      Patient:  Maude Corrales  :  1950    Subjective:   Chief complaint:   Acute encephalopathy, abdominal pain    Interval History:   Pt seen and examined this morning. Initially pt was responsive and talking to staff. A few hours earlier, pt only opening her eyes and not talking. Long conversation with daughter Terrie over the phone. She understands pt is on maximal medical regimen. She is agreeable for palliative measures with any worsening.     Review of systems:   Unable to obtain    Past medical history, surgical history, family history and social history reviewed and unchanged compared to H&P earlier this admission.    Medications:   Scheduled Meds:   enoxaparin  40 mg SubCUTAneous Daily    [Held by provider] fluconazole  200 mg Oral Daily    aspirin  81 mg Oral Daily    insulin lispro  0-4 Units SubCUTAneous 4x Daily AC & HS    carvedilol  6.25 mg Oral BID WC    cefUROXime  250 mg Oral BID    clopidogrel  75 mg Oral Daily    donepezil  5 mg Oral Nightly    empagliflozin  10 mg Oral Daily    ferrous sulfate  324 mg Oral BID with meals    insulin glargine  35 Units SubCUTAneous Nightly    insulin lispro  5 Units SubCUTAneous TID AC    alogliptin  12.5 mg Oral Daily    metFORMIN  500 mg Oral Dinner    lubiprostone  24 mcg Oral BID WC    pantoprazole  40 mg Oral Daily    vitamin B-12  1,000 mcg Oral Daily    Vitamin D  1,000 Units Oral Daily     Continuous Infusions:   dextrose         Objective:     Vital Signs  Temp: 97.3 °F (36.3 °C)  Pulse: 57  Respirations: 16  BP: (!) 132/54  SpO2: 94 %  O2 Device: None (Room air)       Vital signs reviewed in electronic charts.    Physical exam  Constitutional:  Well developed, thin, elderly female lying in bed in no acute distress. Appears generally weak. Will open eyes but does not respond to verbal stimuli.  Eyes:  no scleral icterus, conjunctiva normal   HENT:  Atraumatic, external ears normal, nose normal, oropharynx moist, no pharyngeal

## 2025-01-22 NOTE — FLOWSHEET NOTE
01/22/25 0816   Vital Signs   Pulse 58   Heart Rate Source Monitor   Oxygen Therapy   SpO2 96 %   O2 Device None (Room air)

## 2025-01-22 NOTE — CONSULTS
Comprehensive Nutrition Assessment    Type and Reason for Visit:  Initial, Consult (Consulted for low senthil score)    Nutrition Recommendations/Plan:   4 CHO added to existing diet order d/t hx of DM.   Continue to encourage PO intakes as appropriate. Avg of 9-33% x 3 meals.   Glucerna ordered BID to help meet estimated nutrient needs.   Consider vitamin C supplement as appropriate d/t tobacco use.     RD to follow patient and available PRN.      Malnutrition Assessment:  Malnutrition Status:  At risk for malnutrition (r/t weight loss of 3.5% in ~ 7 weeks and report of poor intakes prior to admission) (01/22/25 1305)    Context:        Findings of the 6 clinical characteristics of malnutrition:  Energy Intake:     Weight Loss:        Body Fat Loss:        Muscle Mass Loss:       Fluid Accumulation:        Strength:       Nutrition Assessment:    Patient admitted with abdominal pain. Patient is at high risk for ongoing nutritional compromise r/t poor PO intakes.    Nutrition Related Findings:    Redness heels. PMH tobacco use, CAD, CKD, COPD, LEON cirrhosis, cystic fibrosis, DM, GERD, HTN, B12 deficiency, iron-deficiency anemia, dementia. Weight loss of 5 lb (3.5%) in ~ 7 weeks. Medications: insulin, ferrous sulfate, B12, vit D. Labs: BUN 22, glu 100-186, alb 3.3, alk phos 176, , . Upper dentures. Abdominal distention. No edema at this time. Report of decreased intakes prior to admission. Wound Type: None       Current Nutrition Intake & Therapies:    Average Meal Intake: 1-25%, 26-50% (9-33% x 3 meals)  Average Supplements Intake: None Ordered  ADULT ORAL NUTRITION SUPPLEMENT; Lunch, Dinner; Diabetic Oral Supplement  ADULT DIET; Dysphagia - Minced and Moist; 4 carb choices (60 gm/meal); Mildly Thick (Nectar)    Anthropometric Measures:  Height: 162.6 cm (5' 4\")  Ideal Body Weight (IBW): 120 lbs (55 kg)       Current Body Weight: 63 kg (139 lb), 115.8 % IBW. Weight Source: Bed scale  Current BMI

## 2025-01-22 NOTE — PLAN OF CARE
Problem: Safety - Adult  Goal: Free from fall injury  1/22/2025 0916 by Violette Parham, RN  Outcome: Progressing  1/22/2025 0120 by Pedro iPnk, RN  Outcome: Progressing     Problem: Chronic Conditions and Co-morbidities  Goal: Patient's chronic conditions and co-morbidity symptoms are monitored and maintained or improved  Outcome: Progressing

## 2025-01-22 NOTE — FLOWSHEET NOTE
01/22/25 0741   Vital Signs   Temp 97.7 °F (36.5 °C)   Temp Source Oral   Pulse 56   Heart Rate Source Monitor   Respirations 17   BP (!) 152/54   MAP (Calculated) 87   MAP (mmHg) 85   Oxygen Therapy   SpO2 98 %   O2 Device None (Room air)

## 2025-01-22 NOTE — DISCHARGE SUMMARY
Discharge Summary      Patient ID: Maude Corrales   :  1950     Patient's PCP: Lazarus Ambrosio MD    Admit Date: 2025     Discharge Date:   2025    Admitting Provider: Lazarus Ambrosio MD    Discharging Provider: VIGNESH Teresa     Reason for this admission:   Abdominal pain    Discharge Diagnoses:     Active Hospital Problems    Diagnosis Date Noted    Abdominal pain [R10.9] 2025    History of GI bleed [Z87.19] 2025    Urinary retention [R33.9] 2025    Type 2 diabetes mellitus (HCC) [E11.9] 2024    Generalized weakness [R53.1] 2023    Acute kidney injury superimposed on stage 3b chronic kidney disease (HCC) [N17.9, N18.32] 2022    Declining functional status [R53.81] 2022    Cirrhosis (HCC) [K74.60] 2021    COPD (chronic obstructive pulmonary disease) (HCC) [J44.9]        Procedures:  CT ABDOMEN PELVIS WO CONTRAST Additional Contrast? None   Final Result      No renal or ureteral calculi are identified.      Extensive vascular calcifications are seen bilaterally.      Avila catheter noted.      Nodular contour of the hepatic parenchyma is suspicious for advanced cirrhotic change.      Borderline splenomegaly.      Otherwise no discrete evidence of acute abnormality is identified.      Electronically signed by Jacob Lujan MD            Consults:   IP CONSULT TO CASE MANAGEMENT  IP CONSULT TO DIETITIAN  PT OT    Briefly:   74 y.o. female resident of [facility] with PMH of GIB, COPD, LEON cirrhosis, atrial fibrillation (No AC due to GIBs), PAD, CKD 3, multiple CVAs (most recent left ICA occlusion and left MCA territory stroke around 25) with residual right-sided hemiplegia and others admitted for abdominal pain, urinary retention and UTI.     Hospital Course:      Active Hospital Problems    Diagnosis Date Noted    Abdominal pain [R10.9] 2025    History of GI bleed [Z87.19] 2025    Urinary retention [R33.9] 2025    Type 2 diabetes

## 2025-01-22 NOTE — FLOWSHEET NOTE
01/21/25 2100   Assessment   Charting Type Shift assessment   Psychosocial   Psychosocial (WDL) WDL   Neurological   Neuro (WDL) X   Level of Consciousness 1   Orientation Level Oriented to person;Oriented to situation;Oriented to place;Oriented to time   Cognition Follows commands;Appropriate judgement;Appropriate safety awareness;Appropriate attention/concentration   Speech Delayed responses;Clear   R Hand  Absent   L Hand  Weak   RUE Motor Response Flaccid   LUE Motor Response Responds to command   RLE Motor Response Flaccid   LLE Motor Response Responds to command   [place] Coma Scale   Eye Opening 4   Best Verbal Response 5   Best Motor Response 6   [place] Coma Scale Score 15   HEENT (Head, Ears, Eyes, Nose, & Throat)   HEENT (WDL) X   Teeth Dentures upper   Lips Dry   Respiratory   Respiratory (WDL) X   Respiratory Pattern Regular   Respiratory Depth Shallow   Respiratory Quality/Effort Unlabored   L Breath Sounds Diminished   R Breath Sounds Diminished   Cough/Sputum   Cough None   Cardiac   Cardiac (WDL) WDL   Gastrointestinal   RUQ Bowel Sounds Active   LUQ Bowel Sounds Active   RLQ Bowel Sounds Active   LLQ Bowel Sounds Active   Abdomen Inspection Soft   Genitourinary   Genitourinary (WDL) X   Urine Assessment   Urinary Status External catheter   Urinary Incontinence Present   Urine Color Yellow/straw   Urine Appearance Clear   Peripheral Vascular   Peripheral Vascular (WDL) WDL   Skin Integumentary    Skin Integumentary (WDL) X   Skin Color Pink   Skin Condition/Temp Dry;Warm   Skin Integrity Redness   Location sacrum   Skin Integrity Site 2   Skin Integrity Location 2 Redness   Location 2 Heels   Preventative Dressing No   Musculoskeletal   RUE Flaccid   LUE Weakness   RL Extremity Flaccid   LL Extremity Weakness

## 2025-01-22 NOTE — PROGRESS NOTES
Occupational Therapy  Facility/Department: [facility] MED SURG  Daily Treatment Note  NAME: Maude Corrales  : 1950  MRN: [id number]    Date of Service: 2025    Discharge Recommendations:   SNF      Patient Diagnosis(es): The primary encounter diagnosis was Acute cystitis without hematuria. Diagnoses of CAMILO (acute kidney injury) (HCC), Lethargy, Lower abdominal pain, Degeneration of intervertebral disc of lumbar region with discogenic back pain, and Type 2 diabetes mellitus with diabetic neuropathy, with long-term current use of insulin (HCC) were also pertinent to this visit.     Assessment   Assessment: Pt received in bed on room air on this date. Co tx with PTA. Pt transferred to sitting at EOB with MOD x2 with max verbal cuing and jamia technique to cue through process of coming to sit using railing and using non affected side to assist with mobility. Pt sat at EOB with SBA. Pt required min assist to scoot forward at EOB. Pt with neck flexion and cued to correct in sitting position. Pt completed cervical extension x3 and cervical rotation left and right x3. Pt come to stand with min x2 assist mod verbal cuing to sequence and correct posture. Pt able to move R hand minimally in standing to reach for therapist hand. Pt fatigued quickly and transferred to sitting with min x2 assist. Pt reporting not feeling well today. Pt stood x3 attempts however, sitting at EOB began not responding verbally. Pt transitioned to supine with max x2 and nurse notified. PA present in room after patient transitioned to supine. Pt positioned for comfort and nurse present in room completing blood glucose check upon exit. Treatment limited secondary to change in status.  Activity Tolerance: Treatment limited secondary to medical complications     Subjective  Subjective  Subjective: I don't know if I can (stand)     Objective  Vitals     Bed Mobility Training  Bed Mobility Training: Yes  Overall Level of Assistance: Moderate

## 2025-01-23 VITALS
WEIGHT: 139.3 LBS | HEART RATE: 51 BPM | OXYGEN SATURATION: 97 % | SYSTOLIC BLOOD PRESSURE: 136 MMHG | HEIGHT: 64 IN | DIASTOLIC BLOOD PRESSURE: 55 MMHG | RESPIRATION RATE: 18 BRPM | TEMPERATURE: 97.3 F | BODY MASS INDEX: 23.78 KG/M2

## 2025-01-23 LAB
ALBUMIN SERPL-MCNC: 3.3 G/DL (ref 3.4–4.8)
ALBUMIN/GLOB SERPL: 1.3 {RATIO} (ref 0.8–2)
ALP SERPL-CCNC: 183 U/L (ref 25–100)
ALT SERPL-CCNC: 142 U/L (ref 4–36)
ANION GAP SERPL CALCULATED.3IONS-SCNC: 10 MMOL/L (ref 3–16)
AST SERPL-CCNC: 158 U/L (ref 8–33)
BILIRUB SERPL-MCNC: 0.5 MG/DL (ref 0.3–1.2)
BUN SERPL-MCNC: 19 MG/DL (ref 6–20)
CALCIUM SERPL-MCNC: 9.6 MG/DL (ref 8.5–10.5)
CHLORIDE SERPL-SCNC: 103 MMOL/L (ref 98–107)
CO2 SERPL-SCNC: 24 MMOL/L (ref 20–30)
CREAT SERPL-MCNC: 1 MG/DL (ref 0.4–1.2)
ERYTHROCYTE [DISTWIDTH] IN BLOOD BY AUTOMATED COUNT: 15.3 % (ref 11–16)
GFR SERPLBLD CREATININE-BSD FMLA CKD-EPI: 60 ML/MIN/{1.73_M2}
GLOBULIN SER CALC-MCNC: 2.5 G/DL
GLUCOSE BLD-MCNC: 143 MG/DL (ref 74–106)
GLUCOSE BLD-MCNC: 95 MG/DL (ref 74–106)
GLUCOSE SERPL-MCNC: 77 MG/DL (ref 74–106)
HCT VFR BLD AUTO: 25.9 % (ref 37–47)
HGB BLD-MCNC: 8.9 G/DL (ref 11.5–16.5)
MCH RBC QN AUTO: 30.3 PG (ref 27–32)
MCHC RBC AUTO-ENTMCNC: 34.4 G/DL (ref 31–35)
MCV RBC AUTO: 88.1 FL (ref 80–100)
PERFORMED ON: ABNORMAL
PERFORMED ON: NORMAL
PLATELET # BLD AUTO: 164 K/UL (ref 150–400)
PMV BLD AUTO: 11.2 FL (ref 6–10)
POTASSIUM SERPL-SCNC: 3.9 MMOL/L (ref 3.4–5.1)
PROT SERPL-MCNC: 5.8 G/DL (ref 6.4–8.3)
RBC # BLD AUTO: 2.94 M/UL (ref 3.8–5.8)
SARS-COV-2 RDRP RESP QL NAA+PROBE: NOT DETECTED
SODIUM SERPL-SCNC: 137 MMOL/L (ref 136–145)
WBC # BLD AUTO: 4.9 K/UL (ref 4–11)

## 2025-01-23 PROCEDURE — 85027 COMPLETE CBC AUTOMATED: CPT

## 2025-01-23 PROCEDURE — G0378 HOSPITAL OBSERVATION PER HR: HCPCS

## 2025-01-23 PROCEDURE — 96372 THER/PROPH/DIAG INJ SC/IM: CPT

## 2025-01-23 PROCEDURE — 36415 COLL VENOUS BLD VENIPUNCTURE: CPT

## 2025-01-23 PROCEDURE — 80053 COMPREHEN METABOLIC PANEL: CPT

## 2025-01-23 PROCEDURE — 6370000000 HC RX 637 (ALT 250 FOR IP): Performed by: PHYSICIAN ASSISTANT

## 2025-01-23 PROCEDURE — 99238 HOSP IP/OBS DSCHRG MGMT 30/<: CPT | Performed by: PHYSICIAN ASSISTANT

## 2025-01-23 PROCEDURE — 6360000002 HC RX W HCPCS: Performed by: PHYSICIAN ASSISTANT

## 2025-01-23 PROCEDURE — 87635 SARS-COV-2 COVID-19 AMP PRB: CPT

## 2025-01-23 PROCEDURE — 94761 N-INVAS EAR/PLS OXIMETRY MLT: CPT

## 2025-01-23 RX ORDER — LACTULOSE 10 G/15ML
30 SOLUTION ORAL
Status: DISCONTINUED | OUTPATIENT
Start: 2025-01-23 | End: 2025-01-23 | Stop reason: HOSPADM

## 2025-01-23 RX ORDER — BISACODYL 10 MG
10 SUPPOSITORY, RECTAL RECTAL DAILY PRN
Qty: 30 SUPPOSITORY | Refills: 0 | Status: SHIPPED | OUTPATIENT
Start: 2025-01-23 | End: 2025-02-22

## 2025-01-23 RX ORDER — CEFUROXIME AXETIL 250 MG/1
250 TABLET ORAL 2 TIMES DAILY
Qty: 4 TABLET | Refills: 0 | Status: SHIPPED | OUTPATIENT
Start: 2025-01-23 | End: 2025-01-25

## 2025-01-23 RX ORDER — FERROUS SULFATE 325(65) MG
325 TABLET ORAL
Qty: 60 TABLET | Refills: 3 | Status: SHIPPED | OUTPATIENT
Start: 2025-01-23

## 2025-01-23 RX ORDER — INSULIN LISPRO 100 [IU]/ML
5 INJECTION, SOLUTION INTRAVENOUS; SUBCUTANEOUS
Qty: 4.5 ML | Refills: 0 | Status: SHIPPED | OUTPATIENT
Start: 2025-01-23 | End: 2025-02-22

## 2025-01-23 RX ORDER — MAGNESIUM CARB/ALUMINUM HYDROX 105-160MG
30 TABLET,CHEWABLE ORAL ONCE
Status: DISCONTINUED | OUTPATIENT
Start: 2025-01-23 | End: 2025-01-23 | Stop reason: HOSPADM

## 2025-01-23 RX ORDER — POLYETHYLENE GLYCOL 3350 17 G/17G
17 POWDER, FOR SOLUTION ORAL DAILY
Qty: 527 G | Refills: 1 | Status: SHIPPED | OUTPATIENT
Start: 2025-01-23 | End: 2025-03-26

## 2025-01-23 RX ORDER — HYDROCODONE BITARTRATE AND ACETAMINOPHEN 5; 325 MG/1; MG/1
1 TABLET ORAL EVERY 8 HOURS PRN
Qty: 90 TABLET | Refills: 0 | Status: SHIPPED | OUTPATIENT
Start: 2025-01-23 | End: 2025-02-22

## 2025-01-23 RX ORDER — MEGESTROL ACETATE 40 MG/ML
200 SUSPENSION ORAL DAILY
Qty: 240 ML | Refills: 3 | Status: SHIPPED | OUTPATIENT
Start: 2025-01-23

## 2025-01-23 RX ORDER — ACETAMINOPHEN 325 MG/1
500 TABLET ORAL EVERY 6 HOURS PRN
Qty: 120 TABLET | Refills: 3 | Status: SHIPPED | OUTPATIENT
Start: 2025-01-23

## 2025-01-23 RX ORDER — UMECLIDINIUM BROMIDE AND VILANTEROL TRIFENATATE 62.5; 25 UG/1; UG/1
1 POWDER RESPIRATORY (INHALATION) DAILY
Qty: 1 EACH | Refills: 0 | Status: SHIPPED | OUTPATIENT
Start: 2025-01-23

## 2025-01-23 RX ADMIN — Medication 1000 UNITS: at 09:16

## 2025-01-23 RX ADMIN — ALOGLIPTIN 12.5 MG: 12.5 TABLET, FILM COATED ORAL at 09:16

## 2025-01-23 RX ADMIN — INSULIN LISPRO 5 UNITS: 100 INJECTION, SOLUTION INTRAVENOUS; SUBCUTANEOUS at 11:46

## 2025-01-23 RX ADMIN — FERROUS SULFATE TAB EC 324 MG (65 MG FE EQUIVALENT) 324 MG: 324 (65 FE) TABLET DELAYED RESPONSE at 09:16

## 2025-01-23 RX ADMIN — INSULIN LISPRO 5 UNITS: 100 INJECTION, SOLUTION INTRAVENOUS; SUBCUTANEOUS at 09:17

## 2025-01-23 RX ADMIN — BISACODYL 10 MG: 10 SUPPOSITORY RECTAL at 11:53

## 2025-01-23 RX ADMIN — CARVEDILOL 6.25 MG: 6.25 TABLET, FILM COATED ORAL at 09:16

## 2025-01-23 RX ADMIN — LINACLOTIDE 145 MCG: 145 CAPSULE, GELATIN COATED ORAL at 06:27

## 2025-01-23 RX ADMIN — ENOXAPARIN SODIUM 40 MG: 100 INJECTION SUBCUTANEOUS at 09:16

## 2025-01-23 RX ADMIN — CLOPIDOGREL BISULFATE 75 MG: 75 TABLET ORAL at 09:16

## 2025-01-23 RX ADMIN — ASPIRIN 81 MG CHEWABLE TABLET 81 MG: 81 TABLET CHEWABLE at 09:16

## 2025-01-23 RX ADMIN — EMPAGLIFLOZIN 10 MG: 10 TABLET, FILM COATED ORAL at 09:17

## 2025-01-23 RX ADMIN — CEFUROXIME AXETIL 250 MG: 250 TABLET ORAL at 09:17

## 2025-01-23 RX ADMIN — PANTOPRAZOLE SODIUM 40 MG: 40 TABLET, DELAYED RELEASE ORAL at 09:16

## 2025-01-23 RX ADMIN — CYANOCOBALAMIN TAB 500 MCG 1000 MCG: 500 TAB at 09:16

## 2025-01-23 NOTE — PROGRESS NOTES
EMS called for transport back to [facility].   Grossly 3/5 throughout bilateral UE and Left LE, right LE not tested due to pain/discomfort.

## 2025-01-23 NOTE — DISCHARGE INSTR - DIET
Good nutrition is important when healing from an illness, injury, or surgery.  Follow any nutrition recommendations given to you during your hospital stay.   If you were given an oral nutrition supplement while in the hospital, continue to take this supplement at home.  You can take it with meals, in-between meals, and/or before bedtime. These supplements can be purchased at most local grocery stores, pharmacies, and chain [employer]stores.   If you have any questions about your diet or nutrition, call the hospital and ask for the dietitian.      Dysphagia, minced and moist. Nectar thick liquids.

## 2025-01-23 NOTE — PLAN OF CARE
Problem: Chronic Conditions and Co-morbidities  Goal: Patient's chronic conditions and co-morbidity symptoms are monitored and maintained or improved  Outcome: Progressing     Problem: Discharge Planning  Goal: Discharge to home or other facility with appropriate resources  1/23/2025 1056 by Salome Fuentes RN  Outcome: Progressing  1/23/2025 0247 by Chas Delgadillo RN  Outcome: Progressing     Problem: Skin/Tissue Integrity  Goal: Absence of new skin breakdown  Description: 1.  Monitor for areas of redness and/or skin breakdown  2.  Assess vascular access sites hourly  3.  Every 4-6 hours minimum:  Change oxygen saturation probe site  4.  Every 4-6 hours:  If on nasal continuous positive airway pressure, respiratory therapy assess nares and determine need for appliance change or resting period.  1/23/2025 1056 by Salome Fuentes RN  Outcome: Progressing  1/23/2025 0247 by Chas Delgadillo RN  Outcome: Progressing     Problem: Safety - Adult  Goal: Free from fall injury  1/23/2025 1056 by Salome Fuentes RN  Outcome: Progressing  1/23/2025 0247 by Chas Delgadillo RN  Outcome: Progressing     Problem: Nutrition Deficit:  Goal: Optimize nutritional status  Outcome: Progressing

## 2025-01-23 NOTE — CARE COORDINATION
Case Management Assessment Discharge Note    Date / Time of Note:  01/23/25 11:00 AM  Discharge Note Completed by: Heidi Elizondo RN      Patient Name: Maude Corrales   YOB: 1950  Diagnosis: Lethargy [R53.83]  Lower abdominal pain [R10.30]  CAMILO (acute kidney injury) (HCC) [N17.9]  Acute cystitis without hematuria [N30.00]  Abdominal pain [R10.9]   Date / Time: 1/21/2025  3:39 AM    Current PCP: Lazarus Ambrosio MD  Clinic patient: No    Hospitalization in the last 30 days: Yes    Advance Directives:  Code Status: DNR    Financial:  Payor: MEDICARE / Plan: MEDICARE PART A AND B / Product Type: *No Product type* /      Pharmacy:    [facility] [phone] - F [phone]  [address] [place] [id number]  Phone: [phone] Fax: [phone]    [id number] [address] [phone] - F [phone]  [address] [place] [id number]  Phone: [phone] Fax: [phone]    [place], [place] - [address] [phone] - F [phone]  [address] [facility] [address]  [facility] [address]  Phone: [phone] Fax: [phone]    [facility] [phone] - F [phone]  [address] [place] [address]  Phone: [phone] Fax: [phone]      Assistance purchasing medications?: Potential Assistance Purchasing Medications: No    Does patient want to participate in local refill/ meds to beds program?: No    Meds To Beds General Rules:  1. Can ONLY be done Monday- Friday between 8:30am-5pm  2. Prescription(s) must be in pharmacy by 3pm to be filled same day  3.Copy of patient's insurance/ prescription drug card and patient face sheet must be sent along with the prescription(s)  4. Cost of Rx cannot be added to hospital bill. If financial assistance is needed, please contact unit  or ;  or

## 2025-01-25 LAB
BACTERIA BLD CULT ORG #2: NORMAL
BACTERIA BLD CULT: NORMAL

## 2025-01-30 RX ORDER — FUROSEMIDE 20 MG/1
TABLET ORAL
Qty: 30 TABLET | Refills: 3 | Status: SHIPPED | OUTPATIENT
Start: 2025-01-30

## 2025-02-13 DIAGNOSIS — M51.360 DEGENERATION OF INTERVERTEBRAL DISC OF LUMBAR REGION WITH DISCOGENIC BACK PAIN: ICD-10-CM

## 2025-02-13 RX ORDER — HYDROCODONE BITARTRATE AND ACETAMINOPHEN 5; 325 MG/1; MG/1
1 TABLET ORAL EVERY 6 HOURS PRN
Qty: 120 TABLET | Refills: 0 | Status: SHIPPED | OUTPATIENT
Start: 2025-02-13 | End: 2025-03-15

## 2025-02-14 ENCOUNTER — APPOINTMENT (OUTPATIENT)
Dept: GENERAL RADIOLOGY | Facility: HOSPITAL | Age: 75
End: 2025-02-14
Payer: MEDICARE

## 2025-02-14 ENCOUNTER — HOSPITAL ENCOUNTER (EMERGENCY)
Facility: HOSPITAL | Age: 75
Discharge: HOME | End: 2025-02-14
Attending: HOSPITALIST
Payer: MEDICARE

## 2025-02-14 VITALS
SYSTOLIC BLOOD PRESSURE: 124 MMHG | HEART RATE: 66 BPM | DIASTOLIC BLOOD PRESSURE: 61 MMHG | RESPIRATION RATE: 15 BRPM | OXYGEN SATURATION: 99 % | TEMPERATURE: 97.3 F

## 2025-02-14 DIAGNOSIS — R40.4 TRANSIENT ALTERATION OF AWARENESS: ICD-10-CM

## 2025-02-14 DIAGNOSIS — U07.1 COVID-19: ICD-10-CM

## 2025-02-14 DIAGNOSIS — N30.00 ACUTE CYSTITIS WITHOUT HEMATURIA: Primary | ICD-10-CM

## 2025-02-14 LAB
ALBUMIN SERPL-MCNC: 4.2 G/DL (ref 3.4–4.8)
ALBUMIN/GLOB SERPL: 1.4 {RATIO} (ref 0.8–2)
ALP SERPL-CCNC: 181 U/L (ref 25–100)
ALT SERPL-CCNC: 177 U/L (ref 4–36)
AMMONIA PLAS-SCNC: 62 MCG/DL (ref 19–87)
AMPHET UR QL SCN: ABNORMAL
ANION GAP SERPL CALCULATED.3IONS-SCNC: 14 MMOL/L (ref 3–16)
AST SERPL-CCNC: 104 U/L (ref 8–33)
BACTERIA URNS QL MICRO: ABNORMAL /HPF
BARBITURATES UR QL SCN: ABNORMAL
BASOPHILS # BLD: 0 K/UL (ref 0–0.1)
BASOPHILS NFR BLD: 0.6 %
BENZODIAZ UR QL SCN: ABNORMAL
BILIRUB SERPL-MCNC: 0.7 MG/DL (ref 0.3–1.2)
BILIRUB UR QL STRIP.AUTO: NEGATIVE
BUN SERPL-MCNC: 34 MG/DL (ref 6–20)
BUPRENORPHINE QUAL, URINE: ABNORMAL
CALCIUM SERPL-MCNC: 10.9 MG/DL (ref 8.3–10.6)
CANNABINOIDS UR QL SCN: ABNORMAL
CHLORIDE SERPL-SCNC: 110 MMOL/L (ref 98–107)
CLARITY UR: ABNORMAL
CO2 SERPL-SCNC: 20 MMOL/L (ref 20–30)
COCAINE UR QL SCN: ABNORMAL
COLOR UR: YELLOW
CREAT SERPL-MCNC: 1.8 MG/DL (ref 0.4–1.2)
DRUG SCREEN COMMENT UR-IMP: ABNORMAL
EOSINOPHIL # BLD: 0 K/UL (ref 0–0.4)
EOSINOPHIL NFR BLD: 0.6 %
EPI CELLS #/AREA URNS HPF: ABNORMAL /HPF (ref 0–5)
ERYTHROCYTE [DISTWIDTH] IN BLOOD BY AUTOMATED COUNT: 16.6 % (ref 11–16)
FENTANYL SCREEN, URINE: NORMAL
GFR SERPLBLD CREATININE-BSD FMLA CKD-EPI: 29 ML/MIN/{1.73_M2}
GLOBULIN SER CALC-MCNC: 3.1 G/DL
GLUCOSE SERPL-MCNC: 169 MG/DL (ref 74–106)
GLUCOSE UR STRIP.AUTO-MCNC: >=1000 MG/DL
HCT VFR BLD AUTO: 36.5 % (ref 37–47)
HGB BLD-MCNC: 11.7 G/DL (ref 11.5–16.5)
HGB UR QL STRIP.AUTO: ABNORMAL
IMM GRANULOCYTES # BLD: 0 K/UL
IMM GRANULOCYTES NFR BLD: 0.4 % (ref 0–5)
KETONES UR STRIP.AUTO-MCNC: NEGATIVE MG/DL
LEUKOCYTE ESTERASE UR QL STRIP.AUTO: ABNORMAL
LYMPHOCYTES # BLD: 1.5 K/UL (ref 1.5–4)
LYMPHOCYTES NFR BLD: 20.4 %
MCH RBC QN AUTO: 29.4 PG (ref 27–32)
MCHC RBC AUTO-ENTMCNC: 32.1 G/DL (ref 31–35)
MCV RBC AUTO: 91.7 FL (ref 80–100)
METHADONE UR QL SCN: ABNORMAL
METHAMPHET UR QL SCN: ABNORMAL
MONOCYTES # BLD: 0.6 K/UL (ref 0.2–0.8)
MONOCYTES NFR BLD: 7.7 %
MUCOUS THREADS URNS QL MICRO: ABNORMAL /LPF
NEUTROPHILS # BLD: 5 K/UL (ref 2–7.5)
NEUTS SEG NFR BLD: 70.3 %
NITRITE UR QL STRIP.AUTO: POSITIVE
OPIATES UR QL SCN: POSITIVE
OXYCODONE UR QL SCN: ABNORMAL
PCP UR QL SCN: ABNORMAL
PH UR STRIP.AUTO: 5.5 [PH] (ref 5–8)
PLATELET # BLD AUTO: 207 K/UL (ref 150–400)
PMV BLD AUTO: 11.3 FL (ref 6–10)
POTASSIUM SERPL-SCNC: 4.1 MMOL/L (ref 3.4–5.1)
PROT SERPL-MCNC: 7.3 G/DL (ref 6.4–8.3)
PROT UR STRIP.AUTO-MCNC: >=300 MG/DL
RBC # BLD AUTO: 3.98 M/UL (ref 3.8–5.8)
RBC #/AREA URNS HPF: ABNORMAL /HPF (ref 0–4)
SARS-COV-2 RDRP RESP QL NAA+PROBE: DETECTED
SODIUM SERPL-SCNC: 144 MMOL/L (ref 136–145)
SP GR UR STRIP.AUTO: 1.02 (ref 1–1.03)
TRICYCLICS UR QL SCN: ABNORMAL
TROPONIN, HIGH SENSITIVITY: 33 NG/L (ref 0–14)
TROPONIN, HIGH SENSITIVITY: 34 NG/L (ref 0–14)
UA COMPLETE W REFLEX CULTURE PNL UR: YES
UA DIPSTICK W REFLEX MICRO PNL UR: YES
URN SPEC COLLECT METH UR: ABNORMAL
UROBILINOGEN UR STRIP-ACNC: 0.2 E.U./DL
WBC # BLD AUTO: 7.2 K/UL (ref 4–11)
WBC #/AREA URNS HPF: ABNORMAL /HPF (ref 0–5)
YEAST URNS QL MICRO: PRESENT /HPF

## 2025-02-14 PROCEDURE — 85025 COMPLETE CBC W/AUTO DIFF WBC: CPT

## 2025-02-14 PROCEDURE — 81001 URINALYSIS AUTO W/SCOPE: CPT

## 2025-02-14 PROCEDURE — 36415 COLL VENOUS BLD VENIPUNCTURE: CPT

## 2025-02-14 PROCEDURE — 2580000003 HC RX 258: Performed by: HOSPITALIST

## 2025-02-14 PROCEDURE — G0480 DRUG TEST DEF 1-7 CLASSES: HCPCS

## 2025-02-14 PROCEDURE — 71045 X-RAY EXAM CHEST 1 VIEW: CPT

## 2025-02-14 PROCEDURE — 96374 THER/PROPH/DIAG INJ IV PUSH: CPT

## 2025-02-14 PROCEDURE — 6370000000 HC RX 637 (ALT 250 FOR IP): Performed by: HOSPITALIST

## 2025-02-14 PROCEDURE — 87086 URINE CULTURE/COLONY COUNT: CPT

## 2025-02-14 PROCEDURE — 96375 TX/PRO/DX INJ NEW DRUG ADDON: CPT

## 2025-02-14 PROCEDURE — 99285 EMERGENCY DEPT VISIT HI MDM: CPT

## 2025-02-14 PROCEDURE — 87635 SARS-COV-2 COVID-19 AMP PRB: CPT

## 2025-02-14 PROCEDURE — 80053 COMPREHEN METABOLIC PANEL: CPT

## 2025-02-14 PROCEDURE — 80307 DRUG TEST PRSMV CHEM ANLYZR: CPT

## 2025-02-14 PROCEDURE — 6360000002 HC RX W HCPCS

## 2025-02-14 PROCEDURE — 82140 ASSAY OF AMMONIA: CPT

## 2025-02-14 PROCEDURE — 84484 ASSAY OF TROPONIN QUANT: CPT

## 2025-02-14 RX ORDER — LEVOFLOXACIN 250 MG/1
250 TABLET, FILM COATED ORAL DAILY
Qty: 10 TABLET | Refills: 0 | Status: SHIPPED | OUTPATIENT
Start: 2025-02-14 | End: 2025-02-24

## 2025-02-14 RX ORDER — ONDANSETRON 2 MG/ML
INJECTION INTRAMUSCULAR; INTRAVENOUS
Status: COMPLETED
Start: 2025-02-14 | End: 2025-02-14

## 2025-02-14 RX ORDER — 0.9 % SODIUM CHLORIDE 0.9 %
500 INTRAVENOUS SOLUTION INTRAVENOUS ONCE
Status: COMPLETED | OUTPATIENT
Start: 2025-02-14 | End: 2025-02-14

## 2025-02-14 RX ORDER — ONDANSETRON 2 MG/ML
4 INJECTION INTRAMUSCULAR; INTRAVENOUS ONCE
Status: COMPLETED | OUTPATIENT
Start: 2025-02-14 | End: 2025-02-14

## 2025-02-14 RX ORDER — NALOXONE HYDROCHLORIDE 0.4 MG/ML
0.4 INJECTION, SOLUTION INTRAMUSCULAR; INTRAVENOUS; SUBCUTANEOUS ONCE
Status: COMPLETED | OUTPATIENT
Start: 2025-02-14 | End: 2025-02-14

## 2025-02-14 RX ORDER — NALOXONE HYDROCHLORIDE 0.4 MG/ML
INJECTION, SOLUTION INTRAMUSCULAR; INTRAVENOUS; SUBCUTANEOUS
Status: COMPLETED
Start: 2025-02-14 | End: 2025-02-14

## 2025-02-14 RX ORDER — LEVOFLOXACIN 500 MG/1
500 TABLET, FILM COATED ORAL ONCE
Status: COMPLETED | OUTPATIENT
Start: 2025-02-14 | End: 2025-02-14

## 2025-02-14 RX ADMIN — NALXONE HYDROCHLORIDE 0.4 MG: 0.4 INJECTION INTRAMUSCULAR; INTRAVENOUS; SUBCUTANEOUS at 13:18

## 2025-02-14 RX ADMIN — SODIUM CHLORIDE 500 ML: 9 INJECTION, SOLUTION INTRAVENOUS at 14:58

## 2025-02-14 RX ADMIN — SODIUM CHLORIDE 500 ML: 9 INJECTION, SOLUTION INTRAVENOUS at 13:22

## 2025-02-14 RX ADMIN — LEVOFLOXACIN 500 MG: 500 TABLET, FILM COATED ORAL at 17:04

## 2025-02-14 RX ADMIN — ONDANSETRON 4 MG: 2 INJECTION INTRAMUSCULAR; INTRAVENOUS at 13:25

## 2025-02-14 RX ADMIN — NALOXONE HYDROCHLORIDE 0.4 MG: 0.4 INJECTION, SOLUTION INTRAMUSCULAR; INTRAVENOUS; SUBCUTANEOUS at 13:18

## 2025-02-14 NOTE — PROGRESS NOTES
Pt discharged at this time.  Pt IV removed.  Report given to [facility] for transport.  Report called to INDIRA Fletcher at [facility].

## 2025-02-14 NOTE — DISCHARGE INSTRUCTIONS
Attempted to send prescription for Paxlovid however specialty pharmacy apparently does not stock this medication.

## 2025-02-14 NOTE — ED TRIAGE NOTES
Pt arrived via  [facility], from [facility] No distress noted on arrival. Eyes open to loud verbal, minimal verbal response, grunts/groins occasionally in response     Reports was called from [facility]MS. Pt was complaining of CP earlier in day, was given Oxycodon and 2 SL Nitro alround 1200. Since then is not responding verablly.       Vital signs Prior to arrival, as reported by EMS;     SBP 90s    GCS 11  Eyes open to loud verbal, minimal verbal response, grunts/groins occasionally in response

## 2025-02-14 NOTE — ED PROVIDER NOTES
[facility]  EMERGENCY DEPARTMENT ENCOUNTER        Pt Name: Maude Corrales  MRN: [id number]  Birthdate 1950  Date of evaluation: 2/14/2025  Provider: Vadim King DO  PCP: Lazarus Ambrosio MD  Note Started: 1:03 PM EST 2/14/25    CHIEF COMPLAINT       Chief Complaint   Patient presents with    Altered Mental Status    Chest Pain       HISTORY OF PRESENT ILLNESS: 1 or more Elements     History from : EMS    Limitations to history : Altered Mental Status    Maude Corrales is a 74 y.o. female who presents to the emergency department for altered mental status and chest pain.  As far as EMS was able to give report because we did not get a report from the nursing facility on this patient prior to arrival.  Patient apparently was complaining of some chest pain earlier today they gave her one of her prescribed hydrocodone and then to nitroglycerin however shortly afterwards she started having altered mental status where she was staring off into space.  Patient was recently admitted here almost a month ago for urinary tract infection has indwelling Avila catheter in.  Otherwise the patient will occasionally shake her head yes or no but she is not truly answering questions verbally.  Baseline per EMS is that the patient usually does speak.  Patient shook her head no that she was not having any abdominal pain but she states she was nauseated with a shake of her head and she also said that she was having some chest discomfort by shaking her head yes.  She denies shortness of breath.  Otherwise she did not answer any further questions.  Past medical history is pertinent for angina pectoris, anxiety disorder, atherosclerotic heart disease, A-fib, coronary artery disease, cerebral infarct, chronic kidney disease, cirrhosis, constipation, COPD, cystic fibrosis, diabetes mellitus, dysphagia, encephalopathy, esophageal varices, GERD, history of CVA, hypertension, hyperlipidemia, mass on

## 2025-02-14 NOTE — ED NOTES
GCS of 11 prior to receiving, 0.4mg Narcan.      02/14/25 1322   [place] Coma Scale   Eye Opening (S)  4   Best Verbal Response (S)  4   Best Motor Response (S)  6   [place] Coma Scale Score 14

## 2025-02-16 PROBLEM — N39.0 UTI (URINARY TRACT INFECTION): Status: RESOLVED | Noted: 2025-01-17 | Resolved: 2025-02-16

## 2025-02-16 LAB — BACTERIA UR CULT: NORMAL

## 2025-03-04 DIAGNOSIS — Z51.5 PALLIATIVE CARE PATIENT: Primary | ICD-10-CM

## 2025-03-04 DIAGNOSIS — M51.360 DEGENERATION OF INTERVERTEBRAL DISC OF LUMBAR REGION WITH DISCOGENIC BACK PAIN: ICD-10-CM

## 2025-03-04 RX ORDER — LORAZEPAM 2 MG/ML
0.5 CONCENTRATE ORAL EVERY 8 HOURS PRN
Qty: 30 ML | Refills: 0 | Status: SHIPPED | OUTPATIENT
Start: 2025-03-04 | End: 2025-04-03

## 2025-03-04 RX ORDER — HYDROCODONE BITARTRATE AND ACETAMINOPHEN 5; 325 MG/1; MG/1
1 TABLET ORAL EVERY 8 HOURS
Qty: 90 TABLET | Refills: 0 | Status: SHIPPED | OUTPATIENT
Start: 2025-03-04 | End: 2025-04-03

## 2025-03-07 DIAGNOSIS — Z51.5 PALLIATIVE CARE PATIENT: Primary | ICD-10-CM

## 2025-03-07 RX ORDER — MORPHINE SULFATE 100 MG/5ML
5 SOLUTION ORAL EVERY 4 HOURS PRN
Qty: 30 ML | Refills: 0 | Status: SHIPPED | OUTPATIENT
Start: 2025-03-07 | End: 2025-03-21

## 2025-06-27 NOTE — TELEPHONE ENCOUNTER
New rx sent with updated SIG.   Patient recently hospitalized for weakness, anemia, hypokalemia and UTI. Received iron infusion and K replacement. Followed up with PCP and wore a 22 hr monitor. Read by PW. Showed 100% afib burden, average  (I have the monitor for you to review).     Labs (11/28/2023):  Glucose 200, creat 1.3, BUN 13, K 3.4, Na 139, AST 18, ALT 14  WBC 5.5, RBC 3.74, HGB 10.9, HCT 33.3,     She is on metoprolol succinate 25 mg daily, Jardiance 10 mg daily, hydralazine 25 mg BID, furosemide 20 mg daily and KDur 10 mEq daily.    Did you want to increase metoprolol or change to something else? Add Thomas? She no showed her last appt with cardiology and GI.     I called the patient and asked her to come in for a visit tomorrow. She declined saying she felt much better. She said that her PCP had not contacted her yet with her monitor results.

## (undated) DEVICE — SOL IRR H2O BO 1000ML STRL

## (undated) DEVICE — LUBE JELLY FOIL PACK 1.4 OZ: Brand: MEDLINE INDUSTRIES, INC.

## (undated) DEVICE — STRIP,CLOSURE,WOUND,MEDI-STRIP,1/2X4: Brand: MEDLINE

## (undated) DEVICE — TUBING, SUCTION, 1/4" X 10', STRAIGHT: Brand: MEDLINE

## (undated) DEVICE — INTRO ACCSR BLNT TP

## (undated) DEVICE — Device

## (undated) DEVICE — CONTN GRAD MEAS TRIANG 32OZ BLK

## (undated) DEVICE — LUBE JELLY PK/2.75GM STRL BX/144

## (undated) DEVICE — ST LINER SAFECAP GRN RED CP STRL

## (undated) DEVICE — FRCP BX RADJAW4 NDL 2.8 240 STD OG

## (undated) DEVICE — HYBRID CO2 TUBING/CAP SET FOR OLYMPUS® SCOPES & CO2 SOURCE: Brand: ERBE

## (undated) DEVICE — HYBRID TUBING/CAP SET FOR OLYMPUS® SCOPES: Brand: ERBE

## (undated) DEVICE — SUT SILK 2/0 SH 30IN K833H

## (undated) DEVICE — NDL HYPO ECLPS SFTY 25G 1 1/2IN

## (undated) DEVICE — ENDOSCOPY PORT CONNECTOR FOR OLYMPUS® SCOPES: Brand: ERBE

## (undated) DEVICE — Device: Brand: SPOT EX ENDOSCOPIC TATTOO

## (undated) DEVICE — AIR/WATER CLEANING VALVES: Brand: AIR/WATER CLEANING VALVES

## (undated) DEVICE — THE BITE BLOCK MAXI, LATEX FREE STRAP IS USED TO PROTECT THE ENDOSCOPE INSERTION TUBE FROM BEING BITTEN BY THE PATIENT.

## (undated) DEVICE — QUICK CATCH IN-LINE SUCTION POLYP TRAP IS USED FOR SUCTION RETRIEVAL OF ENDOSCOPICALLY REMOVED POLYPS.

## (undated) DEVICE — GLV SURG SENSICARE W/ALOE PF LF 7.5 STRL

## (undated) DEVICE — UNDYED BRAIDED (POLYGLACTIN 910), SYNTHETIC ABSORBABLE SUTURE: Brand: COATED VICRYL

## (undated) DEVICE — CONMED SCOPE SAVER BITE BLOCK, 20X27 MM: Brand: SCOPE SAVER

## (undated) DEVICE — COVADERM: Brand: DEROYAL

## (undated) DEVICE — RICH MAJOR PROCEDURE: Brand: MEDLINE INDUSTRIES, INC.

## (undated) DEVICE — PATIENT RETURN ELECTRODE, SINGLE-USE, CONTACT QUALITY MONITORING, ADULT, WITH 9FT CORD, FOR PATIENTS WEIGING OVER 33LBS. (15KG): Brand: MEGADYNE

## (undated) DEVICE — VLV SXN AIR/H2O ORCAPOD3 1P/U STRL

## (undated) DEVICE — KT ORCA ORCAPOD DISP STRL

## (undated) DEVICE — SYR LUERLOK 50ML

## (undated) DEVICE — SINGLE-USE POLYPECTOMY SNARE: Brand: CAPTIVATOR II

## (undated) DEVICE — DRSNG WND BORDR/ADHS NONADHR/GZ LF 4X4IN STRL

## (undated) DEVICE — SUCTION CANISTER, 1500CC, RIGID: Brand: DEROYAL

## (undated) DEVICE — SOLIDIFIER LIQ PREMISORB 1500CC

## (undated) DEVICE — FIAPC® PROBE W/ FILTER 2200 C OD 2.3MM/6.9FR; L 2.2M/7.2FT: Brand: ERBE

## (undated) DEVICE — FIRST STEP BEDSIDE ADD WATER KIT - RESEALABLE STAND-UP POUCH, ENDOSCOPIC CLEANING PAD - 1 POUCH: Brand: FIRST STEP BEDSIDE ADD WATER KIT - RESEALABLE STAND-UP POUCH, ENDOSCOPIC CLEANIN

## (undated) DEVICE — FLEXIBLE YANKAUER,MEDIUM TIP, NO VACUUM CONTROL: Brand: ARGYLE

## (undated) DEVICE — SUT VIC 3/0 SH 36IN J527H

## (undated) DEVICE — SYR LL TP 10ML STRL